# Patient Record
Sex: FEMALE | Race: WHITE | Employment: UNEMPLOYED | URBAN - METROPOLITAN AREA
[De-identification: names, ages, dates, MRNs, and addresses within clinical notes are randomized per-mention and may not be internally consistent; named-entity substitution may affect disease eponyms.]

---

## 2017-01-23 ENCOUNTER — OFFICE VISIT (OUTPATIENT)
Dept: FAMILY MEDICINE | Facility: CLINIC | Age: 22
End: 2017-01-23

## 2017-01-23 VITALS
BODY MASS INDEX: 23.64 KG/M2 | OXYGEN SATURATION: 100 % | HEART RATE: 109 BPM | WEIGHT: 156 LBS | SYSTOLIC BLOOD PRESSURE: 116 MMHG | HEIGHT: 68 IN | DIASTOLIC BLOOD PRESSURE: 73 MMHG | RESPIRATION RATE: 14 BRPM | TEMPERATURE: 98.1 F

## 2017-01-23 DIAGNOSIS — E55.9 VITAMIN D DEFICIENCY: ICD-10-CM

## 2017-01-23 DIAGNOSIS — F45.9 SOMATOFORM DISORDER: ICD-10-CM

## 2017-01-23 DIAGNOSIS — N80.9 ENDOMETRIOSIS: ICD-10-CM

## 2017-01-23 DIAGNOSIS — G89.29 CHRONIC ABDOMINAL PAIN: Primary | ICD-10-CM

## 2017-01-23 DIAGNOSIS — E16.2 HYPOGLYCEMIA: ICD-10-CM

## 2017-01-23 DIAGNOSIS — R09.81 NASAL CONGESTION: ICD-10-CM

## 2017-01-23 DIAGNOSIS — K83.4 SPHINCTER OF ODDI DYSFUNCTION: ICD-10-CM

## 2017-01-23 DIAGNOSIS — F33.1 MAJOR DEPRESSIVE DISORDER, RECURRENT EPISODE, MODERATE (H): ICD-10-CM

## 2017-01-23 DIAGNOSIS — L70.0 ACNE VULGARIS: ICD-10-CM

## 2017-01-23 DIAGNOSIS — L85.8 KP (KERATOSIS PILARIS): ICD-10-CM

## 2017-01-23 DIAGNOSIS — R06.2 WHEEZE: ICD-10-CM

## 2017-01-23 DIAGNOSIS — R10.9 CHRONIC ABDOMINAL PAIN: Primary | ICD-10-CM

## 2017-01-23 DIAGNOSIS — K86.1 IDIOPATHIC CHRONIC PANCREATITIS (H): ICD-10-CM

## 2017-01-23 DIAGNOSIS — R10.9 RECURRING ABDOMINAL PAIN: ICD-10-CM

## 2017-01-23 RX ORDER — GABAPENTIN 300 MG/1
300 CAPSULE ORAL 3 TIMES DAILY
Qty: 90 CAPSULE | Refills: 5 | Status: ON HOLD | OUTPATIENT
Start: 2017-01-23 | End: 2018-01-04

## 2017-01-23 RX ORDER — ONDANSETRON 4 MG/1
4 TABLET, ORALLY DISINTEGRATING ORAL EVERY 8 HOURS PRN
Qty: 40 TABLET | Refills: 5 | Status: ON HOLD | OUTPATIENT
Start: 2017-01-23 | End: 2017-12-13

## 2017-01-23 RX ORDER — ERGOCALCIFEROL 1.25 MG/1
CAPSULE, LIQUID FILLED ORAL
Qty: 8 CAPSULE | Refills: 5 | Status: ON HOLD | OUTPATIENT
Start: 2017-01-23 | End: 2017-12-06

## 2017-01-23 RX ORDER — FLUTICASONE PROPIONATE 50 MCG
2 SPRAY, SUSPENSION (ML) NASAL DAILY
Qty: 16 G | Refills: 3 | Status: SHIPPED | OUTPATIENT
Start: 2017-01-23

## 2017-01-23 RX ORDER — LEVALBUTEROL TARTRATE 45 UG/1
2 AEROSOL, METERED ORAL EVERY 6 HOURS PRN
Qty: 1 INHALER | Refills: 1 | Status: SHIPPED | OUTPATIENT
Start: 2017-01-23

## 2017-01-23 RX ORDER — NORTRIPTYLINE HCL 10 MG
CAPSULE ORAL
Qty: 60 CAPSULE | Refills: 5 | Status: ON HOLD | OUTPATIENT
Start: 2017-01-23 | End: 2017-12-13

## 2017-01-23 RX ORDER — LANCETS
EACH MISCELLANEOUS
Qty: 1 BOX | Refills: 5 | Status: ON HOLD | OUTPATIENT
Start: 2017-01-23 | End: 2017-12-06

## 2017-01-23 RX ORDER — TRIAMCINOLONE ACETONIDE 1 MG/G
CREAM TOPICAL 2 TIMES DAILY
Qty: 45 G | Refills: 3 | Status: ON HOLD | OUTPATIENT
Start: 2017-01-23 | End: 2017-12-06

## 2017-01-23 RX ORDER — NORETHINDRONE ACETATE 5 MG
5 TABLET ORAL DAILY
Qty: 90 TABLET | Refills: 1 | Status: SHIPPED | OUTPATIENT
Start: 2017-01-23

## 2017-01-23 RX ORDER — ADAPALENE 0.1 G/100G
CREAM TOPICAL
Qty: 45 G | Refills: 3 | Status: ON HOLD | OUTPATIENT
Start: 2017-01-23 | End: 2017-12-06

## 2017-01-23 ASSESSMENT — ANXIETY QUESTIONNAIRES
5. BEING SO RESTLESS THAT IT IS HARD TO SIT STILL: NOT AT ALL
6. BECOMING EASILY ANNOYED OR IRRITABLE: NOT AT ALL
3. WORRYING TOO MUCH ABOUT DIFFERENT THINGS: SEVERAL DAYS
7. FEELING AFRAID AS IF SOMETHING AWFUL MIGHT HAPPEN: NOT AT ALL
IF YOU CHECKED OFF ANY PROBLEMS ON THIS QUESTIONNAIRE, HOW DIFFICULT HAVE THESE PROBLEMS MADE IT FOR YOU TO DO YOUR WORK, TAKE CARE OF THINGS AT HOME, OR GET ALONG WITH OTHER PEOPLE: NOT DIFFICULT AT ALL
GAD7 TOTAL SCORE: 2
2. NOT BEING ABLE TO STOP OR CONTROL WORRYING: NOT AT ALL
1. FEELING NERVOUS, ANXIOUS, OR ON EDGE: SEVERAL DAYS

## 2017-01-23 ASSESSMENT — PATIENT HEALTH QUESTIONNAIRE - PHQ9: 5. POOR APPETITE OR OVEREATING: NOT AT ALL

## 2017-01-23 ASSESSMENT — PAIN SCALES - GENERAL: PAINLEVEL: MILD PAIN (3)

## 2017-01-23 NOTE — PATIENT INSTRUCTIONS
Vitamin D  Does this test have other names?  25-hydroxyvitamin D (25-high-DROX-ee-VIE-tuh-min D), 25(OH)D  What is this test?  Vitamin D is mainly found in fortified dairy foods, juice, breakfast cereal, and certain fish. This vitamin plays many roles in the body. But because it helps the body absorb calcium from foods and supplements, it's particularly important for bone health. Vitamin D has many additional roles in the body.  Vitamin D comes in several forms. When ultraviolet light, such as sunlight, hits your skin, it creates vitamin D3. D2 is used to fortify dairy foods. Both of these are further processed by your liver and kidneys into a form your body can use. Most tests for vitamin D check the level of a form circulating in the body called 25-hydroxyvitamin D, also called 25(OH)D.   Why do I need this test?  Vitamin D testing has become much more popular in recent years. Your healthcare provider may check your vitamin D levels to find out if you have any risks to bone health. These might be:    Low calcium    Soft bones caused by low vitamin D or problems using it (osteomalacia)    Osteopenia    Osteoporosis    Rickets, in children  You may also need this test if you are at risk for low vitamin D levels. Risks include:    Being an older adult    Having difficulty absorbing fat from your diet    Having chronic kidney disease    Have dark skin pigmentation    Being a  baby  Vitamin D has many effects in the body. You may need this test to help your provider diagnose or treat:    Problems with the parathyroid gland    Cancer    Autoimmune diseases such as multiple sclerosis and Crohn's disease    Psoriasis    Asthma    Weakness or falls    What other tests might I have along with this test?  A healthcare provider may also want to check your parathyroid hormone levels and your calcium levels.   What do my test results mean?  A result for a lab test may be affected by many things, including the method  the laboratory uses to do the test. If your test results are different from the normal value, you may not have a problem. To learn what the results mean for you, talk with your healthcare provider.  Children and adults need more than 30 nanograms per milliliter (ng/ml) of vitamin D. The optimal level of 25(OH)D is usually between 30 and 60 ng/mL. Recommended daily amounts range from 400 to 800 international units (IU) per day based on your age.  Levels lower than normal can mean you are:    Not making enough vitamin D on your own    Not getting enough vitamin D in your diet    Not absorbing vitamin D from your food as you should  Lower levels may also mean that your body is not converting the vitamin as it should. This might be because of kidney or liver disease.  Above-normal levels may be a sign that you're taking too much in supplement form.   How is this test done?  The test requires a blood sample, which is drawn through a needle from a vein in your arm.  Does this test pose any risks?  Taking a blood sample with a needle carries risks that include bleeding, infection, bruising, and a sense of lightheadedness. When the needle pricks your arm, you may feel a slight stinging sensation or pain. Afterward, the site may be slightly sore.   What might affect my test results?  The amount of time you spend in the sunlight, your diet, and whether you take vitamin D in supplement form can affect your vitamin D levels. Ask your healthcare provider if any health conditions you have or medicines you take could affect your results.  How do I get ready for this test?  Tell your healthcare provider if you take vitamin D supplements. Also be sure your provider knows about all medicines, herbs, vitamins, and supplements you are taking. This includes medicines that don't need a prescription and any illicit drugs you may use.     7501-8447 [employer]. [address] [place] [address], [place] [address]. All rights reserved.  This information is not intended as a substitute for professional medical care. Always follow your healthcare professional's instructions.

## 2017-01-23 NOTE — PROGRESS NOTES
HPI:       Alexandra Melgoza is a 21 year old who presents for the following  Patient presents with:  Medication Request: Refill. Student in [place].     Alexandra is a 21 year old female who presents with her mother Leeann for refills on nearly all of her medications. She is new to this writer and new to this clinic. Alexandra reports she lives in [place] but used to live in [place] and still receives some of her health care in [place]. Most recently, she had some insurance changes on 1/1/17  And she's had difficulty getting her medications covered in [place]. She would like her meds refilled, she reports she is unable to get in to see her PCP prior to leaving today to head back to [place]. She does have a PCP she sees in [place] who helps refill her controlled medications. Patient's med list was reviewed along with reasons she is taking medication and if med refills were needed.     Recently, she got a kidney infection, went to the ED, she is still having some dysuria and is still taking her antibiotic, she will finish within the next 24 hours, she still has some mild dysuria. At night she spikes a low grade fever. It is resolving. She has some concerns that she may have a bladder infection. She is on a 7 days course of Keflex 500 q 12 hrs. She will finish this medication, no refills needed.       Hx of Chronic pancreatitis. Uses Kytril for nausea/vomiting and this was recently refilled 12/29/16, no refills needed. She Nausea last night, her last emesis was 3 days. She typically has emesis on a weekly basis. She's gone 2-3 days without vomiting. On the medication she will go one week. PCP watches weight and she will follow up with nutritionist. 2 months ago hospitalized for pancreatitis for 9 days in [place], lost weight but has regained it. Weight 156 and stable. She gets tooth cleanings to monitor the effect of the emesis acid on her teeth.    Ativan 0.6 mg tablet- PCP in [place] fills, takes as needed for anxiety,  uses on average once a day without Kytril as her anxiety is worse if she has increased N/V. In the past it was scheduled. In the past she was taking it 2 x day. Now prn, typically uses once or twice a day.     Hydromorphone filled by PCP 3-4 x a day as needed. Her pain comes on after eating anything. She eats small amounts more frequently. She takes it after meals if she has pain only if the pain isn't first relieved by the Tramadol. She has hx of diarrhea in the past, now stool is normal. BM at least once a day sometimes twice. She did not need any pain medication until two months ago after being hospitalized for acute on chronic pancreatitis.      Gabapentin 300 mg po daily, she takes am, afternoon evening for pain.      Takes Zenpep prior to eating or ingesting anything. In the past she lost 30 pounds, almost had GJ tube placed, Zenpep helps with pain. She takes 1-5 with snacks and meals and can take up to 15 per day. She takes at most 3- before during and after a meal 3 times a day (min 9 tabs a day).    Nortriptyline 10 mg; 2 tabs at bedtime for anxiety and chronic pain. No side effects, when she first started she had dry mouth.     OB/Gyn does lupron injection. She is due in 2 months. Treatment for endometriosis.     No diabetes but sometimes gets hypoglycemia, blood sugar can get low- 40s-50s and she has hx of hypoglycemia. She gets tingly when he blood sugar is low. She last took her glucose 40% gel last night, she takes on average 2 x a day. She finds she takes it before meals. She doesn't routinely check her blood sugars. She hasn't needed to use her glucagon.     Takes zofran as needed. She will take this if she doesn't have improvement with the Kytril.       Aygestin, for endometriosis. Helps with hot flashes.    Uses metrogel as needed to skin for keratosis pilaris on arms, it started when she became lactose intolerant.    Uses adapalene for face for break outs of skin and acne.    Uses rizatriptan once  or twice a year for migraines. Still has refills.     GI provider gives infusion orders. She will get infusions of LR For chronic pancreatitis and dehydration.     Tramadol taking max 4 tablets daily, takes 2-3 as needed for stomach pain. Uses this first prior to hydromorphone. She restarted pain meds 2 months ago.     Unsure of last vitamin D. Has been on vitamin D replacement for 2 years. She is open to getting her vitamin D level checked.     She uses kenalog for eczema to her hands.    She had lab work performed by GI 12/12/16    Problem, Medication and Allergy Lists were reviewed and are current.  Patient is   a new patient to this clinic and so  I reviewed/updated the Past Medical History, the Family History and the Social History. ,   Past Medical History   Diagnosis Date     Chronic abdominal pain      Ovarian cysts      Endometriosis      Mild intermittent asthma      Cholecystitis      s/p cholecystectomy     Somatoform disorder      Anxiety      Depression      PONV (postoperative nausea and vomiting)      Asthma      Chronic pain      Sphincter of Oddi dysfunction      Cyclic vomiting syndrome 10/27/2012     Vasovagal syncope      Pseudoseizures      Migraines      Pancreatic disease      Hypoglycaemia      Chronic infection      mrsa   ,   Family History     Problem (# of Occurrences) Relation (Name,Age of Onset)    Allergies (1) Maternal Grandmother    Anxiety Disorder (1) Other (paternal cousin)    Bipolar Disorder (1) Other (paternal cousin)    CEREBROVASCULAR DISEASE (1) Maternal Grandfather    Cardiovascular (1) Maternal Grandfather    DIABETES (1) Paternal Uncle    Depression (1) Paternal Grandmother    Depression/Anxiety (1) Maternal Grandfather    GASTROINTESTINAL DISEASE (1) Maternal Grandfather    Hypertension (1) Father    Hypoglycemia (1) Brother       Negative family history of: CANCER       and   Social History     Social History     Marital Status: Single     Spouse Name: N/A     Number  "of Children: N/A     Years of Education: N/A     Social History Main Topics     Smoking status: Never Smoker      Smokeless tobacco: Never Used     Alcohol Use: No     Drug Use: No     Sexual Activity: No     Other Topics Concern     Parent/Sibling W/ Cabg, Mi Or Angioplasty Before 65f 55m? 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, and works part-time     Focusing on DBT therapy - individual and group therapy    Currently living with her mother at her grandmother's home        Considering going to nursing school            Review of Systems:   Review of Systems    ROS: 10 point ROS neg other than the symptoms noted above in the HPI.         Physical Exam:   Patient Vitals for the past 24 hrs:   BP Temp Temp src Pulse Resp SpO2 Height Weight   01/23/17 1107 116/73 mmHg 98.1  F (36.7  C) Oral 109 14 100 % 5' 7.8\" (172.2 cm) 156 lb (70.761 kg)     Body mass index is 23.86 kg/(m^2).  Vital signs normal except  (pt reports this is normal for her)     Physical Exam   Constitutional: She is oriented to person, place, and time. She appears well-developed and well-nourished.   HENT:   Head: Normocephalic and atraumatic.   Nose: Nose normal.   Mouth/Throat: Oropharynx is clear and moist.   Eyes: Pupils are equal, round, and reactive to light.   Neck: Neck supple.   Cardiovascular: Normal rate and regular rhythm.    Pulmonary/Chest: Effort normal and breath sounds normal.   Lymphadenopathy:     She has no cervical adenopathy.   Neurological: She is alert and oriented to person, place, and time.   Skin: Skin is warm and dry.   Psychiatric: Her behavior is normal. Her mood appears anxious.         Results:      Results from the last 24 hoursNo results found. However, due to the size of the patient record, not all encounters were searched. Please check Results Review for a complete set of results.  Assessment and Plan     Alexandra was seen today for medication request.   21 year old female, PCP " Dr. Baez, patient unable to get same day appointment for refills and is leaving to head back to [place] where she lives. She does get some care in [place] and will stay with her mother Leeann who lives in [place]. She is requesting refills until she can get her insurance straightened out as well as get appointment for PCP. All medications refills for 6 months. Reviewed most recent lab work. Will get vitamin D level, if normal, will call patient and tell her to hold vitamin D supplementation. Total time spent reviewing each medication and getting refills and reviewing chart history is 45 min of 50 minute visit.     Diagnoses and all orders for this visit:    Chronic abdominal pain  -     gabapentin (NEURONTIN) 300 MG capsule; Take 1 capsule (300 mg) by mouth 3 times daily    Sphincter of Oddi dysfunction  -     gabapentin (NEURONTIN) 300 MG capsule; Take 1 capsule (300 mg) by mouth 3 times daily    Recurring abdominal pain  -     gabapentin (NEURONTIN) 300 MG capsule; Take 1 capsule (300 mg) by mouth 3 times daily    PROVISIONAL DX: Somatoform disorder  -     gabapentin (NEURONTIN) 300 MG capsule; Take 1 capsule (300 mg) by mouth 3 times daily    Idiopathic chronic pancreatitis (H)  -     amylase-lipase-protease (ZENPEP) 59762 UNITS CPEP; Take 1-5 with snacks and meals, up to 15 per day.  -     ondansetron (ZOFRAN ODT) 4 MG ODT tab; Take 1 tablet (4 mg) by mouth every 8 hours as needed for nausea or vomiting    Major depressive disorder, recurrent episode, moderate (H)  -     nortriptyline (PAMELOR) 10 MG capsule; Take 2 tablets by mouth at bedtime.  -     vitamin D (ERGOCALCIFEROL) 68883 UNIT capsule; Take one capsule by mouth twice a week    Endometriosis  -     norethindrone (AYGESTIN) 5 MG tablet; Take 1 tablet (5 mg) by mouth daily    Acne vulgaris  -     adapalene (DIFFERIN) 0.1 % cream; Apply pea-sized amount to face, chest, and back at bedtime. Initially start every 2 days, then every other day, then  daily as tolerated.    KP (keratosis pilaris)  -     triamcinolone (KENALOG) 0.1 % cream; Apply topically 2 times daily    Nasal congestion  -     fluticasone (FLONASE) 50 MCG/ACT spray; Spray 2 sprays into both nostrils daily    Hypoglycemia  -     blood glucose monitoring (ACCU-CHEK FASTCLIX) lancets; Use to test blood sugar 2 times daily or as directed.  -     blood glucose monitoring (NO BRAND SPECIFIED) test strip; Use to test blood sugars 2 times daily or as directed    Wheeze  -     levalbuterol (XOPENEX HFA) 45 MCG/ACT Inhaler; Inhale 2 puffs into the lungs every 6 hours as needed for shortness of breath / dyspnea    Vitamin D deficiency  -     Vitamin D Deficiency      Medications Discontinued During This Encounter   Medication Reason     fludrocortisone (FLORINEF) 0.1 MG tablet Therapy completed     Options for treatment and follow-up care were reviewed with the patient. Alexandra Melgoza  engaged in the decision making process and verbalized understanding of the options discussed and agreed with the final plan.    Ysabel Goddard, CATIA    Patient Instructions     Vitamin D  Does this test have other names?  25-hydroxyvitamin D (25-high-DROX-ee-VIE-tuh-min D), 25(OH)D  What is this test?  Vitamin D is mainly found in fortified dairy foods, juice, breakfast cereal, and certain fish. This vitamin plays many roles in the body. But because it helps the body absorb calcium from foods and supplements, it's particularly important for bone health. Vitamin D has many additional roles in the body.  Vitamin D comes in several forms. When ultraviolet light, such as sunlight, hits your skin, it creates vitamin D3. D2 is used to fortify dairy foods. Both of these are further processed by your liver and kidneys into a form your body can use. Most tests for vitamin D check the level of a form circulating in the body called 25-hydroxyvitamin D, also called 25(OH)D.   Why do I need this test?  Vitamin D testing has become much  more popular in recent years. Your healthcare provider may check your vitamin D levels to find out if you have any risks to bone health. These might be:    Low calcium    Soft bones caused by low vitamin D or problems using it (osteomalacia)    Osteopenia    Osteoporosis    Rickets, in children  You may also need this test if you are at risk for low vitamin D levels. Risks include:    Being an older adult    Having difficulty absorbing fat from your diet    Having chronic kidney disease    Have dark skin pigmentation    Being a  baby  Vitamin D has many effects in the body. You may need this test to help your provider diagnose or treat:    Problems with the parathyroid gland    Cancer    Autoimmune diseases such as multiple sclerosis and Crohn's disease    Psoriasis    Asthma    Weakness or falls    What other tests might I have along with this test?  A healthcare provider may also want to check your parathyroid hormone levels and your calcium levels.   What do my test results mean?  A result for a lab test may be affected by many things, including the method the laboratory uses to do the test. If your test results are different from the normal value, you may not have a problem. To learn what the results mean for you, talk with your healthcare provider.  Children and adults need more than 30 nanograms per milliliter (ng/ml) of vitamin D. The optimal level of 25(OH)D is usually between 30 and 60 ng/mL. Recommended daily amounts range from 400 to 800 international units (IU) per day based on your age.  Levels lower than normal can mean you are:    Not making enough vitamin D on your own    Not getting enough vitamin D in your diet    Not absorbing vitamin D from your food as you should  Lower levels may also mean that your body is not converting the vitamin as it should. This might be because of kidney or liver disease.  Above-normal levels may be a sign that you're taking too much in supplement form.   How  is this test done?  The test requires a blood sample, which is drawn through a needle from a vein in your arm.  Does this test pose any risks?  Taking a blood sample with a needle carries risks that include bleeding, infection, bruising, and a sense of lightheadedness. When the needle pricks your arm, you may feel a slight stinging sensation or pain. Afterward, the site may be slightly sore.   What might affect my test results?  The amount of time you spend in the sunlight, your diet, and whether you take vitamin D in supplement form can affect your vitamin D levels. Ask your healthcare provider if any health conditions you have or medicines you take could affect your results.  How do I get ready for this test?  Tell your healthcare provider if you take vitamin D supplements. Also be sure your provider knows about all medicines, herbs, vitamins, and supplements you are taking. This includes medicines that don't need a prescription and any illicit drugs you may use.     5325-2277 [employer]. [address] [place] [address], [place] [address]. All rights reserved. This information is not intended as a substitute for professional medical care. Always follow your healthcare professional's instructions.

## 2017-01-23 NOTE — NURSING NOTE
"21 year old  Chief Complaint   Patient presents with     Medication Request     Refill. Student in [place].        Blood pressure 116/73, pulse 109, temperature 98.1  F (36.7  C), temperature source Oral, resp. rate 14, height 5' 7.8\" (172.2 cm), weight 156 lb (70.761 kg), SpO2 100 %, not currently breastfeeding. Body mass index is 23.86 kg/(m^2).  BP completed using cuff size: regular    Patient Active Problem List   Diagnosis     Chronic abdominal pain     Nausea     PROVISIONAL DX: Somatoform disorder     Opioid dependence (H)     Encounter for long-term opiate analgesic use     Thrombocytopenia (H)     Dystonia     Sphincter of Oddi dysfunction     Dehydration     Vomiting     Migraine     Pancreatitis     Anxiety     Dysmenorrhea     Seizure- worked up and decided that it was pseudoseizure      POTS (postural orthostatic tachycardia syndrome)     Shingles (herpes zoster) polyneuropathy     Postherpetic neuralgia     Recurring abdominal pain     Cyclic vomiting syndrome     Dysuria     Endometriosis     Asthma     Eating disorder     Major depressive disorder, recurrent episode, mild (H)     Rash     Major depressive disorder, recurrent episode, moderate (H)     Anxiety state     Myalgia and myositis     Urge incontinence     Nausea & vomiting     Hypoglycemia     Palpitations       Wt Readings from Last 2 Encounters:   01/23/17 156 lb (70.761 kg)   12/12/16 155 lb 3.3 oz (70.4 kg)     BP Readings from Last 3 Encounters:   01/23/17 116/73   12/12/16 119/73   12/10/16 117/76       Current Outpatient Prescriptions   Medication     granisetron (KYTRIL) 1 MG tablet     LORazepam (ATIVAN) 0.5 MG tablet     HYDROMORPHONE HCL PO     gabapentin (NEURONTIN) 300 MG capsule     amylase-lipase-protease (ZENPEP) 48364 UNITS CPEP     amylase-lipase-protease (ZENPEP) 97439 UNITS CPEP     nortriptyline (PAMELOR) 10 MG capsule     leuprolide (LUPRON DEPOT) 11.25 MG injection     glucose (GLUCOSE 15) 40 % GEL     granisetron (KYTRIL) " 1 MG tablet     norethindrone (AYGESTIN) 5 MG tablet     metroNIDAZOLE (METROGEL) 0.75 % gel     adapalene (DIFFERIN) 0.1 % cream     RIZATRIPTAN BENZOATE PO     lactated ringers SOLN     traMADol (ULTRAM) 50 MG tablet     vitamin D (ERGOCALCIFEROL) 44848 UNIT capsule     triamcinolone (KENALOG) 0.1 % cream     tretinoin (RETIN-A) 0.025 % cream     ONE TOUCH LANCETS MISC     Blood Glucose Monitoring Suppl KIT     fluticasone (FLONASE) 50 MCG/ACT nasal spray     blood glucose (ACCU-CHEK SMARTVIEW) test strip     blood glucose monitoring (ACCU-CHEK FASTCLIX) lancets     leuprolide (LUPRON DEPOT) 11.25 MG injection     ibuprofen (ADVIL) 200 MG capsule     levalbuterol (XOPENEX HFA) 45 MCG/ACT inhaler     fluticasone-salmeterol (ADVAIR) 250-50 MCG/DOSE diskus inhaler     glucagon (GLUCAGON EMERGENCY) 1 MG injection     No current facility-administered medications for this visit.       Social History   Substance Use Topics     Smoking status: Never Smoker      Smokeless tobacco: Never Used     Alcohol Use: No       Health Maintenance Due   Topic Date Due     HPV IMMUNIZATION (2 of 3 - Female 3 Dose Series) 04/11/2011     URINE DRUG SCREEN Q1 YR  04/03/2014     ASTHMA CONTROL TEST Q6 MOS (NO INBASKET)  04/28/2016     PHQ-9 Q1 MONTH (NO INBASKET)  05/14/2016     PHQ-9 Q3 MONTHS (NO INBASKET)  07/14/2016     TANISHA QUESTIONNAIRE 1 YEAR  10/28/2016     DEPRESSION ACTION PLAN Q1 YR (NO INBASKET)  10/28/2016     ASTHMA ACTION PLAN Q1 YR (NO INBASKET)  02/11/2017       PAP      NIL   12/18/2014    PHQ-2 ( 1999 Pf[employer]) 1/23/2017 10/28/2015   Q1: Little interest or pleasure in doing things 0 0   Q2: Feeling down, depressed or hopeless 0 0   PHQ-2 Score 0 0       PHQ-9 SCORE 10/28/2015 2/11/2016 4/14/2016 1/23/2017   Total Score - - - -   Total Score - - - -   Total Score 1 4 0 1   Some encounter information is confidential and restricted. Go to Review Flowsheets activity to see all data.       TANISHA-7 SCORE 5/8/2015 10/28/2015  1/23/2017   Total Score - 0 2   Total Score 10 - -   Total Score [facility] Adult - - -       Renita Oscar CMA  January 23, 2017 11:11 AM

## 2017-01-23 NOTE — MR AVS SNAPSHOT
After Visit Summary   1/23/2017    Alexandra Melgoza    MRN: [id number]           Patient Information     Date Of Birth          1995        Visit Information        Provider Department      1/23/2017 11:00 AM Ysabel Goddard NP [facility]        Today's Diagnoses     Chronic abdominal pain    -  1     Sphincter of Oddi dysfunction         Recurring abdominal pain         PROVISIONAL DX: Somatoform disorder         Idiopathic chronic pancreatitis (H)         Major depressive disorder, recurrent episode, moderate (H)         Endometriosis         Acne vulgaris         KP (keratosis pilaris)         Nasal congestion         Hypoglycemia         Wheeze         Vitamin D deficiency           Care Instructions      Vitamin D  Does this test have other names?  25-hydroxyvitamin D (25-high-DROX-ee-VIE-tuh-min D), 25(OH)D  What is this test?  Vitamin D is mainly found in fortified dairy foods, juice, breakfast cereal, and certain fish. This vitamin plays many roles in the body. But because it helps the body absorb calcium from foods and supplements, it's particularly important for bone health. Vitamin D has many additional roles in the body.  Vitamin D comes in several forms. When ultraviolet light, such as sunlight, hits your skin, it creates vitamin D3. D2 is used to fortify dairy foods. Both of these are further processed by your liver and kidneys into a form your body can use. Most tests for vitamin D check the level of a form circulating in the body called 25-hydroxyvitamin D, also called 25(OH)D.   Why do I need this test?  Vitamin D testing has become much more popular in recent years. Your healthcare provider may check your vitamin D levels to find out if you have any risks to bone health. These might be:    Low calcium    Soft bones caused by low vitamin D or problems using it (osteomalacia)    Osteopenia    Osteoporosis    Rickets, in children  You may also need this test if you are  at risk for low vitamin D levels. Risks include:    Being an older adult    Having difficulty absorbing fat from your diet    Having chronic kidney disease    Have dark skin pigmentation    Being a  baby  Vitamin D has many effects in the body. You may need this test to help your provider diagnose or treat:    Problems with the parathyroid gland    Cancer    Autoimmune diseases such as multiple sclerosis and Crohn's disease    Psoriasis    Asthma    Weakness or falls    What other tests might I have along with this test?  A healthcare provider may also want to check your parathyroid hormone levels and your calcium levels.   What do my test results mean?  A result for a lab test may be affected by many things, including the method the laboratory uses to do the test. If your test results are different from the normal value, you may not have a problem. To learn what the results mean for you, talk with your healthcare provider.  Children and adults need more than 30 nanograms per milliliter (ng/ml) of vitamin D. The optimal level of 25(OH)D is usually between 30 and 60 ng/mL. Recommended daily amounts range from 400 to 800 international units (IU) per day based on your age.  Levels lower than normal can mean you are:    Not making enough vitamin D on your own    Not getting enough vitamin D in your diet    Not absorbing vitamin D from your food as you should  Lower levels may also mean that your body is not converting the vitamin as it should. This might be because of kidney or liver disease.  Above-normal levels may be a sign that you're taking too much in supplement form.   How is this test done?  The test requires a blood sample, which is drawn through a needle from a vein in your arm.  Does this test pose any risks?  Taking a blood sample with a needle carries risks that include bleeding, infection, bruising, and a sense of lightheadedness. When the needle pricks your arm, you may feel a slight stinging  sensation or pain. Afterward, the site may be slightly sore.   What might affect my test results?  The amount of time you spend in the sunlight, your diet, and whether you take vitamin D in supplement form can affect your vitamin D levels. Ask your healthcare provider if any health conditions you have or medicines you take could affect your results.  How do I get ready for this test?  Tell your healthcare provider if you take vitamin D supplements. Also be sure your provider knows about all medicines, herbs, vitamins, and supplements you are taking. This includes medicines that don't need a prescription and any illicit drugs you may use.     1828-9629 [employer]. [address] [place] [address], [place] [address]. All rights reserved. This information is not intended as a substitute for professional medical care. Always follow your healthcare professional's instructions.              Follow-ups after your visit        Who to contact     Please call your clinic at [phone] to:    Ask questions about your health    Make or cancel appointments    Discuss your medicines    Learn about your test results    Speak to your doctor   If you have compliments or concerns about an experience at your clinic, or if you wish to file a complaint, please contact [facility] Patient Relations at [phone] or email us at [email or web address]         Additional Information About Your Visit        [employer]" Information     [employer] gives you secure access to your electronic health record. If you see a primary care provider, you can also send messages to your care team and make appointments. If you have questions, please call your primary care clinic.  If you do not have a primary care provider, please call [phone] and they will assist you.      [employer] is an electronic gateway that provides easy, online access to your medical records. With [employer], you can request a clinic appointment,  "read your test results, renew a prescription or communicate with your care team.     To access your existing account, please contact your [facility] or call [phone] for assistance.        Care EveryWhere ID     This is your Care EveryWhere ID. This could be used by other organizations to access your [place] medical records  [id number]        Your Vitals Were     Pulse Temperature Respirations Height BMI (Body Mass Index) Pulse Oximetry    109 98.1  F (36.7  C) (Oral) 14 5' 7.8\" (172.2 cm) 23.86 kg/m2 100%    Breastfeeding?                   No            Blood Pressure from Last 3 Encounters:   01/23/17 116/73   12/12/16 119/73   12/10/16 117/76    Weight from Last 3 Encounters:   01/23/17 156 lb (70.761 kg)   12/12/16 155 lb 3.3 oz (70.4 kg)   12/09/16 156 lb (70.761 kg)              We Performed the Following     Vitamin D Deficiency          Today's Medication Changes          These changes are accurate as of: 1/23/17 12:00 PM.  If you have any questions, ask your nurse or doctor.               Start taking these medicines.        Dose/Directions    blood glucose monitoring test strip   Commonly known as:  no brand specified   Used for:  Hypoglycemia   Started by:  Ysabel Goddard NP        Use to test blood sugars 2 times daily or as directed   Quantity:  100 strip   Refills:  5       ondansetron 4 MG ODT tab   Commonly known as:  ZOFRAN ODT   Used for:  Idiopathic chronic pancreatitis (H)   Started by:  Ysabel Goddard NP        Dose:  4 mg   Take 1 tablet (4 mg) by mouth every 8 hours as needed for nausea or vomiting   Quantity:  40 tablet   Refills:  5         These medicines have changed or have updated prescriptions.        Dose/Directions    nortriptyline 10 MG capsule   Commonly known as:  PAMELOR   This may have changed:  additional instructions   Used for:  Major depressive disorder, recurrent episode, moderate (H)   Changed by:  Ysabel Goddard NP        " Take 2 tablets by mouth at bedtime.   Quantity:  60 capsule   Refills:  5            Where to get your medicines      These medications were sent to [facility] - [place], [place] - [address] [place] [facility] [address], [place] [address]    Hours:  called pharm.  they do accept faxes Phone:  [phone]    - adapalene 0.1 % cream  - amylase-lipase-protease 55520 UNITS Cpep  - blood glucose monitoring lancets  - blood glucose monitoring test strip  - fluticasone 50 MCG/ACT spray  - gabapentin 300 MG capsule  - levalbuterol 45 MCG/ACT Inhaler  - norethindrone 5 MG tablet  - nortriptyline 10 MG capsule  - ondansetron 4 MG ODT tab  - triamcinolone 0.1 % cream  - vitamin D 27624 UNIT capsule             Primary Care Provider Office Phone # Fax #    Quyen Baez [phone]       [address] [place] [address]        Thank you!     Thank you for choosing [facility]  for your care. Our goal is always to provide you with excellent care. Hearing back from our patients is one way we can continue to improve our services. Please take a few minutes to complete the written survey that you may receive in the mail after your visit with us. Thank you!             Your Updated Medication List - Protect others around you: Learn how to safely use, store and throw away your medicines at www.disposemymeds.org.          This list is accurate as of: 1/23/17 12:00 PM.  Always use your most recent med list.                   Brand Name Dispense Instructions for use    adapalene 0.1 % cream    DIFFERIN    45 g    Apply pea-sized amount to face, chest, and back at bedtime. Initially start every 2 days, then every other day, then daily as tolerated.       ADVIL 200 MG capsule   Generic drug:  ibuprofen      Take 400 mg by mouth every 4 hours as needed       amylase-lipase-protease 05576 UNITS Cpep    ZENPEP     450 capsule    Take 1-5 with snacks and meals, up to 15 per day.       blood glucose monitoring lancets     1 Box    Use to test blood sugar 2 times daily or as directed.       blood glucose monitoring meter device kit    no brand specified    1 kit    1 kit 2 times daily       blood glucose monitoring test strip    no brand specified    100 strip    Use to test blood sugars 2 times daily or as directed       fluticasone 50 MCG/ACT spray    FLONASE    16 g    Spray 2 sprays into both nostrils daily       fluticasone-salmeterol 250-50 MCG/DOSE diskus inhaler    ADVAIR    1 Inhaler    Inhale 1 puff into the lungs every 12 hours.       gabapentin 300 MG capsule    NEURONTIN    90 capsule    Take 1 capsule (300 mg) by mouth 3 times daily       glucagon 1 MG kit    GLUCAGON EMERGENCY    1 mg    Inject 1 mg into the muscle once for 1 dose       glucose 40 % Gel gel    GLUCOSE 15    1 Tube    Take 15 g by mouth every hour as needed for low blood sugar       granisetron 1 MG tablet    KYTRIL    6 tablet    Take 1 tablet (1 mg) by mouth every 12 hours as needed for nausea       HYDROMORPHONE HCL PO      Take 2 mg by mouth every 4 hours as needed for moderate to severe pain       lactated ringers Soln     83117 mL    Inject 2,000 mLs into the vein as needed (3 TIMES WEEKLY PRN) 3 TIMES WEEKLY PRN during times of exacerbation of her nausea/vomiting/intractable abdominal pain.  Please page [phone] or call [phone] with questions regarding this therapy plan.  VORB per Dr. Campbell Narayanan, per protocol.       leuprolide 11.25 MG kit    LUPRON DEPOT    1 each    Inject 11.25 mg into the muscle every 3 months       levalbuterol 45 MCG/ACT Inhaler    XOPENEX HFA    1 Inhaler    Inhale 2 puffs into the lungs every 6 hours as needed for shortness of breath / dyspnea       LORazepam 0.5 MG tablet    ATIVAN         metroNIDAZOLE 0.75 % topical gel    METROGEL    45 g    Apply twice daily to entire face       norethindrone 5  MG tablet    AYGESTIN    90 tablet    Take 1 tablet (5 mg) by mouth daily       nortriptyline 10 MG capsule    PAMELOR    60 capsule    Take 2 tablets by mouth at bedtime.       ondansetron 4 MG ODT tab    ZOFRAN ODT    40 tablet    Take 1 tablet (4 mg) by mouth every 8 hours as needed for nausea or vomiting       RIZATRIPTAN BENZOATE PO      Take 5 mg by mouth       traMADol 50 MG tablet    ULTRAM    90 tablet    Take 1-2 tablets ( mg) by mouth every 6 hours as needed for moderate pain (max 4 tabs daily)       triamcinolone 0.1 % cream    KENALOG    45 g    Apply topically 2 times daily       vitamin D 95573 UNIT capsule    ERGOCALCIFEROL    8 capsule    Take one capsule by mouth twice a week

## 2017-01-23 NOTE — Clinical Note
January 25, 2017       TO: Alexandra ROJO Rhona  [address] [facility] [address]       Dear ,    We are writing to inform you of your test results.    Test results indicate you may require additional follow up, see comment below.    Resulted Orders   Vitamin D Deficiency   Result Value Ref Range    Vitamin D Deficiency screening 15 (L) 20 - 75 ug/L      Comment:      Season, race, dietary intake, and treatment affect the concentration of   25-hydroxy-Vitamin D. Values may decrease during winter months and increase   during summer months. Values 20-29 ug/L may indicate Vitamin D insufficiency   and values <20 ug/L may indicate Vitamin D deficiency.   Vitamin D determination is routinely performed by an immunoassay specific for   25 hydroxyvitamin D3.  If an individual is on vitamin D2 (ergocalciferol)   supplementation, please specify 25 OH vitamin D2 and D3 level determination   by   LCMSMS test VITD23.         Your vitamin D level is low. Please take your vitamin D prescription of 50,000 units by mouth once a week. Follow up to have your vitamin D level checked in 2 months.   If you have questions, please call the clinic at [phone].    Sincerely,     Ysabel Goddard DNP, RN, FNP-BC

## 2017-01-24 LAB — DEPRECATED CALCIDIOL+CALCIFEROL SERPL-MC: 15 UG/L (ref 20–75)

## 2017-01-24 ASSESSMENT — ANXIETY QUESTIONNAIRES: GAD7 TOTAL SCORE: 2

## 2017-01-24 ASSESSMENT — PATIENT HEALTH QUESTIONNAIRE - PHQ9: SUM OF ALL RESPONSES TO PHQ QUESTIONS 1-9: 1

## 2017-01-25 ENCOUNTER — TELEPHONE (OUTPATIENT)
Dept: FAMILY MEDICINE | Facility: CLINIC | Age: 22
End: 2017-01-25

## 2017-01-27 ENCOUNTER — TELEPHONE (OUTPATIENT)
Dept: FAMILY MEDICINE | Facility: CLINIC | Age: 22
End: 2017-01-27

## 2017-01-27 NOTE — TELEPHONE ENCOUNTER
Called, prior auth from pharmacy for adapalene and inhaler, unable to leave voicemail, mailbox full. Follow up with PCP.

## 2017-04-27 ENCOUNTER — TELEPHONE (OUTPATIENT)
Dept: FAMILY MEDICINE | Facility: CLINIC | Age: 22
End: 2017-04-27

## 2017-04-27 NOTE — TELEPHONE ENCOUNTER
Called and spoke with Antonia at [facility] and informed her that we need to speak with patient before any refills/prior auths can be done. I tried to call the patient but she did not answer and her voice mail is not available. Renita Oscar, [facility]

## 2017-07-06 ENCOUNTER — TRANSFERRED RECORDS (OUTPATIENT)
Dept: HEALTH INFORMATION MANAGEMENT | Facility: CLINIC | Age: 22
End: 2017-07-06

## 2017-11-14 ENCOUNTER — CARE COORDINATION (OUTPATIENT)
Dept: GASTROENTEROLOGY | Facility: CLINIC | Age: 22
End: 2017-11-14

## 2017-11-14 ENCOUNTER — TELEPHONE (OUTPATIENT)
Dept: GASTROENTEROLOGY | Facility: CLINIC | Age: 22
End: 2017-11-14

## 2017-11-14 RX ORDER — ONDANSETRON 2 MG/ML
4 INJECTION INTRAMUSCULAR; INTRAVENOUS ONCE
Status: CANCELLED
Start: 2017-11-14 | End: 2017-11-14

## 2017-11-14 NOTE — PROGRESS NOTES
Message received:  Alexandra is calling Dr. Narayanan.  She reports she's moved to [place] and gets infusions 3 x per week there (2 L LR plus nausea meds).  She will be in [place] next week and is having problems, she's calling to request orders to come to the  infusion center starting Monday 11/20 for infusions while in [facility].  Has Dr. Naraaynan appointment 11/22. May be here as long as a month, but not sure.  Please call her when orders in place so she can call to schedule an appointment for Monday if possible. [phone]    Updated infusion orders.  Called Alexandra to advise she can schedule an infusion appointment if needed.    Mirza ORTEGA RN Coordinator  Dr. Narayanan, Dr. Joe & Dr. Klein  [facility]  [phone]

## 2017-11-14 NOTE — TELEPHONE ENCOUNTER
Alexandra is calling Dr. Narayanan.  She reports she's moved to [place] and gets infusions 3 x per week there (2 L LR plus nausea meds).  She will be in [place] next week and is having problems, she's calling to request orders to come to the  infusion center starting Monday 11/20 for infusions while in [facility].  Has Dr. Narayanan appointment 11/22. May be here as long as a month, but not sure.    Please call her when orders in place so she can call to schedule an appointment for Monday if possible. [phone]

## 2017-11-15 NOTE — TELEPHONE ENCOUNTER
Spoke with Alexandra today, reminded of upcoming appointment next week 11/22 with Dr. Narayanan.  She plans to attend and has number if needs to reschedule.  I see she got infusion center appointments made for next week as discussed yesterday.

## 2017-11-20 ENCOUNTER — INFUSION THERAPY VISIT (OUTPATIENT)
Dept: INFUSION THERAPY | Facility: CLINIC | Age: 22
End: 2017-11-20
Attending: INTERNAL MEDICINE
Payer: COMMERCIAL

## 2017-11-20 VITALS
SYSTOLIC BLOOD PRESSURE: 116 MMHG | RESPIRATION RATE: 16 BRPM | HEART RATE: 111 BPM | OXYGEN SATURATION: 100 % | DIASTOLIC BLOOD PRESSURE: 81 MMHG

## 2017-11-20 DIAGNOSIS — R11.15 CYCLIC VOMITING SYNDROME: Primary | ICD-10-CM

## 2017-11-20 DIAGNOSIS — K85.90 PANCREATITIS: ICD-10-CM

## 2017-11-20 PROCEDURE — 96374 THER/PROPH/DIAG INJ IV PUSH: CPT

## 2017-11-20 PROCEDURE — 25000128 H RX IP 250 OP 636: Mod: ZF | Performed by: INTERNAL MEDICINE

## 2017-11-20 PROCEDURE — 96361 HYDRATE IV INFUSION ADD-ON: CPT

## 2017-11-20 RX ORDER — ONDANSETRON 2 MG/ML
4 INJECTION INTRAMUSCULAR; INTRAVENOUS ONCE
Status: COMPLETED | OUTPATIENT
Start: 2017-11-20 | End: 2017-11-20

## 2017-11-20 RX ORDER — ONDANSETRON 2 MG/ML
4 INJECTION INTRAMUSCULAR; INTRAVENOUS ONCE
Status: CANCELLED
Start: 2017-11-20 | End: 2017-11-20

## 2017-11-20 RX ORDER — HYDROCODONE BITARTRATE AND ACETAMINOPHEN 5; 325 MG/1; MG/1
1 TABLET ORAL EVERY 4 HOURS PRN
Qty: 18 TABLET | Refills: 0 | Status: ON HOLD | COMMUNITY
Start: 2017-11-20 | End: 2017-12-13

## 2017-11-20 RX ADMIN — SODIUM CHLORIDE, POTASSIUM CHLORIDE, SODIUM LACTATE AND CALCIUM CHLORIDE 1500 ML: 600; 310; 30; 20 INJECTION, SOLUTION INTRAVENOUS at 14:28

## 2017-11-20 RX ADMIN — ONDANSETRON 4 MG: 2 INJECTION INTRAMUSCULAR; INTRAVENOUS at 14:29

## 2017-11-20 NOTE — MR AVS SNAPSHOT
After Visit Summary   11/20/2017    Alexandra Melgoza    MRN: [id number]           Patient Information     Date Of Birth          1995        Visit Information        Provider Department      11/20/2017 2:00 PM UC 45 ATC; UC SPEC INFUSION [facility]        Today's Diagnoses     Cyclic vomiting syndrome    -  1    Pancreatitis           Follow-ups after your visit        Your next 10 appointments already scheduled     Nov 22, 2017  2:00 PM CST   (Arrive by 1:45 PM)   Return Visit with Campbell Narayanan MD   [facility] ([facility])    [address] [place] [address]  4th Floor  [facility] [address] [phone]            Nov 24, 2017  9:00 AM CST   Infusion 180 with UC SPEC INFUSION, UC 47 ATC   [facility] ([facility])    [address] [place] [address]  2nd Floor  [facility] [address] [phone]              Who to contact     If you have questions or need follow up information about today's clinic visit or your schedule please contact [facility] PROC[facility] directly at [phone].  Normal or non-critical lab and imaging results will be communicated to you by [employer], letter or phone within 4 business days after the clinic has received the results. If you do not hear from us within 7 days, please contact the clinic through [employer] or phone. If you have a critical or abnormal lab result, we will notify you by phone as soon as possible.  Submit refill requests through [employer]igl or call your pharmacy and they will forward the refill request to us. Please allow 3 business days for your refill to be completed.          Additional Information About Your Visit        [employer] Information     [employer] gives you secure access to your electronic health record. If you see a primary care provider, you can also send  messages to your care team and make appointments. If you have questions, please call your primary care clinic.  If you do not have a primary care provider, please call [phone] and they will assist you.        Care EveryWhere ID     This is your Care EveryWhere ID. This could be used by other organizations to access your [facility] medical records  [id number]        Your Vitals Were     Pulse Respirations Pulse Oximetry             111 16 100%          Blood Pressure from Last 3 Encounters:   11/20/17 116/81   01/23/17 116/73   12/12/16 119/73    Weight from Last 3 Encounters:   01/23/17 70.8 kg (156 lb)   12/12/16 70.4 kg (155 lb 3.3 oz)   12/09/16 70.8 kg (156 lb)              Today, you had the following     No orders found for display         Today's Medication Changes          These changes are accurate as of: 11/20/17  4:27 PM.  If you have any questions, ask your nurse or doctor.               Stop taking these medicines if you haven't already. Please contact your care team if you have questions.     lactated ringers Soln                    Primary Care Provider Office Phone # Fax #    Quyen Baez [phone]       [address] [place] [address] [address] [place] [address]        Equal Access to Services     RACHAEL BENITEZ : Hadii angelina rodneyo Sokathleenali, waaxda luqadaha, qaybta kaalmada adeegyada, maki smallwood. So [facility] [phone].    ATENCIÓN: Si habla español, tiene a quijano disposición servicios gratuitos de asistencia lingüística. Lllaurie al [phone].    We comply with applicable federal civil rights laws and [place] laws. We do not discriminate on the basis of race, color, national origin, age, disability, sex, sexual orientation, or gender identity.            Thank you!     Thank you for choosing [facility] PROCEDURE  for your care. Our goal is always to provide you with excellent care. Hearing back from our patients is  one way we can continue to improve our services. Please take a few minutes to complete the written survey that you may receive in the mail after your visit with us. Thank you!             Your Updated Medication List - Protect others around you: Learn how to safely use, store and throw away your medicines at www.disposemymeds.org.          This list is accurate as of: 11/20/17  4:27 PM.  Always use your most recent med list.                   Brand Name Dispense Instructions for use Diagnosis    adapalene 0.1 % cream    DIFFERIN    45 g    Apply pea-sized amount to face, chest, and back at bedtime. Initially start every 2 days, then every other day, then daily as tolerated.    Acne vulgaris       ADVIL 200 MG capsule   Generic drug:  ibuprofen      Take 400 mg by mouth every 4 hours as needed        amylase-lipase-protease 02784 UNITS Cpep    ZENPEP    450 capsule    Take 1-5 with snacks and meals, up to 15 per day.    Idiopathic chronic pancreatitis (H)       blood glucose monitoring lancets     1 Box    Use to test blood sugar 2 times daily or as directed.    Hypoglycemia       blood glucose monitoring meter device kit    no brand specified    1 kit    1 kit 2 times daily    Type I (juvenile type) diabetes mellitus without mention of complication, not stated as uncontrolled       blood glucose monitoring test strip    no brand specified    100 strip    Use to test blood sugars 2 times daily or as directed    Hypoglycemia       fluticasone 50 MCG/ACT spray    FLONASE    16 g    Spray 2 sprays into both nostrils daily    Nasal congestion       fluticasone-salmeterol 250-50 MCG/DOSE diskus inhaler    ADVAIR    1 Inhaler    Inhale 1 puff into the lungs every 12 hours.    Wheeze       gabapentin 300 MG capsule    NEURONTIN    90 capsule    Take 1 capsule (300 mg) by mouth 3 times daily    Chronic abdominal pain, Sphincter of Oddi dysfunction, Recurring abdominal pain, Somatoform disorder       glucagon 1 MG kit     GLUCAGON EMERGENCY    1 mg    Inject 1 mg into the muscle once for 1 dose    Hypoglycemia       glucose 40 % Gel gel    GLUCOSE 15    1 Tube    Take 15 g by mouth every hour as needed for low blood sugar        granisetron 1 MG tablet    KYTRIL    6 tablet    Take 1 tablet (1 mg) by mouth every 12 hours as needed for nausea    Cyclical vomiting with nausea, intractability of vomiting not specified       HYDROcodone-acetaminophen 5-325 MG per tablet    NORCO    18 tablet    Take 1 tablet by mouth every 4 hours as needed for moderate to severe pain (Dr. santa in [place] ordered)    Pancreatitis       leuprolide 11.25 MG kit    LUPRON DEPOT (3-MONTH)    1 each    Inject 11.25 mg into the muscle every 3 months    Endometriosis       levalbuterol 45 MCG/ACT Inhaler    XOPENEX HFA    1 Inhaler    Inhale 2 puffs into the lungs every 6 hours as needed for shortness of breath / dyspnea    Wheeze       metroNIDAZOLE 0.75 % topical gel    METROGEL    45 g    Apply twice daily to entire face    Dermatitis, perioral       norethindrone 5 MG tablet    AYGESTIN    90 tablet    Take 1 tablet (5 mg) by mouth daily    Endometriosis       nortriptyline 10 MG capsule    PAMELOR    60 capsule    Take 2 tablets by mouth at bedtime.    Major depressive disorder, recurrent episode, moderate (H)       ondansetron 4 MG ODT tab    ZOFRAN ODT    40 tablet    Take 1 tablet (4 mg) by mouth every 8 hours as needed for nausea or vomiting    Idiopathic chronic pancreatitis (H)       RIZATRIPTAN BENZOATE PO      Take 5 mg by mouth        traMADol 50 MG tablet    ULTRAM    90 tablet    Take 1-2 tablets ( mg) by mouth every 6 hours as needed for moderate pain (max 4 tabs daily)    Chronic pain syndrome       triamcinolone 0.1 % cream    KENALOG    45 g    Apply topically 2 times daily    KP (keratosis pilaris)       vitamin D 15664 UNIT capsule    ERGOCALCIFEROL    8 capsule    Take one capsule by mouth twice a week    Major depressive  disorder, recurrent episode, moderate (H)

## 2017-11-20 NOTE — PROGRESS NOTES
Infusion Nursing Note:  Alexandra Melgoza presents today to [facility] for a IVF and zofran infusion.  During today's [facility] appointment orders from Dr. Campbell Narayanan were completed.  Frequency: As needed    Progress note:  ID verified by name and .  Assessment completed. Vitals were stable throughout time in [facility]. Patient education regarding infusion and possible side effects provided.  Patient verbalized understanding. Patient reports ongoing nausea and vomiting. Tachycardic with .     Premedicationst ordered as prescribed    Infusion Rates: 1500 ml over 1.5 hours. Zofran over 2 minutes    Labs were not ordered for this appointment.    Vascular access: Port accessed today. + blood return.     Treatment Conditions:   No treatment conditions.    Patient tolerated infusion well.    Discharge Plan:   Follow up plan of care with: [facility]  Discharge instructions were reviewed with patient.  Patient/representative verbalized understanding of discharge instructions and all questions answered.    [facility] in stable condition.    Lauren Menjivar RN    Administrations This Visit     lactated ringers BOLUS 1,000-1,500 mL     Admin Date Action Dose Route Administered By             2017 New Bag 1500 mL Intravenous Lauren Menjivar RN                    ondansetron (ZOFRAN) injection 4 mg     Admin Date Action Dose Route Administered By             2017 Given 4 mg Intravenous Lauren Menjivar RN                          /81  Pulse 111  Resp 16  SpO2 100%

## 2017-11-22 ENCOUNTER — OFFICE VISIT (OUTPATIENT)
Dept: GASTROENTEROLOGY | Facility: CLINIC | Age: 22
End: 2017-11-22

## 2017-11-22 VITALS
OXYGEN SATURATION: 100 % | BODY MASS INDEX: 23.42 KG/M2 | HEART RATE: 101 BPM | TEMPERATURE: 98.4 F | HEIGHT: 67 IN | DIASTOLIC BLOOD PRESSURE: 72 MMHG | WEIGHT: 149.2 LBS | SYSTOLIC BLOOD PRESSURE: 114 MMHG

## 2017-11-22 DIAGNOSIS — R10.10 PAIN OF UPPER ABDOMEN: Primary | ICD-10-CM

## 2017-11-22 ASSESSMENT — PAIN SCALES - GENERAL: PAINLEVEL: SEVERE PAIN (6)

## 2017-11-22 NOTE — NURSING NOTE
"Chief Complaint   Patient presents with     Clinic Care Coordination - Follow-up     F/u-increasing pain.        Vitals:    11/22/17 1346   BP: 114/72   Pulse: 101   Temp: 98.4  F (36.9  C)   TempSrc: Oral   SpO2: 100%   Weight: 149 lb 3.2 oz   Height: 5' 6.8\"       Body mass index is 23.51 kg/(m^2).    Eric BARBOSA LPN                     "

## 2017-11-22 NOTE — PROGRESS NOTES
HISTORY OF PRESENT ILLNESS:  Alexandra is very well known to me with intractable abdominal pain after presenting with post-cholecystectomy pain with mildly abnormal liver enzymes, not responding to sphincter of Oddi ablation a few years ago.  She has had increasingly intractable pain.  A year ago, we did a full evaluation for chronic pancreatitis.  She had normal pancreatic function test, 2/9 criteria at EUS and a completely normal pancreatic secretin MRCP with normal T1 signal, exocrine function, etc.  She has been living in [place] and has been in [facility] where they are discussing repeating all these tests.  They even suggested pancreatectomy islet autotransplant to be considered.  Her pain is epigastric, radiates to the back.  It is improved with enzyme replacement.  The only objective evidence is that she has had a persistently low serum lipase of as low as 8 done at other centers and now around 18 at [facility] lab.  Her stools are regular.      IMPRESSION/RECOMMENDATION:  We spent 25 minutes, more than 50% spent in counseling, discussing that clearly her pain syndrome seems to be deteriorating.  She is on narcotics, now on Norco 3 times a day and gabapentin, but she is not being managed [facility].  Nobody in [place] will take her on.  We discussed that she does not have evidence to support chronic pancreatitis at present, even though she might.  Her MRCP is normal, EUS essentially normal and pancreatic function tests normal, has not had recurrent acute pancreatitis.  Therefore, we cannot possibly entertain pancreatectomy with islet autotransplant.  Which she needs is diagnostically a fecal elastase, a repeat secretin MRCP to see if there is any evidence now and then referral to our Pain Clinic.  She is willing to come from [place] and even eventually move here if that is what it takes. I would again recommended as usual against celiac plexus blocks as in the most young women  with somatic pain syndrome.  We will see her in followup after that.      Twenty-five minutes, more than 50% in counseling.

## 2017-11-22 NOTE — MR AVS SNAPSHOT
After Visit Summary   11/22/2017    Alexandra Melgoza    MRN: [id number]           Patient Information     Date Of Birth          1995        Visit Information        Provider Department      11/22/2017 2:00 PM Campbell Narayanan MD [facility] Pancreas and Biliary        Today's Diagnoses     Abdominal pain    -  1      Care Instructions    1.  Secretin MRCP [facility] [address] - Thursday, December 7th check in at 12:45 PM. Nothing by mouth 6 hours prior to scan    2.  Chronic pain referral with Dr. Wang - Please call [phone]    3.  Nutrition referral with Marjorie Brown RD - Please call [phone]    4.  Fecal elastase test - please stop by lab on first floor on your way out.    Follow up:    Please call with any questions or concerns regarding your clinic visit today.    It is a pleasure being involved in your health care.    Contacts post-consultation depending on your need:    Schedule Clinic Appointments       [phone] # 1   M-F 7:30 - 5 pm    Ella SARMIENTO RN Coordinator           [phone] # 3   M-F 8:00 - 5 pm  (Triage hours)     Mirza ORTEGA RN Coordinator               [phone] # 3   M-F 8:00 - 5 pm  (Triage hours)    OR Procedure Scheduling              [phone]    My Chart is available 24 hours a day and is a secure way to access your records and communicate with your care team.  I strongly recommend signing up if you haven't already done so, if you are comfortable with computers.  If you would like to inquire about this or are having problems with My Chart access, you may call [phone] or go online at [email or web address].  Please allow at least 24 hours for a response and extra time on weekends and Holidays.          Follow-ups after your visit        Additional Services     [facility] REFERRAL       Your provider has referred you to: [facility] for Comprehensive Pain Management.  Please call [phone] to make an appointment.     Clinic is located 1st Floor, Clinic 1C ,  [facility] ,  [address] [place] [address], [place], [place]    Please schedule with Dr. Wang            NUTRITION REFERRAL       With Marjorie Brown RD                  Your next 10 appointments already scheduled     Nov 24, 2017  9:00 AM CST   Infusion 180 with UC SPEC INFUSION, UC 47 ATC   [facility] Specialty and Procedure ([facility])    [address]  2nd Floor  [facility] [address] [phone]            Dec 07, 2017  1:00 PM CST   MR ABDOMEN MRCP W/O & W CONTRAST with UUMR1   [facility], [place], MRI ([facility], [facility])    [address] [facility] [address] [phone]           Take your medicines as usual, unless your doctor tells you not to. Bring a list of your current medicines to your exam (including vitamins, minerals and over-the-counter drugs). Also bring the results of similar scans you may have had.    The day before your exam, drink extra fluids at least six 8-ounce glasses (unless your doctor tells you to restrict your fluids).   Have a blood test (creatinine test) within 30 days of your exam. Go to your clinic or Diagnostic Imaging Department for this test.   Do not eat or drink for 6 hours prior to exam.  The MRI machine uses a strong magnet. Please wear clothes without metal (snaps, zippers). A sweatsuit works well, or we may give you a hospital gown.  Please remove any body piercings and hair extensions before you arrive. You will also remove watches, jewelry, hairpins, wallets, dentures, partial dental plates and hearing aids. You may wear contact lenses, and you may be able to wear your rings. We have a safe place to keep your personal items, but it is safer to leave them at home.   **IMPORTANT** THE INSTRUCTIONS BELOW ARE ONLY FOR THOSE PATIENTS WHO HAVE BEEN TOLD  THEY WILL RECEIVE SEDATION OR GENERAL ANESTHESIA DURING THEIR MRI PROCEDURE:  IF YOU WILL RECEIVE SEDATION (take medicine to help you relax during your exam):   You must get the medicine from your doctor before you arrive. Bring the medicine to the exam. Do not take it at home.   Arrive one hour early. Bring someone who can take you home after the test. Your medicine will make you sleepy. After the exam, you may not drive, take a bus or take a taxi by yourself.   No eating 8 hours before your exam. You may have clear liquids up until 4 hours before your exam. (Clear liquids include water, clear tea, black coffee and fruit juice without pulp.)  IF YOU WILL RECEIVE ANESTHESIA (be asleep for your exam):   Arrive 1 1/2 hours early. Bring someone who can take you home after the test. You may not drive, take a bus or take a taxi by yourself.   No eating 8 hours before your exam. You may have clear liquids up until 4 hours before your exam. (Clear liquids include water, clear tea, black coffee and fruit juice without pulp.)  If you have any questions, please contact your Imaging Department exam site.              Future tests that were ordered for you today     Open Future Orders        Priority Expected Expires Ordered    Pancreatic Elastase Fecal Routine  11/22/2018 11/22/2017            Who to contact     Please call your clinic at [phone] to:    Ask questions about your health    Make or cancel appointments    Discuss your medicines    Learn about your test results    Speak to your doctor   If you have compliments or concerns about an experience at your clinic, or if you wish to file a complaint, please contact [facility] Patient Relations at [phone] or email us at [email or web address]         Additional Information About Your Visit        [employer] Information     [employer] gives you secure access to your electronic health record. If you see a primary care provider, you can  "also send messages to your care team and make appointments. If you have questions, please call your primary care clinic.  If you do not have a primary care provider, please call [phone] and they will assist you.      sMedio is an electronic gateway that provides easy, online access to your medical records. With sMedio, you can request a clinic appointment, read your test results, renew a prescription or communicate with your care team.     To access your existing account, please contact your [facility] or call [phone] for assistance.        Care EveryWhere ID     This is your Care EveryWhere ID. This could be used by other organizations to access your [place] medical records  [id number]        Your Vitals Were     Pulse Temperature Height Pulse Oximetry BMI (Body Mass Index)       101 98.4  F (36.9  C) (Oral) 1.697 m (5' 6.8\") 100% 23.51 kg/m2        Blood Pressure from Last 3 Encounters:   11/22/17 114/72   11/20/17 116/81   01/23/17 116/73    Weight from Last 3 Encounters:   11/22/17 67.7 kg (149 lb 3.2 oz)   01/23/17 70.8 kg (156 lb)   12/12/16 70.4 kg (155 lb 3.3 oz)              We Performed the Following     [facility] REFERRAL     MR Abdomen MRCP w/o & w Contrast     NUTRITION REFERRAL        Primary Care Provider Office Phone # Fax #    Quyen Baez [phone]       [address] [place] [address] [address] [place] [address]        Equal Access to Services     RACHAEL BENITEZ : Hadii aad ku hadasho Soomaali, waaxda luqadaha, qaybta kaalmada adeegyada, maki smallwood. So [facility] [phone].    ATENCIÓN: Si habla español, tiene a quijano disposición servicios gratuitos de asistencia lingüística. Llame al [phone].    We comply with applicable federal civil rights laws and [place] laws. We do not discriminate on the basis of race, color, national origin, age, disability, sex, sexual orientation, or " gender identity.            Thank you!     Thank you for choosing [facility] PANCREAS AND BILIARY  for your care. Our goal is always to provide you with excellent care. Hearing back from our patients is one way we can continue to improve our services. Please take a few minutes to complete the written survey that you may receive in the mail after your visit with us. Thank you!             Your Updated Medication List - Protect others around you: Learn how to safely use, store and throw away your medicines at www.disposemymeds.org.          This list is accurate as of: 11/22/17  3:49 PM.  Always use your most recent med list.                   Brand Name Dispense Instructions for use Diagnosis    adapalene 0.1 % cream    DIFFERIN    45 g    Apply pea-sized amount to face, chest, and back at bedtime. Initially start every 2 days, then every other day, then daily as tolerated.    Acne vulgaris       ADVIL 200 MG capsule   Generic drug:  ibuprofen      Take 400 mg by mouth every 4 hours as needed        amylase-lipase-protease 45006 UNITS Cpep    ZENPEP    450 capsule    Take 1-5 with snacks and meals, up to 15 per day.    Idiopathic chronic pancreatitis (H)       blood glucose monitoring lancets     1 Box    Use to test blood sugar 2 times daily or as directed.    Hypoglycemia       blood glucose monitoring meter device kit    no brand specified    1 kit    1 kit 2 times daily    Type I (juvenile type) diabetes mellitus without mention of complication, not stated as uncontrolled       blood glucose monitoring test strip    no brand specified    100 strip    Use to test blood sugars 2 times daily or as directed    Hypoglycemia       fluticasone 50 MCG/ACT spray    FLONASE    16 g    Spray 2 sprays into both nostrils daily    Nasal congestion       fluticasone-salmeterol 250-50 MCG/DOSE diskus inhaler    ADVAIR    1 Inhaler    Inhale 1 puff into the lungs every 12 hours.    Wheeze       gabapentin 300 MG capsule     NEURONTIN    90 capsule    Take 1 capsule (300 mg) by mouth 3 times daily    Chronic abdominal pain, Sphincter of Oddi dysfunction, Recurring abdominal pain, Somatoform disorder       glucagon 1 MG kit    GLUCAGON EMERGENCY    1 mg    Inject 1 mg into the muscle once for 1 dose    Hypoglycemia       glucose 40 % Gel gel    GLUCOSE 15    1 Tube    Take 15 g by mouth every hour as needed for low blood sugar        granisetron 1 MG tablet    KYTRIL    6 tablet    Take 1 tablet (1 mg) by mouth every 12 hours as needed for nausea    Cyclical vomiting with nausea, intractability of vomiting not specified       HYDROcodone-acetaminophen 5-325 MG per tablet    NORCO    18 tablet    Take 1 tablet by mouth every 4 hours as needed for moderate to severe pain (Dr. santa in [place] ordered)    Pancreatitis       leuprolide 11.25 MG kit    LUPRON DEPOT (3-MONTH)    1 each    Inject 11.25 mg into the muscle every 3 months    Endometriosis       levalbuterol 45 MCG/ACT Inhaler    XOPENEX HFA    1 Inhaler    Inhale 2 puffs into the lungs every 6 hours as needed for shortness of breath / dyspnea    Wheeze       metroNIDAZOLE 0.75 % topical gel    METROGEL    45 g    Apply twice daily to entire face    Dermatitis, perioral       norethindrone 5 MG tablet    AYGESTIN    90 tablet    Take 1 tablet (5 mg) by mouth daily    Endometriosis       nortriptyline 10 MG capsule    PAMELOR    60 capsule    Take 2 tablets by mouth at bedtime.    Major depressive disorder, recurrent episode, moderate (H)       ondansetron 4 MG ODT tab    ZOFRAN ODT    40 tablet    Take 1 tablet (4 mg) by mouth every 8 hours as needed for nausea or vomiting    Idiopathic chronic pancreatitis (H)       RIZATRIPTAN BENZOATE PO      Take 5 mg by mouth        triamcinolone 0.1 % cream    KENALOG    45 g    Apply topically 2 times daily    KP (keratosis pilaris)       vitamin D 10187 UNIT capsule    ERGOCALCIFEROL    8 capsule    Take one capsule by mouth twice a week     Major depressive disorder, recurrent episode, moderate (H)

## 2017-11-22 NOTE — PATIENT INSTRUCTIONS
1.  Secretin [facility] [address] - Thursday, December 7th check in at 12:45 PM. Nothing by mouth 6 hours prior to scan    2.  Chronic pain referral with Dr. Wang - Please call [phone]    3.  Nutrition referral with Marjorie Brown RD - Please call [phone]    4.  Fecal elastase test - please stop by lab on first floor on your way out.    Follow up:    Please call with any questions or concerns regarding your clinic visit today.    It is a pleasure being involved in your health care.    Contacts post-consultation depending on your need:    Schedule Clinic Appointments       [phone] # 1   M-F 7:30 - 5 pm    Ella SARMIENTO RN Coordinator           [phone] # 3   M-F 8:00 - 5 pm  (Triage hours)     Mirza ORTEGA RN Coordinator               [phone] # 3   M-F 8:00 - 5 pm  (Triage hours)    OR Procedure Scheduling              [phone]    My Chart is available 24 hours a day and is a secure way to access your records and communicate with your care team.  I strongly recommend signing up if you haven't already done so, if you are comfortable with computers.  If you would like to inquire about this or are having problems with My Chart access, you may call [phone] or go online at [email or web address].  Please allow at least 24 hours for a response and extra time on weekends and Holidays.

## 2017-11-24 ENCOUNTER — INFUSION THERAPY VISIT (OUTPATIENT)
Dept: INFUSION THERAPY | Facility: CLINIC | Age: 22
End: 2017-11-24
Attending: INTERNAL MEDICINE
Payer: COMMERCIAL

## 2017-11-24 VITALS — DIASTOLIC BLOOD PRESSURE: 80 MMHG | OXYGEN SATURATION: 100 % | SYSTOLIC BLOOD PRESSURE: 117 MMHG

## 2017-11-24 DIAGNOSIS — R11.15 CYCLIC VOMITING SYNDROME: Primary | ICD-10-CM

## 2017-11-24 PROCEDURE — 96361 HYDRATE IV INFUSION ADD-ON: CPT

## 2017-11-24 PROCEDURE — 25000128 H RX IP 250 OP 636: Mod: ZF | Performed by: INTERNAL MEDICINE

## 2017-11-24 PROCEDURE — 96374 THER/PROPH/DIAG INJ IV PUSH: CPT

## 2017-11-24 PROCEDURE — 96376 TX/PRO/DX INJ SAME DRUG ADON: CPT

## 2017-11-24 RX ORDER — ONDANSETRON 2 MG/ML
4 INJECTION INTRAMUSCULAR; INTRAVENOUS ONCE
Status: CANCELLED
Start: 2017-11-24 | End: 2017-11-24

## 2017-11-24 RX ORDER — ONDANSETRON 2 MG/ML
4 INJECTION INTRAMUSCULAR; INTRAVENOUS ONCE
Status: COMPLETED | OUTPATIENT
Start: 2017-11-24 | End: 2017-11-24

## 2017-11-24 RX ADMIN — ONDANSETRON 4 MG: 2 INJECTION INTRAMUSCULAR; INTRAVENOUS at 09:35

## 2017-11-24 RX ADMIN — ONDANSETRON 4 MG: 2 INJECTION INTRAMUSCULAR; INTRAVENOUS at 11:00

## 2017-11-24 RX ADMIN — SODIUM CHLORIDE, SODIUM LACTATE, POTASSIUM CHLORIDE, AND CALCIUM CHLORIDE 1500 ML: 600; 310; 30; 20 INJECTION, SOLUTION INTRAVENOUS at 09:28

## 2017-11-24 NOTE — MR AVS SNAPSHOT
After Visit Summary   11/24/2017    Alexandra Melgoza    MRN: [id number]           Patient Information     Date Of Birth          1995        Visit Information        Provider Department      11/24/2017 9:00 AM RENEE 47 ATC; RENEE SPEC INFUSION [facility] [place] Specialty and Procedure        Today's Diagnoses     Cyclic vomiting syndrome    -  1       Follow-ups after your visit        Your next 10 appointments already scheduled     Nov 27, 2017  7:20 AM CST   (Arrive by 7:05 AM)   New Patient Visit with ANABEL [facility] for Comprehensive Pain Management ([facility])    [address]  [address]th [facility] [address] [phone]            Dec 07, 2017  1:00 PM CST   MR ABDOMEN MRCP W/O & W CONTRAST with UUMR1   [facility] [place], MRI ([facility])    [address] [facility] [id number] [phone]           Take your medicines as usual, unless your doctor tells you not to. Bring a list of your current medicines to your exam (including vitamins, minerals and over-the-counter drugs). Also bring the results of similar scans you may have had.    The day before your exam, drink extra fluids at least six 8-ounce glasses (unless your doctor tells you to restrict your fluids).   Have a blood test (creatinine test) within 30 days of your exam. Go to your clinic or Diagnostic Imaging Department for this test.   Do not eat or drink for 6 hours prior to exam.  The MRI machine uses a strong magnet. Please wear clothes without metal (snaps, zippers). A sweatsuit works well, or we may give you a hospital gown.  Please remove any body piercings and hair extensions before you arrive. You will also remove watches, jewelry, hairpins, wallets, dentures, partial dental plates and hearing aids. You may wear contact lenses, and you may be able to wear your rings. We have a safe  place to keep your personal items, but it is safer to leave them at home.   **IMPORTANT** THE INSTRUCTIONS BELOW ARE ONLY FOR THOSE PATIENTS WHO HAVE BEEN TOLD THEY WILL RECEIVE SEDATION OR GENERAL ANESTHESIA DURING THEIR MRI PROCEDURE:  IF YOU WILL RECEIVE SEDATION (take medicine to help you relax during your exam):   You must get the medicine from your doctor before you arrive. Bring the medicine to the exam. Do not take it at home.   Arrive one hour early. Bring someone who can take you home after the test. Your medicine will make you sleepy. After the exam, you may not drive, take a bus or take a taxi by yourself.   No eating 8 hours before your exam. You may have clear liquids up until 4 hours before your exam. (Clear liquids include water, clear tea, black coffee and fruit juice without pulp.)  IF YOU WILL RECEIVE ANESTHESIA (be asleep for your exam):   Arrive 1 1/2 hours early. Bring someone who can take you home after the test. You may not drive, take a bus or take a taxi by yourself.   No eating 8 hours before your exam. You may have clear liquids up until 4 hours before your exam. (Clear liquids include water, clear tea, black coffee and fruit juice without pulp.)  If you have any questions, please contact your Imaging Department exam site.              Who to contact     If you have questions or need follow up information about today's clinic visit or your schedule please contact [facility] SPECIALTY AND PROCEDURE directly at [phone].  Normal or non-critical lab and imaging results will be communicated to you by MyChart, letter or phone within 4 business days after the clinic has received the results. If you do not hear from us within 7 days, please contact the clinic through [employer] or phone. If you have a critical or abnormal lab result, we will notify you by phone as soon as possible.  Submit refill requests through [employer] or call your pharmacy and they will forward the  refill request to us. Please allow 3 business days for your refill to be completed.          Additional Information About Your Visit        virtual [employer] l[employer]hart Information     [employer] gives you secure access to your electronic health record. If you see a primary care provider, you can also send messages to your care team and make appointments. If you have questions, please call your primary care clinic.  If you do not have a primary care provider, please call [phone] and they will assist you.        Care EveryWhere ID     This is your Care EveryWhere ID. This could be used by other organizations to access your [place] medical records  [id number]        Your Vitals Were     Pulse Oximetry                   100%            Blood Pressure from Last 3 Encounters:   11/24/17 117/80   11/22/17 114/72   11/20/17 116/81    Weight from Last 3 Encounters:   11/22/17 67.7 kg (149 lb 3.2 oz)   01/23/17 70.8 kg (156 lb)   12/12/16 70.4 kg (155 lb 3.3 oz)              Today, you had the following     No orders found for display       Primary Care Provider Office Phone # Fax #    Quyen Baez [phone]       [address] [place] [address] [address]  [facility] [address]        Equal Access to Services     RACHAEL BENITEZ : Hadii angelina kelley hadasho Sokathleenali, waaxda luqadaha, qaybta kaalmada adeegyada, maki smallwood. So [facility] [phone].    ATENCIÓN: Si habla español, tiene a quijano disposición servicios gratuitos de asistencia lingüística. Llame al [phone].    We comply with applicable federal civil rights laws and [place] laws. We do not discriminate on the basis of race, color, national origin, age, disability, sex, sexual orientation, or gender identity.            Thank you!     Thank you for choosing [facility] SPECIALTY AND PROCEDURE  for your care. Our goal is always to provide you with excellent care. Hearing back from our patients is one way we can continue to improve  our services. Please take a few minutes to complete the written survey that you may receive in the mail after your visit with us. Thank you!             Your Updated Medication List - Protect others around you: Learn how to safely use, store and throw away your medicines at www.disposemymeds.org.          This list is accurate as of: 11/24/17  4:11 PM.  Always use your most recent med list.                   Brand Name Dispense Instructions for use Diagnosis    adapalene 0.1 % cream    DIFFERIN    45 g    Apply pea-sized amount to face, chest, and back at bedtime. Initially start every 2 days, then every other day, then daily as tolerated.    Acne vulgaris       ADVIL 200 MG capsule   Generic drug:  ibuprofen      Take 400 mg by mouth every 4 hours as needed        amylase-lipase-protease 06399 UNITS Cpep    ZENPEP    450 capsule    Take 1-5 with snacks and meals, up to 15 per day.    Idiopathic chronic pancreatitis (H)       blood glucose monitoring lancets     1 Box    Use to test blood sugar 2 times daily or as directed.    Hypoglycemia       blood glucose monitoring meter device kit    no brand specified    1 kit    1 kit 2 times daily    Type I (juvenile type) diabetes mellitus without mention of complication, not stated as uncontrolled       blood glucose monitoring test strip    no brand specified    100 strip    Use to test blood sugars 2 times daily or as directed    Hypoglycemia       fluticasone 50 MCG/ACT spray    FLONASE    16 g    Spray 2 sprays into both nostrils daily    Nasal congestion       fluticasone-salmeterol 250-50 MCG/DOSE diskus inhaler    ADVAIR    1 Inhaler    Inhale 1 puff into the lungs every 12 hours.    Wheeze       gabapentin 300 MG capsule    NEURONTIN    90 capsule    Take 1 capsule (300 mg) by mouth 3 times daily    Chronic abdominal pain, Sphincter of Oddi dysfunction, Recurring abdominal pain, Somatoform disorder       glucagon 1 MG kit    GLUCAGON EMERGENCY    1 mg    Inject 1  mg into the muscle once for 1 dose    Hypoglycemia       glucose 40 % Gel gel    GLUCOSE 15    1 Tube    Take 15 g by mouth every hour as needed for low blood sugar        granisetron 1 MG tablet    KYTRIL    6 tablet    Take 1 tablet (1 mg) by mouth every 12 hours as needed for nausea    Cyclical vomiting with nausea, intractability of vomiting not specified       HYDROcodone-acetaminophen 5-325 MG per tablet    NORCO    18 tablet    Take 1 tablet by mouth every 4 hours as needed for moderate to severe pain (Dr. santa in [place] ordered)    Pancreatitis       leuprolide 11.25 MG kit    LUPRON DEPOT (3-MONTH)    1 each    Inject 11.25 mg into the muscle every 3 months    Endometriosis       levalbuterol 45 MCG/ACT Inhaler    XOPENEX HFA    1 Inhaler    Inhale 2 puffs into the lungs every 6 hours as needed for shortness of breath / dyspnea    Wheeze       metroNIDAZOLE 0.75 % topical gel    METROGEL    45 g    Apply twice daily to entire face    Dermatitis, perioral       norethindrone 5 MG tablet    AYGESTIN    90 tablet    Take 1 tablet (5 mg) by mouth daily    Endometriosis       nortriptyline 10 MG capsule    PAMELOR    60 capsule    Take 2 tablets by mouth at bedtime.    Major depressive disorder, recurrent episode, moderate (H)       ondansetron 4 MG ODT tab    ZOFRAN ODT    40 tablet    Take 1 tablet (4 mg) by mouth every 8 hours as needed for nausea or vomiting    Idiopathic chronic pancreatitis (H)       RIZATRIPTAN BENZOATE PO      Take 5 mg by mouth        triamcinolone 0.1 % cream    KENALOG    45 g    Apply topically 2 times daily    KP (keratosis pilaris)       vitamin D 92545 UNIT capsule    ERGOCALCIFEROL    8 capsule    Take one capsule by mouth twice a week    Major depressive disorder, recurrent episode, moderate (H)

## 2017-11-24 NOTE — PROGRESS NOTES
Infusion nursing note:    Alexandra Melgoza presents today to [facility] for a IVF, zofran infusion.  During today's [facility] appointment orders from Dr Narayanan were completed.  Frequency: 3times per week as needed    Progress note:  ID verified by name and .  Assessment completed.  Vitals were stable throughout time in [facility].  verbal education given to patient/representative regarding infusion and possible side effects.  Patient verbalized understanding.    Note: Alexandra is nauseated and in pain on arrival. She feels somewhat improved after IVF and zofran IV.  Alexandra unfortunately is rarely without pain and nausea due to chronic pancreatitis.     Infusion given over approximately  IVF 1.5liters over 1.5 hours.  IV Zofran (x2 doses0 IVP over 2minutes apiece.     Administrations This Visit     lactated ringers BOLUS 1,000-1,500 mL     Admin Date Action Dose Route Administered By             2017 New Bag 1500 mL Intravenous Patito Liu RN                    ondansetron (ZOFRAN) injection 4 mg     Admin Date Action Dose Route Administered By             2017 Given 4 mg Intravenous Patito Liu RN              Admin Date Action Dose Route Administered By             2017 Given 4 mg Intravenous Patito Liu RN                          /80  SpO2 100%    Discharge plan:    Discharge instructions were reviewed with patient: Yes  Patient/representative verbalized understanding of discharge instructions and all questions answered: Yes.    Discharged from [facility] at 1130 with self to home.    Patito Liu

## 2017-11-24 NOTE — TELEPHONE ENCOUNTER
APPT INFO    Date /Time: 11/27/17 7:20AM    Reason for Appt: Pancreatitis    Ref Provider/Clinic: Oracio GALLEGOS    Are there internal records? If yes, list: [facility] Oracio GALLEGOS (referring) / Images in PACS   [facility] Rodolfo GALLEGOS   [facility] ED Notes 2012   [facility] Angelika NP & Amada GALLEGOS    [facility] Winnie GALLEGOS & Juan Antonio PT     Patient Contact (Y/N) & Call Details: No, pt was referred.    Action: Closing encounter

## 2017-11-27 ENCOUNTER — PRE VISIT (OUTPATIENT)
Dept: ANESTHESIOLOGY | Facility: CLINIC | Age: 22
End: 2017-11-27

## 2017-11-29 ENCOUNTER — CARE COORDINATION (OUTPATIENT)
Dept: GASTROENTEROLOGY | Facility: CLINIC | Age: 22
End: 2017-11-29

## 2017-11-29 ENCOUNTER — TRANSFERRED RECORDS (OUTPATIENT)
Dept: HEALTH INFORMATION MANAGEMENT | Facility: CLINIC | Age: 22
End: 2017-11-29

## 2017-11-29 NOTE — PROGRESS NOTES
Alexandra calling with increased pain and vomiting.  Patient states she is back in [place] now and she is going to go to the emergency room there.  Advised that she should go to the emergency room, if her physician would like to talk to an attending physician they can call.  Patient was agreeable to plan, verified contact information.    Mirza ORTEGA RN Coordinator  Dr. Narayanan, Dr. Joe & Dr. Klein  Advanced Endoscopy  [phone]

## 2017-12-01 ENCOUNTER — CARE COORDINATION (OUTPATIENT)
Dept: GASTROENTEROLOGY | Facility: CLINIC | Age: 22
End: 2017-12-01

## 2017-12-01 NOTE — PROGRESS NOTES
Leeann is calling back and she is currently inpatient with an episode of pancreatitis. She is expecting her to be there for 24-48 hours before she is safe to travel home.   Leeann is wondering if she does get her home what should she do then. She is currently scheduled for procedure Thursday with Dr. Narayanan.     Advised to have her medical team there call the [facility] to collaborate care and form plan if needed. Number given for GI on-call. Verbalized understanding amenable to plan.     Ella SARMIENTO RN Coordinator  Dr. Narayanan, Dr. Joe & Dr. Klein   Advanced Endoscopy  [phone]

## 2017-12-01 NOTE — PROGRESS NOTES
Received message from triage line: Patients mother, Leeann called. Alexandra was admitted to [facility] in [place], [place]. Currently NPO receiving IV antibiotics.     Alexandra is scheduled for an MRCP with Dr. Narayanan next Thursday.     Leeann is wondering if Alexandra should be seen sooner?     [facility]- [phone]   Leeann- [phone]     Called Leeann and left a voicemail asking for more information, contact information for her to call back to discuss.     Ella SARMIENTO RN Coordinator  Dr. Narayanan, Dr. Joe & Dr. Klein   Advanced Endoscopy  [phone]

## 2017-12-04 ENCOUNTER — CARE COORDINATION (OUTPATIENT)
Dept: GASTROENTEROLOGY | Facility: CLINIC | Age: 22
End: 2017-12-04

## 2017-12-04 NOTE — PROGRESS NOTES
[facility]  Transfer Triage Note    Date of call: 12/04/17  Time of call: 9:37 AM    Reason for Transfer:Further diagnostic work up, management, and consultation for specialized care  Diagnosis: Chronic pancreatitis    Outside Records: Not available  Additional records requested to be faxed to [phone].    Stability of Patient: Patient is vitally stable, with no critical labs, and will likely remain stable throughout the transfer process    Expected Time of Arrival for Transfer: greater than 24 hours    Recommendations for Management and Stabilization: Not needed    Additional Comments   20 yo female with chronic abd pain without clear etiology.  Concerns for possible chronic pancreatitis and has been seeing Dr. Narayanan. She is hospitalized at [facility] in [place] for the past 5 days.  Dr. Joe was on the phone call. Hospitalist, their GI service, and patient requesting transfer due to specialist care with Dr. Narayanan.  It was made clear that she may end up not getting further interventions once here.  She is otherwise stable and thus transfer can happen tomorrow as our bed situation is full today.    Trevon Hughes MD    12/6/2017 Update    Patient has developed fever to 102 with possible infiltrate on CXR.  She has had NG tubes in and out. Lactate was 1.7 with norm WBC. BP stable.  Plan is to start zosyn for possible aspiration pneumonia.  Ongoing IVF hydration. Family is having some difficulty with confirming $ for transport costs. The referring hospital will update us when that is figured out.     Brcye Jeffrey MD  IM/Peds Hospitalist - [phone]

## 2017-12-06 ENCOUNTER — HOSPITAL ENCOUNTER (INPATIENT)
Facility: CLINIC | Age: 22
LOS: 7 days | Discharge: HOME | DRG: 091 | End: 2017-12-13
Attending: INTERNAL MEDICINE | Admitting: INTERNAL MEDICINE
Payer: COMMERCIAL

## 2017-12-06 ENCOUNTER — CARE COORDINATION (OUTPATIENT)
Dept: GASTROENTEROLOGY | Facility: CLINIC | Age: 22
End: 2017-12-06

## 2017-12-06 ENCOUNTER — TELEPHONE (OUTPATIENT)
Dept: GASTROENTEROLOGY | Facility: CLINIC | Age: 22
End: 2017-12-06

## 2017-12-06 ENCOUNTER — APPOINTMENT (OUTPATIENT)
Dept: GENERAL RADIOLOGY | Facility: CLINIC | Age: 22
DRG: 091 | End: 2017-12-06
Attending: NURSE PRACTITIONER
Payer: COMMERCIAL

## 2017-12-06 ENCOUNTER — TRANSFERRED RECORDS (OUTPATIENT)
Dept: HEALTH INFORMATION MANAGEMENT | Facility: CLINIC | Age: 22
End: 2017-12-06

## 2017-12-06 DIAGNOSIS — K86.1 IDIOPATHIC CHRONIC PANCREATITIS (H): ICD-10-CM

## 2017-12-06 DIAGNOSIS — K29.60 EROSIVE GASTRITIS: ICD-10-CM

## 2017-12-06 DIAGNOSIS — R10.11 RIGHT UPPER QUADRANT ABDOMINAL PAIN: Primary | ICD-10-CM

## 2017-12-06 LAB
ALBUMIN SERPL-MCNC: 3.3 G/DL (ref 3.4–5)
ALP SERPL-CCNC: 73 U/L (ref 40–150)
ALT SERPL W P-5'-P-CCNC: 18 U/L (ref 0–50)
ANION GAP SERPL CALCULATED.3IONS-SCNC: 8 MMOL/L (ref 3–14)
AST SERPL W P-5'-P-CCNC: 16 U/L (ref 0–45)
BILIRUB SERPL-MCNC: 0.8 MG/DL (ref 0.2–1.3)
BUN SERPL-MCNC: 3 MG/DL (ref 7–30)
CALCIUM SERPL-MCNC: 8.5 MG/DL (ref 8.5–10.1)
CHLORIDE SERPL-SCNC: 102 MMOL/L (ref 94–109)
CO2 SERPL-SCNC: 27 MMOL/L (ref 20–32)
CREAT SERPL-MCNC: 0.65 MG/DL (ref 0.52–1.04)
ERYTHROCYTE [DISTWIDTH] IN BLOOD BY AUTOMATED COUNT: 12.3 % (ref 10–15)
GFR SERPL CREATININE-BSD FRML MDRD: >90 ML/MIN/1.7M2
GLUCOSE SERPL-MCNC: 79 MG/DL (ref 70–99)
HCT VFR BLD AUTO: 30.9 % (ref 35–47)
HGB BLD-MCNC: 10.6 G/DL (ref 11.7–15.7)
LIPASE SERPL-CCNC: 96 U/L (ref 73–393)
MCH RBC QN AUTO: 30.3 PG (ref 26.5–33)
MCHC RBC AUTO-ENTMCNC: 34.3 G/DL (ref 31.5–36.5)
MCV RBC AUTO: 88 FL (ref 78–100)
PLATELET # BLD AUTO: 147 10E9/L (ref 150–450)
POTASSIUM SERPL-SCNC: 3.3 MMOL/L (ref 3.4–5.3)
PROT SERPL-MCNC: 5.5 G/DL (ref 6.8–8.8)
RBC # BLD AUTO: 3.5 10E12/L (ref 3.8–5.2)
SODIUM SERPL-SCNC: 138 MMOL/L (ref 133–144)
WBC # BLD AUTO: 7.9 10E9/L (ref 4–11)

## 2017-12-06 PROCEDURE — 25000128 H RX IP 250 OP 636: Performed by: NURSE PRACTITIONER

## 2017-12-06 PROCEDURE — 99222 1ST HOSP IP/OBS MODERATE 55: CPT | Mod: AI | Performed by: INTERNAL MEDICINE

## 2017-12-06 PROCEDURE — 71010 XR CHEST PORT 1 VW: CPT

## 2017-12-06 PROCEDURE — 80053 COMPREHEN METABOLIC PANEL: CPT | Performed by: NURSE PRACTITIONER

## 2017-12-06 PROCEDURE — 99207 ZZC APP CREDIT; MD BILLING SHARED VISIT: CPT | Performed by: NURSE PRACTITIONER

## 2017-12-06 PROCEDURE — 85027 COMPLETE CBC AUTOMATED: CPT | Performed by: NURSE PRACTITIONER

## 2017-12-06 PROCEDURE — 12000008 ZZH R&B INTERMEDIATE UMMC

## 2017-12-06 PROCEDURE — 83690 ASSAY OF LIPASE: CPT | Performed by: NURSE PRACTITIONER

## 2017-12-06 PROCEDURE — 36592 COLLECT BLOOD FROM PICC: CPT | Performed by: NURSE PRACTITIONER

## 2017-12-06 RX ORDER — ONDANSETRON 2 MG/ML
4 INJECTION INTRAMUSCULAR; INTRAVENOUS EVERY 6 HOURS PRN
Status: CANCELLED | OUTPATIENT
Start: 2017-12-06

## 2017-12-06 RX ORDER — AMOXICILLIN 250 MG
1 CAPSULE ORAL 2 TIMES DAILY PRN
Status: DISCONTINUED | OUTPATIENT
Start: 2017-12-06 | End: 2017-12-13 | Stop reason: HOSPADM

## 2017-12-06 RX ORDER — LEVALBUTEROL TARTRATE 45 UG/1
2 AEROSOL, METERED ORAL EVERY 6 HOURS PRN
Status: DISCONTINUED | OUTPATIENT
Start: 2017-12-06 | End: 2017-12-13 | Stop reason: HOSPADM

## 2017-12-06 RX ORDER — HEPARIN SODIUM (PORCINE) LOCK FLUSH IV SOLN 100 UNIT/ML 100 UNIT/ML
5 SOLUTION INTRAVENOUS
Status: DISCONTINUED | OUTPATIENT
Start: 2017-12-06 | End: 2017-12-13 | Stop reason: HOSPADM

## 2017-12-06 RX ORDER — LIDOCAINE 40 MG/G
CREAM TOPICAL
Status: DISCONTINUED | OUTPATIENT
Start: 2017-12-06 | End: 2017-12-13 | Stop reason: HOSPADM

## 2017-12-06 RX ORDER — ACETAMINOPHEN 325 MG/1
975 TABLET ORAL EVERY 8 HOURS PRN
Status: DISCONTINUED | OUTPATIENT
Start: 2017-12-06 | End: 2017-12-08

## 2017-12-06 RX ORDER — GABAPENTIN 300 MG/1
300 CAPSULE ORAL 3 TIMES DAILY
Status: DISCONTINUED | OUTPATIENT
Start: 2017-12-06 | End: 2017-12-13 | Stop reason: HOSPADM

## 2017-12-06 RX ORDER — OXYCODONE HYDROCHLORIDE 5 MG/1
5-10 TABLET ORAL
Status: DISCONTINUED | OUTPATIENT
Start: 2017-12-06 | End: 2017-12-07

## 2017-12-06 RX ORDER — NALOXONE HYDROCHLORIDE 0.4 MG/ML
.1-.4 INJECTION, SOLUTION INTRAMUSCULAR; INTRAVENOUS; SUBCUTANEOUS
Status: DISCONTINUED | OUTPATIENT
Start: 2017-12-06 | End: 2017-12-13 | Stop reason: HOSPADM

## 2017-12-06 RX ORDER — RIZATRIPTAN BENZOATE 5 MG/1
5 TABLET ORAL
Status: DISCONTINUED | OUTPATIENT
Start: 2017-12-06 | End: 2017-12-13 | Stop reason: HOSPADM

## 2017-12-06 RX ORDER — HEPARIN SODIUM,PORCINE 10 UNIT/ML
5-10 VIAL (ML) INTRAVENOUS
Status: DISCONTINUED | OUTPATIENT
Start: 2017-12-06 | End: 2017-12-13 | Stop reason: HOSPADM

## 2017-12-06 RX ORDER — HEPARIN SODIUM,PORCINE 10 UNIT/ML
5-10 VIAL (ML) INTRAVENOUS EVERY 24 HOURS
Status: DISCONTINUED | OUTPATIENT
Start: 2017-12-06 | End: 2017-12-13 | Stop reason: HOSPADM

## 2017-12-06 RX ORDER — AMOXICILLIN 250 MG
2 CAPSULE ORAL 2 TIMES DAILY PRN
Status: DISCONTINUED | OUTPATIENT
Start: 2017-12-06 | End: 2017-12-13 | Stop reason: HOSPADM

## 2017-12-06 RX ORDER — SODIUM CHLORIDE, SODIUM LACTATE, POTASSIUM CHLORIDE, CALCIUM CHLORIDE 600; 310; 30; 20 MG/100ML; MG/100ML; MG/100ML; MG/100ML
INJECTION, SOLUTION INTRAVENOUS CONTINUOUS
Status: DISCONTINUED | OUTPATIENT
Start: 2017-12-06 | End: 2017-12-11

## 2017-12-06 RX ORDER — NALOXONE HYDROCHLORIDE 0.4 MG/ML
.1-.4 INJECTION, SOLUTION INTRAMUSCULAR; INTRAVENOUS; SUBCUTANEOUS
Status: DISCONTINUED | OUTPATIENT
Start: 2017-12-06 | End: 2017-12-06

## 2017-12-06 RX ORDER — GRANISETRON HYDROCHLORIDE 1 MG/1
1 TABLET, FILM COATED ORAL EVERY 12 HOURS PRN
Status: DISCONTINUED | OUTPATIENT
Start: 2017-12-06 | End: 2017-12-08

## 2017-12-06 RX ORDER — NORETHINDRONE ACETATE 5 MG
5 TABLET ORAL AT BEDTIME
Status: DISCONTINUED | OUTPATIENT
Start: 2017-12-07 | End: 2017-12-13 | Stop reason: HOSPADM

## 2017-12-06 RX ORDER — NORTRIPTYLINE HCL 10 MG
30 CAPSULE ORAL AT BEDTIME
Status: DISCONTINUED | OUTPATIENT
Start: 2017-12-06 | End: 2017-12-13 | Stop reason: HOSPADM

## 2017-12-06 RX ORDER — ONDANSETRON 4 MG/1
4 TABLET, ORALLY DISINTEGRATING ORAL EVERY 6 HOURS PRN
Status: CANCELLED | OUTPATIENT
Start: 2017-12-06

## 2017-12-06 RX ADMIN — HYDROMORPHONE HYDROCHLORIDE: 10 INJECTION, SOLUTION INTRAMUSCULAR; INTRAVENOUS; SUBCUTANEOUS at 22:06

## 2017-12-06 RX ADMIN — SODIUM CHLORIDE, POTASSIUM CHLORIDE, SODIUM LACTATE AND CALCIUM CHLORIDE: 600; 310; 30; 20 INJECTION, SOLUTION INTRAVENOUS at 22:03

## 2017-12-06 ASSESSMENT — PAIN DESCRIPTION - DESCRIPTORS
DESCRIPTORS: RADIATING;SHARP;STABBING
DESCRIPTORS: RADIATING;SHARP;STABBING

## 2017-12-06 NOTE — IP AVS SNAPSHOT
MRN:[id number]                      After Visit Summary   12/6/2017    Alexandra Melgoza    MRN: [id number]           Thank you!     Thank you for choosing [facility] for your care. Our goal is always to provide you with excellent care. Hearing back from our patients is one way we can continue to improve our services. Please take a few minutes to complete the written survey that you may receive in the mail after you visit with us. Thank you!        Patient Information     Date Of Birth          1995        Designated Caregiver       Most Recent Value    Caregiver    Will someone help with your care after discharge? yes    Name of designated caregiver Leeann Goldberg - mom    Phone number of caregiver [phone]    Caregiver address [address] [place] [address]      About your hospital stay     You were admitted on:  December 6, 2017 You last received care in the:  [address] [place] [address]    You were discharged on:  December 13, 2017        Reason for your hospital stay       Abdominal pain and erosive gastrititis                  Who to Call     For medical emergencies, please call 911.  For non-urgent questions about your medical care, please call your primary care provider or clinic, [phone]  For questions related to your surgery, please call your surgery clinic        Attending Provider     Provider Specialty    Trevon Hughes MD Internal Medicine    Elvia Ang MD Internal Medicine    Chip Blanc MD Internal Medicine    Meme Hameed MD Internal Medicine       Primary Care Provider Office Phone # Fax #    Quyen Baez [phone]       When to contact your care team       Call your primary doctor if you have any of the following: increased pain.                  After Care Instructions     Diet       Follow this diet upon discharge: Orders Placed This Encounter      Advance Diet as Tolerated: Regular Diet Adult                   Follow-up Appointments     Adult [facility] Follow-up and recommended labs and tests       Follow up with primary care provider, Quyen Baez, within 7 days for hospital follow- up.  No follow up labs or test are needed.      Appointments on [facility] and/or [facility] (with [facility] or [facility] provider or service). Call [phone] if you haven't heard regarding these appointments within 7 days of discharge.                  Additional Services     GASTROENTEROLOGY ADULT REF CONSULT ONLY       Preferred Location: [facility] [phone]    With Dr. Narayanan as per GI recommendations       Please be aware that coverage of these services is subject to the terms and limitations of your health insurance plan.  Call member services at your health plan with any benefit or coverage questions.  Any procedures must be performed at a [facility] facility OR coordinated by your clinic's referral office.    Please bring the following with you to your appointment:    (1) Any X-Rays, CTs or MRIs which have been performed.  Contact the facility where they were done to arrange for  prior to your scheduled appointment.    (2) List of current medications   (3) This referral request   (4) Any documents/labs given to you for this referral            Pain Management Referral       Your provider has referred you to: [facility]: [facility] [place] [phone] [email or web address]      Please call [phone] to make an appointment. Clinic is located: [facility] [address] [place] [address] [id number]th [place] [address]    Please complete the following questions:    Procedure/Referral: Referral Only -  Comprehensive Evaluation and Management    What is your diagnosis for the patient's pain? Chronic abdominal pain of unknown etiology     What are your specific questions for the pain specialist? Optimal  management of pain and non pharmacological therapy     Are there any red flags that may impact the assessment or management of the patient? None         Please note the Pre-Op Pain Consult must be scheduled 2-3 weeks prior to the patient's surgery.  Patient's trying to schedule within 2 weeks of surgery may not be accommodated.     Pre-Op Pain Consults are only good for 30 days.    **ANY DIAGNOSTIC TESTS THAT ARE NOT IN EPIC SHOULD BE SENT TO THE PAIN CENTER**    REGARDING OPIOID MEDICATIONS:  We will always address appropriateness of opioid pain medications, but we generally will not automatically take on a prescribing role. When we do take on prescribing of opioids for chronic pain, it is in collaboration with the referring physician for an intermediate period of time (months), with an expectation that the primary physician or provider will assume the prescribing role if medications are effective at stable doses with demonstrated compliance.  Therefore, please do not assume that your prescribing responsibilities end on the day of pain clinic consultation.  Is this agreeable to you? YES    Please be aware that coverage of these services is subject to the terms and limitations of your health insurance plan.  Call member services at your health plan with any benefit or coverage questions.      Please bring the following with you to your appointment:    (1) Any X-Rays, CTs or MRIs which have been performed.  Contact the facility where they were done to arrange for  prior to your scheduled appointment.    (2) List of current medications   (3) This referral request   (4) Any documents/labs given to you for this referral                  Further instructions from your care team       You were admitted for abdominal pain.  You were found not to have chronic pancreatitis here.  Please continue follow up with pain clinic and primary care provider.     Pending Results     No orders found from 12/4/2017 to 12/7/2017.             Statement of Approval     Ordered          12/13/17 1218  I have reviewed and agree with all the recommendations and orders detailed in this document.  EFFECTIVE NOW     Approved and electronically signed by:  Meme Hmaeed MD             Admission Information     Date & Time Provider Department Dept. Phone    12/6/2017 Meme Hameed MD [address] [facility] [phone]      Your Vitals Were     Blood Pressure Pulse Temperature Respirations Weight Pulse Oximetry    102/56 (BP Location: Left arm) 71 97.5  F (36.4  C) (Oral) 16 65.2 kg (143 lb 12.8 oz) 95%    BMI (Body Mass Index)                   22.66 kg/m2           MyChart Information     [facility] gives you secure access to your electronic health record. If you see a primary care provider, you can also send messages to your care team and make appointments. If you have questions, please call your primary care clinic.  If you do not have a primary care provider, please call [phone] and they will assist you.        Care EveryWhere ID     This is your Care EveryWhere ID. This could be used by other organizations to access your [facility] medical records  [id number]        Equal Access to Services     RACHAEL BENITEZ AH: Hadyoana Enamorado, alcon beck, maki quintero. So [facility] [phone].    ATENCIÓN: Si habla español, tiene a quijano disposición servicios gratuitos de asistencia lingüística. Llame al [phone].    We comply with applicable federal civil rights laws and [place] laws. We do not discriminate on the basis of race, color, national origin, age, disability, sex, sexual orientation, or gender identity.               Review of your medicines      START taking        Dose / Directions    oxyCODONE 5 MG/5ML solution   Commonly known as:  ROXICODONE        Dose:  10-15 mg   Take 10-15 mLs (10-15 mg) by mouth every 4 hours as needed for moderate to severe pain   Quantity:   900 mL   Refills:  0       pantoprazole 40 MG EC tablet   Commonly known as:  PROTONIX   Used for:  Erosive gastritis        Dose:  40 mg   Take 1 tablet (40 mg) by mouth 2 times daily (before meals)   Quantity:  30 tablet   Refills:  1       polyethylene glycol Packet   Commonly known as:  MIRALAX/GLYCOLAX        Dose:  17 g   Start taking on:  12/14/2017   Take 17 g by mouth daily   Quantity:  7 packet   Refills:  0       senna-docusate 8.6-50 MG per tablet   Commonly known as:  SENOKOT-S;PERICOLACE        Dose:  1 tablet   Take 1 tablet by mouth 2 times daily as needed for constipation   Quantity:  100 tablet   Refills:  0         CONTINUE these medicines which may have CHANGED, or have new prescriptions. If we are uncertain of the size of tablets/capsules you have at home, strength may be listed as something that might have changed.        Dose / Directions    * amylase-lipase-protease 73423 UNITS Cpep   Commonly known as:  ZENPEP   This may have changed:    - how much to take  - how to take this  - when to take this  - reasons to take this  - additional instructions   Used for:  Idiopathic chronic pancreatitis (H)        Dose:  2-3 capsule   Take 2-3 capsules (40,000-60,000 Units) by mouth Take with snacks or supplements (Snacks)   Quantity:  180 capsule   Refills:  1       * amylase-lipase-protease 41924 UNITS Cpep   Commonly known as:  ZENPEP   This may have changed:  You were already taking a medication with the same name, and this prescription was added. Make sure you understand how and when to take each.        Dose:  5 capsule   Take 5 capsules (100,000 Units) by mouth 3 times daily (with meals)   Quantity:  180 capsule   Refills:  1       nortriptyline 10 MG capsule   Commonly known as:  PAMELOR   This may have changed:    - how much to take  - how to take this  - when to take this  - additional instructions        Dose:  30 mg   Take 3 capsules (30 mg) by mouth At Bedtime   Quantity:  120 capsule    Refills:  0       * Notice:  This list has 2 medication(s) that are the same as other medications prescribed for you. Read the directions carefully, and ask your doctor or other care provider to review them with you.      CONTINUE these medicines which have NOT CHANGED        Dose / Directions    fluticasone 50 MCG/ACT spray   Commonly known as:  FLONASE   Used for:  Nasal congestion        Dose:  2 spray   Spray 2 sprays into both nostrils daily   Quantity:  16 g   Refills:  3       gabapentin 300 MG capsule   Commonly known as:  NEURONTIN   Used for:  Chronic abdominal pain, Sphincter of Oddi dysfunction, Recurring abdominal pain, Somatoform disorder        Dose:  300 mg   Take 1 capsule (300 mg) by mouth 3 times daily   Quantity:  90 capsule   Refills:  5       leuprolide 11.25 MG kit   Commonly known as:  LUPRON DEPOT (3-MONTH)   Used for:  Endometriosis        Dose:  11.25 mg   Inject 11.25 mg into the muscle every 3 months   Quantity:  1 each   Refills:  3       levalbuterol 45 MCG/ACT Inhaler   Commonly known as:  XOPENEX HFA   Used for:  Wheeze        Dose:  2 puff   Inhale 2 puffs into the lungs every 6 hours as needed for shortness of breath / dyspnea   Quantity:  1 Inhaler   Refills:  1       norethindrone 5 MG tablet   Commonly known as:  AYGESTIN   Used for:  Endometriosis        Dose:  5 mg   Take 1 tablet (5 mg) by mouth daily   Quantity:  90 tablet   Refills:  1       ondansetron 4 MG ODT tab   Commonly known as:  ZOFRAN ODT   Used for:  Idiopathic chronic pancreatitis (H)        Dose:  4 mg   Take 1 tablet (4 mg) by mouth every 8 hours as needed for nausea or vomiting   Quantity:  40 tablet   Refills:  0       RIZATRIPTAN BENZOATE PO        Dose:  5 mg   Take 5 mg by mouth once as needed   Refills:  0         STOP taking     ADVIL 200 MG capsule   Generic drug:  ibuprofen           glucagon 1 MG kit   Commonly known as:  GLUCAGON EMERGENCY           granisetron 1 MG tablet   Commonly known as:   KYTRIL           HYDROcodone-acetaminophen 5-325 MG per tablet   Commonly known as:  NORCO                Where to get your medicines      These medications were sent to [facility] - [place], [place] - [address]  [address] [place] [address]     Phone:  [phone]     amylase-lipase-protease 78866 UNITS Cpep    amylase-lipase-protease 01677 UNITS Cpep    nortriptyline 10 MG capsule    ondansetron 4 MG ODT tab    pantoprazole 40 MG EC tablet    polyethylene glycol Packet    senna-docusate 8.6-50 MG per tablet         Some of these will need a paper prescription and others can be bought over the counter. Ask your nurse if you have questions.     Bring a paper prescription for each of these medications     oxyCODONE 5 MG/5ML solution                Protect others around you: Learn how to safely use, store and throw away your medicines at www.disposemymeds.org.             Medication List: This is a list of all your medications and when to take them. Check marks below indicate your daily home schedule. Keep this list as a reference.      Medications           Morning Afternoon Evening Bedtime As Needed    * amylase-lipase-protease 46263 UNITS Cpep   Commonly known as:  ZENPEP   Take 2-3 capsules (40,000-60,000 Units) by mouth Take with snacks or supplements (Snacks)   Last time this was given:  100,000 Units on 12/13/2017  9:56 AM                            With snacks only       * amylase-lipase-protease 42658 UNITS Cpep   Commonly known as:  ZENPEP   Take 5 capsules (100,000 Units) by mouth 3 times daily (with meals)   Last time this was given:  100,000 Units on 12/13/2017  9:56 AM            Before breakfast       Before lunch       Before dinner               fluticasone 50 MCG/ACT spray   Commonly known as:  FLONASE   Spray 2 sprays into both nostrils daily                                   gabapentin 300 MG capsule   Commonly known as:  NEURONTIN   Take 1 capsule (300 mg)  by mouth 3 times daily   Last time this was given:  300 mg on 12/13/2017  2:03 PM            8 am       2 pm       8 pm               leuprolide 11.25 MG kit   Commonly known as:  LUPRON DEPOT (3-MONTH)   Inject 11.25 mg into the muscle every 3 months                            Every 3 months       levalbuterol 45 MCG/ACT Inhaler   Commonly known as:  XOPENEX HFA   Inhale 2 puffs into the lungs every 6 hours as needed for shortness of breath / dyspnea                            Every 6 hours as needed       norethindrone 5 MG tablet   Commonly known as:  AYGESTIN   Take 1 tablet (5 mg) by mouth daily   Last time this was given:  5 mg on 12/12/2017 10:53 PM                        10 pm           nortriptyline 10 MG capsule   Commonly known as:  PAMELOR   Take 3 capsules (30 mg) by mouth At Bedtime   Last time this was given:  30 mg on 12/12/2017 10:53 PM                        10 pm           ondansetron 4 MG ODT tab   Commonly known as:  ZOFRAN ODT   Take 1 tablet (4 mg) by mouth every 8 hours as needed for nausea or vomiting                            Every 8 hours as needed (total 3x/day)       oxyCODONE 5 MG/5ML solution   Commonly known as:  ROXICODONE   Take 10-15 mLs (10-15 mg) by mouth every 4 hours as needed for moderate to severe pain   Last time this was given:  15 mg on 12/13/2017  2:03 PM                            Every 4 hours as needed       pantoprazole 40 MG EC tablet   Commonly known as:  PROTONIX   Take 1 tablet (40 mg) by mouth 2 times daily (before meals)   Last time this was given:  40 mg on 12/13/2017  9:05 AM            Before breakfast           Before dinner               polyethylene glycol Packet   Commonly known as:  MIRALAX/GLYCOLAX   Take 17 g by mouth daily   Start taking on:  12/14/2017   Last time this was given:  17 g on 12/13/2017  9:05 AM            8 am                       RIZATRIPTAN BENZOATE PO   Take 5 mg by mouth once as needed                            1x/day as needed        senna-docusate 8.6-50 MG per tablet   Commonly known as:  SENOKOT-S;PERICOLACE   Take 1 tablet by mouth 2 times daily as needed for constipation   Last time this was given:  2 tablets on 12/11/2017 11:54 AM                            2x/day as needed       * Notice:  This list has 2 medication(s) that are the same as other medications prescribed for you. Read the directions carefully, and ask your doctor or other care provider to review them with you.

## 2017-12-06 NOTE — PROGRESS NOTES
Leeann Alexandra's mom called and stated Alexandra is being transferred to [facility] today from [place] at noon.  Wanted to be sure her MRCP was still scheduled for tomorrow.  Advised the appointment is still scheduled but inpatient team will decide what patient will be having done.  She understood and is thankful she will be at the [facility].    Mirza ORTEGA RN Coordinator  Dr. Narayanan, Dr. Joe & Dr. Klein  Advanced Endoscopy  [phone]

## 2017-12-06 NOTE — IP AVS SNAPSHOT
[address] [address]    Phone:  [phone]                                       After Visit Summary   12/6/2017    Alexandra Melgoza    MRN: [id number]           After Visit Summary Signature Page     I have received my discharge instructions, and my questions have been answered. I have discussed any challenges I see with this plan with the nurse or doctor.    ..........................................................................................................................................  Patient/Patient Representative Signature      ..........................................................................................................................................  Patient Representative Print Name and Relationship to Patient    ..................................................               ................................................  Date                                            Time    ..........................................................................................................................................  Reviewed by Signature/Title    ...................................................              ..............................................  Date                                                            Time

## 2017-12-06 NOTE — TELEPHONE ENCOUNTER
Alexandra's Mom is calling with information for Dr. Narayanan.  She reports Alexandra is still hospitalized in [place].  Yesterday they placed an NG.  Her BP has been high and her hemoglobin is low.  Fever greater than 102.  The hospital staff there spoke with physician here on call her, but would really like to speak directly with Dr. Narayanan.  Number has been provided previously to this.  Call Mom if more information needed.    Will route to Dr. Oracio Blue's clinic nurse.

## 2017-12-07 ENCOUNTER — HOSPITAL ENCOUNTER (OUTPATIENT)
Dept: MRI IMAGING | Facility: CLINIC | Age: 22
Discharge: HOME | DRG: 091 | End: 2017-12-07
Attending: INTERNAL MEDICINE | Admitting: INTERNAL MEDICINE
Payer: COMMERCIAL

## 2017-12-07 ENCOUNTER — APPOINTMENT (OUTPATIENT)
Dept: GENERAL RADIOLOGY | Facility: CLINIC | Age: 22
DRG: 091 | End: 2017-12-07
Attending: INTERNAL MEDICINE
Payer: COMMERCIAL

## 2017-12-07 LAB
ALBUMIN UR-MCNC: NEGATIVE MG/DL
ANION GAP SERPL CALCULATED.3IONS-SCNC: 10 MMOL/L (ref 3–14)
APPEARANCE UR: CLEAR
BILIRUB UR QL STRIP: NEGATIVE
BUN SERPL-MCNC: 12 MG/DL (ref 7–30)
CALCIUM SERPL-MCNC: 8.8 MG/DL (ref 8.5–10.1)
CHLORIDE SERPL-SCNC: 101 MMOL/L (ref 94–109)
CO2 SERPL-SCNC: 24 MMOL/L (ref 20–32)
COLOR UR AUTO: YELLOW
CREAT SERPL-MCNC: 0.64 MG/DL (ref 0.52–1.04)
CRP SERPL-MCNC: 35 MG/L (ref 0–8)
FLUAV+FLUBV RNA SPEC QL NAA+PROBE: ABNORMAL
FLUAV+FLUBV RNA SPEC QL NAA+PROBE: ABNORMAL
FLUAV+FLUBV RNA SPEC QL NAA+PROBE: NEGATIVE
FLUAV+FLUBV RNA SPEC QL NAA+PROBE: NEGATIVE
GFR SERPL CREATININE-BSD FRML MDRD: >90 ML/MIN/1.7M2
GLUCOSE SERPL-MCNC: 63 MG/DL (ref 70–99)
GLUCOSE UR STRIP-MCNC: NEGATIVE MG/DL
HGB UR QL STRIP: NEGATIVE
KETONES UR STRIP-MCNC: 40 MG/DL
LEUKOCYTE ESTERASE UR QL STRIP: NEGATIVE
MAGNESIUM SERPL-MCNC: 1.6 MG/DL (ref 1.6–2.3)
NITRATE UR QL: NEGATIVE
PH UR STRIP: 8 PH (ref 5–7)
PHOSPHATE SERPL-MCNC: 4.7 MG/DL (ref 2.5–4.5)
POTASSIUM SERPL-SCNC: 4 MMOL/L (ref 3.4–5.3)
PROCALCITONIN SERPL-MCNC: 0.07 NG/ML
RSV RNA SPEC NAA+PROBE: ABNORMAL
RSV RNA SPEC NAA+PROBE: NEGATIVE
SODIUM SERPL-SCNC: 135 MMOL/L (ref 133–144)
SOURCE: ABNORMAL
SP GR UR STRIP: 1.01 (ref 1–1.03)
SPECIMEN SOURCE: ABNORMAL
SPECIMEN SOURCE: NORMAL
UROBILINOGEN UR STRIP-MCNC: NORMAL MG/DL (ref 0–2)

## 2017-12-07 PROCEDURE — 25000128 H RX IP 250 OP 636

## 2017-12-07 PROCEDURE — 84145 PROCALCITONIN (PCT): CPT | Performed by: INTERNAL MEDICINE

## 2017-12-07 PROCEDURE — 87040 BLOOD CULTURE FOR BACTERIA: CPT | Performed by: INTERNAL MEDICINE

## 2017-12-07 PROCEDURE — 81003 URINALYSIS AUTO W/O SCOPE: CPT | Performed by: NURSE PRACTITIONER

## 2017-12-07 PROCEDURE — 25000125 ZZHC RX 250: Performed by: INTERNAL MEDICINE

## 2017-12-07 PROCEDURE — 86140 C-REACTIVE PROTEIN: CPT | Performed by: INTERNAL MEDICINE

## 2017-12-07 PROCEDURE — 27210437 ZZH NUTRITION PRODUCT SEMIELEM INTERMED LITER

## 2017-12-07 PROCEDURE — 25000128 H RX IP 250 OP 636: Performed by: INTERNAL MEDICINE

## 2017-12-07 PROCEDURE — A9585 GADOBUTROL INJECTION: HCPCS | Performed by: INTERNAL MEDICINE

## 2017-12-07 PROCEDURE — 25000128 H RX IP 250 OP 636: Performed by: NURSE PRACTITIONER

## 2017-12-07 PROCEDURE — 25000132 ZZH RX MED GY IP 250 OP 250 PS 637: Performed by: NURSE PRACTITIONER

## 2017-12-07 PROCEDURE — 99233 SBSQ HOSP IP/OBS HIGH 50: CPT | Performed by: INTERNAL MEDICINE

## 2017-12-07 PROCEDURE — 80048 BASIC METABOLIC PNL TOTAL CA: CPT | Performed by: INTERNAL MEDICINE

## 2017-12-07 PROCEDURE — 25000125 ZZHC RX 250: Performed by: RADIOLOGY

## 2017-12-07 PROCEDURE — 25000132 ZZH RX MED GY IP 250 OP 250 PS 637: Performed by: INTERNAL MEDICINE

## 2017-12-07 PROCEDURE — 12000008 ZZH R&B INTERMEDIATE UMMC

## 2017-12-07 PROCEDURE — 74183 MRI ABD W/O CNTR FLWD CNTR: CPT

## 2017-12-07 PROCEDURE — 25000128 H RX IP 250 OP 636: Performed by: PEDIATRICS

## 2017-12-07 PROCEDURE — 87631 RESP VIRUS 3-5 TARGETS: CPT | Performed by: NURSE PRACTITIONER

## 2017-12-07 PROCEDURE — 83735 ASSAY OF MAGNESIUM: CPT | Performed by: INTERNAL MEDICINE

## 2017-12-07 PROCEDURE — 84100 ASSAY OF PHOSPHORUS: CPT | Performed by: INTERNAL MEDICINE

## 2017-12-07 PROCEDURE — 44500 INTRO GASTROINTESTINAL TUBE: CPT

## 2017-12-07 PROCEDURE — 36415 COLL VENOUS BLD VENIPUNCTURE: CPT | Performed by: INTERNAL MEDICINE

## 2017-12-07 PROCEDURE — 99223 1ST HOSP IP/OBS HIGH 75: CPT | Performed by: ANESTHESIOLOGY

## 2017-12-07 RX ORDER — POTASSIUM CHLORIDE 29.8 MG/ML
20 INJECTION INTRAVENOUS
Status: DISCONTINUED | OUTPATIENT
Start: 2017-12-07 | End: 2017-12-12

## 2017-12-07 RX ORDER — OXYCODONE HYDROCHLORIDE 10 MG/1
10 TABLET ORAL EVERY 4 HOURS PRN
Status: DISCONTINUED | OUTPATIENT
Start: 2017-12-07 | End: 2017-12-08

## 2017-12-07 RX ORDER — LORAZEPAM 2 MG/ML
0.5 INJECTION INTRAMUSCULAR ONCE
Status: COMPLETED | OUTPATIENT
Start: 2017-12-07 | End: 2017-12-07

## 2017-12-07 RX ORDER — POTASSIUM CHLORIDE 750 MG/1
20-40 TABLET, EXTENDED RELEASE ORAL
Status: DISCONTINUED | OUTPATIENT
Start: 2017-12-07 | End: 2017-12-12

## 2017-12-07 RX ORDER — KETOROLAC TROMETHAMINE 30 MG/ML
15 INJECTION, SOLUTION INTRAMUSCULAR; INTRAVENOUS EVERY 6 HOURS PRN
Status: DISCONTINUED | OUTPATIENT
Start: 2017-12-07 | End: 2017-12-08

## 2017-12-07 RX ORDER — POTASSIUM CHLORIDE 7.45 MG/ML
10 INJECTION INTRAVENOUS
Status: DISCONTINUED | OUTPATIENT
Start: 2017-12-07 | End: 2017-12-12

## 2017-12-07 RX ORDER — GADOBUTROL 604.72 MG/ML
7.5 INJECTION INTRAVENOUS ONCE
Status: COMPLETED | OUTPATIENT
Start: 2017-12-07 | End: 2017-12-07

## 2017-12-07 RX ORDER — HYDROMORPHONE HYDROCHLORIDE 1 MG/ML
0.4 INJECTION, SOLUTION INTRAMUSCULAR; INTRAVENOUS; SUBCUTANEOUS ONCE
Status: COMPLETED | OUTPATIENT
Start: 2017-12-07 | End: 2017-12-07

## 2017-12-07 RX ORDER — POTASSIUM CHLORIDE 1.5 G/1.58G
20-40 POWDER, FOR SOLUTION ORAL
Status: DISCONTINUED | OUTPATIENT
Start: 2017-12-07 | End: 2017-12-12

## 2017-12-07 RX ORDER — LORAZEPAM 2 MG/ML
INJECTION INTRAMUSCULAR
Status: COMPLETED
Start: 2017-12-07 | End: 2017-12-07

## 2017-12-07 RX ORDER — POTASSIUM CL/LIDO/0.9 % NACL 10MEQ/0.1L
10 INTRAVENOUS SOLUTION, PIGGYBACK (ML) INTRAVENOUS
Status: DISCONTINUED | OUTPATIENT
Start: 2017-12-07 | End: 2017-12-12

## 2017-12-07 RX ADMIN — PIPERACILLIN SODIUM AND TAZOBACTAM SODIUM 3.38 G: 36; 4.5 INJECTION, POWDER, FOR SOLUTION INTRAVENOUS at 06:18

## 2017-12-07 RX ADMIN — HYDROMORPHONE HYDROCHLORIDE 1 MG: 1 INJECTION, SOLUTION INTRAMUSCULAR; INTRAVENOUS; SUBCUTANEOUS at 22:48

## 2017-12-07 RX ADMIN — HUMAN SECRETIN 13.7 MCG: 16 INJECTION, POWDER, LYOPHILIZED, FOR SOLUTION INTRAVENOUS at 10:17

## 2017-12-07 RX ADMIN — LIDOCAINE HYDROCHLORIDE 5 ML: 20 SOLUTION ORAL; TOPICAL at 15:49

## 2017-12-07 RX ADMIN — HUMAN SECRETIN 0.2 MCG: 16 INJECTION, POWDER, LYOPHILIZED, FOR SOLUTION INTRAVENOUS at 10:14

## 2017-12-07 RX ADMIN — GRANISETRON HYDROCHLORIDE 1 MG: 1 TABLET, FILM COATED ORAL at 00:11

## 2017-12-07 RX ADMIN — LORAZEPAM 0.5 MG: 2 INJECTION, SOLUTION INTRAMUSCULAR; INTRAVENOUS at 15:13

## 2017-12-07 RX ADMIN — HYDROMORPHONE HYDROCHLORIDE 1 MG: 1 INJECTION, SOLUTION INTRAMUSCULAR; INTRAVENOUS; SUBCUTANEOUS at 15:50

## 2017-12-07 RX ADMIN — GRANISETRON HYDROCHLORIDE 1 MG: 1 TABLET, FILM COATED ORAL at 12:49

## 2017-12-07 RX ADMIN — OXYCODONE HYDROCHLORIDE 10 MG: 10 TABLET ORAL at 21:19

## 2017-12-07 RX ADMIN — HYDROMORPHONE HYDROCHLORIDE 1 MG: 1 INJECTION, SOLUTION INTRAMUSCULAR; INTRAVENOUS; SUBCUTANEOUS at 13:08

## 2017-12-07 RX ADMIN — GADOBUTROL 7.5 ML: 604.72 INJECTION INTRAVENOUS at 10:53

## 2017-12-07 RX ADMIN — HYDROMORPHONE HYDROCHLORIDE 0.4 MG: 10 INJECTION, SOLUTION INTRAMUSCULAR; INTRAVENOUS; SUBCUTANEOUS at 10:42

## 2017-12-07 RX ADMIN — LORAZEPAM 0.5 MG: 2 INJECTION INTRAMUSCULAR at 15:13

## 2017-12-07 RX ADMIN — PIPERACILLIN SODIUM AND TAZOBACTAM SODIUM 3.38 G: 36; 4.5 INJECTION, POWDER, FOR SOLUTION INTRAVENOUS at 00:11

## 2017-12-07 RX ADMIN — LORAZEPAM 0.5 MG: 2 INJECTION, SOLUTION INTRAMUSCULAR; INTRAVENOUS at 09:12

## 2017-12-07 RX ADMIN — OXYCODONE HYDROCHLORIDE 10 MG: 5 TABLET ORAL at 08:54

## 2017-12-07 RX ADMIN — HYDROMORPHONE HYDROCHLORIDE 1 MG: 1 INJECTION, SOLUTION INTRAMUSCULAR; INTRAVENOUS; SUBCUTANEOUS at 19:23

## 2017-12-07 RX ADMIN — KETOROLAC TROMETHAMINE 15 MG: 30 INJECTION, SOLUTION INTRAMUSCULAR at 12:48

## 2017-12-07 RX ADMIN — KETOROLAC TROMETHAMINE 15 MG: 30 INJECTION, SOLUTION INTRAMUSCULAR at 19:23

## 2017-12-07 RX ADMIN — OXYCODONE HYDROCHLORIDE 10 MG: 10 TABLET ORAL at 16:28

## 2017-12-07 ASSESSMENT — PAIN DESCRIPTION - DESCRIPTORS
DESCRIPTORS: SHARP
DESCRIPTORS: SHARP

## 2017-12-07 NOTE — PLAN OF CARE
Problem: Patient Care Overview  Goal: Plan of Care/Patient Progress Review  /48  Pulse 96  Temp 100  F (37.8  C) (Oral)  Resp 15  Wt 68.9 kg (152 lb)  SpO2 95%  BMI 23.95 kg/m2     Pt arrived on 7C at approximately 2015 and was oriented to the room. Pt A&Ox4, Neuros intact; Pt was tearful, reporting sharp, radiating mid-abdominal pain and nausea. Tmax 100.0, intermittently tachycardic (upper 90s to low 100s), AOVSS on 2L O2; pt desatting into upper 80s to 90 on RA. Pt on capnography, EtCO2 35-45, IPI 8-10. Pain managed w/ PCA dilaudid, started at 0.2mg q10min which pt reported was inadequate; dose bumped to current dose of 0.3mg q10 min with max dose of 1.8mg/hr. Nausea managed with granisetron q12, plus cold packs, cool cloth, and nausea triggers minimized. Pt requested Benadryl for nausea, states that it has helped in the past w/ nausea; MD paged. Pt denies dyspnea. Voiding in adequate amounts in BSC, Up w/ SBA. No BM this shift. Pt is on contact precautions for hx of MRSA, and droplet precautions pending results ruling out of influenza or RSV; nasal swab collected and sent to lab. UA still needs to be collected. Will continue to monitor and follow POC.

## 2017-12-07 NOTE — PHARMACY-CONSULT NOTE
Pharmacy Tube Feeding Consult    Medication reviewed for administration by feeding tube and for potential food/drug interactions.    Recommendation: No changes are needed at this time. Per nurse, patient is able to take medications orally.    Pharmacy will continue to follow as new medications are ordered.    Yarely Lanza, JoyD

## 2017-12-07 NOTE — CONSULTS
"  GASTROENTEROLOGY CONSULTATION      Date of Admission:  12/6/2017  Reason for Admission: Abdominal pain, n/v  Date of Consult  12/7/2017   Requesting Physician:  Chip Blanc MD           ASSESSMENT AND RECOMMENDATIONS:   Assessment:  21 year old female with a history of intractable abdominal pain for many years on chronic narcotics, previously thought to be related to chronic pancreatitis however had normal pancreatic function tests, only 2/9 EUS criteria (12/2016) and completely normal secretin stimulated MRCP with normal T1 signal and exocrine function. Admitted to OSH 11/29 for worsening abdominal pain, nausea and vomiting. Required NJFT placement due to poor oral intake and continuous N/V (dislodged during transfer). Transferred here for specialized care, patient follows with Dr. Narayanan. Per his last clinic note he had requested that patient repeat MRCP with secretin along with fecal elastase. Lastly, patient would benefit from comprehensive pain management, please obtain pain consult this admission.     Recommendations:  MRCP with secretin  Diet as tolerated  Pain consult  Please obtain CT scan A/P images from OSH  NJ replacement, nutrition consult and re-start TF  Obtain fecal elastase  Analgesia/Antiemetics per primary team and consulting pain service  Discussed with primary team, Dr. Blanc    Gastroenterology follow up recommendations: TBD    Thank you for involving us in this patient's care. Please do not hesitate to contact the GI service with any questions or concerns.     Pt seen and care plan discussed with Dr. Narayanan, GI staff physician.    Marlys Smallwood PA-C  [facility]  [facility]  Pager *[phone]  -------------------------------------------------------------------------------------------------------------------       Reason for Consultation:   \"came to see Dr. Narayanan for abd pain\"           History of Present Illness: "   Patient seen and examined at 1330. History is obtained from the patient and her mother at bedside.    Alexandra Melgoza is a 21 year old female with a PMH significant for intractable abdominal pain for many years on chronic narcotics, previously thought to be related to chronic pancreatitis however had normal pancreatic function tests, only 2/9 EUS criteria (12/2016) and completely normal secretin stimulated MRCP with normal T1 signal and exocrine function. She has followed with Dr. Narayanan for many years. She reports chronic abdominal pain issues since the age of 8. She reports that most recently she has been taking narcotics every day, has an appointment with the pain clinic tomorrow. She was hospitalized in [place], where she is living with her boyfriend, last week for flare of her chronic abdominal pain, associated with nausea and vomiting. Labs unremarkable on admission. She had a CT scan that commented on very mild pancreatitis (we do not have images available). She reports that the pain is similar to that she has had in the past. She was requiring PCA pump for analgesia along with frequent IV dilaudid pushes for break through pain. She had an NJT placed for enteral nutrition. She has never needed this in the past. OSH hospitalist provider requested transfer because they had reached maximal medical management.    Currently she reports her pain is in the RUQ, radiates around to her back. She is persistently nauseated. She developed a fever to 102F yesterday before transfer. They were told it was related to her NJFT. CXR showed mild left sided infiltrate concerning for pneumonia. Flu swab negative on admission today. Procalcitonin normal. Blood cultures drawn.      Previous Procedures:  EUS - Dr. Klein, 12/2016  - No gross lesions in esophagus.   - No gross lesions in the stomach.   - Duodenal juice was aspirated to check for bicarbonate   level. Since EUS findings have not progressed since last exam,  Dr Narayanan was consulted intra-operatively and we agreed to cancel the secretin stimulated test as both the EUS and MRI was indeterminate for chronic pancreatitis   - Pancreatic parenchymal abnormalities consisting of hyperechoic foci without shadowing and hyperechoic strands were noted in the pancreatic body and in the pancreatic tail. These represent 2/9 standard endosonographic features which is indeterminate for chronic pancreatitis. These changes have not progressed since last EUS exam performed by Dr Joe in 6/2015.   - Unremarkable bile duct and surgically absent gall bladder   - No focal pathology in the visualized portion of the liver             Past Medical History:   Reviewed and edited as appropriate  Past Medical History:   Diagnosis Date     Anxiety      Asthma      Cholecystitis     s/p cholecystectomy     Chronic abdominal pain      Chronic infection     mrsa     Chronic pain      Cyclic vomiting syndrome 10/27/2012     Depression      Endometriosis      Hypoglycaemia      Migraines      Mild intermittent asthma      Ovarian cysts      Pancreatic disease      PONV (postoperative nausea and vomiting)      Pseudoseizures      Somatoform disorder      Sphincter of Oddi dysfunction      Vasovagal syncope             Past Surgical History:   Reviewed and edited as appropriate   Past Surgical History:   Procedure Laterality Date     ABDOMEN SURGERY      ERCP, biliary stents     CHOLECYSTECTOMY  8/2/11     COLONOSCOPY  2011    negative finding     ENDOSCOPIC RETROGRADE CHOLANGIOPANCREATOGRAM  8/23/2011    Procedure:ENDOSCOPIC RETROGRADE CHOLANGIOPANCREATOGRAM; Endoscopic Retrograde Cholangiopancreatogram; Surgeon:SHORTY NARAYANAN; Location:[facility]     ENDOSCOPIC RETROGRADE CHOLANGIOPANCREATOGRAM  5/17/2012    Procedure:ENDOSCOPIC RETROGRADE CHOLANGIOPANCREATOGRAM; Endoscopic Retrograde Cholangiopancreatogram with pancreatic stent placement.; Surgeon:SHORTY NARAYANAN; [facility]      ENDOSCOPIC RETROGRADE CHOLANGIOPANCREATOGRAM COMPLEX  1/3/2012    Procedure:ENDOSCOPIC RETROGRADE CHOLANGIOPANCREATOGRAM COMPLEX; Endoscopic Retrograde Cholangiopancreatogram with Manometry bile duct sphincterotomy extention pancreatic duct sphincterotomy pancreatic duct stent placement; Surgeon:SHORTY MCCOY; Location:[facility]     ENDOSCOPIC ULTRASOUND UPPER GASTROINTESTINAL TRACT (GI) N/A 6/9/2015    Procedure: ENDOSCOPIC ULTRASOUND, ESOPHAGOSCOPY / UPPER GASTROINTESTINAL TRACT (GI);  Surgeon: Mario Joe MD;  [facility]: [facility]     ENDOSCOPIC ULTRASOUND UPPER GASTROINTESTINAL TRACT (GI) N/A 12/12/2016    Procedure: ENDOSCOPIC ULTRASOUND, ESOPHAGOSCOPY / UPPER GASTROINTESTINAL TRACT (GI);  Surgeon: Guru oJse Klein MD;  [facility]     ESOPHAGOSCOPY, GASTROSCOPY, DUODENOSCOPY (EGD), COMBINED  1/18/2012    Procedure:COMBINED ESOPHAGOSCOPY, GASTROSCOPY, DUODENOSCOPY (EGD); Surgeon:ARNIE ESPINOZA; Location[facility]     ESOPHAGOSCOPY, GASTROSCOPY, DUODENOSCOPY (EGD), COMBINED  1/18/2012    Procedure:COMBINED ESOPHAGOSCOPY, GASTROSCOPY, DUODENOSCOPY (EGD); EGD; Surgeon:ARNIE ESPINOZA; Location:[facility]     INSERT PORT VASCULAR ACCESS       L knee arthroscopy  2009     LAPAROSCOPY DIAGNOSTIC (GYN)  10/26/2012    Procedure: LAPAROSCOPY DIAGNOSTIC (GYN);  LAPAROSCOPY DIAGNOSTIC, CAUTERY ENDOMETRIOISIS and biopsy of Fallopian tube lesions;  Surgeon: Carla Lopez MD;  Location:  OR     ORTHOPEDIC SURGERY  2008    knee     VASCULAR SURGERY                Social History:   The patient lives in [facility] currently with her boyfriend. Her mother lives here in [place].    Alcohol: Denies  Tobacco: denies  Illicit drugs: denies           Family History:   Reviewed and edited as appropriate  Family History   Problem Relation Age of Onset     Hypertension Father      DIABETES Paternal Uncle      Bipolar Disorder Other      Anxiety Disorder Other      Depression Paternal Grandmother      Allergies  "Maternal Grandmother      CEREBROVASCULAR DISEASE Maternal Grandfather      Cardiovascular Maternal Grandfather      Depression/Anxiety Maternal Grandfather      GASTROINTESTINAL DISEASE Maternal Grandfather      CANCER No family hx of      No family history of skin cancer     Hypoglycemia Brother              Allergies:   Reviewed and edited as appropriate     Allergies   Allergen Reactions     Amoxicillin-Pot Clavulanate Nausea and Vomiting     Compazine [Prochlorperazine] Other (See Comments)     Dystonia       Hyoscyamine Other (See Comments)     Dystonia     Reglan [Metoclopramide Hcl] Other (See Comments)     Dystonia     Zyprexa Other (See Comments)     Sensitive, dystonic reaction on 11-9-2011     Amitriptyline Hcl Other (See Comments)     Dystonia, hallucinations     Buspirone Other (See Comments)     No Adverse Reactions, no benefit     Cogentin [Benztropine]      Cyproheptadine Other (See Comments)     Distonic     Dicyclomine Other (See Comments)     Droperidol Other (See Comments)     Feels tense and \"like she has to jump out of her skin\".       Effexor [Venlafaxine] Other (See Comments)     Dystonia     Food      Cilantro--lips/tongue swelling     No Clinical Screening - See Comments Other (See Comments)     Cilantro     Phenergan Dm [Promethazine-Dm] Other (See Comments)     dystonia     Promethazine Other (See Comments)     Risperidone Other (See Comments)     dystonia     Vistaril Other (See Comments)     Burning sensation.       Augmentin GI Disturbance     Ketamine Other (See Comments)     jittery     Sorbitol GI Disturbance     Headache and dyspepsia            Medications:     Current Facility-Administered Medications   Medication     HYDROmorphone (DILAUDID) injection 1 mg     ketorolac (TORADOL) injection 15 mg     lidocaine (viscous) (XYLOCAINE) 2 % solution 5 mL     oxyCODONE IR (ROXICODONE) tablet 10 mg     naloxone (NARCAN) injection 0.1-0.4 mg     acetaminophen (TYLENOL) tablet 975 mg     " senna-docusate (SENOKOT-S;PERICOLACE) 8.6-50 MG per tablet 1 tablet    Or     senna-docusate (SENOKOT-S;PERICOLACE) 8.6-50 MG per tablet 2 tablet     magnesium hydroxide (MILK OF MAGNESIA) suspension 30 mL     amylase-lipase-protease (ZENPEP) 16207 UNITS delayed release capsule 40,000 Units     amylase-lipase-protease (ZENPEP) 93108 UNITS delayed release capsule 60,000-80,000 Units     gabapentin (NEURONTIN) capsule 300 mg     granisetron (KYTRIL) tablet 1 mg     levalbuterol (XOPENEX HFA) Inhaler 2 puff     norethindrone (AYGESTIN) tablet 5 mg     nortriptyline (PAMELOR) capsule 30 mg     rizatriptan (MAXALT) tablet 5 mg     lactated ringers infusion     lidocaine 1 % 1 mL     lidocaine (LMX4) kit     sodium chloride (PF) 0.9% PF flush 10-20 mL     heparin lock flush 10 UNIT/ML injection 5-10 mL     heparin lock flush 10 UNIT/ML injection 5-10 mL     heparin 100 UNIT/ML injection 5 mL     sodium chloride (PF) 0.9% PF flush 10-20 mL             Review of Systems:   A complete review of systems was performed and is negative except as noted in the HPI      Skin: negative  Eyes: negative  Ears/Nose/Throat: negative  Respiratory: No shortness of breath, dyspnea on exertion, cough, or hemoptysis  Cardiovascular: negative  Gastrointestinal: positive for poor appetite, nausea, vomiting and abdominal pain  Genitourinary: negative  Musculoskeletal: negative  Neurologic: negative  Psychiatric: negative  Hematologic/Lymphatic/Immunologic: positive for fever yesterday  Endocrine: negative         Physical Exam:   Temp: 100  F (37.8  C) Temp src: Oral BP: 108/48 Pulse: 96   Resp: 15 SpO2: 95 % O2 Device: Nasal cannula Oxygen Delivery: 1 LPM  Wt:   Wt Readings from Last 2 Encounters:   12/06/17 68.9 kg (152 lb)   11/22/17 67.7 kg (149 lb 3.2 oz)        General: Pleasant female in NAD.  Answers appropriately.  Anxious, tearful  HEENT: Head is AT/NC. Sclera anicteric. No conjunctival injection.  Oropharynx is clear, moist and w/o  exudate or lesions.  Neck: No masses or thyromegaly.  Lungs: Clear to auscultation bilaterally.  No wheezes, rhonchi or crackles.    Heart: Regular rate and rhythm.  No murmurs, gallops or rubs.  Normal S1 and S2.  Abdomen: Soft, tender in RUQ with deep palpation but not when patient is distracted, non-distended.  BS +.  No hepatosplenomegaly. No rebound or peritoneal signs  Extremities: No pedal edema.  Heme/Lymph: No cervical or supraclavicular adenopathy.   Skin: No jaundice, rash  Neurologic: Grossly non-focal.  CN 2-12 grossly intact.           Data:   Labs and imaging below were independently reviewed and interpreted    LAB WORK:    BMP  Recent Labs  Lab 12/06/17  2132      POTASSIUM 3.3*   CHLORIDE 102   RENEA 8.5   CO2 27   BUN 3*   CR 0.65   GLC 79     CBC  Recent Labs  Lab 12/06/17 2132   WBC 7.9   RBC 3.50*   HGB 10.6*   HCT 30.9*   MCV 88   MCH 30.3   MCHC 34.3   RDW 12.3   *     LFTs  Recent Labs  Lab 12/06/17  2132   ALKPHOS 73   AST 16   ALT 18   BILITOTAL 0.8   PROTTOTAL 5.5*   ALBUMIN 3.3*      PANC  Recent Labs  Lab 12/06/17  2132   LIPASE 96       IMAGING:  Liver MRI with secretin enhanced MRCP: 12/7/2017     COMPARISON: MRI 11/30/2016, 4/22/2015, CT 8/19/2014     HISTORY: Pain of upper abdomen     TECHNIQUE: Coronal T2 HASTE, sagittal T2 HASTE, axial T2 STIR, axial  JUANJO T2, In-phase and Out-of-phase axial breath-hold FLASH,  diffusion-weighted, and T1-weighted VIBE axial fat saturation images  were obtained. Thick and thin slab heavily T2-weighted MRCP images  were obtained. 3-D reformatted images were created by the  technologist. Following administration of secretin, T2-weighted HASTE  images were obtained at 1 minute intervals to 7 minutes, and an  additional 10 minutes image was also obtained.      Contrast and medications: 7.5cc of Gadavist injected.     FINDINGS:   MRCP:   There is no pancreas divisum. The pancreatic duct does not appear  abnormally dilated, and no sidebranches  are visualized.      Pancreatic duct measures 2.2 mm prior to administration of secretin.  Following administration of secretin, it dilates to maximum diameter  of 3.0 mm.  At 10 minutes following secretin administration, the  diameter is approximately 1.2 mm.     There is normal exocrine function, with contrast seen in the second  and third portions of the duodenum at 10 minutes following secretin  administration.     Stable mild intrahepatic biliary dilation. Stable focal narrowing of  the left hepatic duct near the confluence with the right hepatic duct  (series 14, image 61). The common bile duct measures 5.6 mm.      Pancreas: No focal masses or cystic lesions. No significant atrophy.  No peripancreatic fluid collections. Decreased intrinsic parenchymal  T1 signal intensity similar to prior exams with normal homogeneous  contrast enhancement     Liver: Relatively normal parenchymal signal intensity. Slightly  decreased signal intensity within the left hepatic lobe on both the  intra and operative phase images may be artifactual. No focal liver  mass.     Gallbladder: Surgically absent.     Spleen: Normal     Kidneys: Normal     Adrenal glands: Normal     Bowel: Poorly visualized, grossly unremarkable     Lymph nodes: No lymphadenopathy     Blood vessels: Normal     Lung bases: Clear     Bones and soft tissues: Unremarkable     Mesentery and abdominal wall: Normal     Ascites: None            IMPRESSION:  1. Stable appearance of the pancreas without focal mass or ductal  dilatation.  2. Normal response to secretin.  3. Postoperative changes of cholecystectomy without extrahepatic  biliary dilatation. Persistent mild narrowing at the confluence of the  right and left hepatic ducts without obstructing mass identified.         =======================================================================

## 2017-12-07 NOTE — H&P
[facility]    Internal Medicine History and Physical - Gold Service       Date of Admission:  12/6/2017    Assessment & Plan   Alexandra Melgoza is a 21 year old female  admitted on 12/6/2017. She has a history of asthma, chronic abdominal pain s/p cholecystectomy, cyclic N/V, migraines, pseudoseizures and somatoform disorder and is admitted for abdominal pain and fever.  She was transferred here from a facility in [place] where she has been hospitalized for 5 days.    1) Fever - Developed yesterday and peaked at 102.4.  She had an NJ tube in place which was misplaced and had to be removed and her CXR was reportedly concerning for developing aspiration pneumonia.  Started on Zosyn today at OSH.  - Will repeat CXR now.  Will continue Zosyn for now.  - UA/UC, flu swab (patient reports body aches and headaches)    2) Recurrent abdominal pain - Presents with 7 days now of epigastric abdominal pain radiating to her back as well as nausea and vomiting.  CT scan at outside facility noted some minimal pancreatic inflammation, lipase negative, LFTs normal.  Reports a diagnosis of chronic pancreatitis but per our last GI note does not meet diagnostic criteria ([place], 11/22/2017).  Had an MRCP planned here and transferred to see Dr Narayanan in the hospital if possible.  - Consult gastroenterology  - Dilaudid PCA overnight  - LR @ 50 ccs/hr  - Will defer obtaining previously planned MRCP study to GI team in am  - Lipase, CBC, CMP now  - Continue chronic pain regimen: gabapentin, nortriptyline.  Hold norco while on PCA    3) History of endometriosis  - Continue lupron    4) History of asthma  - Continue levalbuterol    Diet: Combination Diet Regular Diet Adult, NPO midnight  Fluids: LR @ 50 ccs/hr  DVT Prophylaxis: Pneumatic Compression Devices  Code Status: Full Code    Disposition Plan   Expected discharge: 2 - 3 days; recommended to prior living arrangement once seen by GI team, febrile  "illness resolved.     Entered: Corbin Vaughn 12/06/2017, 7:42 PM   Information in the above section will display in the discharge planner report.    The patient's care was discussed with the Attending Physician, Dr. Ang.    Corbin Vaughn NP  Internal Medicine Staff Hospitalist Service  [facility]  Pager: [phone]  Please see sticky note for cross cover information  ______________________________________________________________________    Chief Complaint   Abdominal pain, N/V, fever    History is obtained from the patient    History of Present Illness   Alexandra Melgoza is a 21 year old female  admitted on 12/6/2017. She has a history of asthma, chronic abdominal pain s/p cholecystectomy, cyclic N/V, migraines, pseudoseizures and somatoform disorder and is admitted for abdominal pain and fever.  She was transferred here from a facility in [place] where she has been hospitalized for 5 days.    The patient reports she has been ill for close to ten days.  This past Sunday she noted feeling clammy, nauseated and full which is what she reports usually indicates a \"pancreas flare\" is coming on.  The next day she had vomiting and the day after that she went in for scheduled IV fluids (patient has a port an receives IVF ~3 times per week).  On Wednesday 11/29 her abdominal pain worsened severely.  It was epigastric, radiated to her back and was associated with nausea and vomiting.  All in all, it is similar to her chronic pain but intensified in every way.  It has been three months since she had a similar episode.    No chest pain, no shortness of breath.  She notes nausea, vomiting, grey stools, fevers, chills, body aches and headaches.    Since being hospitalized in [place], she had an NJ tube which she was later told was displaced with possible pneumonia noted on CXR.  She also reports fevers over the past two days up to 102.4.        Review of Systems   The 10 point Review of Systems is " negative other than noted in the HPI or here.     Past Medical History    I have reviewed this patient's medical history and updated it with pertinent information if needed.   Past Medical History:   Diagnosis Date     Anxiety      Asthma      Cholecystitis     s/p cholecystectomy     Chronic abdominal pain      Chronic infection     mrsa     Chronic pain      Cyclic vomiting syndrome 10/27/2012     Depression      Endometriosis      Hypoglycaemia      Migraines      Mild intermittent asthma      Ovarian cysts      Pancreatic disease      PONV (postoperative nausea and vomiting)      Pseudoseizures      Somatoform disorder      Sphincter of Oddi dysfunction      Vasovagal syncope       Past Surgical History   I have reviewed this patient's surgical history and updated it with pertinent information if needed.  Past Surgical History:   Procedure Laterality Date     ABDOMEN SURGERY      ERCP, biliary stents     CHOLECYSTECTOMY  8/2/11     COLONOSCOPY  2011    negative finding     ENDOSCOPIC RETROGRADE CHOLANGIOPANCREATOGRAM  8/23/2011    Procedure:ENDOSCOPIC RETROGRADE CHOLANGIOPANCREATOGRAM; Endoscopic Retrograde Cholangiopancreatogram; Surgeon:SHORTY MCCOY; Location:[facility]     ENDOSCOPIC RETROGRADE CHOLANGIOPANCREATOGRAM  5/17/2012    Procedure:ENDOSCOPIC RETROGRADE CHOLANGIOPANCREATOGRAM; Endoscopic Retrograde Cholangiopancreatogram with pancreatic stent placement.; Surgeon:SHORTY MCCOY; Location:[facility] OR     ENDOSCOPIC RETROGRADE CHOLANGIOPANCREATOGRAM COMPLEX  1/3/2012    Procedure:ENDOSCOPIC RETROGRADE CHOLANGIOPANCREATOGRAM COMPLEX; Endoscopic Retrograde Cholangiopancreatogram with Manometry bile duct sphincterotomy extention pancreatic duct sphincterotomy pancreatic duct stent placement; Surgeon:SHORTY MCCOY; Location:[facility] OR     ENDOSCOPIC ULTRASOUND UPPER GASTROINTESTINAL TRACT (GI) N/A 6/9/2015    Procedure: ENDOSCOPIC ULTRASOUND, ESOPHAGOSCOPY / UPPER GASTROINTESTINAL TRACT (GI);   Surgeon: Mario Joe MD;  Location: [facility] OR     ENDOSCOPIC ULTRASOUND UPPER GASTROINTESTINAL TRACT (GI) N/A 12/12/2016    Procedure: ENDOSCOPIC ULTRASOUND, ESOPHAGOSCOPY / UPPER GASTROINTESTINAL TRACT (GI);  Surgeon: Guru Jose Klein MD;  Location: [facility] OR     ESOPHAGOSCOPY, GASTROSCOPY, DUODENOSCOPY (EGD), COMBINED  1/18/2012    Procedure:COMBINED ESOPHAGOSCOPY, GASTROSCOPY, DUODENOSCOPY (EGD); Surgeon:ARNIE ESPINOZA; Location:[facility]     ESOPHAGOSCOPY, GASTROSCOPY, DUODENOSCOPY (EGD), COMBINED  1/18/2012    Procedure:COMBINED ESOPHAGOSCOPY, GASTROSCOPY, DUODENOSCOPY (EGD); EGD; Surgeon:ARNIE ESPINOZA; Location:[facility] OR     INSERT PORT VASCULAR ACCESS       L knee arthroscopy  2009     LAPAROSCOPY DIAGNOSTIC (GYN)  10/26/2012    Procedure: LAPAROSCOPY DIAGNOSTIC (GYN);  LAPAROSCOPY DIAGNOSTIC, CAUTERY ENDOMETRIOISIS and biopsy of Fallopian tube lesions;  Surgeon: Carla Lopez MD;  Location:  OR     ORTHOPEDIC SURGERY  2008    knee     VASCULAR SURGERY        Social History   Social History   Substance Use Topics     Smoking status: Never Smoker     Smokeless tobacco: Never Used     Alcohol use No     Family History   I have reviewed this patient's family history and updated it with pertinent information if needed.   Family History   Problem Relation Age of Onset     Hypertension Father      DIABETES Paternal Uncle      Bipolar Disorder Other      Anxiety Disorder Other      Depression Paternal Grandmother      Allergies Maternal Grandmother      CEREBROVASCULAR DISEASE Maternal Grandfather      Cardiovascular Maternal Grandfather      Depression/Anxiety Maternal Grandfather      GASTROINTESTINAL DISEASE Maternal Grandfather      CANCER No family hx of      No family history of skin cancer     Hypoglycemia Brother      Prior to Admission Medications   Prior to Admission Medications   Prescriptions Last Dose Informant Patient Reported? Taking?   Blood Glucose Monitoring Suppl KIT  Self No No    Si kit 2 times daily   HYDROcodone-acetaminophen (NORCO) 5-325 MG per tablet   Yes No   Sig: Take 1 tablet by mouth every 4 hours as needed for moderate to severe pain (Dr. santa in [place] ordered)   RIZATRIPTAN BENZOATE PO  Self Yes No   Sig: Take 5 mg by mouth   adapalene (DIFFERIN) 0.1 % cream   No No   Sig: Apply pea-sized amount to face, chest, and back at bedtime. Initially start every 2 days, then every other day, then daily as tolerated.   amylase-lipase-protease (ZENPEP) 48463 UNITS CPEP   No No   Sig: Take 1-5 with snacks and meals, up to 15 per day.   blood glucose monitoring (ACCU-CHEK FASTCLIX) lancets   No No   Sig: Use to test blood sugar 2 times daily or as directed.   blood glucose monitoring (NO BRAND SPECIFIED) test strip   No No   Sig: Use to test blood sugars 2 times daily or as directed   fluticasone (FLONASE) 50 MCG/ACT spray   No No   Sig: Spray 2 sprays into both nostrils daily   fluticasone-salmeterol (ADVAIR) 250-50 MCG/DOSE diskus inhaler  Self No No   Sig: Inhale 1 puff into the lungs every 12 hours.   gabapentin (NEURONTIN) 300 MG capsule   No No   Sig: Take 1 capsule (300 mg) by mouth 3 times daily   glucagon (GLUCAGON EMERGENCY) 1 MG injection   No No   Sig: Inject 1 mg into the muscle once for 1 dose   glucose (GLUCOSE 15) 40 % GEL  Self No No   Sig: Take 15 g by mouth every hour as needed for low blood sugar   granisetron (KYTRIL) 1 MG tablet   No No   Sig: Take 1 tablet (1 mg) by mouth every 12 hours as needed for nausea   ibuprofen (ADVIL) 200 MG capsule  Self Yes No   Sig: Take 400 mg by mouth every 4 hours as needed   leuprolide (LUPRON DEPOT) 11.25 MG injection  Self No No   Sig: Inject 11.25 mg into the muscle every 3 months   levalbuterol (XOPENEX HFA) 45 MCG/ACT Inhaler   No No   Sig: Inhale 2 puffs into the lungs every 6 hours as needed for shortness of breath / dyspnea   metroNIDAZOLE (METROGEL) 0.75 % gel  Self No No   Sig: Apply twice daily to entire face  "  norethindrone (AYGESTIN) 5 MG tablet   No No   Sig: Take 1 tablet (5 mg) by mouth daily   nortriptyline (PAMELOR) 10 MG capsule   No No   Sig: Take 2 tablets by mouth at bedtime.   ondansetron (ZOFRAN ODT) 4 MG ODT tab   No No   Sig: Take 1 tablet (4 mg) by mouth every 8 hours as needed for nausea or vomiting   triamcinolone (KENALOG) 0.1 % cream   No No   Sig: Apply topically 2 times daily   vitamin D (ERGOCALCIFEROL) 72602 UNIT capsule   No No   Sig: Take one capsule by mouth twice a week      Facility-Administered Medications: None     Allergies   Allergies   Allergen Reactions     Amoxicillin-Pot Clavulanate Nausea and Vomiting     Compazine [Prochlorperazine] Other (See Comments)     Dystonia       Hyoscyamine Other (See Comments)     Dystonia     Reglan [Metoclopramide Hcl] Other (See Comments)     Dystonia     Zyprexa Other (See Comments)     Sensitive, dystonic reaction on 11-9-2011     Amitriptyline Hcl Other (See Comments)     Dystonia, hallucinations     Buspirone Other (See Comments)     No Adverse Reactions, no benefit     Cogentin [Benztropine]      Cyproheptadine Other (See Comments)     Distonic     Dicyclomine Other (See Comments)     Droperidol Other (See Comments)     Feels tense and \"like she has to jump out of her skin\".       Effexor [Venlafaxine] Other (See Comments)     Dystonia     Food      Cilantro--lips/tongue swelling     No Clinical Screening - See Comments Other (See Comments)     Cilantro     Phenergan Dm [Promethazine-Dm] Other (See Comments)     dystonia     Promethazine Other (See Comments)     Risperidone Other (See Comments)     dystonia     Vistaril Other (See Comments)     Burning sensation.       Augmentin GI Disturbance     Ketamine Other (See Comments)     jittery     Sorbitol GI Disturbance     Headache and dyspepsia       Physical Exam   Vital Signs: Temp: 99.4  F (37.4  C) Temp src: Oral BP: 127/79 Pulse: 91   Resp: 18 SpO2: 100 % O2 Device: None (Room air)    Weight: " 152 lbs 0 oz    Physical Exam   Constitutional:   Uncomfortable and ill appearing young woman   Eyes: Conjunctivae are normal. Pupils are equal, round, and reactive to light.  Pharynx has no erythema or exudate, mucous membranes are moist  Neck:   No adenopathy, no bony tenderness  Cardiovascular: Regular rate and rhythm without murmurs or gallops  Pulmonary/Chest: Clear to auscultation bilaterally, with no wheezes or retractions. No respiratory distress.  GI: Soft with good bowel sounds.  Tender in the epigastric area, non-distended, with mild guarding, no rebound, no peritoneal signs.   Back:  No bony or CVA tenderness   Musculoskeletal:  No edema or clubbing   Skin: Skin is warm and dry. No rash noted.   Neurological: Alert and oriented to person, place, and time. Nonfocal exam  Psychiatric:  Normal mood and affect.      Data   Data reviewed today: I reviewed all medications, new labs and imaging results over the last 24 hours. I personally reviewed imaging reports from the outside hospital.

## 2017-12-07 NOTE — CONSULTS
Inpatient Pain Management Service: Consultation      DATE OF CONSULT: December 7, 2017      REASON FOR PAIN CONSULTATION:  Alexandra Melgoza is a 21 year old female I am seeing in consultation evaluation and recommendations for her pain condition.        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 However her pain continued after a sphincter of Oddi ablation.  A year ago, she had a full evaluation for chronic pancreatitis.  She had normal pancreatic function test, 2/9 criteria at EUS and a completely normal pancreatic secretin  In MRCP with normal T1 signal, exocrine function, etc.  She has been living in [place] and has been in [facility]. She was suggested pancreatectomy and islet autotransplant. She has seen Dr. Narayanan on 11/22/17. Per Dr. Narayanan's note, she does not have evidence to support chronic pancreatitis at present.   - She has a h/o spina bifida and has been hopitalized many times for multiple medical issues including gastritis, ovarian cyst rupture, and chronic abdominal pain .  -  She is on chronic opioid therapy since 2016 per . Currently on norco 5/325 mg bid prn. Her last prescription on 11/21/1. She has seen Dr. Haji in 2013. Currently under the care of Dr. Rendon at [place], [place].   - Opiate Risk/Benefit:  No abuse/misuse of medication; Stable dose with acceptable pain control.  Managable side effects.   - She has h/o depressive symptoms have included feelings of hopelessness, sadness, difficulty concentrating, low motivation, difficulty falling and staying asleep and decreased appetite.  Has seen several psychiatrist in the past and was diagnosed with depressive disorder, anxiety disorder and somatization disorders.        TREATMENT RECOMMENDATIONS/PLAN:      - Oxycodone 10 mg po q4 hours prn.   - Hydromorphone 1 mg IV q3 hour prn when pain not controlled with oxycodone.   - Once tolerates orals, start acetaminophen 1000mg po q6h scheduled for one week. Then change to PRN after a week.   - Continue gabapentin 300mg po tid.   - continue nortriptyline 30 mg qhs.   - Recommend to establish care with a pain physician for multidisciplinary approach for pain management including TAP block, Celiac ganglion block,  TENS unit, abdominal breathing exercise, pain psychotherapy, and acupunture.  - Recommend to see a clinical psychologist outpatient  for learning pain coping strategies. Quyen Lennon, PhD  - Recommend to see integrated medicine service  Betty Avila ([place]), How to order a consult: [facility] (Dr. Avila)   - Consider GI prophylaxis if the patient has not started yet.       ASSESSMENT AND RECOMMENDATIONS DISCUSSED WITH: GI service       Thank you for consulting the Inpatient Pain Management Service.   The above recommendations are to be acted upon at the primary team s discretion.    To reach us:  Mon - Friday 8 AM - 3 PM: Pager [phone]   After hours, weekends and holidays: Primary service should call [phone] for the on-call pain specialist    HISTORY OF PRESENT ILLNESS:   21 year old female with a history of intractable abdominal pain for nearly 10 years, managed with chronic narcotics, She has post-cholecystectomy pain which was diagnosed as sphincter of Oddi dysfunction. However her pain continued after a sphincter of Oddi ablation.  A year ago, we did a full evaluation for chronic pancreatitis.  She had normal pancreatic function test, 2/9 criteria at EUS and a completely normal pancreatic secretin MRCP with normal T1 signal, exocrine function, etc.  She has been living in [place] and has been in [facility]. She was suggested pancreatectomy islet autotransplant. She has seen Dr. Narayanan on 11/22/17.she does not have  evidence to support chronic pancreatitis at present. She has a h/o spina bifida and has been hopitalized many times for multiple medical issues including gastritis, ovarian cyst rupture, and chronic abdominal pain .She is on chronic opioid therapy since 2016. Currently on norco 5/325 mg bid prn. Her last prescription on 11/21/1. She has seen Dr. Haji in 2013. Currently under the care of Dr. Rendon at [place], [place]. Opiate Risk/Benefit:  No abuse/misuse of medication; Stable dose with acceptable pain control.  Managable side effects. She has h/o depressive symptoms have included feelings of hopelessness, sadness, difficulty concentrating, low motivation, difficulty falling and staying asleep and decreased appetite.  Has seen several psychiatrist in the past and was diagnosed with depressive disorder, anxiety disorder and somatization disorders.         CAPA (Clinically Aligned Pain Assessment)  Comfort (How is your pain?): Tolerable with discomfort  Change in Pain (Since your last medication/intervention?): About the same  Pain Control (How are your pain treatments working?): Partially effective pain control  Functioning (Are you able to do activities to get better?) : Pain keeps me from doing most of what I need to do  Sleep (Does your pain management allow you to sleep or rest?): Awake with occasional pain       REVIEW OF 10 BODY SYSTEMS: 10 point ROS of systems including Constitutional, Eyes, Respiratory, Cardiovascular, Gastroenterology, Genitourinary, Integumentary, Musculoskeletal, Psychiatric were all negative except for pertinent positives noted in my HPI.         CURRENT MEDICATIONS:   Current Facility-Administered Medications Ordered in Epic   Medication Dose Route Frequency Provider Last Rate Last Dose     HYDROmorphone (DILAUDID) injection 1 mg  1 mg Intravenous Q3H PRN Chip Blanc MD   1 mg at 12/07/17 1550     ketorolac (TORADOL) injection 15 mg  15 mg Intravenous Q6H PRN Chip Blanc MD    15 mg at 12/07/17 1248     oxyCODONE IR (ROXICODONE) tablet 10 mg  10 mg Oral Q4H PRN Chip Blanc MD   10 mg at 12/07/17 1628     dextrose 10 % 1,000 mL infusion   Intravenous Continuous PRN Chip Blanc MD         multivitamins with minerals (CERTAVITE/CEROVITE) liquid 15 mL  15 mL Per Feeding Tube Daily Chip Blanc MD         naloxone (NARCAN) injection 0.1-0.4 mg  0.1-0.4 mg Intravenous Q2 Min PRN Corbin Vaughn APRN CNP         acetaminophen (TYLENOL) tablet 975 mg  975 mg Oral Q8H PRN Corbin Vaughn APRN CNP         senna-docusate (SENOKOT-S;PERICOLACE) 8.6-50 MG per tablet 1 tablet  1 tablet Oral BID PRN Corbin Vaughn APRN CNP        Or     senna-docusate (SENOKOT-S;PERICOLACE) 8.6-50 MG per tablet 2 tablet  2 tablet Oral BID PRN Corbin Vaughn APRN CNP         magnesium hydroxide (MILK OF MAGNESIA) suspension 30 mL  30 mL Oral Daily PRN Corbin Vaughn APRN CNP         amylase-lipase-protease (ZENPEP) 05788 UNITS delayed release capsule 40,000 Units  2 capsule Oral With Snacks or Supplements Corbin Vaughn APRN CNP         amylase-lipase-protease (ZENPEP) 98299 UNITS delayed release capsule 60,000-80,000 Units  3-4 capsule Oral TID w/meals Corbin Vaughn APRN CNP         gabapentin (NEURONTIN) capsule 300 mg  300 mg Oral TID Corbin Vaughn APRN CNP         granisetron (KYTRIL) tablet 1 mg  1 mg Oral Q12H PRN Corbin Vaughn APRN CNP   1 mg at 12/07/17 1249     levalbuterol (XOPENEX HFA) Inhaler 2 puff  2 puff Inhalation Q6H PRN Corbin Vaughn APRN CNP         norethindrone (AYGESTIN) tablet 5 mg  5 mg Oral At Bedtime Corbin Vaughn APRN CNP         nortriptyline (PAMELOR) capsule 30 mg  30 mg Oral At Bedtime Corbin Vaughn APRN CNP         rizatriptan (MAXALT) tablet 5 mg  5 mg Oral Once PRN Corbin Vaughn APRN CNP         lactated ringers infusion   Intravenous Continuous Chip Blanc,   mL/hr at 12/07/17 1603       lidocaine 1 % 1 mL  1 mL Other Q1H PRN Corbin Vaughn APRN CNP         lidocaine (LMX4) kit   Topical Q1H PRN Corbin Vaughn APRN CNP         sodium chloride (PF) 0.9% PF flush 10-20 mL  10-20 mL Intracatheter Q1H PRN JohanaCorbin martínez APRN CNP         heparin lock flush 10 UNIT/ML injection 5-10 mL  5-10 mL Intracatheter Q24H Corbin Vaughn APRN CNP         heparin lock flush 10 UNIT/ML injection 5-10 mL  5-10 mL Intracatheter Q1H PRN Corbin Vaughn APRN CNP         heparin 100 UNIT/ML injection 5 mL  5 mL Intracatheter Q28 Days Corbin Vaughn APRN CNP         sodium chloride (PF) 0.9% PF flush 10-20 mL  10-20 mL Intracatheter Q1H PRN JohanaCorbin martínez APRN CNP   20 mL at 12/06/17 2128     No current [facility]-ordered outpatient prescriptions on file.           OUTPATIENT OPIOIDS PRESCRIBED BY:      PRIMARY CARE PROVIDER: Quyen Baez  database reviewed:       HOME/PREVIOUS MEDICATIONS:   Prior to Admission medications    Medication Sig Start Date End Date Taking? Authorizing Provider   HYDROcodone-acetaminophen (NORCO) 5-325 MG per tablet Take 1 tablet by mouth every 4 hours as needed for moderate to severe pain (Dr. santa in [place] ordered) 11/20/17   Campbell Narayanan MD   gabapentin (NEURONTIN) 300 MG capsule Take 1 capsule (300 mg) by mouth 3 times daily 1/23/17   Ysabel Goddard NP   amylase-lipase-protease (ZENPEP) 66973 UNITS CPEP Take 1-5 with snacks and meals, up to 15 per day. 1/23/17   Ysabel Goddard NP   nortriptyline (PAMELOR) 10 MG capsule Take 2 tablets by mouth at bedtime.  Patient taking differently: Take 30 mg by mouth At Bedtime Take 2 tablets by mouth at bedtime. 1/23/17   Ysabel Goddard NP   norethindrone (AYGESTIN) 5 MG tablet Take 1 tablet (5 mg) by mouth daily 1/23/17   Ysabel Goddard NP   fluticasone (FLONASE) 50 MCG/ACT spray Spray 2 sprays into both nostrils daily 1/23/17    "Ysabel Goddard NP   levalbuterol (XOPENEX HFA) 45 MCG/ACT Inhaler Inhale 2 puffs into the lungs every 6 hours as needed for shortness of breath / dyspnea 1/23/17   Ysabel Goddard NP   ondansetron (ZOFRAN ODT) 4 MG ODT tab Take 1 tablet (4 mg) by mouth every 8 hours as needed for nausea or vomiting 1/23/17   Ysabel Goddard NP   granisetron (KYTRIL) 1 MG tablet Take 1 tablet (1 mg) by mouth every 12 hours as needed for nausea 12/29/16   Quyen Baez MD   leuprolide (LUPRON DEPOT) 11.25 MG injection Inject 11.25 mg into the muscle every 3 months 5/3/16   Mary Anne Aranda MD   RIZATRIPTAN BENZOATE PO Take 5 mg by mouth once as needed     Adithya Haji MD   glucagon (GLUCAGON EMERGENCY) 1 MG injection Inject 1 mg into the muscle once for 1 dose 5/21/15 5/19/16  Iman Hooper MD   ibuprofen (ADVIL) 200 MG capsule Take 400 mg by mouth every 4 hours as needed    Reported, Patient         ALLERGIES:    Allergies   Allergen Reactions     Amoxicillin-Pot Clavulanate Nausea and Vomiting     Compazine [Prochlorperazine] Other (See Comments)     Dystonia       Hyoscyamine Other (See Comments)     Dystonia     Reglan [Metoclopramide Hcl] Other (See Comments)     Dystonia     Zyprexa Other (See Comments)     Sensitive, dystonic reaction on 11-9-2011     Amitriptyline Hcl Other (See Comments)     Dystonia, hallucinations     Buspirone Other (See Comments)     No Adverse Reactions, no benefit     Cogentin [Benztropine]      Cyproheptadine Other (See Comments)     Distonic     Dicyclomine Other (See Comments)     Droperidol Other (See Comments)     Feels tense and \"like she has to jump out of her skin\".       Effexor [Venlafaxine] Other (See Comments)     Dystonia     Food      Cilantro--lips/tongue swelling     No Clinical Screening - See Comments Other (See Comments)     Cilantro     Phenergan Dm [Promethazine-Dm] Other (See Comments)     dystonia     Promethazine Other (See Comments)     Risperidone " Other (See Comments)     dystonia     Vistaril Other (See Comments)     Burning sensation.       Augmentin GI Disturbance     Ketamine Other (See Comments)     jittery     Sorbitol GI Disturbance     Headache and dyspepsia            PAST MEDICAL AND PSYCHIATRIC HISTORY:    Past Medical History:   Diagnosis Date     Anxiety      Asthma      Cholecystitis     s/p cholecystectomy     Chronic abdominal pain      Chronic infection     mrsa     Chronic pain      Cyclic vomiting syndrome 10/27/2012     Depression      Endometriosis      Hypoglycaemia      Migraines      Mild intermittent asthma      Ovarian cysts      Pancreatic disease      PONV (postoperative nausea and vomiting)      Pseudoseizures      Somatoform disorder      Sphincter of Oddi dysfunction      Vasovagal syncope            PAST SURGICAL HISTORY:   Past Surgical History:   Procedure Laterality Date     ABDOMEN SURGERY      ERCP, biliary stents     CHOLECYSTECTOMY  8/2/11     COLONOSCOPY  2011    negative finding     ENDOSCOPIC RETROGRADE CHOLANGIOPANCREATOGRAM  8/23/2011    Procedure:ENDOSCOPIC RETROGRADE CHOLANGIOPANCREATOGRAM; Endoscopic Retrograde Cholangiopancreatogram; Surgeon:SHORTY MCCOY; [facility]     ENDOSCOPIC RETROGRADE CHOLANGIOPANCREATOGRAM  5/17/2012    Procedure:ENDOSCOPIC RETROGRADE CHOLANGIOPANCREATOGRAM; Endoscopic Retrograde Cholangiopancreatogram with pancreatic stent placement.; Surgeon:SHORTY MCCOY; Location:[facility] OR     ENDOSCOPIC RETROGRADE CHOLANGIOPANCREATOGRAM COMPLEX  1/3/2012    Procedure:ENDOSCOPIC RETROGRADE CHOLANGIOPANCREATOGRAM COMPLEX; Endoscopic Retrograde Cholangiopancreatogram with Manometry bile duct sphincterotomy extention pancreatic duct sphincterotomy pancreatic duct stent placement; Surgeon:SHORTY MCCOY; Location:[facility] OR     ENDOSCOPIC ULTRASOUND UPPER GASTROINTESTINAL TRACT (GI) N/A 6/9/2015    Procedure: ENDOSCOPIC ULTRASOUND, ESOPHAGOSCOPY / UPPER GASTROINTESTINAL TRACT (GI);   Surgeon: Mario Joe MD;  Location: [facility] OR     ENDOSCOPIC ULTRASOUND UPPER GASTROINTESTINAL TRACT (GI) N/A 12/12/2016    Procedure: ENDOSCOPIC ULTRASOUND, ESOPHAGOSCOPY / UPPER GASTROINTESTINAL TRACT (GI);  Surgeon: Guru Jose Klein MD;  Location: [facility] OR     ESOPHAGOSCOPY, GASTROSCOPY, DUODENOSCOPY (EGD), COMBINED  1/18/2012    Procedure:COMBINED ESOPHAGOSCOPY, GASTROSCOPY, DUODENOSCOPY (EGD); Surgeon:ARNIE ESPINOZA; [facility]     ESOPHAGOSCOPY, GASTROSCOPY, DUODENOSCOPY (EGD), COMBINED  1/18/2012    Procedure:COMBINED ESOPHAGOSCOPY, GASTROSCOPY, DUODENOSCOPY (EGD); EGD; Surgeon:ARNIE ESPINOZA; Location:[facility] OR     INSERT PORT VASCULAR ACCESS       L knee arthroscopy  2009     LAPAROSCOPY DIAGNOSTIC (GYN)  10/26/2012    Procedure: LAPAROSCOPY DIAGNOSTIC (GYN);  LAPAROSCOPY DIAGNOSTIC, CAUTERY ENDOMETRIOISIS and biopsy of Fallopian tube lesions;  Surgeon: Crala Lopez MD;  Location:  OR     ORTHOPEDIC SURGERY  2008    knee     VASCULAR SURGERY             FAMILY HISTORY: family history includes Allergies in her maternal grandmother; Anxiety Disorder in an other family member; Bipolar Disorder in an other family member; CEREBROVASCULAR DISEASE in her maternal grandfather; Cardiovascular in her maternal grandfather; DIABETES in her paternal uncle; Depression in her paternal grandmother; Depression/Anxiety in her maternal grandfather; GASTROINTESTINAL DISEASE in her maternal grandfather; Hypertension in her father; Hypoglycemia in her brother. There is no history of CANCER.      HEALTH & LIFESTYLE PRACTICES:   Tobacco:  reports that she has never smoked. She has never used smokeless tobacco.  Alcohol:  reports that she does not drink alcohol.  Illicit drugs:  reports that she does not use illicit drugs.      SOCIAL HISTORY:       LABORATORY VALUES:   Last Basic Metabolic Panel:  Lab Results   Component Value Date     12/06/2017      Lab Results   Component Value Date    POTASSIUM  3.3 12/06/2017     Lab Results   Component Value Date    CHLORIDE 102 12/06/2017     Lab Results   Component Value Date    RENEA 8.5 12/06/2017     Lab Results   Component Value Date    CO2 27 12/06/2017     Lab Results   Component Value Date    BUN 3 12/06/2017     Lab Results   Component Value Date    CR 0.65 12/06/2017     Lab Results   Component Value Date    GLC 79 12/06/2017       CBC results:  Lab Results   Component Value Date    WBC 7.9 12/06/2017     Lab Results   Component Value Date    HGB 10.6 12/06/2017     Lab Results   Component Value Date    HCT 30.9 12/06/2017     Lab Results   Component Value Date     12/06/2017       DIAGNOSTIC TESTS:       Labs above reviewed as well as additional relevant diagnostic studies from the EPIC record.       PHYSICAL EXAMINATION:  VITAL SIGNS:  B/P: 120/68, T: 97.5, P: 96, R: 16    CONSTITUTIONAL/GENERAL APPEARANCE: Alert, interactive, & in no significant discomfort  EYES: anicteric  ENT/NECK: atraumatic  RESPIRATORY: non-labored breathing   CARDIOVASCULAR: regular rate and rhythm   MUSCULOSKELETAL/BACK/SPINE/EXTREMITIES: moves all extremities    GAIT: not checked  NEURO:  Alert oriented   Abdomen: Soft, very tender in the epigastrium and LUQ with guarding, nondistended. SKIN/VASCULAR EXAM:  No jaundice or rash  PSYCHIATRIC/BEHAVIORAL/OBSERVATIONS:  No objective signs of pain observed during our interview.   Extremities: No LE edema or obvious joint abnormalities      TIME SPENT: 60 minutes including 30 minutes of face-to-face time counseling her  about her pain management treatment options, and coordinating care with the primary team.    Zena James MD  Inpatient Pain Management Service

## 2017-12-07 NOTE — PLAN OF CARE
Problem: Patient Care Overview  Goal: Plan of Care/Patient Progress Review  Outcome: Improving  PCA discontinued today, started tordol and prn IV dilaudid, so far pain appears managed. Kytril for nausea, no emesis today. Patient had an MRI today, will get a feeding tube placed this afternoon. Urine sample sent to lab, also had blood cultures today. No BM today, still needs pancreatic elastase fecal sample.

## 2017-12-07 NOTE — PROGRESS NOTES
"SPIRITUAL HEALTH SERVICES  SPIRITUAL ASSESSMENT Progress Note  [facility] ([place]) 7C    PRIMARY FOCUS:     Emotional/spiritual/[employer] distress    Support for coping    ILLNESS CIRCUMSTANCES:   Reviewed documentation. Reflective conversation shared with Alexandra which integrated elements of illness and family narratives.  Reason for visit: request on admission.      Context of Serious Illness/Symptom(s) - Alexandra briefly reviewed her periodic GI \"flare-ups\" which started at age [age] and have recurred periodically since.    Resources for Support -   o Mother  o Brother - five years older  o boyfriend - from [place]    DISTRESS:     Emotional/Existential/Relational Distress - Not discussed.    Spiritual/L[employer] Distress - Not discussed.    Social/Cultural/Economic Distress - Not discussed.    SPIRITUAL/Faith COPING:     Congregation/Berta - Not discussed.    Spiritual Practice(s) - Per her request, shared in prayer.  Introduced the 24/7 availability of [facility].    Emotional/Existential/Relational Connections -   o Alexandra described meeting her boyfriend at a retreat for teens dealing with chronic pain.  o She and her mom explained the longstanding relationship they have with staff here, especially known for their expertise in working with pancreatic issues.  o She articulated her hope that she would be able use her experience to help  - to share her experience with other young people and   - Eventually, to work as a nurse.    GOALS OF CARE:    Goals of Care - If possible, avoid future flare-ups.    Meaning/Sense-Making - Working through her issues presents and opportunity to help others.    PLAN: Will visit this pt 1-2x /wk while she remains on the unit.  Visits are available on request.      Alli Vang M.Div.  Staff   Pager [phone]      "

## 2017-12-07 NOTE — PROGRESS NOTES
CLINICAL NUTRITION SERVICES - ASSESSMENT NOTE     Nutrition Prescription    RECOMMENDATIONS FOR MDs/PROVIDERS TO ORDER:  -Diet advancement per team discretion, if unable to advance recommend restarting enteral nutrition.   -If diet advanced and unable to meet 60% of high end nutritional needs (1242 kcal, 83 g PRO) per tamra counts, recommend starting enteral nutrition    Malnutrition Status:    Non-severe malnutrition in the context of chronic illness.    Recommendations already ordered by Registered Dietitian (RD):  Ordered calorie counts    Continue current PERT regimen at this time.     Future/Additional Recommendations:  If diet unable to adv or inadequate calorie counts in the next 3-5 days consider TF via NJ:  Impact Peptide 1.5 (semi-elemental formula/appropriate for pancreatitis) @ 50 mL/hr to provide: 1200 mL, 1800 kcal (26 kcal/kg), 113 g PRO (1.6 g/kg), 168 g CHO, 77 g FAT, 0 g FIBER, 924 mL free water.  -If enteral nutrition started monitor for signs of fat malabsorption (oily, frothy stools, abdominal pain, bloating), may need to consider enzymes if s/s present. Would recommend as follows:  Oral PERT:   2 capsules of ZenPep 28226 q 4 hrs (12 total per day) to provide 3116 UI lipase/g fat/total volume.    Start TF at 15 mL/hr and adv by 10 mL q 8 hrs as tolerated. Monitor and replace lytes per protocol. Do not advance if Phos < 2.0.     If diet adv:  -Offer Ensure clear supplements to help with po intake  -Watch for signs of fat malabsorption in stool (oily, frothy stools, abdominal pain, bloating). If symptoms occur, consider increasing oral PERT to 5-6 capsules per meal for 0767-7981 units of lipase/kg/meal.     REASON FOR ASSESSMENT  Alexandra Melgoza is a/an 21 year old female assessed by the dietitian for Admission Nutrition Risk Screen for unintentional loss of 10# or more in the past two months    PMH of asthma, chronic abdominal pain s/p cholecystectomy, cyclic N/V, migraines, pseudoseizures, and  "somatoform disorder.    Pt admitted from previous hospital for further care and management of suspected chronic pancreatitis. Etiology is unknown. Pt reports the pain has been chronic with vomiting for the past 7 days. Pt was dx with chronic pancreatitis at previous hospital.     CT scan at outside facility noted some minimal pancreatic inflammation.    NUTRITION HISTORY  Pt appears to have a long standing hx of nausea, vomiting and abdominal pain effecting her eating habits per chart review. Pt reports these symptoms have been going on for about 7 years and her weight fluctuates from it. Pt visited with an outpatient RD in 2015 to discuss N/V and weight loss. Pt reported she will sometimes tolerate a meal and then excessively vomit from it the next day. Pt was following a gluten and dairy free, low fat diet in 2015. She also was following a LEAP (elimination diet). Pt reports she is currently following a mediterranean diet which has been working well for her for the most part. She eats small amounts of gluten and dairy and continues to follow a low fat diet. Pt says high fiber foods do not cause GI symptoms, but high fat foods seem to. Pt had NJ tube at OSH and received nutrition support for 3-4 days, this was removed d/t displacement.     CURRENT NUTRITION ORDERS  Diet: NPO  Intake/Tolerance: NPO since admit 12/6.    LABS  K+: 3.3 (L)  Cr: 3 (L)    MEDICATIONS  3-4 capsules ZenPep 20,000 UI w/ meals 3 x per day:  869-1,159 units of lipase/kg/meal  2,608-3,478 units lipase/kg/day    ANTHROPOMETRICS  Height: 67\" -obtained from chart  Most Recent Weight: 68.9 kg (152 lb)    IBW: 61 kg (113%)  BMI: Overweight BMI 25-29.9  Weight History:   Wt Readings from Last 20 Encounters:   12/06/17 68.9 kg (152 lb)   11/22/17 67.7 kg (149 lb 3.2 oz)   01/23/17 70.8 kg (156 lb)   12/12/16 70.4 kg (155 lb 3.3 oz)   12/09/16 70.8 kg (156 lb)   11/28/16 67.1 kg (148 lb)   09/28/16 63.3 kg (139 lb 8 oz)   05/19/16 60.6 kg (133 lb 8 " oz)   04/27/16 59.4 kg (131 lb)   04/25/16 58.5 kg (129 lb)   04/24/16 60.3 kg (133 lb)   04/14/16 60.4 kg (133 lb 3.2 oz)   03/17/16 59 kg (130 lb)   03/16/16 59 kg (130 lb)   03/06/16 59 kg (130 lb)   02/19/16 59.2 kg (130 lb 8 oz)   02/11/16 56.3 kg (124 lb 3.2 oz)   12/10/15 63.4 kg (139 lb 12.8 oz)   12/02/15 63 kg (138 lb 12.8 oz)   11/17/15 62.5 kg (137 lb 11.2 oz)     No weight loss noted. Pt reports her normal weight is around 162 lbs and she believes she has recently loss 10 lbs in the past 3 months. No data to confirm this.     Dosing Weight: 69 kg (actual weight based on admit weight 12/6)    ASSESSED NUTRITION NEEDS  Estimated Energy Needs: 4560-6887 kcals/day (25 - 30 kcals/kg)  Justification: Maintenance  Estimated Protein Needs: 104-138 grams protein/day (1.5 - 2 grams of pro/kg)  Justification: Possible chronic pancreatitis  Estimated Fluid Needs: 6284-4908 mL/day (1 mL/kcal)   Justification: Maintenance    PHYSICAL FINDINGS  See malnutrition section below.  No abnormal nutrition-related physical findings observed.     MALNUTRITION  % Intake: < 75% for > 7 days (non-severe)  % Weight Loss: Suspect up to 7.5% in 3 months (non-severe)  Subcutaneous Fat Loss: None observed  Muscle Loss: None observed  Fluid Accumulation/Edema: None noted  Malnutrition Diagnosis: Non-severe malnutrition in the context of chronic illness.    NUTRITION DIAGNOSIS  Inadequate oral intake related to abdominal pain, nausea and vomiting, as evidenced by pt reports 6% weight loss in the past 3 months and <50% oral intake in the past 7 days.       INTERVENTIONS  Implementation  Nutrition Education: Role as RD  Calorie counts x 3 days (12/8-12/10)  Enteral Nutrition - recs    Goals  1. Diet adv v nutrition support within 3-5 days.    2. Calorie counts to meet at least 60% of high end nutritional needs (1242 kcal, 83 g PRO)     Monitoring/Evaluation  Progress toward goals will be monitored and evaluated per protocol.    Mago  Kaveh  Dietetic Intern     I agree with the assessment, interventions, and recommendations.    Angie Delarosa RD, FAITH  Unit pager: [phone]      _Addendum/Nutrition Consult: Registered Dietitian to Assess and Order TF per Medical Nutrition Therapy Protocol    Spoke with MD.  Pt having feeding tube placed and ok to start TFs.     INTERVENTIONS  1. Ordered TF per above recs start Impact Peptide @ 10mL/hr and advance by 10mL every 8 hours pending lyte (mg++/K+ WNL and phos>1.9) to goal of 50mL/hr.   2. Water flushes 30mL every 4 hours  3. Ordered Certavite  4. Ordered labs BMP,mg,phos-monitor for refeeding.     Angie Delarosa RD, FAITH  Pager: [phone]

## 2017-12-08 ENCOUNTER — APPOINTMENT (OUTPATIENT)
Dept: CT IMAGING | Facility: CLINIC | Age: 22
DRG: 091 | End: 2017-12-08
Attending: INTERNAL MEDICINE
Payer: COMMERCIAL

## 2017-12-08 LAB
ALBUMIN SERPL-MCNC: 3.2 G/DL (ref 3.4–5)
ALBUMIN SERPL-MCNC: 3.4 G/DL (ref 3.4–5)
ALBUMIN UR-MCNC: NEGATIVE MG/DL
ALP SERPL-CCNC: 74 U/L (ref 40–150)
ALP SERPL-CCNC: 78 U/L (ref 40–150)
ALT SERPL W P-5'-P-CCNC: 18 U/L (ref 0–50)
ALT SERPL W P-5'-P-CCNC: 18 U/L (ref 0–50)
ANION GAP SERPL CALCULATED.3IONS-SCNC: 8 MMOL/L (ref 3–14)
ANION GAP SERPL CALCULATED.3IONS-SCNC: 8 MMOL/L (ref 3–14)
APPEARANCE UR: CLEAR
AST SERPL W P-5'-P-CCNC: 14 U/L (ref 0–45)
AST SERPL W P-5'-P-CCNC: 15 U/L (ref 0–45)
BILIRUB SERPL-MCNC: 0.6 MG/DL (ref 0.2–1.3)
BILIRUB SERPL-MCNC: 1.4 MG/DL (ref 0.2–1.3)
BILIRUB UR QL STRIP: NEGATIVE
BUN SERPL-MCNC: 6 MG/DL (ref 7–30)
BUN SERPL-MCNC: 7 MG/DL (ref 7–30)
CALCIUM SERPL-MCNC: 8.6 MG/DL (ref 8.5–10.1)
CALCIUM SERPL-MCNC: 8.8 MG/DL (ref 8.5–10.1)
CHLORIDE SERPL-SCNC: 103 MMOL/L (ref 94–109)
CHLORIDE SERPL-SCNC: 104 MMOL/L (ref 94–109)
CO2 SERPL-SCNC: 27 MMOL/L (ref 20–32)
CO2 SERPL-SCNC: 27 MMOL/L (ref 20–32)
COLOR UR AUTO: YELLOW
CREAT SERPL-MCNC: 0.57 MG/DL (ref 0.52–1.04)
CREAT SERPL-MCNC: 0.58 MG/DL (ref 0.52–1.04)
ERYTHROCYTE [DISTWIDTH] IN BLOOD BY AUTOMATED COUNT: 12.2 % (ref 10–15)
ERYTHROCYTE [DISTWIDTH] IN BLOOD BY AUTOMATED COUNT: 12.3 % (ref 10–15)
GFR SERPL CREATININE-BSD FRML MDRD: >90 ML/MIN/1.7M2
GFR SERPL CREATININE-BSD FRML MDRD: >90 ML/MIN/1.7M2
GLUCOSE BLDC GLUCOMTR-MCNC: 96 MG/DL (ref 70–99)
GLUCOSE SERPL-MCNC: 96 MG/DL (ref 70–99)
GLUCOSE SERPL-MCNC: 99 MG/DL (ref 70–99)
GLUCOSE UR STRIP-MCNC: NEGATIVE MG/DL
HCT VFR BLD AUTO: 31.3 % (ref 35–47)
HCT VFR BLD AUTO: 33.3 % (ref 35–47)
HGB BLD-MCNC: 10.7 G/DL (ref 11.7–15.7)
HGB BLD-MCNC: 11.4 G/DL (ref 11.7–15.7)
HGB UR QL STRIP: NEGATIVE
KETONES UR STRIP-MCNC: 40 MG/DL
LEUKOCYTE ESTERASE UR QL STRIP: NEGATIVE
LIPASE SERPL-CCNC: 88 U/L (ref 73–393)
MAGNESIUM SERPL-MCNC: 1.7 MG/DL (ref 1.6–2.3)
MCH RBC QN AUTO: 30.2 PG (ref 26.5–33)
MCH RBC QN AUTO: 30.4 PG (ref 26.5–33)
MCHC RBC AUTO-ENTMCNC: 34.2 G/DL (ref 31.5–36.5)
MCHC RBC AUTO-ENTMCNC: 34.2 G/DL (ref 31.5–36.5)
MCV RBC AUTO: 88 FL (ref 78–100)
MCV RBC AUTO: 89 FL (ref 78–100)
NITRATE UR QL: NEGATIVE
PH UR STRIP: 6.5 PH (ref 5–7)
PHOSPHATE SERPL-MCNC: 3.4 MG/DL (ref 2.5–4.5)
PLATELET # BLD AUTO: 160 10E9/L (ref 150–450)
PLATELET # BLD AUTO: 170 10E9/L (ref 150–450)
POTASSIUM SERPL-SCNC: 3.6 MMOL/L (ref 3.4–5.3)
POTASSIUM SERPL-SCNC: 3.9 MMOL/L (ref 3.4–5.3)
PROCALCITONIN SERPL-MCNC: 0.07 NG/ML
PROT SERPL-MCNC: 6 G/DL (ref 6.8–8.8)
PROT SERPL-MCNC: 6.5 G/DL (ref 6.8–8.8)
RBC # BLD AUTO: 3.54 10E12/L (ref 3.8–5.2)
RBC # BLD AUTO: 3.75 10E12/L (ref 3.8–5.2)
RBC #/AREA URNS AUTO: 1 /HPF (ref 0–2)
SODIUM SERPL-SCNC: 138 MMOL/L (ref 133–144)
SODIUM SERPL-SCNC: 140 MMOL/L (ref 133–144)
SOURCE: ABNORMAL
SP GR UR STRIP: 1.01 (ref 1–1.03)
SQUAMOUS #/AREA URNS AUTO: <1 /HPF (ref 0–1)
UROBILINOGEN UR STRIP-MCNC: 2 MG/DL (ref 0–2)
WBC # BLD AUTO: 3.6 10E9/L (ref 4–11)
WBC # BLD AUTO: 3.9 10E9/L (ref 4–11)
WBC #/AREA URNS AUTO: 1 /HPF (ref 0–2)

## 2017-12-08 PROCEDURE — 12000008 ZZH R&B INTERMEDIATE UMMC

## 2017-12-08 PROCEDURE — S0164 INJECTION PANTROPRAZOLE: HCPCS | Performed by: INTERNAL MEDICINE

## 2017-12-08 PROCEDURE — 83735 ASSAY OF MAGNESIUM: CPT | Performed by: INTERNAL MEDICINE

## 2017-12-08 PROCEDURE — 80048 BASIC METABOLIC PNL TOTAL CA: CPT | Performed by: INTERNAL MEDICINE

## 2017-12-08 PROCEDURE — 25000128 H RX IP 250 OP 636: Performed by: INTERNAL MEDICINE

## 2017-12-08 PROCEDURE — 99233 SBSQ HOSP IP/OBS HIGH 50: CPT | Performed by: INTERNAL MEDICINE

## 2017-12-08 PROCEDURE — 25000132 ZZH RX MED GY IP 250 OP 250 PS 637: Performed by: INTERNAL MEDICINE

## 2017-12-08 PROCEDURE — 25000132 ZZH RX MED GY IP 250 OP 250 PS 637: Performed by: NURSE PRACTITIONER

## 2017-12-08 PROCEDURE — 83690 ASSAY OF LIPASE: CPT | Performed by: INTERNAL MEDICINE

## 2017-12-08 PROCEDURE — 25000125 ZZHC RX 250: Performed by: INTERNAL MEDICINE

## 2017-12-08 PROCEDURE — 25000128 H RX IP 250 OP 636: Performed by: PHYSICIAN ASSISTANT

## 2017-12-08 PROCEDURE — 25000125 ZZHC RX 250

## 2017-12-08 PROCEDURE — 74176 CT ABD & PELVIS W/O CONTRAST: CPT

## 2017-12-08 PROCEDURE — 27210437 ZZH NUTRITION PRODUCT SEMIELEM INTERMED LITER

## 2017-12-08 PROCEDURE — 36592 COLLECT BLOOD FROM PICC: CPT | Performed by: INTERNAL MEDICINE

## 2017-12-08 PROCEDURE — 25000128 H RX IP 250 OP 636

## 2017-12-08 PROCEDURE — 84145 PROCALCITONIN (PCT): CPT | Performed by: INTERNAL MEDICINE

## 2017-12-08 PROCEDURE — 40000556 ZZH STATISTIC PERIPHERAL IV START W US GUIDANCE

## 2017-12-08 PROCEDURE — 85027 COMPLETE CBC AUTOMATED: CPT | Performed by: INTERNAL MEDICINE

## 2017-12-08 PROCEDURE — 00000146 ZZHCL STATISTIC GLUCOSE BY METER IP

## 2017-12-08 PROCEDURE — 84100 ASSAY OF PHOSPHORUS: CPT | Performed by: INTERNAL MEDICINE

## 2017-12-08 PROCEDURE — 81001 URINALYSIS AUTO W/SCOPE: CPT | Performed by: INTERNAL MEDICINE

## 2017-12-08 PROCEDURE — 80053 COMPREHEN METABOLIC PANEL: CPT | Performed by: INTERNAL MEDICINE

## 2017-12-08 RX ORDER — CELECOXIB 100 MG/1
100 CAPSULE ORAL 2 TIMES DAILY
Status: DISCONTINUED | OUTPATIENT
Start: 2017-12-08 | End: 2017-12-08

## 2017-12-08 RX ORDER — ONDANSETRON 2 MG/ML
4 INJECTION INTRAMUSCULAR; INTRAVENOUS EVERY 6 HOURS PRN
Status: DISCONTINUED | OUTPATIENT
Start: 2017-12-08 | End: 2017-12-08

## 2017-12-08 RX ORDER — DIPHENHYDRAMINE HYDROCHLORIDE 50 MG/ML
INJECTION INTRAMUSCULAR; INTRAVENOUS
Status: DISPENSED
Start: 2017-12-08 | End: 2017-12-09

## 2017-12-08 RX ORDER — DIPHENHYDRAMINE HYDROCHLORIDE 50 MG/ML
INJECTION INTRAMUSCULAR; INTRAVENOUS
Status: DISCONTINUED
Start: 2017-12-08 | End: 2017-12-08 | Stop reason: WASHOUT

## 2017-12-08 RX ORDER — ONDANSETRON 2 MG/ML
4 INJECTION INTRAMUSCULAR; INTRAVENOUS ONCE
Status: COMPLETED | OUTPATIENT
Start: 2017-12-08 | End: 2017-12-08

## 2017-12-08 RX ORDER — DIPHENHYDRAMINE HYDROCHLORIDE 50 MG/ML
25 INJECTION INTRAMUSCULAR; INTRAVENOUS ONCE
Status: COMPLETED | OUTPATIENT
Start: 2017-12-08 | End: 2017-12-08

## 2017-12-08 RX ORDER — SCOLOPAMINE TRANSDERMAL SYSTEM 1 MG/1
1 PATCH, EXTENDED RELEASE TRANSDERMAL
Status: DISCONTINUED | OUTPATIENT
Start: 2017-12-08 | End: 2017-12-09

## 2017-12-08 RX ORDER — ONDANSETRON 2 MG/ML
4 INJECTION INTRAMUSCULAR; INTRAVENOUS ONCE
Status: COMPLETED | OUTPATIENT
Start: 2017-12-09 | End: 2017-12-09

## 2017-12-08 RX ORDER — ONDANSETRON 2 MG/ML
4 INJECTION INTRAMUSCULAR; INTRAVENOUS EVERY 8 HOURS PRN
Status: DISCONTINUED | OUTPATIENT
Start: 2017-12-08 | End: 2017-12-08

## 2017-12-08 RX ORDER — LORAZEPAM 2 MG/ML
0.25 INJECTION INTRAMUSCULAR EVERY 6 HOURS PRN
Status: DISCONTINUED | OUTPATIENT
Start: 2017-12-08 | End: 2017-12-09

## 2017-12-08 RX ORDER — ONDANSETRON 2 MG/ML
4 INJECTION INTRAMUSCULAR; INTRAVENOUS ONCE
Status: DISCONTINUED | OUTPATIENT
Start: 2017-12-08 | End: 2017-12-08

## 2017-12-08 RX ORDER — DIPHENHYDRAMINE HYDROCHLORIDE 50 MG/ML
12.5 INJECTION INTRAMUSCULAR; INTRAVENOUS ONCE
Status: DISCONTINUED | OUTPATIENT
Start: 2017-12-08 | End: 2017-12-08

## 2017-12-08 RX ORDER — ACETAMINOPHEN 325 MG/1
975 TABLET ORAL 3 TIMES DAILY
Status: DISCONTINUED | OUTPATIENT
Start: 2017-12-08 | End: 2017-12-13 | Stop reason: HOSPADM

## 2017-12-08 RX ORDER — ONDANSETRON 8 MG/1
8 TABLET, FILM COATED ORAL EVERY 8 HOURS PRN
Status: DISCONTINUED | OUTPATIENT
Start: 2017-12-08 | End: 2017-12-08

## 2017-12-08 RX ORDER — DIPHENHYDRAMINE HYDROCHLORIDE 50 MG/ML
25 INJECTION INTRAMUSCULAR; INTRAVENOUS ONCE
Status: COMPLETED | OUTPATIENT
Start: 2017-12-09 | End: 2017-12-09

## 2017-12-08 RX ORDER — OXYCODONE HCL 5 MG/5 ML
10 SOLUTION, ORAL ORAL EVERY 4 HOURS PRN
Status: DISCONTINUED | OUTPATIENT
Start: 2017-12-08 | End: 2017-12-11

## 2017-12-08 RX ORDER — LIDOCAINE 40 MG/G
CREAM TOPICAL
Status: DISCONTINUED | OUTPATIENT
Start: 2017-12-08 | End: 2017-12-13 | Stop reason: HOSPADM

## 2017-12-08 RX ORDER — IOPAMIDOL 755 MG/ML
93 INJECTION, SOLUTION INTRAVASCULAR ONCE
Status: COMPLETED | OUTPATIENT
Start: 2017-12-08 | End: 2017-12-08

## 2017-12-08 RX ADMIN — PANTOPRAZOLE SODIUM 8 MG/HR: 40 INJECTION, POWDER, FOR SOLUTION INTRAVENOUS at 18:11

## 2017-12-08 RX ADMIN — SENNOSIDES AND DOCUSATE SODIUM 2 TABLET: 8.6; 5 TABLET ORAL at 15:18

## 2017-12-08 RX ADMIN — SODIUM CHLORIDE, POTASSIUM CHLORIDE, SODIUM LACTATE AND CALCIUM CHLORIDE: 600; 310; 30; 20 INJECTION, SOLUTION INTRAVENOUS at 01:26

## 2017-12-08 RX ADMIN — HYDROMORPHONE HYDROCHLORIDE 1 MG: 1 INJECTION, SOLUTION INTRAMUSCULAR; INTRAVENOUS; SUBCUTANEOUS at 09:02

## 2017-12-08 RX ADMIN — IOPAMIDOL 93 ML: 755 INJECTION, SOLUTION INTRAVENOUS at 19:32

## 2017-12-08 RX ADMIN — PANTOPRAZOLE SODIUM 80 MG: 40 INJECTION, POWDER, FOR SOLUTION INTRAVENOUS at 16:40

## 2017-12-08 RX ADMIN — SODIUM CHLORIDE, POTASSIUM CHLORIDE, SODIUM LACTATE AND CALCIUM CHLORIDE 500 ML: 600; 310; 30; 20 INJECTION, SOLUTION INTRAVENOUS at 16:41

## 2017-12-08 RX ADMIN — DIPHENHYDRAMINE HYDROCHLORIDE 25 MG: 50 INJECTION, SOLUTION INTRAMUSCULAR; INTRAVENOUS at 17:47

## 2017-12-08 RX ADMIN — HYDROMORPHONE HYDROCHLORIDE 1 MG: 1 INJECTION, SOLUTION INTRAMUSCULAR; INTRAVENOUS; SUBCUTANEOUS at 23:28

## 2017-12-08 RX ADMIN — LORAZEPAM 0.25 MG: 2 INJECTION INTRAMUSCULAR; INTRAVENOUS at 21:25

## 2017-12-08 RX ADMIN — SODIUM CHLORIDE, PRESERVATIVE FREE 73 ML: 5 INJECTION INTRAVENOUS at 19:32

## 2017-12-08 RX ADMIN — HYDROMORPHONE HYDROCHLORIDE 1 MG: 1 INJECTION, SOLUTION INTRAMUSCULAR; INTRAVENOUS; SUBCUTANEOUS at 19:48

## 2017-12-08 RX ADMIN — HYDROMORPHONE HYDROCHLORIDE 1 MG: 1 INJECTION, SOLUTION INTRAMUSCULAR; INTRAVENOUS; SUBCUTANEOUS at 17:13

## 2017-12-08 RX ADMIN — SODIUM CHLORIDE, POTASSIUM CHLORIDE, SODIUM LACTATE AND CALCIUM CHLORIDE: 600; 310; 30; 20 INJECTION, SOLUTION INTRAVENOUS at 10:58

## 2017-12-08 RX ADMIN — OXYCODONE HYDROCHLORIDE 10 MG: 10 TABLET ORAL at 01:26

## 2017-12-08 RX ADMIN — SCOPALAMINE 1 PATCH: 1 PATCH, EXTENDED RELEASE TRANSDERMAL at 19:47

## 2017-12-08 RX ADMIN — ONDANSETRON 4 MG: 2 INJECTION INTRAMUSCULAR; INTRAVENOUS at 17:13

## 2017-12-08 RX ADMIN — ACETAMINOPHEN 975 MG: 325 TABLET, FILM COATED ORAL at 15:18

## 2017-12-08 RX ADMIN — GABAPENTIN 300 MG: 300 CAPSULE ORAL at 15:18

## 2017-12-08 RX ADMIN — ONDANSETRON HYDROCHLORIDE 8 MG: 8 TABLET, FILM COATED ORAL at 15:38

## 2017-12-08 RX ADMIN — HYDROMORPHONE HYDROCHLORIDE 1 MG: 1 INJECTION, SOLUTION INTRAMUSCULAR; INTRAVENOUS; SUBCUTANEOUS at 13:15

## 2017-12-08 RX ADMIN — ONDANSETRON 4 MG: 2 INJECTION INTRAMUSCULAR; INTRAVENOUS at 09:55

## 2017-12-08 RX ADMIN — OXYCODONE HYDROCHLORIDE 10 MG: 10 TABLET ORAL at 06:47

## 2017-12-08 RX ADMIN — GRANISETRON HYDROCHLORIDE 1 MG: 1 TABLET, FILM COATED ORAL at 01:26

## 2017-12-08 RX ADMIN — HYDROMORPHONE HYDROCHLORIDE 1 MG: 1 INJECTION, SOLUTION INTRAMUSCULAR; INTRAVENOUS; SUBCUTANEOUS at 16:14

## 2017-12-08 RX ADMIN — KETOROLAC TROMETHAMINE 15 MG: 30 INJECTION, SOLUTION INTRAMUSCULAR at 01:26

## 2017-12-08 RX ADMIN — OXYCODONE HYDROCHLORIDE 10 MG: 5 SOLUTION ORAL at 15:18

## 2017-12-08 RX ADMIN — GABAPENTIN 300 MG: 300 CAPSULE ORAL at 09:02

## 2017-12-08 RX ADMIN — HYDROMORPHONE HYDROCHLORIDE 1 MG: 1 INJECTION, SOLUTION INTRAMUSCULAR; INTRAVENOUS; SUBCUTANEOUS at 02:45

## 2017-12-08 ASSESSMENT — PAIN DESCRIPTION - DESCRIPTORS
DESCRIPTORS: ACHING
DESCRIPTORS: SHARP
DESCRIPTORS: ACHING
DESCRIPTORS: SHARP

## 2017-12-08 NOTE — PROGRESS NOTES
Patient: Alexandra Melgoza  Date of Service: December 8, 2017 Admission Date:12/6/2017   * No surgery found *     Chief Pain Endorsement: abdominal pain     Recommendations were discussed and relayed to Dr. Blanc (medicine)  Plan was reviewed by the Inpatient Pain Service and staff attending, Dr. James      1. Discontinue ketorolac, patient received PRN doses throughout hospitalization at OSH.    2. Start oral NSAID of primary team's choice. Celecoxib 100 mg PO BID would have lower GI side effect profile.  Would continue x5-7 days then stop.   3. Continue acetaminophen - if OK with possibility of masking fevers would schedule it 975 mg PO Q8hr x7 days then change back to PRN.   4. Continue oxycodone LIQUID 10 mg PO q4 hr PRN.  Would recommend using this first.  Patient has been preferentially using IV dilaudid today, we discussed using orals first and transitioning off IVs in upcoming days and patient was agreeable, also discussed this with daytime RN Saima.     5. Continue hydromorphone 1 mg IV every 3 hr PRN while GI workup ongoing, Tomorrow would start limiting IV doses to 3-4 doses/24 hr then discontinue by Sunday or Monday as tolerated.  Patient agreeable to this plan during discussion on 12/8/17.  If new organic causes for pain are discovered or patient requires a procedure then plan will need modification.   6. Consider starting senna/docusate 1 tab PO BID to prevent opioid induced constipation.   7. Recommended patient call [facility] to reschedule her appointment - pt had an appointment today that she missed due to inpatient hospitalization.    8. Recommend referral for Pain Psychology with Dr. Quyen Lennon in [facility] - order as Pain Management Referral and in the comments section select Dr. Lennon.   9. Continue gabapentin 300mg po tid and nortriptyline 30 mg qhs.  Modifications to these medications deferred to outpatient  pain provider.   10. Nausea regimen deferred to medicine team.   11. If other medications taken orally would be easier taken in liquid formulation while NG in place, recommend consulting with floor RPh and they can switch medications to liquid formulation.     Pain Service will Continue to follow at this time.     Thank you for consulting the Inpatient Pain Management Service. The above recommendations are to be acted upon at the primary team s discretion.     To reach us:  Mon - Friday 8 AM - 3 PM: Pager [phone] (Text Page)  After hours, weekends and holidays: Primary service should call [phone] for the on-call pain specialist    PAIN MEDICATION SAFE USE:   Prior to discharge instruct patient on the following in addition to the medication fact sheet:    Caution: these medications can cause sedation       Do not use alcohol or other recreational drugs while on these medications    Take your medication exactly as directed    Do not drive until you are instructed by your medical provider that it is okay and you know how the medication affects you.      Do not stop abruptly once at higher doses.  These medications must be tapered off.    Opioid pain medications do carry the risk for physical dependence and addiction and patients should be counseled about this.         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 However her pain continued after a sphincter of Oddi ablation.  A year ago, she had a full evaluation for chronic pancreatitis.  She had normal pancreatic function test, 2/9 criteria at EUS and a completely normal pancreatic secretin  In MRCP with normal T1 signal, exocrine function, etc.  She has been living in [place] and has been in [facility]. She was suggested pancreatectomy and islet autotransplant. She has seen Dr. Narayanan on  11/22/17. Per Dr. Narayanan's note, she does not have evidence to support chronic pancreatitis at present.  Pain has not changed much since yesterday.   - She has a h/o spina bifida and has been hopitalized many times for multiple medical issues including gastritis, ovarian cyst rupture, and chronic abdominal pain .  -  She is on chronic opioid therapy since 2016 per . Currently on norco 5/325 mg bid prn. Her last prescription on 11/21/1. She has seen Dr. Haji in 2013. Currently under the care of Dr. Rendon at [place], [place].   - Opiate Risk/Benefit:  No abuse/misuse of medication; Stable dose with acceptable pain control.  Managable side effects.   - She has h/o depressive symptoms have included feelings of hopelessness, sadness, difficulty concentrating, low motivation, difficulty falling and staying asleep and decreased appetite.  Has seen several psychiatrist in the past and was diagnosed with depressive disorder, anxiety disorder and somatization disorders.       Interval History:  Alexandra Melgoza was seen today (December 8, 2017) and she reports that her pain is quite similar to yesterday although she has used significantly less opioid pain medication in the past 12 hr than she had previously.  She reports pain is primarily RUQ and is sharp and radiates to her back.  Pain has not changed much.  She has been able to get up and ambulate in the room but has not been walking the halls much, largely because she is hooked up to a lot of IVs, capnography, NG tube etc.  Continues to have issues with nausea but is taking medicaitons orally.  Feels that the liquid oxycodone will be easier to take as the patient has difficulty swallowing around the NG tube.   Reports she is passing gas, but has not had a BM since 12/6 which is not unusual for her.  Awaiting further GI input at this time regarding final dispo.  Will continue to follow.   CAPA (Clinically Aligned Pain Assessment):    Comfort (How is your pain?):  Tolerable with discomfort  Change in Pain (Since your last medication/intervention?): About the same  Pain Control (How are your pain treatments working?):  Partially effective control  Sleep (Does your pain management allow you to sleep or rest?): Awake with occasional pain      FUNCTIONAL STATUS:  Change:      staying the same  Oral intake:     NPO with TF via NG tube  Activity level:     Up ambulating in room  Mood:      Stable      -- Inpatient Medications Related to Pain Management:   Medications related to Pain Management (Future)    Start     Dose/Rate Route Frequency Ordered Stop    12/08/17 1339  oxyCODONE (ROXICODONE) solution 10 mg      10 mg Oral EVERY 4 HOURS PRN 12/08/17 1340      12/07/17 1212  ketorolac (TORADOL) injection 15 mg      15 mg Intravenous EVERY 6 HOURS PRN 12/07/17 1212 12/12/17 1211    12/07/17 1211  HYDROmorphone (DILAUDID) injection 1 mg      1 mg Intravenous EVERY 3 HOURS PRN 12/07/17 1212      12/06/17 2200  nortriptyline (PAMELOR) capsule 30 mg      30 mg Oral AT BEDTIME 12/06/17 2055 12/06/17 2100  gabapentin (NEURONTIN) capsule 300 mg      300 mg Oral 3 TIMES DAILY 12/06/17 2055 12/06/17 2056  lidocaine 1 % 1 mL      1 mL Other EVERY 1 HOUR PRN 12/06/17 2056 12/06/17 2056  lidocaine (LMX4) kit       Topical EVERY 1 HOUR PRN 12/06/17 2056 12/06/17 2048  senna-docusate (SENOKOT-S;PERICOLACE) 8.6-50 MG per tablet 1 tablet      1 tablet Oral 2 TIMES DAILY PRN 12/06/17 2055 12/06/17 2048  senna-docusate (SENOKOT-S;PERICOLACE) 8.6-50 MG per tablet 2 tablet      2 tablet Oral 2 TIMES DAILY PRN 12/06/17 2055 12/06/17 2048  acetaminophen (TYLENOL) tablet 975 mg      975 mg Oral EVERY 8 HOURS PRN 12/06/17 2055 12/06/17 2048  magnesium hydroxide (MILK OF MAGNESIA) suspension 30 mL      30 mL Oral DAILY PRN 12/06/17 2055            LAB DATA:    Recent Labs  Lab 12/08/17 0713 12/07/17  1647 12/06/17  2132   CR 0.57 0.64 0.65   WBC 3.9*  --  7.9   HGB 10.7*   --  10.6*   AST 14  --  16   ALT 18  --  18         ----------------------------------------------------------------------------------  Maury Ledesma, [facility]  Inpatient Pain Service     To reach us:  Mon - Friday 8 AM - 3 PM: Pager [phone] (Text Page)  After hours, weekends and holidays: Primary service should call [phone] for the on-call pain specialist    Helpful Resources:  Getting Rid of Unwanted Medications (printable PDF for patients)   Opioid Overdose Prevention Toolkit (printable PDF for patients)

## 2017-12-08 NOTE — PLAN OF CARE
Problem: Patient Care Overview  Goal: Plan of Care/Patient Progress Review  Outcome: No Change  /68 (BP Location: Left arm)  Pulse 96  Temp 97.5  F (36.4  C) (Oral)  Resp 16  Wt 68.9 kg (152 lb)  SpO2 98%  BMI 23.95 kg/m2  VSS. A&Ox4. Up ad gonzález. C/O abd pain; taking IV dilaudid, PO oxy, IV Toradol. Denies nausea; but taking kytril Q12hrs. PICC infusing LR @ 100ml/hr. NG placed today started tubefeeding 8PM @ 10ml/hr, So far pt tolerating okay. Continue POC.

## 2017-12-08 NOTE — PROGRESS NOTES
Clinical Nutrition Services- Brief Note    TF's started 12/8, pt advancing to goal.     Diet order:  NPO    Calorie counts 12/8-10    RECOMMENDATIONS  1. RD continuing to follow tolerance of TF's. No enzymes added at this time. If enzymes warranted see recs from previous note on 12/7.  2. If water flushes to meet 100% of hydration recommend increasing to 75mL every 2 hours (900mL/day)    INTERVENTIONS  1. DC'd calorie counts given NPO diet and TFs started.     Angie Delarosa RD, LD  Pager: [phone]

## 2017-12-08 NOTE — PROGRESS NOTES
[facility], [place]    Internal Medicine Progress Note - Gold Service      Assessment & Plan   Alexandra Melgoza is a 21 year old female admitted on 12/6/2017. She has a history of asthma, chronic abdominal pain s/p cholecystectomy, cyclic N/V, migraines, pseudoseizures and somatoform disorder and is admitted for abdominal pain and fever.  She was transferred here from a facility in [place] where she has been hospitalized for 5 days.      Changes today:  -d/c zosyn with no symptom for PNA and procalcitonin negative  -NJ tube and feeding started   -GI consulted- appreciate recs= MRCP with secretin and fecal elastase  -hydromorphone changed from PCA to PRN along with ketorolac and oxycodone oral        1) Fever - Developed yesterday and peaked at 102.4.  She had an NJ tube in place which was misplaced and had to be removed and her CXR was reportedly concerning for developing aspiration pneumonia.  Started on Zosyn today at OSH.  -will d/c antibiotic and continue to monitor     2) Recurrent abdominal pain - Presents with 7 days now of epigastric abdominal pain radiating to her back as well as nausea and vomiting.  CT scan at outside facility noted some minimal pancreatic inflammation, lipase negative, LFTs normal.  Reports a diagnosis of chronic pancreatitis but per our last GI note does not meet diagnostic criteria (Oracio, 11/22/2017).  Had an MRCP planned here and transferred to see Dr Narayanan in the hospital if possible.  - Consult gastroenterology- appreciate recs  - Dilaudid PRN, along with ketorolac PRN and oxycodone PRN  -pain consulted and appreciate recs   3) History of endometriosis  - Continue lupron     4) History of asthma  - Continue levalbuterol      Diet: NPO for Medical/Clinical Reasons Except for: Meds, Ice Chips  Calorie Counts  Adult Formula Drip Feeding: Continuous Impact Peptide 1.5; Nasojejunal; Goal Rate: 50; mL/hr; Medication - Tube Feeding Flush Frequency: At  least 15-30 mL water before and after medication administration and with tube clogging; Amout to Send (Nutr...  Fluids: ringer's lactate   DVT Prophylaxis: ambulate  Code Status: Full Code    Disposition Plan   Expected discharge: 2 - 3 days, recommended to prior living arrangement once adequate pain management/ tolerating PO medications.     Entered: Chip Dueñasta 12/07/2017, 6:34 PM   Information in the above section will display in the discharge planner report.      The patient's care was discussed with the Bedside Nurse.    zakia Suekota  Internal Medicine Staff Hospitalist Service  [facility]  Pager: [phone]  Please see sticky note for cross cover information    Interval History   Pt still complaining of epigastric pain; some nausea and denies vomiting   Last 24 hr care team notes reviewed.   ROS:  4 point ROS including Respiratory, CV, GI and , other than that noted in the HPI, is negative.       Data reviewed today: I reviewed all medications, new labs and imaging results over the last 24 hours. I personally reviewed no images or EKG's today.    Physical Exam   Vital Signs: Temp: 97.5  F (36.4  C) Temp src: Oral BP: 120/68 Pulse: 96   Resp: 16 SpO2: 98 % O2 Device: None (Room air) Oxygen Delivery: 1 LPM  Weight: 152 lbs 0 oz  General Appearance: Pt in mild distress due to pain  Respiratory: b/l equal breath sounds with no added sounds   Cardiovascular: s1s2 with no murmur   GI: soft, tenderness in epigastric, bowel sounds noted  Skin: no rash  Neuro: AAOx3, b/l UE and LE-5/5          Data   Data     Recent Labs  Lab 12/07/17  1647 12/06/17  2132   WBC  --  7.9   HGB  --  10.6*   MCV  --  88   PLT  --  147*    138   POTASSIUM 4.0 3.3*   CHLORIDE 101 102   CO2 24 27   BUN 12 3*   CR 0.64 0.65   ANIONGAP 10 8   RENEA 8.8 8.5   GLC 63* 79   ALBUMIN  --  3.3*   PROTTOTAL  --  5.5*   BILITOTAL  --  0.8   ALKPHOS  --  73   ALT  --  18   AST  --  16   LIPASE  --  96

## 2017-12-08 NOTE — PLAN OF CARE
"4820-9015 hours: Patient has been in \"a lot\" of abdominal pain today. IV dilaudid given x 2. Oxycodone offered when available. Discussed using oxycodone first. Oxycodone switched to oral solution, which patient feels she will be able to swallow easier. Zofran and kytril given for nausea. Ice pack behind the head also helped with nausea. Ice chips are used sparingly. TF increased at 1200 hours to 30 mL/hr. Goal rate is 50 mL/hr. At 1400 reduced TF rate due to nausea and will increase again by 1400 as patient's nausea is better managed. No emesis. Adequate urine output. No BM x 3-4 days. Senna given. Stool sample needed if patient has a bowel movement. LR infusing at 100 mL/hr via port.     "

## 2017-12-08 NOTE — PLAN OF CARE
Problem: Patient Care Overview  Goal: Plan of Care/Patient Progress Review  Outcome: Therapy, progress towards functional goals is fair  HD 2 admitted with abd. Pain and nausea. A&Ox4, VSS on room air, Pain is controlled with IV dilaudid, Ketorolac, Oxycodone and tylenol. Capno WNL, Port is infusing IVM at 100/hr. TF at 20 ml/hr, increase rate at 1200 by 10/hr. LS clear, BS hypoactive. Up SBA, voiding in the bathroom, UA sent. Plan is to control pain, possible discharge in 2-3 days.     Pt would like bedside report

## 2017-12-09 ENCOUNTER — SURGERY (OUTPATIENT)
Age: 22
End: 2017-12-09

## 2017-12-09 LAB
ALBUMIN SERPL-MCNC: 3.3 G/DL (ref 3.4–5)
ALP SERPL-CCNC: 75 U/L (ref 40–150)
ALT SERPL W P-5'-P-CCNC: 17 U/L (ref 0–50)
ANION GAP SERPL CALCULATED.3IONS-SCNC: 8 MMOL/L (ref 3–14)
AST SERPL W P-5'-P-CCNC: 11 U/L (ref 0–45)
BILIRUB SERPL-MCNC: 0.6 MG/DL (ref 0.2–1.3)
BUN SERPL-MCNC: 5 MG/DL (ref 7–30)
CALCIUM SERPL-MCNC: 8.4 MG/DL (ref 8.5–10.1)
CHLORIDE SERPL-SCNC: 104 MMOL/L (ref 94–109)
CO2 SERPL-SCNC: 27 MMOL/L (ref 20–32)
CREAT SERPL-MCNC: 0.5 MG/DL (ref 0.52–1.04)
ERYTHROCYTE [DISTWIDTH] IN BLOOD BY AUTOMATED COUNT: 12.2 % (ref 10–15)
GFR SERPL CREATININE-BSD FRML MDRD: >90 ML/MIN/1.7M2
GLUCOSE SERPL-MCNC: 75 MG/DL (ref 70–99)
HCT VFR BLD AUTO: 31.1 % (ref 35–47)
HGB BLD-MCNC: 10.7 G/DL (ref 11.7–15.7)
MAGNESIUM SERPL-MCNC: 1.6 MG/DL (ref 1.6–2.3)
MCH RBC QN AUTO: 30.2 PG (ref 26.5–33)
MCHC RBC AUTO-ENTMCNC: 34.4 G/DL (ref 31.5–36.5)
MCV RBC AUTO: 88 FL (ref 78–100)
PHOSPHATE SERPL-MCNC: 3.2 MG/DL (ref 2.5–4.5)
PLATELET # BLD AUTO: 178 10E9/L (ref 150–450)
POTASSIUM SERPL-SCNC: 3.7 MMOL/L (ref 3.4–5.3)
PROT SERPL-MCNC: 6.3 G/DL (ref 6.8–8.8)
RBC # BLD AUTO: 3.54 10E12/L (ref 3.8–5.2)
SODIUM SERPL-SCNC: 139 MMOL/L (ref 133–144)
UPPER GI ENDOSCOPY: NORMAL
WBC # BLD AUTO: 3.6 10E9/L (ref 4–11)

## 2017-12-09 PROCEDURE — 85027 COMPLETE CBC AUTOMATED: CPT | Performed by: INTERNAL MEDICINE

## 2017-12-09 PROCEDURE — 25000132 ZZH RX MED GY IP 250 OP 250 PS 637: Performed by: NURSE PRACTITIONER

## 2017-12-09 PROCEDURE — G0500 MOD SEDAT ENDO SERVICE >5YRS: HCPCS | Performed by: INTERNAL MEDICINE

## 2017-12-09 PROCEDURE — 25000125 ZZHC RX 250: Performed by: INTERNAL MEDICINE

## 2017-12-09 PROCEDURE — 36592 COLLECT BLOOD FROM PICC: CPT | Performed by: INTERNAL MEDICINE

## 2017-12-09 PROCEDURE — 12000008 ZZH R&B INTERMEDIATE UMMC

## 2017-12-09 PROCEDURE — 99233 SBSQ HOSP IP/OBS HIGH 50: CPT | Performed by: INTERNAL MEDICINE

## 2017-12-09 PROCEDURE — 25000128 H RX IP 250 OP 636: Performed by: INTERNAL MEDICINE

## 2017-12-09 PROCEDURE — 25000132 ZZH RX MED GY IP 250 OP 250 PS 637: Performed by: INTERNAL MEDICINE

## 2017-12-09 PROCEDURE — 80053 COMPREHEN METABOLIC PANEL: CPT | Performed by: INTERNAL MEDICINE

## 2017-12-09 PROCEDURE — 99153 MOD SED SAME PHYS/QHP EA: CPT | Performed by: INTERNAL MEDICINE

## 2017-12-09 PROCEDURE — S0164 INJECTION PANTROPRAZOLE: HCPCS | Performed by: INTERNAL MEDICINE

## 2017-12-09 PROCEDURE — 84100 ASSAY OF PHOSPHORUS: CPT | Performed by: INTERNAL MEDICINE

## 2017-12-09 PROCEDURE — 43235 EGD DIAGNOSTIC BRUSH WASH: CPT | Performed by: INTERNAL MEDICINE

## 2017-12-09 PROCEDURE — 27210437 ZZH NUTRITION PRODUCT SEMIELEM INTERMED LITER

## 2017-12-09 PROCEDURE — 83735 ASSAY OF MAGNESIUM: CPT | Performed by: INTERNAL MEDICINE

## 2017-12-09 PROCEDURE — 0DJ08ZZ INSPECTION OF UPPER INTESTINAL TRACT, VIA NATURAL OR ARTIFICIAL OPENING ENDOSCOPIC: ICD-10-PCS | Performed by: INTERNAL MEDICINE

## 2017-12-09 RX ORDER — DIPHENHYDRAMINE HYDROCHLORIDE 50 MG/ML
25 INJECTION INTRAMUSCULAR; INTRAVENOUS EVERY 6 HOURS PRN
Status: DISCONTINUED | OUTPATIENT
Start: 2017-12-09 | End: 2017-12-09

## 2017-12-09 RX ORDER — DIPHENHYDRAMINE HYDROCHLORIDE 50 MG/ML
25 INJECTION INTRAMUSCULAR; INTRAVENOUS
Status: DISCONTINUED | OUTPATIENT
Start: 2017-12-09 | End: 2017-12-12

## 2017-12-09 RX ORDER — DIPHENHYDRAMINE HYDROCHLORIDE 50 MG/ML
25 INJECTION INTRAMUSCULAR; INTRAVENOUS 4 TIMES DAILY PRN
Status: DISCONTINUED | OUTPATIENT
Start: 2017-12-09 | End: 2017-12-09

## 2017-12-09 RX ORDER — DIPHENHYDRAMINE HYDROCHLORIDE 50 MG/ML
INJECTION INTRAMUSCULAR; INTRAVENOUS PRN
Status: DISCONTINUED | OUTPATIENT
Start: 2017-12-09 | End: 2017-12-12

## 2017-12-09 RX ORDER — ONDANSETRON 2 MG/ML
4 INJECTION INTRAMUSCULAR; INTRAVENOUS 4 TIMES DAILY PRN
Status: DISCONTINUED | OUTPATIENT
Start: 2017-12-09 | End: 2017-12-09

## 2017-12-09 RX ORDER — ONDANSETRON 2 MG/ML
4 INJECTION INTRAMUSCULAR; INTRAVENOUS
Status: DISCONTINUED | OUTPATIENT
Start: 2017-12-09 | End: 2017-12-13 | Stop reason: HOSPADM

## 2017-12-09 RX ORDER — FENTANYL CITRATE 50 UG/ML
INJECTION, SOLUTION INTRAMUSCULAR; INTRAVENOUS PRN
Status: DISCONTINUED | OUTPATIENT
Start: 2017-12-09 | End: 2017-12-12

## 2017-12-09 RX ORDER — ONDANSETRON 2 MG/ML
4 INJECTION INTRAMUSCULAR; INTRAVENOUS EVERY 6 HOURS PRN
Status: DISCONTINUED | OUTPATIENT
Start: 2017-12-09 | End: 2017-12-09

## 2017-12-09 RX ADMIN — OXYCODONE HYDROCHLORIDE 10 MG: 5 SOLUTION ORAL at 19:35

## 2017-12-09 RX ADMIN — DIPHENHYDRAMINE HYDROCHLORIDE 25 MG: 50 INJECTION, SOLUTION INTRAMUSCULAR; INTRAVENOUS at 09:01

## 2017-12-09 RX ADMIN — PANTOPRAZOLE SODIUM 8 MG/HR: 40 INJECTION, POWDER, FOR SOLUTION INTRAVENOUS at 05:15

## 2017-12-09 RX ADMIN — MIDAZOLAM 1 MG: 1 INJECTION INTRAMUSCULAR; INTRAVENOUS at 11:55

## 2017-12-09 RX ADMIN — PANTOPRAZOLE SODIUM 40 MG: 40 INJECTION, POWDER, FOR SOLUTION INTRAVENOUS at 19:36

## 2017-12-09 RX ADMIN — FENTANYL CITRATE 50 MCG: 50 INJECTION, SOLUTION INTRAMUSCULAR; INTRAVENOUS at 11:57

## 2017-12-09 RX ADMIN — GABAPENTIN 300 MG: 300 CAPSULE ORAL at 07:58

## 2017-12-09 RX ADMIN — GABAPENTIN 300 MG: 300 CAPSULE ORAL at 19:36

## 2017-12-09 RX ADMIN — HYDROMORPHONE HYDROCHLORIDE 1 MG: 1 INJECTION, SOLUTION INTRAMUSCULAR; INTRAVENOUS; SUBCUTANEOUS at 04:20

## 2017-12-09 RX ADMIN — MIDAZOLAM 2 MG: 1 INJECTION INTRAMUSCULAR; INTRAVENOUS at 11:46

## 2017-12-09 RX ADMIN — ACETAMINOPHEN 375 MG: 325 TABLET, FILM COATED ORAL at 19:36

## 2017-12-09 RX ADMIN — FENTANYL CITRATE 50 MCG: 50 INJECTION, SOLUTION INTRAMUSCULAR; INTRAVENOUS at 11:51

## 2017-12-09 RX ADMIN — DIPHENHYDRAMINE HYDROCHLORIDE 25 MG: 50 INJECTION, SOLUTION INTRAMUSCULAR; INTRAVENOUS at 22:27

## 2017-12-09 RX ADMIN — MIDAZOLAM 1 MG: 1 INJECTION INTRAMUSCULAR; INTRAVENOUS at 11:51

## 2017-12-09 RX ADMIN — HYDROMORPHONE HYDROCHLORIDE 1 MG: 1 INJECTION, SOLUTION INTRAMUSCULAR; INTRAVENOUS; SUBCUTANEOUS at 12:53

## 2017-12-09 RX ADMIN — DIPHENHYDRAMINE HYDROCHLORIDE 50 MG: 50 INJECTION, SOLUTION INTRAMUSCULAR; INTRAVENOUS at 11:39

## 2017-12-09 RX ADMIN — HYDROMORPHONE HYDROCHLORIDE 1 MG: 1 INJECTION, SOLUTION INTRAMUSCULAR; INTRAVENOUS; SUBCUTANEOUS at 18:19

## 2017-12-09 RX ADMIN — ONDANSETRON 4 MG: 2 INJECTION INTRAMUSCULAR; INTRAVENOUS at 00:46

## 2017-12-09 RX ADMIN — ACETAMINOPHEN 975 MG: 325 TABLET, FILM COATED ORAL at 13:33

## 2017-12-09 RX ADMIN — ONDANSETRON 4 MG: 2 INJECTION INTRAMUSCULAR; INTRAVENOUS at 13:34

## 2017-12-09 RX ADMIN — OXYCODONE HYDROCHLORIDE 10 MG: 5 SOLUTION ORAL at 15:51

## 2017-12-09 RX ADMIN — FENTANYL CITRATE 50 MCG: 50 INJECTION, SOLUTION INTRAMUSCULAR; INTRAVENOUS at 11:55

## 2017-12-09 RX ADMIN — DIPHENHYDRAMINE HYDROCHLORIDE 25 MG: 50 INJECTION, SOLUTION INTRAMUSCULAR; INTRAVENOUS at 13:34

## 2017-12-09 RX ADMIN — NORETHINDRONE ACETATE 5 MG: 5 TABLET ORAL at 22:19

## 2017-12-09 RX ADMIN — FENTANYL CITRATE 50 MCG: 50 INJECTION, SOLUTION INTRAMUSCULAR; INTRAVENOUS at 11:43

## 2017-12-09 RX ADMIN — SODIUM CHLORIDE, POTASSIUM CHLORIDE, SODIUM LACTATE AND CALCIUM CHLORIDE: 600; 310; 30; 20 INJECTION, SOLUTION INTRAVENOUS at 00:46

## 2017-12-09 RX ADMIN — DIPHENHYDRAMINE HYDROCHLORIDE 25 MG: 50 INJECTION, SOLUTION INTRAMUSCULAR; INTRAVENOUS at 18:18

## 2017-12-09 RX ADMIN — GABAPENTIN 300 MG: 300 CAPSULE ORAL at 13:33

## 2017-12-09 RX ADMIN — MIDAZOLAM 2 MG: 1 INJECTION INTRAMUSCULAR; INTRAVENOUS at 11:43

## 2017-12-09 RX ADMIN — HYDROMORPHONE HYDROCHLORIDE 1 MG: 1 INJECTION, SOLUTION INTRAMUSCULAR; INTRAVENOUS; SUBCUTANEOUS at 07:58

## 2017-12-09 RX ADMIN — SODIUM CHLORIDE, POTASSIUM CHLORIDE, SODIUM LACTATE AND CALCIUM CHLORIDE: 600; 310; 30; 20 INJECTION, SOLUTION INTRAVENOUS at 15:57

## 2017-12-09 RX ADMIN — SODIUM CHLORIDE, POTASSIUM CHLORIDE, SODIUM LACTATE AND CALCIUM CHLORIDE: 600; 310; 30; 20 INJECTION, SOLUTION INTRAVENOUS at 07:35

## 2017-12-09 RX ADMIN — MIDAZOLAM 1 MG: 1 INJECTION INTRAMUSCULAR; INTRAVENOUS at 11:57

## 2017-12-09 RX ADMIN — ONDANSETRON 4 MG: 2 INJECTION INTRAMUSCULAR; INTRAVENOUS at 22:27

## 2017-12-09 RX ADMIN — DIPHENHYDRAMINE HYDROCHLORIDE 25 MG: 50 INJECTION, SOLUTION INTRAMUSCULAR; INTRAVENOUS at 00:45

## 2017-12-09 RX ADMIN — ONDANSETRON 4 MG: 2 INJECTION INTRAMUSCULAR; INTRAVENOUS at 09:00

## 2017-12-09 RX ADMIN — FENTANYL CITRATE 50 MCG: 50 INJECTION, SOLUTION INTRAMUSCULAR; INTRAVENOUS at 11:46

## 2017-12-09 RX ADMIN — HYDROMORPHONE HYDROCHLORIDE 1 MG: 1 INJECTION, SOLUTION INTRAMUSCULAR; INTRAVENOUS; SUBCUTANEOUS at 22:27

## 2017-12-09 ASSESSMENT — PAIN DESCRIPTION - DESCRIPTORS
DESCRIPTORS: SHARP

## 2017-12-09 NOTE — PROGRESS NOTES
[facility], [place]    Internal Medicine Progress Note - Gold Service      Assessment & Plan   Alexandra Melgoza is a 21 year old female admitted on 12/6/2017. She has a history of asthma, chronic abdominal pain s/p cholecystectomy, cyclic N/V, migraines, pseudoseizures and somatoform disorder and is admitted for abdominal pain and fever.  She was transferred here from a facility in [place] where she has been hospitalized for 5 days.      Changes today:  -in evening time patient had episode of hematemesis  -d/c celecoxib, started on IV fluids and PPI drip   -CT abdomen stat ordered due to concern for worsening pain  -also has dystonic reaction with zofran and started on scopolamine patch    1)Concern for upper GI bleed:  Patient had an episode of hematemesis -concern for erosive gastritis in setting of NSAID use   -hemodynamically stable and hemoglobin stable  -GI consulted -will see patient tomorrow; started on PPI drip   -NPO for now  -NJ tube decompression with no output   -IVF bolus and increase maintainance      2) Recurrent abdominal pain   Acute on chronic pancreatitis   - Presents with 7 days now of epigastric abdominal pain radiating to her back as well as nausea and vomiting.  CT scan at outside facility noted some minimal pancreatic inflammation, lipase negative, LFTs normal.  Reports a diagnosis of chronic pancreatitis but per our last GI note does not meet diagnostic criteria ([place], 11/22/2017).  Had an MRCP planned here and transferred to see Dr Narayanan in the hospital if possible.  MRCP with secretin with normal response -with no changes in pancreas  CT abdomen stat obtained due to concern for worsening abdominal pain in setting of hematemesis  Pain consulted-appreciate recs  -continue with hydromorphone IV PRN, oxycodone solution PRN and tylenol veena  -d/c celecoxib due to concern for hematemesis  -continue with gabapentin, nortryptilline and pancreatic enzymes  -nausea-  scopolamine patch; does have dystonic reaction with zofran -need to give benadryl with that        3) History of endometriosis  - Continue lupron     4) History of asthma  - Continue levalbuterol      Diet: NPO, holding tube feed   Fluids: ringer's lactate   DVT Prophylaxis: ambulate  Code Status: Full Code    Disposition Plan   Expected discharge: 2 - 3 days, recommended to prior living arrangement once adequate pain management/ tolerating PO medications.     Entered: Chip Blanc 12/08/2017, 6:33 PM   Information in the above section will display in the discharge planner report.      The patient's care was discussed with the Bedside Nurse.    Chip Blanc  Internal Medicine Staff Hospitalist Service  [facility]  Pager: [phone]  Please see sticky note for cross cover information    Interval History   Pt still complaining of epigastric pain; hematemesis  Last 24 hr care team notes reviewed.   ROS:  4 point ROS including Respiratory, CV, GI and , other than that noted in the HPI, is negative.       Data reviewed today: I reviewed all medications, new labs and imaging results over the last 24 hours. I personally reviewed no images or EKG's today.    Physical Exam   Vital Signs: Temp: 98.4  F (36.9  C) Temp src: Oral BP: 128/82 Pulse: 75 Heart Rate: 74 Resp: 16 SpO2: 95 % O2 Device: None (Room air)    Weight: 152 lbs 0 oz  General Appearance: Pt in  distress due to pain  Respiratory: b/l equal breath sounds with no added sounds   Cardiovascular: s1s2 with no murmur   GI:  tenderness in epigastric, bowel sounds noted  Skin: no rash  Neuro: AAOx3, b/l UE and LE-5/5          Data   Data     Recent Labs  Lab 12/08/17  1716 12/08/17  0713 12/07/17  1647 12/06/17  2132   WBC 3.6* 3.9*  --  7.9   HGB 11.4* 10.7*  --  10.6*   MCV 89 88  --  88    160  --  147*    138 135 138   POTASSIUM 3.9 3.6 4.0 3.3*   CHLORIDE 104 103 101 102   CO2 27 27 24 27   BUN 6* 7 12 3*   CR 0.58 0.57 0.64 0.65    ANIONGAP 8 8 10 8   RENEA 8.6 8.8 8.8 8.5   GLC 96 99 63* 79   ALBUMIN 3.4 3.2*  --  3.3*   PROTTOTAL 6.5* 6.0*  --  5.5*   BILITOTAL 0.6 1.4*  --  0.8   ALKPHOS 78 74  --  73   ALT 18 18  --  18   AST 15 14  --  16   LIPASE 88  --   --  96

## 2017-12-09 NOTE — CONSULTS
GASTROENTEROLOGY CONSULTATION      Date of Admission:  12/6/2017          ASSESSMENT AND RECOMMENDATIONS:   Assessment:  21 year old female with a history of gastritis, chronic intractable abdominal pain on chronic narcotics, transferred from [place] for flare of chronic abdominal pain with nausea/vomiting, developed hematemesis x1 today, GI luminal is consulted for evaluation.     Hematemesis: likely due to MV tear. Other differentials include gastritis/duodenitis, PUD.      Recommendations  - keep NPO after midnight   - start IV PPI gtt.   - Plan for EGD tomorrow. Discussed EGD with patient and her mother. Patient states that her previous EGD was all done under general anaesthesia. Explained to patient that we don't have OR available for non-emergent cases over the weekend, offered to try EGD under moderate sedation tomorrow, patient started to cry. Patient's mother at bedside tried to calm her down. Patient's mother states that they will think about EGD under moderate sedation overnight, and will let the GI team know tomorrow morning.     Gastroenterology follow up recommendations: TBD    Thank you for involving us in this patient's care. Please do not hesitate to contact the GI service with any questions or concerns.     Pt care plan is pending discussion with GI staff physician.    Charis David  -------------------------------------------------------------------------------------------------------------------          Chief Complaint:   We were asked by Dr. Blanc of internal medicine to evaluate this patient with hematemesis     History is obtained from the patient and the medical record.          History of Present Illness:   Alexandra Melgoza is a 21 year old female with a history of gastritis, chronic intractable abdominal pain on chronic narcotics, transferred from [place] for flare of chronic abdominal pain with nausea/vomiting, developed hematemesis x1 today, GI luminal is consulted for evaluation.  Patient had 1 episode hematemesis this afternoon, about 150 cc bright red blood. Denied melena, hematochezia, dysphagia, odynophagia, fever or chills. Reports abdominal pain which is chronic. Patient has been retching for couple of days.      Please refer to GI panc/bili consult note for history of chronic abdominal pain.             Past Medical History:   Reviewed and edited as appropriate  Past Medical History:   Diagnosis Date     Anxiety      Asthma      Cholecystitis     s/p cholecystectomy     Chronic abdominal pain      Chronic infection     mrsa     Chronic pain      Cyclic vomiting syndrome 10/27/2012     Depression      Endometriosis      Hypoglycaemia      Migraines      Mild intermittent asthma      Ovarian cysts      Pancreatic disease      PONV (postoperative nausea and vomiting)      Pseudoseizures      Somatoform disorder      Sphincter of Oddi dysfunction      Vasovagal syncope             Past Surgical History:   Reviewed and edited as appropriate   Past Surgical History:   Procedure Laterality Date     ABDOMEN SURGERY      ERCP, biliary stents     CHOLECYSTECTOMY  8/2/11     COLONOSCOPY  2011    negative finding     ENDOSCOPIC RETROGRADE CHOLANGIOPANCREATOGRAM  8/23/2011    Procedure:ENDOSCOPIC RETROGRADE CHOLANGIOPANCREATOGRAM; Endoscopic Retrograde Cholangiopancreatogram; Surgeon:SHORTY MCCOY; Location:[facility]     ENDOSCOPIC RETROGRADE CHOLANGIOPANCREATOGRAM  5/17/2012    Procedure:ENDOSCOPIC RETROGRADE CHOLANGIOPANCREATOGRAM; Endoscopic Retrograde Cholangiopancreatogram with pancreatic stent placement.; Surgeon:SHORTY MCCOY; Location:[facility]     ENDOSCOPIC RETROGRADE CHOLANGIOPANCREATOGRAM COMPLEX  1/3/2012    Procedure:ENDOSCOPIC RETROGRADE CHOLANGIOPANCREATOGRAM COMPLEX; Endoscopic Retrograde Cholangiopancreatogram with Manometry bile duct sphincterotomy extention pancreatic duct sphincterotomy pancreatic duct stent placement; Surgeon:SHORTY MCCOY; Location:[facility]      ENDOSCOPIC ULTRASOUND UPPER GASTROINTESTINAL TRACT (GI) N/A 6/9/2015    Procedure: ENDOSCOPIC ULTRASOUND, ESOPHAGOSCOPY / UPPER GASTROINTESTINAL TRACT (GI);  Surgeon: Mario Joe MD;  Location: [facility] OR     ENDOSCOPIC ULTRASOUND UPPER GASTROINTESTINAL TRACT (GI) N/A 12/12/2016    Procedure: ENDOSCOPIC ULTRASOUND, ESOPHAGOSCOPY / UPPER GASTROINTESTINAL TRACT (GI);  Surgeon: Guru Jose Klein MD;  Location: [facility] OR     ESOPHAGOSCOPY, GASTROSCOPY, DUODENOSCOPY (EGD), COMBINED  1/18/2012    Procedure:COMBINED ESOPHAGOSCOPY, GASTROSCOPY, DUODENOSCOPY (EGD); Surgeon:ARNIE ESPINOZA; Location:[facility]     ESOPHAGOSCOPY, GASTROSCOPY, DUODENOSCOPY (EGD), COMBINED  1/18/2012    Procedure:COMBINED ESOPHAGOSCOPY, GASTROSCOPY, DUODENOSCOPY (EGD); EGD; Surgeon:ARNIE ESPINOZA; Location:[facility] OR     INSERT PORT VASCULAR ACCESS       L knee arthroscopy  2009     LAPAROSCOPY DIAGNOSTIC (GYN)  10/26/2012    Procedure: LAPAROSCOPY DIAGNOSTIC (GYN);  LAPAROSCOPY DIAGNOSTIC, CAUTERY ENDOMETRIOISIS and biopsy of Fallopian tube lesions;  Surgeon: Carla Lopez MD;  Location: [facility] OR     ORTHOPEDIC SURGERY  2008    knee     VASCULAR SURGERY              Previous Endoscopy:   No results found. However, due to the size of the patient record, not all encounters were searched. Please check Results Review for a complete set of results.         Social History:   Reviewed and edited as appropriate  Social History     Social History     Marital status: Single     Spouse name: N/A     Number of children: N/A     Years of education: N/A     Occupational History     Not on file.     Social History Main Topics     Smoking status: Never Smoker     Smokeless tobacco: Never Used     Alcohol use No     Drug use: No     Sexual activity: No     Other Topics Concern     Parent/Sibling W/ Cabg, Mi Or Angioplasty Before 65f 55m? 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, and works part-time     Focusing on DBT  "therapy - individual and group therapy    Currently living with her mother at her grandmother's home        Considering going to nursing school            Family History:   Reviewed and edited as appropriate  No known history of gastrointestinal/liver disease or  gastrointestinal malignancies       Allergies:   Reviewed and edited as appropriate     Allergies   Allergen Reactions     Amoxicillin-Pot Clavulanate Nausea and Vomiting     Compazine [Prochlorperazine] Other (See Comments)     Dystonia       Hyoscyamine Other (See Comments)     Dystonia     Reglan [Metoclopramide Hcl] Other (See Comments)     Dystonia     Zyprexa Other (See Comments)     Sensitive, dystonic reaction on 11-9-2011     Amitriptyline Hcl Other (See Comments)     Dystonia, hallucinations     Buspirone Other (See Comments)     No Adverse Reactions, no benefit     Cogentin [Benztropine]      Cyproheptadine Other (See Comments)     Distonic     Dicyclomine Other (See Comments)     Droperidol Other (See Comments)     Feels tense and \"like she has to jump out of her skin\".       Effexor [Venlafaxine] Other (See Comments)     Dystonia     Food      Cilantro--lips/tongue swelling     No Clinical Screening - See Comments Other (See Comments)     Cilantro     Phenergan Dm [Promethazine-Dm] Other (See Comments)     dystonia     Promethazine Other (See Comments)     Risperidone Other (See Comments)     dystonia     Vistaril Other (See Comments)     Burning sensation.       Augmentin GI Disturbance     Ketamine Other (See Comments)     jittery     Sorbitol GI Disturbance     Headache and dyspepsia            Medications:     Current Facility-Administered Medications   Medication     oxyCODONE (ROXICODONE) solution 10 mg     acetaminophen (TYLENOL) tablet 975 mg     pantoprazole (PROTONIX) 80 mg in NaCl 0.9 % 100 mL infusion     scopolamine (TRANSDERM) 72 hr patch 1 patch    And     scopolamine (TRANSDERM-SCOP) Patch in Place    And     [START ON " 12/11/2017] scopolamine (TRANSDERM-SCOP) patch REMOVAL     lidocaine 1 % 1 mL     lidocaine (LMX4) kit     sodium chloride (PF) 0.9% PF flush 3 mL     sodium chloride (PF) 0.9% PF flush 3 mL     ondansetron (ZOFRAN) injection 4 mg     diphenhydrAMINE (BENADRYL) injection 12.5 mg     HYDROmorphone (DILAUDID) injection 1 mg     dextrose 10 % 1,000 mL infusion     multivitamins with minerals (CERTAVITE/CEROVITE) liquid 15 mL     potassium chloride SA (K-DUR/KLOR-CON M) CR tablet 20-40 mEq     potassium chloride (KLOR-CON) Packet 20-40 mEq     potassium chloride 10 mEq in 100 mL sterile water intermittent infusion (premix)     potassium chloride 10 mEq in 100 mL intermittent infusion with 10 mg lidocaine     potassium chloride 20 mEq in 50 mL intermittent infusion     naloxone (NARCAN) injection 0.1-0.4 mg     senna-docusate (SENOKOT-S;PERICOLACE) 8.6-50 MG per tablet 1 tablet    Or     senna-docusate (SENOKOT-S;PERICOLACE) 8.6-50 MG per tablet 2 tablet     magnesium hydroxide (MILK OF MAGNESIA) suspension 30 mL     amylase-lipase-protease (ZENPEP) 83941 UNITS delayed release capsule 40,000 Units     amylase-lipase-protease (ZENPEP) 65671 UNITS delayed release capsule 60,000-80,000 Units     gabapentin (NEURONTIN) capsule 300 mg     levalbuterol (XOPENEX HFA) Inhaler 2 puff     norethindrone (AYGESTIN) tablet 5 mg     nortriptyline (PAMELOR) capsule 30 mg     rizatriptan (MAXALT) tablet 5 mg     lactated ringers infusion     lidocaine 1 % 1 mL     lidocaine (LMX4) kit     sodium chloride (PF) 0.9% PF flush 10-20 mL     heparin lock flush 10 UNIT/ML injection 5-10 mL     heparin lock flush 10 UNIT/ML injection 5-10 mL     heparin 100 UNIT/ML injection 5 mL     sodium chloride (PF) 0.9% PF flush 10-20 mL             Review of Systems:   A complete review of systems was performed and is negative except as noted in the HPI           Physical Exam:   /82 (BP Location: Left arm)  Pulse 75  Temp 98.4  F (36.9  C)  (Oral)  Resp 16  Wt 68.9 kg (152 lb)  SpO2 95%  BMI 23.95 kg/m2  Wt:   Wt Readings from Last 2 Encounters:   12/06/17 68.9 kg (152 lb)   11/22/17 67.7 kg (149 lb 3.2 oz)      Constitutional: agitated and crying  Eyes: Sclera anicteric/injected  Ears/nose/mouth/throat: Normal oropharynx without ulcers or exudate, mucus membranes moist, hearing intact  Neck: supple, thyroid normal size  CV: RRR, no murmur   Respiratory: clear to auscultation bilaterally, no wheezing or crackles   Lymph: No axillary, submandibular, supraclavicular or inguinal lymphadenopathy  Abd: tender at epigastric and RUQ areas. Normal active BS. No peritoneal signs.   Skin: warm, perfused, no jaundice  Neuro: AAO x 3, No asterixis  Psych: crying and agitated   MSK: No gross deformities         Data:   Labs and imaging below were independently reviewed and interpreted    BMP  Recent Labs  Lab 12/08/17 1716 12/08/17  0713 12/07/17  1647 12/06/17  2132    138 135 138   POTASSIUM 3.9 3.6 4.0 3.3*   CHLORIDE 104 103 101 102   RENEA 8.6 8.8 8.8 8.5   CO2 27 27 24 27   BUN 6* 7 12 3*   CR 0.58 0.57 0.64 0.65   GLC 96 99 63* 79     CBC  Recent Labs  Lab 12/08/17  1716 12/08/17  0713 12/06/17  2132   WBC 3.6* 3.9* 7.9   RBC 3.75* 3.54* 3.50*   HGB 11.4* 10.7* 10.6*   HCT 33.3* 31.3* 30.9*   MCV 89 88 88   MCH 30.4 30.2 30.3   MCHC 34.2 34.2 34.3   RDW 12.3 12.2 12.3    160 147*     INRNo lab results found in last 7 days.  LFTs  Recent Labs  Lab 12/08/17 1716 12/08/17  0713 12/06/17  2132   ALKPHOS 78 74 73   AST 15 14 16   ALT 18 18 18   BILITOTAL 0.6 1.4* 0.8   PROTTOTAL 6.5* 6.0* 5.5*   ALBUMIN 3.4 3.2* 3.3*      PANC  Recent Labs  Lab 12/08/17  1716 12/06/17  2132   LIPASE 88 96

## 2017-12-09 NOTE — PROVIDER NOTIFICATION
Paged oncall Gold crosscover of pt's increasing complaints of throat/upper chest pain she attributes to her NJ tube; per pt, she had emesis x2 earlier today and pain in throat has increased since then. Pt also c/o increased nausea; pharmacy also contacted to make sure that pt can get additional dose of IV zofran 4mg, even though last dose was given at 1713 this evening. Per pharmacy, okay to administer IV zofran and IV benadryl @2100; eleazar GALLEGOS also to order additional dose of lorazepam and GI cocktail for nausea/abdominal pain. Also per MD, okay to remove NJ tube tonight--per MD, CT showing that feeding tube is coiled in distal esophagus. Pt and pt's mother made aware of plan and pt amenable to having IV zofran/IV benadryl @2100. Will continue to monitor.     Addendum @0100--After removing feeding tube, pt reported major decrease in nausea and decrease of throat/upper chest pain. However, pt still endorsing dull, persistent feeling of queasiness. Pt denied all PO meds this evening d/t nausea. IV zofran/IV benadryl was not given @2100 d/t being discontinued by evening provider. Night shift eleazar GALLEGOS paged and one time dose of IV zofran/IV benadryl ordered and given to pt; upon reassessment, pt was asleep in bed. Will continue to monitor.

## 2017-12-09 NOTE — PROGRESS NOTES
RN notified of continued nausea, throat pain since emesis.    Followed up on CT scan- no acute findings w/ exception feeding tube coiled in distal esophagus w/ tip in the stomach, mild intrahepatic biliary ductal dilatation not significantly changed from recent MRCP, groundglass opacities in medial right lung base.     NJ coiled in the distal esophagus w/ the tip in the stomach.     -Will have RN remove NG tube.  -GI to scope tomorrow, day team to assess w/ GI if they can replace while scoping.  -For nausea overnight due to allergies, will start low dose ativan PRN.     Please notify medicine if any changes.    ESTELITA Olivares

## 2017-12-09 NOTE — PROCEDURES
Upper GI Endoscopy 12/09/2017 10:29 AM [address] [place] [address] [phone]     Endoscopy Department   _______________________________________________________________________________   Patient Name: Alexandra Melgoza           Procedure Date: 12/9/2017 10:29 AM   MRN: [id number]                       Account Number: [id number]   YOB: 1995             Admit Type: Inpatient   Age: 21                                Gender: Female   Note Status: Finalized                Attending MD: Josias Chan MD   Pause for the Cause: time out performed Total Sedation Time:   _______________________________________________________________________________       Procedure:           Upper GI endoscopy   Indications:         Hematemesis   Providers:           Josias Chan MD, Violette Rod RN, Franca Castillo RN   Referring MD:        Chip Balnc   Medicines:           Midazolam 7 mg IV, Fentanyl 250 micrograms IV,                        Diphenhydramine 50 mg IV   Complications:       No immediate complications. Estimated blood loss: None.   _______________________________________________________________________________   Procedure:           Pre-Anesthesia Assessment:                        - Prior to the procedure, a History and Physical was                        performed, and patient medications and allergies were                        reviewed. The patient is competent. The risks and                        benefits of the procedure and the sedation options and                        risks were discussed with the patient. All questions                        were answered and informed consent was obtained. Patient                        identification and proposed procedure were verified by                        the physician and the nurse in the procedure room.                        Mental Status Examination: alert and oriented. Airway                         Examination: normal oropharyngeal airway and neck                        mobility. Respiratory Examination: clear to                        auscultation. CV Examination: normal. Prophylactic                        Antibiotics: The patient does not require prophylactic                        antibiotics. Prior Anticoagulants: The patient has taken                        no previous anticoagulant or antiplatelet agents. ASA                        Grade Assessment: II - A patient with mild systemic                        disease. After reviewing the risks and benefits, the                        patient was deemed in satisfactory condition to undergo                        the procedure. The anesthesia plan was to use moderate                        sedation / analgesia (conscious sedation). Immediately                        prior to administration of medications, the patient was                        re-assessed for adequacy to receive sedatives. The heart                        rate, respiratory rate, oxygen saturations, blood                        pressure, adequacy of pulmonary ventilation, and                        response to care were monitored throughout the                        procedure. The physical status of the patient was                        re-assessed after the procedure.                        After obtaining informed consent, the endoscope was                        passed under direct vision. Throughout the procedure,                        the patient's blood pressure, pulse, and oxygen                        saturations were monitored continuously. The Endoscope                        was introduced through the mouth, and advanced to the                        second part of duodenum. The upper GI endoscopy was                        accomplished without difficulty. The patient tolerated                        the procedure well.                                                                                      Findings:        The examined duodenum was normal.        Patchy moderate inflammation characterized by congestion (edema),        erythema and granularity was found in the entire examined stomach. More        extensive in the cardia possibly related to wretching.        The esophagus was normal.        No active or signs of old bleeding seen.                                                                                     Moderate Sedation:        Moderate (conscious) sedation was administered by the endoscopy nurse        and supervised by the endoscopist. The following parameters were        monitored: oxygen saturation, heart rate, blood pressure, and response        to care. Total physician intraservice time was 26 minutes.   Impression:          - Normal examined duodenum.                        - Patchy gastritis in the stomach. More pronounced in                        the cardia likely from wretching.                        - Normal esophagus.                        - No active or signs of old bleeding seen                        - No specimens collected.   Recommendation:      - [facility] to [facility] care.                        - Continue BID PPI                                                                                       Josias Chan MD   ________________

## 2017-12-09 NOTE — PLAN OF CARE
[id number] hours: Afebrile, vital signs were stable. Patient did have an emesis of 150 cc bright red blood followed by a 50 cc clear emesis approximately 1 hour later. MD notified. One time order IV dilaudid ordered for pain, 4 mg IV zofran ordered for nausea. Patient developed dystonia to right jaw due to zofran, benadryl 25 mg IV given with relief. CT abdomen ordered and awaiting transport to take her down. Protonix 80 mg IVP given as ordered followed by the start of a Protonix gtt that is currently infusing at 8 mcg/hr (10 cc per hour). Scopalomine patch ordered for better nausea management. Awaiting scop patch from pharmacy. No bowel movement x 3-4 days per patient report. Active bowel sounds present and soft abdomen. Patient has been tearful throughout this experience and expressed feelings of fear.

## 2017-12-09 NOTE — OR NURSING
Upper endoscopy tolerated moderately well with sedation given. All sedation given before procedure attempted. Pediatric scope used and patient verbally encouraged to relax, take deep breaths and think about getting through the procedure without difficulty.  No therapeutic measures performed.

## 2017-12-09 NOTE — PLAN OF CARE
Problem: Patient Care Overview  Goal: Plan of Care/Patient Progress Review    [id number] VSS on RA, no s/s of distress. A/Ox4, pleasant and cooperative through cares most of shift. At start of shift, pt was tearful/anxious and c/o of increased nausea/pain d/t NJ tube/feeding tube--feeding tube discontinued per oncall MD order (see previous note). After feeding tube d/c; pt expressed major decrease in nausea and pain. Nausea also treated with low dose of PRN ativan x1, scopolamine patch placed behind right ear, and one time order of IV zofran x1 with IV benadryl x1 (IV benadryl given to prevent dystonia that occurs with zofran administration). After zofran administration; pt was able to rest/sleep 3-4hrs. Per pt, zofran/benadryl combo is better at controlling her nausea; ativan is second. As for abdominal pain, pt given IV dilaudid x3 this shift with adequate relief; pt refused PO oxy and all PO meds d/t fear of having emesis overnight. Up ad gonzález in room; no BMs overnight; voiding adequate amounts (about 1500ml clear yellow urine this shift). Right Port patent with LR@150ml/hr; protonix gtt y-sited in @10ml/hr. LPIV SL. Pt NPO overnight for possible EGD with mild sedation this morning. Pt resting quietly now, call light and belongings within reach, will continue to monitor and continue POC/notify team as needed.

## 2017-12-09 NOTE — PLAN OF CARE
Problem: Patient Care Overview  Goal: Individualization & Mutuality  Outcome: No Change  AVSS on RA. Continues to c/o of sharp, upper abdominal pain and nausea. RN encouraged pt to try oxycodone before IV dilaudid but pt declined d/t nausea. PRN IV dilaudid given x2 with good relief. PRN zofran and benadryl given x2 for nausea w/ good relief. Scopolamine patch d/c'd. MIVF infusing via port. PIV saline locked. Pt went for upper endoscopy, no signs of bleeding, protonix gtt d/c'd. Voiding well. No BM since day of transfer, stool sample remains to be collected. Diet advanced to clears, pt to order. Up ad gonzález. Will continue POC.

## 2017-12-10 LAB
ANION GAP SERPL CALCULATED.3IONS-SCNC: 10 MMOL/L (ref 3–14)
BUN SERPL-MCNC: 4 MG/DL (ref 7–30)
CALCIUM SERPL-MCNC: 8.5 MG/DL (ref 8.5–10.1)
CHLORIDE SERPL-SCNC: 107 MMOL/L (ref 94–109)
CO2 SERPL-SCNC: 26 MMOL/L (ref 20–32)
CREAT SERPL-MCNC: 0.62 MG/DL (ref 0.52–1.04)
ERYTHROCYTE [DISTWIDTH] IN BLOOD BY AUTOMATED COUNT: 12.4 % (ref 10–15)
GFR SERPL CREATININE-BSD FRML MDRD: >90 ML/MIN/1.7M2
GLUCOSE SERPL-MCNC: 77 MG/DL (ref 70–99)
HCT VFR BLD AUTO: 30.9 % (ref 35–47)
HGB BLD-MCNC: 10.6 G/DL (ref 11.7–15.7)
MAGNESIUM SERPL-MCNC: 1.7 MG/DL (ref 1.6–2.3)
MCH RBC QN AUTO: 30.6 PG (ref 26.5–33)
MCHC RBC AUTO-ENTMCNC: 34.3 G/DL (ref 31.5–36.5)
MCV RBC AUTO: 89 FL (ref 78–100)
PHOSPHATE SERPL-MCNC: 3.4 MG/DL (ref 2.5–4.5)
PLATELET # BLD AUTO: 180 10E9/L (ref 150–450)
POTASSIUM SERPL-SCNC: 3.3 MMOL/L (ref 3.4–5.3)
POTASSIUM SERPL-SCNC: 3.7 MMOL/L (ref 3.4–5.3)
RBC # BLD AUTO: 3.46 10E12/L (ref 3.8–5.2)
SODIUM SERPL-SCNC: 143 MMOL/L (ref 133–144)
WBC # BLD AUTO: 3.5 10E9/L (ref 4–11)

## 2017-12-10 PROCEDURE — 25000132 ZZH RX MED GY IP 250 OP 250 PS 637: Performed by: INTERNAL MEDICINE

## 2017-12-10 PROCEDURE — 12000008 ZZH R&B INTERMEDIATE UMMC

## 2017-12-10 PROCEDURE — 25000128 H RX IP 250 OP 636: Performed by: INTERNAL MEDICINE

## 2017-12-10 PROCEDURE — 99233 SBSQ HOSP IP/OBS HIGH 50: CPT | Performed by: INTERNAL MEDICINE

## 2017-12-10 PROCEDURE — 36592 COLLECT BLOOD FROM PICC: CPT | Performed by: INTERNAL MEDICINE

## 2017-12-10 PROCEDURE — 84132 ASSAY OF SERUM POTASSIUM: CPT | Performed by: INTERNAL MEDICINE

## 2017-12-10 PROCEDURE — 83735 ASSAY OF MAGNESIUM: CPT | Performed by: INTERNAL MEDICINE

## 2017-12-10 PROCEDURE — 25000125 ZZHC RX 250: Performed by: INTERNAL MEDICINE

## 2017-12-10 PROCEDURE — 84100 ASSAY OF PHOSPHORUS: CPT | Performed by: INTERNAL MEDICINE

## 2017-12-10 PROCEDURE — 80048 BASIC METABOLIC PNL TOTAL CA: CPT | Performed by: INTERNAL MEDICINE

## 2017-12-10 PROCEDURE — 25000132 ZZH RX MED GY IP 250 OP 250 PS 637: Performed by: NURSE PRACTITIONER

## 2017-12-10 PROCEDURE — 85027 COMPLETE CBC AUTOMATED: CPT | Performed by: INTERNAL MEDICINE

## 2017-12-10 RX ORDER — PANTOPRAZOLE SODIUM 40 MG/1
40 TABLET, DELAYED RELEASE ORAL
Status: DISCONTINUED | OUTPATIENT
Start: 2017-12-10 | End: 2017-12-13 | Stop reason: HOSPADM

## 2017-12-10 RX ORDER — MV-MIN 51/FOLIC ACID/VIT K/UBI 100-350MCG
1 TABLET,CHEWABLE ORAL DAILY
Status: DISCONTINUED | OUTPATIENT
Start: 2017-12-10 | End: 2017-12-11

## 2017-12-10 RX ADMIN — POTASSIUM CHLORIDE 20 MEQ: 29.8 INJECTION, SOLUTION INTRAVENOUS at 11:05

## 2017-12-10 RX ADMIN — NORTRIPTYLINE HYDROCHLORIDE 30 MG: 10 CAPSULE ORAL at 22:46

## 2017-12-10 RX ADMIN — GABAPENTIN 300 MG: 300 CAPSULE ORAL at 07:48

## 2017-12-10 RX ADMIN — HYDROMORPHONE HYDROCHLORIDE 1 MG: 1 INJECTION, SOLUTION INTRAMUSCULAR; INTRAVENOUS; SUBCUTANEOUS at 02:23

## 2017-12-10 RX ADMIN — ACETAMINOPHEN 975 MG: 325 TABLET, FILM COATED ORAL at 21:33

## 2017-12-10 RX ADMIN — NORETHINDRONE ACETATE 5 MG: 5 TABLET ORAL at 22:46

## 2017-12-10 RX ADMIN — PANCRELIPASE 60000 UNITS: 20000; 68000; 109000 CAPSULE, DELAYED RELEASE ORAL at 09:26

## 2017-12-10 RX ADMIN — POTASSIUM CHLORIDE 20 MEQ: 29.8 INJECTION, SOLUTION INTRAVENOUS at 09:59

## 2017-12-10 RX ADMIN — DIPHENHYDRAMINE HYDROCHLORIDE 25 MG: 50 INJECTION, SOLUTION INTRAMUSCULAR; INTRAVENOUS at 05:01

## 2017-12-10 RX ADMIN — OXYCODONE HYDROCHLORIDE 10 MG: 5 SOLUTION ORAL at 13:39

## 2017-12-10 RX ADMIN — PANTOPRAZOLE SODIUM 40 MG: 40 INJECTION, POWDER, FOR SOLUTION INTRAVENOUS at 07:48

## 2017-12-10 RX ADMIN — DIPHENHYDRAMINE HYDROCHLORIDE 25 MG: 50 INJECTION, SOLUTION INTRAMUSCULAR; INTRAVENOUS at 23:33

## 2017-12-10 RX ADMIN — ONDANSETRON 4 MG: 2 INJECTION INTRAMUSCULAR; INTRAVENOUS at 23:33

## 2017-12-10 RX ADMIN — OXYCODONE HYDROCHLORIDE 10 MG: 5 SOLUTION ORAL at 05:01

## 2017-12-10 RX ADMIN — PANTOPRAZOLE SODIUM 40 MG: 40 TABLET, DELAYED RELEASE ORAL at 16:49

## 2017-12-10 RX ADMIN — HYDROMORPHONE HYDROCHLORIDE 1 MG: 1 INJECTION, SOLUTION INTRAMUSCULAR; INTRAVENOUS; SUBCUTANEOUS at 21:33

## 2017-12-10 RX ADMIN — OXYCODONE HYDROCHLORIDE 10 MG: 5 SOLUTION ORAL at 09:31

## 2017-12-10 RX ADMIN — HYDROMORPHONE HYDROCHLORIDE 1 MG: 1 INJECTION, SOLUTION INTRAMUSCULAR; INTRAVENOUS; SUBCUTANEOUS at 12:21

## 2017-12-10 RX ADMIN — ONDANSETRON 4 MG: 2 INJECTION INTRAMUSCULAR; INTRAVENOUS at 19:39

## 2017-12-10 RX ADMIN — NORTRIPTYLINE HYDROCHLORIDE 20 MG: 10 CAPSULE ORAL at 01:00

## 2017-12-10 RX ADMIN — ONDANSETRON 4 MG: 2 INJECTION INTRAMUSCULAR; INTRAVENOUS at 11:20

## 2017-12-10 RX ADMIN — SODIUM CHLORIDE, POTASSIUM CHLORIDE, SODIUM LACTATE AND CALCIUM CHLORIDE: 600; 310; 30; 20 INJECTION, SOLUTION INTRAVENOUS at 23:26

## 2017-12-10 RX ADMIN — DIPHENHYDRAMINE HYDROCHLORIDE 25 MG: 50 INJECTION, SOLUTION INTRAMUSCULAR; INTRAVENOUS at 19:39

## 2017-12-10 RX ADMIN — Medication 1 TABLET: at 13:40

## 2017-12-10 RX ADMIN — GABAPENTIN 300 MG: 300 CAPSULE ORAL at 21:33

## 2017-12-10 RX ADMIN — DIPHENHYDRAMINE HYDROCHLORIDE 25 MG: 50 INJECTION, SOLUTION INTRAMUSCULAR; INTRAVENOUS at 11:20

## 2017-12-10 RX ADMIN — ACETAMINOPHEN 975 MG: 325 TABLET, FILM COATED ORAL at 13:39

## 2017-12-10 RX ADMIN — HYDROMORPHONE HYDROCHLORIDE 1 MG: 1 INJECTION, SOLUTION INTRAMUSCULAR; INTRAVENOUS; SUBCUTANEOUS at 16:49

## 2017-12-10 RX ADMIN — ONDANSETRON 4 MG: 2 INJECTION INTRAMUSCULAR; INTRAVENOUS at 05:01

## 2017-12-10 RX ADMIN — OXYCODONE HYDROCHLORIDE 10 MG: 5 SOLUTION ORAL at 18:19

## 2017-12-10 RX ADMIN — SODIUM CHLORIDE, POTASSIUM CHLORIDE, SODIUM LACTATE AND CALCIUM CHLORIDE: 600; 310; 30; 20 INJECTION, SOLUTION INTRAVENOUS at 01:30

## 2017-12-10 RX ADMIN — HYDROMORPHONE HYDROCHLORIDE 1 MG: 1 INJECTION, SOLUTION INTRAMUSCULAR; INTRAVENOUS; SUBCUTANEOUS at 07:48

## 2017-12-10 RX ADMIN — ACETAMINOPHEN 975 MG: 325 TABLET, FILM COATED ORAL at 07:48

## 2017-12-10 RX ADMIN — OXYCODONE HYDROCHLORIDE 10 MG: 5 SOLUTION ORAL at 00:03

## 2017-12-10 RX ADMIN — SODIUM CHLORIDE, POTASSIUM CHLORIDE, SODIUM LACTATE AND CALCIUM CHLORIDE: 600; 310; 30; 20 INJECTION, SOLUTION INTRAVENOUS at 12:21

## 2017-12-10 RX ADMIN — OXYCODONE HYDROCHLORIDE 10 MG: 5 SOLUTION ORAL at 22:46

## 2017-12-10 RX ADMIN — SENNOSIDES AND DOCUSATE SODIUM 2 TABLET: 8.6; 5 TABLET ORAL at 09:58

## 2017-12-10 RX ADMIN — GABAPENTIN 300 MG: 300 CAPSULE ORAL at 13:39

## 2017-12-10 RX ADMIN — HYDROMORPHONE HYDROCHLORIDE 1 MG: 1 INJECTION, SOLUTION INTRAMUSCULAR; INTRAVENOUS; SUBCUTANEOUS at 14:59

## 2017-12-10 RX ADMIN — ONDANSETRON 4 MG: 2 INJECTION INTRAMUSCULAR; INTRAVENOUS at 16:49

## 2017-12-10 RX ADMIN — DIPHENHYDRAMINE HYDROCHLORIDE 25 MG: 50 INJECTION, SOLUTION INTRAMUSCULAR; INTRAVENOUS at 16:49

## 2017-12-10 ASSESSMENT — PAIN DESCRIPTION - DESCRIPTORS
DESCRIPTORS: ACHING
DESCRIPTORS: ACHING;DISCOMFORT
DESCRIPTORS: SHOOTING;STABBING
DESCRIPTORS: STABBING
DESCRIPTORS: ACHING;CRAMPING;DISCOMFORT
DESCRIPTORS: ACHING;CONSTANT
DESCRIPTORS: ACHING;DISCOMFORT;CRAMPING
DESCRIPTORS: SHARP
DESCRIPTORS: ACHING
DESCRIPTORS: ACHING;CONSTANT
DESCRIPTORS: ACHING
DESCRIPTORS: SHARP
DESCRIPTORS: ACHING;DISCOMFORT
DESCRIPTORS: ACHING;DISCOMFORT
DESCRIPTORS: ACHING;SHOOTING

## 2017-12-10 NOTE — DOWNTIME EVENT NOTE
The EMR was down for 2.5 hours on 12/10/2017.    Grace Kilpatrick was responsible for completing the paper charting during this time period.     The following information was re-entered into the system by Garce Kilpatrick: Flowsheet data, Intake and output and MAR    The following information will remain in the paper chart: none    Grace Kilpatrick  12/10/2017

## 2017-12-10 NOTE — PLAN OF CARE
Gastroenterology Inpatient Sign Off Note    Inpatient GI consults service will sign off. No further recommendations at this time. If primary team has addition questions, please page consult fellow listed in [place].    Current GI Consult Staff: Dr. Chan    Follow up recommendations:   No outpatient GI clinic follow-up indicated. Follow-up with primary care provider at timing determined by discharge physician.    If outpatient GI follow-up is felt indicated by primary care, they may coordinate this by placing new GI referral and contacting ; General GI clinic - ([phone]); Advanced Endoscopy clinic (Esophageal and Pancreas Biliary Clinics) - ([phone]); IBD clinic - ([phone]); Hepatology Clinic (Liver) - ([phone])    Charis David  GI fellow  P [phone]

## 2017-12-10 NOTE — PROGRESS NOTES
[facility], [place]    Internal Medicine Progress Note - Gold Service      Assessment & Plan   Alexandra Melgoza is a 21 year old female admitted on 12/6/2017. She has a history of asthma, chronic abdominal pain s/p cholecystectomy, cyclic N/V, migraines, pseudoseizures and somatoform disorder and is admitted for abdominal pain and fever.  She was transferred here from a facility in [place] where she has been hospitalized for 5 days.      Changes today:  -feels better and wants to try diet , does not  feel ready to go down on pain medications   -advanced to diet as tolerated   -zofran and bendryl for nausea     1)Concern for upper GI bleed:  Patient had an episode of hematemesis -concern for erosive gastritis in setting of NSAID use   -hemodynamically stable and hemoglobin stable  -GI consulted -EGD with erosive gastritis and continue with PPI BID  -advance diet as tolerated   -NJ tube was d/francy      2) Recurrent abdominal pain   Acute on chronic pancreatitis   - Presents with 7 days now of epigastric abdominal pain radiating to her back as well as nausea and vomiting.  CT scan at outside facility noted some minimal pancreatic inflammation, lipase negative, LFTs normal.  Reports a diagnosis of chronic pancreatitis but per our last GI note does not meet diagnostic criteria (Oracio, 11/22/2017).  Had an MRCP planned here and transferred to see Dr Narayanan in the hospital if possible.  MRCP with secretin with normal response -with no changes in pancreas  CT abdomen stat obtained due to concern for worsening abdominal pain in setting of hematemesis  Pain consulted-appreciate recs  -continue with hydromorphone IV PRN (decreased frequency to every 4 hrs), oxycodone solution PRN and tylenol veena  -d/c celecoxib due to concern for hematemesis  -continue with gabapentin, nortryptilline and pancreatic enzymes  -nausea-treating with zofran and benadryl  -advance diet as tolerated       3) History of  endometriosis  - Continue lupron     4) History of asthma  - Continue levalbuterol      Diet: advance diet as tolerated   Fluids: ringer's lactate   DVT Prophylaxis: ambulate  Code Status: Full Code    Disposition Plan   Expected discharge: 2 - 3 days, recommended to prior living arrangement once adequate pain management/ tolerating PO medications.     Entered: zakia Suekota 12/10/2017, 3:42 PM   Information in the above section will display in the discharge planner report.      The patient's care was discussed with the Bedside Nurse.    Blackrito Jayashree  Internal Medicine Staff Hospitalist Service  [facility]  Pager: [phone]  Please see sticky note for cross cover information    Interval History   Pt still complaining of epigastric pain; feels better after discontinuing the NJ tube and feels ready for diet     Last 24 hr care team notes reviewed.   ROS:  4 point ROS including Respiratory, CV, GI and , other than that noted in the HPI, is negative.       Data reviewed today: I reviewed all medications, new labs and imaging results over the last 24 hours. I personally reviewed no images or EKG's today.    Physical Exam   Vital Signs: Temp: 99.4  F (37.4  C) Temp src: Oral BP: 121/83 Pulse: 73 Heart Rate: 69 Resp: 16 SpO2: 99 % O2 Device: None (Room air)    Weight: 152 lbs 0 oz  General Appearance: Pt in  distress due to pain  Respiratory: b/l equal breath sounds with no added sounds   Cardiovascular: s1s2 with no murmur   GI:  tenderness in epigastric, bowel sounds noted  Skin: no rash  Neuro: AAOx3, b/l UE and LE-5/5          Data   Data     Recent Labs  Lab 12/10/17  1421 12/10/17  0737 12/09/17  0708 12/08/17  1716  12/06/17  2132   WBC  --  3.5* 3.6* 3.6*  < > 7.9   HGB  --  10.6* 10.7* 11.4*  < > 10.6*   MCV  --  89 88 89  < > 88   PLT  --  180 178 170  < > 147*   NA  --  143 139 140  < > 138   POTASSIUM 3.7 3.3* 3.7 3.9  < > 3.3*   CHLORIDE  --  107 104 104  < > 102   CO2  --  26 27 27  < >  27   BUN  --  4* 5* 6*  < > 3*   CR  --  0.62 0.50* 0.58  < > 0.65   ANIONGAP  --  10 8 8  < > 8   RENEA  --  8.5 8.4* 8.6  < > 8.5   GLC  --  77 75 96  < > 79   ALBUMIN  --   --  3.3* 3.4  < > 3.3*   PROTTOTAL  --   --  6.3* 6.5*  < > 5.5*   BILITOTAL  --   --  0.6 0.6  < > 0.8   ALKPHOS  --   --  75 78  < > 73   ALT  --   --  17 18  < > 18   AST  --   --  11 15  < > 16   LIPASE  --   --   --  88  --  96   < > = values in this interval not displayed.

## 2017-12-10 NOTE — PLAN OF CARE
Problem: Patient Care Overview  Goal: Plan of Care/Patient Progress Review  Outcome: No Change  AVSS.  Pt resting well between cares.  Still with pain and nausea.  Pain adequately controlled with oxycodone, dilaudid IV for BT.  Nausea adequately controlled with zofran (IV benadryl given to prevent dystonia that occurs with zofran administration).  Up independently in room.  Voiding.  No stool overnight, needs stool sample still - pt aware.  Port infusing @100/hr.  Cont with POC.  Plan for report at bedside.

## 2017-12-10 NOTE — PLAN OF CARE
Problem: Patient Care Overview  Goal: Plan of Care/Patient Progress Review  Outcome: No Change  Slightly hypertensive possible d/t pain, otherwise VSS. RA. Up ad gonzález. Voiding adequately. + PG/-BM. Senna administered per MAR. Clear liquid diet with minimal intake (<240). RN encouraged fluids. LR running @ 100 mL/hr into PORT. PIV saline locked. Pain is being managed with Oxycodone 10 mg PO q4h, Tylenol scheduled and IV Dilaudid 1mg q4h. Nausea is being managed with Zofran 5 x daily. Please administered with Benadryl due to dystonia in jaw d/t Zofran. Continue POC.    Pt would LIKE to be awoken for bedside shift report.

## 2017-12-11 ENCOUNTER — APPOINTMENT (OUTPATIENT)
Dept: GENERAL RADIOLOGY | Facility: CLINIC | Age: 22
DRG: 091 | End: 2017-12-11
Attending: INTERNAL MEDICINE
Payer: COMMERCIAL

## 2017-12-11 LAB
ANION GAP SERPL CALCULATED.3IONS-SCNC: 11 MMOL/L (ref 3–14)
BUN SERPL-MCNC: 4 MG/DL (ref 7–30)
CALCIUM SERPL-MCNC: 8.4 MG/DL (ref 8.5–10.1)
CHLORIDE SERPL-SCNC: 105 MMOL/L (ref 94–109)
CO2 SERPL-SCNC: 25 MMOL/L (ref 20–32)
CREAT SERPL-MCNC: 0.64 MG/DL (ref 0.52–1.04)
GFR SERPL CREATININE-BSD FRML MDRD: >90 ML/MIN/1.7M2
GLUCOSE SERPL-MCNC: 73 MG/DL (ref 70–99)
MAGNESIUM SERPL-MCNC: 1.7 MG/DL (ref 1.6–2.3)
PHOSPHATE SERPL-MCNC: 4 MG/DL (ref 2.5–4.5)
POTASSIUM SERPL-SCNC: 3.5 MMOL/L (ref 3.4–5.3)
SODIUM SERPL-SCNC: 142 MMOL/L (ref 133–144)

## 2017-12-11 PROCEDURE — 99207 ZZC NO CHARGE FOLLOW UP PS: CPT

## 2017-12-11 PROCEDURE — 99233 SBSQ HOSP IP/OBS HIGH 50: CPT | Performed by: INTERNAL MEDICINE

## 2017-12-11 PROCEDURE — 83520 IMMUNOASSAY QUANT NOS NONAB: CPT | Performed by: PHYSICIAN ASSISTANT

## 2017-12-11 PROCEDURE — 36592 COLLECT BLOOD FROM PICC: CPT | Performed by: INTERNAL MEDICINE

## 2017-12-11 PROCEDURE — 25000128 H RX IP 250 OP 636: Performed by: INTERNAL MEDICINE

## 2017-12-11 PROCEDURE — 80048 BASIC METABOLIC PNL TOTAL CA: CPT | Performed by: INTERNAL MEDICINE

## 2017-12-11 PROCEDURE — 40000135 CT OUTSIDE READ

## 2017-12-11 PROCEDURE — 25000132 ZZH RX MED GY IP 250 OP 250 PS 637: Performed by: INTERNAL MEDICINE

## 2017-12-11 PROCEDURE — 84100 ASSAY OF PHOSPHORUS: CPT | Performed by: INTERNAL MEDICINE

## 2017-12-11 PROCEDURE — 25000132 ZZH RX MED GY IP 250 OP 250 PS 637: Performed by: NURSE PRACTITIONER

## 2017-12-11 PROCEDURE — 83735 ASSAY OF MAGNESIUM: CPT | Performed by: INTERNAL MEDICINE

## 2017-12-11 PROCEDURE — 12000001 ZZH R&B MED SURG/OB UMMC

## 2017-12-11 RX ORDER — OXYCODONE HCL 5 MG/5 ML
10-15 SOLUTION, ORAL ORAL EVERY 4 HOURS PRN
Status: DISCONTINUED | OUTPATIENT
Start: 2017-12-11 | End: 2017-12-13 | Stop reason: HOSPADM

## 2017-12-11 RX ORDER — POLYETHYLENE GLYCOL 3350 17 G/17G
17 POWDER, FOR SOLUTION ORAL DAILY
Status: DISCONTINUED | OUTPATIENT
Start: 2017-12-11 | End: 2017-12-13 | Stop reason: HOSPADM

## 2017-12-11 RX ADMIN — GABAPENTIN 300 MG: 300 CAPSULE ORAL at 14:32

## 2017-12-11 RX ADMIN — DIPHENHYDRAMINE HYDROCHLORIDE 25 MG: 50 INJECTION, SOLUTION INTRAMUSCULAR; INTRAVENOUS at 20:19

## 2017-12-11 RX ADMIN — NORETHINDRONE ACETATE 5 MG: 5 TABLET ORAL at 22:15

## 2017-12-11 RX ADMIN — ACETAMINOPHEN 650 MG: 325 TABLET, FILM COATED ORAL at 20:10

## 2017-12-11 RX ADMIN — OXYCODONE HYDROCHLORIDE 10 MG: 5 SOLUTION ORAL at 02:49

## 2017-12-11 RX ADMIN — ONDANSETRON 4 MG: 2 INJECTION INTRAMUSCULAR; INTRAVENOUS at 04:21

## 2017-12-11 RX ADMIN — OXYCODONE HYDROCHLORIDE 15 MG: 5 SOLUTION ORAL at 22:15

## 2017-12-11 RX ADMIN — Medication 1 TABLET: at 08:13

## 2017-12-11 RX ADMIN — DIPHENHYDRAMINE HYDROCHLORIDE 25 MG: 50 INJECTION, SOLUTION INTRAMUSCULAR; INTRAVENOUS at 04:21

## 2017-12-11 RX ADMIN — OXYCODONE HYDROCHLORIDE 15 MG: 5 SOLUTION ORAL at 16:37

## 2017-12-11 RX ADMIN — ACETAMINOPHEN 975 MG: 325 TABLET, FILM COATED ORAL at 08:13

## 2017-12-11 RX ADMIN — PANTOPRAZOLE SODIUM 40 MG: 40 TABLET, DELAYED RELEASE ORAL at 08:13

## 2017-12-11 RX ADMIN — OXYCODONE HYDROCHLORIDE 10 MG: 5 SOLUTION ORAL at 12:38

## 2017-12-11 RX ADMIN — ONDANSETRON 4 MG: 2 INJECTION INTRAMUSCULAR; INTRAVENOUS at 09:46

## 2017-12-11 RX ADMIN — PANTOPRAZOLE SODIUM 40 MG: 40 TABLET, DELAYED RELEASE ORAL at 16:13

## 2017-12-11 RX ADMIN — NORTRIPTYLINE HYDROCHLORIDE 30 MG: 10 CAPSULE ORAL at 22:15

## 2017-12-11 RX ADMIN — ONDANSETRON 4 MG: 2 INJECTION INTRAMUSCULAR; INTRAVENOUS at 20:19

## 2017-12-11 RX ADMIN — ACETAMINOPHEN 975 MG: 325 TABLET, FILM COATED ORAL at 14:32

## 2017-12-11 RX ADMIN — HYDROMORPHONE HYDROCHLORIDE 1 MG: 1 INJECTION, SOLUTION INTRAMUSCULAR; INTRAVENOUS; SUBCUTANEOUS at 05:37

## 2017-12-11 RX ADMIN — GABAPENTIN 300 MG: 300 CAPSULE ORAL at 20:10

## 2017-12-11 RX ADMIN — GABAPENTIN 300 MG: 300 CAPSULE ORAL at 08:13

## 2017-12-11 RX ADMIN — PANCRELIPASE 60000 UNITS: 20000; 68000; 109000 CAPSULE, DELAYED RELEASE ORAL at 11:54

## 2017-12-11 RX ADMIN — HYDROMORPHONE HYDROCHLORIDE 1 MG: 1 INJECTION, SOLUTION INTRAMUSCULAR; INTRAVENOUS; SUBCUTANEOUS at 20:12

## 2017-12-11 RX ADMIN — HYDROMORPHONE HYDROCHLORIDE 1 MG: 1 INJECTION, SOLUTION INTRAMUSCULAR; INTRAVENOUS; SUBCUTANEOUS at 14:33

## 2017-12-11 RX ADMIN — SODIUM CHLORIDE, POTASSIUM CHLORIDE, SODIUM LACTATE AND CALCIUM CHLORIDE: 600; 310; 30; 20 INJECTION, SOLUTION INTRAVENOUS at 09:39

## 2017-12-11 RX ADMIN — SENNOSIDES AND DOCUSATE SODIUM 2 TABLET: 8.6; 5 TABLET ORAL at 11:54

## 2017-12-11 RX ADMIN — PANCRELIPASE 80000 UNITS: 20000; 68000; 109000 CAPSULE, DELAYED RELEASE ORAL at 16:36

## 2017-12-11 RX ADMIN — OXYCODONE HYDROCHLORIDE 10 MG: 5 SOLUTION ORAL at 07:06

## 2017-12-11 RX ADMIN — HYDROMORPHONE HYDROCHLORIDE 1 MG: 1 INJECTION, SOLUTION INTRAMUSCULAR; INTRAVENOUS; SUBCUTANEOUS at 01:37

## 2017-12-11 RX ADMIN — DIPHENHYDRAMINE HYDROCHLORIDE 25 MG: 50 INJECTION, SOLUTION INTRAMUSCULAR; INTRAVENOUS at 09:47

## 2017-12-11 RX ADMIN — HYDROMORPHONE HYDROCHLORIDE 1 MG: 1 INJECTION, SOLUTION INTRAMUSCULAR; INTRAVENOUS; SUBCUTANEOUS at 09:39

## 2017-12-11 RX ADMIN — PANCRELIPASE 40000 UNITS: 20000; 68000; 109000 CAPSULE, DELAYED RELEASE ORAL at 18:40

## 2017-12-11 RX ADMIN — ONDANSETRON 4 MG: 2 INJECTION INTRAMUSCULAR; INTRAVENOUS at 16:14

## 2017-12-11 RX ADMIN — DIPHENHYDRAMINE HYDROCHLORIDE 25 MG: 50 INJECTION, SOLUTION INTRAMUSCULAR; INTRAVENOUS at 16:13

## 2017-12-11 ASSESSMENT — PAIN DESCRIPTION - DESCRIPTORS
DESCRIPTORS: CONSTANT;ACHING
DESCRIPTORS: ACHING;DISCOMFORT
DESCRIPTORS: ACHING;CONSTANT
DESCRIPTORS: ACHING;CONSTANT
DESCRIPTORS: CONSTANT;ACHING
DESCRIPTORS: CONSTANT;ACHING
DESCRIPTORS: ACHING;CONSTANT
DESCRIPTORS: CONSTANT;ACHING

## 2017-12-11 NOTE — PLAN OF CARE
Problem: Patient Care Overview  Goal: Plan of Care/Patient Progress Review  Writer cared for pt from [id number]. A&Ox4. AVSS. Pt c/o pain, getting PRN dilaudid and oxycodone and scheduled tylenol. Pain was severe around 1400, MD ordered one time dose IV dilaudid, pt reported small amount of relief. Around-the-clock dosing utilized for pain meds, pt reporting more relief. Pt c/o nausea 2x this shift, PRN zofran given with benadryl (pt needs benadryl with zofran to prevent dystonia), relief reported. Pt tolerating small amts of full liquids, writer encouraged pt to take it slow to prevent nausea. Pt ambulated in halls 1x this shift, independently. Pt voiding in bathroom, not saving. Pt needs to give stool sample, No BM this shift, + flatus. Continue POC.     Pt would like bedside report, but not if asleep.

## 2017-12-11 NOTE — PROGRESS NOTES
Patient: Alexandra Melgoza  Date of Service: December 11, 2017 Admission Date:12/6/2017   2 Days Post-Op     Chief Pain Endorsement: abdominal pain    Recommendations were discussed and relayed to Chip Blanc  Plan was reviewed by the Inpatient Pain Service and staff attending, Dr. Adela Romo      1. Increase oxycodone liquid to 10-15 mg orally Q 4 hours PRN. Patient is okay using liquid even though she no longer has NG tube as the liquid formulation causes less nausea  2. Decrease hydromorphone IV to 1 mg IV QID PRN x 24 hours today    On 12/12 decrease to 1 mg IV BID x 24 hours, then off Patient was agreeable to reducing her IV use today and working more on an oral regimen in anticipating for discharge soon  3. Continue home gabapentin and nortriptyline  4. Continue acetaminophen 975 mg orally Q 8 hours scheduled    Change to PRN upon discharge  5. Patient is planning to reschedule appointment with [facility] for Comprehensive Pain Management  6. Recommend referral for Pain Psychology with Dr. Quyen Lennon in [facility] for [facility] Pain Management  7. Bowel regimen per primary team to prevent opioid induced constipation.    Pain Service will Sign Off at this time.     Thank you for consulting the Inpatient Pain Management Service. The above recommendations are to be acted upon at the primary team s discretion.     To reach us:  Mon - Friday 8 AM - 3 PM: Pager [phone] (Text Page)  After hours, weekends and holidays: Primary service should call [phone] for the on-call pain specialist    PAIN MEDICATION SAFE USE:   Prior to discharge instruct patient on the following in addition to the medication fact sheet:    Caution: these medications can cause sedation       Do not use alcohol or other recreational drugs while on these medications    Take your medication exactly as directed    Do not drive until you are instructed by your medical provider that it is okay and you know how the  medication affects you.      Do not stop abruptly once at higher doses.  These medications must be tapered off.    Opioid pain medications do carry the risk for physical dependence and addiction and patients should be counseled about this.       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 However her pain continued after a sphincter of Oddi ablation.  A year ago, she had a full evaluation for chronic pancreatitis.  She had normal pancreatic function test, 2/9 criteria at EUS and a completely normal pancreatic secretin  In MRCP with normal T1 signal, exocrine function, etc.  She has been living in [place] and has been in [facility]. She was suggested pancreatectomy and islet autotransplant. She has seen Dr. Narayanan on 11/22/17. Per Dr. Narayanan's note, she does not have evidence to support chronic pancreatitis at present.  Pain has not changed much since yesterday.   - She has a h/o spina bifida and has been hopitalized many times for multiple medical issues including gastritis, ovarian cyst rupture, and chronic abdominal pain .  -  She is on chronic opioid therapy since 2016 per . Currently on norco 5/325 mg bid prn. Her last prescription on 11/21/1. She has seen Dr. Haji in 2013. Currently under the care of Dr. Rendon at [place], [place].   - Opiate Risk/Benefit:  No abuse/misuse of medication; Stable dose with acceptable pain control.  Managable side effects.   - She has h/o depressive symptoms have included feelings of hopelessness, sadness, difficulty concentrating, low motivation, difficulty falling and staying asleep and decreased appetite.  Has seen several psychiatrist in the past and was diagnosed with depressive disorder, anxiety disorder and somatization disorders.        Interval History:  Alexandra Melgoza was seen today (December  11, 2017) sitting comfortably in bed. She reports that her pain has not changed much since yesterday. Her pain is in her RUQ and she describes it as a sharp pain straight through her back. Her IV usage did not reduce at much even with the addition of the oral regimen. She's been working on advancing her diet and ate cream of wheat yesterday, which made her very nauseous so she's back on a clear liquid diet. She still hasn't had a bowel movement yet but reports passing flatus. Plan is to likely discharge in 2-3 days    Her last bowel movement was 12/6 and it was Normal.     CAPA (Clinically Aligned Pain Assessment):    Comfort (How is your pain?): Tolerable with discomfort  Change in Pain (Since your last medication/intervention?): About the same  Pain Control (How are your pain treatments working?):  Partially effective control  Functioning (Are you able to do activities to get better?) : Can do most things, but pain gets in the way of some   Sleep (Does your pain management allow you to sleep or rest?): Awake with occasional pain      FUNCTIONAL STATUS:  Change:      improving  Oral intake:     Advancing diet as tolerated (12/11 clear liquids)  Activity level:     Ambulating in andres  Mood:      adjusting to situation     -- Inpatient Medications Related to Pain Management:   Medications related to Pain Management (Future)    Start     Dose/Rate Route Frequency Ordered Stop    12/11/17 1100  HYDROmorphone (DILAUDID) injection 1 mg      1 mg Intravenous 5 TIMES DAILY PRN 12/11/17 1031      12/09/17 1345  diphenhydrAMINE (BENADRYL) injection 25 mg      25 mg  over 1-2 Minutes Intravenous 5 TIMES DAILY PRN 12/09/17 1344      12/09/17 1143  fentaNYL (PF) (SUBLIMAZE) injection      over 3-5 Minutes  PRN 12/09/17 1143      12/09/17 1143  midazolam (VERSED) injection      over 2 Minutes  PRN 12/09/17 1143      12/09/17 1139  diphenhydrAMINE (BENADRYL) injection      over 1-2 Minutes  PRN 12/09/17 1140      12/08/17 1817   lidocaine 1 % 1 mL      1 mL Other EVERY 1 HOUR PRN 12/08/17 1817      12/08/17 1817  lidocaine (LMX4) kit       Topical EVERY 1 HOUR PRN 12/08/17 1817 12/08/17 1500  acetaminophen (TYLENOL) tablet 975 mg      975 mg Oral 3 TIMES DAILY 12/08/17 1452      12/08/17 1339  oxyCODONE (ROXICODONE) solution 10 mg      10 mg Oral EVERY 4 HOURS PRN 12/08/17 1340      12/06/17 2200  nortriptyline (PAMELOR) capsule 30 mg      30 mg Oral AT BEDTIME 12/06/17 2055 12/06/17 2100  gabapentin (NEURONTIN) capsule 300 mg      300 mg Oral 3 TIMES DAILY 12/06/17 2055 12/06/17 2056  lidocaine 1 % 1 mL      1 mL Other EVERY 1 HOUR PRN 12/06/17 2056 12/06/17 2056  lidocaine (LMX4) kit       Topical EVERY 1 HOUR PRN 12/06/17 2056 12/06/17 2048  senna-docusate (SENOKOT-S;PERICOLACE) 8.6-50 MG per tablet 1 tablet      1 tablet Oral 2 TIMES DAILY PRN 12/06/17 2055 12/06/17 2048  senna-docusate (SENOKOT-S;PERICOLACE) 8.6-50 MG per tablet 2 tablet      2 tablet Oral 2 TIMES DAILY PRN 12/06/17 2055 12/06/17 2048  magnesium hydroxide (MILK OF MAGNESIA) suspension 30 mL      30 mL Oral DAILY PRN 12/06/17 2055            LAB DATA:    Recent Labs  Lab 12/11/17  0749 12/10/17  0737 12/09/17  0708 12/08/17  1716 12/08/17  0713   CR 0.64 0.62 0.50* 0.58 0.57   WBC  --  3.5* 3.6* 3.6* 3.9*   HGB  --  10.6* 10.7* 11.4* 10.7*   AST  --   --  11 15 14   ALT  --   --  17 18 18         ----------------------------------------------------------------------------------  Yarely Lanza [facility]  Inpatient Pain Service     To reach us:  Mon - Friday 8 AM - 3 PM: Pager [phone] (Text Page)  After hours, weekends and holidays: Primary service should call [phone] for the on-call pain specialist    Helpful Resources:  Getting Rid of Unwanted Medications (printable PDF for patients)   Opioid Overdose Prevention Toolkit (printable PDF for patients)

## 2017-12-11 NOTE — PROGRESS NOTES
[facility], [place]    Internal Medicine Progress Note - Gold Service      Assessment & Plan   Alexandra Melgoza is a 21 year old female admitted on 12/6/2017. She has a history of asthma, chronic abdominal pain s/p cholecystectomy, cyclic N/V, migraines, pseudoseizures and somatoform disorder and is admitted for abdominal pain and fever.  She was transferred here from a facility in [place] where she has been hospitalized for 5 days.      Changes today:  -feels better and wants to try diet   -advanced to diet as tolerated   -zofran and bendryl for nausea continue and will go down on hydromorphone to IV q6H PRN along with increased oxycodone to 10-15 every 4hr PRN       1)Concern for upper GI bleed:  Patient had an episode of hematemesis -concern for erosive gastritis in setting of NSAID use   -hemodynamically stable and hemoglobin stable  -GI consulted -EGD with erosive gastritis and continue with PPI BID  -advance diet as tolerated   -NJ tube was d/francy  -continue with PPI BID for 4-6 weeks and can follow GI PRN if ongoing issues      2) Acute on chronic pancreatitis   - Presents with 7 days now of epigastric abdominal pain radiating to her back as well as nausea and vomiting.  CT scan at outside facility noted some minimal pancreatic inflammation, lipase negative, LFTs normal.  Reports a diagnosis of chronic pancreatitis but per our last GI note does not meet diagnostic criteria (Oracio, 11/22/2017).  Had an MRCP planned here and transferred to see Dr Narayanan in the hospital if possible.  MRCP with secretin with normal response -with no changes in pancreas  CT abdomen stat obtained due to concern for worsening abdominal pain in setting of hematemesis  Pain consulted-appreciate recs  -continue with hydromorphone IV PRN (decreased frequency to every 6 hrs), oxycodone solution PRN and tylenol veena; will continue to work with patient to go down on her pain meds every day and aim to  transition her to norco (home regimen) when she discharges   -d/c celecoxib due to concern for hematemesis  -continue with gabapentin, nortryptilline and pancreatic enzymes  -nausea-treating with zofran and benadryl  -advance diet as tolerated       3) History of endometriosis  - Continue lupron     4) History of asthma  - Continue levalbuterol      Diet: advance diet as tolerated   Fluids: ringer's lactate   DVT Prophylaxis: ambulate  Code Status: Full Code    Disposition Plan   Expected discharge: 2 - 3 days, recommended to prior living arrangement once adequate pain management/ tolerating PO medications.     Entered: Chip Blanc 12/11/2017, 3:50 PM   Information in the above section will display in the discharge planner report.      The patient's care was discussed with the Bedside Nurse.    Chip Blanc  Internal Medicine Staff Hospitalist Service  [facility]  Pager: [phone]  Please see sticky note for cross cover information    Interval History   Pt still complaining of epigastric pain; feels better today and wants to try full liquid diet     Last 24 hr care team notes reviewed.   ROS:  4 point ROS including Respiratory, CV, GI and , other than that noted in the HPI, is negative.       Data reviewed today: I reviewed all medications, new labs and imaging results over the last 24 hours. I personally reviewed no images or EKG's today.    Physical Exam   Vital Signs: Temp: 97.5  F (36.4  C) Temp src: Oral BP: 123/70 Pulse: 73 Heart Rate: 73 Resp: 16 SpO2: 98 % O2 Device: None (Room air)    Weight: 152 lbs 0 oz  General Appearance: Pt in  distress due to pain  Respiratory: b/l equal breath sounds with no added sounds   Cardiovascular: s1s2 with no murmur   GI:  Non tender, no organomegaly, bowel sounds noted  Skin: no rash  Neuro: AAOx3, b/l UE and LE-5/5          Data   Data     Recent Labs  Lab 12/11/17  0749 12/10/17  1421 12/10/17  0737 12/09/17  0708 12/08/17  1716  12/06/17  2132    WBC  --   --  3.5* 3.6* 3.6*  < > 7.9   HGB  --   --  10.6* 10.7* 11.4*  < > 10.6*   MCV  --   --  89 88 89  < > 88   PLT  --   --  180 178 170  < > 147*     --  143 139 140  < > 138   POTASSIUM 3.5 3.7 3.3* 3.7 3.9  < > 3.3*   CHLORIDE 105  --  107 104 104  < > 102   CO2 25  --  26 27 27  < > 27   BUN 4*  --  4* 5* 6*  < > 3*   CR 0.64  --  0.62 0.50* 0.58  < > 0.65   ANIONGAP 11  --  10 8 8  < > 8   RENEA 8.4*  --  8.5 8.4* 8.6  < > 8.5   GLC 73  --  77 75 96  < > 79   ALBUMIN  --   --   --  3.3* 3.4  < > 3.3*   PROTTOTAL  --   --   --  6.3* 6.5*  < > 5.5*   BILITOTAL  --   --   --  0.6 0.6  < > 0.8   ALKPHOS  --   --   --  75 78  < > 73   ALT  --   --   --  17 18  < > 18   AST  --   --   --  11 15  < > 16   LIPASE  --   --   --   --  88  --  96   < > = values in this interval not displayed.

## 2017-12-11 NOTE — PLAN OF CARE
Problem: Patient Care Overview  Goal: Plan of Care/Patient Progress Review  Outcome: Therapy, progress toward functional goals is gradual  AVSS. Pain managed with scheduled tylenol,  prn oxycodone and iv dilaudid. Around the clock dosing utilized for pain meds with relief. Nausea with zofran. Zofran must be given with benadryl to prevent dystonia. Port infusing IVMF. On full liquids, gets nauseated very easily. Showered last night. Voids, not saving. No BM overnight but passing gas. Still need stool sample. UAL. Pt would like bedside report if awake. Continue with plan.

## 2017-12-11 NOTE — PROGRESS NOTES
GASTROENTEROLOGY PROGRESS NOTE    Date of Admission: 12/6/2017  Reason for Admission: acute on chronic abdominal pain      Assessment:  21 year old female with a history of intractable abdominal pain for many years on chronic narcotics, previously thought to be related to chronic pancreatitis however had normal pancreatic function tests, only 2/9 EUS criteria (12/2016) and completely normal secretin stimulated MRCP with normal T1 signal and exocrine function. Admitted to OSH 11/29 for worsening abdominal pain, nausea and vomiting. Required NJFT placement due to poor oral intake and continuous N/V. Transferred here for specialized care, patient follows with Dr. Narayanan. Repeat MRCP with secretin is normal. Patient working on advancing diet.    Patient had small volume hematemesis over weekend, underwent EGD which was quite unremarkable. Likely related to healing MW tear. Hgb stable, no further bleeding      Recommendations:  Diet as tolerated  Obtain fecal elastase when having BM  Analgesia/Antiemetics per primary team and consulting pain service  Discussed with primary team, Dr. Blanc    The inpatient gastroenterology service will sign off at this time. Please call or repost with questions or if status changes. Thank you for allowing us to participate in the care of this patient.    GI follow up:  Appt to be scheduled with Dr. Narayanan  To establish with pain clinic as outpatient    The patient was discussed and plan agreed upon with GI staff, Dr Klein.      Marlys Smallwood PA-C   [facility]  [facility]  Pager *[phone]  _______________________________________________________________      Subjective: Patient seen and examined at 0930. Patient reports that he abd pain is controlled with po oxy and Iv dilaudid (pain team following). Reports nausea, no further vomiting since EGD this weekend. No fevers, cough or SOB. Passing gas. Working on  advancing diet. NJT dislodged over weekend with vomiting and decision was made not to replace.    ROS:   4 pt ROS negative unless noted in subjective.     Objective:  Blood pressure 123/70, pulse 73, temperature 97.5  F (36.4  C), temperature source Oral, resp. rate 16, weight 68.9 kg (152 lb), SpO2 98 %, not currently breastfeeding.  Gen: NAD  HEENT: No icterus  Ext: WWP  Skin: No jaundice      Date 12/11/17 0700 - 12/12/17 0659   Shift 7152-5733 3183-1716 8740-5282 24 Hour Total   I  N  T  A  K  E   I.V. 20   20    Shift Total  (mL/kg) 20  (0.29)   20  (0.29)   O  U  T  P  U  T   Urine 1000   1000    Shift Total  (mL/kg) 1000  (14.5)   1000  (14.5)   Weight (kg) 68.95 68.95 68.95 68.95         PROCEDURES:  12/9 EGD - Dr. Chan  - Normal examined duodenum.   - Patchy gastritis in the stomach. More pronounced in the cardia likely from wretching.   - Normal esophagus.   - No active or signs of old bleeding seen   - No specimens collected.    LABS:  BMP  Recent Labs  Lab 12/11/17  0749 12/10/17  1421 12/10/17  0737 12/09/17  0708 12/08/17  1716     --  143 139 140   POTASSIUM 3.5 3.7 3.3* 3.7 3.9   CHLORIDE 105  --  107 104 104   RENEA 8.4*  --  8.5 8.4* 8.6   CO2 25  --  26 27 27   BUN 4*  --  4* 5* 6*   CR 0.64  --  0.62 0.50* 0.58   GLC 73  --  77 75 96     CBC  Recent Labs  Lab 12/10/17  0737 12/09/17  0708 12/08/17  1716 12/08/17  0713   WBC 3.5* 3.6* 3.6* 3.9*   RBC 3.46* 3.54* 3.75* 3.54*   HGB 10.6* 10.7* 11.4* 10.7*   HCT 30.9* 31.1* 33.3* 31.3*   MCV 89 88 89 88   MCH 30.6 30.2 30.4 30.2   MCHC 34.3 34.4 34.2 34.2   RDW 12.4 12.2 12.3 12.2    178 170 160     LFTs  Recent Labs  Lab 12/09/17  0708 12/08/17  1716 12/08/17  0713 12/06/17  2132   ALKPHOS 75 78 74 73   AST 11 15 14 16   ALT 17 18 18 18   BILITOTAL 0.6 0.6 1.4* 0.8   PROTTOTAL 6.3* 6.5* 6.0* 5.5*   ALBUMIN 3.3* 3.4 3.2* 3.3*      PANC  Recent Labs  Lab 12/08/17  1716 12/06/17  2132   LIPASE 88 96         IMAGING:  EXAMINATION: CT ABDOMEN  PELVIS W/O CONTRAST  12/8/2017 7:46 PM       CLINICAL HISTORY: pt with acute hematemesis and now complaining of  severe abdominal pain with guarding;      COMPARISON: Fluoroscopic images from the prior day. MRI from the prior  day.     PROCEDURE COMMENTS: CT of the abdomen was performed with 93 cc of  Isovue-370 intravenous and oral contrast. Coronal and sagittal  reformatted images were obtained.     FINDINGS:  Lower thorax:   Nodular groundglass opacities in the medial right lower lobe. No  pleural effusion. No other suspicious pulmonary opacities.     Abdomen and pelvis:  The feeding tube has been retracted, with a portion of the tube coiled  in the distal esophagus, and the tip of the tube within the stomach.  The hepatic parenchyma is within normal limits. There is stable mild  intrahepatic biliary ductal dilation, better characterized on the  recent MRCP. Postsurgical changes of cholecystectomy. The pancreatic  parenchyma adrenal glands, and spleen are within normal limits.  Kidneys are normal. No stone or hydronephrosis. Bladder wall  thickening. Stomach and small bowel are nondilated. Inspissated stool  in the colon. Uterus and adnexa are unremarkable. No focal  inflammatory changes or abnormal fluid collection in the abdomen or  pelvis. No lymphadenopathy.     Osseous structures:   No acute fracture or suspicious bone lesion.         IMPRESSION:  1. The feeding tube is coiled in the distal esophagus with the tip in  the stomach.  2. Stable stable mild intrahepatic biliary ductal dilation, not  significantly changed from and better characterized on the recent  MRCP.  3. Groundglass nodules in the medial right lung base have a  nonspecific appearance. The differential includes infection and  inflammation, and could be secondary to aspiration.   4. Bladder wall thickening. Could represent cystitis.   5. No other acute finding in the abdomen or pelvis.    Liver MRI with secretin enhanced MRCP:  12/7/2017     COMPARISON: MRI 11/30/2016, 4/22/2015, CT 8/19/2014     HISTORY: Pain of upper abdomen     TECHNIQUE: Coronal T2 HASTE, sagittal T2 HASTE, axial T2 STIR, axial  JUANJO T2, In-phase and Out-of-phase axial breath-hold FLASH,  diffusion-weighted, and T1-weighted VIBE axial fat saturation images  were obtained. Thick and thin slab heavily T2-weighted MRCP images  were obtained. 3-D reformatted images were created by the  technologist. Following administration of secretin, T2-weighted HASTE  images were obtained at 1 minute intervals to 7 minutes, and an  additional 10 minutes image was also obtained.      Contrast and medications: 7.5cc of Gadavist injected.     FINDINGS:   MRCP:   There is no pancreas divisum. The pancreatic duct does not appear  abnormally dilated, and no sidebranches are visualized.      Pancreatic duct measures 2.2 mm prior to administration of secretin.  Following administration of secretin, it dilates to maximum diameter  of 3.0 mm.  At 10 minutes following secretin administration, the  diameter is approximately 1.2 mm.     There is normal exocrine function, with contrast seen in the second  and third portions of the duodenum at 10 minutes following secretin  administration.     Stable mild intrahepatic biliary dilation. Stable focal narrowing of  the left hepatic duct near the confluence with the right hepatic duct  (series 14, image 61). The common bile duct measures 5.6 mm.      Pancreas: No focal masses or cystic lesions. No significant atrophy.  No peripancreatic fluid collections. Decreased intrinsic parenchymal  T1 signal intensity similar to prior exams with normal homogeneous  contrast enhancement     Liver: Relatively normal parenchymal signal intensity. Slightly  decreased signal intensity within the left hepatic lobe on both the  intra and operative phase images may be artifactual. No focal liver  mass.     Gallbladder: Surgically absent.     Spleen: Normal     Kidneys:  Normal     Adrenal glands: Normal     Bowel: Poorly visualized, grossly unremarkable     Lymph nodes: No lymphadenopathy     Blood vessels: Normal     Lung bases: Clear     Bones and soft tissues: Unremarkable     Mesentery and abdominal wall: Normal     Ascites: None            IMPRESSION:  1. Stable appearance of the pancreas without focal mass or ductal  dilatation.  2. Normal response to secretin.  3. Postoperative changes of cholecystectomy without extrahepatic  biliary dilatation. Persistent mild narrowing at the confluence of the  right and left hepatic ducts without obstructing mass identified.

## 2017-12-11 NOTE — PLAN OF CARE
Problem: Patient Care Overview  Goal: Plan of Care/Patient Progress Review  Outcome: No Change  Patient had a BM today, sent a sample to lab. Pain managed with IV dilaudid and oxycodone, followed by pain service. Nausea managed with zofran and benadryl (benadryl for dystonia ).  Patient declined breakfast but ate a chocolate ice cream and a few bites of pudding for lunch. Up ad gonzález, needs encouragement to walk.

## 2017-12-12 LAB
ANION GAP SERPL CALCULATED.3IONS-SCNC: 11 MMOL/L (ref 3–14)
BUN SERPL-MCNC: 6 MG/DL (ref 7–30)
CALCIUM SERPL-MCNC: 8.9 MG/DL (ref 8.5–10.1)
CHLORIDE SERPL-SCNC: 106 MMOL/L (ref 94–109)
CO2 SERPL-SCNC: 24 MMOL/L (ref 20–32)
CREAT SERPL-MCNC: 0.66 MG/DL (ref 0.52–1.04)
GFR SERPL CREATININE-BSD FRML MDRD: >90 ML/MIN/1.7M2
GLUCOSE SERPL-MCNC: 80 MG/DL (ref 70–99)
POTASSIUM SERPL-SCNC: 3.5 MMOL/L (ref 3.4–5.3)
SODIUM SERPL-SCNC: 140 MMOL/L (ref 133–144)

## 2017-12-12 PROCEDURE — 25000132 ZZH RX MED GY IP 250 OP 250 PS 637: Performed by: INTERNAL MEDICINE

## 2017-12-12 PROCEDURE — 25000132 ZZH RX MED GY IP 250 OP 250 PS 637: Performed by: NURSE PRACTITIONER

## 2017-12-12 PROCEDURE — 99207 ZZC NO CHARGE SIGN-OFF PS: CPT

## 2017-12-12 PROCEDURE — 36592 COLLECT BLOOD FROM PICC: CPT | Performed by: INTERNAL MEDICINE

## 2017-12-12 PROCEDURE — 25000128 H RX IP 250 OP 636: Performed by: INTERNAL MEDICINE

## 2017-12-12 PROCEDURE — 25000132 ZZH RX MED GY IP 250 OP 250 PS 637: Performed by: HOSPITALIST

## 2017-12-12 PROCEDURE — 25000128 H RX IP 250 OP 636: Performed by: NURSE PRACTITIONER

## 2017-12-12 PROCEDURE — 27210437 ZZH NUTRITION PRODUCT SEMIELEM INTERMED LITER

## 2017-12-12 PROCEDURE — 12000001 ZZH R&B MED SURG/OB UMMC

## 2017-12-12 PROCEDURE — 99232 SBSQ HOSP IP/OBS MODERATE 35: CPT | Performed by: HOSPITALIST

## 2017-12-12 PROCEDURE — 80048 BASIC METABOLIC PNL TOTAL CA: CPT | Performed by: INTERNAL MEDICINE

## 2017-12-12 RX ORDER — DIPHENHYDRAMINE HCL 25 MG
25 CAPSULE ORAL EVERY 6 HOURS PRN
Status: DISCONTINUED | OUTPATIENT
Start: 2017-12-12 | End: 2017-12-13 | Stop reason: HOSPADM

## 2017-12-12 RX ORDER — DIPHENHYDRAMINE HCL 25 MG
25 CAPSULE ORAL
Status: DISCONTINUED | OUTPATIENT
Start: 2017-12-12 | End: 2017-12-12

## 2017-12-12 RX ADMIN — GABAPENTIN 300 MG: 300 CAPSULE ORAL at 14:16

## 2017-12-12 RX ADMIN — ONDANSETRON 4 MG: 2 INJECTION INTRAMUSCULAR; INTRAVENOUS at 22:52

## 2017-12-12 RX ADMIN — PANCRELIPASE 100000 UNITS: 20000; 68000; 109000 CAPSULE, DELAYED RELEASE ORAL at 18:59

## 2017-12-12 RX ADMIN — ONDANSETRON 4 MG: 2 INJECTION INTRAMUSCULAR; INTRAVENOUS at 14:49

## 2017-12-12 RX ADMIN — ACETAMINOPHEN 975 MG: 325 TABLET, FILM COATED ORAL at 20:00

## 2017-12-12 RX ADMIN — OXYCODONE HYDROCHLORIDE 15 MG: 5 SOLUTION ORAL at 11:49

## 2017-12-12 RX ADMIN — DIPHENHYDRAMINE HYDROCHLORIDE 25 MG: 50 INJECTION, SOLUTION INTRAMUSCULAR; INTRAVENOUS at 00:55

## 2017-12-12 RX ADMIN — SODIUM CHLORIDE, PRESERVATIVE FREE 5 ML: 5 INJECTION INTRAVENOUS at 08:39

## 2017-12-12 RX ADMIN — GABAPENTIN 300 MG: 300 CAPSULE ORAL at 07:43

## 2017-12-12 RX ADMIN — ACETAMINOPHEN 975 MG: 325 TABLET, FILM COATED ORAL at 07:43

## 2017-12-12 RX ADMIN — PANTOPRAZOLE SODIUM 40 MG: 40 TABLET, DELAYED RELEASE ORAL at 17:35

## 2017-12-12 RX ADMIN — HYDROMORPHONE HYDROCHLORIDE 1 MG: 1 INJECTION, SOLUTION INTRAMUSCULAR; INTRAVENOUS; SUBCUTANEOUS at 22:03

## 2017-12-12 RX ADMIN — ACETAMINOPHEN 975 MG: 325 TABLET, FILM COATED ORAL at 14:16

## 2017-12-12 RX ADMIN — NORETHINDRONE ACETATE 5 MG: 5 TABLET ORAL at 22:53

## 2017-12-12 RX ADMIN — OXYCODONE HYDROCHLORIDE 15 MG: 5 SOLUTION ORAL at 20:00

## 2017-12-12 RX ADMIN — HYDROMORPHONE HYDROCHLORIDE 1 MG: 1 INJECTION, SOLUTION INTRAMUSCULAR; INTRAVENOUS; SUBCUTANEOUS at 08:39

## 2017-12-12 RX ADMIN — SODIUM CHLORIDE, PRESERVATIVE FREE 5 ML: 5 INJECTION INTRAVENOUS at 14:50

## 2017-12-12 RX ADMIN — OXYCODONE HYDROCHLORIDE 15 MG: 5 SOLUTION ORAL at 07:42

## 2017-12-12 RX ADMIN — DIPHENHYDRAMINE HYDROCHLORIDE 25 MG: 25 CAPSULE ORAL at 14:16

## 2017-12-12 RX ADMIN — POLYETHYLENE GLYCOL 3350 17 G: 17 POWDER, FOR SOLUTION ORAL at 07:42

## 2017-12-12 RX ADMIN — PANTOPRAZOLE SODIUM 40 MG: 40 TABLET, DELAYED RELEASE ORAL at 07:43

## 2017-12-12 RX ADMIN — GABAPENTIN 300 MG: 300 CAPSULE ORAL at 20:00

## 2017-12-12 RX ADMIN — ONDANSETRON 4 MG: 2 INJECTION INTRAMUSCULAR; INTRAVENOUS at 00:55

## 2017-12-12 RX ADMIN — DIPHENHYDRAMINE HYDROCHLORIDE 25 MG: 50 INJECTION, SOLUTION INTRAMUSCULAR; INTRAVENOUS at 07:43

## 2017-12-12 RX ADMIN — HYDROMORPHONE HYDROCHLORIDE 1 MG: 1 INJECTION, SOLUTION INTRAMUSCULAR; INTRAVENOUS; SUBCUTANEOUS at 02:15

## 2017-12-12 RX ADMIN — NORTRIPTYLINE HYDROCHLORIDE 30 MG: 10 CAPSULE ORAL at 22:53

## 2017-12-12 RX ADMIN — PANCRELIPASE 60000 UNITS: 20000; 68000; 109000 CAPSULE, DELAYED RELEASE ORAL at 10:05

## 2017-12-12 RX ADMIN — PANCRELIPASE 100000 UNITS: 20000; 68000; 109000 CAPSULE, DELAYED RELEASE ORAL at 17:34

## 2017-12-12 RX ADMIN — PANCRELIPASE 60000 UNITS: 20000; 68000; 109000 CAPSULE, DELAYED RELEASE ORAL at 16:13

## 2017-12-12 RX ADMIN — DIPHENHYDRAMINE HYDROCHLORIDE 25 MG: 25 CAPSULE ORAL at 21:56

## 2017-12-12 RX ADMIN — HYDROMORPHONE HYDROCHLORIDE 1 MG: 1 INJECTION, SOLUTION INTRAMUSCULAR; INTRAVENOUS; SUBCUTANEOUS at 15:03

## 2017-12-12 RX ADMIN — OXYCODONE HYDROCHLORIDE 15 MG: 5 SOLUTION ORAL at 16:14

## 2017-12-12 RX ADMIN — ONDANSETRON 4 MG: 2 INJECTION INTRAMUSCULAR; INTRAVENOUS at 07:43

## 2017-12-12 ASSESSMENT — PAIN DESCRIPTION - DESCRIPTORS
DESCRIPTORS: ACHING;STABBING
DESCRIPTORS: ACHING;DISCOMFORT
DESCRIPTORS: ACHING;THROBBING;SHARP
DESCRIPTORS: ACHING;CONSTANT
DESCRIPTORS: ACHING;DISCOMFORT;THROBBING
DESCRIPTORS: ACHING;DISCOMFORT;THROBBING;NAGGING
DESCRIPTORS: ACHING;STABBING
DESCRIPTORS: ACHING
DESCRIPTORS: ACHING
DESCRIPTORS: CONSTANT

## 2017-12-12 NOTE — PLAN OF CARE
Problem: Patient Care Overview  Goal: Plan of Care/Patient Progress Review  Outcome: Therapy, progress towards functional goals is fair  UAL, ambulating in the hallway. Abdominal pain treated with prn oxycodone and IV dilaudid with relief. OVSS, afebrile. On regular diet tolerating well. Pt is getting IV zofran and benadryl 5x a day. Pt have already received 4 doses of both medication. Voided x 3 this shift not saving but pt verbalized good amount of urine. Power port needle changed today, good blood return with no incident. PLAN: To continue with the POC.

## 2017-12-12 NOTE — PROGRESS NOTES
Clinical Nutrition Services- Brief Note/Nutrition Consult: please order for diet given acute on chronic abdominal pain and patient wants more info on if dietary changes help with chronic pain    Pt's NJ tube found to be in stomach on 12/8, likely dislodged d/t vomiting. Feeding tube was pulled and no plans to replace at this time.     Pt able to tolerate some food last evening (chicken noodle soup, pears, peaches, potatoes) with some pain but it was tolerable.     Medications: Zenpep 65465 (3-4 capsules/meal, 2 capsules/snacks & supplements). Medication helps significantly with PO but notes she still gets some abdominal bloating and sometimes notes steatorhea.     Met with patient who reports she would like to try a mediterranean type diet to help w/ pain associated with pancreatitis. She does not tolerate red meat well but does tolerate chicken, fish, fruit and vegetables so she thinks this would be a good diet for her.    INTERVENTIONS  -Discussed mediterranean diet, likely good option to try and monitor fat in foods with this diet. For pt's generalized pain encouraged healthy diet that includes whole foods or minimally processed. Discussed adding omega-3 foods in diet to help inflammation w/ inflammation. Encouraged eating small frequent meals/snacks/supplements.   -Increase zenpep to 5 capsules w/meals and 2-3 capsules with snacks.     Angie Delarosa RD, LD  Pager: [phone]

## 2017-12-12 NOTE — PLAN OF CARE
Problem: Patient Care Overview  Goal: Plan of Care/Patient Progress Review  AVSS for pt on RA. C/o constant abdominal pain that is improving, had IV dilaudid x1 per request this shift, rested comfortably since. Regular diet, c/o nausea at beginning of shift, improved with prn zofran & benadryl (can get 4 more doses today for a total of up to 5 doses prn/day), pt needs to get these 2 meds together. Voiding not saving. Passing gas, had a loose BM yesterday (on scheduled senna). Port infusing TKO. UAL. Cont POC.     Pt would like bedside report.

## 2017-12-13 VITALS
SYSTOLIC BLOOD PRESSURE: 102 MMHG | HEART RATE: 71 BPM | WEIGHT: 143.8 LBS | TEMPERATURE: 97.5 F | OXYGEN SATURATION: 95 % | BODY MASS INDEX: 22.66 KG/M2 | RESPIRATION RATE: 16 BRPM | DIASTOLIC BLOOD PRESSURE: 56 MMHG

## 2017-12-13 LAB
ANION GAP SERPL CALCULATED.3IONS-SCNC: 8 MMOL/L (ref 3–14)
BACTERIA SPEC CULT: NO GROWTH
BACTERIA SPEC CULT: NO GROWTH
BUN SERPL-MCNC: 7 MG/DL (ref 7–30)
CALCIUM SERPL-MCNC: 8.4 MG/DL (ref 8.5–10.1)
CHLORIDE SERPL-SCNC: 107 MMOL/L (ref 94–109)
CO2 SERPL-SCNC: 25 MMOL/L (ref 20–32)
CREAT SERPL-MCNC: 0.58 MG/DL (ref 0.52–1.04)
ELASTASE PANC STL-MCNT: 351 UG/G
GFR SERPL CREATININE-BSD FRML MDRD: >90 ML/MIN/1.7M2
GLUCOSE SERPL-MCNC: 91 MG/DL (ref 70–99)
POTASSIUM SERPL-SCNC: 3.3 MMOL/L (ref 3.4–5.3)
SODIUM SERPL-SCNC: 140 MMOL/L (ref 133–144)
SPECIMEN SOURCE: NORMAL
SPECIMEN SOURCE: NORMAL

## 2017-12-13 PROCEDURE — 80048 BASIC METABOLIC PNL TOTAL CA: CPT | Performed by: HOSPITALIST

## 2017-12-13 PROCEDURE — 99239 HOSP IP/OBS DSCHRG MGMT >30: CPT | Performed by: HOSPITALIST

## 2017-12-13 PROCEDURE — 25000132 ZZH RX MED GY IP 250 OP 250 PS 637: Performed by: INTERNAL MEDICINE

## 2017-12-13 PROCEDURE — 25000128 H RX IP 250 OP 636: Performed by: NURSE PRACTITIONER

## 2017-12-13 PROCEDURE — 25000128 H RX IP 250 OP 636: Performed by: INTERNAL MEDICINE

## 2017-12-13 PROCEDURE — 25000132 ZZH RX MED GY IP 250 OP 250 PS 637: Performed by: NURSE PRACTITIONER

## 2017-12-13 PROCEDURE — 36592 COLLECT BLOOD FROM PICC: CPT | Performed by: HOSPITALIST

## 2017-12-13 RX ORDER — PANTOPRAZOLE SODIUM 40 MG/1
40 TABLET, DELAYED RELEASE ORAL
Qty: 30 TABLET | Refills: 1 | Status: ON HOLD | OUTPATIENT
Start: 2017-12-13 | End: 2018-12-11

## 2017-12-13 RX ORDER — AMOXICILLIN 250 MG
1 CAPSULE ORAL 2 TIMES DAILY PRN
Qty: 100 TABLET | Refills: 0 | Status: ON HOLD | OUTPATIENT
Start: 2017-12-13 | End: 2019-06-18

## 2017-12-13 RX ORDER — POLYETHYLENE GLYCOL 3350 17 G/17G
17 POWDER, FOR SOLUTION ORAL DAILY
Qty: 7 PACKET | Refills: 0 | Status: SHIPPED | OUTPATIENT
Start: 2017-12-14

## 2017-12-13 RX ORDER — ONDANSETRON 4 MG/1
4 TABLET, ORALLY DISINTEGRATING ORAL EVERY 8 HOURS PRN
Qty: 40 TABLET | Refills: 0 | Status: ON HOLD | OUTPATIENT
Start: 2017-12-13 | End: 2018-01-04

## 2017-12-13 RX ORDER — OXYCODONE HCL 5 MG/5 ML
10-15 SOLUTION, ORAL ORAL EVERY 4 HOURS PRN
Qty: 900 ML | Refills: 0 | Status: ON HOLD | OUTPATIENT
Start: 2017-12-13 | End: 2018-01-04

## 2017-12-13 RX ORDER — NORTRIPTYLINE HCL 10 MG
30 CAPSULE ORAL AT BEDTIME
Qty: 120 CAPSULE | Refills: 0 | Status: SHIPPED | OUTPATIENT
Start: 2017-12-13 | End: 2018-02-13

## 2017-12-13 RX ADMIN — OXYCODONE HYDROCHLORIDE 15 MG: 5 SOLUTION ORAL at 14:03

## 2017-12-13 RX ADMIN — OXYCODONE HYDROCHLORIDE 15 MG: 5 SOLUTION ORAL at 01:27

## 2017-12-13 RX ADMIN — PANCRELIPASE 100000 UNITS: 20000; 68000; 109000 CAPSULE, DELAYED RELEASE ORAL at 09:56

## 2017-12-13 RX ADMIN — ACETAMINOPHEN 975 MG: 325 TABLET, FILM COATED ORAL at 09:05

## 2017-12-13 RX ADMIN — HYDROMORPHONE HYDROCHLORIDE 1 MG: 1 INJECTION, SOLUTION INTRAMUSCULAR; INTRAVENOUS; SUBCUTANEOUS at 03:50

## 2017-12-13 RX ADMIN — GABAPENTIN 300 MG: 300 CAPSULE ORAL at 14:03

## 2017-12-13 RX ADMIN — GABAPENTIN 300 MG: 300 CAPSULE ORAL at 09:05

## 2017-12-13 RX ADMIN — SODIUM CHLORIDE, PRESERVATIVE FREE 5 ML: 5 INJECTION INTRAVENOUS at 08:25

## 2017-12-13 RX ADMIN — ACETAMINOPHEN 975 MG: 325 TABLET, FILM COATED ORAL at 14:03

## 2017-12-13 RX ADMIN — SODIUM CHLORIDE, PRESERVATIVE FREE 5 ML: 5 INJECTION INTRAVENOUS at 14:49

## 2017-12-13 RX ADMIN — OXYCODONE HYDROCHLORIDE 15 MG: 5 SOLUTION ORAL at 05:53

## 2017-12-13 RX ADMIN — POLYETHYLENE GLYCOL 3350 17 G: 17 POWDER, FOR SOLUTION ORAL at 09:05

## 2017-12-13 RX ADMIN — PANTOPRAZOLE SODIUM 40 MG: 40 TABLET, DELAYED RELEASE ORAL at 09:05

## 2017-12-13 RX ADMIN — SODIUM CHLORIDE, PRESERVATIVE FREE 5 ML: 5 INJECTION INTRAVENOUS at 09:06

## 2017-12-13 RX ADMIN — OXYCODONE HYDROCHLORIDE 15 MG: 5 SOLUTION ORAL at 09:56

## 2017-12-13 ASSESSMENT — PAIN DESCRIPTION - DESCRIPTORS
DESCRIPTORS: ACHING
DESCRIPTORS: ACHING;SHARP
DESCRIPTORS: ACHING
DESCRIPTORS: ACHING;DISCOMFORT

## 2017-12-13 NOTE — DISCHARGE INSTRUCTIONS
You were admitted for abdominal pain.  You were found not to have chronic pancreatitis here.  Please continue follow up with pain clinic and primary care provider.

## 2017-12-13 NOTE — PLAN OF CARE
"Problem: Patient Care Overview  Goal: Plan of Care/Patient Progress Review  Writer cared for pt from [id number]. A&Ox4. AVSS. Pt c/o R upper-mid abdominal pain, controlled with around-the-clock dosing of PRN oxycodone and 2 doses of PRN dilaudid for breakthrough pain. Pt reports worsening nausea in correlation with worsening pain. PRN zofran given with benadryl, relief reported. Pt to get benadryl before each zofran administration d/t hx of dystonia with zofran. Pt tolerating moderate amts of regular diet, enzymes given before each meal and snack. Pt up ad gonzález in room, ambulated in halls once, independent. Pt voiding good amounts, not saving. No BM, no flatus. Pt reports feeling as though \"nothing is getting better.\" Writer reassured pt of pt's progress, provided empathetic listening.  Continue POC.      Pt would like bedside report, but not if asleep.  "

## 2017-12-13 NOTE — DISCHARGE SUMMARY
[facility], [place]    Internal Medicine Discharge Summary- Gold Service    Date of Admission:  12/6/2017  Date of Discharge:  12/13/2017  Discharging Provider: Meme Hameed  Discharge Team: Gold 1    Discharge Diagnoses     # Acute on chronic abdominal pain due to chronic pain syndrome with ongoing outpatient evaluation  # Hematemesis due to possible Alexandra Sanchez tear and erosive gastritis   # Endometriosis   # Asthma     Follow-ups Needed After Discharge      # Follow up with Dr. Narayanan from GI  # Follow up with [facility]  #  Follow up with Pain Psychology with Dr. Quyen Lennon in [facility]  # Follow up with primary care provider for post hospital follow up     Hospital Course     Alexandra Melgoza is a 21 year old female admitted on 12/6/2017. She has a prior medical history of chronic abdominal pain, spina bifida, cyclic nausea and vomiting, migraines, pseudoseizures, endometriosis, prior ovarian cyst rupture presenting for abdominal pain.  She was assessed for pancreatitis however we don't think this is the etiology of her pain.  We are working on symptomatic management of her pain and advancement of her diet.     Alexandra did have some hematemesis and she underwent an EGD on December 9, 2017.  This showed patchy gastritis in the stomach.  She was started on a PPI and celecoxib was held.    Alexandra was seen by our nutrition team.  Her nausea was controlled with Zofran and her diet was advanced on a general diet.  Her abdominal pain was controlled with IV and oral pain medications and tapered to oral pain medications upon dismissal.  She was also continued on her home Gabapentin and Nortriptyline.  Pancreatic enzyme doses were increased for symptomatic relief.     Consultations This Hospital Stay   GI PANCREATICOBILIARY ADULT IP CONSULT  PAIN MANAGEMENT ADULT IP CONSULT  NUTRITION SERVICES ADULT IP  CONSULT  PHARMACY IP CONSULT  GI LUMINAL ADULT IP CONSULT  VASCULAR ACCESS CARE ADULT IP CONSULT  NUTRITION SERVICES ADULT IP CONSULT     Code Status   Full Code    Time Spent on this Encounter   I, Meme Hameed, personally saw the patient today and spent greater than 30 minutes discharging this patient.     Meme Hameed  Internal Medicine Staff Hospitalist Service  [facility]  Pager: [phone]  ______________________________________________________________________    Physical Exam   Vital Signs: Temp: 97.5  F (36.4  C) Temp src: Oral BP: 102/56 Pulse: 71 Heart Rate: 71 Resp: 16 SpO2: 95 % O2 Device: None (Room air)    Weight: 143 lbs 12.8 oz    General Appearance: Patient is in no acute distress, pain controlled   Neuro: Patient is alert and oriented to person, place, time, and situation  Respiratory: Lungs are clear to auscultation, no wheezing, rales, or rhonchi auscultated  Cardiovascular: Regular rate and rhythm, no murmurs, no rubs, and no gallops   GI: soft, not tender, not distended, bowel sounds present and normal, no masses appreciated   Skin: no rashes and no discolorations, port site with no erythema and no edema   Extremities: no cyanosis, no edema, and no clubbing     Significant Results and Procedures   Results for orders placed or performed during the hospital encounter of 12/06/17   XR Chest Port 1 View    Narrative    EXAMINATION: XR CHEST PORT 1 VW, 12/6/2017 9:21 PM    COMPARISON: 6/19/2015    HISTORY: Reported L PNA ?aspiration per OSH;     FINDINGS: Cardiac silhouette is within normal limits. Right IJ  catheter tip in the high right atrium right lateral midlung hazy  opacity.      Impression    IMPRESSION: Right lateral mid lung haziness may represent mild  pneumonia.    NAMITA LOPEZ MD   XR Feeding Tube Placement    Narrative    FEEDING TUBE PLACEMENT 12/7/2017 3:49 PM    HISTORY: Nutritional needs.    COMPARISON: Correlations made with CT dated  8/19/2014    FINDINGS: 2.45 minutes of fluoroscopy were utilized for placement of a  feeding tube.  At the final position, the feeding tube tip was in the  fourth segment of the duodenum.  10 mL of Omnipaque 240 contrast was  injected to confirm placement. There were no complications of the  procedure.      Impression    IMPRESSION: Successful feeding tube placement.    I, PANCHO ROSENTHAL MD, attest that I was present for all critical  portions of the procedure and was immediately available to provide  guidance and assistance during the remainder of the procedure.    I have personally reviewed the examination and initial interpretation  and I agree with the findings.    PANCHO ROSENTHAL MD   CT Abdomen Pelvis w/o Contrast    Narrative    EXAMINATION: CT ABDOMEN PELVIS W/O CONTRAST  12/8/2017 7:46 PM      CLINICAL HISTORY: pt with acute hematemesis and now complaining of  severe abdominal pain with guarding;     COMPARISON: Fluoroscopic images from the prior day. MRI from the prior  day.    PROCEDURE COMMENTS: CT of the abdomen was performed with 93 cc of  Isovue-370 intravenous and oral contrast. Coronal and sagittal  reformatted images were obtained.    FINDINGS:  Lower thorax:   Nodular groundglass opacities in the medial right lower lobe. No  pleural effusion. No other suspicious pulmonary opacities.    Abdomen and pelvis:  The feeding tube has been retracted, with a portion of the tube coiled  in the distal esophagus, and the tip of the tube within the stomach.  The hepatic parenchyma is within normal limits. There is stable mild  intrahepatic biliary ductal dilation, better characterized on the  recent MRCP. Postsurgical changes of cholecystectomy. The pancreatic  parenchyma adrenal glands, and spleen are within normal limits.  Kidneys are normal. No stone or hydronephrosis. Bladder wall  thickening. Stomach and small bowel are nondilated. Inspissated stool  in the colon. Uterus and adnexa are  unremarkable. No focal  inflammatory changes or abnormal fluid collection in the abdomen or  pelvis. No lymphadenopathy.    Osseous structures:   No acute fracture or suspicious bone lesion.      Impression    IMPRESSION:  1. The feeding tube is coiled in the distal esophagus with the tip in  the stomach.  2. Stable stable mild intrahepatic biliary ductal dilation, not  significantly changed from and better characterized on the recent  MRCP.  3. Groundglass nodules in the medial right lung base have a  nonspecific appearance. The differential includes infection and  inflammation, and could be secondary to aspiration.   4. Bladder wall thickening. Could represent cystitis.   5. No other acute finding in the abdomen or pelvis.    I have personally reviewed the examination and initial interpretation  and I agree with the findings.    PANCHO ROSENTHAL MD     *Note: Due to a large number of results and/or encounters for the requested time period, some results have not been displayed. A complete set of results can be found in Results Review.       Pending Results      These results will be followed up by primary care provider   Unresulted Labs Ordered in the Past 30 Days of this Admission     No orders found from 10/7/2017 to 12/7/2017.             Primary Care Physician   Quyen Baez    Discharge Disposition   Discharged to home  Condition at discharge: Stable    Discharge Orders     Reason for your hospital stay   Abdominal pain and erosive gastrititis     Adult [facility] Follow-up and recommended labs and tests   Follow up with primary care provider, Quyen Baez, within 7 days for hospital follow- up.  No follow up labs or test are needed.      Appointments on [place] and/or [facility] (with [facility] or [facility] provider or service). Call [phone] if you haven't heard regarding these appointments within 7 days of discharge.     When to contact your care team   Call your primary doctor if you have  any of the following: increased pain.     Full Code     Diet   Follow this diet upon discharge: Orders Placed This Encounter     Advance Diet as Tolerated: Regular Diet Adult       Discharge Medications   Current Discharge Medication List      START taking these medications    Details   oxyCODONE (ROXICODONE) 5 MG/5ML solution Take 10-15 mLs (10-15 mg) by mouth every 4 hours as needed for moderate to severe pain  Qty: 900 mL, Refills: 0    Comments: For short term course until pain clinic follow up  Associated Diagnoses: Right upper quadrant abdominal pain      senna-docusate (SENOKOT-S;PERICOLACE) 8.6-50 MG per tablet Take 1 tablet by mouth 2 times daily as needed for constipation  Qty: 100 tablet, Refills: 0    Associated Diagnoses: Right upper quadrant abdominal pain      pantoprazole (PROTONIX) 40 MG EC tablet Take 1 tablet (40 mg) by mouth 2 times daily (before meals)  Qty: 30 tablet, Refills: 1    Associated Diagnoses: Right upper quadrant abdominal pain; Erosive gastritis      polyethylene glycol (MIRALAX/GLYCOLAX) Packet Take 17 g by mouth daily  Qty: 7 packet, Refills: 0    Associated Diagnoses: Right upper quadrant abdominal pain         CONTINUE these medications which have CHANGED    Details   nortriptyline (PAMELOR) 10 MG capsule Take 3 capsules (30 mg) by mouth At Bedtime  Qty: 120 capsule, Refills: 0    Associated Diagnoses: Right upper quadrant abdominal pain      ondansetron (ZOFRAN ODT) 4 MG ODT tab Take 1 tablet (4 mg) by mouth every 8 hours as needed for nausea or vomiting  Qty: 40 tablet, Refills: 0    Associated Diagnoses: Idiopathic chronic pancreatitis (H)      !! amylase-lipase-protease (ZENPEP) 11371 UNITS CPEP Take 2-3 capsules (40,000-60,000 Units) by mouth Take with snacks or supplements (Snacks)  Qty: 180 capsule, Refills: 1    Associated Diagnoses: Idiopathic chronic pancreatitis (H)      !! amylase-lipase-protease (ZENPEP) 55842 UNITS CPEP Take 5 capsules (100,000 Units) by mouth 3  times daily (with meals)  Qty: 180 capsule, Refills: 1    Associated Diagnoses: Right upper quadrant abdominal pain       !! - Potential duplicate medications found. Please discuss with provider.      CONTINUE these medications which have NOT CHANGED    Details   fluticasone (FLONASE) 50 MCG/ACT spray Spray 2 sprays into both nostrils daily  Qty: 16 g, Refills: 3    Associated Diagnoses: Nasal congestion      gabapentin (NEURONTIN) 300 MG capsule Take 1 capsule (300 mg) by mouth 3 times daily  Qty: 90 capsule, Refills: 5    Associated Diagnoses: Chronic abdominal pain; Sphincter of Oddi dysfunction; Recurring abdominal pain; Somatoform disorder      norethindrone (AYGESTIN) 5 MG tablet Take 1 tablet (5 mg) by mouth daily  Qty: 90 tablet, Refills: 1    Associated Diagnoses: Endometriosis      levalbuterol (XOPENEX HFA) 45 MCG/ACT Inhaler Inhale 2 puffs into the lungs every 6 hours as needed for shortness of breath / dyspnea  Qty: 1 Inhaler, Refills: 1    Associated Diagnoses: Wheeze      leuprolide (LUPRON DEPOT) 11.25 MG injection Inject 11.25 mg into the muscle every 3 months  Qty: 1 each, Refills: 3    Associated Diagnoses: Endometriosis      RIZATRIPTAN BENZOATE PO Take 5 mg by mouth once as needed          STOP taking these medications       HYDROcodone-acetaminophen (NORCO) 5-325 MG per tablet Comments:   Reason for Stopping:         granisetron (KYTRIL) 1 MG tablet Comments:   Reason for Stopping:         glucagon (GLUCAGON EMERGENCY) 1 MG injection Comments:   Reason for Stopping:         ibuprofen (ADVIL) 200 MG capsule Comments:   Reason for Stopping:             Allergies   Allergies   Allergen Reactions     Amoxicillin-Pot Clavulanate Nausea and Vomiting     Compazine [Prochlorperazine] Other (See Comments)     Dystonia       Hyoscyamine Other (See Comments)     Dystonia     Reglan [Metoclopramide Hcl] Other (See Comments)     Dystonia     Zyprexa Other (See Comments)     Sensitive, dystonic reaction on  "11-9-2011     Amitriptyline Hcl Other (See Comments)     Dystonia, hallucinations     Buspirone Other (See Comments)     No Adverse Reactions, no benefit     Cogentin [Benztropine]      Cyproheptadine Other (See Comments)     Distonic     Dicyclomine Other (See Comments)     Droperidol Other (See Comments)     Feels tense and \"like she has to jump out of her skin\".       Effexor [Venlafaxine] Other (See Comments)     Dystonia     Food      Cilantro--lips/tongue swelling     No Clinical Screening - See Comments Other (See Comments)     Cilantro     Phenergan Dm [Promethazine-Dm] Other (See Comments)     dystonia     Promethazine Other (See Comments)     Risperidone Other (See Comments)     dystonia     Vistaril Other (See Comments)     Burning sensation.       Augmentin GI Disturbance     Ketamine Other (See Comments)     jittery     Sorbitol GI Disturbance     Headache and dyspepsia     "

## 2017-12-13 NOTE — PLAN OF CARE
"Problem: Patient Care Overview  Goal: Plan of Care/Patient Progress Review  Assumed cares 6060-7010:   AVSS for pt. RUQ pain mostly managed with cold packs, scheduled tylenol, around the clock prn oxycodone 15 mg, also had prn IV dilaudid for breakthrough pain>8 - was tearful at these times d/t pain. Pt reports worsening nausea in conjunction with worsening pain, did have prn po benadryl (given prior to zofran d/t hx of dystonia) with zofran x1 for nausea, can have 4 more doses today. Regular diet, thinks that she ate too much per pt and this caused flare of pain. Enzymes given prior to snacks and meals, review pill bottles carefully. UAL. Port hep locked, good blood return. Voiding spont, not saving. Passing gas, had 1 BM this shift (\"oily\" per report and caused stomach upset). Frequent emotional support provided this shift, pt feeling stuck in her situation. Cont to provide emotional support, cont POC.     If sleeping, pt did not want to be woken for bedside report.  "

## 2017-12-14 ENCOUNTER — TELEPHONE (OUTPATIENT)
Dept: FAMILY MEDICINE | Facility: CLINIC | Age: 22
End: 2017-12-14

## 2017-12-14 ENCOUNTER — MYC MEDICAL ADVICE (OUTPATIENT)
Dept: INTERNAL MEDICINE | Facility: CLINIC | Age: 22
End: 2017-12-14

## 2017-12-14 ENCOUNTER — CARE COORDINATION (OUTPATIENT)
Dept: CARE COORDINATION | Facility: CLINIC | Age: 22
End: 2017-12-14

## 2017-12-14 ENCOUNTER — OFFICE VISIT (OUTPATIENT)
Dept: INTERNAL MEDICINE | Facility: CLINIC | Age: 22
End: 2017-12-14
Payer: COMMERCIAL

## 2017-12-14 VITALS
HEART RATE: 96 BPM | SYSTOLIC BLOOD PRESSURE: 116 MMHG | DIASTOLIC BLOOD PRESSURE: 77 MMHG | WEIGHT: 144.8 LBS | BODY MASS INDEX: 22.81 KG/M2

## 2017-12-14 DIAGNOSIS — R30.0 DYSURIA: ICD-10-CM

## 2017-12-14 DIAGNOSIS — R30.0 DYSURIA: Primary | ICD-10-CM

## 2017-12-14 LAB
ALBUMIN UR-MCNC: NEGATIVE MG/DL
APPEARANCE UR: CLEAR
BILIRUB UR QL STRIP: NEGATIVE
COLOR UR AUTO: YELLOW
GLUCOSE UR STRIP-MCNC: NEGATIVE MG/DL
HGB UR QL STRIP: NEGATIVE
KETONES UR STRIP-MCNC: NEGATIVE MG/DL
LEUKOCYTE ESTERASE UR QL STRIP: NEGATIVE
MUCOUS THREADS #/AREA URNS LPF: PRESENT /LPF
NITRATE UR QL: NEGATIVE
PH UR STRIP: 7 PH (ref 5–7)
RBC #/AREA URNS AUTO: 1 /HPF (ref 0–2)
SOURCE: ABNORMAL
SP GR UR STRIP: 1.02 (ref 1–1.03)
SQUAMOUS #/AREA URNS AUTO: 1 /HPF (ref 0–1)
UROBILINOGEN UR STRIP-MCNC: 4 MG/DL (ref 0–2)
WBC #/AREA URNS AUTO: 3 /HPF (ref 0–2)

## 2017-12-14 RX ORDER — POLYETHYLENE GLYCOL 3350 17 G/17G
POWDER, FOR SOLUTION ORAL
COMMUNITY
Start: 2017-12-13 | End: 2017-12-14

## 2017-12-14 ASSESSMENT — PAIN SCALES - GENERAL: PAINLEVEL: MILD PAIN (3)

## 2017-12-14 NOTE — PROGRESS NOTES
PRIMARY CARE CENTER       SUBJECTIVE:  Alexandra Melgoza is a 21 year old female who comes in for dysuria. This worsened last night and is having fevers and cold sweats. Also having increased frequency. Feels like her nausea is a little bit worse. Urine appears darker. Also voiding in small amounts. Has been taking tylenol, but no AZO. Discharged from hospital yesterday after being there for chronic abdominal pain, nausea/vomiting. Had a UA on 12/7 and 12/8 that were normal, but notes being asymptomatic at that time.       Medications and allergies reviewed by me today.     ROS:   Constitutional, HEENT, cardiovascular, pulmonary, gi and gu systems are negative, except as otherwise noted.      OBJECTIVE:  /77  Pulse 96  Wt 65.7 kg (144 lb 12.8 oz)  BMI 22.81 kg/m2   Wt Readings from Last 1 Encounters:   12/14/17 65.7 kg (144 lb 12.8 oz)     Gen: Pleasant female, in NAD  ENT: Oropharynx clear, MMM  Neck: No LAD  Resp: lungs CAB  CV: Heart RRR, no MRG  Abd: Soft, mild suprapubic tenderness, epigastric tenderness; mild CVA tenderness bilaterally     ASSESSMENT/PLAN:    Alexandra was seen today for hospital f/u and uti. Will get UA/UC. If positive will treat with oral antibiotics first. Alexandra notes having a difficult time taking oral antibiotics, but I want to start with this prior to IV.     Diagnoses and all orders for this visit:    Dysuria  -     UA with Micro reflex to Culture; Future       Pt should return to clinic for f/u with me in PRN      Marjorie Beltran MD  12/14/17

## 2017-12-14 NOTE — TELEPHONE ENCOUNTER
Patient discharged from [facility] for inpatient hospital stay on 12/13 for RUQ abd pain.    Please contact patient to follow up; no appointment scheduled at this time.  Patient no longer seen at  [facility].  Will not call for follow up.    ER / IP:  0/1    Care Coordination:  In other cc      Merry Garner

## 2017-12-15 NOTE — PROGRESS NOTES
Patient has clinic visit within 24-48 hours of Discharge so no post DC follow up call is needed              Date: 12/14/2017 Status: Arrived   Time: 5:30 PM Length: 30     Visit Type: UMP RETURN [[id number]]   COLE:     Provider: Marjorie López MD Department: [facility] MEDICINE   Special Needs:   Comments:     Referral Number:   Referral Status:     Enc Form Number: [id number] CSN: [id number]   Notes: HOSP FU &Possible UTI 12/6-2/14/17 for chronic Pacreatitis. [facility], [facility] pt     Arrival Time: 5:14 PM     Made On:  Checked In:   12/14/2017 10:59 AM  12/14/2017 5:14 PM By:  By:   CHASE HIGGINS [[email or web address]] (ES)  BECCA ORO [[id number]] (ES)

## 2017-12-18 NOTE — TELEPHONE ENCOUNTER
APPT INFO     Date /Time: 12/20/17 2:10PM    Reason for Appt: Pancreatitis    Ref Provider/Clinic: Oracio GALLEGOS    Are there internal records? If yes, list: [facility] Oracio GALLEGOS (referring) / Images in PACS   [facility]fo GALLE[facility]   [facility] ED Notes 2012   [facility] Angelika NP & Amada GALLEGOS    [facility] Winnie GALLEGOS & Juan Antonio PT   Patient Contact (Y/N) & Call Details: No, pt was referred.    Action: Closing encounter

## 2017-12-20 ENCOUNTER — PRE VISIT (OUTPATIENT)
Dept: ANESTHESIOLOGY | Facility: CLINIC | Age: 22
End: 2017-12-20

## 2017-12-20 ENCOUNTER — OFFICE VISIT (OUTPATIENT)
Dept: ANESTHESIOLOGY | Facility: CLINIC | Age: 22
End: 2017-12-20
Attending: INTERNAL MEDICINE
Payer: COMMERCIAL

## 2017-12-20 VITALS
HEIGHT: 67 IN | BODY MASS INDEX: 22.6 KG/M2 | SYSTOLIC BLOOD PRESSURE: 115 MMHG | DIASTOLIC BLOOD PRESSURE: 79 MMHG | RESPIRATION RATE: 16 BRPM | HEART RATE: 93 BPM | WEIGHT: 144 LBS

## 2017-12-20 DIAGNOSIS — R10.13 ABDOMINAL PAIN, EPIGASTRIC: Primary | ICD-10-CM

## 2017-12-20 RX ORDER — OXYCODONE HYDROCHLORIDE 10 MG/1
10 TABLET ORAL EVERY 8 HOURS PRN
Qty: 42 TABLET | Refills: 0 | Status: ON HOLD | OUTPATIENT
Start: 2017-12-20 | End: 2018-01-04

## 2017-12-20 RX ORDER — GABAPENTIN 400 MG/1
400 CAPSULE ORAL 3 TIMES DAILY
Qty: 90 CAPSULE | Refills: 3 | Status: ON HOLD | OUTPATIENT
Start: 2017-12-20 | End: 2018-01-23

## 2017-12-20 ASSESSMENT — ENCOUNTER SYMPTOMS
SHORTNESS OF BREATH: 1
NECK MASS: 0
DYSPNEA ON EXERTION: 1
ABDOMINAL PAIN: 1
DECREASED APPETITE: 1
HALLUCINATIONS: 0
TROUBLE SWALLOWING: 0
SORE THROAT: 0
POSTURAL DYSPNEA: 0
BLOOD IN STOOL: 0
WHEEZING: 0
COUGH: 1
SPUTUM PRODUCTION: 1
MUSCLE CRAMPS: 1
WEIGHT LOSS: 1
HEMOPTYSIS: 0
ARTHRALGIAS: 0
DIFFICULTY URINATING: 0
STIFFNESS: 0
MYALGIAS: 1
DIARRHEA: 1
RECTAL PAIN: 0
CHILLS: 1
FEVER: 1
HOARSE VOICE: 0
SINUS PAIN: 1
SINUS CONGESTION: 1
JAUNDICE: 0
FLANK PAIN: 0
POLYDIPSIA: 0
NIGHT SWEATS: 1
BOWEL INCONTINENCE: 0
NECK PAIN: 0
MUSCLE WEAKNESS: 1
ALTERED TEMPERATURE REGULATION: 1
NAUSEA: 1
DYSURIA: 1
SMELL DISTURBANCE: 0
BACK PAIN: 0
INCREASED ENERGY: 1
BLOATING: 1
COUGH DISTURBING SLEEP: 1
FATIGUE: 1
VOMITING: 1
POLYPHAGIA: 0
CONSTIPATION: 0
JOINT SWELLING: 0
HEARTBURN: 0
SNORES LOUDLY: 0
HEMATURIA: 0
TASTE DISTURBANCE: 0

## 2017-12-20 ASSESSMENT — ANXIETY QUESTIONNAIRES
4. TROUBLE RELAXING: SEVERAL DAYS
5. BEING SO RESTLESS THAT IT IS HARD TO SIT STILL: NOT AT ALL
2. NOT BEING ABLE TO STOP OR CONTROL WORRYING: NOT AT ALL
7. FEELING AFRAID AS IF SOMETHING AWFUL MIGHT HAPPEN: NOT AT ALL
GAD7 TOTAL SCORE: 2
GAD7 TOTAL SCORE: 2
1. FEELING NERVOUS, ANXIOUS, OR ON EDGE: SEVERAL DAYS
7. FEELING AFRAID AS IF SOMETHING AWFUL MIGHT HAPPEN: NOT AT ALL
3. WORRYING TOO MUCH ABOUT DIFFERENT THINGS: NOT AT ALL
GAD7 TOTAL SCORE: 2
6. BECOMING EASILY ANNOYED OR IRRITABLE: NOT AT ALL

## 2017-12-20 ASSESSMENT — PAIN SCALES - GENERAL: PAINLEVEL: SEVERE PAIN (6)

## 2017-12-20 NOTE — LETTER
12/20/2017       RE: Alexandra Melgoza  [address]  [facility] [address]     Dear Colleague,    Thank you for referring your patient, Alexandra Melgoza, to the [facility] CLINIC FOR COMPREHENSIVE PAIN MANAGEMENT at [facility]. Please see a copy of my visit note below.    The patient is a 21-year-old woman with a history of chronic pancreatitis.  She had her gallbladder removed at the age of 15 and has had severe pancreatitis since that time.  She has been seen by Dr. Narayanan here at the [facility] and has had an ERCP and stent placed.  She notes that she has pancreatitis flares approximately every 3 months.  And presents today for suggestions about treatments for her pain during these pancreatitis flares.  She states that her flares occur every 3 months.  During her flares she states that she gets fluids and pain medications namely oxycodone 10-15 mg every 4-6 hours when necessary.  Her pain is alleviated by utilizing a low-fat diet, fruit, veggie's, poultry and fish, and relaxation techniques.  She states that her pain is exacerbated when she straightens from this diet.  She has used several different modalities to treat her pain.  These include Tylenol oxycodone gabapentin Pamelor and acupuncture. She states that the gabapentin causes sedation.  She presents today for initial evaluation and treatment modalities for her pain.  She asked specifically about celiac plexus blockade.  Current Outpatient Prescriptions   Medication     gabapentin (NEURONTIN) 400 MG capsule     oxyCODONE IR (ROXICODONE) 10 MG tablet     oxyCODONE (ROXICODONE) 5 MG/5ML solution     nortriptyline (PAMELOR) 10 MG capsule     ondansetron (ZOFRAN ODT) 4 MG ODT tab     amylase-lipase-protease (ZENPEP) 12233 UNITS CPEP     amylase-lipase-protease (ZENPEP) 78629 UNITS CPEP     senna-docusate (SENOKOT-S;PERICOLACE) 8.6-50 MG per tablet     pantoprazole (PROTONIX) 40 MG EC tablet     polyethylene  "glycol (MIRALAX/GLYCOLAX) Packet     gabapentin (NEURONTIN) 300 MG capsule     norethindrone (AYGESTIN) 5 MG tablet     fluticasone (FLONASE) 50 MCG/ACT spray     levalbuterol (XOPENEX HFA) 45 MCG/ACT Inhaler     leuprolide (LUPRON DEPOT) 11.25 MG injection     RIZATRIPTAN BENZOATE PO     No current facility-administered medications for this visit.      Allergies   Allergen Reactions     Amoxicillin-Pot Clavulanate Nausea and Vomiting     Compazine [Prochlorperazine] Other (See Comments)     Dystonia       Hyoscyamine Other (See Comments)     Dystonia     Reglan [Metoclopramide Hcl] Other (See Comments)     Dystonia     Zyprexa Other (See Comments)     Sensitive, dystonic reaction on 11-9-2011     Amitriptyline Hcl Other (See Comments)     Dystonia, hallucinations     Buspirone Other (See Comments)     No Adverse Reactions, no benefit     Cogentin [Benztropine]      Cyproheptadine Other (See Comments)     Distonic     Dicyclomine Other (See Comments)     Droperidol Other (See Comments)     Feels tense and \"like she has to jump out of her skin\".       Effexor [Venlafaxine] Other (See Comments)     Dystonia     Food      Cilantro--lips/tongue swelling     No Clinical Screening - See Comments Other (See Comments)     Cilantro     Phenergan Dm [Promethazine-Dm] Other (See Comments)     dystonia     Promethazine Other (See Comments)     Risperidone Other (See Comments)     dystonia     Vistaril Other (See Comments)     Burning sensation.       Augmentin GI Disturbance     Ketamine Other (See Comments)     jittery     Sorbitol GI Disturbance     Headache and dyspepsia     Past Medical History:   Diagnosis Date     Anxiety      Asthma      Cholecystitis     s/p cholecystectomy     Chronic abdominal pain      Chronic infection     mrsa     Chronic pain      Cyclic vomiting syndrome 10/27/2012     Depression      Endometriosis      Hypoglycaemia      Migraines      Mild intermittent asthma      Ovarian cysts      Pancreatic " disease      PONV (postoperative nausea and vomiting)      Pseudoseizures      Somatoform disorder      Sphincter of Oddi dysfunction      Vasovagal syncope      Past Surgical History:   Procedure Laterality Date     ABDOMEN SURGERY      ERCP, biliary stents     CHOLECYSTECTOMY  8/2/11     COLONOSCOPY  2011    negative finding     ENDOSCOPIC RETROGRADE CHOLANGIOPANCREATOGRAM  8/23/2011    Procedure:ENDOSCOPIC RETROGRADE CHOLANGIOPANCREATOGRAM; Endoscopic Retrograde Cholangiopancreatogram; Surgeon:SHORTY MCCOY; Location:[facility]     ENDOSCOPIC RETROGRADE CHOLANGIOPANCREATOGRAM  5/17/2012    Procedure:ENDOSCOPIC RETROGRADE CHOLANGIOPANCREATOGRAM; Endoscopic Retrograde Cholangiopancreatogram with pancreatic stent placement.; Surgeon:SHORTY MCCOY; [facility]     ENDOSCOPIC RETROGRADE CHOLANGIOPANCREATOGRAM COMPLEX  1/3/2012    Procedure:ENDOSCOPIC RETROGRADE CHOLANGIOPANCREATOGRAM COMPLEX; Endoscopic Retrograde Cholangiopancreatogram with Manometry bile duct sphincterotomy extention pancreatic duct sphincterotomy pancreatic duct stent placement; Surgeon:SHORTY MCCOY; [facility]     ENDOSCOPIC ULTRASOUND UPPER GASTROINTESTINAL TRACT (GI) N/A 6/9/2015    Procedure: ENDOSCOPIC ULTRASOUND, ESOPHAGOSCOPY / UPPER GASTROINTESTINAL TRACT (GI);  Surgeon: Mario Joe MD;  [facility]     ENDOSCOPIC ULTRASOUND UPPER GASTROINTESTINAL TRACT (GI) N/A 12/12/2016    Procedure: ENDOSCOPIC ULTRASOUND, ESOPHAGOSCOPY / UPPER GASTROINTESTINAL TRACT (GI);  Surgeon: Guru Jose Klein MD;  [facility]     ESOPHAGOSCOPY, GASTROSCOPY, DUODENOSCOPY (EGD), COMBINED  1/18/2012    Procedure:COMBINED ESOPHAGOSCOPY, GASTROSCOPY, DUODENOSCOPY (EGD); Surgeon:ARNIE ESPINOZA; [facility]     ESOPHAGOSCOPY, GASTROSCOPY, DUODENOSCOPY (EGD), COMBINED  1/18/2012    Procedure:COMBINED ESOPHAGOSCOPY, GASTROSCOPY, DUODENOSCOPY (EGD); EGD; Surgeon:ARNIE ESPINOZA; [facility]      ESOPHAGOSCOPY, GASTROSCOPY, DUODENOSCOPY (EGD), COMBINED N/A 12/9/2017    Procedure: COMBINED ESOPHAGOSCOPY, GASTROSCOPY, DUODENOSCOPY (EGD);;  Surgeon: Josias Chan MD;  Location[facility] GI     INSERT PORT VASCULAR ACCESS       L knee arthroscopy  2009     LAPAROSCOPY DIAGNOSTIC (GYN)  10/26/2012    Procedure: LAPAROSCOPY DIAGNOSTIC (GYN);  LAPAROSCOPY DIAGNOSTIC, CAUTERY ENDOMETRIOISIS and biopsy of Fallopian tube lesions;  Surgeon: Carla Lopez MD;  Location: [facility] OR     ORTHOPEDIC SURGERY  2008    knee     VASCULAR SURGERY       Social History     Social History     Marital status: Single     Spouse name: N/A     Number of children: N/A     Years of education: N/A     Occupational History     Not on file.     Social History Main Topics     Smoking status: Never Smoker     Smokeless tobacco: Never Used     Alcohol use No     Drug use: No     Sexual activity: No     Other Topics Concern     Parent/Sibling W/ Cabg, Mi Or Angioplasty Before 65f 55m? 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  Physical Exam   Constitutional: She is oriented to person, place, and time. She appears well-developed and well-nourished.   HENT:   Head: Normocephalic and atraumatic.   Right Ear: External ear normal.   Left Ear: External ear normal.   Nose: Nose normal.   Mouth/Throat: Oropharynx is clear and moist.   Eyes: Conjunctivae and EOM are normal. Pupils are equal, round, and reactive to light.   Neck: Normal range of motion. Neck supple.   Cardiovascular: Normal rate, regular rhythm, normal heart sounds and intact distal pulses.    Pulmonary/Chest: Effort normal and breath sounds normal.   Abdominal: She exhibits no distension and no mass. There is  tenderness. There is guarding. There is no rebound.   Musculoskeletal: Normal range of motion.   Neurological: She is alert and oriented to person, place, and time. She has normal reflexes.   Skin: Skin is warm and dry.   Psychiatric: She has a normal mood and affect.   Nursing note and vitals reviewed.  A/P:Patient is a  23 y/o lady with chronic abdominal pain who presents for initial evaluation.  It is clear that the most likely eiology of her pain is pancreatitic flares and in an effort to decrease the frequency of her flares and decrease the duration of flares, I recommend  1. Pain psychology  2. Accuuncture  3. Increase gabapentin dose to 400 mg tid  4. Oxycodone 10 mg  Tid during flares (which normally last two weeks)  5. RTC in 3 weeks                Again, thank you for allowing me to participate in the care of your patient.      Sincerely,    Leonardo Wang MD

## 2017-12-20 NOTE — PATIENT INSTRUCTIONS
1. Referral placed to Dr. Lennon in Pain Psychology.   Call to schedule [phone]    2.  Acupuncture Referral.  -Please call your insurance provider to find out about acupuncture coverage, being that not all policies cover acupuncture services.    [facility]- Dominique Nudd [phone]    ([facility])- [facility] Thelma baker- [phone]    [facility] [phone]  [facility]   [address], [place]    3. Increase your Neurontin to 400 mg Three times a day.    4. One time prescription given for Oxycodone 10 mg, Take 1 tablet every 8 hours as needed.  42 tablets dispensed.     Follow up: Early January with Dr. Wang.       To speak with a nurse, schedule/reschedule/cancel a clinic appointment, or request a medication refill call: [phone]     You can also reach us by [employer]: [email or web address]    For refills, please call on Monday, 1 week before your medication runs out. The doctors are not always in clinic, so this gives us time to get your prescriptions ready.  Please let us know the name of the medication you are requesting a refill of.

## 2017-12-20 NOTE — MR AVS SNAPSHOT
After Visit Summary   12/20/2017    Alexandra Melgoza    MRN: [id number]           Patient Information     Date Of Birth          1995        Visit Information        Provider Department      12/20/2017 2:10 PM Leonardo Wang MD [facility] for Comprehensive [facility] Management        Today's Diagnoses     Abdominal pain, epigastric    -  1      Care Instructions    1. Referral placed to Dr. Lennon in Pain Psychology.   Call to schedule [phone]    2.  Acupuncture Referral.  -Please call your insurance provider to find out about acupuncture coverage, being that not all policies cover acupuncture services.    [facility]- Dominique Nudd [phone]    [facility] [phone]    [facility] [phone]  [facility]   [address] [place] [address]    3. Increase your Neurontin to 400 mg Three times a day.    4. One time prescription given for Oxycodone 10 mg, Take 1 tablet every 8 hours as needed.  42 tablets dispensed.     Follow up: Early January with Dr. Wang.       To speak with a nurse, schedule/reschedule/cancel a clinic appointment, or request a medication refill call: [phone]     You can also reach us by [employer]: [email or web address]    For refills, please call on Monday, 1 week before your medication runs out. The doctors are not always in clinic, so this gives us time to get your prescriptions ready.  Please let us know the name of the medication you are requesting a refill of.                                     Follow-ups after your visit        Additional Services     ACUPUNCTURE REFERRAL       Acupuncture Referral.  -Please call your insurance provider to find out about acupuncture coverage, being that not all policies cover acupuncture services.    [facility]- Dominique Nudd [phone]    [facility] [phone]    [facility] [phone]  [facility]   [facility] REFERRAL       Please call [phone] to make an appointment. Clinic is located: [facility] [address] [address]th [place] [address]      Please complete the following questions:    Procedure/Referral: Referral Only -  Refer to Dr. Quyen Lennon for Pain Psychology, Needs approval to be seen, contact .[phone].  [facility]   Comprehensive Pain Program         Why should you see a pain psychologist?       Because you have CHRONIC  Pain.         Chronic pain produces unique challenges that effect your whole body and almost every area of your life.    Chronic pain   (different from acute pain )  can change your  relationships, your work, your sleep, your finances, enjoyment of your hobbies and activities.  Your  sense of self-worth can  be impacted by   chronic pain.    Often, anger or anxiety or depression are triggered by  chronic pain.   Your normal coping strategies   ( having fun  or  exercise) may not be possible any more, or only in very limited doses.    You may be surprised to know  that pain psychology is a psychological specialty. Pain psychologists typically have a PhD in clinical psychology and training  in specialized treatment tools for improving pain.   Pain psychology is different from seeing a therapist for a primary mental health problem.   In pain psychology,  the focus is on your body, and its more about teaching than having a series of conversations.    Learning techniques to down regulate your Sympathetic  Nervous System and manage  Central Sensitization Syndrome are the focus of pain psychology.    Quyen Lennon, PhD LP  Is the medical psychologist for the  Comprehensive Pain Program.    She has over 25 years of experience working with chronic pain patients.   Prior to being a psychologist,     Saji was a physician assistant.  And so she brings a dual background in medicine and psychology to assist chronic pain patients.       The goal of the  Comprehensive Pain Program is to work as a multi disciplinary team.   Physicians,  Nurses,  Physical therapists,  Pharmacists,    Psychologists and other  Professionals are working in tandem to provide the breadth of care necessary to manage the many challenges of chronic pain.                  Your next 10 appointments already scheduled     Jan 03, 2018  1:00 PM CST   (Arrive by 12:45 PM)   New Patient Visit with ANABEL Baez CNP   [facility] ([facility])    [address] [place] [address] [phone]            Umer 10, 2018 12:00 PM CST   (Arrive by 11:45 AM)   Return Visit with Campbell Narayanan MD   [facility] ([facility])    [address] [place] [address] [phone]              Who to contact     Please call your clinic at [phone] to:    Ask questions about your health    Make or cancel appointments    Discuss your medicines    Learn about your test results    Speak to your doctor   If you have compliments or concerns about an experience at your clinic, or if you wish to file a complaint, please contact [facility] Patient Relations at [phone] or email us at [email or web address]         Additional Information About Your Visit        MyChart Information     [employer] gives you secure access to your electronic health record. If you see a primary care provider, you can also send messages to your care team and make appointments. If you have questions, please call your primary care clinic.  If you do not have a primary care provider, please call [phone] and they will assist you.      [employer] is an electronic gateway that provides easy, online access to  "your medical records. With [employer], you can request a clinic appointment, read your test results, renew a prescription or communicate with your care team.     To access your existing account, please contact your [facility] or call [phone] for assistance.        Care EveryWhere ID     This is your Care EveryWhere ID. This could be used by other organizations to access your [facility] medical records  [id number]        Your Vitals Were     Pulse Respirations Height BMI (Body Mass Index)          93 16 1.689 m (5' 6.5\") 22.89 kg/m2         Blood Pressure from Last 3 Encounters:   12/20/17 115/79   12/14/17 116/77   12/13/17 102/56    Weight from Last 3 Encounters:   12/20/17 65.3 kg (144 lb)   12/14/17 65.7 kg (144 lb 12.8 oz)   12/12/17 65.2 kg (143 lb 12.8 oz)              We Performed the Following     [facility] REFERR[facility]     [facility]          Today's Medication Changes          These changes are accurate as of: 12/20/17  3:50 PM.  If you have any questions, ask your nurse or doctor.               These medicines have changed or have updated prescriptions.        Dose/Directions    * gabapentin 300 MG capsule   Commonly known as:  NEURONTIN   This may have changed:  Another medication with the same name was added. Make sure you understand how and when to take each.   Used for:  Chronic abdominal pain, Sphincter of Oddi dysfunction, Recurring abdominal pain, Somatoform disorder   Changed by:  Ysabel Goddard NP        Dose:  300 mg   Take 1 capsule (300 mg) by mouth 3 times daily   Quantity:  90 capsule   Refills:  5       * gabapentin 400 MG capsule   Commonly known as:  NEURONTIN   This may have changed:  You were already taking a medication with the same name, and this prescription was added. Make sure you understand how and when to take each.   Used for:  Abdominal pain, epigastric   Changed by:  Leonardo Wang MD        Dose:  400 " mg   Take 1 capsule (400 mg) by mouth 3 times daily   Quantity:  90 capsule   Refills:  3       * oxyCODONE 5 MG/5ML solution   Commonly known as:  ROXICODONE   This may have changed:  Another medication with the same name was added. Make sure you understand how and when to take each.   Used for:  Right upper quadrant abdominal pain        Dose:  10-15 mg   Take 10-15 mLs (10-15 mg) by mouth every 4 hours as needed for moderate to severe pain   Quantity:  900 mL   Refills:  0       * oxyCODONE IR 10 MG tablet   Commonly known as:  ROXICODONE   This may have changed:  You were already taking a medication with the same name, and this prescription was added. Make sure you understand how and when to take each.   Used for:  Abdominal pain, epigastric   Changed by:  Leonardo Wang MD        Dose:  10 mg   Take 1 tablet (10 mg) by mouth every 8 hours as needed for moderate to severe pain   Quantity:  42 tablet   Refills:  0       * Notice:  This list has 4 medication(s) that are the same as other medications prescribed for you. Read the directions carefully, and ask your doctor or other care provider to review them with you.         Where to get your medicines      These medications were sent to [facility] [phone]  [address] [id number]    Hours:  TRANSPLANT PHONE NUMBER [phone] Phone:  [phone]     gabapentin 400 MG capsule         Some of these will need a paper prescription and others can be bought over the counter.  Ask your nurse if you have questions.     Bring a paper prescription for each of these medications     oxyCODONE IR 10 MG tablet                Primary Care Provider Office Phone # Fax #    Quyen Baez [phone]       [address] [place] [address] 741  [facility] [address]        Equal Access to Services     RACHAEL BENITEZ AH: alcon Orta qaybta kaalmada  maki santillanchris elvaperi ramaan ah. So [facility] [phone].    ATENCIÓN: Si ishan kilpatrick, tiene a quijano disposición servicios gratuitos de asistencia lingüística. Panfilo al [phone].    We comply with applicable federal civil rights laws and [place] laws. We do not discriminate on the basis of race, color, national origin, age, disability, sex, sexual orientation, or gender identity.            Thank you!     Thank you for choosing [facility] FOR COMPREHENSIVE PAIN MANAGEMENT  for your care. Our goal is always to provide you with excellent care. Hearing back from our patients is one way we can continue to improve our services. Please take a few minutes to complete the written survey that you may receive in the mail after your visit with us. Thank you!             Your Updated Medication List - Protect others around you: Learn how to safely use, store and throw away your medicines at www.disposemymeds.org.          This list is accurate as of: 12/20/17  3:50 PM.  Always use your most recent med list.                   Brand Name Dispense Instructions for use Diagnosis    * amylase-lipase-protease 08985 UNITS Cpep    ZENPEP    180 capsule    Take 2-3 capsules (40,000-60,000 Units) by mouth Take with snacks or supplements (Snacks)    Idiopathic chronic pancreatitis (H)       * amylase-lipase-protease 21657 UNITS Cpep    ZENPEP    180 capsule    Take 5 capsules (100,000 Units) by mouth 3 times daily (with meals)    Right upper quadrant abdominal pain       fluticasone 50 MCG/ACT spray    FLONASE    16 g    Spray 2 sprays into both nostrils daily    Nasal congestion       * gabapentin 300 MG capsule    NEURONTIN    90 capsule    Take 1 capsule (300 mg) by mouth 3 times daily    Chronic abdominal pain, Sphincter of Oddi dysfunction, Recurring abdominal pain, Somatoform disorder       * gabapentin 400 MG capsule    NEURONTIN    90 capsule    Take 1 capsule (400 mg) by mouth 3 times daily     Abdominal pain, epigastric       leuprolide 11.25 MG kit    LUPRON DEPOT (3-MONTH)    1 each    Inject 11.25 mg into the muscle every 3 months    Endometriosis       levalbuterol 45 MCG/ACT Inhaler    XOPENEX HFA    1 Inhaler    Inhale 2 puffs into the lungs every 6 hours as needed for shortness of breath / dyspnea    Wheeze       norethindrone 5 MG tablet    AYGESTIN    90 tablet    Take 1 tablet (5 mg) by mouth daily    Endometriosis       nortriptyline 10 MG capsule    PAMELOR    120 capsule    Take 3 capsules (30 mg) by mouth At Bedtime    Right upper quadrant abdominal pain       ondansetron 4 MG ODT tab    ZOFRAN ODT    40 tablet    Take 1 tablet (4 mg) by mouth every 8 hours as needed for nausea or vomiting    Idiopathic chronic pancreatitis (H)       * oxyCODONE 5 MG/5ML solution    ROXICODONE    900 mL    Take 10-15 mLs (10-15 mg) by mouth every 4 hours as needed for moderate to severe pain    Right upper quadrant abdominal pain       * oxyCODONE IR 10 MG tablet    ROXICODONE    42 tablet    Take 1 tablet (10 mg) by mouth every 8 hours as needed for moderate to severe pain    Abdominal pain, epigastric       pantoprazole 40 MG EC tablet    PROTONIX    30 tablet    Take 1 tablet (40 mg) by mouth 2 times daily (before meals)    Right upper quadrant abdominal pain, Erosive gastritis       polyethylene glycol Packet    MIRALAX/GLYCOLAX    7 packet    Take 17 g by mouth daily    Right upper quadrant abdominal pain       RIZATRIPTAN BENZOATE PO      Take 5 mg by mouth once as needed        senna-docusate 8.6-50 MG per tablet    SENOKOT-S;PERICOLACE    100 tablet    Take 1 tablet by mouth 2 times daily as needed for constipation    Right upper quadrant abdominal pain       * Notice:  This list has 6 medication(s) that are the same as other medications prescribed for you. Read the directions carefully, and ask your doctor or other care provider to review them with you.

## 2017-12-20 NOTE — NURSING NOTE
LPN reviewed AVS with Pt includin. Referral placed to Dr. Lennon in Pain Psychology.   Call to schedule [phone]    2.  Acupuncture Referral.  -Please call your insurance provider to find out about acupuncture coverage, being that not all policies cover acupuncture services.    [facility]- Dominique Nudd [phone]    ([facility])- [facility] oliviaThelma [phone]    [facility] [phone]  [facility]   [address] [place] [address]    3. Increase your Neurontin to 400 mg Three times a day.    4. One time prescription given for Oxycodone 10 mg, Take 1 tablet every 8 hours as needed.  42 tablets dispensed.     Follow up: Early January with Dr. Wang.     Pt verbalized an understanding of information, and was asked to contact clinic with questions.    Abigail Prieto LPN

## 2017-12-21 ASSESSMENT — ANXIETY QUESTIONNAIRES: GAD7 TOTAL SCORE: 2

## 2017-12-22 ENCOUNTER — OFFICE VISIT (OUTPATIENT)
Dept: FAMILY MEDICINE | Facility: CLINIC | Age: 22
End: 2017-12-22
Payer: COMMERCIAL

## 2017-12-22 ENCOUNTER — TELEPHONE (OUTPATIENT)
Dept: INTERNAL MEDICINE | Facility: CLINIC | Age: 22
End: 2017-12-22

## 2017-12-22 ENCOUNTER — RADIANT APPOINTMENT (OUTPATIENT)
Dept: GENERAL RADIOLOGY | Facility: CLINIC | Age: 22
End: 2017-12-22
Attending: FAMILY MEDICINE
Payer: COMMERCIAL

## 2017-12-22 VITALS
HEIGHT: 67 IN | TEMPERATURE: 98 F | BODY MASS INDEX: 22.99 KG/M2 | RESPIRATION RATE: 15 BRPM | WEIGHT: 146.5 LBS | DIASTOLIC BLOOD PRESSURE: 64 MMHG | OXYGEN SATURATION: 99 % | SYSTOLIC BLOOD PRESSURE: 104 MMHG | HEART RATE: 85 BPM

## 2017-12-22 DIAGNOSIS — N39.0 URINARY TRACT INFECTION WITHOUT HEMATURIA, SITE UNSPECIFIED: Primary | ICD-10-CM

## 2017-12-22 DIAGNOSIS — R74.8 ELEVATED LIVER ENZYMES: ICD-10-CM

## 2017-12-22 DIAGNOSIS — R05.9 COUGH: ICD-10-CM

## 2017-12-22 LAB
ALBUMIN UR-MCNC: NEGATIVE MG/DL
APPEARANCE UR: CLEAR
BASOPHILS # BLD AUTO: 0 10E9/L (ref 0–0.2)
BASOPHILS NFR BLD AUTO: 0.4 %
BILIRUB UR QL STRIP: NEGATIVE
COLOR UR AUTO: ABNORMAL
DIFFERENTIAL METHOD BLD: NORMAL
EOSINOPHIL # BLD AUTO: 0.1 10E9/L (ref 0–0.7)
EOSINOPHIL NFR BLD AUTO: 2.1 %
ERYTHROCYTE [DISTWIDTH] IN BLOOD BY AUTOMATED COUNT: 12.5 % (ref 10–15)
GLUCOSE UR STRIP-MCNC: NEGATIVE MG/DL
HCT VFR BLD AUTO: 39.9 % (ref 35–47)
HGB BLD-MCNC: 13.3 G/DL (ref 11.7–15.7)
HGB UR QL STRIP: NEGATIVE
KETONES UR STRIP-MCNC: NEGATIVE MG/DL
LEUKOCYTE ESTERASE UR QL STRIP: NEGATIVE
LIPASE SERPL-CCNC: 121 U/L (ref 73–393)
LYMPHOCYTES # BLD AUTO: 1.6 10E9/L (ref 0.8–5.3)
LYMPHOCYTES NFR BLD AUTO: 34.4 %
MCH RBC QN AUTO: 31.3 PG (ref 26.5–33)
MCHC RBC AUTO-ENTMCNC: 33.3 G/DL (ref 31.5–36.5)
MCV RBC AUTO: 94 FL (ref 78–100)
MONOCYTES # BLD AUTO: 0.3 10E9/L (ref 0–1.3)
MONOCYTES NFR BLD AUTO: 6.6 %
NEUTROPHILS # BLD AUTO: 2.7 10E9/L (ref 1.6–8.3)
NEUTROPHILS NFR BLD AUTO: 56.5 %
NITRATE UR QL: POSITIVE
PH UR STRIP: 6.5 PH (ref 5–7)
PLATELET # BLD AUTO: 255 10E9/L (ref 150–450)
RBC # BLD AUTO: 4.25 10E12/L (ref 3.8–5.2)
RBC #/AREA URNS AUTO: NORMAL /HPF
SOURCE: ABNORMAL
SP GR UR STRIP: 1.02 (ref 1–1.03)
UROBILINOGEN UR STRIP-ACNC: 1 EU/DL (ref 0.2–1)
WBC # BLD AUTO: 4.7 10E9/L (ref 4–11)
WBC #/AREA URNS AUTO: NORMAL /HPF

## 2017-12-22 PROCEDURE — 71020 XR CHEST 2 VW: CPT

## 2017-12-22 PROCEDURE — 87086 URINE CULTURE/COLONY COUNT: CPT | Performed by: FAMILY MEDICINE

## 2017-12-22 PROCEDURE — 36415 COLL VENOUS BLD VENIPUNCTURE: CPT | Performed by: FAMILY MEDICINE

## 2017-12-22 PROCEDURE — 83690 ASSAY OF LIPASE: CPT | Performed by: FAMILY MEDICINE

## 2017-12-22 PROCEDURE — 80053 COMPREHEN METABOLIC PANEL: CPT | Performed by: FAMILY MEDICINE

## 2017-12-22 PROCEDURE — 81001 URINALYSIS AUTO W/SCOPE: CPT | Performed by: FAMILY MEDICINE

## 2017-12-22 PROCEDURE — 85025 COMPLETE CBC W/AUTO DIFF WBC: CPT | Performed by: FAMILY MEDICINE

## 2017-12-22 PROCEDURE — 99214 OFFICE O/P EST MOD 30 MIN: CPT | Performed by: FAMILY MEDICINE

## 2017-12-22 RX ORDER — CEPHALEXIN 500 MG/1
500 CAPSULE ORAL 2 TIMES DAILY
Qty: 14 CAPSULE | Refills: 0 | Status: SHIPPED | OUTPATIENT
Start: 2017-12-22 | End: 2017-12-29

## 2017-12-22 NOTE — PATIENT INSTRUCTIONS
Urinary Tract Infections in Women    Urinary tract infections (UTIs) are most often caused by bacteria (germs). These bacteria enter the urinary tract. The bacteria may come from outside the body. Or they may travel from the skin outside the rectum or vagina into the urethra. Female anatomy makes it easy for bacteria from the bowel to enter a woman s urinary tract, which is the most common source of UTI. This means women develop UTIs more often than men. Pain in or around the urinary tract is a common UTI symptom. But the only way to know for sure if you have a UTI for the healthcare provider to test your urine. The two tests that may be done are the urinalysis and urine culture.  Types of UTIs    Cystitis: A bladder infection (cystitis) is the most common UTI in women. You may have urgent or frequent urination. You may also have pain, burning when you urinate, and bloody urine.    Urethritis: This is an inflamed urethra, which is the tube that carries urine from the bladder to outside the body. You may have lower stomach or back pain. You may also have urgent or frequent urination.    Pyelonephritis: This is a kidney infection. If not treated, it can be serious and damage your kidneys. In severe cases, you may be hospitalized. You may have a fever and lower back pain.  Medicines to treat a UTI  Most UTIs are treated with antibiotics. These kill the bacteria. The length of time you need to take them depends on the type of infection. It may be as short as 3 days. If you have repeated UTIs, a low-dose antibiotic may be needed for several months. Take antibiotics exactly as directed. Don t stop taking them until all of the medicine is gone. If you stop taking the antibiotic too soon, the infection may not go away, and you may develop a resistance to the antibiotic. This can make it much harder to treat.  Lifestyle changes to treat and prevent UTIs  The lifestyle changes below will help get rid of your UTI. They may  also help prevent future UTIs.    Drink plenty of fluids. This includes water, juice, or other caffeine-free drinks. Fluids help flush bacteria out of your body.    Empty your bladder. Always empty your bladder when you feel the urge to urinate. And always urinate before going to sleep. Urine that stays in your bladder can lead to infection. Try to urinate before and after sex as well.    Practice good personal hygiene. Wipe yourself from front to back after using the toilet. This helps keep bacteria from getting into the urethra.    Use condoms during sex. These help prevent UTIs caused by sexually transmitted bacteria. Also, avoid using spermicides during sex. These can increase the risk of UTIs. Choose other forms of birth control instead. For women who tend to get UTIs after sex, a low-dose of a preventive antibiotic may be used. Be sure to discuss this option with your healthcare provider.    Follow up with your healthcare provider as directed. He or she may test to make sure the infection has cleared. If needed, more treatment may be started.  Date Last Reviewed: 1/1/2017 2000-2017 [employer]. [address] [place] [address]. All rights reserved. This information is not intended as a substitute for professional medical care. Always follow your healthcare professional's instructions.

## 2017-12-22 NOTE — NURSING NOTE
"Chief Complaint   Patient presents with     [facility] from 12/5 to 12/12/2017     Cough     Right sided chest pain, temp.       Initial /64 (BP Location: Right arm, Patient Position: Sitting, Cuff Size: Adult Regular)  Pulse 85  Temp 98  F (36.7  C) (Tympanic)  Resp 15  Ht 5' 6.5\" (1.689 m)  Wt 146 lb 8 oz (66.5 kg)  SpO2 99%  Breastfeeding? No  BMI 23.29 kg/m2 Estimated body mass index is 23.29 kg/(m^2) as calculated from the following:    Height as of this encounter: 5' 6.5\" (1.689 m).    Weight as of this encounter: 146 lb 8 oz (66.5 kg).  Medication Reconciliation: complete     Marjorie Bates LPN  "

## 2017-12-22 NOTE — TELEPHONE ENCOUNTER
Pt called with c/o rt sided chest pain, SOB, elevated temp, headache, cough, wheezing and sweating.  Pt said she was recently hospitalized and advised to call if sx's get worse and the sx's are getting worse.  No available appt in [facility] today, advised pt to go to the ED for an evaluation of her sx's, pt agreed with the plan.  Renetta Haddad RN

## 2017-12-22 NOTE — MR AVS SNAPSHOT
After Visit Summary   12/22/2017    Alexandra Melgoza    MRN: [id number]           Patient Information     Date Of Birth          1995        Visit Information        Provider Department      12/22/2017 3:10 PM Michelle Martinez DO [facility]        Today's Diagnoses     Dysuria    -  1    Cough        Urinary tract infection without hematuria, site unspecified          Care Instructions      Urinary Tract Infections in Women    Urinary tract infections (UTIs) are most often caused by bacteria (germs). These bacteria enter the urinary tract. The bacteria may come from outside the body. Or they may travel from the skin outside the rectum or vagina into the urethra. Female anatomy makes it easy for bacteria from the bowel to enter a woman s urinary tract, which is the most common source of UTI. This means women develop UTIs more often than men. Pain in or around the urinary tract is a common UTI symptom. But the only way to know for sure if you have a UTI for the healthcare provider to test your urine. The two tests that may be done are the urinalysis and urine culture.  Types of UTIs    Cystitis: A bladder infection (cystitis) is the most common UTI in women. You may have urgent or frequent urination. You may also have pain, burning when you urinate, and bloody urine.    Urethritis: This is an inflamed urethra, which is the tube that carries urine from the bladder to outside the body. You may have lower stomach or back pain. You may also have urgent or frequent urination.    Pyelonephritis: This is a kidney infection. If not treated, it can be serious and damage your kidneys. In severe cases, you may be hospitalized. You may have a fever and lower back pain.  Medicines to treat a UTI  Most UTIs are treated with antibiotics. These kill the bacteria. The length of time you need to take them depends on the type of infection. It may be as short as 3 days. If you have  repeated UTIs, a low-dose antibiotic may be needed for several months. Take antibiotics exactly as directed. Don t stop taking them until all of the medicine is gone. If you stop taking the antibiotic too soon, the infection may not go away, and you may develop a resistance to the antibiotic. This can make it much harder to treat.  Lifestyle changes to treat and prevent UTIs  The lifestyle changes below will help get rid of your UTI. They may also help prevent future UTIs.    Drink plenty of fluids. This includes water, juice, or other caffeine-free drinks. Fluids help flush bacteria out of your body.    Empty your bladder. Always empty your bladder when you feel the urge to urinate. And always urinate before going to sleep. Urine that stays in your bladder can lead to infection. Try to urinate before and after sex as well.    Practice good personal hygiene. Wipe yourself from front to back after using the toilet. This helps keep bacteria from getting into the urethra.    Use condoms during sex. These help prevent UTIs caused by sexually transmitted bacteria. Also, avoid using spermicides during sex. These can increase the risk of UTIs. Choose other forms of birth control instead. For women who tend to get UTIs after sex, a low-dose of a preventive antibiotic may be used. Be sure to discuss this option with your healthcare provider.    Follow up with your healthcare provider as directed. He or she may test to make sure the infection has cleared. If needed, more treatment may be started.  Date Last Reviewed: 1/1/2017 2000-2017 [employer]. [address] [place] [address], [place] [address]. All rights reserved. This information is not intended as a substitute for professional medical care. Always follow your healthcare professional's instructions.                Follow-ups after your visit        Follow-up notes from your care team     Return in about 1 week (around 12/29/2017).      Your next 10 appointments  already scheduled     Jan 02, 2018  8:20 AM CST   (Arrive by 8:05 AM)   Return Visit with Leonardo Wang MD   [facility] ([facility])    [address] [place] [id number] [phone]            Jan 03, 2018  1:00 PM CST   (Arrive by 12:45 PM)   New Patient Visit with ANABEL Baez CNP   [facility] ([facility])    [address] [place] [id number] [phone]            Umer 10, 2018 12:00 PM CST   (Arrive by 11:45 AM)   Return Visit with Campbell Narayanan MD   [facility] ([facility])    [address] [place] [id number] [phone]              Who to contact     If you have questions or need follow up information about today's clinic visit or your schedule please contact [facility] directly at [phone].  Normal or non-critical lab and imaging results will be communicated to you by [employer], letter or phone within 4 business days after the clinic has received the results. If you do not hear from us within 7 days, please contact the clinic through [employer] or phone. If you have a critical or abnormal lab result, we will notify you by phone as soon as possible.  Submit refill requests through [facility] or call your pharmacy and they will forward the refill request to us. Please allow 3 business days for your refill to be completed.          Additional Information About Your Visit        [employer] Information     [facility] gives you secure access to your electronic health record. If you see a primary care provider, you can also send messages to your care team and make appointments. If you have questions, please call your primary care clinic.  If you do not have a primary care provider, please call [phone] and they will assist you.       "  Care EveryWhere ID     This is your Care EveryWhere ID. This could be used by other organizations to access your [facility] medical records  [id number]        Your Vitals Were     Pulse Temperature Respirations Height Pulse Oximetry Breastfeeding?    85 98  F (36.7  C) (Tympanic) 15 5' 6.5\" (1.689 m) 99% No    BMI (Body Mass Index)                   23.29 kg/m2            Blood Pressure from Last 3 Encounters:   12/22/17 104/64   12/20/17 115/79   12/14/17 116/77    Weight from Last 3 Encounters:   12/22/17 146 lb 8 oz (66.5 kg)   12/20/17 144 lb (65.3 kg)   12/14/17 144 lb 12.8 oz (65.7 kg)              We Performed the Following     CBC with platelets and differential     Comprehensive metabolic panel (BMP + Alb, Alk Phos, ALT, AST, Total. Bili, TP)     Lipase     UA reflex to Microscopic     Urine Culture Aerobic Bacterial          Today's Medication Changes          These changes are accurate as of: 12/22/17  3:52 PM.  If you have any questions, ask your nurse or doctor.               Start taking these medicines.        Dose/Directions    cephALEXin 500 MG capsule   Commonly known as:  KEFLEX   Used for:  Urinary tract infection without hematuria, site unspecified   Started by:  Michelle Martinez DO        Dose:  500 mg   Take 1 capsule (500 mg) by mouth 2 times daily for 7 days   Quantity:  14 capsule   Refills:  0            Where to get your medicines      These medications were sent to [facility] [address] [place] [address] ANGEL CORTES  Maame VERONA FITZPATRICK AT [facility]  [address] [place] [address]     Phone:  [phone]     cephALEXin 500 MG capsule                Primary Care Provider Office Phone # Fax #    Quyen Baez [phone]       [address] [place] [address]        Equal Access to Services     RACHAEL BENITEZ AH: Bret joseph Somalini, waaxda luqadaha, qaybta kaalmada jacque, maki gaspar . So [facility] " [phone].    ATENCIÓN: Si ishan kilpatrick, tiene a quijano disposición servicios gratuitos de asistencia lingüística. Panfilo champion [phone].    We comply with applicable federal civil rights laws and [place] laws. We do not discriminate on the basis of race, color, national origin, age, disability, sex, sexual orientation, or gender identity.            Thank you!     Thank you for choosing [facility]  for your care. Our goal is always to provide you with excellent care. Hearing back from our patients is one way we can continue to improve our services. Please take a few minutes to complete the written survey that you may receive in the mail after your visit with us. Thank you!             Your Updated Medication List - Protect others around you: Learn how to safely use, store and throw away your medicines at www.disposemymeds.org.          This list is accurate as of: 12/22/17  3:52 PM.  Always use your most recent med list.                   Brand Name Dispense Instructions for use Diagnosis    * amylase-lipase-protease 92295 UNITS Cpep    ZENPEP    180 capsule    Take 2-3 capsules (40,000-60,000 Units) by mouth Take with snacks or supplements (Snacks)    Idiopathic chronic pancreatitis (H)       * amylase-lipase-protease 29198 UNITS Cpep    ZENPEP    180 capsule    Take 5 capsules (100,000 Units) by mouth 3 times daily (with meals)    Right upper quadrant abdominal pain       cephALEXin 500 MG capsule    KEFLEX    14 capsule    Take 1 capsule (500 mg) by mouth 2 times daily for 7 days    Urinary tract infection without hematuria, site unspecified       fluticasone 50 MCG/ACT spray    FLONASE    16 g    Spray 2 sprays into both nostrils daily    Nasal congestion       * gabapentin 300 MG capsule    NEURONTIN    90 capsule    Take 1 capsule (300 mg) by mouth 3 times daily    Chronic abdominal pain, Sphincter of Oddi dysfunction, Recurring abdominal pain, Somatoform disorder       *  gabapentin 400 MG capsule    NEURONTIN    90 capsule    Take 1 capsule (400 mg) by mouth 3 times daily    Abdominal pain, epigastric       leuprolide 11.25 MG kit    LUPRON DEPOT (3-MONTH)    1 each    Inject 11.25 mg into the muscle every 3 months    Endometriosis       levalbuterol 45 MCG/ACT Inhaler    XOPENEX HFA    1 Inhaler    Inhale 2 puffs into the lungs every 6 hours as needed for shortness of breath / dyspnea    Wheeze       norethindrone 5 MG tablet    AYGESTIN    90 tablet    Take 1 tablet (5 mg) by mouth daily    Endometriosis       nortriptyline 10 MG capsule    PAMELOR    120 capsule    Take 3 capsules (30 mg) by mouth At Bedtime    Right upper quadrant abdominal pain       ondansetron 4 MG ODT tab    ZOFRAN ODT    40 tablet    Take 1 tablet (4 mg) by mouth every 8 hours as needed for nausea or vomiting    Idiopathic chronic pancreatitis (H)       * oxyCODONE 5 MG/5ML solution    ROXICODONE    900 mL    Take 10-15 mLs (10-15 mg) by mouth every 4 hours as needed for moderate to severe pain    Right upper quadrant abdominal pain       * oxyCODONE IR 10 MG tablet    ROXICODONE    42 tablet    Take 1 tablet (10 mg) by mouth every 8 hours as needed for moderate to severe pain    Abdominal pain, epigastric       pantoprazole 40 MG EC tablet    PROTONIX    30 tablet    Take 1 tablet (40 mg) by mouth 2 times daily (before meals)    Right upper quadrant abdominal pain, Erosive gastritis       polyethylene glycol Packet    MIRALAX/GLYCOLAX    7 packet    Take 17 g by mouth daily    Right upper quadrant abdominal pain       RIZATRIPTAN BENZOATE PO      Take 5 mg by mouth once as needed        senna-docusate 8.6-50 MG per tablet    SENOKOT-S;PERICOLACE    100 tablet    Take 1 tablet by mouth 2 times daily as needed for constipation    Right upper quadrant abdominal pain       * Notice:  This list has 6 medication(s) that are the same as other medications prescribed for you. Read the directions carefully, and ask  your doctor or other care provider to review them with you.

## 2017-12-22 NOTE — PROGRESS NOTES
SUBJECTIVE:   Alexandra Melgoza is a 21 year old female who presents to clinic today for the following health issues:      Hospital Follow-up Visit (already had hospital f/u visit with PCP on 12/14/17) :    Hospital/Nursing Home/IP Rehab Facility: [facility]  Date of Admission: 12/05/2017  Date of Discharge: 12/12/2017  Reason(s) for Admission: Pancreatitis            Problems taking medications regularly:  None       Medication changes since discharge: None       Problems adhering to non-medication therapy:  None    Summary of hospitalization:  [facility] discharge summary reviewed  Diagnostic Tests/Treatments reviewed.  Follow up needed: PCP  Other Healthcare Providers Involved in Patient s Care:         PCP  Update since discharge: worsened. Coughing more.  Might have a UTI.  Having some nausea/vomiting as well and abdominal pain similar to her pancreatitis    Post Discharge Medication Reconciliation: discharge medications reconciled, continue medications without change.  Plan of care communicated with patient              UTI - Female:  Continues to have UTI symptoms--frequency, dusuria.  Has tried AZO which has helped a little.  Was seen by PCP a few days ago for s/p hospital visit and urine was tested but found to be negative for an infection.    Cough   Had feeding tube placed during hospital stay--complicated by aspiration.  CT was taken and found to have possible PNA--no antibiotic treatment given.  Pt has since c/o worsening cough, fevers, chills and vomiting.      Problem list and histories reviewed & adjusted, as indicated.  Additional history: as documented    Labs reviewed in EPIC    Reviewed and updated as needed this visit by clinical staffTobacco  Allergies  Meds  Problems  Med Hx  Surg Hx  Fam Hx  Soc Hx        Reviewed and updated as needed this visit by Provider  Allergies  Meds  Problems         ROS:  CONSTITUTIONAL:POSITIVE  for chills, fatigue, fever,  "malaise and sweats  I: NEGATIVE for worrisome rashes, moles or lesions  E: NEGATIVE for vision changes or irritation  E/M: NEGATIVE for ear, mouth and throat problems  RESP:POSITIVE for cough-productive and sputum   CV: NEGATIVE for chest pain, palpitations or peripheral edema  GI: POSITIVE for abdominal pain epigastric, nausea, poor appetite and vomiting   female: dysuria and frequency  M: NEGATIVE for significant arthralgias or myalgia  N: NEGATIVE for weakness, dizziness or paresthesias  H: NEGATIVE for bleeding problems    OBJECTIVE:     /64 (BP Location: Right arm, Patient Position: Sitting, Cuff Size: Adult Regular)  Pulse 85  Temp 98  F (36.7  C) (Tympanic)  Resp 15  Ht 5' 6.5\" (1.689 m)  Wt 146 lb 8 oz (66.5 kg)  SpO2 99%  Breastfeeding? No  BMI 23.29 kg/m2  Body mass index is 23.29 kg/(m^2).   GENERAL: pale and fatigued  EYES: Eyes grossly normal to inspection, PERRL and conjunctivae and sclerae normal  HENT: ear canals and TM's normal, nose and mouth without ulcers or lesions  NECK: no adenopathy, no asymmetry, masses, or scars and thyroid normal to palpation  RESP:no rales or wheezes.  Rhonchi present in right mid-posterior lung  CV: regular rate and rhythm, normal S1 S2, no S3 or S4, no murmur, click or rub, no peripheral edema and peripheral pulses strong  ABDOMEN: tenderness epigastric and bowel sounds normal  MS: no gross musculoskeletal defects noted, no edema  SKIN: no suspicious lesions or rashes  NEURO: Normal strength and tone, mentation intact and speech normal  PSYCH: mentation appears normal, affect normal/bright    Diagnostic Test Results:  CBC, CMP, lipase, Urinalysis, CXR  ASSESSMENT/PLAN:     Problem List Items Addressed This Visit     None      Visit Diagnoses     Urinary tract infection without hematuria, site unspecified    -  Primary    Relevant Medications    cephALEXin (KEFLEX) 500 MG capsule    Other Relevant Orders    UA reflex to Microscopic (Completed)    Urine " Culture Aerobic Bacterial (Completed)    Urine Microscopic (Completed)    Cough        Relevant Orders    CBC with platelets and differential (Completed)    Comprehensive metabolic panel (BMP + Alb, Alk Phos, ALT, AST, Total. Bili, TP) (Completed)    XR Chest 2 Views (Completed)    Elevated liver enzymes        Relevant Orders    Lipase (Completed)    CBC with platelets and differential (Completed)    Comprehensive metabolic panel (BMP + Alb, Alk Phos, ALT, AST, Total. Bili, TP) (Completed)    Hepatic panel (Albumin, ALT, AST, Bili, Alk Phos, TP)         --CXR personally reviewed, negative for acute lung pathology.  Radiologist agrees.  --Obtaining blood work to monitor pancreatitis: lipase, CBC/CMP, hepatic panel  --Urinalysis appears positive for UTI. Ucx pending.  Rx Keflex, will titrate according to Ucx  --Will return to clinic in 1-2 weeks to monitor symptoms.    For now, instructed to rest, keep up fluids/hydration, take Tylenol prn pain.  If pain/symptoms worsen, will go to ED/hospital for immediate evaluation.  See Patient Instructions for details and follow-up instructions    Michelle Martinez,   [facility]

## 2017-12-23 LAB
ALBUMIN SERPL-MCNC: 4.3 G/DL (ref 3.4–5)
ALP SERPL-CCNC: 108 U/L (ref 40–150)
ALT SERPL W P-5'-P-CCNC: 74 U/L (ref 0–50)
ANION GAP SERPL CALCULATED.3IONS-SCNC: 5 MMOL/L (ref 3–14)
AST SERPL W P-5'-P-CCNC: 121 U/L (ref 0–45)
BACTERIA SPEC CULT: NO GROWTH
BILIRUB SERPL-MCNC: 0.4 MG/DL (ref 0.2–1.3)
BUN SERPL-MCNC: 10 MG/DL (ref 7–30)
CALCIUM SERPL-MCNC: 8.7 MG/DL (ref 8.5–10.1)
CHLORIDE SERPL-SCNC: 107 MMOL/L (ref 94–109)
CO2 SERPL-SCNC: 29 MMOL/L (ref 20–32)
CREAT SERPL-MCNC: 0.59 MG/DL (ref 0.52–1.04)
GFR SERPL CREATININE-BSD FRML MDRD: >90 ML/MIN/1.7M2
GLUCOSE SERPL-MCNC: 70 MG/DL (ref 70–99)
POTASSIUM SERPL-SCNC: 4.2 MMOL/L (ref 3.4–5.3)
PROT SERPL-MCNC: 7.4 G/DL (ref 6.8–8.8)
SODIUM SERPL-SCNC: 141 MMOL/L (ref 133–144)
SPECIMEN SOURCE: NORMAL

## 2017-12-27 ENCOUNTER — TELEPHONE (OUTPATIENT)
Dept: GASTROENTEROLOGY | Facility: CLINIC | Age: 22
End: 2017-12-27

## 2017-12-27 ENCOUNTER — TELEPHONE (OUTPATIENT)
Dept: ANESTHESIOLOGY | Facility: CLINIC | Age: 22
End: 2017-12-27

## 2017-12-27 NOTE — TELEPHONE ENCOUNTER
Left message for patient, reminded of upcoming appointment 1/3 with Rand Navarro. Number provided if needs to reschedule.

## 2017-12-27 NOTE — TELEPHONE ENCOUNTER
LPN received a phone call from pt stating that they were having increased pain, and had been vomiting. Pt stated that they were in [place] but that they were going to be coming back to the [facility].     LPN reviewed pt's chart, Pt was reminding that Dr. Wang had requested that pt increase their Neurontin to 400 mg TID, as well as utilize their Oxycodone for breakthrough pain.   Pt was also referred to Acupuncture and Dr. Lennon in Pain Psychology. Pt had not called to set up those appointments as of yet.     Pt stated that they just wanted to let the Doctor know about their pain. That they were going to take their Zofran and understood that if their pain becomes worse they could go in for an evaluation.     Pt is scheduled to follow up with Dr. Wang on 1/2/17.    Abigail Prieto LPN  [facility]  [phone]

## 2018-01-01 ENCOUNTER — HOSPITAL ENCOUNTER (EMERGENCY)
Facility: CLINIC | Age: 23
Discharge: HOME | End: 2018-01-02
Attending: EMERGENCY MEDICINE | Admitting: EMERGENCY MEDICINE
Payer: COMMERCIAL

## 2018-01-01 DIAGNOSIS — R10.9 CHRONIC ABDOMINAL PAIN: ICD-10-CM

## 2018-01-01 DIAGNOSIS — G89.29 CHRONIC ABDOMINAL PAIN: ICD-10-CM

## 2018-01-01 DIAGNOSIS — R11.2 NON-INTRACTABLE VOMITING WITH NAUSEA, UNSPECIFIED VOMITING TYPE: ICD-10-CM

## 2018-01-01 LAB
ALBUMIN SERPL-MCNC: 4.8 G/DL (ref 3.4–5)
ALBUMIN UR-MCNC: NEGATIVE MG/DL
ALP SERPL-CCNC: 99 U/L (ref 40–150)
ALT SERPL W P-5'-P-CCNC: 42 U/L (ref 0–50)
ANION GAP SERPL CALCULATED.3IONS-SCNC: 9 MMOL/L (ref 3–14)
APPEARANCE UR: CLEAR
AST SERPL W P-5'-P-CCNC: 23 U/L (ref 0–45)
BASOPHILS # BLD AUTO: 0 10E9/L (ref 0–0.2)
BASOPHILS NFR BLD AUTO: 0.2 %
BILIRUB SERPL-MCNC: 0.5 MG/DL (ref 0.2–1.3)
BILIRUB UR QL STRIP: NEGATIVE
BUN SERPL-MCNC: 14 MG/DL (ref 7–30)
CALCIUM SERPL-MCNC: 9.5 MG/DL (ref 8.5–10.1)
CHLORIDE SERPL-SCNC: 106 MMOL/L (ref 94–109)
CO2 SERPL-SCNC: 26 MMOL/L (ref 20–32)
COLOR UR AUTO: ABNORMAL
CREAT SERPL-MCNC: 0.53 MG/DL (ref 0.52–1.04)
DIFFERENTIAL METHOD BLD: NORMAL
EOSINOPHIL # BLD AUTO: 0 10E9/L (ref 0–0.7)
EOSINOPHIL NFR BLD AUTO: 0.4 %
ERYTHROCYTE [DISTWIDTH] IN BLOOD BY AUTOMATED COUNT: 13 % (ref 10–15)
GFR SERPL CREATININE-BSD FRML MDRD: >90 ML/MIN/1.7M2
GLUCOSE SERPL-MCNC: 89 MG/DL (ref 70–99)
GLUCOSE UR STRIP-MCNC: NEGATIVE MG/DL
HCT VFR BLD AUTO: 39.3 % (ref 35–47)
HGB BLD-MCNC: 12.9 G/DL (ref 11.7–15.7)
HGB UR QL STRIP: NEGATIVE
IMM GRANULOCYTES # BLD: 0 10E9/L (ref 0–0.4)
IMM GRANULOCYTES NFR BLD: 0.2 %
KETONES UR STRIP-MCNC: 10 MG/DL
LEUKOCYTE ESTERASE UR QL STRIP: NEGATIVE
LIPASE SERPL-CCNC: 100 U/L (ref 73–393)
LYMPHOCYTES # BLD AUTO: 1.3 10E9/L (ref 0.8–5.3)
LYMPHOCYTES NFR BLD AUTO: 27.9 %
MCH RBC QN AUTO: 30.4 PG (ref 26.5–33)
MCHC RBC AUTO-ENTMCNC: 32.8 G/DL (ref 31.5–36.5)
MCV RBC AUTO: 93 FL (ref 78–100)
MONOCYTES # BLD AUTO: 0.2 10E9/L (ref 0–1.3)
MONOCYTES NFR BLD AUTO: 5.2 %
MUCOUS THREADS #/AREA URNS LPF: PRESENT /LPF
NEUTROPHILS # BLD AUTO: 3.1 10E9/L (ref 1.6–8.3)
NEUTROPHILS NFR BLD AUTO: 66.1 %
NITRATE UR QL: NEGATIVE
NRBC # BLD AUTO: 0 10*3/UL
NRBC BLD AUTO-RTO: 0 /100
PH UR STRIP: 6.5 PH (ref 5–7)
PLATELET # BLD AUTO: 208 10E9/L (ref 150–450)
POTASSIUM SERPL-SCNC: 4.1 MMOL/L (ref 3.4–5.3)
PROT SERPL-MCNC: 7.6 G/DL (ref 6.8–8.8)
RBC # BLD AUTO: 4.24 10E12/L (ref 3.8–5.2)
RBC #/AREA URNS AUTO: 0 /HPF (ref 0–2)
SODIUM SERPL-SCNC: 140 MMOL/L (ref 133–144)
SOURCE: ABNORMAL
SP GR UR STRIP: 1.01 (ref 1–1.03)
SQUAMOUS #/AREA URNS AUTO: 1 /HPF (ref 0–1)
UROBILINOGEN UR STRIP-MCNC: NORMAL MG/DL (ref 0–2)
WBC # BLD AUTO: 4.6 10E9/L (ref 4–11)
WBC #/AREA URNS AUTO: 1 /HPF (ref 0–2)

## 2018-01-01 PROCEDURE — 83690 ASSAY OF LIPASE: CPT | Performed by: EMERGENCY MEDICINE

## 2018-01-01 PROCEDURE — 96376 TX/PRO/DX INJ SAME DRUG ADON: CPT | Performed by: EMERGENCY MEDICINE

## 2018-01-01 PROCEDURE — 96361 HYDRATE IV INFUSION ADD-ON: CPT | Performed by: EMERGENCY MEDICINE

## 2018-01-01 PROCEDURE — 99285 EMERGENCY DEPT VISIT HI MDM: CPT | Mod: 25 | Performed by: EMERGENCY MEDICINE

## 2018-01-01 PROCEDURE — 80053 COMPREHEN METABOLIC PANEL: CPT | Performed by: EMERGENCY MEDICINE

## 2018-01-01 PROCEDURE — 96374 THER/PROPH/DIAG INJ IV PUSH: CPT | Performed by: EMERGENCY MEDICINE

## 2018-01-01 PROCEDURE — 96375 TX/PRO/DX INJ NEW DRUG ADDON: CPT | Performed by: EMERGENCY MEDICINE

## 2018-01-01 PROCEDURE — 85025 COMPLETE CBC W/AUTO DIFF WBC: CPT | Performed by: EMERGENCY MEDICINE

## 2018-01-01 PROCEDURE — 81001 URINALYSIS AUTO W/SCOPE: CPT | Performed by: EMERGENCY MEDICINE

## 2018-01-01 PROCEDURE — 99285 EMERGENCY DEPT VISIT HI MDM: CPT | Mod: Z6 | Performed by: EMERGENCY MEDICINE

## 2018-01-01 PROCEDURE — 25000128 H RX IP 250 OP 636: Performed by: EMERGENCY MEDICINE

## 2018-01-01 RX ORDER — ONDANSETRON 2 MG/ML
4 INJECTION INTRAMUSCULAR; INTRAVENOUS EVERY 30 MIN PRN
Status: COMPLETED | OUTPATIENT
Start: 2018-01-01 | End: 2018-01-02

## 2018-01-01 RX ORDER — SODIUM CHLORIDE, SODIUM LACTATE, POTASSIUM CHLORIDE, CALCIUM CHLORIDE 600; 310; 30; 20 MG/100ML; MG/100ML; MG/100ML; MG/100ML
1000 INJECTION, SOLUTION INTRAVENOUS CONTINUOUS
Status: DISCONTINUED | OUTPATIENT
Start: 2018-01-01 | End: 2018-01-02 | Stop reason: HOSPADM

## 2018-01-01 RX ORDER — MORPHINE SULFATE 2 MG/ML
4 INJECTION, SOLUTION INTRAMUSCULAR; INTRAVENOUS
Status: COMPLETED | OUTPATIENT
Start: 2018-01-01 | End: 2018-01-02

## 2018-01-01 RX ADMIN — ONDANSETRON 4 MG: 2 INJECTION INTRAMUSCULAR; INTRAVENOUS at 23:20

## 2018-01-01 RX ADMIN — ONDANSETRON 4 MG: 2 INJECTION INTRAMUSCULAR; INTRAVENOUS at 22:19

## 2018-01-01 RX ADMIN — SODIUM CHLORIDE, POTASSIUM CHLORIDE, SODIUM LACTATE AND CALCIUM CHLORIDE 1000 ML: 600; 310; 30; 20 INJECTION, SOLUTION INTRAVENOUS at 22:18

## 2018-01-01 RX ADMIN — MORPHINE SULFATE 4 MG: 2 INJECTION, SOLUTION INTRAMUSCULAR; INTRAVENOUS at 22:18

## 2018-01-01 RX ADMIN — MORPHINE SULFATE 4 MG: 2 INJECTION, SOLUTION INTRAMUSCULAR; INTRAVENOUS at 23:20

## 2018-01-01 ASSESSMENT — ENCOUNTER SYMPTOMS
ABDOMINAL PAIN: 1
VOMITING: 1
NAUSEA: 1

## 2018-01-01 NOTE — ED AVS SNAPSHOT
[facility], [place], Emergency Department    [address] [place] [address]    Phone:  [phone]                                       Alexandra Melgoza   MRN: [id number]    Department:  [facility], Emergency Department   Date of Visit:  1/1/2018           After Visit Summary Signature Page     I have received my discharge instructions, and my questions have been answered. I have discussed any challenges I see with this plan with the nurse or doctor.    ..........................................................................................................................................  Patient/Patient Representative Signature      ..........................................................................................................................................  Patient Representative Print Name and Relationship to Patient    ..................................................               ................................................  Date                                            Time    ..........................................................................................................................................  Reviewed by Signature/Title    ...................................................              ..............................................  Date                                                            Time

## 2018-01-01 NOTE — ED AVS SNAPSHOT
[facility], Emergency Department    [address] [address]    Phone:  [phone]                                       Alexandra Melgoza   MRN: [id number]    Department:  [facility], Emergency Department   Date of Visit:  1/1/2018           Patient Information     Date Of Birth          1995        Your diagnoses for this visit were:     Non-intractable vomiting with nausea, unspecified vomiting type     Chronic abdominal pain        You were seen by Linus Church MD.        Discharge Instructions       Your labs were all normal  Follow up with your gastroenterologist    Future Appointments        Provider Department Dept Phone Center    1/2/2018 8:20 AM Leonardo Wang MD [facility] [phone] [facility]    1/3/2018 1:00 PM ANABEL Apple [facility] [phone] [facility]    1/10/2018 12:00 PM Campbell Narayanan MD [facility] [phone] [facility]      24 Hour Appointment Hotline       To make an appointment at any [facility], call [phone] ([phone]). If you don't have a family doctor or clinic, we will help you find one. [facility] clinics are conveniently located to serve the needs of you and your family.             Review of your medicines      Our records show that you are taking the medicines listed below. If these are incorrect, please call your family doctor or clinic.        Dose / Directions Last dose taken    * amylase-lipase-protease 81395 UNITS Cpep   Commonly known as:  ZENPEP   Dose:  2-3 capsule   Quantity:  180 capsule        Take 2-3 capsules (40,000-60,000 Units) by mouth Take with snacks or supplements (Snacks)   Refills:  1        * amylase-lipase-protease 98793 UNITS Cpep   Commonly known as:  ZENPEP   Dose:  5 capsule   Quantity:  180 capsule        Take 5 capsules (100,000 Units) by mouth 3 times daily (with meals)   Refills:  1        fluticasone  50 MCG/ACT spray   Commonly known as:  FLONASE   Dose:  2 spray   Quantity:  16 g        Spray 2 sprays into both nostrils daily   Refills:  3        * gabapentin 300 MG capsule   Commonly known as:  NEURONTIN   Dose:  300 mg   Quantity:  90 capsule        Take 1 capsule (300 mg) by mouth 3 times daily   Refills:  5        * gabapentin 400 MG capsule   Commonly known as:  NEURONTIN   Dose:  400 mg   Quantity:  90 capsule        Take 1 capsule (400 mg) by mouth 3 times daily   Refills:  3        leuprolide 11.25 MG kit   Commonly known as:  LUPRON DEPOT (3-MONTH)   Dose:  11.25 mg   Quantity:  1 each        Inject 11.25 mg into the muscle every 3 months   Refills:  3        levalbuterol 45 MCG/ACT Inhaler   Commonly known as:  XOPENEX HFA   Dose:  2 puff   Quantity:  1 Inhaler        Inhale 2 puffs into the lungs every 6 hours as needed for shortness of breath / dyspnea   Refills:  1        norethindrone 5 MG tablet   Commonly known as:  AYGESTIN   Dose:  5 mg   Quantity:  90 tablet        Take 1 tablet (5 mg) by mouth daily   Refills:  1        nortriptyline 10 MG capsule   Commonly known as:  PAMELOR   Dose:  30 mg   Quantity:  120 capsule        Take 3 capsules (30 mg) by mouth At Bedtime   Refills:  0        ondansetron 4 MG ODT tab   Commonly known as:  ZOFRAN ODT   Dose:  4 mg   Quantity:  40 tablet        Take 1 tablet (4 mg) by mouth every 8 hours as needed for nausea or vomiting   Refills:  0        * oxyCODONE 5 MG/5ML solution   Commonly known as:  ROXICODONE   Dose:  10-15 mg   Quantity:  900 mL        Take 10-15 mLs (10-15 mg) by mouth every 4 hours as needed for moderate to severe pain   Refills:  0        * oxyCODONE IR 10 MG tablet   Commonly known as:  ROXICODONE   Dose:  10 mg   Quantity:  42 tablet        Take 1 tablet (10 mg) by mouth every 8 hours as needed for moderate to severe pain   Refills:  0        pantoprazole 40 MG EC tablet   Commonly known as:  PROTONIX   Dose:  40 mg   Quantity:  30  tablet        Take 1 tablet (40 mg) by mouth 2 times daily (before meals)   Refills:  1        polyethylene glycol Packet   Commonly known as:  MIRALAX/GLYCOLAX   Dose:  17 g   Quantity:  7 packet        Take 17 g by mouth daily   Refills:  0        RIZATRIPTAN BENZOATE PO   Dose:  5 mg        Take 5 mg by mouth once as needed   Refills:  0        senna-docusate 8.6-50 MG per tablet   Commonly known as:  SENOKOT-S;PERICOLACE   Dose:  1 tablet   Quantity:  100 tablet        Take 1 tablet by mouth 2 times daily as needed for constipation   Refills:  0        * Notice:  This list has 6 medication(s) that are the same as other medications prescribed for you. Read the directions carefully, and ask your doctor or other care provider to review them with you.            Procedures and tests performed during your visit     CBC with platelets differential    Comprehensive metabolic panel    Lipase    UA with Microscopic      Orders Needing Specimen Collection     None      Pending Results     No orders found for last 3 day(s).            Pending Culture Results     No orders found for last 3 day(s).            Pending Results Instructions     If you had any lab results that were not finalized at the time of your Discharge, you can call the ED Lab Result RN at [phone]. You will be contacted by this team for any positive Lab results or changes in treatment. The nurses are available 7 days a week from 10A to 6:30P.  You can leave a message 24 hours per day and they will return your call.        Thank you for choosing [facility]       Thank you for choosing [facility] for your care. Our goal is always to provide you with excellent care. Hearing back from our patients is one way we can continue to improve our services. Please take a few minutes to complete the written survey that you may receive in the mail after you visit with us. Thank you!        [employer] Information     [facility] [employer] gives you secure access to your electronic health  record. If you see a primary care provider, you can also send messages to your care team and make appointments. If you have questions, please call your primary care clinic.  If you do not have a primary care provider, please call [phone] and they will assist you.        Care EveryWhere ID     This is your Care EveryWhere ID. This could be used by other organizations to access your [facility] medical records  [id number]        Equal Access to Services     RACHAEL BENITEZ : Bret Enamorado, alcon beck, lashell santillan, maki smallwood. So [facility] [phone].    ATENCIÓN: Si habla español, tiene a quijano disposición servicios gratuitos de asistencia lingüística. Llame al [phone].    We comply with applicable federal civil rights laws and [place] laws. We do not discriminate on the basis of race, color, national origin, age, disability, sex, sexual orientation, or gender identity.            After Visit Summary       This is your record. Keep this with you and show to your community pharmacist(s) and doctor(s) at your next visit.

## 2018-01-02 ENCOUNTER — OFFICE VISIT (OUTPATIENT)
Dept: ANESTHESIOLOGY | Facility: CLINIC | Age: 23
End: 2018-01-02
Payer: COMMERCIAL

## 2018-01-02 ENCOUNTER — TELEPHONE (OUTPATIENT)
Dept: ANESTHESIOLOGY | Facility: CLINIC | Age: 23
End: 2018-01-02

## 2018-01-02 ENCOUNTER — APPOINTMENT (OUTPATIENT)
Dept: GENERAL RADIOLOGY | Facility: CLINIC | Age: 23
End: 2018-01-02
Payer: COMMERCIAL

## 2018-01-02 VITALS
RESPIRATION RATE: 16 BRPM | BODY MASS INDEX: 22.58 KG/M2 | DIASTOLIC BLOOD PRESSURE: 65 MMHG | HEART RATE: 96 BPM | WEIGHT: 142 LBS | TEMPERATURE: 97.4 F | OXYGEN SATURATION: 100 % | SYSTOLIC BLOOD PRESSURE: 116 MMHG

## 2018-01-02 VITALS
HEART RATE: 106 BPM | BODY MASS INDEX: 22.29 KG/M2 | WEIGHT: 142 LBS | HEIGHT: 67 IN | SYSTOLIC BLOOD PRESSURE: 125 MMHG | DIASTOLIC BLOOD PRESSURE: 73 MMHG

## 2018-01-02 DIAGNOSIS — R10.13 ABDOMINAL PAIN, EPIGASTRIC: Primary | ICD-10-CM

## 2018-01-02 PROCEDURE — 96361 HYDRATE IV INFUSION ADD-ON: CPT | Performed by: EMERGENCY MEDICINE

## 2018-01-02 PROCEDURE — 25000128 H RX IP 250 OP 636: Performed by: EMERGENCY MEDICINE

## 2018-01-02 PROCEDURE — 96376 TX/PRO/DX INJ SAME DRUG ADON: CPT | Performed by: EMERGENCY MEDICINE

## 2018-01-02 RX ORDER — HEPARIN SODIUM (PORCINE) LOCK FLUSH IV SOLN 100 UNIT/ML 100 UNIT/ML
5 SOLUTION INTRAVENOUS
Status: DISCONTINUED | OUTPATIENT
Start: 2018-01-02 | End: 2018-01-02 | Stop reason: HOSPADM

## 2018-01-02 RX ORDER — HEPARIN SODIUM,PORCINE 10 UNIT/ML
5-10 VIAL (ML) INTRAVENOUS
Status: DISCONTINUED | OUTPATIENT
Start: 2018-01-02 | End: 2018-01-02 | Stop reason: HOSPADM

## 2018-01-02 RX ORDER — METHADONE HYDROCHLORIDE 5 MG/1
2.5 TABLET ORAL EVERY 12 HOURS
Qty: 30 TABLET | Refills: 0 | Status: ON HOLD | OUTPATIENT
Start: 2018-01-02 | End: 2018-01-18

## 2018-01-02 RX ORDER — HEPARIN SODIUM,PORCINE 10 UNIT/ML
5-10 VIAL (ML) INTRAVENOUS EVERY 24 HOURS
Status: DISCONTINUED | OUTPATIENT
Start: 2018-01-02 | End: 2018-01-02 | Stop reason: HOSPADM

## 2018-01-02 RX ORDER — METHADONE HYDROCHLORIDE 5 MG/1
2.5 TABLET ORAL EVERY 8 HOURS
Qty: 30 TABLET | Refills: 0 | Status: SHIPPED | OUTPATIENT
Start: 2018-01-02 | End: 2018-01-02

## 2018-01-02 RX ADMIN — ONDANSETRON 4 MG: 2 INJECTION INTRAMUSCULAR; INTRAVENOUS at 00:24

## 2018-01-02 RX ADMIN — SODIUM CHLORIDE, PRESERVATIVE FREE 5 ML: 5 INJECTION INTRAVENOUS at 01:15

## 2018-01-02 RX ADMIN — MORPHINE SULFATE 4 MG: 2 INJECTION, SOLUTION INTRAMUSCULAR; INTRAVENOUS at 00:24

## 2018-01-02 RX ADMIN — SODIUM CHLORIDE, POTASSIUM CHLORIDE, SODIUM LACTATE AND CALCIUM CHLORIDE 1000 ML: 600; 310; 30; 20 INJECTION, SOLUTION INTRAVENOUS at 00:23

## 2018-01-02 ASSESSMENT — ENCOUNTER SYMPTOMS
CARDIOVASCULAR NEGATIVE: 1
APPETITE CHANGE: 1
RESPIRATORY NEGATIVE: 1
FEVER: 0

## 2018-01-02 ASSESSMENT — PAIN SCALES - GENERAL: PAINLEVEL: EXTREME PAIN (8)

## 2018-01-02 NOTE — MR AVS SNAPSHOT
After Visit Summary   1/2/2018    Alexandra Melgoza    MRN: [id number]           Patient Information     Date Of Birth          1995        Visit Information        Provider Department      1/2/2018 8:20 AM Leonardo Wang MD [facility]        Today's Diagnoses     Abdominal pain, epigastric    -  1      Care Instructions    1. Get an EKG     2. Take Methadone 2.5mg every 12 hours    3. Discuss with Dr. Narayanan a celiac plexus block        Follow up:    1 month before your next refill      To speak with a nurse, schedule/reschedule/cancel a clinic appointment, or request a medication refill call: [phone]     You can also reach us by Rdio: [email or web address]    For refills, please call on Monday, 1 week before your medication runs out. The doctors are not always in clinic, so this gives us time to get your prescriptions ready.  Please let us know the name of the medication you are requesting a refill of.                                     Methadone tablets  Brand Names: Dolophine, Methadose  What is this medicine?  METHADONE (METH a done) is a pain reliever. It is used to treat severe pain. The medicine is also used to prevent withdrawal symptoms in people addicted to other drugs.  How should I use this medicine?  Take this medicine by mouth with a drink of water. If the medicine upsets your stomach, take it with food or milk. Follow the directions on the prescription label. Do not take more medicine than you are told to take.  A special MedGuide will be given to you by the pharmacist with each prescription and refill. Be sure to read this information carefully each time.  Talk to your pediatrician regarding the use of this medicine in children. Special care may be needed.  What side effects may I notice from receiving this medicine?  Side effects that you should report to your doctor or health care professional as soon as  possible:    allergic reactions like skin rash, itching or hives, swelling of the face, lips, or tongue    breathing problems    confusion    signs and symptoms of a dangerous change in heartbeat or heart rhythm like chest pain; dizziness; fast or irregular heartbeat; palpitations; feeling faint or lightheaded, falls; breathing problems    signs and symptoms of adrenal insufficiency like nausea; vomiting; loss of appetite; fatigue; weakness; dizziness; low blood pressure    signs and symptoms of low blood pressure like dizziness; feeling faint or lightheaded, falls; unusually weak or tired    signs and symptoms of serotonin syndrome like agitation; confusion; diarrhea; fast or irregular heartbeat; muscle twitching; stiff muscles; trouble walking; increased sweating; high fever; seizures; shivering; vomiting    trouble passing urine or change in the amount of urine  Side effects that usually do not require medical attention (report to your doctor or health care professional if they continue or are bothersome):    constipation    dry mouth    nausea, vomiting    tiredness  What may interact with this medicine?  Do not take this medicine with any of the following medications:    certain medicines for fungal infections like itraconazole, ketoconazole, posaconazole, voriconazole    certain medicines for irregular heart beat like bepridil, bretylium, dofetilide, dronedarone, quinidine    cisapride    halofantrine    mesoridazine    pimozide    rasagiline    selegiline    thioridazine    ziprasidone  This medicine may also interact with the following medications:    alcohol    antihistamines for allergy, cough and cold    antiviral medicines for HIV or AIDS    arsenic trioxide    atropine    certain antibiotics like clarithromycin, erythromycin, gemifloxacin, levofloxacin, moxifloxacin, ofloxacin, pentamidine, telithromycin, rifampin, rifapentine    certain medicines for anxiety or sleep    certain medicines for bladder  problems like oxybutynin, tolterodine    certain medicines for cancer like dasatinib, lapatinib, sunitinib, vorinostat    certain medicines for depression like amitriptyline, desipramine, fluoxetine, sertraline    certain medicines for irregular heart beat like amiodarone, disopyramide, flecainide, procainamide, propafenone, sotalol    certain medicines for malaria like chloroquine, mefloquine    certain medicines for migraine headache like almotriptan, eletriptan, frovatriptan, naratriptan, rizatriptan, sumatriptan, zolmitriptan    certain medicines for nausea or vomiting like dolasetron, droperidol, granisetron, ondansetron    certain medicines for seizures like carbamazepine, phenobarbital, phenytoin, primidone    certain medicines for stomach problems like dicyclomine, hyoscyamine    certain medicines for travel sickness like scopolamine    certain medicines for Parkinson's disease like benztropine, trihexyphenidyl    fluconazole    general anesthetics like halothane, isoflurane, methoxyflurane, propofol    haloperidol    ipratropium    linezolid    local anesthetics like lidocaine, pramoxine, tetracaine    MAOIs like Marplan, Nardil, and Parnate    medicines that relax muscles for surgery    methylene blue    octreotide    other medicines that prolong the QT interval (cause an abnormal heart rhythm)    other narcotic medicines for pain or cough    peginterferon samuel-2b    phenothiazines like chlorpromazine, prochlorperazine    ranolazine    tacrolimus    vardenafil  What if I miss a dose?  If you miss a dose, take it as soon as you can. If it is almost time for your next dose, take only that dose. Do not take double or extra doses.  Where should I keep my medicine?  Keep out of the reach of children. This medicine can be abused. Keep your medicine in a safe place to protect it from theft. Do not share this medicine with anyone. Selling or giving away this medicine is dangerous and is against the law.  Store at  room temperature between 15 and 30 degrees C (59 and 86 degrees F). Keep container tightly closed.  This medicine may cause accidental overdose and death if it is taken by other adults, children, or pets. Flush any unused medicine down the toilet to reduce the chance of harm. Do not use the medicine after the expiration date.  What should I tell my health care provider before I take this medicine?  They need to know if you have any of these conditions:    [place]'s disease    brain tumor    drug abuse or addiction    gallbladder disease    head injury    history of irregular heartbeat    if you often drink alcohol    kidney disease    liver disease    low blood pressure    lung or breathing disease, like asthma    mental illness    problems urinating    seizures    stomach or intestine problems    thyroid disease    an unusual or allergic reaction to methadone, other opioid analgesics, other medicines, foods, dyes, or preservatives    pregnant or trying to get pregnant    breast-feeding  What should I watch for while using this medicine?  Tell your doctor or health care professional if your pain does not go away, if it gets worse, or if you have new or a different type of pain. You may develop tolerance to the medicine. Tolerance means that you will need a higher dose of the medicine for pain relief. Tolerance is normal and is expected if you take this medicine for a long time.  Do not suddenly stop taking your medicine because you may develop a severe reaction. Your body becomes used to the medicine. This does NOT mean you are addicted. Addiction is a behavior related to getting and using a drug for a non-medical reason. If you have pain, you have a medical reason to take pain medicine. Your doctor will tell you how much medicine to take. If your doctor wants you to stop the medicine, the dose will be slowly lowered over time to avoid any side effects.  There are different types of narcotic medicines (opiates). If  you take more than one type at the same time or if you are taking another medicine that also causes drowsiness, you may have more side effects. Give your health care provider a list of all medicines you use. Your doctor will tell you how much medicine to take. Do not take more medicine than directed. Call emergency for help if you have problems breathing or unusual sleepiness.  You may get drowsy or dizzy. Do not drive, use machinery, or do anything that needs mental alertness until you know how this medicine affects you. Do not stand or sit up quickly, especially if you are an older patient. This reduces the risk of dizzy or fainting spells. Alcohol may interfere with the effect of this medicine. Avoid alcoholic drinks.  This medicine will cause constipation. Try to have a bowel movement at least every 2 to 3 days. If you do not have a bowel movement for 3 days, call your doctor or health care professional.  Your mouth may get dry. Chewing sugarless gum or sucking hard candy, and drinking plenty of water may help. Contact your doctor if the problem does not go away or is severe.  Women should inform their doctor if they wish to become pregnant or think they might be pregnant. There is a potential for serious side effects to an unborn child. Talk to your doctor about the benefits and risks of breast-feeding while using this medicine. This medicine does pass into breast milk. Talk to your doctor if you plan to begin or stop using this medicine while breast-feeding or if you plan to stop breast-feeding.  NOTE:This sheet is a summary. It may not cover all possible information. If you have questions about this medicine, talk to your doctor, pharmacist, or health care provider. Copyright  2017 [employer]                Follow-ups after your visit        Your next 10 appointments already scheduled     Jan 03, 2018  1:00 PM CST   (Arrive by 12:45 PM)   New Patient Visit with ANABEL Baez [facility] ([facility])    [address]  4th Floor  [facility] [id number] [phone]            Jan 05, 2018  8:30 AM CST   Level 2 with UR CH 04   [facility] [place] ([facility])    [address] [place] [address] S.  [address]  [facility] [address]   [phone]            Umer 10, 2018 12:00 PM CST   (Arrive by 11:45 AM)   Return Visit with Campbell Narayanan MD   [facility] ([facility])    [address]  4th [employer] [id number] [phone]              Who to contact     Please call your clinic at [phone] to:    Ask questions about your health    Make or cancel appointments    Discuss your medicines    Learn about your test results    Speak to your doctor   If you have compliments or concerns about an experience at your clinic, or if you wish to file a complaint, please contact [facility] Patient Relations at [phone] or email us at [email or web address]         Additional Information About Your Visit        AppratsharWhitepages Information     [employer] gives you secure access to your electronic health record. If you see a primary care provider, you can also send messages to your care team and make appointments. If you have questions, please call your primary care clinic.  If you do not have a primary care provider, please call [phone] and they will assist you.      [email or web address] is an electronic gateway that provides easy, online access to your medical records. With [email or web address], you can request a clinic appointment, read your test results, renew a prescription or communicate with your care team.     To access your existing account, please contact your [facility] or call [phone] for assistance.        Care EveryWhere ID     This is your Care EveryWhere ID. This could be used by other  "organizations to access your [place] medical records  [id number]        Your Vitals Were     Pulse Height BMI (Body Mass Index)             106 1.689 m (5' 6.5\") 22.58 kg/m2          Blood Pressure from Last 3 Encounters:   01/02/18 125/73   01/02/18 116/65   12/22/17 104/64    Weight from Last 3 Encounters:   01/02/18 64.4 kg (142 lb)   01/01/18 64.4 kg (142 lb)   12/22/17 66.5 kg (146 lb 8 oz)              Today, you had the following     No orders found for display         Today's Medication Changes          These changes are accurate as of: 1/2/18  8:53 AM.  If you have any questions, ask your nurse or doctor.               Start taking these medicines.        Dose/Directions    methadone 5 MG tablet   Commonly known as:  DOLOPHINE   Used for:  Abdominal pain, epigastric   Started by:  Leonardo Wang MD        Dose:  2.5 mg   Take 0.5 tablets (2.5 mg) by mouth every 12 hours   Quantity:  30 tablet   Refills:  0            Where to get your medicines      Some of these will need a paper prescription and others can be bought over the counter.  Ask your nurse if you have questions.     Bring a paper prescription for each of these medications     methadone 5 MG tablet                Primary Care Provider Office Phone # Fax #    TombstoneCheyanne Baez [phone]       [address] [place] [address]  [facility] [address]        Equal Access to Services     [facility] Center[facility] : Hadii angelina Enamorado, waaxda kiran, qaybta kaalmamaki jones. So [facility] [phone].    ATENCIÓN: Si habla español, tiene a quijano disposición servicios gratuitos de asistencia lingüística. Llame al [phone].    We comply with applicable federal civil rights laws and [place] laws. We do not discriminate on the basis of race, color, national origin, age, disability, sex, sexual orientation, or gender identity.            Thank you!     Thank you for choosing [facility]  for your care. Our goal is always to provide you with excellent care. Hearing back from our patients is one way we can continue to improve our services. Please take a few minutes to complete the written survey that you may receive in the mail after your visit with us. Thank you!             Your Updated Medication List - Protect others around you: Learn how to safely use, store and throw away your medicines at www.disposemymeds.org.          This list is accurate as of: 1/2/18  8:53 AM.  Always use your most recent med list.                   Brand Name Dispense Instructions for use Diagnosis    * amylase-lipase-protease 93569 UNITS Cpep    ZENPEP    180 capsule    Take 2-3 capsules (40,000-60,000 Units) by mouth Take with snacks or supplements (Snacks)    Idiopathic chronic pancreatitis (H)       * amylase-lipase-protease 70241 UNITS Cpep    ZENPEP    180 capsule    Take 5 capsules (100,000 Units) by mouth 3 times daily (with meals)    Right upper quadrant abdominal pain       fluticasone 50 MCG/ACT spray    FLONASE    16 g    Spray 2 sprays into both nostrils daily    Nasal congestion       * gabapentin 300 MG capsule    NEURONTIN    90 capsule    Take 1 capsule (300 mg) by mouth 3 times daily    Chronic abdominal pain, Sphincter of Oddi dysfunction, Recurring abdominal pain, Somatoform disorder       * gabapentin 400 MG capsule    NEURONTIN    90 capsule    Take 1 capsule (400 mg) by mouth 3 times daily    Abdominal pain, epigastric       leuprolide 11.25 MG kit    LUPRON DEPOT (3-MONTH)    1 each    Inject 11.25 mg into the muscle every 3 months    Endometriosis       levalbuterol 45 MCG/ACT Inhaler    XOPENEX HFA    1 Inhaler    Inhale 2 puffs into the lungs every 6 hours as needed for shortness of breath / dyspnea    Wheeze       methadone 5 MG tablet    DOLOPHINE    30 tablet    Take 0.5 tablets (2.5 mg) by mouth every 12 hours    Abdominal pain, epigastric       norethindrone 5  MG tablet    AYGESTIN    90 tablet    Take 1 tablet (5 mg) by mouth daily    Endometriosis       nortriptyline 10 MG capsule    PAMELOR    120 capsule    Take 3 capsules (30 mg) by mouth At Bedtime    Right upper quadrant abdominal pain       ondansetron 4 MG ODT tab    ZOFRAN ODT    40 tablet    Take 1 tablet (4 mg) by mouth every 8 hours as needed for nausea or vomiting    Idiopathic chronic pancreatitis (H)       * oxyCODONE 5 MG/5ML solution    ROXICODONE    900 mL    Take 10-15 mLs (10-15 mg) by mouth every 4 hours as needed for moderate to severe pain    Right upper quadrant abdominal pain       * oxyCODONE IR 10 MG tablet    ROXICODONE    42 tablet    Take 1 tablet (10 mg) by mouth every 8 hours as needed for moderate to severe pain    Abdominal pain, epigastric       pantoprazole 40 MG EC tablet    PROTONIX    30 tablet    Take 1 tablet (40 mg) by mouth 2 times daily (before meals)    Right upper quadrant abdominal pain, Erosive gastritis       polyethylene glycol Packet    MIRALAX/GLYCOLAX    7 packet    Take 17 g by mouth daily    Right upper quadrant abdominal pain       RIZATRIPTAN BENZOATE PO      Take 5 mg by mouth once as needed        senna-docusate 8.6-50 MG per tablet    SENOKOT-S;PERICOLACE    100 tablet    Take 1 tablet by mouth 2 times daily as needed for constipation    Right upper quadrant abdominal pain       * Notice:  This list has 6 medication(s) that are the same as other medications prescribed for you. Read the directions carefully, and ask your doctor or other care provider to review them with you.

## 2018-01-02 NOTE — NURSING NOTE
"Pt stated that they went to the ED at 630 pm on 1/1/18 with complaints of vomiting and increased abdominal pain.   \"They gave me fluids (2 L), and medications- Zofran, Morphine.   I was recently on ABX and sometimes it flares me up.   I wanted to be sure I would get discharged so I wouldn't miss this appointment with Dr. Wang today.\"    Abigail Prieto LPN      "

## 2018-01-02 NOTE — PATIENT INSTRUCTIONS
1. Get an EKG     2. Take Methadone 2.5mg every 12 hours    3. Discuss with Dr. Narayanan a celiac plexus block        Follow up:    1 month before your next refill      To speak with a nurse, schedule/reschedule/cancel a clinic appointment, or request a medication refill call: [phone]     You can also reach us by [email or web address]: [email or web address]    For refills, please call on Monday, 1 week before your medication runs out. The doctors are not always in clinic, so this gives us time to get your prescriptions ready.  Please let us know the name of the medication you are requesting a refill of.                                     Methadone tablets  Brand Names: Dolophine, Methadose  What is this medicine?  METHADONE (METH a done) is a pain reliever. It is used to treat severe pain. The medicine is also used to prevent withdrawal symptoms in people addicted to other drugs.  How should I use this medicine?  Take this medicine by mouth with a drink of water. If the medicine upsets your stomach, take it with food or milk. Follow the directions on the prescription label. Do not take more medicine than you are told to take.  A special MedGuide will be given to you by the pharmacist with each prescription and refill. Be sure to read this information carefully each time.  Talk to your pediatrician regarding the use of this medicine in children. Special care may be needed.  What side effects may I notice from receiving this medicine?  Side effects that you should report to your doctor or health care professional as soon as possible:    allergic reactions like skin rash, itching or hives, swelling of the face, lips, or tongue    breathing problems    confusion    signs and symptoms of a dangerous change in heartbeat or heart rhythm like chest pain; dizziness; fast or irregular heartbeat; palpitations; feeling faint or lightheaded, falls; breathing problems    signs and symptoms of adrenal insufficiency like  nausea; vomiting; loss of appetite; fatigue; weakness; dizziness; low blood pressure    signs and symptoms of low blood pressure like dizziness; feeling faint or lightheaded, falls; unusually weak or tired    signs and symptoms of serotonin syndrome like agitation; confusion; diarrhea; fast or irregular heartbeat; muscle twitching; stiff muscles; trouble walking; increased sweating; high fever; seizures; shivering; vomiting    trouble passing urine or change in the amount of urine  Side effects that usually do not require medical attention (report to your doctor or health care professional if they continue or are bothersome):    constipation    dry mouth    nausea, vomiting    tiredness  What may interact with this medicine?  Do not take this medicine with any of the following medications:    certain medicines for fungal infections like itraconazole, ketoconazole, posaconazole, voriconazole    certain medicines for irregular heart beat like bepridil, bretylium, dofetilide, dronedarone, quinidine    cisapride    halofantrine    mesoridazine    pimozide    rasagiline    selegiline    thioridazine    ziprasidone  This medicine may also interact with the following medications:    alcohol    antihistamines for allergy, cough and cold    antiviral medicines for HIV or AIDS    arsenic trioxide    atropine    certain antibiotics like clarithromycin, erythromycin, gemifloxacin, levofloxacin, moxifloxacin, ofloxacin, pentamidine, telithromycin, rifampin, rifapentine    certain medicines for anxiety or sleep    certain medicines for bladder problems like oxybutynin, tolterodine    certain medicines for cancer like dasatinib, lapatinib, sunitinib, vorinostat    certain medicines for depression like amitriptyline, desipramine, fluoxetine, sertraline    certain medicines for irregular heart beat like amiodarone, disopyramide, flecainide, procainamide, propafenone, sotalol    certain medicines for malaria like chloroquine,  mefloquine    certain medicines for migraine headache like almotriptan, eletriptan, frovatriptan, naratriptan, rizatriptan, sumatriptan, zolmitriptan    certain medicines for nausea or vomiting like dolasetron, droperidol, granisetron, ondansetron    certain medicines for seizures like carbamazepine, phenobarbital, phenytoin, primidone    certain medicines for stomach problems like dicyclomine, hyoscyamine    certain medicines for travel sickness like scopolamine    certain medicines for Parkinson's disease like benztropine, trihexyphenidyl    fluconazole    general anesthetics like halothane, isoflurane, methoxyflurane, propofol    haloperidol    ipratropium    linezolid    local anesthetics like lidocaine, pramoxine, tetracaine    MAOIs like Marplan, Nardil, and Parnate    medicines that relax muscles for surgery    methylene blue    octreotide    other medicines that prolong the QT interval (cause an abnormal heart rhythm)    other narcotic medicines for pain or cough    peginterferon samuel-2b    phenothiazines like chlorpromazine, prochlorperazine    ranolazine    tacrolimus    vardenafil  What if I miss a dose?  If you miss a dose, take it as soon as you can. If it is almost time for your next dose, take only that dose. Do not take double or extra doses.  Where should I keep my medicine?  Keep out of the reach of children. This medicine can be abused. Keep your medicine in a safe place to protect it from theft. Do not share this medicine with anyone. Selling or giving away this medicine is dangerous and is against the law.  Store at room temperature between 15 and 30 degrees C (59 and 86 degrees F). Keep container tightly closed.  This medicine may cause accidental overdose and death if it is taken by other adults, children, or pets. Flush any unused medicine down the toilet to reduce the chance of harm. Do not use the medicine after the expiration date.  What should I tell my health care provider before I take  this medicine?  They need to know if you have any of these conditions:    [place]'s disease    brain tumor    drug abuse or addiction    gallbladder disease    head injury    history of irregular heartbeat    if you often drink alcohol    kidney disease    liver disease    low blood pressure    lung or breathing disease, like asthma    mental illness    problems urinating    seizures    stomach or intestine problems    thyroid disease    an unusual or allergic reaction to methadone, other opioid analgesics, other medicines, foods, dyes, or preservatives    pregnant or trying to get pregnant    breast-feeding  What should I watch for while using this medicine?  Tell your doctor or health care professional if your pain does not go away, if it gets worse, or if you have new or a different type of pain. You may develop tolerance to the medicine. Tolerance means that you will need a higher dose of the medicine for pain relief. Tolerance is normal and is expected if you take this medicine for a long time.  Do not suddenly stop taking your medicine because you may develop a severe reaction. Your body becomes used to the medicine. This does NOT mean you are addicted. Addiction is a behavior related to getting and using a drug for a non-medical reason. If you have pain, you have a medical reason to take pain medicine. Your doctor will tell you how much medicine to take. If your doctor wants you to stop the medicine, the dose will be slowly lowered over time to avoid any side effects.  There are different types of narcotic medicines (opiates). If you take more than one type at the same time or if you are taking another medicine that also causes drowsiness, you may have more side effects. Give your health care provider a list of all medicines you use. Your doctor will tell you how much medicine to take. Do not take more medicine than directed. Call emergency for help if you have problems breathing or unusual sleepiness.  You  may get drowsy or dizzy. Do not drive, use machinery, or do anything that needs mental alertness until you know how this medicine affects you. Do not stand or sit up quickly, especially if you are an older patient. This reduces the risk of dizzy or fainting spells. Alcohol may interfere with the effect of this medicine. Avoid alcoholic drinks.  This medicine will cause constipation. Try to have a bowel movement at least every 2 to 3 days. If you do not have a bowel movement for 3 days, call your doctor or health care professional.  Your mouth may get dry. Chewing sugarless gum or sucking hard candy, and drinking plenty of water may help. Contact your doctor if the problem does not go away or is severe.  Women should inform their doctor if they wish to become pregnant or think they might be pregnant. There is a potential for serious side effects to an unborn child. Talk to your doctor about the benefits and risks of breast-feeding while using this medicine. This medicine does pass into breast milk. Talk to your doctor if you plan to begin or stop using this medicine while breast-feeding or if you plan to stop breast-feeding.  NOTE:This sheet is a summary. It may not cover all possible information. If you have questions about this medicine, talk to your doctor, pharmacist, or health care provider. Copyright  2017 [employer]

## 2018-01-02 NOTE — LETTER
1/2/2018       RE: Alexandra Melgoza  [address]  [facility] [address]     Dear Colleague,    Thank you for referring your patient, Alexandra Melgoza, to the [facility] FOR COMPRE[facility]ENSIVE PAIN MANAGEMENT at [facility]. Please see a copy of my visit note below.    The patient is a 22-year-old female with a history of chronic pancreatitis who presents for follow-up.  The previous visit was her first visit to our clinic.  During that visit a plan was formulated with the goals of attempting to diminish chronic pancreatitis flares and thus emergency room visits and should those flares occur provided patient with pain medication that would be available to treat a flare and preclude visit to the emergency room.  In the interim since our last visit the patient was actually seen in the emergency room last night.  She notes that of the pain medications that were prescribed to her she has used all of the 42 tablets of the oxycodone 10 mg.  At her last visit she also inquired about the possibility of a celiac plexus block.  I spoke with her and informed her that this could make her pain worse secondary to subsequent artery spasm however I would speak with Dr. Narayanan to see if he were amenable to having such a block performed.  She once again asked about performance of a celiac plexus block today her physical status is otherwise unchanged.  She complains of mild nausea and vomiting.  She presents today for reevaluation and follow-up    Initial summary:  The patient is a 21-year-old woman with a history of chronic pancreatitis.  She had her gallbladder removed at the age of 15 and has had severe pancreatitis since that time.  She has been seen by Dr. Narayanan here at the [facility] and has had an ERCP and stent placed.  She notes that she has pancreatitis flares approximately every 3 months.  And presents today for suggestions about treatments for her pain during these  pancreatitis flares.  She states that her flares occur every 3 months.  During her flares she states that she gets fluids and pain medications namely oxycodone 10-15 mg every 4-6 hours when necessary.  Her pain is alleviated by utilizing a low-fat diet, fruit, veggie's, poultry and fish, and relaxation techniques.  She states that her pain is exacerbated when she straightens from this diet.  She has used several different modalities to treat her pain.  These include Tylenol oxycodone gabapentin Pamelor and acupuncture. She states that the gabapentin causes sedation.  She presents today for initial evaluation and treatment modalities for her pain.  She asked specifically about celiac plexus blockade.      Current Outpatient Prescriptions   Medication     gabapentin (NEURONTIN) 400 MG capsule     oxyCODONE IR (ROXICODONE) 10 MG tablet     oxyCODONE (ROXICODONE) 5 MG/5ML solution     nortriptyline (PAMELOR) 10 MG capsule     ondansetron (ZOFRAN ODT) 4 MG ODT tab     amylase-lipase-protease (ZENPEP) 37245 UNITS CPEP     amylase-lipase-protease (ZENPEP) 77405 UNITS CPEP     senna-docusate (SENOKOT-S;PERICOLACE) 8.6-50 MG per tablet     pantoprazole (PROTONIX) 40 MG EC tablet     polyethylene glycol (MIRALAX/GLYCOLAX) Packet     gabapentin (NEURONTIN) 300 MG capsule     norethindrone (AYGESTIN) 5 MG tablet     fluticasone (FLONASE) 50 MCG/ACT spray     levalbuterol (XOPENEX HFA) 45 MCG/ACT Inhaler     leuprolide (LUPRON DEPOT) 11.25 MG injection     RIZATRIPTAN BENZOATE PO      No current facility-administered medications for this visit.             Allergies   Allergen Reactions     Amoxicillin-Pot Clavulanate Nausea and Vomiting     Compazine [Prochlorperazine] Other (See Comments)       Dystonia     Hyoscyamine Other (See Comments)       Dystonia     Reglan [Metoclopramide Hcl] Other (See Comments)       Dystonia     Zyprexa Other (See Comments)       Sensitive, dystonic reaction on 11-9-2011     Amitriptyline Hcl  "Other (See Comments)       Dystonia, hallucinations     Buspirone Other (See Comments)       No Adverse Reactions, no benefit     Cogentin [Benztropine]       Cyproheptadine Other (See Comments)       Distonic     Dicyclomine Other (See Comments)     Droperidol Other (See Comments)       Feels tense and \"like she has to jump out of her skin\".       Effexor [Venlafaxine] Other (See Comments)       Dystonia     Food         Cilantro--lips/tongue swelling     No Clinical Screening - See Comments Other (See Comments)       Cilantro     Phenergan Dm [Promethazine-Dm] Other (See Comments)       dystonia     Promethazine Other (See Comments)     Risperidone Other (See Comments)       dystonia     Vistaril Other (See Comments)       Burning sensation.     Augmentin GI Disturbance     Ketamine Other (See Comments)       jittery     Sorbitol GI Disturbance       Headache and dyspepsia       Past Medical History         Past Medical History:   Diagnosis Date     Anxiety       Asthma       Cholecystitis       s/p cholecystectomy     Chronic abdominal pain       Chronic infection       mrsa     Chronic pain       Cyclic vomiting syndrome 10/27/2012     Depression       Endometriosis       Hypoglycaemia       Migraines       Mild intermittent asthma       Ovarian cysts       Pancreatic disease       PONV (postoperative nausea and vomiting)       Pseudoseizures       Somatoform disorder       Sphincter of Oddi dysfunction       Vasovagal syncope            Past Surgical History          Past Surgical History:   Procedure Laterality Date     ABDOMEN SURGERY         ERCP, biliary stents     CHOLECYSTECTOMY   8/2/11     COLONOSCOPY   2011     negative finding     ENDOSCOPIC RETROGRADE CHOLANGIOPANCREATOGRAM   8/23/2011     Procedure:ENDOSCOPIC RETROGRADE CHOLANGIOPANCREATOGRAM; Endoscopic Retrograde Cholangiopancreatogram; Surgeon:SHORTY MCCOY; Location:[facility]     ENDOSCOPIC RETROGRADE CHOLANGIOPANCREATOGRAM   5/17/2012     " Procedure:ENDOSCOPIC RETROGRADE CHOLANGIOPANCREATOGRAM; Endoscopic Retrograde Cholangiopancreatogram with pancreatic stent placement.; Surgeon:SHORTY MCCOY; Location:[facility]     ENDOSCOPIC RETROGRADE CHOLANGIOPANCREATOGRAM COMPLEX   1/3/2012     Procedure:ENDOSCOPIC RETROGRADE CHOLANGIOPANCREATOGRAM COMPLEX; Endoscopic Retrograde Cholangiopancreatogram with Manometry bile duct sphincterotomy extention pancreatic duct sphincterotomy pancreatic duct stent placement; Surgeon:SHORTY MCCOY; Location:[facility] OR     ENDOSCOPIC ULTRASOUND UPPER GASTROINTESTINAL TRACT (GI) N/A 6/9/2015     Procedure: ENDOSCOPIC ULTRASOUND, ESOPHAGOSCOPY / UPPER GASTROINTESTINAL TRACT (GI);  Surgeon: Mario Joe MD;  Location: [facility] OR     ENDOSCOPIC ULTRASOUND UPPER GASTROINTESTINAL TRACT (GI) N/A 12/12/2016     Procedure: ENDOSCOPIC ULTRASOUND, ESOPHAGOSCOPY / UPPER GASTROINTESTINAL TRACT (GI);  Surgeon: Guru Jose Klein MD;  Location: [facility] OR     ESOPHAGOSCOPY, GASTROSCOPY, DUODENOSCOPY (EGD), COMBINED   1/18/2012     Procedure:COMBINED ESOPHAGOSCOPY, GASTROSCOPY, DUODENOSCOPY (EGD); Surgeon:ARNIE ESPINOZA; Location:[facility]     ESOPHAGOSCOPY, GASTROSCOPY, DUODENOSCOPY (EGD), COMBINED   1/18/2012     Procedure:COMBINED ESOPHAGOSCOPY, GASTROSCOPY, DUODENOSCOPY (EGD); EGD; Surgeon:ARNIE ESPINOZA; Location:[facility]     ESOPHAGOSCOPY, GASTROSCOPY, DUODENOSCOPY (EGD), COMBINED N/A 12/9/2017     Procedure: COMBINED ESOPHAGOSCOPY, GASTROSCOPY, DUODENOSCOPY (EGD);;  Surgeon: Josias Chan MD;  Location: [facility] GI     INSERT PORT VASCULAR ACCESS         L knee arthroscopy   2009     LAPAROSCOPY DIAGNOSTIC (GYN)   10/26/2012     Procedure: LAPAROSCOPY DIAGNOSTIC (GYN);  LAPAROSCOPY DIAGNOSTIC, CAUTERY ENDOMETRIOISIS and biopsy of Fallopian tube lesions;  Surgeon: Carla Lopez MD;  Location:  OR     ORTHOPEDIC SURGERY   2008     knee     VASCULAR SURGERY              Social History    Social History            Social  History     Marital status: Single       Spouse name: N/A     Number of children: N/A     Years of education: N/A          Occupational History     Not on file.           Social History Main Topics     Smoking status: Never Smoker     Smokeless tobacco: Never Used     Alcohol use No     Drug use: No     Sexual activity: No           Other Topics Concern     Parent/Sibling W/ Cabg, Mi Or Angioplasty Before 65f 55m? 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  Physical Exam   Constitutional: She is oriented to person, place, and time. She appears well-developed and well-nourished.   HENT:   Head: Normocephalic and atraumatic.   Right Ear: External ear normal.   Left Ear: External ear normal.   Nose: Nose normal.   Mouth/Throat: Oropharynx is clear and moist.   Eyes: Conjunctivae and EOM are normal. Pupils are equal, round, and reactive to light.   Neck: Normal range of motion. Neck supple.   Cardiovascular: Normal rate, regular rhythm, normal heart sounds and intact distal pulses.    Pulmonary/Chest: Effort normal and breath sounds normal.   Abdominal: She exhibits no distension and no mass. There is tenderness. There is guarding. There is no rebound.   Musculoskeletal: Normal range of motion.   Neurological: She is alert and oriented to person, place, and time. She has normal reflexes.   Skin: Skin is warm and dry.   Psychiatric: She has a normal mood and affect.   Nursing note and vitals reviewed.  A/P:Patient is a  21 y/o lady with chronic abdominal pain who presents for initial evaluation.  It is clear that the most likely eiology of her pain is pancreatitic flares.  The previous plan of providing medication for  flares only was not very successful as she was in the ED last night.  She has utilized all of the opioid that was prescribed. In an effort to decrease the frequency of her flares and decrease the duration of flares, I recommend  1. Pain psychology- patient is scheduling appt today  2. D/C Oxycodone  3.Add methadone 2.5 mg bid  4. Obtain EKG to r/o prolonged QT   5. Continue gabapentin dose to 400 mg tid  6. Consult GI service re: efficacy of celiac plexus blockade as this has not been proven to be beneficial in the past  7. RTC in 4 weeks                               Again, thank you for allowing me to participate in the care of your patient.      Sincerely,    Leonardo Wang MD

## 2018-01-02 NOTE — ED PROVIDER NOTES
History     Chief Complaint   Patient presents with     Abdominal Pain     HPI  Alexandra Melgoza is a 22 year old female history of chronic abdominal pain, chronic pancreatitis, spina bifida, cyclical vomiting syndrome, migraines, pseudoseizures, somatoform disorder and migraines who presents with abdominal pain.  Of note, the patient was recently admitted (12/6/2017-12/13/2017) for acute on chronic abdominal pain secondary to chronic pain syndrome, hematemesis (possible Alexandra-Sanchez tear and erosive gastritis) and endometriosis.  At that time, she was managed with pain medication and advancement of her diet.  She did undergo an EGD for evaluation of hematemesis (12/9/2017) which revealed patchy gastritis for which she was started on a PPI.    Patient reports that 2-3 days ago she developed worsening abdominal pain with associated nausea and vomiting.  She notes that she has been taking her home pain medications including oxycodone 10 mg every 6-8 hours.  She reports that she does have a follow-up appointment with her pain management physician, Dr. Wang, tomorrow.  She notes that she also has a GI follow-up with Dr. Narayanan on Wednesday, 2 days from now. She does have a port in place reports this is used for regular IV fluid infusions.  Her abdominal surgical history is significant for cholecystectomy in 2011, as well as previous ERCPs with stent placement.  Patient reports that her lipase does not normally increase with episodes of abdominal pain.  Patient is currently living in [place] (with a 1.5 years ago).    Past Medical History:   Diagnosis Date     Anxiety      Asthma      Cholecystitis     s/p cholecystectomy     Chronic abdominal pain      Chronic infection     mrsa     Chronic pain      Cyclic vomiting syndrome 10/27/2012     Depression      Endometriosis      Hypoglycaemia      Migraines      Mild intermittent asthma      Ovarian cysts      Pancreatic disease      PONV (postoperative nausea and  vomiting)      Pseudoseizures      Somatoform disorder      Sphincter of Oddi dysfunction      Vasovagal syncope        Past Surgical History:   Procedure Laterality Date     ABDOMEN SURGERY      ERCP, biliary stents     CHOLECYSTECTOMY  8/2/11     COLONOSCOPY  2011    negative finding     ENDOSCOPIC RETROGRADE CHOLANGIOPANCREATOGRAM  8/23/2011    Procedure:ENDOSCOPIC RETROGRADE CHOLANGIOPANCREATOGRAM; Endoscopic Retrograde Cholangiopancreatogram; Surgeon:SHORTY MCCOY; [facility]     ENDOSCOPIC RETROGRADE CHOLANGIOPANCREATOGRAM  5/17/2012    Procedure:ENDOSCOPIC RETROGRADE CHOLANGIOPANCREATOGRAM; Endoscopic Retrograde Cholangiopancreatogram with pancreatic stent placement.; Surgeon:SHORTY MCCOY; [facility]     ENDOSCOPIC RETROGRADE CHOLANGIOPANCREATOGRAM COMPLEX  1/3/2012    Procedure:ENDOSCOPIC RETROGRADE CHOLANGIOPANCREATOGRAM COMPLEX; Endoscopic Retrograde Cholangiopancreatogram with Manometry bile duct sphincterotomy extention pancreatic duct sphincterotomy pancreatic duct stent placement; Surgeon:SHORTY MCCOY; [facility]     ENDOSCOPIC ULTRASOUND UPPER GASTROINTESTINAL TRACT (GI) N/A 6/9/2015    Procedure: ENDOSCOPIC ULTRASOUND, ESOPHAGOSCOPY / UPPER GASTROINTESTINAL TRACT (GI);  Surgeon: Mario Joe MD;  [facility]     ENDOSCOPIC ULTRASOUND UPPER GASTROINTESTINAL TRACT (GI) N/A 12/12/2016    Procedure: ENDOSCOPIC ULTRASOUND, ESOPHAGOSCOPY / UPPER GASTROINTESTINAL TRACT (GI);  Surgeon: Guru Jose Klein MD;  [facility]     ESOPHAGOSCOPY, GASTROSCOPY, DUODENOSCOPY (EGD), COMBINED  1/18/2012    Procedure:COMBINED ESOPHAGOSCOPY, GASTROSCOPY, DUODENOSCOPY (EGD); Surgeon:ARNIE ESPINOZA; [facility]     ESOPHAGOSCOPY, GASTROSCOPY, DUODENOSCOPY (EGD), COMBINED  1/18/2012    Procedure:COMBINED ESOPHAGOSCOPY, GASTROSCOPY, DUODENOSCOPY (EGD); EGD; Surgeon:ARNIE ESPINOZA; [facility]     ESOPHAGOSCOPY, GASTROSCOPY, DUODENOSCOPY (EGD),  COMBINED N/A 12/9/2017    Procedure: COMBINED ESOPHAGOSCOPY, GASTROSCOPY, DUODENOSCOPY (EGD);;  Surgeon: Josias Chan MD;  [facility]     INSERT PORT VASCULAR ACCESS       L knee arthroscopy  2009     LAPAROSCOPY DIAGNOSTIC (GYN)  10/26/2012    Procedure: LAPAROSCOPY DIAGNOSTIC (GYN);  LAPAROSCOPY DIAGNOSTIC, CAUTERY ENDOMETRIOISIS and biopsy of Fallopian tube lesions;  Surgeon: Carla Lopez MD;  Location: [facility]     ORTHOPEDIC SURGERY  2008    knee     VASCULAR SURGERY         Family History   Problem Relation Age of Onset     Hypertension Father      Bipolar Disorder Other      Anxiety Disorder Other      Depression Paternal Grandmother      Allergies Maternal Grandmother      CEREBROVASCULAR DISEASE Maternal Grandfather      Cardiovascular Maternal Grandfather      Depression/Anxiety Maternal Grandfather      GASTROINTESTINAL DISEASE Maternal Grandfather      DIABETES Paternal Uncle      Hypoglycemia Brother      CANCER No family hx of      No family history of skin cancer       Social History   Substance Use Topics     Smoking status: Never Smoker     Smokeless tobacco: Never Used     Alcohol use No     Current Facility-Administered Medications   Medication     sodium chloride (PF) 0.9% PF flush 10-20 mL     heparin lock flush 10 UNIT/ML injection 5-10 mL     heparin lock flush 10 UNIT/ML injection 5-10 mL     heparin 100 UNIT/ML injection 5 mL     sodium chloride (PF) 0.9% PF flush 10-20 mL     lactated ringers BOLUS 1,000 mL    Followed by     lactated ringers infusion     Current Outpatient Prescriptions   Medication     oxyCODONE IR (ROXICODONE) 10 MG tablet     ondansetron (ZOFRAN ODT) 4 MG ODT tab     gabapentin (NEURONTIN) 300 MG capsule     gabapentin (NEURONTIN) 400 MG capsule     oxyCODONE (ROXICODONE) 5 MG/5ML solution     nortriptyline (PAMELOR) 10 MG capsule     amylase-lipase-protease (ZENPEP) 70630 UNITS CPEP     amylase-lipase-protease (ZENPEP) 64538 UNITS CPEP     senna-docusate  "(SENOKOT-S;PERICOLACE) 8.6-50 MG per tablet     pantoprazole (PROTONIX) 40 MG EC tablet     polyethylene glycol (MIRALAX/GLYCOLAX) Packet     norethindrone (AYGESTIN) 5 MG tablet     fluticasone (FLONASE) 50 MCG/ACT spray     levalbuterol (XOPENEX HFA) 45 MCG/ACT Inhaler     leuprolide (LUPRON DEPOT) 11.25 MG injection     RIZATRIPTAN BENZOATE PO        Allergies   Allergen Reactions     Amoxicillin-Pot Clavulanate Nausea and Vomiting     Compazine [Prochlorperazine] Other (See Comments)     Dystonia       Hyoscyamine Other (See Comments)     Dystonia     Reglan [Metoclopramide Hcl] Other (See Comments)     Dystonia     Zyprexa Other (See Comments)     Sensitive, dystonic reaction on 11-9-2011     Amitriptyline Hcl Other (See Comments)     Dystonia, hallucinations     Buspirone Other (See Comments)     No Adverse Reactions, no benefit     Cogentin [Benztropine]      Cyproheptadine Other (See Comments)     Distonic     Dicyclomine Other (See Comments)     Droperidol Other (See Comments)     Feels tense and \"like she has to jump out of her skin\".       Effexor [Venlafaxine] Other (See Comments)     Dystonia     Food      Cilantro--lips/tongue swelling     No Clinical Screening - See Comments Other (See Comments)     Cilantro     Phenergan Dm [Promethazine-Dm] Other (See Comments)     dystonia     Promethazine Other (See Comments)     Risperidone Other (See Comments)     dystonia     Vistaril Other (See Comments)     Burning sensation.       Augmentin GI Disturbance     Ketamine Other (See Comments)     jittery     Sorbitol GI Disturbance     Headache and dyspepsia        I have reviewed the Medications, Allergies, Past Medical and Surgical History, and Social History in the Epic system.    Review of Systems   Constitutional: Positive for appetite change. Negative for fever.   Respiratory: Negative.    Cardiovascular: Negative.    Gastrointestinal: Positive for abdominal pain, nausea and vomiting.   Genitourinary: " Negative.    All other systems reviewed and are negative.      Physical Exam   BP: 142/78  Heart Rate: 120  Temp: 98.2  F (36.8  C)  Resp: 16  Weight: 64.4 kg (142 lb)  SpO2: 100 %      Physical Exam   Constitutional: She is oriented to person, place, and time. She appears well-developed and well-nourished.   HENT:   Head: Normocephalic and atraumatic.   Mouth/Throat: Oropharynx is clear and moist.   Eyes: Conjunctivae and EOM are normal. Pupils are equal, round, and reactive to light.   Neck: Normal range of motion. Neck supple. No JVD present.   Cardiovascular: Normal rate, regular rhythm, normal heart sounds and intact distal pulses.    Pulmonary/Chest: Effort normal and breath sounds normal. No respiratory distress. She has no wheezes. She has no rales.   Abdominal: Soft. She exhibits no mass. There is no tenderness. There is no guarding.   Musculoskeletal: Normal range of motion.        Right lower leg: Normal.        Left lower leg: Normal.   Neurological: She is alert and oriented to person, place, and time. No cranial nerve deficit.   Skin: Skin is warm and dry.   Psychiatric: She has a normal mood and affect. Her behavior is normal. Thought content normal.   Nursing note and vitals reviewed.      ED Course     ED Course     Procedures     9:52 PM  The patient was seen and examined by Dr. Church in Room [facility].          Medications   lactated ringers BOLUS 1,000 mL (0 mLs Intravenous Stopped 1/2/18 0018)     Followed by   lactated ringers BOLUS 1,000 mL (1,000 mLs Intravenous New Bag 1/2/18 0023)     Followed by   lactated ringers infusion (not administered)   sodium chloride (PF) 0.9% PF flush 10-20 mL (not administered)   heparin lock flush 10 UNIT/ML injection 5-10 mL (5 mLs Intracatheter Not Given 1/2/18 0020)   heparin lock flush 10 UNIT/ML injection 5-10 mL (not administered)   heparin 100 UNIT/ML injection 5 mL (not administered)   sodium chloride (PF) 0.9% PF flush 10-20 mL (not administered)    ondansetron (ZOFRAN) injection 4 mg (4 mg Intravenous Given 1/2/18 0024)   morphine (PF) injection 4 mg (4 mg Intravenous Given 1/2/18 0024)     12:53 AM feels better, ready for discharge       Labs Ordered and Resulted from Time of ED Arrival Up to the Time of Departure from the ED   ROUTINE UA WITH MICROSCOPIC - Abnormal; Notable for the following:        Result Value    Ketones Urine 10 (*)     Mucous Urine Present (*)     All other components within normal limits   CBC WITH PLATELETS DIFFERENTIAL   COMPREHENSIVE METABOLIC PANEL   LIPASE   CENTRAL VENOUS CATHETER            Assessments & Plan (with Medical Decision Making)   22-year-old female with chronic abdominal pain of unknown etiology.  She is here with epigastric discomfort nausea and vomiting.  I have reviewed her previous notes.  Here her labs were checked which were all normal she was given IV fluids and 1 dose of morphine.  She feels better and will be discharged home.  Her abdominal exam is on concerning this seems to be a recurrence of her chronic symptoms of unknown etiology she has been referred for chronic comprehensive pain management which seems reasonable.  She does not require admission to the hospital.    I have reviewed the nursing notes.    I have reviewed the findings, diagnosis, plan and need for follow up with the patient.    New Prescriptions    No medications on file       Final diagnoses:   Non-intractable vomiting with nausea, unspecified vomiting type   Chronic abdominal pain   I, Rita Fung, am serving as a trained medical scribe to document services personally performed by Linus Church MD, based on the provider's statements to me.   ILinus MD, was physically present and have reviewed and verified the accuracy of this note documented by Rita Fung.    1/1/2018   [facility], EMERGENCY DEPARTMENT     Linus Church MD  01/02/18 0054

## 2018-01-02 NOTE — PROGRESS NOTES
The patient is a 22-year-old female with a history of chronic pancreatitis who presents for follow-up.  The previous visit was her first visit to our clinic.  During that visit a plan was formulated with the goals of attempting to diminish chronic pancreatitis flares and thus emergency room visits and should those flares occur provided patient with pain medication that would be available to treat a flare and preclude visit to the emergency room.  In the interim since our last visit the patient was actually seen in the emergency room last night.  She notes that of the pain medications that were prescribed to her she has used all of the 42 tablets of the oxycodone 10 mg.  At her last visit she also inquired about the possibility of a celiac plexus block.  I spoke with her and informed her that this could make her pain worse secondary to subsequent artery spasm however I would speak with Dr. Narayanan to see if he were amenable to having such a block performed.  She once again asked about performance of a celiac plexus block today her physical status is otherwise unchanged.  She complains of mild nausea and vomiting.  She presents today for reevaluation and follow-up    Initial summary:  The patient is a 21-year-old woman with a history of chronic pancreatitis.  She had her gallbladder removed at the age of 15 and has had severe pancreatitis since that time.  She has been seen by Dr. Narayanan here at the [facility] and has had an ERCP and stent placed.  She notes that she has pancreatitis flares approximately every 3 months.  And presents today for suggestions about treatments for her pain during these pancreatitis flares.  She states that her flares occur every 3 months.  During her flares she states that she gets fluids and pain medications namely oxycodone 10-15 mg every 4-6 hours when necessary.  Her pain is alleviated by utilizing a low-fat diet, fruit, veggie's, poultry and fish, and relaxation  techniques.  She states that her pain is exacerbated when she straightens from this diet.  She has used several different modalities to treat her pain.  These include Tylenol oxycodone gabapentin Pamelor and acupuncture. She states that the gabapentin causes sedation.  She presents today for initial evaluation and treatment modalities for her pain.  She asked specifically about celiac plexus blockade.      Current Outpatient Prescriptions   Medication     gabapentin (NEURONTIN) 400 MG capsule     oxyCODONE IR (ROXICODONE) 10 MG tablet     oxyCODONE (ROXICODONE) 5 MG/5ML solution     nortriptyline (PAMELOR) 10 MG capsule     ondansetron (ZOFRAN ODT) 4 MG ODT tab     amylase-lipase-protease (ZENPEP) 97670 UNITS CPEP     amylase-lipase-protease (ZENPEP) 83688 UNITS CPEP     senna-docusate (SENOKOT-S;PERICOLACE) 8.6-50 MG per tablet     pantoprazole (PROTONIX) 40 MG EC tablet     polyethylene glycol (MIRALAX/GLYCOLAX) Packet     gabapentin (NEURONTIN) 300 MG capsule     norethindrone (AYGESTIN) 5 MG tablet     fluticasone (FLONASE) 50 MCG/ACT spray     levalbuterol (XOPENEX HFA) 45 MCG/ACT Inhaler     leuprolide (LUPRON DEPOT) 11.25 MG injection     RIZATRIPTAN BENZOATE PO      No current facility-administered medications for this visit.             Allergies   Allergen Reactions     Amoxicillin-Pot Clavulanate Nausea and Vomiting     Compazine [Prochlorperazine] Other (See Comments)       Dystonia     Hyoscyamine Other (See Comments)       Dystonia     Reglan [Metoclopramide Hcl] Other (See Comments)       Dystonia     Zyprexa Other (See Comments)       Sensitive, dystonic reaction on 11-9-2011     Amitriptyline Hcl Other (See Comments)       Dystonia, hallucinations     Buspirone Other (See Comments)       No Adverse Reactions, no benefit     Cogentin [Benztropine]       Cyproheptadine Other (See Comments)       Distonic     Dicyclomine Other (See Comments)     Droperidol Other (See Comments)       Feels tense and  "\"like she has to jump out of her skin\".       Effexor [Venlafaxine] Other (See Comments)       Dystonia     Food         Cilantro--lips/tongue swelling     No Clinical Screening - See Comments Other (See Comments)       Cilantro     Phenergan Dm [Promethazine-Dm] Other (See Comments)       dystonia     Promethazine Other (See Comments)     Risperidone Other (See Comments)       dystonia     Vistaril Other (See Comments)       Burning sensation.     Augmentin GI Disturbance     Ketamine Other (See Comments)       jittery     Sorbitol GI Disturbance       Headache and dyspepsia       Past Medical History         Past Medical History:   Diagnosis Date     Anxiety       Asthma       Cholecystitis       s/p cholecystectomy     Chronic abdominal pain       Chronic infection       mrsa     Chronic pain       Cyclic vomiting syndrome 10/27/2012     Depression       Endometriosis       Hypoglycaemia       Migraines       Mild intermittent asthma       Ovarian cysts       Pancreatic disease       PONV (postoperative nausea and vomiting)       Pseudoseizures       Somatoform disorder       Sphincter of Oddi dysfunction       Vasovagal syncope            Past Surgical History          Past Surgical History:   Procedure Laterality Date     ABDOMEN SURGERY         ERCP, biliary stents     CHOLECYSTECTOMY   8/2/11     COLONOSCOPY   2011     negative finding     ENDOSCOPIC RETROGRADE CHOLANGIOPANCREATOGRAM   8/23/2011     Procedure:ENDOSCOPIC RETROGRADE CHOLANGIOPANCREATOGRAM; Endoscopic Retrograde Cholangiopancreatogram; Surgeon:SHORTY MCCOY; [facility]     ENDOSCOPIC RETROGRADE CHOLANGIOPANCREATOGRAM   5/17/2012     Procedure:ENDOSCOPIC RETROGRADE CHOLANGIOPANCREATOGRAM; Endoscopic Retrograde Cholangiopancreatogram with pancreatic stent placement.; Surgeon:SHORTY MCCOY; [facility]     ENDOSCOPIC RETROGRADE CHOLANGIOPANCREATOGRAM COMPLEX   1/3/2012     Procedure:ENDOSCOPIC RETROGRADE " CHOLANGIOPANCREATOGRAM COMPLEX; Endoscopic Retrograde Cholangiopancreatogram with Manometry bile duct sphincterotomy extention pancreatic duct sphincterotomy pancreatic duct stent placement; Surgeon:SHORTY MCCOY; [facility]     ENDOSCOPIC ULTRASOUND UPPER GASTROINTESTINAL TRACT (GI) N/A 6/9/2015     Procedure: ENDOSCOPIC ULTRASOUND, ESOPHAGOSCOPY / UPPER GASTROINTESTINAL TRACT (GI);  Surgeon: Mario Joe MD;  [facility]     ENDOSCOPIC ULTRASOUND UPPER GASTROINTESTINAL TRACT (GI) N/A 12/12/2016     Procedure: ENDOSCOPIC ULTRASOUND, ESOPHAGOSCOPY / UPPER GASTROINTESTINAL TRACT (GI);  Surgeon: Guru Jose Klein MD;  [facility]     ESOPHAGOSCOPY, GASTROSCOPY, DUODENOSCOPY (EGD), COMBINED   1/18/2012     Procedure:COMBINED ESOPHAGOSCOPY, GASTROSCOPY, DUODENOSCOPY (EGD); Surgeon:ARNIE ESPINOZA; [facility]     ESOPHAGOSCOPY, GASTROSCOPY, DUODENOSCOPY (EGD), COMBINED   1/18/2012     Procedure:COMBINED ESOPHAGOSCOPY, GASTROSCOPY, DUODENOSCOPY (EGD); EGD; Surgeon:ARNIE ESPINOZA; [facility]     ESOPHAGOSCOPY, GASTROSCOPY, DUODENOSCOPY (EGD), COMBINED N/A 12/9/2017     Procedure: COMBINED ESOPHAGOSCOPY, GASTROSCOPY, DUODENOSCOPY (EGD);;  Surgeon: Josias Chan MD;  [facility]     INSERT PORT VASCULAR ACCESS         L knee arthroscopy   2009     LAPAROSCOPY DIAGNOSTIC (GYN)   10/26/2012     Procedure: LAPAROSCOPY DIAGNOSTIC (GYN);  LAPAROSCOPY DIAGNOSTIC, CAUTERY ENDOMETRIOISIS and biopsy of Fallopian tube lesions;  Surgeon: Carla Lopez MD;  Location:  OR     ORTHOPEDIC SURGERY   2008     knee     VASCULAR SURGERY              Social History    Social History            Social History     Marital status: Single       Spouse name: N/A     Number of children: N/A     Years of education: N/A          Occupational History     Not on file.           Social History Main Topics     Smoking status: Never Smoker     Smokeless tobacco: Never Used     Alcohol use No      Drug use: No     Sexual activity: No           Other Topics Concern     Parent/Sibling W/ Cabg, Mi Or Angioplasty Before 65f 55m? 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  Physical Exam   Constitutional: She is oriented to person, place, and time. She appears well-developed and well-nourished.   HENT:   Head: Normocephalic and atraumatic.   Right Ear: External ear normal.   Left Ear: External ear normal.   Nose: Nose normal.   Mouth/Throat: Oropharynx is clear and moist.   Eyes: Conjunctivae and EOM are normal. Pupils are equal, round, and reactive to light.   Neck: Normal range of motion. Neck supple.   Cardiovascular: Normal rate, regular rhythm, normal heart sounds and intact distal pulses.    Pulmonary/Chest: Effort normal and breath sounds normal.   Abdominal: She exhibits no distension and no mass. There is tenderness. There is guarding. There is no rebound.   Musculoskeletal: Normal range of motion.   Neurological: She is alert and oriented to person, place, and time. She has normal reflexes.   Skin: Skin is warm and dry.   Psychiatric: She has a normal mood and affect.   Nursing note and vitals reviewed.  A/P:Patient is a  21 y/o lady with chronic abdominal pain who presents for initial evaluation.  It is clear that the most likely eiology of her pain is pancreatitic flares.  The previous plan of providing medication for flares only was not very successful as she was in the ED last night.  She has utilized all of the opioid that was prescribed. In an effort to decrease the frequency of her flares and decrease the duration of flares, I recommend  1. Pain psychology- patient is scheduling appt today  2. D/C  Oxycodone  3.Add methadone 2.5 mg bid  4. Obtain EKG to r/o prolonged QT   5. Continue gabapentin dose to 400 mg tid  6. Consult GI service re: efficacy of celiac plexus blockade as this has not been proven to be beneficial in the past  7. RTC in 4 weeks

## 2018-01-03 ENCOUNTER — TELEPHONE (OUTPATIENT)
Dept: GASTROENTEROLOGY | Facility: CLINIC | Age: 23
End: 2018-01-03

## 2018-01-03 ENCOUNTER — HOSPITAL ENCOUNTER (OUTPATIENT)
Facility: CLINIC | Age: 23
Setting detail: OBSERVATION
Discharge: HOME | End: 2018-01-04
Attending: EMERGENCY MEDICINE | Admitting: INTERNAL MEDICINE
Payer: COMMERCIAL

## 2018-01-03 ENCOUNTER — OFFICE VISIT (OUTPATIENT)
Dept: GASTROENTEROLOGY | Facility: CLINIC | Age: 23
End: 2018-01-03
Payer: COMMERCIAL

## 2018-01-03 ENCOUNTER — APPOINTMENT (OUTPATIENT)
Dept: ULTRASOUND IMAGING | Facility: CLINIC | Age: 23
End: 2018-01-03
Attending: EMERGENCY MEDICINE
Payer: COMMERCIAL

## 2018-01-03 VITALS
SYSTOLIC BLOOD PRESSURE: 116 MMHG | HEIGHT: 67 IN | HEART RATE: 122 BPM | BODY MASS INDEX: 22.44 KG/M2 | TEMPERATURE: 98.7 F | WEIGHT: 143 LBS | OXYGEN SATURATION: 98 % | DIASTOLIC BLOOD PRESSURE: 76 MMHG

## 2018-01-03 DIAGNOSIS — R11.2 INTRACTABLE VOMITING WITH NAUSEA, UNSPECIFIED VOMITING TYPE: ICD-10-CM

## 2018-01-03 DIAGNOSIS — R11.2 NON-INTRACTABLE VOMITING WITH NAUSEA, UNSPECIFIED VOMITING TYPE: ICD-10-CM

## 2018-01-03 DIAGNOSIS — R10.13 ABDOMINAL PAIN, EPIGASTRIC: ICD-10-CM

## 2018-01-03 DIAGNOSIS — R10.13 ABDOMINAL PAIN, EPIGASTRIC: Primary | ICD-10-CM

## 2018-01-03 DIAGNOSIS — K86.1 IDIOPATHIC CHRONIC PANCREATITIS (H): ICD-10-CM

## 2018-01-03 DIAGNOSIS — K86.1 OTHER CHRONIC PANCREATITIS (H): ICD-10-CM

## 2018-01-03 PROBLEM — R10.9 ACUTE ABDOMINAL PAIN: Status: ACTIVE | Noted: 2018-01-03

## 2018-01-03 LAB
ALBUMIN SERPL-MCNC: 4.6 G/DL (ref 3.4–5)
ALP SERPL-CCNC: 120 U/L (ref 40–150)
ALT SERPL W P-5'-P-CCNC: 152 U/L (ref 0–50)
ANION GAP SERPL CALCULATED.3IONS-SCNC: 8 MMOL/L (ref 3–14)
AST SERPL W P-5'-P-CCNC: 82 U/L (ref 0–45)
BILIRUB SERPL-MCNC: 0.6 MG/DL (ref 0.2–1.3)
BUN SERPL-MCNC: 12 MG/DL (ref 7–30)
CALCIUM SERPL-MCNC: 9.3 MG/DL (ref 8.5–10.1)
CHLORIDE SERPL-SCNC: 105 MMOL/L (ref 94–109)
CO2 SERPL-SCNC: 24 MMOL/L (ref 20–32)
CREAT SERPL-MCNC: 0.61 MG/DL (ref 0.52–1.04)
ERYTHROCYTE [DISTWIDTH] IN BLOOD BY AUTOMATED COUNT: 12.3 % (ref 10–15)
GFR SERPL CREATININE-BSD FRML MDRD: >90 ML/MIN/1.7M2
GLUCOSE SERPL-MCNC: 94 MG/DL (ref 70–99)
HCT VFR BLD AUTO: 41.2 % (ref 35–47)
HGB BLD-MCNC: 13.4 G/DL (ref 11.7–15.7)
LIPASE SERPL-CCNC: 134 U/L (ref 73–393)
MCH RBC QN AUTO: 30.5 PG (ref 26.5–33)
MCHC RBC AUTO-ENTMCNC: 32.5 G/DL (ref 31.5–36.5)
MCV RBC AUTO: 94 FL (ref 78–100)
PLATELET # BLD AUTO: 203 10E9/L (ref 150–450)
POTASSIUM SERPL-SCNC: 4.5 MMOL/L (ref 3.4–5.3)
PROT SERPL-MCNC: 7.8 G/DL (ref 6.8–8.8)
RBC # BLD AUTO: 4.39 10E12/L (ref 3.8–5.2)
SODIUM SERPL-SCNC: 136 MMOL/L (ref 133–144)
WBC # BLD AUTO: 3.3 10E9/L (ref 4–11)

## 2018-01-03 PROCEDURE — 99285 EMERGENCY DEPT VISIT HI MDM: CPT | Mod: Z6 | Performed by: EMERGENCY MEDICINE

## 2018-01-03 PROCEDURE — 99207 ZZC APP CREDIT; MD BILLING SHARED VISIT: CPT | Performed by: PHYSICIAN ASSISTANT

## 2018-01-03 PROCEDURE — 96375 TX/PRO/DX INJ NEW DRUG ADDON: CPT

## 2018-01-03 PROCEDURE — 96361 HYDRATE IV INFUSION ADD-ON: CPT | Performed by: EMERGENCY MEDICINE

## 2018-01-03 PROCEDURE — 96376 TX/PRO/DX INJ SAME DRUG ADON: CPT | Performed by: EMERGENCY MEDICINE

## 2018-01-03 PROCEDURE — G0378 HOSPITAL OBSERVATION PER HR: HCPCS

## 2018-01-03 PROCEDURE — 99285 EMERGENCY DEPT VISIT HI MDM: CPT | Mod: 25 | Performed by: EMERGENCY MEDICINE

## 2018-01-03 PROCEDURE — 99220 ZZC INITIAL OBSERVATION CARE,LEVL III: CPT | Mod: AI | Performed by: INTERNAL MEDICINE

## 2018-01-03 PROCEDURE — 25000128 H RX IP 250 OP 636: Performed by: PHYSICIAN ASSISTANT

## 2018-01-03 PROCEDURE — 96361 HYDRATE IV INFUSION ADD-ON: CPT

## 2018-01-03 PROCEDURE — 96375 TX/PRO/DX INJ NEW DRUG ADDON: CPT | Performed by: EMERGENCY MEDICINE

## 2018-01-03 PROCEDURE — 76700 US EXAM ABDOM COMPLETE: CPT

## 2018-01-03 PROCEDURE — 25000132 ZZH RX MED GY IP 250 OP 250 PS 637: Performed by: EMERGENCY MEDICINE

## 2018-01-03 PROCEDURE — 96374 THER/PROPH/DIAG INJ IV PUSH: CPT | Performed by: EMERGENCY MEDICINE

## 2018-01-03 PROCEDURE — 25000128 H RX IP 250 OP 636: Performed by: EMERGENCY MEDICINE

## 2018-01-03 RX ORDER — NORTRIPTYLINE HCL 10 MG
30 CAPSULE ORAL AT BEDTIME
Status: DISCONTINUED | OUTPATIENT
Start: 2018-01-03 | End: 2018-01-05 | Stop reason: HOSPADM

## 2018-01-03 RX ORDER — POLYETHYLENE GLYCOL 3350 17 G/17G
17 POWDER, FOR SOLUTION ORAL 2 TIMES DAILY
Status: DISCONTINUED | OUTPATIENT
Start: 2018-01-04 | End: 2018-01-05 | Stop reason: HOSPADM

## 2018-01-03 RX ORDER — SODIUM CHLORIDE, SODIUM LACTATE, POTASSIUM CHLORIDE, CALCIUM CHLORIDE 600; 310; 30; 20 MG/100ML; MG/100ML; MG/100ML; MG/100ML
INJECTION, SOLUTION INTRAVENOUS CONTINUOUS
Status: DISCONTINUED | OUTPATIENT
Start: 2018-01-03 | End: 2018-01-05 | Stop reason: HOSPADM

## 2018-01-03 RX ORDER — NALOXONE HYDROCHLORIDE 0.4 MG/ML
.1-.4 INJECTION, SOLUTION INTRAMUSCULAR; INTRAVENOUS; SUBCUTANEOUS
Status: DISCONTINUED | OUTPATIENT
Start: 2018-01-03 | End: 2018-01-05 | Stop reason: HOSPADM

## 2018-01-03 RX ORDER — FLUTICASONE PROPIONATE 50 MCG
2 SPRAY, SUSPENSION (ML) NASAL DAILY
Status: DISCONTINUED | OUTPATIENT
Start: 2018-01-04 | End: 2018-01-05 | Stop reason: HOSPADM

## 2018-01-03 RX ORDER — HYDROMORPHONE HYDROCHLORIDE 1 MG/ML
.5-1 INJECTION, SOLUTION INTRAMUSCULAR; INTRAVENOUS; SUBCUTANEOUS
Status: DISCONTINUED | OUTPATIENT
Start: 2018-01-03 | End: 2018-01-04

## 2018-01-03 RX ORDER — LEVALBUTEROL TARTRATE 45 UG/1
2 AEROSOL, METERED ORAL EVERY 6 HOURS PRN
Status: DISCONTINUED | OUTPATIENT
Start: 2018-01-03 | End: 2018-01-05 | Stop reason: HOSPADM

## 2018-01-03 RX ORDER — GABAPENTIN 400 MG/1
400 CAPSULE ORAL 3 TIMES DAILY
Status: DISCONTINUED | OUTPATIENT
Start: 2018-01-04 | End: 2018-01-05 | Stop reason: HOSPADM

## 2018-01-03 RX ORDER — ACETAMINOPHEN 325 MG/1
650 TABLET ORAL EVERY 4 HOURS PRN
Status: DISCONTINUED | OUTPATIENT
Start: 2018-01-03 | End: 2018-01-05 | Stop reason: HOSPADM

## 2018-01-03 RX ORDER — PANTOPRAZOLE SODIUM 40 MG/1
40 TABLET, DELAYED RELEASE ORAL
Status: DISCONTINUED | OUTPATIENT
Start: 2018-01-04 | End: 2018-01-05 | Stop reason: HOSPADM

## 2018-01-03 RX ORDER — ONDANSETRON 2 MG/ML
4 INJECTION INTRAMUSCULAR; INTRAVENOUS EVERY 6 HOURS PRN
Status: DISCONTINUED | OUTPATIENT
Start: 2018-01-03 | End: 2018-01-05 | Stop reason: HOSPADM

## 2018-01-03 RX ORDER — LIDOCAINE 40 MG/G
CREAM TOPICAL
Status: DISCONTINUED | OUTPATIENT
Start: 2018-01-03 | End: 2018-01-05 | Stop reason: HOSPADM

## 2018-01-03 RX ORDER — ONDANSETRON 4 MG/1
4 TABLET, ORALLY DISINTEGRATING ORAL EVERY 6 HOURS PRN
Status: DISCONTINUED | OUTPATIENT
Start: 2018-01-03 | End: 2018-01-05 | Stop reason: HOSPADM

## 2018-01-03 RX ORDER — ACETAMINOPHEN 500 MG
1000 TABLET ORAL ONCE
Status: COMPLETED | OUTPATIENT
Start: 2018-01-03 | End: 2018-01-03

## 2018-01-03 RX ORDER — ONDANSETRON 2 MG/ML
4 INJECTION INTRAMUSCULAR; INTRAVENOUS ONCE
Status: COMPLETED | OUTPATIENT
Start: 2018-01-03 | End: 2018-01-03

## 2018-01-03 RX ORDER — AMOXICILLIN 250 MG
1 CAPSULE ORAL 2 TIMES DAILY PRN
Status: DISCONTINUED | OUTPATIENT
Start: 2018-01-03 | End: 2018-01-05 | Stop reason: HOSPADM

## 2018-01-03 RX ORDER — SODIUM CHLORIDE 9 MG/ML
1000 INJECTION, SOLUTION INTRAVENOUS CONTINUOUS
Status: DISCONTINUED | OUTPATIENT
Start: 2018-01-03 | End: 2018-01-03

## 2018-01-03 RX ORDER — MORPHINE SULFATE 2 MG/ML
4 INJECTION, SOLUTION INTRAMUSCULAR; INTRAVENOUS
Status: COMPLETED | OUTPATIENT
Start: 2018-01-03 | End: 2018-01-03

## 2018-01-03 RX ORDER — ONDANSETRON 2 MG/ML
4 INJECTION INTRAMUSCULAR; INTRAVENOUS EVERY 30 MIN PRN
Status: COMPLETED | OUTPATIENT
Start: 2018-01-03 | End: 2018-01-03

## 2018-01-03 RX ADMIN — SODIUM CHLORIDE 1000 ML: 9 INJECTION, SOLUTION INTRAVENOUS at 16:28

## 2018-01-03 RX ADMIN — SODIUM CHLORIDE 1000 ML: 9 INJECTION, SOLUTION INTRAVENOUS at 18:04

## 2018-01-03 RX ADMIN — SODIUM CHLORIDE 1000 ML: 9 INJECTION, SOLUTION INTRAVENOUS at 19:06

## 2018-01-03 RX ADMIN — ONDANSETRON 4 MG: 2 INJECTION INTRAMUSCULAR; INTRAVENOUS at 17:47

## 2018-01-03 RX ADMIN — ONDANSETRON 4 MG: 2 INJECTION INTRAMUSCULAR; INTRAVENOUS at 21:48

## 2018-01-03 RX ADMIN — Medication 0.5 MG: at 23:39

## 2018-01-03 RX ADMIN — MORPHINE SULFATE 4 MG: 2 INJECTION, SOLUTION INTRAMUSCULAR; INTRAVENOUS at 17:47

## 2018-01-03 RX ADMIN — ACETAMINOPHEN 1000 MG: 500 TABLET, FILM COATED ORAL at 19:05

## 2018-01-03 RX ADMIN — ONDANSETRON 4 MG: 2 INJECTION INTRAMUSCULAR; INTRAVENOUS at 19:24

## 2018-01-03 RX ADMIN — SODIUM CHLORIDE 1000 ML: 9 INJECTION, SOLUTION INTRAVENOUS at 21:51

## 2018-01-03 RX ADMIN — MORPHINE SULFATE 4 MG: 2 INJECTION, SOLUTION INTRAMUSCULAR; INTRAVENOUS at 19:05

## 2018-01-03 RX ADMIN — SODIUM CHLORIDE, POTASSIUM CHLORIDE, SODIUM LACTATE AND CALCIUM CHLORIDE: 600; 310; 30; 20 INJECTION, SOLUTION INTRAVENOUS at 23:40

## 2018-01-03 ASSESSMENT — ENCOUNTER SYMPTOMS
SHORTNESS OF BREATH: 0
FREQUENCY: 0
NAUSEA: 1
CHILLS: 0
ABDOMINAL PAIN: 1
DYSURIA: 0
COUGH: 0
HEMATURIA: 0
VOMITING: 1
FEVER: 0

## 2018-01-03 ASSESSMENT — PAIN SCALES - GENERAL: PAINLEVEL: EXTREME PAIN (8)

## 2018-01-03 NOTE — ED NOTES
Bed: IN02  Expected date:   Expected time:   Means of arrival:   Comments:  Alexandra Melgoza [id number]  Was in ED on 1/1/18, coming from GI clinic  Chronic abdominal pain, s/p cholecystectomy  Cyclic vomiting, gastritis, migraines  Since 12/31/17, increased nausea and vomiting  Not keeping meds down

## 2018-01-03 NOTE — TELEPHONE ENCOUNTER
Attempted to call patient to remind of upcoming appointment with Dr. Narayanan 1/10.  Mailbox is full.  Unable to leave a message.

## 2018-01-03 NOTE — ED PROVIDER NOTES
[place] EMERGENCY DEPARTMENT ([facility])  1/03/18 ED 14  4:41 PM   History     Chief Complaint   Patient presents with     Abdominal Pain     The history is provided by the patient, medical records and a parent.     Alexandra Melgoza is a 22 year old female who presents for further management of chronic abdominal pain, nausea and vomiting.  She has a history of chronic abdominal pain, cholecystectomy, cyclic nausea and vomiting, spina bifida, endometriosis, and prior hospitalizations secondary to abdominal pain.  Her abdominal pain is managed by Dr. Narayanan.  In the past this is been worked up and felt to be possibly pancreatitis, but further imaging suggested otherwise.  Patient had been away from [place] for a while, as she had moved to [place] year and a half ago. She developed a flare of her chronic abdominal pain a month ago and was seen and admitted to a hospital in [place] on 12/1/17.  She reports that imaging was suggestive of inflammation of her pancreas, and she was admitted for 7 days.  She was transferred by ambulance and hospitalized here under Dr. Narayanan s recommendations and was inpatient from 12/6-12/13/17 on the [facility] medicine service.  It was felt that she had acute on chronic abdominal pain due to chronic pain syndrome.  She also had hematemesis which they felt was possibly due to Alexandra-Sanchez tear and erosive gastritis.  She was instructed to follow-up with Dr. Narayanan from GI as well as the [facility] for comprehensive pain management.  She states that she has had this ongoing abdominal pain now for a month that continues to get worse.  She developed exacerbation of her chronic symptoms 4 days ago.  She did follow up as instructed. Patient just saw pain clinic yesterday and they changed medication to methadone to see if this could help. She saw Dr. Narayanan s NP today and reported her symptoms including abdominal pain, nausea and vomiting who referred her back to the emergency  department.  Patient is fairly miserable, states that she vomited all night last night.  She states she has this abdominal pain is focal in her right upper quadrant and radiates to her midback.  This is consistent with her long standing pain.  No fevers.  No hematemesis. Stools have been normal, stools 3 times a day. No black or bloody stools.  She denies any no cough cold rhinorrhea flu symptoms.  She has been on birth control for 2 years and cannot recall when her last period was.     This part of the document was transcribed by Tomeka Pan, Medical Scribe.      I have reviewed the Medications, Allergies, Past Medical and Surgical History, and Social History in the [employer] system.  Past Medical History:   Diagnosis Date     Anxiety      Asthma      Cholecystitis     s/p cholecystectomy     Chronic abdominal pain      Chronic infection     mrsa     Chronic pain      Cyclic vomiting syndrome 10/27/2012     Depression      Endometriosis      Hypoglycaemia      Migraines      Mild intermittent asthma      Ovarian cysts      Pancreatic disease      PONV (postoperative nausea and vomiting)      Pseudoseizures      Somatoform disorder      Sphincter of Oddi dysfunction      Vasovagal syncope        Past Surgical History:   Procedure Laterality Date     ABDOMEN SURGERY      ERCP, biliary stents     CHOLECYSTECTOMY  8/2/11     COLONOSCOPY  2011    negative finding     ENDOSCOPIC RETROGRADE CHOLANGIOPANCREATOGRAM  8/23/2011    Procedure:ENDOSCOPIC RETROGRADE CHOLANGIOPANCREATOGRAM; Endoscopic Retrograde Cholangiopancreatogram; Surgeon:SHORTY MCCOY; Location:[facility] OR     ENDOSCOPIC RETROGRADE CHOLANGIOPANCREATOGRAM  5/17/2012    Procedure:ENDOSCOPIC RETROGRADE CHOLANGIOPANCREATOGRAM; Endoscopic Retrograde Cholangiopancreatogram with pancreatic stent placement.; Surgeon:SHORTY MCCOY; Location:[facility] OR     ENDOSCOPIC RETROGRADE CHOLANGIOPANCREATOGRAM COMPLEX  1/3/2012    Procedure:ENDOSCOPIC RETROGRADE  CHOLANGIOPANCREATOGRAM COMPLEX; Endoscopic Retrograde Cholangiopancreatogram with Manometry bile duct sphincterotomy extention pancreatic duct sphincterotomy pancreatic duct stent placement; Surgeon:SHORTY MCCOY; Location:[facility] OR     ENDOSCOPIC ULTRASOUND UPPER GASTROINTESTINAL TRACT (GI) N/A 6/9/2015    Procedure: ENDOSCOPIC ULTRASOUND, ESOPHAGOSCOPY / UPPER GASTROINTESTINAL TRACT (GI);  Surgeon: Mario Joe MD;  Location: [facility] OR     ENDOSCOPIC ULTRASOUND UPPER GASTROINTESTINAL TRACT (GI) N/A 12/12/2016    Procedure: ENDOSCOPIC ULTRASOUND, ESOPHAGOSCOPY / UPPER GASTROINTESTINAL TRACT (GI);  Surgeon: Guru Jose Klein MD;  Location: [facility] OR     ESOPHAGOSCOPY, GASTROSCOPY, DUODENOSCOPY (EGD), COMBINED  1/18/2012    Procedure:COMBINED ESOPHAGOSCOPY, GASTROSCOPY, DUODENOSCOPY (EGD); Surgeon:ARNIE ESPINOZA; Location:[facility] GI     ESOPHAGOSCOPY, GASTROSCOPY, DUODENOSCOPY (EGD), COMBINED  1/18/2012    Procedure:COMBINED ESOPHAGOSCOPY, GASTROSCOPY, DUODENOSCOPY (EGD); EGD; Surgeon:ARNIE ESPINOZA; [facility]     ESOPHAGOSCOPY, GASTROSCOPY, DUODENOSCOPY (EGD), COMBINED N/A 12/9/2017    Procedure: COMBINED ESOPHAGOSCOPY, GASTROSCOPY, DUODENOSCOPY (EGD);;  Surgeon: Josias Chan MD;  Location: [facility] GI     INSERT PORT VASCULAR ACCESS       L knee arthroscopy  2009     LAPAROSCOPY DIAGNOSTIC (GYN)  10/26/2012    Procedure: LAPAROSCOPY DIAGNOSTIC (GYN);  LAPAROSCOPY DIAGNOSTIC, CAUTERY ENDOMETRIOISIS and biopsy of Fallopian tube lesions;  Surgeon: Carla Lopez MD;  Location:  OR     ORTHOPEDIC SURGERY  2008    knee     VASCULAR SURGERY         Family History   Problem Relation Age of Onset     Hypertension Father      Bipolar Disorder Other      Anxiety Disorder Other      Depression Paternal Grandmother      Allergies Maternal Grandmother      CEREBROVASCULAR DISEASE Maternal Grandfather      Cardiovascular Maternal Grandfather      Depression/Anxiety Maternal Grandfather      GASTROINTESTINAL  "DISEASE Maternal Grandfather      DIABETES Paternal Uncle      Hypoglycemia Brother      CANCER No family hx of      No family history of skin cancer       Social History   Substance Use Topics     Smoking status: Never Smoker     Smokeless tobacco: Never Used     Alcohol use No     Current Facility-Administered Medications   Medication     0.9% sodium chloride BOLUS    Followed by     0.9% sodium chloride infusion     Current Outpatient Prescriptions   Medication     methadone (DOLOPHINE) 5 MG tablet     gabapentin (NEURONTIN) 400 MG capsule     oxyCODONE (ROXICODONE) 5 MG/5ML solution     nortriptyline (PAMELOR) 10 MG capsule     ondansetron (ZOFRAN ODT) 4 MG ODT tab     amylase-lipase-protease (ZENPEP) 82660 UNITS CPEP     senna-docusate (SENOKOT-S;PERICOLACE) 8.6-50 MG per tablet     pantoprazole (PROTONIX) 40 MG EC tablet     norethindrone (AYGESTIN) 5 MG tablet     fluticasone (FLONASE) 50 MCG/ACT spray     oxyCODONE IR (ROXICODONE) 10 MG tablet     amylase-lipase-protease (ZENPEP) 14121 UNITS CPEP     polyethylene glycol (MIRALAX/GLYCOLAX) Packet     gabapentin (NEURONTIN) 300 MG capsule     levalbuterol (XOPENEX HFA) 45 MCG/ACT Inhaler     leuprolide (LUPRON DEPOT) 11.25 MG injection     RIZATRIPTAN BENZOATE PO     Allergies   Allergen Reactions     Amoxicillin-Pot Clavulanate Nausea and Vomiting     Compazine [Prochlorperazine] Other (See Comments)     Dystonia       Hyoscyamine Other (See Comments)     Dystonia     Reglan [Metoclopramide Hcl] Other (See Comments)     Dystonia     Zyprexa Other (See Comments)     Sensitive, dystonic reaction on 11-9-2011     Amitriptyline Hcl Other (See Comments)     Dystonia, hallucinations     Buspirone Other (See Comments)     No Adverse Reactions, no benefit     Cogentin [Benztropine]      Cyproheptadine Other (See Comments)     Distonic     Dicyclomine Other (See Comments)     Droperidol Other (See Comments)     Feels tense and \"like she has to jump out of her skin\".   "     Effexor [Venlafaxine] Other (See Comments)     Dystonia     Food      Cilantro--lips/tongue swelling     No Clinical Screening - See Comments Other (See Comments)     Cilantro     Phenergan Dm [Promethazine-Dm] Other (See Comments)     dystonia     Promethazine Other (See Comments)     Risperidone Other (See Comments)     dystonia     Vistaril Other (See Comments)     Burning sensation.       Augmentin GI Disturbance     Ketamine Other (See Comments)     jittery     Sorbitol GI Disturbance     Headache and dyspepsia       Review of Systems   Constitutional: Negative for chills and fever.   Respiratory: Negative for cough and shortness of breath.    Gastrointestinal: Positive for abdominal pain, nausea and vomiting.   Genitourinary: Negative for dysuria, frequency, hematuria and urgency.   All other systems reviewed and are negative.      Physical Exam   BP: 135/77  Pulse: 119  Temp: 99.2  F (37.3  C)  Resp: 16  Weight: 66.4 kg (146 lb 6.2 oz)  SpO2: 100 %      Physical Exam   General: patient is alert and oriented, tearful and uncomfortable  Head: atraumatic and normocephalic   EENT: dry mucus membranes without tonsillar erythema or exudates, pupils round and reactive, sclera anicteric  Neck: supple   Cardiovascular: regular rate and rhythm, extremities warm and well perfused, no lower extremity edema  Pulmonary: lungs clear to auscultation bilaterally   Abdomen: soft, epigastric TTP without rebound or guarding  Musculoskeletal: normal range of motion   Neurological: alert and oriented, moving all extremities symmetrically, gait normal   Skin: warm, dry       ED Course     ED Course     Procedures             Critical Care time:  none             Labs Ordered and Resulted from Time of ED Arrival Up to the Time of Departure from the ED - No data to display         Assessments & Plan (with Medical Decision Making)   Ms. Melgoza is a 22 year old female who presents for further management of chronic abdominal pain,  nausea and vomiting.  She is tachycardic to the 120s but not hypotensive.  She has a low-grade temp to 99.2.  Labs drawn in clinic were reviewed and she does have a new elevation in her LFTs compared to 2 days ago.  Lipase is within normal limits.  She has no leukocytosis and anemia is at baseline.  Ultrasound of the abdomen is obtained which does not show any hepatic enlargement or ductal dilatation.  UA from 2 days ago is reviewed and negative and she denies any urinary symptoms.  She was given 2 L IV fluids, IV morphine, acetaminophen and IV Zofran ×2.  She continues to have multiple episodes of vomiting and has been unable to tolerate p.o.  I do not feel that she has any acute emergent abdominal pathology she has had an extensive workup for this in the past.  It is unclear the treatment of her elevated LFTs given her negative abdominal ultrasound.  She was recently started on methadone however this is oral and low dose and is low risk for hepatic toxicity.  Unfortunately she continues to be unable to take any of her oral medications or fluids without vomiting.  I did discuss trial outpatient management with Zofran at home however both the patient and her mother do not feel this will be successful and opposed to trial as she has had two ED in the last 3 days.  She did follow her ED discharge plan on prior visit which resulted in her being referred directly back to the ED.  We discussed that her pain would likely not be resolved in the hospital and goal would be to tolerate orals or establish plan for outpatient infusions for hydration and patient and mother voiced understanding.        I have reviewed the nursing notes.    I have reviewed the findings, diagnosis, plan and need for follow up with the patient.    New Prescriptions    No medications on file       Final diagnoses:   Abdominal pain, epigastric   Intractable vomiting with nausea, unspecified vomiting type   I, Tomeka Pan, am serving as a trained  medical scribe to document services personally performed by Agnes Henderson MD, based on the provider's statements to me on January 3, 2018.  This document has been checked and approved by the attending provider.    I, Agnes Henderson MD, was physically present and have reviewed and verified the accuracy of this note documented by Tomeka Pan, medical scribe.       1/3/2018   [facility], [place], EMERGENCY DEPARTMENT     Agnes Henderson MD  01/03/18 204

## 2018-01-03 NOTE — NURSING NOTE
"Chief Complaint   Patient presents with     Clinic Care Coordination - Initial     Post cholecystectomy pain.        Vitals:    01/03/18 1319   BP: 116/76   BP Location: Left arm   Patient Position: Chair   Cuff Size: Adult Regular   Pulse: 122   Temp: 98.7  F (37.1  C)   TempSrc: Oral   SpO2: 98%   Weight: 143 lb   Height: 5' 6.5\"       Body mass index is 22.74 kg/(m^2).          Eric BABROSA LPN    "

## 2018-01-03 NOTE — PROGRESS NOTES
REASON FOR VISIT/CONSULT:  Abdominal pain       HISTORY OF PRESENT ILLNESS:     Alexandra Melgoza is a 22 year old female with a history of gastritis, chronic abdominal pain with post-cholecystectomy pain with mildly abnormal liver enzymes, not responding to sphincter of Oddi ablation a few years ago, on chronic narcotics. She reports chronic abdominal pain issues since the age of 8. She underwent a full evaluation for chronic pancreatitis however had normal pancreatic function tests, only 2/9 EUS criteria (12/2016) and completely normal secretin stimulated MRCP with normal T1 signal and exocrine function. She was last seen in clinic by Dr. Narayanan on 11/22/2017. Hospital admission in November for worsening abdominal pain, nausea and vomiting. Required NJFT placement due to poor oral intake and continuous N/V. Repeat MRCP with secretin 11/22/2017 was normal. She did undergo an EGD for evaluation of hematemesis (12/9/2017) which revealed patchy gastritis for which she was started on a PPI.  She was seen in the emergency room 2 days ago for abdominal pain, nausea and vomiting. Het labs were checked which were all normal she was given IV fluids and 1 dose of morphine, her symptoms improved she was discharged home. She followed up with pain clinic yesterday for chronic comprehensive pain management and started on Methadone.     She reports at baseline she has daily abdominal pain. She reports worsening abdominal pain for the past 4 days, with associated nausea and vomiting. For the past few days, her usual regimen for  nausea,Zofran and benadryl, has not helped. She reports 4 days of vomiting that inially improved after receiving IV fluids and zofran in the ED a few days ago. She reports persistent nausea and vomiting for the past 24 hours. She vomited 6 times yesterday and 3 times this morning. She reports worsening abdominal  pain today that radiates to her back, rates pain 8/10. She is unable to keep pain medication  down, food or liquids down. She denies fever, she does endorse sweating and some chills.         PAST MEDICAL HISTORY:  chronic abdominal pain, pancreatitis, cyclical vomiting syndrome, endometriosis, migraines, pseudoseizures, somatoform disorder and migraines      PAST SURGICAL HISTORY:   cholecystectomy       MEDICATIONS:   See Epic medication list, medications reviewed       FAMILY HISTORY:  Non-contributory           SOCIAL HISTORY:    Tobacco use: none   Alcohol use: none   Drug use: none        REVIEW OF SYSTEMS:   A comprehensive review of systems was performed and was noted to be negative except as above.      PHYSICAL EXAM:  VITAL SIGNS: stable, tachycardia   GENERAL: tearful, appears to be in mild distress, holding emesis back, dry heaves  EYES: Eyes grossly normal to inspection, anicteric sclera   RESP: lungs clear to auscultation - no rales, no rhonchi, no wheezes  CV: regular rates and rhythm, normal S1 S2, no murmur   ABDOMEN: soft, epigastric tenderness, no hepatosplenomegaly, no masses, distention, guarding, or rebound, normal bowel sounds  MS: extremities- no gross deformities noted, no edema  SKIN: no rashes or juandice, skin warm and dry  PSYCH: Alert and oriented times 3  LYMPHATICS: ant. cervical- normal      ASSESSMENT AND RECOMMENDATIONS:   Alexandra Melgoza is a 22 year old female with a history of gastritis, chronic abdominal pain with post-cholecystectomy pain with mildly abnormal liver enzymes, not responding to sphincter of Oddi ablation a few years ago, on chronic narcotics who presents today for persistent nausea, vomiting and abdominal pain. Considering she is unable to tolerate PO intake and with persistent abdominal pain, nausea and vomiting recommended patient be evaluated in the emergency room for possible hospital admission due to concern for dehydration, persistent nausea and vomiting and acute on chronic abdominal pain. Will check labs including lipase, CMP, and CBC in clinic  today before sending to ER.     She has follow-up appointment with Dr. Narayanan next week on the 10th. We discussed long-term management to include consideration of GJ tube placement to meet nutritional needs and goal that this will help with symptom management. Will discuss this further at next clinic visit.           Plan discussed with Dr. Narayanan, agreed with plan     Rand Navarro, CIPRIANO   [facility]   [phone]        I spent 30 minutes with this patient face to face and explained the conditions and plans (more than 50% of time was counseling/coordination of care, as discussed above).

## 2018-01-03 NOTE — ED NOTES
Pt presents ambulatory to triage from Clinic with mother. Pt states for past 4 days has had upper abd pain, nausea and emesis. Denies fevers and diarrhea. Pt has hx chronic pancreatitis. Pt states pain feels like pancreatitis flare up, sent from clinic for pain control.

## 2018-01-03 NOTE — MR AVS SNAPSHOT
After Visit Summary   1/3/2018    Alexandra Melgoza    MRN: [id number]           Patient Information     Date Of Birth          1995        Visit Information        Provider Department      1/3/2018 1:00 PM Rand Navarro APRN CNP [facility]        Today's Diagnoses     Abdominal pain, epigastric    -  1    Non-intractable vomiting with nausea, unspecified vomiting type           Follow-ups after your visit        Your next 10 appointments already scheduled     Jan 05, 2018  8:30 AM CST   Level 2 with UR  04   [facility] [place], Infusion Services ([facility])    [address] [place] [address] S  [address]  [facility] [address]   [phone]            Umer 10, 2018 12:00 PM CST   (Arrive by 11:45 AM)   Return Visit with MD LIVE [facility] ([facility])    [address] [facility] [address]  [address] [facility] [address] [phone]              Who to contact     Please call your clinic at [phone] to:    Ask questions about your health    Make or cancel appointments    Discuss your medicines    Learn about your test results    Speak to your doctor   If you have compliments or concerns about an experience at your clinic, or if you wish to file a complaint, please contact [facility] Patient Relations at [phone] or email us at [email or web address]         Additional Information About Your Visit        MyChart Information     [employer] gives you secure access to your electronic health record. If you see a primary care provider, you can also send messages to your care team and make appointments. If you have questions, please call your primary care clinic.  If you do not have a primary care provider, please call [phone] and they will assist you.      [email or web address] is an electronic gateway that provides easy, online access to your medical records.  "With MyChart, you can request a clinic appointment, read your test results, renew a prescription or communicate with your care team.     To access your existing account, please contact your [facility] or call [phone] for assistance.        Care EveryWhere ID     This is your Care EveryWhere ID. This could be used by other organizations to access your [place] medical records  [id number]        Your Vitals Were     Pulse Temperature Height Pulse Oximetry BMI (Body Mass Index)       122 98.7  F (37.1  C) (Oral) 1.689 m (5' 6.5\") 98% 22.74 kg/m2        Blood Pressure from Last 3 Encounters:   01/03/18 135/77   01/03/18 116/76   01/02/18 125/73    Weight from Last 3 Encounters:   01/03/18 66.4 kg (146 lb 6.2 oz)   01/03/18 64.9 kg (143 lb)   01/02/18 64.4 kg (142 lb)               Primary Care Provider Office Phone # Fax #    Quyen Baez [phone]       [address] [place] [address] [phone]  [place] [address]        Equal Access to Services     [facility]: Hadii aad allie hadcathio Somalini, waaxda luqadaha, qaybta kaalmada adeegyada, maki gaspar . So [facility] [phone].    ATENCIÓN: Si habla español, tiene a quijano disposición servicios gratuitos de asistencia lingüística. Llame al [phone].    We comply with applicable federal civil rights laws and [place] laws. We do not discriminate on the basis of race, color, national origin, age, disability, sex, sexual orientation, or gender identity.            Thank you!     Thank you for choosing [facility]  for your care. Our goal is always to provide you with excellent care. Hearing back from our patients is one way we can continue to improve our services. Please take a few minutes to complete the written survey that you may receive in the mail after your visit with us. Thank you!             Your Updated Medication List - Protect others around you: Learn how to safely use, " store and throw away your medicines at www.disposemymeds.org.          This list is accurate as of: 1/3/18  6:26 PM.  Always use your most recent med list.                   Brand Name Dispense Instructions for use Diagnosis    * amylase-lipase-protease 23827 UNITS Cpep    ZENPEP    180 capsule    Take 2-3 capsules (40,000-60,000 Units) by mouth Take with snacks or supplements (Snacks)    Idiopathic chronic pancreatitis (H)       * amylase-lipase-protease 95294 UNITS Cpep    ZENPEP    180 capsule    Take 5 capsules (100,000 Units) by mouth 3 times daily (with meals)    Right upper quadrant abdominal pain       fluticasone 50 MCG/ACT spray    FLONASE    16 g    Spray 2 sprays into both nostrils daily    Nasal congestion       * gabapentin 300 MG capsule    NEURONTIN    90 capsule    Take 1 capsule (300 mg) by mouth 3 times daily    Chronic abdominal pain, Sphincter of Oddi dysfunction, Recurring abdominal pain, Somatoform disorder       * gabapentin 400 MG capsule    NEURONTIN    90 capsule    Take 1 capsule (400 mg) by mouth 3 times daily    Abdominal pain, epigastric       leuprolide 11.25 MG kit    LUPRON DEPOT (3-MONTH)    1 each    Inject 11.25 mg into the muscle every 3 months    Endometriosis       levalbuterol 45 MCG/ACT Inhaler    XOPENEX HFA    1 Inhaler    Inhale 2 puffs into the lungs every 6 hours as needed for shortness of breath / dyspnea    Wheeze       methadone 5 MG tablet    DOLOPHINE    30 tablet    Take 0.5 tablets (2.5 mg) by mouth every 12 hours    Abdominal pain, epigastric       norethindrone 5 MG tablet    AYGESTIN    90 tablet    Take 1 tablet (5 mg) by mouth daily    Endometriosis       nortriptyline 10 MG capsule    PAMELOR    120 capsule    Take 3 capsules (30 mg) by mouth At Bedtime    Right upper quadrant abdominal pain       ondansetron 4 MG ODT tab    ZOFRAN ODT    40 tablet    Take 1 tablet (4 mg) by mouth every 8 hours as needed for nausea or vomiting    Idiopathic chronic  pancreatitis (H)       * oxyCODONE 5 MG/5ML solution    ROXICODONE    900 mL    Take 10-15 mLs (10-15 mg) by mouth every 4 hours as needed for moderate to severe pain    Right upper quadrant abdominal pain       * oxyCODONE IR 10 MG tablet    ROXICODONE    42 tablet    Take 1 tablet (10 mg) by mouth every 8 hours as needed for moderate to severe pain    Abdominal pain, epigastric       pantoprazole 40 MG EC tablet    PROTONIX    30 tablet    Take 1 tablet (40 mg) by mouth 2 times daily (before meals)    Right upper quadrant abdominal pain, Erosive gastritis       polyethylene glycol Packet    MIRALAX/GLYCOLAX    7 packet    Take 17 g by mouth daily    Right upper quadrant abdominal pain       RIZATRIPTAN BENZOATE PO      Take 5 mg by mouth once as needed        senna-docusate 8.6-50 MG per tablet    SENOKOT-S;PERICOLACE    100 tablet    Take 1 tablet by mouth 2 times daily as needed for constipation    Right upper quadrant abdominal pain       * Notice:  This list has 6 medication(s) that are the same as other medications prescribed for you. Read the directions carefully, and ask your doctor or other care provider to review them with you.

## 2018-01-03 NOTE — ED AVS SNAPSHOT
[facility], Emergency Department    [address] [facility] [address]    Phone:  [phone]                                       Alexandra Melgoza   MRN: [id number]    Department:  [facility], Emergency Department   Date of Visit:  1/3/2018           Patient Information     Date Of Birth          1995        Your diagnoses for this visit were:     Abdominal pain, epigastric        You were seen by Agnes Martinez MD.        Discharge Instructions       Please make an appointment to follow up with GI clinic in one week to re-check LFTs.  Follow-up with pain clinic as scheduled.  Continue previously prescribed methadone and zofran as needed for nausea.     If you have worsening pain, fevers, intractable vomiting or other concerns, return to the emergency department for re-evaluation.            *Abdominal Pain, Unknown Cause (Female)    The exact cause of your abdominal (stomach) pain is not certain. This does not mean that this is something to worry about, or the right tests were not done. Everyone likes to know the exact cause of the problem, but sometimes with abdominal pain, there is no clear-cut cause, and this could be a good thing. The good news is that your symptoms can be treated, and you will feel better.   Your condition does not seem serious now; however, sometimes the signs of a serious problem may take more time to appear. For this reason, it is important for you to watch for any new symptoms, problems, or worsening of your condition.  Over the next few days, the abdominal pain may come and go, or be continuous. Other common symptoms can include nausea and vomiting. Sometimes it can be difficult to tell if you feel nauseous, you may just feel bad and not associate that feeling with nausea. Constipation, diarrhea, and a fever may go along with the pain.  The pain may continue even if treated correctly over the following days. Depending on how things go, sometimes the cause can  become clear and may require further or different treatment. Additional evaluations, medications, or tests may be needed.  Home care  Your health care provider may prescribe medications for pain, symptoms, or an infection.  Follow the health care provider's instructions for taking these medications.  General care    Rest until your next exam. No strenuous activities.    Try to find positions that ease discomfort. A small pillow placed on the abdomen may help relieve pain.    Something warm on your abdomen (such as a heating pad) may help, but be careful not to burn yourself.  Diet    Do not force yourself to eat, especially if having cramps, vomiting, or diarrhea.    Water is important so you do not get dehydrated. Soup may also be good. Sports drinks may also help, especially if they are not too acidic. Make sure you don't drink sugary drinks as this can make things worse. Take liquids in small amounts. Do not guzzle them.    Caffeine sometimes makes the pain and cramping worse.    Avoid dairy products if you have vomiting or diarrhea.    Don't eat large amounts at a time. Wait a few minutes between bites.    Eat a diet low in fiber (called a low-residue diet). Foods allowed include refined breads, white rice, fruit and vegetable juices without pulp, tender meats. These foods will pass more easily through the intestine.    Avoid fried or fatty foods, dairy, alcohol and spicy foods until your symptoms go away.  Follow-up care  Follow up with your health care provider as instructed, or if your pain does not begin to improve in the next 24 hours.  When to seek medical care  Seek prompt medical care if any of the following occur:    Pain gets worse or moves to the right lower abdomen    New or worsening vomiting or diarrhea    Swelling of the abdomen    Unable to pass stool for more than three days    New fever over 101  F (38.3 C), or rising fever    Blood in vomit or bowel movements (dark red or black  color)    Jaundice (yellow color of eyes and skin)    Weakness, dizziness    Chest, arm, back, neck or jaw pain    Unexpected vaginal bleeding or missed period  Call 911  Call emergency services if any of the following occur:    Trouble breathing    Confusion    Fainting or loss of consciousness    Rapid heart rate    Seizure    [id number] Dionne Nolasco, [address] [place] [address], [place] [address]. All rights reserved. This information is not intended as a substitute for professional medical care. Always follow your healthcare professional's instructions.            Future Appointments        Provider Department Dept Phone Center    1/5/2018 8:30 AM UR CHAIR 04 [facility], [place], Infusion Services [phone] [place]    1/10/2018 12:00 PM Campbell Narayanan MD [facility] [phone] [place]      24 Hour Appointment Hotline       To make an appointment at any [facility] call [phone] ([phone]). If you don't have a family doctor or clinic, we will help you find one. [place] clinics are conveniently located to serve the needs of you and your family.             Review of your medicines      Our records show that you are taking the medicines listed below. If these are incorrect, please call your family doctor or clinic.        Dose / Directions Last dose taken    * amylase-lipase-protease 85380 UNITS Cpep   Commonly known as:  ZENPEP   Dose:  2-3 capsule   Quantity:  180 capsule        Take 2-3 capsules (40,000-60,000 Units) by mouth Take with snacks or supplements (Snacks)   Refills:  1        * amylase-lipase-protease 20774 UNITS Cpep   Commonly known as:  ZENPEP   Dose:  5 capsule   Quantity:  180 capsule        Take 5 capsules (100,000 Units) by mouth 3 times daily (with meals)   Refills:  1        fluticasone 50 MCG/ACT spray   Commonly known as:  FLONASE   Dose:  2 spray   Quantity:  16 g        Spray 2 sprays into both nostrils daily   Refills:  3        * gabapentin 300  MG capsule   Commonly known as:  NEURONTIN   Dose:  300 mg   Quantity:  90 capsule        Take 1 capsule (300 mg) by mouth 3 times daily   Refills:  5        * gabapentin 400 MG capsule   Commonly known as:  NEURONTIN   Dose:  400 mg   Quantity:  90 capsule        Take 1 capsule (400 mg) by mouth 3 times daily   Refills:  3        leuprolide 11.25 MG kit   Commonly known as:  LUPRON DEPOT (3-MONTH)   Dose:  11.25 mg   Quantity:  1 each        Inject 11.25 mg into the muscle every 3 months   Refills:  3        levalbuterol 45 MCG/ACT Inhaler   Commonly known as:  XOPENEX HFA   Dose:  2 puff   Quantity:  1 Inhaler        Inhale 2 puffs into the lungs every 6 hours as needed for shortness of breath / dyspnea   Refills:  1        methadone 5 MG tablet   Commonly known as:  DOLOPHINE   Dose:  2.5 mg   Quantity:  30 tablet        Take 0.5 tablets (2.5 mg) by mouth every 12 hours   Refills:  0        norethindrone 5 MG tablet   Commonly known as:  AYGESTIN   Dose:  5 mg   Quantity:  90 tablet        Take 1 tablet (5 mg) by mouth daily   Refills:  1        nortriptyline 10 MG capsule   Commonly known as:  PAMELOR   Dose:  30 mg   Quantity:  120 capsule        Take 3 capsules (30 mg) by mouth At Bedtime   Refills:  0        ondansetron 4 MG ODT tab   Commonly known as:  ZOFRAN ODT   Dose:  4 mg   Quantity:  40 tablet        Take 1 tablet (4 mg) by mouth every 8 hours as needed for nausea or vomiting   Refills:  0        * oxyCODONE 5 MG/5ML solution   Commonly known as:  ROXICODONE   Dose:  10-15 mg   Quantity:  900 mL        Take 10-15 mLs (10-15 mg) by mouth every 4 hours as needed for moderate to severe pain   Refills:  0        * oxyCODONE IR 10 MG tablet   Commonly known as:  ROXICODONE   Dose:  10 mg   Quantity:  42 tablet        Take 1 tablet (10 mg) by mouth every 8 hours as needed for moderate to severe pain   Refills:  0        pantoprazole 40 MG EC tablet   Commonly known as:  PROTONIX   Dose:  40 mg   Quantity:   30 tablet        Take 1 tablet (40 mg) by mouth 2 times daily (before meals)   Refills:  1        polyethylene glycol Packet   Commonly known as:  MIRALAX/GLYCOLAX   Dose:  17 g   Quantity:  7 packet        Take 17 g by mouth daily   Refills:  0        RIZATRIPTAN BENZOATE PO   Dose:  5 mg        Take 5 mg by mouth once as needed   Refills:  0        senna-docusate 8.6-50 MG per tablet   Commonly known as:  SENOKOT-S;PERICOLACE   Dose:  1 tablet   Quantity:  100 tablet        Take 1 tablet by mouth 2 times daily as needed for constipation   Refills:  0        * Notice:  This list has 6 medication(s) that are the same as other medications prescribed for you. Read the directions carefully, and ask your doctor or other care provider to review them with you.            Procedures and tests performed during your visit     Abdomen US, complete      Orders Needing Specimen Collection     None      Pending Results     Date and Time Order Name Status Description    1/3/2018 1610 Abdomen US, complete Preliminary     1/2/2018 0857 EKG 12-LEAD COMPLETE W/READ - CLINICS Preliminary             Pending Culture Results     No orders found from 1/1/2018 to 1/4/2018.            Pending Results Instructions     If you had any lab results that were not finalized at the time of your Discharge, you can call the ED Lab Result RN at [phone]. You will be contacted by this team for any positive Lab results or changes in treatment. The nurses are available 7 days a week from 10A to 6:30P.  You can leave a message 24 hours per day and they will return your call.        Thank you for choosing [facility]       Thank you for choosing [facility] for your care. Our goal is always to provide you with excellent care. Hearing back from our patients is one way we can continue to improve our services. Please take a few minutes to complete the written survey that you may receive in the mail after you visit with us. Thank you!        MyChart Information      [employer] gives you secure access to your electronic health record. If you see a primary care provider, you can also send messages to your care team and make appointments. If you have questions, please call your primary care clinic.  If you do not have a primary care provider, please call [phone] and they will assist you.        Care EveryWhere ID     This is your Care EveryWhere ID. This could be used by other organizations to access your [place] medical records  [id number]        Equal Access to Services     [facility] : Hadii angelina rodneyo Somalini, waaxda luqadaha, qaybta kaalmada adeonel, maki gaspar . So [facility] [phone].    ATENCIÓN: Si habla español, tiene a quijano disposición servicios gratuitos de asistencia lingüística. Panfilo al [phone].    We comply with applicable federal civil rights laws and [place] laws. We do not discriminate on the basis of race, color, national origin, age, disability, sex, sexual orientation, or gender identity.            After Visit Summary       This is your record. Keep this with you and show to your community pharmacist(s) and doctor(s) at your next visit.

## 2018-01-03 NOTE — IP AVS SNAPSHOT
MRN:[id number]                      After Visit Summary   1/3/2018    Alexandra Melgoza    MRN: [id number]           Thank you!     Thank you for choosing [facility] for your care. Our goal is always to provide you with excellent care. Hearing back from our patients is one way we can continue to improve our services. Please take a few minutes to complete the written survey that you may receive in the mail after you visit with us. Thank you!        Patient Information     Date Of Birth          1995        Designated Caregiver       Most Recent Value    Caregiver    Will someone help with your care after discharge? no      About your hospital stay     You were admitted on:  January 3, 2018 You last received care in the:  Unit 6D Observation [facility]    You were discharged on:  January 4, 2018        Reason for your hospital stay       Acute on chronic abdominal pain                  Who to Call     For medical emergencies, please call 911.  For non-urgent questions about your medical care, please call your primary care provider or clinic, [phone]          Attending Provider     Provider Specialty    Agnes Martinez MD Emergency Medicine    Raissa, Jasmin Muñiz MD Emergency Medicine    Bautista, Louise Horowitz MD Internal Medicine    Ricardo, August NATARAJAN MD Internal Medicine       Primary Care Provider Office Phone # Fax #    Quyen Baez [phone]      After Care Instructions     Diet       Follow this diet upon discharge: Regular                  Follow-up Appointments     Follow Up and recommended labs and tests       Follow up with pain team, infusion center                  Your next 10 appointments already scheduled     Jan 05, 2018  8:30 AM CST   Level 2 with UR CH 04   [facility]    [address] [place] [address]   [phone]            Umer 10,  2018 12:00 PM CST   (Arrive by 11:45 AM)   Return Visit with Campbell Narayanan MD   [facility] ([facility])    [address]  [address] [facility] [address] [phone]              Further instructions from your care team       Please make an appointment to follow up with GI clinic in one week to re-check LFTs.  Follow-up with pain clinic as scheduled.  Continue previously prescribed methadone and zofran as needed for nausea.     If you have worsening pain, fevers, intractable vomiting or other concerns, return to the emergency department for re-evaluation.            *Abdominal Pain, Unknown Cause (Female)    The exact cause of your abdominal (stomach) pain is not certain. This does not mean that this is something to worry about, or the right tests were not done. Everyone likes to know the exact cause of the problem, but sometimes with abdominal pain, there is no clear-cut cause, and this could be a good thing. The good news is that your symptoms can be treated, and you will feel better.   Your condition does not seem serious now; however, sometimes the signs of a serious problem may take more time to appear. For this reason, it is important for you to watch for any new symptoms, problems, or worsening of your condition.  Over the next few days, the abdominal pain may come and go, or be continuous. Other common symptoms can include nausea and vomiting. Sometimes it can be difficult to tell if you feel nauseous, you may just feel bad and not associate that feeling with nausea. Constipation, diarrhea, and a fever may go along with the pain.  The pain may continue even if treated correctly over the following days. Depending on how things go, sometimes the cause can become clear and may require further or different treatment. Additional evaluations, medications, or tests may be needed.  Home care  Your health care provider may prescribe medications for  pain, symptoms, or an infection.  Follow the health care provider's instructions for taking these medications.  General care    Rest until your next exam. No strenuous activities.    Try to find positions that ease discomfort. A small pillow placed on the abdomen may help relieve pain.    Something warm on your abdomen (such as a heating pad) may help, but be careful not to burn yourself.  Diet    Do not force yourself to eat, especially if having cramps, vomiting, or diarrhea.    Water is important so you do not get dehydrated. Soup may also be good. Sports drinks may also help, especially if they are not too acidic. Make sure you don't drink sugary drinks as this can make things worse. Take liquids in small amounts. Do not guzzle them.    Caffeine sometimes makes the pain and cramping worse.    Avoid dairy products if you have vomiting or diarrhea.    Don't eat large amounts at a time. Wait a few minutes between bites.    Eat a diet low in fiber (called a low-residue diet). Foods allowed include refined breads, white rice, fruit and vegetable juices without pulp, tender meats. These foods will pass more easily through the intestine.    Avoid fried or fatty foods, dairy, alcohol and spicy foods until your symptoms go away.  Follow-up care  Follow up with your health care provider as instructed, or if your pain does not begin to improve in the next 24 hours.  When to seek medical care  Seek prompt medical care if any of the following occur:    Pain gets worse or moves to the right lower abdomen    New or worsening vomiting or diarrhea    Swelling of the abdomen    Unable to pass stool for more than three days    New fever over 101  F (38.3 C), or rising fever    Blood in vomit or bowel movements (dark red or black color)    Jaundice (yellow color of eyes and skin)    Weakness, dizziness    Chest, arm, back, neck or jaw pain    Unexpected vaginal bleeding or missed period  Call 911  Call emergency services if any of  "the following occur:    Trouble breathing    Confusion    Fainting or loss of consciousness    Rapid heart rate    Seizure    [id number] Dionne Nolasco, [address] [facility], [place] [address]. All rights reserved. This information is not intended as a substitute for professional medical care. Always follow your healthcare professional's instructions.            Pending Results     No orders found for last 3 day(s).            Statement of Approval     Ordered          01/04/18 2100  I have reviewed and agree with all the recommendations and orders detailed in this document.  EFFECTIVE NOW     Approved and electronically signed by:  Corbin Vaughn APRN CNP             Admission Information     Date & Time Provider Department Dept. Phone    1/3/2018 August Silva MD Unit 6D Observation [facility] [phone]      Your Vitals Were     Blood Pressure Pulse Temperature Respirations Height Weight    119/79 (BP Location: Right arm) 98 98.8  F (37.1  C) (Oral) 15 1.676 m (5' 6\") 65.8 kg (145 lb)    Pulse Oximetry BMI (Body Mass Index)                100% 23.4 kg/m2          [employer] Information     [employer] gives you secure access to your electronic health record. If you see a primary care provider, you can also send messages to your care team and make appointments. If you have questions, please call your primary care clinic.  If you do not have a primary care provider, please call [phone] and they will assist you.        Care EveryWhere ID     This is your Care EveryWhere ID. This could be used by other organizations to access your [facility] medical records  [id number]        Equal Access to Services     [facility] : Hadii angelina ku hadasho Soomaali, waaxda luqadaha, qaybta kaalmada adeegyada, maki gaspar . So [facility] [phone].    ATENCIÓN: Si habla español, tiene a quijano disposición servicios gratuitos de asistencia lingüística. Llame al [phone].    We comply with " applicable federal civil rights laws and [place] laws. We do not discriminate on the basis of race, color, national origin, age, disability, sex, sexual orientation, or gender identity.               Review of your medicines      START taking        Dose / Directions    scopolamine 72 hr patch   Commonly known as:  TRANSDERM   Used for:  Intractable vomiting with nausea, unspecified vomiting type        Apply 1 patch to hairless area behind one ear if severe nausea and vomiting.  Remove old patch and change every 3 days (72 hours).   Quantity:  2 patch   Refills:  0         CONTINUE these medicines which may have CHANGED, or have new prescriptions. If we are uncertain of the size of tablets/capsules you have at home, strength may be listed as something that might have changed.        Dose / Directions    gabapentin 400 MG capsule   Commonly known as:  NEURONTIN   This may have changed:  Another medication with the same name was removed. Continue taking this medication, and follow the directions you see here.   Used for:  Abdominal pain, epigastric        Dose:  400 mg   Take 1 capsule (400 mg) by mouth 3 times daily   Quantity:  90 capsule   Refills:  3         CONTINUE these medicines which have NOT CHANGED        Dose / Directions    * amylase-lipase-protease 56173 UNITS Cpep   Commonly known as:  ZENPEP   Used for:  Idiopathic chronic pancreatitis (H)        Dose:  2-3 capsule   Take 2-3 capsules (40,000-60,000 Units) by mouth Take with snacks or supplements (Snacks)   Quantity:  180 capsule   Refills:  1       * amylase-lipase-protease 71830 UNITS Cpep   Commonly known as:  ZENPEP   Used for:  Right upper quadrant abdominal pain        Dose:  5 capsule   Take 5 capsules (100,000 Units) by mouth 3 times daily (with meals)   Quantity:  180 capsule   Refills:  1       fluticasone 50 MCG/ACT spray   Commonly known as:  FLONASE   Used for:  Nasal congestion        Dose:  2 spray   Spray 2 sprays into both nostrils  daily   Quantity:  16 g   Refills:  3       leuprolide 11.25 MG kit   Commonly known as:  LUPRON DEPOT (3-MONTH)   Used for:  Endometriosis        Dose:  11.25 mg   Inject 11.25 mg into the muscle every 3 months   Quantity:  1 each   Refills:  3       levalbuterol 45 MCG/ACT Inhaler   Commonly known as:  XOPENEX HFA   Used for:  Wheeze        Dose:  2 puff   Inhale 2 puffs into the lungs every 6 hours as needed for shortness of breath / dyspnea   Quantity:  1 Inhaler   Refills:  1       methadone 5 MG tablet   Commonly known as:  DOLOPHINE   Used for:  Abdominal pain, epigastric        Dose:  2.5 mg   Take 0.5 tablets (2.5 mg) by mouth every 12 hours   Quantity:  30 tablet   Refills:  0       norethindrone 5 MG tablet   Commonly known as:  AYGESTIN   Used for:  Endometriosis        Dose:  5 mg   Take 1 tablet (5 mg) by mouth daily   Quantity:  90 tablet   Refills:  1       nortriptyline 10 MG capsule   Commonly known as:  PAMELOR   Used for:  Right upper quadrant abdominal pain        Dose:  30 mg   Take 3 capsules (30 mg) by mouth At Bedtime   Quantity:  120 capsule   Refills:  0       ondansetron 4 MG ODT tab   Commonly known as:  ZOFRAN ODT   Used for:  Idiopathic chronic pancreatitis (H)        Dose:  4 mg   Take 1 tablet (4 mg) by mouth every 8 hours as needed for nausea or vomiting   Quantity:  40 tablet   Refills:  0       pantoprazole 40 MG EC tablet   Commonly known as:  PROTONIX   Used for:  Right upper quadrant abdominal pain, Erosive gastritis        Dose:  40 mg   Take 1 tablet (40 mg) by mouth 2 times daily (before meals)   Quantity:  30 tablet   Refills:  1       polyethylene glycol Packet   Commonly known as:  MIRALAX/GLYCOLAX   Used for:  Right upper quadrant abdominal pain        Dose:  17 g   Take 17 g by mouth daily   Quantity:  7 packet   Refills:  0       RIZATRIPTAN BENZOATE PO        Dose:  5 mg   Take 5 mg by mouth once as needed   Refills:  0       senna-docusate 8.6-50 MG per tablet    Commonly known as:  SENOKOT-S;PERICOLACE   Used for:  Right upper quadrant abdominal pain        Dose:  1 tablet   Take 1 tablet by mouth 2 times daily as needed for constipation   Quantity:  100 tablet   Refills:  0       * Notice:  This list has 2 medication(s) that are the same as other medications prescribed for you. Read the directions carefully, and ask your doctor or other care provider to review them with you.      STOP taking     oxyCODONE 5 MG/5ML solution   Commonly known as:  ROXICODONE           oxyCODONE IR 10 MG tablet   Commonly known as:  ROXICODONE                Where to get your medicines      Some of these will need a paper prescription and others can be bought over the counter. Ask your nurse if you have questions.     Bring a paper prescription for each of these medications     ondansetron 4 MG ODT tab    scopolamine 72 hr patch                Protect others around you: Learn how to safely use, store and throw away your medicines at www.disposemymeds.org.             Medication List: This is a list of all your medications and when to take them. Check marks below indicate your daily home schedule. Keep this list as a reference.      Medications           Morning Afternoon Evening Bedtime As Needed    * amylase-lipase-protease 69783 UNITS Cpep   Commonly known as:  ZENPEP   Take 2-3 capsules (40,000-60,000 Units) by mouth Take with snacks or supplements (Snacks)                                * amylase-lipase-protease 44620 UNITS Cpep   Commonly known as:  ZENPEP   Take 5 capsules (100,000 Units) by mouth 3 times daily (with meals)                                fluticasone 50 MCG/ACT spray   Commonly known as:  FLONASE   Spray 2 sprays into both nostrils daily                                gabapentin 400 MG capsule   Commonly known as:  NEURONTIN   Take 1 capsule (400 mg) by mouth 3 times daily   Last time this was given:  400 mg on 1/4/2018  8:01 PM                                leuprolide  11.25 MG kit   Commonly known as:  LUPRON DEPOT (3-MONTH)   Inject 11.25 mg into the muscle every 3 months                                levalbuterol 45 MCG/ACT Inhaler   Commonly known as:  XOPENEX HFA   Inhale 2 puffs into the lungs every 6 hours as needed for shortness of breath / dyspnea                                methadone 5 MG tablet   Commonly known as:  DOLOPHINE   Take 0.5 tablets (2.5 mg) by mouth every 12 hours   Last time this was given:  2.5 mg on 1/4/2018  4:34 PM                                norethindrone 5 MG tablet   Commonly known as:  AYGESTIN   Take 1 tablet (5 mg) by mouth daily                                nortriptyline 10 MG capsule   Commonly known as:  PAMELOR   Take 3 capsules (30 mg) by mouth At Bedtime                                ondansetron 4 MG ODT tab   Commonly known as:  ZOFRAN ODT   Take 1 tablet (4 mg) by mouth every 8 hours as needed for nausea or vomiting                                pantoprazole 40 MG EC tablet   Commonly known as:  PROTONIX   Take 1 tablet (40 mg) by mouth 2 times daily (before meals)   Last time this was given:  40 mg on 1/4/2018  4:36 PM                                polyethylene glycol Packet   Commonly known as:  MIRALAX/GLYCOLAX   Take 17 g by mouth daily                                RIZATRIPTAN BENZOATE PO   Take 5 mg by mouth once as needed                                scopolamine 72 hr patch   Commonly known as:  TRANSDERM   Apply 1 patch to hairless area behind one ear if severe nausea and vomiting.  Remove old patch and change every 3 days (72 hours).   Last time this was given:  1 patch on 1/4/2018  2:45 PM                                senna-docusate 8.6-50 MG per tablet   Commonly known as:  SENOKOT-S;PERICOLACE   Take 1 tablet by mouth 2 times daily as needed for constipation                                * Notice:  This list has 2 medication(s) that are the same as other medications prescribed for you. Read the directions  carefully, and ask your doctor or other care provider to review them with you.

## 2018-01-03 NOTE — IP AVS SNAPSHOT
Unit 6D Observation [address] [address]    Phone:  [phone]    Fax:  [phone]                                       After Visit Summary   1/3/2018    Alexandra Melgoza    MRN: [id number]           After Visit Summary Signature Page     I have received my discharge instructions, and my questions have been answered. I have discussed any challenges I see with this plan with the nurse or doctor.    ..........................................................................................................................................  Patient/Patient Representative Signature      ..........................................................................................................................................  Patient Representative Print Name and Relationship to Patient    ..................................................               ................................................  Date                                            Time    ..........................................................................................................................................  Reviewed by Signature/Title    ...................................................              ..............................................  Date                                                            Time

## 2018-01-03 NOTE — ED AVS SNAPSHOT
[facility], [place], Emergency Department    [address] [place] [address]    Phone:  [phone]                                       Alexandra Melgoza   MRN: [id number]    Department:  [facility], Emergency Department   Date of Visit:  1/3/2018           After Visit Summary Signature Page     I have received my discharge instructions, and my questions have been answered. I have discussed any challenges I see with this plan with the nurse or doctor.    ..........................................................................................................................................  Patient/Patient Representative Signature      ..........................................................................................................................................  Patient Representative Print Name and Relationship to Patient    ..................................................               ................................................  Date                                            Time    ..........................................................................................................................................  Reviewed by Signature/Title    ...................................................              ..............................................  Date                                                            Time

## 2018-01-03 NOTE — LETTER
1/3/2018       RE: Alexandra Melgoza  [address]  [facility] [address]     Dear Colleague,    Thank you for referring your patient, Alexandra Melgoza, to the [facility] PANCREAS AND BILIARY at [facility]. Please see a copy of my visit note below.    REASON FOR VISIT/CONSULT:  Abdominal pain       HISTORY OF PRESENT ILLNESS:     Alexandra Melgoza is a 22 year old female with a history of gastritis, chronic abdominal pain with post-cholecystectomy pain with mildly abnormal liver enzymes, not responding to sphincter of Oddi ablation a few years ago, on chronic narcotics. She reports chronic abdominal pain issues since the age of 8. She underwent a full evaluation for chronic pancreatitis however had normal pancreatic function tests, only 2/9 EUS criteria (12/2016) and completely normal secretin stimulated MRCP with normal T1 signal and exocrine function. She was last seen in clinic by Dr. Narayanan on 11/22/2017. Hospital admission in November for worsening abdominal pain, nausea and vomiting. Required NJFT placement due to poor oral intake and continuous N/V. Repeat MRCP with secretin 11/22/2017 was normal. She did undergo an EGD for evaluation of hematemesis (12/9/2017) which revealed patchy gastritis for which she was started on a PPI.  She was seen in the emergency room 2 days ago for abdominal pain, nausea and vomiting. Het labs were checked which were all normal she was given IV fluids and 1 dose of morphine, her symptoms improved she was discharged home. She followed up with pain clinic yesterday for chronic comprehensive pain management and started on Methadone.     She reports at baseline she has daily abdominal pain. She reports worsening abdominal pain for the past 4 days, with associated nausea and vomiting. For the past few days, her usual regimen for  nausea,Zofran and benadryl, has not helped. She reports 4 days of vomiting that inially improved after receiving IV  fluids and zofran in the ED a few days ago. She reports persistent nausea and vomiting for the past 24 hours. She vomited 6 times yesterday and 3 times this morning. She reports worsening abdominal  pain today that radiates to her back, rates pain 8/10. She is unable to keep pain medication down, food or liquids down. She denies fever, she does endorse sweating and some chills.         PAST MEDICAL HISTORY:  chronic abdominal pain, pancreatitis, cyclical vomiting syndrome, endometriosis, migraines, pseudoseizures, somatoform disorder and migraines      PAST SURGICAL HISTORY:   cholecystectomy       MEDICATIONS:   See Epic medication list, medications reviewed       FAMILY HISTORY:  Non-contributory           SOCIAL HISTORY:    Tobacco use: none   Alcohol use: none   Drug use: none        REVIEW OF SYSTEMS:   A comprehensive review of systems was performed and was noted to be negative except as above.      PHYSICAL EXAM:  VITAL SIGNS: stable, tachycardia   GENERAL: tearful, appears to be in mild distress, holding emesis back, dry heaves  EYES: Eyes grossly normal to inspection, anicteric sclera   RESP: lungs clear to auscultation - no rales, no rhonchi, no wheezes  CV: regular rates and rhythm, normal S1 S2, no murmur   ABDOMEN: soft, epigastric tenderness, no hepatosplenomegaly, no masses, distention, guarding, or rebound, normal bowel sounds  MS: extremities- no gross deformities noted, no edema  SKIN: no rashes or juandice, skin warm and dry  PSYCH: Alert and oriented times 3  LYMPHATICS: ant. cervical- normal      ASSESSMENT AND RECOMMENDATIONS:   Alexandra Melgoza is a 22 year old female with a history of gastritis, chronic abdominal pain with post-cholecystectomy pain with mildly abnormal liver enzymes, not responding to sphincter of Oddi ablation a few years ago, on chronic narcotics who presents today for persistent nausea, vomiting and abdominal pain. Considering she is unable to tolerate PO intake and with  persistent abdominal pain, nausea and vomiting recommended patient be evaluated in the emergency room for possible hospital admission due to concern for dehydration, persistent nausea and vomiting and acute on chronic abdominal pain. Will check labs including lipase, CMP, and CBC in clinic today before sending to ER.     She has follow-up appointment with Dr. Narayanan next week on the 10th. We discussed long-term management to include consideration of GJ tube placement to meet nutritional needs and goal that this will help with symptom management. Will discuss this further at next clinic visit.       I spent 30 minutes with this patient face to face and explained the conditions and plans (more than 50% of time was counseling/coordination of care, as discussed above).    Plan discussed with Dr. Narayanan, agreed with plan     Rand Navarro, CIPRIANO   Advanced Endoscopy   [phone]

## 2018-01-04 VITALS
SYSTOLIC BLOOD PRESSURE: 119 MMHG | TEMPERATURE: 98.8 F | OXYGEN SATURATION: 100 % | DIASTOLIC BLOOD PRESSURE: 79 MMHG | BODY MASS INDEX: 23.3 KG/M2 | HEART RATE: 98 BPM | RESPIRATION RATE: 15 BRPM | WEIGHT: 145 LBS | HEIGHT: 66 IN

## 2018-01-04 LAB
ALBUMIN SERPL-MCNC: 3.5 G/DL (ref 3.4–5)
ALP SERPL-CCNC: 102 U/L (ref 40–150)
ALT SERPL W P-5'-P-CCNC: 110 U/L (ref 0–50)
ANION GAP SERPL CALCULATED.3IONS-SCNC: 13 MMOL/L (ref 3–14)
AST SERPL W P-5'-P-CCNC: 56 U/L (ref 0–45)
BILIRUB SERPL-MCNC: 0.8 MG/DL (ref 0.2–1.3)
BUN SERPL-MCNC: 14 MG/DL (ref 7–30)
CALCIUM SERPL-MCNC: 8.9 MG/DL (ref 8.5–10.1)
CHLORIDE SERPL-SCNC: 106 MMOL/L (ref 94–109)
CO2 SERPL-SCNC: 21 MMOL/L (ref 20–32)
CREAT SERPL-MCNC: 0.54 MG/DL (ref 0.52–1.04)
ERYTHROCYTE [DISTWIDTH] IN BLOOD BY AUTOMATED COUNT: 12.6 % (ref 10–15)
GFR SERPL CREATININE-BSD FRML MDRD: >90 ML/MIN/1.7M2
GLUCOSE SERPL-MCNC: 60 MG/DL (ref 70–99)
HCT VFR BLD AUTO: 32.4 % (ref 35–47)
HGB BLD-MCNC: 10.6 G/DL (ref 11.7–15.7)
INTERPRETATION ECG - MUSE: NORMAL
LIPASE SERPL-CCNC: 93 U/L (ref 73–393)
MCH RBC QN AUTO: 30.5 PG (ref 26.5–33)
MCHC RBC AUTO-ENTMCNC: 32.7 G/DL (ref 31.5–36.5)
MCV RBC AUTO: 93 FL (ref 78–100)
PLATELET # BLD AUTO: 154 10E9/L (ref 150–450)
POTASSIUM SERPL-SCNC: 3.8 MMOL/L (ref 3.4–5.3)
PROT SERPL-MCNC: 6.2 G/DL (ref 6.8–8.8)
RBC # BLD AUTO: 3.48 10E12/L (ref 3.8–5.2)
SODIUM SERPL-SCNC: 139 MMOL/L (ref 133–144)
WBC # BLD AUTO: 4.5 10E9/L (ref 4–11)

## 2018-01-04 PROCEDURE — 99207 ZZC CDG-CODE CATEGORY CHANGED: CPT | Performed by: NURSE PRACTITIONER

## 2018-01-04 PROCEDURE — 25000132 ZZH RX MED GY IP 250 OP 250 PS 637: Performed by: PHYSICIAN ASSISTANT

## 2018-01-04 PROCEDURE — 25000128 H RX IP 250 OP 636: Performed by: PHYSICIAN ASSISTANT

## 2018-01-04 PROCEDURE — G0378 HOSPITAL OBSERVATION PER HR: HCPCS

## 2018-01-04 PROCEDURE — 25000128 H RX IP 250 OP 636: Performed by: NURSE PRACTITIONER

## 2018-01-04 PROCEDURE — 96376 TX/PRO/DX INJ SAME DRUG ADON: CPT

## 2018-01-04 PROCEDURE — 83690 ASSAY OF LIPASE: CPT | Performed by: PHYSICIAN ASSISTANT

## 2018-01-04 PROCEDURE — 25000125 ZZHC RX 250: Performed by: PHYSICIAN ASSISTANT

## 2018-01-04 PROCEDURE — 99207 ZZC CONSULT E&M CHANGED TO SUBSEQUENT LEVEL: CPT | Performed by: PHYSICIAN ASSISTANT

## 2018-01-04 PROCEDURE — 36592 COLLECT BLOOD FROM PICC: CPT | Performed by: PHYSICIAN ASSISTANT

## 2018-01-04 PROCEDURE — 85027 COMPLETE CBC AUTOMATED: CPT | Performed by: PHYSICIAN ASSISTANT

## 2018-01-04 PROCEDURE — 99207 ZZC APP CREDIT; MD BILLING SHARED VISIT: CPT | Performed by: PHYSICIAN ASSISTANT

## 2018-01-04 PROCEDURE — 99217 ZZC OBSERVATION CARE DISCHARGE: CPT | Performed by: NURSE PRACTITIONER

## 2018-01-04 PROCEDURE — 99215 OFFICE O/P EST HI 40 MIN: CPT | Performed by: PHYSICIAN ASSISTANT

## 2018-01-04 PROCEDURE — 80053 COMPREHEN METABOLIC PANEL: CPT | Performed by: PHYSICIAN ASSISTANT

## 2018-01-04 RX ORDER — ONDANSETRON 4 MG/1
4 TABLET, ORALLY DISINTEGRATING ORAL EVERY 8 HOURS PRN
Qty: 40 TABLET | Refills: 0 | Status: SHIPPED | OUTPATIENT
Start: 2018-01-04 | End: 2018-01-31

## 2018-01-04 RX ORDER — LIDOCAINE 40 MG/G
CREAM TOPICAL
Status: DISCONTINUED | OUTPATIENT
Start: 2018-01-04 | End: 2018-01-05 | Stop reason: HOSPADM

## 2018-01-04 RX ORDER — SCOLOPAMINE TRANSDERMAL SYSTEM 1 MG/1
PATCH, EXTENDED RELEASE TRANSDERMAL
Qty: 2 PATCH | Refills: 0 | Status: ON HOLD | OUTPATIENT
Start: 2018-01-04 | End: 2018-03-27

## 2018-01-04 RX ORDER — LIDOCAINE 4 G/G
1 PATCH TOPICAL
Status: DISCONTINUED | OUTPATIENT
Start: 2018-01-04 | End: 2018-01-05 | Stop reason: HOSPADM

## 2018-01-04 RX ORDER — SCOLOPAMINE TRANSDERMAL SYSTEM 1 MG/1
1 PATCH, EXTENDED RELEASE TRANSDERMAL
Status: DISCONTINUED | OUTPATIENT
Start: 2018-01-04 | End: 2018-01-05 | Stop reason: HOSPADM

## 2018-01-04 RX ORDER — HEPARIN SODIUM,PORCINE 10 UNIT/ML
5-10 VIAL (ML) INTRAVENOUS EVERY 24 HOURS
Status: DISCONTINUED | OUTPATIENT
Start: 2018-01-04 | End: 2018-01-05 | Stop reason: HOSPADM

## 2018-01-04 RX ORDER — HEPARIN SODIUM (PORCINE) LOCK FLUSH IV SOLN 100 UNIT/ML 100 UNIT/ML
5 SOLUTION INTRAVENOUS
Status: DISCONTINUED | OUTPATIENT
Start: 2018-01-04 | End: 2018-01-05 | Stop reason: HOSPADM

## 2018-01-04 RX ORDER — METHADONE HYDROCHLORIDE 5 MG/5ML
2.5 SOLUTION ORAL EVERY 12 HOURS
Status: DISCONTINUED | OUTPATIENT
Start: 2018-01-04 | End: 2018-01-05 | Stop reason: HOSPADM

## 2018-01-04 RX ORDER — HEPARIN SODIUM,PORCINE 10 UNIT/ML
5-10 VIAL (ML) INTRAVENOUS
Status: DISCONTINUED | OUTPATIENT
Start: 2018-01-04 | End: 2018-01-05 | Stop reason: HOSPADM

## 2018-01-04 RX ADMIN — PANTOPRAZOLE SODIUM 40 MG: 40 TABLET, DELAYED RELEASE ORAL at 16:36

## 2018-01-04 RX ADMIN — SODIUM CHLORIDE, POTASSIUM CHLORIDE, SODIUM LACTATE AND CALCIUM CHLORIDE: 600; 310; 30; 20 INJECTION, SOLUTION INTRAVENOUS at 06:25

## 2018-01-04 RX ADMIN — Medication 0.5 MG: at 03:42

## 2018-01-04 RX ADMIN — ONDANSETRON 4 MG: 2 INJECTION INTRAMUSCULAR; INTRAVENOUS at 15:48

## 2018-01-04 RX ADMIN — Medication 0.5 MG: at 07:43

## 2018-01-04 RX ADMIN — ONDANSETRON 4 MG: 2 INJECTION INTRAMUSCULAR; INTRAVENOUS at 03:42

## 2018-01-04 RX ADMIN — PANTOPRAZOLE SODIUM 40 MG: 40 TABLET, DELAYED RELEASE ORAL at 07:45

## 2018-01-04 RX ADMIN — GABAPENTIN 400 MG: 400 CAPSULE ORAL at 07:45

## 2018-01-04 RX ADMIN — SCOPALAMINE 1 PATCH: 1 PATCH, EXTENDED RELEASE TRANSDERMAL at 14:45

## 2018-01-04 RX ADMIN — GABAPENTIN 400 MG: 400 CAPSULE ORAL at 20:01

## 2018-01-04 RX ADMIN — SODIUM CHLORIDE, POTASSIUM CHLORIDE, SODIUM LACTATE AND CALCIUM CHLORIDE: 600; 310; 30; 20 INJECTION, SOLUTION INTRAVENOUS at 12:48

## 2018-01-04 RX ADMIN — Medication 1 MG: at 11:58

## 2018-01-04 RX ADMIN — METHADONE HYDROCHLORIDE 2.5 MG: 5 SOLUTION ORAL at 20:00

## 2018-01-04 RX ADMIN — SODIUM CHLORIDE, PRESERVATIVE FREE 5 ML: 5 INJECTION INTRAVENOUS at 21:36

## 2018-01-04 RX ADMIN — GABAPENTIN 400 MG: 400 CAPSULE ORAL at 13:59

## 2018-01-04 RX ADMIN — METHADONE HYDROCHLORIDE 2.5 MG: 5 TABLET ORAL at 16:34

## 2018-01-04 RX ADMIN — LIDOCAINE 1 PATCH: 560 PATCH PERCUTANEOUS; TOPICAL; TRANSDERMAL at 20:01

## 2018-01-04 RX ADMIN — ONDANSETRON 4 MG: 2 INJECTION INTRAMUSCULAR; INTRAVENOUS at 09:31

## 2018-01-04 NOTE — PROVIDER NOTIFICATION
[facility] text paged Re: Patient arrived to the unit and needs orders placed. Patient also reporting pain to the abdomen.

## 2018-01-04 NOTE — PROGRESS NOTES
[facility], [place]    Internal Medicine Progress Note - Gold Service      Assessment & Plan   Alexandra Melgoza is a 22 year old female with a pmh od asthma, chronic abdominal pain s/p cholecystectomy, cyclic N/V, migraines, pseudoseizures and somatoform disorder admitted on 12/6/17 for acute on chronic abdominal pain.     # Acute on chronic abdominal pain. Unknown etiology and ongoing since age 8. EGD in 12/2017 w/ patchy gastritis in the stomach- more pronounced in the cardia, normal esophagus and duodenum. MRCP 12/2017 w/ normal secretin stimulation study, stable appearance of pancreas w/o focal mass or ductal dilatation, post op sravani changes w/o extrahepatic biliary dilatation. EUS in 12/2016 did not meet criteria for chronic pancreatitis. NM hepatobiliary scan in 04/2015 normal. Pancreatic elastase normal 12/2017.  Follows w/ Dr. Narayanan and Pain clinic. Recently DCed oxycodone and started on PO methadone. GI consulted on admission.   - GI consulted  - Pain consulted, recommend dc IV dilaudid, resume PTA methadone 2.5mg Q24H  - Will try scopolamine patches for nausea  - Continue IVF  - CLD  - IV pain management, dc once tolerating PO  - pancreatic enzyme supplementation w/ meals  - Continue PPI  - am CMP    Transaminitis. Unclear etiology  (152), AST 56 (82), Tbili, lipase and alk phos normal. US abdomen 1/3/18 normal.  - GI consulted    Depression, anxiety. Continue nortriptyline.     Diet: Clear Liquid Diet  Fluids: LR @ 150  DVT Prophylaxis: PCDs and Ambulate every shift  Code Status: Full Code    Disposition Plan   Expected discharge: 2 - 3 days, recommended to prior living arrangement once adequate pain management/ tolerating PO medications.     Entered: Nadeen Torres 01/04/2018, 11:55 AM   Information in the above section will display in the discharge planner report.      The patient's care was discussed with the Attending Physician, Dr. Sanders.    Nadeen BURRIS  Melissa  Internal Medicine Staff Hospitalist Service  [facility]  Pager: [phone]  Please see sticky note for cross cover information    Interval History   Abdominal pain unchanged from yesterday. Continues to have significant nausea, vomited meds this am. Describes pain as RUQ and wrapping around to back, stabbing in nature. Denies chest pain, shortness of breath or difficulty breathing. No fevers, chills or diarrhea.       Data reviewed today: I reviewed all medications, new labs and imaging results over the last 24 hours.    Physical Exam   Vital Signs: Temp: 97.7  F (36.5  C) Temp src: Oral BP: 105/74 Pulse: 78   Resp: 16 SpO2: 100 % O2 Device: None (Room air)    Weight: 145 lbs 0 oz  General Appearance: Alert and oriented x 3, NAD, tearful  Respiratory: Lungs CTAB, no wheezing or crackles  Cardiovascular: RRR, no murmurs or gallops  GI: Abdomen soft, non distended, tender to palpation in RUQ, BS+  Skin: warm and dry to touch, no rashes or excoriations  Other: no LE edema          Data   Data     Recent Labs  Lab 01/04/18  0656 01/03/18  1416 01/01/18  2145   WBC 4.5 3.3* 4.6   HGB 10.6* 13.4 12.9   MCV 93 94 93    203 208    136 140   POTASSIUM 3.8 4.5 4.1   CHLORIDE 106 105 106   CO2 21 24 26   BUN 14 12 14   CR 0.54 0.61 0.53   ANIONGAP 13 8 9   RENEA 8.9 9.3 9.5   GLC 60* 94 89   ALBUMIN 3.5 4.6 4.8   PROTTOTAL 6.2* 7.8 7.6   BILITOTAL 0.8 0.6 0.5   ALKPHOS 102 120 99   * 152* 42   AST 56* 82* 23   LIPASE 93 134 100

## 2018-01-04 NOTE — PROVIDER NOTIFICATION
Med Gold text paged Re: pt. Threw up morning meds and is nauseous.  Zofran is not due until 0945, need antiemetic sooner.

## 2018-01-04 NOTE — PLAN OF CARE
Problem: Patient Care Overview  Goal: Plan of Care/Patient Progress Review  Outpatient/Observation goals to be met before discharge home:    - Diagnostic tests and consults completed and resulted: Yes  - Vital signs normal or at patient baseline: Yes   -Tolerating oral intake to maintain hydration: No, pt is NPO  - Adequate pain control on oral analgesics: No

## 2018-01-04 NOTE — DISCHARGE INSTRUCTIONS
Please make an appointment to follow up with GI clinic in one week to re-check LFTs.  Follow-up with pain clinic as scheduled.  Continue previously prescribed methadone and zofran as needed for nausea.     If you have worsening pain, fevers, intractable vomiting or other concerns, return to the emergency department for re-evaluation.            *Abdominal Pain, Unknown Cause (Female)    The exact cause of your abdominal (stomach) pain is not certain. This does not mean that this is something to worry about, or the right tests were not done. Everyone likes to know the exact cause of the problem, but sometimes with abdominal pain, there is no clear-cut cause, and this could be a good thing. The good news is that your symptoms can be treated, and you will feel better.   Your condition does not seem serious now; however, sometimes the signs of a serious problem may take more time to appear. For this reason, it is important for you to watch for any new symptoms, problems, or worsening of your condition.  Over the next few days, the abdominal pain may come and go, or be continuous. Other common symptoms can include nausea and vomiting. Sometimes it can be difficult to tell if you feel nauseous, you may just feel bad and not associate that feeling with nausea. Constipation, diarrhea, and a fever may go along with the pain.  The pain may continue even if treated correctly over the following days. Depending on how things go, sometimes the cause can become clear and may require further or different treatment. Additional evaluations, medications, or tests may be needed.  Home care  Your health care provider may prescribe medications for pain, symptoms, or an infection.  Follow the health care provider's instructions for taking these medications.  General care    Rest until your next exam. No strenuous activities.    Try to find positions that ease discomfort. A small pillow placed on the abdomen may help relieve  pain.    Something warm on your abdomen (such as a heating pad) may help, but be careful not to burn yourself.  Diet    Do not force yourself to eat, especially if having cramps, vomiting, or diarrhea.    Water is important so you do not get dehydrated. Soup may also be good. Sports drinks may also help, especially if they are not too acidic. Make sure you don't drink sugary drinks as this can make things worse. Take liquids in small amounts. Do not guzzle them.    Caffeine sometimes makes the pain and cramping worse.    Avoid dairy products if you have vomiting or diarrhea.    Don't eat large amounts at a time. Wait a few minutes between bites.    Eat a diet low in fiber (called a low-residue diet). Foods allowed include refined breads, white rice, fruit and vegetable juices without pulp, tender meats. These foods will pass more easily through the intestine.    Avoid fried or fatty foods, dairy, alcohol and spicy foods until your symptoms go away.  Follow-up care  Follow up with your health care provider as instructed, or if your pain does not begin to improve in the next 24 hours.  When to seek medical care  Seek prompt medical care if any of the following occur:    Pain gets worse or moves to the right lower abdomen    New or worsening vomiting or diarrhea    Swelling of the abdomen    Unable to pass stool for more than three days    New fever over 101  F (38.3 C), or rising fever    Blood in vomit or bowel movements (dark red or black color)    Jaundice (yellow color of eyes and skin)    Weakness, dizziness    Chest, arm, back, neck or jaw pain    Unexpected vaginal bleeding or missed period  Call 911  Call emergency services if any of the following occur:    Trouble breathing    Confusion    Fainting or loss of consciousness    Rapid heart rate    Seizure    [id number] Dionne NarayananLe[facility], [address] [place] [address], [place] [address]. All rights reserved. This information is not intended as a substitute for  professional medical care. Always follow your healthcare professional's instructions.

## 2018-01-04 NOTE — PLAN OF CARE
Problem: Patient Care Overview  Goal: Plan of Care/Patient Progress Review  - Diagnostic tests and consults completed and resulted: No  - Vital signs normal or at patient baseline: Yes   -Tolerating oral intake to maintain hydration: No, pt is NPO  - Adequate pain control on oral analgesics: No

## 2018-01-04 NOTE — PLAN OF CARE
Problem: Patient Care Overview  Goal: Plan of Care/Patient Progress Review  - Diagnostic tests and consults completed and resulted: No  - Vital signs normal or at patient baseline: Yes   -Tolerating oral intake to maintain hydration: No   - Adequate pain control on oral analgesics: No

## 2018-01-04 NOTE — H&P
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    #Acute on chronic abdominal pain: EGD in 12/2017 w/ patchy gastritis in the stomach- more pronounced in the cardia, normal esophagus and duodenum. MRCP 12/2017 w/ normal secretin stimulation study, stable appearance of pancreas w/o focal mass or ductal dilatation, post op sravani changes w/o extrahepatic biliary dilatation. EUS in 12/2016 did not meet criteria for chronic pancreatitis. NM hepatobiliary scan in 04/2015 normal. Pancreatic elastase normal 12/2017. Gastric bicarb fluid w/ 13. Follows w/ Dr. Narayanan and Pain clinic. Recently DCed oxycodone and started on PO methadone. DDx includes gastritis, gastroparesis, functional abdominal pain.  -GI consult  -NPO  -IVF  W/ LR at 150 cc/hour  -IV dilaudid overnight for pain  -Zofran PRN for nausea  -Continue Protonix  -Continue nortriptyline and gabapentin for chronic pain  -Continue zenpep when tolerating diet    #Transaminitis: Unclear etiology, , AST 82, Tbili, lipase & alk phos normal. US abdomen 01/03/2018 normal.  -Trend in AM    #Depression, anxiety: On nortriptyline. Continue.     Diet:  NPO  Fluids: LR at 150 cc/hour  DVT Prophylaxis: Pneumatic Compression Devices and Ambulate every shift  Code Status: FULL    Disposition Plan   Expected discharge: 2 - 3 days; recommended to prior living arrangement once adequate pain management/ tolerating PO medications.     Entered: Sandra Moreno 01/03/2018, 9:15 PM   Information in the above section will display in the discharge planner report.    The patient's care was discussed with the Attending Physician,   Ricardo, Bedside Nurse and Patient.    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    The patient notes that she has had worsening abdominal pain over the last 4 days. She notes she always has RUQ abdominal pain. Now worse in the last 4 days, sharp and stabbing in nature and goes to her back. Unable to tolerate PO food/drinks due to nausea. 7 episodes of vomiting today. Soft BM this morning, dark grey in color. No diarrhea or sick contacts. Notes she has been discussing a GJ w/ GI as OP. She denies fevers, notes intermittent chills. She denies dysuria, LE edema, headaches, rashes, URI symptoms.     Review of Systems   The 10 point Review of Systems is negative other than noted in the HPI or here.     Past Medical History    I have reviewed this patient's medical history and updated it with pertinent information if needed.   Past Medical History:   Diagnosis Date     Anxiety      Asthma      Cholecystitis     s/p cholecystectomy     Chronic abdominal pain      Chronic infection     mrsa     Chronic pain      Cyclic vomiting syndrome 10/27/2012     Depression      Endometriosis      Hypoglycaemia      Migraines      Mild intermittent asthma      Ovarian cysts      Pancreatic disease      PONV (postoperative nausea and vomiting)      Pseudoseizures      Somatoform disorder      Sphincter of Oddi dysfunction      Vasovagal syncope         Past Surgical History   I have reviewed this patient's  surgical history and updated it with pertinent information if needed.  Past Surgical History:   Procedure Laterality Date     ABDOMEN SURGERY      ERCP, biliary stents     CHOLECYSTECTOMY  8/2/11     COLONOSCOPY  2011    negative finding     ENDOSCOPIC RETROGRADE CHOLANGIOPANCREATOGRAM  8/23/2011    Procedure:ENDOSCOPIC RETROGRADE CHOLANGIOPANCREATOGRAM; Endoscopic Retrograde Cholangiopancreatogram; Surgeon:SHORTY MCCOY; Location:[facility]     ENDOSCOPIC RETROGRADE CHOLANGIOPANCREATOGRAM  5/17/2012    Procedure:ENDOSCOPIC RETROGRADE CHOLANGIOPANCREATOGRAM; Endoscopic Retrograde Cholangiopancreatogram with pancreatic stent placement.; Surgeon:SHORTY MCCOY; Location:[facility]     ENDOSCOPIC RETROGRADE CHOLANGIOPANCREATOGRAM COMPLEX  1/3/2012    Procedure:ENDOSCOPIC RETROGRADE CHOLANGIOPANCREATOGRAM COMPLEX; Endoscopic Retrograde Cholangiopancreatogram with Manometry bile duct sphincterotomy extention pancreatic duct sphincterotomy pancreatic duct stent placement; Surgeon:SHORTY MCCOY; Location:[facility] OR     ENDOSCOPIC ULTRASOUND UPPER GASTROINTESTINAL TRACT (GI) N/A 6/9/2015    Procedure: ENDOSCOPIC ULTRASOUND, ESOPHAGOSCOPY / UPPER GASTROINTESTINAL TRACT (GI);  Surgeon: Mario Joe MD;  Location: [facility] OR     ENDOSCOPIC ULTRASOUND UPPER GASTROINTESTINAL TRACT (GI) N/A 12/12/2016    Procedure: ENDOSCOPIC ULTRASOUND, ESOPHAGOSCOPY / UPPER GASTROINTESTINAL TRACT (GI);  Surgeon: Guru Jose Klein MD;  Location: [facility] OR     ESOPHAGOSCOPY, GASTROSCOPY, DUODENOSCOPY (EGD), COMBINED  1/18/2012    Procedure:COMBINED ESOPHAGOSCOPY, GASTROSCOPY, DUODENOSCOPY (EGD); Surgeon:ARNIE ESPINOZA; [facility]     ESOPHAGOSCOPY, GASTROSCOPY, DUODENOSCOPY (EGD), COMBINED  1/18/2012    Procedure:COMBINED ESOPHAGOSCOPY, GASTROSCOPY, DUODENOSCOPY (EGD); EGD; Surgeon:ARNIE ESPINOZA; Location:[facility]     ESOPHAGOSCOPY, GASTROSCOPY, DUODENOSCOPY (EGD), COMBINED N/A 12/9/2017    Procedure: COMBINED  ESOPHAGOSCOPY, GASTROSCOPY, DUODENOSCOPY (EGD);;  Surgeon: Josias Chan MD;  Location: [facility] GI     INSERT PORT VASCULAR ACCESS       L knee arthroscopy  2009     LAPAROSCOPY DIAGNOSTIC (GYN)  10/26/2012    Procedure: LAPAROSCOPY DIAGNOSTIC (GYN);  LAPAROSCOPY DIAGNOSTIC, CAUTERY ENDOMETRIOISIS and biopsy of Fallopian tube lesions;  Surgeon: Carla Lopez MD;  Location: [facility] OR     ORTHOPEDIC SURGERY  2008    knee     VASCULAR SURGERY          Social History   Social History   Substance Use Topics     Smoking status: Never Smoker     Smokeless tobacco: Never Used     Alcohol use No   Lives in a home with parents right now, has house in [place] with boyfriend. Wants to be nurse.     Family History   I have reviewed this patient's family history and updated it with pertinent information if needed.   Family History   Problem Relation Age of Onset     Hypertension Father      Bipolar Disorder Other      Anxiety Disorder Other      Depression Paternal Grandmother      Allergies Maternal Grandmother      CEREBROVASCULAR DISEASE Maternal Grandfather      Cardiovascular Maternal Grandfather      Depression/Anxiety Maternal Grandfather      GASTROINTESTINAL DISEASE Maternal Grandfather      DIABETES Paternal Uncle      Hypoglycemia Brother      CANCER No family hx of      No family history of skin cancer       Prior to Admission Medications   Prior to Admission Medications   Prescriptions Last Dose Informant Patient Reported? Taking?   RIZATRIPTAN BENZOATE PO Unknown at Unknown time Self Yes No   Sig: Take 5 mg by mouth once as needed    amylase-lipase-protease (ZENPEP) 39380 UNITS CPEP 1/2/2018 at Unknown time  No Yes   Sig: Take 2-3 capsules (40,000-60,000 Units) by mouth Take with snacks or supplements (Snacks)   amylase-lipase-protease (ZENPEP) 26361 UNITS CPEP Unknown at Unknown time  No No   Sig: Take 5 capsules (100,000 Units) by mouth 3 times daily (with meals)   fluticasone (FLONASE) 50 MCG/ACT spray 1/2/2018 at  Unknown time  No Yes   Sig: Spray 2 sprays into both nostrils daily   gabapentin (NEURONTIN) 300 MG capsule Unknown at Unknown time  No No   Sig: Take 1 capsule (300 mg) by mouth 3 times daily   gabapentin (NEURONTIN) 400 MG capsule 1/2/2018 at Unknown time  No Yes   Sig: Take 1 capsule (400 mg) by mouth 3 times daily   leuprolide (LUPRON DEPOT) 11.25 MG injection Unknown at Unknown time Self No No   Sig: Inject 11.25 mg into the muscle every 3 months   levalbuterol (XOPENEX HFA) 45 MCG/ACT Inhaler Unknown at Unknown time  No No   Sig: Inhale 2 puffs into the lungs every 6 hours as needed for shortness of breath / dyspnea   methadone (DOLOPHINE) 5 MG tablet 1/2/2018 at Unknown time  No Yes   Sig: Take 0.5 tablets (2.5 mg) by mouth every 12 hours   norethindrone (AYGESTIN) 5 MG tablet 1/2/2018 at Unknown time  No Yes   Sig: Take 1 tablet (5 mg) by mouth daily   nortriptyline (PAMELOR) 10 MG capsule 1/2/2018 at Unknown time  No Yes   Sig: Take 3 capsules (30 mg) by mouth At Bedtime   ondansetron (ZOFRAN ODT) 4 MG ODT tab 1/3/2018 at Unknown time  No Yes   Sig: Take 1 tablet (4 mg) by mouth every 8 hours as needed for nausea or vomiting   oxyCODONE (ROXICODONE) 5 MG/5ML solution 1/2/2018 at Unknown time  No Yes   Sig: Take 10-15 mLs (10-15 mg) by mouth every 4 hours as needed for moderate to severe pain   oxyCODONE IR (ROXICODONE) 10 MG tablet   No No   Sig: Take 1 tablet (10 mg) by mouth every 8 hours as needed for moderate to severe pain   pantoprazole (PROTONIX) 40 MG EC tablet 1/2/2018 at Unknown time  No Yes   Sig: Take 1 tablet (40 mg) by mouth 2 times daily (before meals)   polyethylene glycol (MIRALAX/GLYCOLAX) Packet   No No   Sig: Take 17 g by mouth daily   senna-docusate (SENOKOT-S;PERICOLACE) 8.6-50 MG per tablet 1/2/2018 at Unknown time  No Yes   Sig: Take 1 tablet by mouth 2 times daily as needed for constipation      Facility-Administered Medications: None     Allergies   Allergies   Allergen Reactions  "    Amoxicillin-Pot Clavulanate Nausea and Vomiting     Compazine [Prochlorperazine] Other (See Comments)     Dystonia       Hyoscyamine Other (See Comments)     Dystonia     Reglan [Metoclopramide Hcl] Other (See Comments)     Dystonia     Zyprexa Other (See Comments)     Sensitive, dystonic reaction on 11-9-2011     Amitriptyline Hcl Other (See Comments)     Dystonia, hallucinations     Buspirone Other (See Comments)     No Adverse Reactions, no benefit     Cogentin [Benztropine]      Cyproheptadine Other (See Comments)     Distonic     Dicyclomine Other (See Comments)     Droperidol Other (See Comments)     Feels tense and \"like she has to jump out of her skin\".       Effexor [Venlafaxine] Other (See Comments)     Dystonia     Food      Cilantro--lips/tongue swelling     No Clinical Screening - See Comments Other (See Comments)     Cilantro     Phenergan Dm [Promethazine-Dm] Other (See Comments)     dystonia     Promethazine Other (See Comments)     Risperidone Other (See Comments)     dystonia     Vistaril Other (See Comments)     Burning sensation.       Augmentin GI Disturbance     Ketamine Other (See Comments)     jittery     Sorbitol GI Disturbance     Headache and dyspepsia       Physical Exam   Vital Signs: Temp: 99.2  F (37.3  C) Temp src: Oral BP: 100/80 Pulse: 119   Resp: 16 SpO2: 100 % O2 Device: None (Room air)    Weight: 146 lbs 6.17 oz  GENERAL: Alert and oriented x 3. Sitting comfortably in bed.   HEENT: Anicteric sclera. Mucous membranes moist and without lesions.   CV: RRR. S1, S2. No murmurs appreciated.   RESPIRATORY: Effort normal on RA. Lungs CTAB with no wheezing, rales, rhonchi.   GI: Abdomen soft and non distended, bowel sounds present. No tenderness, rebound, guarding.   MUSCULOSKELETAL: No joint swelling or tenderness. Moves all extremities.   NEUROLOGICAL: No focal deficits.   EXTREMITIES: No peripheral edema. Intact bilateral pedal pulses.   SKIN: No jaundice. No rashes.       Data "   Data reviewed today: I reviewed all medications, new labs and imaging results over the last 24 hours. I personally reviewed the abdominal US  image(s) showing normal.    Reviewed CMP, CBC, glucose

## 2018-01-04 NOTE — PLAN OF CARE
Problem: Patient Care Overview  Goal: Plan of Care/Patient Progress Review  Outpatient/Observation goals to be met before discharge home:    - Diagnostic tests and consults completed and resulted: No  - Vital signs normal or at patient baseline: Yes   -Tolerating oral intake to maintain hydration: No, pt is NPO  - Adequate pain control on oral analgesics: No

## 2018-01-04 NOTE — ED PROVIDER NOTES
SIGN-OUT:  - Assumed care of this patient from Dr. Martinez  - Pending at shift change: Movement to inpatient service  - Tentative plan: Manage nausea as needed w/ zofran, etc., but does have adverse reactions to many anti-emetics. Recommended not escalating pain regimen as able.     REASSESSMENT:  - Did need redosing of zofran x1, otherwise no changes in ED     DISPOSITION:  - Patient carried on with their original disposition plan.           Jasmin Haji MD  01/03/18 [id number]

## 2018-01-04 NOTE — CONSULTS
GASTROENTEROLOGY CONSULTATION      Date of Admission:  1/3/2018          ASSESSMENT AND RECOMMENDATIONS:   Assessment:  22 year old female with a history of chronic pain of unknown etiology since the age of 8, that worsened post-cholecystectomy (chronic opioid dependence, currently on methadone) with mildly abnormal liver enzymes (did not respond to sphincter of Oddi ablation a few years ago), patchy erosive gastritis seen on EGD Dec 2017 (on PPI), cyclical nausea and vomiting, hx of migraines and ?somatoform disorder who has been admitted under the medicine service for acute worsening of her chronic abdominal pain for the last 4 days, associated with inability to tolerate PO intake, nausea and vomiting. Gastroenterology has been consulted to assist in the management of her acute on chronic abdominal pain.     This is acute worsening of her known chronic pain with opioid dependence. As per patient, she has had flares like this 4 times this year with the last flare being the most severe that required hospitalization. Typically, she is able to go to the infusion center 3 times x week to get IV fluid infusions when she has symptoms but due to the holidays, she was unable to get in. She was also recently treated for a UTI with antibiotics and believes that the antibiotics might have been a trigger for her symptoms as well.   Labs reveal a normal CBC but mildly elevated LFTs (which she has had in the past). RUQ U/S reveals a normal liver and no biliary ductal dilatation.      Recommendations:  - Recommend resuscitation with IV Fluids.  - Diet as tolerated. Clear liquids today and can advance to full liquids tomorrow.  - Please consult the pain team to assist in management of her pain (follows with comprehensive pain clinic as an outpatient).  - Continue pancreatic enzyme supplementation with meals.  - No role for further imaging at this time. Continue to trend LFTs.   - Continue PPI for her hx of patchy, erosive  gastritis.    Gastroenterology outpatient follow up recommendations: We will continue to follow with you.  Thank you for involving us in this patient's care. Please do not hesitate to contact the GI service with any questions or concerns.     Pt care plan discussed with Dr. Curry, GI staff physician.    Camilo Mccollum  -------------------------------------------------------------------------------------------------------------------          Chief Complaint:   We were asked by Dr. Blanc of the medicine team to evaluate this patient with acute on chronic abdominal pain.   History is obtained from the patient and the medical record.          History of Present Illness:   Alexandra Melgoza is a 22 year old female with a history of chronic pain of unknown etiology since the age of 8, that worsened post-cholecystectomy (chronic opioid dependence, currently on methadone) with mildly abnormal liver enzymes (did not respond to sphincter of Oddi ablation a few years ago), patchy erosive gastritis seen on EGD Dec 2017 (on PPI), cyclical nausea and vomiting, hx of migraines and ?somatoform disorder who has been admitted under the medicine service for acute worsening of her chronic abdominal pain for the last 4 days, associated with inability to tolerate PO intake, nausea and vomiting. Gastroenterology has been consulted to assist in the management of her abdominal pain.   Patient reports that she has had abdominal pain since the age of 8. Due to mildly abnormal LFTs that persisted post cholecystectomy, there was suspected sphincter of Oddi dysfunction and the patient underwent an ERCP on 8/23/11 with biliary sphincterotomy with no improvement in her symptoms. Then there were concerns of chronic pancreatitis but a full evaluation for chronic pancreatitis has so far been unrevealing. She had an EUS 6/9/2015 that revealed only 2/9 conventional 2 minor [place] criteria. A repeat EUS on 12/12/16 revealed an unchanged  pancreas. Exocrine function was assessed via aspiration of the duodenal juice for bicarbonate but was 13 mmol/L and normal. An MRCP with secretin in 2015 did show a decreased parenchymal T1 signal (?suggestive of chronic pancreatitis) however it was felt to be unequivocal since pancreatic duct was unremarkable and there was a normal exocrine response to secretin. Repeat MRCP 11/30/16 and 12/7/17 actually revealed a normal T1 signal, normal pancreatic duct and a normal exocrine response to secretin. The only objective evidence for chronic pancreatitis has been her chronically low lipase levels (previously were as low as 8 but recently have been within normal limits) and the fact that her symptoms improved with pancreatic enzyme supplementation (currently on ZENPEP 5 capsules 3 times daily with meals).   She was admitted in [place] for worsening pain, nausea and vomiting end of November earlier this year. She required placement of [place] tube since she had poor PO intake. Eventually, she had to be transferred here on 12/6/17 for further work-up /managment since she had no improvement in her symptoms. A repeat MRCP was done here as mentioned above. She did have an episode of hematemesis which led to an EGD that showed patchy erosive gastritis for which she was started on a PPI with little to no improvement in her pain or other symptoms. She was seen in [facility]'s clinic yesterday after an ED visit 2 days ago with worsening symptoms (at that time her labs had been normal and she was discharged home after receiving IV Fluids). She had reported worsening abdominal pain last 4 days with associated nausea and vomiting. She had endorsed that her usual regimen for her nausea that includes Ondansteron and Diphenyhydramine had stopped working. She described the pain as a stabbing, sharp pain in her RUQ that was radiating to her back. Pain is 8/10 on the pain scale (worse from a baseline of 4). She was unable to tolerate PO  intake including medications due to her nausea/vomitting and abdominal pain. She thinks the symptoms started after she received antibiotics for a UTI (she started therapy 10 days ago and completed the course of therapy 5 days ago). She was sent to the emergency room for further management due to concern for dehydration. In the ED, she was afebrile and hemodynamically stable. Initial labs revealed a normal CBC, Lipase of 134, mildly elevated LFTs with AST 56,  and a normal T bili of 0.6 and Alk Phos of 102. She was admitted for further management of her symptoms.   Of note, she has been seen by pain clinic 2 days ago for chronic comprehensive pain management on 1/2/18. Her oxycodone was discontinued and methadone was added for her pain regimen.  Her Gabapentin and Nortryptyline was continued and a pain psychology consultation was recommended.           Past Medical History:   Reviewed and edited as appropriate  Past Medical History:   Diagnosis Date     Anxiety      Asthma      Cholecystitis     s/p cholecystectomy     Chronic abdominal pain      Chronic infection     mrsa     Chronic pain      Cyclic vomiting syndrome 10/27/2012     Depression      Endometriosis      Hypoglycaemia      Migraines      Mild intermittent asthma      Ovarian cysts      Pancreatic disease      PONV (postoperative nausea and vomiting)      Pseudoseizures      Somatoform disorder      Sphincter of Oddi dysfunction      Vasovagal syncope             Past Surgical History:   Reviewed and edited as appropriate   Past Surgical History:   Procedure Laterality Date     ABDOMEN SURGERY      ERCP, biliary stents     CHOLECYSTECTOMY  8/2/11     COLONOSCOPY  2011    negative finding     ENDOSCOPIC RETROGRADE CHOLANGIOPANCREATOGRAM  8/23/2011    Procedure:ENDOSCOPIC RETROGRADE CHOLANGIOPANCREATOGRAM; Endoscopic Retrograde Cholangiopancreatogram; Surgeon:SHORTY MCCOY; Location:[facility]     ENDOSCOPIC RETROGRADE CHOLANGIOPANCREATOGRAM   5/17/2012    Procedure:ENDOSCOPIC RETROGRADE CHOLANGIOPANCREATOGRAM; Endoscopic Retrograde Cholangiopancreatogram with pancreatic stent placement.; Surgeon:SHORTY MCCOY; Location:[facility]     ENDOSCOPIC RETROGRADE CHOLANGIOPANCREATOGRAM COMPLEX  1/3/2012    Procedure:ENDOSCOPIC RETROGRADE CHOLANGIOPANCREATOGRAM COMPLEX; Endoscopic Retrograde Cholangiopancreatogram with Manometry bile duct sphincterotomy extention pancreatic duct sphincterotomy pancreatic duct stent placement; Surgeon:SHORTY MCCOY; Location:[facility]     ENDOSCOPIC ULTRASOUND UPPER GASTROINTESTINAL TRACT (GI) N/A 6/9/2015    Procedure: ENDOSCOPIC ULTRASOUND, ESOPHAGOSCOPY / UPPER GASTROINTESTINAL TRACT (GI);  Surgeon: Mario Jeo MD;  Location: [facility]     ENDOSCOPIC ULTRASOUND UPPER GASTROINTESTINAL TRACT (GI) N/A 12/12/2016    Procedure: ENDOSCOPIC ULTRASOUND, ESOPHAGOSCOPY / UPPER GASTROINTESTINAL TRACT (GI);  Surgeon: Guru Jose Klein MD;  Location: [facility]     ESOPHAGOSCOPY, GASTROSCOPY, DUODENOSCOPY (EGD), COMBINED  1/18/2012    Procedure:COMBINED ESOPHAGOSCOPY, GASTROSCOPY, DUODENOSCOPY (EGD); Surgeon:ARNIE ESPINOZA; Location:[facility]     ESOPHAGOSCOPY, GASTROSCOPY, DUODENOSCOPY (EGD), COMBINED  1/18/2012    Procedure:COMBINED ESOPHAGOSCOPY, GASTROSCOPY, DUODENOSCOPY (EGD); EGD; Surgeon:ARNIE ESPINOZA; [facility]     ESOPHAGOSCOPY, GASTROSCOPY, DUODENOSCOPY (EGD), COMBINED N/A 12/9/2017    Procedure: COMBINED ESOPHAGOSCOPY, GASTROSCOPY, DUODENOSCOPY (EGD);;  Surgeon: Josias Chan MD;  Location: [facility]     INSERT PORT VASCULAR ACCESS       L knee arthroscopy  2009     LAPAROSCOPY DIAGNOSTIC (GYN)  10/26/2012    Procedure: LAPAROSCOPY DIAGNOSTIC (GYN);  LAPAROSCOPY DIAGNOSTIC, CAUTERY ENDOMETRIOISIS and biopsy of Fallopian tube lesions;  Surgeon: Carla Lopez MD;  Location:  OR     ORTHOPEDIC SURGERY  2008    knee     VASCULAR SURGERY              Previous Endoscopy:   No results found. However, due to  the size of the patient record, not all encounters were searched. Please check Results Review for a complete set of results.         Social History:   Reviewed and edited as appropriate  Social History     Social History     Marital status: Single     Spouse name: N/A     Number of children: N/A     Years of education: N/A     Occupational History     Not on file.     Social History Main Topics     Smoking status: Never Smoker     Smokeless tobacco: Never Used     Alcohol use No     Drug use: No     Sexual activity: No     Other Topics Concern     Parent/Sibling W/ Cabg, Mi Or Angioplasty Before 65f 55m? 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 She plans to go to nursing school.  - Since her hospitalization in December, she has moved here and is living with her mother and step dad temporarily.         Family History:   Reviewed and edited as appropriate  Family History   Problem Relation Age of Onset     Hypertension Father      Bipolar Disorder Other      Anxiety Disorder Other      Depression Paternal Grandmother      Allergies Maternal Grandmother      CEREBROVASCULAR DISEASE Maternal Grandfather      Cardiovascular Maternal Grandfather      Depression/Anxiety Maternal Grandfather      GASTROINTESTINAL DISEASE Maternal Grandfather      DIABETES Paternal Uncle      Hypoglycemia Brother      CANCER No family hx of      No family history of skin cancer      No known history of colorectal cancer, liver disease, or inflammatory bowel disease.         Allergies:   Reviewed and edited as appropriate     Allergies   Allergen Reactions     Amoxicillin-Pot Clavulanate Nausea  "and Vomiting     Compazine [Prochlorperazine] Other (See Comments)     Dystonia       Hyoscyamine Other (See Comments)     Dystonia     Reglan [Metoclopramide Hcl] Other (See Comments)     Dystonia     Zyprexa Other (See Comments)     Sensitive, dystonic reaction on 11-9-2011     Amitriptyline Hcl Other (See Comments)     Dystonia, hallucinations     Buspirone Other (See Comments)     No Adverse Reactions, no benefit     Cogentin [Benztropine]      Cyproheptadine Other (See Comments)     Distonic     Dicyclomine Other (See Comments)     Droperidol Other (See Comments)     Feels tense and \"like she has to jump out of her skin\".       Effexor [Venlafaxine] Other (See Comments)     Dystonia     Food      Cilantro--lips/tongue swelling     No Clinical Screening - See Comments Other (See Comments)     Cilantro     Phenergan Dm [Promethazine-Dm] Other (See Comments)     dystonia     Promethazine Other (See Comments)     Risperidone Other (See Comments)     dystonia     Vistaril Other (See Comments)     Burning sensation.       Augmentin GI Disturbance     Ketamine Other (See Comments)     jittery     Sorbitol GI Disturbance     Headache and dyspepsia            Medications:     Current Facility-Administered Medications   Medication     amylase-lipase-protease (ZENPEP) 49541 UNITS delayed release capsule 40,000-60,000 Units     amylase-lipase-protease (ZENPEP) 75912 UNITS delayed release capsule 100,000 Units     fluticasone (FLONASE) 50 MCG/ACT spray 2 spray     levalbuterol (XOPENEX HFA) Inhaler 2 puff     nortriptyline (PAMELOR) capsule 30 mg     pantoprazole (PROTONIX) EC tablet 40 mg     polyethylene glycol (MIRALAX/GLYCOLAX) Packet 17 g     senna-docusate (SENOKOT-S;PERICOLACE) 8.6-50 MG per tablet 1 tablet     gabapentin (NEURONTIN) capsule 400 mg     acetaminophen (TYLENOL) tablet 650 mg     naloxone (NARCAN) injection 0.1-0.4 mg     ondansetron (ZOFRAN-ODT) ODT tab 4 mg    Or     ondansetron (ZOFRAN) injection 4 " "mg     lidocaine 1 % 1 mL     lidocaine (LMX4) kit     sodium chloride (PF) 0.9% PF flush 3 mL     sodium chloride (PF) 0.9% PF flush 3 mL     HYDROmorphone (PF) (DILAUDID) injection 0.5-1 mg     lactated ringers infusion             Review of Systems:   A complete review of systems was performed and is negative except as noted in the HPI           Physical Exam:   /74 (BP Location: Right arm)  Pulse 78  Temp 97.7  F (36.5  C) (Oral)  Resp 16  Ht 1.676 m (5' 6\")  Wt 65.8 kg (145 lb)  SpO2 100%  BMI 23.4 kg/m2  Wt:   Wt Readings from Last 2 Encounters:   01/03/18 65.8 kg (145 lb)   01/03/18 64.9 kg (143 lb)      Constitutional: cooperative, anxious, not dyspneic/diaphoretic,   Eyes: Sclera anicteric/injected  Ears/nose/mouth/throat: Normal oropharynx without ulcers or exudate, mucus membranes moist, hearing intact  Neck: supple, thyroid normal size  CV: No edema  Respiratory: Unlabored breathing  Lymph: No axillary, submandibular, supraclavicular or inguinal lymphadenopathy  Abd:  TTP in RUQ, Nondistended, +bs, no hepatosplenomegaly, no peritoneal signs  Skin: warm, perfused, no jaundice  Neuro: AAO x 3, No asterixis  Psych: Normal affect  MSK: No gross deformities         Data:   Labs and imaging below were independently reviewed and interpreted    BMP  Recent Labs  Lab 01/04/18  0656 01/03/18  1416 01/01/18  2145    136 140   POTASSIUM 3.8 4.5 4.1   CHLORIDE 106 105 106   RENEA 8.9 9.3 9.5   CO2 21 24 26   BUN 14 12 14   CR 0.54 0.61 0.53   GLC 60* 94 89     CBC  Recent Labs  Lab 01/04/18  0656 01/03/18  1416 01/01/18  2145   WBC 4.5 3.3* 4.6   RBC 3.48* 4.39 4.24   HGB 10.6* 13.4 12.9   HCT 32.4* 41.2 39.3   MCV 93 94 93   MCH 30.5 30.5 30.4   MCHC 32.7 32.5 32.8   RDW 12.6 12.3 13.0    203 208     INRNo lab results found in last 7 days.  LFTs  Recent Labs  Lab 01/04/18  0656 01/03/18  1416 01/01/18  2145   ALKPHOS 102 120 99   AST 56* 82* 23   * 152* 42   BILITOTAL 0.8 0.6 0.5 "   PROTTOTAL 6.2* 7.8 7.6   ALBUMIN 3.5 4.6 4.8      PANC  Recent Labs  Lab 01/04/18  0656 01/03/18  1416 01/01/18  2145   LIPASE 93 134 100       Imaging:  U/S Abdomen 1/3/2018:  Liver: The liver demonstrates normal homogeneous echotexture. No evidence of a focal hepatic mass. The main portal vein is patent with antegrade flow.  Gallbladder: Surgically absent  Bile Ducts: Both the intra- and extrahepatic biliary system are of normal caliber.  The common bile duct measures 6 mm in diameter.  Pancreas: Obscured by overlying bowel gas.  Kidneys: Both kidneys are of normal echotexture, without mass or hydronephrosis.   The craniocaudal dimensions are: right- 8.4 cm, left- 9.0 cm.   Spleen: The spleen is normal in size,  measuring 10.5 cm in sagittal dimension.  Aorta and IVC: The visualized portions of the aorta and IVC are unremarkable. The proximal aorta measures 1.5 cm in diameter and the IVC measures 2.3 cm in diameter.  Fluid: No evidence of ascites or pleural effusions

## 2018-01-04 NOTE — CONSULTS
Inpatient Pain Management Service: Consultation      DATE OF CONSULT:January 4, 2018      REASON FOR PAIN CONSULTATION:  Alexandra Melgoza is a 22 year old female I am seeing in consultation at the request of  Nadeen Torres PA-C for evaluation and recommendations for her acute on chronic RUQ pain.       CHIEF PAIN COMPLAINT: RUQ pain      ASSESSMENT:   1. Acute on chronic RUQ pain-states that this recent pain was triggered from recent use of antibiotics for treatment of UTI.  States this has happened to her in the past with antibiotics use  and bowel rest is what helps her the best.  2. Chronic abdominal pain since age 8.  She has recently had extensive GI workup and does not meet criteria for chronic pancreatitis.  3. Chronic opioid use.  Was on oxycodone 10mg 2-3/day.  On 1/2/17, was seen at [facility] pain clinic and discontinued on oxycodone and started methadone 2.5mg PO BID and so far had 3 doses without side effects.  She would like to continue on the methadone.  4. H/o depression.  Plan on seeing psychologist Quyen Lennon at [facility] pain clinic.    -- Outpatient opioid requirements prior to admission:   [place]  reviewed.  1/2/17, filled methadone 5mg #30 for 30days.    Primary Care Provider: Quyen Baez  Chronic Pain Provider: [facility]: Dr. Leonardo Wang (recent)    TREATMENT RECOMMENDATIONS/PLAN:   1. Discontinue IV pushes of Dilaudid 0.5-1mg inj Q 2 hours PRN.  2. Resume PTA methadone 2.5mg PO Q 12 hours.  May consider oral solution or tablet form. Pt has had 3 doses of methadone since 1/2/17 and tolerated it.  States that there was some nausea associated, but she does have nausea with abdominal pain flares such as the present one.  3. Defer anti nausea-emetics to primary team. Pt states that IV Zofran does not provide long lasting anti nausea relief.  May consider scopolamine patches.  4. Continue gabapentin 400mg PO TID (PTA she was taking 400mg, 300, 400mg as prescribed by [facility])  5. Continue bowel regimen per primary team to prevent opioid induced constipation.    Pain Service will Continue to follow at this time.     Recommendations were discussed with pain team and Danitza Torres PA-C (medicine)  Plan was reviewed by the Pain Service consisting of Adela Romo MD.    Thank you for consulting the Inpatient Pain Management Service.   The above recommendations are to be acted upon at the primary team s discretion.    To reach us:  Mon - Friday 8 AM - 3 PM: Pager [phone] (Text Page)  After hours, weekends and holidays: Primary service should call [phone] for the on-call pain specialist    HISTORY OF PRESENT ILLNESS: Alexandra Melgoza is a 22 year old female with history of asthma, chronic abdominal pain since age 8 but worsened since cholecystectomy, cyclic N/V, migraines, pseudoseizures and somatoform disorder admitted on 12/6/2017 for abdominal pain.  Ms. Melgoza states that since her last admission in early Dec. 2017 for abdominal pain, she had more abdominal pain flares and increased intensity.    She was in ED on 1/1/18 for abdominal pain was treated and discharged from ED.  Was evaluated by Dr. Leonardo Wang on 1/2/18 for abdominal pain and flare and was started on methadone 2.5mg PO BID and so far had 3 doses without any side effects.  States that on 1/3/18, she was in the clinic at Dr. Narayanan's office for evaluation and because of her health state, Dr. Narayanan advised her to get admitted.    Today, 1/4/17, Ms. Melgoza appears comfortable lying in bed.  She is very pleasant.  States pain is in RUQ.  Pain is 7/10 on the VAS.  Feels that this abdominal painful flare is related to recent antibiotics use for UTI which can trigger her abdominal pain.  She states bowel rest is what helps her pain in the past for several days along with fluids.  She has been in discussion with Dr. Narayanan for possible GJ tube to allow her bowel rest in the future when flares occur.   She will also talk to Dr. Narayanan regarding possible Celiac plexus nerve block at [facility] pain clinic.  For now discussed discontinuing IV and resume the methadone that was recently prescribed to her.  She is agreeable to this but somewhat hesitant to stop the IV Dilaudid.  She reported throwing up her morning oral medications and concerned of nausea and vomiting with any oral intake.      PAIN HISTORY:   Location: RUQ  Duration: Constant  Pain intensity: 7/10 on VAS  Quality of the pain: sharp  Aggravating factors: oral intake  Relieving factors : bowel rest, IV pain medication    CAPA (Clinically Aligned Pain Assessment)  Comfort (How is your pain?): Tolerable with discomfort  Change in Pain (Since your last medication/intervention?): Getting better  Pain Control (How are your pain treatments working?): Partially effective pain control  Functioning (Are you able to do activities to get better?) : Can do most things, but pain gets in the way of some   Sleep (Does your pain management allow you to sleep or rest?): Awake with occasional pain       FUNCTIONAL STATUS:  Change:      staying the same  Oral intake:     Clear liquids  Bowel function:    Normal-uses colace at home  Activity level:     Up ambulating independently in room  Sleep:      Did not sleep well due to pain  Mood:      Calm, pleasant, NAD      REVIEW OF 10 BODY SYSTEMS: 10 point ROS of systems including Constitutional, Eyes, Respiratory, Cardiovascular, Gastroenterology, Genitourinary, Integumentary, Musculoskeletal, Psychiatric were all negative except for pertinent positives noted in my HPI.       INPATIENT MEDICATIONS PERTINENT TO PAIN CONSULT:   Medications related to Pain Management (Future)    Start     Dose/Rate Route Frequency Ordered Stop    01/04/18 0800  polyethylene glycol (MIRALAX/GLYCOLAX) Packet 17 g      17 g Oral 2 TIMES DAILY 01/03/18 8711      01/04/18 0800  gabapentin (NEURONTIN) capsule 400 mg      400 mg Oral 3 TIMES DAILY  01/03/18 2247 01/03/18 2300  nortriptyline (PAMELOR) capsule 30 mg      30 mg Oral AT BEDTIME 01/03/18 2247 01/03/18 2247  senna-docusate (SENOKOT-S;PERICOLACE) 8.6-50 MG per tablet 1 tablet      1 tablet Oral 2 TIMES DAILY PRN 01/03/18 2247      01/03/18 2247  lidocaine 1 % 1 mL      1 mL Other EVERY 1 HOUR PRN 01/03/18 2247      01/03/18 2247  lidocaine (LMX4) kit       Topical EVERY 1 HOUR PRN 01/03/18 2247      01/03/18 2247  HYDROmorphone (PF) (DILAUDID) injection 0.5-1 mg      0.5-1 mg Intravenous EVERY 2 HOURS PRN 01/03/18 2247 01/03/18 2247  acetaminophen (TYLENOL) tablet 650 mg      650 mg Oral EVERY 4 HOURS PRN 01/03/18 2247              CURRENT MEDICATIONS:   Current Facility-Administered Medications Ordered in Epic   Medication Dose Route Frequency Provider Last Rate Last Dose     amylase-lipase-protease (ZENPEP) 51388 UNITS delayed release capsule 40,000-60,000 Units  2-3 capsule Oral With Snacks or Supplements Sandra Hayward PA-C         amylase-lipase-protease (ZENPEP) 09499 UNITS delayed release capsule 100,000 Units  5 capsule Oral TID w/meals Sandra Hayward PA-C         fluticasone (FLONASE) 50 MCG/ACT spray 2 spray  2 spray Both Nostrils Daily Sandra Hayward PA-C         levalbuterol (XOPENEX HFA) Inhaler 2 puff  2 puff Inhalation Q6H PRN Sandra Hayward PA-C         nortriptyline (PAMELOR) capsule 30 mg  30 mg Oral At Bedtime Sandra Hayward PA-C         pantoprazole (PROTONIX) EC tablet 40 mg  40 mg Oral BID AC Sandra Hayward PA-C   40 mg at 01/04/18 0745     polyethylene glycol (MIRALAX/GLYCOLAX) Packet 17 g  17 g Oral BID Sandra Hayward PA-C         senna-docusate (SENOKOT-S;PERICOLACE) 8.6-50 MG per tablet 1 tablet  1 tablet Oral BID PRN Sandra Hayward PA-C         gabapentin (NEURONTIN) capsule 400 mg  400 mg Oral TID Sandra Hayward PA-C   400 mg at 01/04/18 0745     acetaminophen  (TYLENOL) tablet 650 mg  650 mg Oral Q4H PRN Sandra Hayward PA-C         naloxone (NARCAN) injection 0.1-0.4 mg  0.1-0.4 mg Intravenous Q2 Min PRN Sandra Hayward PA-C         ondansetron (ZOFRAN-ODT) ODT tab 4 mg  4 mg Oral Q6H PRN Sandra Hayward PA-C        Or     ondansetron (ZOFRAN) injection 4 mg  4 mg Intravenous Q6H PRN Sandra Hayward PA-C   4 mg at 01/04/18 0931     lidocaine 1 % 1 mL  1 mL Other Q1H PRN Sandra Hayward PA-C         lidocaine (LMX4) kit   Topical Q1H PRN Sandra Hayward PA-C         sodium chloride (PF) 0.9% PF flush 3 mL  3 mL Intracatheter Q1H PRN Sandra Hayward PA-C   20 mL at 01/04/18 0652     sodium chloride (PF) 0.9% PF flush 3 mL  3 mL Intracatheter Q8H Sandra Hayward PA-C   3 mL at 01/03/18 2340     HYDROmorphone (PF) (DILAUDID) injection 0.5-1 mg  0.5-1 mg Intravenous Q2H PRN Sandra Hayward PA-C   1 mg at 01/04/18 1158     lactated ringers infusion   Intravenous Continuous Sandra Hayward PA-C 150 mL/hr at 01/04/18 1248       No current [facility]-ordered outpatient prescriptions on file.           HOME/PREVIOUS MEDICATIONS:   Prior to Admission medications    Medication Sig Start Date End Date Taking? Authorizing Provider   methadone (DOLOPHINE) 5 MG tablet Take 0.5 tablets (2.5 mg) by mouth every 12 hours 1/2/18  Yes Leonardo Wang MD   gabapentin (NEURONTIN) 400 MG capsule Take 1 capsule (400 mg) by mouth 3 times daily 12/20/17  Yes Leonardo Wang MD   oxyCODONE (ROXICODONE) 5 MG/5ML solution Take 10-15 mLs (10-15 mg) by mouth every 4 hours as needed for moderate to severe pain 12/13/17  Yes Khambaty, Maleka, MD   nortriptyline (PAMELOR) 10 MG capsule Take 3 capsules (30 mg) by mouth At Bedtime 12/13/17  Yes Meme Hameed MD   ondansetron (ZOFRAN ODT) 4 MG ODT tab Take 1 tablet (4 mg) by mouth every 8 hours as needed for nausea or vomiting 12/13/17  Yes Yoseph  MD Meme   amylase-lipase-protease (ZENPEP) 21147 UNITS CPEP Take 2-3 capsules (40,000-60,000 Units) by mouth Take with snacks or supplements (Snacks) 12/13/17  Yes Meme Hameed MD   senna-docusate (SENOKOT-S;PERICOLACE) 8.6-50 MG per tablet Take 1 tablet by mouth 2 times daily as needed for constipation 12/13/17  Yes Meme Hameed MD   pantoprazole (PROTONIX) 40 MG EC tablet Take 1 tablet (40 mg) by mouth 2 times daily (before meals) 12/13/17  Yes Meme Hameed MD   norethindrone (AYGESTIN) 5 MG tablet Take 1 tablet (5 mg) by mouth daily 1/23/17  Yes Ysabel Goddard NP   fluticasone (FLONASE) 50 MCG/ACT spray Spray 2 sprays into both nostrils daily 1/23/17  Yes Ysabel Goddard NP   oxyCODONE IR (ROXICODONE) 10 MG tablet Take 1 tablet (10 mg) by mouth every 8 hours as needed for moderate to severe pain 12/20/17   Leonardo Wang MD   amylase-lipase-protease (ZENPEP) 94540 UNITS CPEP Take 5 capsules (100,000 Units) by mouth 3 times daily (with meals) 12/13/17   Meme Hameed MD   polyethylene glycol (MIRALAX/GLYCOLAX) Packet Take 17 g by mouth daily 12/14/17   Meme Hameed MD   gabapentin (NEURONTIN) 300 MG capsule Take 1 capsule (300 mg) by mouth 3 times daily 1/23/17   Ysabel Goddard NP   levalbuterol (XOPENEX HFA) 45 MCG/ACT Inhaler Inhale 2 puffs into the lungs every 6 hours as needed for shortness of breath / dyspnea 1/23/17   Ysabel Goddard NP   leuprolide (LUPRON DEPOT) 11.25 MG injection Inject 11.25 mg into the muscle every 3 months 5/3/16   Mary Anne Aranda MD   RIZATRIPTAN BENZOATE PO Take 5 mg by mouth once as needed     Adithya Haji MD           PAST PAIN TREATMENT:   Chronic opioid  Oxycodone 10mg 2-3/day        ALLERGIES:    Allergies   Allergen Reactions     Amoxicillin-Pot Clavulanate Nausea and Vomiting     Compazine [Prochlorperazine] Other (See Comments)     Dystonia       Hyoscyamine Other (See Comments)     Dystonia     Reglan [Metoclopramide Hcl]  "Other (See Comments)     Dystonia     Zyprexa Other (See Comments)     Sensitive, dystonic reaction on 11-9-2011     Amitriptyline Hcl Other (See Comments)     Dystonia, hallucinations     Buspirone Other (See Comments)     No Adverse Reactions, no benefit     Cogentin [Benztropine]      Cyproheptadine Other (See Comments)     Distonic     Dicyclomine Other (See Comments)     Droperidol Other (See Comments)     Feels tense and \"like she has to jump out of her skin\".       Effexor [Venlafaxine] Other (See Comments)     Dystonia     Food      Cilantro--lips/tongue swelling     No Clinical Screening - See Comments Other (See Comments)     Cilantro     Phenergan Dm [Promethazine-Dm] Other (See Comments)     dystonia     Promethazine Other (See Comments)     Risperidone Other (See Comments)     dystonia     Vistaril Other (See Comments)     Burning sensation.       Augmentin GI Disturbance     Ketamine Other (See Comments)     jittery     Sorbitol GI Disturbance     Headache and dyspepsia            PAST MEDICAL AND PSYCHIATRIC HISTORY:    Past Medical History:   Diagnosis Date     Anxiety      Asthma      Cholecystitis     s/p cholecystectomy     Chronic abdominal pain      Chronic infection     mrsa     Chronic pain      Cyclic vomiting syndrome 10/27/2012     Depression      Endometriosis      Hypoglycaemia      Migraines      Mild intermittent asthma      Ovarian cysts      Pancreatic disease      PONV (postoperative nausea and vomiting)      Pseudoseizures      Somatoform disorder      Sphincter of Oddi dysfunction      Vasovagal syncope            PAST SURGICAL HISTORY:   Past Surgical History:   Procedure Laterality Date     ABDOMEN SURGERY      ERCP, biliary stents     CHOLECYSTECTOMY  8/2/11     COLONOSCOPY  2011    negative finding     ENDOSCOPIC RETROGRADE CHOLANGIOPANCREATOGRAM  8/23/2011    Procedure:ENDOSCOPIC RETROGRADE CHOLANGIOPANCREATOGRAM; Endoscopic Retrograde Cholangiopancreatogram; Surgeon:NADINE" SHORTY PINEDA; Location:[facility]     ENDOSCOPIC RETROGRADE CHOLANGIOPANCREATOGRAM  5/17/2012    Procedure:ENDOSCOPIC RETROGRADE CHOLANGIOPANCREATOGRAM; Endoscopic Retrograde Cholangiopancreatogram with pancreatic stent placement.; Surgeon:SHORTY MCCOY; Location:[facility]     ENDOSCOPIC RETROGRADE CHOLANGIOPANCREATOGRAM COMPLEX  1/3/2012    Procedure:ENDOSCOPIC RETROGRADE CHOLANGIOPANCREATOGRAM COMPLEX; Endoscopic Retrograde Cholangiopancreatogram with Manometry bile duct sphincterotomy extention pancreatic duct sphincterotomy pancreatic duct stent placement; Surgeon:SHORTY MCCOY; Location:[facility] OR     ENDOSCOPIC ULTRASOUND UPPER GASTROINTESTINAL TRACT (GI) N/A 6/9/2015    Procedure: ENDOSCOPIC ULTRASOUND, ESOPHAGOSCOPY / UPPER GASTROINTESTINAL TRACT (GI);  Surgeon: Mario Joe MD;  Location: [facility] OR     ENDOSCOPIC ULTRASOUND UPPER GASTROINTESTINAL TRACT (GI) N/A 12/12/2016    Procedure: ENDOSCOPIC ULTRASOUND, ESOPHAGOSCOPY / UPPER GASTROINTESTINAL TRACT (GI);  Surgeon: Guru Jose Klein MD;  Location: [facility] OR     ESOPHAGOSCOPY, GASTROSCOPY, DUODENOSCOPY (EGD), COMBINED  1/18/2012    Procedure:COMBINED ESOPHAGOSCOPY, GASTROSCOPY, DUODENOSCOPY (EGD); Surgeon:ARNIE ESPINOZA; [facility]     ESOPHAGOSCOPY, GASTROSCOPY, DUODENOSCOPY (EGD), COMBINED  1/18/2012    Procedure:COMBINED ESOPHAGOSCOPY, GASTROSCOPY, DUODENOSCOPY (EGD); EGD; Surgeon:ARNIE ESPINOZA; Location[facility]     ESOPHAGOSCOPY, GASTROSCOPY, DUODENOSCOPY (EGD), COMBINED N/A 12/9/2017    Procedure: COMBINED ESOPHAGOSCOPY, GASTROSCOPY, DUODENOSCOPY (EGD);;  Surgeon: Josias Chan MD;  Location: [facility] GI     INSERT PORT VASCULAR ACCESS       L knee arthroscopy  2009     LAPAROSCOPY DIAGNOSTIC (GYN)  10/26/2012    Procedure: LAPAROSCOPY DIAGNOSTIC (GYN);  LAPAROSCOPY DIAGNOSTIC, CAUTERY ENDOMETRIOISIS and biopsy of Fallopian tube lesions;  Surgeon: Carla Lopez MD;  Location:  OR     ORTHOPEDIC SURGERY  2008    knee     VASCULAR  SURGERY             FAMILY HISTORY: family history includes Allergies in her maternal grandmother; Anxiety Disorder in an other family member; Bipolar Disorder in an other family member; CEREBROVASCULAR DISEASE in her maternal grandfather; Cardiovascular in her maternal grandfather; DIABETES in her paternal uncle; Depression in her paternal grandmother; Depression/Anxiety in her maternal grandfather; GASTROINTESTINAL DISEASE in her maternal grandfather; Hypertension in her father; Hypoglycemia in her brother. There is no history of CANCER.      HEALTH & LIFESTYLE PRACTICES:   Tobacco:  reports that she has never smoked. She has never used smokeless tobacco.  Alcohol:  reports that she does not drink alcohol.  Illicit drugs:  reports that she does not use illicit drugs.      SOCIAL HISTORY:  She is engaged.  Was living in [place] but returned to [place] to receive GI care from Dr. Narayanan.  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 Wants to return to school to be an RN someday.  Social History     Social History     Marital status: Single     Spouse name: N/A     Number of children: N/A     Years of education: N/A     Occupational History     Not on file.     Social History Main Topics     Smoking status: Never Smoker     Smokeless tobacco: Never Used     Alcohol use No     Drug use: No     Sexual activity: No     Other Topics Concern     Parent/Sibling W/ Cabg, Mi Or Angioplasty Before 65f 55m? 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, and works part-time     Focusing on DBT therapy - individual and group therapy    Currently living with her mother at her grandmother's home        Considering going to nursing school       LABORATORY VALUES:   Last Basic Metabolic Panel:  Lab Results   Component Value Date     01/04/2018      Lab Results   Component Value Date    POTASSIUM 3.8 01/04/2018     Lab  Results   Component Value Date    CHLORIDE 106 01/04/2018     Lab Results   Component Value Date    RENEA 8.9 01/04/2018     Lab Results   Component Value Date    CO2 21 01/04/2018     Lab Results   Component Value Date    BUN 14 01/04/2018     Lab Results   Component Value Date    CR 0.54 01/04/2018     Lab Results   Component Value Date    GLC 60 01/04/2018       CBC results:  Lab Results   Component Value Date    WBC 4.5 01/04/2018     Lab Results   Component Value Date    HGB 10.6 01/04/2018     Lab Results   Component Value Date    HCT 32.4 01/04/2018     Lab Results   Component Value Date     01/04/2018       DIAGNOSTIC TESTS:       Labs above reviewed as well as additional relevant diagnostic studies from the EPIC record.       PHYSICAL EXAMINATION:  VITAL SIGNS:  B/P: 105/74, T: 97.7, P: 78, R: 16    CONSTITUTIONAL/GENERAL APPEARANCE:  Awake, alert, cooperative. AS stated age.  NAD  EYES: EOMIs, sclerae clear  ENT/NECK: supple  RESPIRATORY: non labored    GI:soft, round, mild tenderness in RUQ,     MUSCULOSKELETAL/BACK/SPINE/EXTREMITIES: Moving UE and LE well     NEURO:  SILT UE and LE  SKIN/VASCULAR EXAM:  Dry, warm, no rashes observed  PSYCHIATRIC/BEHAVIORAL/OBSERVATIONS:  No objective signs of pain observed during our interview. Pleasant   Judgment/Insight -fair   Orientation - x4   Memory -good   Mood and affect -calm, pleasant, NAD             TIME SPENT: 50 minutes including 25 minutes of face-to-face time counseling her  about her pain management treatment options, and coordinating care with the primary team.    Ani Garibay PA-C  January 4, 2018, 1:13 PM  Inpatient Pain Management Service

## 2018-01-04 NOTE — PROGRESS NOTES
Care Coordinator Progress Note     Admission Date/Time:  1/3/2018  Attending MD:  August Silva MD     Data  Chart reviewed, discussed with interdisciplinary team.   Patient was admitted for:    Abdominal pain, epigastric  Intractable vomiting with nausea, unspecified vomiting type  Other chronic pancreatitis (H).    Concerns with insurance coverage for discharge needs: None.  Current Living Situation: Patient lives in [place] with fiance, but currently staying with her mother in [place].  Support System: Supportive and Involved  Services Involved: none  Transportation: [place] transportation,  [facility] [phone]  Barriers to Discharge: chronically ill and pending medical clearance    Coordination of Care and Referrals: Provided patient/family with options for pending medical clearance.        Assessment  Alexandra Melgoza is a 21 year old female with a past medical history of asthma, chronic abdominal pain s/p cholecystectomy, cyclic N/V, migraines, pseudoseizures and somatoform disorder admitted for abdominal pain.    Spoke with patient at bedside.  Introduced RNCC role and discharge planning.  Patient lives with her fiance in [place], but was transferred back to [place] for continuation of her cares with her specialists.  Patient has been staying with her mom while in [place].  Patient is up independently.  Patient states her mom can provide assistance at home if needed after discharge and her mom should be able to provide transportation upon discharge.  No needs for discharge anticipated at this time.  Will follow.     Plan  Anticipated Discharge Date:  TBD  Anticipated Discharge Plan:  home    Ysabel Vázquez RN, BSN  Care Coordinator [facility]  Pager: [phone]  Phone: [phone]

## 2018-01-05 ENCOUNTER — TELEPHONE (OUTPATIENT)
Dept: FAMILY MEDICINE | Facility: CLINIC | Age: 23
End: 2018-01-05

## 2018-01-05 ENCOUNTER — INFUSION THERAPY VISIT (OUTPATIENT)
Dept: INFUSION THERAPY | Facility: CLINIC | Age: 23
End: 2018-01-05
Attending: INTERNAL MEDICINE
Payer: COMMERCIAL

## 2018-01-05 DIAGNOSIS — K86.1 CHRONIC PANCREATITIS, UNSPECIFIED PANCREATITIS TYPE (H): ICD-10-CM

## 2018-01-05 DIAGNOSIS — R10.9 CHRONIC ABDOMINAL PAIN: Primary | ICD-10-CM

## 2018-01-05 DIAGNOSIS — G89.29 CHRONIC ABDOMINAL PAIN: Primary | ICD-10-CM

## 2018-01-05 DIAGNOSIS — G43.A0 CYCLICAL VOMITING WITH NAUSEA, INTRACTABILITY OF VOMITING NOT SPECIFIED: Primary | ICD-10-CM

## 2018-01-05 PROCEDURE — 96374 THER/PROPH/DIAG INJ IV PUSH: CPT

## 2018-01-05 PROCEDURE — 25000128 H RX IP 250 OP 636: Mod: ZF | Performed by: INTERNAL MEDICINE

## 2018-01-05 PROCEDURE — 96361 HYDRATE IV INFUSION ADD-ON: CPT

## 2018-01-05 RX ORDER — ONDANSETRON 2 MG/ML
4 INJECTION INTRAMUSCULAR; INTRAVENOUS ONCE
Status: COMPLETED | OUTPATIENT
Start: 2018-01-05 | End: 2018-01-05

## 2018-01-05 RX ORDER — OXYCODONE HCL 5 MG/5 ML
10 SOLUTION, ORAL ORAL EVERY 8 HOURS PRN
Qty: 300 ML | Refills: 0 | Status: SHIPPED | OUTPATIENT
Start: 2018-01-05 | End: 2018-01-15

## 2018-01-05 RX ORDER — ONDANSETRON 2 MG/ML
4 INJECTION INTRAMUSCULAR; INTRAVENOUS ONCE
Status: CANCELLED
Start: 2018-01-05 | End: 2018-01-05

## 2018-01-05 RX ADMIN — SODIUM CHLORIDE, POTASSIUM CHLORIDE, SODIUM LACTATE AND CALCIUM CHLORIDE 1000 ML: 600; 310; 30; 20 INJECTION, SOLUTION INTRAVENOUS at 09:19

## 2018-01-05 RX ADMIN — ONDANSETRON 4 MG: 2 INJECTION INTRAMUSCULAR; INTRAVENOUS at 09:19

## 2018-01-05 ASSESSMENT — PAIN SCALES - GENERAL: PAINLEVEL: SEVERE PAIN (6)

## 2018-01-05 NOTE — PROGRESS NOTES
"Patient tolerated her scheduled night meds and stated she was feeling better and her nausea was controlled. Discharge instructions reviewed.  Patient verbalized understanding. Port A Cath de accessed per patient request. Patient ambulated to the main lobby with family. Patient discharged. Blood pressure 119/79, pulse 98, temperature 98.8  F (37.1  C), temperature source Oral, resp. rate 15, height 1.676 m (5' 6\"), weight 65.8 kg (145 lb), SpO2 100 %, not currently breastfeeding.      "

## 2018-01-05 NOTE — PROVIDER NOTIFICATION
[employer] text paged Re: Patient states provider told her she could discharge if she's keeping things down. Patient took meds and kept them down. Would like provider to see her or place discharge orders.

## 2018-01-05 NOTE — PROGRESS NOTES
Infusion Nursing Note:  Alexandra Melgoza presents today to [facility] for a IVF and zofran infusion.  During today's [facility] appointment orders from Dr. Campbell Narayanan were completed.  Frequency: As needed    Progress note:  ID verified by name and .  Assessment completed. Vitals were stable throughout time in [facility]. Patient education regarding infusion and possible side effects provided.  Patient verbalized understanding. Patient reports ongoing nausea and vomiting.    Premedicationst ordered as prescribed    Infusion Rates: 1500 ml over 1.5 hours. Zofran over 2 minutes    Labs were not ordered for this appointment.    Vascular access: Port accessed today. + blood return.     Treatment Conditions:   No treatment conditions.    Patient tolerated infusion well.    Discharge Plan:   Follow up plan of care with: Ongoing infusions at [facility]  Discharge instructions were reviewed with patient.  Patient/representative verbalized understanding of discharge instructions and all questions answered.    Patient discharged from [facility] in stable condition.    Lauren Menjivar RN    Administrations This Visit     lactated ringers BOLUS 1,000-1,500 mL     Admin Date Action Dose Rate Route Administered By          2018 New Bag 1000 mL 999 mL/hr Intravenous Lauren Menjivar RN                   ondansetron (ZOFRAN) injection 4 mg     Admin Date Action Dose Route Administered By             2018 Given 4 mg Intravenous Lauren Menjivar RN                          BP (P) 114/67  Pulse (P) 101  Temp (P) 97.2  F (36.2  C) (Oral)  Resp (P) 16

## 2018-01-05 NOTE — TELEPHONE ENCOUNTER
"Hospital/TCU/ED for chronic condition Discharge Protocol    \"Hi, my name is Tanmay Peraza, a registered nurse, and I am calling from [facility].  I am calling to follow up and see how things are going for you after your recent emergency visit/hospital/TCU stay.\"    Tell me how you are doing now that you are home?\" not good- see plan below.      Discharge Instructions    \"Let's review your discharge instructions.  What is/are the follow-up recommendations?  Pt. Response:     \"Has an appointment with your primary care provider been scheduled?\"   Yes. (confirm)    \"When you see the provider, I would recommend that you bring your medications with you.\"    Medications    \"Tell me what changed about your medicines when you discharged?\"    Changes to chronic meds?               \"What questions do you have about your medications?\"    None     New diagnoses of heart failure, COPD, diabetes, or MI?    No                  Medication reconciliation completed? Yes  Was MTM referral placed (*Make sure to put transitions as reason for referral)?   No    Call Summary    \"What questions or concerns do you have about your recent visit and your follow-up care?\"     none Patient reports she plans to schedule appt with her pain doctor and will also find a primary clinic. She is currently getting fluids at outpatient clinic.    \"If you have questions or things don't continue to improve, we encourage you contact us through the main clinic number (give number).  Even if the clinic is not open, triage nurses are available 24/7 to help you.     We would like you to know that our clinic has extended hours (provide information).  We also have urgent care (provide details on closest location and hours/contact info)\"      \"Thank you for your time and take care!\"         "

## 2018-01-05 NOTE — MR AVS SNAPSHOT
After Visit Summary   1/5/2018    Alexandra Melgoza    MRN: [id number]           Patient Information     Date Of Birth          1995        Visit Information        Provider Department      1/5/2018 9:00 AM UC 46 ATC; UC SPEC INFUSION [facility]        Today's Diagnoses     Cyclical vomiting with nausea, intractability of vomiting not specified    -  1    Chronic pancreatitis, unspecified pancreatitis type (H)           Follow-ups after your visit        Your next 10 appointments already scheduled     Jan 08, 2018 11:00 AM CST   Infusion 90 with UC SPEC INFUSION, UC 44 ATC   [facility] ([facility])    [address]  [address]  [facility] [address]   [phone]            Umer 10, 2018 12:00 PM CST   (Arrive by 11:45 AM)   Return Visit with Campbell Narayanan MD   [facility] ([facility])    [address]  4th [address]  [facility] [address]   [phone]            Umer 10, 2018  1:00 PM CST   Infusion 90 with UC SPEC INFUSION, UC 42 ATC   [facility] ([facility])    [address]  [address]  [facility] [address]   [phone]            Jan 12, 2018  3:00 PM CST   Infusion 90 with UC SPEC INFUSION, UC 45 ATC   [facility] ([facility])    [address]  [address]  [facility] [address]   [phone]            Umer 15, 2018 10:00 AM CST   (Arrive by 9:45 AM)   Return Visit with Leonardo Wang MD   [facility] ([facility])    [address]  4th [address]  [facility] [address]   [phone]            Umer 15, 2018  3:00 PM CST   Infusion 90 with UC SPEC INFUSION, UC 51 ATC     [facility] Procedure ([facility])    [address]  [address]  [facility] [address] [phone]              Who to contact     If you have questions or need follow up information about today's clinic visit or your schedule please contact [facility] SPECIAL[facility] AND PROCEDURE directly at [phone].  Normal or non-critical lab and imaging results will be communicated to you by MyChart, letter or phone within 4 business days after the clinic has received the results. If you do not hear from us within 7 days, please contact the clinic through [employer] or phone. If you have a critical or abnormal lab result, we will notify you by phone as soon as possible.  Submit refill requests through [employer] or call your pharmacy and they will forward the refill request to us. Please allow 3 business days for your refill to be completed.          Additional Information About Your Visit        [employer] Information     [employer] gives you secure access to your electronic health record. If you see a primary care provider, you can also send messages to your care team and make appointments. If you have questions, please call your primary care clinic.  If you do not have a primary care provider, please call [phone] and they will assist you.        Care EveryWhere ID     This is your Care EveryWhere ID. This could be used by other organizations to access your [place] medical records  [id number]         Blood Pressure from Last 3 Encounters:   01/05/18 (P) 114/67   01/04/18 119/79   01/03/18 116/76    Weight from Last 3 Encounters:   01/03/18 65.8 kg (145 lb)   01/03/18 64.9 kg (143 lb)   01/02/18 64.4 kg (142 lb)              Today, you had the following     No orders found for display         Today's Medication Changes          These changes are accurate as of: 1/5/18  1:10 PM.  If you have any questions, ask your nurse or doctor.                Start taking these medicines.        Dose/Directions    oxyCODONE 5 MG/5ML solution   Commonly known as:  ROXICODONE   Used for:  Chronic abdominal pain   Started by:  Leonardo Wang MD        Dose:  10 mg   Take 10 mLs (10 mg) by mouth every 8 hours as needed for pain maximum 30 mg per day   Quantity:  300 mL   Refills:  0            Where to get your medicines      Some of these will need a paper prescription and others can be bought over the counter.  Ask your nurse if you have questions.     Bring a paper prescription for each of these medications     oxyCODONE 5 MG/5ML solution                Primary Care Provider Office Phone # Fax #    PeruCheyanne Baez [phone]       [address] [place] [address] [address] [place] [address]        Equal Access to Services     RACHAEL BENITEZ : Bret rodneyo Somalini, waaxda luqadaha, qaybta kaalmada adechrisyakush, maki smallwood. So [facility] [phone].    ATENCIÓN: Si habla español, tiene a quijano disposición servicios gratuitos de asistencia lingüística. [facility] [phone].    We comply with applicable federal civil rights laws and [place] laws. We do not discriminate on the basis of race, color, national origin, age, disability, sex, sexual orientation, or gender identity.            Thank you!     Thank you for choosing [facility] SPECIALTY AND PROCEDURE  for your care. Our goal is always to provide you with excellent care. Hearing back from our patients is one way we can continue to improve our services. Please take a few minutes to complete the written survey that you may receive in the mail after your visit with us. Thank you!             Your Updated Medication List - Protect others around you: Learn how to safely use, store and throw away your medicines at www.disposemymeds.org.          This list is accurate as of: 1/5/18  1:10 PM.  Always use your most recent med list.                   Brand  Name Dispense Instructions for use Diagnosis    * amylase-lipase-protease 14637 UNITS Cpep    ZENPEP    180 capsule    Take 2-3 capsules (40,000-60,000 Units) by mouth Take with snacks or supplements (Snacks)    Idiopathic chronic pancreatitis (H)       * amylase-lipase-protease 26123 UNITS Cpep    ZENPEP    180 capsule    Take 5 capsules (100,000 Units) by mouth 3 times daily (with meals)    Right upper quadrant abdominal pain       fluticasone 50 MCG/ACT spray    FLONASE    16 g    Spray 2 sprays into both nostrils daily    Nasal congestion       gabapentin 400 MG capsule    NEURONTIN    90 capsule    Take 1 capsule (400 mg) by mouth 3 times daily    Abdominal pain, epigastric       leuprolide 11.25 MG kit    LUPRON DEPOT (3-MONTH)    1 each    Inject 11.25 mg into the muscle every 3 months    Endometriosis       levalbuterol 45 MCG/ACT Inhaler    XOPENEX HFA    1 Inhaler    Inhale 2 puffs into the lungs every 6 hours as needed for shortness of breath / dyspnea    Wheeze       methadone 5 MG tablet    DOLOPHINE    30 tablet    Take 0.5 tablets (2.5 mg) by mouth every 12 hours    Abdominal pain, epigastric       norethindrone 5 MG tablet    AYGESTIN    90 tablet    Take 1 tablet (5 mg) by mouth daily    Endometriosis       nortriptyline 10 MG capsule    PAMELOR    120 capsule    Take 3 capsules (30 mg) by mouth At Bedtime    Right upper quadrant abdominal pain       ondansetron 4 MG ODT tab    ZOFRAN ODT    40 tablet    Take 1 tablet (4 mg) by mouth every 8 hours as needed for nausea or vomiting    Idiopathic chronic pancreatitis (H)       oxyCODONE 5 MG/5ML solution    ROXICODONE    300 mL    Take 10 mLs (10 mg) by mouth every 8 hours as needed for pain maximum 30 mg per day    Chronic abdominal pain       pantoprazole 40 MG EC tablet    PROTONIX    30 tablet    Take 1 tablet (40 mg) by mouth 2 times daily (before meals)    Right upper quadrant abdominal pain, Erosive gastritis       polyethylene glycol Packet     MIRALAX/GLYCOLAX    7 packet    Take 17 g by mouth daily    Right upper quadrant abdominal pain       RIZATRIPTAN BENZOATE PO      Take 5 mg by mouth once as needed        scopolamine 72 hr patch    TRANSDERM    2 patch    Apply 1 patch to hairless area behind one ear if severe nausea and vomiting.  Remove old patch and change every 3 days (72 hours).    Intractable vomiting with nausea, unspecified vomiting type       senna-docusate 8.6-50 MG per tablet    SENOKOT-S;PERICOLACE    100 tablet    Take 1 tablet by mouth 2 times daily as needed for constipation    Right upper quadrant abdominal pain       * Notice:  This list has 2 medication(s) that are the same as other medications prescribed for you. Read the directions carefully, and ask your doctor or other care provider to review them with you.

## 2018-01-05 NOTE — DISCHARGE SUMMARY
[facility], [place]    Internal Medicine Discharge Summary- Gold Service    Date of Admission:  1/3/2018  Date of Discharge:  1/4/2018  Discharging Provider: Corbin Vaughn  Discharge Team: Gold Night    Discharge Diagnoses   Chronic abdominal pain    Follow-ups Needed After Discharge   Follow up with pain clinic    Hospital Course   Alexandra Melgoza was admitted on 1/3/2018 for acute on chronic abdominal pain complicated by nausea and vomiting limiting oral pain medication tolerance.  The following problems were addressed during her hospitalization:    Acute on chronic abdominal pain - Presented with worsening of her chronic abdominal pain and new nausea and vomiting limiting PO medication tolerance in the setting of recent outpatient pain medication adjustment.  CMP significant for mild AST, ALT elevation which has been historic and abd US without new pathology.  - Seen by GI with no plans for intervention or further imaging  - Seen by pain team and restarted on methadone which she was able to tolerate with Zofran and a scopolamine patch  - Patient will follow up with her pain team as an outpatient and resume fluid infusions as an outpatient.  - Patient prescribed Zofran, scopolamine patch on discharge    Consultations This Hospital Stay   GI PANCREATICOBILIARY ADULT IP CONSULT  PAIN MANAGEMENT ADULT IP CONSULT     Code Status   Full Code    Time Spent on this Encounter   I, Corbin Vaughn, personally saw the patient today and spent less than or equal to 30 minutes discharging this patient.       Crobin Vaughn NP  Internal Medicine Staff Hospitalist Service  [facility]  Pager: [phone]  ______________________________________________________________________    Physical Exam   Vital Signs: Temp: 98.8  F (37.1  C) Temp src: Oral BP: 119/79 Pulse: 98   Resp: 15 SpO2: 100 % O2 Device: None (Room air)    Weight: 145 lbs 0 oz    General Appearance: Young  woman, tearful  Respiratory: No labored breathing, no evident respiratory distress.  Skin: No rash or bruising noted    Significant Results and Procedures        Primary Care Physician   Quyen Baez    Discharge Disposition   Discharged to home  Condition at discharge: Good    Discharge Orders   No discharge procedures on file.  Discharge Medications   Current Discharge Medication List      START taking these medications    Details   scopolamine (TRANSDERM) 72 hr patch Apply 1 patch to hairless area behind one ear if severe nausea and vomiting.  Remove old patch and change every 3 days (72 hours).  Qty: 2 patch, Refills: 0    Associated Diagnoses: Intractable vomiting with nausea, unspecified vomiting type         CONTINUE these medications which have NOT CHANGED    Details   methadone (DOLOPHINE) 5 MG tablet Take 0.5 tablets (2.5 mg) by mouth every 12 hours  Qty: 30 tablet, Refills: 0    Associated Diagnoses: Abdominal pain, epigastric      gabapentin (NEURONTIN) 400 MG capsule Take 1 capsule (400 mg) by mouth 3 times daily  Qty: 90 capsule, Refills: 3    Associated Diagnoses: Abdominal pain, epigastric      nortriptyline (PAMELOR) 10 MG capsule Take 3 capsules (30 mg) by mouth At Bedtime  Qty: 120 capsule, Refills: 0    Associated Diagnoses: Right upper quadrant abdominal pain      ondansetron (ZOFRAN ODT) 4 MG ODT tab Take 1 tablet (4 mg) by mouth every 8 hours as needed for nausea or vomiting  Qty: 40 tablet, Refills: 0    Associated Diagnoses: Idiopathic chronic pancreatitis (H)      !! amylase-lipase-protease (ZENPEP) 98867 UNITS CPEP Take 2-3 capsules (40,000-60,000 Units) by mouth Take with snacks or supplements (Snacks)  Qty: 180 capsule, Refills: 1    Associated Diagnoses: Idiopathic chronic pancreatitis (H)      senna-docusate (SENOKOT-S;PERICOLACE) 8.6-50 MG per tablet Take 1 tablet by mouth 2 times daily as needed for constipation  Qty: 100 tablet, Refills: 0    Associated Diagnoses: Right upper  quadrant abdominal pain      pantoprazole (PROTONIX) 40 MG EC tablet Take 1 tablet (40 mg) by mouth 2 times daily (before meals)  Qty: 30 tablet, Refills: 1    Associated Diagnoses: Right upper quadrant abdominal pain; Erosive gastritis      norethindrone (AYGESTIN) 5 MG tablet Take 1 tablet (5 mg) by mouth daily  Qty: 90 tablet, Refills: 1    Associated Diagnoses: Endometriosis      fluticasone (FLONASE) 50 MCG/ACT spray Spray 2 sprays into both nostrils daily  Qty: 16 g, Refills: 3    Associated Diagnoses: Nasal congestion      !! amylase-lipase-protease (ZENPEP) 38982 UNITS CPEP Take 5 capsules (100,000 Units) by mouth 3 times daily (with meals)  Qty: 180 capsule, Refills: 1    Associated Diagnoses: Right upper quadrant abdominal pain      polyethylene glycol (MIRALAX/GLYCOLAX) Packet Take 17 g by mouth daily  Qty: 7 packet, Refills: 0    Associated Diagnoses: Right upper quadrant abdominal pain      levalbuterol (XOPENEX HFA) 45 MCG/ACT Inhaler Inhale 2 puffs into the lungs every 6 hours as needed for shortness of breath / dyspnea  Qty: 1 Inhaler, Refills: 1    Associated Diagnoses: Wheeze      leuprolide (LUPRON DEPOT) 11.25 MG injection Inject 11.25 mg into the muscle every 3 months  Qty: 1 each, Refills: 3    Associated Diagnoses: Endometriosis      RIZATRIPTAN BENZOATE PO Take 5 mg by mouth once as needed        !! - Potential duplicate medications found. Please discuss with provider.      STOP taking these medications       oxyCODONE IR (ROXICODONE) 10 MG tablet Comments:   Reason for Stopping:         oxyCODONE (ROXICODONE) 5 MG/5ML solution Comments:   Reason for Stopping:             Allergies   Allergies   Allergen Reactions     Amoxicillin-Pot Clavulanate Nausea and Vomiting     Compazine [Prochlorperazine] Other (See Comments)     Dystonia       Hyoscyamine Other (See Comments)     Dystonia     Reglan [Metoclopramide Hcl] Other (See Comments)     Dystonia     Zyprexa Other (See Comments)      "Sensitive, dystonic reaction on 11-9-2011     Amitriptyline Hcl Other (See Comments)     Dystonia, hallucinations     Buspirone Other (See Comments)     No Adverse Reactions, no benefit     Cogentin [Benztropine]      Cyproheptadine Other (See Comments)     Distonic     Dicyclomine Other (See Comments)     Droperidol Other (See Comments)     Feels tense and \"like she has to jump out of her skin\".       Effexor [Venlafaxine] Other (See Comments)     Dystonia     Food      Cilantro--lips/tongue swelling     No Clinical Screening - See Comments Other (See Comments)     Cilantro     Phenergan Dm [Promethazine-Dm] Other (See Comments)     dystonia     Promethazine Other (See Comments)     Risperidone Other (See Comments)     dystonia     Vistaril Other (See Comments)     Burning sensation.       Augmentin GI Disturbance     Ketamine Other (See Comments)     jittery     Sorbitol GI Disturbance     Headache and dyspepsia       "

## 2018-01-08 ENCOUNTER — INFUSION THERAPY VISIT (OUTPATIENT)
Dept: INFUSION THERAPY | Facility: CLINIC | Age: 23
End: 2018-01-08
Attending: INTERNAL MEDICINE
Payer: COMMERCIAL

## 2018-01-08 VITALS
DIASTOLIC BLOOD PRESSURE: 84 MMHG | RESPIRATION RATE: 16 BRPM | SYSTOLIC BLOOD PRESSURE: 138 MMHG | HEART RATE: 91 BPM | OXYGEN SATURATION: 100 % | TEMPERATURE: 98.5 F

## 2018-01-08 DIAGNOSIS — G43.A0 CYCLICAL VOMITING WITH NAUSEA, INTRACTABILITY OF VOMITING NOT SPECIFIED: Primary | ICD-10-CM

## 2018-01-08 DIAGNOSIS — K86.1 CHRONIC PANCREATITIS, UNSPECIFIED PANCREATITIS TYPE (H): ICD-10-CM

## 2018-01-08 PROCEDURE — 96361 HYDRATE IV INFUSION ADD-ON: CPT

## 2018-01-08 PROCEDURE — 96374 THER/PROPH/DIAG INJ IV PUSH: CPT

## 2018-01-08 PROCEDURE — 25000128 H RX IP 250 OP 636: Mod: ZF | Performed by: INTERNAL MEDICINE

## 2018-01-08 RX ORDER — ONDANSETRON 2 MG/ML
4 INJECTION INTRAMUSCULAR; INTRAVENOUS ONCE
Status: CANCELLED
Start: 2018-01-08 | End: 2018-01-08

## 2018-01-08 RX ORDER — ONDANSETRON 2 MG/ML
4 INJECTION INTRAMUSCULAR; INTRAVENOUS DAILY PRN
Status: DISCONTINUED | OUTPATIENT
Start: 2018-01-08 | End: 2018-01-08 | Stop reason: HOSPADM

## 2018-01-08 RX ORDER — ONDANSETRON 2 MG/ML
4 INJECTION INTRAMUSCULAR; INTRAVENOUS DAILY PRN
Status: CANCELLED
Start: 2018-01-08

## 2018-01-08 RX ORDER — ONDANSETRON 2 MG/ML
4 INJECTION INTRAMUSCULAR; INTRAVENOUS ONCE
Status: COMPLETED | OUTPATIENT
Start: 2018-01-08 | End: 2018-01-08

## 2018-01-08 RX ADMIN — ONDANSETRON 4 MG: 2 INJECTION INTRAMUSCULAR; INTRAVENOUS at 13:11

## 2018-01-08 RX ADMIN — ONDANSETRON 4 MG: 2 INJECTION INTRAMUSCULAR; INTRAVENOUS at 11:24

## 2018-01-08 RX ADMIN — SODIUM CHLORIDE, POTASSIUM CHLORIDE, SODIUM LACTATE AND CALCIUM CHLORIDE 1500 ML: 600; 310; 30; 20 INJECTION, SOLUTION INTRAVENOUS at 11:24

## 2018-01-08 NOTE — PROGRESS NOTES
Nursing Note  Alexandra Melgo[facility]:   Chief Complaint   Patient presents with     Infusion     Fluids/ zofran   During today's [facility] appointment, orders from Dr. Campbell Narayanan were completed.  Frequency: as needed    Progress note:  Patient identification verified by name and date of birth.  Assessment completed.  Vitals recorded in Doc Flowsheets.  Patient was provided with education regarding infusion and possible side effects.  Patient verbalized understanding.      needed: No  Premedications: were not ordered.  Infusion Rates: 999 mls/hr  Approximate Infusion length:2 hours.   Labs: were not ordered for this appointment.  Vascular access: port accessed today.  Treatment Conditions: non-applicable.  Patient tolerated infusion: well.    Discharge Plan:   Follow up plan of care with: ongoing infusions at [facility].  Discharge instructions were reviewed with patient.  Patient/representative verbalized understanding of discharge instructions and all questions answered.  [facility] discharged [facility] in stable condition.    Ysabel Piedra RN    Administrations This Visit     lactated ringers BOLUS 1,000-1,500 mL     Admin Date Action Dose Route Administered By             01/08/2018 New Bag 1500 mL Intravenous Ysabel Piedra RN                    ondansetron (ZOFRAN) injection 4 mg     Admin Date Action Dose Route Administered By             01/08/2018 Given 4 mg Intravenous Ysabel Piedra RN                          /73  Pulse 86  Temp 98.5  F (36.9  C) (Oral)  Resp 16  SpO2 100%

## 2018-01-08 NOTE — PATIENT INSTRUCTIONS
Dear Alexandra Melgoza    Thank you for choosing [facility] ([facility]) for your infusion.  The following information is a summary of our appointment as well as important reminders.        We look forward in seeing you on your next appointment here at [facility].  Please don t hesitate to call us at [phone] to reschedule any of your appointments or to speak with one of the [facility] registered nurses.  It was a pleasure taking care of you today.    Sincerely,    [facility]  [facility]  [address] [place] [address]  [address]  Phone:  [phone]

## 2018-01-08 NOTE — MR AVS SNAPSHOT
After Visit Summary   1/8/2018    Alexandra Melgoza    MRN: [id number]           Patient Information     Date Of Birth          1995        Visit Information        Provider Department      1/8/2018 11:00 AM UC 44 ATC; UC SPEC INFUSION [facility] Specialty and Procedure        Today's Diagnoses     Cyclical vomiting with nausea, intractability of vomiting not specified    -  1    Chronic pancreatitis, unspecified pancreatitis type (H)          Care Instructions    Dear Alexandra Melgoza    Thank you for choosing [facility] Specialty Infusion [facility] Procedure [facility] ([facility]) for your infusion.  The following information is a summary of our appointment as well as important reminders.        We look forward in seeing you on your next appointment here at [facility].  Please don t hesitate to call us at [phone] to reschedule any of your appointments or to speak with one of the [facility] registered nurses.  It was a pleasure taking care of you today.    Sincerely,    [facility]  Specialty Infusion & Procedure Center  [address] [place] [address]  [address]  Phone:  [phone]            Follow-ups after your visit        Your next 10 appointments already scheduled     Umer 10, 2018 12:00 PM CST   (Arrive by 11:45 AM)   Return Visit with Campbell Narayanan MD   [facility] Pancreas and Biliary ([facility])    [address]  4th Floor  [facility] [address] [phone]            Umer 10, 2018  1:00 PM CST   Infusion 90 with UC SPEC INFUSION, UC 42 ATC   [facility] Specialty and Procedure ([facility])    [address]  [address]  [facility] [address] [phone]            Jan 12, 2018  3:00 PM CST   Infusion 90 with UC SPEC INFUSION, UC 45 ATC   [facility] Specialty and Procedure ([facility])    [address]  [address]  [facility] [address]   [phone]            Umer 15, 2018 10:00 AM CST   (Arrive by 9:45 AM)   Return Visit with Leonardo Wang MD   [facility] ([facility])    [address] [place] [address]  4th Floor  [facility] [address] [phone]            Umer 15, 2018  3:00 PM CST   Infusion 90 with UC SPEC INFUSION, UC 51 ATC   [facility] Specialty and Procedure ([facility])    [address]  [address]  [facility] [address] [phone]            Jan 17, 2018  4:00 PM CST   Infusion 90 with UC SPEC INFUSION, UC 44 ATC   [facility] Specialty and Procedure ([facility])    [address] [place] [address]  [address]  [facility] [address] [phone]              Who to contact     If you have questions or need follow up information about today's clinic visit or your schedule please contact [facility] SPECIALTY AND PROCEDURE directly at [phone].  Normal or non-critical lab and imaging results will be communicated to you by Coupayhart, letter or phone within 4 business days after the clinic has received the results. If you do not hear from us within 7 days, please contact the clinic through Kirondot or phone. If you have a critical or abnormal lab result, we will notify you by phone as soon as possible.  Submit refill requests through Mobspire or call your pharmacy and they will forward the refill request to us. Please allow 3 business days for your refill to be completed.          Additional Information About Your Visit        MyChart Information     Mobspire gives you secure access to your electronic health record. If you see a primary care provider, you can also send messages to your care team and make appointments. If you have questions, please call your primary care clinic.  If  you do not have a primary care provider, please call [phone] and they will assist you.        Care EveryWhere ID     This is your Care EveryWhere ID. This could be used by other organizations to access your [place] medical records  [id number]        Your Vitals Were     Pulse Temperature Respirations Pulse Oximetry          91 98.5  F (36.9  C) (Oral) 16 100%         Blood Pressure from Last 3 Encounters:   01/08/18 138/84   01/05/18 (P) 114/67   01/04/18 119/79    Weight from Last 3 Encounters:   01/03/18 65.8 kg (145 lb)   01/03/18 64.9 kg (143 lb)   01/02/18 64.4 kg (142 lb)              Today, you had the following     No orders found for display       Primary Care Provider Office Phone # Fax #    Quyen Baez [phone]       [address] [place] [address] [address]  [facility] [address]        Equal Access to Services     RACHAEL BENITEZ : Hadii aad ku hadasho Somalini, waaxda luqadaha, qaybta kaalmada adeegyada, maki gaspar . So [facility] [phone].    ATENCIÓN: Si habla español, tiene a quijano disposición servicios gratuitos de asistencia lingüística. Llame al [phone].    We comply with applicable federal civil rights laws and [place] laws. We do not discriminate on the basis of race, color, national origin, age, disability, sex, sexual orientation, or gender identity.            Thank you!     Thank you for choosing [facility] SPECIALTY AND PROCEDURE  for your care. Our goal is always to provide you with excellent care. Hearing back from our patients is one way we can continue to improve our services. Please take a few minutes to complete the written survey that you may receive in the mail after your visit with us. Thank you!             Your Updated Medication List - Protect others around you: Learn how to safely use, store and throw away your medicines at www.disposemymeds.org.          This list is accurate as of: 1/8/18  2:50 PM.   Always use your most recent med list.                   Brand Name Dispense Instructions for use Diagnosis    * amylase-lipase-protease 67996 UNITS Cpep    ZENPEP    180 capsule    Take 2-3 capsules (40,000-60,000 Units) by mouth Take with snacks or supplements (Snacks)    Idiopathic chronic pancreatitis (H)       * amylase-lipase-protease 24956 UNITS Cpep    ZENPEP    180 capsule    Take 5 capsules (100,000 Units) by mouth 3 times daily (with meals)    Right upper quadrant abdominal pain       fluticasone 50 MCG/ACT spray    FLONASE    16 g    Spray 2 sprays into both nostrils daily    Nasal congestion       gabapentin 400 MG capsule    NEURONTIN    90 capsule    Take 1 capsule (400 mg) by mouth 3 times daily    Abdominal pain, epigastric       leuprolide 11.25 MG kit    LUPRON DEPOT (3-MONTH)    1 each    Inject 11.25 mg into the muscle every 3 months    Endometriosis       levalbuterol 45 MCG/ACT Inhaler    XOPENEX HFA    1 Inhaler    Inhale 2 puffs into the lungs every 6 hours as needed for shortness of breath / dyspnea    Wheeze       methadone 5 MG tablet    DOLOPHINE    30 tablet    Take 0.5 tablets (2.5 mg) by mouth every 12 hours    Abdominal pain, epigastric       norethindrone 5 MG tablet    AYGESTIN    90 tablet    Take 1 tablet (5 mg) by mouth daily    Endometriosis       nortriptyline 10 MG capsule    PAMELOR    120 capsule    Take 3 capsules (30 mg) by mouth At Bedtime    Right upper quadrant abdominal pain       ondansetron 4 MG ODT tab    ZOFRAN ODT    40 tablet    Take 1 tablet (4 mg) by mouth every 8 hours as needed for nausea or vomiting    Idiopathic chronic pancreatitis (H)       oxyCODONE 5 MG/5ML solution    ROXICODONE    300 mL    Take 10 mLs (10 mg) by mouth every 8 hours as needed for pain maximum 30 mg per day    Chronic abdominal pain       pantoprazole 40 MG EC tablet    PROTONIX    30 tablet    Take 1 tablet (40 mg) by mouth 2 times daily (before meals)    Right upper quadrant  abdominal pain, Erosive gastritis       polyethylene glycol Packet    MIRALAX/GLYCOLAX    7 packet    Take 17 g by mouth daily    Right upper quadrant abdominal pain       RIZATRIPTAN BENZOATE PO      Take 5 mg by mouth once as needed        scopolamine 72 hr patch    TRANSDERM    2 patch    Apply 1 patch to hairless area behind one ear if severe nausea and vomiting.  Remove old patch and change every 3 days (72 hours).    Intractable vomiting with nausea, unspecified vomiting type       senna-docusate 8.6-50 MG per tablet    SENOKOT-S;PERICOLACE    100 tablet    Take 1 tablet by mouth 2 times daily as needed for constipation    Right upper quadrant abdominal pain       * Notice:  This list has 2 medication(s) that are the same as other medications prescribed for you. Read the directions carefully, and ask your doctor or other care provider to review them with you.

## 2018-01-10 ENCOUNTER — INFUSION THERAPY VISIT (OUTPATIENT)
Dept: INFUSION THERAPY | Facility: CLINIC | Age: 23
End: 2018-01-10
Attending: INTERNAL MEDICINE
Payer: COMMERCIAL

## 2018-01-10 ENCOUNTER — OFFICE VISIT (OUTPATIENT)
Dept: GASTROENTEROLOGY | Facility: CLINIC | Age: 23
End: 2018-01-10
Payer: COMMERCIAL

## 2018-01-10 ENCOUNTER — TELEPHONE (OUTPATIENT)
Dept: ANESTHESIOLOGY | Facility: CLINIC | Age: 23
End: 2018-01-10

## 2018-01-10 VITALS
TEMPERATURE: 98.4 F | DIASTOLIC BLOOD PRESSURE: 72 MMHG | WEIGHT: 147.4 LBS | SYSTOLIC BLOOD PRESSURE: 112 MMHG | BODY MASS INDEX: 23.79 KG/M2 | OXYGEN SATURATION: 100 % | RESPIRATION RATE: 17 BRPM | HEART RATE: 87 BPM

## 2018-01-10 VITALS
BODY MASS INDEX: 23.69 KG/M2 | WEIGHT: 147.4 LBS | SYSTOLIC BLOOD PRESSURE: 134 MMHG | DIASTOLIC BLOOD PRESSURE: 87 MMHG | HEART RATE: 106 BPM | HEIGHT: 66 IN | TEMPERATURE: 98.4 F | OXYGEN SATURATION: 100 %

## 2018-01-10 DIAGNOSIS — K86.1 CHRONIC PANCREATITIS, UNSPECIFIED PANCREATITIS TYPE (H): ICD-10-CM

## 2018-01-10 DIAGNOSIS — G43.A0 CYCLICAL VOMITING WITH NAUSEA, INTRACTABILITY OF VOMITING NOT SPECIFIED: Primary | ICD-10-CM

## 2018-01-10 DIAGNOSIS — R10.11 RIGHT UPPER QUADRANT ABDOMINAL PAIN: Primary | ICD-10-CM

## 2018-01-10 DIAGNOSIS — G43.A0 CYCLICAL VOMITING WITH NAUSEA, INTRACTABILITY OF VOMITING NOT SPECIFIED: ICD-10-CM

## 2018-01-10 PROCEDURE — 96374 THER/PROPH/DIAG INJ IV PUSH: CPT

## 2018-01-10 PROCEDURE — 25000128 H RX IP 250 OP 636: Mod: ZF | Performed by: INTERNAL MEDICINE

## 2018-01-10 PROCEDURE — 96361 HYDRATE IV INFUSION ADD-ON: CPT

## 2018-01-10 RX ORDER — ONDANSETRON 2 MG/ML
4 INJECTION INTRAMUSCULAR; INTRAVENOUS ONCE
Status: COMPLETED | OUTPATIENT
Start: 2018-01-10 | End: 2018-01-10

## 2018-01-10 RX ORDER — ONDANSETRON 2 MG/ML
4 INJECTION INTRAMUSCULAR; INTRAVENOUS DAILY PRN
Status: CANCELLED
Start: 2018-01-10

## 2018-01-10 RX ORDER — ONDANSETRON 2 MG/ML
4 INJECTION INTRAMUSCULAR; INTRAVENOUS DAILY PRN
Status: DISCONTINUED | OUTPATIENT
Start: 2018-01-10 | End: 2018-01-10 | Stop reason: HOSPADM

## 2018-01-10 RX ORDER — DIPHENHYDRAMINE HYDROCHLORIDE 50 MG/ML
25 INJECTION INTRAMUSCULAR; INTRAVENOUS ONCE
Status: CANCELLED
Start: 2018-01-10 | End: 2018-01-10

## 2018-01-10 RX ORDER — ONDANSETRON 2 MG/ML
4 INJECTION INTRAMUSCULAR; INTRAVENOUS ONCE
Status: CANCELLED
Start: 2018-01-10 | End: 2018-01-10

## 2018-01-10 RX ORDER — DIPHENHYDRAMINE HYDROCHLORIDE 50 MG/ML
25 INJECTION INTRAMUSCULAR; INTRAVENOUS ONCE
Status: COMPLETED | OUTPATIENT
Start: 2018-01-10 | End: 2018-01-10

## 2018-01-10 RX ADMIN — SODIUM CHLORIDE, POTASSIUM CHLORIDE, SODIUM LACTATE AND CALCIUM CHLORIDE 1500 ML: 600; 310; 30; 20 INJECTION, SOLUTION INTRAVENOUS at 13:20

## 2018-01-10 RX ADMIN — ONDANSETRON 4 MG: 2 INJECTION INTRAMUSCULAR; INTRAVENOUS at 14:51

## 2018-01-10 RX ADMIN — DIPHENHYDRAMINE HYDROCHLORIDE 25 MG: 50 INJECTION INTRAMUSCULAR; INTRAVENOUS at 13:21

## 2018-01-10 RX ADMIN — ONDANSETRON 4 MG: 2 INJECTION INTRAMUSCULAR; INTRAVENOUS at 13:21

## 2018-01-10 ASSESSMENT — PAIN SCALES - GENERAL
PAINLEVEL: SEVERE PAIN (6)
PAINLEVEL: SEVERE PAIN (7)

## 2018-01-10 NOTE — PROGRESS NOTES
HISTORY OF PRESENT ILLNESS:  Alexandra is well known to me.  She is a 22-year-old with intractable abdominal pain and recent hospitalizations (who moved back here from [place]) most recently for severe flare of pain, and on 01/03 associated with dramatic transaminase rises of 152 ALT, AST 82, coming down to 110 and 56.  Other enzymes were normal.  Also, her ultrasound showed intrahepatic ducts relatively normal.  A CT, though, from 12/06 here had showed coiled feeding tube, mild intrahepatic chasidy-dil.  No other acute findings.  She had thought that on her admission to [facility], she had evidence of acute pancreatitis.  I reviewed that CT, and it is without contrast.      IMPRESSION/RECOMMENDATIONS:  We spent 25 minutes, more than 50% counseling, reviewing her situation.  She is seeing Dr. Leonardo Wang and is very happy with her pain management.  She has a good rapport with him. She feels that things are becoming intolerable.  I did review her full history.  It appears that her bile duct is now somewhat dilated.  She had significant liver enzyme bumps with pain.  We had done an ERCP with sphincterotomy x1 in 2011, so more than 6 years ago.  We discussed that it is possible that she has stenosis of her biliary sphincter noting that her original presentation was abdominal pain with bumped liver tests.  I doubt that this would have a major impact on her pain.  We went over risks and alternatives.  The other is that she did really much better with a feeding tube in, although it would not stay in place.  We discussed that under the same anesthesia, we could place and NJ relatively deep and give her a trial of NJ feeding.  The ERCP would consist of extending the sphincterotomy, temporary stent in the bile and pancreatic ducts.  We also reviewed the possibility of a GJ tube; however, I would really like to hold that off, as it would give her a whole new level of sources of pain and irritation.  Plan is ERCP plus  NJ tube placement.  We went over risks, limited benefit and alternatives.      We spent 25 minutes, more than 50% counseling.

## 2018-01-10 NOTE — MR AVS SNAPSHOT
After Visit Summary   1/10/2018    Alexandra Melgoza    MRN: [id number]           Patient Information     Date Of Birth          1995        Visit Information        Provider Department      1/10/2018 12:00 PM Campbell Narayanan MD [facility] Pancreas and Biliary        Care Instructions    1. ERCP - You will be called to schedule this, tentatively scheduled for Thursday 1/18/2018.      Follow up:  As above.    Please call with any questions or concerns regarding your clinic visit today.    It is a pleasure being involved in your health care.    Contacts post-consultation depending on your need:    Schedule Clinic Appointments       [phone] # 1   M-F 7:30 - 5 pm    Ella SARMIENTO RN Coordinator           [phone] # 3   M-F 8:00 - 5 pm  (Triage hours)     Mirza ORTEGA RN Coordinator               [phone] # 3   M-F 8:00 - 5 pm  (Triage hours)    OR Procedure Scheduling              [phone]    My Chart is available 24 hours a day and is a secure way to access your records and communicate with your care team.  I strongly recommend signing up if you haven't already done so, if you are comfortable with computers.  If you would like to inquire about this or are having problems with My Chart access, you may call [phone] or go online at [email or web address].  Please allow at least 24 hours for a response and extra time on weekends and Holidays.          Follow-ups after your visit        Your next 10 appointments already scheduled     Jan 12, 2018  3:00 PM CST   Infusion 90 with UC SPEC INFUSION, UC 45 ATC   [facility] Specialty and Procedure ([facility])    [address] [place] [address]  [address]  [facility] [address] [phone]            Umer 15, 2018 10:00 AM CST   (Arrive by 9:45 AM)   Return Visit with Leonardo Wang MD   [facility] ([facility])    [address] [place] [address]  [address]  4th Floor  [facility] [address]   [phone]            Umer 15, 2018  3:00 PM CST   Infusion 90 with UC SPEC INFUSION, UC 51 ATC   [facility] Specialty and Procedure ([facility])    [address]  [address]  [facility] [address]   [phone]            Jan 17, 2018  4:00 PM CST   Infusion 90 with UC SPEC INFUSION, UC 44 ATC   [facility] Specialty and Procedure ([facility])    [address]  [address]  [facility] [address] [phone]            Jan 19, 2018 10:00 AM CST   Infusion 90 with UC SPEC INFUSION, UC 48 ATC   [facility] Specialty and Procedure ([facility])    [address]  [address]  [facility] [address] [phone]              Who to contact     Please call your clinic at [phone] to:    Ask questions about your health    Make or cancel appointments    Discuss your medicines    Learn about your test results    Speak to your doctor   If you have compliments or concerns about an experience at your clinic, or if you wish to file a complaint, please contact [facility] Patient Relations at [phone] or email us at [email or web address]         Additional Information About Your Visit        Flypadhart Information     ProspectNow gives you secure access to your electronic health record. If you see a primary care provider, you can also send messages to your care team and make appointments. If you have questions, please call your primary care clinic.  If you do not have a primary care provider, please call [phone] and they will assist you.      ProspectNow is an electronic gateway that provides easy, online access to your medical records. With ProspectNow, you can request a clinic appointment, read your test results, renew a prescription or communicate with your care team.    "  To access your existing account, please contact your [facility] or call [phone] for assistance.        Care EveryWhere ID     This is your Care EveryWhere ID. This could be used by other organizations to access your [place] medical records  [id number]        Your Vitals Were     Pulse Temperature Height Pulse Oximetry BMI (Body Mass Index)       106 98.4  F (36.9  C) (Oral) 1.676 m (5' 6\") 100% 23.79 kg/m2        Blood Pressure from Last 3 Encounters:   01/10/18 134/87   01/08/18 138/84   01/05/18 (P) 114/67    Weight from Last 3 Encounters:   01/10/18 66.9 kg (147 lb 6.4 oz)   01/03/18 65.8 kg (145 lb)   01/03/18 64.9 kg (143 lb)              Today, you had the following     No orders found for display       Primary Care Provider Office Phone # Fax #    Quyen Baez [phone]       [address] [place] [address] [address] [place] [address]        Equal Access to Services     [facility]: Hadii angelina ku hadasho Somalini, waaxda luqadaha, qaybta kaalmada jacque, maki gaspar . So [facility] [phone].    ATENCIÓN: Si habla español, tiene a quijano disposición servicios gratuitos de asistencia lingüística. Panfilo al [phone].    We comply with applicable federal civil rights laws and [place] laws. We do not discriminate on the basis of race, color, national origin, age, disability, sex, sexual orientation, or gender identity.            Thank you!     Thank you for choosing [facility] PANCREAS AND BILIARY  for your care. Our goal is always to provide you with excellent care. Hearing back from our patients is one way we can continue to improve our services. Please take a few minutes to complete the written survey that you may receive in the mail after your visit with us. Thank you!             Your Updated Medication List - Protect others around you: Learn how to safely use, store and throw away your medicines at www.disposemymeds.org.    "       This list is accurate as of: 1/10/18  1:00 PM.  Always use your most recent med list.                   Brand Name Dispense Instructions for use Diagnosis    * amylase-lipase-protease 35168 UNITS Cpep    ZENPEP    180 capsule    Take 2-3 capsules (40,000-60,000 Units) by mouth Take with snacks or supplements (Snacks)    Idiopathic chronic pancreatitis (H)       * amylase-lipase-protease 77407 UNITS Cpep    ZENPEP    180 capsule    Take 5 capsules (100,000 Units) by mouth 3 times daily (with meals)    Right upper quadrant abdominal pain       fluticasone 50 MCG/ACT spray    FLONASE    16 g    Spray 2 sprays into both nostrils daily    Nasal congestion       gabapentin 400 MG capsule    NEURONTIN    90 capsule    Take 1 capsule (400 mg) by mouth 3 times daily    Abdominal pain, epigastric       leuprolide 11.25 MG kit    LUPRON DEPOT (3-MONTH)    1 each    Inject 11.25 mg into the muscle every 3 months    Endometriosis       levalbuterol 45 MCG/ACT Inhaler    XOPENEX HFA    1 Inhaler    Inhale 2 puffs into the lungs every 6 hours as needed for shortness of breath / dyspnea    Wheeze       methadone 5 MG tablet    DOLOPHINE    30 tablet    Take 0.5 tablets (2.5 mg) by mouth every 12 hours    Abdominal pain, epigastric       norethindrone 5 MG tablet    AYGESTIN    90 tablet    Take 1 tablet (5 mg) by mouth daily    Endometriosis       nortriptyline 10 MG capsule    PAMELOR    120 capsule    Take 3 capsules (30 mg) by mouth At Bedtime    Right upper quadrant abdominal pain       ondansetron 4 MG ODT tab    ZOFRAN ODT    40 tablet    Take 1 tablet (4 mg) by mouth every 8 hours as needed for nausea or vomiting    Idiopathic chronic pancreatitis (H)       oxyCODONE 5 MG/5ML solution    ROXICODONE    300 mL    Take 10 mLs (10 mg) by mouth every 8 hours as needed for pain maximum 30 mg per day    Chronic abdominal pain       pantoprazole 40 MG EC tablet    PROTONIX    30 tablet    Take 1 tablet (40 mg) by mouth 2 times  daily (before meals)    Right upper quadrant abdominal pain, Erosive gastritis       polyethylene glycol Packet    MIRALAX/GLYCOLAX    7 packet    Take 17 g by mouth daily    Right upper quadrant abdominal pain       RIZATRIPTAN BENZOATE PO      Take 5 mg by mouth once as needed        scopolamine 72 hr patch    TRANSDERM    2 patch    Apply 1 patch to hairless area behind one ear if severe nausea and vomiting.  Remove old patch and change every 3 days (72 hours).    Intractable vomiting with nausea, unspecified vomiting type       senna-docusate 8.6-50 MG per tablet    SENOKOT-S;PERICOLACE    100 tablet    Take 1 tablet by mouth 2 times daily as needed for constipation    Right upper quadrant abdominal pain       * Notice:  This list has 2 medication(s) that are the same as other medications prescribed for you. Read the directions carefully, and ask your doctor or other care provider to review them with you.

## 2018-01-10 NOTE — TELEPHONE ENCOUNTER
Central Prior Authorization Team   Phone: [phone]    PA Initiation    Medication: ondansetron (ZOFRAN ODT) 4 MG ODT tab-Initiated-  Insurance Company: [employer] - Phone [phone] Fax [phone]  Pharmacy Filling the Rx: [facility] [place], [place] - [address] [place] [address] [phone]  Filling Pharmacy Phone: [phone]  Filling Pharmacy Fax:    Start Date: 1/10/2018

## 2018-01-10 NOTE — LETTER
1/10/2018       RE: Alexandra Melgoza  [address]  [facility] [address]     Dear Colleague,    Thank you for referring your patient, Alexandra Melgoza, to the [facility] PANCREAS AND BILIARY at [facility]. Please see a copy of my visit note below.    HISTORY OF PRESENT ILLNESS:  Alexandra is well known to me.  She is a 22-year-old with intractable abdominal pain and recent hospitalizations (who moved back here from [place]) most recently for severe flare of pain, and on 01/03 associated with dramatic transaminase rises of 152 ALT, AST 82, coming down to 110 and 56.  Other enzymes were normal.  Also, her ultrasound showed intrahepatic ducts relatively normal.  A CT, though, from 12/06 here had showed coiled feeding tube, mild intrahepatic chasidy-dil.  No other acute findings.  She had thought that on her admission to [facility], she had evidence of acute pancreatitis.  I reviewed that CT, and it is without contrast.      IMPRESSION/RECOMMENDATIONS:  We spent 25 minutes, more than 50% counseling, reviewing her situation.  She is seeing Dr. Leonardo Wang and is very happy with her pain management.  She has a good rapport with him. She feels that things are becoming intolerable.  I did review her full history.  It appears that her bile duct is now somewhat dilated.  She had significant liver enzyme bumps with pain.  We had done an ERCP with sphincterotomy x1 in 2011, so more than 6 years ago.  We discussed that it is possible that she has stenosis of her biliary sphincter noting that her original presentation was abdominal pain with bumped liver tests.  I doubt that this would have a major impact on her pain.  We went over risks and alternatives.  The other is that she did really much better with a feeding tube in, although it would not stay in place.  We discussed that under the same anesthesia, we could place and NJ relatively deep and give her a trial of NJ feeding.  The  ERCP would consist of extending the sphincterotomy, temporary stent in the bile and pancreatic ducts.  We also reviewed the possibility of a GJ tube; however, I would really like to hold that off, as it would give her a whole new level of sources of pain and irritation.  Plan is ERCP plus NJ tube placement.  We went over risks, limited benefit and alternatives.      We spent 25 minutes, more than 50% counseling.         Again, thank you for allowing me to participate in the care of your patient.      Sincerely,    Campbell Narayanan MD

## 2018-01-10 NOTE — NURSING NOTE
"Chief Complaint   Patient presents with     Clinic Care Coordination - Follow-up     return       Vitals:    01/10/18 1219   BP: 134/87   Pulse: 106   Temp: 98.4  F (36.9  C)   TempSrc: Oral   SpO2: 100%   Weight: 147 lb 6.4 oz   Height: 5' 6\"       Body mass index is 23.79 kg/(m^2).      Quyen BURRIS LPN                       "

## 2018-01-10 NOTE — PATIENT INSTRUCTIONS
1. ERCP - You will be called to schedule this, tentatively scheduled for Thursday 1/18/2018.      Follow up:  As above.    Please call with any questions or concerns regarding your clinic visit today.    It is a pleasure being involved in your health care.    Contacts post-consultation depending on your need:    Schedule Clinic Appointments       [phone] # 1   M-F 7:30 - 5 pm    Ella SARMIENTO RN Coordinator           [phone] # 3   M-F 8:00 - 5 pm  (Triage hours)     Mirza ORTEGA RN Coordinator               [phone] # 3   M-F 8:00 - 5 pm  (Triage hours)    OR Procedure Scheduling              [phone]    My Chart is available 24 hours a day and is a secure way to access your records and communicate with your care team.  I strongly recommend signing up if you haven't already done so, if you are comfortable with computers.  If you would like to inquire about this or are having problems with My Chart access, you may call [phone] or go online at [email or web address].  Please allow at least 24 hours for a response and extra time on weekends and Holidays.

## 2018-01-10 NOTE — PATIENT INSTRUCTIONS
Dear Alexandra Melgoza    Thank you for choosing [facility] ([facility]) for your infusion.  The following information is a summary of our appointment as well as important reminders.      Please refer to your hospital discharge instructions for details on home care services, future appointments, phone numbers, and diet/activity levels.    Additional information: Today you received hydration and some pre meds. You will have your next infusion this week.    We look forward in seeing you on your next appointment here at [facility].  Please don t hesitate to call us at [phone] to reschedule any of your appointments or to speak with one of the [facility] registered nurses.  It was a pleasure taking care of you today.    Sincerely,    [facility]  Specialty Infusion & Procedure Center  [address] [place]  [address]  Phone:  [phone]

## 2018-01-10 NOTE — PROGRESS NOTES
Nursing Note  Alexandra Melgoza [facility] for:   Chief Complaint   Patient presents with     Infusion     IVF & pre meds     During today's [facility] appointment, orders from Dr Narayanan were completed.  Frequency: 2-3 times weekly    Progress note:  Patient identification verified by name and date of birth.  Assessment completed.  Vitals recorded in Doc Flowsheets.  Patient was provided with education regarding infusion and possible side effects.  Patient verbalized understanding.      needed: No  Premedications: administered per order.  Infusion Rates: 999 ml/hr.  Approximate Infusion length:90 minutes.   Labs: were not ordered for this appointment.  Vascular access: port accessed today.  Treatment Conditions: non-applicable.  Patient tolerated infusion: well.    Drug Waste Record? Yes      Drug Name: Benadryl  Dose: 25mg  Route administered: IV  NDC #: 8868-2039-93  Amount of waste(mL):0.5ml  Reason for waste: Single use vial       Discharge Plan:   Follow up plan of care with: ongoing infusions at [facility].  Discharge instructions were reviewed with patient.  Patient/representative verbalized understanding of discharge instructions and all questions answered.  Patient discharged from [facility] in stable condition.  YAHAIRA RAYMUNDO, SAMARA    Administrations This Visit     diphenhydrAMINE (BENADRYL) injection 25 mg     Admin Date Action Dose Route Administered By             01/10/2018 Given 25 mg Intravenous Yahaira Raymundo RN                    lactated ringers BOLUS 1,000-1,500 mL     Admin Date Action Dose Rate Route Administered By          01/10/2018 New Bag 1500 mL 999 mL/hr Intravenous Yahaira Raymundo, SAMARA                   ondansetron (ZOFRAN) injection 4 mg     Admin Date Action Dose Route Administered By             01/10/2018 Given 4 mg Intravenous Breanne  Nita, RN              Admin Date Action Dose Route Administered By             01/10/2018 Given 4 mg Intravenous Nita Raymundo, SAMARA                          /72 (BP Location: Left arm, Patient Position: Sitting, Cuff Size: Adult Regular)  Pulse 87  Temp 98.4  F (36.9  C) (Oral)  Resp 17  Wt 66.9 kg (147 lb 6.4 oz)  SpO2 100%  BMI 23.79 kg/m2

## 2018-01-10 NOTE — MR AVS SNAPSHOT
After Visit Summary   1/10/2018    Alexandra Melgoza    MRN: [id number]           Patient Information     Date Of Birth          1995        Visit Information        Provider Department      1/10/2018 1:00 PM UC 42 ATC; UC SPEC INFUSION [facility] Specialty and Procedure        Today's Diagnoses     Cyclical vomiting with nausea, intractability of vomiting not specified    -  1    Chronic pancreatitis, unspecified pancreatitis type (H)          Care Instructions    Dear Alexandra Melgoza    Thank you for choosing [facility] ([facility]) for your infusion.  The following information is a summary of our appointment as well as important reminders.      Please refer to your hospital discharge instructions for details on home care services, future appointments, phone numbers, and diet/activity levels.    Additional information: Today you received hydration and some pre meds. You will have your next infusion this week.    We look forward in seeing you on your next appointment here at [facility].  Please don t hesitate to call us at [phone] to reschedule any of your appointments or to speak with one of the [facility] registered nurses.  It was a pleasure taking care of you today.    Sincerely,    [facility]  Specialty Infusion & Procedure Center  [address] [place] [address]  [address]  Phone:  [phone]          Follow-ups after your visit        Your next 10 appointments already scheduled     Jan 12, 2018  3:00 PM CST   Infusion 90 with UC SPEC INFUSION, UC 45 ATC   [facility] Specialty and Procedure ([facility])    [address] [place] [address] [phone]            Umer 15, 2018 10:00 AM CST   (Arrive by 9:45 AM)   Return Visit with Leonardo Wang MD   [facility] ([facility]  [facility] [place])    [address]  4th Floor  [facility] [address]   [phone]            Umer 15, 2018  3:00 PM CST   Infusion 90 with UC SPEC INFUSION, UC 51 ATC   [facility] Specialty and Procedure ([facility])    [address]  [address]  [facility] [place] [address] [phone]            Jan 17, 2018  4:00 PM CST   Infusion 90 with UC SPEC INFUSION, UC 44 ATC   [facility] Specialty and Procedure ([facility])    [address]  [address]  [facility] [place] [address] [phone]            Jan 18, 2018   Procedure with Campbell Narayanan MD   [facility] [place], Same Day Surgery (--)    [address] [address] [phone]            Jan 19, 2018 10:00 AM CST   Infusion 90 with UC SPEC INFUSION, UC 48 ATC   [facility] Specialty and Procedure ([facility])    [address]  [address]  [facility] [id number] [phone]              Who to contact     If you have questions or need follow up information about today's clinic visit or your schedule please contact [facility] SPECIALTY AND PROCEDURE directly at [phone].  Normal or non-critical lab and imaging results will be communicated to you by UmbaBoxhart, letter or phone within 4 business days after the clinic has received the results. If you do not hear from us within 7 days, please contact the clinic through MyChart or phone. If you have a critical or abnormal lab result, we will notify you by phone as soon as possible.  Submit refill requests through [employer] or call your pharmacy and they will forward the refill request to us. Please allow 3 business days for your refill to be completed.          Additional Information About Your Visit        Get Satisfaction Information     [employer] gives you secure access to your electronic health record.  If you see a primary care provider, you can also send messages to your care team and make appointments. If you have questions, please call your primary care clinic.  If you do not have a primary care provider, please call [phone] and they will assist you.        Care EveryWhere ID     This is your Care EveryWhere ID. This could be used by other organizations to access your [place] medical records  [id number]        Your Vitals Were     Pulse Temperature Respirations Pulse Oximetry BMI (Body Mass Index)       87 98.4  F (36.9  C) (Oral) 17 100% 23.79 kg/m2        Blood Pressure from Last 3 Encounters:   01/10/18 112/72   01/10/18 134/87   01/08/18 138/84    Weight from Last 3 Encounters:   01/10/18 66.9 kg (147 lb 6.4 oz)   01/10/18 66.9 kg (147 lb 6.4 oz)   01/03/18 65.8 kg (145 lb)              Today, you had the following     No orders found for display       Primary Care Provider Office Phone # Fax #    Quyen Baez [phone]       [address] [place] [address]  [facility] [address]        Equal Access to Services     RACHAEL BENITEZ AH: Hadii angelina Enamorado, waaxda kiran, qaybta kaalmada adetobinda, maki smallwood. So [facility] [phone].    ATENCIÓN: Si habla español, tiene a quijano disposición servicios gratuitos de asistencia lingüística. [facility] [phone].    We comply with applicable federal civil rights laws and [place] laws. We do not discriminate on the basis of race, color, national origin, age, disability, sex, sexual orientation, or gender identity.            Thank you!     Thank you for choosing [facility] SPECIALTY AND PROCEDURE  for your care. Our goal is always to provide you with excellent care. Hearing back from our patients is one way we can continue to improve our services. Please take a few minutes to complete the written survey that you may receive in the mail after your visit with us. Thank you!              Your Updated Medication List - Protect others around you: Learn how to safely use, store and throw away your medicines at www.disposemymeds.org.          This list is accurate as of: 1/10/18  3:04 PM.  Always use your most recent med list.                   Brand Name Dispense Instructions for use Diagnosis    * amylase-lipase-protease 01592 UNITS Cpep    ZENPEP    180 capsule    Take 2-3 capsules (40,000-60,000 Units) by mouth Take with snacks or supplements (Snacks)    Idiopathic chronic pancreatitis (H)       * amylase-lipase-protease 08432 UNITS Cpep    ZENPEP    180 capsule    Take 5 capsules (100,000 Units) by mouth 3 times daily (with meals)    Right upper quadrant abdominal pain       fluticasone 50 MCG/ACT spray    FLONASE    16 g    Spray 2 sprays into both nostrils daily    Nasal congestion       gabapentin 400 MG capsule    NEURONTIN    90 capsule    Take 1 capsule (400 mg) by mouth 3 times daily    Abdominal pain, epigastric       leuprolide 11.25 MG kit    LUPRON DEPOT (3-MONTH)    1 each    Inject 11.25 mg into the muscle every 3 months    Endometriosis       levalbuterol 45 MCG/ACT Inhaler    XOPENEX HFA    1 Inhaler    Inhale 2 puffs into the lungs every 6 hours as needed for shortness of breath / dyspnea    Wheeze       methadone 5 MG tablet    DOLOPHINE    30 tablet    Take 0.5 tablets (2.5 mg) by mouth every 12 hours    Abdominal pain, epigastric       norethindrone 5 MG tablet    AYGESTIN    90 tablet    Take 1 tablet (5 mg) by mouth daily    Endometriosis       nortriptyline 10 MG capsule    PAMELOR    120 capsule    Take 3 capsules (30 mg) by mouth At Bedtime    Right upper quadrant abdominal pain       ondansetron 4 MG ODT tab    ZOFRAN ODT    40 tablet    Take 1 tablet (4 mg) by mouth every 8 hours as needed for nausea or vomiting    Idiopathic chronic pancreatitis (H)       oxyCODONE 5 MG/5ML solution    ROXICODONE    300 mL    Take 10 mLs (10 mg) by mouth every 8 hours as needed for pain  maximum 30 mg per day    Chronic abdominal pain       pantoprazole 40 MG EC tablet    PROTONIX    30 tablet    Take 1 tablet (40 mg) by mouth 2 times daily (before meals)    Right upper quadrant abdominal pain, Erosive gastritis       polyethylene glycol Packet    MIRALAX/GLYCOLAX    7 packet    Take 17 g by mouth daily    Right upper quadrant abdominal pain       RIZATRIPTAN BENZOATE PO      Take 5 mg by mouth once as needed        scopolamine 72 hr patch    TRANSDERM    2 patch    Apply 1 patch to hairless area behind one ear if severe nausea and vomiting.  Remove old patch and change every 3 days (72 hours).    Intractable vomiting with nausea, unspecified vomiting type       senna-docusate 8.6-50 MG per tablet    SENOKOT-S;PERICOLACE    100 tablet    Take 1 tablet by mouth 2 times daily as needed for constipation    Right upper quadrant abdominal pain       * Notice:  This list has 2 medication(s) that are the same as other medications prescribed for you. Read the directions carefully, and ask your doctor or other care provider to review them with you.

## 2018-01-11 ENCOUNTER — TELEPHONE (OUTPATIENT)
Dept: ANESTHESIOLOGY | Facility: CLINIC | Age: 23
End: 2018-01-11

## 2018-01-11 ENCOUNTER — CARE COORDINATION (OUTPATIENT)
Dept: GASTROENTEROLOGY | Facility: CLINIC | Age: 23
End: 2018-01-11

## 2018-01-11 NOTE — PROGRESS NOTES
"Grabiel called with intense abdominal pain rated 7-8/10 since 3:30 am and has jb throwing up since then. She is treating her with pain with liquid pain medication. Vomited about 6 times since then, now dry heaving. Unable to take in water except a few sips of water at at time. Last ate applesauce last night around 8pm.   Taking her zofran for the nausea with little effect. She had an infusion yesterday and is scheduled for another one tomorrow.     States she is feeling weak but denies being lightheadedness or dizzy.      ~ Advised to go to the ER if develops:    Fevers over 101 +/- chills,    Significant nausea/vomiting causing inability to take in fluids depleting hydration.    severe pain, ongoing symptoms (pain, nausea) that cannot be controlled with prescribed or OTC medication.    Signs of dehydration (pale skin, low blood pressure, lightheadedness, dark urine, \"sticky\" skin when pinched, rapid heart rate, little to no urine output).    She is home alone, but someone will be home within the next hour.   Advised her to call her mom and get some pedialyte and then possibly try sea-bands which can help with nausea. Again, reviewed the symptoms that she will need to watch to go to ER. Main concern would be the dehydration. Verbalized understanding.   Confirmed she has the contact number to call with further questions.     Ella SARMIENTO RN Coordinator  Dr. Narayanan, Dr. Joe & Dr. Klein   [facility]  [phone]          "

## 2018-01-11 NOTE — TELEPHONE ENCOUNTER
Call back to pt.  Pt advised to continue using oxycodone as prescribed, in addition to heat and cold therapy for pain.  Pt already scheduled to see Dr. Wang 1/15/18.  Pt verbalized understanding.     Amanda Rodriguez, RN, BSN

## 2018-01-11 NOTE — TELEPHONE ENCOUNTER
----- Message from Sosa Irene sent at 1/11/2018 11:19 AM CST -----  Regarding: (Kathleen) Increased Pain  Contact: [phone]  Pt pain has been getting worse as of last night.  She saw her GI doctor yesterday, and she is having surgery next Thursday, Jan 18th to have stints placed.  She is concerned about managing her pain through the weekend and week. Please give Alexandra a call today at [phone].

## 2018-01-12 ENCOUNTER — INFUSION THERAPY VISIT (OUTPATIENT)
Dept: INFUSION THERAPY | Facility: CLINIC | Age: 23
End: 2018-01-12
Attending: INTERNAL MEDICINE
Payer: COMMERCIAL

## 2018-01-12 VITALS
HEART RATE: 85 BPM | OXYGEN SATURATION: 100 % | RESPIRATION RATE: 16 BRPM | SYSTOLIC BLOOD PRESSURE: 124 MMHG | DIASTOLIC BLOOD PRESSURE: 79 MMHG

## 2018-01-12 DIAGNOSIS — K86.1 CHRONIC PANCREATITIS, UNSPECIFIED PANCREATITIS TYPE (H): ICD-10-CM

## 2018-01-12 DIAGNOSIS — G43.A0 CYCLICAL VOMITING WITH NAUSEA, INTRACTABILITY OF VOMITING NOT SPECIFIED: Primary | ICD-10-CM

## 2018-01-12 PROCEDURE — 96375 TX/PRO/DX INJ NEW DRUG ADDON: CPT

## 2018-01-12 PROCEDURE — 25000128 H RX IP 250 OP 636: Mod: ZF | Performed by: INTERNAL MEDICINE

## 2018-01-12 PROCEDURE — 96374 THER/PROPH/DIAG INJ IV PUSH: CPT

## 2018-01-12 PROCEDURE — 96361 HYDRATE IV INFUSION ADD-ON: CPT

## 2018-01-12 RX ORDER — ONDANSETRON 2 MG/ML
4 INJECTION INTRAMUSCULAR; INTRAVENOUS DAILY PRN
Status: CANCELLED
Start: 2018-01-12

## 2018-01-12 RX ORDER — DIPHENHYDRAMINE HYDROCHLORIDE 50 MG/ML
25 INJECTION INTRAMUSCULAR; INTRAVENOUS ONCE
Status: COMPLETED | OUTPATIENT
Start: 2018-01-12 | End: 2018-01-12

## 2018-01-12 RX ORDER — ONDANSETRON 2 MG/ML
4 INJECTION INTRAMUSCULAR; INTRAVENOUS ONCE
Status: CANCELLED
Start: 2018-01-12 | End: 2018-01-12

## 2018-01-12 RX ORDER — ONDANSETRON 2 MG/ML
4 INJECTION INTRAMUSCULAR; INTRAVENOUS ONCE
Status: COMPLETED | OUTPATIENT
Start: 2018-01-12 | End: 2018-01-12

## 2018-01-12 RX ORDER — DIPHENHYDRAMINE HYDROCHLORIDE 50 MG/ML
25 INJECTION INTRAMUSCULAR; INTRAVENOUS ONCE
Status: CANCELLED
Start: 2018-01-12 | End: 2018-01-12

## 2018-01-12 RX ADMIN — ONDANSETRON 4 MG: 2 INJECTION INTRAMUSCULAR; INTRAVENOUS at 15:51

## 2018-01-12 RX ADMIN — SODIUM CHLORIDE, POTASSIUM CHLORIDE, SODIUM LACTATE AND CALCIUM CHLORIDE 1500 ML: 600; 310; 30; 20 INJECTION, SOLUTION INTRAVENOUS at 15:42

## 2018-01-12 RX ADMIN — DIPHENHYDRAMINE HYDROCHLORIDE 25 MG: 50 INJECTION INTRAMUSCULAR; INTRAVENOUS at 15:52

## 2018-01-12 ASSESSMENT — PAIN SCALES - GENERAL: PAINLEVEL: MODERATE PAIN (4)

## 2018-01-12 NOTE — PROGRESS NOTES
Nursing Note  Alexandra Melgoza [facility] for:   Chief Complaint   Patient presents with     Infusion     IVF      During today's [facility] appointment, orders from Dr. Narayanan were completed.  Frequency: as needed    Progress note:  Patient identification verified by name and date of birth.  Assessment completed.  Vitals recorded in Doc Flowsheets.  Patient was provided with education regarding infusion and possible side effects.  Patient verbalized understanding.      needed: No  Premedications: were not ordered. PRN Zofran and PRN Benadryl given IVP.  Infusion Rates: 1.5 liters LR infusion given over approximately 1.5 hours.  Approximate Infusion length:1.5 hours.   Labs: were not ordered for this appointment.  Vascular access: port accessed today.  Treatment Conditions: non-applicable.  Patient tolerated infusion: well.    Drug Waste Record    Drug Name: Benadryl  Dose: 25 mg  Route administered: IV  NDC #: 1962-6829-66  Amount of waste(mL):0.5 ml  Reason for waste: Single use vial      Discharge Plan:   Follow up plan of care with: ongoing infusions at [facility].  Discharge instructions were reviewed with patient.  Patient/representative verbalized understanding of discharge instructions and all questions answered.  Patient discharged from [facility] in stable condition.    Phyllis Brooks RN    Administrations This Visit     diphenhydrAMINE (BENADRYL) injection 25 mg     Admin Date Action Dose Route Administered By             01/12/2018 Given 25 mg Intravenous Phyllis Brooks RN                    lactated ringers BOLUS 1,000-1,500 mL     Admin Date Action Dose Route Administered By             01/12/2018 New Bag 1000 mL Intravenous Phyllis Brooks RN                    ondansetron (ZOFRAN) injection 4 mg     Admin Date Action Dose Route Administered By              01/12/2018 Given 4 mg Intravenous Phyllis Brooks, RN                          /88  Resp 18  SpO2 100%

## 2018-01-12 NOTE — PATIENT INSTRUCTIONS
Dear Alexandra Melgoza    Thank you for choosing [facility] ([facility]) for your infusion.  The following information is a summary of our appointment as well as important reminders.        We look forward in seeing you on your next appointment here at [facility].  Please don t hesitate to call us at [phone] to reschedule any of your appointments or to speak with one of the [facility] registered nurses.  It was a pleasure taking care of you today.    Sincerely,    [facility]  Specialty Infusion & Procedure Center  [address] [place] [address]  [address]  Phone:  [phone]

## 2018-01-12 NOTE — MR AVS SNAPSHOT
After Visit Summary   1/12/2018    Alexandra Melgoza    MRN: [id number]           Patient Information     Date Of Birth          1995        Visit Information        Provider Department      1/12/2018 3:00 PM UC 45 ATC; UC SPEC INFUSION [facility]        Today's Diagnoses     Cyclical vomiting with nausea, intractability of vomiting not specified    -  1    Chronic pancreatitis, unspecified pancreatitis type (H)          Care Instructions    Dear Alexandra Melgoza    Thank you for choosing [facility] ([facility]) for your infusion.  The following information is a summary of our appointment as well as important reminders.        We look forward in seeing you on your next appointment here at [facility].  Please don t hesitate to call us at [phone] to reschedule any of your appointments or to speak with one of the [facility] registered nurses.  It was a pleasure taking care of you today.    Sincerely,    [facility]  Specialty Infusion & Procedure Center  [address] [place] [address]  [address]  Phone:  [phone]            Follow-ups after your visit        Your next 10 appointments already scheduled     Umer 15, 2018 10:00 AM CST   (Arrive by 9:45 AM)   Return Visit with Leonardo Wang MD   [facility] ([facility])    [address] [place] [address]  4th Floor  [facility] [address] [phone]            Umer 15, 2018  3:00 PM CST   Infusion 90 with UC SPEC INFUSION, UC 51 ATC   [facility] ([facility])    [address]  [address]  [facility] [address] [phone]            Jan 17, 2018  4:00 PM CST   Infusion 90 with UC SPEC INFUSION, UC 44 ATC   [facility] Special[facility] ([facility]  [facility])    [address]  [address]  [facility] [address] [phone]            Jan 18, 2018   Procedure with Campbell Narayanan MD   [facility] [place], Same Day Surgery (--)    [address]  [facility] [place] [address]   [phone]            Jan 19, 2018 10:00 AM CST   Infusion 90 with UC SPEC INFUSION, UC 48 ATC   [facility] Specialty and Procedure ([facility])    [address]  [address]  [facility] [address] [phone]            Jan 22, 2018  1:00 PM CST   Infusion 90 with UC SPEC INFUSION, UC 49 ATC   [facility] Specialty and Procedure ([facility])    [address]  [address]  [facility] [address] [phone]              Who to contact     If you have questions or need follow up information about today's clinic visit or your schedule please contact [facility] SPECIALTY AND PROCEDURE directly at [phone].  Normal or non-critical lab and imaging results will be communicated to you by [employer], letter or phone within 4 business days after the clinic has received the results. If you do not hear from us within 7 days, please contact the clinic through Piazza or phone. If you have a critical or abnormal lab result, we will notify you by phone as soon as possible.  Submit refill requests through Piazza or call your pharmacy and they will forward the refill request to us. Please allow 3 business days for your refill to be completed.          Additional Information About Your Visit        Piazza Information     Piazza gives you secure access to your electronic health record. If you see a primary care provider, you can also send messages to your care team and make appointments. If you have questions, please call your primary care clinic.  If you do not have a primary care provider, please call [phone] and they will assist you.         Care EveryWhere ID     This is your Care EveryWhere ID. This could be used by other organizations to access your [facility] medical records  [id number]        Your Vitals Were     Pulse Respirations Pulse Oximetry             85 16 100%          Blood Pressure from Last 3 Encounters:   01/12/18 124/79   01/10/18 112/72   01/10/18 134/87    Weight from Last 3 Encounters:   01/10/18 66.9 kg (147 lb 6.4 oz)   01/10/18 66.9 kg (147 lb 6.4 oz)   01/03/18 65.8 kg (145 lb)              Today, you had the following     No orders found for display       Primary Care Provider Office Phone # Fax #    Quyen Baez [phone]       [address] [place] [address]        Equal Access to Services     [facility] Center[facility]IL : Hadii angelina rodneyo Somalini, waaxda luqadaha, qaybta kaalmada jacque, maki gaspar . So [facility] [phone].    ATENCIÓN: Si habla español, tiene a quijano disposición servicios gratuitos de asistencia lingüística. [facility] [phone].    We comply with applicable federal civil rights laws and [place] laws. We do not discriminate on the basis of race, color, national origin, age, disability, sex, sexual orientation, or gender identity.            Thank you!     Thank you for choosing [facility] SPECIALTY AND PROCEDURE  for your care. Our goal is always to provide you with excellent care. Hearing back from our patients is one way we can continue to improve our services. Please take a few minutes to complete the written survey that you may receive in the mail after your visit with us. Thank you!             Your Updated Medication List - Protect others around you: Learn how to safely use, store and throw away your medicines at www.disposemymeds.org.          This list is accurate as of: 1/12/18  5:20 PM.  Always use your most recent med list.                   Brand Name Dispense Instructions for use Diagnosis    *  amylase-lipase-protease 69291 UNITS Cpep    ZENPEP    180 capsule    Take 2-3 capsules (40,000-60,000 Units) by mouth Take with snacks or supplements (Snacks)    Idiopathic chronic pancreatitis (H)       * amylase-lipase-protease 11643 UNITS Cpep    ZENPEP    180 capsule    Take 5 capsules (100,000 Units) by mouth 3 times daily (with meals)    Right upper quadrant abdominal pain       fluticasone 50 MCG/ACT spray    FLONASE    16 g    Spray 2 sprays into both nostrils daily    Nasal congestion       gabapentin 400 MG capsule    NEURONTIN    90 capsule    Take 1 capsule (400 mg) by mouth 3 times daily    Abdominal pain, epigastric       leuprolide 11.25 MG kit    LUPRON DEPOT (3-MONTH)    1 each    Inject 11.25 mg into the muscle every 3 months    Endometriosis       levalbuterol 45 MCG/ACT Inhaler    XOPENEX HFA    1 Inhaler    Inhale 2 puffs into the lungs every 6 hours as needed for shortness of breath / dyspnea    Wheeze       methadone 5 MG tablet    DOLOPHINE    30 tablet    Take 0.5 tablets (2.5 mg) by mouth every 12 hours    Abdominal pain, epigastric       norethindrone 5 MG tablet    AYGESTIN    90 tablet    Take 1 tablet (5 mg) by mouth daily    Endometriosis       nortriptyline 10 MG capsule    PAMELOR    120 capsule    Take 3 capsules (30 mg) by mouth At Bedtime    Right upper quadrant abdominal pain       ondansetron 4 MG ODT tab    ZOFRAN ODT    40 tablet    Take 1 tablet (4 mg) by mouth every 8 hours as needed for nausea or vomiting    Idiopathic chronic pancreatitis (H)       oxyCODONE 5 MG/5ML solution    ROXICODONE    300 mL    Take 10 mLs (10 mg) by mouth every 8 hours as needed for pain maximum 30 mg per day    Chronic abdominal pain       pantoprazole 40 MG EC tablet    PROTONIX    30 tablet    Take 1 tablet (40 mg) by mouth 2 times daily (before meals)    Right upper quadrant abdominal pain, Erosive gastritis       polyethylene glycol Packet    MIRALAX/GLYCOLAX    7 packet    Take 17 g by mouth  daily    Right upper quadrant abdominal pain       RIZATRIPTAN BENZOATE PO      Take 5 mg by mouth once as needed        scopolamine 72 hr patch    TRANSDERM    2 patch    Apply 1 patch to hairless area behind one ear if severe nausea and vomiting.  Remove old patch and change every 3 days (72 hours).    Intractable vomiting with nausea, unspecified vomiting type       senna-docusate 8.6-50 MG per tablet    SENOKOT-S;PERICOLACE    100 tablet    Take 1 tablet by mouth 2 times daily as needed for constipation    Right upper quadrant abdominal pain       * Notice:  This list has 2 medication(s) that are the same as other medications prescribed for you. Read the directions carefully, and ask your doctor or other care provider to review them with you.

## 2018-01-13 ENCOUNTER — HOSPITAL ENCOUNTER (EMERGENCY)
Facility: CLINIC | Age: 23
Discharge: HOME | End: 2018-01-13
Attending: EMERGENCY MEDICINE | Admitting: EMERGENCY MEDICINE
Payer: COMMERCIAL

## 2018-01-13 ENCOUNTER — TELEPHONE (OUTPATIENT)
Dept: GASTROENTEROLOGY | Facility: CLINIC | Age: 23
End: 2018-01-13

## 2018-01-13 VITALS
SYSTOLIC BLOOD PRESSURE: 141 MMHG | BODY MASS INDEX: 23.08 KG/M2 | DIASTOLIC BLOOD PRESSURE: 90 MMHG | WEIGHT: 143 LBS | RESPIRATION RATE: 14 BRPM | TEMPERATURE: 98.2 F | OXYGEN SATURATION: 97 %

## 2018-01-13 DIAGNOSIS — R11.2 INTRACTABLE VOMITING WITH NAUSEA, UNSPECIFIED VOMITING TYPE: ICD-10-CM

## 2018-01-13 DIAGNOSIS — R10.84 ABDOMINAL PAIN, GENERALIZED: ICD-10-CM

## 2018-01-13 LAB
ALBUMIN SERPL-MCNC: 4.3 G/DL (ref 3.4–5)
ALP SERPL-CCNC: 103 U/L (ref 40–150)
ALT SERPL W P-5'-P-CCNC: 35 U/L (ref 0–50)
ANION GAP SERPL CALCULATED.3IONS-SCNC: 8 MMOL/L (ref 3–14)
AST SERPL W P-5'-P-CCNC: 17 U/L (ref 0–45)
BASOPHILS # BLD AUTO: 0 10E9/L (ref 0–0.2)
BASOPHILS NFR BLD AUTO: 0.2 %
BILIRUB SERPL-MCNC: 0.3 MG/DL (ref 0.2–1.3)
BUN SERPL-MCNC: 9 MG/DL (ref 7–30)
CALCIUM SERPL-MCNC: 9.1 MG/DL (ref 8.5–10.1)
CHLORIDE SERPL-SCNC: 106 MMOL/L (ref 94–109)
CO2 SERPL-SCNC: 26 MMOL/L (ref 20–32)
CREAT SERPL-MCNC: 0.5 MG/DL (ref 0.52–1.04)
DIFFERENTIAL METHOD BLD: NORMAL
EOSINOPHIL # BLD AUTO: 0.1 10E9/L (ref 0–0.7)
EOSINOPHIL NFR BLD AUTO: 1.6 %
ERYTHROCYTE [DISTWIDTH] IN BLOOD BY AUTOMATED COUNT: 12.8 % (ref 10–15)
GFR SERPL CREATININE-BSD FRML MDRD: >90 ML/MIN/1.7M2
GLUCOSE SERPL-MCNC: 89 MG/DL (ref 70–99)
HCG SERPL QL: NEGATIVE
HCT VFR BLD AUTO: 37 % (ref 35–47)
HGB BLD-MCNC: 12.1 G/DL (ref 11.7–15.7)
IMM GRANULOCYTES # BLD: 0 10E9/L (ref 0–0.4)
IMM GRANULOCYTES NFR BLD: 0 %
LIPASE SERPL-CCNC: 113 U/L (ref 73–393)
LYMPHOCYTES # BLD AUTO: 1.5 10E9/L (ref 0.8–5.3)
LYMPHOCYTES NFR BLD AUTO: 33.7 %
MCH RBC QN AUTO: 30.3 PG (ref 26.5–33)
MCHC RBC AUTO-ENTMCNC: 32.7 G/DL (ref 31.5–36.5)
MCV RBC AUTO: 93 FL (ref 78–100)
MONOCYTES # BLD AUTO: 0.3 10E9/L (ref 0–1.3)
MONOCYTES NFR BLD AUTO: 6.1 %
NEUTROPHILS # BLD AUTO: 2.6 10E9/L (ref 1.6–8.3)
NEUTROPHILS NFR BLD AUTO: 58.4 %
NRBC # BLD AUTO: 0 10*3/UL
NRBC BLD AUTO-RTO: 0 /100
PLATELET # BLD AUTO: 249 10E9/L (ref 150–450)
POTASSIUM SERPL-SCNC: 4 MMOL/L (ref 3.4–5.3)
PROT SERPL-MCNC: 7.3 G/DL (ref 6.8–8.8)
RBC # BLD AUTO: 3.99 10E12/L (ref 3.8–5.2)
SODIUM SERPL-SCNC: 140 MMOL/L (ref 133–144)
WBC # BLD AUTO: 4.4 10E9/L (ref 4–11)

## 2018-01-13 PROCEDURE — 96374 THER/PROPH/DIAG INJ IV PUSH: CPT | Performed by: EMERGENCY MEDICINE

## 2018-01-13 PROCEDURE — 84703 CHORIONIC GONADOTROPIN ASSAY: CPT | Performed by: EMERGENCY MEDICINE

## 2018-01-13 PROCEDURE — 85025 COMPLETE CBC W/AUTO DIFF WBC: CPT | Performed by: EMERGENCY MEDICINE

## 2018-01-13 PROCEDURE — 96361 HYDRATE IV INFUSION ADD-ON: CPT | Performed by: EMERGENCY MEDICINE

## 2018-01-13 PROCEDURE — 96375 TX/PRO/DX INJ NEW DRUG ADDON: CPT | Performed by: EMERGENCY MEDICINE

## 2018-01-13 PROCEDURE — 25000128 H RX IP 250 OP 636: Performed by: EMERGENCY MEDICINE

## 2018-01-13 PROCEDURE — 83690 ASSAY OF LIPASE: CPT | Performed by: EMERGENCY MEDICINE

## 2018-01-13 PROCEDURE — 96376 TX/PRO/DX INJ SAME DRUG ADON: CPT | Performed by: EMERGENCY MEDICINE

## 2018-01-13 PROCEDURE — 99285 EMERGENCY DEPT VISIT HI MDM: CPT | Mod: 25 | Performed by: EMERGENCY MEDICINE

## 2018-01-13 PROCEDURE — 99285 EMERGENCY DEPT VISIT HI MDM: CPT | Mod: Z6 | Performed by: EMERGENCY MEDICINE

## 2018-01-13 PROCEDURE — 80053 COMPREHEN METABOLIC PANEL: CPT | Performed by: EMERGENCY MEDICINE

## 2018-01-13 RX ORDER — DIPHENHYDRAMINE HYDROCHLORIDE 50 MG/ML
25 INJECTION INTRAMUSCULAR; INTRAVENOUS ONCE
Status: COMPLETED | OUTPATIENT
Start: 2018-01-13 | End: 2018-01-13

## 2018-01-13 RX ORDER — HEPARIN SODIUM (PORCINE) LOCK FLUSH IV SOLN 100 UNIT/ML 100 UNIT/ML
500 SOLUTION INTRAVENOUS ONCE
Status: COMPLETED | OUTPATIENT
Start: 2018-01-13 | End: 2018-01-13

## 2018-01-13 RX ORDER — HEPARIN SODIUM (PORCINE) LOCK FLUSH IV SOLN 100 UNIT/ML 100 UNIT/ML
100 SOLUTION INTRAVENOUS ONCE
Status: DISCONTINUED | OUTPATIENT
Start: 2018-01-13 | End: 2018-01-13

## 2018-01-13 RX ORDER — ONDANSETRON 2 MG/ML
4 INJECTION INTRAMUSCULAR; INTRAVENOUS ONCE
Status: COMPLETED | OUTPATIENT
Start: 2018-01-13 | End: 2018-01-13

## 2018-01-13 RX ORDER — HYDROMORPHONE HYDROCHLORIDE 1 MG/ML
0.5 INJECTION, SOLUTION INTRAMUSCULAR; INTRAVENOUS; SUBCUTANEOUS ONCE
Status: COMPLETED | OUTPATIENT
Start: 2018-01-13 | End: 2018-01-13

## 2018-01-13 RX ORDER — ONDANSETRON 2 MG/ML
4 INJECTION INTRAMUSCULAR; INTRAVENOUS EVERY 30 MIN PRN
Status: DISCONTINUED | OUTPATIENT
Start: 2018-01-13 | End: 2018-01-14 | Stop reason: HOSPADM

## 2018-01-13 RX ORDER — ONDANSETRON 2 MG/ML
4 INJECTION INTRAMUSCULAR; INTRAVENOUS EVERY 30 MIN PRN
Status: COMPLETED | OUTPATIENT
Start: 2018-01-13 | End: 2018-01-13

## 2018-01-13 RX ORDER — SODIUM CHLORIDE 9 MG/ML
1000 INJECTION, SOLUTION INTRAVENOUS CONTINUOUS
Status: DISCONTINUED | OUTPATIENT
Start: 2018-01-13 | End: 2018-01-14 | Stop reason: HOSPADM

## 2018-01-13 RX ORDER — HYDROMORPHONE HYDROCHLORIDE 1 MG/ML
0.5 INJECTION, SOLUTION INTRAMUSCULAR; INTRAVENOUS; SUBCUTANEOUS
Status: COMPLETED | OUTPATIENT
Start: 2018-01-13 | End: 2018-01-13

## 2018-01-13 RX ADMIN — DIPHENHYDRAMINE HYDROCHLORIDE 25 MG: 50 INJECTION, SOLUTION INTRAMUSCULAR; INTRAVENOUS at 22:36

## 2018-01-13 RX ADMIN — ONDANSETRON 4 MG: 2 INJECTION INTRAMUSCULAR; INTRAVENOUS at 22:00

## 2018-01-13 RX ADMIN — HYDROMORPHONE HYDROCHLORIDE 1 MG: 1 INJECTION, SOLUTION INTRAMUSCULAR; INTRAVENOUS; SUBCUTANEOUS at 16:38

## 2018-01-13 RX ADMIN — Medication 0.5 MG: at 20:12

## 2018-01-13 RX ADMIN — Medication 0.5 MG: at 22:01

## 2018-01-13 RX ADMIN — DIPHENHYDRAMINE HYDROCHLORIDE 25 MG: 50 INJECTION, SOLUTION INTRAMUSCULAR; INTRAVENOUS at 20:11

## 2018-01-13 RX ADMIN — SODIUM CHLORIDE 1000 ML: 9 INJECTION, SOLUTION INTRAVENOUS at 20:11

## 2018-01-13 RX ADMIN — SODIUM CHLORIDE 1000 ML: 9 INJECTION, SOLUTION INTRAVENOUS at 16:36

## 2018-01-13 RX ADMIN — ONDANSETRON 4 MG: 2 INJECTION INTRAMUSCULAR; INTRAVENOUS at 16:37

## 2018-01-13 RX ADMIN — ONDANSETRON 4 MG: 2 INJECTION INTRAMUSCULAR; INTRAVENOUS at 20:11

## 2018-01-13 RX ADMIN — SODIUM CHLORIDE, PRESERVATIVE FREE 500 UNITS: 5 INJECTION INTRAVENOUS at 22:36

## 2018-01-13 RX ADMIN — HYDROMORPHONE HYDROCHLORIDE 1 MG: 1 INJECTION, SOLUTION INTRAMUSCULAR; INTRAVENOUS; SUBCUTANEOUS at 18:01

## 2018-01-13 RX ADMIN — ONDANSETRON 4 MG: 2 INJECTION INTRAMUSCULAR; INTRAVENOUS at 17:56

## 2018-01-13 ASSESSMENT — ENCOUNTER SYMPTOMS
NAUSEA: 1
VOMITING: 1
ABDOMINAL PAIN: 1

## 2018-01-13 NOTE — ED NOTES
Pt comes in with abdominal pain and vomiting. Hx chronic pancreatitis. Saw GI last week, scheduled for stent placement this coming Thursday. Unable to control pain and nausea at home. Alert and oriented, vss.

## 2018-01-13 NOTE — ED PROVIDER NOTES
History     Chief Complaint   Patient presents with     Pancreatitis     HPI  Alexandra Melgoza is a 22 year old female with history of chronic abdominal pain who follows with gastroenterology for possible chronic pancreatitis who presents today with acute on chronic abdominal pain.  Patient states that this is her typical abdominal pain which started getting worse last night.  She was just admitted to the hospital about a week and half ago.  She was feeling better by the time she was discharged home.  She gets fluids infused daily and this was helping yesterday but then last night she got worse.  She has been unable to take her oxycodone which she has a prescription for.  She was started on methadone but did not continue to take this.  She has had multiple episodes of nonbloody vomiting.  Pain is localized in the epigastrium.  Again she states that this is her typical pain.  She has an appointment scheduled for this week for a stent placement with Dr. Narayanan.  She was hoping to have gotten through to that point but said that her symptoms got so bad she had to come.    This part of the document was transcribed by Tomeka Pan, Medical Scribe.      I have reviewed the Medications, Allergies, Past Medical and Surgical History, and Social History in the Epic system.    Review of Systems   Gastrointestinal: Positive for abdominal pain, nausea and vomiting.   All other systems reviewed and are negative.      Physical Exam   BP: (!) 139/93  Heart Rate: 100  Temp: 98.2  F (36.8  C)  Resp: 16  Weight: 64.9 kg (143 lb)  SpO2: 100 %      Physical Exam   Constitutional: She is oriented to person, place, and time. She appears well-developed and well-nourished.   HENT:   Head: Atraumatic.   Right Ear: External ear normal.   Left Ear: External ear normal.   Eyes: Conjunctivae and EOM are normal. No scleral icterus.   Neck: Normal range of motion. Neck supple.   Cardiovascular: Normal rate and regular rhythm.    Pulmonary/Chest:  Effort normal. No respiratory distress.   Abdominal: Soft. There is tenderness. There is no rebound and no guarding.   Very mild epigastric tenderness without rebound or guarding   Musculoskeletal: Normal range of motion. She exhibits no edema or tenderness.   Neurological: She is alert and oriented to person, place, and time.   Skin: Skin is warm and dry. No rash noted.   Psychiatric: She has a normal mood and affect. Her behavior is normal.   Nursing note and vitals reviewed.      ED Course     ED Course     Procedures      Labs Ordered and Resulted from Time of ED Arrival Up to the Time of Departure from the ED   COMPREHENSIVE METABOLIC PANEL - Abnormal; Notable for the following:        Result Value    Creatinine 0.50 (*)     All other components within normal limits   CBC WITH PLATELETS DIFFERENTIAL   LIPASE   HCG QUALITATIVE            Assessments & Plan (with Medical Decision Making)   22-year-old female who presents today with acute on chronic abdominal pain.  Patient with history of pancreatitis, follows with GI.  She is due to have a stent later this week but is having ongoing symptoms.  She is well appearing with normal vital signs.  Her exam is extremely benign.  She endorses that this is her typical abdominal pain.    Her labs all look unremarkable today, her LFTs and lipase are normal.  Her white blood cell count is normal.  Her belly exam is very benign so I do not think she needs any imaging today, and the patient and her family agree.    She was given a dose of Dilaudid, Zofran, as well as IV fluids.  She did have temporary relief of her symptoms but did request additional antiemetics.  She is allergic to most antiemetics of Zofran so she was given 2 more doses of Zofran will is a second liter IV fluid.    At this juncture did talk to the patient regarding admission versus discharge home.  She would prefer to go home if she feels well enough.  She would like to try one more round of Zofran and  Benadryl.  I signed the patient out at this point to Dr. Garcia who will follow up on her in the next hour disposition pending.    I have reviewed the nursing notes.    I have reviewed the findings, diagnosis, plan and need for follow up with the patient.    Discharge Medication List as of 1/13/2018 10:31 PM          Final diagnoses:   Abdominal pain, generalized   Intractable vomiting with nausea, unspecified vomiting type       1/13/2018   [facility], [place], EMERGENCY DEPARTMENT     Tanesha Gray MD  01/14/18 6838

## 2018-01-13 NOTE — TELEPHONE ENCOUNTER
I was paged by Ms. Melgoza as the GI fellow on call.  She is having worsening abdominal pain and nausea with vomiting starting this morning.  She is unable to tolerate liquids or solids.  She is planning to have an ERCP and nasojejunal feeding tube placed this coming Thursday.  I stated if she is truly unable to tolerate p.o., she will need to come into the emergency room for evaluation and treatment.  If her workup is reassuring and she feels better after fluids and antiemetics, she can follow-up as an outpatient as currently scheduled.    Arun Kay MD  GI Fellow  [phone]

## 2018-01-13 NOTE — ED AVS SNAPSHOT
[facility], [place], Emergency Department    [address] [place] [address]    Phone:  [phone]                                       Alexandra Melgoza   MRN: [id number]    Department:  [facility], Emergency Department   Date of Visit:  1/13/2018           After Visit Summary Signature Page     I have received my discharge instructions, and my questions have been answered. I have discussed any challenges I see with this plan with the nurse or doctor.    ..........................................................................................................................................  Patient/Patient Representative Signature      ..........................................................................................................................................  Patient Representative Print Name and Relationship to Patient    ..................................................               ................................................  Date                                            Time    ..........................................................................................................................................  Reviewed by Signature/Title    ...................................................              ..............................................  Date                                                            Time

## 2018-01-13 NOTE — ED AVS SNAPSHOT
[facility], Emergency Department    [address] [facility] [address]    Phone:  [phone]                                       Alexandra Melgoza   MRN: [id number]    Department:  [facility], Emergency Department   Date of Visit:  1/13/2018           Patient Information     Date Of Birth          1995        Your diagnoses for this visit were:     Abdominal pain, generalized     Intractable vomiting with nausea, unspecified vomiting type        You were seen by Tanesha Gray MD and Duane Garcia MD.        Discharge Instructions       Please make an appointment to follow up with Your Primary Care Provider in 2 days    Return to the ED if you are having fever, worsening symptoms, or any other urgent/life-threatening concerns.       Future Appointments        Provider Department Dept Phone Center    1/15/2018 10:00 AM Leonardo Wang MD [facility] [phone] [facility]    1/15/2018 3:00 PM [facility]; Specialty [facility] [place] Specialty and Procedure [phone] [facility]    1/17/2018 4:00 PM [facility]; Specialty [facility] Specialty and Procedure [phone] [facility]    1/19/2018 10:00 AM [facility]; Specialty [facility] Specialty and Procedure [phone] [facility]    1/22/2018 1:00 PM [facility]; Specialty [facility] Specialty [facility] Procedure [phone] [facility]    1/24/2018 3:00 PM [facility] Treatment [facility]; [facility] Specialty and Procedure [phone] [facility]    1/26/2018 2:00 PM [facility]; Specialty [facility] Specialty and Procedure [phone] [facility]      24 Hour Appointment Hotline       To make an appointment at any [facility] [place], call [phone]  ([phone]). If you don't have a family doctor or clinic, we will help you find one. [facility] are conveniently located to serve the needs of you and your family.             Review of your medicines      Our records show that you are taking the medicines listed below. If these are incorrect, please call your family doctor or clinic.        Dose / Directions Last dose taken    * amylase-lipase-protease 11456 UNITS Cpep   Commonly known as:  ZENPEP   Dose:  2-3 capsule   Quantity:  180 capsule        Take 2-3 capsules (40,000-60,000 Units) by mouth Take with snacks or supplements (Snacks)   Refills:  1        * amylase-lipase-protease 74697 UNITS Cpep   Commonly known as:  ZENPEP   Dose:  5 capsule   Quantity:  180 capsule        Take 5 capsules (100,000 Units) by mouth 3 times daily (with meals)   Refills:  1        fluticasone 50 MCG/ACT spray   Commonly known as:  FLONASE   Dose:  2 spray   Quantity:  16 g        Spray 2 sprays into both nostrils daily   Refills:  3        gabapentin 400 MG capsule   Commonly known as:  NEURONTIN   Dose:  400 mg   Quantity:  90 capsule        Take 1 capsule (400 mg) by mouth 3 times daily   Refills:  3        leuprolide 11.25 MG kit   Commonly known as:  LUPRON DEPOT (3-MONTH)   Dose:  11.25 mg   Quantity:  1 each        Inject 11.25 mg into the muscle every 3 months   Refills:  3        levalbuterol 45 MCG/ACT Inhaler   Commonly known as:  XOPENEX HFA   Dose:  2 puff   Quantity:  1 Inhaler        Inhale 2 puffs into the lungs every 6 hours as needed for shortness of breath / dyspnea   Refills:  1        methadone 5 MG tablet   Commonly known as:  DOLOPHINE   Dose:  2.5 mg   Quantity:  30 tablet        Take 0.5 tablets (2.5 mg) by mouth every 12 hours   Refills:  0        norethindrone 5 MG tablet   Commonly known as:  AYGESTIN   Dose:  5 mg   Quantity:  90 tablet        Take 1 tablet (5 mg) by mouth daily   Refills:  1        nortriptyline 10 MG capsule   Commonly  known as:  PAMELOR   Dose:  30 mg   Quantity:  120 capsule        Take 3 capsules (30 mg) by mouth At Bedtime   Refills:  0        ondansetron 4 MG ODT tab   Commonly known as:  ZOFRAN ODT   Dose:  4 mg   Quantity:  40 tablet        Take 1 tablet (4 mg) by mouth every 8 hours as needed for nausea or vomiting   Refills:  0        oxyCODONE 5 MG/5ML solution   Commonly known as:  ROXICODONE   Dose:  10 mg   Quantity:  300 mL        Take 10 mLs (10 mg) by mouth every 8 hours as needed for pain maximum 30 mg per day   Refills:  0        pantoprazole 40 MG EC tablet   Commonly known as:  PROTONIX   Dose:  40 mg   Quantity:  30 tablet        Take 1 tablet (40 mg) by mouth 2 times daily (before meals)   Refills:  1        polyethylene glycol Packet   Commonly known as:  MIRALAX/GLYCOLAX   Dose:  17 g   Quantity:  7 packet        Take 17 g by mouth daily   Refills:  0        RIZATRIPTAN BENZOATE PO   Dose:  5 mg        Take 5 mg by mouth once as needed   Refills:  0        scopolamine 72 hr patch   Commonly known as:  TRANSDERM   Quantity:  2 patch        Apply 1 patch to hairless area behind one ear if severe nausea and vomiting.  Remove old patch and change every 3 days (72 hours).   Refills:  0        senna-docusate 8.6-50 MG per tablet   Commonly known as:  SENOKOT-S;PERICOLACE   Dose:  1 tablet   Quantity:  100 tablet        Take 1 tablet by mouth 2 times daily as needed for constipation   Refills:  0        * Notice:  This list has 2 medication(s) that are the same as other medications prescribed for you. Read the directions carefully, and ask your doctor or other care provider to review them with you.            Procedures and tests performed during your visit     CBC with platelets differential    Comprehensive metabolic panel    HCG qualitative    Lipase      Orders Needing Specimen Collection     None      Pending Results     No orders found from 1/11/2018 to 1/14/2018.            Pending Culture Results     No  orders found from 1/11/2018 to 1/14/2018.            Pending Results Instructions     If you had any lab results that were not finalized at the time of your Discharge, you can call the ED Lab Result RN at [phone]. You will be contacted by this team for any positive Lab results or changes in treatment. The nurses are available 7 days a week from 10A to 6:30P.  You can leave a message 24 hours per day and they will return your call.        Thank you for choosing [facility]       Thank you for choosing [facility] for your care. Our goal is always to provide you with excellent care. Hearing back from our patients is one way we can continue to improve our services. Please take a few minutes to complete the written survey that you may receive in the mail after you visit with us. Thank you!        [employer] Information     [employer] gives you secure access to your electronic health record. If you see a primary care provider, you can also send messages to your care team and make appointments. If you have questions, please call your primary care clinic.  If you do not have a primary care provider, please call [phone] and they will assist you.        Care EveryWhere ID     This is your Care EveryWhere ID. This could be used by other organizations to access your [facility] medical records  [id number]        Equal Access to Services     RACHAEL BENITEZ : Bret Enamorado, waar beck, qanicci kaalmakush santillan, maki smallwood. So [facility] [phone].    ATENCIÓN: Si habla español, tiene a quijano disposición servicios gratuitos de asistencia lingüística. Llame al [phone].    We comply with applicable federal civil rights laws and [place] laws. We do not discriminate on the basis of race, color, national origin, age, disability, sex, sexual orientation, or gender identity.            After Visit Summary       This is your record. Keep this with you and show to your community  pharmacist(s) and doctor(s) at your next visit.

## 2018-01-14 NOTE — DISCHARGE INSTRUCTIONS
Please make an appointment to follow up with Your Primary Care Provider in 2 days    Return to the ED if you are having fever, worsening symptoms, or any other urgent/life-threatening concerns.

## 2018-01-15 ENCOUNTER — CARE COORDINATION (OUTPATIENT)
Dept: GASTROENTEROLOGY | Facility: CLINIC | Age: 23
End: 2018-01-15

## 2018-01-15 ENCOUNTER — TELEPHONE (OUTPATIENT)
Dept: GASTROENTEROLOGY | Facility: CLINIC | Age: 23
End: 2018-01-15

## 2018-01-15 ENCOUNTER — OFFICE VISIT (OUTPATIENT)
Dept: ANESTHESIOLOGY | Facility: CLINIC | Age: 23
End: 2018-01-15
Payer: COMMERCIAL

## 2018-01-15 ENCOUNTER — INFUSION THERAPY VISIT (OUTPATIENT)
Dept: INFUSION THERAPY | Facility: CLINIC | Age: 23
End: 2018-01-15
Attending: INTERNAL MEDICINE
Payer: COMMERCIAL

## 2018-01-15 VITALS
TEMPERATURE: 98.6 F | RESPIRATION RATE: 16 BRPM | HEART RATE: 85 BPM | DIASTOLIC BLOOD PRESSURE: 60 MMHG | SYSTOLIC BLOOD PRESSURE: 117 MMHG

## 2018-01-15 VITALS
BODY MASS INDEX: 23.3 KG/M2 | HEIGHT: 66 IN | RESPIRATION RATE: 16 BRPM | HEART RATE: 104 BPM | SYSTOLIC BLOOD PRESSURE: 119 MMHG | WEIGHT: 145 LBS | DIASTOLIC BLOOD PRESSURE: 76 MMHG

## 2018-01-15 DIAGNOSIS — G89.29 CHRONIC ABDOMINAL PAIN: ICD-10-CM

## 2018-01-15 DIAGNOSIS — G43.A0 CYCLICAL VOMITING WITH NAUSEA, INTRACTABILITY OF VOMITING NOT SPECIFIED: Primary | ICD-10-CM

## 2018-01-15 DIAGNOSIS — K86.1 CHRONIC PANCREATITIS, UNSPECIFIED PANCREATITIS TYPE (H): ICD-10-CM

## 2018-01-15 DIAGNOSIS — R10.9 CHRONIC ABDOMINAL PAIN: ICD-10-CM

## 2018-01-15 DIAGNOSIS — K85.90 ACUTE PANCREATITIS: Primary | ICD-10-CM

## 2018-01-15 PROCEDURE — 96374 THER/PROPH/DIAG INJ IV PUSH: CPT

## 2018-01-15 PROCEDURE — 96361 HYDRATE IV INFUSION ADD-ON: CPT

## 2018-01-15 PROCEDURE — 25000128 H RX IP 250 OP 636: Mod: ZF | Performed by: INTERNAL MEDICINE

## 2018-01-15 PROCEDURE — 96375 TX/PRO/DX INJ NEW DRUG ADDON: CPT

## 2018-01-15 PROCEDURE — 96376 TX/PRO/DX INJ SAME DRUG ADON: CPT

## 2018-01-15 RX ORDER — DIPHENHYDRAMINE HYDROCHLORIDE 50 MG/ML
25 INJECTION INTRAMUSCULAR; INTRAVENOUS ONCE
Status: COMPLETED | OUTPATIENT
Start: 2018-01-15 | End: 2018-01-15

## 2018-01-15 RX ORDER — DIPHENHYDRAMINE HYDROCHLORIDE 50 MG/ML
25 INJECTION INTRAMUSCULAR; INTRAVENOUS ONCE
Status: CANCELLED
Start: 2018-01-15 | End: 2018-01-15

## 2018-01-15 RX ORDER — ONDANSETRON 2 MG/ML
4 INJECTION INTRAMUSCULAR; INTRAVENOUS DAILY PRN
Status: DISCONTINUED | OUTPATIENT
Start: 2018-01-15 | End: 2018-01-15 | Stop reason: HOSPADM

## 2018-01-15 RX ORDER — ONDANSETRON 2 MG/ML
4 INJECTION INTRAMUSCULAR; INTRAVENOUS ONCE
Status: CANCELLED
Start: 2018-01-15 | End: 2018-01-15

## 2018-01-15 RX ORDER — OXYCODONE HCL 5 MG/5 ML
10 SOLUTION, ORAL ORAL EVERY 8 HOURS PRN
Qty: 900 ML | Refills: 0 | Status: SHIPPED | OUTPATIENT
Start: 2018-01-15 | End: 2018-02-02

## 2018-01-15 RX ORDER — ONDANSETRON 2 MG/ML
4 INJECTION INTRAMUSCULAR; INTRAVENOUS ONCE
Status: COMPLETED | OUTPATIENT
Start: 2018-01-15 | End: 2018-01-15

## 2018-01-15 RX ORDER — ONDANSETRON 2 MG/ML
4 INJECTION INTRAMUSCULAR; INTRAVENOUS DAILY PRN
Status: CANCELLED
Start: 2018-01-15

## 2018-01-15 RX ADMIN — ONDANSETRON 4 MG: 2 INJECTION INTRAMUSCULAR; INTRAVENOUS at 13:48

## 2018-01-15 RX ADMIN — ONDANSETRON 4 MG: 2 INJECTION INTRAMUSCULAR; INTRAVENOUS at 11:43

## 2018-01-15 RX ADMIN — SODIUM CHLORIDE, POTASSIUM CHLORIDE, SODIUM LACTATE AND CALCIUM CHLORIDE 2000 ML: 600; 310; 30; 20 INJECTION, SOLUTION INTRAVENOUS at 11:35

## 2018-01-15 RX ADMIN — DIPHENHYDRAMINE HYDROCHLORIDE 25 MG: 50 INJECTION INTRAMUSCULAR; INTRAVENOUS at 11:39

## 2018-01-15 ASSESSMENT — PAIN SCALES - GENERAL: PAINLEVEL: MODERATE PAIN (5)

## 2018-01-15 NOTE — PATIENT INSTRUCTIONS
Dear Alexandra Melgoza    Thank you for choosing [facility] ([facility]) for your infusion.  The following information is a summary of our appointment as well as important reminders.      We look forward in seeing you on your next appointment here at [facility].  Please don t hesitate to call us at [phone] to reschedule any of your appointments or to speak with one of the [facility] registered nurses.  It was a pleasure taking care of you today.    Sincerely,    [facility]  Specialty Infusion [facility] Procedure Center  [address] [place] [address]  [address]  Phone:  [phone]

## 2018-01-15 NOTE — NURSING NOTE
LPN reviewed AVS with Pt includin. Medication refilled. Continue as prescribed.     Follow up: 30 days for your next prescription.     Pt verbalized an understanding of information, and was asked to contact clinic with questions.    Abigail Prieto LPN

## 2018-01-15 NOTE — MR AVS SNAPSHOT
After Visit Summary   1/15/2018    Alexandra Melgoza    MRN: [id number]           Patient Information     Date Of Birth          1995        Visit Information        Provider Department      1/15/2018 10:00 AM Leonardo Wang MD [facility]        Today's Diagnoses     Chronic abdominal pain          Care Instructions    1. Medication refilled. Continue as prescribed.     Follow up: 30 days for your next prescription.       To speak with a nurse, schedule/reschedule/cancel a clinic appointment, or request a medication refill call: [phone]     You can also reach us by mGenerator: [email or web address]    For refills, please call on Monday, 1 week before your medication runs out. The doctors are not always in clinic, so this gives us time to get your prescriptions ready.  Please let us know the name of the medication you are requesting a refill of.                                     Follow-ups after your visit        Your next 10 appointments already scheduled     Umer 15, 2018  3:00 PM CST   Infusion 90 with UC SPEC INFUSION, UC 51 ATC   [facility] Specialty and Procedure ([facility])    [address]  [address]  [facility] [id number] [phone]            Jan 17, 2018  4:00 PM CST   Infusion 90 with UC SPEC INFUSION, UC 44 ATC   [facility] Specialty and Procedure ([facility])    [address]  [address]  [facility] [address] [phone]            Jan 18, 2018   Procedure with Campbell Narayanan MD   [facility], [place], Same Day Surgery (--)    [address] [address] [phone]            Jan 19, 2018 10:00 AM CST   Infusion 90 with UC SPEC INFUSION, UC 48 ATC   [facility] Specialty and Procedure ([facility])    [address] [place] [address]  [address]  [facility] [address] [phone]            Jan 22, 2018  1:00 PM CST   Infusion 90 with UC SPEC INFUSION, UC 49 ATC   [facility] Specialty and Procedure ([facility])    [address]  [address]  [facility] [address] [phone]            Jan 24, 2018  3:00 PM CST   Infusion 90 with UC SPEC INFUSION   [facility] Specialty and Procedure ([facility])    [address]  [address]  [facility] [address] [phone]              Who to contact     Please call your clinic at [phone] to:    Ask questions about your health    Make or cancel appointments    Discuss your medicines    Learn about your test results    Speak to your doctor   If you have compliments or concerns about an experience at your clinic, or if you wish to file a complaint, please contact [facility] Patient Relations at [phone] or email us at [email or web address]         Additional Information About Your Visit        [employer] Information     Sunnova gives you secure access to your electronic health record. If you see a primary care provider, you can also send messages to your care team and make appointments. If you have questions, please call your primary care clinic.  If you do not have a primary care provider, please call [phone] and they will assist you.      Sunnova is an electronic gateway that provides easy, online access to your medical records. With Sunnova, you can request a clinic appointment, read your test results, renew a prescription or communicate with your care team.     To access your existing account, please contact your [facility] or call [phone] for assistance.        Care EveryWhere ID     This is your Care EveryWhere ID. This could be used by other organizations to access your [facility]  "records  [id number]        Your Vitals Were     Pulse Respirations Height BMI (Body Mass Index)          104 16 1.676 m (5' 6\") 23.4 kg/m2         Blood Pressure from Last 3 Encounters:   01/15/18 119/76   01/13/18 141/90   01/12/18 124/79    Weight from Last 3 Encounters:   01/15/18 65.8 kg (145 lb)   01/13/18 64.9 kg (143 lb)   01/10/18 66.9 kg (147 lb 6.4 oz)              Today, you had the following     No orders found for display         Where to get your medicines      Some of these will need a paper prescription and others can be bought over the counter.  Ask your nurse if you have questions.     Bring a paper prescription for each of these medications     oxyCODONE 5 MG/5ML solution          Primary Care Provider Office Phone # Fax #    Quyen Baez [phone]       [address] [place] [address] [address] [place] [address]        Equal Access to Services     TRACE BENITEZ : Hadii aad ku hadasho Sokathleenali, waaxda luqadaha, qaybta kaalmada adeegyada, maki gaspar . So [facility] [phone].    ATENCIÓN: Si habla español, tiene a quijano disposición servicios gratuitos de asistencia lingüística. Llame al [phone].    We comply with applicable federal civil rights laws and [place] laws. We do not discriminate on the basis of race, color, national origin, age, disability, sex, sexual orientation, or gender identity.            Thank you!     Thank you for choosing [facility]  for your care. Our goal is always to provide you with excellent care. Hearing back from our patients is one way we can continue to improve our services. Please take a few minutes to complete the written survey that you may receive in the mail after your visit with us. Thank you!             Your Updated Medication List - Protect others around you: Learn how to safely use, store and throw away your medicines at www.disposemymeds.org.          This list is accurate " as of: 1/15/18 10:21 AM.  Always use your most recent med list.                   Brand Name Dispense Instructions for use Diagnosis    * amylase-lipase-protease 35209 UNITS Cpep    ZENPEP    180 capsule    Take 2-3 capsules (40,000-60,000 Units) by mouth Take with snacks or supplements (Snacks)    Idiopathic chronic pancreatitis (H)       * amylase-lipase-protease 04959 UNITS Cpep    ZENPEP    180 capsule    Take 5 capsules (100,000 Units) by mouth 3 times daily (with meals)    Right upper quadrant abdominal pain       fluticasone 50 MCG/ACT spray    FLONASE    16 g    Spray 2 sprays into both nostrils daily    Nasal congestion       gabapentin 400 MG capsule    NEURONTIN    90 capsule    Take 1 capsule (400 mg) by mouth 3 times daily    Abdominal pain, epigastric       leuprolide 11.25 MG kit    LUPRON DEPOT (3-MONTH)    1 each    Inject 11.25 mg into the muscle every 3 months    Endometriosis       levalbuterol 45 MCG/ACT Inhaler    XOPENEX HFA    1 Inhaler    Inhale 2 puffs into the lungs every 6 hours as needed for shortness of breath / dyspnea    Wheeze       methadone 5 MG tablet    DOLOPHINE    30 tablet    Take 0.5 tablets (2.5 mg) by mouth every 12 hours    Abdominal pain, epigastric       norethindrone 5 MG tablet    AYGESTIN    90 tablet    Take 1 tablet (5 mg) by mouth daily    Endometriosis       nortriptyline 10 MG capsule    PAMELOR    120 capsule    Take 3 capsules (30 mg) by mouth At Bedtime    Right upper quadrant abdominal pain       ondansetron 4 MG ODT tab    ZOFRAN ODT    40 tablet    Take 1 tablet (4 mg) by mouth every 8 hours as needed for nausea or vomiting    Idiopathic chronic pancreatitis (H)       oxyCODONE 5 MG/5ML solution    ROXICODONE    900 mL    Take 10 mLs (10 mg) by mouth every 8 hours as needed for pain maximum 30 mg per day    Chronic abdominal pain       pantoprazole 40 MG EC tablet    PROTONIX    30 tablet    Take 1 tablet (40 mg) by mouth 2 times daily (before meals)     Right upper quadrant abdominal pain, Erosive gastritis       polyethylene glycol Packet    MIRALAX/GLYCOLAX    7 packet    Take 17 g by mouth daily    Right upper quadrant abdominal pain       RIZATRIPTAN BENZOATE PO      Take 5 mg by mouth once as needed        scopolamine 72 hr patch    TRANSDERM    2 patch    Apply 1 patch to hairless area behind one ear if severe nausea and vomiting.  Remove old patch and change every 3 days (72 hours).    Intractable vomiting with nausea, unspecified vomiting type       senna-docusate 8.6-50 MG per tablet    SENOKOT-S;PERICOLACE    100 tablet    Take 1 tablet by mouth 2 times daily as needed for constipation    Right upper quadrant abdominal pain       * Notice:  This list has 2 medication(s) that are the same as other medications prescribed for you. Read the directions carefully, and ask your doctor or other care provider to review them with you.

## 2018-01-15 NOTE — MR AVS SNAPSHOT
After Visit Summary   1/15/2018    Alexandra Melgoza    MRN: [id number]           Patient Information     Date Of Birth          1995        Visit Information        Provider Department      1/15/2018 3:00 PM UC 51 ATC; UC SPEC INFUSION [facility] Specialty and Procedure        Today's Diagnoses     Cyclical vomiting with nausea, intractability of vomiting not specified    -  1    Chronic pancreatitis, unspecified pancreatitis type (H)          Care Instructions    Dear Alexandra Melgoza    Thank you for choosing [facility] ([facility]) for your infusion.  The following information is a summary of our appointment as well as important reminders.      We look forward in seeing you on your next appointment here at [facility].  Please don t hesitate to call us at [phone] to reschedule any of your appointments or to speak with one of the [facility] registered nurses.  It was a pleasure taking care of you today.    Sincerely,    [facility]  Specialty Infusion & Procedure Center  [address] [place] [address]  [address]  Phone:  [phone]            Follow-ups after your visit        Your next 10 appointments already scheduled     Umer 15, 2018  3:00 PM CST   Infusion 90 with UC SPEC INFUSION, UC 51 ATC   [facility] Specialty and Procedure ([facility] [place])    [address]  [address]  [facility] [address] [phone]            Jan 17, 2018  4:00 PM CST   Infusion 90 with UC SPEC INFUSION, UC 44 ATC   [facility] Specialty and Procedure ([facility] [place])    [address]  [address]  [facility] [address] [phone]            Jan 18, 2018   Procedure with Campbell Narayanan MD   [facility] [place], Same Day Surgery (--)    [address] [id number] [phone]             Jan 22, 2018  1:00 PM CST   Infusion 90 with UC SPEC INFUSION, UC 49 ATC   [facility] Specialty and Procedure ([facility])    [address]  [address]  [facility] [address] [phone]            Jan 24, 2018  3:00 PM CST   Infusion 90 with UC SPEC INFUSION, UC 47 ATC   [facility] Specialty and Procedure ([facility])    [address]  [address]  [facility] [address] [phone]            Jan 26, 2018  2:00 PM CST   Infusion 90 with UC SPEC INFUSION   [facility] Specialty and Procedure ([facility])    [address]  [address]  [facility] [address] [phone]              Future tests that were ordered for you today     Open Future Orders        Priority Expected Expires Ordered    Basic metabolic panel Routine  1/15/2019 1/15/2018            Who to contact     If you have questions or need follow up information about today's clinic visit or your schedule please contact [facility] SPECIALTY AND PROCEDURE directly at [phone].  Normal or non-critical lab and imaging results will be communicated to you by JPG Technologieshart, letter or phone within 4 business days after the clinic has received the results. If you do not hear from us within 7 days, please contact the clinic through Fluttert or phone. If you have a critical or abnormal lab result, we will notify you by phone as soon as possible.  Submit refill requests through [employer] or call your pharmacy and they will forward the refill request to us. Please allow 3 business days for your refill to be completed.          Additional Information About Your Visit        [employer] Information     [employer] gives you secure access to your electronic health record. If you see a primary care provider, you can also send messages to your care team and make appointments. If you  have questions, please call your primary care clinic.  If you do not have a primary care provider, please call [phone] and they will assist you.        Care EveryWhere ID     This is your Care EveryWhere ID. This could be used by other organizations to access your [facility] medical records  [id number]        Your Vitals Were     Pulse Temperature Respirations             85 98.6  F (37  C) (Oral) 16          Blood Pressure from Last 3 Encounters:   01/15/18 117/60   01/15/18 119/76   01/13/18 141/90    Weight from Last 3 Encounters:   01/15/18 65.8 kg (145 lb)   01/13/18 64.9 kg (143 lb)   01/10/18 66.9 kg (147 lb 6.4 oz)              Today, you had the following     No orders found for display         Where to get your medicines      Some of these will need a paper prescription and others can be bought over the counter.  Ask your nurse if you have questions.     Bring a paper prescription for each of these medications     oxyCODONE 5 MG/5ML solution          Primary Care Provider Office Phone # Fax #    Quyen Baez [phone]       [address] [place] [address]  [place] [address]        Equal Access to Services     RACHAEL BENITEZ AH: Hadii angelina Enamorado, waar beck, qanicci rosariomada jacque, maki smallwood. So [facility] [phone].    ATENCIÓN: Si habla español, tiene a quijano disposición servicios gratuitos de asistencia lingüística. Llame al [phone].    We comply with applicable federal civil rights laws and [place] laws. We do not discriminate on the basis of race, color, national origin, age, disability, sex, sexual orientation, or gender identity.            Thank you!     Thank you for choosing [facility] SPECIALTY AND PROCEDURE  for your care. Our goal is always to provide you with excellent care. Hearing back from our patients is one way we can continue to improve our services. Please take a few minutes to complete  the written survey that you may receive in the mail after your visit with us. Thank you!             Your Updated Medication List - Protect others around you: Learn how to safely use, store and throw away your medicines at www.PasslogixemTwengaeds.org.          This list is accurate as of: 1/15/18  2:01 PM.  Always use your most recent med list.                   Brand Name Dispense Instructions for use Diagnosis    * amylase-lipase-protease 46877 UNITS Cpep    ZENPEP    180 capsule    Take 2-3 capsules (40,000-60,000 Units) by mouth Take with snacks or supplements (Snacks)    Idiopathic chronic pancreatitis (H)       * amylase-lipase-protease 24177 UNITS Cpep    ZENPEP    180 capsule    Take 5 capsules (100,000 Units) by mouth 3 times daily (with meals)    Right upper quadrant abdominal pain       fluticasone 50 MCG/ACT spray    FLONASE    16 g    Spray 2 sprays into both nostrils daily    Nasal congestion       gabapentin 400 MG capsule    NEURONTIN    90 capsule    Take 1 capsule (400 mg) by mouth 3 times daily    Abdominal pain, epigastric       leuprolide 11.25 MG kit    LUPRON DEPOT (3-MONTH)    1 each    Inject 11.25 mg into the muscle every 3 months    Endometriosis       levalbuterol 45 MCG/ACT Inhaler    XOPENEX HFA    1 Inhaler    Inhale 2 puffs into the lungs every 6 hours as needed for shortness of breath / dyspnea    Wheeze       methadone 5 MG tablet    DOLOPHINE    30 tablet    Take 0.5 tablets (2.5 mg) by mouth every 12 hours    Abdominal pain, epigastric       norethindrone 5 MG tablet    AYGESTIN    90 tablet    Take 1 tablet (5 mg) by mouth daily    Endometriosis       nortriptyline 10 MG capsule    PAMELOR    120 capsule    Take 3 capsules (30 mg) by mouth At Bedtime    Right upper quadrant abdominal pain       ondansetron 4 MG ODT tab    ZOFRAN ODT    40 tablet    Take 1 tablet (4 mg) by mouth every 8 hours as needed for nausea or vomiting    Idiopathic chronic pancreatitis (H)       oxyCODONE 5  MG/5ML solution    ROXICODONE    900 mL    Take 10 mLs (10 mg) by mouth every 8 hours as needed for pain maximum 30 mg per day    Chronic abdominal pain       pantoprazole 40 MG EC tablet    PROTONIX    30 tablet    Take 1 tablet (40 mg) by mouth 2 times daily (before meals)    Right upper quadrant abdominal pain, Erosive gastritis       polyethylene glycol Packet    MIRALAX/GLYCOLAX    7 packet    Take 17 g by mouth daily    Right upper quadrant abdominal pain       RIZATRIPTAN BENZOATE PO      Take 5 mg by mouth once as needed        scopolamine 72 hr patch    TRANSDERM    2 patch    Apply 1 patch to hairless area behind one ear if severe nausea and vomiting.  Remove old patch and change every 3 days (72 hours).    Intractable vomiting with nausea, unspecified vomiting type       senna-docusate 8.6-50 MG per tablet    SENOKOT-S;PERICOLACE    100 tablet    Take 1 tablet by mouth 2 times daily as needed for constipation    Right upper quadrant abdominal pain       * Notice:  This list has 2 medication(s) that are the same as other medications prescribed for you. Read the directions carefully, and ask your doctor or other care provider to review them with you.

## 2018-01-15 NOTE — PATIENT INSTRUCTIONS
1. Medication refilled. Continue as prescribed.     Follow up: 30 days for your next prescription.       To speak with a nurse, schedule/reschedule/cancel a clinic appointment, or request a medication refill call: [phone]     You can also reach us by YellowPepper: [email or web address]    For refills, please call on Monday, 1 week before your medication runs out. The doctors are not always in clinic, so this gives us time to get your prescriptions ready.  Please let us know the name of the medication you are requesting a refill of.

## 2018-01-15 NOTE — PROGRESS NOTES
Patient called as she was getting an infusion, requested she received full 2 liter of LR during infusion.  Per Rand Navarro NP okay to change infusion orders to read 1394-2472 mL LR.  Also placed order for BMP per Rand Navarro NP.    Mirza ORTEGA RN Coordinator  Dr. Narayanan, Dr. Joe & Dr. Klein  [facility]  [phone]

## 2018-01-15 NOTE — TELEPHONE ENCOUNTER
Prior Authorization Not Needed per Insurance    Medication: ondansetron (ZOFRAN ODT) 4 MG ODT tab-Initiated-PA Not Needed  Insurance Company: [employer] - Phone [phone] Fax [phone]  Expected CoPay: $0.00    Pharmacy Filling the Rx: [facility] [place], [place] - [address] [place] [address] [phone]  Pharmacy Notified: Yes  Patient Notified: Yes    Patient already picked prescription up from pharmacy

## 2018-01-15 NOTE — PROGRESS NOTES
Nursing Note  Alexandra Melgoza [facility] for:   Chief Complaint   Patient presents with     Infusion     IVF & pre meds     During today's [facility] appointment, orders from Dr. Narayanan were completed.  Frequency: 2-3 times weekly    Progress note:  Patient identification verified by name and date of birth.  Assessment completed.  Vitals recorded in Doc Flowsheets.  Patient was provided with education regarding infusion and possible side effects.  Patient verbalized understanding.      needed: No  Premedications: administered per order.  Infusion Rates: infusion given over approximately 2 hours.  Labs: were not ordered for this appointment.  Vascular access: port accessed today.  Treatment Conditions: non-applicable.  Patient tolerated infusion: well.    Drug Waste Record    Drug Name: Benadryl   Dose: 25 mg  Route administered: IV  NDC #: 4339-0868-53  Amount of waste(mL):0.5ml  Reason for waste: Single use vial      Discharge Plan:   Follow up plan of care with: ongoing infusions at [facility].  Discharge instructions were reviewed with patient.  Patient/representative verbalized understanding of discharge instructions and all questions answered.  Patient discharged from [facility] in stable condition.    ROGER BARROS, SAMARA    Administrations This Visit     diphenhydrAMINE (BENADRYL) injection 25 mg     Admin Date Action Dose Route Administered By             01/15/2018 Given 25 mg Intravenous Daphnie Baum RN                    lactated ringers BOLUS 1,000-1,500 mL     Admin Date Action Dose Route Administered By             01/15/2018 New Bag 2000 mL Intravenous Poeschel, Ysabel MANCINI RN                    ondansetron (ZOFRAN) injection 4 mg     Admin Date Action Dose Route Administered By             01/15/2018 Given 4 mg Intravenous Daphnie Baum, SAMARA               Admin Date Action Dose Route Administered By             01/15/2018 Given 4 mg Intravenous Daphnie Baum, RN                          /52  Pulse 88  Temp 98.6  F (37  C) (Oral)  Resp 16

## 2018-01-15 NOTE — TELEPHONE ENCOUNTER
Left VM for Leeann (mom) informing her that I sent Alexandra a Zonoffhart message regarding scheduling but she has not yet read it.   I am calling to confirm Alexandra is informed of her procedure with Dr. Narayanan on 01/18/2018.  It was to be communicated to patient at her last clinic consult.   Alexandra was also called but her VM box was full.    SR 01.15.18 @ 804 A

## 2018-01-15 NOTE — LETTER
"1/15/2018       RE: Alexandra Melgoza  [address]  [facility] [address]     Dear Colleague,    Thank you for referring your patient, Alexandra Melgoza, to the [facility] at [facility]. Please see a copy of my visit note below.    The patient is a 22-year-old female with a history of chronic pancreatitis who presents for follow-up.  The previous visit was her second visit to our clinic.  During that visit a plan was formulated with the goals of attempting to diminish chronic pancreatitis flares and thus emergency room visits and should those flares occur provided patient with pain medication that would be available to treat a flare and preclude visit to the emergency room.   Methadone was prescribed.  Unfortunately, the methadone caused nausea.  She states that \"only the oxycodone helps me\".  She uses the medication three times per day.  At her last visit she also inquired about the possibility of a celiac plexus block. I have spoken with her GI physician.  He prefers no celiac plexus block at this time.   She complains of mild nausea and vomiting.  She presents today for reevaluation and follow-up     Initial summary:  The patient is a 21-year-old woman with a history of chronic pancreatitis.  She had her gallbladder removed at the age of 15 and has had severe pancreatitis since that time.  She has been seen by Dr. Narayanan here at the [facility] and has had an ERCP and stent placed.  She notes that she has pancreatitis flares approximately every 3 months.  And presents today for suggestions about treatments for her pain during these pancreatitis flares.  She states that her flares occur every 3 months.  During her flares she states that she gets fluids and pain medications namely oxycodone 10-15 mg every 4-6 hours when necessary.  Her pain is alleviated by utilizing a low-fat diet, fruit, veggie's, poultry and fish, and " relaxation techniques.  She states that her pain is exacerbated when she straightens from this diet.  She has used several different modalities to treat her pain.  These include Tylenol oxycodone gabapentin Pamelor and acupuncture. She states that the gabapentin causes sedation.  She presents today for initial evaluation and treatment modalities for her pain.  She asked specifically about celiac plexus blockade.        Current Outpatient Prescriptions   Medication     gabapentin (NEURONTIN) 400 MG capsule     oxyCODONE IR (ROXICODONE) 10 MG tablet     oxyCODONE (ROXICODONE) 5 MG/5ML solution     nortriptyline (PAMELOR) 10 MG capsule     ondansetron (ZOFRAN ODT) 4 MG ODT tab     amylase-lipase-protease (ZENPEP) 88897 UNITS CPEP     amylase-lipase-protease (ZENPEP) 20661 UNITS CPEP     senna-docusate (SENOKOT-S;PERICOLACE) 8.6-50 MG per tablet     pantoprazole (PROTONIX) 40 MG EC tablet     polyethylene glycol (MIRALAX/GLYCOLAX) Packet     gabapentin (NEURONTIN) 300 MG capsule     norethindrone (AYGESTIN) 5 MG tablet     fluticasone (FLONASE) 50 MCG/ACT spray     levalbuterol (XOPENEX HFA) 45 MCG/ACT Inhaler     leuprolide (LUPRON DEPOT) 11.25 MG injection     RIZATRIPTAN BENZOATE PO       No current facility-administered medications for this visit.                  Allergies   Allergen Reactions     Amoxicillin-Pot Clavulanate Nausea and Vomiting     Compazine [Prochlorperazine] Other (See Comments)         Dystonia     Hyoscyamine Other (See Comments)         Dystonia     Reglan [Metoclopramide Hcl] Other (See Comments)         Dystonia     Zyprexa Other (See Comments)         Sensitive, dystonic reaction on 11-9-2011     Amitriptyline Hcl Other (See Comments)         Dystonia, hallucinations     Buspirone Other (See Comments)         No Adverse Reactions, no benefit     Cogentin [Benztropine]        Cyproheptadine Other (See Comments)         Distonic     Dicyclomine Other (See Comments)     Droperidol Other (See  "Comments)         Feels tense and \"like she has to jump out of her skin\".       Effexor [Venlafaxine] Other (See Comments)         Dystonia     Food            Cilantro--lips/tongue swelling     No Clinical Screening - See Comments Other (See Comments)         Cilantro     Phenergan Dm [Promethazine-Dm] Other (See Comments)         dystonia     Promethazine Other (See Comments)     Risperidone Other (See Comments)         dystonia     Vistaril Other (See Comments)         Burning sensation.     Augmentin GI Disturbance     Ketamine Other (See Comments)         jittery     Sorbitol GI Disturbance         Headache and dyspepsia         Past Medical History            Past Medical History:   Diagnosis Date     Anxiety        Asthma        Cholecystitis         s/p cholecystectomy     Chronic abdominal pain        Chronic infection         mrsa     Chronic pain        Cyclic vomiting syndrome 10/27/2012     Depression        Endometriosis        Hypoglycaemia        Migraines        Mild intermittent asthma        Ovarian cysts        Pancreatic disease        PONV (postoperative nausea and vomiting)        Pseudoseizures        Somatoform disorder        Sphincter of Oddi dysfunction        Vasovagal syncope                Past Surgical History              Past Surgical History:   Procedure Laterality Date     ABDOMEN SURGERY            ERCP, biliary stents     CHOLECYSTECTOMY    8/2/11     COLONOSCOPY    2011      negative finding     ENDOSCOPIC RETROGRADE CHOLANGIOPANCREATOGRAM    8/23/2011      Procedure:ENDOSCOPIC RETROGRADE CHOLANGIOPANCREATOGRAM; Endoscopic Retrograde Cholangiopancreatogram; Surgeon:SHORTY MCCOY; [facility]     ENDOSCOPIC RETROGRADE CHOLANGIOPANCREATOGRAM    5/17/2012      Procedure:ENDOSCOPIC RETROGRADE CHOLANGIOPANCREATOGRAM; Endoscopic Retrograde Cholangiopancreatogram with pancreatic stent placement.; Surgeon:SHORTY MCCOY; [facility]     ENDOSCOPIC RETROGRADE " CHOLANGIOPANCREATOGRAM COMPLEX    1/3/2012      Procedure:ENDOSCOPIC RETROGRADE CHOLANGIOPANCREATOGRAM COMPLEX; Endoscopic Retrograde Cholangiopancreatogram with Manometry bile duct sphincterotomy extention pancreatic duct sphincterotomy pancreatic duct stent placement; Surgeon:SHORTY MCCOY; [facility]     ENDOSCOPIC ULTRASOUND UPPER GASTROINTESTINAL TRACT (GI) N/A 6/9/2015      Procedure: ENDOSCOPIC ULTRASOUND, ESOPHAGOSCOPY / UPPER GASTROINTESTINAL TRACT (GI);  Surgeon: Mario Joe MD;  [facility]     ENDOSCOPIC ULTRASOUND UPPER GASTROINTESTINAL TRACT (GI) N/A 12/12/2016      Procedure: ENDOSCOPIC ULTRASOUND, ESOPHAGOSCOPY / UPPER GASTROINTESTINAL TRACT (GI);  Surgeon: Guru Jose Klein MD;  [facility]     ESOPHAGOSCOPY, GASTROSCOPY, DUODENOSCOPY (EGD), COMBINED    1/18/2012      Procedure:COMBINED ESOPHAGOSCOPY, GASTROSCOPY, DUODENOSCOPY (EGD); Surgeon:ARNIE ESPINOZA; [facility]     ESOPHAGOSCOPY, GASTROSCOPY, DUODENOSCOPY (EGD), COMBINED    1/18/2012      Procedure:COMBINED ESOPHAGOSCOPY, GASTROSCOPY, DUODENOSCOPY (EGD); EGD; Surgeon:ARNIE ESPINOZA; [facility]     ESOPHAGOSCOPY, GASTROSCOPY, DUODENOSCOPY (EGD), COMBINED N/A 12/9/2017      Procedure: COMBINED ESOPHAGOSCOPY, GASTROSCOPY, DUODENOSCOPY (EGD);;  Surgeon: Josias Chan MD;  [facility]     INSERT PORT VASCULAR ACCESS           L knee arthroscopy    2009     LAPAROSCOPY DIAGNOSTIC (GYN)    10/26/2012      Procedure: LAPAROSCOPY DIAGNOSTIC (GYN);  LAPAROSCOPY DIAGNOSTIC, CAUTERY ENDOMETRIOISIS and biopsy of Fallopian tube lesions;  Surgeon: Carla Lopez MD;  Location:  OR     ORTHOPEDIC SURGERY    2008      knee     VASCULAR SURGERY                   Social History    Social History                 Social History     Marital status: Single         Spouse name: N/A     Number of children: N/A     Years of education: N/A             Occupational History     Not on file.                Social History Main Topics     Smoking status: Never Smoker     Smokeless tobacco: Never Used     Alcohol use No     Drug use: No     Sexual activity: No               Other Topics Concern     Parent/Sibling W/ Cabg, Mi Or Angioplasty Before 65f 55m? 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  Physical Exam   Constitutional: She is oriented to person, place, and time. She appears well-developed and well-nourished.   HENT:   Head: Normocephalic and atraumatic.   Right Ear: External ear normal.   Left Ear: External ear normal.   Nose: Nose normal.   Mouth/Throat: Oropharynx is clear and moist.   Eyes: Conjunctivae and EOM are normal. Pupils are equal, round, and reactive to light.   Neck: Normal range of motion. Neck supple.   Cardiovascular: Normal rate, regular rhythm, normal heart sounds and intact distal pulses.    Pulmonary/Chest: Effort normal and breath sounds normal.   Abdominal: She exhibits no distension and no mass. There is tenderness. There is guarding. There is no rebound.   Musculoskeletal: Normal range of motion.   Neurological: She is alert and oriented to person, place, and time. She has normal reflexes.   Skin: Skin is warm and dry.   Psychiatric: She has a normal mood and affect.   Nursing note and vitals reviewed.  A/P:Patient is a  23 y/o lady with chronic abdominal pain who presents for initial evaluation.  It is clear that the most likely eiology of her pain is pancreatitic flares.  The previous plan of providing medication for flares only was not very successful as she was in the ED last night.  She has utilized all of the opioid that was prescribed. In an effort to decrease the  frequency of her flares and decrease the duration of flares, I recommend  1. Pain psychology- patient is scheduling appt today  2. D/C methadone  3.restart oxycodone  4 RTC in 4 weeks      Again, thank you for allowing me to participate in the care of your patient.      Sincerely,    Leonardo Wang MD

## 2018-01-17 ENCOUNTER — INFUSION THERAPY VISIT (OUTPATIENT)
Dept: INFUSION THERAPY | Facility: CLINIC | Age: 23
End: 2018-01-17
Attending: INTERNAL MEDICINE
Payer: COMMERCIAL

## 2018-01-17 ENCOUNTER — ANESTHESIA EVENT (OUTPATIENT)
Dept: SURGERY | Facility: CLINIC | Age: 23
DRG: 948 | End: 2018-01-17
Payer: COMMERCIAL

## 2018-01-17 VITALS
HEART RATE: 93 BPM | OXYGEN SATURATION: 97 % | DIASTOLIC BLOOD PRESSURE: 73 MMHG | RESPIRATION RATE: 16 BRPM | SYSTOLIC BLOOD PRESSURE: 125 MMHG

## 2018-01-17 DIAGNOSIS — G43.A0 CYCLICAL VOMITING WITH NAUSEA, INTRACTABILITY OF VOMITING NOT SPECIFIED: Primary | ICD-10-CM

## 2018-01-17 DIAGNOSIS — K86.1 CHRONIC PANCREATITIS, UNSPECIFIED PANCREATITIS TYPE (H): ICD-10-CM

## 2018-01-17 PROCEDURE — 96374 THER/PROPH/DIAG INJ IV PUSH: CPT

## 2018-01-17 PROCEDURE — 96361 HYDRATE IV INFUSION ADD-ON: CPT

## 2018-01-17 PROCEDURE — 25000128 H RX IP 250 OP 636: Mod: ZF | Performed by: NURSE PRACTITIONER

## 2018-01-17 PROCEDURE — 25000128 H RX IP 250 OP 636: Mod: ZF | Performed by: INTERNAL MEDICINE

## 2018-01-17 PROCEDURE — 96375 TX/PRO/DX INJ NEW DRUG ADDON: CPT

## 2018-01-17 RX ORDER — ONDANSETRON 2 MG/ML
4 INJECTION INTRAMUSCULAR; INTRAVENOUS ONCE
Status: CANCELLED
Start: 2018-01-17 | End: 2018-01-17

## 2018-01-17 RX ORDER — ONDANSETRON 2 MG/ML
4 INJECTION INTRAMUSCULAR; INTRAVENOUS DAILY PRN
Status: DISCONTINUED | OUTPATIENT
Start: 2018-01-17 | End: 2018-01-17 | Stop reason: HOSPADM

## 2018-01-17 RX ORDER — ONDANSETRON 2 MG/ML
4 INJECTION INTRAMUSCULAR; INTRAVENOUS ONCE
Status: COMPLETED | OUTPATIENT
Start: 2018-01-17 | End: 2018-01-17

## 2018-01-17 RX ORDER — DIPHENHYDRAMINE HYDROCHLORIDE 50 MG/ML
25 INJECTION INTRAMUSCULAR; INTRAVENOUS ONCE
Status: COMPLETED | OUTPATIENT
Start: 2018-01-17 | End: 2018-01-17

## 2018-01-17 RX ORDER — DIPHENHYDRAMINE HYDROCHLORIDE 50 MG/ML
25 INJECTION INTRAMUSCULAR; INTRAVENOUS ONCE
Status: CANCELLED
Start: 2018-01-17 | End: 2018-01-17

## 2018-01-17 RX ORDER — ONDANSETRON 2 MG/ML
4 INJECTION INTRAMUSCULAR; INTRAVENOUS DAILY PRN
Status: CANCELLED
Start: 2018-01-17

## 2018-01-17 RX ADMIN — DIPHENHYDRAMINE HYDROCHLORIDE 25 MG: 50 INJECTION INTRAMUSCULAR; INTRAVENOUS at 15:56

## 2018-01-17 RX ADMIN — ONDANSETRON 4 MG: 2 INJECTION INTRAMUSCULAR; INTRAVENOUS at 17:56

## 2018-01-17 RX ADMIN — SODIUM CHLORIDE, POTASSIUM CHLORIDE, SODIUM LACTATE AND CALCIUM CHLORIDE 1000 ML: 600; 310; 30; 20 INJECTION, SOLUTION INTRAVENOUS at 15:51

## 2018-01-17 RX ADMIN — ONDANSETRON 4 MG: 2 INJECTION INTRAMUSCULAR; INTRAVENOUS at 15:51

## 2018-01-17 NOTE — ANESTHESIA PREPROCEDURE EVALUATION
Anesthesia Evaluation     . Pt has had prior anesthetic.     History of anesthetic complications   - PONV        ROS/MED HX    ENT/Pulmonary:     (+)asthma , . .    Neurologic:  - neg neurologic ROS     Cardiovascular: Comment: P.O.T.S. Postural orthostatic tachycardia syndrome    (+) ----. : . . . :. . Previous cardiac testing Echodate:2013results:Interpretation Summary  Global and regional left ventricular function is normal with an EF of 55-60%.   Global right ventricular function is normal. Pulmonary artery systolic   pressure is normal. The inferior vena cava is normal. No pericardial   effusion is present.  PatientHeight: 67 in  PatientWeight: 143 lbs  SystolicPressure: 96 mmHg  DiastolicPressure: 55 mmHg  BSA 1.8 m^2      Procedure  Echocardiogram with two-dimensional, color and spectral Doppler performed.    Left Ventricle  Global and regional left ventricular function is normal with an EF of 55-60%.    Right Ventricle  The right ventricle is normal size.  Global right ventricular function is normal.    Atria  Both atria appear normal.  A prominent eustachian valve is noted.  A catheter is noted in the right atrium.    Mitral Valve  The mitral valve is normal.    Aortic Valve  Aortic valve is normal in structure and function.    Tricuspid Valve  The tricuspid valve is normal.  Trace tricuspid insufficiency is present.  The TR peak pressure gradient is 16 mmHg .  Pulmonary artery systolic pressure is normal.    Pulmonic Valve  TheInterpretation Summary  Global and regional left ventricular function is normal with an EF of 55-60%.   Global right ventricular function is normal. Pulmonary artery systolic   pressure is normal. The inferior vena cava is normal. No pericardial   effusion is present.  PatientHeight: 67 in  PatientWeight: 143 lbs  SystolicPressure: 96 mmHg  DiastolicPressure: 55 mmHg  BSA 1.8 m^2      Procedure  Echocardiogram with two-dimensional, color and spectral Doppler performed.    Left  Ventricle  Global and regional left ventricular function is normal with an EF of 55-60%.    Right Ventricle  The right ventricle is normal size.  Global right ventricular function is normal.    Atria  Both atria appear normal.  A prominent eustachian valve is noted.  A catheter is noted in the right atrium.    Mitral Valve  The mitral valve is normal.    Aortic Valve  Aortic valve is normal in structure and function.    Tricuspid Valve  The tricuspid valve is normal.  Trace tricuspid insufficiency is present.  The TR peak pressure gradient is 16 mmHg .  Pulmonary artery systolic pressure is normal.    Pulmonic Valve  The pulmonic valve is normal.  Trace pulmonic insufficiency is present.    Vessels  The inferior vena cava is normal.    Pericardium  No pericardial effusion is present.   pulmonic valve is normal.  Trace pulmonic insufficiency is present.    Vessels  The inferior vena cava is normal.    Pericardium  No pericardial effusion is present.  date: results:ECG reviewed date:2/15 results:NSR date: results:          METS/Exercise Tolerance:     Hematologic:  - neg hematologic  ROS       Musculoskeletal:  - neg musculoskeletal ROS       GI/Hepatic:         Renal/Genitourinary:         Endo:     (+) Other Endocrine Disorder endometriosis.      Psychiatric:     (+) psychiatric history other (comment) (somatoform)      Infectious Disease:  - neg infectious disease ROS       Malignancy:      - no malignancy   Other:    (+) H/O Chronic Pain,                   Physical Exam  Normal systems: cardiovascular, pulmonary and dental    Airway   Mallampati: II  TM distance: >3 FB  Neck ROM: full    Dental     Cardiovascular       Pulmonary                         Anesthesia Plan      History & Physical Review  History and physical reviewed and following examination; no interval change.    ASA Status:  2 .    NPO Status:  > 8 hours    Plan for General and ETT with Intravenous induction. Maintenance will be TIVA.    PONV  prophylaxis:  Ondansetron and Dexamethasone or Solumedrol (TIVA)       Postoperative Care  Postoperative pain management:  IV analgesics.      Consents  Anesthetic plan, risks, benefits and alternatives discussed with:  Patient..        Procedure:  Procedure(s):  Endoscopic Retrograde Cholangiopancreatogram  - Wound Class:     Patient Active Problem List   Diagnosis     Chronic abdominal pain     PROVISIONAL DX: Somatoform disorder     Opioid dependence (H)     Encounter for long-term opiate analgesic use     Thrombocytopenia (H)     Sphincter of Oddi dysfunction     Vomiting     Migraine     Pancreatitis     Anxiety     Dysmenorrhea     POTS (postural orthostatic tachycardia syndrome)     Postherpetic neuralgia     Cyclic vomiting syndrome     Endometriosis     Asthma     Eating disorder     Major depressive disorder, recurrent episode, mild (H)     Rash     Major depressive disorder, recurrent episode, moderate (H)     Anxiety state     Myalgia and myositis     Urge incontinence     Hypoglycemia     Abdominal pain     Acute abdominal pain       Past Medical History:   Diagnosis Date     Anxiety      Asthma      Cholecystitis     s/p cholecystectomy     Chronic abdominal pain      Chronic infection     mrsa     Chronic pain      Cyclic vomiting syndrome 10/27/2012     Depression      Endometriosis      Hypoglycaemia      Migraines      Mild intermittent asthma      Ovarian cysts      Pancreatic disease      PONV (postoperative nausea and vomiting)      Pseudoseizures      Somatoform disorder      Sphincter of Oddi dysfunction      Vasovagal syncope        Past Surgical History:   Procedure Laterality Date     ABDOMEN SURGERY      ERCP, biliary stents     CHOLECYSTECTOMY  8/2/11     COLONOSCOPY  2011    negative finding     ENDOSCOPIC RETROGRADE CHOLANGIOPANCREATOGRAM  8/23/2011    Procedure:ENDOSCOPIC RETROGRADE CHOLANGIOPANCREATOGRAM; Endoscopic Retrograde Cholangiopancreatogram; Surgeon:SHORTY MCCOY;  [facility]     ENDOSCOPIC RETROGRADE CHOLANGIOPANCREATOGRAM  5/17/2012    Procedure:ENDOSCOPIC RETROGRADE CHOLANGIOPANCREATOGRAM; Endoscopic Retrograde Cholangiopancreatogram with pancreatic stent placement.; Surgeon:SHORTY MCCOY; [facility]     ENDOSCOPIC RETROGRADE CHOLANGIOPANCREATOGRAM COMPLEX  1/3/2012    Procedure:ENDOSCOPIC RETROGRADE CHOLANGIOPANCREATOGRAM COMPLEX; Endoscopic Retrograde Cholangiopancreatogram with Manometry bile duct sphincterotomy extention pancreatic duct sphincterotomy pancreatic duct stent placement; Surgeon:SHORTY MCCOY; [facility]     ENDOSCOPIC ULTRASOUND UPPER GASTROINTESTINAL TRACT (GI) N/A 6/9/2015    Procedure: ENDOSCOPIC ULTRASOUND, ESOPHAGOSCOPY / UPPER GASTROINTESTINAL TRACT (GI);  Surgeon: Mario Joe MD;  [facility]     ENDOSCOPIC ULTRASOUND UPPER GASTROINTESTINAL TRACT (GI) N/A 12/12/2016    Procedure: ENDOSCOPIC ULTRASOUND, ESOPHAGOSCOPY / UPPER GASTROINTESTINAL TRACT (GI);  Surgeon: Guru Jose Klein MD;  [facility]     ESOPHAGOSCOPY, GASTROSCOPY, DUODENOSCOPY (EGD), COMBINED  1/18/2012    Procedure:COMBINED ESOPHAGOSCOPY, GASTROSCOPY, DUODENOSCOPY (EGD); Surgeon:ARNIE ESPINOZA; [facility]     ESOPHAGOSCOPY, GASTROSCOPY, DUODENOSCOPY (EGD), COMBINED  1/18/2012    Procedure:COMBINED ESOPHAGOSCOPY, GASTROSCOPY, DUODENOSCOPY (EGD); EGD; Surgeon:ARNIE ESPINOZA; [facility]     ESOPHAGOSCOPY, GASTROSCOPY, DUODENOSCOPY (EGD), COMBINED N/A 12/9/2017    Procedure: COMBINED ESOPHAGOSCOPY, GASTROSCOPY, DUODENOSCOPY (EGD);;  Surgeon: Josias Chan MD;  [facility]     INSERT PORT VASCULAR ACCESS       L knee arthroscopy  2009     LAPAROSCOPY DIAGNOSTIC (GYN)  10/26/2012    Procedure: LAPAROSCOPY DIAGNOSTIC (GYN);  LAPAROSCOPY DIAGNOSTIC, CAUTERY ENDOMETRIOISIS and biopsy of Fallopian tube lesions;  Surgeon: Carla Lopez MD;  Location:  OR     ORTHOPEDIC SURGERY  2008    knee     VASCULAR SURGERY            No current facility-administered medications on file prior to encounter.   Current Outpatient Prescriptions on File Prior to Encounter:  scopolamine (TRANSDERM) 72 hr patch Apply 1 patch to hairless area behind one ear if severe nausea and vomiting.  Remove old patch and change every 3 days (72 hours).   ondansetron (ZOFRAN ODT) 4 MG ODT tab Take 1 tablet (4 mg) by mouth every 8 hours as needed for nausea or vomiting   methadone (DOLOPHINE) 5 MG tablet Take 0.5 tablets (2.5 mg) by mouth every 12 hours   gabapentin (NEURONTIN) 400 MG capsule Take 1 capsule (400 mg) by mouth 3 times daily   nortriptyline (PAMELOR) 10 MG capsule Take 3 capsules (30 mg) by mouth At Bedtime   amylase-lipase-protease (ZENPEP) 73048 UNITS CPEP Take 2-3 capsules (40,000-60,000 Units) by mouth Take with snacks or supplements (Snacks) (Patient not taking: Reported on 1/15/2018)   amylase-lipase-protease (ZENPEP) 48074 UNITS CPEP Take 5 capsules (100,000 Units) by mouth 3 times daily (with meals)   senna-docusate (SENOKOT-S;PERICOLACE) 8.6-50 MG per tablet Take 1 tablet by mouth 2 times daily as needed for constipation   pantoprazole (PROTONIX) 40 MG EC tablet Take 1 tablet (40 mg) by mouth 2 times daily (before meals)   polyethylene glycol (MIRALAX/GLYCOLAX) Packet Take 17 g by mouth daily   norethindrone (AYGESTIN) 5 MG tablet Take 1 tablet (5 mg) by mouth daily   fluticasone (FLONASE) 50 MCG/ACT spray Spray 2 sprays into both nostrils daily   levalbuterol (XOPENEX HFA) 45 MCG/ACT Inhaler Inhale 2 puffs into the lungs every 6 hours as needed for shortness of breath / dyspnea   leuprolide (LUPRON DEPOT) 11.25 MG injection Inject 11.25 mg into the muscle every 3 months   RIZATRIPTAN BENZOATE PO Take 5 mg by mouth once as needed        Allergies   Allergen Reactions     Amoxicillin-Pot Clavulanate Nausea and Vomiting     Compazine [Prochlorperazine] Other (See Comments)     Dystonia       Hyoscyamine Other (See Comments)     Dystonia  "    Reglan [Metoclopramide Hcl] Other (See Comments)     Dystonia     Zyprexa Other (See Comments)     Sensitive, dystonic reaction on 11-9-2011     Amitriptyline Hcl Other (See Comments)     Dystonia, hallucinations     Buspirone Other (See Comments)     No Adverse Reactions, no benefit     Cogentin [Benztropine]      Cyproheptadine Other (See Comments)     Distonic     Dicyclomine Other (See Comments)     Droperidol Other (See Comments)     Feels tense and \"like she has to jump out of her skin\".       Effexor [Venlafaxine] Other (See Comments)     Dystonia     Food      Cilantro--lips/tongue swelling     No Clinical Screening - See Comments Other (See Comments)     Cilantro     Phenergan Dm [Promethazine-Dm] Other (See Comments)     dystonia     Promethazine Other (See Comments)     Risperidone Other (See Comments)     dystonia     Vistaril Other (See Comments)     Burning sensation.       Augmentin GI Disturbance     Ketamine Other (See Comments)     jittery     Sorbitol GI Disturbance     Headache and dyspepsia       Recent Labs   Lab Test  12/10/16   1053   HGB  12.4     Recent Labs   Lab Test  12/10/16   1053   POTASSIUM  3.7     Recent Labs   Lab Test  12/10/16   1053   PLT  241     Recent Labs   Lab Test  11/06/15   2003   INR  1.07       No results found for this or any previous visit (from the past 4320 hour(s)).                  .  "

## 2018-01-17 NOTE — MR AVS SNAPSHOT
After Visit Summary   1/17/2018    Alexandra Melgoza    MRN: [id number]           Patient Information     Date Of Birth          1995        Visit Information        Provider Department      1/17/2018 4:00 PM UC 44 ATC; UC SPEC INFUSION [facility] Specialty and Procedure        Today's Diagnoses     Cyclical vomiting with nausea, intractability of vomiting not specified    -  1    Chronic pancreatitis, unspecified pancreatitis type (H)           Follow-ups after your visit        Your next 10 appointments already scheduled     Jan 18, 2018   Procedure with Campbell Narayanan MD   [facility], [place], Same Day Surgery (--)    [address] [address]   [phone]            Jan 22, 2018  1:00 PM CST   Infusion 90 with UC SPEC INFUSION, UC 49 ATC   [facility] Specialty and Procedure ([facility])    [address]  [address] [place] [address] [phone]            Jan 24, 2018  3:00 PM CST   Infusion 90 with UC SPEC INFUSION, UC 47 ATC   [facility] Specialty and Procedure ([facility])    [address]  [address]  [facility] [address] [phone]            Jan 26, 2018  2:00 PM CST   Infusion 90 with UC SPEC INFUSION, UC 45 ATC   [facility] Specialty and Procedure ([facility])    [address]  [address]  [facility] [address] [phone]            Feb 13, 2018  8:00 AM CST   (Arrive by 7:45 AM)   Return Visit with Leonardo Wang MD   [facility] [[email or web address]] for Comprehensive Pain Management ([facility])    [address] [place] [address]  4th Floor  [facility] [address] [phone]              Who to contact     If you have questions or need follow up information about today's clinic visit or your schedule please contact [facility]  SPECIALTY AND PROCEDURE directly at [phone].  Normal or non-critical lab and imaging results will be communicated to you by MyChart, letter or phone within 4 business days after the clinic has received the results. If you do not hear from us within 7 days, please contact the clinic through [employer] or phone. If you have a critical or abnormal lab result, we will notify you by phone as soon as possible.  Submit refill requests through [facility] or call your pharmacy and they will forward the refill request to us. Please allow 3 business days for your refill to be completed.          Additional Information About Your Visit        [employer] Information     [facility] gives you secure access to your electronic health record. If you see a primary care provider, you can also send messages to your care team and make appointments. If you have questions, please call your primary care clinic.  If you do not have a primary care provider, please call [phone] and they will assist you.        Care EveryWhere ID     This is your Care EveryWhere ID. This could be used by other organizations to access your [facility] medical records  [id number]        Your Vitals Were     Pulse Respirations Pulse Oximetry             93 16 97%          Blood Pressure from Last 3 Encounters:   01/17/18 125/73   01/15/18 117/60   01/15/18 119/76    Weight from Last 3 Encounters:   01/15/18 65.8 kg (145 lb)   01/13/18 64.9 kg (143 lb)   01/10/18 66.9 kg (147 lb 6.4 oz)              Today, you had the following     No orders found for display       Primary Care Provider Office Phone # Fax #    Quyen Baez [phone]       [address] [place] [address]47  [facility] [address]        Equal Access to Services     [facility]: Hadyoana Enamorado, alcon beck, maki quintero . So [facility] [phone].    ATENCIÓN: Si habla español, tiene a quijano disposición servicios  kinsey de asistencia lingüística. Panfilo champion [phone].    We comply with applicable federal civil rights laws and [place] laws. We do not discriminate on the basis of race, color, national origin, age, disability, sex, sexual orientation, or gender identity.            Thank you!     Thank you for choosing [facility] SPECIALTY AND PROCEDURE  for your care. Our goal is always to provide you with excellent care. Hearing back from our patients is one way we can continue to improve our services. Please take a few minutes to complete the written survey that you may receive in the mail after your visit with us. Thank you!             Your Updated Medication List - Protect others around you: Learn how to safely use, store and throw away your medicines at www.disposemymeds.org.          This list is accurate as of: 1/17/18  5:56 PM.  Always use your most recent med list.                   Brand Name Dispense Instructions for use Diagnosis    * amylase-lipase-protease 34395 UNITS Cpep    ZENPEP    180 capsule    Take 2-3 capsules (40,000-60,000 Units) by mouth Take with snacks or supplements (Snacks)    Idiopathic chronic pancreatitis (H)       * amylase-lipase-protease 96045 UNITS Cpep    ZENPEP    180 capsule    Take 5 capsules (100,000 Units) by mouth 3 times daily (with meals)    Right upper quadrant abdominal pain       fluticasone 50 MCG/ACT spray    FLONASE    16 g    Spray 2 sprays into both nostrils daily    Nasal congestion       gabapentin 400 MG capsule    NEURONTIN    90 capsule    Take 1 capsule (400 mg) by mouth 3 times daily    Abdominal pain, epigastric       leuprolide 11.25 MG kit    LUPRON DEPOT (3-MONTH)    1 each    Inject 11.25 mg into the muscle every 3 months    Endometriosis       levalbuterol 45 MCG/ACT Inhaler    XOPENEX HFA    1 Inhaler    Inhale 2 puffs into the lungs every 6 hours as needed for shortness of breath / dyspnea    Wheeze       methadone 5 MG tablet     DOLOPHINE    30 tablet    Take 0.5 tablets (2.5 mg) by mouth every 12 hours    Abdominal pain, epigastric       norethindrone 5 MG tablet    AYGESTIN    90 tablet    Take 1 tablet (5 mg) by mouth daily    Endometriosis       nortriptyline 10 MG capsule    PAMELOR    120 capsule    Take 3 capsules (30 mg) by mouth At Bedtime    Right upper quadrant abdominal pain       ondansetron 4 MG ODT tab    ZOFRAN ODT    40 tablet    Take 1 tablet (4 mg) by mouth every 8 hours as needed for nausea or vomiting    Idiopathic chronic pancreatitis (H)       oxyCODONE 5 MG/5ML solution    ROXICODONE    900 mL    Take 10 mLs (10 mg) by mouth every 8 hours as needed for pain maximum 30 mg per day    Chronic abdominal pain       pantoprazole 40 MG EC tablet    PROTONIX    30 tablet    Take 1 tablet (40 mg) by mouth 2 times daily (before meals)    Right upper quadrant abdominal pain, Erosive gastritis       polyethylene glycol Packet    MIRALAX/GLYCOLAX    7 packet    Take 17 g by mouth daily    Right upper quadrant abdominal pain       RIZATRIPTAN BENZOATE PO      Take 5 mg by mouth once as needed        scopolamine 72 hr patch    TRANSDERM    2 patch    Apply 1 patch to hairless area behind one ear if severe nausea and vomiting.  Remove old patch and change every 3 days (72 hours).    Intractable vomiting with nausea, unspecified vomiting type       senna-docusate 8.6-50 MG per tablet    SENOKOT-S;PERICOLACE    100 tablet    Take 1 tablet by mouth 2 times daily as needed for constipation    Right upper quadrant abdominal pain       * Notice:  This list has 2 medication(s) that are the same as other medications prescribed for you. Read the directions carefully, and ask your doctor or other care provider to review them with you.

## 2018-01-17 NOTE — PROGRESS NOTES
Nursing Note  Alexandra Melgoza [facility]:   Chief Complaint   Patient presents with     Infusion     2 L IVF, Anti-emetics     During [facility]'s [facility] appointment, orders from Dr Narayanan were completed.  Frequency: 2-3 times weekly    Progress note:  Patient identification verified by name and date of birth.  Assessment completed.  Vitals recorded in Doc Flowsheets.  Patient was provided with education regarding infusion and possible side effects.  Patient verbalized understanding.      needed: No  Premedications: administered per order.  Infusion Rates: 999 ml/hr.  Approximate Infusion length:2 hour   Labs: were not ordered for this appointment.  Vascular access: port accessed today.  Treatment Conditions: non-applicable.  Patient tolerated infusion: well.    Drug Waste Record? Yes      Drug Name: Benadryl  Dose: 25mg  Route administered: IV  NDC #: 4083-6335-13  Amount of waste(mL):0.5ml  Reason for waste: Single use vial       Discharge Plan:   Follow up plan of care with: ongoing infusions [facility].  Discharge instructions were reviewed with patient.  Patient/representative verbalized understanding of discharge instructions and all questions answered.  Patient discharged from [facility] in stable condition.  Lauren Menjivar RN    Administrations This Visit     diphenhydrAMINE (BENADRYL) injection 25 mg     Admin Date Action Dose Route Administered By             01/17/2018 Given 25 mg Intravenous Lauren Menjivar RN                    lactated ringers BOLUS 1,000-2,000 mL     Admin Date Action Dose Route Administered By             01/17/2018 New Bag 1000 mL Intravenous Lauren Menjivar RN                    ondansetron (ZOFRAN) injection 4 mg     Admin Date Action Dose Route Administered By             01/17/2018 Given 4 mg Intravenous Lauren Menjivar  J, RN                          /69  Pulse 97  Resp 16  SpO2 97%

## 2018-01-18 ENCOUNTER — HOSPITAL ENCOUNTER (INPATIENT)
Facility: CLINIC | Age: 23
LOS: 3 days | Discharge: HOME | DRG: 948 | End: 2018-01-23
Attending: INTERNAL MEDICINE | Admitting: INTERNAL MEDICINE
Payer: COMMERCIAL

## 2018-01-18 ENCOUNTER — ANESTHESIA (OUTPATIENT)
Dept: SURGERY | Facility: CLINIC | Age: 23
DRG: 948 | End: 2018-01-18
Payer: COMMERCIAL

## 2018-01-18 ENCOUNTER — APPOINTMENT (OUTPATIENT)
Dept: GENERAL RADIOLOGY | Facility: CLINIC | Age: 23
DRG: 948 | End: 2018-01-18
Attending: INTERNAL MEDICINE
Payer: COMMERCIAL

## 2018-01-18 DIAGNOSIS — R10.9 CHRONIC ABDOMINAL PAIN: Chronic | ICD-10-CM

## 2018-01-18 DIAGNOSIS — R52 PAIN: ICD-10-CM

## 2018-01-18 DIAGNOSIS — R10.11 RIGHT UPPER QUADRANT ABDOMINAL PAIN: ICD-10-CM

## 2018-01-18 DIAGNOSIS — R10.13 ABDOMINAL PAIN, EPIGASTRIC: Primary | ICD-10-CM

## 2018-01-18 DIAGNOSIS — G89.29 CHRONIC ABDOMINAL PAIN: Chronic | ICD-10-CM

## 2018-01-18 PROBLEM — Z98.890 S/P ERCP: Status: ACTIVE | Noted: 2018-01-18

## 2018-01-18 LAB
ALBUMIN SERPL-MCNC: 4.2 G/DL (ref 3.4–5)
ALP SERPL-CCNC: 113 U/L (ref 40–150)
ALT SERPL W P-5'-P-CCNC: 74 U/L (ref 0–50)
ALT SERPL W P-5'-P-CCNC: 78 U/L (ref 0–50)
AMYLASE SERPL-CCNC: 36 U/L (ref 30–110)
AMYLASE SERPL-CCNC: 38 U/L (ref 30–110)
ANION GAP SERPL CALCULATED.3IONS-SCNC: 7 MMOL/L (ref 3–14)
AST SERPL W P-5'-P-CCNC: 38 U/L (ref 0–45)
AST SERPL W P-5'-P-CCNC: 55 U/L (ref 0–45)
BILIRUB SERPL-MCNC: 0.3 MG/DL (ref 0.2–1.3)
BUN SERPL-MCNC: 10 MG/DL (ref 7–30)
CALCIUM SERPL-MCNC: 9 MG/DL (ref 8.5–10.1)
CHLORIDE SERPL-SCNC: 103 MMOL/L (ref 94–109)
CO2 SERPL-SCNC: 28 MMOL/L (ref 20–32)
CREAT SERPL-MCNC: 0.62 MG/DL (ref 0.52–1.04)
ERYTHROCYTE [DISTWIDTH] IN BLOOD BY AUTOMATED COUNT: 12.9 % (ref 10–15)
GFR SERPL CREATININE-BSD FRML MDRD: >90 ML/MIN/1.7M2
GLUCOSE BLDC GLUCOMTR-MCNC: 92 MG/DL (ref 70–99)
GLUCOSE SERPL-MCNC: 80 MG/DL (ref 70–99)
HCT VFR BLD AUTO: 38.9 % (ref 35–47)
HGB BLD-MCNC: 12.4 G/DL (ref 11.7–15.7)
LIPASE SERPL-CCNC: 111 U/L (ref 73–393)
LIPASE SERPL-CCNC: 80 U/L (ref 73–393)
MCH RBC QN AUTO: 30 PG (ref 26.5–33)
MCHC RBC AUTO-ENTMCNC: 31.9 G/DL (ref 31.5–36.5)
MCV RBC AUTO: 94 FL (ref 78–100)
PLATELET # BLD AUTO: 206 10E9/L (ref 150–450)
POTASSIUM SERPL-SCNC: 4.1 MMOL/L (ref 3.4–5.3)
PROT SERPL-MCNC: 7.4 G/DL (ref 6.8–8.8)
RBC # BLD AUTO: 4.13 10E12/L (ref 3.8–5.2)
SODIUM SERPL-SCNC: 139 MMOL/L (ref 133–144)
WBC # BLD AUTO: 3.7 10E9/L (ref 4–11)

## 2018-01-18 PROCEDURE — 36000059 ZZH SURGERY LEVEL 3 EA 15 ADDTL MIN UMMC: Performed by: INTERNAL MEDICINE

## 2018-01-18 PROCEDURE — 25000132 ZZH RX MED GY IP 250 OP 250 PS 637: Performed by: NURSE PRACTITIONER

## 2018-01-18 PROCEDURE — 37000008 ZZH ANESTHESIA TECHNICAL FEE, 1ST 30 MIN: Performed by: INTERNAL MEDICINE

## 2018-01-18 PROCEDURE — 85027 COMPLETE CBC AUTOMATED: CPT | Performed by: INTERNAL MEDICINE

## 2018-01-18 PROCEDURE — 25000125 ZZHC RX 250: Performed by: NURSE ANESTHETIST, CERTIFIED REGISTERED

## 2018-01-18 PROCEDURE — 25000128 H RX IP 250 OP 636: Performed by: ANESTHESIOLOGY

## 2018-01-18 PROCEDURE — 25000128 H RX IP 250 OP 636: Performed by: NURSE ANESTHETIST, CERTIFIED REGISTERED

## 2018-01-18 PROCEDURE — 0DHA8UZ INSERTION OF FEEDING DEVICE INTO JEJUNUM, VIA NATURAL OR ARTIFICIAL OPENING ENDOSCOPIC: ICD-10-PCS | Performed by: INTERNAL MEDICINE

## 2018-01-18 PROCEDURE — 84460 ALANINE AMINO (ALT) (SGPT): CPT | Performed by: INTERNAL MEDICINE

## 2018-01-18 PROCEDURE — 27210794 ZZH OR GENERAL SUPPLY STERILE: Performed by: INTERNAL MEDICINE

## 2018-01-18 PROCEDURE — 80053 COMPREHEN METABOLIC PANEL: CPT | Performed by: INTERNAL MEDICINE

## 2018-01-18 PROCEDURE — 37000009 ZZH ANESTHESIA TECHNICAL FEE, EACH ADDTL 15 MIN: Performed by: INTERNAL MEDICINE

## 2018-01-18 PROCEDURE — 96361 HYDRATE IV INFUSION ADD-ON: CPT

## 2018-01-18 PROCEDURE — 25000125 ZZHC RX 250: Performed by: ANESTHESIOLOGY

## 2018-01-18 PROCEDURE — 25000125 ZZHC RX 250: Performed by: INTERNAL MEDICINE

## 2018-01-18 PROCEDURE — 25500064 ZZH RX 255 OP 636: Performed by: INTERNAL MEDICINE

## 2018-01-18 PROCEDURE — 83690 ASSAY OF LIPASE: CPT | Performed by: INTERNAL MEDICINE

## 2018-01-18 PROCEDURE — G0378 HOSPITAL OBSERVATION PER HR: HCPCS

## 2018-01-18 PROCEDURE — 36000061 ZZH SURGERY LEVEL 3 W FLUORO 1ST 30 MIN - UMMC: Performed by: INTERNAL MEDICINE

## 2018-01-18 PROCEDURE — 40000280 XR SURGERY CARM FLUORO GREATER THAN 5 MIN: Mod: TC

## 2018-01-18 PROCEDURE — C9399 UNCLASSIFIED DRUGS OR BIOLOG: HCPCS | Performed by: NURSE ANESTHETIST, CERTIFIED REGISTERED

## 2018-01-18 PROCEDURE — 00000146 ZZHCL STATISTIC GLUCOSE BY METER IP

## 2018-01-18 PROCEDURE — 25000128 H RX IP 250 OP 636: Performed by: NURSE PRACTITIONER

## 2018-01-18 PROCEDURE — 36415 COLL VENOUS BLD VENIPUNCTURE: CPT | Performed by: INTERNAL MEDICINE

## 2018-01-18 PROCEDURE — 84450 TRANSFERASE (AST) (SGOT): CPT | Performed by: INTERNAL MEDICINE

## 2018-01-18 PROCEDURE — 82150 ASSAY OF AMYLASE: CPT | Performed by: INTERNAL MEDICINE

## 2018-01-18 PROCEDURE — 71000017 ZZH RECOVERY PHASE 1 LEVEL 3 EA ADDTL HR: Performed by: INTERNAL MEDICINE

## 2018-01-18 PROCEDURE — 96374 THER/PROPH/DIAG INJ IV PUSH: CPT

## 2018-01-18 PROCEDURE — 25000128 H RX IP 250 OP 636: Performed by: INTERNAL MEDICINE

## 2018-01-18 PROCEDURE — 40000170 ZZH STATISTIC PRE-PROCEDURE ASSESSMENT II: Performed by: INTERNAL MEDICINE

## 2018-01-18 PROCEDURE — 71000016 ZZH RECOVERY PHASE 1 LEVEL 3 FIRST HR: Performed by: INTERNAL MEDICINE

## 2018-01-18 PROCEDURE — 99220 ZZC INITIAL OBSERVATION CARE,LEVL III: CPT | Mod: AI | Performed by: INTERNAL MEDICINE

## 2018-01-18 PROCEDURE — 0FJB8ZZ INSPECTION OF HEPATOBILIARY DUCT, VIA NATURAL OR ARTIFICIAL OPENING ENDOSCOPIC: ICD-10-PCS | Performed by: INTERNAL MEDICINE

## 2018-01-18 PROCEDURE — C1769 GUIDE WIRE: HCPCS | Performed by: INTERNAL MEDICINE

## 2018-01-18 PROCEDURE — 99207 ZZC APP CREDIT; MD BILLING SHARED VISIT: CPT | Performed by: NURSE PRACTITIONER

## 2018-01-18 RX ORDER — GABAPENTIN 250 MG/5ML
400 SOLUTION ORAL 3 TIMES DAILY
Status: DISCONTINUED | OUTPATIENT
Start: 2018-01-18 | End: 2018-01-22

## 2018-01-18 RX ORDER — SODIUM CHLORIDE, SODIUM LACTATE, POTASSIUM CHLORIDE, CALCIUM CHLORIDE 600; 310; 30; 20 MG/100ML; MG/100ML; MG/100ML; MG/100ML
INJECTION, SOLUTION INTRAVENOUS CONTINUOUS
Status: DISCONTINUED | OUTPATIENT
Start: 2018-01-18 | End: 2018-01-18 | Stop reason: HOSPADM

## 2018-01-18 RX ORDER — PANTOPRAZOLE SODIUM 40 MG/1
40 TABLET, DELAYED RELEASE ORAL
Status: DISCONTINUED | OUTPATIENT
Start: 2018-01-19 | End: 2018-01-20

## 2018-01-18 RX ORDER — ONDANSETRON 4 MG/1
4 TABLET, ORALLY DISINTEGRATING ORAL EVERY 30 MIN PRN
Status: DISCONTINUED | OUTPATIENT
Start: 2018-01-18 | End: 2018-01-18 | Stop reason: HOSPADM

## 2018-01-18 RX ORDER — ACETAMINOPHEN 325 MG/1
975 TABLET ORAL EVERY 8 HOURS PRN
Status: DISCONTINUED | OUTPATIENT
Start: 2018-01-18 | End: 2018-01-23

## 2018-01-18 RX ORDER — LORAZEPAM 2 MG/ML
1 INJECTION INTRAMUSCULAR ONCE
Status: COMPLETED | OUTPATIENT
Start: 2018-01-18 | End: 2018-01-18

## 2018-01-18 RX ORDER — DIPHENHYDRAMINE HYDROCHLORIDE 50 MG/ML
INJECTION INTRAMUSCULAR; INTRAVENOUS PRN
Status: DISCONTINUED | OUTPATIENT
Start: 2018-01-18 | End: 2018-01-18

## 2018-01-18 RX ORDER — ONDANSETRON 2 MG/ML
4 INJECTION INTRAMUSCULAR; INTRAVENOUS EVERY 6 HOURS PRN
Status: DISCONTINUED | OUTPATIENT
Start: 2018-01-18 | End: 2018-01-19

## 2018-01-18 RX ORDER — DIPHENHYDRAMINE HYDROCHLORIDE 50 MG/ML
25 INJECTION INTRAMUSCULAR; INTRAVENOUS ONCE
Status: COMPLETED | OUTPATIENT
Start: 2018-01-18 | End: 2018-01-18

## 2018-01-18 RX ORDER — ONDANSETRON 4 MG/1
4 TABLET, ORALLY DISINTEGRATING ORAL EVERY 6 HOURS PRN
Status: DISCONTINUED | OUTPATIENT
Start: 2018-01-18 | End: 2018-01-19

## 2018-01-18 RX ORDER — IOPAMIDOL 510 MG/ML
INJECTION, SOLUTION INTRAVASCULAR PRN
Status: DISCONTINUED | OUTPATIENT
Start: 2018-01-18 | End: 2018-01-18 | Stop reason: HOSPADM

## 2018-01-18 RX ORDER — FENTANYL CITRATE 50 UG/ML
25-50 INJECTION, SOLUTION INTRAMUSCULAR; INTRAVENOUS
Status: DISCONTINUED | OUTPATIENT
Start: 2018-01-18 | End: 2018-01-18 | Stop reason: HOSPADM

## 2018-01-18 RX ORDER — ESMOLOL HYDROCHLORIDE 10 MG/ML
INJECTION INTRAVENOUS PRN
Status: DISCONTINUED | OUTPATIENT
Start: 2018-01-18 | End: 2018-01-18

## 2018-01-18 RX ORDER — PROPOFOL 10 MG/ML
INJECTION, EMULSION INTRAVENOUS PRN
Status: DISCONTINUED | OUTPATIENT
Start: 2018-01-18 | End: 2018-01-18

## 2018-01-18 RX ORDER — AMOXICILLIN 250 MG
2 CAPSULE ORAL 2 TIMES DAILY PRN
Status: DISCONTINUED | OUTPATIENT
Start: 2018-01-18 | End: 2018-01-19

## 2018-01-18 RX ORDER — OXYCODONE HYDROCHLORIDE 5 MG/1
5 TABLET ORAL EVERY 4 HOURS PRN
Status: DISCONTINUED | OUTPATIENT
Start: 2018-01-18 | End: 2018-01-18

## 2018-01-18 RX ORDER — DEXAMETHASONE SODIUM PHOSPHATE 4 MG/ML
INJECTION, SOLUTION INTRA-ARTICULAR; INTRALESIONAL; INTRAMUSCULAR; INTRAVENOUS; SOFT TISSUE PRN
Status: DISCONTINUED | OUTPATIENT
Start: 2018-01-18 | End: 2018-01-18

## 2018-01-18 RX ORDER — AMOXICILLIN 250 MG
1 CAPSULE ORAL 2 TIMES DAILY PRN
Status: DISCONTINUED | OUTPATIENT
Start: 2018-01-18 | End: 2018-01-19

## 2018-01-18 RX ORDER — NALOXONE HYDROCHLORIDE 0.4 MG/ML
.1-.4 INJECTION, SOLUTION INTRAMUSCULAR; INTRAVENOUS; SUBCUTANEOUS
Status: DISCONTINUED | OUTPATIENT
Start: 2018-01-18 | End: 2018-01-18

## 2018-01-18 RX ORDER — OXYMETAZOLINE HYDROCHLORIDE 0.05 G/100ML
SPRAY NASAL PRN
Status: DISCONTINUED | OUTPATIENT
Start: 2018-01-18 | End: 2018-01-18

## 2018-01-18 RX ORDER — PROPOFOL 10 MG/ML
INJECTION, EMULSION INTRAVENOUS CONTINUOUS PRN
Status: DISCONTINUED | OUTPATIENT
Start: 2018-01-18 | End: 2018-01-18

## 2018-01-18 RX ORDER — ONDANSETRON 2 MG/ML
4 INJECTION INTRAMUSCULAR; INTRAVENOUS EVERY 30 MIN PRN
Status: DISCONTINUED | OUTPATIENT
Start: 2018-01-18 | End: 2018-01-18 | Stop reason: HOSPADM

## 2018-01-18 RX ORDER — NALOXONE HYDROCHLORIDE 0.4 MG/ML
.1-.4 INJECTION, SOLUTION INTRAMUSCULAR; INTRAVENOUS; SUBCUTANEOUS
Status: DISCONTINUED | OUTPATIENT
Start: 2018-01-18 | End: 2018-01-18 | Stop reason: HOSPADM

## 2018-01-18 RX ORDER — POLYETHYLENE GLYCOL 3350 17 G/17G
17 POWDER, FOR SOLUTION ORAL DAILY
Status: DISCONTINUED | OUTPATIENT
Start: 2018-01-19 | End: 2018-01-23 | Stop reason: HOSPADM

## 2018-01-18 RX ORDER — HYDROMORPHONE HYDROCHLORIDE 1 MG/ML
INJECTION, SOLUTION INTRAMUSCULAR; INTRAVENOUS; SUBCUTANEOUS
Status: DISPENSED
Start: 2018-01-18 | End: 2018-01-19

## 2018-01-18 RX ORDER — MEPERIDINE HYDROCHLORIDE 50 MG/ML
12.5 INJECTION INTRAMUSCULAR; INTRAVENOUS; SUBCUTANEOUS
Status: DISCONTINUED | OUTPATIENT
Start: 2018-01-18 | End: 2018-01-18 | Stop reason: HOSPADM

## 2018-01-18 RX ORDER — LIDOCAINE HYDROCHLORIDE 20 MG/ML
INJECTION, SOLUTION INFILTRATION; PERINEURAL PRN
Status: DISCONTINUED | OUTPATIENT
Start: 2018-01-18 | End: 2018-01-18

## 2018-01-18 RX ORDER — LIDOCAINE 40 MG/G
CREAM TOPICAL
Status: DISCONTINUED | OUTPATIENT
Start: 2018-01-18 | End: 2018-01-18 | Stop reason: HOSPADM

## 2018-01-18 RX ORDER — NORTRIPTYLINE HCL 10 MG
30 CAPSULE ORAL AT BEDTIME
Status: DISCONTINUED | OUTPATIENT
Start: 2018-01-18 | End: 2018-01-23 | Stop reason: HOSPADM

## 2018-01-18 RX ORDER — LABETALOL HYDROCHLORIDE 5 MG/ML
10 INJECTION, SOLUTION INTRAVENOUS
Status: DISCONTINUED | OUTPATIENT
Start: 2018-01-18 | End: 2018-01-18 | Stop reason: HOSPADM

## 2018-01-18 RX ORDER — NALOXONE HYDROCHLORIDE 0.4 MG/ML
.1-.4 INJECTION, SOLUTION INTRAMUSCULAR; INTRAVENOUS; SUBCUTANEOUS
Status: DISCONTINUED | OUTPATIENT
Start: 2018-01-18 | End: 2018-01-23 | Stop reason: HOSPADM

## 2018-01-18 RX ORDER — SCOLOPAMINE TRANSDERMAL SYSTEM 1 MG/1
1 PATCH, EXTENDED RELEASE TRANSDERMAL
Status: DISCONTINUED | OUTPATIENT
Start: 2018-01-18 | End: 2018-01-23 | Stop reason: HOSPADM

## 2018-01-18 RX ORDER — DIPHENHYDRAMINE HCL 12.5MG/5ML
25-50 LIQUID (ML) ORAL EVERY 6 HOURS PRN
Status: DISCONTINUED | OUTPATIENT
Start: 2018-01-18 | End: 2018-01-23 | Stop reason: HOSPADM

## 2018-01-18 RX ORDER — INDOMETHACIN 50 MG/1
100 SUPPOSITORY RECTAL
Status: COMPLETED | OUTPATIENT
Start: 2018-01-18 | End: 2018-01-18

## 2018-01-18 RX ORDER — FENTANYL CITRATE 50 UG/ML
INJECTION, SOLUTION INTRAMUSCULAR; INTRAVENOUS PRN
Status: DISCONTINUED | OUTPATIENT
Start: 2018-01-18 | End: 2018-01-18

## 2018-01-18 RX ORDER — ONDANSETRON 2 MG/ML
INJECTION INTRAMUSCULAR; INTRAVENOUS PRN
Status: DISCONTINUED | OUTPATIENT
Start: 2018-01-18 | End: 2018-01-18

## 2018-01-18 RX ADMIN — MIDAZOLAM 2 MG: 1 INJECTION INTRAMUSCULAR; INTRAVENOUS at 11:46

## 2018-01-18 RX ADMIN — SCOLOPAMINE TRANSDERMAL SYSTEM 1 PATCH: 1 PATCH, EXTENDED RELEASE TRANSDERMAL at 17:00

## 2018-01-18 RX ADMIN — Medication: at 22:57

## 2018-01-18 RX ADMIN — SUGAMMADEX 130 MG: 100 INJECTION, SOLUTION INTRAVENOUS at 12:52

## 2018-01-18 RX ADMIN — ROCURONIUM BROMIDE 30 MG: 10 INJECTION INTRAVENOUS at 11:54

## 2018-01-18 RX ADMIN — ONDANSETRON 4 MG: 2 INJECTION INTRAMUSCULAR; INTRAVENOUS at 14:30

## 2018-01-18 RX ADMIN — HYDROMORPHONE HYDROCHLORIDE 1 MG: 1 INJECTION, SOLUTION INTRAMUSCULAR; INTRAVENOUS; SUBCUTANEOUS at 19:49

## 2018-01-18 RX ADMIN — HYDROMORPHONE HYDROCHLORIDE 1 MG: 1 INJECTION, SOLUTION INTRAMUSCULAR; INTRAVENOUS; SUBCUTANEOUS at 20:48

## 2018-01-18 RX ADMIN — DIPHENHYDRAMINE HYDROCHLORIDE 25 MG: 50 INJECTION, SOLUTION INTRAMUSCULAR; INTRAVENOUS at 17:04

## 2018-01-18 RX ADMIN — HYDROMORPHONE HYDROCHLORIDE 0.5 MG: 1 INJECTION, SOLUTION INTRAMUSCULAR; INTRAVENOUS; SUBCUTANEOUS at 17:15

## 2018-01-18 RX ADMIN — DIPHENHYDRAMINE HYDROCHLORIDE 50 MG: 25 SOLUTION ORAL at 23:10

## 2018-01-18 RX ADMIN — HYDROMORPHONE HYDROCHLORIDE 0.5 MG: 1 INJECTION, SOLUTION INTRAMUSCULAR; INTRAVENOUS; SUBCUTANEOUS at 15:07

## 2018-01-18 RX ADMIN — DEXAMETHASONE SODIUM PHOSPHATE 6 MG: 4 INJECTION, SOLUTION INTRA-ARTICULAR; INTRALESIONAL; INTRAMUSCULAR; INTRAVENOUS; SOFT TISSUE at 12:05

## 2018-01-18 RX ADMIN — HYDROMORPHONE HYDROCHLORIDE 0.5 MG: 1 INJECTION, SOLUTION INTRAMUSCULAR; INTRAVENOUS; SUBCUTANEOUS at 14:30

## 2018-01-18 RX ADMIN — HYDROMORPHONE HYDROCHLORIDE 0.5 MG: 1 INJECTION, SOLUTION INTRAMUSCULAR; INTRAVENOUS; SUBCUTANEOUS at 15:17

## 2018-01-18 RX ADMIN — SODIUM CHLORIDE, POTASSIUM CHLORIDE, SODIUM LACTATE AND CALCIUM CHLORIDE: 600; 310; 30; 20 INJECTION, SOLUTION INTRAVENOUS at 11:46

## 2018-01-18 RX ADMIN — PHENYLEPHRINE HYDROCHLORIDE 100 MCG: 10 INJECTION, SOLUTION INTRAMUSCULAR; INTRAVENOUS; SUBCUTANEOUS at 12:33

## 2018-01-18 RX ADMIN — ONDANSETRON 4 MG: 2 INJECTION INTRAMUSCULAR; INTRAVENOUS at 20:38

## 2018-01-18 RX ADMIN — HYDROMORPHONE HYDROCHLORIDE 1 MG: 1 INJECTION, SOLUTION INTRAMUSCULAR; INTRAVENOUS; SUBCUTANEOUS at 18:31

## 2018-01-18 RX ADMIN — OXYMETAZOLINE HYDROCHLORIDE 4 SPRAY: 5 SPRAY NASAL at 12:33

## 2018-01-18 RX ADMIN — ONDANSETRON 4 MG: 2 INJECTION INTRAMUSCULAR; INTRAVENOUS at 12:38

## 2018-01-18 RX ADMIN — PROPOFOL 120 MG: 10 INJECTION, EMULSION INTRAVENOUS at 11:54

## 2018-01-18 RX ADMIN — ESMOLOL HYDROCHLORIDE 20 MG: 10 INJECTION, SOLUTION INTRAVENOUS at 12:28

## 2018-01-18 RX ADMIN — LORAZEPAM 1 MG: 2 INJECTION INTRAMUSCULAR; INTRAVENOUS at 17:57

## 2018-01-18 RX ADMIN — LIDOCAINE HYDROCHLORIDE 100 MG: 20 INJECTION, SOLUTION INFILTRATION; PERINEURAL at 11:54

## 2018-01-18 RX ADMIN — PROPOFOL 200 MCG/KG/MIN: 10 INJECTION, EMULSION INTRAVENOUS at 11:55

## 2018-01-18 RX ADMIN — FENTANYL CITRATE 100 MCG: 50 INJECTION, SOLUTION INTRAMUSCULAR; INTRAVENOUS at 11:59

## 2018-01-18 RX ADMIN — HYDROMORPHONE HYDROCHLORIDE 0.4 MG: 1 INJECTION, SOLUTION INTRAMUSCULAR; INTRAVENOUS; SUBCUTANEOUS at 13:59

## 2018-01-18 RX ADMIN — FENTANYL CITRATE 50 MCG: 50 INJECTION, SOLUTION INTRAMUSCULAR; INTRAVENOUS at 14:12

## 2018-01-18 RX ADMIN — HYDROMORPHONE HYDROCHLORIDE 0.3 MG: 1 INJECTION, SOLUTION INTRAMUSCULAR; INTRAVENOUS; SUBCUTANEOUS at 13:37

## 2018-01-18 RX ADMIN — HYDROMORPHONE HYDROCHLORIDE 0.5 MG: 1 INJECTION, SOLUTION INTRAMUSCULAR; INTRAVENOUS; SUBCUTANEOUS at 16:12

## 2018-01-18 RX ADMIN — FENTANYL CITRATE 25 MCG: 50 INJECTION, SOLUTION INTRAMUSCULAR; INTRAVENOUS at 14:08

## 2018-01-18 RX ADMIN — DIPHENHYDRAMINE HYDROCHLORIDE 25 MG: 50 INJECTION, SOLUTION INTRAMUSCULAR; INTRAVENOUS at 12:15

## 2018-01-18 RX ADMIN — HYDROMORPHONE HYDROCHLORIDE 0.5 MG: 1 INJECTION, SOLUTION INTRAMUSCULAR; INTRAVENOUS; SUBCUTANEOUS at 13:42

## 2018-01-18 RX ADMIN — HYDROMORPHONE HYDROCHLORIDE 0.3 MG: 1 INJECTION, SOLUTION INTRAMUSCULAR; INTRAVENOUS; SUBCUTANEOUS at 13:52

## 2018-01-18 RX ADMIN — HYDROMORPHONE HYDROCHLORIDE 1 MG: 1 INJECTION, SOLUTION INTRAMUSCULAR; INTRAVENOUS; SUBCUTANEOUS at 22:40

## 2018-01-18 RX ADMIN — LORAZEPAM 1 MG: 2 INJECTION INTRAMUSCULAR; INTRAVENOUS at 20:04

## 2018-01-18 RX ADMIN — SODIUM CHLORIDE, POTASSIUM CHLORIDE, SODIUM LACTATE AND CALCIUM CHLORIDE: 600; 310; 30; 20 INJECTION, SOLUTION INTRAVENOUS at 20:21

## 2018-01-18 RX ADMIN — HYDROMORPHONE HYDROCHLORIDE 0.5 MG: 1 INJECTION, SOLUTION INTRAMUSCULAR; INTRAVENOUS; SUBCUTANEOUS at 13:47

## 2018-01-18 RX ADMIN — PHENYLEPHRINE HYDROCHLORIDE 100 MCG: 10 INJECTION, SOLUTION INTRAMUSCULAR; INTRAVENOUS; SUBCUTANEOUS at 12:35

## 2018-01-18 ASSESSMENT — PAIN DESCRIPTION - DESCRIPTORS
DESCRIPTORS: SHARP
DESCRIPTORS: SHARP
DESCRIPTORS: ACHING

## 2018-01-18 NOTE — IP AVS SNAPSHOT
MRN:[id number]                      After Visit Summary   1/18/2018    Alexandra Melgoza    MRN: [id number]           Thank you!     Thank you for choosing [facility] for your care. Our goal is always to provide you with excellent care. Hearing back from our patients is one way we can continue to improve our services. Please take a few minutes to complete the written survey that you may receive in the mail after you visit with us. Thank you!        Patient Information     Date Of Birth          1995        Designated Caregiver       Most Recent Value    Caregiver    Will someone help with your care after discharge? yes    Name of designated caregiver ALBAN    Phone number of caregiver [phone]    Caregiver address [place]      About your hospital stay     You were admitted on:  January 18, 2018 You last received care in the:  [facility]    You were discharged on:  January 23, 2018        Reason for your hospital stay       You were admitted to the hospital for an ERCP and remained in the hospital following this procedure for ongoing abdominal pain and intolerance of oral food and medication.                  Who to Call     For medical emergencies, please call 911.  For non-urgent questions about your medical care, please call your primary care provider or clinic, [phone]  For questions related to your surgery, please call your surgery clinic        Attending Provider     Provider Specialty    Campbell Narayanan MD Gastroenterology    RESIDENT, PHYSICAN UNLICENSED --    Daquan Salas MD Internal Medicine    Stacey Gray MD Internal Medicine    Renny Harding MD Internal Medicine       Primary Care Provider Office Phone # Fax #    Quyen Baez [phone]       When to contact your care team       Call your PCP or return to ED for temperatures > 100.7 degrees, worsening or changing pain, uncontrolled vomiting or inability to tolerate oral intake,  new or worsening diarrhea, new or worsening shortness of breath, decreased urine output, yellowing of the eyes or skin, confusion, weakness, blood in urine or stools.                  After Care Instructions     Activity       Your activity upon discharge: activity as tolerated.  No driving on narcotic pain medication.            Diet       Follow this diet upon discharge: Orders Placed This Encounter      Adult Formula Drip Feeding: Continuous Peptamen 1.5; Nasoduodenal tube; Goal Rate: 50; mL/hr; Medication - Tube Feeding Flush Frequency: At least 15-30 mL water before and after medication administration and with tube clogging; Amout to Send (Nutr...      Advance Diet as Tolerated: Clear Liquid Diet            Discharge Instructions       You were ordered for Lidocaine patches during your hospital stay.  Unfortunately Lidocaine patches are not covered by your insurance.  If you would like to continue this, you can but the Lidocaine 4% patches at your local pharmacy as over-the-counter.            Free water       Free water flushes 250mL every 6 hours via feeding tube            Monitor and record       fluid intake and output daily  Drink plenty of fluids and ensure that you are urinating 6-8 times a day            Tubes and drains       You are going home with the following tubes or drains: feeding tube NJ-Tube.   Tube cares per hospital or home care instructions                  Follow-up Appointments     Adult [facility] Follow-up and recommended labs and tests       Follow up with primary care provider, Quyen Baez, within 7 days for hospital follow- up.  No follow up labs or test are needed.  Follow up with Nurse Practitioner from Dr. Kaiser office this week if possible.  Please also call the nutritionist at Dr. Kaiser office to scheduled a follow up appointment in order to discuss tube feeding plan.     Follow up Dr. Wang in Pain Clinic as scheduled for hospital follow up.  No follow up labs or  test are needed.    Appointments on [facility] and/or [facility] (with [facility] or [employer] provider or service). Call [phone] if you haven't heard regarding these appointments within 7 days of discharge.                  Your next 10 appointments already scheduled     Jan 24, 2018  3:00 PM CST   Infusion 90 with UC SPEC INFUSION, UC 47 ATC   [facility] ([facility] [place])    [address]  [address]  [place] [address] [phone]            Jan 26, 2018  2:00 PM CST   Infusion 90 with UC SPEC INFUSION, UC 45 ATC   [facility] ([facility])    [address]  [address]  [facility] [address] [phone]            Feb 13, 2018  8:00 AM CST   (Arrive by 7:45 AM)   Return Visit with Leonardo Wang MD   [facility] ([facility])    [address]  4th Floor  [facility] [address] [phone]              Additional Services     Home infusion referral       Your provider has referred you to: [facility]     Local Address (if different from home address): N/A    Anticipated Length of Therapy: per md order    Home Infusion Pharmacist to adjust therapy based on labs and clinical assessments: Yes    Labs:  May draw labs from Venous Catheter: Yes  Home Infusion Pharmacist to order labs based on therapy type and clinical assessments: Yes  Call/Fax Lab Results to: pcp    Agency Staff to assess nursing needs for Infusion Therapy.    Tube Feedings and supplies                  Additional Information     If you use hormonal birth control (such as the pill, patch, ring or implants): You'll need a second form of birth control for 7 days (condoms, a diaphragm or contraceptive foam). While in the hospital, you received a medicine called Bridion. Your normal birth control will not  "work as well for a week after taking this medicine.          Pending Results     No orders found from 1/16/2018 to 1/19/2018.            Admission Information     Date & Time Provider Department Dept. Phone    1/18/2018 Renny Harding MD Unit 6D [facility]ation [facility] [phone]      Your Vitals Were     Blood Pressure Pulse Temperature Respirations Height Weight    128/82 (BP Location: Right arm) 80 98.7  F (37.1  C) (Oral) 16 1.689 m (5' 6.5\") 67.8 kg (149 lb 7.6 oz)    Pulse Oximetry BMI (Body Mass Index)                100% 23.76 kg/m2          MyChart Information     [employer] gives you secure access to your electronic health record. If you see a primary care provider, you can also send messages to your care team and make appointments. If you have questions, please call your primary care clinic.  If you do not have a primary care provider, please call [phone] and they will assist you.        Care EveryWhere ID     This is your Care EveryWhere ID. This could be used by other organizations to access your [facility] medical records  [id number]        Equal Access to Services     RACHAEL BENITEZ AH: Hadyoana Enamorado, alcon beck, lashell santillan, maki gaspar . So [facility] [phone].    ATENCIÓN: Si habla español, tiene a quijano disposición servicios gratuitos de asistencia lingüística. Llame al [phone].    We comply with applicable federal civil rights laws and [place] laws. We do not discriminate on the basis of race, color, national origin, age, disability, sex, sexual orientation, or gender identity.               Review of your medicines      START taking        Dose / Directions    * gabapentin 250 MG/5ML solution   Commonly known as:  NEURONTIN   Replaces:  gabapentin 400 MG capsule        Dose:  400 mg   8 mLs (400 mg) by ORAL OR NJ TUBE route 2 times daily   Quantity:  470 mL   Refills:  0       * gabapentin 250 MG/5ML solution   Commonly known " as:  NEURONTIN        Dose:  600 mg   Take 12 mLs (600 mg) by mouth daily   Quantity:  450 mL   Refills:  0       menthol 5 % Ptch   Commonly known as:  ICY HOT        Dose:  1 patch   Apply 1 patch topically every 8 hours as needed for muscle soreness   Quantity:  15 patch   Refills:  3       multivitamins with minerals Liqd liquid        Dose:  15 mL   Start taking on:  1/24/2018   15 mLs by Per Feeding Tube route daily   Quantity:  1 Bottle   Refills:  0       order for DME   Used for:  Chronic abdominal pain        Equipment being ordered: TENS with wire and electrode.   Quantity:  1 Units   Refills:  0       sodium bicarbonate 325 MG tablet        Dose:  325 mg   1 tablet (325 mg) by Per Feeding Tube route 4 times daily   Quantity:  200 tablet   Refills:  0       * Notice:  This list has 2 medication(s) that are the same as other medications prescribed for you. Read the directions carefully, and ask your doctor or other care provider to review them with you.      CONTINUE these medicines which may have CHANGED, or have new prescriptions. If we are uncertain of the size of tablets/capsules you have at home, strength may be listed as something that might have changed.        Dose / Directions    * amylase-lipase-protease 49217 UNITS Cpep   Commonly known as:  ZENPEP   This may have changed:  Another medication with the same name was changed. Make sure you understand how and when to take each.   Used for:  Idiopathic chronic pancreatitis (H)        Dose:  2-3 capsule   Take 2-3 capsules (40,000-60,000 Units) by mouth Take with snacks or supplements (Snacks)   Quantity:  180 capsule   Refills:  1       * amylase-lipase-protease 06238 UNITS Cpep   Commonly known as:  ZENPEP   This may have changed:  when to take this   Used for:  Right upper quadrant abdominal pain        Dose:  5 capsule   Take 5 capsules (100,000 Units) by mouth 4 times daily (with meals and nightly)   Quantity:  180 capsule   Refills:  1       *  Notice:  This list has 2 medication(s) that are the same as other medications prescribed for you. Read the directions carefully, and ask your doctor or other care provider to review them with you.      CONTINUE these medicines which have NOT CHANGED        Dose / Directions    fluticasone 50 MCG/ACT spray   Commonly known as:  FLONASE   Used for:  Nasal congestion        Dose:  2 spray   Spray 2 sprays into both nostrils daily   Quantity:  16 g   Refills:  3       leuprolide 11.25 MG kit   Commonly known as:  LUPRON DEPOT (3-MONTH)   Used for:  Endometriosis        Dose:  11.25 mg   Inject 11.25 mg into the muscle every 3 months   Quantity:  1 each   Refills:  3       levalbuterol 45 MCG/ACT Inhaler   Commonly known as:  XOPENEX HFA   Used for:  Wheeze        Dose:  2 puff   Inhale 2 puffs into the lungs every 6 hours as needed for shortness of breath / dyspnea   Quantity:  1 Inhaler   Refills:  1       norethindrone 5 MG tablet   Commonly known as:  AYGESTIN   Used for:  Endometriosis        Dose:  5 mg   Take 1 tablet (5 mg) by mouth daily   Quantity:  90 tablet   Refills:  1       nortriptyline 10 MG capsule   Commonly known as:  PAMELOR   Used for:  Right upper quadrant abdominal pain        Dose:  30 mg   Take 3 capsules (30 mg) by mouth At Bedtime   Quantity:  120 capsule   Refills:  0       ondansetron 4 MG ODT tab   Commonly known as:  ZOFRAN ODT   Used for:  Idiopathic chronic pancreatitis (H)        Dose:  4 mg   Take 1 tablet (4 mg) by mouth every 8 hours as needed for nausea or vomiting   Quantity:  40 tablet   Refills:  0       oxyCODONE 5 MG/5ML solution   Commonly known as:  ROXICODONE   Used for:  Chronic abdominal pain        Dose:  10 mg   Take 10 mLs (10 mg) by mouth every 8 hours as needed for pain maximum 30 mg per day   Quantity:  900 mL   Refills:  0       pantoprazole 40 MG EC tablet   Commonly known as:  PROTONIX   Used for:  Right upper quadrant abdominal pain, Erosive gastritis         Dose:  40 mg   Take 1 tablet (40 mg) by mouth 2 times daily (before meals)   Quantity:  30 tablet   Refills:  1       polyethylene glycol Packet   Commonly known as:  MIRALAX/GLYCOLAX   Used for:  Right upper quadrant abdominal pain        Dose:  17 g   Take 17 g by mouth daily   Quantity:  7 packet   Refills:  0       RIZATRIPTAN BENZOATE PO        Dose:  5 mg   Take 5 mg by mouth once as needed   Refills:  0       scopolamine 72 hr patch   Commonly known as:  TRANSDERM   Used for:  Intractable vomiting with nausea, unspecified vomiting type        Apply 1 patch to hairless area behind one ear if severe nausea and vomiting.  Remove old patch and change every 3 days (72 hours).   Quantity:  2 patch   Refills:  0       senna-docusate 8.6-50 MG per tablet   Commonly known as:  SENOKOT-S;PERICOLACE   Used for:  Right upper quadrant abdominal pain        Dose:  1 tablet   Take 1 tablet by mouth 2 times daily as needed for constipation   Quantity:  100 tablet   Refills:  0         STOP taking     gabapentin 400 MG capsule   Commonly known as:  NEURONTIN   Replaced by:  gabapentin 250 MG/5ML solution                Where to get your medicines      These medications were sent to [facility] [place], [place] - [address] [place] [address]     Phone:  [phone]     gabapentin 250 MG/5ML solution    gabapentin 250 MG/5ML solution    menthol 5 % Ptch    multivitamins with minerals Liqd liquid    sodium bicarbonate 325 MG tablet         Some of these will need a paper prescription and others can be bought over the counter. Ask your nurse if you have questions.     Bring a paper prescription for each of these medications     order for DME                Protect others around you: Learn how to safely use, store and throw away your medicines at www.disposemymeds.org.             Medication List: This is a list of all your medications and when to take them. Check marks below  indicate your daily home schedule. Keep this list as a reference.      Medications           Morning Afternoon Evening Bedtime As Needed    * amylase-lipase-protease 68423 UNITS Cpep   Commonly known as:  ZENPEP   Take 2-3 capsules (40,000-60,000 Units) by mouth Take with snacks or supplements (Snacks)   Last time this was given:  40,000 Units on 1/23/2018  1:20 PM                                * amylase-lipase-protease 79277 UNITS Cpep   Commonly known as:  ZENPEP   Take 5 capsules (100,000 Units) by mouth 4 times daily (with meals and nightly)   Last time this was given:  40,000 Units on 1/23/2018  1:20 PM                                fluticasone 50 MCG/ACT spray   Commonly known as:  FLONASE   Spray 2 sprays into both nostrils daily                                * gabapentin 250 MG/5ML solution   Commonly known as:  NEURONTIN   8 mLs (400 mg) by ORAL OR NJ TUBE route 2 times daily   Last time this was given:  400 mg on 1/23/2018  9:46 AM                                * gabapentin 250 MG/5ML solution   Commonly known as:  NEURONTIN   Take 12 mLs (600 mg) by mouth daily   Last time this was given:  400 mg on 1/23/2018  9:46 AM                                leuprolide 11.25 MG kit   Commonly known as:  LUPRON DEPOT (3-MONTH)   Inject 11.25 mg into the muscle every 3 months                                levalbuterol 45 MCG/ACT Inhaler   Commonly known as:  XOPENEX HFA   Inhale 2 puffs into the lungs every 6 hours as needed for shortness of breath / dyspnea                                menthol 5 % Ptch   Commonly known as:  ICY HOT   Apply 1 patch topically every 8 hours as needed for muscle soreness                                multivitamins with minerals Liqd liquid   15 mLs by Per Feeding Tube route daily   Start taking on:  1/24/2018   Last time this was given:  15 mLs on 1/23/2018  9:47 AM                                norethindrone 5 MG tablet   Commonly known as:  AYGESTIN   Take 1 tablet (5 mg) by  mouth daily                                nortriptyline 10 MG capsule   Commonly known as:  PAMELOR   Take 3 capsules (30 mg) by mouth At Bedtime   Last time this was given:  30 mg on 1/22/2018 10:29 PM                                ondansetron 4 MG ODT tab   Commonly known as:  ZOFRAN ODT   Take 1 tablet (4 mg) by mouth every 8 hours as needed for nausea or vomiting   Last time this was given:  4 mg on 1/23/2018 11:55 AM                                order for DME   Equipment being ordered: TENS with wire and electrode.                                oxyCODONE 5 MG/5ML solution   Commonly known as:  ROXICODONE   Take 10 mLs (10 mg) by mouth every 8 hours as needed for pain maximum 30 mg per day   Last time this was given:  10 mg on 1/23/2018  9:28 AM                                pantoprazole 40 MG EC tablet   Commonly known as:  PROTONIX   Take 1 tablet (40 mg) by mouth 2 times daily (before meals)                                polyethylene glycol Packet   Commonly known as:  MIRALAX/GLYCOLAX   Take 17 g by mouth daily   Last time this was given:  17 g on 1/21/2018  8:43 AM                                RIZATRIPTAN BENZOATE PO   Take 5 mg by mouth once as needed                                scopolamine 72 hr patch   Commonly known as:  TRANSDERM   Apply 1 patch to hairless area behind one ear if severe nausea and vomiting.  Remove old patch and change every 3 days (72 hours).   Last time this was given:  1 patch on 1/21/2018  8:10 PM                                senna-docusate 8.6-50 MG per tablet   Commonly known as:  SENOKOT-S;PERICOLACE   Take 1 tablet by mouth 2 times daily as needed for constipation   Last time this was given:  1 tablet on 1/22/2018  8:53 AM                                sodium bicarbonate 325 MG tablet   1 tablet (325 mg) by Per Feeding Tube route 4 times daily   Last time this was given:  325 mg on 1/23/2018  1:20 PM                                * Notice:  This list has 4  medication(s) that are the same as other medications prescribed for you. Read the directions carefully, and ask your doctor or other care provider to review them with you.              More Information        Taking Medicine Through a Feeding Tube  You are going home with a feeding tube in place. If you normally take any medicines by mouth, you will need to take them through your feeding tube. You can make this easier by calling your pharmacist to see whether any of your medicines are available in liquid form. If they are, ask that your prescriptions be filled with liquid medicines.  You were shown how to care for your type of feeding tube in the hospital. If you did not receive an instruction sheet on caring for your tube, ask for one. This sheet provides general guidelines and steps to follow when taking medicine through a feeding tube.  Before you begin  Remember to:    Use liquid medicines whenever possible.    Tell all your healthcare providers that you take medicines through your tube.    Don't crush or dissolve extended-release or enteric-coated medicines unless directed by your healthcare provider.    Don t mix medicines with feeding formula unless your healthcare provider says it s OK.    Flush your tube before, between, and after giving medicines to prevent the tube from getting clogged.  Gather your supplies  Wash your hands thoroughly with mild soap and warm water.    Here's what you will need:    Measuring cup that is marked with mL or cc (these are the same thing)    Measuring spoon or syringe marked with mL or cc    50 mL (cc) or larger syringe    Bowl of tap water (2 cups or more)  Taking your medicine  Recommendations include the following:     Check the placement of your feeding tube the way you were shown in the hospital.    Prepare each medicine the way you were shown in the hospital.    Be sure to use the correct port on the feeding tube for your medicines     Take your medicines in the  following order:    Liquid medicines first.    Medicines that need to be dissolved second.    Thick medicines last.    Measure the prescribed amount of liquid medicine, or crush pills and dissolve powder in 15 mL (about 1 tablespoon) or more of warm water.    Remove the plunger from the 50 mL syringe. Pour 30 mL of warm water into the syringe and flush your tube.    Pour the medicine into the syringe. Do not use the syringe plunger to push the medicine into the tube. Let the medicine flow in slowly.    Be sure to flush your tube with 5 mL (about 1 teaspoon) or more of warm water between all medicines.    Take each medicine by itself. Never mix medicines together in the syringe.    Flush the tube with 30 mL of warm water after all medicines have been given.    Wait before restarting your tube feeding. Some medicines don t work when mixed with the feeding formula. Ask your healthcare provider how long you should wait to start feeding after taking medicines.    Keep your tube clamped in between feedings.     When to call your healthcare provider  Call your healthcare provider right away if you have any of the following:    Coughing    Trouble breathing during feeding, flushing, or giving medicine    Tube that can t be unclogged    Tube that falls out or difficulty telling if the tube is in your stomach    Tube that is cracked or breaking down     Diarrhea that lasts more than 3 loose stools    Constipation that lasts more than 48 hours    Nausea or vomiting    Bloody or coffee-colored drainage through the tube    Red, warm, or tender skin around the tube    Sudden increase or decrease in the amount of drainage through the tube    Sudden weight loss or gain (more than 2 pounds in 24 hours)    Bloated or tight stomach    Fever of 100.4 F (38 C) or higher, or as directed by your healthcare provider   Date Last Reviewed: 8/1/2016 2000-2017 [employer]. [address] [place] [address], [place] [address]. All rights  reserved. This information is not intended as a substitute for professional medical care. Always follow your healthcare professional's instructions.                Discharge Instructions for ERCP (Endoscopic Retrograde Cholangiopancreatography)  You had a procedure known as an ERCP. Your healthcare provider performed the ERCP to look at your bile or pancreatic ducts, and to locate and treat blockages in the ducts. This procedure is used to diagnose diseases of the pancreas, bile ducts, and pancreatic duct, liver, and gallbladder. Here s what you need to do following your ERCP.  Home care    Don t take aspirin or any other blood-thinning medicines (anticoagulants) until your provider says it s OK.    Your provider may prescribe an antibiotic, depending on what was done during the ERCP.    You may have a sore throat for 1 to 2 days after the procedure. Use lozenges or gargle with salt water for your sore throat. If you're not better in a few days, call your provider.    Rest, drink fluids, and eat light meals. If you feel bloated or have too much gas, use a heating pad on your belly to help reduce the discomfort. This should help you feel better. But if it doesn't, call your provider.    Don t drink alcohol for 2 days after the procedure.  Follow-up care  Make a follow-up appointment as directed by our staff.     When to seek medical care  Call your provider right away if you have any of the following:    Trouble swallowing or throat pain that gets worse     Chest pain or severe belly or abdominal pain    Fever above 100 F (37.7 C) or chills    Upset stomach (nausea) and vomiting    Black or tarry stools   Date Last Reviewed: 6/13/2015 2000-2017 [employer] [address] [place] [address], [place] [address]. All rights reserved. This information is not intended as a substitute for professional medical care. Always follow your healthcare professional's instructions.

## 2018-01-18 NOTE — BRIEF OP NOTE
[facility], [place]    Brief Operative Note    Pre-operative diagnosis: Abdominal Pain   Post-operative diagnosis Same  Procedure: Procedure(s):  Endoscopic Retrograde Cholangiopancreatography with nasojejunal feeding tube placement - Wound Class: II-Clean Contaminated  Surgeon: Surgeon(s) and Role:     * Campbell Narayanan MD - Primary  Anesthesia: General, indomethacin suppository   Estimated blood loss: None  Drains: None  Specimens: * No specimens in log *  Findings:   Wide open pancreatic and biliary sphincters, bile duct injected  8F NJ placed to beyond ligment of Treitz w peds endoscope assistance  Complications: None.  Implants: 8F NJ  HOme, FU in clinic

## 2018-01-18 NOTE — IP AVS SNAPSHOT
Unit 6D Observation [address] [address]    Phone:  [phone]    Fax:  [phone]                                       After Visit Summary   1/18/2018    Alexandra Melgoza    MRN: [id number]           After Visit Summary Signature Page     I have received my discharge instructions, and my questions have been answered. I have discussed any challenges I see with this plan with the nurse or doctor.    ..........................................................................................................................................  Patient/Patient Representative Signature      ..........................................................................................................................................  Patient Representative Print Name and Relationship to Patient    ..................................................               ................................................  Date                                            Time    ..........................................................................................................................................  Reviewed by Signature/Title    ...................................................              ..............................................  Date                                                            Time

## 2018-01-18 NOTE — OR NURSING
Dr narayanan called and notified of Alexandra's pain. He ordered labs for 2:30 labs sent. Alexandra then started to vomit increasing her pain. Dr. Narayanan came and talked with Alexandra. He will admit her for nausea and pain management. Dr. Lopez called x2 first for pain management to increase dilaudid second for uncontrolled vomiting and pain. She stopped by room 8 in PACU

## 2018-01-18 NOTE — ANESTHESIA CARE TRANSFER NOTE
Patient: Alexandra Melgoza    Procedure(s):  Endoscopic Retrograde Cholangiopancreatography with nasojejunal feeding tube placement - Wound Class: II-Clean Contaminated    Diagnosis: Abdominal Pain   Diagnosis Additional Information: No value filed.    Anesthesia Type:   General, ETT     Note:  Airway :Nasal Cannula  Patient transferred to:PACU  Comments: VSS, patent airway, report to RN.Handoff Report: Identifed the Patient, Identified the Reponsible Provider, Reviewed the pertinent medical history, Discussed the surgical course, Reviewed Intra-OP anesthesia mangement and issues during anesthesia, Set expectations for post-procedure period and Allowed opportunity for questions and acknowledgement of understanding      Vitals: (Last set prior to Anesthesia Care Transfer)    CRNA VITALS  1/18/2018 1234 - 1/18/2018 1310      1/18/2018             Pulse: 93    SpO2: 100 %                Electronically Signed By: ANABEL Osuna CRNA  January 18, 2018  1:10 PM

## 2018-01-18 NOTE — ANESTHESIA POSTPROCEDURE EVALUATION
Patient: Alexandra Melgoza    Procedure(s):  Endoscopic Retrograde Cholangiopancreatography with nasojejunal feeding tube placement - Wound Class: II-Clean Contaminated    Diagnosis:Abdominal Pain   Diagnosis Additional Information: No value filed.    Anesthesia Type:  General, ETT    Note:  Anesthesia Post Evaluation    Patient location during evaluation: PACU and Bedside  Patient participation: Able to fully participate in evaluation  Level of consciousness: awake and alert  Pain management: adequate  Airway patency: patent  Cardiovascular status: acceptable  Respiratory status: acceptable  Hydration status: acceptable  PONV: none     Anesthetic complications: None          Last vitals:  Vitals:    01/18/18 1415 01/18/18 1430 01/18/18 1445   BP: (!) 141/92 (!) 134/97 128/74   Pulse:      Resp: 16 16 16   Temp:      SpO2: 98% 96% 97%         Electronically Signed By: Georgie Lopez MD  January 18, 2018  3:39 PM

## 2018-01-19 ENCOUNTER — APPOINTMENT (OUTPATIENT)
Dept: GENERAL RADIOLOGY | Facility: CLINIC | Age: 23
DRG: 948 | End: 2018-01-19
Attending: PHYSICIAN ASSISTANT
Payer: COMMERCIAL

## 2018-01-19 LAB
ALBUMIN SERPL-MCNC: 3.7 G/DL (ref 3.4–5)
ALP SERPL-CCNC: 95 U/L (ref 40–150)
ALT SERPL W P-5'-P-CCNC: 64 U/L (ref 0–50)
AMYLASE SERPL-CCNC: 28 U/L (ref 30–110)
ANION GAP SERPL CALCULATED.3IONS-SCNC: 9 MMOL/L (ref 3–14)
AST SERPL W P-5'-P-CCNC: 44 U/L (ref 0–45)
BILIRUB SERPL-MCNC: 0.5 MG/DL (ref 0.2–1.3)
BUN SERPL-MCNC: 12 MG/DL (ref 7–30)
CALCIUM SERPL-MCNC: 8.9 MG/DL (ref 8.5–10.1)
CHLORIDE SERPL-SCNC: 101 MMOL/L (ref 94–109)
CO2 SERPL-SCNC: 26 MMOL/L (ref 20–32)
CREAT SERPL-MCNC: 0.49 MG/DL (ref 0.52–1.04)
ERYTHROCYTE [DISTWIDTH] IN BLOOD BY AUTOMATED COUNT: 12.9 % (ref 10–15)
GFR SERPL CREATININE-BSD FRML MDRD: >90 ML/MIN/1.7M2
GLUCOSE SERPL-MCNC: 77 MG/DL (ref 70–99)
HCT VFR BLD AUTO: 35 % (ref 35–47)
HGB BLD-MCNC: 11.4 G/DL (ref 11.7–15.7)
LIPASE SERPL-CCNC: 64 U/L (ref 73–393)
MCH RBC QN AUTO: 30.2 PG (ref 26.5–33)
MCHC RBC AUTO-ENTMCNC: 32.6 G/DL (ref 31.5–36.5)
MCV RBC AUTO: 93 FL (ref 78–100)
PLATELET # BLD AUTO: 204 10E9/L (ref 150–450)
POTASSIUM SERPL-SCNC: 4.3 MMOL/L (ref 3.4–5.3)
PROT SERPL-MCNC: 6.6 G/DL (ref 6.8–8.8)
RBC # BLD AUTO: 3.77 10E12/L (ref 3.8–5.2)
SODIUM SERPL-SCNC: 136 MMOL/L (ref 133–144)
WBC # BLD AUTO: 5.3 10E9/L (ref 4–11)

## 2018-01-19 PROCEDURE — 99207 ZZC APP CREDIT; MD BILLING SHARED VISIT: CPT | Performed by: PHYSICIAN ASSISTANT

## 2018-01-19 PROCEDURE — 99226 ZZC SUBSEQUENT OBSERVATION CARE,LEVEL III: CPT | Performed by: INTERNAL MEDICINE

## 2018-01-19 PROCEDURE — G0378 HOSPITAL OBSERVATION PER HR: HCPCS

## 2018-01-19 PROCEDURE — 82150 ASSAY OF AMYLASE: CPT | Performed by: PHYSICIAN ASSISTANT

## 2018-01-19 PROCEDURE — 74018 RADEX ABDOMEN 1 VIEW: CPT

## 2018-01-19 PROCEDURE — 25000128 H RX IP 250 OP 636: Performed by: PHYSICIAN ASSISTANT

## 2018-01-19 PROCEDURE — 96376 TX/PRO/DX INJ SAME DRUG ADON: CPT

## 2018-01-19 PROCEDURE — 96361 HYDRATE IV INFUSION ADD-ON: CPT

## 2018-01-19 PROCEDURE — 80053 COMPREHEN METABOLIC PANEL: CPT | Performed by: PHYSICIAN ASSISTANT

## 2018-01-19 PROCEDURE — 85027 COMPLETE CBC AUTOMATED: CPT | Performed by: PHYSICIAN ASSISTANT

## 2018-01-19 PROCEDURE — 96375 TX/PRO/DX INJ NEW DRUG ADDON: CPT

## 2018-01-19 PROCEDURE — 36415 COLL VENOUS BLD VENIPUNCTURE: CPT | Performed by: PHYSICIAN ASSISTANT

## 2018-01-19 PROCEDURE — 25000132 ZZH RX MED GY IP 250 OP 250 PS 637: Performed by: NURSE PRACTITIONER

## 2018-01-19 PROCEDURE — 83690 ASSAY OF LIPASE: CPT | Performed by: PHYSICIAN ASSISTANT

## 2018-01-19 PROCEDURE — 25000125 ZZHC RX 250: Performed by: NURSE PRACTITIONER

## 2018-01-19 RX ORDER — ONDANSETRON 4 MG/1
4-8 TABLET, ORALLY DISINTEGRATING ORAL EVERY 6 HOURS PRN
Status: DISCONTINUED | OUTPATIENT
Start: 2018-01-19 | End: 2018-01-23 | Stop reason: HOSPADM

## 2018-01-19 RX ORDER — ONDANSETRON 2 MG/ML
4 INJECTION INTRAMUSCULAR; INTRAVENOUS ONCE
Status: COMPLETED | OUTPATIENT
Start: 2018-01-19 | End: 2018-01-19

## 2018-01-19 RX ORDER — AMOXICILLIN 250 MG
1 CAPSULE ORAL 2 TIMES DAILY
Status: DISCONTINUED | OUTPATIENT
Start: 2018-01-19 | End: 2018-01-23 | Stop reason: HOSPADM

## 2018-01-19 RX ORDER — ONDANSETRON 2 MG/ML
4 INJECTION INTRAMUSCULAR; INTRAVENOUS EVERY 6 HOURS PRN
Status: DISCONTINUED | OUTPATIENT
Start: 2018-01-19 | End: 2018-01-19

## 2018-01-19 RX ORDER — ONDANSETRON 4 MG/1
4-8 TABLET, ORALLY DISINTEGRATING ORAL EVERY 6 HOURS PRN
Status: DISCONTINUED | OUTPATIENT
Start: 2018-01-19 | End: 2018-01-19

## 2018-01-19 RX ORDER — SODIUM CHLORIDE 9 MG/ML
INJECTION, SOLUTION INTRAVENOUS CONTINUOUS
Status: DISCONTINUED | OUTPATIENT
Start: 2018-01-19 | End: 2018-01-19

## 2018-01-19 RX ORDER — HYDROMORPHONE HYDROCHLORIDE 1 MG/ML
.5-1 INJECTION, SOLUTION INTRAMUSCULAR; INTRAVENOUS; SUBCUTANEOUS
Status: DISCONTINUED | OUTPATIENT
Start: 2018-01-19 | End: 2018-01-21

## 2018-01-19 RX ORDER — ONDANSETRON 2 MG/ML
4-8 INJECTION INTRAMUSCULAR; INTRAVENOUS EVERY 6 HOURS PRN
Status: DISCONTINUED | OUTPATIENT
Start: 2018-01-19 | End: 2018-01-23 | Stop reason: HOSPADM

## 2018-01-19 RX ORDER — SODIUM CHLORIDE, SODIUM LACTATE, POTASSIUM CHLORIDE, CALCIUM CHLORIDE 600; 310; 30; 20 MG/100ML; MG/100ML; MG/100ML; MG/100ML
INJECTION, SOLUTION INTRAVENOUS CONTINUOUS
Status: DISCONTINUED | OUTPATIENT
Start: 2018-01-19 | End: 2018-01-20

## 2018-01-19 RX ORDER — DIPHENHYDRAMINE HYDROCHLORIDE 50 MG/ML
25 INJECTION INTRAMUSCULAR; INTRAVENOUS EVERY 6 HOURS PRN
Status: DISCONTINUED | OUTPATIENT
Start: 2018-01-19 | End: 2018-01-23 | Stop reason: HOSPADM

## 2018-01-19 RX ADMIN — ONDANSETRON 4 MG: 4 TABLET, ORALLY DISINTEGRATING ORAL at 09:14

## 2018-01-19 RX ADMIN — SODIUM CHLORIDE, POTASSIUM CHLORIDE, SODIUM LACTATE AND CALCIUM CHLORIDE: 600; 310; 30; 20 INJECTION, SOLUTION INTRAVENOUS at 17:58

## 2018-01-19 RX ADMIN — ONDANSETRON 4 MG: 4 TABLET, ORALLY DISINTEGRATING ORAL at 04:07

## 2018-01-19 RX ADMIN — HYDROMORPHONE HYDROCHLORIDE 1 MG: 1 INJECTION, SOLUTION INTRAMUSCULAR; INTRAVENOUS; SUBCUTANEOUS at 12:57

## 2018-01-19 RX ADMIN — ONDANSETRON 4 MG: 2 INJECTION INTRAMUSCULAR; INTRAVENOUS at 11:45

## 2018-01-19 RX ADMIN — ONDANSETRON 4 MG: 2 INJECTION INTRAMUSCULAR; INTRAVENOUS at 14:59

## 2018-01-19 RX ADMIN — HYDROMORPHONE HYDROCHLORIDE 1 MG: 1 INJECTION, SOLUTION INTRAMUSCULAR; INTRAVENOUS; SUBCUTANEOUS at 21:19

## 2018-01-19 RX ADMIN — HYDROMORPHONE HYDROCHLORIDE 1 MG: 1 INJECTION, SOLUTION INTRAMUSCULAR; INTRAVENOUS; SUBCUTANEOUS at 19:07

## 2018-01-19 RX ADMIN — NORTRIPTYLINE HYDROCHLORIDE 30 MG: 10 CAPSULE ORAL at 23:44

## 2018-01-19 RX ADMIN — HYDROMORPHONE HYDROCHLORIDE 1 MG: 1 INJECTION, SOLUTION INTRAMUSCULAR; INTRAVENOUS; SUBCUTANEOUS at 17:01

## 2018-01-19 RX ADMIN — HYDROMORPHONE HYDROCHLORIDE 1 MG: 1 INJECTION, SOLUTION INTRAMUSCULAR; INTRAVENOUS; SUBCUTANEOUS at 14:55

## 2018-01-19 RX ADMIN — DIPHENHYDRAMINE HYDROCHLORIDE 25 MG: 50 INJECTION, SOLUTION INTRAMUSCULAR; INTRAVENOUS at 17:58

## 2018-01-19 RX ADMIN — ONDANSETRON 4 MG: 2 INJECTION INTRAMUSCULAR; INTRAVENOUS at 20:16

## 2018-01-19 RX ADMIN — ONDANSETRON 8 MG: 2 INJECTION INTRAMUSCULAR; INTRAVENOUS at 22:57

## 2018-01-19 RX ADMIN — HYDROMORPHONE HYDROCHLORIDE 1 MG: 1 INJECTION, SOLUTION INTRAMUSCULAR; INTRAVENOUS; SUBCUTANEOUS at 23:22

## 2018-01-19 ASSESSMENT — PAIN DESCRIPTION - DESCRIPTORS
DESCRIPTORS: SHARP
DESCRIPTORS: STABBING;SHARP
DESCRIPTORS: STABBING;SHARP

## 2018-01-19 NOTE — PROGRESS NOTES
[facility], [place]    Internal Medicine Progress Note - Gold Service      Assessment & Plan   Alexandra Melgoza is a 22 year old female  admitted on 1/18/2018. She has a history of chronic abdominal pain, somatoform disorder, pseudoseizures, POTS and is admitted for post-op pain, nausea and vomiting.     Post-ERCP pain, nausea: S/p diagnostic ERCP with NJ feeding tube placement yesterday. Post procedure unable to go home secondary to pain, nausea and vomiting. PTA on oxycodone 10 mg TID. Previously on methadone but stopped a few days ago with the agreement of her pain specialist. Last night switched to Dilaudid PCA into this am and pt states abd pain 8/10 and with severe nausea and dry heaves as pt with no PO intake.    - D/c Dilaudid PCA  - Start Dilaudid 0.5-1 mg q2hrs prn pain.   - Continue PTA gabapentin and nortriptyline.  - Hold oral narcotics as well as starting TFs due to current severe nausea  - Continue scopolamine patch and Zofran for nausea.   - Continue IVFs until tolerating clears.   - Repeat BMP tomorrow am    Pancreatic insufficiency:  Continue home pancreatic enzymes     Diet:  Clears  Fluids: LR @ 100 ccs/hr  DVT Prophylaxis: Low Risk/Ambulatory with no VTE prophylaxis indicated  Code Status: Full      Disposition Plan   Expected discharge: 1-2 days, recommended to prior living arrangement once adequate pain management/ tolerating PO medications.     Entered: Herberth Kerns 01/19/2018, 12:52 PM   Information in the above section will display in the discharge planner report.      The patient's care was discussed with the Attending Physician, Dr. Gray and Bedside Nurse.    Evangelista Kerns PA-C  Internal Medicine Hospitalist   [facility]  [phone]     Interval History   No acute events overnight. Currently states abd pain 8/10 and having severe nausea and dry heaves. No PO intake. Denies fever, chills, chest pain and other acute physical concerns  at this time.       Data reviewed today: I reviewed all medications, new labs and imaging results over the last 24 hours.    Physical Exam   Vital Signs: Temp: 98  F (36.7  C) Temp src: Oral BP: 94/70 Pulse: 85 Heart Rate: 113 Resp: 16 SpO2: 96 % O2 Device: None (Room air)    Weight: 149 lbs 7.55 oz  General Appearance: Appears uncomfortable due to nausea and abd pain  Respiratory: CTAB  Cardiovascular: RRR  GI: soft, ND; tender over epigastrium with guarding  Skin: No acute rashes on exposed areas.   Ext: No c/c/e      Data     CMP  Recent Labs  Lab 01/19/18  1039 01/18/18  1446 01/18/18  1043 01/13/18  1615     --  139 140   POTASSIUM 4.3  --  4.1 4.0   CHLORIDE 101  --  103 106   CO2 26  --  28 26   ANIONGAP 9  --  7 8   GLC 77  --  80 89   BUN 12  --  10 9   CR 0.49*  --  0.62 0.50*   GFRESTIMATED >90  --  >90 >90   GFRESTBLACK >90  --  >90 >90   RENEA 8.9  --  9.0 9.1   PROTTOTAL 6.6*  --  7.4 7.3   ALBUMIN 3.7  --  4.2 4.3   BILITOTAL 0.5  --  0.3 0.3   ALKPHOS 95  --  113 103   AST 44 55* 38 17   ALT 64* 78* 74* 35     CBC  Recent Labs  Lab 01/19/18  1039 01/18/18  1043 01/13/18  1615   WBC 5.3 3.7* 4.4   RBC 3.77* 4.13 3.99   HGB 11.4* 12.4 12.1   HCT 35.0 38.9 37.0   MCV 93 94 93   MCH 30.2 30.0 30.3   MCHC 32.6 31.9 32.7   RDW 12.9 12.9 12.8    206 249     Lab Results   Component Value Date    LIPASE 64 01/19/2018    LIPASE 111 01/18/2018

## 2018-01-19 NOTE — PROGRESS NOTES
Care Coordinator Progress Note     Admission Date/Time:  1/18/2018  Attending MD:  Daquan Salas MD     Data  Chart reviewed, discussed with interdisciplinary team.   Patient was admitted for: Abdominal pain, epigastric.    Concerns with insurance coverage for discharge needs: None.  Current Living Situation: Patient lives with family.  Support System: Supportive and Involved  Services Involved: Outpatient Infusion Services  Transportation: Family or Friend will provide  Barriers to Discharge: tube feeding initiation    Coordination of Care and Referrals: Provided patient/family with options for Home Infusion.        Assessment  Patient admitted for pain control and tube feeding initiation s/p tube feeding placement and ERCP. History of chronic abdominal pain, somatoform disorder, pseudoseizures, and POTS.   Met with patient at bedside to discuss discharge planning and needs. Patient lives at home in [place] with her fiance. But currently living locally in [place] with her parents for the next month or so. Goes to [facility] outpatient infusion three times per week for IVF. Open to starting tube feedings and after given choice referral made for [facility] as they are a statewide company and will be able to switch to a [place] branch when she moves home. Patient learning center appointment ordered. Parents to provide ride home at time of discharge. Patient currently on a PCA pump for pain and has not started on tube feedings yet. RNCC to continue to follow for discharge planning and needs.      Plan  Anticipated Discharge Date:  TBD  Anticipated Discharge Plan:  Home with home infusion    Ronna Lion RN

## 2018-01-19 NOTE — H&P
[facility]    Internal Medicine History and Physical - Gold Service       Date of Admission:  1/18/2018    Assessment & Plan   Alexandra Melgoza is a 22 year old female  admitted on 1/18/2018. She has a history of chronic abdominal pain, somatoform disorder, pseudoseizures, POTS and is admitted for post-op pain, nausea and vomiting.    1) Pain post ERCP - S/p diagnostic ERCP with NJ feeding tube placement.  Unable to go home secondary to pain, nausea and vomiting.  - Patient normally takes oxycodone 10 mg TID.  Will switch to PCA overnight given high needs in PACU.  Would switch to oxycodone solution via NJ tube in am.  - Zofran, scopalamine for nausea.  Patient also requesting benadryl which I will order via her NJ tube  - Continue home pain regimen: gabapentin, nortriptyline.  Previously on methadone but stopped a few days ago with the agreement of her pain specialist    2) Question of pancreatic insufficiency - Does not appear to have a diagnosis of chronic pancreatitis or insufficiency.  - Continue home pancreatic enzymes    Diet:  Regular  Fluids: LR @ 100 ccs/hr  DVT Prophylaxis: Low Risk/Ambulatory with no VTE prophylaxis indicated  Code Status: Full    Disposition Plan   Expected discharge: Tomorrow; recommended to prior living arrangement once adequate pain management/ tolerating PO medications.     Entered: Corbin Vaughn 01/18/2018, 9:06 PM   Information in the above section will display in the discharge planner report.    The patient's care was discussed with the Attending Physician, Dr. TREVINO.    Corbin Vaughn NP  Internal Medicine Staff Hospitalist Service  [facility]  Pager: [phone]  Please see sticky note for cross cover information  ______________________________________________________________________    Chief Complaint   Abdominal pain, N/V post-op    History is obtained from the patient    History of Present Illness   Alexandra Melgoza  is a 22 year old female  admitted on 1/18/2018. She has a history of chronic abdominal pain and pseudoseizures and is admitted for post-op pain, nausea and vomiting.    The patient reports she has a history of chronic abdominal pain of unclear etiology.  She requires frequent IVF infusions secondary to nausea and comes in to the hospital when she can no longer keep down her her home narcotics.  She was just admitted from 1/3-1/4 with a pain flare.  She was seen again on 1/13 in our ED with a flare of pain, nausea and vomiting and discharged home.  She was recently given a trial of methadone per her pain team but felt this worsened her nausea and is now back to TID oxycodone along with gabapentin and nortriptyline.    She presents today following an ERCP for placement of an NJ tube.  Sphincterotomy and bile and pancreatic duct stenting was planned but does not appear to have been performed per her procedure note.    Post-op she had severe nausea and vomiting which limit her ability to keep down her medications.  She denies any recent illness including fevers, chills, chest pain, shortness of breath, diarrhea or constipation.  Her post-op pain feels like a flare of her chronic pain.    Review of Systems   The 10 point Review of Systems is negative other than noted in the HPI or here.     Past Medical History    I have reviewed this patient's medical history and updated it with pertinent information if needed.   Past Medical History:   Diagnosis Date     Anxiety      Asthma      Cholecystitis     s/p cholecystectomy     Chronic abdominal pain      Chronic infection     mrsa     Chronic pain      Cyclic vomiting syndrome 10/27/2012     Depression      Endometriosis      Hypoglycaemia      Migraines      Mild intermittent asthma      Ovarian cysts      Pancreatic disease      PONV (postoperative nausea and vomiting)      Pseudoseizures      Somatoform disorder      Sphincter of Oddi dysfunction      Vasovagal syncope        Past Surgical History   I have reviewed this patient's surgical history and updated it with pertinent information if needed.  Past Surgical History:   Procedure Laterality Date     ABDOMEN SURGERY      ERCP, biliary stents     CHOLECYSTECTOMY  8/2/11     COLONOSCOPY  2011    negative finding     ENDOSCOPIC RETROGRADE CHOLANGIOPANCREATOGRAM  8/23/2011    Procedure:ENDOSCOPIC RETROGRADE CHOLANGIOPANCREATOGRAM; Endoscopic Retrograde Cholangiopancreatogram; Surgeon:SHORTY MCCOY; Location:[facility] OR     ENDOSCOPIC RETROGRADE CHOLANGIOPANCREATOGRAM  5/17/2012    Procedure:ENDOSCOPIC RETROGRADE CHOLANGIOPANCREATOGRAM; Endoscopic Retrograde Cholangiopancreatogram with pancreatic stent placement.; Surgeon:SHORTY MCCOY; Location:[facility] OR     ENDOSCOPIC RETROGRADE CHOLANGIOPANCREATOGRAM COMPLEX  1/3/2012    Procedure:ENDOSCOPIC RETROGRADE CHOLANGIOPANCREATOGRAM COMPLEX; Endoscopic Retrograde Cholangiopancreatogram with Manometry bile duct sphincterotomy extention pancreatic duct sphincterotomy pancreatic duct stent placement; Surgeon:SHORTY MCCOY; Location:[facility] OR     ENDOSCOPIC ULTRASOUND UPPER GASTROINTESTINAL TRACT (GI) N/A 6/9/2015    Procedure: ENDOSCOPIC ULTRASOUND, ESOPHAGOSCOPY / UPPER GASTROINTESTINAL TRACT (GI);  Surgeon: Mario Joe MD;  Location: [facility] OR     ENDOSCOPIC ULTRASOUND UPPER GASTROINTESTINAL TRACT (GI) N/A 12/12/2016    Procedure: ENDOSCOPIC ULTRASOUND, ESOPHAGOSCOPY / UPPER GASTROINTESTINAL TRACT (GI);  Surgeon: Guru Jose Klein MD;  Location: [facility] OR     ESOPHAGOSCOPY, GASTROSCOPY, DUODENOSCOPY (EGD), COMBINED  1/18/2012    Procedure:COMBINED ESOPHAGOSCOPY, GASTROSCOPY, DUODENOSCOPY (EGD); Surgeon:ARNIE ESPINOZA; [facility]     ESOPHAGOSCOPY, GASTROSCOPY, DUODENOSCOPY (EGD), COMBINED  1/18/2012    Procedure:COMBINED ESOPHAGOSCOPY, GASTROSCOPY, DUODENOSCOPY (EGD); EGD; Surgeon:ARNIE ESPINOZA; Location[facility]     ESOPHAGOSCOPY, GASTROSCOPY, DUODENOSCOPY  (EGD), COMBINED N/A 12/9/2017    Procedure: COMBINED ESOPHAGOSCOPY, GASTROSCOPY, DUODENOSCOPY (EGD);;  Surgeon: Josias Chan MD;  Location: [facility] GI     INSERT PORT VASCULAR ACCESS       L knee arthroscopy  2009     LAPAROSCOPY DIAGNOSTIC (GYN)  10/26/2012    Procedure: LAPAROSCOPY DIAGNOSTIC (GYN);  LAPAROSCOPY DIAGNOSTIC, CAUTERY ENDOMETRIOISIS and biopsy of Fallopian tube lesions;  Surgeon: Carla Lopez MD;  Location: [facility] OR     ORTHOPEDIC SURGERY  2008    knee     VASCULAR SURGERY        Social History   Social History   Substance Use Topics     Smoking status: Never Smoker     Smokeless tobacco: Never Used     Alcohol use No     Family History   I have reviewed this patient's family history and updated it with pertinent information if needed.   Family History   Problem Relation Age of Onset     Hypertension Father      Bipolar Disorder Other      Anxiety Disorder Other      Depression Paternal Grandmother      Allergies Maternal Grandmother      CEREBROVASCULAR DISEASE Maternal Grandfather      Cardiovascular Maternal Grandfather      Depression/Anxiety Maternal Grandfather      GASTROINTESTINAL DISEASE Maternal Grandfather      DIABETES Paternal Uncle      Hypoglycemia Brother      CANCER No family hx of      No family history of skin cancer     Prior to Admission Medications   Prior to Admission Medications   Prescriptions Last Dose Informant Patient Reported? Taking?   RIZATRIPTAN BENZOATE PO More than a month at Unknown time Self Yes No   Sig: Take 5 mg by mouth once as needed    amylase-lipase-protease (ZENPEP) 39622 UNITS CPEP 1/17/2018 at Unknown time  No Yes   Sig: Take 2-3 capsules (40,000-60,000 Units) by mouth Take with snacks or supplements (Snacks)   amylase-lipase-protease (ZENPEP) 46695 UNITS CPEP 1/17/2018 at Unknown time  No Yes   Sig: Take 5 capsules (100,000 Units) by mouth 3 times daily (with meals)   fluticasone (FLONASE) 50 MCG/ACT spray 1/17/2018 at Unknown time  No Yes   Sig: Spray 2  sprays into both nostrils daily   gabapentin (NEURONTIN) 400 MG capsule 1/17/2018 at Unknown time  No Yes   Sig: Take 1 capsule (400 mg) by mouth 3 times daily   leuprolide (LUPRON DEPOT) 11.25 MG injection More than a month at Unknown time Self No No   Sig: Inject 11.25 mg into the muscle every 3 months   levalbuterol (XOPENEX HFA) 45 MCG/ACT Inhaler More than a month at Unknown time  No No   Sig: Inhale 2 puffs into the lungs every 6 hours as needed for shortness of breath / dyspnea   norethindrone (AYGESTIN) 5 MG tablet 1/17/2018 at Unknown time  No Yes   Sig: Take 1 tablet (5 mg) by mouth daily   nortriptyline (PAMELOR) 10 MG capsule 1/17/2018 at Unknown time  No Yes   Sig: Take 3 capsules (30 mg) by mouth At Bedtime   ondansetron (ZOFRAN ODT) 4 MG ODT tab 1/17/2018 at Unknown time  No Yes   Sig: Take 1 tablet (4 mg) by mouth every 8 hours as needed for nausea or vomiting   oxyCODONE (ROXICODONE) 5 MG/5ML solution 1/18/2018 at 0730  No Yes   Sig: Take 10 mLs (10 mg) by mouth every 8 hours as needed for pain maximum 30 mg per day   pantoprazole (PROTONIX) 40 MG EC tablet 1/17/2018 at Unknown time  No Yes   Sig: Take 1 tablet (40 mg) by mouth 2 times daily (before meals)   polyethylene glycol (MIRALAX/GLYCOLAX) Packet Past Week at Unknown time  No Yes   Sig: Take 17 g by mouth daily   scopolamine (TRANSDERM) 72 hr patch Past Week at Unknown time  No Yes   Sig: Apply 1 patch to hairless area behind one ear if severe nausea and vomiting.  Remove old patch and change every 3 days (72 hours).   senna-docusate (SENOKOT-S;PERICOLACE) 8.6-50 MG per tablet Past Week at Unknown time  No Yes   Sig: Take 1 tablet by mouth 2 times daily as needed for constipation      Facility-Administered Medications: None     Allergies   Allergies   Allergen Reactions     Amoxicillin-Pot Clavulanate Nausea and Vomiting     Compazine [Prochlorperazine] Other (See Comments)     Dystonia       Hyoscyamine Other (See Comments)     Dystonia      "Reglan [Metoclopramide Hcl] Other (See Comments)     Dystonia     Zyprexa Other (See Comments)     Sensitive, dystonic reaction on 11-9-2011     Amitriptyline Hcl Other (See Comments)     Dystonia, hallucinations     Buspirone Other (See Comments)     No Adverse Reactions, no benefit     Cogentin [Benztropine]      Cyproheptadine Other (See Comments)     Distonic     Dicyclomine Other (See Comments)     Droperidol Other (See Comments)     Feels tense and \"like she has to jump out of her skin\".       Effexor [Venlafaxine] Other (See Comments)     Dystonia     Food      Cilantro--lips/tongue swelling     No Clinical Screening - See Comments Other (See Comments)     Cilantro     Phenergan Dm [Promethazine-Dm] Other (See Comments)     dystonia     Promethazine Other (See Comments)     Risperidone Other (See Comments)     dystonia     Vistaril Other (See Comments)     Burning sensation.       Augmentin GI Disturbance     Ketamine Other (See Comments)     jittery     Sorbitol GI Disturbance     Headache and dyspepsia       Physical Exam   Vital Signs: Temp: 98.8  F (37.1  C) Temp src: Oral BP: (!) 141/92 Pulse: 85 Heart Rate: 90 Resp: 16 SpO2: 97 % O2 Device: None (Room air)    Weight: 149 lbs 7.55 oz    Physical Exam   Constitutional:   Tearful young woman resting comfortably   Head: Normocephalic and atraumatic.   Eyes: Conjunctivae are normal. Pupils are equal, round, and reactive to light.  Pharynx has no erythema or exudate, mucous membranes are moist  Neck:   No adenopathy, no bony tenderness  Cardiovascular: Regular rate and rhythm without murmurs or gallops  Pulmonary/Chest: Clear to auscultation bilaterally, with no wheezes or retractions. No respiratory distress.  GI: Soft with good bowel sounds.  Mildly tender in the RUQ, non-distended, with no guarding, no rebound, no peritoneal signs.   Back:  No bony or CVA tenderness   Musculoskeletal:  No edema or clubbing   Skin: Skin is warm and dry. No rash noted. "   Neurological: Alert and oriented to person, place, and time. Nonfocal exam  Psychiatric:  Tearfull.      Data   Data reviewed today: I reviewed all medications, new labs and imaging results over the last 24 hours. I personally reviewed her procedure note from today.

## 2018-01-19 NOTE — OR NURSING
Dr Narayanan called because of continued nause and dry heaving. Ativan ordered and given. Dr. Sauer called due to continued pain. Dilaudid 1mg given as ordered. Patient is calmer laying in bed. Mom at bedside. Awaiting a bed on 6D. Report to Guanaco LUNA RN

## 2018-01-19 NOTE — PLAN OF CARE
"Problem: Patient Care Overview  Goal: Discharge Needs Assessment  Tolerating PO intake. No. Patient refused po meds.  Receiving pca dilaudid for pain. Patient reports relief.  Ng tube is intact.  BP (!) 123/111 (BP Location: Right arm)  Pulse 85  Temp 99.6  F (37.6  C) (Oral)  Resp 16  Ht 1.689 m (5' 6.5\")  Wt 67.8 kg (149 lb 7.6 oz)  SpO2 96%  BMI 23.76 kg/m2        "

## 2018-01-19 NOTE — PLAN OF CARE
Problem: Patient Care Overview  Goal: Discharge Needs Assessment  Tolerating PO intake. No. Patient refused po meds.  Receiving pca dilaudid for pain. Patient reports relief.  Ng tube is intact.

## 2018-01-19 NOTE — OR NURSING
Pain medication is lasting less than an hour. Pain gets aggravated when pt has dry heaves. Ativan given for nausea per Dr. Narayanan

## 2018-01-19 NOTE — OR NURSING
The on call medicine physician called for orders. He sent Dr. Mcdonald down to assess pt. Plan for care was set up for pain and nausea.

## 2018-01-20 PROBLEM — R11.2 INTRACTABLE NAUSEA AND VOMITING: Status: ACTIVE | Noted: 2018-01-20

## 2018-01-20 LAB
ANION GAP SERPL CALCULATED.3IONS-SCNC: 14 MMOL/L (ref 3–14)
BUN SERPL-MCNC: 14 MG/DL (ref 7–30)
CALCIUM SERPL-MCNC: 8.9 MG/DL (ref 8.5–10.1)
CHLORIDE SERPL-SCNC: 101 MMOL/L (ref 94–109)
CO2 SERPL-SCNC: 23 MMOL/L (ref 20–32)
CREAT SERPL-MCNC: 0.57 MG/DL (ref 0.52–1.04)
GFR SERPL CREATININE-BSD FRML MDRD: >90 ML/MIN/1.7M2
GLUCOSE SERPL-MCNC: 52 MG/DL (ref 70–99)
POTASSIUM SERPL-SCNC: 3.8 MMOL/L (ref 3.4–5.3)
SODIUM SERPL-SCNC: 138 MMOL/L (ref 133–144)

## 2018-01-20 PROCEDURE — 25000132 ZZH RX MED GY IP 250 OP 250 PS 637: Performed by: PHYSICIAN ASSISTANT

## 2018-01-20 PROCEDURE — 25800025 ZZH RX 258: Performed by: PHYSICIAN ASSISTANT

## 2018-01-20 PROCEDURE — 96361 HYDRATE IV INFUSION ADD-ON: CPT

## 2018-01-20 PROCEDURE — 80048 BASIC METABOLIC PNL TOTAL CA: CPT | Performed by: PHYSICIAN ASSISTANT

## 2018-01-20 PROCEDURE — 25000132 ZZH RX MED GY IP 250 OP 250 PS 637: Performed by: NURSE PRACTITIONER

## 2018-01-20 PROCEDURE — 96376 TX/PRO/DX INJ SAME DRUG ADON: CPT

## 2018-01-20 PROCEDURE — 00000146 ZZHCL STATISTIC GLUCOSE BY METER IP

## 2018-01-20 PROCEDURE — 99232 SBSQ HOSP IP/OBS MODERATE 35: CPT | Performed by: INTERNAL MEDICINE

## 2018-01-20 PROCEDURE — G0378 HOSPITAL OBSERVATION PER HR: HCPCS

## 2018-01-20 PROCEDURE — 36592 COLLECT BLOOD FROM PICC: CPT | Performed by: PHYSICIAN ASSISTANT

## 2018-01-20 PROCEDURE — 99207 ZZC APP CREDIT; MD BILLING SHARED VISIT: CPT | Performed by: PHYSICIAN ASSISTANT

## 2018-01-20 PROCEDURE — 27210436 ZZH NUTRITION PRODUCT SEMIELEM INTERMED CAN

## 2018-01-20 PROCEDURE — 12000001 ZZH R&B MED SURG/OB UMMC

## 2018-01-20 PROCEDURE — 25000128 H RX IP 250 OP 636: Performed by: PHYSICIAN ASSISTANT

## 2018-01-20 PROCEDURE — 25800025 ZZH RX 258: Performed by: INTERNAL MEDICINE

## 2018-01-20 RX ORDER — NICOTINE POLACRILEX 4 MG
15-30 LOZENGE BUCCAL
Status: DISCONTINUED | OUTPATIENT
Start: 2018-01-20 | End: 2018-01-23 | Stop reason: HOSPADM

## 2018-01-20 RX ORDER — SODIUM BICARBONATE 325 MG/1
325 TABLET ORAL 4 TIMES DAILY
Status: DISCONTINUED | OUTPATIENT
Start: 2018-01-20 | End: 2018-01-20

## 2018-01-20 RX ORDER — DEXTROSE MONOHYDRATE, SODIUM CHLORIDE, AND POTASSIUM CHLORIDE 50; 1.49; 9 G/1000ML; G/1000ML; G/1000ML
INJECTION, SOLUTION INTRAVENOUS CONTINUOUS
Status: DISCONTINUED | OUTPATIENT
Start: 2018-01-20 | End: 2018-01-23

## 2018-01-20 RX ORDER — SODIUM BICARBONATE 325 MG/1
325 TABLET ORAL EVERY 6 HOURS
Status: DISCONTINUED | OUTPATIENT
Start: 2018-01-20 | End: 2018-01-20

## 2018-01-20 RX ORDER — DEXTROSE MONOHYDRATE 25 G/50ML
25-50 INJECTION, SOLUTION INTRAVENOUS
Status: DISCONTINUED | OUTPATIENT
Start: 2018-01-20 | End: 2018-01-20

## 2018-01-20 RX ORDER — NICOTINE POLACRILEX 4 MG
15-30 LOZENGE BUCCAL
Status: DISCONTINUED | OUTPATIENT
Start: 2018-01-20 | End: 2018-01-20

## 2018-01-20 RX ORDER — SODIUM BICARBONATE 325 MG/1
325 TABLET ORAL EVERY 6 HOURS
Status: DISCONTINUED | OUTPATIENT
Start: 2018-01-20 | End: 2018-01-23

## 2018-01-20 RX ORDER — DEXTROSE MONOHYDRATE 25 G/50ML
25-50 INJECTION, SOLUTION INTRAVENOUS
Status: DISCONTINUED | OUTPATIENT
Start: 2018-01-20 | End: 2018-01-23 | Stop reason: HOSPADM

## 2018-01-20 RX ADMIN — GABAPENTIN 400 MG: 250 SOLUTION ORAL at 20:48

## 2018-01-20 RX ADMIN — PANTOPRAZOLE SODIUM 40 MG: 40 TABLET, DELAYED RELEASE ORAL at 20:49

## 2018-01-20 RX ADMIN — POTASSIUM CHLORIDE, DEXTROSE MONOHYDRATE AND SODIUM CHLORIDE: 150; 5; 900 INJECTION, SOLUTION INTRAVENOUS at 21:10

## 2018-01-20 RX ADMIN — HYDROMORPHONE HYDROCHLORIDE 1 MG: 1 INJECTION, SOLUTION INTRAMUSCULAR; INTRAVENOUS; SUBCUTANEOUS at 21:41

## 2018-01-20 RX ADMIN — DIPHENHYDRAMINE HYDROCHLORIDE 25 MG: 50 INJECTION, SOLUTION INTRAMUSCULAR; INTRAVENOUS at 00:00

## 2018-01-20 RX ADMIN — POTASSIUM CHLORIDE, DEXTROSE MONOHYDRATE AND SODIUM CHLORIDE: 150; 5; 900 INJECTION, SOLUTION INTRAVENOUS at 11:56

## 2018-01-20 RX ADMIN — HYDROMORPHONE HYDROCHLORIDE 1 MG: 1 INJECTION, SOLUTION INTRAMUSCULAR; INTRAVENOUS; SUBCUTANEOUS at 08:13

## 2018-01-20 RX ADMIN — HYDROMORPHONE HYDROCHLORIDE 1 MG: 1 INJECTION, SOLUTION INTRAMUSCULAR; INTRAVENOUS; SUBCUTANEOUS at 05:58

## 2018-01-20 RX ADMIN — HYDROMORPHONE HYDROCHLORIDE 1 MG: 1 INJECTION, SOLUTION INTRAMUSCULAR; INTRAVENOUS; SUBCUTANEOUS at 19:24

## 2018-01-20 RX ADMIN — HYDROMORPHONE HYDROCHLORIDE 1 MG: 1 INJECTION, SOLUTION INTRAMUSCULAR; INTRAVENOUS; SUBCUTANEOUS at 10:31

## 2018-01-20 RX ADMIN — HYDROMORPHONE HYDROCHLORIDE 1 MG: 1 INJECTION, SOLUTION INTRAMUSCULAR; INTRAVENOUS; SUBCUTANEOUS at 13:01

## 2018-01-20 RX ADMIN — HYDROMORPHONE HYDROCHLORIDE 1 MG: 1 INJECTION, SOLUTION INTRAMUSCULAR; INTRAVENOUS; SUBCUTANEOUS at 01:21

## 2018-01-20 RX ADMIN — SODIUM CHLORIDE, POTASSIUM CHLORIDE, SODIUM LACTATE AND CALCIUM CHLORIDE: 600; 310; 30; 20 INJECTION, SOLUTION INTRAVENOUS at 02:07

## 2018-01-20 RX ADMIN — HYDROMORPHONE HYDROCHLORIDE 1 MG: 1 INJECTION, SOLUTION INTRAMUSCULAR; INTRAVENOUS; SUBCUTANEOUS at 17:18

## 2018-01-20 RX ADMIN — DEXTROSE MONOHYDRATE 25 ML: 500 INJECTION PARENTERAL at 10:14

## 2018-01-20 RX ADMIN — ONDANSETRON 8 MG: 2 INJECTION INTRAMUSCULAR; INTRAVENOUS at 18:02

## 2018-01-20 RX ADMIN — ONDANSETRON 8 MG: 2 INJECTION INTRAMUSCULAR; INTRAVENOUS at 11:48

## 2018-01-20 RX ADMIN — DEXTROSE MONOHYDRATE 25 ML: 500 INJECTION PARENTERAL at 12:15

## 2018-01-20 RX ADMIN — SENNOSIDES AND DOCUSATE SODIUM 1 TABLET: 8.6; 5 TABLET ORAL at 20:42

## 2018-01-20 RX ADMIN — DIPHENHYDRAMINE HYDROCHLORIDE 25 MG: 50 INJECTION, SOLUTION INTRAMUSCULAR; INTRAVENOUS at 13:16

## 2018-01-20 RX ADMIN — ACETAMINOPHEN 975 MG: 325 TABLET, FILM COATED ORAL at 20:42

## 2018-01-20 RX ADMIN — MULTIVIT AND MINERALS-FERROUS GLUCONATE 9 MG IRON/15 ML ORAL LIQUID 15 ML: at 19:18

## 2018-01-20 RX ADMIN — HYDROMORPHONE HYDROCHLORIDE 1 MG: 1 INJECTION, SOLUTION INTRAMUSCULAR; INTRAVENOUS; SUBCUTANEOUS at 15:21

## 2018-01-20 RX ADMIN — DIPHENHYDRAMINE HYDROCHLORIDE 25 MG: 50 INJECTION, SOLUTION INTRAMUSCULAR; INTRAVENOUS at 06:23

## 2018-01-20 RX ADMIN — PANCRELIPASE 40000 UNITS: 20000; 68000; 109000 CAPSULE, DELAYED RELEASE ORAL at 20:49

## 2018-01-20 RX ADMIN — ONDANSETRON 8 MG: 2 INJECTION INTRAMUSCULAR; INTRAVENOUS at 05:02

## 2018-01-20 RX ADMIN — DIPHENHYDRAMINE HYDROCHLORIDE 25 MG: 50 INJECTION, SOLUTION INTRAMUSCULAR; INTRAVENOUS at 19:24

## 2018-01-20 RX ADMIN — SODIUM BICARBONATE 325 MG: 325 TABLET ORAL at 20:44

## 2018-01-20 RX ADMIN — HYDROMORPHONE HYDROCHLORIDE 1 MG: 1 INJECTION, SOLUTION INTRAMUSCULAR; INTRAVENOUS; SUBCUTANEOUS at 03:44

## 2018-01-20 ASSESSMENT — PAIN DESCRIPTION - DESCRIPTORS
DESCRIPTORS: ACHING;SHARP
DESCRIPTORS: ACHING;SHARP
DESCRIPTORS: SHARP
DESCRIPTORS: SHARP;STABBING
DESCRIPTORS: SHARP
DESCRIPTORS: SHARP;STABBING
DESCRIPTORS: SHARP;STABBING
DESCRIPTORS: SHARP

## 2018-01-20 NOTE — PROGRESS NOTES
[facility], [place]    Internal Medicine Progress Note - Gold Service      Assessment & Plan   Alexandra Melgoza is a 22 year old female  admitted on 1/18/2018. She has a history of chronic abdominal pain, somatoform disorder, pseudoseizures, POTS and is admitted for post-op pain, nausea and vomiting.     Post-ERCP pain, nausea: S/p diagnostic ERCP with NJ feeding tube placement 1/18. Post procedure unable to go home secondary to pain, nausea and vomiting. PTA on oxycodone 10 mg TID. Previously on methadone but stopped a few days ago with the agreement of her pain specialist. After procedure started on Dilaudid PCA pump that was discontinued yesterday and placed on IV hydromorphone, of which pt states controls the pain. Due to severe nausea yesterday decision made to defer start of TFs. Continues into today with severe nausea resulting in no PO intake and pt had episode of hypoglycemia earlier in the day that resolved after giving pt a one time dextrose 50% injection. Of note pt concerned about the tip placement being in wrong place, however, d/w GI team (Dr. Narayanan) and he states tip in perfect place. Also discussed that sxs most likely exacerbated by significant anxiety component.  - D/w Dr. Narayanan today and ok to start TFs  - Consult pain service to help with pain management   - In interim, continue Dilaudid 0.5-1 mg q2hrs prn pain.   - Consult psychiatry to help treat pt's anxiety.   - Continue PTA gabapentin and nortriptyline.  - Hold oral narcotics due to current severe nausea  - Continue scopolamine patch and prn Zofran and Benadryl for nausea.   - Continue IVFs until tolerating clears.   - Repeat BMP tomorrow am     Pancreatic insufficiency:  Continue home pancreatic enzymes     Diet:  Clears; start TFs today  Fluids: LR @ 100 ccs/hr  DVT Prophylaxis: Low Risk/Ambulatory with no VTE prophylaxis indicated  Code Status: Full      Disposition Plan   Expected discharge: 2-3 days,  recommended to prior living arrangement once adequate pain management/ tolerating PO medications and TFs.     Entered: Herberth Kerns 01/20/2018, 2:28 PM   Information in the above section will display in the discharge planner report.      The patient's care was discussed with the Attending Physician, Dr. Gray and Bedside Nurse.    Evangelista Kerns PA-C  Internal Medicine Hospitalist   [facility]  [phone]     Interval History   No acute events overnight other than an AXR obtained due to pt feeling her GJ tube was not in the right place. States abd pain somewhat better into today but nausea still severe and not able to eat any food and keep any water down. Has been dry heaving but no significant amount of emesis. Had large BM this morning. Denies other acute physical concerns at this time.       Data reviewed today: I reviewed all medications, new labs and imaging results over the last 24 hours.    Physical Exam   Vital Signs: Temp: 98.6  F (37  C) Temp src: Oral BP: 128/77   Heart Rate: 99 Resp: 16 SpO2: 100 % O2 Device: None (Room air)    Weight: 149 lbs 7.55 oz  General Appearance: Appears less uncomfortable due to nausea and abd pain  Respiratory: CTAB  Cardiovascular: RRR  GI: soft, ND; tender over epigastrium with guarding  Skin: No acute rashes on exposed areas.   Ext: No c/c/e      Data     CMP    Recent Labs  Lab 01/20/18  0714 01/19/18  1039 01/18/18  1446 01/18/18  1043 01/13/18  1615    136  --  139 140   POTASSIUM 3.8 4.3  --  4.1 4.0   CHLORIDE 101 101  --  103 106   CO2 23 26  --  28 26   ANIONGAP 14 9  --  7 8   GLC 52* 77  --  80 89   BUN 14 12  --  10 9   CR 0.57 0.49*  --  0.62 0.50*   GFRESTIMATED >90 >90  --  >90 >90   GFRESTBLACK >90 >90  --  >90 >90   RENEA 8.9 8.9  --  9.0 9.1   PROTTOTAL  --  6.6*  --  7.4 7.3   ALBUMIN  --  3.7  --  4.2 4.3   BILITOTAL  --  0.5  --  0.3 0.3   ALKPHOS  --  95  --  113 103   AST  --  44 55* 38 17   ALT  --  64* 78* 74* 35      CBC    Recent Labs  Lab 01/19/18  1039 01/18/18  1043 01/13/18  1615   WBC 5.3 3.7* 4.4   RBC 3.77* 4.13 3.99   HGB 11.4* 12.4 12.1   HCT 35.0 38.9 37.0   MCV 93 94 93   MCH 30.2 30.0 30.3   MCHC 32.6 31.9 32.7   RDW 12.9 12.9 12.8    206 249     Lab Results   Component Value Date    LIPASE 64 01/19/2018    LIPASE 111 01/18/2018

## 2018-01-20 NOTE — PROGRESS NOTES
CLINICAL NUTRITION SERVICES - ASSESSMENT NOTE     Nutrition Prescription    RECOMMENDATIONS FOR MDs/PROVIDERS TO ORDER:  - Place pt on electrolyte protocol with the initiation of nutrition support.     Malnutrition Status:    Patient does not meet two of the above criteria necessary for diagnosing malnutrition but is at risk for malnutrition    Recommendations already ordered by Registered Dietitian (RD):  Peptamen 1.5 @ goal 50 ml/hr (1200 ml/day) to provide 1800 kcals (26 kcal/kg/day), 82 g PRO (1.2 g/kg/day), 924 ml free H2O, 67 g Fat (70% from MCTs), 226 g CHO and no Fiber daily.  - Initiate TF at 10 ml/hr and advance by 10 ml Q8hrs to goal of 50 ml/hr. Only advance if K+/Mg++/Phos are WNL  - Ensure that FT is in the correct position prior to starting TF  - Flush TF with 30 ml of free water Q4hr  - Ordered Certavite to ensure micronutrients needs are being met, with the anticipation of TF interruptions.   Once begin TFs, begin the following pancreatic enzyme regimen and recommend order each of the following:   (please copy and paste administration instructions into each order)  A) Sodium Bicarb tablet (325 mg), 1 tablet q 4 hrs via Jtube. Administration Instructions: Crush 1 tablet and mix into 15 ml of warm water and use this solution to mix with Creon pancreatic enzymes. DO NOT administer directly into Jtube (to be mixed into TF formula with Creon enzyme - see Creon enzyme order)   B) ZenPEP  28555, 1- 2 capsules q 4 hrs via Jtube. Administration Instructions:   --If TF rate is running @ 10-30 ml/hr, administer 1 capsule q 4 hrs;   --If TF rate is running @ 40-50 ml/hr, increase to 2 capsules q 4 hrs.    **Open capsule and empty contents into 15 ml sodium bicarb solution (see sodium bicarb order), let dissolve for about 20-30 minutes and then add this solution to the amount of TF formula hung in TF bag every 4 hrs (i.e., once TF @ goal infusion 50 ml/hr will mix 2 capsules into 200 ml of TF formula every 4  "hrs).   *Note: this enzyme regimen with TF @ goal infusion will provide approximately 2388 units of lipase/gram of total Fat daily and approp dosing initially for pancreatic insufficiency with more elemental TF formula.     Future/Additional Recommendations:  - Monitor symptoms and weight     REASON FOR ASSESSMENT  Alexandra Melgoza is a/an 22 year old female assessed by the dietitian for Provider Order - Registered Dietitian to Assess and Order TF per Medical Nutrition Therapy Protocol    NUTRITION HISTORY  Spoke with pt today and she states that d/t abdominal pain at home she was having small snacks through out the day. Pt on PERT at home; she takes 5 capsules of ZenPEP 24915 with meals and 3-4 capsules of ZenPEP 73325 with snacks at home. Pt does report that her stools are a bit frothy and have been blade colored lately.     She endorses severe nausea and has not been able to keep anything down since admission. Pt reports that she has been on TF before and does not have any questions at this time.     CURRENT NUTRITION ORDERS  Diet: Clear Liquid  Intake/Tolerance: Per pt not tolerating any PO intake at this time.     LABS  Labs reviewed    MEDICATIONS  Medications reviewed    ANTHROPOMETRICS  Height: 168.9 cm (5' 6.5\")  Most Recent Weight: 67.8 kg (149 lb 7.6 oz)    IBW: 59.1 kg  BMI: Normal BMI  Weight History:   Wt Readings from Last 15 Encounters:   01/18/18 67.8 kg (149 lb 7.6 oz)   01/15/18 65.8 kg (145 lb)   01/13/18 64.9 kg (143 lb)   01/10/18 66.9 kg (147 lb 6.4 oz)   01/10/18 66.9 kg (147 lb 6.4 oz)   01/03/18 65.8 kg (145 lb)   01/03/18 64.9 kg (143 lb)   01/02/18 64.4 kg (142 lb)   01/01/18 64.4 kg (142 lb)   12/22/17 66.5 kg (146 lb 8 oz)   12/20/17 65.3 kg (144 lb)   12/14/17 65.7 kg (144 lb 12.8 oz)   12/12/17 65.2 kg (143 lb 12.8 oz)   11/22/17 67.7 kg (149 lb 3.2 oz)   01/23/17 70.8 kg (156 lb)       Dosing Weight: 68 kg (actual) - based on lowest weight since admission (67.8 kg on 1/18)    ASSESSED " NUTRITION NEEDS  Estimated Energy Needs: 7272-4522 kcals/day (25 - 30 kcals/kg)  Justification: Maintenance  Estimated Protein Needs: 68-81 grams protein/day (1 - 1.2 grams of pro/kg)  Justification: Maintenance  Estimated Fluid Needs: (1 mL/kcal) or Per provider pending fluid status  Justification: Maintenance     PHYSICAL FINDINGS  See malnutrition section below.    MALNUTRITION  % Intake: </= 50% for >/= 5 days (severe)  % Weight Loss: None noted  Subcutaneous Fat Loss: None observed  Muscle Loss: None observed  Fluid Accumulation/Edema: None noted  Malnutrition Diagnosis: Patient does not meet two of the above criteria necessary for diagnosing malnutrition but is at risk for malnutrition    NUTRITION DIAGNOSIS  Inadequate protein-energy intake related to decreased appetite secondary to abdominal pain and nausea as evidenced by pt report and need to initiate TF to meet 100% of pt's nutritional needs.       INTERVENTIONS  Implementation   Enteral Nutrition - Initiate     Goals  Total avg nutritional intake to meet a minimum of 25 kcal/kg and 1 g PRO/kg daily (per dosing wt 68 kg).     Monitoring/Evaluation  Progress toward goals will be monitored and evaluated per protocol.    Aleta Wiggins RD, LD  Weekend pgr:  [phone]

## 2018-01-20 NOTE — PLAN OF CARE
Problem: Patient Care Overview  Goal: Plan of Care/Patient Progress Review  Outpatient/Observation goals to be met before discharge home:    - Tolerating PO intake - NO, unable to tolerate po    Patient on PCA pump, unable to stay awake overnight and got behind on pain medication. Orders to change to IV Dilaudid 1 mg Q2 hrs prn. Patient continues to be nauseated and receiving Zofran ODT. Will continue to monitor.

## 2018-01-20 NOTE — PROGRESS NOTES
BG check 70 at 1205, MDs rounding and aware, D5 NS with 20 KCL infusing. Apple juice at bedside, dextrose 25 mg given via IV. Recheck 117. Will continue to monitor.

## 2018-01-20 NOTE — PLAN OF CARE
Problem: Patient Care Overview  Goal: Plan of Care/Patient Progress Review  - Tolerating PO intake - NO, unable to tolerate PO    Patient not tolerating any PO intake at this time. Patient dry heaving overnight. IV benadryl and zofran given for nausea/dry heaving with some relief. IV dilaudid given prn overnight for abdominal pain. Patient is ambulating independently and voiding spontaneously. LR @125ml/hr via PIV. Awaiting abdominal Xray results. Will continue to monitor.

## 2018-01-20 NOTE — PROGRESS NOTES
Spoke with nutrition re: tube feeding, they will supply in jug so we can add in medications per orders. Kangaroo pump ordered.

## 2018-01-20 NOTE — PLAN OF CARE
Problem: Patient Care Overview  Goal: Plan of Care/Patient Progress Review  - Tolerating PO intake - NO, unable to tolerate PO      IV Dilaudid 1 mg Q2 hrs prn. Will continue to monitor.  Patient reports increased abdominal pain to left abdomen; believes there may be a problem with feeding tube. Gold cross cover paged. Abdomen is soft, non distended. Will await further orders.

## 2018-01-20 NOTE — PROGRESS NOTES
Pt c/o shakiness and requested BG check. BG 44, pt unable to tolerable PO apple juice. Team paged and 25mL dextrose given with recheck 116. Pt reports improvement in symptoms.

## 2018-01-20 NOTE — PROVIDER NOTIFICATION
Text paged Gold 3 [phone] Evangelista CAPONE re: Low BS 44, apple juice, Needing order for glucose gel STAT and D5NS and call me. Franca PASCUAL [phone]

## 2018-01-20 NOTE — PLAN OF CARE
Problem: Patient Care Overview  Goal: Plan of Care/Patient Progress Review  Outpatient/Observation goals to be met before discharge home:     - Tolerating PO intake - NO, unable to tolerate po     New orders for IV Dilaudid 1 mg Q2 hrs prn. Patient continues to be nauseated and receiving Zofran ODT, Patient to rest and not take orals at this time, patient dry heaving and up to bathroom, IV Zofran given. Will continue to monitor.

## 2018-01-20 NOTE — PROVIDER NOTIFICATION
Text paged provider, patient has had multiple IV pain medications, requesting inpatient and transfer orders.

## 2018-01-20 NOTE — PROGRESS NOTES
"Patient A&O, VSS. Patient continues to have \"sharp\" and \"stabbing\" pain to right upper abdomen radiating to back. IV Dilaudid 1 mg Q2 hrs prn needed with 6 doses given this shift. Nausea continues and 2 episodes of dry heaves reported. IV Zofran 8 mg prn needed with 3 doses given. IV Benedryl 25 mg prn needed as well for nausea with 4 doses given. Ice pack, aromatherapy and sea bands utilized as well. Waiting [facility] Consult. Patient was able to get a short nap this afternoon between pain medication, otherwise teary eyed and appears uncomfortable but states pain is about the same. NJ tube in place, Nutrition in to see patient and discuss continuous tube feeding to start this evening, discussed plan to add enzymes to feeding formula as patient is unable to tolerate anything by mouth at this time. Feeding pump and formula arrived to unit. Patient is ambulating independently to the bathroom, tolerating short walks in room but states anything further is too much at this time. Voiding spontaneously. BM reported this am. Patient had low blood glucose this am. Dextrose 50 give per IV twice this shift with recheck at 116-117. D5 NS with 20 KCL infusing. Will continue to with POC, monitor and notify provider of any concerns.   "

## 2018-01-20 NOTE — PLAN OF CARE
Problem: Patient Care Overview  Goal: Plan of Care/Patient Progress Review  - Tolerating PO intake - NO, unable to tolerate PO    Abdominal XR completed; awaiting results.    Patient attempted to take PM medications by mouth. Patient had emesis shortly after administration.      IV Dilaudid 1 mg Q2 hrs prn for abdominal pain with moderate relief. IV zofran and IV benadryl given for nausea with moderate relief. Will continue to monitor.

## 2018-01-20 NOTE — PLAN OF CARE
"Problem: Patient Care Overview  Goal: Plan of Care/Patient Progress Review  Outpatient/Observation goals to be met before discharge home:      - Tolerating PO intake - NO, unable to tolerate po      IV Dilaudid 1 mg Q2 hrs prn throughout shift x 4 doses. Patient continues to be nauseated, Zofran IV given twice and one-time dose ordered but patient wanted to try Benadryl instead, states \"sometimes it works better\". Patient has not taken anything orally this shift. dry heaving improved.   Will continue to monitor. Mom and Step-Dad at bedside.     "

## 2018-01-21 LAB
ANION GAP SERPL CALCULATED.3IONS-SCNC: 7 MMOL/L (ref 3–14)
BUN SERPL-MCNC: 5 MG/DL (ref 7–30)
CALCIUM SERPL-MCNC: 8.7 MG/DL (ref 8.5–10.1)
CHLORIDE SERPL-SCNC: 103 MMOL/L (ref 94–109)
CO2 SERPL-SCNC: 29 MMOL/L (ref 20–32)
CREAT SERPL-MCNC: 0.53 MG/DL (ref 0.52–1.04)
GFR SERPL CREATININE-BSD FRML MDRD: >90 ML/MIN/1.7M2
GLUCOSE SERPL-MCNC: 95 MG/DL (ref 70–99)
MAGNESIUM SERPL-MCNC: 1.7 MG/DL (ref 1.6–2.3)
PHOSPHATE SERPL-MCNC: 4.4 MG/DL (ref 2.5–4.5)
POTASSIUM SERPL-SCNC: 4 MMOL/L (ref 3.4–5.3)
SODIUM SERPL-SCNC: 139 MMOL/L (ref 133–144)

## 2018-01-21 PROCEDURE — 84100 ASSAY OF PHOSPHORUS: CPT | Performed by: PHYSICIAN ASSISTANT

## 2018-01-21 PROCEDURE — 25000128 H RX IP 250 OP 636: Performed by: INTERNAL MEDICINE

## 2018-01-21 PROCEDURE — 00000146 ZZHCL STATISTIC GLUCOSE BY METER IP

## 2018-01-21 PROCEDURE — 27210436 ZZH NUTRITION PRODUCT SEMIELEM INTERMED CAN

## 2018-01-21 PROCEDURE — 36592 COLLECT BLOOD FROM PICC: CPT | Performed by: PHYSICIAN ASSISTANT

## 2018-01-21 PROCEDURE — 25800025 ZZH RX 258: Performed by: PHYSICIAN ASSISTANT

## 2018-01-21 PROCEDURE — 25000128 H RX IP 250 OP 636: Performed by: PHYSICIAN ASSISTANT

## 2018-01-21 PROCEDURE — 25000132 ZZH RX MED GY IP 250 OP 250 PS 637: Performed by: NURSE PRACTITIONER

## 2018-01-21 PROCEDURE — 25000132 ZZH RX MED GY IP 250 OP 250 PS 637: Performed by: PHYSICIAN ASSISTANT

## 2018-01-21 PROCEDURE — 80048 BASIC METABOLIC PNL TOTAL CA: CPT | Performed by: PHYSICIAN ASSISTANT

## 2018-01-21 PROCEDURE — 12000001 ZZH R&B MED SURG/OB UMMC

## 2018-01-21 PROCEDURE — 99232 SBSQ HOSP IP/OBS MODERATE 35: CPT | Performed by: INTERNAL MEDICINE

## 2018-01-21 PROCEDURE — 25000125 ZZHC RX 250: Performed by: ANESTHESIOLOGY

## 2018-01-21 PROCEDURE — 99207 ZZC APP CREDIT; MD BILLING SHARED VISIT: CPT | Performed by: PHYSICIAN ASSISTANT

## 2018-01-21 PROCEDURE — 83735 ASSAY OF MAGNESIUM: CPT | Performed by: PHYSICIAN ASSISTANT

## 2018-01-21 RX ORDER — HYDROMORPHONE HYDROCHLORIDE 1 MG/ML
0.5 INJECTION, SOLUTION INTRAMUSCULAR; INTRAVENOUS; SUBCUTANEOUS EVERY 4 HOURS PRN
Status: DISCONTINUED | OUTPATIENT
Start: 2018-01-21 | End: 2018-01-21

## 2018-01-21 RX ORDER — OXYCODONE HCL 5 MG/5 ML
10 SOLUTION, ORAL ORAL 3 TIMES DAILY
Status: DISCONTINUED | OUTPATIENT
Start: 2018-01-21 | End: 2018-01-23 | Stop reason: HOSPADM

## 2018-01-21 RX ORDER — HYDROMORPHONE HYDROCHLORIDE 1 MG/ML
0.5 INJECTION, SOLUTION INTRAMUSCULAR; INTRAVENOUS; SUBCUTANEOUS ONCE
Status: COMPLETED | OUTPATIENT
Start: 2018-01-21 | End: 2018-01-21

## 2018-01-21 RX ORDER — HYDROMORPHONE HYDROCHLORIDE 1 MG/ML
0.5 INJECTION, SOLUTION INTRAMUSCULAR; INTRAVENOUS; SUBCUTANEOUS
Status: DISCONTINUED | OUTPATIENT
Start: 2018-01-21 | End: 2018-01-22

## 2018-01-21 RX ADMIN — ONDANSETRON 4 MG: 2 INJECTION INTRAMUSCULAR; INTRAVENOUS at 00:06

## 2018-01-21 RX ADMIN — HYDROMORPHONE HYDROCHLORIDE 1 MG: 1 INJECTION, SOLUTION INTRAMUSCULAR; INTRAVENOUS; SUBCUTANEOUS at 09:02

## 2018-01-21 RX ADMIN — POTASSIUM CHLORIDE, DEXTROSE MONOHYDRATE AND SODIUM CHLORIDE: 150; 5; 900 INJECTION, SOLUTION INTRAVENOUS at 06:43

## 2018-01-21 RX ADMIN — OXYCODONE HYDROCHLORIDE 10 MG: 5 SOLUTION ORAL at 19:52

## 2018-01-21 RX ADMIN — ONDANSETRON 4 MG: 2 INJECTION INTRAMUSCULAR; INTRAVENOUS at 06:50

## 2018-01-21 RX ADMIN — SCOLOPAMINE TRANSDERMAL SYSTEM 1 PATCH: 1 PATCH, EXTENDED RELEASE TRANSDERMAL at 20:10

## 2018-01-21 RX ADMIN — GABAPENTIN 400 MG: 250 SOLUTION ORAL at 08:42

## 2018-01-21 RX ADMIN — PANTOPRAZOLE SODIUM 40 MG: 40 TABLET, DELAYED RELEASE ORAL at 08:42

## 2018-01-21 RX ADMIN — PANCRELIPASE 20000 UNITS: 20000; 68000; 109000 CAPSULE, DELAYED RELEASE ORAL at 16:51

## 2018-01-21 RX ADMIN — HYDROMORPHONE HYDROCHLORIDE 1 MG: 1 INJECTION, SOLUTION INTRAMUSCULAR; INTRAVENOUS; SUBCUTANEOUS at 02:04

## 2018-01-21 RX ADMIN — DIPHENHYDRAMINE HYDROCHLORIDE 25 MG: 50 INJECTION, SOLUTION INTRAMUSCULAR; INTRAVENOUS at 13:56

## 2018-01-21 RX ADMIN — DIPHENHYDRAMINE HYDROCHLORIDE 25 MG: 50 INJECTION, SOLUTION INTRAMUSCULAR; INTRAVENOUS at 08:01

## 2018-01-21 RX ADMIN — SODIUM BICARBONATE 325 MG: 325 TABLET ORAL at 20:58

## 2018-01-21 RX ADMIN — Medication 0.5 MG: at 13:17

## 2018-01-21 RX ADMIN — Medication 0.5 MG: at 16:26

## 2018-01-21 RX ADMIN — DIPHENHYDRAMINE HYDROCHLORIDE 25 MG: 50 INJECTION, SOLUTION INTRAMUSCULAR; INTRAVENOUS at 01:31

## 2018-01-21 RX ADMIN — SODIUM BICARBONATE 325 MG: 325 TABLET ORAL at 08:37

## 2018-01-21 RX ADMIN — MULTIVIT AND MINERALS-FERROUS GLUCONATE 9 MG IRON/15 ML ORAL LIQUID 15 ML: at 08:42

## 2018-01-21 RX ADMIN — DIPHENHYDRAMINE HYDROCHLORIDE 25 MG: 50 INJECTION, SOLUTION INTRAMUSCULAR; INTRAVENOUS at 20:11

## 2018-01-21 RX ADMIN — HYDROMORPHONE HYDROCHLORIDE 1 MG: 1 INJECTION, SOLUTION INTRAMUSCULAR; INTRAVENOUS; SUBCUTANEOUS at 07:03

## 2018-01-21 RX ADMIN — SENNOSIDES AND DOCUSATE SODIUM 1 TABLET: 8.6; 5 TABLET ORAL at 08:43

## 2018-01-21 RX ADMIN — HYDROMORPHONE HYDROCHLORIDE 1 MG: 1 INJECTION, SOLUTION INTRAMUSCULAR; INTRAVENOUS; SUBCUTANEOUS at 04:53

## 2018-01-21 RX ADMIN — GABAPENTIN 400 MG: 250 SOLUTION ORAL at 19:53

## 2018-01-21 RX ADMIN — SENNOSIDES AND DOCUSATE SODIUM 1 TABLET: 8.6; 5 TABLET ORAL at 19:53

## 2018-01-21 RX ADMIN — PANCRELIPASE 40000 UNITS: 20000; 68000; 109000 CAPSULE, DELAYED RELEASE ORAL at 08:37

## 2018-01-21 RX ADMIN — ONDANSETRON 4 MG: 2 INJECTION INTRAMUSCULAR; INTRAVENOUS at 12:55

## 2018-01-21 RX ADMIN — Medication 0.5 MG: at 14:59

## 2018-01-21 RX ADMIN — POLYETHYLENE GLYCOL 3350 17 G: 17 POWDER, FOR SOLUTION ORAL at 08:43

## 2018-01-21 RX ADMIN — PANTOPRAZOLE SODIUM 40 MG: 40 TABLET, DELAYED RELEASE ORAL at 19:53

## 2018-01-21 RX ADMIN — ONDANSETRON 8 MG: 2 INJECTION INTRAMUSCULAR; INTRAVENOUS at 23:34

## 2018-01-21 RX ADMIN — SODIUM BICARBONATE 325 MG: 325 TABLET ORAL at 02:05

## 2018-01-21 RX ADMIN — Medication 0.5 MG: at 21:39

## 2018-01-21 RX ADMIN — PANCRELIPASE 40000 UNITS: 20000; 68000; 109000 CAPSULE, DELAYED RELEASE ORAL at 02:05

## 2018-01-21 RX ADMIN — POTASSIUM CHLORIDE, DEXTROSE MONOHYDRATE AND SODIUM CHLORIDE: 150; 5; 900 INJECTION, SOLUTION INTRAVENOUS at 17:00

## 2018-01-21 RX ADMIN — HYDROMORPHONE HYDROCHLORIDE 1 MG: 1 INJECTION, SOLUTION INTRAMUSCULAR; INTRAVENOUS; SUBCUTANEOUS at 11:10

## 2018-01-21 RX ADMIN — GABAPENTIN 400 MG: 250 SOLUTION ORAL at 14:29

## 2018-01-21 RX ADMIN — SODIUM BICARBONATE 325 MG: 325 TABLET ORAL at 16:52

## 2018-01-21 RX ADMIN — PANCRELIPASE 40000 UNITS: 20000; 68000; 109000 CAPSULE, DELAYED RELEASE ORAL at 20:59

## 2018-01-21 RX ADMIN — HYDROMORPHONE HYDROCHLORIDE 1 MG: 1 INJECTION, SOLUTION INTRAMUSCULAR; INTRAVENOUS; SUBCUTANEOUS at 00:09

## 2018-01-21 RX ADMIN — OXYCODONE HYDROCHLORIDE 10 MG: 5 SOLUTION ORAL at 14:30

## 2018-01-21 ASSESSMENT — PAIN DESCRIPTION - DESCRIPTORS
DESCRIPTORS: SHARP;STABBING
DESCRIPTORS: ACHING;SHARP

## 2018-01-21 NOTE — PROGRESS NOTES
[facility], [place]    Internal Medicine Progress Note - Gold Service      Assessment & Plan   Alexandra Melgoza is a 22 year old female  admitted on 1/18/2018. She has a history of chronic abdominal pain, somatoform disorder, pseudoseizures, POTS and is admitted for post-op pain, nausea and vomiting.     Post-ERCP pain, nausea: S/p diagnostic ERCP with NJ feeding tube placement 1/18. Post procedure unable to go home secondary to pain, nausea and vomiting. PTA on oxycodone 10 mg TID. Previously on methadone but stopped a few days ago with the agreement of her pain specialist. After procedure started on Dilaudid PCA pump that was discontinued 1/19 and placed on IV hydromorphone, of which pt states controls the pain. Due to ongoing severe nausea 1/19 decision made to defer start of TFs until yesterday, of which pt is tolerating at 20 ml/hr (historically states never tolerates >30 ml/hr). Continues into today with severe nausea resulting in no PO intake resulting in intermittent hypoglycemia. Pain with modest improvement since yesterday.   - Consult pain service to help transition pt to only oral narcotic regimen   - In interim, restart PTA oxycodone solution 10 mg via feeding tube TID  - Continue IV Dilaudid, but change from 0.5-1 mg q2hrs to 0.5 mg q3hrs prn. Continue to wean as able.    - Continue PTA gabapentin and nortriptyline.  - Continue scopolamine patch and prn Zofran and Benadryl for nausea.   - Continue IVFs until tolerating clears.   - Repeat BMP tomorrow am     Pancreatic insufficiency:  Continue home pancreatic enzymes     Diet:  Clears; TFs currently at 20 ml/hr  Fluids: LR @ 100 ccs/hr  DVT Prophylaxis: Low Risk/Ambulatory with no VTE prophylaxis indicated  Code Status: Full      Disposition Plan   Expected discharge: 2-3 days, recommended to prior living arrangement once adequate pain management/ tolerating PO medications and TFs.     Entered: Herberth Kerns  01/21/2018, 10:33 AM   Information in the above section will display in the discharge planner report.      The patient's care was discussed with the Attending Physician, Dr. Gray and Bedside Nurse.    Evangelista Kerns PA-C  Internal Medicine Hospitalist   [facility]  [phone]     Interval History   No acute events overnight. Tolerating TFs currently at 20 ml/hr. Still with severe nausea preventing any PO food or fluid intake. States abd pain persists but slightly improved from yesterday. Had soft formed BM this am. Denies fever, chills, chest pain, SOB, and bladder concerns.       Data reviewed today: I reviewed all medications, new labs and imaging results over the last 24 hours.    Physical Exam   Vital Signs: Temp: 99.4  F (37.4  C) Temp src: Oral BP: 122/87 Pulse: 74 Heart Rate: 85 Resp: 16 SpO2: 100 % O2 Device: None (Room air)    Weight: 149 lbs 7.55 oz  General Appearance: In NAD lying in bed  Respiratory: CTAB  Cardiovascular: RRR  GI: soft, ND; tender over epigastrium without guarding  Skin: No acute rashes on exposed areas.   Ext: No c/c/e      Data     CMP    Recent Labs  Lab 01/21/18  0754 01/20/18  0714 01/19/18  1039 01/18/18  1446 01/18/18  1043    138 136  --  139   POTASSIUM 4.0 3.8 4.3  --  4.1   CHLORIDE 103 101 101  --  103   CO2 29 23 26  --  28   ANIONGAP 7 14 9  --  7   GLC 95 52* 77  --  80   BUN 5* 14 12  --  10   CR 0.53 0.57 0.49*  --  0.62   GFRESTIMATED >90 >90 >90  --  >90   GFRESTBLACK >90 >90 >90  --  >90   RENEA 8.7 8.9 8.9  --  9.0   MAG 1.7  --   --   --   --    PHOS 4.4  --   --   --   --    PROTTOTAL  --   --  6.6*  --  7.4   ALBUMIN  --   --  3.7  --  4.2   BILITOTAL  --   --  0.5  --  0.3   ALKPHOS  --   --  95  --  113   AST  --   --  44 55* 38   ALT  --   --  64* 78* 74*     CBC    Recent Labs  Lab 01/19/18  1039 01/18/18  1043   WBC 5.3 3.7*   RBC 3.77* 4.13   HGB 11.4* 12.4   HCT 35.0 38.9   MCV 93 94   MCH 30.2 30.0   MCHC 32.6 31.9   RDW 12.9 12.9     206     Lab Results   Component Value Date    LIPASE 64 01/19/2018    LIPASE 111 01/18/2018

## 2018-01-21 NOTE — PHARMACY-CONSULT NOTE
Pharmacy Tube Feeding Consult    Medication reviewed for administration by feeding tube and for potential food/drug interactions.    Recommendation: No changes are needed at this time.     Pharmacy will continue to follow as new medications are ordered.    Benji Nichols, PharmD, BCPS

## 2018-01-21 NOTE — PROGRESS NOTES
"Patent A&O, AVSS. Continues to have \"sharp and stabbing\" pain to right upper abdomen. IV Dilaudid orders changed from Q 2 hrs to Q 3 hrs with Liquid Oxycodone 10 mg given once this shift. Intermittent nausea reported, no emesis but pt states one episode of dry heaves. Prn Zofran and Benadryl given with relief. Tube feeding at 10 ml/hr advanced to 20 ml/hr at start of shift and now currently at 30 ml/hr and tolerating. Pancreatic enzymes and sodium bicarb dissolved and added to feeding. Encouraged ambulation. Patient up independently to bathroom and took 2 walks in hallway today with her Mom. Voiding spontaneously and had BM this am.   [facility],Sunday at 9 am in [facility]. Rm [id number] - patient invited family and Mom attended. Continue POC. /76 (BP Location: Right arm)  Pulse 74  Temp 98.9  F (37.2  C) (Oral)  Resp 20  Ht 1.689 m (5' 6.5\")  Wt 67.8 kg (149 lb 7.6 oz)  SpO2 99%  BMI 23.76 kg/m2    "

## 2018-01-21 NOTE — PROGRESS NOTES
"Continues to receive prn Dilaudid 1 mg for sharp/ stabbing abdominal pain every 2 hrs. Reports of intermittent nausea and has been dry heaving. No emesis. Prn Benadryl and Zofran administered throughout with no relief. Pt not tolerating oral intake. TF initiated at 2000 infusing at 10 ml/hr and to advance after 8 hrs if tolerating. Pt refused to have TF avance from 10 ml to 20 ml at 0400, because she is not comfortable with advancement and fear of abdominal pain. Blood sugar checked through out, 93, 96 and 89.  Continues with D5 NS and 20Kcl infusing at 125 ml/hr. Teary eyes due to pain. Indepent to baroom and voiding spontaneously, 1 BM overnight. /81  Pulse 79  Temp 98.1  F (36.7  C) (Oral)  Resp 16  Ht 1.689 m (5' 6.5\")  Wt 67.8 kg (149 lb 7.6 oz)  SpO2 95%  BMI 23.76 kg/m2  Will continue to monitor pain and manage accordingly.  "

## 2018-01-22 PROCEDURE — 99232 SBSQ HOSP IP/OBS MODERATE 35: CPT | Performed by: INTERNAL MEDICINE

## 2018-01-22 PROCEDURE — 12000001 ZZH R&B MED SURG/OB UMMC

## 2018-01-22 PROCEDURE — 25000132 ZZH RX MED GY IP 250 OP 250 PS 637: Performed by: NURSE PRACTITIONER

## 2018-01-22 PROCEDURE — 25000128 H RX IP 250 OP 636: Performed by: NURSE PRACTITIONER

## 2018-01-22 PROCEDURE — 25000128 H RX IP 250 OP 636: Performed by: PHYSICIAN ASSISTANT

## 2018-01-22 PROCEDURE — 25000125 ZZHC RX 250: Performed by: PHYSICIAN ASSISTANT

## 2018-01-22 PROCEDURE — 25000132 ZZH RX MED GY IP 250 OP 250 PS 637: Performed by: PHYSICIAN ASSISTANT

## 2018-01-22 PROCEDURE — 99207 ZZC APP CREDIT; MD BILLING SHARED VISIT: CPT | Performed by: NURSE PRACTITIONER

## 2018-01-22 PROCEDURE — 27210436 ZZH NUTRITION PRODUCT SEMIELEM INTERMED CAN

## 2018-01-22 PROCEDURE — 25000132 ZZH RX MED GY IP 250 OP 250 PS 637

## 2018-01-22 PROCEDURE — 25800025 ZZH RX 258: Performed by: PHYSICIAN ASSISTANT

## 2018-01-22 PROCEDURE — 25000128 H RX IP 250 OP 636: Performed by: INTERNAL MEDICINE

## 2018-01-22 RX ORDER — GABAPENTIN 250 MG/5ML
400 SOLUTION ORAL 2 TIMES DAILY
Status: DISCONTINUED | OUTPATIENT
Start: 2018-01-22 | End: 2018-01-23 | Stop reason: HOSPADM

## 2018-01-22 RX ORDER — GABAPENTIN 250 MG/5ML
600 SOLUTION ORAL DAILY
Status: DISCONTINUED | OUTPATIENT
Start: 2018-01-22 | End: 2018-01-23 | Stop reason: HOSPADM

## 2018-01-22 RX ORDER — LIDOCAINE 4 G/G
1-3 PATCH TOPICAL
Status: DISCONTINUED | OUTPATIENT
Start: 2018-01-22 | End: 2018-01-22

## 2018-01-22 RX ORDER — LIDOCAINE 4 G/G
1-3 PATCH TOPICAL
Status: DISCONTINUED | OUTPATIENT
Start: 2018-01-22 | End: 2018-01-23 | Stop reason: HOSPADM

## 2018-01-22 RX ORDER — HYDROMORPHONE HYDROCHLORIDE 1 MG/ML
0.5 INJECTION, SOLUTION INTRAMUSCULAR; INTRAVENOUS; SUBCUTANEOUS 2 TIMES DAILY PRN
Status: DISCONTINUED | OUTPATIENT
Start: 2018-01-22 | End: 2018-01-23

## 2018-01-22 RX ADMIN — PANCRELIPASE 40000 UNITS: 20000; 68000; 109000 CAPSULE, DELAYED RELEASE ORAL at 08:54

## 2018-01-22 RX ADMIN — MULTIVIT AND MINERALS-FERROUS GLUCONATE 9 MG IRON/15 ML ORAL LIQUID 15 ML: at 08:54

## 2018-01-22 RX ADMIN — POTASSIUM CHLORIDE, DEXTROSE MONOHYDRATE AND SODIUM CHLORIDE: 150; 5; 900 INJECTION, SOLUTION INTRAVENOUS at 17:21

## 2018-01-22 RX ADMIN — ACETAMINOPHEN 975 MG: 325 TABLET, FILM COATED ORAL at 14:09

## 2018-01-22 RX ADMIN — ONDANSETRON 4 MG: 4 TABLET, ORALLY DISINTEGRATING ORAL at 09:31

## 2018-01-22 RX ADMIN — Medication 0.5 MG: at 01:42

## 2018-01-22 RX ADMIN — OXYCODONE HYDROCHLORIDE 10 MG: 5 SOLUTION ORAL at 08:52

## 2018-01-22 RX ADMIN — Medication 0.5 MG: at 12:48

## 2018-01-22 RX ADMIN — LIDOCAINE 1 PATCH: 560 PATCH PERCUTANEOUS; TOPICAL; TRANSDERMAL at 19:58

## 2018-01-22 RX ADMIN — PANCRELIPASE 40000 UNITS: 20000; 68000; 109000 CAPSULE, DELAYED RELEASE ORAL at 13:27

## 2018-01-22 RX ADMIN — Medication 0.5 MG: at 05:10

## 2018-01-22 RX ADMIN — GABAPENTIN 400 MG: 250 SOLUTION ORAL at 14:08

## 2018-01-22 RX ADMIN — DIPHENHYDRAMINE HYDROCHLORIDE 25 MG: 50 INJECTION, SOLUTION INTRAMUSCULAR; INTRAVENOUS at 16:21

## 2018-01-22 RX ADMIN — ONDANSETRON 4 MG: 4 TABLET, ORALLY DISINTEGRATING ORAL at 16:21

## 2018-01-22 RX ADMIN — Medication 0.5 MG: at 22:29

## 2018-01-22 RX ADMIN — PANCRELIPASE 40000 UNITS: 20000; 68000; 109000 CAPSULE, DELAYED RELEASE ORAL at 01:20

## 2018-01-22 RX ADMIN — SODIUM BICARBONATE 325 MG: 325 TABLET ORAL at 01:20

## 2018-01-22 RX ADMIN — POTASSIUM CHLORIDE, DEXTROSE MONOHYDRATE AND SODIUM CHLORIDE: 150; 5; 900 INJECTION, SOLUTION INTRAVENOUS at 09:17

## 2018-01-22 RX ADMIN — POTASSIUM CHLORIDE, DEXTROSE MONOHYDRATE AND SODIUM CHLORIDE: 150; 5; 900 INJECTION, SOLUTION INTRAVENOUS at 01:25

## 2018-01-22 RX ADMIN — SODIUM BICARBONATE 325 MG: 325 TABLET ORAL at 19:19

## 2018-01-22 RX ADMIN — DIPHENHYDRAMINE HYDROCHLORIDE 25 MG: 50 INJECTION, SOLUTION INTRAMUSCULAR; INTRAVENOUS at 03:47

## 2018-01-22 RX ADMIN — PANTOPRAZOLE SODIUM 40 MG: 40 TABLET, DELAYED RELEASE ORAL at 19:58

## 2018-01-22 RX ADMIN — DIPHENHYDRAMINE HYDROCHLORIDE 25 MG: 50 INJECTION, SOLUTION INTRAMUSCULAR; INTRAVENOUS at 10:14

## 2018-01-22 RX ADMIN — GABAPENTIN 400 MG: 250 SOLUTION ORAL at 08:54

## 2018-01-22 RX ADMIN — SODIUM BICARBONATE 325 MG: 325 TABLET ORAL at 08:59

## 2018-01-22 RX ADMIN — OXYCODONE HYDROCHLORIDE 10 MG: 5 SOLUTION ORAL at 14:08

## 2018-01-22 RX ADMIN — SODIUM BICARBONATE 325 MG: 325 TABLET ORAL at 13:27

## 2018-01-22 RX ADMIN — PANCRELIPASE 40000 UNITS: 20000; 68000; 109000 CAPSULE, DELAYED RELEASE ORAL at 19:20

## 2018-01-22 RX ADMIN — NORTRIPTYLINE HYDROCHLORIDE 30 MG: 10 CAPSULE ORAL at 22:29

## 2018-01-22 RX ADMIN — DIPHENHYDRAMINE HYDROCHLORIDE 25 MG: 50 INJECTION, SOLUTION INTRAMUSCULAR; INTRAVENOUS at 22:29

## 2018-01-22 RX ADMIN — PANTOPRAZOLE SODIUM 40 MG: 40 TABLET, DELAYED RELEASE ORAL at 08:53

## 2018-01-22 RX ADMIN — OXYCODONE HYDROCHLORIDE 10 MG: 5 SOLUTION ORAL at 19:24

## 2018-01-22 RX ADMIN — GABAPENTIN 600 MG: 250 SOLUTION ORAL at 19:58

## 2018-01-22 RX ADMIN — SENNOSIDES AND DOCUSATE SODIUM 1 TABLET: 8.6; 5 TABLET ORAL at 08:53

## 2018-01-22 ASSESSMENT — PAIN DESCRIPTION - DESCRIPTORS
DESCRIPTORS: SHARP;RADIATING
DESCRIPTORS: SHARP

## 2018-01-22 NOTE — PROGRESS NOTES
VSS. Pt reports continued pain in RUQ that radiates to back. Dilauded, Oxycodone, and Tylenol given for pain. Pt reported improvement in pain from 8 to 5. Pt reports nausea-some relief with IV Benadryl and sublingual Zofran. Tube feedings at goal rate of 50ml/hr with q4hr flushes of 15ml/hr. No episodes of vomiting. Pt ambulated in andres. NJ tube in place.

## 2018-01-22 NOTE — PROGRESS NOTES
VSS. Patient continues to endorse sharp pain to right upper abdomen; reports pain goes through to her back. PO oxycodone given and PRN IV dilaudid given with some relief. Patient continues to report some nausea; IV benadryl and IV zofran given with some relief. Patient has not taken anything by mouth this shift. Feeds running via NJ tube; feeds increased to 40ml/hr at 0140; next rate change due at 0940. Patient sleeping intermittently overnight. Patient ambulating independently and voiding spontaneously. Maintenance fluids via Port at 125ml/hr. NJ securement device changed overnight; noted skin to be reddened underneath. Skin protectant applied prior to new device application. Will continue to monitor.

## 2018-01-22 NOTE — CONSULTS
Inpatient Pain Management Service: Consultation      DATE OF CONSULT: January 22, 2018      REASON FOR PAIN CONSULTATION:  Alexandra Melgoza is a 22 year old female I am seeing in consultation at the request of Herberth Kerns PA-C for evaluation and recommendations for her acute on chronic RUQ pain.       CHIEF PAIN COMPLAINT:  Acute on chronic RUQ pain      ASSESSMENT:  1. Acute on chronic RUQ pain.  Pt had ERCP on 1/18/18 for diagnostic reasons and NJ tube placement was admitted due to uncontrolled pain and nausea post ERCP.  No new pathology noted. She had past NJ tube placements x 2, the first one lasted 4 days which helped with her pain.  The second NJ tube last 24 hours.  They were removed due to coiling of the tube.  States that if needed in future, GJ tube has been discussed.  2. Chronic abdominal pain since age 8.   She does not meet criteria for chronic pancreatitis.  She has been receiving chronic pain care by Dr. Leonardo Wang at [facility].  She was discontinued on oxycodone 10mg TID and started on methadone 2.5mg BID for a short while.  She states that she tried methadone for a time but agreed with Dr. Wang to discontinuing it because of nausea.  She is now back to using oxycodone solution 10mg TID.  States that she uses oxycodone everyday anywhere from 10-30mg/day.  3. Chronic opioid use.  PTA, uses oxycodone solution 10mg TID which manages her pain to 4/10 on VAS and is very controlled for her.  4. H/o Depression.  Was planning on seeing psychologist Quyen Lennon at [facility] pain clinic.  5. H/o somataform disorder  6. H/o pseudoseizures     -- Outpatient opioid requirements prior to admission: oxycodone 30mg/day    [place] and [place]  reviewed.-Pt lives in [place] but is able to fly to [place] as needed for GI and pain care at the [facility].    Primary Care Provider: Quyen Baez  Chronic Pain Provider: Leonardo Bennett MD    TREATMENT RECOMMENDATIONS/PLAN:    1. Continue with PTA oxycodone solution 10mg PO or FT  TID   No need to provide a prescription for oxycodone upon discharge, she has a script to last her until 2/14/18 (plus the 4+ days that she was inpatient).  2. Decrease IV Dilaudid 0.5mg inj up BID for rescue and activations only for 1/22/18.     Discontinue IV injection  HM on 1/23/18.  Discussed that this is being tapered and discontinued in preparation for discharge which patient is agreeable.  3. Increase gabapentin 400mg in am, 400mg in afternoon, 600mg at bedttime PO or FT.  (PTA was 400mg TID).   At discharge, may need to provide additional 200mg capsules of gabapentin #22 capsules until her appt. On 2/13/18 at [facility]. pt has 400mg capsules at home only.  4. Start Lidoderm 5%, 1-3 patches 12hours on and 12 hours off.   If helpful, may consider OTC lidocaine patches 4% in the outpatient setting if insurance will not cover the 5%.  5. Start Menthol patches 1 patch TD Q 8 hours.  6. Consider trial of TENS unit via physical therapy service or in the outpatient setting.  She tried a TENS unit for past LE CRPS pain with benefit.  7. Consider scheduling acetaminophen 975mg PO BID.  Slight ALT and AST elevation on 1/18/18.  States that acetaminophen scheduled in past was helpful.   8. No role for muscle relaxant at this time due to patient not endorsing muscle spasms.  9. Please have patient f/u with [facility] pain clinic with scheduled appointment on 2/13/18 or patient can try to move up the appointment sooner if needed.      Pain Service will sign off at this time.     Recommendations were discussed with pain team and medicine team (Sapphire Guerra CNP)  Plan was reviewed by the Pain Service consisting of Bryce Alvaraod MD    Thank you for consulting the Inpatient Pain Management Service.   The above recommendations are to be acted upon at the primary team s discretion.    To reach us:  Mon - Friday 8 AM - 3 PM: Pager [phone] (Text  Page)  After hours, weekends and holidays: Primary service should call [phone] for the on-call pain specialist    HISTORY OF PRESENT ILLNESS: Alexandra Melgoza is a 22 year old female with  history of asthma, chronic abdominal pain since age 8 but worsened since cholecystectomy, cyclic N/V, migraines, pseudoseizures and somatoform disorder admitted on 1/18/2018 secondary to uncontrolled pain and nausea post ERCP and NJ placement on 1/18/18.  Typically, bowel rest provides relief to abdominal pain exacerbations.    Today, 1/22/18, she feels that RUQ pain is better controlled at 6/10 on the VAS.  Pain is sharp constantly but intermittently has more intense pain.  Pain is mostly related to exacerbation from ERCP procedure on 1/18/18.  States that prior to that she was managing her pain well (4/10 on VAS) with liquid oxycodone 10mg max TID.  She was on methadone 2.5mg BID but discontinued that recently as agreed by Dr. Wang (pain) due to nausea.  She is now back to using liquid oxycodone 10mg TID.  Discussed plan to taper and discontinue IV Dilaudid in anticipation of discharge.  Discussed that oxycodone will likely provide longer lasting relief than IV Dilaudid.  Other pain management options provided to her and she was agreeable.  Encourage her to f/u with Dr. Wang at [facility].    PAIN HISTORY:   Location: RUQ  Duration: constant, with more intermittent intense pain  Pain intensity: 6/10 on VAS  Quality of the pain: Sharp  Aggravating factors: oral intake  Relieving factors : pain medication, NJ tube    CAPA (Clinically Aligned Pain Assessment)  Comfort (How is your pain?): Tolerable with discomfort  Change in Pain (Since your last medication/intervention?): Getting better  Pain Control (How are your pain treatments working?): Partially effective pain control  Functioning (Are you able to do activities to get better?) : Able to do most ADLs but pain gets in the way of some.  Sleep (Does your pain  management allow you to sleep or rest?): slept well last night      FUNCTIONAL STATUS:  Change:      improving  Oral intake:     Tube feed via NJ tube.  Alvin will provide in home care for tube feeding when she is discharged.  Bowel function:    Soft. Last BM was on 1/21/18  Activity level:     Ambulating in andres  Sleep:      No concerns, sleeps well through night  Mood:      Pleasant, calm and appropriate.    REVIEW OF 10 BODY SYSTEMS: 10 point ROS of systems including Constitutional, Eyes, Respiratory, Cardiovascular, Gastroenterology, Genitourinary, Integumentary, Musculoskeletal, Psychiatric were all negative except for pertinent positives noted in my HPI.       INPATIENT MEDICATIONS PERTINENT TO PAIN CONSULT:   Medications related to Pain Management (Future)    Start     Dose/Rate Route Frequency Ordered Stop    01/21/18 1400  oxyCODONE (ROXICODONE) solution 10 mg      10 mg Oral or Feeding Tube 3 TIMES DAILY 01/21/18 1131      01/21/18 1220  HYDROmorphone (PF) (DILAUDID) injection 0.5 mg      0.5 mg Intravenous EVERY 3 HOURS PRN 01/21/18 1220      01/19/18 2200  senna-docusate (SENOKOT-S;PERICOLACE) 8.6-50 MG per tablet 1 tablet      1 tablet Oral 2 TIMES DAILY 01/19/18 2158 01/19/18 1731  diphenhydrAMINE (BENADRYL) injection 25 mg      25 mg  over 1-2 Minutes Intravenous EVERY 6 HOURS PRN 01/19/18 1732      01/19/18 0800  polyethylene glycol (MIRALAX/GLYCOLAX) Packet 17 g      17 g Oral DAILY 01/18/18 2150 01/18/18 2300  gabapentin (NEURONTIN) solution 400 mg      400 mg ORAL OR NJ TUBE 3 TIMES DAILY 01/18/18 2150      01/18/18 2300  nortriptyline (PAMELOR) capsule 30 mg      30 mg Oral AT BEDTIME 01/18/18 2150 01/18/18 2150  diphenhydrAMINE (BENADRYL) solution 25-50 mg      25-50 mg Oral EVERY 6 HOURS PRN 01/18/18 2150      01/18/18 2150  acetaminophen (TYLENOL) tablet 975 mg      975 mg Oral EVERY 8 HOURS PRN 01/18/18 2150              CURRENT MEDICATIONS:   Current Facility-Administered  Medications Ordered in Epic   Medication Dose Route Frequency Provider Last Rate Last Dose     oxyCODONE (ROXICODONE) solution 10 mg  10 mg Oral or Feeding Tube TID Herberth Kerns PA-C   10 mg at 01/22/18 0852     HYDROmorphone (PF) (DILAUDID) injection 0.5 mg  0.5 mg Intravenous Q3H PRN Stacey Gray MD   0.5 mg at 01/22/18 0510     glucose 40 % gel 15-30 g  15-30 g Oral Q15 Min PRN Stacey Gray MD        Or     dextrose 50 % injection 25-50 mL  25-50 mL Intravenous Q15 Min PRN Stacey Gray MD   25 mL at 01/20/18 1215    Or     glucagon injection 1 mg  1 mg Subcutaneous Q15 Min PRN Stacey Gray MD         dextrose 5% and 0.9% NaCl with potassium chloride 20 mEq infusion   Intravenous Continuous Herberth Kerns PA-C 125 mL/hr at 01/22/18 1000       dextrose 10 % 1,000 mL infusion   Intravenous Continuous PRN Herberth Kerns PA-C         multivitamins with minerals (CERTAVITE/CEROVITE) liquid 15 mL  15 mL Per Feeding Tube Daily Herberth Kerns PA-C   15 mL at 01/22/18 0854     pantoprazole (PROTONIX) suspension 40 mg  40 mg Oral BID Corbin Vaughn APRN CNP   40 mg at 01/22/18 0853     amylase-lipase-protease (ZENPEP) 46350 UNITS delayed release capsule 40,000 Units  2 capsule Per Feeding Tube Q6H Corbin Vaughn APRN CNP   40,000 Units at 01/22/18 0854    And     sodium bicarbonate tablet 325 mg  325 mg Per Feeding Tube Q6H Corbin Vaughn APRN CNP   325 mg at 01/22/18 0859     ondansetron (ZOFRAN) injection 4-8 mg  4-8 mg Intravenous Q6H PRN Herberth Kerns PA-C   8 mg at 01/21/18 2334    Or     ondansetron (ZOFRAN-ODT) ODT tab 4-8 mg  4-8 mg Oral Q6H PRN Herberth Kerns PA-C   4 mg at 01/22/18 0931     diphenhydrAMINE (BENADRYL) injection 25 mg  25 mg Intravenous Q6H PRN Herberth Kerns PA-C   25 mg at 01/22/18 1014     senna-docusate (SENOKOT-S;PERICOLACE) 8.6-50 MG per tablet 1 tablet  1 tablet Oral BID Ivelisse Lynn PA   1 tablet at 01/22/18  0853     scopolamine (TRANSDERM) 72 hr patch 1 patch  1 patch Transdermal Q72H Georgie Lopez MD   1 patch at 01/21/18 2010    And     scopolamine (TRANSDERM-SCOP) Patch in Place   Transdermal Q8H Georgie Lopez MD        And     scopolamine (TRANSDERM-SCOP) patch REMOVAL   Transdermal Q72H Georgie Lopez MD         amylase-lipase-protease (ZENPEP) 87478 UNITS delayed release capsule 40,000-60,000 Units  2-3 capsule Oral With Snacks or Supplements Corbin Vaughn APRN CNP         gabapentin (NEURONTIN) solution 400 mg  400 mg ORAL OR NJ TUBE TID Corbin Vaughn APRN CNP   400 mg at 01/22/18 0854     nortriptyline (PAMELOR) capsule 30 mg  30 mg Oral At Bedtime Corbin Vaughn APRN CNP   30 mg at 01/19/18 2344     polyethylene glycol (MIRALAX/GLYCOLAX) Packet 17 g  17 g Oral Daily Corbin Vaughn APRN CNP   17 g at 01/21/18 0843     acetaminophen (TYLENOL) tablet 975 mg  975 mg Oral Q8H PRN Corbin Vaughn APRN CNP   975 mg at 01/20/18 2042     naloxone (NARCAN) injection 0.1-0.4 mg  0.1-0.4 mg Intravenous Q2 Min PRN Corbin Vaughn APRN CNP         diphenhydrAMINE (BENADRYL) solution 25-50 mg  25-50 mg Oral Q6H PRN Corbin Vaughn APRN CNP   50 mg at 01/18/18 2310     No current [facility]-ordered outpatient prescriptions on file.           HOME/PREVIOUS MEDICATIONS:   Prior to Admission medications    Medication Sig Start Date End Date Taking? Authorizing Provider   oxyCODONE (ROXICODONE) 5 MG/5ML solution Take 10 mLs (10 mg) by mouth every 8 hours as needed for pain maximum 30 mg per day 1/15/18  Yes Leonardo Wang MD   scopolamine (TRANSDERM) 72 hr patch Apply 1 patch to hairless area behind one ear if severe nausea and vomiting.  Remove old patch and change every 3 days (72 hours). 1/4/18  Yes Corbin Vaughn APRN CNP   ondansetron (ZOFRAN ODT) 4 MG ODT tab Take 1 tablet (4 mg) by mouth every 8 hours as needed for nausea or vomiting 1/4/18  Yes Johana,  ANABEL Godfrey CNP   gabapentin (NEURONTIN) 400 MG capsule Take 1 capsule (400 mg) by mouth 3 times daily 12/20/17  Yes Leonardo Wang MD   nortriptyline (PAMELOR) 10 MG capsule Take 3 capsules (30 mg) by mouth At Bedtime 12/13/17  Yes Meme Hameed MD   amylase-lipase-protease (ZENPEP) 70386 UNITS CPEP Take 2-3 capsules (40,000-60,000 Units) by mouth Take with snacks or supplements (Snacks) 12/13/17  Yes Meme Hameed MD   amylase-lipase-protease (ZENPEP) 03534 UNITS CPEP Take 5 capsules (100,000 Units) by mouth 3 times daily (with meals) 12/13/17  Yes Meme Hameed MD   senna-docusate (SENOKOT-S;PERICOLACE) 8.6-50 MG per tablet Take 1 tablet by mouth 2 times daily as needed for constipation 12/13/17  Yes Meme Hameed MD   pantoprazole (PROTONIX) 40 MG EC tablet Take 1 tablet (40 mg) by mouth 2 times daily (before meals) 12/13/17  Yes Meme Hameed MD   polyethylene glycol (MIRALAX/GLYCOLAX) Packet Take 17 g by mouth daily 12/14/17  Yes Meme Hameed MD   norethindrone (AYGESTIN) 5 MG tablet Take 1 tablet (5 mg) by mouth daily 1/23/17  Yes Ysabel Goddard NP   fluticasone (FLONASE) 50 MCG/ACT spray Spray 2 sprays into both nostrils daily 1/23/17  Yes Ysabel Goddard NP   levalbuterol (XOPENEX HFA) 45 MCG/ACT Inhaler Inhale 2 puffs into the lungs every 6 hours as needed for shortness of breath / dyspnea 1/23/17   Ysabel Goddard NP   leuprolide (LUPRON DEPOT) 11.25 MG injection Inject 11.25 mg into the muscle every 3 months 5/3/16   Mary Anne Aranda MD   RIZATRIPTAN BENZOATE PO Take 5 mg by mouth once as needed     Adithya Haji MD           PAST PAIN TREATMENT:   Chronic opioid management      ALLERGIES:    Allergies   Allergen Reactions     Amoxicillin-Pot Clavulanate Nausea and Vomiting     Compazine [Prochlorperazine] Other (See Comments)     Dystonia       Hyoscyamine Other (See Comments)     Dystonia     Reglan [Metoclopramide Hcl] Other (See Comments)      "Dystonia     Zyprexa Other (See Comments)     Sensitive, dystonic reaction on 11-9-2011     Amitriptyline Hcl Other (See Comments)     Dystonia, hallucinations     Buspirone Other (See Comments)     No Adverse Reactions, no benefit     Cogentin [Benztropine]      Cyproheptadine Other (See Comments)     Distonic     Dicyclomine Other (See Comments)     Droperidol Other (See Comments)     Feels tense and \"like she has to jump out of her skin\".       Effexor [Venlafaxine] Other (See Comments)     Dystonia     Food      Cilantro--lips/tongue swelling     No Clinical Screening - See Comments Other (See Comments)     Cilantro     Phenergan Dm [Promethazine-Dm] Other (See Comments)     dystonia     Promethazine Other (See Comments)     Risperidone Other (See Comments)     dystonia     Vistaril Other (See Comments)     Burning sensation.       Augmentin GI Disturbance     Ketamine Other (See Comments)     jittery     Sorbitol GI Disturbance     Headache and dyspepsia            PAST MEDICAL AND PSYCHIATRIC HISTORY:    Past Medical History:   Diagnosis Date     Anxiety      Asthma      Cholecystitis     s/p cholecystectomy     Chronic abdominal pain      Chronic infection     mrsa     Chronic pain      Cyclic vomiting syndrome 10/27/2012     Depression      Endometriosis      Hypoglycaemia      Migraines      Mild intermittent asthma      Ovarian cysts      Pancreatic disease      PONV (postoperative nausea and vomiting)      Pseudoseizures      Somatoform disorder      Sphincter of Oddi dysfunction      Vasovagal syncope            PAST SURGICAL HISTORY:   Past Surgical History:   Procedure Laterality Date     ABDOMEN SURGERY      ERCP, biliary stents     CHOLECYSTECTOMY  8/2/11     COLONOSCOPY  2011    negative finding     ENDOSCOPIC RETROGRADE CHOLANGIOPANCREATOGRAM  8/23/2011    Procedure:ENDOSCOPIC RETROGRADE CHOLANGIOPANCREATOGRAM; Endoscopic Retrograde Cholangiopancreatogram; Surgeon:SHORTY MCCOY; Location:[facility] " OR     ENDOSCOPIC RETROGRADE CHOLANGIOPANCREATOGRAM  5/17/2012    Procedure:ENDOSCOPIC RETROGRADE CHOLANGIOPANCREATOGRAM; Endoscopic Retrograde Cholangiopancreatogram with pancreatic stent placement.; Surgeon:CAMPBELL NARAYANAN; Location:[facility] OR     ENDOSCOPIC RETROGRADE CHOLANGIOPANCREATOGRAM N/A 1/18/2018    Procedure: COMBINED ENDOSCOPIC RETROGRADE CHOLANGIOPANCREATOGRAPHY, PLACE TUBE/STENT;  Endoscopic Retrograde Cholangiopancreatography with nasojejunal feeding tube placement;  Surgeon: Campbell Narayanan MD;  Location: [facility] OR     ENDOSCOPIC RETROGRADE CHOLANGIOPANCREATOGRAM COMPLEX  1/3/2012    Procedure:ENDOSCOPIC RETROGRADE CHOLANGIOPANCREATOGRAM COMPLEX; Endoscopic Retrograde Cholangiopancreatogram with Manometry bile duct sphincterotomy extention pancreatic duct sphincterotomy pancreatic duct stent placement; Surgeon:CAMPBELL NARAYANAN; Location[facility] OR     ENDOSCOPIC ULTRASOUND UPPER GASTROINTESTINAL TRACT (GI) N/A 6/9/2015    Procedure: ENDOSCOPIC ULTRASOUND, ESOPHAGOSCOPY / UPPER GASTROINTESTINAL TRACT (GI);  Surgeon: aMrio Joe MD;  Location: [facility] OR     ENDOSCOPIC ULTRASOUND UPPER GASTROINTESTINAL TRACT (GI) N/A 12/12/2016    Procedure: ENDOSCOPIC ULTRASOUND, ESOPHAGOSCOPY / UPPER GASTROINTESTINAL TRACT (GI);  Surgeon: Guru Jose Klein MD;  Location: [facility] OR     ESOPHAGOSCOPY, GASTROSCOPY, DUODENOSCOPY (EGD), COMBINED  1/18/2012    Procedure:COMBINED ESOPHAGOSCOPY, GASTROSCOPY, DUODENOSCOPY (EGD); Surgeon:ARNIE ESPINOZA; [facility]     ESOPHAGOSCOPY, GASTROSCOPY, DUODENOSCOPY (EGD), COMBINED  1/18/2012    Procedure:COMBINED ESOPHAGOSCOPY, GASTROSCOPY, DUODENOSCOPY (EGD); EGD; Surgeon:ARNIE ESPINOZA; Location[facility]     ESOPHAGOSCOPY, GASTROSCOPY, DUODENOSCOPY (EGD), COMBINED N/A 12/9/2017    Procedure: COMBINED ESOPHAGOSCOPY, GASTROSCOPY, DUODENOSCOPY (EGD);;  Surgeon: Josias Chan MD;  Location: [facility] GI     INSERT PORT VASCULAR ACCESS       L knee arthroscopy  2009      LAPAROSCOPY DIAGNOSTIC (GYN)  10/26/2012    Procedure: LAPAROSCOPY DIAGNOSTIC (GYN);  LAPAROSCOPY DIAGNOSTIC, CAUTERY ENDOMETRIOISIS and biopsy of Fallopian tube lesions;  Surgeon: Carla Lopez MD;  Location: [facility] OR     ORTHOPEDIC SURGERY  2008    knee     VASCULAR SURGERY             FAMILY HISTORY: family history includes Allergies in her maternal grandmother; Anxiety Disorder in an other family member; Bipolar Disorder in an other family member; CEREBROVASCULAR DISEASE in her maternal grandfather; Cardiovascular in her maternal grandfather; DIABETES in her paternal uncle; Depression in her paternal grandmother; Depression/Anxiety in her maternal grandfather; GASTROINTESTINAL DISEASE in her maternal grandfather; Hypertension in her father; Hypoglycemia in her brother. There is no history of CANCER.      HEALTH & LIFESTYLE PRACTICES:   Tobacco:  reports that she has never smoked. She has never used smokeless tobacco.  Alcohol:  reports that she does not drink alcohol.  Illicit drugs:  reports that she does not use illicit drugs.       SOCIAL HISTORY:  Pt lives in [place] with her fiance.  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  Her job is flexible in allowing her to fly to [place] for GI and pain care.  Social History     Social History     Marital status: Single     Spouse name: N/A     Number of children: N/A     Years of education: N/A     Occupational History     Not on file.     Social History Main Topics     Smoking status: Never Smoker     Smokeless tobacco: Never Used     Alcohol use No     Drug use: No     Sexual activity: No     Other Topics Concern     Parent/Sibling W/ Cabg, Mi Or Angioplasty Before 65f 55m? 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, and works part-time     Focusing on DBT therapy - individual and group therapy    Currently living with her mother at her grandmother's home        Considering going to nursing school         LABORATORY VALUES:    Last Basic Metabolic Panel:  Lab Results   Component Value Date     01/21/2018      Lab Results   Component Value Date    POTASSIUM 4.0 01/21/2018     Lab Results   Component Value Date    CHLORIDE 103 01/21/2018     Lab Results   Component Value Date    RENEA 8.7 01/21/2018     Lab Results   Component Value Date    CO2 29 01/21/2018     Lab Results   Component Value Date    BUN 5 01/21/2018     Lab Results   Component Value Date    CR 0.53 01/21/2018     Lab Results   Component Value Date    GLC 95 01/21/2018       CBC results:  Lab Results   Component Value Date    WBC 5.3 01/19/2018     Lab Results   Component Value Date    HGB 11.4 01/19/2018     Lab Results   Component Value Date    HCT 35.0 01/19/2018     Lab Results   Component Value Date     01/19/2018       DIAGNOSTIC TESTS:       Labs above reviewed as well as additional relevant diagnostic studies from the EPIC record.       PHYSICAL EXAMINATION:  VITAL SIGNS:  B/P: 102/72, T: 98.2, P: 74, R: 16    CONSTITUTIONAL/GENERAL APPEARANCE:  Of stated age.  NAD, calm. Sitting up in bed.  EYES:  Pupils round and Equal.  EOMIs. Sclerae clear  ENT/NECK:  Supple. Moist mucosa.  NJ tube present in left nostril  RESPIRATORY: non labored  CARDIOVASCULAR: Pulses regular.  GI:  Soft, round.  RUQ- sensitive to light touch and tender.    MUSCULOSKELETAL/BACK/SPINE/EXTREMITIES: Moving all extremities.  NO UE and LE edema     NEURO:  SILT to UE and LE.  SKIN/VASCULAR EXAM:  Dry and warm.  PSYCHIATRIC/BEHAVIORAL/OBSERVATIONS:  No objective signs of pain observed during our interview.   Judgment/Insight - fair   Orientation - x 4   Memory -good   Mood and affect -calm, cooperative, pleasant.             TIME SPENT:55  minutes including 25 minutes of face-to-face time counseling her  about her pain management treatment options, and coordinating care with the primary team.    Ani Garibay PA-C  January 22, 2018, 10:41 AM  Inpatient Pain Management  Service

## 2018-01-22 NOTE — PROGRESS NOTES
VSS. Pt reports pain in RUQ of abdomen that radiates to her back- liquid Oxycodone given via NJ tube which provided some relief. Pain clinic to consult patient later in morning. Pt still reports pain at 4/10. IV Benadryl and sublingual Zofran given for nausea-some relief. Feeds increased to goal of 50ml/hr. Free water flush decreased to 15ml q4hrs due to pt reports of nausea. Pt denies any emesis episodes this morning. Ambulates independently. NJ tube in place and secure. Family asking for tender  securing device as well as tube extender for at home ease of use.

## 2018-01-22 NOTE — PROGRESS NOTES
[facility], [place]  Internal Medicine Progress Note - Gold Service    Assessment & Plan   Alexandra Melgoza is a 22 year old female  admitted on 1/18/2018. She has a history of chronic abdominal pain, somatoform disorder, pseudoseizures, POTS and is admitted for post-op pain, nausea and vomiting.    Acute on Chronic Abdominal Pain; Post-ERCP Pain; Nausea - Pt carries history of chronic abd pain since age 8, does not meet criteria for chronic pancreatitis.  Has chronic pain care as OP with Dr. Leonardo Wang at [facility].  Managed on PTA Oxycodone 10 mg TID and Gabapentin 400 mg TID.  Briefly trialed Methadone but this was D/Cd 2/2 nausea.  She is currently using oxycodone every day anywhere from 10-30 mg per day.  Has been on IV Dilaudid this hospitalization given intolerance of oral meds.  S/p diagnostic ERCP with NJ feeding tube placement 1/18. Post procedure unable to go home secondary to pain, nausea and vomiting.  Initially started on Hydromorphone PCA, this has been since D/Cd.  Due to ongoing severe nausea 1/19 decision made to defer start of TFs until 1/20. Tube feeds have been increased to 50mL/hour which she is tolerating.  Per chart review, she historically has not tolerated TF greater than 20mL/hour.  Pain improved from yesterday.  Pain team consulted today for assistance with transition to oral medications.    - Pain recommendations: Continue Oxycodone 10 mg TID, Increase Gabapentin to 400mg/400mg/600mg, start Lidoderm and Menthol patches   - Ok for two more doses of IV Dilaudid tonight for rescue, no further IV doses tomorrow. This was discussed with patient and family.   - Follow up with OP Pain provider on 2/13, consider scheduling earlier  - PCP vs OP pain team consider TENS unit     Pancreatic Insufficiency - Continue home pancreatic enzymes    History of Depression - Continue Nortriptyline.  Plan to follow up with OP psychologist  Quyen Lennon at [facility].     Diet: Advance Diet as Tolerated: Clear Liquid Diet  Adult Formula Drip Feeding: Continuous Peptamen 1.5; Nasoduodenal tube; Goal Rate: 50; mL/hr; Medication - Tube Feeding Flush Frequency: At least 15-30 mL water before and after medication administration and with tube clogging; Amout to Send (Nutr...  Fluids: D5NS with 20 meq KCL at 125mL/hour  DVT Prophylaxis: Low Risk/Ambulatory with no VTE prophylaxis indicated  Code Status: Full Code    Disposition Plan   Expected discharge: Tomorrow, recommended to prior living arrangement once adequate pain management/ tolerating PO medications.     Entered: Sapphire Guerra 01/22/2018, 8:13 AM   Information in the above section will display in the discharge planner report.      The patient's care was discussed with the Attending Physician, Dr. Gray, Bedside Nurse, Care Coordinator/, Patient, Patient's Family and Pain .    Sapphire Guerra NP  Internal Medicine Staff Hospitalist Service  [facility]  Pager: [phone]  Please see sticky note for cross cover information    Interval History     No overnight events.  Pain is slightly better than yesterday. Tolerating tube feeds at goal.  Denies fever, chills, nausea, vomiting, chest pain SOB.  Denies HA, changes in vision.  No urinary symptoms.  Normal BMs.     Data reviewed today: I reviewed all medications, new labs and imaging results over the last 24 hours. I personally reviewed no images or EKG's today.    Physical Exam   Vital Signs: Temp: 98.2  F (36.8  C) Temp src: Oral BP: 102/72   Heart Rate: 68 Resp: 16 SpO2: 98 % O2 Device: None (Room air)    Weight: 149 lbs 7.55 oz    GENERAL: Alert and oriented x 3. Appears comfortable.   HEENT: Anicteric sclera. Mucous membranes moist. NJT in place with TF infusing.   CV: RRR. S1, S2. No murmurs appreciated.   RESPIRATORY: Effort normal on room air. Lungs CTAB with no wheezing, rales,  rhonchi.   GI: Abdomen soft and non distended, bowel sounds present. Mild tenderness to palpation on deep palpation.  No rebound, guarding.   MUSCULOSKELETAL: No joint swelling or tenderness.   NEUROLOGICAL: No focal deficits. Moves all extremities.   EXTREMITIES: No peripheral edema. Intact bilateral pedal pulses.   SKIN: No jaundice. No rashes.       Data   Medications     dextrose 5% and 0.9% NaCl with potassium chloride 20 mEq 125 mL/hr at 01/22/18 1300     IV fluid REPLACEMENT ONLY         gabapentin  400 mg ORAL OR NJ TUBE BID     gabapentin  600 mg Oral Daily     menthol   Transdermal Q8H     lidocaine  1-3 patch Transdermal Q24h    And     [START ON 1/23/2018] lidocaine   Transdermal Q24H    And     lidocaine   Transdermal Q8H     oxyCODONE  10 mg Oral or Feeding Tube TID     multivitamins with minerals  15 mL Per Feeding Tube Daily     pantoprazole  40 mg Oral BID     amylase-lipase-protease  2 capsule Per Feeding Tube Q6H    And     sodium bicarbonate  325 mg Per Feeding Tube Q6H     senna-docusate  1 tablet Oral BID     scopolamine  1 patch Transdermal Q72H    And     scopolamine   Transdermal Q8H    And     scopolamine   Transdermal Q72H     nortriptyline  30 mg Oral At Bedtime     polyethylene glycol  17 g Oral Daily     Data     Recent Labs  Lab 01/21/18  0754 01/20/18  0714 01/19/18  1039 01/18/18  1446 01/18/18  1043   WBC  --   --  5.3  --  3.7*   HGB  --   --  11.4*  --  12.4   MCV  --   --  93  --  94   PLT  --   --  204  --  206    138 136  --  139   POTASSIUM 4.0 3.8 4.3  --  4.1   CHLORIDE 103 101 101  --  103   CO2 29 23 26  --  28   BUN 5* 14 12  --  10   CR 0.53 0.57 0.49*  --  0.62   ANIONGAP 7 14 9  --  7   RENEA 8.7 8.9 8.9  --  9.0   GLC 95 52* 77  --  80   ALBUMIN  --   --  3.7  --  4.2   PROTTOTAL  --   --  6.6*  --  7.4   BILITOTAL  --   --  0.5  --  0.3   ALKPHOS  --   --  95  --  113   ALT  --   --  64* 78* 74*   AST  --   --  44 55* 38   LIPASE  --   --  64* 111 80     No  results found for this or any previous visit (from the past 24 hour(s)).

## 2018-01-23 ENCOUNTER — TELEPHONE (OUTPATIENT)
Dept: GASTROENTEROLOGY | Facility: CLINIC | Age: 23
End: 2018-01-23

## 2018-01-23 ENCOUNTER — APPOINTMENT (OUTPATIENT)
Dept: PHYSICAL THERAPY | Facility: CLINIC | Age: 23
DRG: 948 | End: 2018-01-23
Attending: NURSE PRACTITIONER
Payer: COMMERCIAL

## 2018-01-23 VITALS
DIASTOLIC BLOOD PRESSURE: 82 MMHG | RESPIRATION RATE: 16 BRPM | OXYGEN SATURATION: 100 % | TEMPERATURE: 98.7 F | HEART RATE: 80 BPM | HEIGHT: 67 IN | SYSTOLIC BLOOD PRESSURE: 128 MMHG | WEIGHT: 149.47 LBS | BODY MASS INDEX: 23.46 KG/M2

## 2018-01-23 LAB — GLUCOSE BLDC GLUCOMTR-MCNC: 105 MG/DL (ref 70–99)

## 2018-01-23 PROCEDURE — 25000128 H RX IP 250 OP 636: Performed by: INTERNAL MEDICINE

## 2018-01-23 PROCEDURE — 99238 HOSP IP/OBS DSCHRG MGMT 30/<: CPT | Mod: GC | Performed by: INTERNAL MEDICINE

## 2018-01-23 PROCEDURE — 99207 ZZC NO CHARGE SIGN-OFF PS: CPT

## 2018-01-23 PROCEDURE — 25000132 ZZH RX MED GY IP 250 OP 250 PS 637

## 2018-01-23 PROCEDURE — 99239 HOSP IP/OBS DSCHRG MGMT >30: CPT | Performed by: NURSE PRACTITIONER

## 2018-01-23 PROCEDURE — 25000132 ZZH RX MED GY IP 250 OP 250 PS 637: Performed by: NURSE PRACTITIONER

## 2018-01-23 PROCEDURE — 25800025 ZZH RX 258: Performed by: PHYSICIAN ASSISTANT

## 2018-01-23 PROCEDURE — 25000125 ZZHC RX 250: Performed by: PHYSICIAN ASSISTANT

## 2018-01-23 PROCEDURE — 64550 ZZHC PT APPLICATION OF SURFACE NUEROSTIMULATOR: CPT | Mod: GP

## 2018-01-23 PROCEDURE — 25000128 H RX IP 250 OP 636: Performed by: PHYSICIAN ASSISTANT

## 2018-01-23 PROCEDURE — 27210436 ZZH NUTRITION PRODUCT SEMIELEM INTERMED CAN

## 2018-01-23 PROCEDURE — 00000146 ZZHCL STATISTIC GLUCOSE BY METER IP

## 2018-01-23 PROCEDURE — 97161 PT EVAL LOW COMPLEX 20 MIN: CPT | Mod: GP

## 2018-01-23 PROCEDURE — 25000132 ZZH RX MED GY IP 250 OP 250 PS 637: Performed by: PHYSICIAN ASSISTANT

## 2018-01-23 PROCEDURE — 40000193 ZZH STATISTIC PT WARD VISIT

## 2018-01-23 RX ORDER — GABAPENTIN 250 MG/5ML
400 SOLUTION ORAL 2 TIMES DAILY
Qty: 470 ML | Refills: 0 | Status: SHIPPED | OUTPATIENT
Start: 2018-01-23 | End: 2018-03-13

## 2018-01-23 RX ORDER — GABAPENTIN 250 MG/5ML
600 SOLUTION ORAL DAILY
Qty: 450 ML | Refills: 0 | Status: SHIPPED | OUTPATIENT
Start: 2018-01-23 | End: 2018-02-25

## 2018-01-23 RX ORDER — SODIUM BICARBONATE 325 MG/1
325 TABLET ORAL EVERY 4 HOURS
Status: DISCONTINUED | OUTPATIENT
Start: 2018-01-23 | End: 2018-01-23 | Stop reason: HOSPADM

## 2018-01-23 RX ORDER — SODIUM BICARBONATE 325 MG/1
325 TABLET ORAL 4 TIMES DAILY
Qty: 200 TABLET | Refills: 0 | Status: ON HOLD | OUTPATIENT
Start: 2018-01-23 | End: 2018-02-27

## 2018-01-23 RX ORDER — HEPARIN SODIUM (PORCINE) LOCK FLUSH IV SOLN 100 UNIT/ML 100 UNIT/ML
500 SOLUTION INTRAVENOUS EVERY 8 HOURS
Status: DISCONTINUED | OUTPATIENT
Start: 2018-01-23 | End: 2018-01-23 | Stop reason: HOSPADM

## 2018-01-23 RX ADMIN — PANCRELIPASE 40000 UNITS: 20000; 68000; 109000 CAPSULE, DELAYED RELEASE ORAL at 02:13

## 2018-01-23 RX ADMIN — SODIUM BICARBONATE 325 MG: 325 TABLET ORAL at 13:20

## 2018-01-23 RX ADMIN — SODIUM CHLORIDE, PRESERVATIVE FREE 500 UNITS: 5 INJECTION INTRAVENOUS at 14:33

## 2018-01-23 RX ADMIN — ACETAMINOPHEN 975 MG: 160 SOLUTION ORAL at 09:30

## 2018-01-23 RX ADMIN — OXYCODONE HYDROCHLORIDE 10 MG: 5 SOLUTION ORAL at 09:28

## 2018-01-23 RX ADMIN — SODIUM BICARBONATE 325 MG: 325 TABLET ORAL at 09:00

## 2018-01-23 RX ADMIN — OXYCODONE HYDROCHLORIDE 10 MG: 5 SOLUTION ORAL at 14:11

## 2018-01-23 RX ADMIN — DIPHENHYDRAMINE HYDROCHLORIDE 25 MG: 50 INJECTION, SOLUTION INTRAMUSCULAR; INTRAVENOUS at 13:01

## 2018-01-23 RX ADMIN — GABAPENTIN 400 MG: 250 SOLUTION ORAL at 14:11

## 2018-01-23 RX ADMIN — PANTOPRAZOLE SODIUM 40 MG: 40 TABLET, DELAYED RELEASE ORAL at 09:47

## 2018-01-23 RX ADMIN — ONDANSETRON 4 MG: 4 TABLET, ORALLY DISINTEGRATING ORAL at 11:55

## 2018-01-23 RX ADMIN — PANCRELIPASE 40000 UNITS: 20000; 68000; 109000 CAPSULE, DELAYED RELEASE ORAL at 09:00

## 2018-01-23 RX ADMIN — DIPHENHYDRAMINE HYDROCHLORIDE 25 MG: 50 INJECTION, SOLUTION INTRAMUSCULAR; INTRAVENOUS at 05:53

## 2018-01-23 RX ADMIN — SODIUM BICARBONATE 325 MG: 325 TABLET ORAL at 02:13

## 2018-01-23 RX ADMIN — GABAPENTIN 400 MG: 250 SOLUTION ORAL at 09:46

## 2018-01-23 RX ADMIN — POTASSIUM CHLORIDE, DEXTROSE MONOHYDRATE AND SODIUM CHLORIDE: 150; 5; 900 INJECTION, SOLUTION INTRAVENOUS at 01:55

## 2018-01-23 RX ADMIN — MULTIVIT AND MINERALS-FERROUS GLUCONATE 9 MG IRON/15 ML ORAL LIQUID 15 ML: at 09:47

## 2018-01-23 NOTE — PLAN OF CARE
Problem: Patient Care Overview  Goal: Plan of Care/Patient Progress Review  PT 6D: Trialed TENS unit for RUQ pain. Pt reports no improvement in pain. Will sign off.

## 2018-01-23 NOTE — PLAN OF CARE
Problem: Patient Care Overview  Goal: Plan of Care/Patient Progress Review  Outcome: No Change  0925-9479: A&O, VSS on RA. C/o pain in RUQ that was well managed with schedule regimen, patches, and PRN IV Dilaudid x1. C/o nausea that was well managed with aromatherapy and PRN IV Benadryl x2. NJ patent with TF infusing at goal rate of 50 mL/hr; pt tolerating well. Port patent and infusing IVMF's. BG stable throughout shift. Up ad gonzález. Voiding spontaneously. BMx1. Plan for possible d/c home today. Continue to monitor and follow POC.

## 2018-01-23 NOTE — PROGRESS NOTES
Care Coordinator- Discharge Planning     Admission Date/Time:  1/18/2018  Attending MD:  Renny Harding MD     Data  Chart reviewed, discussed with interdisciplinary team.   Patient was admitted for:   1. Abdominal pain, epigastric    2. Chronic abdominal pain    3. Right upper quadrant abdominal pain         Assessment  Full assessment completed in previous note    Coordination of Care and Referrals: Provided patient/family with options for DME and Home Infusion.      Plan  Anticipated Discharge Date: 1/23/2018   Anticipated Discharge Plan: dc home with NJ and [place] for home infusion.  RNCC met with pt and mother at bedside, Per pt she has not had a phone call from [facility] stating when pump and feedings are going to be delivered.  RNCC contacted Becky Esquivel, who states she will stop in her room now and update them on plan for feedings and pump.  Pt also needing a Tens unit at home.  RNCC contacted several DME companies, the only one that is still doing this unit is [facility].  [facility] was unsure if it would be paid by insurance, they will contact pt if it ends up being self pay.  Pt could also buy one OTC if insurance will not cover it.  RNCC updated script in chart, faxed that and MD note to [facility].  Updated pt and mother on these items.  No other needs known.  Per NP, Sapphire, pt to dc this afternoon.     CTS Handoff completed:  YES      [facility]- tens unit  Ph [phone]  Fax [phone]              [facility]- TF  Enteral refills/pump questions/supplies  Ph: [phone]  Fx: [phone]  Cam[facility]- Enteral Liaison @ [phone]    NAOMI Vance, BSN    [facility]  [address] [place] [id number]    [email or web address]  [email or web address]    Office: [phone] Pager: [phone]

## 2018-01-23 NOTE — PROGRESS NOTES
01/23/18 1500   Quick Adds   Type of Visit Initial PT Evaluation   Living Environment   Living Environment Comment Did not gather as it doesn't pertain to this type of eval for TENS unit.    Self-Care   Activity/Exercise/Self-Care Comment Pt reports indep.   Functional Level Prior   Ambulation 0-->independent   Transferring 0-->independent   General Information   Patient/Family Goals Statement Improve pain.   Pertinent History of Current Problem (include personal factors and/or comorbidities that impact the POC) Alexandra Melgoza is a 22 year old female  admitted on 1/18/2018. She has a history of chronic abdominal pain, somatoform disorder, pseudoseizures, POTS and is admitted for post-op pain, nausea and vomiting.   Precautions/Limitations no known precautions/limitations   General Info Comments Pt indep.   Cognitive Status Examination   Orientation orientation to person, place and time   Level of Consciousness alert   Personal Safety and Judgment intact   Pain Assessment   Patient Currently in Pain Yes, see Vital Sign flowsheet   Integumentary/Edema   Integumentary/Edema no deficits were identifed   Posture    Posture Not impaired   Range of Motion (ROM)   ROM Comment WFL   Strength   Strength Comments WFL   Bed Mobility   Bed Mobility Comments Indep   Transfer Skills   Transfer Comments Indep   Gait   Gait Comments Indep   Balance   Balance Comments Indep   Sensory Examination   Sensory Perception no deficits were identified   General Therapy Interventions   Planned Therapy Interventions home program guidelines   Clinical Impression   Criteria for Skilled Therapeutic Intervention yes, treatment indicated   PT Diagnosis Impaired function due to chronic referred pain   Influenced by the following impairments Pain   Functional limitations due to impairments Endurance   Clinical Presentation Stable/Uncomplicated   Clinical Presentation Rationale Clinical judgement   Clinical Decision Making (Complexity) Low  "complexity   Therapy Frequency` (1x eval and treat)   Predicted Duration of Therapy Intervention (days/wks) 1x eval and treat   Anticipated Discharge Disposition Home   Risk & Benefits of therapy have been explained Yes   Patient, Family & other staff in agreement with plan of care Yes   [facility] AM-PAC  \"6 Clicks\" V.2 Basic Mobility Inpatient Short Form   1. Turning from your back to your side while in a flat bed without using bedrails? 4 - None   2. Moving from lying on your back to sitting on the side of a flat bed without using bedrails? 4 - None   3. Moving to and from a bed to a chair (including a wheelchair)? 4 - None   4. Standing up from a chair using your arms (e.g., wheelchair, or bedside chair)? 4 - None   5. To walk in hospital room? 4 - None   6. Climbing 3-5 steps with a railing? 4 - None   Basic Mobility Raw Score (Score out of 24.Lower scores equate to lower levels of function) 24   Total Evaluation Time   Total Evaluation Time (Minutes) 5     "

## 2018-01-23 NOTE — PROGRESS NOTES
Discharge instructions given and pt voiced understanding. Scripts picked up from pharmacy. Up walking in room and andres. Tolerating tube feeds. Urinating adequate amounts. Discomfort noted - oxycodone and Tylenol given with some relief. Nauseated relieved with Zofran. Adequate for discharge. Discharged to home with family.

## 2018-01-24 ENCOUNTER — OFFICE VISIT (OUTPATIENT)
Dept: GASTROENTEROLOGY | Facility: CLINIC | Age: 23
End: 2018-01-24
Payer: COMMERCIAL

## 2018-01-24 ENCOUNTER — INFUSION THERAPY VISIT (OUTPATIENT)
Dept: INFUSION THERAPY | Facility: CLINIC | Age: 23
End: 2018-01-24
Attending: INTERNAL MEDICINE
Payer: COMMERCIAL

## 2018-01-24 VITALS
TEMPERATURE: 97.1 F | WEIGHT: 149 LBS | SYSTOLIC BLOOD PRESSURE: 110 MMHG | OXYGEN SATURATION: 100 % | DIASTOLIC BLOOD PRESSURE: 60 MMHG | BODY MASS INDEX: 23.69 KG/M2 | HEART RATE: 91 BPM

## 2018-01-24 VITALS
OXYGEN SATURATION: 100 % | SYSTOLIC BLOOD PRESSURE: 110 MMHG | DIASTOLIC BLOOD PRESSURE: 60 MMHG | HEART RATE: 91 BPM | TEMPERATURE: 97.1 F

## 2018-01-24 DIAGNOSIS — R10.13 ABDOMINAL PAIN, EPIGASTRIC: Primary | ICD-10-CM

## 2018-01-24 DIAGNOSIS — Z78.9 ON TUBE FEEDING DIET: Primary | ICD-10-CM

## 2018-01-24 DIAGNOSIS — G89.29 CHRONIC ABDOMINAL PAIN: Primary | Chronic | ICD-10-CM

## 2018-01-24 DIAGNOSIS — R10.9 CHRONIC ABDOMINAL PAIN: Primary | Chronic | ICD-10-CM

## 2018-01-24 DIAGNOSIS — G43.A0 CYCLICAL VOMITING WITH NAUSEA, INTRACTABILITY OF VOMITING NOT SPECIFIED: Primary | ICD-10-CM

## 2018-01-24 DIAGNOSIS — R10.13 ABDOMINAL PAIN, EPIGASTRIC: ICD-10-CM

## 2018-01-24 DIAGNOSIS — K86.1 CHRONIC PANCREATITIS, UNSPECIFIED PANCREATITIS TYPE (H): ICD-10-CM

## 2018-01-24 DIAGNOSIS — Z71.3 NUTRITIONAL COUNSELING: ICD-10-CM

## 2018-01-24 LAB
ALBUMIN SERPL-MCNC: 3.5 G/DL (ref 3.4–5)
ALP SERPL-CCNC: 95 U/L (ref 40–150)
ALT SERPL W P-5'-P-CCNC: 107 U/L (ref 0–50)
ANION GAP SERPL CALCULATED.3IONS-SCNC: 6 MMOL/L (ref 3–14)
AST SERPL W P-5'-P-CCNC: 42 U/L (ref 0–45)
BILIRUB SERPL-MCNC: 0.2 MG/DL (ref 0.2–1.3)
BUN SERPL-MCNC: 12 MG/DL (ref 7–30)
CALCIUM SERPL-MCNC: 8.1 MG/DL (ref 8.5–10.1)
CHLORIDE SERPL-SCNC: 104 MMOL/L (ref 94–109)
CO2 SERPL-SCNC: 28 MMOL/L (ref 20–32)
CREAT SERPL-MCNC: 0.52 MG/DL (ref 0.52–1.04)
GFR SERPL CREATININE-BSD FRML MDRD: >90 ML/MIN/1.7M2
GLUCOSE SERPL-MCNC: 112 MG/DL (ref 70–99)
POTASSIUM SERPL-SCNC: 3.8 MMOL/L (ref 3.4–5.3)
PROT SERPL-MCNC: 6.2 G/DL (ref 6.8–8.8)
SODIUM SERPL-SCNC: 138 MMOL/L (ref 133–144)

## 2018-01-24 PROCEDURE — 25000128 H RX IP 250 OP 636: Mod: ZF | Performed by: NURSE PRACTITIONER

## 2018-01-24 PROCEDURE — 96374 THER/PROPH/DIAG INJ IV PUSH: CPT

## 2018-01-24 PROCEDURE — 25000128 H RX IP 250 OP 636: Mod: ZF | Performed by: INTERNAL MEDICINE

## 2018-01-24 PROCEDURE — 96376 TX/PRO/DX INJ SAME DRUG ADON: CPT

## 2018-01-24 PROCEDURE — 96361 HYDRATE IV INFUSION ADD-ON: CPT

## 2018-01-24 PROCEDURE — 96375 TX/PRO/DX INJ NEW DRUG ADDON: CPT

## 2018-01-24 PROCEDURE — 80053 COMPREHEN METABOLIC PANEL: CPT | Performed by: NURSE PRACTITIONER

## 2018-01-24 RX ORDER — ONDANSETRON 2 MG/ML
4 INJECTION INTRAMUSCULAR; INTRAVENOUS DAILY PRN
Status: DISCONTINUED | OUTPATIENT
Start: 2018-01-24 | End: 2018-01-24 | Stop reason: HOSPADM

## 2018-01-24 RX ORDER — ONDANSETRON 2 MG/ML
4 INJECTION INTRAMUSCULAR; INTRAVENOUS ONCE
Status: COMPLETED | OUTPATIENT
Start: 2018-01-24 | End: 2018-01-24

## 2018-01-24 RX ORDER — ONDANSETRON 2 MG/ML
4 INJECTION INTRAMUSCULAR; INTRAVENOUS ONCE
Status: CANCELLED
Start: 2018-01-24 | End: 2018-01-24

## 2018-01-24 RX ORDER — ONDANSETRON 2 MG/ML
4 INJECTION INTRAMUSCULAR; INTRAVENOUS DAILY PRN
Status: CANCELLED
Start: 2018-01-24

## 2018-01-24 RX ORDER — DIPHENHYDRAMINE HYDROCHLORIDE 50 MG/ML
25 INJECTION INTRAMUSCULAR; INTRAVENOUS ONCE
Status: CANCELLED
Start: 2018-01-24 | End: 2018-01-24

## 2018-01-24 RX ORDER — DIPHENHYDRAMINE HYDROCHLORIDE 50 MG/ML
25 INJECTION INTRAMUSCULAR; INTRAVENOUS ONCE
Status: COMPLETED | OUTPATIENT
Start: 2018-01-24 | End: 2018-01-24

## 2018-01-24 RX ADMIN — SODIUM CHLORIDE, POTASSIUM CHLORIDE, SODIUM LACTATE AND CALCIUM CHLORIDE 1000 ML: 600; 310; 30; 20 INJECTION, SOLUTION INTRAVENOUS at 10:05

## 2018-01-24 RX ADMIN — ONDANSETRON 4 MG: 2 INJECTION INTRAMUSCULAR; INTRAVENOUS at 12:27

## 2018-01-24 RX ADMIN — DIPHENHYDRAMINE HYDROCHLORIDE 25 MG: 50 INJECTION INTRAMUSCULAR; INTRAVENOUS at 10:07

## 2018-01-24 RX ADMIN — ONDANSETRON 4 MG: 2 INJECTION INTRAMUSCULAR; INTRAVENOUS at 10:07

## 2018-01-24 NOTE — PROGRESS NOTES
Nursing Note  Alexandra Melgoza [facility] for:   Chief Complaint   Patient presents with     Infusion     IVF, Zofran     During today's [facility] appointment, orders from Dr Narayanan were completed.  Frequency: 2-3 times weekly    Progress note:  Patient identification verified by name and date of birth.  Assessment completed.  Vitals recorded in Doc Flowsheets.  Patient was provided with education regarding infusion and possible side effects.  Patient verbalized understanding.      needed: No  Premedications: administered per order.  Infusion Rates: 999 ml/hr.  Approximate Infusion length:2 hour   Labs: were ordered for this appointment.  Vascular access: port accessed today.  Treatment Conditions: non-applicable.  Patient tolerated infusion: well.    Drug Waste Record? Yes      Drug Name: Benadryl   Dose: 25mg  Route administered: IV  NDC #: 6333-1589-07  Amount of waste(mL):0.5ml  Reason for waste: Single use vial       Discharge Plan:   Follow up plan of care with: ongoing infusions at [facility].  Discharge instructions were reviewed with patient.  Patient/representative verbalized understanding of discharge instructions and all questions answered.  Patient discharged from [facility] in stable condition.  Lauren Alvarez RN    Administrations This Visit     diphenhydrAMINE (BENADRYL) injection 25 mg     Admin Date Action Dose Route Administered By             01/24/2018 Given 25 mg Intravenous Nita Raymundo RN                    lactated ringers BOLUS 1,000-2,000 mL     Admin Date Action Dose Rate Route Administered By          01/24/2018 New Bag 1000 mL 999 mL/hr Intravenous Nita Raymundo RN                   ondansetron (ZOFRAN) injection 4 mg     Admin Date Action Dose Route Administered By             01/24/2018 Given 4 mg Intravenous Nita Raymundo  RN              Admin Date Action Dose Route Administered By             01/24/2018 Given 4 mg Intravenous Lauren Menjivar RN                          /60  Pulse 91  Temp 97.1  F (36.2  C) (Oral)  SpO2 100%

## 2018-01-24 NOTE — LETTER
1/24/2018       RE: Alexandra Melgoza  [address]  [facility] [place] [facility] [address]     Dear Colleague,    Thank you for referring your patient, Alexandra Melgoza, to the [facility] at [facility]. Please see a copy of my visit note below.    REASON FOR VISIT: Hospital discharge follow-up       HISTORY OF PRESENT ILLNESS:     Alexandra Melgoza is a 22 year old female with a history of gastritis, chronic abdominal pain post-cholecystectomy with mildly abnormal liver enzymes, not responding to sphincter of Oddi ablation a few years ago,cyclic N/V, migraines, pseudoseizures and somatoform disorder on chronic narcotics. Chronic abdominal pain since age 8 but worsened since cholecystectomy, admitted on 1/18/2018 secondary to uncontrolled pain and nausea post ERCP. She was discharged yesterday, presents today for hospital discharge follow-up. She underwent diagnostic ERCP by Dr. Narayanan  and NJ placement on 1/18/18, noted to have wide open pancreatic and biliary sphincters at time of ERCP. Post procedure unable to go home secondary to pain, nausea and vomiting. During her hospital stay she had ongoing nausea, initially had difficulty tolerating tube feed, resulting in intermittent hypoglycemia. Started on Peptamen with goal rate of 50 ml/hr. She reports abdominal pain and nausea well controlled on current regimen. She was previously on methadone but stopped due to nausea. Discharged home on Oxycodone 10 mg TID. She reports tolerating tube feeding at current rate of 50 ml/hr. She reports some difficulty tolerating full liquid diet such as jello, applesauce,and clear liquids without increase abdominal pain and nausea.  She takes benadryl, Zofran and scolopmine patch which seems to help manage nausea. She denies constipation, diarrhea, bloody stools, jaundice, itching, fever, night sweats or chills.      Past medical history, social history, surgical history, family history,  medications, and allergies were reviewed      REVIEW OF SYSTEMS:   A comprehensive review of systems was performed and was noted to be negative except as above.      PHYSICAL EXAM:  VITAL SIGNS: Stable  GENERAL: healthy, alert, well nourished, well hydrated, no distress  EYES: Eyes grossly normal to inspection, anicteric sclera   RESP: lungs clear to auscultation - no rales, no rhonchi, no wheezes  CV: regular rates and rhythm, normal S1 S2, no murmur   ABDOMEN: soft, no tenderness, no hepatosplenomegaly, no masses, distention, guarding, or rebound, normal bowel sounds  PSYCH: Alert and oriented times 3  LYMPHATICS: ant. cervical- normal      ASSESSMENT AND RECOMMENDATIONS:     Alexandra Melgoza is a 22 year old female with a history of gastritis, chronic abdominal pain with post-cholecystectomy pain with mildly abnormal liver enzymes, not responding to sphincter of Oddi ablation a few years ago, on chronic narcotics who presents today for hospital discharge follow-up. S/P ERCP with NJ tube placement secondary to inability to tolerate oral intake due to worsening abdominal pain, nausea, and vomiting. She reports abdominal pain and nausea significantly improved, sympotms managed on current regimen. The following recommendations were discussed with patient and her mother:     #Chronic abdominal pain  #Nausea   -Follow-up with Pain Specialist, continue with current pain regimen Oxycodone 10 mg TID, gabapentin and nortriptyline  - Continue scopolamine patch and prn Zofran and Benadryl for nausea, may try taking Zofran 20-30 minutes before meals   - Continue outpatient IVFs as patient finds this beneficial    Meet with dietitian Marjorie today, continue tube feeding at 50 ml/hr and PO intake as tolerated (patient reports bowel rest provides relief to abdominal pain exacerbations)  -Continue pancreatic enzymes   -Continue daily bowel regimen for constipation, may increase Miralax to twice a day   -Follow-up with Dr. Narayanan in  3-4 weeks to discuss long-term tube feeding management. If unable to meet nutritional needs PO, may need to consider GJ tube placement.     I spent 25 minutes with this patient face to face and explained the conditions and plans (more than 50% of time was counseling/coordination of care, as discussed above).    Sincerely,    ANABEL Apple CNP

## 2018-01-24 NOTE — NURSING NOTE
Chief Complaint   Patient presents with     Clinic Care Coordination - Follow-up     Hospital Follow up.        Vitals:    01/24/18 1021   BP: 110/60   Pulse: 91   Temp: 97.1  F (36.2  C)   TempSrc: Oral   SpO2: 100%   Weight: 149 lb       Body mass index is 23.69 kg/(m^2).    Vitals transferred from appt today with infusion.    Eric BARBOSA LPN

## 2018-01-24 NOTE — PROGRESS NOTES
REASON FOR VISIT: Hospital discharge follow-up       HISTORY OF PRESENT ILLNESS:     Alexandra Melgoza is a 22 year old female with a history of gastritis, chronic abdominal pain post-cholecystectomy with mildly abnormal liver enzymes, not responding to sphincter of Oddi ablation a few years ago,cyclic N/V, migraines, pseudoseizures and somatoform disorder on chronic narcotics. Chronic abdominal pain since age 8 but worsened since cholecystectomy, admitted on 1/18/2018 secondary to uncontrolled pain and nausea post ERCP. She was discharged yesterday, presents today for hospital discharge follow-up. She underwent diagnostic ERCP by Dr. Narayanan  and NJ placement on 1/18/18, noted to have wide open pancreatic and biliary sphincters at time of ERCP. Post procedure unable to go home secondary to pain, nausea and vomiting. During her hospital stay she had ongoing nausea, initially had difficulty tolerating tube feed, resulting in intermittent hypoglycemia. Started on Peptamen with goal rate of 50 ml/hr. She reports abdominal pain and nausea well controlled on current regimen. She was previously on methadone but stopped due to nausea. Discharged home on Oxycodone 10 mg TID. She reports tolerating tube feeding at current rate of 50 ml/hr. She reports some difficulty tolerating full liquid diet such as jello, applesauce,and clear liquids without increase abdominal pain and nausea.  She takes benadryl, Zofran and scolopmine patch which seems to help manage nausea. She denies constipation, diarrhea, bloody stools, jaundice, itching, fever, night sweats or chills.      Past medical history, social history, surgical history, family history, medications, and allergies were reviewed      REVIEW OF SYSTEMS:   A comprehensive review of systems was performed and was noted to be negative except as above.      PHYSICAL EXAM:  VITAL SIGNS: Stable  GENERAL: healthy, alert, well nourished, well hydrated, no distress  EYES: Eyes grossly  normal to inspection, anicteric sclera   RESP: lungs clear to auscultation - no rales, no rhonchi, no wheezes  CV: regular rates and rhythm, normal S1 S2, no murmur   ABDOMEN: soft, no tenderness, no hepatosplenomegaly, no masses, distention, guarding, or rebound, normal bowel sounds  PSYCH: Alert and oriented times 3  LYMPHATICS: ant. cervical- normal      ASSESSMENT AND RECOMMENDATIONS:     Alexandra Melgoza is a 22 year old female with a history of gastritis, chronic abdominal pain with post-cholecystectomy pain with mildly abnormal liver enzymes, not responding to sphincter of Oddi ablation a few years ago, on chronic narcotics who presents today for hospital discharge follow-up. S/P ERCP with NJ tube placement secondary to inability to tolerate oral intake due to worsening abdominal pain, nausea, and vomiting. She reports abdominal pain and nausea significantly improved, sympotms managed on current regimen. The following recommendations were discussed with patient and her mother:     #Chronic abdominal pain  #Nausea   -Follow-up with Pain Specialist, continue with current pain regimen Oxycodone 10 mg TID, gabapentin and nortriptyline  - Continue scopolamine patch and prn Zofran and Benadryl for nausea, may try taking Zofran 20-30 minutes before meals   - Continue outpatient IVFs as patient finds this beneficial    Meet with dietitiflaca Amador today, continue tube feeding at 50 ml/hr and PO intake as tolerated (patient reports bowel rest provides relief to abdominal pain exacerbations)  -Continue pancreatic enzymes   -Continue daily bowel regimen for constipation, may increase Miralax to twice a day   -Follow-up with Dr. Narayanan in 3-4 weeks to discuss long-term tube feeding management. If unable to meet nutritional needs PO, may need to consider GJ tube placement.           Rand Navarro, CIPRIANO   Advanced Endoscopy   [phone]        I spent 25 minutes with this patient face to face and explained the conditions and  plans (more than 50% of time was counseling/coordination of care, as discussed above).

## 2018-01-24 NOTE — MR AVS SNAPSHOT
After Visit Summary   1/24/2018    Alexandra Melgoza    MRN: [id number]           Patient Information     Date Of Birth          1995        Visit Information        Provider Department      1/24/2018 11:00 AM Marjorie Brown RD [facility]        Today's Diagnoses     On tube feeding diet    -  1    Nutritional counseling           Follow-ups after your visit        Your next 10 appointments already scheduled     Jan 26, 2018  2:00 PM CST   Infusion 120 with UC SPEC INFUSION, UC 45 ATC   [facility] ([facility])    [address]  [address]  [facility] [address] [phone]            Feb 13, 2018  8:00 AM CST   (Arrive by 7:45 AM)   Return Visit with Leonardo Wang MD   [facility] ([facility])    [address] [place] [address]  4th Floor  [facility] [id number] [phone]            Feb 14, 2018 12:00 PM CST   (Arrive by 11:45 AM)   Return Visit with Campbell Narayanan MD   [facility] ([facility])    [address] [place] [address]  4th [facility] [id number] [phone]              Who to contact     Please call your clinic at [phone] to:    Ask questions about your health    Make or cancel appointments    Discuss your medicines    Learn about your test results    Speak to your doctor   If you have compliments or concerns about an experience at your clinic, or if you wish to file a complaint, please contact [facility] Patient Relations at [phone] or email us at [email or web address]         Additional Information About Your Visit        MyChart Information     [employer] gives you secure access to your electronic health record. If you see a primary care provider, you can also send messages to your care team and make  appointments. If you have questions, please call your primary care clinic.  If you do not have a primary care provider, please call [phone] and they will assist you.      [employer] is an electronic gateway that provides easy, online access to your medical records. With [employer], you can request a clinic appointment, read your test results, renew a prescription or communicate with your care team.     To access your existing account, please contact your [facility] or call [phone] for assistance.        Care EveryWhere ID     This is your Care EveryWhere ID. This could be used by other organizations to access your [place] medical records  [id number]         Blood Pressure from Last 3 Encounters:   01/24/18 110/60   01/24/18 110/60   01/23/18 128/82    Weight from Last 3 Encounters:   01/24/18 67.6 kg (149 lb)   01/18/18 67.8 kg (149 lb 7.6 oz)   01/15/18 65.8 kg (145 lb)              We Performed the Following     MNT INDIVIDUAL INITIAL EA 15 MIN        Primary Care Provider Office Phone # Fax #    Quyen Baez [phone]       [address] [place] [address] 741  [facility] [address]        Equal Access to Services     RACHAEL BENITEZ : Hadii angelina rodneyo Somalini, waaxda luqadaha, qaybta kaalmada adeegyada, maki smallwood. So [facility] [phone].    ATENCIÓN: Si habla español, tiene a quijano disposición servicios gratuitos de asistencia lingüística. Llame al [phone].    We comply with applicable federal civil rights laws and [place] laws. We do not discriminate on the basis of race, color, national origin, age, disability, sex, sexual orientation, or gender identity.            Thank you!     Thank you for choosing [facility]  for your care. Our goal is always to provide you with excellent care. Hearing back from our patients is one way we can continue to improve our services. Please take a few minutes to  complete the written survey that you may receive in the mail after your visit with us. Thank you!             Your Updated Medication List - Protect others around you: Learn how to safely use, store and throw away your medicines at www.disposemymeds.org.          This list is accurate as of 1/24/18  3:23 PM.  Always use your most recent med list.                   Brand Name Dispense Instructions for use Diagnosis    * amylase-lipase-protease 79107 UNITS Cpep    ZENPEP    180 capsule    Take 2-3 capsules (40,000-60,000 Units) by mouth Take with snacks or supplements (Snacks)    Idiopathic chronic pancreatitis (H)       * amylase-lipase-protease 55288 UNITS Cpep    ZENPEP    180 capsule    Take 5 capsules (100,000 Units) by mouth 4 times daily (with meals and nightly)    Right upper quadrant abdominal pain       fluticasone 50 MCG/ACT spray    FLONASE    16 g    Spray 2 sprays into both nostrils daily    Nasal congestion       * gabapentin 250 MG/5ML solution    NEURONTIN    470 mL    8 mLs (400 mg) by ORAL OR NJ TUBE route 2 times daily    Abdominal pain, epigastric       * gabapentin 250 MG/5ML solution    NEURONTIN    450 mL    Take 12 mLs (600 mg) by mouth daily    Abdominal pain, epigastric       leuprolide 11.25 MG kit    LUPRON DEPOT (3-MONTH)    1 each    Inject 11.25 mg into the muscle every 3 months    Endometriosis       levalbuterol 45 MCG/ACT Inhaler    XOPENEX HFA    1 Inhaler    Inhale 2 puffs into the lungs every 6 hours as needed for shortness of breath / dyspnea    Wheeze       menthol 5 % Ptch    ICY HOT    15 patch    Apply 1 patch topically every 8 hours as needed for muscle soreness    Abdominal pain, epigastric       multivitamins with minerals Liqd liquid     1 Bottle    15 mLs by Per Feeding Tube route daily    Abdominal pain, epigastric       norethindrone 5 MG tablet    AYGESTIN    90 tablet    Take 1 tablet (5 mg) by mouth daily    Endometriosis       nortriptyline 10 MG capsule    PAMELOR     120 capsule    Take 3 capsules (30 mg) by mouth At Bedtime    Right upper quadrant abdominal pain       ondansetron 4 MG ODT tab    ZOFRAN ODT    40 tablet    Take 1 tablet (4 mg) by mouth every 8 hours as needed for nausea or vomiting    Idiopathic chronic pancreatitis (H)       order for DME     1 Units    Equipment being ordered: TENS with wire and electrode.    Chronic abdominal pain       oxyCODONE 5 MG/5ML solution    ROXICODONE    900 mL    Take 10 mLs (10 mg) by mouth every 8 hours as needed for pain maximum 30 mg per day    Chronic abdominal pain       pantoprazole 40 MG EC tablet    PROTONIX    30 tablet    Take 1 tablet (40 mg) by mouth 2 times daily (before meals)    Right upper quadrant abdominal pain, Erosive gastritis       polyethylene glycol Packet    MIRALAX/GLYCOLAX    7 packet    Take 17 g by mouth daily    Right upper quadrant abdominal pain       RIZATRIPTAN BENZOATE PO      Take 5 mg by mouth once as needed        scopolamine 72 hr patch    TRANSDERM    2 patch    Apply 1 patch to hairless area behind one ear if severe nausea and vomiting.  Remove old patch and change every 3 days (72 hours).    Intractable vomiting with nausea, unspecified vomiting type       senna-docusate 8.6-50 MG per tablet    SENOKOT-S;PERICOLACE    100 tablet    Take 1 tablet by mouth 2 times daily as needed for constipation    Right upper quadrant abdominal pain       sodium bicarbonate 325 MG tablet     200 tablet    1 tablet (325 mg) by Per Feeding Tube route 4 times daily    Abdominal pain, epigastric       * Notice:  This list has 4 medication(s) that are the same as other medications prescribed for you. Read the directions carefully, and ask your doctor or other care provider to review them with you.

## 2018-01-24 NOTE — MR AVS SNAPSHOT
After Visit Summary   1/24/2018    Alexandra Melgoza    MRN: [id number]           Patient Information     Date Of Birth          1995        Visit Information        Provider Department      1/24/2018 10:00 AM Rand Navarro APRN CNP [facility]creas [facility] Biliary        Today's Diagnoses     Chronic abdominal pain    -  1       Follow-ups after your visit        Your next 10 appointments already scheduled     Jan 26, 2018  2:00 PM CST   Infusion 120 with UC SPEC INFUSION, UC 45 ATC   [facility] [place] Specialty and Procedure ([facility])    [address]  [address]  [facility] [address]   [phone]            Feb 13, 2018  8:00 AM CST   (Arrive by 7:45 AM)   Return Visit with Leonardo Wang MD   [facility] ([facility])    [address] [place] [address]  4th [facility] [address] [phone]            Feb 14, 2018 12:00 PM CST   (Arrive by 11:45 AM)   Return Visit with Campbell Narayanan MD   [facility] Biliary ([facility])    [address] [place] [address]  4th [facility] [id number] [phone]              Future tests that were ordered for you today     Open Future Orders        Priority Expected Expires Ordered    Comprehensive metabolic panel Routine  1/24/2019 1/24/2018            Who to contact     Please call your clinic at [phone] to:    Ask questions about your health    Make or cancel appointments    Discuss your medicines    Learn about your test results    Speak to your doctor   If you have compliments or concerns about an experience at your clinic, or if you wish to file a complaint, please contact [facility] Patient Relations at [phone] or email us at [email or web address]         Additional Information About Your Visit        MyChart Information     MyChart gives  you secure access to your electronic health record. If you see a primary care provider, you can also send messages to your care team and make appointments. If you have questions, please call your primary care clinic.  If you do not have a primary care provider, please call [phone] and they will assist you.      [email or web address] is an electronic gateway that provides easy, online access to your medical records. With [email or web address], you can request a clinic appointment, read your test results, renew a prescription or communicate with your care team.     To access your existing account, please contact your [facility] or call [phone] for assistance.        Care EveryWhere ID     This is your Care EveryWhere ID. This could be used by other organizations to access your [place] medical records  [id number]        Your Vitals Were     Pulse Temperature Pulse Oximetry BMI (Body Mass Index)          91 97.1  F (36.2  C) (Oral) 100% 23.69 kg/m2         Blood Pressure from Last 3 Encounters:   01/24/18 110/60   01/24/18 110/60   01/23/18 128/82    Weight from Last 3 Encounters:   01/24/18 67.6 kg (149 lb)   01/18/18 67.8 kg (149 lb 7.6 oz)   01/15/18 65.8 kg (145 lb)              Today, you had the following     No orders found for display       Primary Care Provider Office Phone # Fax #    Quyen Baez [phone]       [address] [place] [address] 7[address]  [facility] [address]        Equal Access to Services     RACHAEL BENITEZ : Hadii angelina rodneyo Somalini, waaxda luqadaha, qaybta kaalmada adeegyada, maki gaspar . So [facility] [phone].    ATENCIÓN: Si habla español, tiene a quijano disposición servicios gratuitos de asistencia lingüística. Llame al [phone].    We comply with applicable federal civil rights laws and [place] laws. We do not discriminate on the basis of race, color, national origin, age, disability, sex, sexual orientation, or gender  identity.            Thank you!     Thank you for choosing [facility]AS [facility] BILI[facility]  for your care. Our goal is always to provide you with excellent care. Hearing back from our patients is one way we can continue to improve our services. Please take a few minutes to complete the written survey that you may receive in the mail after your visit with us. Thank you!             Your Updated Medication List - Protect others around you: Learn how to safely use, store and throw away your medicines at www.disposemymeds.org.          This list is accurate as of 1/24/18 11:58 AM.  Always use your most recent med list.                   Brand Name Dispense Instructions for use Diagnosis    * amylase-lipase-protease 03082 UNITS Cpep    ZENPEP    180 capsule    Take 2-3 capsules (40,000-60,000 Units) by mouth Take with snacks or supplements (Snacks)    Idiopathic chronic pancreatitis (H)       * amylase-lipase-protease 80677 UNITS Cpep    ZENPEP    180 capsule    Take 5 capsules (100,000 Units) by mouth 4 times daily (with meals and nightly)    Right upper quadrant abdominal pain       fluticasone 50 MCG/ACT spray    FLONASE    16 g    Spray 2 sprays into both nostrils daily    Nasal congestion       * gabapentin 250 MG/5ML solution    NEURONTIN    470 mL    8 mLs (400 mg) by ORAL OR NJ TUBE route 2 times daily    Abdominal pain, epigastric       * gabapentin 250 MG/5ML solution    NEURONTIN    450 mL    Take 12 mLs (600 mg) by mouth daily    Abdominal pain, epigastric       leuprolide 11.25 MG kit    LUPRON DEPOT (3-MONTH)    1 each    Inject 11.25 mg into the muscle every 3 months    Endometriosis       levalbuterol 45 MCG/ACT Inhaler    XOPENEX HFA    1 Inhaler    Inhale 2 puffs into the lungs every 6 hours as needed for shortness of breath / dyspnea    Wheeze       menthol 5 % Ptch    ICY HOT    15 patch    Apply 1 patch topically every 8 hours as needed for muscle soreness    Abdominal pain, epigastric        multivitamins with minerals Liqd liquid     1 Bottle    15 mLs by Per Feeding Tube route daily    Abdominal pain, epigastric       norethindrone 5 MG tablet    AYGESTIN    90 tablet    Take 1 tablet (5 mg) by mouth daily    Endometriosis       nortriptyline 10 MG capsule    PAMELOR    120 capsule    Take 3 capsules (30 mg) by mouth At Bedtime    Right upper quadrant abdominal pain       ondansetron 4 MG ODT tab    ZOFRAN ODT    40 tablet    Take 1 tablet (4 mg) by mouth every 8 hours as needed for nausea or vomiting    Idiopathic chronic pancreatitis (H)       order for DME     1 Units    Equipment being ordered: TENS with wire and electrode.    Chronic abdominal pain       oxyCODONE 5 MG/5ML solution    ROXICODONE    900 mL    Take 10 mLs (10 mg) by mouth every 8 hours as needed for pain maximum 30 mg per day    Chronic abdominal pain       pantoprazole 40 MG EC tablet    PROTONIX    30 tablet    Take 1 tablet (40 mg) by mouth 2 times daily (before meals)    Right upper quadrant abdominal pain, Erosive gastritis       polyethylene glycol Packet    MIRALAX/GLYCOLAX    7 packet    Take 17 g by mouth daily    Right upper quadrant abdominal pain       RIZATRIPTAN BENZOATE PO      Take 5 mg by mouth once as needed        scopolamine 72 hr patch    TRANSDERM    2 patch    Apply 1 patch to hairless area behind one ear if severe nausea and vomiting.  Remove old patch and change every 3 days (72 hours).    Intractable vomiting with nausea, unspecified vomiting type       senna-docusate 8.6-50 MG per tablet    SENOKOT-S;PERICOLACE    100 tablet    Take 1 tablet by mouth 2 times daily as needed for constipation    Right upper quadrant abdominal pain       sodium bicarbonate 325 MG tablet     200 tablet    1 tablet (325 mg) by Per Feeding Tube route 4 times daily    Abdominal pain, epigastric       * Notice:  This list has 4 medication(s) that are the same as other medications prescribed for you. Read the directions carefully,  and ask your doctor or other care provider to review them with you.

## 2018-01-24 NOTE — LETTER
1/24/2018       RE: Alexandra Melgoza  [address] [facility] [address]     Dear Colleague,    Thank you for referring your patient, Alexandra Melgoza, to the [facility] at [facility]. Please see a copy of my visit note below.    [facility] Outpatient Medical Nutrition Therapy      Time Spent:  15 minutes  Session Type:  Initial Individual Session  Referring Physician:  Rand Navarro CNP     Nutrition Assessment:  Patient is here with her mother for initial visit with Registered Dietitian (RD).  Patient is a 22 y.o. female with history of chronic abdominal pain, post cholecystectomy, sphincter of Oddi ablation, cyclic nausea and vomiting, migraines, pseudoseizure and somatoform disorder. Had recent hospital admission on 1/18/2018 for uncontrolled pain and nausea post ERCP. Started NJ feeding tube on 1/18/2018 and has been on continuous tube feeding at goal rate of 50 ml/hr of Peptamen 1.5. + 30 ml water flushes q 4 hours + Zenpep (see full regimen below). Patient is eating bites of applesauce, jello and drinking about 4 oz water and 4 oz apple juice per day, but has some pain with eating. Stated that she takes one enzyme capsule with PO. Patient and her mother were wondering if patient is getting adequate fluids. Patient met with NP prior to RD visit. Since patient only recently to goal rate 2 days ago, plan to continue continuous TF until upcoming MD visit in mid-February.    Current Enteral Nutrition regimen as follows:  Peptamen 1.5 @ goal 50 ml/hr (1200 ml/day) to provide 1800 kcals (26 kcal/kg/day), 82 g PRO (1.2 g/kg/day), 924 ml free H2O, 67 g Fat (70% from MCTs), 226 g CHO and no Fiber daily + 30 ml water flushes every 4 hours (provides 180 ml fluids from flushes, so total fluids from formula and flushes =1104 ml). Is dissolving 2 capsules zenpep 20,000 in sodium bicarbonate and warm water every 4 hours which provides 3,582 units  "lipase/gram of fat daily.    Height:   Ht Readings from Last 1 Encounters:   18 1.689 m (5' 6.5\")     Weight:    Wt Readings from Last 15 Encounters:   18 67.6 kg (149 lb)   18 67.8 kg (149 lb 7.6 oz)   01/15/18 65.8 kg (145 lb)   18 64.9 kg (143 lb)   01/10/18 66.9 kg (147 lb 6.4 oz)   01/10/18 66.9 kg (147 lb 6.4 oz)   18 65.8 kg (145 lb)   18 64.9 kg (143 lb)   18 64.4 kg (142 lb)   18 64.4 kg (142 lb)   17 66.5 kg (146 lb 8 oz)   17 65.3 kg (144 lb)   17 65.7 kg (144 lb 12.8 oz)   17 65.2 kg (143 lb 12.8 oz)   17 67.7 kg (149 lb 3.2 oz)      BMI: 23.45 healthy     Labs:  On  elevated glucose (105),  decreased urea nitrogen (5). On  decreased total protein (6.6), elevated ALT (64) and decreased amylase (28) and decrease lipase (64).  Pertinent Medications/vitamin and mineral supplements:   Zenpep 20,000. (reported takes 5 with PO meals and 3 with snacks). Is taking 1 with her bites of applesauce or jello. Reported gets IV infusions a few times per week.  Food Allergies:  Cilantro, sorbitol.  Estimated Nutrition Needs based on current body weight of ~67.6k-2028 calories (25-30 kcals/kg), 67-81g protein (1-1.2g/kg), 7777-0210 ml fluids (~1 ml/kcal or total fluids per MD).     MALNUTRITION:  % Weight Loss:  None noted  % Intake:  Decreased intake does not meet criteria for malnutrition. Currently on enteral nutrition which meets estimated nutrition needs.  Subcutaneous Fat Loss:  None observed  Muscle Loss:  None observed  Fluid Retention:  None noted    Malnutrition Diagnosis: Patient does not meet two of the above criteria necessary for diagnosing malnutrition  In Context of:  Chronic illness or disease    Nutrition Diagnosis:    Altered GI function related to inability to consume adequate PO, chronic abdominal pain and recent hx of cyclic nausea and vomiting as evidenced by Patient report and need for enteral " nutrition to meet estimated nutrition needs.    Nutrition Prescription: Recommended continuing current TF regimen of Peptamin 1.5 at 50 ml/hr continuous. Recommended increasing water flushes to  ml every 4 hours (6x/day).    Nutrition Intervention:    Nutrition Education/Counseling:  Provided nutrition tips and suggestions and answered patient and her mother's questions surrounding Tube feeding and PO. Encouraged patient to trial PO as able but explained that she does not need to push PO or eat large amounts of PO at this time especially if causing a lot of pain. Explained that tube feeding is meeting estimated needs at this time so continue to trial PO as tolerated. Since patient recently at goal rate over the past couple of day and recent hospital discharge, plan is for patient to continue continuous tube feeding at this time. Recommended increasing water flushes from 30 ml q4 hours to  ml q4 hours (6x/day). This will provide 360-720 ml from flushes which will provide  ~0725-7599 ml total fluids per day (formala= 924 ml and PO fluids ~240 ml/day + flushes).    Once patient able to transition to overnight feeding, then recommend increasing from 50 ml to goal rate of 85 ml x 14 hours +  180 ml water flushes (4 times per day). This regimen will provide 1785 calories, 81g protein, 67g fat, 219g  ml fluids (total with flushes 916 ml from formula +720 ml from flushes = 1636 ml). This meets 100% of estimated calories and protein needs.    -Recommend continuing 12 capsules Zenpep/day added to TF formula  (opened, dissolved in warm water, sodium bicarbonate and then added to TF formula) which continues to provide 3582 units lipase per gram fat.    Patient verbalized understanding of education provided. See Goals below.     Goals:  -Continue current TF regimen  -Continue PO as able/tolerated  -Follow up with RD next month (on same day a f/u with MD 2/14/18).    Nutrition Monitoring and Evaluation: Will  monitor adherence to nutrition recommendations at future RD visits.     Further Medical Nutrition Therapy:  Yes  Next Appointment (if applicable):  Patient will f/u with RD on same day as next f/u visit with MD on 2/14/2018.  Patient was encouraged to call/contact RD with any further questions.    Marjorie Brown, MS, RD, LD

## 2018-01-24 NOTE — PROGRESS NOTES
"[facility] Medical Nutrition Therapy      Time Spent:  15 minutes  Session Type:  Initial Individual Session  Referring Physician:  Rand Navarro CNP     Nutrition Assessment:  Patient is here with her mother for initial visit with Registered Dietitian (RD).  Patient is a 22 y.o. female with history of chronic abdominal pain, post cholecystectomy, sphincter of Oddi ablation, cyclic nausea and vomiting, migraines, pseudoseizure and somatoform disorder. Had recent hospital admission on 1/18/2018 for uncontrolled pain and nausea post ERCP. Started NJ feeding tube on 1/18/2018 and has been on continuous tube feeding at goal rate of 50 ml/hr of Peptamen 1.5. + 30 ml water flushes q 4 hours + Zenpep (see full regimen below). Patient is eating bites of applesauce, jello and drinking about 4 oz water and 4 oz apple juice per day, but has some pain with eating. Stated that she takes one enzyme capsule with PO. Patient and her mother were wondering if patient is getting adequate fluids. Patient met with NP prior to RD visit. Since patient only recently to goal rate 2 days ago, plan to continue continuous TF until upcoming MD visit in mid-February.    Current Enteral Nutrition regimen as follows:  Peptamen 1.5 @ goal 50 ml/hr (1200 ml/day) to provide 1800 kcals (26 kcal/kg/day), 82 g PRO (1.2 g/kg/day), 924 ml free H2O, 67 g Fat (70% from MCTs), 226 g CHO and no Fiber daily + 30 ml water flushes every 4 hours (provides 180 ml fluids from flushes, so total fluids from formula and flushes =1104 ml). Is dissolving 2 capsules zenpep 20,000 in sodium bicarbonate and warm water every 4 hours which provides 3,582 units lipase/gram of fat daily.    Height:   Ht Readings from Last 1 Encounters:   01/18/18 1.689 m (5' 6.5\")     Weight:    Wt Readings from Last 15 Encounters:   01/24/18 67.6 kg (149 lb)   01/18/18 67.8 kg (149 lb 7.6 oz)   01/15/18 65.8 kg (145 lb)   01/13/18 64.9 kg (143 lb)   01/10/18 66.9 kg (147 lb 6.4 oz) "   01/10/18 66.9 kg (147 lb 6.4 oz)   18 65.8 kg (145 lb)   18 64.9 kg (143 lb)   18 64.4 kg (142 lb)   18 64.4 kg (142 lb)   17 66.5 kg (146 lb 8 oz)   17 65.3 kg (144 lb)   17 65.7 kg (144 lb 12.8 oz)   17 65.2 kg (143 lb 12.8 oz)   17 67.7 kg (149 lb 3.2 oz)      BMI: 23.45 healthy     Labs:  On  elevated glucose (105),  decreased urea nitrogen (5). On  decreased total protein (6.6), elevated ALT (64) and decreased amylase (28) and decrease lipase (64).  Pertinent Medications/vitamin and mineral supplements:   Zenpep 20,000. (reported takes 5 with PO meals and 3 with snacks). Is taking 1 with her bites of applesauce or jello. Reported gets IV infusions a few times per week.  Food Allergies:  Cilantro, sorbitol.  Estimated Nutrition Needs based on current body weight of ~67.6k-2028 calories (25-30 kcals/kg), 67-81g protein (1-1.2g/kg), 6630-1757 ml fluids (~1 ml/kcal or total fluids per MD).     MALNUTRITION:  % Weight Loss:  None noted  % Intake:  Decreased intake does not meet criteria for malnutrition. Currently on enteral nutrition which meets estimated nutrition needs.  Subcutaneous Fat Loss:  None observed  Muscle Loss:  None observed  Fluid Retention:  None noted    Malnutrition Diagnosis: Patient does not meet two of the above criteria necessary for diagnosing malnutrition  In Context of:  Chronic illness or disease    Nutrition Diagnosis:    Altered GI function related to inability to consume adequate PO, chronic abdominal pain and recent hx of cyclic nausea and vomiting as evidenced by Patient report and need for enteral nutrition to meet estimated nutrition needs.    Nutrition Prescription: Recommended continuing current TF regimen of Peptamin 1.5 at 50 ml/hr continuous. Recommended increasing water flushes to  ml every 4 hours (6x/day).    Nutrition Intervention:    Nutrition Education/Counseling:  Provided nutrition tips  and suggestions and answered patient and her mother's questions surrounding Tube feeding and PO. Encouraged patient to trial PO as able but explained that she does not need to push PO or eat large amounts of PO at this time especially if causing a lot of pain. Explained that tube feeding is meeting estimated needs at this time so continue to trial PO as tolerated. Since patient recently at goal rate over the past couple of day and recent hospital discharge, plan is for patient to continue continuous tube feeding at this time. Recommended increasing water flushes from 30 ml q4 hours to  ml q4 hours (6x/day). This will provide 360-720 ml from flushes which will provide  ~6983-4234 ml total fluids per day (formala= 924 ml and PO fluids ~240 ml/day + flushes).    Once patient able to transition to overnight feeding, then recommend increasing from 50 ml to goal rate of 85 ml x 14 hours +  180 ml water flushes (4 times per day). This regimen will provide 1785 calories, 81g protein, 67g fat, 219g  ml fluids (total with flushes 916 ml from formula +720 ml from flushes = 1636 ml). This meets 100% of estimated calories and protein needs.    -Recommend continuing 12 capsules Zenpep/day added to TF formula  (opened, dissolved in warm water, sodium bicarbonate and then added to TF formula) which continues to provide 3582 units lipase per gram fat.    Patient verbalized understanding of education provided. See Goals below.     Goals:  -Continue current TF regimen  -Continue PO as able/tolerated  -Follow up with RD next month (on same day a f/u with MD 2/14/18).    Nutrition Monitoring and Evaluation: Will monitor adherence to nutrition recommendations at future RD visits.     Further Medical Nutrition Therapy:  Yes  Next Appointment (if applicable):  Patient will f/u with RD on same day as next f/u visit with MD on 2/14/2018.  Patient was encouraged to call/contact RD with any further questions.    Marjorie Brown MS,  DAVIAN, FAITH

## 2018-01-24 NOTE — MR AVS SNAPSHOT
After Visit Summary   1/24/2018    Alexandra Melgoza    MRN: [id number]           Patient Information     Date Of Birth          1995        Visit Information        Provider Department      1/24/2018 9:30 AM UC 47 ATC; UC SPEC INFUSION [facility] Specialty [facility] Procedure        Today's Diagnoses     Cyclical vomiting with nausea, intractability of vomiting not specified    -  1    Chronic pancreatitis, unspecified pancreatitis type (H)        Abdominal pain, epigastric           Follow-ups after your visit        Your next 10 appointments already scheduled     Jan 26, 2018  2:00 PM CST   Infusion 120 with UC SPEC INFUSION, UC 45 ATC   [facility] ([facility])    [address]  [address]  [facility] [id number] [phone]            Feb 13, 2018  8:00 AM CST   (Arrive by 7:45 AM)   Return Visit with Leonardo Wang MD   [facility] ([facility])    [address]  4th [address]  [facility] [address] [phone]            Feb 14, 2018 12:00 PM CST   (Arrive by 11:45 AM)   Return Visit with Campbell Narayanan MD   [facility]iliary ([facility])    [address]  [address]th [address]  [facility] [address] [phone]              Who to contact     If you have questions or need follow up information about today's clinic visit or your schedule please contact [facility] SPECIALTY AND PROCEDURE directly at [phone].  Normal or non-critical lab and imaging results will be communicated to you by MyChart, letter or phone within 4 business days after the clinic has received the results. If you do not hear from us within 7 days, please contact the clinic through MyChart or phone. If you have a critical or abnormal lab result, we will notify you by phone as  soon as possible.  Submit refill requests through Netotiate or call your pharmacy and they will forward the refill request to us. Please allow 3 business days for your refill to be completed.          Additional Information About Your Visit        8Triphart Information     Netotiate gives you secure access to your electronic health record. If you see a primary care provider, you can also send messages to your care team and make appointments. If you have questions, please call your primary care clinic.  If you do not have a primary care provider, please call [phone] and they will assist you.        Care EveryWhere ID     This is your Care EveryWhere ID. This could be used by other organizations to access your [facility] medical records  [id number]        Your Vitals Were     Pulse Temperature Pulse Oximetry             91 97.1  F (36.2  C) (Oral) 100%          Blood Pressure from Last 3 Encounters:   01/24/18 110/60   01/24/18 110/60   01/23/18 128/82    Weight from Last 3 Encounters:   01/24/18 67.6 kg (149 lb)   01/18/18 67.8 kg (149 lb 7.6 oz)   01/15/18 65.8 kg (145 lb)              We Performed the Following     Comprehensive metabolic panel        Primary Care Provider Office Phone # Fax #    Quyen Baez [phone]       [address] [place] [address] [address] [place] [address]        Equal Access to Services     RACHAEL BENITEZ : Hadii angelina ku hadasho Soomaali, waaxda luqadaha, qaybta kaalmada adechrisyada, maki smallwood. So [facility] [phone].    ATENCIÓN: Si habla español, tiene a quijano disposición servicios gratuitos de asistencia lingüística. Panfilo al [phone].    We comply with applicable federal civil rights laws and [place] laws. We do not discriminate on the basis of race, color, national origin, age, disability, sex, sexual orientation, or gender identity.            Thank you!     Thank you for choosing [facility] SPECIALTY AND PROCEDURE   for your care. Our goal is always to provide you with excellent care. Hearing back from our patients is one way we can continue to improve our services. Please take a few minutes to complete the written survey that you may receive in the mail after your visit with us. Thank you!             Your Updated Medication List - Protect others around you: Learn how to safely use, store and throw away your medicines at www.disposemymeds.org.          This list is accurate as of 1/24/18  3:06 PM.  Always use your most recent med list.                   Brand Name Dispense Instructions for use Diagnosis    * amylase-lipase-protease 19658 UNITS Cpep    ZENPEP    180 capsule    Take 2-3 capsules (40,000-60,000 Units) by mouth Take with snacks or supplements (Snacks)    Idiopathic chronic pancreatitis (H)       * amylase-lipase-protease 27452 UNITS Cpep    ZENPEP    180 capsule    Take 5 capsules (100,000 Units) by mouth 4 times daily (with meals and nightly)    Right upper quadrant abdominal pain       fluticasone 50 MCG/ACT spray    FLONASE    16 g    Spray 2 sprays into both nostrils daily    Nasal congestion       * gabapentin 250 MG/5ML solution    NEURONTIN    470 mL    8 mLs (400 mg) by ORAL OR NJ TUBE route 2 times daily    Abdominal pain, epigastric       * gabapentin 250 MG/5ML solution    NEURONTIN    450 mL    Take 12 mLs (600 mg) by mouth daily    Abdominal pain, epigastric       leuprolide 11.25 MG kit    LUPRON DEPOT (3-MONTH)    1 each    Inject 11.25 mg into the muscle every 3 months    Endometriosis       levalbuterol 45 MCG/ACT Inhaler    XOPENEX HFA    1 Inhaler    Inhale 2 puffs into the lungs every 6 hours as needed for shortness of breath / dyspnea    Wheeze       menthol 5 % Ptch    ICY HOT    15 patch    Apply 1 patch topically every 8 hours as needed for muscle soreness    Abdominal pain, epigastric       multivitamins with minerals Liqd liquid     1 Bottle    15 mLs by Per Feeding Tube route daily     Abdominal pain, epigastric       norethindrone 5 MG tablet    AYGESTIN    90 tablet    Take 1 tablet (5 mg) by mouth daily    Endometriosis       nortriptyline 10 MG capsule    PAMELOR    120 capsule    Take 3 capsules (30 mg) by mouth At Bedtime    Right upper quadrant abdominal pain       ondansetron 4 MG ODT tab    ZOFRAN ODT    40 tablet    Take 1 tablet (4 mg) by mouth every 8 hours as needed for nausea or vomiting    Idiopathic chronic pancreatitis (H)       order for DME     1 Units    Equipment being ordered: TENS with wire and electrode.    Chronic abdominal pain       oxyCODONE 5 MG/5ML solution    ROXICODONE    900 mL    Take 10 mLs (10 mg) by mouth every 8 hours as needed for pain maximum 30 mg per day    Chronic abdominal pain       pantoprazole 40 MG EC tablet    PROTONIX    30 tablet    Take 1 tablet (40 mg) by mouth 2 times daily (before meals)    Right upper quadrant abdominal pain, Erosive gastritis       polyethylene glycol Packet    MIRALAX/GLYCOLAX    7 packet    Take 17 g by mouth daily    Right upper quadrant abdominal pain       RIZATRIPTAN BENZOATE PO      Take 5 mg by mouth once as needed        scopolamine 72 hr patch    TRANSDERM    2 patch    Apply 1 patch to hairless area behind one ear if severe nausea and vomiting.  Remove old patch and change every 3 days (72 hours).    Intractable vomiting with nausea, unspecified vomiting type       senna-docusate 8.6-50 MG per tablet    SENOKOT-S;PERICOLACE    100 tablet    Take 1 tablet by mouth 2 times daily as needed for constipation    Right upper quadrant abdominal pain       sodium bicarbonate 325 MG tablet     200 tablet    1 tablet (325 mg) by Per Feeding Tube route 4 times daily    Abdominal pain, epigastric       * Notice:  This list has 4 medication(s) that are the same as other medications prescribed for you. Read the directions carefully, and ask your doctor or other care provider to review them with you.

## 2018-01-25 ENCOUNTER — CARE COORDINATION (OUTPATIENT)
Dept: GASTROENTEROLOGY | Facility: CLINIC | Age: 23
End: 2018-01-25

## 2018-01-25 LAB — ERCP: NORMAL

## 2018-01-25 NOTE — PROGRESS NOTES
Patient calling with symptoms of nausea and LUQ pain.  Patient reports that nausea is helped after turning her tube feeding off.  Advised to keep it off but continue flushing with water as tolerated.  If she is feeling better in the morning to start tube feeding again at half the rate.  Patient is agreeable to plan and will call with any further questions or concerns.    Mirza ORTEGA RN Coordinator  Dr. Narayanan, Dr. Joe & Dr. Klein  Advanced Endoscopy  [phone]

## 2018-01-26 ENCOUNTER — INFUSION THERAPY VISIT (OUTPATIENT)
Dept: INFUSION THERAPY | Facility: CLINIC | Age: 23
End: 2018-01-26
Attending: INTERNAL MEDICINE
Payer: COMMERCIAL

## 2018-01-26 VITALS
TEMPERATURE: 97.4 F | SYSTOLIC BLOOD PRESSURE: 114 MMHG | HEART RATE: 84 BPM | OXYGEN SATURATION: 100 % | RESPIRATION RATE: 17 BRPM | DIASTOLIC BLOOD PRESSURE: 64 MMHG

## 2018-01-26 DIAGNOSIS — K86.1 CHRONIC PANCREATITIS, UNSPECIFIED PANCREATITIS TYPE (H): ICD-10-CM

## 2018-01-26 DIAGNOSIS — G43.A0 CYCLICAL VOMITING WITH NAUSEA, INTRACTABILITY OF VOMITING NOT SPECIFIED: Primary | ICD-10-CM

## 2018-01-26 PROCEDURE — 25000128 H RX IP 250 OP 636: Mod: ZF | Performed by: NURSE PRACTITIONER

## 2018-01-26 PROCEDURE — 96375 TX/PRO/DX INJ NEW DRUG ADDON: CPT

## 2018-01-26 PROCEDURE — 25000128 H RX IP 250 OP 636: Mod: ZF | Performed by: INTERNAL MEDICINE

## 2018-01-26 PROCEDURE — 96361 HYDRATE IV INFUSION ADD-ON: CPT

## 2018-01-26 PROCEDURE — 96374 THER/PROPH/DIAG INJ IV PUSH: CPT

## 2018-01-26 PROCEDURE — 96376 TX/PRO/DX INJ SAME DRUG ADON: CPT

## 2018-01-26 RX ORDER — ONDANSETRON 2 MG/ML
4 INJECTION INTRAMUSCULAR; INTRAVENOUS ONCE
Status: CANCELLED
Start: 2018-01-26 | End: 2018-01-26

## 2018-01-26 RX ORDER — ONDANSETRON 2 MG/ML
4 INJECTION INTRAMUSCULAR; INTRAVENOUS ONCE
Status: COMPLETED | OUTPATIENT
Start: 2018-01-26 | End: 2018-01-26

## 2018-01-26 RX ORDER — DIPHENHYDRAMINE HYDROCHLORIDE 50 MG/ML
25 INJECTION INTRAMUSCULAR; INTRAVENOUS ONCE
Status: CANCELLED
Start: 2018-01-26 | End: 2018-01-26

## 2018-01-26 RX ORDER — DIPHENHYDRAMINE HYDROCHLORIDE 50 MG/ML
25 INJECTION INTRAMUSCULAR; INTRAVENOUS ONCE
Status: COMPLETED | OUTPATIENT
Start: 2018-01-26 | End: 2018-01-26

## 2018-01-26 RX ORDER — ONDANSETRON 2 MG/ML
4 INJECTION INTRAMUSCULAR; INTRAVENOUS DAILY PRN
Status: CANCELLED
Start: 2018-01-26

## 2018-01-26 RX ORDER — ONDANSETRON 2 MG/ML
4 INJECTION INTRAMUSCULAR; INTRAVENOUS DAILY PRN
Status: DISCONTINUED | OUTPATIENT
Start: 2018-01-26 | End: 2018-01-26 | Stop reason: HOSPADM

## 2018-01-26 RX ADMIN — DIPHENHYDRAMINE HYDROCHLORIDE 25 MG: 50 INJECTION INTRAMUSCULAR; INTRAVENOUS at 14:31

## 2018-01-26 RX ADMIN — SODIUM CHLORIDE, POTASSIUM CHLORIDE, SODIUM LACTATE AND CALCIUM CHLORIDE 1000 ML: 600; 310; 30; 20 INJECTION, SOLUTION INTRAVENOUS at 14:25

## 2018-01-26 RX ADMIN — ONDANSETRON 4 MG: 2 INJECTION INTRAMUSCULAR; INTRAVENOUS at 14:31

## 2018-01-26 RX ADMIN — ONDANSETRON 4 MG: 2 INJECTION INTRAMUSCULAR; INTRAVENOUS at 15:31

## 2018-01-26 ASSESSMENT — PAIN DESCRIPTION - DESCRIPTORS: DESCRIPTORS: SHARP

## 2018-01-26 NOTE — MR AVS SNAPSHOT
After Visit Summary   1/26/2018    Alexandra Melgoza    MRN: [id number]           Patient Information     Date Of Birth          1995        Visit Information        Provider Department      1/26/2018 2:00 PM UC 45 ATC; UC SPEC INFUSION [facility] Specialty and Procedure        Today's Diagnoses     Cyclical vomiting with nausea, intractability of vomiting not specified    -  1    Chronic pancreatitis, unspecified pancreatitis type (H)          Care Instructions    Dear Alexandra Melgoza    Thank you for choosing [facility]fusion [facility] ([facility]) for your infusion.  The following information is a summary of our appointment as well as important reminders.      Please refer to your hospital discharge instructions for details on home care services, future appointments, phone numbers, and diet/activity levels.    Additional information: You received IVF, Benadryl & Zofran today. You will receive your next week. infusion      We look forward in seeing you on your next appointment here at [facility].  Please don t hesitate to call us at [phone] to reschedule any of your appointments or to speak with one of the [facility] registered nurses.  It was a pleasure taking care of you today.    Sincerely,    [facility]  Specialty Infusion & Procedure Center  [address] [place] [address]  [address]  Phone:  [phone]          Follow-ups after your visit        Your next 10 appointments already scheduled     Jan 29, 2018  5:00 PM CST   Infusion 120 with UC SPEC INFUSION, UC 47 ATC   [facility] Specialty and Procedure ([facility])    [address] [place] [address]  [address] [place] [address] [phone]            Jan 31, 2018  5:00 PM CST   Infusion 120 with UC SPEC INFUSION, UC 47 ATC   [facility] Specialty and Procedure ([facility]  [facility])    [address]  [address]  [facility] [address]   [phone]            Feb 02, 2018  8:00 AM CST   Infusion 120 with UC SPEC INFUSION, UC 48 ATC   [facility] Specialty and Procedure ([facility])    [address]  [address]  [facility] [address]   [phone]            Feb 05, 2018  2:00 PM CST   Infusion 120 with UC SPEC INFUSION, UC 48 ATC   [facility] Specialty and Procedure ([facility])    [address]  [address]  [facility] [address]   [phone]            Feb 07, 2018  1:00 PM CST   Infusion 120 with UC SPEC INFUSION, UC 48 ATC   [facility] Specialty and Procedure ([facility])    [address] [place] [address]  [address]  [facility] [address]   [phone]              Who to contact     If you have questions or need follow up information about today's clinic visit or your schedule please contact [facility] SPECIALTY AND PROCEDURE directly at [phone].  Normal or non-critical lab and imaging results will be communicated to you by [email or web address], letter or phone within 4 business days after the clinic has received the results. If you do not hear from us within 7 days, please contact the clinic through Codesiont or phone. If you have a critical or abnormal lab result, we will notify you by phone as soon as possible.  Submit refill requests through NeuroPace or call your pharmacy and they will forward the refill request to us. Please allow 3 business days for your refill to be completed.          Additional Information About Your Visit        [email or web address] Information     NeuroPace gives you secure access to your electronic health record. If you see a primary care provider, you can also send messages to your care team and make appointments. If you have questions, please call your primary care  clinic.  If you do not have a primary care provider, please call [phone] and they will assist you.        Care EveryWhere ID     This is your Care EveryWhere ID. This could be used by other organizations to access your [facility] medical records  [id number]        Your Vitals Were     Pulse Temperature Respirations Pulse Oximetry          84 97.4  F (36.3  C) (Oral) 17 100%         Blood Pressure from Last 3 Encounters:   01/26/18 114/64   01/24/18 110/60   01/24/18 110/60    Weight from Last 3 Encounters:   01/24/18 67.6 kg (149 lb)   01/18/18 67.8 kg (149 lb 7.6 oz)   01/15/18 65.8 kg (145 lb)              Today, you had the following     No orders found for display       Primary Care Provider Office Phone # Fax #    Quyen Baez [phone]       [address] [place] [address]  [facility] [address]        Equal Access to Services     RACHAEL BENITEZ : Hadii aad ku hadasho Soomaali, waaxda luqadaha, qaybta kaalmada adeegyada, waxay eliseoin haymonique gaspar . So [facility] [phone].    ATENCIÓN: Si habla español, tiene a quijano disposición servicios gratuitos de asistencia lingüística. Ll[facility] [phone].    We comply with applicable federal civil rights laws and [place] laws. We do not discriminate on the basis of race, color, national origin, age, disability, sex, sexual orientation, or gender identity.            Thank you!     Thank you for choosing [facility] SPECIALTY AND PROCEDURE  for your care. Our goal is always to provide you with excellent care. Hearing back from our patients is one way we can continue to improve our services. Please take a few minutes to complete the written survey that you may receive in the mail after your visit with us. Thank you!             Your Updated Medication List - Protect others around you: Learn how to safely use, store and throw away your medicines at www.disposemymeds.org.          This list is accurate as of 1/26/18   4:51 PM.  Always use your most recent med list.                   Brand Name Dispense Instructions for use Diagnosis    * amylase-lipase-protease 96602 UNITS Cpep    ZENPEP    180 capsule    Take 2-3 capsules (40,000-60,000 Units) by mouth Take with snacks or supplements (Snacks)    Idiopathic chronic pancreatitis (H)       * amylase-lipase-protease 95454 UNITS Cpep    ZENPEP    180 capsule    Take 5 capsules (100,000 Units) by mouth 4 times daily (with meals and nightly)    Right upper quadrant abdominal pain       fluticasone 50 MCG/ACT spray    FLONASE    16 g    Spray 2 sprays into both nostrils daily    Nasal congestion       * gabapentin 250 MG/5ML solution    NEURONTIN    470 mL    8 mLs (400 mg) by ORAL OR NJ TUBE route 2 times daily    Abdominal pain, epigastric       * gabapentin 250 MG/5ML solution    NEURONTIN    450 mL    Take 12 mLs (600 mg) by mouth daily    Abdominal pain, epigastric       leuprolide 11.25 MG kit    LUPRON DEPOT (3-MONTH)    1 each    Inject 11.25 mg into the muscle every 3 months    Endometriosis       levalbuterol 45 MCG/ACT Inhaler    XOPENEX HFA    1 Inhaler    Inhale 2 puffs into the lungs every 6 hours as needed for shortness of breath / dyspnea    Wheeze       menthol 5 % Ptch    ICY HOT    15 patch    Apply 1 patch topically every 8 hours as needed for muscle soreness    Abdominal pain, epigastric       multivitamins with minerals Liqd liquid     1 Bottle    15 mLs by Per Feeding Tube route daily    Abdominal pain, epigastric       norethindrone 5 MG tablet    AYGESTIN    90 tablet    Take 1 tablet (5 mg) by mouth daily    Endometriosis       nortriptyline 10 MG capsule    PAMELOR    120 capsule    Take 3 capsules (30 mg) by mouth At Bedtime    Right upper quadrant abdominal pain       ondansetron 4 MG ODT tab    ZOFRAN ODT    40 tablet    Take 1 tablet (4 mg) by mouth every 8 hours as needed for nausea or vomiting    Idiopathic chronic pancreatitis (H)       order for DME      1 Units    Equipment being ordered: TENS with wire and electrode.    Chronic abdominal pain       oxyCODONE 5 MG/5ML solution    ROXICODONE    900 mL    Take 10 mLs (10 mg) by mouth every 8 hours as needed for pain maximum 30 mg per day    Chronic abdominal pain       pantoprazole 40 MG EC tablet    PROTONIX    30 tablet    Take 1 tablet (40 mg) by mouth 2 times daily (before meals)    Right upper quadrant abdominal pain, Erosive gastritis       polyethylene glycol Packet    MIRALAX/GLYCOLAX    7 packet    Take 17 g by mouth daily    Right upper quadrant abdominal pain       RIZATRIPTAN BENZOATE PO      Take 5 mg by mouth once as needed        scopolamine 72 hr patch    TRANSDERM    2 patch    Apply 1 patch to hairless area behind one ear if severe nausea and vomiting.  Remove old patch and change every 3 days (72 hours).    Intractable vomiting with nausea, unspecified vomiting type       senna-docusate 8.6-50 MG per tablet    SENOKOT-S;PERICOLACE    100 tablet    Take 1 tablet by mouth 2 times daily as needed for constipation    Right upper quadrant abdominal pain       sodium bicarbonate 325 MG tablet     200 tablet    1 tablet (325 mg) by Per Feeding Tube route 4 times daily    Abdominal pain, epigastric       * Notice:  This list has 4 medication(s) that are the same as other medications prescribed for you. Read the directions carefully, and ask your doctor or other care provider to review them with you.

## 2018-01-26 NOTE — DISCHARGE SUMMARY
[facility], [place]    Internal Medicine Discharge Summary- Gold Service    Date of Admission:  1/18/2018  Date of Discharge:  1/23/2018  3:42 PM  Discharging Provider: Leodan Perez Team: Gold 3    Discharge Diagnoses   Acute on Chronic Abdominal Pain  Post-ERCP Pain  Nausea   Pancreatic Insufficiency  History of Depression     Follow-ups Needed After Discharge   1.  Follow up with PCP within one week of discharge  2.  Follow up with Dr. Narayanan's RN and nutritionist for tube feed lauryn  3.  Follow up with Pain Management, Dr. Wang as scheduled   4.  Follow up with Dr. Narayanan per his instructions    Hospital Course   Alexandra Melgoza is a 22 year old female  admitted on 1/18/2018. She has a history of chronic abdominal pain, somatoform disorder, pseudoseizures, POTS and is admitted for post-op pain, nausea and vomiting.  The following medical problems were addressed this hospitalization.     Acute on Chronic Abdominal Pain; Post-ERCP Pain; Nausea. Patient carries history of chronic abd pain since age 8, does not meet criteria for chronic pancreatitis.  Has chronic pain care as OP with Dr. Leonardo Wang at [facility].  Managed on PTA Oxycodone 10 mg TID and Gabapentin 400 mg TID.  Briefly trialed Methadone but this was D/Cd 2/2 nausea.  She is currently using oxycodone every day anywhere from 10-30 mg per day.  Has been on IV Dilaudid this hospitalization given intolerance of oral meds.  S/p diagnostic ERCP with NJ feeding tube placement 1/18. Post procedure unable to go home secondary to pain, nausea and vomiting.  Initially started on Hydromorphone PCA, this has been since D/Cd.  Due to ongoing severe nausea 1/19 decision made to defer start of TFs until 1/20. Tube feeds have been increased to 50mL/hour which she is tolerating.  Prior to discharge, her TF were increased to goal and she tolerated oral medications.  Pain team was  consulted this admission.  They recommended continuing her PTA Oxycodone 10 mg TID and her Gabapentin was increased to 400 mg in AM and afternoon, then 600 mg at night.  She was discharged on Menthol patches.  She was weaned off IV narcotics prior to discharge.  She will need to follow up with Dr. Wang, her PTA pain MD as scheduled on 2/13.  I have ordered a TENS unit for patient on discharge as well.  She will need to follow up with Dr. Narayanan's nutritionist and her PCP within one week for TF plan.     Pancreatic Insufficiency. Continue home pancreatic enzymes     History of Depression. Continue Nortriptyline.  Plan to follow up with OP psychologist Quyen Lennon at [facility].      Consultations This Hospital Stay   NUTRITION SERVICES ADULT IP CONSULT  PATIENT LEARNING CENTER IP CONSULT  NUTRITION SERVICES ADULT IP CONSULT  REGIONAL ANESTHESIA PAIN SERVICE ADULT IP CONSULT  PSYCHIATRY IP CONSULT  PHARMACY IP CONSULT  PAIN MANAGEMENT ADULT IP CONSULT  PHYSICAL THERAPY ADULT IP CONSULT     Code Status   Full Code    Time Spent on this Encounter   I, Sapphire Guerra, personally saw the patient today and spent greater than 30 minutes discharging this patient.     Sapphire Guerra NP  Internal Medicine Staff Hospitalist Service  [facility]  Pager: [phone]  ______________________________________________________________________    Physical Exam   Vital Signs:                    Weight: 149 lbs 7.55 oz    GENERAL: Alert and oriented x 3. Appears comfortable.   HEENT: Anicteric sclera. Mucous membranes moist. NJT in place.   CV: RRR. S1, S2. No murmurs appreciated.   RESPIRATORY: Effort normal on room air. Lungs CTAB with no wheezing, rales, rhonchi.   GI: Abdomen soft and non distended, bowel sounds present. No tenderness, rebound, guarding.   MUSCULOSKELETAL: No joint swelling or tenderness.   NEUROLOGICAL: No focal deficits. Moves all extremities.   EXTREMITIES: No peripheral  edema. Intact bilateral pedal pulses.   SKIN: No jaundice. No rashes.     Significant Results and Procedures   Results for orders placed or performed during the hospital encounter of 01/18/18   XR Surgery BARBER  Fluoro G/T 5 Min    Narrative    This exam was marked as non-reportable because it will not be read by a   radiologist or a [place] non-radiologist provider.             XR Abdomen Port 1 View    Narrative    Exam: XR ABDOMEN PORT F1 VW, 1/19/2018 10:42 PM    Indication: new abdominal pain - check tube placement and r/o  ileus/obstruction;     Comparison: None    Findings:   Single AP supine radiograph of abdomen was obtained.  A feeding tube with tip projects over the proximal jejunum. Partial  visualization of a central venous catheter projecting over the right  atrium.  Nonobstructive bowel gas pattern. No abnormal calcification. No  evidence for organomegaly. Moderate stool burden in the colon.  Visualized lower chest is unremarkable. Cholecystectomy surgical  clips.      Impression    Impression:   1. The feeding tube tip projects over the proximal jejunum.  2. Moderate stool burden with nonobstructive bowel gas pattern.    I have personally reviewed the examination and initial interpretation  and I agree with the findings.    PNACHO GAN     *Note: Due to a large number of results and/or encounters for the requested time period, some results have not been displayed. A complete set of results can be found in Results Review.       Pending Results   These results will be followed up by: NA  Unresulted Labs Ordered in the Past 30 Days of this Admission     No orders found from 11/19/2017 to 1/19/2018.             Primary Care Physician   Quyen Baez    Discharge Disposition   Discharged to home  Condition at discharge: Stable    Discharge Orders     Home infusion referral     Reason for your hospital stay   You were admitted to the hospital for an ERCP and remained in the hospital following this  procedure for ongoing abdominal pain and intolerance of oral food and medication.     Activity   Your activity upon discharge: activity as tolerated.  No driving on narcotic pain medication.     Monitor and record   fluid intake and output daily  Drink plenty of fluids and ensure that you are urinating 6-8 times a day     When to contact your care team   Call your PCP or return to ED for temperatures > 100.7 degrees, worsening or changing pain, uncontrolled vomiting or inability to tolerate oral intake, new or worsening diarrhea, new or worsening shortness of breath, decreased urine output, yellowing of the eyes or skin, confusion, weakness, blood in urine or stools.     Tubes and drains   You are going home with the following tubes or drains: feeding tube NJ-Tube.   Tube cares per hospital or home care instructions     Discharge Instructions   You were ordered for Lidocaine patches during your hospital stay.  Unfortunately Lidocaine patches are not covered by your insurance.  If you would like to continue this, you can but the Lidocaine 4% patches at your local pharmacy as over-the-counter.     Adult [facility] Follow-up and recommended labs and tests   Follow up with primary care provider, Quyen Baez, within 7 days for hospital follow- up.  No follow up labs or test are needed.  Follow up with Nurse Practitioner from Dr. Kaiser office this week if possible.  Please also call the nutritionist at Dr. Kaiser office to scheduled a follow up appointment in order to discuss tube feeding plan.     Follow up Dr. Wang in Pain Clinic as scheduled for hospital follow up.  No follow up labs or test are needed.    Appointments on [place] and/or [facility] (with [facility] or [facility] provider or service). Call [phone] if you haven't heard regarding these appointments within 7 days of discharge.     Free water   Free water flushes 250mL every 6 hours via feeding tube     Full Code     Diet   Follow this diet  upon discharge: Orders Placed This Encounter     Adult Formula Drip Feeding: Continuous Peptamen 1.5; Nasoduodenal tube; Goal Rate: 50; mL/hr; Medication - Tube Feeding Flush Frequency: At least 15-30 mL water before and after medication administration and with tube clogging; Amout to Send (Nutr...     Advance Diet as Tolerated: Clear Liquid Diet       Discharge Medications   Discharge Medication List as of 1/23/2018  1:30 PM      START taking these medications    Details   menthol (ICY HOT) 5 % PTCH Apply 1 patch topically every 8 hours as needed for muscle soreness, Disp-15 patch, R-3, E-Prescribe      multivitamins with minerals (CERTAVITE/CEROVITE) LIQD liquid 15 mLs by Per Feeding Tube route daily, Disp-1 Bottle, R-0, E-Prescribe      !! gabapentin (NEURONTIN) 250 MG/5ML solution 8 mLs (400 mg) by ORAL OR NJ TUBE route 2 times daily, Disp-470 mL, R-0, E-Prescribe      !! gabapentin (NEURONTIN) 250 MG/5ML solution Take 12 mLs (600 mg) by mouth daily, Disp-450 mL, R-0, E-Prescribe      sodium bicarbonate 325 MG tablet 1 tablet (325 mg) by Per Feeding Tube route 4 times daily, Disp-200 tablet, R-0, E-Prescribe      order for DME Equipment being ordered: TENS with wire and electrode.Disp-1 Units, R-0, Local Print       !! - Potential duplicate medications found. Please discuss with provider.      CONTINUE these medications which have CHANGED    Details   !! amylase-lipase-protease (ZENPEP) 76558 UNITS CPEP Take 5 capsules (100,000 Units) by mouth 4 times daily (with meals and nightly), Disp-180 capsule, R-1, Historical       !! - Potential duplicate medications found. Please discuss with provider.      CONTINUE these medications which have NOT CHANGED    Details   oxyCODONE (ROXICODONE) 5 MG/5ML solution Take 10 mLs (10 mg) by mouth every 8 hours as needed for pain maximum 30 mg per day, Disp-900 mL, R-0, Local Print      scopolamine (TRANSDERM) 72 hr patch Apply 1 patch to hairless area behind one ear if severe  nausea and vomiting.  Remove old patch and change every 3 days (72 hours)., Disp-2 patch, R-0, Local Print      ondansetron (ZOFRAN ODT) 4 MG ODT tab Take 1 tablet (4 mg) by mouth every 8 hours as needed for nausea or vomiting, Disp-40 tablet, R-0, Local Print      nortriptyline (PAMELOR) 10 MG capsule Take 3 capsules (30 mg) by mouth At Bedtime, Disp-120 capsule, R-0, E-Prescribe      !! amylase-lipase-protease (ZENPEP) 90783 UNITS CPEP Take 2-3 capsules (40,000-60,000 Units) by mouth Take with snacks or supplements (Snacks), Disp-180 capsule, R-1, E-Prescribe      senna-docusate (SENOKOT-S;PERICOLACE) 8.6-50 MG per tablet Take 1 tablet by mouth 2 times daily as needed for constipation, Disp-100 tablet, R-0, E-Prescribe      pantoprazole (PROTONIX) 40 MG EC tablet Take 1 tablet (40 mg) by mouth 2 times daily (before meals), Disp-30 tablet, R-1, E-Prescribe      polyethylene glycol (MIRALAX/GLYCOLAX) Packet Take 17 g by mouth daily, Disp-7 packet, R-0, E-Prescribe      norethindrone (AYGESTIN) 5 MG tablet Take 1 tablet (5 mg) by mouth daily, Disp-90 tablet, R-1, E-Prescribe      fluticasone (FLONASE) 50 MCG/ACT spray Spray 2 sprays into both nostrils daily, Disp-16 g, R-3, E-Prescribe      levalbuterol (XOPENEX HFA) 45 MCG/ACT Inhaler Inhale 2 puffs into the lungs every 6 hours as needed for shortness of breath / dyspnea, Disp-1 Inhaler, R-1, E-Prescribe      leuprolide (LUPRON DEPOT) 11.25 MG injection Inject 11.25 mg into the muscle every 3 months, Disp-1 each, R-3, Injection      RIZATRIPTAN BENZOATE PO Take 5 mg by mouth once as needed , Historical       !! - Potential duplicate medications found. Please discuss with provider.      STOP taking these medications       gabapentin (NEURONTIN) 400 MG capsule Comments:   Reason for Stopping:             Allergies   Allergies   Allergen Reactions     Amoxicillin-Pot Clavulanate Nausea and Vomiting     Compazine [Prochlorperazine] Other (See Comments)     Dystonia    "    Hyoscyamine Other (See Comments)     Dystonia     Reglan [Metoclopramide Hcl] Other (See Comments)     Dystonia     Zyprexa Other (See Comments)     Sensitive, dystonic reaction on 11-9-2011     Amitriptyline Hcl Other (See Comments)     Dystonia, hallucinations     Buspirone Other (See Comments)     No Adverse Reactions, no benefit     Cogentin [Benztropine]      Cyproheptadine Other (See Comments)     Distonic     Dicyclomine Other (See Comments)     Droperidol Other (See Comments)     Feels tense and \"like she has to jump out of her skin\".       Effexor [Venlafaxine] Other (See Comments)     Dystonia     Food      Cilantro--lips/tongue swelling     No Clinical Screening - See Comments Other (See Comments)     Cilantro     Phenergan Dm [Promethazine-Dm] Other (See Comments)     dystonia     Promethazine Other (See Comments)     Risperidone Other (See Comments)     dystonia     Vistaril Other (See Comments)     Burning sensation.       Augmentin GI Disturbance     Ketamine Other (See Comments)     jittery     Sorbitol GI Disturbance     Headache and dyspepsia     "

## 2018-01-26 NOTE — PROGRESS NOTES
Nursing Note  Alexandra Melgoza presents [facility] for:   Chief Complaint   Patient presents with     Infusion     Fluids, zofran, benadryl     During today's [facility] appointment, orders from Dr. Narayanan were completed.  Frequency: as needed    Progress note:  Patient identification verified by name and date of birth.  Assessment completed.  Vitals recorded in Doc Flowsheets.  Patient was provided with education regarding infusion and possible side effects.  Patient verbalized understanding.      needed: No  Premedications: administered per order.  Infusion Rates: IVP for benadryl and zofran, 2 liters lactated ringers over 2 hours.  Approximate Infusion length:3 hours.   Labs: were not ordered for this appointment.  Vascular access: port accessed today.  Treatment Conditions: patient denies fever, chills, signs of infection, recent illness, on antibiotics, productive cough or elevated temperature.  Patient tolerated infusion: well.    Drug Waste Record? No     Discharge Plan:   Follow up plan of care with: ongoing infusions at [facility].  Discharge instructions were reviewed with patient.  Patient/representative verbalized understanding of discharge instructions and all questions answered.  Patient discharged from [facility] in stable condition.    Ting Davidson RN    Administrations This Visit     diphenhydrAMINE (BENADRYL) injection 25 mg     Admin Date Action Dose Route Administered By             01/26/2018 Given 25 mg Intravenous Ting Davidson RN                    lactated ringers BOLUS 1,000-2,000 mL     Admin Date Action Dose Route Administered By             01/26/2018 New Bag 1000 mL Intravenous Ting Davidson RN                    ondansetron (ZOFRAN) injection 4 mg     Admin Date Action Dose Route Administered By             01/26/2018 Given 4 mg  Intravenous Ting Davidson, RN              Admin Date Action Dose Route Administered By             01/26/2018 Given 4 mg Intravenous Nita Raymundo, SAMARA                          /64 (BP Location: Left arm, Patient Position: Semi-Antunez's, Cuff Size: Adult Regular)  Pulse 84  Temp 97.4  F (36.3  C) (Oral)  Resp 17  SpO2 100%

## 2018-01-26 NOTE — PATIENT INSTRUCTIONS
Dear Alexandra Melgoza    Thank you for choosing [facility] ([facility]) for your infusion.  The following information is a summary of our appointment as well as important reminders.      Please refer to your hospital discharge instructions for details on home care services, future appointments, phone numbers, and diet/activity levels.    Additional information: You received IVF, Benadryl & Zofran today. You will receive your next week. infusion      We look forward in seeing you on your next appointment here at [facility].  Please don t hesitate to call us at [phone] to reschedule any of your appointments or to speak with one of the [facility] registered nurses.  It was a pleasure taking care of you today.    Sincerely,    [facility]  [facility]  [address] [place] [address]  [id number]  Phone:  [phone]

## 2018-01-29 ENCOUNTER — INFUSION THERAPY VISIT (OUTPATIENT)
Dept: INFUSION THERAPY | Facility: CLINIC | Age: 23
End: 2018-01-29
Attending: INTERNAL MEDICINE
Payer: COMMERCIAL

## 2018-01-29 VITALS
SYSTOLIC BLOOD PRESSURE: 116 MMHG | HEART RATE: 97 BPM | DIASTOLIC BLOOD PRESSURE: 71 MMHG | RESPIRATION RATE: 16 BRPM | OXYGEN SATURATION: 100 %

## 2018-01-29 DIAGNOSIS — G43.A0 CYCLICAL VOMITING WITH NAUSEA, INTRACTABILITY OF VOMITING NOT SPECIFIED: Primary | ICD-10-CM

## 2018-01-29 DIAGNOSIS — K86.1 CHRONIC PANCREATITIS, UNSPECIFIED PANCREATITIS TYPE (H): ICD-10-CM

## 2018-01-29 PROCEDURE — 96361 HYDRATE IV INFUSION ADD-ON: CPT

## 2018-01-29 PROCEDURE — 96375 TX/PRO/DX INJ NEW DRUG ADDON: CPT

## 2018-01-29 PROCEDURE — 25000128 H RX IP 250 OP 636: Mod: ZF | Performed by: INTERNAL MEDICINE

## 2018-01-29 PROCEDURE — 96376 TX/PRO/DX INJ SAME DRUG ADON: CPT

## 2018-01-29 PROCEDURE — 96374 THER/PROPH/DIAG INJ IV PUSH: CPT

## 2018-01-29 PROCEDURE — 25000128 H RX IP 250 OP 636: Mod: ZF | Performed by: NURSE PRACTITIONER

## 2018-01-29 RX ORDER — ONDANSETRON 2 MG/ML
4 INJECTION INTRAMUSCULAR; INTRAVENOUS DAILY PRN
Status: DISCONTINUED | OUTPATIENT
Start: 2018-01-29 | End: 2018-01-29 | Stop reason: HOSPADM

## 2018-01-29 RX ORDER — DIPHENHYDRAMINE HYDROCHLORIDE 50 MG/ML
25 INJECTION INTRAMUSCULAR; INTRAVENOUS ONCE
Status: CANCELLED
Start: 2018-01-29 | End: 2018-01-29

## 2018-01-29 RX ORDER — ONDANSETRON 2 MG/ML
4 INJECTION INTRAMUSCULAR; INTRAVENOUS DAILY PRN
Status: CANCELLED
Start: 2018-01-29

## 2018-01-29 RX ORDER — ONDANSETRON 2 MG/ML
4 INJECTION INTRAMUSCULAR; INTRAVENOUS ONCE
Status: CANCELLED
Start: 2018-01-29 | End: 2018-01-29

## 2018-01-29 RX ORDER — DIPHENHYDRAMINE HYDROCHLORIDE 50 MG/ML
25 INJECTION INTRAMUSCULAR; INTRAVENOUS ONCE
Status: COMPLETED | OUTPATIENT
Start: 2018-01-29 | End: 2018-01-29

## 2018-01-29 RX ORDER — ONDANSETRON 2 MG/ML
4 INJECTION INTRAMUSCULAR; INTRAVENOUS ONCE
Status: COMPLETED | OUTPATIENT
Start: 2018-01-29 | End: 2018-01-29

## 2018-01-29 RX ADMIN — DIPHENHYDRAMINE HYDROCHLORIDE: 50 INJECTION INTRAMUSCULAR; INTRAVENOUS at 17:05

## 2018-01-29 RX ADMIN — ONDANSETRON 4 MG: 2 INJECTION INTRAMUSCULAR; INTRAVENOUS at 18:41

## 2018-01-29 RX ADMIN — SODIUM CHLORIDE, POTASSIUM CHLORIDE, SODIUM LACTATE AND CALCIUM CHLORIDE 1500 ML: 600; 310; 30; 20 INJECTION, SOLUTION INTRAVENOUS at 17:02

## 2018-01-29 RX ADMIN — ONDANSETRON 4 MG: 2 INJECTION INTRAMUSCULAR; INTRAVENOUS at 17:02

## 2018-01-29 NOTE — PROGRESS NOTES
Nursing Note  Alexandra Melgoza presents today to [facility] for:   Chief Complaint   Patient presents with     Infusion     Fluids, zofran, benadryl     During today's [facility] appointment, orders from Dr. Narayanan were completed.  Frequency: as needed    Progress note:  Patient identification verified by name and date of birth.  Assessment completed.  Vitals recorded in Doc Flowsheets.  Patient was provided with education regarding infusion and possible side effects.  Patient verbalized understanding.      needed: No  Premedications: administered per order.  Infusion Rates: IVP for benadryl and zofran, 2 liters lactated ringers over 2 hours.  Approximate Infusion length:3 hours.   Labs: were not ordered for this appointment.  Vascular access: port accessed today.  Treatment Conditions: patient denies fever, chills, signs of infection, recent illness, on antibiotics, productive cough or elevated temperature.  Patient tolerated infusion: well.    Drug Waste Record? No     Discharge Plan:   Follow up plan of care with: ongoing infusions at [facility].  Discharge instructions were reviewed with patient.  Patient/representative verbalized understanding of discharge instructions and all questions answered.  Patient discharged from [facility] in stable condition.    Ysabel Piedra RN    Administrations This Visit     diphenhydrAMINE (BENADRYL) injection 25 mg     Admin Date Action Dose Route Administered By             01/29/2018 Given   Intravenous Ysabel Piedra RN                    lactated ringers BOLUS 1,000-2,000 mL     Admin Date Action Dose Route Administered By             01/29/2018 New Bag 2000 mL Intravenous Ysabel Piedra RN                    ondansetron (ZOFRAN) injection 4 mg     Admin Date Action Dose Route Administered By             01/29/2018 Given 4 mg  Intravenous Ysabel Piedra RN                          /71  Pulse 97  Resp 16  SpO2 100%

## 2018-01-29 NOTE — MR AVS SNAPSHOT
After Visit Summary   1/29/2018    Alexandra Melgoza    MRN: [id number]           Patient Information     Date Of Birth          1995        Visit Information        Provider Department      1/29/2018 5:00 PM UC 47 ATC; UC SPEC INFUSION [facility] Specialty and Procedure        Today's Diagnoses     Cyclical vomiting with nausea, intractability of vomiting not specified    -  1    Chronic pancreatitis, unspecified pancreatitis type (H)           Follow-ups after your visit        Your next 10 appointments already scheduled     Jan 31, 2018  5:00 PM CST   Infusion 120 with UC SPEC INFUSION, UC 47 ATC   [facility] Specialty and Procedure ([facility])    [address]  [address]  [facility] [address]   [phone]            Feb 02, 2018  8:00 AM CST   Infusion 120 with UC SPEC INFUSION, UC 48 ATC   [facility] Specialty and Procedure ([facility])    [address]  [address]  [facility] [address]   [phone]            Feb 05, 2018  2:00 PM CST   Infusion 120 with UC SPEC INFUSION, UC 48 ATC   [facility] Special[facility] Procedure ([facility])    [address]  [address]  [facility] [address]   [phone]            Feb 07, 2018  1:00 PM CST   Infusion 120 with UC SPEC INFUSION, UC 48 ATC   [facility] Specialty [facility] Procedure ([facility])    [address]  [address]  [facility] [address]   [phone]            Feb 09, 2018  3:00 PM CST   Infusion 120 with UC SPEC INFUSION, UC 47 ATC   [facility] Procedure ([facility])    [address]  [address]  [facility] [address]   [phone]              Who to contact     If you have questions or need follow up  information about today's clinic visit or your schedule please contact [facility] SPECIAL[facility] AND PROCEDURE directly at [phone].  Normal or non-critical lab and imaging results will be communicated to you by Flexiroamhart, letter or phone within 4 business days after the clinic has received the results. If you do not hear from us within 7 days, please contact the clinic through Flexiroamhart or phone. If you have a critical or abnormal lab result, we will notify you by phone as soon as possible.  Submit refill requests through [employer] or call your pharmacy and they will forward the refill request to us. Please allow 3 business days for your refill to be completed.          Additional Information About Your Visit        Flex[employer] Medical Information     [employer] gives you secure access to your electronic health record. If you see a primary care provider, you can also send messages to your care team and make appointments. If you have questions, please call your primary care clinic.  If you do not have a primary care provider, please call [phone] and they will assist you.        Care EveryWhere ID     This is your Care EveryWhere ID. This could be used by other organizations to access your [place] medical records  [id number]        Your Vitals Were     Pulse Respirations Pulse Oximetry             97 16 100%          Blood Pressure from Last 3 Encounters:   01/29/18 116/71   01/26/18 114/64   01/24/18 110/60    Weight from Last 3 Encounters:   01/24/18 67.6 kg (149 lb)   01/18/18 67.8 kg (149 lb 7.6 oz)   01/15/18 65.8 kg (145 lb)              Today, you had the following     No orders found for display       Primary Care Provider Office Phone # Fax #    Quyen Baez [phone]       [address] [place] [address] [address] [place] [address]        Equal Access to Services     RACHAEL BENITEZ : alcon Orta qaybta kaalmada adeegyada, waxay idiin hayaan adeeg  dorene gaspar ah. So [facility] [phone].    ATENCIÓN: Si ishan kilpatrick, tiene a quijano disposición servicios gratuitos de asistencia lingüística. Panfilo champion [phone].    We comply with applicable federal civil rights laws and [place] laws. We do not discriminate on the basis of race, color, national origin, age, disability, sex, sexual orientation, or gender identity.            Thank you!     Thank you for choosing [facility] SPECIALTY AND PROCEDURE  for your care. Our goal is always to provide you with excellent care. Hearing back from our patients is one way we can continue to improve our services. Please take a few minutes to complete the written survey that you may receive in the mail after your visit with us. Thank you!             Your Updated Medication List - Protect others around you: Learn how to safely use, store and throw away your medicines at www.disposemymeds.org.          This list is accurate as of 1/29/18  6:45 PM.  Always use your most recent med list.                   Brand Name Dispense Instructions for use Diagnosis    * amylase-lipase-protease 94942 UNITS Cpep    ZENPEP    180 capsule    Take 2-3 capsules (40,000-60,000 Units) by mouth Take with snacks or supplements (Snacks)    Idiopathic chronic pancreatitis (H)       * amylase-lipase-protease 69079 UNITS Cpep    ZENPEP    180 capsule    Take 5 capsules (100,000 Units) by mouth 4 times daily (with meals and nightly)    Right upper quadrant abdominal pain       fluticasone 50 MCG/ACT spray    FLONASE    16 g    Spray 2 sprays into both nostrils daily    Nasal congestion       * gabapentin 250 MG/5ML solution    NEURONTIN    470 mL    8 mLs (400 mg) by ORAL OR NJ TUBE route 2 times daily    Abdominal pain, epigastric       * gabapentin 250 MG/5ML solution    NEURONTIN    450 mL    Take 12 mLs (600 mg) by mouth daily    Abdominal pain, epigastric       leuprolide 11.25 MG kit    LUPRON DEPOT (3-MONTH)    1 each     Inject 11.25 mg into the muscle every 3 months    Endometriosis       levalbuterol 45 MCG/ACT Inhaler    XOPENEX HFA    1 Inhaler    Inhale 2 puffs into the lungs every 6 hours as needed for shortness of breath / dyspnea    Wheeze       menthol 5 % Ptch    ICY HOT    15 patch    Apply 1 patch topically every 8 hours as needed for muscle soreness    Abdominal pain, epigastric       multivitamins with minerals Liqd liquid     1 Bottle    15 mLs by Per Feeding Tube route daily    Abdominal pain, epigastric       norethindrone 5 MG tablet    AYGESTIN    90 tablet    Take 1 tablet (5 mg) by mouth daily    Endometriosis       nortriptyline 10 MG capsule    PAMELOR    120 capsule    Take 3 capsules (30 mg) by mouth At Bedtime    Right upper quadrant abdominal pain       ondansetron 4 MG ODT tab    ZOFRAN ODT    40 tablet    Take 1 tablet (4 mg) by mouth every 8 hours as needed for nausea or vomiting    Idiopathic chronic pancreatitis (H)       order for DME     1 Units    Equipment being ordered: TENS with wire and electrode.    Chronic abdominal pain       oxyCODONE 5 MG/5ML solution    ROXICODONE    900 mL    Take 10 mLs (10 mg) by mouth every 8 hours as needed for pain maximum 30 mg per day    Chronic abdominal pain       pantoprazole 40 MG EC tablet    PROTONIX    30 tablet    Take 1 tablet (40 mg) by mouth 2 times daily (before meals)    Right upper quadrant abdominal pain, Erosive gastritis       polyethylene glycol Packet    MIRALAX/GLYCOLAX    7 packet    Take 17 g by mouth daily    Right upper quadrant abdominal pain       RIZATRIPTAN BENZOATE PO      Take 5 mg by mouth once as needed        scopolamine 72 hr patch    TRANSDERM    2 patch    Apply 1 patch to hairless area behind one ear if severe nausea and vomiting.  Remove old patch and change every 3 days (72 hours).    Intractable vomiting with nausea, unspecified vomiting type       senna-docusate 8.6-50 MG per tablet    SENOKOT-S;PERICOLACE    100 tablet     Take 1 tablet by mouth 2 times daily as needed for constipation    Right upper quadrant abdominal pain       sodium bicarbonate 325 MG tablet     200 tablet    1 tablet (325 mg) by Per Feeding Tube route 4 times daily    Abdominal pain, epigastric       * Notice:  This list has 4 medication(s) that are the same as other medications prescribed for you. Read the directions carefully, and ask your doctor or other care provider to review them with you.

## 2018-01-30 ENCOUNTER — CARE COORDINATION (OUTPATIENT)
Dept: GASTROENTEROLOGY | Facility: CLINIC | Age: 23
End: 2018-01-30

## 2018-01-30 NOTE — PROGRESS NOTES
Grabiel is calling with pain at her [place] site in her nose and she still continues to have nausea. She has tried to decrease the rate with little effect. She is only able to take in small sips of water.     States the pain is so much more manageable since she has had the [place] placed but she can feel the tube much more and feels like it is gagging her and causing more nausea and vomiting. She would like to see Rand in clinic and talk about a more permanent solution for tube feedings.     Ella SARMIENTO RN Coordinator  Dr. Narayanan, Dr. Joe & Dr. Klein   [facility]  [phone]

## 2018-01-31 ENCOUNTER — OFFICE VISIT (OUTPATIENT)
Dept: GASTROENTEROLOGY | Facility: CLINIC | Age: 23
End: 2018-01-31
Payer: COMMERCIAL

## 2018-01-31 ENCOUNTER — INFUSION THERAPY VISIT (OUTPATIENT)
Dept: INFUSION THERAPY | Facility: CLINIC | Age: 23
End: 2018-01-31
Attending: INTERNAL MEDICINE
Payer: COMMERCIAL

## 2018-01-31 VITALS
SYSTOLIC BLOOD PRESSURE: 127 MMHG | BODY MASS INDEX: 23.95 KG/M2 | WEIGHT: 149 LBS | HEART RATE: 110 BPM | DIASTOLIC BLOOD PRESSURE: 80 MMHG | OXYGEN SATURATION: 100 % | HEIGHT: 66 IN

## 2018-01-31 VITALS
SYSTOLIC BLOOD PRESSURE: 110 MMHG | RESPIRATION RATE: 14 BRPM | DIASTOLIC BLOOD PRESSURE: 79 MMHG | OXYGEN SATURATION: 100 % | TEMPERATURE: 97.8 F

## 2018-01-31 DIAGNOSIS — R10.9 CHRONIC ABDOMINAL PAIN: Primary | ICD-10-CM

## 2018-01-31 DIAGNOSIS — G43.A0 CYCLICAL VOMITING WITH NAUSEA, INTRACTABILITY OF VOMITING NOT SPECIFIED: Primary | ICD-10-CM

## 2018-01-31 DIAGNOSIS — K86.1 CHRONIC PANCREATITIS, UNSPECIFIED PANCREATITIS TYPE (H): ICD-10-CM

## 2018-01-31 DIAGNOSIS — G89.29 CHRONIC ABDOMINAL PAIN: Primary | ICD-10-CM

## 2018-01-31 PROCEDURE — 25000128 H RX IP 250 OP 636: Mod: ZF | Performed by: NURSE PRACTITIONER

## 2018-01-31 PROCEDURE — 96361 HYDRATE IV INFUSION ADD-ON: CPT

## 2018-01-31 PROCEDURE — 25000128 H RX IP 250 OP 636: Mod: ZF | Performed by: INTERNAL MEDICINE

## 2018-01-31 PROCEDURE — 96376 TX/PRO/DX INJ SAME DRUG ADON: CPT

## 2018-01-31 PROCEDURE — 96374 THER/PROPH/DIAG INJ IV PUSH: CPT

## 2018-01-31 PROCEDURE — 96375 TX/PRO/DX INJ NEW DRUG ADDON: CPT

## 2018-01-31 RX ORDER — ONDANSETRON 2 MG/ML
4 INJECTION INTRAMUSCULAR; INTRAVENOUS ONCE
Status: COMPLETED | OUTPATIENT
Start: 2018-01-31 | End: 2018-01-31

## 2018-01-31 RX ORDER — DIPHENHYDRAMINE HYDROCHLORIDE 50 MG/ML
25 INJECTION INTRAMUSCULAR; INTRAVENOUS ONCE
Status: COMPLETED | OUTPATIENT
Start: 2018-01-31 | End: 2018-01-31

## 2018-01-31 RX ORDER — ONDANSETRON 2 MG/ML
4 INJECTION INTRAMUSCULAR; INTRAVENOUS ONCE
Status: CANCELLED
Start: 2018-01-31 | End: 2018-01-31

## 2018-01-31 RX ORDER — ONDANSETRON 2 MG/ML
4 INJECTION INTRAMUSCULAR; INTRAVENOUS DAILY PRN
Status: CANCELLED
Start: 2018-01-31

## 2018-01-31 RX ORDER — ONDANSETRON 4 MG/1
4 TABLET, ORALLY DISINTEGRATING ORAL EVERY 8 HOURS PRN
Qty: 40 TABLET | Refills: 3 | Status: ON HOLD | OUTPATIENT
Start: 2018-01-31 | End: 2018-02-27

## 2018-01-31 RX ORDER — ONDANSETRON 2 MG/ML
4 INJECTION INTRAMUSCULAR; INTRAVENOUS DAILY PRN
Status: DISCONTINUED | OUTPATIENT
Start: 2018-01-31 | End: 2018-01-31 | Stop reason: HOSPADM

## 2018-01-31 RX ORDER — DIPHENHYDRAMINE HYDROCHLORIDE 50 MG/ML
25 INJECTION INTRAMUSCULAR; INTRAVENOUS ONCE
Status: CANCELLED
Start: 2018-01-31 | End: 2018-01-31

## 2018-01-31 RX ADMIN — ONDANSETRON 4 MG: 2 INJECTION INTRAMUSCULAR; INTRAVENOUS at 17:05

## 2018-01-31 RX ADMIN — DIPHENHYDRAMINE HYDROCHLORIDE 25 MG: 50 INJECTION INTRAMUSCULAR; INTRAVENOUS at 16:18

## 2018-01-31 RX ADMIN — SODIUM CHLORIDE, POTASSIUM CHLORIDE, SODIUM LACTATE AND CALCIUM CHLORIDE 2000 ML: 600; 310; 30; 20 INJECTION, SOLUTION INTRAVENOUS at 16:00

## 2018-01-31 RX ADMIN — ONDANSETRON 4 MG: 2 INJECTION INTRAMUSCULAR; INTRAVENOUS at 16:13

## 2018-01-31 ASSESSMENT — PAIN SCALES - GENERAL: PAINLEVEL: NO PAIN (0)

## 2018-01-31 NOTE — NURSING NOTE
"Chief Complaint   Patient presents with     RECHECK     f/u        Vitals:    01/31/18 1326   BP: 127/80   Pulse: 110   SpO2: 100%   Weight: 67.6 kg (149 lb)   Height: 1.676 m (5' 6\")       Body mass index is 24.05 kg/(m^2).      Jose HIGGINBOTHAM"

## 2018-01-31 NOTE — MR AVS SNAPSHOT
After Visit Summary   1/31/2018    Alexandra Melgoza    MRN: [id number]           Patient Information     Date Of Birth          1995        Visit Information        Provider Department      1/31/2018 5:00 PM UC 47 ATC; UC SPEC INFUSION [facility] Specialty and Procedure        Today's Diagnoses     Cyclical vomiting with nausea, intractability of vomiting not specified    -  1    Chronic pancreatitis, unspecified pancreatitis type (H)           Follow-ups after your visit        Your next 10 appointments already scheduled     Feb 02, 2018  8:00 AM CST   Infusion 120 with UC SPEC INFUSION, UC 48 ATC   [facility] Specialty and Procedure ([facility])    [address]  [address]  [facility] [address]   [phone]            Feb 05, 2018  2:00 PM CST   Infusion 120 with UC SPEC INFUSION, UC 48 ATC   [facility] Specialty and Procedure ([facility])    [address]  [address]  [facility] [address]   [phone]            Feb 07, 2018  1:00 PM CST   Infusion 120 with UC SPEC INFUSION, UC 48 ATC   [facility] Specialty and Procedure ([facility])    [address]  [address]  [facility] [address]   [phone]            Feb 09, 2018  3:00 PM CST   Infusion 120 with UC SPEC INFUSION, UC 47 ATC   [facility] Special[facility] and Procedure ([facility])    [address]  [address]  [facility] [address]   [phone]            Feb 12, 2018  1:00 PM CST   Infusion 120 with UC SPEC INFUSION, UC 45 ATC   [facility] Specialty and Procedure ([facility])    [address]  [address]  [facility] [address]   [phone]              Future tests that were ordered for you today     Open Future Orders         Priority Expected Expires Ordered    EXAMEGD Routine 3/17/2018 3/17/2018 1/31/2018            Who to contact     If you have questions or need follow up information about today's clinic visit or your schedule please contact [facility] SPECIALTY AND PROCEDURE directly at [phone].  Normal or non-critical lab and imaging results will be communicated to you by MyChart, letter or phone within 4 business days after the clinic has received the results. If you do not hear from us within 7 days, please contact the clinic through [employer] or phone. If you have a critical or abnormal lab result, we will notify you by phone as soon as possible.  Submit refill requests through [employer] or call your pharmacy and they will forward the refill request to us. Please allow 3 business days for your refill to be completed.          Additional Information About Your Visit        MyChart Information     [employer] gives you secure access to your electronic health record. If you see a primary care provider, you can also send messages to your care team and make appointments. If you have questions, please call your primary care clinic.  If you do not have a primary care provider, please call [phone] and they will assist you.        Care EveryWhere ID     This is your Care EveryWhere ID. This could be used by other organizations to access your [place] medical records  [id number]        Your Vitals Were     Temperature Respirations Pulse Oximetry             97.8  F (36.6  C) 14 100%          Blood Pressure from Last 3 Encounters:   01/31/18 110/79   01/31/18 127/80   01/29/18 116/71    Weight from Last 3 Encounters:   01/31/18 67.6 kg (149 lb)   01/24/18 67.6 kg (149 lb)   01/18/18 67.8 kg (149 lb 7.6 oz)              Today, you had the following     No orders found for display         Where to get your medicines      These medications were sent to [place], [place] -  [address]  [address], [facility] [address]    Hours:  TRANSPLANT PHONE NUMBER [phone] Phone:  [phone]     ondansetron 4 MG ODT tab          Primary Care Provider Office Phone # Fax #    Quyen Baez [phone]       [address] [place] [address] [address]  [facility] [address]        Equal Access to Services     RACHAEL BENITEZ : Hadii aad ku hadasho Soomaali, waaxda luqadaha, qaybta kaalmada adeegyada, waxay idiin hayaan adeeg kharash la'monique ah. So [facility] [phone].    ATENCIÓN: Si habla español, tiene a quijano disposición servicios gratuitos de asistencia lingüística. Llame al [phone].    We comply with applicable federal civil rights laws and [place] laws. We do not discriminate on the basis of race, color, national origin, age, disability, sex, sexual orientation, or gender identity.            Thank you!     Thank you for choosing [facility] PROCEDURE  for your care. Our goal is always to provide you with excellent care. Hearing back from our patients is one way we can continue to improve our services. Please take a few minutes to complete the written survey that you may receive in the mail after your visit with us. Thank you!             Your Updated Medication List - Protect others around you: Learn how to safely use, store and throw away your medicines at www.disposemymeds.org.          This list is accurate as of 1/31/18  7:02 PM.  Always use your most recent med list.                   Brand Name Dispense Instructions for use Diagnosis    * amylase-lipase-protease 33752 UNITS Cpep    ZENPEP    180 capsule    Take 2-3 capsules (40,000-60,000 Units) by mouth Take with snacks or supplements (Snacks)    Idiopathic chronic pancreatitis (H)       * amylase-lipase-protease 68687 UNITS Cpep    ZENPEP    180 capsule    Take 5 capsules (100,000 Units) by mouth 4 times daily (with meals and nightly)    Right upper quadrant  abdominal pain       fluticasone 50 MCG/ACT spray    FLONASE    16 g    Spray 2 sprays into both nostrils daily    Nasal congestion       * gabapentin 250 MG/5ML solution    NEURONTIN    470 mL    8 mLs (400 mg) by ORAL OR [place] TUBE route 2 times daily    Abdominal pain, epigastric       * gabapentin 250 MG/5ML solution    NEURONTIN    450 mL    Take 12 mLs (600 mg) by mouth daily    Abdominal pain, epigastric       leuprolide 11.25 MG kit    LUPRON DEPOT (3-MONTH)    1 each    Inject 11.25 mg into the muscle every 3 months    Endometriosis       levalbuterol 45 MCG/ACT Inhaler    XOPENEX HFA    1 Inhaler    Inhale 2 puffs into the lungs every 6 hours as needed for shortness of breath / dyspnea    Wheeze       menthol 5 % Ptch    ICY HOT    15 patch    Apply 1 patch topically every 8 hours as needed for muscle soreness    Abdominal pain, epigastric       multivitamins with minerals Liqd liquid     1 Bottle    15 mLs by Per Feeding Tube route daily    Abdominal pain, epigastric       norethindrone 5 MG tablet    AYGESTIN    90 tablet    Take 1 tablet (5 mg) by mouth daily    Endometriosis       nortriptyline 10 MG capsule    PAMELOR    120 capsule    Take 3 capsules (30 mg) by mouth At Bedtime    Right upper quadrant abdominal pain       ondansetron 4 MG ODT tab    ZOFRAN ODT    40 tablet    Take 1 tablet (4 mg) by mouth every 8 hours as needed for nausea or vomiting        order for DME     1 Units    Equipment being ordered: TENS with wire and electrode.    Chronic abdominal pain       oxyCODONE 5 MG/5ML solution    ROXICODONE    900 mL    Take 10 mLs (10 mg) by mouth every 8 hours as needed for pain maximum 30 mg per day    Chronic abdominal pain       pantoprazole 40 MG EC tablet    PROTONIX    30 tablet    Take 1 tablet (40 mg) by mouth 2 times daily (before meals)    Right upper quadrant abdominal pain, Erosive gastritis       polyethylene glycol Packet    MIRALAX/GLYCOLAX    7 packet    Take 17 g by mouth daily     Right upper quadrant abdominal pain       RIZATRIPTAN BENZOATE PO      Take 5 mg by mouth once as needed        scopolamine 72 hr patch    TRANSDERM    2 patch    Apply 1 patch to hairless area behind one ear if severe nausea and vomiting.  Remove old patch and change every 3 days (72 hours).    Intractable vomiting with nausea, unspecified vomiting type       senna-docusate 8.6-50 MG per tablet    SENOKOT-S;PERICOLACE    100 tablet    Take 1 tablet by mouth 2 times daily as needed for constipation    Right upper quadrant abdominal pain       sodium bicarbonate 325 MG tablet     200 tablet    1 tablet (325 mg) by Per Feeding Tube route 4 times daily    Abdominal pain, epigastric       * Notice:  This list has 4 medication(s) that are the same as other medications prescribed for you. Read the directions carefully, and ask your doctor or other care provider to review them with you.

## 2018-01-31 NOTE — PATIENT INSTRUCTIONS
1. GJ- Tube placement for Tube Feeds  2. PAC appointment prior to surgery   Our  will call and schedule both appointments with you.    Follow up:    Please call with any questions or concerns regarding your clinic visit today.    It is a pleasure being involved in your health care.    Contacts post-consultation depending on your need:    Schedule Clinic Appointments       [phone] # 1   M-F 7:30 - 5 pm    Ella SARMIENTO RN Coordinator           [phone] # 3   M-F 8:00 - 5 pm  (Triage hours)     OR Procedure Scheduling              [phone]    My Chart is available 24 hours a day and is a secure way to access your records and communicate with your care team.  I strongly recommend signing up if you haven't already done so, if you are comfortable with computers.  If you would like to inquire about this or are having problems with My Chart access, you may call [phone] or go online at [email or web address].  Please allow at least 24 hours for a response and extra time on weekends and Holidays.

## 2018-01-31 NOTE — MR AVS SNAPSHOT
After Visit Summary   1/31/2018    Alexandra Melgoza    MRN: [id number]           Patient Information     Date Of Birth          1995        Visit Information        Provider Department      1/31/2018 1:00 PM Rand Navarro APRN [facility]        Today's Diagnoses     Idiopathic chronic pancreatitis (H)          Care Instructions    1. GJ- Tube placement for Tube Feeds  2. PAC appointment prior to surgery   Our  will call and schedule both appointments with you.    Follow up:    Please call with any questions or concerns regarding your clinic visit today.    It is a pleasure being involved in your health care.    Contacts post-consultation depending on your need:    Schedule Clinic Appointments       [phone] # 1   M-F 7:30 - 5 pm    Ella SARMIENTO RN Coordinator           [phone] # 3   M-F 8:00 - 5 pm  (Triage hours)     OR Procedure Scheduling              [phone]    My Chart is available 24 hours a day and is a secure way to access your records and communicate with your care team.  I strongly recommend signing up if you haven't already done so, if you are comfortable with computers.  If you would like to inquire about this or are having problems with My Chart access, you may call [phone] or go online at [email or web address].  Please allow at least 24 hours for a response and extra time on weekends and Holidays.              Follow-ups after your visit        Additional Services     NUTRITION REFERRAL           PAC Visit Referral (For [facility] Only)           Patient Learning Center OP Referral                 Your next 10 appointments already scheduled     Jan 31, 2018  5:00 PM CST   Infusion 120 with UC SPEC INFUSION, UC 47 ATC   [facility] ([facility])    [address]  [address]  [place] [address] [phone]            Feb 02, 2018  8:00 AM CST    Infusion 120 with UC SPEC INFUSION, UC 48 ATC   [facility] Specialty [facility] Procedure ([facility])    [address]  [address]  [place] [facility] [address] [phone]            Feb 05, 2018  2:00 PM CST   Infusion 120 with UC SPEC INFUSION, UC 48 ATC   [facility] Specialty [facility] Procedure ([facility])    [address]  [address]  [place] [facility] [address] [phone]            Feb 07, 2018  1:00 PM CST   Infusion 120 with UC SPEC INFUSION, UC 48 ATC   [facility] ([facility])    [address]  [address]  [place] [facility] [address] [phone]            Feb 09, 2018  3:00 PM CST   Infusion 120 with UC SPEC INFUSION, UC 47 ATC   [facility] Procedure ([facility])    [address]  [address]  [place] [facility] [id number] [phone]              Future tests that were ordered for you today     Open Future Orders        Priority Expected Expires Ordered    EXAMEGD Routine 3/17/2018 3/17/2018 1/31/2018            Who to contact     Please call your clinic at [phone] to:    Ask questions about your health    Make or cancel appointments    Discuss your medicines    Learn about your test results    Speak to your doctor   If you have compliments or concerns about an experience at your clinic, or if you wish to file a complaint, please contact [facility] Patient Relations at [phone] or email us at [email or web address]         Additional Information About Your Visit        MyChart Information     [employer] gives you secure access to your electronic health record. If you see a primary care provider, you can also send messages to your care team and make appointments. If you have questions, please call your primary care  "clinic.  If you do not have a primary care provider, please call [phone] and they will assist you.      [employer] is an electronic gateway that provides easy, online access to your medical records. With [employer], you can request a clinic appointment, read your test results, renew a prescription or communicate with your care team.     To access your existing account, please contact your [facility] or call [phone] for assistance.        Care EveryWhere ID     This is your Care EveryWhere ID. This could be used by other organizations to access your [place] medical records  [id number]        Your Vitals Were     Pulse Height Pulse Oximetry BMI (Body Mass Index)          110 1.676 m (5' 6\") 100% 24.05 kg/m2         Blood Pressure from Last 3 Encounters:   01/31/18 127/80   01/29/18 116/71   01/26/18 114/64    Weight from Last 3 Encounters:   01/31/18 67.6 kg (149 lb)   01/24/18 67.6 kg (149 lb)   01/18/18 67.8 kg (149 lb 7.6 oz)              We Performed the Following     NUTRITION REFERRAL     PAC Visit Referral (For [facility] Only)     Patient Learning Center OP Referral          Where to get your medicines      These medications were sent to [facility] [address]  [address] [place], [place] [id number]    Hours:  TRANSPLANT PHONE NUMBER [phone] Phone:  [phone]     ondansetron 4 MG ODT tab          Primary Care Provider Office Phone # Fax #    Quyen Baez [phone]       [address] [place] [address] [address] [place] [phone]        Equal Access to Services     RACHAEL BENITEZ : Hadyoana Enamorado, alcon beck, maki quintero. So [facility] [phone].    ATENCIÓN: Si habla español, tiene a quijano disposición servicios gratuitos de asistencia lingüística. Llame al [phone].    We comply with applicable federal civil rights " laws and [place] laws. We do not discriminate on the basis of race, color, national origin, age, disability, sex, sexual orientation, or gender identity.            Thank you!     Thank you for choosing [facility]  for your care. Our goal is always to provide you with excellent care. Hearing back from our patients is one way we can continue to improve our services. Please take a few minutes to complete the written survey that you may receive in the mail after your visit with us. Thank you!             Your Updated Medication List - Protect others around you: Learn how to safely use, store and throw away your medicines at www.disposemymeds.org.          This list is accurate as of 1/31/18  2:32 PM.  Always use your most recent med list.                   Brand Name Dispense Instructions for use Diagnosis    * amylase-lipase-protease 36091 UNITS Cpep    ZENPEP    180 capsule    Take 2-3 capsules (40,000-60,000 Units) by mouth Take with snacks or supplements (Snacks)    Idiopathic chronic pancreatitis (H)       * amylase-lipase-protease 52307 UNITS Cpep    ZENPEP    180 capsule    Take 5 capsules (100,000 Units) by mouth 4 times daily (with meals and nightly)    Right upper quadrant abdominal pain       fluticasone 50 MCG/ACT spray    FLONASE    16 g    Spray 2 sprays into both nostrils daily    Nasal congestion       * gabapentin 250 MG/5ML solution    NEURONTIN    470 mL    8 mLs (400 mg) by ORAL OR NJ TUBE route 2 times daily    Abdominal pain, epigastric       * gabapentin 250 MG/5ML solution    NEURONTIN    450 mL    Take 12 mLs (600 mg) by mouth daily    Abdominal pain, epigastric       leuprolide 11.25 MG kit    LUPRON DEPOT (3-MONTH)    1 each    Inject 11.25 mg into the muscle every 3 months    Endometriosis       levalbuterol 45 MCG/ACT Inhaler    XOPENEX HFA    1 Inhaler    Inhale 2 puffs into the lungs every 6 hours as needed for shortness of breath / dyspnea    Wheeze       menthol 5 %  Ptch    ICY HOT    15 patch    Apply 1 patch topically every 8 hours as needed for muscle soreness    Abdominal pain, epigastric       multivitamins with minerals Liqd liquid     1 Bottle    15 mLs by Per Feeding Tube route daily    Abdominal pain, epigastric       norethindrone 5 MG tablet    AYGESTIN    90 tablet    Take 1 tablet (5 mg) by mouth daily    Endometriosis       nortriptyline 10 MG capsule    PAMELOR    120 capsule    Take 3 capsules (30 mg) by mouth At Bedtime    Right upper quadrant abdominal pain       ondansetron 4 MG ODT tab    ZOFRAN ODT    40 tablet    Take 1 tablet (4 mg) by mouth every 8 hours as needed for nausea or vomiting    Idiopathic chronic pancreatitis (H)       order for DME     1 Units    Equipment being ordered: TENS with wire and electrode.    Chronic abdominal pain       oxyCODONE 5 MG/5ML solution    ROXICODONE    900 mL    Take 10 mLs (10 mg) by mouth every 8 hours as needed for pain maximum 30 mg per day    Chronic abdominal pain       pantoprazole 40 MG EC tablet    PROTONIX    30 tablet    Take 1 tablet (40 mg) by mouth 2 times daily (before meals)    Right upper quadrant abdominal pain, Erosive gastritis       polyethylene glycol Packet    MIRALAX/GLYCOLAX    7 packet    Take 17 g by mouth daily    Right upper quadrant abdominal pain       RIZATRIPTAN BENZOATE PO      Take 5 mg by mouth once as needed        scopolamine 72 hr patch    TRANSDERM    2 patch    Apply 1 patch to hairless area behind one ear if severe nausea and vomiting.  Remove old patch and change every 3 days (72 hours).    Intractable vomiting with nausea, unspecified vomiting type       senna-docusate 8.6-50 MG per tablet    SENOKOT-S;PERICOLACE    100 tablet    Take 1 tablet by mouth 2 times daily as needed for constipation    Right upper quadrant abdominal pain       sodium bicarbonate 325 MG tablet     200 tablet    1 tablet (325 mg) by Per Feeding Tube route 4 times daily    Abdominal pain, epigastric        * Notice:  This list has 4 medication(s) that are the same as other medications prescribed for you. Read the directions carefully, and ask your doctor or other care provider to review them with you.

## 2018-01-31 NOTE — PROGRESS NOTES
REASON FOR VISIT: Discuss long-term placement of feeding tube       HISTORY OF PRESENT ILLNESS:     Alexandra Melgoza is a 22 year old female with a history of gastritis, chronic abdominal pain post-cholecystectomy with mildly abnormal liver enzymes, not responding to sphincter of Oddi ablation a few years ago,cyclic N/V, migraines, pseudoseizures and somatoform disorder on chronic narcotics. Chronic abdominal pain since age 8 but worsened since cholecystectomy. She underwent diagnostic ERCP by Dr. Narayanan  and NJ placement on 1/18/18, noted to have wide open pancreatic and biliary sphincters at time of ERCP, was admitted post-procedure for  uncontrolled pain and nausea. Since NJ tube has been placed, she reports abdominal pain and nausea better controlled on current regimen of Oxycodone 10 mg TID. She reports tolerating tube feeding at current rate of 50 ml/hr. However, she continues to have difficulty tolerating PO intake, and discomfort from the NJ tube in her nose and the back of her throat. She states the pain is so much more manageable since she has had the NJ placed but she can feel the tube and feels like it is gagging her and causing more nausea and vomiting. She has tried nasal saline spray, chloraseptic spray, and Biotene mouth wash to help with sore nose and sore throat which all have been ineffective. She is requesting to have NJ tube removed. She presents today to discuss GJ tube feeding placement.      Past medical history, social history, surgical history, family history, medications, and allergies were reviewed      REVIEW OF SYSTEMS:   A comprehensive review of systems was performed and was noted to be negative except as above.      PHYSICAL EXAM:  VITAL SIGNS: Stable  GENERAL: Alert, no acute distress  EYES: Eyes grossly normal to inspection, anicteric sclera   RESP: lungs clear to auscultation - no rales, no rhonchi, no wheezes  CV: regular rates and rhythm, normal S1 S2, no murmur   ABDOMEN:  soft, no tenderness, no hepatosplenomegaly, no masses, distention, guarding, or rebound, normal bowel sounds  PSYCH: Alert and oriented times 3        ASSESSMENT AND RECOMMENDATIONS:   Alexandra Melgoza is a 22 old female with longstanding history of chronic abdominal pain since age 8, most recently frequent episodes of acute on chronic abdominal pain, nausea, vomiting and decreased PO intake secondary to abdominal pain. S/P ERCP with NJ tube placement on 1/18 with significant improvement in pain since NJ tube placed. However, she is experiencing difficulty tolerating NJ tube due to nasal pain from tube and sore throat.    Discussed with patient GJ tube placement for long-term tube feeding management as she is currently unable to meet nutritional needs by mouth. We discussed risk and benefits of GJ tube placement. She was informed of risk of procedure including  minor and major complications including wound infection, minor bleeding, tube dislodgement, peristomal wound leakage, tube dislodgement, ulceration, and major complications including gastric perforation, gastric bleeding, small bowel obstruction, hematoma development, fistula formation, and risk of serious infections such as peritonitis. She was informed placement of GJ tube may be another source of pain even in the absence of complications. She verbalized understanding and wants to proceed with GJ tube placement. Patient seen in conjunction with Dr. Narayanan, who recommends Tap Block by Anaesthesiologist  prior to GJ tube placement. Recommend patient continue with current pain regimen of Oxycodone 10 mg TID, gabapentin and nortriptyline as this has helped manage her pain. Continue scopolamine patch and prn Zofran for nausea. Recommend she continue outpatient IVFs as patient finds this beneficial        Patient seen in conjunction with Dr. Oracio Navarro, C[facility]   Advanced Endoscopy   [phone]        I spent 45 minutes with this patient face to  face and explained the conditions and plans (more than 50% of time was counseling/coordination of care, as discussed above).

## 2018-02-01 NOTE — PROGRESS NOTES
"Infusion nursing note:    Alexandra Melgoza presents today to [facility] for a IVF, IV zofran and IV benadryl infusion.  During today's [facility] appointment orders from Dr Narayanan were completed.  Frequency: as needed for nausea, dehydration    Progress note:  ID verified by name and .  Assessment completed.  Vitals were stable throughout time in [facility].  verbal education given to patient/representative regarding infusion and possible side effects.  Patient verbalized understanding.    Note: Pt states she is mostly nauseous from the tf presence in the back of her throat. Not having to take in as much oral intake because of the tube feedings actually improved her pain she said.  Her pain \"isn't too bad\"today. She is scheduled for a GJ tube placement next week.     Infusion given over approximately  Each liter over one hour      Administrations This Visit     diphenhydrAMINE (BENADRYL) injection 25 mg     Admin Date Action Dose Route Administered By             2018 Given 25 mg Intravenous Patito Liu RN                    lactated ringers BOLUS 1,000-2,000 mL     Admin Date Action Dose Route Administered By             2018 New Bag 2000 mL Intravenous Patito Liu RN                    ondansetron (ZOFRAN) injection 4 mg     Admin Date Action Dose Route Administered By             2018 Given 4 mg Intravenous Patito Liu RN              Admin Date Action Dose Route Administered By             2018 Given 4 mg Intravenous Patito Liu RN                          /79  Resp 14  SpO2 100%      Discharge plan:    Discharge instructions were reviewed with patient: Yes  Patient/representative verbalized understanding of discharge instructions and all questions answered: Yes.    Discharged from [facility] at 1825 with self to home..    Patito Liu      "

## 2018-02-02 ENCOUNTER — APPOINTMENT (OUTPATIENT)
Dept: GENERAL RADIOLOGY | Facility: CLINIC | Age: 23
End: 2018-02-02
Attending: EMERGENCY MEDICINE
Payer: COMMERCIAL

## 2018-02-02 ENCOUNTER — CARE COORDINATION (OUTPATIENT)
Dept: GASTROENTEROLOGY | Facility: CLINIC | Age: 23
End: 2018-02-02

## 2018-02-02 ENCOUNTER — TELEPHONE (OUTPATIENT)
Dept: GASTROENTEROLOGY | Facility: CLINIC | Age: 23
End: 2018-02-02

## 2018-02-02 ENCOUNTER — HOSPITAL ENCOUNTER (EMERGENCY)
Facility: CLINIC | Age: 23
Discharge: HOME | End: 2018-02-02
Attending: EMERGENCY MEDICINE | Admitting: EMERGENCY MEDICINE
Payer: COMMERCIAL

## 2018-02-02 ENCOUNTER — INFUSION THERAPY VISIT (OUTPATIENT)
Dept: INFUSION THERAPY | Facility: CLINIC | Age: 23
End: 2018-02-02
Attending: INTERNAL MEDICINE
Payer: COMMERCIAL

## 2018-02-02 ENCOUNTER — TELEPHONE (OUTPATIENT)
Dept: ANESTHESIOLOGY | Facility: CLINIC | Age: 23
End: 2018-02-02

## 2018-02-02 VITALS
OXYGEN SATURATION: 100 % | HEART RATE: 90 BPM | SYSTOLIC BLOOD PRESSURE: 116 MMHG | TEMPERATURE: 97.8 F | RESPIRATION RATE: 17 BRPM | DIASTOLIC BLOOD PRESSURE: 80 MMHG

## 2018-02-02 VITALS
SYSTOLIC BLOOD PRESSURE: 132 MMHG | OXYGEN SATURATION: 100 % | HEIGHT: 66 IN | HEART RATE: 93 BPM | RESPIRATION RATE: 16 BRPM | DIASTOLIC BLOOD PRESSURE: 89 MMHG | TEMPERATURE: 99 F

## 2018-02-02 DIAGNOSIS — K92.0 HEMATEMESIS, PRESENCE OF NAUSEA NOT SPECIFIED: ICD-10-CM

## 2018-02-02 DIAGNOSIS — R10.9 CHRONIC ABDOMINAL PAIN: ICD-10-CM

## 2018-02-02 DIAGNOSIS — K86.1 CHRONIC PANCREATITIS, UNSPECIFIED PANCREATITIS TYPE (H): ICD-10-CM

## 2018-02-02 DIAGNOSIS — G43.A0 CYCLICAL VOMITING WITH NAUSEA, INTRACTABILITY OF VOMITING NOT SPECIFIED: Primary | ICD-10-CM

## 2018-02-02 DIAGNOSIS — G89.29 CHRONIC ABDOMINAL PAIN: ICD-10-CM

## 2018-02-02 LAB
ANION GAP SERPL CALCULATED.3IONS-SCNC: 6 MMOL/L (ref 3–14)
BASOPHILS # BLD AUTO: 0 10E9/L (ref 0–0.2)
BASOPHILS NFR BLD AUTO: 0.2 %
BUN SERPL-MCNC: 8 MG/DL (ref 7–30)
CALCIUM SERPL-MCNC: 8.6 MG/DL (ref 8.5–10.1)
CHLORIDE SERPL-SCNC: 105 MMOL/L (ref 94–109)
CO2 SERPL-SCNC: 30 MMOL/L (ref 20–32)
CREAT SERPL-MCNC: 0.5 MG/DL (ref 0.52–1.04)
DIFFERENTIAL METHOD BLD: ABNORMAL
EOSINOPHIL # BLD AUTO: 0.1 10E9/L (ref 0–0.7)
EOSINOPHIL NFR BLD AUTO: 3.2 %
ERYTHROCYTE [DISTWIDTH] IN BLOOD BY AUTOMATED COUNT: 12.9 % (ref 10–15)
GFR SERPL CREATININE-BSD FRML MDRD: >90 ML/MIN/1.7M2
GLUCOSE SERPL-MCNC: 88 MG/DL (ref 70–99)
HCT VFR BLD AUTO: 33.3 % (ref 35–47)
HGB BLD-MCNC: 10.8 G/DL (ref 11.7–15.7)
IMM GRANULOCYTES # BLD: 0 10E9/L (ref 0–0.4)
IMM GRANULOCYTES NFR BLD: 0 %
LYMPHOCYTES # BLD AUTO: 1.8 10E9/L (ref 0.8–5.3)
LYMPHOCYTES NFR BLD AUTO: 45.1 %
MCH RBC QN AUTO: 30.1 PG (ref 26.5–33)
MCHC RBC AUTO-ENTMCNC: 32.4 G/DL (ref 31.5–36.5)
MCV RBC AUTO: 93 FL (ref 78–100)
MONOCYTES # BLD AUTO: 0.2 10E9/L (ref 0–1.3)
MONOCYTES NFR BLD AUTO: 6 %
NEUTROPHILS # BLD AUTO: 1.8 10E9/L (ref 1.6–8.3)
NEUTROPHILS NFR BLD AUTO: 45.5 %
NRBC # BLD AUTO: 0 10*3/UL
NRBC BLD AUTO-RTO: 0 /100
PLATELET # BLD AUTO: 222 10E9/L (ref 150–450)
POTASSIUM SERPL-SCNC: 4 MMOL/L (ref 3.4–5.3)
RBC # BLD AUTO: 3.59 10E12/L (ref 3.8–5.2)
SODIUM SERPL-SCNC: 141 MMOL/L (ref 133–144)
WBC # BLD AUTO: 4 10E9/L (ref 4–11)

## 2018-02-02 PROCEDURE — 96374 THER/PROPH/DIAG INJ IV PUSH: CPT | Performed by: EMERGENCY MEDICINE

## 2018-02-02 PROCEDURE — 96375 TX/PRO/DX INJ NEW DRUG ADDON: CPT

## 2018-02-02 PROCEDURE — 25000128 H RX IP 250 OP 636: Mod: ZF | Performed by: NURSE PRACTITIONER

## 2018-02-02 PROCEDURE — 96361 HYDRATE IV INFUSION ADD-ON: CPT

## 2018-02-02 PROCEDURE — 25000128 H RX IP 250 OP 636: Performed by: EMERGENCY MEDICINE

## 2018-02-02 PROCEDURE — 99284 EMERGENCY DEPT VISIT MOD MDM: CPT | Mod: 25 | Performed by: EMERGENCY MEDICINE

## 2018-02-02 PROCEDURE — 85025 COMPLETE CBC W/AUTO DIFF WBC: CPT | Performed by: EMERGENCY MEDICINE

## 2018-02-02 PROCEDURE — 25000132 ZZH RX MED GY IP 250 OP 250 PS 637: Performed by: EMERGENCY MEDICINE

## 2018-02-02 PROCEDURE — 96376 TX/PRO/DX INJ SAME DRUG ADON: CPT

## 2018-02-02 PROCEDURE — 80048 BASIC METABOLIC PNL TOTAL CA: CPT | Performed by: EMERGENCY MEDICINE

## 2018-02-02 PROCEDURE — 99284 EMERGENCY DEPT VISIT MOD MDM: CPT | Mod: Z6 | Performed by: EMERGENCY MEDICINE

## 2018-02-02 PROCEDURE — 25000128 H RX IP 250 OP 636: Mod: ZF | Performed by: INTERNAL MEDICINE

## 2018-02-02 PROCEDURE — 74018 RADEX ABDOMEN 1 VIEW: CPT

## 2018-02-02 PROCEDURE — 96374 THER/PROPH/DIAG INJ IV PUSH: CPT

## 2018-02-02 RX ORDER — ONDANSETRON 2 MG/ML
4 INJECTION INTRAMUSCULAR; INTRAVENOUS DAILY PRN
Status: DISCONTINUED | OUTPATIENT
Start: 2018-02-02 | End: 2018-02-02 | Stop reason: HOSPADM

## 2018-02-02 RX ORDER — DIPHENHYDRAMINE HYDROCHLORIDE 50 MG/ML
25 INJECTION INTRAMUSCULAR; INTRAVENOUS ONCE
Status: COMPLETED | OUTPATIENT
Start: 2018-02-02 | End: 2018-02-02

## 2018-02-02 RX ORDER — OXYCODONE HCL 5 MG/5 ML
10 SOLUTION, ORAL ORAL EVERY 8 HOURS PRN
Qty: 210 ML | Refills: 0 | Status: SHIPPED | OUTPATIENT
Start: 2018-02-02 | End: 2018-02-02

## 2018-02-02 RX ORDER — ONDANSETRON 2 MG/ML
4 INJECTION INTRAMUSCULAR; INTRAVENOUS ONCE
Status: COMPLETED | OUTPATIENT
Start: 2018-02-02 | End: 2018-02-02

## 2018-02-02 RX ORDER — ONDANSETRON 2 MG/ML
4 INJECTION INTRAMUSCULAR; INTRAVENOUS DAILY PRN
Status: CANCELLED
Start: 2018-02-02

## 2018-02-02 RX ORDER — HEPARIN SODIUM (PORCINE) LOCK FLUSH IV SOLN 100 UNIT/ML 100 UNIT/ML
5 SOLUTION INTRAVENOUS
Status: DISCONTINUED | OUTPATIENT
Start: 2018-02-02 | End: 2018-02-02 | Stop reason: HOSPADM

## 2018-02-02 RX ORDER — ONDANSETRON 2 MG/ML
4 INJECTION INTRAMUSCULAR; INTRAVENOUS ONCE
Status: CANCELLED
Start: 2018-02-02 | End: 2018-02-02

## 2018-02-02 RX ORDER — ONDANSETRON 2 MG/ML
4 INJECTION INTRAMUSCULAR; INTRAVENOUS EVERY 30 MIN PRN
Status: DISCONTINUED | OUTPATIENT
Start: 2018-02-02 | End: 2018-02-02 | Stop reason: HOSPADM

## 2018-02-02 RX ORDER — LIDOCAINE 40 MG/G
CREAM TOPICAL
Status: DISCONTINUED | OUTPATIENT
Start: 2018-02-02 | End: 2018-02-02 | Stop reason: HOSPADM

## 2018-02-02 RX ORDER — DIPHENHYDRAMINE HYDROCHLORIDE 50 MG/ML
25 INJECTION INTRAMUSCULAR; INTRAVENOUS ONCE
Status: CANCELLED
Start: 2018-02-02 | End: 2018-02-02

## 2018-02-02 RX ORDER — OXYCODONE HYDROCHLORIDE 5 MG/1
5 TABLET ORAL ONCE
Status: COMPLETED | OUTPATIENT
Start: 2018-02-02 | End: 2018-02-02

## 2018-02-02 RX ORDER — OXYCODONE HCL 5 MG/5 ML
10 SOLUTION, ORAL ORAL EVERY 8 HOURS PRN
Qty: 150 ML | Refills: 0 | Status: ON HOLD | OUTPATIENT
Start: 2018-02-09 | End: 2018-02-10

## 2018-02-02 RX ADMIN — SODIUM CHLORIDE, POTASSIUM CHLORIDE, SODIUM LACTATE AND CALCIUM CHLORIDE 2000 ML: 600; 310; 30; 20 INJECTION, SOLUTION INTRAVENOUS at 09:10

## 2018-02-02 RX ADMIN — ONDANSETRON 4 MG: 2 INJECTION INTRAMUSCULAR; INTRAVENOUS at 14:02

## 2018-02-02 RX ADMIN — OXYCODONE HYDROCHLORIDE 5 MG: 5 TABLET ORAL at 14:01

## 2018-02-02 RX ADMIN — DIPHENHYDRAMINE HYDROCHLORIDE 25 MG: 50 INJECTION INTRAMUSCULAR; INTRAVENOUS at 09:17

## 2018-02-02 RX ADMIN — ONDANSETRON 4 MG: 2 INJECTION INTRAMUSCULAR; INTRAVENOUS at 10:33

## 2018-02-02 RX ADMIN — ONDANSETRON 4 MG: 2 INJECTION INTRAMUSCULAR; INTRAVENOUS at 09:13

## 2018-02-02 ASSESSMENT — ENCOUNTER SYMPTOMS
ABDOMINAL PAIN: 1
NAUSEA: 1
BLOOD IN STOOL: 0
VOMITING: 1

## 2018-02-02 NOTE — TELEPHONE ENCOUNTER
Voice message left for pt and pt's mother, Leeann, that prescriptions for oxycodone is in lock box on 1st floor of [facility].  Clinic number left for pt to call if needed.     Amanda Rodriguez RN, BSN

## 2018-02-02 NOTE — PROGRESS NOTES
Grabiel is calling with abdominal pain that is worsening in nature since last night and she also states there is blood in her vomit. States she is vomiting bile mixed with blood- she estimates that there may be abut 10 ml of blood at each time.     Denies fevers, states she is sometimes dizzy and feels weak.   Advised her to head to the ED for evaluation.   She states she will head to the ED for evaluation.     Ella SARMIENTO RN Coordinator  Dr. Narayanan, Dr. Joe & Dr. Klein   Advanced Endoscopy  [phone]

## 2018-02-02 NOTE — PATIENT INSTRUCTIONS
Dear Alexandra Melgoza    Thank you for choosing [facility] ([facility]) for your infusion.  The following information is a summary of our appointment as well as important reminders.      Please refer to your hospital discharge instructions for details on home care services, future appointments, phone numbers, and diet/activity levels.    Additional information: You received IVF & anti nausea meds today. Per Dr Narayanan you should o to the ER upon discharged to be assessed due to the significant amount of blood that you vomited. Call us if you have ay appointment changes.      We look forward in seeing you on your next appointment here at [facility].  Please don t hesitate to call us at [phone] to reschedule any of your appointments or to speak with one of the [facility] registered nurses.  It was a pleasure taking care of you today.    Sincerely,    [facility]  Specialty Infusion [facility] Procedure Center  [address] [place] [address]  [id number]  Phone:  [phone]

## 2018-02-02 NOTE — ED AVS SNAPSHOT
[facility] [place], Emergency Department    [address] [place] [address]    Phone:  [phone]                                       Alexandra Melgoza   MRN: [id number]    Department:  [facility], Emergency Department   Date of Visit:  2/2/2018           After Visit Summary Signature Page     I have received my discharge instructions, and my questions have been answered. I have discussed any challenges I see with this plan with the nurse or doctor.    ..........................................................................................................................................  Patient/Patient Representative Signature      ..........................................................................................................................................  Patient Representative Print Name and Relationship to Patient    ..................................................               ................................................  Date                                            Time    ..........................................................................................................................................  Reviewed by Signature/Title    ...................................................              ..............................................  Date                                                            Time

## 2018-02-02 NOTE — DISCHARGE INSTRUCTIONS
The feeding tube is in the appropriate position  Dr. Narayanan will arrange for you to have the PEG tube placed next week

## 2018-02-02 NOTE — MR AVS SNAPSHOT
After Visit Summary   2/2/2018    Alexandra Melgoza    MRN: [id number]           Patient Information     Date Of Birth          1995        Visit Information        Provider Department      2/2/2018 8:00 AM UC 48 ATC; UC SPEC INFUSION [facility]        Today's Diagnoses     Cyclical vomiting with nausea, intractability of vomiting not specified    -  1    Chronic pancreatitis, unspecified pancreatitis type (H)          Care Instructions    Dear Alexandra Melgoza    Thank you for choosing [facility] ([facility]) for your infusion.  The following information is a summary of our appointment as well as important reminders.      Please refer to your hospital discharge instructions for details on home care services, future appointments, phone numbers, and diet/activity levels.    Additional information: You received IVF & anti nausea meds today. Per Dr Narayanan you should o to the ER upon discharged to be assessed due to the significant amount of blood that you vomited. Call us if you have ay appointment changes.      We look forward in seeing you on your next appointment here at [facility].  Please don t hesitate to call us at [phone] to reschedule any of your appointments or to speak with one of the [facility] registered nurses.  It was a pleasure taking care of you today.    Sincerely,    [facility]  [facility]  [address] [place] [address]  [address]  Phone:  [phone]          Follow-ups after your visit        Your next 10 appointments already scheduled     Feb 05, 2018  2:00 PM CST   Infusion 120 with UC SPEC INFUSION, UC 48 ATC   [facility] ([facility])    [address] [place] [address]  [address] [place] [address] [phone]            Feb 07, 2018  1:00 PM CST    Infusion 120 with UC SPEC INFUSION, UC 48 ATC   [facility] Special[facility] Procedure ([facility])    [address]  [address]  [facility] [address] [phone]            Feb 09, 2018  3:00 PM CST   Infusion 120 with UC SPEC INFUSION, UC 47 ATC   [facility] Special[facility] Procedure ([facility])    [address]  [address]  [facility] [id number] [phone]            Feb 12, 2018  1:00 PM CST   Infusion 120 with UC SPEC INFUSION, UC 45 ATC   [facility] Specialty and Procedure ([facility])    [address]  [address]  [facility] [id number] [phone]            Feb 13, 2018  8:00 AM CST   (Arrive by 7:45 AM)   Return Visit with Leonardo Wang MD   [facility] ([facility])    [address]  4th Floor  [facility] [id number] [phone]            Feb 14, 2018 12:00 PM CST   (Arrive by 11:45 AM)   Return Visit with Campbell Narayanan MD   [facility] Biliary ([facility])    [address]  4th Floor  [facility] [id number] [phone]              Who to contact     If you have questions or need follow up information about today's clinic visit or your schedule please contact [facility] SPECIALTY AND PROCEDURE directly at [phone].  Normal or non-critical lab and imaging results will be communicated to you by MyChart, letter or phone within 4 business days after the clinic has received the results. If you do not hear from us within 7 days, please contact the clinic through MyChart or phone. If you have a critical or abnormal lab result, we will notify you by phone as soon as possible.  Submit refill requests through [employer] or call your pharmacy and they will forward the refill  request to us. Please allow 3 business days for your refill to be completed.          Additional Information About Your Visit        [employer] Information     [employer] gives you secure access to your electronic health record. If you see a primary care provider, you can also send messages to your care team and make appointments. If you have questions, please call your primary care clinic.  If you do not have a primary care provider, please call [phone] and they will assist you.        Care EveryWhere ID     This is your Care EveryWhere ID. This could be used by other organizations to access your [facility] medical records  [id number]        Your Vitals Were     Pulse Temperature Respirations Pulse Oximetry          90 97.8  F (36.6  C) (Oral) 17 100%         Blood Pressure from Last 3 Encounters:   02/02/18 139/83   02/02/18 116/80   01/31/18 110/79    Weight from Last 3 Encounters:   01/31/18 67.6 kg (149 lb)   01/24/18 67.6 kg (149 lb)   01/18/18 67.8 kg (149 lb 7.6 oz)              Today, you had the following     No orders found for display         Today's Medication Changes          These changes are accurate as of 2/2/18  2:49 PM.  If you have any questions, ask your nurse or doctor.               Start taking these medicines.        Dose/Directions    oxyCODONE 5 MG/5ML solution   Commonly known as:  ROXICODONE   Used for:  Chronic abdominal pain   Started by:  Leonardo Wang MD        Dose:  10 mg   Start taking on:  2/9/2018   Take 10 mLs (10 mg) by mouth every 8 hours as needed for pain maximum 30 mg per day   Quantity:  150 mL   Refills:  0            Where to get your medicines      Some of these will need a paper prescription and others can be bought over the counter.  Ask your nurse if you have questions.     Bring a paper prescription for each of these medications     oxyCODONE 5 MG/5ML solution                Primary Care Provider Office Phone # Fax #    Quyen Baez [phone]  [phone]       [address] [place] [phone]  [facility] [address]        Equal Access to Services     RACHAEL BENITEZ : Hadii aad ku hadcathicaleb Livmalini, wajesusda luqadaha, qaybta kaalmada yannmariokush, maki avilez julianaamber stevensonarjunliss smallwood. So [facility] [phone].    ATENCIÓN: Si habla español, tiene a quijano disposición servicios gratuitos de asistencia lingüística. Panfilo al [phone].    We comply with applicable federal civil rights laws and [place] laws. We do not discriminate on the basis of race, color, national origin, age, disability, sex, sexual orientation, or gender identity.            Thank you!     Thank you for choosing [facility] SPECIALTY AND PROCEDURE  for your care. Our goal is always to provide you with excellent care. Hearing back from our patients is one way we can continue to improve our services. Please take a few minutes to complete the written survey that you may receive in the mail after your visit with us. Thank you!             Your Updated Medication List - Protect others around you: Learn how to safely use, store and throw away your medicines at www.disposemymeds.org.          This list is accurate as of 2/2/18  2:49 PM.  Always use your most recent med list.                   Brand Name Dispense Instructions for use Diagnosis    * amylase-lipase-protease 94230 UNITS Cpep    ZENPEP    180 capsule    Take 2-3 capsules (40,000-60,000 Units) by mouth Take with snacks or supplements (Snacks)    Idiopathic chronic pancreatitis (H)       * amylase-lipase-protease 26465 UNITS Cpep    ZENPEP    180 capsule    Take 5 capsules (100,000 Units) by mouth 4 times daily (with meals and nightly)    Right upper quadrant abdominal pain       fluticasone 50 MCG/ACT spray    FLONASE    16 g    Spray 2 sprays into both nostrils daily    Nasal congestion       * gabapentin 250 MG/5ML solution    NEURONTIN    470 mL    8 mLs (400 mg) by ORAL OR NJ TUBE route 2 times daily    Abdominal pain,  epigastric       * gabapentin 250 MG/5ML solution    NEURONTIN    450 mL    Take 12 mLs (600 mg) by mouth daily    Abdominal pain, epigastric       leuprolide 11.25 MG kit    LUPRON DEPOT (3-MONTH)    1 each    Inject 11.25 mg into the muscle every 3 months    Endometriosis       levalbuterol 45 MCG/ACT Inhaler    XOPENEX HFA    1 Inhaler    Inhale 2 puffs into the lungs every 6 hours as needed for shortness of breath / dyspnea    Wheeze       menthol 5 % Ptch    ICY HOT    15 patch    Apply 1 patch topically every 8 hours as needed for muscle soreness    Abdominal pain, epigastric       multivitamins with minerals Liqd liquid     1 Bottle    15 mLs by Per Feeding Tube route daily    Abdominal pain, epigastric       norethindrone 5 MG tablet    AYGESTIN    90 tablet    Take 1 tablet (5 mg) by mouth daily    Endometriosis       nortriptyline 10 MG capsule    PAMELOR    120 capsule    Take 3 capsules (30 mg) by mouth At Bedtime    Right upper quadrant abdominal pain       ondansetron 4 MG ODT tab    ZOFRAN ODT    40 tablet    Take 1 tablet (4 mg) by mouth every 8 hours as needed for nausea or vomiting        order for DME     1 Units    Equipment being ordered: TENS with wire and electrode.    Chronic abdominal pain       oxyCODONE 5 MG/5ML solution   Start taking on:  2/9/2018    ROXICODONE    150 mL    Take 10 mLs (10 mg) by mouth every 8 hours as needed for pain maximum 30 mg per day    Chronic abdominal pain       pantoprazole 40 MG EC tablet    PROTONIX    30 tablet    Take 1 tablet (40 mg) by mouth 2 times daily (before meals)    Right upper quadrant abdominal pain, Erosive gastritis       polyethylene glycol Packet    MIRALAX/GLYCOLAX    7 packet    Take 17 g by mouth daily    Right upper quadrant abdominal pain       RIZATRIPTAN BENZOATE PO      Take 5 mg by mouth once as needed        scopolamine 72 hr patch    TRANSDERM    2 patch    Apply 1 patch to hairless area behind one ear if severe nausea and  vomiting.  Remove old patch and change every 3 days (72 hours).    Intractable vomiting with nausea, unspecified vomiting type       senna-docusate 8.6-50 MG per tablet    SENOKOT-S;PERICOLACE    100 tablet    Take 1 tablet by mouth 2 times daily as needed for constipation    Right upper quadrant abdominal pain       sodium bicarbonate 325 MG tablet     200 tablet    1 tablet (325 mg) by Per Feeding Tube route 4 times daily    Abdominal pain, epigastric       * Notice:  This list has 4 medication(s) that are the same as other medications prescribed for you. Read the directions carefully, and ask your doctor or other care provider to review them with you.

## 2018-02-02 NOTE — TELEPHONE ENCOUNTER
Dr. Narayanan advised that he will follow up with the ED re: this patient after she is evaluated.   I called her and informed her of this and gave her [phone] to call and ask for the oncall provider for our department to be paged or she can also ask for the ED to page Dr. Narayanan.    SR 02/02/2018 @ 8931t

## 2018-02-02 NOTE — PROGRESS NOTES
Nursing Note  Alexandra Melgoza [facility] for: IVF; antiemetics  During today's [facility] appointment, orders from Dr. Narayanan/Rand Navarro were completed.  Frequency: as needed    Progress note:  Patient identification verified by name and date of birth.  Assessment completed.  Vitals recorded in Doc Flowsheets.  Patient was provided with education regarding infusion and possible side effects.  Patient verbalized understanding.      needed: No  Premedications: were not ordered.  Infusion Rates: infusion given over approximately 2 hours.  Labs: were not ordered for this appointment.  Vascular access: port accessed today.  Treatment Conditions: non-applicable.  Patient tolerated infusion: well.    Pt reports blood in emesis for the last couple days. Pt reports that amount was more last night. Pt already had a call in to GI team about advice. Pt heard back during infusion that she needs to go to ED for evaluation. Pt plans to discharge to ED after infusion.    Drug Waste Record    Drug Name: benadryl  Dose: 25  Route administered: IV  NDC #: 1630-9541-67  Amount of waste(mL):0.5  Reason for waste: Single use vial      Discharge Plan:   Follow up plan of care with: ongoing infusions at [facility].  Discharge instructions were reviewed with patient.  Patient/representative verbalized understanding of discharge instructions and all questions answered.  Patient discharged from [facility] in stable condition to ED for evaluation.    Mary Anne Nascimento RN    Administrations This Visit     diphenhydrAMINE (BENADRYL) injection 25 mg     Admin Date Action Dose Route Administered By             02/02/2018 Given 25 mg Intravenous Mary Anne Nascimento RN                    lactated ringers BOLUS 1,000-2,000 mL     Admin Date Action Dose Route Administered By             02/02/2018 New Bag  2000 mL Intravenous Mary Anne Nascimento RN                    ondansetron (ZOFRAN) injection 4 mg     Admin Date Action Dose Route Administered By             02/02/2018 Given 4 mg Intravenous Mary Anne Nascimento RN              Admin Date Action Dose Route Administered By             02/02/2018 Given 4 mg Intravenous Mary Anne Nascimento RN                          /80 (BP Location: Left arm, Patient Position: Sitting, Cuff Size: Adult Regular)  Pulse 90  Temp 97.8  F (36.6  C) (Oral)  Resp 17  SpO2 100%

## 2018-02-02 NOTE — ED NOTES
"Pt reports 3 days of emesis with \"5-10 mls\" of blood in emesis today. Pt reports she did receive her infusion today and was instructed to come to the ED now.   "

## 2018-02-02 NOTE — ED AVS SNAPSHOT
[facility], Emergency Department    [address] [place] [address]    Phone:  [phone]                                       Alexandra Melgoza   MRN: [id number]    Department:  [facility], Emergency Department   Date of Visit:  2/2/2018           Patient Information     Date Of Birth          1995        Your diagnoses for this visit were:     Hematemesis, presence of nausea not specified        You were seen by Linus Church MD.        Discharge Instructions       The feeding tube is in the appropriate position  Dr. Narayanan will arrange for you to have the PEG tube placed next week    Future Appointments        Provider Department Dept Phone Center    2/5/2018 2:00 PM [facility]; Specialty [facility] Specialty and Procedure [phone] [facility]    2/7/2018 1:00 PM [facility]; [facility] Specialty and Procedure [phone] [facility]    2/9/2018 3:00 PM [facility]; [facility] Specialty and Procedure [phone] [facility]    2/12/2018 1:00 PM [facility]; [facility] hospital [facility] [phone] [facility]    2/13/2018 8:00 AM Leonardo Wang MD [facility] [phone] [facility]    2/14/2018 12:00 PM Campbell Narayanan MD [facility] Pancreas and Biliary [phone] [facility]    2/14/2018 1:00 PM [facility]; [facility] Procedure [phone] [facility]    2/16/2018 3:00 PM [facility]; Critical access hospital [facility] Procedure [phone] [facility]    2/19/2018 12:00 PM [facility]; Specialty [facility] Procedure [phone] [facility]    2/21/2018 3:00 PM  [facility]; Specialty Infusion [facility] [place] Specialty and Procedure [phone] [facility]    2/23/2018 1:00 PM [facility]; Specialty Infusion [facility] Specialty [facility] Procedure [phone] [facility]    2/26/2018 12:00 PM [facility]; Specialty [facility] Specialty and Procedure [phone] [facility]    2/28/2018 12:00 PM [facility]; Specialty [facility] [place] Specialty and Procedure [phone] [facility]    3/2/2018 1:00 PM [facility]; Specialty [facility] Specialty and Procedure [phone] [facility]      24 Hour Appointment Hotline       To make an appointment at any [facility]3], call [phone] ([phone]). If you don't have a family doctor or clinic, we will help you find one. [facility] clinics are conveniently located to serve the needs of you and your family.             Review of your medicines      Our records show that you are taking the medicines listed below. If these are incorrect, please call your family doctor or clinic.        Dose / Directions Last dose taken    * amylase-lipase-protease 35486 UNITS Cpep   Commonly known as:  ZENPEP   Dose:  2-3 capsule   Quantity:  180 capsule        Take 2-3 capsules (40,000-60,000 Units) by mouth Take with snacks or supplements (Snacks)   Refills:  1        * amylase-lipase-protease 38318 UNITS Cpep   Commonly known as:  ZENPEP   Dose:  5 capsule   Quantity:  180 capsule        Take 5 capsules (100,000 Units) by mouth 4 times daily (with meals and nightly)   Refills:  1        fluticasone 50 MCG/ACT spray   Commonly known as:  FLONASE   Dose:  2 spray   Quantity:  16 g        Spray 2 sprays into both nostrils daily   Refills:  3        * gabapentin 250 MG/5ML solution   Commonly known as:  NEURONTIN   Dose:  400 mg   Quantity:  470 mL        8 mLs (400  mg) by ORAL OR NJ TUBE route 2 times daily   Refills:  0        * gabapentin 250 MG/5ML solution   Commonly known as:  NEURONTIN   Dose:  600 mg   Quantity:  450 mL        Take 12 mLs (600 mg) by mouth daily   Refills:  0        leuprolide 11.25 MG kit   Commonly known as:  LUPRON DEPOT (3-MONTH)   Dose:  11.25 mg   Quantity:  1 each        Inject 11.25 mg into the muscle every 3 months   Refills:  3        levalbuterol 45 MCG/ACT Inhaler   Commonly known as:  XOPENEX HFA   Dose:  2 puff   Quantity:  1 Inhaler        Inhale 2 puffs into the lungs every 6 hours as needed for shortness of breath / dyspnea   Refills:  1        menthol 5 % Ptch   Commonly known as:  ICY HOT   Dose:  1 patch   Quantity:  15 patch        Apply 1 patch topically every 8 hours as needed for muscle soreness   Refills:  3        multivitamins with minerals Liqd liquid   Dose:  15 mL   Quantity:  1 Bottle        15 mLs by Per Feeding Tube route daily   Refills:  0        norethindrone 5 MG tablet   Commonly known as:  AYGESTIN   Dose:  5 mg   Quantity:  90 tablet        Take 1 tablet (5 mg) by mouth daily   Refills:  1        nortriptyline 10 MG capsule   Commonly known as:  PAMELOR   Dose:  30 mg   Quantity:  120 capsule        Take 3 capsules (30 mg) by mouth At Bedtime   Refills:  0        ondansetron 4 MG ODT tab   Commonly known as:  ZOFRAN ODT   Dose:  4 mg   Quantity:  40 tablet        Take 1 tablet (4 mg) by mouth every 8 hours as needed for nausea or vomiting   Refills:  3        order for DME   Quantity:  1 Units        Equipment being ordered: TENS with wire and electrode.   Refills:  0        oxyCODONE 5 MG/5ML solution   Commonly known as:  ROXICODONE   Dose:  10 mg   Quantity:  150 mL   Start taking on:  2/9/2018        Take 10 mLs (10 mg) by mouth every 8 hours as needed for pain maximum 30 mg per day   Refills:  0        pantoprazole 40 MG EC tablet   Commonly known as:  PROTONIX   Dose:  40 mg   Quantity:  30 tablet         Take 1 tablet (40 mg) by mouth 2 times daily (before meals)   Refills:  1        polyethylene glycol Packet   Commonly known as:  MIRALAX/GLYCOLAX   Dose:  17 g   Quantity:  7 packet        Take 17 g by mouth daily   Refills:  0        RIZATRIPTAN BENZOATE PO   Dose:  5 mg        Take 5 mg by mouth once as needed   Refills:  0        scopolamine 72 hr patch   Commonly known as:  TRANSDERM   Quantity:  2 patch        Apply 1 patch to hairless area behind one ear if severe nausea and vomiting.  Remove old patch and change every 3 days (72 hours).   Refills:  0        senna-docusate 8.6-50 MG per tablet   Commonly known as:  SENOKOT-S;PERICOLACE   Dose:  1 tablet   Quantity:  100 tablet        Take 1 tablet by mouth 2 times daily as needed for constipation   Refills:  0        sodium bicarbonate 325 MG tablet   Dose:  325 mg   Quantity:  200 tablet        1 tablet (325 mg) by Per Feeding Tube route 4 times daily   Refills:  0        * Notice:  This list has 4 medication(s) that are the same as other medications prescribed for you. Read the directions carefully, and ask your doctor or other care provider to review them with you.            Procedures and tests performed during your visit     Basic metabolic panel    CBC with platelets differential    XR Abdomen 1 View      Orders Needing Specimen Collection     None      Pending Results     No orders found from 1/31/2018 to 2/3/2018.            Pending Culture Results     No orders found from 1/31/2018 to 2/3/2018.            Pending Results Instructions     If you had any lab results that were not finalized at the time of your Discharge, you can call the ED Lab Result RN at [phone]. You will be contacted by this team for any positive Lab results or changes in treatment. The nurses are available 7 days a week from 10A to 6:30P.  You can leave a message 24 hours per day and they will return your call.        Thank you for choosing [facility]       Thank you for  choosing [facility] for your care. Our goal is always to provide you with excellent care. Hearing back from our patients is one way we can continue to improve our services. Please take a few minutes to complete the written survey that you may receive in the mail after you visit with us. Thank you!        [employer] Information     [employer] gives you secure access to your electronic health record. If you see a primary care provider, you can also send messages to your care team and make appointments. If you have questions, please call your primary care clinic.  If you do not have a primary care provider, please call [phone] and they will assist you.        Care EveryWhere ID     This is your Care EveryWhere ID. This could be used by other organizations to access your [facility] medical records  [id number]        Equal Access to Services     RACHAEL BENITEZ : Bret Enamorado, alcon beck, lashell santillan, maki smallwood. So [facility] [place] [phone].    ATENCIÓN: Si habla español, tiene a quijano disposición servicios gratuitos de asistencia lingüística. Llame al [phone].    We comply with applicable federal civil rights laws and [place] laws. We do not discriminate on the basis of race, color, national origin, age, disability, sex, sexual orientation, or gender identity.            After Visit Summary       This is your record. Keep this with you and show to your community pharmacist(s) and doctor(s) at your next visit.

## 2018-02-02 NOTE — TELEPHONE ENCOUNTER
"Alexandra calls in to report she \"accidentally spilled\" all her oxycodone elixir.     checked.    Dr. Wang signed a prescription for 1 week of the oxycodone elixir, then signed a separate oxycodone prescription for 5 days; enough to get Alexandra to her appt on 02/13 with him.      "

## 2018-02-02 NOTE — TELEPHONE ENCOUNTER
----- Message from Amanda Rodriguez, RN sent at 2/2/2018  2:53 PM CST -----  Regarding: FW: Pt of Dr. Wang, Pt is in the ER at  and spilled her medication and needs to know what to do  Contact: [phone]      ----- Message -----     From: Loretta Ludwig     Sent: 2/2/2018   2:40 PM       To: Adult Chronic Pain Nurs[facility]  Subject: Pt of Dr. Wang, Pt is in the ER at  and#    Pt of Dr. Wang, Pt is in the ER at  and spilled her medication and needs to know what to do.  Pt's mom would like a call back ASAP in regards to this.  Pt's mom Leeann would like a call back at the number listed above    Thank you,    Allyssa ARGUELLO  [facility]    Please DO NOT send this message and/or reply back to sender.  Call Center Representatives DO NOT respond to messages.

## 2018-02-02 NOTE — PLAN OF CARE
Gastroenterology brief plan of care note:    23 yo F well known to Dr. Narayanan for her hx of gastritis, chronic abdominal pain post-cholecystectomy with mildly abnormal liver enzymes, not responding to sphincter of Oddi ablation a few years ago,cyclic N/V, migraines, pseudoseizures and somatoform disorder on chronic narcotics who has now been admitted to the ED for evaluation of small volume hematemesis. Pt has been having specks of blood in her vomitus over the last several days but today, she had about 5-10 mls of blood in her vomitus prompting an evaluation in the ED (last episode at 6:00 AM, none since her stay in the ER). Hb 10.8. HDS.     I was contacted because the patient had requested to see Dr. Narayanan. We will be happy to see the patient as a full consult if the patient is admitted to the hospital. If the patient is discharged, she should be educated about importance to return to the ED in case she has persistent hematemesis or signs/symptoms of symptomatic anemia (endorses none on my brief interview with her, did have some light-headedness/dizziness which has since resolved). She is well connected with our outpatient team and will contact us if necessary for any questions/ concerns.    Patient has been planned for a PEG-J insertion as an outpatient because even though she has had improvement of her abdominal symptoms with NJ placement, it has been a source of discomfort for her. This can be pursued as an outpatient as per the original plan and does not require an inpatient hospital stay.     I will try to get in touch with Dr. Narayanan and update this note with any changes/modification in plan.    ADDENDUM February 2, 2018 3:35 PM:    Communicated with Dr. Narayanan. Plan is to get an X-Ray. If the NJ is in position in the small bowel, will send her home with plans to do a PEG-J next week (will communicate with the GI schedulers who will schedule it and contact her).  If the X-ray shows that the NJ is in  the stomach, it can be removed prior to discharging her with plans to do the PEG-J next week as well.  Plan communicated with Dr. Church in the ED.      Camilo Mccollum   Gastroenterology Fellow

## 2018-02-02 NOTE — ED PROVIDER NOTES
History     Chief Complaint   Patient presents with     Hematemesis     HPI  Alexandra Melgoza is a 22 year old female with a history of chronic abdominal pain, chronic pancreatitis, spina bifida, cyclical vomiting syndrome, migraines, pseudoseizures, somatoform disorder and migraines who presents to the Emergency Department today for evaluation of hematemesis. The patient reports that she has been vomiting up blood for the last couple of days. She states that today it became a little worse and while she was at her infusion appointment today she was instructed by her doctor to present to the ED for further evaluation. Although she does have chronic abdominal pain she notes that it is worse than normal and her nausea has been increasing. She does report that her doctor wants to place a GJ tube in her sometime next week. The patient received 2 L of LR at her infusion appointment. She denies black stool.     I have reviewed the Medications, Allergies, Past Medical and Surgical History, and Social History in the [employer] system.    Past Medical History:   Diagnosis Date     Anxiety      Asthma      Cholecystitis     s/p cholecystectomy     Chronic abdominal pain      Chronic infection     mrsa     Chronic pain      Cyclic vomiting syndrome 10/27/2012     Depression      Endometriosis      Hypoglycaemia      Migraines      Mild intermittent asthma      Ovarian cysts      Pancreatic disease      PONV (postoperative nausea and vomiting)      Pseudoseizures      Somatoform disorder      Sphincter of Oddi dysfunction      Vasovagal syncope        Past Surgical History:   Procedure Laterality Date     ABDOMEN SURGERY      ERCP, biliary stents     CHOLECYSTECTOMY  8/2/11     COLONOSCOPY  2011    negative finding     ENDOSCOPIC RETROGRADE CHOLANGIOPANCREATOGRAM  8/23/2011    Procedure:ENDOSCOPIC RETROGRADE CHOLANGIOPANCREATOGRAM; Endoscopic Retrograde Cholangiopancreatogram; Surgeon:SHORTY MCCOY; Location: OR      ENDOSCOPIC RETROGRADE CHOLANGIOPANCREATOGRAM  5/17/2012    Procedure:ENDOSCOPIC RETROGRADE CHOLANGIOPANCREATOGRAM; Endoscopic Retrograde Cholangiopancreatogram with pancreatic stent placement.; Surgeon:CAMPBELL NARAYANAN; Location:[facility] OR     ENDOSCOPIC RETROGRADE CHOLANGIOPANCREATOGRAM N/A 1/18/2018    Procedure: COMBINED ENDOSCOPIC RETROGRADE CHOLANGIOPANCREATOGRAPHY, PLACE TUBE/STENT;  Endoscopic Retrograde Cholangiopancreatography with nasojejunal feeding tube placement;  Surgeon: Campbell Narayanan MD;  Location: [facility] OR     ENDOSCOPIC RETROGRADE CHOLANGIOPANCREATOGRAM COMPLEX  1/3/2012    Procedure:ENDOSCOPIC RETROGRADE CHOLANGIOPANCREATOGRAM COMPLEX; Endoscopic Retrograde Cholangiopancreatogram with Manometry bile duct sphincterotomy extention pancreatic duct sphincterotomy pancreatic duct stent placement; Surgeon:CAMPBELL NARAYANAN; Location:[facility] OR     ENDOSCOPIC ULTRASOUND UPPER GASTROINTESTINAL TRACT (GI) N/A 6/9/2015    Procedure: ENDOSCOPIC ULTRASOUND, ESOPHAGOSCOPY / UPPER GASTROINTESTINAL TRACT (GI);  Surgeon: Mario Joe MD;  Location: [facility] OR     ENDOSCOPIC ULTRASOUND UPPER GASTROINTESTINAL TRACT (GI) N/A 12/12/2016    Procedure: ENDOSCOPIC ULTRASOUND, ESOPHAGOSCOPY / UPPER GASTROINTESTINAL TRACT (GI);  Surgeon: Guru Jose Klein MD;  Location: [facility] OR     ESOPHAGOSCOPY, GASTROSCOPY, DUODENOSCOPY (EGD), COMBINED  1/18/2012    Procedure:COMBINED ESOPHAGOSCOPY, GASTROSCOPY, DUODENOSCOPY (EGD); Surgeon:ARNIE ESPINOZA; Location:[facility] GI     ESOPHAGOSCOPY, GASTROSCOPY, DUODENOSCOPY (EGD), COMBINED  1/18/2012    Procedure:COMBINED ESOPHAGOSCOPY, GASTROSCOPY, DUODENOSCOPY (EGD); EGD; Surgeon:ARNIE ESPINOZA; Location:[facility]     ESOPHAGOSCOPY, GASTROSCOPY, DUODENOSCOPY (EGD), COMBINED N/A 12/9/2017    Procedure: COMBINED ESOPHAGOSCOPY, GASTROSCOPY, DUODENOSCOPY (EGD);;  Surgeon: Josias Chan MD;  Location: [facility] GI     INSERT PORT VASCULAR ACCESS       L knee arthroscopy  2009      LAPAROSCOPY DIAGNOSTIC (GYN)  10/26/2012    Procedure: LAPAROSCOPY DIAGNOSTIC (GYN);  LAPAROSCOPY DIAGNOSTIC, CAUTERY ENDOMETRIOISIS and biopsy of Fallopian tube lesions;  Surgeon: Carla Lopez MD;  Location: [facility] OR     ORTHOPEDIC SURGERY  2008    knee     VASCULAR SURGERY         Family History   Problem Relation Age of Onset     Hypertension Father      Bipolar Disorder Other      Anxiety Disorder Other      Depression Paternal Grandmother      Allergies Maternal Grandmother      CEREBROVASCULAR DISEASE Maternal Grandfather      Cardiovascular Maternal Grandfather      Depression/Anxiety Maternal Grandfather      GASTROINTESTINAL DISEASE Maternal Grandfather      DIABETES Paternal Uncle      Hypoglycemia Brother      CANCER No family hx of      No family history of skin cancer       Social History   Substance Use Topics     Smoking status: Never Smoker     Smokeless tobacco: Never Used     Alcohol use No       No current facility-administered medications for this encounter.      Current Outpatient Prescriptions   Medication     [START ON 2/9/2018] oxyCODONE (ROXICODONE) 5 MG/5ML solution     ondansetron (ZOFRAN ODT) 4 MG ODT tab     menthol (ICY HOT) 5 % PTCH     amylase-lipase-protease (ZENPEP) 94727 UNITS CPEP     multivitamins with minerals (CERTAVITE/CEROVITE) LIQD liquid     gabapentin (NEURONTIN) 250 MG/5ML solution     gabapentin (NEURONTIN) 250 MG/5ML solution     sodium bicarbonate 325 MG tablet     order for DME     scopolamine (TRANSDERM) 72 hr patch     nortriptyline (PAMELOR) 10 MG capsule     amylase-lipase-protease (ZENPEP) 25642 UNITS CPEP     senna-docusate (SENOKOT-S;PERICOLACE) 8.6-50 MG per tablet     pantoprazole (PROTONIX) 40 MG EC tablet     polyethylene glycol (MIRALAX/GLYCOLAX) Packet     norethindrone (AYGESTIN) 5 MG tablet     fluticasone (FLONASE) 50 MCG/ACT spray     levalbuterol (XOPENEX HFA) 45 MCG/ACT Inhaler     leuprolide (LUPRON DEPOT) 11.25 MG injection     RIZATRIPTAN BENZOATE  "PO        Allergies   Allergen Reactions     Amoxicillin-Pot Clavulanate Nausea and Vomiting     Compazine [Prochlorperazine] Other (See Comments)     Dystonia       Hyoscyamine Other (See Comments)     Dystonia     Reglan [Metoclopramide Hcl] Other (See Comments)     Dystonia     Zyprexa Other (See Comments)     Sensitive, dystonic reaction on 11-9-2011     Amitriptyline Hcl Other (See Comments)     Dystonia, hallucinations     Buspirone Other (See Comments)     No Adverse Reactions, no benefit     Cogentin [Benztropine]      Cyproheptadine Other (See Comments)     Distonic     Dicyclomine Other (See Comments)     Droperidol Other (See Comments)     Feels tense and \"like she has to jump out of her skin\".       Effexor [Venlafaxine] Other (See Comments)     Dystonia     Food      Cilantro--lips/tongue swelling     No Clinical Screening - See Comments Other (See Comments)     Cilantro     Phenergan Dm [Promethazine-Dm] Other (See Comments)     dystonia     Promethazine Other (See Comments)     Risperidone Other (See Comments)     dystonia     Vistaril Other (See Comments)     Burning sensation.       Augmentin GI Disturbance     Ketamine Other (See Comments)     jittery     Sorbitol GI Disturbance     Headache and dyspepsia      Review of Systems   Gastrointestinal: Positive for abdominal pain, nausea and vomiting (blood in emesis). Negative for blood in stool.   All other systems reviewed and are negative.      Physical Exam   BP: 139/83  Pulse: 106  Temp: 99  F (37.2  C)  Resp: 16  Height: 167.6 cm (5' 6\")  SpO2: 100 %      Physical Exam   Constitutional: She is oriented to person, place, and time. She appears well-developed and well-nourished.   HENT:   Head: Normocephalic and atraumatic.   Mouth/Throat: Oropharynx is clear and moist.   Eyes: Conjunctivae and EOM are normal. Pupils are equal, round, and reactive to light.   Neck: Normal range of motion. Neck supple. No JVD present.   Cardiovascular: Normal rate, " regular rhythm, normal heart sounds and intact distal pulses.    Pulmonary/Chest: Effort normal and breath sounds normal. No respiratory distress. She has no wheezes. She has no rales.   Abdominal: Soft. She exhibits no mass. There is no tenderness. There is no guarding.   Musculoskeletal: Normal range of motion.        Right lower leg: Normal.        Left lower leg: Normal.   Neurological: She is alert and oriented to person, place, and time. No cranial nerve deficit.   Skin: Skin is warm and dry.   Psychiatric: She has a normal mood and affect. Her behavior is normal. Thought content normal.   Nursing note and vitals reviewed.      ED Course   12:10 PM  The patient was seen and examined by Dr. Church in Room 23.    ED Course     Procedures        Medications   oxyCODONE IR (ROXICODONE) tablet 5 mg (5 mg Oral Given 2/2/18 1401)       3:36 PM patient was seen by the GI fellow, he spoke to Dr. Narayanan.  The plan is to get a plain film of the abdomen, if the feeding tube is in the jejunum she can be discharged home if it is in the stomach he would like us to remove the tube and she can be discharged home.  He will make arrangements for her to get a PEG feeding tube next week       Labs Ordered and Resulted from Time of ED Arrival Up to the Time of Departure from the ED   CBC WITH PLATELETS DIFFERENTIAL - Abnormal; Notable for the following:        Result Value    RBC Count 3.59 (*)     Hemoglobin 10.8 (*)     Hematocrit 33.3 (*)     All other components within normal limits   BASIC METABOLIC PANEL - Abnormal; Notable for the following:     Creatinine 0.50 (*)     All other components within normal limits            Assessments & Plan (with Medical Decision Making)   22-year-old female with complex history for such a young age she has a nasal jejunal feeding tube in place she is followed by Dr. Narayanan.  She was at her clinic getting enough fluid infusions the plan is for her to get a PEG tube in the near future.   She had what sounds like a small volume hematemesis.  She is not anticoagulated.  She has her chronic pain for which she was given oral medication.  She was seen by the GI fellow and the plain film shows the tube appropriately placed and she will be discharged home and will try to move up the placement of her PEG.    I have reviewed the nursing notes.    I have reviewed the findings, diagnosis, plan and need for follow up with the patient.    Discharge Medication List as of 2/2/2018  4:26 PM      START taking these medications    Details   oxyCODONE (ROXICODONE) 5 MG/5ML solution Take 10 mLs (10 mg) by mouth every 8 hours as needed for pain maximum 30 mg per day, Disp-150 mL, R-0, Local Print             Final diagnoses:   Hematemesis, presence of nausea not specified   I, Nito Red, am serving as a trained medical scribe to document services personally performed by Jae Church MD, based on the provider's statements to me.   IJae MD, was physically present and have reviewed and verified the accuracy of this note documented by Nito Red.     2/2/2018   [facility], EMERGENCY DEPARTMENT     Linus Church MD  02/03/18 4521

## 2018-02-05 ENCOUNTER — TELEPHONE (OUTPATIENT)
Dept: GASTROENTEROLOGY | Facility: CLINIC | Age: 23
End: 2018-02-05

## 2018-02-05 ENCOUNTER — HOSPITAL ENCOUNTER (EMERGENCY)
Facility: CLINIC | Age: 23
Discharge: HOME | End: 2018-02-05
Attending: EMERGENCY MEDICINE | Admitting: EMERGENCY MEDICINE
Payer: COMMERCIAL

## 2018-02-05 ENCOUNTER — APPOINTMENT (OUTPATIENT)
Dept: GENERAL RADIOLOGY | Facility: CLINIC | Age: 23
End: 2018-02-05
Attending: EMERGENCY MEDICINE
Payer: COMMERCIAL

## 2018-02-05 VITALS
HEART RATE: 91 BPM | RESPIRATION RATE: 18 BRPM | OXYGEN SATURATION: 100 % | SYSTOLIC BLOOD PRESSURE: 123 MMHG | DIASTOLIC BLOOD PRESSURE: 86 MMHG | TEMPERATURE: 98.1 F | HEIGHT: 66 IN | WEIGHT: 148 LBS | BODY MASS INDEX: 23.78 KG/M2

## 2018-02-05 DIAGNOSIS — R10.84 ABDOMINAL PAIN, GENERALIZED: ICD-10-CM

## 2018-02-05 DIAGNOSIS — N30.00 ACUTE CYSTITIS WITHOUT HEMATURIA: ICD-10-CM

## 2018-02-05 LAB
ALBUMIN SERPL-MCNC: 4.2 G/DL (ref 3.4–5)
ALBUMIN UR-MCNC: NEGATIVE MG/DL
ALP SERPL-CCNC: 96 U/L (ref 40–150)
ALT SERPL W P-5'-P-CCNC: 39 U/L (ref 0–50)
ANION GAP SERPL CALCULATED.3IONS-SCNC: 9 MMOL/L (ref 3–14)
APPEARANCE UR: CLEAR
AST SERPL W P-5'-P-CCNC: 32 U/L (ref 0–45)
BASOPHILS # BLD AUTO: 0 10E9/L (ref 0–0.2)
BASOPHILS NFR BLD AUTO: 0.2 %
BILIRUB SERPL-MCNC: 0.4 MG/DL (ref 0.2–1.3)
BILIRUB UR QL STRIP: NEGATIVE
BUN SERPL-MCNC: 7 MG/DL (ref 7–30)
CALCIUM SERPL-MCNC: 9 MG/DL (ref 8.5–10.1)
CHLORIDE SERPL-SCNC: 103 MMOL/L (ref 94–109)
CO2 SERPL-SCNC: 28 MMOL/L (ref 20–32)
COLOR UR AUTO: ABNORMAL
CREAT SERPL-MCNC: 0.54 MG/DL (ref 0.52–1.04)
DIFFERENTIAL METHOD BLD: NORMAL
EOSINOPHIL # BLD AUTO: 0.1 10E9/L (ref 0–0.7)
EOSINOPHIL NFR BLD AUTO: 2.3 %
ERYTHROCYTE [DISTWIDTH] IN BLOOD BY AUTOMATED COUNT: 13 % (ref 10–15)
GFR SERPL CREATININE-BSD FRML MDRD: >90 ML/MIN/1.7M2
GLUCOSE SERPL-MCNC: 89 MG/DL (ref 70–99)
GLUCOSE UR STRIP-MCNC: NEGATIVE MG/DL
HCG UR QL: NEGATIVE
HCT VFR BLD AUTO: 37 % (ref 35–47)
HGB BLD-MCNC: 12 G/DL (ref 11.7–15.7)
HGB UR QL STRIP: NEGATIVE
IMM GRANULOCYTES # BLD: 0 10E9/L (ref 0–0.4)
IMM GRANULOCYTES NFR BLD: 0 %
KETONES UR STRIP-MCNC: NEGATIVE MG/DL
LACTATE BLD-SCNC: 0.8 MMOL/L (ref 0.7–2)
LEUKOCYTE ESTERASE UR QL STRIP: ABNORMAL
LIPASE SERPL-CCNC: 69 U/L (ref 73–393)
LYMPHOCYTES # BLD AUTO: 1.3 10E9/L (ref 0.8–5.3)
LYMPHOCYTES NFR BLD AUTO: 26.3 %
MCH RBC QN AUTO: 30.4 PG (ref 26.5–33)
MCHC RBC AUTO-ENTMCNC: 32.4 G/DL (ref 31.5–36.5)
MCV RBC AUTO: 94 FL (ref 78–100)
MONOCYTES # BLD AUTO: 0.3 10E9/L (ref 0–1.3)
MONOCYTES NFR BLD AUTO: 6.8 %
MUCOUS THREADS #/AREA URNS LPF: PRESENT /LPF
NEUTROPHILS # BLD AUTO: 3.1 10E9/L (ref 1.6–8.3)
NEUTROPHILS NFR BLD AUTO: 64.4 %
NITRATE UR QL: NEGATIVE
NRBC # BLD AUTO: 0 10*3/UL
NRBC BLD AUTO-RTO: 0 /100
PH UR STRIP: 7.5 PH (ref 5–7)
PLATELET # BLD AUTO: 230 10E9/L (ref 150–450)
POTASSIUM SERPL-SCNC: 4.2 MMOL/L (ref 3.4–5.3)
PROT SERPL-MCNC: 7.2 G/DL (ref 6.8–8.8)
RBC # BLD AUTO: 3.95 10E12/L (ref 3.8–5.2)
RBC #/AREA URNS AUTO: 3 /HPF (ref 0–2)
SODIUM SERPL-SCNC: 140 MMOL/L (ref 133–144)
SOURCE: ABNORMAL
SP GR UR STRIP: 1 (ref 1–1.03)
SQUAMOUS #/AREA URNS AUTO: 2 /HPF (ref 0–1)
TRANS CELLS #/AREA URNS HPF: 1 /HPF (ref 0–1)
UROBILINOGEN UR STRIP-MCNC: NORMAL MG/DL (ref 0–2)
WBC # BLD AUTO: 4.9 10E9/L (ref 4–11)
WBC #/AREA URNS AUTO: 17 /HPF (ref 0–2)

## 2018-02-05 PROCEDURE — 96376 TX/PRO/DX INJ SAME DRUG ADON: CPT | Performed by: EMERGENCY MEDICINE

## 2018-02-05 PROCEDURE — 25000128 H RX IP 250 OP 636: Performed by: EMERGENCY MEDICINE

## 2018-02-05 PROCEDURE — 83690 ASSAY OF LIPASE: CPT | Performed by: EMERGENCY MEDICINE

## 2018-02-05 PROCEDURE — 80053 COMPREHEN METABOLIC PANEL: CPT | Performed by: EMERGENCY MEDICINE

## 2018-02-05 PROCEDURE — 85025 COMPLETE CBC W/AUTO DIFF WBC: CPT | Performed by: EMERGENCY MEDICINE

## 2018-02-05 PROCEDURE — 83605 ASSAY OF LACTIC ACID: CPT | Performed by: EMERGENCY MEDICINE

## 2018-02-05 PROCEDURE — 96374 THER/PROPH/DIAG INJ IV PUSH: CPT | Performed by: EMERGENCY MEDICINE

## 2018-02-05 PROCEDURE — 96361 HYDRATE IV INFUSION ADD-ON: CPT | Performed by: EMERGENCY MEDICINE

## 2018-02-05 PROCEDURE — 99285 EMERGENCY DEPT VISIT HI MDM: CPT | Mod: 25 | Performed by: EMERGENCY MEDICINE

## 2018-02-05 PROCEDURE — 99284 EMERGENCY DEPT VISIT MOD MDM: CPT | Mod: Z6 | Performed by: EMERGENCY MEDICINE

## 2018-02-05 PROCEDURE — 81025 URINE PREGNANCY TEST: CPT | Performed by: EMERGENCY MEDICINE

## 2018-02-05 PROCEDURE — 96375 TX/PRO/DX INJ NEW DRUG ADDON: CPT | Performed by: EMERGENCY MEDICINE

## 2018-02-05 PROCEDURE — 81001 URINALYSIS AUTO W/SCOPE: CPT | Performed by: EMERGENCY MEDICINE

## 2018-02-05 PROCEDURE — 87086 URINE CULTURE/COLONY COUNT: CPT | Performed by: EMERGENCY MEDICINE

## 2018-02-05 PROCEDURE — 40000986 XR ABDOMEN 1 VW

## 2018-02-05 RX ORDER — ONDANSETRON 2 MG/ML
4 INJECTION INTRAMUSCULAR; INTRAVENOUS EVERY 30 MIN PRN
Status: DISCONTINUED | OUTPATIENT
Start: 2018-02-05 | End: 2018-02-05 | Stop reason: HOSPADM

## 2018-02-05 RX ORDER — HEPARIN SODIUM (PORCINE) LOCK FLUSH IV SOLN 100 UNIT/ML 100 UNIT/ML
500 SOLUTION INTRAVENOUS ONCE
Status: COMPLETED | OUTPATIENT
Start: 2018-02-05 | End: 2018-02-05

## 2018-02-05 RX ORDER — CEPHALEXIN 250 MG/5ML
50 POWDER, FOR SUSPENSION ORAL 4 TIMES DAILY
Qty: 201.6 ML | Refills: 0 | Status: ON HOLD | OUTPATIENT
Start: 2018-02-05 | End: 2018-02-10

## 2018-02-05 RX ORDER — ONDANSETRON 2 MG/ML
8 INJECTION INTRAMUSCULAR; INTRAVENOUS ONCE
Status: COMPLETED | OUTPATIENT
Start: 2018-02-05 | End: 2018-02-05

## 2018-02-05 RX ORDER — SODIUM CHLORIDE 9 MG/ML
1000 INJECTION, SOLUTION INTRAVENOUS CONTINUOUS
Status: DISCONTINUED | OUTPATIENT
Start: 2018-02-05 | End: 2018-02-05 | Stop reason: HOSPADM

## 2018-02-05 RX ORDER — MORPHINE SULFATE 2 MG/ML
4 INJECTION, SOLUTION INTRAMUSCULAR; INTRAVENOUS
Status: DISCONTINUED | OUTPATIENT
Start: 2018-02-05 | End: 2018-02-05 | Stop reason: HOSPADM

## 2018-02-05 RX ADMIN — SODIUM CHLORIDE 1000 ML: 900 INJECTION, SOLUTION INTRAVENOUS at 14:42

## 2018-02-05 RX ADMIN — ONDANSETRON 8 MG: 2 INJECTION INTRAMUSCULAR; INTRAVENOUS at 14:42

## 2018-02-05 RX ADMIN — SODIUM CHLORIDE, PRESERVATIVE FREE 500 UNITS: 5 INJECTION INTRAVENOUS at 17:33

## 2018-02-05 RX ADMIN — MORPHINE SULFATE 4 MG: 2 INJECTION, SOLUTION INTRAMUSCULAR; INTRAVENOUS at 14:43

## 2018-02-05 RX ADMIN — ONDANSETRON 4 MG: 2 INJECTION INTRAMUSCULAR; INTRAVENOUS at 16:03

## 2018-02-05 ASSESSMENT — ENCOUNTER SYMPTOMS
VOMITING: 1
NAUSEA: 1
ABDOMINAL PAIN: 1

## 2018-02-05 NOTE — ED NOTES
Bed: IN01  Expected date:   Expected time:   Means of arrival:   Comments:  Alexandra Thitoribio- vomiting blood.  Seen over weekend for the same- now worse.

## 2018-02-05 NOTE — ED AVS SNAPSHOT
[facility], [place], Emergency Department    [address] [place] [id number]    Phone:  [phone]                                       Alexandra Melgoza   MRN: [id number]    Department:  [facility], Emergency Department   Date of Visit:  2/5/2018           After Visit Summary Signature Page     I have received my discharge instructions, and my questions have been answered. I have discussed any challenges I see with this plan with the nurse or doctor.    ..........................................................................................................................................  Patient/Patient Representative Signature      ..........................................................................................................................................  Patient Representative Print Name and Relationship to Patient    ..................................................               ................................................  Date                                            Time    ..........................................................................................................................................  Reviewed by Signature/Title    ...................................................              ..............................................  Date                                                            Time

## 2018-02-05 NOTE — ED PROVIDER NOTES
"    [facility] EMERGENCY DEPARTMENT ([facility])  2/05/18 [facility] F 2:17 PM   History     Chief Complaint   Patient presents with     Vomiting     Abdominal Pain     HPI  Alexandra Melgoza is a 22 year old female who presents with nausea, vomiting and abdominal pain. She is well known to the ER for prior presentations of abdominal pain, nausea, vomiting. She had an NJ tube in place. She is followed by GI, and they plan to place a G tube.  Patient was seen here a few days ago with similar kind of symptoms.  Patient at that time had noted some vomiting with some streaks of blood.  Patient states that she was sent home but her abdominal discomfort as well as the vomiting had continued including her vomitus with some streaks of blood.  Patient does have surgery scheduled in 3 days but she states that her pain is so severe she could not wait.  She had contacted her nurse practitioner who is covering for her GI physician and they had asked her to come into the emergency department.  Patient states that she is taking her oxycodone around-the-clock but it has not helped.  She is also taking her antiemetics but that has not helped either.  Patient denies any fevers or chills.  No urinary symptoms.  She does describe some loose stools a few times yesterday.  Patient denies any GYN kind of symptoms such as vaginal bleeding or vaginal discharge.  Patient also denies any dysuria or hematuria.    I have reviewed the Medications, Allergies, Past Medical and Surgical History, and Social History in the Epic system.    Review of Systems   Gastrointestinal: Positive for abdominal pain, nausea and vomiting.       Physical Exam   BP: 120/89  Pulse: 56  Temp: 98.1  F (36.7  C)  Resp: 26  Height: 167.6 cm (5' 6\")  Weight: 67.1 kg (148 lb)  SpO2: 98 %      Physical Exam Exam:  Constitutional: healthy, alert and no distress  Head: Normocephalic. No masses, lesions, tenderness or abnormalities  Neck: Neck supple. No adenopathy. Thyroid " symmetric, normal size,, Carotids without bruits.  ENT: nj tube in place; ENT exam normal, no neck nodes or sinus tenderness  Cardiovascular: negative, PMI normal. No lifts, heaves, or thrills. RRR. No murmurs, clicks gallops or rub  Respiratory: negative, Percussion normal. Good diaphragmatic excursion. Lungs clear  Gastrointestinal: Abdomen soft, non-tender. BS normal. No masses, organomegaly  : Deferred  Musculoskeletal: extremities normal- no gross deformities noted, gait normal and normal muscle tone  Skin: no suspicious lesions or rashes  Neurologic: Gait normal. Reflexes normal and symmetric. Sensation grossly WNL.  Psychiatric: mentation appears normal and affect normal/bright  Hematologic/Lymphatic/Immunologic: Normal cervical lymph nodes      ED Course     ED Course     Procedures               Labs Ordered and Resulted from Time of ED Arrival Up to the Time of Departure from the ED   LIPASE - Abnormal; Notable for the following:        Result Value    Lipase 69 (*)     All other components within normal limits   UA MACROSCOPIC WITH REFLEX TO MICRO AND CULTURE - Abnormal; Notable for the following:     pH Urine 7.5 (*)     Leukocyte Esterase Urine Large (*)     RBC Urine 3 (*)     WBC Urine 17 (*)     Squamous Epithelial /HPF Urine 2 (*)     Mucous Urine Present (*)     All other components within normal limits   COMPREHENSIVE METABOLIC PANEL   LACTIC ACID WHOLE BLOOD   CBC WITH PLATELETS DIFFERENTIAL   HCG QUALITATIVE URINE       XR Abdomen 1 View (Final result) Result time: 02/05/18 16:46:49     Procedure changed from XR Abdomen 2 Views          Final result by Shayy Vazquez MD (02/05/18 16:46:49)     Impression:     IMPRESSION:   1. Changed in position of enteric tube tip, favor projected over the  proximal jejunum.  2. Moderate colonic stool burden. Nonobstructive bowel gas pattern.    Findings discussed with Dr. Hollis by Dr. Stanton at 4:38PM.    I have personally reviewed the examination and  initial interpretation  and I agree with the findings.    DEBORAH PRIETO MD     Narrative:     XR ABDOMEN 1 VW  2/5/2018 3:05 PM      HISTORY: NG tube     COMPARISON: 2/2/2018    FINDINGS: Single upright view of the abdomen.  Cholecystectomy. The  enteric tube tip appears slightly changed in position and apparently  folded back with weighted tip projecting over the right side of the  abdomen at the level of L2, possibly proximal jejunum. Moderate amount  of stool in the colon. Nonobstructed bowel gas pattern.                   Assessments & Plan (with Medical Decision Making)   This is a 22-year-old female with multiple medical history for abdominal pain that is chronic in origin.  Patient also underwent NJ tube placement after ERCP performed in this hospital.  Patient had been recovering at home however she has had increasing abdominal pain and was seen here a few days ago with same kind of symptoms as today.  Patient states that her vomitus shows some streaks of blood.  She has Zofran as well as some pain medicine at home but unable to control.  Over the weekend she has had increasing abdominal discomfort as well as vomiting and called her GI clinic and was told to come into the hospital.  Physical exam is unremarkable with soft abdomen.  NG tube is in place.  Given her history and current findings I am concerned for possible worsening of her chronic abdominal pain.  I am concerned for dehydration as well as possible migration of her NJ tube.  Abdominal x-rays are currently pending.  Basic labs are also pending.  We will speak with GI service to determine the next steps since her oral pain medicine regiment is not currently working for at home.    X-ray of the abdomen show any evidence of acute changes.  Urinalysis however does show evidence of UTI at this time.  Given her abdominal pain which appears to be an acute exacerbation of chronic abdominal pain, patient will undergo antibiotic treatment as an outpatient.   Patient to return if any worsening symptoms.    I have reviewed the nursing notes.    I have reviewed the findings, diagnosis, plan and need for follow up with the patient.    Discharge Medication List as of 2/5/2018  5:23 PM      START taking these medications    Details   cephalexin (KEFLEX) 250 MG/5ML suspension Take 16.8 mLs (839 mg) by mouth 4 times daily for 3 days, Disp-201.6 mL, R-0, Local Print             Final diagnoses:   Acute cystitis without hematuria   Abdominal pain, generalized       2/5/2018   [facility], [place], EMERGENCY DEPARTMENT     Phil Hollis MD  02/14/18 0985

## 2018-02-05 NOTE — ED AVS SNAPSHOT
[facility], Emergency Department    [address] [address]    Phone:  [phone]                                       Alexandra Melgoza   MRN: [id number]    Department:  [facility], Emergency Department   Date of Visit:  2/5/2018           Patient Information     Date Of Birth          1995        Your diagnoses for this visit were:     Acute cystitis without hematuria     Abdominal pain, generalized        You were seen by Phil Hollis MD.        Discharge Instructions         Abdominal Pain    Abdominal pain is pain in the stomach or belly area. Everyone has this pain from time to time. In many cases it goes away on its own. But abdominal pain can sometimes be due to a serious problem, such as appendicitis. So it s important to know when to seek help.  Causes of abdominal pain  There are many possible causes of abdominal pain. Common causes in adults include:    Constipation, diarrhea, or gas    Stomach acid flowing back up into the esophagus (acid reflux or heartburn)    Severe acid reflux, called GERD (gastroesophageal reflux disease)    A sore in the lining of the stomach or small intestine (peptic ulcer)    Inflammation of the gallbladder, liver, or pancreas    Gallstones or kidney stones    Appendicitis     Intestinal blockage     An internal organ pushing through a muscle or other tissue (hernia)    Urinary tract infections    In women, menstrual cramps, fibroids, or endometriosis    Inflammation or infection of the intestines  Diagnosing the cause of abdominal pain  Your healthcare provider will do a physical exam help find the cause of your pain. If needed, tests will be ordered. Belly pain has many possible causes. So it can be hard to find the reason for your pain. Giving details about your pain can help. Tell your provider where and when you feel the pain, and what makes it better or worse. Also let your provider know if you have other symptoms such  as:    Fever    Tiredness    Upset stomach (nausea)    Vomiting    Changes in bathroom habits  Treating abdominal pain  Some causes of pain need emergency medical treatment right away. These include appendicitis or a bowel blockage. Other problems can be treated with rest, fluids, or medicines. Your healthcare provider can give you specific instructions for treatment or self-care based on what is causing your pain.  If you have vomiting or diarrhea, sip water or other clear fluids. When you are ready to eat solid foods again, start with small amounts of easy-to-digest, low-fat foods. These include apple sauce, toast, or crackers.   When to seek medical care  Call 911 or go to the hospital right away if you:    Can t pass stool and are vomiting    Are vomiting blood or have bloody diarrhea or black, tarry diarrhea    Have chest, neck, or shoulder pain    Feel like you might pass out    Have pain in your shoulder blades with nausea    Have sudden, severe belly pain    Have new, severe pain unlike any you have felt before    Have a belly that is rigid, hard, and tender to touch  Call your healthcare provider if you have:    Pain for more than 5 days    Bloating for more than 2 days    Diarrhea for more than 5 days    A fever of 100.4 F (38.0 C) or higher, or as directed by your provider    Pain that gets worse    Weight loss for no reason    Continued lack of appetite    Blood in your stool  How to prevent abdominal pain  Here are some tips to help prevent abdominal pain:    Eat smaller amounts of food at one time.    Avoid greasy, fried, or other high-fat foods.    Avoid foods that give you gas.    Exercise regularly.    Drink plenty of fluids.  To help prevent GERD symptoms:    Quit smoking.    Reduce alcohol and certain foods that increase stomach acid.    Avoid aspirin and over-the-counter pain and fever medicines (NSAIDS or nonsteroidal anti-inflammatory drugs), if possible    Lose extra weight.    Finish eating  at least 2 hours before you go to bed or lie down.    Raise the head of your bed.  Date Last Reviewed: 7/1/2016 2000-2017 The "[employer]". [address] [place] [address]. All rights reserved. This information is not intended as a substitute for professional medical care. Always follow your healthcare professional's instructions.          Understanding Urinary Tract Infections (UTIs)  Most UTIs are caused by bacteria, although they may also be caused by viruses or fungi. Bacteria from the bowel are the most common source of infection. The infection may start because of any of the following:    Sexual activity. During sex, bacteria can travel from the penis, vagina, or rectum into the urethra.     Bacteria on the skin outside the rectum may travel into the urethra. This is more common in women since the rectum and urethra are closer to each other than in men. Wiping from front to back after using the toilet and keeping the area clean can help prevent germs from getting to the urethra.    Blockage of urine flow through the urinary tract. If urine sits too long, germs may start to grow out of control.      Parts of the urinary tract  The infection can occur in any part of the urinary tract.    The kidneys collect and store urine.    The ureters carry urine from the kidneys to the bladder.    The bladder holds urine until you are ready to let it out.    The urethra carries urine from the bladder out of the body. It is shorter in women, so bacteria can move through it more easily. The urethra is longer in men, so a UTI is less likely to reach the bladder or kidneys in men.  Date Last Reviewed: 1/1/2017 2000-2017 The "[employer]". [address] [place] [address]. All rights reserved. This information is not intended as a substitute for professional medical care. Always follow your healthcare professional's instructions.          Future Appointments        Provider Department  [facility]    2/7/2018 1:00 PM [facility] [phone] [facility]    2/9/2018 3:00 PM [facility] [phone] [facility]    2/12/2018 1:00 PM [facility]ty [facility] Specialty and Procedure [phone] [facility]    2/13/2018 8:00 AM Leonardo Wang MD [facility] [phone] [facility]    2/14/2018 12:00 PM Campbell Narayanan MD [facility] Pancreas [facility] Biliary [phone] [facility]    2/14/2018 1:00 PM [facility] Specialty [facility] Specialty and Procedure [phone] [facility]    2/16/2018 3:00 PM [facility]ty [facility] Specialty [facility] Procedure [phone] [facility]    2/19/2018 12:00 PM [facility]ty [facility] Special[facility] Procedure [phone] [facility]    2/21/2018 3:00 PM [facility] Procedure [phone] [facility]    2/23/2018 1:00 PM [facility] Special[facility] Procedure [phone] [facility]    2/26/2018 12:00 PM [facility] Specialty [facility] Procedure [phone] [facility]    2/28/2018 12:00 PM [facility] Procedure [phone] [facility]    3/2/2018 1:00 PM [facility]; Specialty [facility] Specialty and Procedure [phone] [facility]      24 Hour Appointment Hotline       To make an appointment at any [facility], call [phone]  ([phone]). If you don't have a family doctor or clinic, we will help you find one. [facility] are conveniently located to serve the needs of you and your family.             Review of your medicines      START taking        Dose / Directions Last dose taken    cephalexin 250 MG/5ML suspension   Commonly known as:  KEFLEX   Dose:  50 mg/kg/day   Quantity:  201.6 mL        Take 16.8 mLs (839 mg) by mouth 4 times daily for 3 days   Refills:  0          Our records show that you are taking the medicines listed below. If these are incorrect, please call your family doctor or clinic.        Dose / Directions Last dose taken    * amylase-lipase-protease 98259 UNITS Cpep   Commonly known as:  ZENPEP   Dose:  2-3 capsule   Quantity:  180 capsule        Take 2-3 capsules (40,000-60,000 Units) by mouth Take with snacks or supplements (Snacks)   Refills:  1        * amylase-lipase-protease 81894 UNITS Cpep   Commonly known as:  ZENPEP   Dose:  5 capsule   Quantity:  180 capsule        Take 5 capsules (100,000 Units) by mouth 4 times daily (with meals and nightly)   Refills:  1        fluticasone 50 MCG/ACT spray   Commonly known as:  FLONASE   Dose:  2 spray   Quantity:  16 g        Spray 2 sprays into both nostrils daily   Refills:  3        * gabapentin 250 MG/5ML solution   Commonly known as:  NEURONTIN   Dose:  400 mg   Quantity:  470 mL        8 mLs (400 mg) by ORAL OR NJ TUBE route 2 times daily   Refills:  0        * gabapentin 250 MG/5ML solution   Commonly known as:  NEURONTIN   Dose:  600 mg   Quantity:  450 mL        Take 12 mLs (600 mg) by mouth daily   Refills:  0        leuprolide 11.25 MG kit   Commonly known as:  LUPRON DEPOT (3-MONTH)   Dose:  11.25 mg   Quantity:  1 each        Inject 11.25 mg into the muscle every 3 months   Refills:  3        levalbuterol 45 MCG/ACT Inhaler   Commonly known as:  XOPENEX HFA   Dose:  2 puff   Quantity:  1 Inhaler        Inhale 2 puffs into the lungs every 6 hours  as needed for shortness of breath / dyspnea   Refills:  1        menthol 5 % Ptch   Commonly known as:  ICY HOT   Dose:  1 patch   Quantity:  15 patch        Apply 1 patch topically every 8 hours as needed for muscle soreness   Refills:  3        multivitamins with minerals Liqd liquid   Dose:  15 mL   Quantity:  1 Bottle        15 mLs by Per Feeding Tube route daily   Refills:  0        norethindrone 5 MG tablet   Commonly known as:  AYGESTIN   Dose:  5 mg   Quantity:  90 tablet        Take 1 tablet (5 mg) by mouth daily   Refills:  1        nortriptyline 10 MG capsule   Commonly known as:  PAMELOR   Dose:  30 mg   Quantity:  120 capsule        Take 3 capsules (30 mg) by mouth At Bedtime   Refills:  0        ondansetron 4 MG ODT tab   Commonly known as:  ZOFRAN ODT   Dose:  4 mg   Quantity:  40 tablet        Take 1 tablet (4 mg) by mouth every 8 hours as needed for nausea or vomiting   Refills:  3        order for DME   Quantity:  1 Units        Equipment being ordered: TENS with wire and electrode.   Refills:  0        oxyCODONE 5 MG/5ML solution   Commonly known as:  ROXICODONE   Dose:  10 mg   Quantity:  150 mL   Start taking on:  2/9/2018        Take 10 mLs (10 mg) by mouth every 8 hours as needed for pain maximum 30 mg per day   Refills:  0        pantoprazole 40 MG EC tablet   Commonly known as:  PROTONIX   Dose:  40 mg   Quantity:  30 tablet        Take 1 tablet (40 mg) by mouth 2 times daily (before meals)   Refills:  1        polyethylene glycol Packet   Commonly known as:  MIRALAX/GLYCOLAX   Dose:  17 g   Quantity:  7 packet        Take 17 g by mouth daily   Refills:  0        RIZATRIPTAN BENZOATE PO   Dose:  5 mg        Take 5 mg by mouth once as needed   Refills:  0        scopolamine 72 hr patch   Commonly known as:  TRANSDERM   Quantity:  2 patch        Apply 1 patch to hairless area behind one ear if severe nausea and vomiting.  Remove old patch and change every 3 days (72 hours).   Refills:  0         senna-docusate 8.6-50 MG per tablet   Commonly known as:  SENOKOT-S;PERICOLACE   Dose:  1 tablet   Quantity:  100 tablet        Take 1 tablet by mouth 2 times daily as needed for constipation   Refills:  0        sodium bicarbonate 325 MG tablet   Dose:  325 mg   Quantity:  200 tablet        1 tablet (325 mg) by Per Feeding Tube route 4 times daily   Refills:  0        * Notice:  This list has 4 medication(s) that are the same as other medications prescribed for you. Read the directions carefully, and ask your doctor or other care provider to review them with you.            Prescriptions were sent or printed at these locations (1 Prescription)                   Other Prescriptions                Printed at Department/Unit printer (1 of 1)         cephalexin (KEFLEX) 250 MG/5ML suspension                Procedures and tests performed during your visit     CBC with platelets differential    Comprehensive metabolic panel    HCG qualitative urine (UPT)    Lactic acid whole blood    Lipase    UA reflex to Microscopic and Culture    XR Abdomen 1 View      Orders Needing Specimen Collection     None      Pending Results     No orders found from 2/3/2018 to 2/6/2018.            Pending Culture Results     No orders found from 2/3/2018 to 2/6/2018.            Pending Results Instructions     If you had any lab results that were not finalized at the time of your Discharge, you can call the ED Lab Result RN at [phone]. You will be contacted by this team for any positive Lab results or changes in treatment. The nurses are available 7 days a week from 10A to 6:30P.  You can leave a message 24 hours per day and they will return your call.        Thank you for choosing [facility]       Thank you for choosing [facility] for your care. Our goal is always to provide you with excellent care. Hearing back from our patients is one way we can continue to improve our services. Please take a few minutes to complete the written survey  that you may receive in the mail after you visit with us. Thank you!        [facility] Information     [employer] gives you secure access to your electronic health record. If you see a primary care provider, you can also send messages to your care team and make appointments. If you have questions, please call your primary care clinic.  If you do not have a primary care provider, please call [phone] and they will assist you.        Care EveryWhere ID     This is your Care EveryWhere ID. This could be used by other organizations to access your [facility] medical records  [id number]        Equal Access to Services     [facility] : Bret Enamorado, alcon beck, lashell santillan, maki gaspar . So [facility] [phone].    ATENCIÓN: Si habla español, tiene a quijano disposición servicios gratuitos de asistencia lingüística. Panfilo al [phone].    We comply with applicable federal civil rights laws and [place] laws. We do not discriminate on the basis of race, color, national origin, age, disability, sex, sexual orientation, or gender identity.            After Visit Summary       This is your record. Keep this with you and show to your community pharmacist(s) and doctor(s) at your next visit.

## 2018-02-05 NOTE — TELEPHONE ENCOUNTER
"Patient called today, concern about persistent nausea, vomiting, worsening abdominal pain over the past 48 hours, \"vomiting up blood\", and inability to tolerate tube feed rate. She was seen in the ED on Friday for hematemesis. She states she is vomiting up more blood compared to a few days ago. In addition, she states outpatient IV infusion has not helped much for the nausea, vomiting and abdominal pain over the past week. She is scheduled with Dr. Narayanan on Thursday to have GJ tube placed. She states she is unable to hold off that long. She was instructed to go to the ED for further evaluation, concern for dehydration, acute on chronic abdominal pain, and for hematemesis.    "

## 2018-02-05 NOTE — PROGRESS NOTES
Gastroenterology brief plan of care note:     21 yo F well known to Dr. 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      I was contacted because the patient had requested to see Dr. Narayanan. We will be happy to see the patient as a full consult if the patient is admitted to the hospital. If she is discharged, she is well connected with our outpatient team and will contact us if necessary for any questions/ concerns. Evaluation in the ED has revealed normal hemodynamics, normal labs including Hb, electrolytes, renal function and normal lipase. X- Ray KUB is revealing a post-pyloric NJ tube (though appears to be slightly pulled back as compared to X-Ray KUB 2/2/18, still appears to be deep in the duodenum).     I had a detailed discussion with the patient and her mom at bedside. If she is discharged, she should decrease the rate of her tube feeds to 25 cc/ hr or as high as she can tolerate tills he gets her PEG placement on Thursday 2/8/18. If possible, she should also contact her infusion center for infusions of IV fluids tomorrow and on Wednesday to ensure that she does not get dehydrated. Can continue oral diet as tolerated in the meanwhile.     All her questions were answered. She was also advised to resume her laxatives since she has a high stool burden on her KUB. She has been having 1-2 loosely formed BMs daily at home.     Camilo Mccollum   Gastroenterology Fellow

## 2018-02-05 NOTE — DISCHARGE INSTRUCTIONS
Abdominal Pain    Abdominal pain is pain in the stomach or belly area. Everyone has this pain from time to time. In many cases it goes away on its own. But abdominal pain can sometimes be due to a serious problem, such as appendicitis. So it s important to know when to seek help.  Causes of abdominal pain  There are many possible causes of abdominal pain. Common causes in adults include:    Constipation, diarrhea, or gas    Stomach acid flowing back up into the esophagus (acid reflux or heartburn)    Severe acid reflux, called GERD (gastroesophageal reflux disease)    A sore in the lining of the stomach or small intestine (peptic ulcer)    Inflammation of the gallbladder, liver, or pancreas    Gallstones or kidney stones    Appendicitis     Intestinal blockage     An internal organ pushing through a muscle or other tissue (hernia)    Urinary tract infections    In women, menstrual cramps, fibroids, or endometriosis    Inflammation or infection of the intestines  Diagnosing the cause of abdominal pain  Your healthcare provider will do a physical exam help find the cause of your pain. If needed, tests will be ordered. Belly pain has many possible causes. So it can be hard to find the reason for your pain. Giving details about your pain can help. Tell your provider where and when you feel the pain, and what makes it better or worse. Also let your provider know if you have other symptoms such as:    Fever    Tiredness    Upset stomach (nausea)    Vomiting    Changes in bathroom habits  Treating abdominal pain  Some causes of pain need emergency medical treatment right away. These include appendicitis or a bowel blockage. Other problems can be treated with rest, fluids, or medicines. Your healthcare provider can give you specific instructions for treatment or self-care based on what is causing your pain.  If you have vomiting or diarrhea, sip water or other clear fluids. When you are ready to eat solid foods again,  start with small amounts of easy-to-digest, low-fat foods. These include apple sauce, toast, or crackers.   When to seek medical care  Call 911 or go to the hospital right away if you:    Can t pass stool and are vomiting    Are vomiting blood or have bloody diarrhea or black, tarry diarrhea    Have chest, neck, or shoulder pain    Feel like you might pass out    Have pain in your shoulder blades with nausea    Have sudden, severe belly pain    Have new, severe pain unlike any you have felt before    Have a belly that is rigid, hard, and tender to touch  Call your healthcare provider if you have:    Pain for more than 5 days    Bloating for more than 2 days    Diarrhea for more than 5 days    A fever of 100.4 F (38.0 C) or higher, or as directed by your provider    Pain that gets worse    Weight loss for no reason    Continued lack of appetite    Blood in your stool  How to prevent abdominal pain  Here are some tips to help prevent abdominal pain:    Eat smaller amounts of food at one time.    Avoid greasy, fried, or other high-fat foods.    Avoid foods that give you gas.    Exercise regularly.    Drink plenty of fluids.  To help prevent GERD symptoms:    Quit smoking.    Reduce alcohol and certain foods that increase stomach acid.    Avoid aspirin and over-the-counter pain and fever medicines (NSAIDS or nonsteroidal anti-inflammatory drugs), if possible    Lose extra weight.    Finish eating at least 2 hours before you go to bed or lie down.    Raise the head of your bed.  Date Last Reviewed: 7/1/2016 2000-2017 [employer]. [address] [place] [address], [place] [address]. All rights reserved. This information is not intended as a substitute for professional medical care. Always follow your healthcare professional's instructions.          Understanding Urinary Tract Infections (UTIs)  Most UTIs are caused by bacteria, although they may also be caused by viruses or fungi. Bacteria from the bowel are the  most common source of infection. The infection may start because of any of the following:    Sexual activity. During sex, bacteria can travel from the penis, vagina, or rectum into the urethra.     Bacteria on the skin outside the rectum may travel into the urethra. This is more common in women since the rectum and urethra are closer to each other than in men. Wiping from front to back after using the toilet and keeping the area clean can help prevent germs from getting to the urethra.    Blockage of urine flow through the urinary tract. If urine sits too long, germs may start to grow out of control.      Parts of the urinary tract  The infection can occur in any part of the urinary tract.    The kidneys collect and store urine.    The ureters carry urine from the kidneys to the bladder.    The bladder holds urine until you are ready to let it out.    The urethra carries urine from the bladder out of the body. It is shorter in women, so bacteria can move through it more easily. The urethra is longer in men, so a UTI is less likely to reach the bladder or kidneys in men.  Date Last Reviewed: 1/1/2017 2000-2017 [employer]. [address] [place] [address]. All rights reserved. This information is not intended as a substitute for professional medical care. Always follow your healthcare professional's instructions.

## 2018-02-05 NOTE — ED NOTES
Patient complains of intractable vomiting for 3 days. States that she is vomiting blood. Complains of severe abdominal pain that is not controlled with Oxycodone. Patient is schedule to have GJ tube placed, for now has an NJ tube.

## 2018-02-06 ENCOUNTER — TELEPHONE (OUTPATIENT)
Dept: GASTROENTEROLOGY | Facility: CLINIC | Age: 23
End: 2018-02-06

## 2018-02-06 ENCOUNTER — INFUSION THERAPY VISIT (OUTPATIENT)
Dept: INFUSION THERAPY | Facility: CLINIC | Age: 23
End: 2018-02-06
Attending: INTERNAL MEDICINE
Payer: COMMERCIAL

## 2018-02-06 VITALS
HEART RATE: 98 BPM | SYSTOLIC BLOOD PRESSURE: 113 MMHG | TEMPERATURE: 98.1 F | OXYGEN SATURATION: 99 % | DIASTOLIC BLOOD PRESSURE: 73 MMHG | RESPIRATION RATE: 16 BRPM

## 2018-02-06 DIAGNOSIS — K86.1 CHRONIC PANCREATITIS, UNSPECIFIED PANCREATITIS TYPE (H): ICD-10-CM

## 2018-02-06 DIAGNOSIS — G43.A0 CYCLICAL VOMITING WITH NAUSEA, INTRACTABILITY OF VOMITING NOT SPECIFIED: Primary | ICD-10-CM

## 2018-02-06 LAB
BACTERIA SPEC CULT: NO GROWTH
Lab: NORMAL
SPECIMEN SOURCE: NORMAL

## 2018-02-06 PROCEDURE — 96376 TX/PRO/DX INJ SAME DRUG ADON: CPT

## 2018-02-06 PROCEDURE — 25000128 H RX IP 250 OP 636: Mod: ZF | Performed by: NURSE PRACTITIONER

## 2018-02-06 PROCEDURE — 96375 TX/PRO/DX INJ NEW DRUG ADDON: CPT

## 2018-02-06 PROCEDURE — 96374 THER/PROPH/DIAG INJ IV PUSH: CPT

## 2018-02-06 PROCEDURE — 96361 HYDRATE IV INFUSION ADD-ON: CPT

## 2018-02-06 PROCEDURE — 25000128 H RX IP 250 OP 636: Mod: ZF | Performed by: INTERNAL MEDICINE

## 2018-02-06 RX ORDER — ONDANSETRON 2 MG/ML
4 INJECTION INTRAMUSCULAR; INTRAVENOUS ONCE
Status: COMPLETED | OUTPATIENT
Start: 2018-02-06 | End: 2018-02-06

## 2018-02-06 RX ORDER — ONDANSETRON 2 MG/ML
4 INJECTION INTRAMUSCULAR; INTRAVENOUS DAILY PRN
Status: DISCONTINUED | OUTPATIENT
Start: 2018-02-06 | End: 2018-02-06 | Stop reason: HOSPADM

## 2018-02-06 RX ORDER — DIPHENHYDRAMINE HYDROCHLORIDE 50 MG/ML
25 INJECTION INTRAMUSCULAR; INTRAVENOUS ONCE
Status: COMPLETED | OUTPATIENT
Start: 2018-02-06 | End: 2018-02-06

## 2018-02-06 RX ORDER — DIPHENHYDRAMINE HYDROCHLORIDE 50 MG/ML
25 INJECTION INTRAMUSCULAR; INTRAVENOUS ONCE
Status: CANCELLED
Start: 2018-02-06 | End: 2018-02-06

## 2018-02-06 RX ORDER — ONDANSETRON 2 MG/ML
4 INJECTION INTRAMUSCULAR; INTRAVENOUS DAILY PRN
Status: CANCELLED
Start: 2018-02-06

## 2018-02-06 RX ORDER — ONDANSETRON 2 MG/ML
4 INJECTION INTRAMUSCULAR; INTRAVENOUS ONCE
Status: CANCELLED
Start: 2018-02-06 | End: 2018-02-06

## 2018-02-06 RX ADMIN — SODIUM CHLORIDE, POTASSIUM CHLORIDE, SODIUM LACTATE AND CALCIUM CHLORIDE 2000 ML: 600; 310; 30; 20 INJECTION, SOLUTION INTRAVENOUS at 14:37

## 2018-02-06 RX ADMIN — ONDANSETRON 4 MG: 2 INJECTION INTRAMUSCULAR; INTRAVENOUS at 14:45

## 2018-02-06 RX ADMIN — ONDANSETRON 4 MG: 2 INJECTION INTRAMUSCULAR; INTRAVENOUS at 16:30

## 2018-02-06 RX ADMIN — DIPHENHYDRAMINE HYDROCHLORIDE 25 MG: 50 INJECTION INTRAMUSCULAR; INTRAVENOUS at 14:40

## 2018-02-06 NOTE — PATIENT INSTRUCTIONS
Dear Alexandra Melgoza    Thank you for choosing [facility] ([facility]) for your infusion.  The following information is a summary of our appointment as well as important reminders.      We look forward in seeing you on your next appointment here at [facility].  Please don t hesitate to call us at [phone] to reschedule any of your appointments or to speak with one of the [facility] registered nurses.  It was a pleasure taking care of you today.    Sincerely,    [facility]  Specialty Infusion & Procedure Center  [address] [place] [address]  [address]  Phone:  [phone]

## 2018-02-06 NOTE — TELEPHONE ENCOUNTER
Spoke with patient reminding of upcoming appointment 2/14 with Dr. Narayanan.  Number provided if needs to reschedule. She plans to attend.  Has GT placement procedure this week.  States she will call and cancel if still in the hospital next week.

## 2018-02-06 NOTE — MR AVS SNAPSHOT
After Visit Summary   2/6/2018    Alexandra Melgoza    MRN: [id number]           Patient Information     Date Of Birth          1995        Visit Information        Provider Department      2/6/2018 3:00 PM UC 47 ATC; UC SPEC INFUSION [facility] Specialty and Procedure        Today's Diagnoses     Cyclical vomiting with nausea, intractability of vomiting not specified    -  1    Chronic pancreatitis, unspecified pancreatitis type (H)          Care Instructions    Dear Alexandra Melgoza    Thank you for choosing [facility] ([facility]) for your infusion.  The following information is a summary of our appointment as well as important reminders.      We look forward in seeing you on your next appointment here at [facility].  Please don t hesitate to call us at [phone] to reschedule any of your appointments or to speak with one of the [facility] registered nurses.  It was a pleasure taking care of you today.    Sincerely,    [facility]  Specialty Infusion & Procedure Center  [address] [place] [address]  [address]  Phone:  [phone]            Follow-ups after your visit        Your next 10 appointments already scheduled     Feb 07, 2018  1:00 PM CST   Infusion 120 with UC SPEC INFUSION, UC 48 ATC   [facility] Specialty and Procedure ([facility])    [address]  [address]  [facility] [address] [phone]            Feb 08, 2018   Procedure with Campbell Narayanan MD   [facility] [place], Same Day Surgery (--)    [address] [id number] [phone]            Feb 09, 2018  3:00 PM CST   Infusion 120 with UC SPEC INFUSION, UC 47 ATC   [facility] Specialty and Procedure ([facility])    [address]  [address]  [facility] [address] [phone]             Feb 12, 2018  1:00 PM CST   Infusion 120 with UC SPEC INFUSION, UC 45 ATC   [facility] Specialty and Procedure ([facility])    [address]  [address]  [facility] [address] [phone]            Feb 13, 2018  8:00 AM CST   (Arrive by 7:45 AM)   Return Visit with Leonardo Wang MD   [facility] ([facility])    [address]  4th Floor  [facility] [address] [phone]            Feb 14, 2018 12:00 PM CST   (Arrive by 11:45 AM)   Return Visit with Campbell Narayanan MD   [facility] Pancreas and Biliary ([facility])    [address]  4th Floor  [facility] [id number] [phone]            Feb 14, 2018  1:00 PM CST   Infusion 120 with UC SPEC INFUSION   [facility] Specialty and Procedure ([facility])    [address]  [address]  [facility] [id number] [phone]              Who to contact     If you have questions or need follow up information about today's clinic visit or your schedule please contact [facility] SPECIALTY AND PROCEDURE directly at [phone].  Normal or non-critical lab and imaging results will be communicated to you by IronPlanethart, letter or phone within 4 business days after the clinic has received the results. If you do not hear from us within 7 days, please contact the clinic through IronPlanethart or phone. If you have a critical or abnormal lab result, we will notify you by phone as soon as possible.  Submit refill requests through Aloqa or call your pharmacy and they will forward the refill request to us. Please allow 3 business days for your refill to be completed.          Additional Information About Your Visit        Aloqa Information     Aloqa gives you secure access to your electronic health record. If you see a primary  care provider, you can also send messages to your care team and make appointments. If you have questions, please call your primary care clinic.  If you do not have a primary care provider, please call [phone] and they will assist you.        Care EveryWhere ID     This is your Care EveryWhere ID. This could be used by other organizations to access your [facility] medical records  [id number]        Your Vitals Were     Pulse Temperature Respirations Pulse Oximetry          98 98.1  F (36.7  C) (Oral) 16 99%         Blood Pressure from Last 3 Encounters:   02/06/18 113/73   02/05/18 123/86   02/02/18 132/89    Weight from Last 3 Encounters:   02/05/18 67.1 kg (148 lb)   01/31/18 67.6 kg (149 lb)   01/24/18 67.6 kg (149 lb)              Today, you had the following     No orders found for display       Primary Care Provider Office Phone # Fax #    Quyen Baez [phone]       [address] [address]        Equal Access to Services     [facility]: Hadii aad ku hadasho Somalini, waaxda luqadaha, qaybta kaalmada jacque, maki gaspar . So [facility] [phone].    ATENCIÓN: Si habla español, tiene a quijano disposición servicios gratrose marieos de asistencia lingüística. [facility] [phone].    We comply with applicable federal civil rights laws and [place] laws. We do not discriminate on the basis of race, color, national origin, age, disability, sex, sexual orientation, or gender identity.            Thank you!     Thank you for choosing [facility] SPECIALTY AND PROCEDURE  for your care. Our goal is always to provide you with excellent care. Hearing back from our patients is one way we can continue to improve our services. Please take a few minutes to complete the written survey that you may receive in the mail after your visit with us. Thank you!             Your Updated Medication List - Protect others around you: Learn  how to safely use, store and throw away your medicines at www.disposemymeds.org.          This list is accurate as of 2/6/18  4:56 PM.  Always use your most recent med list.                   Brand Name Dispense Instructions for use Diagnosis    * amylase-lipase-protease 97331 UNITS Cpep    ZENPEP    180 capsule    Take 2-3 capsules (40,000-60,000 Units) by mouth Take with snacks or supplements (Snacks)    Idiopathic chronic pancreatitis (H)       * amylase-lipase-protease 72667 UNITS Cpep    ZENPEP    180 capsule    Take 5 capsules (100,000 Units) by mouth 4 times daily (with meals and nightly)    Right upper quadrant abdominal pain       cephalexin 250 MG/5ML suspension    KEFLEX    201.6 mL    Take 16.8 mLs (839 mg) by mouth 4 times daily for 3 days        fluticasone 50 MCG/ACT spray    FLONASE    16 g    Spray 2 sprays into both nostrils daily    Nasal congestion       * gabapentin 250 MG/5ML solution    NEURONTIN    470 mL    8 mLs (400 mg) by ORAL OR NJ TUBE route 2 times daily    Abdominal pain, epigastric       * gabapentin 250 MG/5ML solution    NEURONTIN    450 mL    Take 12 mLs (600 mg) by mouth daily    Abdominal pain, epigastric       leuprolide 11.25 MG kit    LUPRON DEPOT (3-MONTH)    1 each    Inject 11.25 mg into the muscle every 3 months    Endometriosis       levalbuterol 45 MCG/ACT Inhaler    XOPENEX HFA    1 Inhaler    Inhale 2 puffs into the lungs every 6 hours as needed for shortness of breath / dyspnea    Wheeze       menthol 5 % Ptch    ICY HOT    15 patch    Apply 1 patch topically every 8 hours as needed for muscle soreness    Abdominal pain, epigastric       multivitamins with minerals Liqd liquid     1 Bottle    15 mLs by Per Feeding Tube route daily    Abdominal pain, epigastric       norethindrone 5 MG tablet    AYGESTIN    90 tablet    Take 1 tablet (5 mg) by mouth daily    Endometriosis       nortriptyline 10 MG capsule    PAMELOR    120 capsule    Take 3 capsules (30 mg) by mouth  At Bedtime    Right upper quadrant abdominal pain       ondansetron 4 MG ODT tab    ZOFRAN ODT    40 tablet    Take 1 tablet (4 mg) by mouth every 8 hours as needed for nausea or vomiting        order for DME     1 Units    Equipment being ordered: TENS with wire and electrode.    Chronic abdominal pain       oxyCODONE 5 MG/5ML solution   Start taking on:  2/9/2018    ROXICODONE    150 mL    Take 10 mLs (10 mg) by mouth every 8 hours as needed for pain maximum 30 mg per day    Chronic abdominal pain       pantoprazole 40 MG EC tablet    PROTONIX    30 tablet    Take 1 tablet (40 mg) by mouth 2 times daily (before meals)    Right upper quadrant abdominal pain, Erosive gastritis       polyethylene glycol Packet    MIRALAX/GLYCOLAX    7 packet    Take 17 g by mouth daily    Right upper quadrant abdominal pain       RIZATRIPTAN BENZOATE PO      Take 5 mg by mouth once as needed        scopolamine 72 hr patch    TRANSDERM    2 patch    Apply 1 patch to hairless area behind one ear if severe nausea and vomiting.  Remove old patch and change every 3 days (72 hours).    Intractable vomiting with nausea, unspecified vomiting type       senna-docusate 8.6-50 MG per tablet    SENOKOT-S;PERICOLACE    100 tablet    Take 1 tablet by mouth 2 times daily as needed for constipation    Right upper quadrant abdominal pain       sodium bicarbonate 325 MG tablet     200 tablet    1 tablet (325 mg) by Per Feeding Tube route 4 times daily    Abdominal pain, epigastric       * Notice:  This list has 4 medication(s) that are the same as other medications prescribed for you. Read the directions carefully, and ask your doctor or other care provider to review them with you.

## 2018-02-06 NOTE — PROGRESS NOTES
Nursing Note  Alexandra Melgoza [facility] for:   Chief Complaint   Patient presents with     Infusion     IVF, benadryl, zofran     During today's [facility] appointment, orders from Dr. Narayanan were completed.  Frequency: 3 times weekly as needed    Progress note:  Patient identification verified by name and date of birth.  Assessment completed.  Vitals recorded in Doc Flowsheets.  Patient was provided with education regarding infusion and possible side effects.  Patient verbalized understanding.      needed: No  Premedications: were not ordered.  Infusion Rates: Each liter of LR given over an hour  Approximate Infusion length:2 hours.   Labs: were not ordered for this appointment.  Vascular access: port accessed today.  Treatment Conditions: non-applicable.  Patient tolerated infusion: well.    Drug Waste Record    Drug Name: Benadryl   Dose: 25mg  Route administered: IV  NDC #: 0350-6993-10  Amount of waste(mL):0.5 ml  Reason for waste: Single use vial      Discharge Plan:   Follow up plan of care with: ongoing infusions at [facility].  Discharge instructions were reviewed with patient.  Patient/representative verbalized understanding of discharge instructions and all questions answered.  Patient discharged from [facility] in stable condition.    ROGER BARROS, SAMARA    Administrations This Visit     diphenhydrAMINE (BENADRYL) injection 25 mg     Admin Date Action Dose Route Administered By             02/06/2018 Given 25 mg Intravenous Daphnie Baum RN                    lactated ringers BOLUS 1,000-2,000 mL     Admin Date Action Dose Route Administered By             02/06/2018 New Bag 2000 mL Intravenous Daphnie Baum, RN                    ondansetron (ZOFRAN) injection 4 mg     Admin Date Action Dose Route Administered By             02/06/2018 Given 4 mg  Intravenous Daphnie Baum, RN              Admin Date Action Dose Route Administered By             02/06/2018 Given 4 mg Intravenous Daphnie Baum, RN                          /75  Pulse 95  Temp 98.1  F (36.7  C) (Oral)  Resp 16  SpO2 99%

## 2018-02-07 ENCOUNTER — INFUSION THERAPY VISIT (OUTPATIENT)
Dept: INFUSION THERAPY | Facility: CLINIC | Age: 23
End: 2018-02-07
Attending: INTERNAL MEDICINE
Payer: COMMERCIAL

## 2018-02-07 ENCOUNTER — ANESTHESIA EVENT (OUTPATIENT)
Dept: SURGERY | Facility: CLINIC | Age: 23
End: 2018-02-07
Payer: COMMERCIAL

## 2018-02-07 VITALS
HEART RATE: 97 BPM | DIASTOLIC BLOOD PRESSURE: 80 MMHG | SYSTOLIC BLOOD PRESSURE: 125 MMHG | TEMPERATURE: 99.3 F | RESPIRATION RATE: 16 BRPM

## 2018-02-07 DIAGNOSIS — K86.1 CHRONIC PANCREATITIS, UNSPECIFIED PANCREATITIS TYPE (H): ICD-10-CM

## 2018-02-07 DIAGNOSIS — G43.A0 CYCLICAL VOMITING WITH NAUSEA, INTRACTABILITY OF VOMITING NOT SPECIFIED: Primary | ICD-10-CM

## 2018-02-07 PROCEDURE — 96374 THER/PROPH/DIAG INJ IV PUSH: CPT

## 2018-02-07 PROCEDURE — 96361 HYDRATE IV INFUSION ADD-ON: CPT

## 2018-02-07 PROCEDURE — 25000128 H RX IP 250 OP 636: Mod: ZF | Performed by: NURSE PRACTITIONER

## 2018-02-07 PROCEDURE — 96376 TX/PRO/DX INJ SAME DRUG ADON: CPT

## 2018-02-07 PROCEDURE — 25000128 H RX IP 250 OP 636: Mod: ZF | Performed by: INTERNAL MEDICINE

## 2018-02-07 PROCEDURE — 96375 TX/PRO/DX INJ NEW DRUG ADDON: CPT

## 2018-02-07 RX ORDER — DIPHENHYDRAMINE HYDROCHLORIDE 50 MG/ML
25 INJECTION INTRAMUSCULAR; INTRAVENOUS ONCE
Status: COMPLETED | OUTPATIENT
Start: 2018-02-07 | End: 2018-02-07

## 2018-02-07 RX ORDER — ONDANSETRON 2 MG/ML
4 INJECTION INTRAMUSCULAR; INTRAVENOUS ONCE
Status: COMPLETED | OUTPATIENT
Start: 2018-02-07 | End: 2018-02-07

## 2018-02-07 RX ORDER — ONDANSETRON 2 MG/ML
4 INJECTION INTRAMUSCULAR; INTRAVENOUS ONCE
Status: CANCELLED
Start: 2018-02-07 | End: 2018-02-07

## 2018-02-07 RX ORDER — ONDANSETRON 2 MG/ML
4 INJECTION INTRAMUSCULAR; INTRAVENOUS DAILY PRN
Status: DISCONTINUED | OUTPATIENT
Start: 2018-02-07 | End: 2018-02-07 | Stop reason: HOSPADM

## 2018-02-07 RX ORDER — ONDANSETRON 2 MG/ML
4 INJECTION INTRAMUSCULAR; INTRAVENOUS DAILY PRN
Status: CANCELLED
Start: 2018-02-07

## 2018-02-07 RX ORDER — DIPHENHYDRAMINE HYDROCHLORIDE 50 MG/ML
25 INJECTION INTRAMUSCULAR; INTRAVENOUS ONCE
Status: CANCELLED
Start: 2018-02-07 | End: 2018-02-07

## 2018-02-07 RX ADMIN — DIPHENHYDRAMINE HYDROCHLORIDE 25 MG: 50 INJECTION INTRAMUSCULAR; INTRAVENOUS at 13:34

## 2018-02-07 RX ADMIN — ONDANSETRON 4 MG: 2 INJECTION INTRAMUSCULAR; INTRAVENOUS at 15:45

## 2018-02-07 RX ADMIN — SODIUM CHLORIDE, POTASSIUM CHLORIDE, SODIUM LACTATE AND CALCIUM CHLORIDE 2000 ML: 600; 310; 30; 20 INJECTION, SOLUTION INTRAVENOUS at 13:27

## 2018-02-07 RX ADMIN — ONDANSETRON 4 MG: 2 INJECTION INTRAMUSCULAR; INTRAVENOUS at 13:29

## 2018-02-07 NOTE — ANESTHESIA PREPROCEDURE EVALUATION
Anesthesia Evaluation     . Pt has had prior anesthetic.     History of anesthetic complications   - PONV        ROS/MED HX    ENT/Pulmonary: Comment: Mask Ventilation: Easy; Ease of Intubation: Easy; Airway Size: 7;  Cuffed;  Oral;  Blade Type: Ocampo;  Blade Size: 2;  Place by: Guanaco Rubin CRNA;  Insertion Attempts: 1;  Secured at (cm)to lip: 22 cm;  Breath Sounds: Equal, clear and bilateral;  End Tidal CO2: Present;  Dentition: Intact;  Grade View of Cords: 1      (+)asthma , . .    Neurologic:     (+)migraines,     Cardiovascular: Comment: P.O.T.S. Postural orthostatic tachycardia syndrome    (+) ----. : . . . :. . Previous cardiac testing Echodate:2013results:Interpretation Summary  Global and regional left ventricular function is normal with an EF of 55-60%.   Global right ventricular function is normal. Pulmonary artery systolic   pressure is normal. The inferior vena cava is normal. No pericardial   effusion is present.  PatientHeight: 67 in  PatientWeight: 143 lbs  SystolicPressure: 96 mmHg  DiastolicPressure: 55 mmHg  BSA 1.8 m^2      Procedure  Echocardiogram with two-dimensional, color and spectral Doppler performed.    Left Ventricle  Global and regional left ventricular function is normal with an EF of 55-60%.    Right Ventricle  The right ventricle is normal size.  Global right ventricular function is normal.    Atria  Both atria appear normal.  A prominent eustachian valve is noted.  A catheter is noted in the right atrium.    Mitral Valve  The mitral valve is normal.    Aortic Valve  Aortic valve is normal in structure and function.    Tricuspid Valve  The tricuspid valve is normal.  Trace tricuspid insufficiency is present.  The TR peak pressure gradient is 16 mmHg .  Pulmonary artery systolic pressure is normal.    Pulmonic Valve  TheInterpretation Summary  Global and regional left ventricular function is normal with an EF of 55-60%.   Global right ventricular function is normal. Pulmonary  artery systolic   pressure is normal. The inferior vena cava is normal. No pericardial   effusion is present.  PatientHeight: 67 in  PatientWeight: 143 lbs  SystolicPressure: 96 mmHg  DiastolicPressure: 55 mmHg  BSA 1.8 m^2      Procedure  Echocardiogram with two-dimensional, color and spectral Doppler performed.    Left Ventricle  Global and regional left ventricular function is normal with an EF of 55-60%.    Right Ventricle  The right ventricle is normal size.  Global right ventricular function is normal.    Atria  Both atria appear normal.  A prominent eustachian valve is noted.  A catheter is noted in the right atrium.    Mitral Valve  The mitral valve is normal.    Aortic Valve  Aortic valve is normal in structure and function.    Tricuspid Valve  The tricuspid valve is normal.  Trace tricuspid insufficiency is present.  The TR peak pressure gradient is 16 mmHg .  Pulmonary artery systolic pressure is normal.    Pulmonic Valve  The pulmonic valve is normal.  Trace pulmonic insufficiency is present.    Vessels  The inferior vena cava is normal.    Pericardium  No pericardial effusion is present.   pulmonic valve is normal.  Trace pulmonic insufficiency is present.    Vessels  The inferior vena cava is normal.    Pericardium  No pericardial effusion is present.  date: results:ECG reviewed date:1/2016 results:NSR date: results:          METS/Exercise Tolerance:     Hematologic:  - neg hematologic  ROS       Musculoskeletal:  - neg musculoskeletal ROS       GI/Hepatic:     (+) Other GI/Hepatic Chronic abdominal pain, cyclic N/V      Renal/Genitourinary:  - ROS Renal section negative       Endo:     (+) Other Endocrine Disorder endometriosis.      Psychiatric:     (+) psychiatric history other (comment) (somatoform disorder, pseudoseizures)      Infectious Disease:  - neg infectious disease ROS       Malignancy:      - no malignancy   Other:    (+) H/O Chronic Pain,                                  Anesthesia  Plan      History & Physical Review  History and physical reviewed and following examination; no interval change.    ASA Status:  2 .        Plan for General and ETT with Intravenous and Propofol induction. Maintenance will be TIVA.    PONV prophylaxis:  Ondansetron (or other 5HT-3) and Dexamethasone or Solumedrol       Postoperative Care      Consents                          .

## 2018-02-07 NOTE — PATIENT INSTRUCTIONS
Dear Alexandra Melgoza    Thank you for choosing [facility] ([facility]) for your infusion.  The following information is a summary of our appointment as well as important reminders.          We look forward in seeing you on your next appointment here at [facility].  Please don t hesitate to call us at [phone] to reschedule any of your appointments or to speak with one of the [facility] registered nurses.  It was a pleasure taking care of you today.    Sincerely,    [facility]  Specialty Infusion & Procedure Center  [address] [place] [address]  [address]  Phone:  [phone]

## 2018-02-07 NOTE — MR AVS SNAPSHOT
After Visit Summary   2/7/2018    Alexandra Melgoza    MRN: [id number]           Patient Information     Date Of Birth          1995        Visit Information        Provider Department      2/7/2018 1:00 PM UC 48 ATC; UC SPEC INFUSION [facility] Specialty [facility] Procedure        Today's Diagnoses     Cyclical vomiting with nausea, intractability of vomiting not specified    -  1    Chronic pancreatitis, unspecified pancreatitis type (H)          Care Instructions    Dear Alexandra Melgoza    Thank you for choosing [facility] ([facility]) for your infusion.  The following information is a summary of our appointment as well as important reminders.          We look forward in seeing you on your next appointment here at [facility].  Please don t hesitate to call us at [phone] to reschedule any of your appointments or to speak with one of the [facility] registered nurses.  It was a pleasure taking care of you today.    Sincerely,    [facility]  Specialty Infusion [facility] Procedure Center  [address] [place] [address]  [address]  Phone:  [phone]            Follow-ups after your visit        Your next 10 appointments already scheduled     Feb 08, 2018   Procedure with Campbell Narayanan MD   [facility] [place], Same Day Surgery (--)    [address] [id number] [phone]            Feb 12, 2018  1:00 PM CST   Infusion 120 with UC SPEC INFUSION, UC 45 ATC   [facility] Specialty and Procedure ([facility] [place])    [address]  [address]  [place] [facility] [address] [phone]            Feb 13, 2018  8:00 AM CST   (Arrive by 7:45 AM)   Return Visit with Leonardo Wang MD   [facility] ([facility] [place])    [address]  4th [address]  [place] [facility] [place] [address] [phone]             Feb 14, 2018 12:00 PM CST   (Arrive by 11:45 AM)   Return Visit with Campbell Narayanan MD   [facility]iliary ([facility])    [address]  4th Floor  [place] [address] [phone]            Feb 14, 2018  1:00 PM CST   Infusion 120 with UC SPEC INFUSION, UC 42 ATC   [facility] Specialty and Procedure ([facility])    [address]  [address]  [place] [id number] [phone]            Feb 16, 2018  3:00 PM CST   Infusion 120 with UC SPEC INFUSION, UC 41 ATC   [facility] Specialty and Procedure ([facility])    [address]  [address]  [place] [address] [phone]            Feb 19, 2018 12:00 PM CST   Infusion 120 with UC SPEC INFUSION   [facility] Specialty and Procedure ([facility])    [address]  [address]  [place] [address] [phone]              Who to contact     If you have questions or need follow up information about today's clinic visit or your schedule please contact [facility] SPECIALTY AND PROCEDURE directly at [phone].  Normal or non-critical lab and imaging results will be communicated to you by Studiohart, letter or phone within 4 business days after the clinic has received the results. If you do not hear from us within 7 days, please contact the clinic through Studiohart or phone. If you have a critical or abnormal lab result, we will notify you by phone as soon as possible.  Submit refill requests through 8Trip or call your pharmacy and they will forward the refill request to us. Please allow 3 business days for your refill to be completed.          Additional Information About Your Visit        8Trip Information     8Trip gives you secure access to your electronic health record. If you see a primary  care provider, you can also send messages to your care team and make appointments. If you have questions, please call your primary care clinic.  If you do not have a primary care provider, please call [phone] and they will assist you.        Care EveryWhere ID     This is your Care EveryWhere ID. This could be used by other organizations to access your Counce medical records  [id number]        Your Vitals Were     Pulse Temperature Respirations             97 99.3  F (37.4  C) (Oral) 16          Blood Pressure from Last 3 Encounters:   02/07/18 125/80   02/06/18 113/73   02/05/18 123/86    Weight from Last 3 Encounters:   02/05/18 67.1 kg (148 lb)   01/31/18 67.6 kg (149 lb)   01/24/18 67.6 kg (149 lb)              Today, you had the following     No orders found for display       Primary Care Provider Office Phone # Fax #    Quyen Baez [phone]        [address] [address]        Equal Access to Services     [facility]: Hadii angelina kelley hadasho Somalini, waaxda luqadaha, qaybta kaalmada adeegmiles, maki gaspar . So [facility] [phone].    ATENCIÓN: Si habla español, tiene a quijano disposición servicios gratuitos de asistencia lingüística. Llame al [phone].    We comply with applicable federal civil rights laws and [place] laws. We do not discriminate on the basis of race, color, national origin, age, disability, sex, sexual orientation, or gender identity.            Thank you!     Thank you for choosing [facility] SPECIALTY AND PROCEDURE  for your care. Our goal is always to provide you with excellent care. Hearing back from our patients is one way we can continue to improve our services. Please take a few minutes to complete the written survey that you may receive in the mail after your visit with us. Thank you!             Your Updated Medication List - Protect others around you: Learn how to safely  use, store and throw away your medicines at www.disposemymeds.org.          This list is accurate as of 2/7/18  3:56 PM.  Always use your most recent med list.                   Brand Name Dispense Instructions for use Diagnosis    * amylase-lipase-protease 34476 UNITS Cpep    ZENPEP    180 capsule    Take 2-3 capsules (40,000-60,000 Units) by mouth Take with snacks or supplements (Snacks)    Idiopathic chronic pancreatitis (H)       * amylase-lipase-protease 45223 UNITS Cpep    ZENPEP    180 capsule    Take 5 capsules (100,000 Units) by mouth 4 times daily (with meals and nightly)    Right upper quadrant abdominal pain       cephalexin 250 MG/5ML suspension    KEFLEX    201.6 mL    Take 16.8 mLs (839 mg) by mouth 4 times daily for 3 days        fluticasone 50 MCG/ACT spray    FLONASE    16 g    Spray 2 sprays into both nostrils daily    Nasal congestion       * gabapentin 250 MG/5ML solution    NEURONTIN    470 mL    8 mLs (400 mg) by ORAL OR NJ TUBE route 2 times daily    Abdominal pain, epigastric       * gabapentin 250 MG/5ML solution    NEURONTIN    450 mL    Take 12 mLs (600 mg) by mouth daily    Abdominal pain, epigastric       leuprolide 11.25 MG kit    LUPRON DEPOT (3-MONTH)    1 each    Inject 11.25 mg into the muscle every 3 months    Endometriosis       levalbuterol 45 MCG/ACT Inhaler    XOPENEX HFA    1 Inhaler    Inhale 2 puffs into the lungs every 6 hours as needed for shortness of breath / dyspnea    Wheeze       menthol 5 % Ptch    ICY HOT    15 patch    Apply 1 patch topically every 8 hours as needed for muscle soreness    Abdominal pain, epigastric       multivitamins with minerals Liqd liquid     1 Bottle    15 mLs by Per Feeding Tube route daily    Abdominal pain, epigastric       norethindrone 5 MG tablet    AYGESTIN    90 tablet    Take 1 tablet (5 mg) by mouth daily    Endometriosis       nortriptyline 10 MG capsule    PAMELOR    120 capsule    Take 3 capsules (30 mg) by mouth At Bedtime     Right upper quadrant abdominal pain       ondansetron 4 MG ODT tab    ZOFRAN ODT    40 tablet    Take 1 tablet (4 mg) by mouth every 8 hours as needed for nausea or vomiting        order for DME     1 Units    Equipment being ordered: TENS with wire and electrode.    Chronic abdominal pain       oxyCODONE 5 MG/5ML solution   Start taking on:  2/9/2018    ROXICODONE    150 mL    Take 10 mLs (10 mg) by mouth every 8 hours as needed for pain maximum 30 mg per day    Chronic abdominal pain       pantoprazole 40 MG EC tablet    PROTONIX    30 tablet    Take 1 tablet (40 mg) by mouth 2 times daily (before meals)    Right upper quadrant abdominal pain, Erosive gastritis       polyethylene glycol Packet    MIRALAX/GLYCOLAX    7 packet    Take 17 g by mouth daily    Right upper quadrant abdominal pain       RIZATRIPTAN BENZOATE PO      Take 5 mg by mouth once as needed        scopolamine 72 hr patch    TRANSDERM    2 patch    Apply 1 patch to hairless area behind one ear if severe nausea and vomiting.  Remove old patch and change every 3 days (72 hours).    Intractable vomiting with nausea, unspecified vomiting type       senna-docusate 8.6-50 MG per tablet    SENOKOT-S;PERICOLACE    100 tablet    Take 1 tablet by mouth 2 times daily as needed for constipation    Right upper quadrant abdominal pain       sodium bicarbonate 325 MG tablet     200 tablet    1 tablet (325 mg) by Per Feeding Tube route 4 times daily    Abdominal pain, epigastric       * Notice:  This list has 4 medication(s) that are the same as other medications prescribed for you. Read the directions carefully, and ask your doctor or other care provider to review them with you.

## 2018-02-07 NOTE — PROGRESS NOTES
Nursing Note  Alexandra Melgoza [facility] for:   Chief Complaint   Patient presents with     Infusion     IV fluids, Benadryl and Zofran      During today's [facility] appointment, orders from Dr. Narayanan were completed.  Frequency: 3 times weekly as needed.    Progress note:  Patient identification verified by name and date of birth.  Assessment completed.  Vitals recorded in Doc Flowsheets.  Patient was provided with education regarding infusion and possible side effects.  Patient verbalized understanding.      needed: No  Premedications: were not ordered.  Infusion Rates: Each liter of LR infused over 1 hour.   Approximate Infusion length:2 hours.   Labs: were not ordered for this appointment.  Vascular access: port accessed today.  Treatment Conditions: non-applicable.  Patient tolerated infusion: well.    Drug Waste Record    Drug Name: Benadryl  Dose: 25 mg  Route administered: IV  NDC #: 4685-8281-61  Amount of waste(mL):0.5 ml  Reason for waste: Single use vial      Discharge Plan:   Follow up plan of care with: ongoing infusions at [facility].  Discharge instructions were reviewed with patient.  Patient/representative verbalized understanding of discharge instructions and all questions answered.  Patient discharged from [facility] in stable condition.    Stacey Posada RN    Administrations This Visit     diphenhydrAMINE (BENADRYL) injection 25 mg     Admin Date Action Dose Route Administered By             02/07/2018 Given 25 mg Intravenous Stacey Posada RN                    lactated ringers BOLUS 1,000-2,000 mL     Admin Date Action Dose Route Administered By             02/07/2018 New Bag 2000 mL Intravenous Stacey Posada RN                    ondansetron (ZOFRAN) injection 4 mg     Admin Date Action Dose Route Administered By             02/07/2018  Given 4 mg Intravenous Stacey Posada RN              Admin Date Action Dose Route Administered By             02/07/2018 Given 4 mg Intravenous Stacey Posada RN                          /80  Pulse 100  Temp 99.3  F (37.4  C) (Oral)  Resp 16

## 2018-02-08 ENCOUNTER — SURGERY (OUTPATIENT)
Age: 23
End: 2018-02-08

## 2018-02-08 ENCOUNTER — ANESTHESIA (OUTPATIENT)
Dept: SURGERY | Facility: CLINIC | Age: 23
End: 2018-02-08
Payer: COMMERCIAL

## 2018-02-08 ENCOUNTER — APPOINTMENT (OUTPATIENT)
Dept: GENERAL RADIOLOGY | Facility: CLINIC | Age: 23
End: 2018-02-08
Attending: INTERNAL MEDICINE
Payer: COMMERCIAL

## 2018-02-08 ENCOUNTER — HOSPITAL ENCOUNTER (OUTPATIENT)
Facility: CLINIC | Age: 23
Setting detail: OBSERVATION
LOS: 2 days | Discharge: HOME | End: 2018-02-10
Attending: INTERNAL MEDICINE | Admitting: INTERNAL MEDICINE
Payer: COMMERCIAL

## 2018-02-08 DIAGNOSIS — K83.4 SPHINCTER OF ODDI DYSFUNCTION: Primary | ICD-10-CM

## 2018-02-08 DIAGNOSIS — R10.9 ACUTE ABDOMINAL PAIN: ICD-10-CM

## 2018-02-08 LAB
BUN SERPL-MCNC: 5 MG/DL (ref 7–30)
ERYTHROCYTE [DISTWIDTH] IN BLOOD BY AUTOMATED COUNT: 13 % (ref 10–15)
GLUCOSE BLDC GLUCOMTR-MCNC: 85 MG/DL (ref 70–99)
GLUCOSE BLDC GLUCOMTR-MCNC: 87 MG/DL (ref 70–99)
HCG UR QL: NEGATIVE
HCT VFR BLD AUTO: 33.6 % (ref 35–47)
HGB BLD-MCNC: 11 G/DL (ref 11.7–15.7)
INR PPP: 0.98 (ref 0.86–1.14)
MCH RBC QN AUTO: 30.6 PG (ref 26.5–33)
MCHC RBC AUTO-ENTMCNC: 32.7 G/DL (ref 31.5–36.5)
MCV RBC AUTO: 93 FL (ref 78–100)
PLATELET # BLD AUTO: 213 10E9/L (ref 150–450)
RBC # BLD AUTO: 3.6 10E12/L (ref 3.8–5.2)
WBC # BLD AUTO: 4.4 10E9/L (ref 4–11)

## 2018-02-08 PROCEDURE — 25000125 ZZHC RX 250: Performed by: NURSE ANESTHETIST, CERTIFIED REGISTERED

## 2018-02-08 PROCEDURE — 85027 COMPLETE CBC AUTOMATED: CPT | Performed by: INTERNAL MEDICINE

## 2018-02-08 PROCEDURE — 27210794 ZZH OR GENERAL SUPPLY STERILE: Performed by: INTERNAL MEDICINE

## 2018-02-08 PROCEDURE — 25000128 H RX IP 250 OP 636: Performed by: STUDENT IN AN ORGANIZED HEALTH CARE EDUCATION/TRAINING PROGRAM

## 2018-02-08 PROCEDURE — 25000132 ZZH RX MED GY IP 250 OP 250 PS 637: Performed by: INTERNAL MEDICINE

## 2018-02-08 PROCEDURE — 37000008 ZZH ANESTHESIA TECHNICAL FEE, 1ST 30 MIN: Performed by: INTERNAL MEDICINE

## 2018-02-08 PROCEDURE — C1894 INTRO/SHEATH, NON-LASER: HCPCS | Performed by: INTERNAL MEDICINE

## 2018-02-08 PROCEDURE — 36000053 ZZH SURGERY LEVEL 2 EA 15 ADDTL MIN - UMMC: Performed by: INTERNAL MEDICINE

## 2018-02-08 PROCEDURE — 96376 TX/PRO/DX INJ SAME DRUG ADON: CPT

## 2018-02-08 PROCEDURE — 71000014 ZZH RECOVERY PHASE 1 LEVEL 2 FIRST HR: Performed by: INTERNAL MEDICINE

## 2018-02-08 PROCEDURE — 81025 URINE PREGNANCY TEST: CPT | Performed by: ANESTHESIOLOGY

## 2018-02-08 PROCEDURE — 71000015 ZZH RECOVERY PHASE 1 LEVEL 2 EA ADDTL HR: Performed by: INTERNAL MEDICINE

## 2018-02-08 PROCEDURE — C1769 GUIDE WIRE: HCPCS | Performed by: INTERNAL MEDICINE

## 2018-02-08 PROCEDURE — 25000125 ZZHC RX 250: Performed by: INTERNAL MEDICINE

## 2018-02-08 PROCEDURE — 25000125 ZZHC RX 250: Performed by: STUDENT IN AN ORGANIZED HEALTH CARE EDUCATION/TRAINING PROGRAM

## 2018-02-08 PROCEDURE — 36000055 ZZH SURGERY LEVEL 2 W FLUORO 1ST 30 MIN - UMMC: Performed by: INTERNAL MEDICINE

## 2018-02-08 PROCEDURE — 25000128 H RX IP 250 OP 636: Performed by: ANESTHESIOLOGY

## 2018-02-08 PROCEDURE — C9399 UNCLASSIFIED DRUGS OR BIOLOG: HCPCS | Performed by: STUDENT IN AN ORGANIZED HEALTH CARE EDUCATION/TRAINING PROGRAM

## 2018-02-08 PROCEDURE — 96374 THER/PROPH/DIAG INJ IV PUSH: CPT

## 2018-02-08 PROCEDURE — 25000125 ZZHC RX 250: Performed by: ANESTHESIOLOGY

## 2018-02-08 PROCEDURE — 25000128 H RX IP 250 OP 636: Performed by: INTERNAL MEDICINE

## 2018-02-08 PROCEDURE — 25000128 H RX IP 250 OP 636: Performed by: NURSE ANESTHETIST, CERTIFIED REGISTERED

## 2018-02-08 PROCEDURE — 99220 ZZC INITIAL OBSERVATION CARE,LEVL III: CPT | Mod: AI | Performed by: INTERNAL MEDICINE

## 2018-02-08 PROCEDURE — 40000170 ZZH STATISTIC PRE-PROCEDURE ASSESSMENT II: Performed by: INTERNAL MEDICINE

## 2018-02-08 PROCEDURE — 27210995 ZZH RX 272: Performed by: INTERNAL MEDICINE

## 2018-02-08 PROCEDURE — 96375 TX/PRO/DX INJ NEW DRUG ADDON: CPT

## 2018-02-08 PROCEDURE — 84520 ASSAY OF UREA NITROGEN: CPT | Performed by: INTERNAL MEDICINE

## 2018-02-08 PROCEDURE — 00000146 ZZHCL STATISTIC GLUCOSE BY METER IP

## 2018-02-08 PROCEDURE — 85610 PROTHROMBIN TIME: CPT | Performed by: INTERNAL MEDICINE

## 2018-02-08 PROCEDURE — G0378 HOSPITAL OBSERVATION PER HR: HCPCS

## 2018-02-08 PROCEDURE — 37000009 ZZH ANESTHESIA TECHNICAL FEE, EACH ADDTL 15 MIN: Performed by: INTERNAL MEDICINE

## 2018-02-08 PROCEDURE — C9290 INJ, BUPIVACAINE LIPOSOME: HCPCS | Performed by: ANESTHESIOLOGY

## 2018-02-08 PROCEDURE — 40000277 XR SURGERY CARM FLUORO LESS THAN 5 MIN W STILLS: Mod: TC

## 2018-02-08 PROCEDURE — 25000132 ZZH RX MED GY IP 250 OP 250 PS 637: Performed by: STUDENT IN AN ORGANIZED HEALTH CARE EDUCATION/TRAINING PROGRAM

## 2018-02-08 RX ORDER — ONDANSETRON 2 MG/ML
4 INJECTION INTRAMUSCULAR; INTRAVENOUS EVERY 6 HOURS PRN
Status: DISCONTINUED | OUTPATIENT
Start: 2018-02-08 | End: 2018-02-10 | Stop reason: HOSPADM

## 2018-02-08 RX ORDER — DIPHENHYDRAMINE HYDROCHLORIDE 50 MG/ML
25 INJECTION INTRAMUSCULAR; INTRAVENOUS EVERY 6 HOURS PRN
Status: DISCONTINUED | OUTPATIENT
Start: 2018-02-08 | End: 2018-02-09

## 2018-02-08 RX ORDER — GABAPENTIN 300 MG/1
300 CAPSULE ORAL 3 TIMES DAILY
Status: DISCONTINUED | OUTPATIENT
Start: 2018-02-08 | End: 2018-02-09

## 2018-02-08 RX ORDER — FENTANYL CITRATE 50 UG/ML
25-50 INJECTION, SOLUTION INTRAMUSCULAR; INTRAVENOUS
Status: DISCONTINUED | OUTPATIENT
Start: 2018-02-08 | End: 2018-02-08

## 2018-02-08 RX ORDER — FENTANYL CITRATE 50 UG/ML
INJECTION, SOLUTION INTRAMUSCULAR; INTRAVENOUS PRN
Status: DISCONTINUED | OUTPATIENT
Start: 2018-02-08 | End: 2018-02-08

## 2018-02-08 RX ORDER — LEVALBUTEROL TARTRATE 45 UG/1
2 AEROSOL, METERED ORAL EVERY 6 HOURS PRN
Status: DISCONTINUED | OUTPATIENT
Start: 2018-02-08 | End: 2018-02-10 | Stop reason: HOSPADM

## 2018-02-08 RX ORDER — NALOXONE HYDROCHLORIDE 0.4 MG/ML
.1-.4 INJECTION, SOLUTION INTRAMUSCULAR; INTRAVENOUS; SUBCUTANEOUS
Status: DISCONTINUED | OUTPATIENT
Start: 2018-02-08 | End: 2018-02-08 | Stop reason: HOSPADM

## 2018-02-08 RX ORDER — FLUMAZENIL 0.1 MG/ML
0.2 INJECTION, SOLUTION INTRAVENOUS
Status: DISCONTINUED | OUTPATIENT
Start: 2018-02-08 | End: 2018-02-08 | Stop reason: HOSPADM

## 2018-02-08 RX ORDER — SODIUM CHLORIDE, SODIUM LACTATE, POTASSIUM CHLORIDE, CALCIUM CHLORIDE 600; 310; 30; 20 MG/100ML; MG/100ML; MG/100ML; MG/100ML
INJECTION, SOLUTION INTRAVENOUS CONTINUOUS PRN
Status: DISCONTINUED | OUTPATIENT
Start: 2018-02-08 | End: 2018-02-08

## 2018-02-08 RX ORDER — PROPOFOL 10 MG/ML
INJECTION, EMULSION INTRAVENOUS CONTINUOUS PRN
Status: DISCONTINUED | OUTPATIENT
Start: 2018-02-08 | End: 2018-02-08

## 2018-02-08 RX ORDER — ACETAMINOPHEN 10 MG/ML
1000 INJECTION, SOLUTION INTRAVENOUS ONCE
Status: COMPLETED | OUTPATIENT
Start: 2018-02-08 | End: 2018-02-08

## 2018-02-08 RX ORDER — NALOXONE HYDROCHLORIDE 0.4 MG/ML
.1-.4 INJECTION, SOLUTION INTRAMUSCULAR; INTRAVENOUS; SUBCUTANEOUS
Status: DISCONTINUED | OUTPATIENT
Start: 2018-02-08 | End: 2018-02-10 | Stop reason: HOSPADM

## 2018-02-08 RX ORDER — BUPIVACAINE HYDROCHLORIDE 2.5 MG/ML
INJECTION, SOLUTION EPIDURAL; INFILTRATION; INTRACAUDAL PRN
Status: DISCONTINUED | OUTPATIENT
Start: 2018-02-08 | End: 2018-02-08

## 2018-02-08 RX ORDER — ACETAMINOPHEN 325 MG/1
975 TABLET ORAL EVERY 6 HOURS
Status: DISCONTINUED | OUTPATIENT
Start: 2018-02-08 | End: 2018-02-10 | Stop reason: HOSPADM

## 2018-02-08 RX ORDER — AMOXICILLIN 250 MG
1 CAPSULE ORAL 2 TIMES DAILY PRN
Status: DISCONTINUED | OUTPATIENT
Start: 2018-02-08 | End: 2018-02-10 | Stop reason: HOSPADM

## 2018-02-08 RX ORDER — DIMENHYDRINATE 50 MG/ML
25 INJECTION, SOLUTION INTRAMUSCULAR; INTRAVENOUS
Status: DISCONTINUED | OUTPATIENT
Start: 2018-02-08 | End: 2018-02-08

## 2018-02-08 RX ORDER — ONDANSETRON 2 MG/ML
4 INJECTION INTRAMUSCULAR; INTRAVENOUS
Status: COMPLETED | OUTPATIENT
Start: 2018-02-08 | End: 2018-02-08

## 2018-02-08 RX ORDER — FLUTICASONE PROPIONATE 50 MCG
2 SPRAY, SUSPENSION (ML) NASAL DAILY PRN
Status: DISCONTINUED | OUTPATIENT
Start: 2018-02-08 | End: 2018-02-10 | Stop reason: HOSPADM

## 2018-02-08 RX ORDER — OXYCODONE HYDROCHLORIDE 100 MG/5ML
10 SOLUTION ORAL EVERY 4 HOURS PRN
Status: DISCONTINUED | OUTPATIENT
Start: 2018-02-08 | End: 2018-02-09

## 2018-02-08 RX ORDER — POLYETHYLENE GLYCOL 3350 17 G/17G
17 POWDER, FOR SOLUTION ORAL DAILY
Status: DISCONTINUED | OUTPATIENT
Start: 2018-02-08 | End: 2018-02-10 | Stop reason: HOSPADM

## 2018-02-08 RX ORDER — CEFAZOLIN SODIUM 1 G/3ML
INJECTION, POWDER, FOR SOLUTION INTRAMUSCULAR; INTRAVENOUS PRN
Status: DISCONTINUED | OUTPATIENT
Start: 2018-02-08 | End: 2018-02-08

## 2018-02-08 RX ORDER — KETOROLAC TROMETHAMINE 30 MG/ML
INJECTION, SOLUTION INTRAMUSCULAR; INTRAVENOUS PRN
Status: DISCONTINUED | OUTPATIENT
Start: 2018-02-08 | End: 2018-02-08

## 2018-02-08 RX ORDER — DEXAMETHASONE SODIUM PHOSPHATE 10 MG/ML
INJECTION, SOLUTION INTRAMUSCULAR; INTRAVENOUS PRN
Status: DISCONTINUED | OUTPATIENT
Start: 2018-02-08 | End: 2018-02-08

## 2018-02-08 RX ORDER — NORETHINDRONE ACETATE 5 MG
5 TABLET ORAL DAILY
Status: DISCONTINUED | OUTPATIENT
Start: 2018-02-08 | End: 2018-02-10 | Stop reason: HOSPADM

## 2018-02-08 RX ORDER — ONDANSETRON 2 MG/ML
INJECTION INTRAMUSCULAR; INTRAVENOUS PRN
Status: DISCONTINUED | OUTPATIENT
Start: 2018-02-08 | End: 2018-02-08

## 2018-02-08 RX ORDER — SCOLOPAMINE TRANSDERMAL SYSTEM 1 MG/1
1 PATCH, EXTENDED RELEASE TRANSDERMAL
Status: DISCONTINUED | OUTPATIENT
Start: 2018-02-08 | End: 2018-02-08 | Stop reason: HOSPADM

## 2018-02-08 RX ORDER — DIPHENHYDRAMINE HYDROCHLORIDE 50 MG/ML
12.5 INJECTION INTRAMUSCULAR; INTRAVENOUS
Status: COMPLETED | OUTPATIENT
Start: 2018-02-08 | End: 2018-02-08

## 2018-02-08 RX ORDER — LIDOCAINE 40 MG/G
CREAM TOPICAL
Status: DISCONTINUED | OUTPATIENT
Start: 2018-02-08 | End: 2018-02-08 | Stop reason: HOSPADM

## 2018-02-08 RX ORDER — LIDOCAINE 40 MG/G
CREAM TOPICAL
Status: DISCONTINUED | OUTPATIENT
Start: 2018-02-08 | End: 2018-02-10 | Stop reason: HOSPADM

## 2018-02-08 RX ORDER — SODIUM CHLORIDE, SODIUM LACTATE, POTASSIUM CHLORIDE, CALCIUM CHLORIDE 600; 310; 30; 20 MG/100ML; MG/100ML; MG/100ML; MG/100ML
INJECTION, SOLUTION INTRAVENOUS CONTINUOUS
Status: ACTIVE | OUTPATIENT
Start: 2018-02-08 | End: 2018-02-09

## 2018-02-08 RX ORDER — LANOLIN ALCOHOL/MO/W.PET/CERES
3 CREAM (GRAM) TOPICAL
Status: DISCONTINUED | OUTPATIENT
Start: 2018-02-08 | End: 2018-02-10 | Stop reason: HOSPADM

## 2018-02-08 RX ORDER — LIDOCAINE 4 G/G
1 PATCH TOPICAL
Status: DISCONTINUED | OUTPATIENT
Start: 2018-02-08 | End: 2018-02-09

## 2018-02-08 RX ORDER — FENTANYL CITRATE 50 UG/ML
25-50 INJECTION, SOLUTION INTRAMUSCULAR; INTRAVENOUS
Status: DISCONTINUED | OUTPATIENT
Start: 2018-02-08 | End: 2018-02-08 | Stop reason: HOSPADM

## 2018-02-08 RX ORDER — SODIUM CHLORIDE, SODIUM LACTATE, POTASSIUM CHLORIDE, CALCIUM CHLORIDE 600; 310; 30; 20 MG/100ML; MG/100ML; MG/100ML; MG/100ML
INJECTION, SOLUTION INTRAVENOUS CONTINUOUS
Status: DISCONTINUED | OUTPATIENT
Start: 2018-02-08 | End: 2018-02-08 | Stop reason: HOSPADM

## 2018-02-08 RX ORDER — PROPOFOL 10 MG/ML
INJECTION, EMULSION INTRAVENOUS PRN
Status: DISCONTINUED | OUTPATIENT
Start: 2018-02-08 | End: 2018-02-08

## 2018-02-08 RX ORDER — NORTRIPTYLINE HCL 10 MG
30 CAPSULE ORAL AT BEDTIME
Status: DISCONTINUED | OUTPATIENT
Start: 2018-02-08 | End: 2018-02-10 | Stop reason: HOSPADM

## 2018-02-08 RX ORDER — IOPAMIDOL 755 MG/ML
INJECTION, SOLUTION INTRAVASCULAR PRN
Status: DISCONTINUED | OUTPATIENT
Start: 2018-02-08 | End: 2018-02-08 | Stop reason: HOSPADM

## 2018-02-08 RX ORDER — ONDANSETRON 4 MG/1
4 TABLET, ORALLY DISINTEGRATING ORAL EVERY 6 HOURS PRN
Status: DISCONTINUED | OUTPATIENT
Start: 2018-02-08 | End: 2018-02-10 | Stop reason: HOSPADM

## 2018-02-08 RX ORDER — PANTOPRAZOLE SODIUM 40 MG/1
40 TABLET, DELAYED RELEASE ORAL
Status: DISCONTINUED | OUTPATIENT
Start: 2018-02-08 | End: 2018-02-08

## 2018-02-08 RX ORDER — DIPHENHYDRAMINE HYDROCHLORIDE 50 MG/ML
INJECTION INTRAMUSCULAR; INTRAVENOUS PRN
Status: DISCONTINUED | OUTPATIENT
Start: 2018-02-08 | End: 2018-02-08

## 2018-02-08 RX ORDER — OXYCODONE HYDROCHLORIDE 10 MG/1
10 TABLET ORAL EVERY 4 HOURS PRN
Status: DISCONTINUED | OUTPATIENT
Start: 2018-02-08 | End: 2018-02-08

## 2018-02-08 RX ADMIN — PROPOFOL 200 MCG/KG/MIN: 10 INJECTION, EMULSION INTRAVENOUS at 12:33

## 2018-02-08 RX ADMIN — HYDROMORPHONE HYDROCHLORIDE 0.5 MG: 1 INJECTION, SOLUTION INTRAMUSCULAR; INTRAVENOUS; SUBCUTANEOUS at 15:19

## 2018-02-08 RX ADMIN — GABAPENTIN 300 MG: 300 CAPSULE ORAL at 20:18

## 2018-02-08 RX ADMIN — Medication 100 MG: at 12:30

## 2018-02-08 RX ADMIN — ACETAMINOPHEN 1000 MG: 10 INJECTION, SOLUTION INTRAVENOUS at 14:32

## 2018-02-08 RX ADMIN — ONDANSETRON 4 MG: 2 INJECTION INTRAMUSCULAR; INTRAVENOUS at 19:33

## 2018-02-08 RX ADMIN — ACETAMINOPHEN 975 MG: 325 TABLET, FILM COATED ORAL at 20:18

## 2018-02-08 RX ADMIN — DIPHENHYDRAMINE HYDROCHLORIDE 12.5 MG: 50 INJECTION, SOLUTION INTRAMUSCULAR; INTRAVENOUS at 14:49

## 2018-02-08 RX ADMIN — FENTANYL CITRATE 25 MCG: 50 INJECTION, SOLUTION INTRAMUSCULAR; INTRAVENOUS at 14:01

## 2018-02-08 RX ADMIN — KETOROLAC TROMETHAMINE 30 MG: 30 INJECTION, SOLUTION INTRAMUSCULAR at 13:34

## 2018-02-08 RX ADMIN — BUPIVACAINE 20 ML: 13.3 INJECTION, SUSPENSION, LIPOSOMAL INFILTRATION at 12:47

## 2018-02-08 RX ADMIN — ONDANSETRON 4 MG: 2 INJECTION INTRAMUSCULAR; INTRAVENOUS at 13:35

## 2018-02-08 RX ADMIN — FENTANYL CITRATE 50 MCG: 50 INJECTION, SOLUTION INTRAMUSCULAR; INTRAVENOUS at 13:13

## 2018-02-08 RX ADMIN — ONDANSETRON 4 MG: 2 INJECTION INTRAMUSCULAR; INTRAVENOUS at 12:38

## 2018-02-08 RX ADMIN — SIMETHICONE 133 MG: 63.3; 3.7 SOLUTION/ DROPS ORAL at 11:56

## 2018-02-08 RX ADMIN — NORTRIPTYLINE HYDROCHLORIDE 30 MG: 10 CAPSULE ORAL at 21:42

## 2018-02-08 RX ADMIN — HYDROMORPHONE HYDROCHLORIDE 0.5 MG: 1 INJECTION, SOLUTION INTRAMUSCULAR; INTRAVENOUS; SUBCUTANEOUS at 14:54

## 2018-02-08 RX ADMIN — HYDROMORPHONE HYDROCHLORIDE 0.2 MG: 1 INJECTION, SOLUTION INTRAMUSCULAR; INTRAVENOUS; SUBCUTANEOUS at 14:00

## 2018-02-08 RX ADMIN — DIPHENHYDRAMINE HYDROCHLORIDE 25 MG: 50 INJECTION, SOLUTION INTRAMUSCULAR; INTRAVENOUS at 13:22

## 2018-02-08 RX ADMIN — FENTANYL CITRATE 50 MCG: 50 INJECTION, SOLUTION INTRAMUSCULAR; INTRAVENOUS at 14:31

## 2018-02-08 RX ADMIN — HYDROMORPHONE HYDROCHLORIDE 0.5 MG: 1 INJECTION, SOLUTION INTRAMUSCULAR; INTRAVENOUS; SUBCUTANEOUS at 15:46

## 2018-02-08 RX ADMIN — WATER 100 ML: 100 IRRIGANT IRRIGATION at 11:56

## 2018-02-08 RX ADMIN — MIDAZOLAM 2 MG: 1 INJECTION INTRAMUSCULAR; INTRAVENOUS at 12:19

## 2018-02-08 RX ADMIN — OXYCODONE HYDROCHLORIDE 10 MG: 100 SOLUTION ORAL at 20:16

## 2018-02-08 RX ADMIN — ONDANSETRON 4 MG: 2 INJECTION INTRAMUSCULAR; INTRAVENOUS at 14:48

## 2018-02-08 RX ADMIN — DEXAMETHASONE SODIUM PHOSPHATE 4 MG: 10 INJECTION, SOLUTION INTRAMUSCULAR; INTRAVENOUS at 12:30

## 2018-02-08 RX ADMIN — PROPOFOL 130 MG: 10 INJECTION, EMULSION INTRAVENOUS at 12:30

## 2018-02-08 RX ADMIN — ROCURONIUM BROMIDE 10 MG: 10 INJECTION INTRAVENOUS at 13:01

## 2018-02-08 RX ADMIN — SCOLOPAMINE TRANSDERMAL SYSTEM 1 PATCH: 1 PATCH, EXTENDED RELEASE TRANSDERMAL at 12:14

## 2018-02-08 RX ADMIN — PANTOPRAZOLE SODIUM 40 MG: 40 TABLET, DELAYED RELEASE ORAL at 21:42

## 2018-02-08 RX ADMIN — HYDROMORPHONE HYDROCHLORIDE 0.2 MG: 1 INJECTION, SOLUTION INTRAMUSCULAR; INTRAVENOUS; SUBCUTANEOUS at 14:09

## 2018-02-08 RX ADMIN — HYDROMORPHONE HYDROCHLORIDE 0.3 MG: 1 INJECTION, SOLUTION INTRAMUSCULAR; INTRAVENOUS; SUBCUTANEOUS at 17:05

## 2018-02-08 RX ADMIN — HYDROMORPHONE HYDROCHLORIDE 1 MG: 1 INJECTION, SOLUTION INTRAMUSCULAR; INTRAVENOUS; SUBCUTANEOUS at 21:39

## 2018-02-08 RX ADMIN — FENTANYL CITRATE 50 MCG: 50 INJECTION, SOLUTION INTRAMUSCULAR; INTRAVENOUS at 14:20

## 2018-02-08 RX ADMIN — NORETHINDRONE ACETATE 5 MG: 5 TABLET ORAL at 21:42

## 2018-02-08 RX ADMIN — FENTANYL CITRATE 25 MCG: 50 INJECTION, SOLUTION INTRAMUSCULAR; INTRAVENOUS at 13:01

## 2018-02-08 RX ADMIN — SODIUM CHLORIDE, POTASSIUM CHLORIDE, SODIUM LACTATE AND CALCIUM CHLORIDE: 600; 310; 30; 20 INJECTION, SOLUTION INTRAVENOUS at 12:19

## 2018-02-08 RX ADMIN — CEFAZOLIN 2 G: 1 INJECTION, POWDER, FOR SOLUTION INTRAMUSCULAR; INTRAVENOUS at 12:38

## 2018-02-08 RX ADMIN — FENTANYL CITRATE 100 MCG: 50 INJECTION, SOLUTION INTRAMUSCULAR; INTRAVENOUS at 12:30

## 2018-02-08 RX ADMIN — BUPIVACAINE HYDROCHLORIDE 20 ML: 2.5 INJECTION, SOLUTION EPIDURAL; INFILTRATION; INTRACAUDAL at 12:47

## 2018-02-08 RX ADMIN — IOPAMIDOL 10 ML: 755 INJECTION, SOLUTION INTRAVENOUS at 11:55

## 2018-02-08 RX ADMIN — SUGAMMADEX 150 MG: 100 INJECTION, SOLUTION INTRAVENOUS at 13:36

## 2018-02-08 RX ADMIN — HYDROMORPHONE HYDROCHLORIDE 1 MG: 1 INJECTION, SOLUTION INTRAMUSCULAR; INTRAVENOUS; SUBCUTANEOUS at 18:34

## 2018-02-08 RX ADMIN — ROCURONIUM BROMIDE 20 MG: 10 INJECTION INTRAVENOUS at 12:38

## 2018-02-08 RX ADMIN — HYDROMORPHONE HYDROCHLORIDE 0.5 MG: 1 INJECTION, SOLUTION INTRAMUSCULAR; INTRAVENOUS; SUBCUTANEOUS at 15:05

## 2018-02-08 RX ADMIN — HYDROMORPHONE HYDROCHLORIDE 0.3 MG: 1 INJECTION, SOLUTION INTRAMUSCULAR; INTRAVENOUS; SUBCUTANEOUS at 16:32

## 2018-02-08 ASSESSMENT — PAIN DESCRIPTION - DESCRIPTORS
DESCRIPTORS: ACHING

## 2018-02-08 NOTE — ANESTHESIA CARE TRANSFER NOTE
Patient: Alexandra Melgoza    Procedure(s):  upper endoscopy with percutaneous gastrojejunostimy feeding tuble placement and gastropexy - Wound Class: I-Clean    Diagnosis: Tube Malfunction   Diagnosis Additional Information: No value filed.    Anesthesia Type:   General, ETT     Note:  Airway :Face Mask  Patient transferred to:PACU  Comments: Transferred to PACU, report given to RN.  BAKARI.  Some mild abdominal pain responsive to analgesia.  Handoff Report: Identifed the Patient, Identified the Reponsible Provider, Reviewed the pertinent medical history, Discussed the surgical course, Reviewed Intra-OP anesthesia mangement and issues during anesthesia, Set expectations for post-procedure period and Allowed opportunity for questions and acknowledgement of understanding      Vitals: (Last set prior to Anesthesia Care Transfer)    CRNA VITALS  2/8/2018 1324 - 2/8/2018 1412      2/8/2018             Pulse: 86    SpO2: 100 %    Resp Rate (observed): (!)  5                Electronically Signed By: Kamlesh Castillo MD  February 8, 2018  2:12 PM

## 2018-02-08 NOTE — OR NURSING
Dr. Mcrae at bedside in PACU to assess pt and discuss plan with pt and mom. Pt and mom satisfied with plan for pain control and discharge goals/plan. Pt transferred to 6D.

## 2018-02-08 NOTE — ANESTHESIA POSTPROCEDURE EVALUATION
Patient: Alexandra Melgoza    Procedure(s):  upper endoscopy with percutaneous gastrojejunostimy feeding tuble placement and gastropexy - Wound Class: I-Clean    Diagnosis:Tube Malfunction   Diagnosis Additional Information: No value filed.    Anesthesia Type:  General, ETT    Note:  Anesthesia Post Evaluation    Patient location during evaluation: PACU  Patient participation: Able to fully participate in evaluation  Level of consciousness: awake  Pain management: adequate  Airway patency: patent  Cardiovascular status: acceptable  Respiratory status: acceptable  Hydration status: acceptable  PONV: none     Anesthetic complications: None          Last vitals:  Vitals:    02/08/18 1400 02/08/18 1415 02/08/18 1430   BP: 125/84 129/63 124/88   Resp: 16 16 16   Temp: 36.6  C (97.8  F)     SpO2: 100% 100% 100%         Electronically Signed By: Wesley Villanueva MD  February 8, 2018  2:47 PM

## 2018-02-08 NOTE — ANESTHESIA PROCEDURE NOTES
Peripheral Nerve Block Procedure Note    Staff:     Anesthesiologist:  ENDY ANDERSON    Resident/CRNA:  CRISTHIAN BUTLER    Block performed by resident/CRNA in the presence of a teaching physician    Location: OR AFTER induction  Procedure Start/Stop TImes:      2/8/2018 12:45 PM     2/8/2018 12:50 PM    patient identified, IV checked, site marked, risks and benefits discussed, informed consent, monitors and equipment checked, pre-op evaluation, at physician/surgeon's request and post-op pain management      Correct Patient: Yes      Correct Position: Yes      Correct Site: Yes      Correct Procedure: Yes      Correct Laterality:  Yes    Site Marked:  Yes  Procedure details:     Procedure:  TAP  Assessment/Narrative:      Patient identified, IV checked, risks and benefits discussed, informed consent, monitors and equipment checked, pre-op evaluation, at physician/surgeon's request and post-op pain management      Correct Patient: Yes      Correct Position: Yes      Correct Site: Yes      Correct Procedure: Yes      Correct Laterality:  N/A    Site Marked:  N/A  Procedure details:     Procedure:  TAP    Diagnosis:  Post-op analgesia    Laterality:  Bilateral    Position:  Supine    Sterile Prep: chloraprep, mask and sterile gloves      Local skin infiltration:  None    Needle:  Pajunk    Needle gauge:  21    Needle length (mm):  110    Ultrasound: Yes      Ultrasound used to identify targeted nerve, plexus, or vascular structure and placed a needle adjacent to it      Permanent Image entered into patiient's record      Abnormal pain on injection: No      Blood Aspirated: No      Paresthesias:  No    Bleeding at site: No      Bolus via:  Needle    Infusion Method:  Single Shot    Complications:  None  Assessment/Narrative:      10mL 0.25% bupivacaine and 10mL Exparel diluted in 10mL NS injected into each site

## 2018-02-08 NOTE — IP AVS SNAPSHOT
Unit [address]D Observation [address] [address]    Phone:  [phone]    Fax:  [phone]                                       After Visit Summary   2/8/2018    Alexandra Melgoza    MRN: [id number]           After Visit Summary Signature Page     I have received my discharge instructions, and my questions have been answered. I have discussed any challenges I see with this plan with the nurse or doctor.    ..........................................................................................................................................  Patient/Patient Representative Signature      ..........................................................................................................................................  Patient Representative Print Name and Relationship to Patient    ..................................................               ................................................  Date                                            Time    ..........................................................................................................................................  Reviewed by Signature/Title    ...................................................              ..............................................  Date                                                            Time

## 2018-02-08 NOTE — BRIEF OP NOTE
[facility] [place]  Gastroenterology Brief Operative Note    Pre-operative diagnosis: Sphincter of oddi dysfunction   Post-operative diagnosis Same   Procedure: EGD with gastrojejunostomy tube placement   Surgeon: Campbell Narayanan MD   Assistants(s): Josias Chan MD   Anesthesia: General endotracheal anesthesia   Estimated blood loss: Minimal    Total IV fluids: (See anesthesia record)   Blood transfusion: No transfusion was given during surgery   Total urine output: (See anesthesia record)   Drains: None   Specimens: None   Implants: 18 Fr x 45 cm GJ tube   Findings: Diagnostic EGD performed and normal. Appropriate site identified for gastrostomy in the antrum endoscopically and fluoroscopically. Gastrostomy created using standard pull technique. Three quadrant sutured gastropexy performed. PEG tube then converted to 18 Fr x 45 cm one-piece GJ tube using peel-away sheath and glidewire technique. Distal tip of GJ tube confirmed to be pas the ligament of Treitz fluoroscopically. Internal balloon inflated and bumper cinched loosely to skin at 4 cm.   Complications: None   Condition: Stable   Comments:      Recommendations:         See dictated procedure report for full details (found in chart review under 'Procedures')    - Admit to medicine for pain control  - Standard post-GJ care: NPO for 4 hours with Q2 hour abdominal exams, leave G tube to gravity.  - Avoid anticoagulation for at least the first 72 hours.  - J tube feedings per nutrition     Josias Chan MD  [phone]

## 2018-02-08 NOTE — IP AVS SNAPSHOT
MRN:[id number]                      After Visit Summary   2/8/2018    Alexandra Melgoza    MRN: [id number]           Thank you!     Thank you for choosing [facility] for your care. Our goal is always to provide you with excellent care. Hearing back from our patients is one way we can continue to improve our services. Please take a few minutes to complete the written survey that you may receive in the mail after you visit with us. Thank you!        Patient Information     Date Of Birth          1995        Designated Caregiver       Most Recent Value    Caregiver    Will someone help with your care after discharge? no      About your hospital stay     You were admitted on:  February 8, 2018 You last received care in the:  Unit 6D Observation [facility] [place]    You were discharged on:  February 10, 2018        Reason for your hospital stay       You were admitted for pain control after PEG tube placement                  Who to Call     For medical emergencies, please call 911.  For non-urgent questions about your medical care, please call your primary care provider or clinic, [phone]  For questions related to your surgery, please call your surgery clinic        Attending Provider     Provider Specialty    Campbell Narayanan MD Gastroenterology    [facility], Elvia Booth MD Internal Medicine    [facility]       Primary Care Provider Office Phone # Fax #    Quyen Baez [phone]      After Care Instructions     Activity       Your activity upon discharge: activity as tolerated            Diet       Follow this diet upon discharge: Orders Placed This Encounter      Adult Formula Drip Feeding: Continuous Peptamen 1.5; Jejunostomy; Goal Rate: 50; mL/hr; Medication - Tube Feeding Flush Frequency: At least 15-30 mL water before and after medication administration and with tube clogging; Amout to Send (Nutrition ...      Regular Diet  Adult            Discharge Instructions       1. Follow up with your appointments as scheduled.  2. If concerns regarding pain control, call Dr. Narayanan's office directly.                  Follow-up Appointments     Adult [facility] Follow-up and recommended labs and tests       Follow up with Dr. Wang and Dr. Narayanan as scheduled    Appointments on Center and/or [facility] (with [facility] or [facility] provider or service). Call [phone] if you haven't heard regarding these appointments within 7 days of discharge.                  Your next 10 appointments already scheduled     Feb 12, 2018  1:00 PM CST   Infusion 120 with UC SPEC INFUSION, UC 45 ATC   [facility] ([facility])    [address]  [address]  [facility] [facility] [id number] [phone]            Feb 13, 2018  8:00 AM CST   (Arrive by 7:45 AM)   Return Visit with Leonardo Wang MD   [facility] ([facility])    [address]  4th Floor  [facility] [place] [facility] [address] [phone]            Feb 14, 2018 12:00 PM CST   (Arrive by 11:45 AM)   Return Visit with Campbell Narayanan MD   [facility] ([facility])    [address]  4th Floor  [facility] [address] [phone]            Feb 14, 2018  1:00 PM CST   Infusion 120 with UC SPEC INFUSION, UC 42 ATC   [facility] ([facility])    [address]  [address]  [facility] [address] [phone]            Feb 16, 2018  3:00 PM CST   Infusion 120 with UC SPEC INFUSION, UC 41 ATC   [facility] ([facility])    [address]  [address]  [facility] [id number] [phone]            Feb 19, 2018 12:00 PM CST   Infusion 120 with UC SPEC  INFUSION,  50 ATC   [facility] ([facility])    [address]  [address]  [place] [address] [phone]              Additional Services     Home infusion referral       Resumption of home infusion services:    Your provider has referred you to: [facility]    Local Address (if different from home address): N/A    Anticipated Length of Therapy: per MD order    Enteral Nutrition via GJ tube:   Peptamen 1.5 at 50 cc/hr continuously      Sodium Bicarb tablet (325 mg), 1 tablet q 4 hrs via Jtube. Administration Instructions: Crush 1 tablet and mix into 15 ml of warm water and use this solution to mix with Creon pancreatic enzymes. DO NOT administer directly into Jtube (to be mixed into TF formula with Creon enzyme - see Creon enzyme order)   B) ZenPEP  20617, 1- 2 capsules q 4 hrs via Jtube. Administration Instructions:   --If TF rate is running @ 10-30 ml/hr, administer 1 capsule q 4 hrs;   --If TF rate is running @ 40-50 ml/hr, increase to 2 capsules q 4 hrs.    **Open capsule and empty contents into 15 ml sodium bicarb solution (see sodium bicarb order), let dissolve for about 20-30 minutes and then add this solution to the amount of TF formula hung in TF bag every 4 hrs (i.e., once TF @ goal infusion 50 ml/hr will mix 2 capsules into 200 ml of TF formula every 4 hrs).   *Note: this enzyme regimen with TF @ goal infusion will provide approximately 3582 units of lipase/gram of total Fat daily and approp dosing initially for pancreatic insufficiency with more elemental TF formula.     Home Infusion Pharmacist to adjust therapy based on labs and clinical assessments: Yes    Home Infusion Pharmacist to order labs based on therapy type and clinical assessments: Yes    Agency Staff to assess nursing needs for Infusion Therapy.                  Further instructions from your care team      "  __________________________________________________________  D/C'ing nurse: Please fax to following agencies at time of patient d/c:    [facility]   Fax: [phone]  ____________________________________________________________        Additional Information     If you use hormonal birth control (such as the pill, patch, ring or implants): You'll need a second form of birth control for 7 days (condoms, a diaphragm or contraceptive foam). While in the hospital, you received a medicine called Bridion. Your normal birth control will not work as well for a week after taking this medicine.          Pending Results     No orders found from 2/6/2018 to 2/9/2018.            Statement of Approval     Ordered          02/10/18 1900  I have reviewed and agree with all the recommendations and orders detailed in this document.  EFFECTIVE NOW     Approved and electronically signed by:  Tawanna Villalobos MD           02/10/18 1901  I have reviewed and agree with all the recommendations and orders detailed in this document.  EFFECTIVE NOW     Approved and electronically signed by:  Tawanna Villalobos MD             Admission Information     Date & Time Provider Department Dept. Phone    2/8/2018 Nadeen Mcrae MD Unit 6D Observation [facility] [place] [phone]      Your Vitals Were     Blood Pressure Pulse Temperature Respirations Height Weight    124/89 (BP Location: Right arm) 84 98.8  F (37.1  C) (Oral) 16 1.68 m (5' 6.14\") 67.7 kg (149 lb 4 oz)    Pulse Oximetry BMI (Body Mass Index)                98% 23.99 kg/m2          MyChart Information     [facility] gives you secure access to your electronic health record. If you see a primary care provider, you can also send messages to your care team and make appointments. If you have questions, please call your primary care clinic.  If you do not have a primary care provider, please call [phone] and they will assist you.        Care EveryWhere ID     This " is your Care EveryWhere ID. This could be used by other organizations to access your [place] medical records  [id number]        Equal Access to Services     RACHAEL BENITEZ : Hadii aad ku hadcathicaleb Enamorado, wajesusda kathryndemondha, lydiata kakurtda jacque, maki eliseoin hayaaamber duongchris catherine la'ramyaamber tabatha So [facility] [phone].    ATENCIÓN: Si habla español, tiene a quijano disposición servicios gratuitos de asistencia lingüística. Llame al [phone].    We comply with applicable federal civil rights laws and [place] laws. We do not discriminate on the basis of race, color, national origin, age, disability, sex, sexual orientation, or gender identity.               Review of your medicines      START taking        Dose / Directions    hydrOXYzine 10 MG/5ML syrup   Commonly known as:  ATARAX   Used for:  Acute abdominal pain        Dose:  25 mg   Take 12.5 mLs (25 mg) by mouth every 4 hours as needed for anxiety or other (pain)   Quantity:  473 mL   Refills:  0       oxyCODONE 20 mg/mL (HIGH CONC) solution   Commonly known as:  ROXICODONE INTENSOL   Used for:  Sphincter of Oddi dysfunction   Replaces:  oxyCODONE 5 MG/5ML solution        Dose:  10-15 mg   Take 0.5-0.75 mLs (10-15 mg) by mouth every 4 hours as needed for moderate to severe pain   Quantity:  13 mL   Refills:  0         CONTINUE these medicines which have NOT CHANGED        Dose / Directions    * amylase-lipase-protease 38913 UNITS Cpep   Commonly known as:  ZENPEP   Used for:  Idiopathic chronic pancreatitis (H)        Dose:  2-3 capsule   Take 2-3 capsules (40,000-60,000 Units) by mouth Take with snacks or supplements (Snacks)   Quantity:  180 capsule   Refills:  1       * amylase-lipase-protease 23502 UNITS Cpep   Commonly known as:  ZENPEP   Used for:  Right upper quadrant abdominal pain        Dose:  5 capsule   Take 5 capsules (100,000 Units) by mouth 4 times daily (with meals and nightly)   Quantity:  180 capsule   Refills:  1       fluticasone 50 MCG/ACT spray    Commonly known as:  FLONASE   Used for:  Nasal congestion        Dose:  2 spray   Spray 2 sprays into both nostrils daily   Quantity:  16 g   Refills:  3       * gabapentin 250 MG/5ML solution   Commonly known as:  NEURONTIN   Used for:  Abdominal pain, epigastric        Dose:  400 mg   8 mLs (400 mg) by ORAL OR NJ TUBE route 2 times daily   Quantity:  470 mL   Refills:  0       * gabapentin 250 MG/5ML solution   Commonly known as:  NEURONTIN   Used for:  Abdominal pain, epigastric        Dose:  600 mg   Take 12 mLs (600 mg) by mouth daily   Quantity:  450 mL   Refills:  0       leuprolide 11.25 MG kit   Commonly known as:  LUPRON DEPOT (3-MONTH)   Used for:  Endometriosis        Dose:  11.25 mg   Inject 11.25 mg into the muscle every 3 months   Quantity:  1 each   Refills:  3       levalbuterol 45 MCG/ACT Inhaler   Commonly known as:  XOPENEX HFA   Used for:  Wheeze        Dose:  2 puff   Inhale 2 puffs into the lungs every 6 hours as needed for shortness of breath / dyspnea   Quantity:  1 Inhaler   Refills:  1       menthol 5 % Ptch   Commonly known as:  ICY HOT   Used for:  Abdominal pain, epigastric        Dose:  1 patch   Apply 1 patch topically every 8 hours as needed for muscle soreness   Quantity:  15 patch   Refills:  3       multivitamins with minerals Liqd liquid   Used for:  Abdominal pain, epigastric        Dose:  15 mL   15 mLs by Per Feeding Tube route daily   Quantity:  1 Bottle   Refills:  0       norethindrone 5 MG tablet   Commonly known as:  AYGESTIN   Used for:  Endometriosis        Dose:  5 mg   Take 1 tablet (5 mg) by mouth daily   Quantity:  90 tablet   Refills:  1       nortriptyline 10 MG capsule   Commonly known as:  PAMELOR        Dose:  30 mg   Take 3 capsules (30 mg) by mouth At Bedtime   Quantity:  120 capsule   Refills:  0       ondansetron 4 MG ODT tab   Commonly known as:  ZOFRAN ODT        Dose:  4 mg   Take 1 tablet (4 mg) by mouth every 8 hours as needed for nausea or vomiting    Quantity:  40 tablet   Refills:  3       order for DME   Used for:  Chronic abdominal pain        Equipment being ordered: TENS with wire and electrode.   Quantity:  1 Units   Refills:  0       pantoprazole 40 MG EC tablet   Commonly known as:  PROTONIX   Used for:  Erosive gastritis        Dose:  40 mg   Take 1 tablet (40 mg) by mouth 2 times daily (before meals)   Quantity:  30 tablet   Refills:  1       polyethylene glycol Packet   Commonly known as:  MIRALAX/GLYCOLAX        Dose:  17 g   Take 17 g by mouth daily   Quantity:  7 packet   Refills:  0       RIZATRIPTAN BENZOATE PO        Dose:  5 mg   Take 5 mg by mouth once as needed   Refills:  0       scopolamine 72 hr patch   Commonly known as:  TRANSDERM   Used for:  Intractable vomiting with nausea, unspecified vomiting type        Apply 1 patch to hairless area behind one ear if severe nausea and vomiting.  Remove old patch and change every 3 days (72 hours).   Quantity:  2 patch   Refills:  0       senna-docusate 8.6-50 MG per tablet   Commonly known as:  SENOKOT-S;PERICOLACE        Dose:  1 tablet   Take 1 tablet by mouth 2 times daily as needed for constipation   Quantity:  100 tablet   Refills:  0       sodium bicarbonate 325 MG tablet   Used for:  Abdominal pain, epigastric        Dose:  325 mg   1 tablet (325 mg) by Per Feeding Tube route 4 times daily   Quantity:  200 tablet   Refills:  0       * Notice:  This list has 4 medication(s) that are the same as other medications prescribed for you. Read the directions carefully, and ask your doctor or other care provider to review them with you.      STOP taking     cephalexin 250 MG/5ML suspension   Commonly known as:  KEFLEX           oxyCODONE 5 MG/5ML solution   Commonly known as:  ROXICODONE   Replaced by:  oxyCODONE 20 mg/mL (HIGH CONC) solution                Where to get your medicines      These medications were sent to [facility] - [place], [place] - [address]  [facility] [address]     Phone:  [phone]     hydrOXYzine 10 MG/5ML syrup         Some of these will need a paper prescription and others can be bought over the counter. Ask your nurse if you have questions.     Bring a paper prescription for each of these medications     oxyCODONE 20 mg/mL (HIGH CONC) solution                Protect others around you: Learn how to safely use, store and throw away your medicines at www.disposemymeds.org.        Information about OPIOIDS     PRESCRIPTION OPIOIDS: WHAT YOU NEED TO KNOW    Prescription opioids can be used to help relieve moderate to severe pain and are often prescribed following a surgery or injury, or for certain health conditions. These medications can be an important part of treatment but also come with serious risks. It is important to work with your health care provider to make sure you are getting the safest, most effective care.    WHAT ARE THE RISKS AND SIDE EFFECTS OF OPIOID USE?  Prescription opioids carry serious risks of addiction and overdose, especially with prolonged use. An opioid overdose, often marked by slowed breathing can cause sudden death. The use of prescription opioids can have a number of side effects as well, even when taken as directed:      Tolerance - meaning you might need to take more of a medication for the same pain relief    Physical dependence - meaning you have symptoms of withdrawal when a medication is stopped    Increased sensitivity to pain    Constipation    Nausea, vomiting, and dry mouth    Sleepiness and dizziness    Confusion    Depression    Low levels of testosterone that can result in lower sex drive, energy, and strength    Itching and sweating    RISKS ARE GREATER WITH:    History of drug misuse, substance use disorder, or overdose    Mental health conditions (such as depression or anxiety)    Sleep apnea    Older age (65 years or older)    Pregnancy    Avoid alcohol while taking prescription  opioids.   Also, unless specifically advised by your health care provider, medications to avoid include:    Benzodiazepines (such as Xanax or Valium)    Muscle relaxants (such as Soma or Flexeril)    Hypnotics (such as Ambien or Lunesta)    Other prescription opioids    KNOW YOUR OPTIONS:  Talk to your health care provider about ways to manage your pain that do not involve prescription opioids. Some of these options may actually work better and have fewer risks and side effects:    Pain relievers such as acetaminophen, ibuprofen, and naproxen    Some medications that are also used for depression or seizures    Physical therapy and exercise    Cognitive behavioral therapy, a psychological, goal-directed approach, in which patients learn how to modify physical, behavioral, and emotional triggers of pain and stress    IF YOU ARE PRESCRIBED OPIOIDS FOR PAIN:    Never take opioids in greater amounts or more often than prescribed    Follow up with your primary health care provider and work together to create a plan on how to manage your pain.    Talk about ways to help manage your pain that do not involve prescription opioids    Talk about all concerns and side effects    Help prevent misuse and abuse    Never sell or share prescription opioids    Never use another person's prescription opioids    Store prescription opioids in a secure place and out of reach of others (this may include visitors, children, friends, and family)    Visit www.cdc.gov/drugoverdose to learn about risks of opioid abuse and overdose    If you believe you may be struggling with addiction, tell your health care provider and ask for guidance or call [facility]'s National Helpline at 5-437-828-HELP    LEARN MORE / www.cdc.gov/drugoverdose/prescribing/guideline.html    Safely dispose of unused prescription opioids: Find your local drug take-back programs and more information about the importance of safe disposal at www.doseofreality.mn.gov              Medication List: This is a list of all your medications and when to take them. Check marks below indicate your daily home schedule. Keep this list as a reference.      Medications           Morning Afternoon Evening Bedtime As Needed    * amylase-lipase-protease 90355 UNITS Cpep   Commonly known as:  ZENPEP   Take 2-3 capsules (40,000-60,000 Units) by mouth Take with snacks or supplements (Snacks)   Last time this was given:  40,000 Units on 2/10/2018  3:45 PM                                * amylase-lipase-protease 68268 UNITS Cpep   Commonly known as:  ZENPEP   Take 5 capsules (100,000 Units) by mouth 4 times daily (with meals and nightly)   Last time this was given:  40,000 Units on 2/10/2018  3:45 PM                                fluticasone 50 MCG/ACT spray   Commonly known as:  FLONASE   Spray 2 sprays into both nostrils daily                                * gabapentin 250 MG/5ML solution   Commonly known as:  NEURONTIN   8 mLs (400 mg) by ORAL OR NJ TUBE route 2 times daily                                * gabapentin 250 MG/5ML solution   Commonly known as:  NEURONTIN   Take 12 mLs (600 mg) by mouth daily                                hydrOXYzine 10 MG/5ML syrup   Commonly known as:  ATARAX   Take 12.5 mLs (25 mg) by mouth every 4 hours as needed for anxiety or other (pain)   Last time this was given:  25 mg on 2/10/2018  6:12 PM                                leuprolide 11.25 MG kit   Commonly known as:  LUPRON DEPOT (3-MONTH)   Inject 11.25 mg into the muscle every 3 months                                levalbuterol 45 MCG/ACT Inhaler   Commonly known as:  XOPENEX HFA   Inhale 2 puffs into the lungs every 6 hours as needed for shortness of breath / dyspnea                                menthol 5 % Ptch   Commonly known as:  ICY HOT   Apply 1 patch topically every 8 hours as needed for muscle soreness                                multivitamins with minerals Liqd liquid   15 mLs by Per Feeding Tube  route daily   Last time this was given:  15 mLs on 2/10/2018  9:37 AM                                norethindrone 5 MG tablet   Commonly known as:  AYGESTIN   Take 1 tablet (5 mg) by mouth daily   Last time this was given:  5 mg on 2/8/2018  9:42 PM                                nortriptyline 10 MG capsule   Commonly known as:  PAMELOR   Take 3 capsules (30 mg) by mouth At Bedtime   Last time this was given:  30 mg on 2/9/2018  9:57 PM                                ondansetron 4 MG ODT tab   Commonly known as:  ZOFRAN ODT   Take 1 tablet (4 mg) by mouth every 8 hours as needed for nausea or vomiting                                order for DME   Equipment being ordered: TENS with wire and electrode.                                oxyCODONE 20 mg/mL (HIGH CONC) solution   Commonly known as:  ROXICODONE INTENSOL   Take 0.5-0.75 mLs (10-15 mg) by mouth every 4 hours as needed for moderate to severe pain   Last time this was given:  15 mg on 2/10/2018  6:43 PM                                pantoprazole 40 MG EC tablet   Commonly known as:  PROTONIX   Take 1 tablet (40 mg) by mouth 2 times daily (before meals)                                polyethylene glycol Packet   Commonly known as:  MIRALAX/GLYCOLAX   Take 17 g by mouth daily                                RIZATRIPTAN BENZOATE PO   Take 5 mg by mouth once as needed                                scopolamine 72 hr patch   Commonly known as:  TRANSDERM   Apply 1 patch to hairless area behind one ear if severe nausea and vomiting.  Remove old patch and change every 3 days (72 hours).   Last time this was given:  1 patch on 2/8/2018 12:14 PM                                senna-docusate 8.6-50 MG per tablet   Commonly known as:  SENOKOT-S;PERICOLACE   Take 1 tablet by mouth 2 times daily as needed for constipation                                sodium bicarbonate 325 MG tablet   1 tablet (325 mg) by Per Feeding Tube route 4 times daily   Last time this was given:   325 mg on 2/10/2018  3:45 PM                                * Notice:  This list has 4 medication(s) that are the same as other medications prescribed for you. Read the directions carefully, and ask your doctor or other care provider to review them with you.

## 2018-02-08 NOTE — H&P
[facility], [place]    History and Physical     Date of Admission:  2/8/2018  Date of Service (when I saw the patient): 02/08/18    Assessment & Plan   Alexandra Melgoza is a 22 year old Alexandra Melgoza is a 22 year old female with a history of gastritis, chronic abdominal pain post-cholecystectomy with mildly abnormal liver enzymes, not responding to sphincter of Oddi ablation a few years ago,cyclic N/V, migraines, pseudoseizures and somatoform disorder on chronic narcotics presented with scheduled PEGJ placement and admitted for pain control.    # Acute on chronic abdominal pain  Patient has a history of chronic pain syndrome requiring hospitalization for pain control. Acute pain due to PEGJ placement. Discussed with patient and mother. We agreed to install the same regimen as patient's last hospitalization pain consult.  - oxycodone 10 mg po q4 hours prn  - hydromorphone 1 mg IV q3 hour prn when pain not controlled with oxycodone.   - continue acetaminophen 1000mg po q6h     - continue gabapentin 300mg po tid  - continue nortriptyline 30 mg qhs   - zofran PRN for nausea  - continue Protonix    FEN:   Active Diet Order      Regular Diet Adult  PPx: ambulatory  Code: Full Code  Dispo: Patient admitted under observation for pain control    Patient seen and discussed with Dr. Mcrae, who agrees with the above assessment and plan.    Kael Boyd  Internal Medicine PGY-2  [id number]    Primary Care Physician   Quyen Baez    Chief Complaint   Abdominal pain    History of Present Illness   History obtained from chart review and discussed with patient.    Alexandra Melgoza is a 22 year old Alexandra Melgoza is a 22 year old female with a history of gastritis, chronic abdominal pain post-cholecystectomy with mildly abnormal liver enzymes, not responding to sphincter of Oddi ablation a few years ago,cyclic N/V, migraines, pseudoseizures and somatoform disorder on chronic narcotics presented with  scheduled PEGJ placement and admitted for pain control.    Patient has a history of chronic abdominal pain since age 8 but worsened since cholecystectomy. She underwent diagnostic ERCP by Dr. Narayanan  and NJ placement on 1/18/18, noted to have wide open pancreatic and biliary sphincters at time of ERCP, was admitted post-procedure for  uncontrolled pain and nausea. Since NJ tube has been placed, she reports abdominal pain and nausea better controlled on current regimen of Oxycodone 10 mg TID. She was seen by Dr. Narayanan who recommended PEGJ placement after patient noted intolerance to NJ. Patient now presented for observation after PEGJ placement for pain control.     Past Medical History    I have reviewed this patient's medical history and updated it with pertinent information if needed.   Past Medical History:   Diagnosis Date     Anxiety      Asthma      Cholecystitis     s/p cholecystectomy     Chronic abdominal pain      Chronic infection     mrsa     Chronic pain      Cyclic vomiting syndrome 10/27/2012     Depression      Endometriosis      Hypoglycaemia      Migraines      Mild intermittent asthma      Ovarian cysts      Pancreatic disease      PONV (postoperative nausea and vomiting)      Pseudoseizures      Somatoform disorder      Sphincter of Oddi dysfunction      Vasovagal syncope        Past Surgical History   I have reviewed this patient's surgical history and updated it with pertinent information if needed.  Past Surgical History:   Procedure Laterality Date     ABDOMEN SURGERY      ERCP, biliary stents     CHOLECYSTECTOMY  8/2/11     COLONOSCOPY  2011    negative finding     ENDOSCOPIC RETROGRADE CHOLANGIOPANCREATOGRAM  8/23/2011    Procedure:ENDOSCOPIC RETROGRADE CHOLANGIOPANCREATOGRAM; Endoscopic Retrograde Cholangiopancreatogram; Surgeon:SHORTY NARAYANAN; Location:[facility]     ENDOSCOPIC RETROGRADE CHOLANGIOPANCREATOGRAM  5/17/2012    Procedure:ENDOSCOPIC RETROGRADE CHOLANGIOPANCREATOGRAM;  Endoscopic Retrograde Cholangiopancreatogram with pancreatic stent placement.; Surgeon:CAMPBELL NARAYANAN; Location:[facility]     ENDOSCOPIC RETROGRADE CHOLANGIOPANCREATOGRAM N/A 1/18/2018    Procedure: COMBINED ENDOSCOPIC RETROGRADE CHOLANGIOPANCREATOGRAPHY, PLACE TUBE/STENT;  Endoscopic Retrograde Cholangiopancreatography with nasojejunal feeding tube placement;  Surgeon: Campbell Narayanan MD;  Location: [facility]     ENDOSCOPIC RETROGRADE CHOLANGIOPANCREATOGRAM COMPLEX  1/3/2012    Procedure:ENDOSCOPIC RETROGRADE CHOLANGIOPANCREATOGRAM COMPLEX; Endoscopic Retrograde Cholangiopancreatogram with Manometry bile duct sphincterotomy extention pancreatic duct sphincterotomy pancreatic duct stent placement; Surgeon:CAMPBELL NARAYANAN; [facility]     ENDOSCOPIC ULTRASOUND UPPER GASTROINTESTINAL TRACT (GI) N/A 6/9/2015    Procedure: ENDOSCOPIC ULTRASOUND, ESOPHAGOSCOPY / UPPER GASTROINTESTINAL TRACT (GI);  Surgeon: Mario Joe MD;  [facility]: [facility]     ENDOSCOPIC ULTRASOUND UPPER GASTROINTESTINAL TRACT (GI) N/A 12/12/2016    Procedure: ENDOSCOPIC ULTRASOUND, ESOPHAGOSCOPY / UPPER GASTROINTESTINAL TRACT (GI);  Surgeon: Guru Jose Klein MD;  [facility]: [facility]     ESOPHAGOSCOPY, GASTROSCOPY, DUODENOSCOPY (EGD), COMBINED  1/18/2012    Procedure:COMBINED ESOPHAGOSCOPY, GASTROSCOPY, DUODENOSCOPY (EGD); Surgeon:ARNIE ESPINOZA; [facility]     ESOPHAGOSCOPY, GASTROSCOPY, DUODENOSCOPY (EGD), COMBINED  1/18/2012    Procedure:COMBINED ESOPHAGOSCOPY, GASTROSCOPY, DUODENOSCOPY (EGD); EGD; Surgeon:ARNIE ESPINOZA; [facility]     ESOPHAGOSCOPY, GASTROSCOPY, DUODENOSCOPY (EGD), COMBINED N/A 12/9/2017    Procedure: COMBINED ESOPHAGOSCOPY, GASTROSCOPY, DUODENOSCOPY (EGD);;  Surgeon: Josias Chan MD;  Location: [facility]     INSERT PORT VASCULAR ACCESS       L knee arthroscopy  2009     LAPAROSCOPY DIAGNOSTIC (GYN)  10/26/2012    Procedure: LAPAROSCOPY DIAGNOSTIC (GYN);  LAPAROSCOPY DIAGNOSTIC, CAUTERY  ENDOMETRIOISIS and biopsy of Fallopian tube lesions;  Surgeon: Carla Lopez MD;  Location: [facility] OR     ORTHOPEDIC SURGERY      knee     VASCULAR SURGERY         Prior to Admission Medications   Prior to Admission Medications   Prescriptions Last Dose Informant Patient Reported? Taking?   RIZATRIPTAN BENZOATE PO More than a month at Unknown time Self Yes No   Sig: Take 5 mg by mouth once as needed    amylase-lipase-protease (ZENPEP) 99248 UNITS CPEP 2018 at Unknown time  No Yes   Sig: Take 2-3 capsules (40,000-60,000 Units) by mouth Take with snacks or supplements (Snacks)   amylase-lipase-protease (ZENPEP) 24140 UNITS CPEP   Yes No   Sig: Take 5 capsules (100,000 Units) by mouth 4 times daily (with meals and nightly)   cephalexin (KEFLEX) 250 MG/5ML suspension 2018 at Unknown time  No Yes   Sig: Take 16.8 mLs (839 mg) by mouth 4 times daily for 3 days   fluticasone (FLONASE) 50 MCG/ACT spray Past Month at Unknown time  No Yes   Sig: Spray 2 sprays into both nostrils daily   gabapentin (NEURONTIN) 250 MG/5ML solution 2018 at Unknown time  No Yes   Si mLs (400 mg) by ORAL OR NJ TUBE route 2 times daily   gabapentin (NEURONTIN) 250 MG/5ML solution   No No   Sig: Take 12 mLs (600 mg) by mouth daily   leuprolide (LUPRON DEPOT) 11.25 MG injection  Self No No   Sig: Inject 11.25 mg into the muscle every 3 months   levalbuterol (XOPENEX HFA) 45 MCG/ACT Inhaler More than a month at Unknown time  No No   Sig: Inhale 2 puffs into the lungs every 6 hours as needed for shortness of breath / dyspnea   menthol (ICY HOT) 5 % PTCH Past Month at Unknown time  No Yes   Sig: Apply 1 patch topically every 8 hours as needed for muscle soreness   multivitamins with minerals (CERTAVITE/CEROVITE) LIQD liquid Past Month at Unknown time  No Yes   Sig: 15 mLs by Per Feeding Tube route daily   norethindrone (AYGESTIN) 5 MG tablet 2018 at Unknown time  No Yes   Sig: Take 1 tablet (5 mg) by mouth daily   nortriptyline  (PAMELOR) 10 MG capsule 2018 at pm  No Yes   Sig: Take 3 capsules (30 mg) by mouth At Bedtime   ondansetron (ZOFRAN ODT) 4 MG ODT tab 2018 at Unknown time  No Yes   Sig: Take 1 tablet (4 mg) by mouth every 8 hours as needed for nausea or vomiting   order for DME   No No   Sig: Equipment being ordered: TENS with wire and electrode.   oxyCODONE (ROXICODONE) 5 MG/5ML solution 2018 at pm  No Yes   Sig: Take 10 mLs (10 mg) by mouth every 8 hours as needed for pain maximum 30 mg per day   pantoprazole (PROTONIX) 40 MG EC tablet Past Month at Unknown time  No Yes   Sig: Take 1 tablet (40 mg) by mouth 2 times daily (before meals)   polyethylene glycol (MIRALAX/GLYCOLAX) Packet Past Week at Unknown time  No Yes   Sig: Take 17 g by mouth daily   scopolamine (TRANSDERM) 72 hr patch Past Month at Unknown time  No Yes   Sig: Apply 1 patch to hairless area behind one ear if severe nausea and vomiting.  Remove old patch and change every 3 days (72 hours).   senna-docusate (SENOKOT-S;PERICOLACE) 8.6-50 MG per tablet Past Week at Unknown time  No Yes   Sig: Take 1 tablet by mouth 2 times daily as needed for constipation   sodium bicarbonate 325 MG tablet 2018 at pm  No Yes   Si tablet (325 mg) by Per Feeding Tube route 4 times daily      Facility-Administered Medications: None     Allergies   Allergies   Allergen Reactions     Amoxicillin-Pot Clavulanate Nausea and Vomiting     Compazine [Prochlorperazine] Other (See Comments)     Dystonia       Hyoscyamine Other (See Comments)     Dystonia     Reglan [Metoclopramide Hcl] Other (See Comments)     Dystonia     Zyprexa Other (See Comments)     Sensitive, dystonic reaction on 2011     Amitriptyline Hcl Other (See Comments)     Dystonia, hallucinations     Buspirone Other (See Comments)     No Adverse Reactions, no benefit     Cogentin [Benztropine]      Cyproheptadine Other (See Comments)     Distonic     Dicyclomine Other (See Comments)     Droperidol Other  "(See Comments)     Feels tense and \"like she has to jump out of her skin\".       Effexor [Venlafaxine] Other (See Comments)     Dystonia     Food      Cilantro--lips/tongue swelling     No Clinical Screening - See Comments Other (See Comments)     Cilantro     Phenergan Dm [Promethazine-Dm] Other (See Comments)     dystonia     Promethazine Other (See Comments)     Risperidone Other (See Comments)     dystonia     Vistaril Other (See Comments)     Burning sensation.       Augmentin GI Disturbance     Ketamine Other (See Comments)     jittery     Sorbitol GI Disturbance     Headache and dyspepsia       Social History   I have reviewed this patient's social history and updated it with pertinent information if needed. Alexandra Melgoza  reports that she has never smoked. She has never used smokeless tobacco. She reports that she does not drink alcohol or use illicit drugs.    Family History   I have reviewed this patient's family history and updated it with pertinent information if needed.   Family History   Problem Relation Age of Onset     Hypertension Father      Bipolar Disorder Other      Anxiety Disorder Other      Depression Paternal Grandmother      Allergies Maternal Grandmother      CEREBROVASCULAR DISEASE Maternal Grandfather      Cardiovascular Maternal Grandfather      Depression/Anxiety Maternal Grandfather      GASTROINTESTINAL DISEASE Maternal Grandfather      DIABETES Paternal Uncle      Hypoglycemia Brother      CANCER No family hx of      No family history of skin cancer       Review of Systems   The 10 point Review of Systems is negative other than noted in the HPI or here.     Physical Exam   BP (!) 146/97  Temp 97.8  F (36.6  C) (Oral)  Resp 16  Ht 1.68 m (5' 6.14\")  Wt 67.7 kg (149 lb 4 oz)  SpO2 100%  BMI 23.99 kg/m2  Vitals:    02/08/18 1121   Weight: 67.7 kg (149 lb 4 oz)       GENERAL: Alert and interactive. Sobbing intermittently during conversation  HEENT: NCAT. PERRL, EOMI, anicteric " sclera. Oral mucosa pink and moist with no lesions or thrush.  LUNG: Non-labored breathing, good air exchange, lungs clear to auscultation bilaterally. No cough or wheeze noted.   CV: Regular rate and rhythm. No murmur or rub.   GI: Normoactive bowel sounds. Abdomen soft, non-distended, and tender around PEG site. No palpable masses or organomegaly. PEG site c/d/i.  SKIN: Warm and dry. No concerning lesions or rash on exposed surfaces.  MSK: Extremities grossly normal, non-tender, no edema. Strong peripheral pulses. Good strength and ROM in bed.   NEURO: A&O x 3. CNs 2-12 grossly intact, speech normal, gait normal, sensation to light touch grossly WNL. Grossly non-focal.    Data   CBC  Recent Labs  Lab 02/08/18  1150 02/05/18  1446 02/02/18  1230   WBC 4.4 4.9 4.0   RBC 3.60* 3.95 3.59*   HGB 11.0* 12.0 10.8*   HCT 33.6* 37.0 33.3*   MCV 93 94 93   MCH 30.6 30.4 30.1   MCHC 32.7 32.4 32.4   RDW 13.0 13.0 12.9    230 222     CMP  Recent Labs  Lab 02/08/18  1150 02/05/18  1446 02/02/18  1230   NA  --  140 141   POTASSIUM  --  4.2 4.0   CHLORIDE  --  103 105   CO2  --  28 30   ANIONGAP  --  9 6   GLC  --  89 88   BUN 5* 7 8   CR  --  0.54 0.50*   GFRESTIMATED  --  >90 >90   GFRESTBLACK  --  >90 >90   RENEA  --  9.0 8.6   PROTTOTAL  --  7.2  --    ALBUMIN  --  4.2  --    BILITOTAL  --  0.4  --    ALKPHOS  --  96  --    AST  --  32  --    ALT  --  39  --      INR  Recent Labs  Lab 02/08/18  1150   INR 0.98       Results for orders placed or performed during the hospital encounter of 02/08/18 (from the past 24 hour(s))   HCG qualitative urine   Result Value Ref Range    HCG Qual Urine Negative NEG^Negative   Glucose by meter   Result Value Ref Range    Glucose 85 70 - 99 mg/dL   CBC with platelets   Result Value Ref Range    WBC 4.4 4.0 - 11.0 10e9/L    RBC Count 3.60 (L) 3.8 - 5.2 10e12/L    Hemoglobin 11.0 (L) 11.7 - 15.7 g/dL    Hematocrit 33.6 (L) 35.0 - 47.0 %    MCV 93 78 - 100 fl    MCH 30.6 26.5 - 33.0 pg     MCHC 32.7 31.5 - 36.5 g/dL    RDW 13.0 10.0 - 15.0 %    Platelet Count 213 150 - 450 10e9/L   INR   Result Value Ref Range    INR 0.98 0.86 - 1.14   Urea nitrogen   Result Value Ref Range    Urea Nitrogen 5 (L) 7 - 30 mg/dL   XR Surgery BARBER Fluoro L/T 5 Min w Stills    Narrative    This exam was marked as non-reportable because it will not be read by a   radiologist or a [facility] non-radiologist provider.             Glucose by meter   Result Value Ref Range    Glucose 87 70 - 99 mg/dL     *Note: Due to a large number of results and/or encounters for the requested time period, some results have not been displayed. A complete set of results can be found in Results Review.

## 2018-02-08 NOTE — OR NURSING
Dr. Kael Boyd at bedside in PACU to evaluate pt and discuss admit plan with pt. MD began to discuss pain med regimen and discharge plan, pt stated that she had understood a different plan, and pt upset, tearful, crying. Writer will have mom come to PACU. Continue to assess, await orders and clarification.

## 2018-02-09 ENCOUNTER — CARE COORDINATION (OUTPATIENT)
Dept: GASTROENTEROLOGY | Facility: CLINIC | Age: 23
End: 2018-02-09

## 2018-02-09 LAB — PROVATION GI EXAM: NORMAL

## 2018-02-09 PROCEDURE — 25000132 ZZH RX MED GY IP 250 OP 250 PS 637: Performed by: INTERNAL MEDICINE

## 2018-02-09 PROCEDURE — 25000128 H RX IP 250 OP 636: Performed by: STUDENT IN AN ORGANIZED HEALTH CARE EDUCATION/TRAINING PROGRAM

## 2018-02-09 PROCEDURE — 27210436 ZZH NUTRITION PRODUCT SEMIELEM INTERMED CAN

## 2018-02-09 PROCEDURE — 99226 ZZC SUBSEQUENT OBSERVATION CARE,LEVEL III: CPT | Mod: GC | Performed by: INTERNAL MEDICINE

## 2018-02-09 PROCEDURE — 25000128 H RX IP 250 OP 636: Performed by: INTERNAL MEDICINE

## 2018-02-09 PROCEDURE — 25800025 ZZH RX 258: Performed by: STUDENT IN AN ORGANIZED HEALTH CARE EDUCATION/TRAINING PROGRAM

## 2018-02-09 PROCEDURE — 99224 ZZC SUBSEQUENT OBSERVATION CARE,LEVEL I: CPT | Performed by: NURSE PRACTITIONER

## 2018-02-09 PROCEDURE — G0378 HOSPITAL OBSERVATION PER HR: HCPCS

## 2018-02-09 PROCEDURE — 25000132 ZZH RX MED GY IP 250 OP 250 PS 637: Performed by: STUDENT IN AN ORGANIZED HEALTH CARE EDUCATION/TRAINING PROGRAM

## 2018-02-09 PROCEDURE — 96376 TX/PRO/DX INJ SAME DRUG ADON: CPT

## 2018-02-09 RX ORDER — SODIUM BICARBONATE 325 MG/1
325 TABLET ORAL EVERY 4 HOURS
Status: DISCONTINUED | OUTPATIENT
Start: 2018-02-09 | End: 2018-02-10 | Stop reason: HOSPADM

## 2018-02-09 RX ORDER — DEXTROSE MONOHYDRATE 25 G/50ML
25-50 INJECTION, SOLUTION INTRAVENOUS
Status: DISCONTINUED | OUTPATIENT
Start: 2018-02-09 | End: 2018-02-10 | Stop reason: HOSPADM

## 2018-02-09 RX ORDER — OXYCODONE HYDROCHLORIDE 100 MG/5ML
10 SOLUTION ORAL
Status: DISCONTINUED | OUTPATIENT
Start: 2018-02-09 | End: 2018-02-09

## 2018-02-09 RX ORDER — GABAPENTIN 400 MG/1
400 CAPSULE ORAL 2 TIMES DAILY
Status: DISCONTINUED | OUTPATIENT
Start: 2018-02-09 | End: 2018-02-10 | Stop reason: HOSPADM

## 2018-02-09 RX ORDER — GABAPENTIN 300 MG/1
600 CAPSULE ORAL AT BEDTIME
Status: DISCONTINUED | OUTPATIENT
Start: 2018-02-09 | End: 2018-02-10 | Stop reason: HOSPADM

## 2018-02-09 RX ORDER — OXYCODONE HYDROCHLORIDE 100 MG/5ML
10 SOLUTION ORAL
Status: DISCONTINUED | OUTPATIENT
Start: 2018-02-09 | End: 2018-02-10

## 2018-02-09 RX ORDER — CEPHALEXIN 250 MG/5ML
500 POWDER, FOR SUSPENSION ORAL 2 TIMES DAILY
Status: DISCONTINUED | OUTPATIENT
Start: 2018-02-09 | End: 2018-02-10 | Stop reason: HOSPADM

## 2018-02-09 RX ORDER — NICOTINE POLACRILEX 4 MG
15-30 LOZENGE BUCCAL
Status: DISCONTINUED | OUTPATIENT
Start: 2018-02-09 | End: 2018-02-10 | Stop reason: HOSPADM

## 2018-02-09 RX ORDER — GABAPENTIN 400 MG/1
400 CAPSULE ORAL 3 TIMES DAILY
Status: DISCONTINUED | OUTPATIENT
Start: 2018-02-09 | End: 2018-02-09

## 2018-02-09 RX ORDER — OXYCODONE HCL 5 MG/5 ML
10 SOLUTION, ORAL ORAL ONCE
Status: COMPLETED | OUTPATIENT
Start: 2018-02-09 | End: 2018-02-09

## 2018-02-09 RX ORDER — HYDROXYZINE HCL 10 MG/5 ML
25 SOLUTION, ORAL ORAL EVERY 4 HOURS PRN
Status: DISCONTINUED | OUTPATIENT
Start: 2018-02-09 | End: 2018-02-10 | Stop reason: HOSPADM

## 2018-02-09 RX ADMIN — HYDROMORPHONE HYDROCHLORIDE 1 MG: 1 INJECTION, SOLUTION INTRAMUSCULAR; INTRAVENOUS; SUBCUTANEOUS at 00:43

## 2018-02-09 RX ADMIN — HYDROMORPHONE HYDROCHLORIDE 1 MG: 1 INJECTION, SOLUTION INTRAMUSCULAR; INTRAVENOUS; SUBCUTANEOUS at 12:33

## 2018-02-09 RX ADMIN — OXYCODONE HYDROCHLORIDE 10 MG: 100 SOLUTION ORAL at 08:23

## 2018-02-09 RX ADMIN — OXYCODONE HYDROCHLORIDE 10 MG: 100 SOLUTION ORAL at 18:01

## 2018-02-09 RX ADMIN — NORTRIPTYLINE HYDROCHLORIDE 30 MG: 10 CAPSULE ORAL at 21:57

## 2018-02-09 RX ADMIN — HYDROMORPHONE HYDROCHLORIDE 1 MG: 1 INJECTION, SOLUTION INTRAMUSCULAR; INTRAVENOUS; SUBCUTANEOUS at 17:21

## 2018-02-09 RX ADMIN — ACETAMINOPHEN 975 MG: 325 TABLET, FILM COATED ORAL at 14:05

## 2018-02-09 RX ADMIN — CEPHALEXIN 500 MG: 250 POWDER, FOR SUSPENSION ORAL at 19:56

## 2018-02-09 RX ADMIN — OXYCODONE HYDROCHLORIDE 10 MG: 100 SOLUTION ORAL at 21:56

## 2018-02-09 RX ADMIN — ONDANSETRON 4 MG: 2 INJECTION INTRAMUSCULAR; INTRAVENOUS at 14:09

## 2018-02-09 RX ADMIN — ACETAMINOPHEN 975 MG: 325 TABLET, FILM COATED ORAL at 01:58

## 2018-02-09 RX ADMIN — OXYCODONE HYDROCHLORIDE 10 MG: 100 SOLUTION ORAL at 13:17

## 2018-02-09 RX ADMIN — HYDROMORPHONE HYDROCHLORIDE 1 MG: 1 INJECTION, SOLUTION INTRAMUSCULAR; INTRAVENOUS; SUBCUTANEOUS at 09:42

## 2018-02-09 RX ADMIN — HYDROMORPHONE HYDROCHLORIDE 1 MG: 1 INJECTION, SOLUTION INTRAMUSCULAR; INTRAVENOUS; SUBCUTANEOUS at 16:41

## 2018-02-09 RX ADMIN — GABAPENTIN 300 MG: 300 CAPSULE ORAL at 08:14

## 2018-02-09 RX ADMIN — SODIUM BICARBONATE 325 MG: 325 TABLET ORAL at 22:00

## 2018-02-09 RX ADMIN — ONDANSETRON 4 MG: 2 INJECTION INTRAMUSCULAR; INTRAVENOUS at 08:22

## 2018-02-09 RX ADMIN — OXYCODONE HYDROCHLORIDE 10 MG: 100 SOLUTION ORAL at 19:55

## 2018-02-09 RX ADMIN — OXYCODONE HYDROCHLORIDE 10 MG: 5 SOLUTION ORAL at 14:22

## 2018-02-09 RX ADMIN — HYDROMORPHONE HYDROCHLORIDE 1 MG: 1 INJECTION, SOLUTION INTRAMUSCULAR; INTRAVENOUS; SUBCUTANEOUS at 03:40

## 2018-02-09 RX ADMIN — PANTOPRAZOLE SODIUM 40 MG: 40 TABLET, DELAYED RELEASE ORAL at 21:42

## 2018-02-09 RX ADMIN — SODIUM BICARBONATE 325 MG: 325 TABLET ORAL at 18:01

## 2018-02-09 RX ADMIN — GABAPENTIN 600 MG: 300 CAPSULE ORAL at 21:59

## 2018-02-09 RX ADMIN — HYDROMORPHONE HYDROCHLORIDE 1 MG: 1 INJECTION, SOLUTION INTRAMUSCULAR; INTRAVENOUS; SUBCUTANEOUS at 06:45

## 2018-02-09 RX ADMIN — DEXTROSE MONOHYDRATE 25 ML: 500 INJECTION PARENTERAL at 09:31

## 2018-02-09 RX ADMIN — ACETAMINOPHEN 975 MG: 325 TABLET, FILM COATED ORAL at 08:14

## 2018-02-09 RX ADMIN — OXYCODONE HYDROCHLORIDE 10 MG: 100 SOLUTION ORAL at 04:31

## 2018-02-09 RX ADMIN — GABAPENTIN 400 MG: 400 CAPSULE ORAL at 14:04

## 2018-02-09 RX ADMIN — OXYCODONE HYDROCHLORIDE 10 MG: 100 SOLUTION ORAL at 00:10

## 2018-02-09 RX ADMIN — PANTOPRAZOLE SODIUM 40 MG: 40 TABLET, DELAYED RELEASE ORAL at 08:14

## 2018-02-09 RX ADMIN — ACETAMINOPHEN 975 MG: 325 TABLET, FILM COATED ORAL at 21:42

## 2018-02-09 RX ADMIN — ONDANSETRON 4 MG: 2 INJECTION INTRAMUSCULAR; INTRAVENOUS at 01:56

## 2018-02-09 ASSESSMENT — PAIN DESCRIPTION - DESCRIPTORS
DESCRIPTORS: ACHING
DESCRIPTORS: SHARP
DESCRIPTORS: ACHING;SHARP
DESCRIPTORS: ACHING

## 2018-02-09 NOTE — PHARMACY-CONSULT NOTE
Pharmacy consulted to Review/monitor meds for enteral feeding interactions     No interactions with current medications identified (patient is on the pantoprazole suspension already).    Pharmacy will continue to monitor for any future interactions.     Thank you,  Renate Gama, PharmD

## 2018-02-09 NOTE — PLAN OF CARE
Problem: Patient Care Overview  Goal: Plan of Care/Patient Progress Review  Outpatient/Observation goals to be met before discharge home:  -diagnostic tests and consults completed and resulted:No   -vital signs normal or at patient baseline: Yes  -tolerating oral intake to maintain hydration: No, pt nauseous  -adequate pain control on oral analgesics: No  IV Dilaudid given

## 2018-02-09 NOTE — DISCHARGE INSTRUCTIONS
__________________________________________________________  D/C'ing nurse: Please fax to following agencies at time of patient d/c:    [facility]   Fax: [phone]  ____________________________________________________________

## 2018-02-09 NOTE — PLAN OF CARE
Problem: Patient Care Overview  Goal: Plan of Care/Patient Progress Review  Gave the patient glucagon 25 ml IV and then on recheck the BS came up to 105, will continue to monitor.

## 2018-02-09 NOTE — PROGRESS NOTES
Patient calling. She is currently admitted in the hospital, and states she is concerned about her care she is receiving. She did not elaborate, but stated that she doesn't think the staff is honoring Dr. Narayanan's orders.     She would like to speak to [facility] RN[facility] for Dr. Narayanan. Advised patient that Ella is out of office, but she should ask to speak to the charge nurse on the floor to discuss her concerns, and she has the right to ask Dr. Narayanan to be paged. If he is not available, then one of his covering partners should be able to respond.     Also offered patient to speak to hospital patient relations, gave phone number to patient.

## 2018-02-09 NOTE — PLAN OF CARE
Provider notification:  Per orders medications to be given through G port, pt requesting meds go through J-tube port due to nausea.  Awaiting response

## 2018-02-09 NOTE — PLAN OF CARE
Problem: Patient Care Overview  Goal: Plan of Care/Patient Progress Review  Patient has been in pain and the resident came and talked to patient and family, IV dilaudid has been discontinued, MD wrote one time order of oxycodone which was given this afternoon.  Dietitian saw patient and tube to be resumed, ordered the pump but the feeding formula has not been delivered yet.  On regular diet but not eating anything yet, meds are given through the J tube.  G-tube to gravity with minima output of 20cc greenish output, wants to take multivitamin later, has given zofran for nausea, continue to monitor.

## 2018-02-09 NOTE — PLAN OF CARE
Problem: Patient Care Overview  Goal: Plan of Care/Patient Progress Review  Rechecked BS it was 56, charge Nurse also paged the team, and later, they called back to order the glucose, charge nurse previously has talked to the patient on accepting apple juice through the tube but she declined that too, will give the glucose as soon as it is available.  Patient stated that she knows her body, and sometimes the low BS resolves on it's own.

## 2018-02-09 NOTE — PLAN OF CARE
Pt arrived to unit stating she was having intolerable pain. Physician from [facility] 4 treatment team came to pt to discuss pain management. Oxycodone to be changed to liquid solution. Alternate Oxycodone with Hydromorphone doses. Pt encouraged to use alternative pain management methods such as aromatherapy. Pt currently holding ice pack over GJ site. GJ site tubes draining adequately. Scant amounts of serosanguinous drainage from GJ incision site. VSS. Pt reports mild nausea but claims nausea will go away with pain management. Pt A&Ox4.

## 2018-02-09 NOTE — PLAN OF CARE
Problem: Patient Care Overview  Goal: Plan of Care/Patient Progress Review  Outpatient/Observation goals to be met before discharge home:  -diagnostic tests and consults completed and resulted:No   -vital signs normal or at patient baseline: Monitoring   -tolerating oral intake to maintain hydration: No, pt nauseous and not taking anything orallly   -adequate pain control on oral analgesics: No on IV Dilaudid

## 2018-02-09 NOTE — PROGRESS NOTES
REGIONAL ANESTHESIA PAIN SERVICE  SUBJECTIVE  Interval history: Pt reports pain worsened this morning when she felt the block wear off.  She notes return of sensation to abdomen and was tearful during the interview stating she is very concerned about pain control and tolerating tube feedings when she discharges from hospital and would like to know that there is clarification of the plan, hopefully from Dr Narayanan or at a minimum someone from gastroenterology.  Patient calmer after discussion that dense block from local anesthetic for surgery will wear off, but there is still long acting local anesthetic that will be less noticeable in terms of blockade, but it will provide some block or numbness.  At this point the pt admitted that while sensation has returned to abdomen, she still does have numbness at PEG insertion site especially lateral aspect.  Denies nausea.  Denies any weakness, paresthesias, circumoral numbness, metallic taste or tinnitus.  Voiding without difficulty. Pt states she usually takes gabapentin 300 mg BID and 400 mg Q HS, then when we reviewed med rec together, she agreed that she was taking higher doses as written (400 mg BID and 600 mg Q HS), and she thinks it would be beneficial for pain to stay on home dose.       Clinically Aligned Pain Assessment (CAPA):  Comfort (How is your pain?): Intolerable  Change in Pain (Since your last medication/intervention?): Getting worse  Pain Control (How are your pain treatments working?):  Inadequate pain control    Medications related to Pain Management (Future)    Start     Dose/Rate Route Frequency Ordered Stop    02/09/18 1156  oxyCODONE HCl (ROXICODONE INTENSOL) 10 MG/0.5ML (HIGH CONC) solution 10 mg      10 mg Oral EVERY 3 HOURS PRN 02/09/18 1156      02/08/18 2200  nortriptyline (PAMELOR) capsule 30 mg      30 mg Oral AT BEDTIME 02/08/18 1751      02/08/18 2000  gabapentin (NEURONTIN) capsule 300 mg      300 mg Oral 3 TIMES DAILY 02/08/18 1758       "02/08/18 2000  Lidocaine (LIDOCARE) 4 % Patch 1 patch      1 patch  over 12 Hours Transdermal EVERY 24 HOURS 2000 02/08/18 1858 02/08/18 2000  lidocaine patch in PLACE       Transdermal EVERY 8 HOURS 02/08/18 1858 02/08/18 1800  acetaminophen (TYLENOL) tablet 975 mg      975 mg Oral EVERY 6 HOURS 02/08/18 1751 02/08/18 1800  polyethylene glycol (MIRALAX/GLYCOLAX) Packet 17 g      17 g Oral DAILY 02/08/18 1751 02/08/18 1751  senna-docusate (SENOKOT-S;PERICOLACE) 8.6-50 MG per tablet 1 tablet      1 tablet Oral 2 TIMES DAILY PRN 02/08/18 1751 02/08/18 1751  lidocaine 1 % 1 mL      1 mL Other EVERY 1 HOUR PRN 02/08/18 1751 02/08/18 1751  lidocaine (LMX4) kit       Topical EVERY 1 HOUR PRN 02/08/18 1751            OBJECTIVE:    /55  Pulse 91  Temp 98.4  F (36.9  C) (Oral)  Resp 16  Ht 1.68 m (5' 6.14\")  Wt 67.7 kg (149 lb 4 oz)  SpO2 96%  BMI 23.99 kg/m2  Exam:  General: alert and crying, moderate distress  Neuro: Strength 5/5 and grossly symmetric BLE     ASSESSMENT/PLAN:    Alexandra Melgoza is a 22 year old female with tube malfunction, now POD #1 s/p  REPLACE GASTROSTOMY TUBE, PERCUTANEOUS with single shot injection bilateral transversus abdominis plane (TAP) nerve block.  Total bupivacaine 0.25% 20 mL, then liposomal (long-acting) bupivacaine (Exparel) 1.3% 20mL administered 2/8/18 for postop pain control.  Pt is ambulating without difficulty.  No weakness or paresthesias.  No evidence of adverse side effects associated with nerve block injections.  Pt acheiving suboptimal pain control, with nerve block and oral analgesics.  Anticipate up to 72 hours of pain control with long-acting local anesthetic. Additionally, pt will continue to require multimodal analgesia for visceral/muscle/musculoskeletal pain not controlled with long-acting local anesthetic.    - NO other local anesthetic use within 96 hours of liposomal bupivacaine (Exparel)  - increase gabapentin to prior to " admission dosing [phone] - discussed with BERNARDO Boyd MD   - do not place lidocaine patches over abdomen which is area where there was placement of long acting local anesthetic nerve block injections  - patient received verbal and written instructions about liposomal bupivacaine and counseling about pharmacologic and nonpharmacologic measures for acute postoperative pain management  - please call [facility] if questions or concerns    ANABEL [facility]  Regional Anesthesia Pain Service ([facility])  2/9/2018 1:19 PM    [facility] Contact Info (for in-house use only):  Job code ID: [employer] [id number]   [employer] [address]  [facility] [address]  [place] phone: dial [phone], enter jobcode ID, then enter call-back number.    Text: Use [employer] on the Intranet <Paging/Directory> tab and enter Jobcode ID.   If no call back at any time, contact the hospital  and ask for [facility] attending or backup

## 2018-02-09 NOTE — PROGRESS NOTES
Care Coordinator Progress Note     Admission Date/Time:  2/8/2018  Attending MD:  Nadeen Mcrae,*     Data  Chart reviewed, discussed with interdisciplinary team.   Patient was admitted for: Sphincter of Oddi dysfunction.    Concerns with insurance coverage for discharge needs: None.  Current Living Situation: Patient lives with family.  Support System: Supportive and Involved  Services Involved: Home Infusion and Outpatient Infusion Services  Transportation: Family or Friend will provide  Barriers to Discharge: medical clearance      Assessment  Pt is a 22 year old female with PMH significant for gastritis, chronic abdominal pain s/p cholecystectomy, cyclic n/v, migraines, pseudoseizures, and somatoform disorder on chronic narcotics who presented with scheduled PEGJ placement and admitted for pain control. Met with pt at bedside to discuss discharge planning. Pt had NJ prior to procedure and was on continuous tube feeds with [facility]. Pt also goes to the [facility] for prn infusions (IV benadryl, zofran, and fluids). Resumption orders placed for tube feeds. No additional needs identified at this time.      Plan  Anticipated Discharge Date: 2/10/18  Anticipated Discharge Plan:  Discharge to home with resumption of enteral nutrition and outpatient infusions    Alice Castro RN

## 2018-02-09 NOTE — PLAN OF CARE
Problem: Patient Care Overview  Goal: Plan of Care/Patient Progress Review  Notified attending and resident that patient's BS is 58 and she stated that she cannot tolerate any oral intake

## 2018-02-09 NOTE — PROGRESS NOTES
CLINICAL NUTRITION SERVICES - ASSESSMENT NOTE     Nutrition Prescription    RECOMMENDATIONS FOR MDs/PROVIDERS TO ORDER:  None at this time     Malnutrition Status:    Patient does not meet two of the above criteria necessary for diagnosing malnutrition but is at risk for malnutrition    Recommendations already ordered by Registered Dietitian (RD):  1. Start Peptamen 1.5 at 10 ml/hr for 6 hrs and increase 10 ml q 6 hrs until goal rate 50 ml/hr (1200 ml/day) to provide 1800 kcals (26 kcal/kg/day), 82 g PRO (1.2 g/kg/day), 924 ml free H2O, 67 g Fat (70% from MCTs), 226 g CHO and no Fiber daily.  - Flush FT with 60 ml H2O q 4 hrs for tube patency  - Certavite 15 ml daily  Once begin TFs, begin the following pancreatic enzyme regimen and recommend order each of the following:   (please copy and paste administration instructions into each order)  A) Sodium Bicarb tablet (325 mg), 1 tablet q 4 hrs via Jtube. Administration Instructions: Crush 1 tablet and mix into 15 ml of warm water and use this solution to mix with Creon pancreatic enzymes. DO NOT administer directly into Jtube (to be mixed into TF formula with Creon enzyme - see Creon enzyme order)   B) ZenPEP  72465, 1- 2 capsules q 4 hrs via Jtube. Administration Instructions:   --If TF rate is running @ 10-30 ml/hr, administer 1 capsule q 4 hrs;   --If TF rate is running @ 40-50 ml/hr, increase to 2 capsules q 4 hrs.    **Open capsule and empty contents into 15 ml sodium bicarb solution (see sodium bicarb order), let dissolve for about 20-30 minutes and then add this solution to the amount of TF formula hung in TF bag every 4 hrs (i.e., once TF @ goal infusion 50 ml/hr will mix 2 capsules into 200 ml of TF formula every 4 hrs).   *Note: this enzyme regimen with TF @ goal infusion will provide approximately 3582 units of lipase/gram of total Fat daily and approp dosing initially for pancreatic insufficiency with more elemental TF formula.     Future/Additional  "Recommendations:  1. Signs of malabsorption and the need to adjust PERT pen  2. Tolerance to TF  3. Oral intake and H2O flushes     REASON FOR ASSESSMENT  Alexandra Melgoza is a/an 22 year old female assessed by the dietitian for Admission Nutrition Risk Screen for tube feeding or parenteral nutrition and Provider Order - Registered Dietitian to Assess and Order TF per Medical Nutrition Therapy Protocol    NUTRITION HISTORY  Per patient she had been tolerating TFs at home okay with NJT. She was on Peptamen 1.5 @ goal 50 ml/hr (1200 ml/day) to provide 1800 kcals (26 kcal/kg/day), 82 g PRO (1.2 g/kg/day), 924 ml free H2O, 67 g Fat (70% from MCTs), 226 g CHO and no Fiber daily.  - enzymes Zenpep 20,000 2 capsules q 4hrs with TF (this provides 3582 units of lipase/g fat/day)  - she reports bloating and some diarrhea with TFs. She had questions relating to trying Betty Komplete - the hospital does not carry this formula at this time. This writer discussed options with TF and requested patient speak with her outpatient team if she decides this is what she would like to try in the future. Patient verbalized understanding     CURRENT NUTRITION ORDERS  Diet: Regular  Intake/Tolerance: no intake at this time. Patient + pain and nausea    LABS  Labs reviewed    MEDICATIONS  Medications reviewed  zenpep 80112 5 caps with meals (this provides 1471 units of lipase/kg/meal)    ANTHROPOMETRICS  Height: 168 cm (5' 6.142\")  Most Recent Weight: 67.7 kg (149 lb 4 oz)    IBW: 59.74 kg  BMI: Normal BMI  Weight History:   Wt Readings from Last 10 Encounters:   02/08/18 67.7 kg (149 lb 4 oz)   02/05/18 67.1 kg (148 lb)   01/31/18 67.6 kg (149 lb)   01/24/18 67.6 kg (149 lb)   01/18/18 67.8 kg (149 lb 7.6 oz)   01/15/18 65.8 kg (145 lb)   01/13/18 64.9 kg (143 lb)   01/10/18 66.9 kg (147 lb 6.4 oz)   01/10/18 66.9 kg (147 lb 6.4 oz)   01/03/18 65.8 kg (145 lb)   + recent weight gain  Dosing Weight: 68 kg (actual wt)    ASSESSED NUTRITION " NEEDS  Estimated Energy Needs: 1774-3610 kcals/day (25 - 30 kcals/kg)  Justification: Maintenance  Estimated Protein Needs: 68-81 grams protein/day (1 - 1.2 grams of pro/kg)  Justification: Maintenance  Estimated Fluid Needs: 1 mL/kcal   Justification: Maintenance    PHYSICAL FINDINGS  See malnutrition section below.    MALNUTRITION  % Intake: No decreased intake noted  % Weight Loss: None noted  Subcutaneous Fat Loss: None observed  Muscle Loss: None observed  Fluid Accumulation/Edema: None noted  Malnutrition Diagnosis: Patient does not meet two of the above criteria necessary for diagnosing malnutrition but is at risk for malnutrition    NUTRITION DIAGNOSIS  Predicted inadequate nutrient intake energy/protein related to Nausea, possible interruptions to TF    INTERVENTIONS  Implementation  1. Nutrition Education: See education flowsheet   2. Enteral Nutrition - Initiate (as above)  3. Discussed FEN/GI with team- team okayed this writer to order PERT/bicarb    Goals  Total avg nutritional intake to meet a minimum of 25 kcal/kg and 1 g PRO/kg daily (per dosing wt 68 kg).     Monitoring/Evaluation  Progress toward goals will be monitored and evaluated per protocol.    Juanita Slater MS/RD/LD/CNSC  6D RD Pager: [phone]

## 2018-02-10 VITALS
SYSTOLIC BLOOD PRESSURE: 124 MMHG | TEMPERATURE: 98.8 F | RESPIRATION RATE: 16 BRPM | OXYGEN SATURATION: 98 % | HEART RATE: 84 BPM | HEIGHT: 66 IN | BODY MASS INDEX: 23.99 KG/M2 | WEIGHT: 149.25 LBS | DIASTOLIC BLOOD PRESSURE: 89 MMHG

## 2018-02-10 PROCEDURE — 25000132 ZZH RX MED GY IP 250 OP 250 PS 637: Performed by: STUDENT IN AN ORGANIZED HEALTH CARE EDUCATION/TRAINING PROGRAM

## 2018-02-10 PROCEDURE — G0378 HOSPITAL OBSERVATION PER HR: HCPCS

## 2018-02-10 PROCEDURE — 00000146 ZZHCL STATISTIC GLUCOSE BY METER IP

## 2018-02-10 PROCEDURE — 25000128 H RX IP 250 OP 636: Performed by: STUDENT IN AN ORGANIZED HEALTH CARE EDUCATION/TRAINING PROGRAM

## 2018-02-10 PROCEDURE — 25000132 ZZH RX MED GY IP 250 OP 250 PS 637: Performed by: INTERNAL MEDICINE

## 2018-02-10 PROCEDURE — 96375 TX/PRO/DX INJ NEW DRUG ADDON: CPT

## 2018-02-10 PROCEDURE — 27210436 ZZH NUTRITION PRODUCT SEMIELEM INTERMED CAN

## 2018-02-10 PROCEDURE — 99217 ZZC OBSERVATION CARE DISCHARGE: CPT | Performed by: INTERNAL MEDICINE

## 2018-02-10 PROCEDURE — 96376 TX/PRO/DX INJ SAME DRUG ADON: CPT

## 2018-02-10 PROCEDURE — 25000128 H RX IP 250 OP 636: Performed by: INTERNAL MEDICINE

## 2018-02-10 RX ORDER — HEPARIN SODIUM (PORCINE) LOCK FLUSH IV SOLN 100 UNIT/ML 100 UNIT/ML
100 SOLUTION INTRAVENOUS ONCE
Status: COMPLETED | OUTPATIENT
Start: 2018-02-10 | End: 2018-02-10

## 2018-02-10 RX ORDER — HEPARIN SODIUM (PORCINE) LOCK FLUSH IV SOLN 100 UNIT/ML 100 UNIT/ML
100 SOLUTION INTRAVENOUS ONCE
Status: DISCONTINUED | OUTPATIENT
Start: 2018-02-10 | End: 2018-02-10 | Stop reason: HOSPADM

## 2018-02-10 RX ORDER — HYDROXYZINE HCL 10 MG/5 ML
25 SOLUTION, ORAL ORAL EVERY 4 HOURS PRN
Qty: 473 ML | Refills: 0 | Status: SHIPPED | OUTPATIENT
Start: 2018-02-10 | End: 2018-03-13

## 2018-02-10 RX ORDER — OXYCODONE HCL 20 MG/ML
10-15 CONCENTRATE, ORAL ORAL EVERY 4 HOURS PRN
Qty: 13 ML | Refills: 0 | Status: SHIPPED | OUTPATIENT
Start: 2018-02-10 | End: 2018-02-25

## 2018-02-10 RX ORDER — OXYCODONE HCL 20 MG/ML
15 CONCENTRATE, ORAL ORAL EVERY 4 HOURS PRN
Status: DISCONTINUED | OUTPATIENT
Start: 2018-02-10 | End: 2018-02-10 | Stop reason: HOSPADM

## 2018-02-10 RX ADMIN — OXYCODONE HYDROCHLORIDE 15 MG: 100 SOLUTION ORAL at 18:43

## 2018-02-10 RX ADMIN — HYDROXYZINE HYDROCHLORIDE 25 MG: 10 SYRUP ORAL at 18:12

## 2018-02-10 RX ADMIN — OXYCODONE HYDROCHLORIDE 10 MG: 100 SOLUTION ORAL at 05:26

## 2018-02-10 RX ADMIN — OXYCODONE HYDROCHLORIDE 10 MG: 100 SOLUTION ORAL at 02:22

## 2018-02-10 RX ADMIN — CEPHALEXIN 500 MG: 250 POWDER, FOR SUSPENSION ORAL at 09:37

## 2018-02-10 RX ADMIN — HYDROXYZINE HYDROCHLORIDE 25 MG: 10 SYRUP ORAL at 09:37

## 2018-02-10 RX ADMIN — GABAPENTIN 400 MG: 400 CAPSULE ORAL at 09:38

## 2018-02-10 RX ADMIN — OXYCODONE HYDROCHLORIDE 10 MG: 100 SOLUTION ORAL at 00:02

## 2018-02-10 RX ADMIN — SODIUM BICARBONATE 325 MG: 325 TABLET ORAL at 11:38

## 2018-02-10 RX ADMIN — ACETAMINOPHEN 975 MG: 325 TABLET, FILM COATED ORAL at 14:59

## 2018-02-10 RX ADMIN — SODIUM BICARBONATE 325 MG: 325 TABLET ORAL at 06:27

## 2018-02-10 RX ADMIN — OXYCODONE HYDROCHLORIDE 15 MG: 100 SOLUTION ORAL at 10:58

## 2018-02-10 RX ADMIN — HYDROXYZINE HYDROCHLORIDE 25 MG: 10 SYRUP ORAL at 13:44

## 2018-02-10 RX ADMIN — SODIUM CHLORIDE, PRESERVATIVE FREE 500 UNITS: 5 INJECTION INTRAVENOUS at 19:18

## 2018-02-10 RX ADMIN — Medication 15 ML: at 09:37

## 2018-02-10 RX ADMIN — GABAPENTIN 400 MG: 400 CAPSULE ORAL at 14:59

## 2018-02-10 RX ADMIN — ACETAMINOPHEN 975 MG: 325 TABLET, FILM COATED ORAL at 02:22

## 2018-02-10 RX ADMIN — OXYCODONE HYDROCHLORIDE 10 MG: 100 SOLUTION ORAL at 08:31

## 2018-02-10 RX ADMIN — HYDROMORPHONE HYDROCHLORIDE 1 MG: 1 INJECTION, SOLUTION INTRAMUSCULAR; INTRAVENOUS; SUBCUTANEOUS at 01:07

## 2018-02-10 RX ADMIN — ONDANSETRON 4 MG: 2 INJECTION INTRAMUSCULAR; INTRAVENOUS at 01:13

## 2018-02-10 RX ADMIN — ACETAMINOPHEN 975 MG: 325 TABLET, FILM COATED ORAL at 09:38

## 2018-02-10 RX ADMIN — OXYCODONE HYDROCHLORIDE 15 MG: 100 SOLUTION ORAL at 14:58

## 2018-02-10 RX ADMIN — SODIUM BICARBONATE 325 MG: 325 TABLET ORAL at 15:45

## 2018-02-10 RX ADMIN — SODIUM BICARBONATE 325 MG: 325 TABLET ORAL at 02:17

## 2018-02-10 RX ADMIN — PANTOPRAZOLE SODIUM 40 MG: 40 TABLET, DELAYED RELEASE ORAL at 09:38

## 2018-02-10 ASSESSMENT — PAIN DESCRIPTION - DESCRIPTORS
DESCRIPTORS: CONSTANT;SHARP
DESCRIPTORS: SHARP
DESCRIPTORS: CONSTANT;SHARP
DESCRIPTORS: ACHING
DESCRIPTORS: CONSTANT;SHARP
DESCRIPTORS: CONSTANT;SHARP
DESCRIPTORS: ACHING
DESCRIPTORS: CONSTANT;SHARP

## 2018-02-10 NOTE — PLAN OF CARE
Problem: Patient Care Overview  Goal: Discharge Needs Assessment  Outpatient/Observation goals to be met before discharge home:    diagnostic tests and consults completed and resulted : Yes  -vital signs normal or at patient baseline : Yes.  -tolerating oral intake to maintain hydration : No. Pt does not eat currently stating taking food or drink orally will make her have more pain and have nausea.Pt is on TF titrating up to a goal rate of 50 ml/hr.  -adequate pain control on oral analgesics : Pt is on oxycodone 15 mg solution Q 4 hrs and atarax 25 mg solution Q 4 hrs, on scheduled tylenol 975 mg solution Q 6 hrs  and gabapentin 400 mg capsule BID; pt states that her pain is not well controlled yet.  Nurse to notify provider when observation goals have been met and patient is ready for discharge

## 2018-02-10 NOTE — PROGRESS NOTES
INTERNAL MEDICINE PROGRESS NOTE    Date of Service: February 10, 2018            Assessment and Plan:     Alexandra Melgoza is a 22 year old Alexandra Melgoza is a 22 year old female with a history of gastritis, chronic abdominal pain post-cholecystectomy with mildly abnormal liver enzymes, not responding to sphincter of Oddi ablation a few years ago,cyclic N/V, migraines, pseudoseizures and somatoform disorder on chronic narcotics presented with scheduled PEGJ placement and admitted for pain control.      # Acute on chronic abdominal pain  Patient has a history of chronic pain syndrome requiring hospitalization for pain control. Acute pain due to PEGJ placement. Case was discussed with Dr. Narayanan of GI and pain team. We continue to make progress today with regards to pain control.   - oxycodone 15 mg po q4 hours prn  - continue acetaminophen 1000mg po q6h     - continue gabapentin 400mg po BID and 600 qhs  - continue nortriptyline 30 mg qhs   - continue hydroxyzine 25mg q4h prn  - zofran PRN for nausea  - continue Protonix      # Tube feeding   Titrating to goal of 50 cc/hr per nutrition. This has been complicated by patient's nausea and pain. Pain and nausea control per above.       FEN:   Active Diet Order      Regular Diet Adult  PPx: ambulatory  Code: Full Code  Dispo: Patient admitted under observation for pain control    Patient staffed with Dr. Nadeen Mcrae, who agrees with above plans.    Kael Boyd MD  Internal Medicine, PGY-2  [phone]  -----------------------------------------------------------------------------------------------------------------------------------------------    Interval History    No acute events overnight. Patient explained that pain was better controlled overnight. Continue to make progress with oral feeds.     Review of Systems:   A 4 point review of systems was negative except as noted above.    OBJECTIVE:  VS: Vital signs:  Temp: 98.3  F (36.8  C) Temp src: Oral BP:  "115/62   Heart Rate: 72 Resp: 16 SpO2: 97 % O2 Device: None (Room air) Oxygen Delivery: 4 LPM Height: 168 cm (5' 6.14\") Weight: 67.7 kg (149 lb 4 oz)  Estimated body mass index is 23.99 kg/(m^2) as calculated from the following:    Height as of this encounter: 1.68 m (5' 6.14\").    Weight as of this encounter: 67.7 kg (149 lb 4 oz).      Intake/Output Summary (Last 24 hours) at 02/10/18 1233  Last data filed at 02/10/18 0951   Gross per 24 hour   Intake              370 ml   Output              400 ml   Net              -30 ml     GENERAL: Alert and interactive. Sobbing intermittently during conversation  LUNG: Non-labored breathing   CV: Regular rate and rhythm. No murmur or rub.   GI: Abdomen soft, non-distended, and mildly tender around PEG site. No palpable masses or organomegaly. PEG site c/d/i.  SKIN: Warm and dry. No concerning lesions or rash on exposed surfaces.  MSK: Extremities grossly normal, non-tender, no edema. Strong peripheral pulses. Good strength and ROM in bed.   NEURO: A&O x 3. Grossly non-focal.    Labs:   All labs reviewed by me  Electrolytes/Renal - Recent Labs   Lab Test  02/08/18   1150  02/05/18   1446  02/02/18   1230  01/24/18   1235  01/21/18   0754   12/11/17   0749   12/10/17   0737   NA   --   140  141  138  139   < >  142   --   143   POTASSIUM   --   4.2  4.0  3.8  4.0   < >  3.5   < >  3.3*   CHLORIDE   --   103  105  104  103   < >  105   --   107   CO2   --   28  30  28  29   < >  25   --   26   BUN  5*  7  8  12  5*   < >  4*   --   4*   CR   --   0.54  0.50*  0.52  0.53   < >  0.64   --   0.62   GLC   --   89  88  112*  95   < >  73   --   77   RENEA   --   9.0  8.6  8.1*  8.7   < >  8.4*   --   8.5   MAG   --    --    --    --   1.7   --   1.7   --   1.7   PHOS   --    --    --    --   4.4   --   4.0   --   3.4    < > = values in this interval not displayed.     CBC -   Recent Labs   Lab Test  02/08/18   1150  02/05/18   1446  02/02/18   1230   WBC  4.4  4.9  4.0   HGB  11.0*  " 12.0  10.8*   PLT  213  230  222     LFTs Recent Labs   Lab Test  02/05/18   1446  01/24/18   1235  01/19/18   1039   ALKPHOS  96  95  95   BILITOTAL  0.4  0.2  0.5   ALT  39  107*  64*   AST  32  42  44   PROTTOTAL  7.2  6.2*  6.6*   ALBUMIN  4.2  3.5  3.7       Current Medications:    multivitamins with minerals  15 mL Per Feeding Tube Daily     amylase-lipase-protease  1-2 capsule Oral Q4H     sodium bicarbonate  325 mg Oral or Feeding Tube Q4H     gabapentin  400 mg Oral BID     gabapentin  600 mg Oral At Bedtime     cephaleXin  500 mg Oral BID     sodium chloride (PF)  3 mL Intracatheter Q8H     acetaminophen  975 mg Oral Q6H     nortriptyline  30 mg Oral At Bedtime     polyethylene glycol  17 g Oral Daily     norethindrone  5 mg Oral Daily     menthol   Transdermal Q8H     amylase-lipase-protease  5 capsule Oral 4x Daily w/meals     pantoprazole  40 mg Oral BID

## 2018-02-10 NOTE — PLAN OF CARE
Problem: Patient Care Overview  Goal: Plan of Care/Patient Progress Review  Pt is alert and oriented x4. AVSS. Pt did not take food or drink orally all day long stating that eating or drinking will give her more pain and have her nausea. Pt is on TF via J tube that has been titrating up Q 4 hrs by 10cc to a goal rate of 50 cc/hr per  order. Tf is running at 40 cc/hr currently and will be titrated up to 50 cc/hr at 6 pm.Pt is tolerating tube feeding so far, denied nausea. Pt C/O constant abdominal pain, decrease in pain with oxycodone 15 mg solution Q 4 hrs, atarax 25 mg solution Q 4 hrs, scheduled tylenol 975 mg Q 6 hrs and gabapentin 400 mg capsule BID. G tube to gravity with about 100 cc of greenish output. Up independent to bathroom, voids, but pt does not save urine. Grandmother and grandfather came this afternoon to visit pt.Plan is to discharge pt home once pt's Tf reaches at goal rate( 50 cc/hr) and pt feels okay to be discharged this evening.

## 2018-02-10 NOTE — PLAN OF CARE
Problem: Patient Care Overview  Goal: Discharge Needs Assessment  Outpatient/Observation goals to be met before discharge home:    diagnostic tests and consults completed and resulted : Yes  -vital signs normal or at patient baseline : Yes.  -tolerating oral intake to maintain hydration : No. Pt does not eat currently stating taking food or drink orally will make her have more pain and have nausea.Pt is on TF titrating up to a goal rate of 50 ml/hr. Tf currently is at 40 ml/hr  -adequate pain control on oral analgesics : Pt is on oxycodone 15 mg solution Q 4 hrs and atarax 25 mg solution Q 4 hrs, on scheduled tylenol 975 mg solution Q 6 hrs  and gabapentin 400 mg capsule BID; pt states that her pain is not well controlled yet.  Nurse to notify provider when observation goals have been met and patient is ready for discharge

## 2018-02-10 NOTE — PROVIDER NOTIFICATION
Pt with tube feeds at 20 ml/hr to be increased to 30 ml at 0600. Has been having increased abdominal pain. Required dilaudid IV x1.  Would like to keep feeds at 20 ml/hr.Dr Saunders notified and OK to keep feeds at 20 ml/hr.

## 2018-02-10 NOTE — PLAN OF CARE
Problem: Patient Care Overview  Goal: Discharge Needs Assessment  Outcome: No Change  Outpatient/Observation goals to be met before discharge home:    OBSERVATION UNIT DISCHARGE PLANNING GOALS:    -diagnostic tests and consults completed and resulted : No.  -vital signs normal or at patient baseline : Yes.  -tolerating oral intake to maintain hydration : No.  -adequate pain control on oral analgesics : No.  Nurse to notify provider when observation goals have been met and patient is ready for discharge

## 2018-02-10 NOTE — PLAN OF CARE
Problem: Patient Care Overview  Goal: Plan of Care/Patient Progress Review  Outcome: No Change  Outpatient/Observation goals to be met before discharge home:      OBSERVATION UNIT DISCHARGE PLANNING GOALS:      -diagnostic tests and consults completed and resulted : No.  -vital signs normal or at patient baseline : Yes.  -tolerating oral intake to maintain hydration : No.  -adequate pain control on oral analgesics : No, one dose IV dilaudid given  Nurse to notify provider when observation goals have been met and patient is ready for discharge

## 2018-02-10 NOTE — PROGRESS NOTES
INTERNAL MEDICINE PROGRESS NOTE    Date of Service: February 9, 2018            Assessment and Plan:     Alexandra Melgoza is a 22 year old Alexandra Melgoza is a 22 year old female with a history of gastritis, chronic abdominal pain post-cholecystectomy with mildly abnormal liver enzymes, not responding to sphincter of Oddi ablation a few years ago,cyclic N/V, migraines, pseudoseizures and somatoform disorder on chronic narcotics presented with scheduled PEGJ placement and admitted for pain control.     # Acute on chronic abdominal pain  Patient has a history of chronic pain syndrome requiring hospitalization for pain control. Acute pain due to PEGJ placement. Case was discussed with Dr. Narayanan of GI and pain team. Today we attempted to transition patient's pain regimen to oral medication from IV. We explained to patient and family that the total morphine equivalent has not changed. Unfortunately, patient did not tolerate this change. We have agreed to a very specific taper overnight to help patient alleviate her pain. This is as follows:  - oxycodone 10 mg po q2 hours prn  - hydromorphone 1 mg IV PRN x2  - continue acetaminophen 1000mg po q6h     - continue gabapentin 400mg po BID and 600 qhs  - continue nortriptyline 30 mg qhs   - zofran PRN for nausea  - continue Protonix     FEN:   Active Diet Order      Regular Diet Adult  PPx: ambulatory  Code: Full Code  Dispo: Patient admitted under observation for pain control    Patient staffed with Dr. Nadeen Mcrae, who agrees with above plans.    Kael Boyd MD  Internal Medicine, PGY-2  [phone]  -----------------------------------------------------------------------------------------------------------------------------------------------    Interval History    Patient was seen 5 times in total today. Patient was very distressed and sobbing each time we saw the patient and explained that her pain is worsening along with nausea. At one time, patient's sister  "stated \"I need you to fucking get a pain consult.\" We spent time addressing patient's pain symptoms and discussing oral options. Pain regimen as above.     Review of Systems:   A 4 point review of systems was negative except as noted above.    OBJECTIVE:  VS: Vital signs:  Temp: 98.3  F (36.8  C) Temp src: Oral BP: 115/62   Heart Rate: 72 Resp: 16 SpO2: 97 % O2 Device: None (Room air) Oxygen Delivery: 4 LPM Height: 168 cm (5' 6.14\") Weight: 67.7 kg (149 lb 4 oz)  Estimated body mass index is 23.99 kg/(m^2) as calculated from the following:    Height as of this encounter: 1.68 m (5' 6.14\").    Weight as of this encounter: 67.7 kg (149 lb 4 oz).      Intake/Output Summary (Last 24 hours) at 02/10/18 0801  Last data filed at 02/10/18 0600   Gross per 24 hour   Intake              300 ml   Output              400 ml   Net             -100 ml     GENERAL: Alert and interactive. Sobbing intermittently during conversation  LUNG: Non-labored breathing, good air exchange, lungs clear to auscultation bilaterally. No cough or wheeze noted.   CV: Regular rate and rhythm. No murmur or rub.   GI: Normoactive bowel sounds. Abdomen soft, non-distended, and mildly tender around PEG site. No palpable masses or organomegaly. PEG site c/d/i.  SKIN: Warm and dry. No concerning lesions or rash on exposed surfaces.  MSK: Extremities grossly normal, non-tender, no edema. Strong peripheral pulses. Good strength and ROM in bed.   NEURO: A&O x 3. CNs 2-12 grossly intact, speech normal, gait normal, sensation to light touch grossly WNL. Grossly non-focal.    Labs:   All labs reviewed by me  Electrolytes/Renal - Recent Labs   Lab Test  02/08/18   1150  02/05/18   1446  02/02/18   1230  01/24/18   1235  01/21/18   0754   12/11/17   0749   12/10/17   0737   NA   --   140  141  138  139   < >  142   --   143   POTASSIUM   --   4.2  4.0  3.8  4.0   < >  3.5   < >  3.3*   CHLORIDE   --   103  105  104  103   < >  105   --   107   CO2   --   28 30  " 28  29   < >  25   --   26   BUN  5*  7  8  12  5*   < >  4*   --   4*   CR   --   0.54  0.50*  0.52  0.53   < >  0.64   --   0.62   GLC   --   89  88  112*  95   < >  73   --   77   RENEA   --   9.0  8.6  8.1*  8.7   < >  8.4*   --   8.5   MAG   --    --    --    --   1.7   --   1.7   --   1.7   PHOS   --    --    --    --   4.4   --   4.0   --   3.4    < > = values in this interval not displayed.     CBC -   Recent Labs   Lab Test  02/08/18   1150  02/05/18   1446  02/02/18   1230   WBC  4.4  4.9  4.0   HGB  11.0*  12.0  10.8*   PLT  213  230  222     LFTs Recent Labs   Lab Test  02/05/18   1446  01/24/18   1235  01/19/18   1039   ALKPHOS  96  95  95   BILITOTAL  0.4  0.2  0.5   ALT  39  107*  64*   AST  32  42  44   PROTTOTAL  7.2  6.2*  6.6*   ALBUMIN  4.2  3.5  3.7       Current Medications:    multivitamins with minerals  15 mL Per Feeding Tube Daily     amylase-lipase-protease  1-2 capsule Oral Q4H     sodium bicarbonate  325 mg Oral or Feeding Tube Q4H     gabapentin  400 mg Oral BID     gabapentin  600 mg Oral At Bedtime     cephaleXin  500 mg Oral BID     sodium chloride (PF)  3 mL Intracatheter Q8H     acetaminophen  975 mg Oral Q6H     nortriptyline  30 mg Oral At Bedtime     polyethylene glycol  17 g Oral Daily     norethindrone  5 mg Oral Daily     menthol   Transdermal Q8H     amylase-lipase-protease  5 capsule Oral 4x Daily w/meals     pantoprazole  40 mg Oral BID

## 2018-02-10 NOTE — PLAN OF CARE
Problem: Patient Care Overview  Goal: Discharge Needs Assessment  Outcome: No Change  Outpatient/Observation goals to be met before discharge home:    OBSERVATION UNIT DISCHARGE PLANNING GOALS:      -diagnostic tests and consults completed and resulted : No.  -vital signs normal or at patient baseline : Yes.  -tolerating oral intake to maintain hydration : No.  -adequate pain control on oral analgesics : No.  Nurse to notify provider when observation goals have been met and patient is ready for discharge.

## 2018-02-11 ENCOUNTER — NURSE TRIAGE (OUTPATIENT)
Dept: NURSING | Facility: CLINIC | Age: 23
End: 2018-02-11

## 2018-02-11 NOTE — PLAN OF CARE
Problem: Patient Care Overview  Goal: Discharge Needs Assessment  Pt later this evening stated that her pain is worse and she requested for more pain med. Medicine crossover, Dr. Tawanna Villalobos was notified and MD came and discussed regarding unable to change current pain regime (according to plan per primary care provider) with pt and pt's mother, Md stated to pt that  pt can stay over night and discuss again with primary care provider tomorrow. Pt decided to go home if there is no change in her pain med regime overnight.Tatianna cath was flushed with heparin per protocol and tatianna cath was  de accessed. J tube was flushed and clamped for the ride home. Pt was provided with some supplies that are needed for GJ tube care.Discharge instruction was revised with pt and pt's mother. Pt and pt's mother stated that they do not have question regarding discharge paper work. Pt's mother picked discharge med from discharge pharmacy.Mother to give pt ride home.Pt to follow up with clinic appointments per discharge instruction.

## 2018-02-11 NOTE — TELEPHONE ENCOUNTER
Alexandra discharged from the hospital 2/10/2018 post J-tube placement. She has concerns about her stoma. It's leaking around the tube and is painful. It's a burning pain on the skin.  She said too that a bloody piece of gauze is still on the operative site and she's wondering if that can be removed. I asked to have the page  page the provider on call for Dr LIVE Narayanan. I was told Dr Camilo Mccollum is the on call. I gave Alexandra's phone number, [phone]. I asked that Alexandra call back if she hasn't received a call by 2:10 pm  [facility]     Alexandra called back at 2:15pm. She had not yet received a call.  I asked the page  to put out a second page.  I asked Alexandra to call back if she doesn't received a call back by 2:45pm  [facility]     Alexandra called again at 4:25pm. I spoke to a page  again and this time she called Dr Mccollum's cell phone and connected him with me vs the patient. He' been too busy.  He said there is only one provider taking all the calls on the weekends. He asked me if he could call Alexandra later when he has the time.  I told him yes. I called Alexandra back and told her he said he'll call her back later when he can.  I asked her again to call [facility] if she hasn't gotten a call back by 8pm tonight. She agreed.  [facility]

## 2018-02-11 NOTE — DISCHARGE SUMMARY
General acute hospital, [place]    Internal Medicine Discharge Summary- [facility]    Date of Admission:  2/8/2018  Date of Discharge:  2/10/2018  8:00 PM  Discharging Attending Provider: Nadeen Mcrae  Discharge Team: [facility] 4    Discharge Diagnoses   Chronic abdominal pain due to pancreatitis.  Acute abdominal pain due to PEG tube placement  Poor PO intake requiring tube feeds    Follow-ups Needed After Discharge   F/u with PCP, pain clinic, and GI  Consider additional treatments for anxiety     Hospital Course   Alexandra Melgoza was admitted on 2/8/2018 for pain after PEGJ tube placement.  The following problems were addressed during her hospitalization:    # Acute on chronic abdominal pain  Patient has a history of chronic pain syndrome requiring hospitalization for pain control. Acute pain due to PEGJ placement. Pain management was difficult in part due to an initial unwillingness to transition from IV pain control to liquid oxycodone through the PEGJ.  I am concerned that some of reluctance to transition to liquid oxycodone is due to untreated anxiety, and she did agree that she would benefit from getting her anxiety under better control.  At the time of d/c, she was provided with a 3 day script for oxycodone 10-15mg q4 hrs prn through the PEGJ.  She has a f/u appt with Dr. Wang from the pain clinic.  Other medications include:  - atarax 25mg q4 hrs prn (NEW)  - continue acetaminophen 1000mg po q6h     - continue gabapentin 400mg po BID and 600 qhs  - continue nortriptyline 30 mg qhs   - zofran PRN for nausea  - continue Protonix    #Poor PO intake requiring TFs: Titrated back up to home rate of 50cc/hr.  She will continue TFs at home.    Consultations This Hospital Stay   NUTRITION SERVICES ADULT IP CONSULT  PHARMACY IP CONSULT     Code Status   Full Code       Nadeen Mcrae  [facility]  Pager:  [phone]  ______________________________________________________________________    Physical Exam   Vital Signs: Temp: 98.8  F (37.1  C) Temp src: Oral BP: 124/89 Pulse: 84   Resp: 16 SpO2: 98 % O2 Device: None (Room air)    Weight: 149 lbs 4.02 oz    General Appearance: NAD, laying in bed  Respiratory: CTAB  Cardiovascular: RRR  GI: soft, tender around the site of the PEGJ.  No erythema or drainage  Skin: intact  Other: AAOx3, anxious    Significant Results and Procedures   Most Recent 3 CBC's:  Recent Labs   Lab Test  02/08/18   1150  02/05/18   1446  02/02/18   1230   WBC  4.4  4.9  4.0   HGB  11.0*  12.0  10.8*   MCV  93  94  93   PLT  213  230  222     Most Recent 3 BMP's:  Recent Labs   Lab Test  02/08/18   1150  02/05/18   1446  02/02/18   1230  01/24/18   1235   NA   --   140  141  138   POTASSIUM   --   4.2  4.0  3.8   CHLORIDE   --   103  105  104   CO2   --   28  30  28   BUN  5*  7  8  12   CR   --   0.54  0.50*  0.52   ANIONGAP   --   9  6  6   RENEA   --   9.0  8.6  8.1*   GLC   --   89  88  112*       Pending Results   These results will be followed up by   Unresulted Labs Ordered in the Past 30 Days of this Admission     No orders found from 12/10/2017 to 2/9/2018.             Primary Care Physician   Quyen Baez    Discharge Disposition   Discharged to home  Condition at discharge: Stable    Discharge Orders     Home infusion referral     Reason for your hospital stay   You were admitted for pain control after PEG tube placement     Adult [facility] Follow-up and recommended labs and tests   Follow up with Dr. Wang and Dr. Narayanan as scheduled    Appointments on [place] and/or [facility] (with [facility] or [facility] provider or service). Call [phone] if you haven't heard regarding these appointments within 7 days of discharge.     Activity   Your activity upon discharge: activity as tolerated     Discharge Instructions   1. Follow up with your appointments as scheduled.  2. If concerns  regarding pain control, call Dr. Narayanan's office directly.     Full Code     Diet   Follow this diet upon discharge: Orders Placed This Encounter     Adult Formula Drip Feeding: Continuous Peptamen 1.5; Jejunostomy; Goal Rate: 50; mL/hr; Medication - Tube Feeding Flush Frequency: At least 15-30 mL water before and after medication administration and with tube clogging; Amout to Send (Nutrition ...     Regular Diet Adult       Discharge Medications   Discharge Medication List as of 2/10/2018  7:05 PM      START taking these medications    Details   oxyCODONE (ROXICODONE INTENSOL) 20 mg/mL (HIGH CONC) solution Take 0.5-0.75 mLs (10-15 mg) by mouth every 4 hours as needed for moderate to severe pain, Disp-13 mL, R-0, Local Print      hydrOXYzine (ATARAX) 10 MG/5ML syrup Take 12.5 mLs (25 mg) by mouth every 4 hours as needed for anxiety or other (pain), Disp-473 mL, R-0, E-Prescribe         CONTINUE these medications which have NOT CHANGED    Details   ondansetron (ZOFRAN ODT) 4 MG ODT tab Take 1 tablet (4 mg) by mouth every 8 hours as needed for nausea or vomiting, Disp-40 tablet, R-3, E-Prescribe      menthol (ICY HOT) 5 % PTCH Apply 1 patch topically every 8 hours as needed for muscle soreness, Disp-15 patch, R-3, E-Prescribe      !! amylase-lipase-protease (ZENPEP) 45356 UNITS CPEP Take 5 capsules (100,000 Units) by mouth 4 times daily (with meals and nightly), Disp-180 capsule, R-1, Historical      multivitamins with minerals (CERTAVITE/CEROVITE) LIQD liquid 15 mLs by Per Feeding Tube route daily, Disp-1 Bottle, R-0, E-Prescribe      !! gabapentin (NEURONTIN) 250 MG/5ML solution 8 mLs (400 mg) by ORAL OR NJ TUBE route 2 times daily, Disp-470 mL, R-0, E-Prescribe      !! gabapentin (NEURONTIN) 250 MG/5ML solution Take 12 mLs (600 mg) by mouth daily, Disp-450 mL, R-0, E-Prescribe      sodium bicarbonate 325 MG tablet 1 tablet (325 mg) by Per Feeding Tube route 4 times daily, Disp-200 tablet, R-0, E-Prescribe       order for DME Equipment being ordered: TENS with wire and electrode.Disp-1 Units, R-0, Local Print      scopolamine (TRANSDERM) 72 hr patch Apply 1 patch to hairless area behind one ear if severe nausea and vomiting.  Remove old patch and change every 3 days (72 hours)., Disp-2 patch, R-0, Local Print      nortriptyline (PAMELOR) 10 MG capsule Take 3 capsules (30 mg) by mouth At Bedtime, Disp-120 capsule, R-0, E-Prescribe      !! amylase-lipase-protease (ZENPEP) 18921 UNITS CPEP Take 2-3 capsules (40,000-60,000 Units) by mouth Take with snacks or supplements (Snacks), Disp-180 capsule, R-1, E-Prescribe      senna-docusate (SENOKOT-S;PERICOLACE) 8.6-50 MG per tablet Take 1 tablet by mouth 2 times daily as needed for constipation, Disp-100 tablet, R-0, E-Prescribe      pantoprazole (PROTONIX) 40 MG EC tablet Take 1 tablet (40 mg) by mouth 2 times daily (before meals), Disp-30 tablet, R-1, E-Prescribe      polyethylene glycol (MIRALAX/GLYCOLAX) Packet Take 17 g by mouth daily, Disp-7 packet, R-0, E-Prescribe      norethindrone (AYGESTIN) 5 MG tablet Take 1 tablet (5 mg) by mouth daily, Disp-90 tablet, R-1, E-Prescribe      fluticasone (FLONASE) 50 MCG/ACT spray Spray 2 sprays into both nostrils daily, Disp-16 g, R-3, E-Prescribe      levalbuterol (XOPENEX HFA) 45 MCG/ACT Inhaler Inhale 2 puffs into the lungs every 6 hours as needed for shortness of breath / dyspnea, Disp-1 Inhaler, R-1, E-Prescribe      leuprolide (LUPRON DEPOT) 11.25 MG injection Inject 11.25 mg into the muscle every 3 months, Disp-1 each, R-3, Injection      RIZATRIPTAN BENZOATE PO Take 5 mg by mouth once as needed , Historical       !! - Potential duplicate medications found. Please discuss with provider.      STOP taking these medications       cephalexin (KEFLEX) 250 MG/5ML suspension Comments:   Reason for Stopping:         oxyCODONE (ROXICODONE) 5 MG/5ML solution Comments:   Reason for Stopping:             Allergies   Allergies   Allergen  "Reactions     Amoxicillin-Pot Clavulanate Nausea and Vomiting     Compazine [Prochlorperazine] Other (See Comments)     Dystonia       Hyoscyamine Other (See Comments)     Dystonia     Reglan [Metoclopramide Hcl] Other (See Comments)     Dystonia     Zyprexa Other (See Comments)     Sensitive, dystonic reaction on 11-9-2011     Amitriptyline Hcl Other (See Comments)     Dystonia, hallucinations     Buspirone Other (See Comments)     No Adverse Reactions, no benefit     Cogentin [Benztropine]      Cyproheptadine Other (See Comments)     Distonic     Dicyclomine Other (See Comments)     Droperidol Other (See Comments)     Feels tense and \"like she has to jump out of her skin\".       Effexor [Venlafaxine] Other (See Comments)     Dystonia     Food      Cilantro--lips/tongue swelling     No Clinical Screening - See Comments Other (See Comments)     Cilantro     Phenergan Dm [Promethazine-Dm] Other (See Comments)     dystonia     Promethazine Other (See Comments)     Risperidone Other (See Comments)     dystonia     Vistaril Other (See Comments)     Burning sensation.       Augmentin GI Disturbance     Ketamine Other (See Comments)     jittery     Sorbitol GI Disturbance     Headache and dyspepsia       "

## 2018-02-12 ENCOUNTER — RADIANT APPOINTMENT (OUTPATIENT)
Dept: GENERAL RADIOLOGY | Facility: CLINIC | Age: 23
End: 2018-02-12
Attending: NURSE PRACTITIONER
Payer: COMMERCIAL

## 2018-02-12 ENCOUNTER — OFFICE VISIT (OUTPATIENT)
Dept: GASTROENTEROLOGY | Facility: CLINIC | Age: 23
End: 2018-02-12
Payer: COMMERCIAL

## 2018-02-12 ENCOUNTER — TELEPHONE (OUTPATIENT)
Dept: GASTROENTEROLOGY | Facility: CLINIC | Age: 23
End: 2018-02-12

## 2018-02-12 ENCOUNTER — INFUSION THERAPY VISIT (OUTPATIENT)
Dept: INFUSION THERAPY | Facility: CLINIC | Age: 23
End: 2018-02-12
Attending: INTERNAL MEDICINE
Payer: COMMERCIAL

## 2018-02-12 VITALS
HEART RATE: 101 BPM | DIASTOLIC BLOOD PRESSURE: 82 MMHG | OXYGEN SATURATION: 98 % | RESPIRATION RATE: 17 BRPM | TEMPERATURE: 97.6 F | SYSTOLIC BLOOD PRESSURE: 139 MMHG

## 2018-02-12 VITALS
OXYGEN SATURATION: 100 % | WEIGHT: 149 LBS | TEMPERATURE: 97.6 F | HEART RATE: 108 BPM | HEIGHT: 66 IN | SYSTOLIC BLOOD PRESSURE: 116 MMHG | DIASTOLIC BLOOD PRESSURE: 77 MMHG | BODY MASS INDEX: 23.95 KG/M2

## 2018-02-12 DIAGNOSIS — G89.29 CHRONIC ABDOMINAL PAIN: Primary | ICD-10-CM

## 2018-02-12 DIAGNOSIS — K86.1 CHRONIC PANCREATITIS, UNSPECIFIED PANCREATITIS TYPE (H): ICD-10-CM

## 2018-02-12 DIAGNOSIS — R10.9 CHRONIC ABDOMINAL PAIN: Primary | ICD-10-CM

## 2018-02-12 DIAGNOSIS — G43.A0 CYCLICAL VOMITING WITH NAUSEA, INTRACTABILITY OF VOMITING NOT SPECIFIED: Primary | ICD-10-CM

## 2018-02-12 DIAGNOSIS — R10.9 ABDOMINAL PAIN: ICD-10-CM

## 2018-02-12 PROCEDURE — 96376 TX/PRO/DX INJ SAME DRUG ADON: CPT

## 2018-02-12 PROCEDURE — 96374 THER/PROPH/DIAG INJ IV PUSH: CPT

## 2018-02-12 PROCEDURE — 96375 TX/PRO/DX INJ NEW DRUG ADDON: CPT

## 2018-02-12 PROCEDURE — 25000128 H RX IP 250 OP 636: Mod: ZF | Performed by: INTERNAL MEDICINE

## 2018-02-12 PROCEDURE — 96361 HYDRATE IV INFUSION ADD-ON: CPT

## 2018-02-12 PROCEDURE — 25000128 H RX IP 250 OP 636: Mod: ZF | Performed by: NURSE PRACTITIONER

## 2018-02-12 RX ORDER — ONDANSETRON 2 MG/ML
4 INJECTION INTRAMUSCULAR; INTRAVENOUS ONCE
Status: CANCELLED
Start: 2018-02-12 | End: 2018-02-12

## 2018-02-12 RX ORDER — ONDANSETRON 2 MG/ML
4 INJECTION INTRAMUSCULAR; INTRAVENOUS DAILY PRN
Status: CANCELLED
Start: 2018-02-12

## 2018-02-12 RX ORDER — DIPHENHYDRAMINE HYDROCHLORIDE 50 MG/ML
25 INJECTION INTRAMUSCULAR; INTRAVENOUS ONCE
Status: COMPLETED | OUTPATIENT
Start: 2018-02-12 | End: 2018-02-12

## 2018-02-12 RX ORDER — ONDANSETRON 2 MG/ML
4 INJECTION INTRAMUSCULAR; INTRAVENOUS DAILY PRN
Status: DISCONTINUED | OUTPATIENT
Start: 2018-02-12 | End: 2018-02-12 | Stop reason: HOSPADM

## 2018-02-12 RX ORDER — ONDANSETRON 2 MG/ML
4 INJECTION INTRAMUSCULAR; INTRAVENOUS ONCE
Status: COMPLETED | OUTPATIENT
Start: 2018-02-12 | End: 2018-02-12

## 2018-02-12 RX ORDER — DIPHENHYDRAMINE HYDROCHLORIDE 50 MG/ML
25 INJECTION INTRAMUSCULAR; INTRAVENOUS ONCE
Status: CANCELLED
Start: 2018-02-12 | End: 2018-02-12

## 2018-02-12 RX ADMIN — ONDANSETRON 4 MG: 2 INJECTION INTRAMUSCULAR; INTRAVENOUS at 13:21

## 2018-02-12 RX ADMIN — ONDANSETRON 4 MG: 2 INJECTION INTRAMUSCULAR; INTRAVENOUS at 16:11

## 2018-02-12 RX ADMIN — DIPHENHYDRAMINE HYDROCHLORIDE 25 MG: 50 INJECTION INTRAMUSCULAR; INTRAVENOUS at 13:22

## 2018-02-12 RX ADMIN — SODIUM CHLORIDE, POTASSIUM CHLORIDE, SODIUM LACTATE AND CALCIUM CHLORIDE 1000 ML: 600; 310; 30; 20 INJECTION, SOLUTION INTRAVENOUS at 13:21

## 2018-02-12 ASSESSMENT — PAIN SCALES - GENERAL
PAINLEVEL: EXTREME PAIN (8)
PAINLEVEL: EXTREME PAIN (8)

## 2018-02-12 NOTE — MR AVS SNAPSHOT
After Visit Summary   2/12/2018    Alexandra Melgoza    MRN: [id number]           Patient Information     Date Of Birth          1995        Visit Information        Provider Department      2/12/2018 2:00 PM Rand Navarro APRN CNP [facility]        Today's Diagnoses     Chronic abdominal pain    -  1       Follow-ups after your visit        Your next 10 appointments already scheduled     Feb 13, 2018  8:00 AM CST   (Arrive by 7:45 AM)   Return Visit with Leonardo Wang MD   [facility] ([facility])    [address] [place] [address]  4th Floor  [facility] [address]   [phone]            Feb 14, 2018 12:00 PM CST   (Arrive by 11:45 AM)   Return Visit with Campbell Narayanan MD   [facility] ([facility])    [address] [place] [address]  [address] [facility] [address]   [phone]            Feb 14, 2018  1:00 PM CST   Infusion 120 with UC SPEC INFUSION, UC 42 ATC   [facility] ([facility])    [address] [place] [address]  [address] [place] [address]   [phone]            Feb 16, 2018  3:00 PM CST   Infusion 120 with UC SPEC INFUSION, UC 41 ATC   [facility] ([facility])    [address] [place] [address]  [address] [place] [address]   [phone]            Feb 19, 2018 12:00 PM CST   Infusion 120 with UC SPEC INFUSION, UC 50 ATC   [facility] ([facility])    [address] [place] [address]  [address]  [facility] [address]   [phone]            Feb 21, 2018  3:00 PM CST   Infusion 120 with UC SPEC INFUSION, UC 48 ATC   [facility] ([facility])    [address] [place] [address]  [address]  [facility] [address] [phone]              Who to contact     Please call your clinic at [phone] to:    Ask questions about your health    Make or cancel appointments    Discuss your medicines    Learn about your test results    Speak to your doctor            Additional Information About Your Visit        MyChart Information     [employer] gives you secure access to your electronic health record. If you see a primary care provider, you can also send messages to your care team and make appointments. If you have questions, please call your primary care clinic.  If you do not have a primary care provider, please call [phone] and they will assist you.      [employer] is an electronic gateway that provides easy, online access to your medical records. With [employer], you can request a clinic appointment, read your test results, renew a prescription or communicate with your care team.     To access your existing account, please contact your [facility] or call [phone] for assistance.        Care EveryWhere ID     This is your Care EveryWhere ID. This could be used by other organizations to access your [place] medical records  [id number]        Your Vitals Were     Pulse Temperature Height Pulse Oximetry BMI (Body Mass Index)       108 97.6  F (36.4  C) (Oral) 1.68 m (5' 6.14\") 100% 23.95 kg/m2        Blood Pressure from Last 3 Encounters:   02/12/18 116/77   02/12/18 139/82   02/10/18 124/89    Weight from Last 3 Encounters:   02/12/18 67.6 kg (149 lb)   02/08/18 67.7 kg (149 lb 4 oz)   02/05/18 67.1 kg (148 lb)              Today, you had the following     No orders found for display       Primary Care Provider Office Phone # Fax #    Quyen Baez [phone]       [address] [place] [address]        Equal Access to Services     RACHAEL BENITEZ AH: Bret Enamorado, alcon beck, maki quintero " fatmata duongchris elvaperi la'aan ah. So [facility] [phone].    ATENCIÓN: Si hilariola finesse, tiene a quijano disposición servicios gratuitos de asistencia lingüística. Panfilo al [phone].    We comply with applicable federal civil rights laws and [place] laws. We do not discriminate on the basis of race, color, national origin, age, disability, sex, sexual orientation, or gender identity.            Thank you!     Thank you for choosing [facility]  for your care. Our goal is always to provide you with excellent care. Hearing back from our patients is one way we can continue to improve our services. Please take a few minutes to complete the written survey that you may receive in the mail after your visit with us. Thank you!             Your Updated Medication List - Protect others around you: Learn how to safely use, store and throw away your medicines at www.disposemymeds.org.          This list is accurate as of 2/12/18  5:11 PM.  Always use your most recent med list.                   Brand Name Dispense Instructions for use Diagnosis    * amylase-lipase-protease 75321 UNITS Cpep    ZENPEP    180 capsule    Take 2-3 capsules (40,000-60,000 Units) by mouth Take with snacks or supplements (Snacks)    Idiopathic chronic pancreatitis (H)       * amylase-lipase-protease 39888 UNITS Cpep    ZENPEP    180 capsule    Take 5 capsules (100,000 Units) by mouth 4 times daily (with meals and nightly)    Right upper quadrant abdominal pain       fluticasone 50 MCG/ACT spray    FLONASE    16 g    Spray 2 sprays into both nostrils daily    Nasal congestion       * gabapentin 250 MG/5ML solution    NEURONTIN    470 mL    8 mLs (400 mg) by ORAL OR NJ TUBE route 2 times daily    Abdominal pain, epigastric       * gabapentin 250 MG/5ML solution    NEURONTIN    450 mL    Take 12 mLs (600 mg) by mouth daily    Abdominal pain, epigastric       hydrOXYzine 10 MG/5ML syrup    ATARAX    473 mL    Take 12.5 mLs (25 mg) by  mouth every 4 hours as needed for anxiety or other (pain)    Acute abdominal pain       leuprolide 11.25 MG kit    LUPRON DEPOT (3-MONTH)    1 each    Inject 11.25 mg into the muscle every 3 months    Endometriosis       levalbuterol 45 MCG/ACT Inhaler    XOPENEX HFA    1 Inhaler    Inhale 2 puffs into the lungs every 6 hours as needed for shortness of breath / dyspnea    Wheeze       menthol 5 % Ptch    ICY HOT    15 patch    Apply 1 patch topically every 8 hours as needed for muscle soreness    Abdominal pain, epigastric       multivitamins with minerals Liqd liquid     1 Bottle    15 mLs by Per Feeding Tube route daily    Abdominal pain, epigastric       norethindrone 5 MG tablet    AYGESTIN    90 tablet    Take 1 tablet (5 mg) by mouth daily    Endometriosis       nortriptyline 10 MG capsule    PAMELOR    120 capsule    Take 3 capsules (30 mg) by mouth At Bedtime    Right upper quadrant abdominal pain       ondansetron 4 MG ODT tab    ZOFRAN ODT    40 tablet    Take 1 tablet (4 mg) by mouth every 8 hours as needed for nausea or vomiting        order for [facility]     1 Units    Equipment being ordered: TENS with wire and electrode.    Chronic abdominal pain       oxyCODONE 20 mg/mL (HIGH CONC) solution    ROXICODONE INTENSOL    13 mL    Take 0.5-0.75 mLs (10-15 mg) by mouth every 4 hours as needed for moderate to severe pain    Sphincter of Oddi dysfunction       pantoprazole 40 MG EC tablet    PROTONIX    30 tablet    Take 1 tablet (40 mg) by mouth 2 times daily (before meals)    Right upper quadrant abdominal pain, Erosive gastritis       polyethylene glycol Packet    MIRALAX/GLYCOLAX    7 packet    Take 17 g by mouth daily    Right upper quadrant abdominal pain       RIZATRIPTAN BENZOATE PO      Take 5 mg by mouth once as needed        scopolamine 72 hr patch    TRANSDERM    2 patch    Apply 1 patch to hairless area behind one ear if severe nausea and vomiting.  Remove old patch and change every 3 days (72 hours).     Intractable vomiting with nausea, unspecified vomiting type       senna-docusate 8.6-50 MG per tablet    SENOKOT-S;PERICOLACE    100 tablet    Take 1 tablet by mouth 2 times daily as needed for constipation    Right upper quadrant abdominal pain       sodium bicarbonate 325 MG tablet     200 tablet    1 tablet (325 mg) by Per Feeding Tube route 4 times daily    Abdominal pain, epigastric       * Notice:  This list has 4 medication(s) that are the same as other medications prescribed for you. Read the directions carefully, and ask your doctor or other care provider to review them with you.

## 2018-02-12 NOTE — MR AVS SNAPSHOT
After Visit Summary   2/12/2018    Alexandra Melgoza    MRN: [id number]           Patient Information     Date Of Birth          1995        Visit Information        Provider Department      2/12/2018 1:00 PM UC 45 ATC; UC SPEC INFUSION [facility] Specialty [facility] Procedure        Today's Diagnoses     Cyclical vomiting with nausea, intractability of vomiting not specified    -  1    Chronic pancreatitis, unspecified pancreatitis type (H)          Care Instructions    Dear Alexandra Melgoza    Thank you for choosing [facility] ([facility]) for your infusion.  The following information is a summary of our appointment as well as important reminders.      Please refer to your hospital discharge instructions for details on home care services, future appointments, phone numbers, and diet/activity levels.    Additional information: Today you received IVF & meds IV. You will return this week for your next infusion.      We look forward in seeing you on your next appointment here at [facility].  Please don t hesitate to call us at [phone] to reschedule any of your appointments or to speak with one of the [facility] registered nurses.  It was a pleasure taking care of you today.    Sincerely,    [facility]  Specialty Infusion & Procedure Center  [address] [place] [address]  [address]  Phone:  [phone]          Follow-ups after your visit        Your next 10 appointments already scheduled     Feb 13, 2018  8:00 AM CST   (Arrive by 7:45 AM)   Return Visit with Leonardo Wang MD   [facility] ([facility])    [address] [place] [address] [phone]            Feb 14, 2018 12:00 PM CST   (Arrive by 11:45 AM)   Return Visit with Campbell Narayanan MD   [facility] Pancreas and Biliary ([facility])    [address]  4th [address]  [facility] [address]   [phone]            Feb 14, 2018  1:00 PM CST   Infusion 120 with UC SPEC INFUSION, UC 42 ATC   [facility] Specialty and Procedure ([facility])    [address]  [address]  [facility] [address]   [phone]            Feb 16, 2018  3:00 PM CST   Infusion 120 with UC SPEC INFUSION, UC 41 ATC   [facility] Specialty and Procedure ([facility])    [address]  [address]  [facility] [address]   [phone]            Feb 19, 2018 12:00 PM CST   Infusion 120 with UC SPEC INFUSION, UC 50 ATC   [facility] Specialty and Procedure ([facility])    [address]  [address]  [facility] [address]   [phone]            Feb 21, 2018  3:00 PM CST   Infusion 120 with UC SPEC INFUSION, UC 48 ATC   [facility] Specialty and Procedure ([facility])    [address]  [address]  [facility] [address]   [phone]              Who to contact     If you have questions or need follow up information about today's clinic visit or your schedule please contact [facility] SPECIALTY AND PROCEDURE directly at [phone].  Normal or non-critical lab and imaging results will be communicated to you by MyChart, letter or phone within 4 business days after the clinic has received the results. If you do not hear from us within 7 days, please contact the clinic through MyChart or phone. If you have a critical or abnormal lab result, we will notify you by phone as soon as possible.  Submit refill requests through [employer] or call your pharmacy and they will forward the refill request to us. Please allow 3 business days for your refill to be completed.          Additional Information About Your Visit         [employer] Information     [employer] gives you secure access to your electronic health record. If you see a primary care provider, you can also send messages to your care team and make appointments. If you have questions, please call your primary care clinic.  If you do not have a primary care provider, please call [phone] and they will assist you.        Care EveryWhere ID     This is your Care EveryWhere ID. This could be used by other organizations to access your [facility] medical records  [id number]        Your Vitals Were     Pulse Temperature Respirations Pulse Oximetry          101 97.6  F (36.4  C) (Oral) 17 98%         Blood Pressure from Last 3 Encounters:   02/12/18 116/77   02/12/18 139/82   02/10/18 124/89    Weight from Last 3 Encounters:   02/12/18 67.6 kg (149 lb)   02/08/18 67.7 kg (149 lb 4 oz)   02/05/18 67.1 kg (148 lb)              Today, you had the following     No orders found for display       Primary Care Provider Office Phone # Fax #    Quyen Baez [phone] [address] [address]        Equal Access to Services     RACHAEL BENITEZ : Hadii angelina Enamorado, waaxda luhalley, qaybta kaalmada adechrisyada, maki smallwood. [facility] [phone].    ATENCIÓN: Si habla español, tiene a quijano disposición servicios gratuitos de asistencia lingüística. [facility] [phone].    We comply with applicable federal civil rights laws and [place] laws. We do not discriminate on the basis of race, color, national origin, age, disability, sex, sexual orientation, or gender identity.            Thank you!     Thank you for choosing [facility] SPECIALTY AND PROCEDURE  for your care. Our goal is always to provide you with excellent care. Hearing back from our patients is one way we can continue to improve our services. Please take a few minutes to complete the written survey that you may receive in  the mail after your visit with us. Thank you!             Your Updated Medication List - Protect others around you: Learn how to safely use, store and throw away your medicines at www.disposemymeds.org.          This list is accurate as of 2/12/18  5:33 PM.  Always use your most recent med list.                   Brand Name Dispense Instructions for use Diagnosis    * amylase-lipase-protease 78988 UNITS Cpep    ZENPEP    180 capsule    Take 2-3 capsules (40,000-60,000 Units) by mouth Take with snacks or supplements (Snacks)    Idiopathic chronic pancreatitis (H)       * amylase-lipase-protease 96299 UNITS Cpep    ZENPEP    180 capsule    Take 5 capsules (100,000 Units) by mouth 4 times daily (with meals and nightly)    Right upper quadrant abdominal pain       fluticasone 50 MCG/ACT spray    FLONASE    16 g    Spray 2 sprays into both nostrils daily    Nasal congestion       * gabapentin 250 MG/5ML solution    NEURONTIN    470 mL    8 mLs (400 mg) by ORAL OR NJ TUBE route 2 times daily    Abdominal pain, epigastric       * gabapentin 250 MG/5ML solution    NEURONTIN    450 mL    Take 12 mLs (600 mg) by mouth daily    Abdominal pain, epigastric       hydrOXYzine 10 MG/5ML syrup    ATARAX    473 mL    Take 12.5 mLs (25 mg) by mouth every 4 hours as needed for anxiety or other (pain)    Acute abdominal pain       leuprolide 11.25 MG kit    LUPRON DEPOT (3-MONTH)    1 each    Inject 11.25 mg into the muscle every 3 months    Endometriosis       levalbuterol 45 MCG/ACT Inhaler    XOPENEX HFA    1 Inhaler    Inhale 2 puffs into the lungs every 6 hours as needed for shortness of breath / dyspnea    Wheeze       menthol 5 % Ptch    ICY HOT    15 patch    Apply 1 patch topically every 8 hours as needed for muscle soreness    Abdominal pain, epigastric       multivitamins with minerals Liqd liquid     1 Bottle    15 mLs by Per Feeding Tube route daily    Abdominal pain, epigastric       norethindrone 5 MG tablet    AYGESTIN     90 tablet    Take 1 tablet (5 mg) by mouth daily    Endometriosis       nortriptyline 10 MG capsule    PAMELOR    120 capsule    Take 3 capsules (30 mg) by mouth At Bedtime    Right upper quadrant abdominal pain       ondansetron 4 MG ODT tab    ZOFRAN ODT    40 tablet    Take 1 tablet (4 mg) by mouth every 8 hours as needed for nausea or vomiting        order for DME     1 Units    Equipment being ordered: TENS with wire and electrode.    Chronic abdominal pain       oxyCODONE 20 mg/mL (HIGH CONC) solution    ROXICODONE INTENSOL    13 mL    Take 0.5-0.75 mLs (10-15 mg) by mouth every 4 hours as needed for moderate to severe pain    Sphincter of Oddi dysfunction       pantoprazole 40 MG EC tablet    PROTONIX    30 tablet    Take 1 tablet (40 mg) by mouth 2 times daily (before meals)    Right upper quadrant abdominal pain, Erosive gastritis       polyethylene glycol Packet    MIRALAX/GLYCOLAX    7 packet    Take 17 g by mouth daily    Right upper quadrant abdominal pain       RIZATRIPTAN BENZOATE PO      Take 5 mg by mouth once as needed        scopolamine 72 hr patch    TRANSDERM    2 patch    Apply 1 patch to hairless area behind one ear if severe nausea and vomiting.  Remove old patch and change every 3 days (72 hours).    Intractable vomiting with nausea, unspecified vomiting type       senna-docusate 8.6-50 MG per tablet    SENOKOT-S;PERICOLACE    100 tablet    Take 1 tablet by mouth 2 times daily as needed for constipation    Right upper quadrant abdominal pain       sodium bicarbonate 325 MG tablet     200 tablet    1 tablet (325 mg) by Per Feeding Tube route 4 times daily    Abdominal pain, epigastric       * Notice:  This list has 4 medication(s) that are the same as other medications prescribed for you. Read the directions carefully, and ask your doctor or other care provider to review them with you.

## 2018-02-12 NOTE — NURSING NOTE
"Chief Complaint   Patient presents with     Clinic Care Coordination - Follow-up     Follow up.       Vitals:    02/12/18 1403   BP: 116/77   Pulse: 108   Temp: 97.6  F (36.4  C)   TempSrc: Oral   SpO2: 100%   Weight: 149 lb   Height: 5' 6.14\"       Body mass index is 23.95 kg/(m^2).     Vitals transferred from appt this AM with another physician.  Eric BARBOSA LPN                       "

## 2018-02-12 NOTE — PROGRESS NOTES
Nursing Note  Alexandra Melgoza [facility] for:   Chief Complaint   Patient presents with     Infusion     IVF & Meds     During today's [facility] appointment, orders from Dr Narayanan were completed.  Frequency: 2-3 times weekly    Progress note:  Patient identification verified by name and date of birth.  Assessment completed.  Vitals recorded in Doc Flowsheets.  Patient was provided with education regarding infusion and possible side effects.  Patient verbalized understanding.      needed: No  Premedications: administered per order.  Infusion Rates: 999 ml/hr per bag x 2 bags.  Approximate Infusion length:4 hours.   Labs: were not ordered for this appointment.  Vascular access: port accessed today.  Treatment Conditions: non-applicable.  Patient tolerated infusion: well.    Drug Waste Record? No     Discharge Plan:   Follow up plan of care with: ongoing infusions at [facility] Infusion and Procedure Center.  Discharge instructions were reviewed with patient.  Patient/representative verbalized understanding of discharge instructions and all questions answered.  Patient discharged from [facility] in stable condition.    YAHAIRA RAYMUNDO RN    Administrations This Visit     diphenhydrAMINE (BENADRYL) injection 25 mg     Admin Date Action Dose Route Administered By             02/12/2018 Given 25 mg Intravenous Yahaira Raymundo RN                    lactated ringers BOLUS 1,000-2,000 mL     Admin Date Action Dose Rate Route Administered By          02/12/2018 New Bag 1000 mL 999 mL/hr Intravenous Yahaira Raymundo RN                   ondansetron (ZOFRAN) injection 4 mg     Admin Date Action Dose Route Administered By             02/12/2018 Given 4 mg Intravenous Yahaira Raymundo RN              Admin Date Action Dose Route Administered By             02/12/2018 Given 4 mg Intravenous Daphnie Baum  RN                          /82 (BP Location: Left arm, Patient Position: Sitting, Cuff Size: Adult Regular)  Pulse 101  Temp 97.6  F (36.4  C) (Oral)  Resp 17  SpO2 98%

## 2018-02-12 NOTE — PATIENT INSTRUCTIONS
Dear Alexandra Melgoza    Thank you for choosing [facility] ([facility]) for your infusion.  The following information is a summary of our appointment as well as important reminders.      Please refer to your hospital discharge instructions for details on home care services, future appointments, phone numbers, and diet/activity levels.    Additional information: Today you received IVF & meds IV. You will return this week for your next infusion.      We look forward in seeing you on your next appointment here at [facility].  Please don t hesitate to call us at [phone] to reschedule any of your appointments or to speak with one of the [facility] registered nurses.  It was a pleasure taking care of you today.    Sincerely,    [facility]  Specialty Infusion & Procedure Center  [address] [place]  [address]  Phone:  [phone]

## 2018-02-12 NOTE — TELEPHONE ENCOUNTER
Alexandra is calling for Dr. Narayanan.  She reports she was discharged Saturday from the hospital post PEGGJ placement.  She reports for the past 2 days when she puts red meds in the J port she can see them in the gastric port.  Needing to flush both ports to clear it out.  Also can see bile in jejunal port.  Felt better after tube placed, until last night nausea and pain returned with feeds and in general.    Has 1:00 infusion appointment here today if needs to be seen in IR or by Rand.    Will route to Dr. Oracio Jaramillo's clinic nurse.

## 2018-02-12 NOTE — LETTER
"2/12/2018       RE: Alexandra Melgoza  [address]  [facility] [address]     Dear Colleague,    Thank you for referring your patient, Alexandra Melgoza, to the [facility] PANCREAS AND BILIARY at [facility]. Please see a copy of my visit note below.    REASON FOR VISIT: \"make sure tube is working\"         HISTORY OF PRESENT ILLNESS:     Alexandra Melgoza is a 22 year old female with a history of gastritis, chronic abdominal pain post-cholecystectomy with mildly abnormal liver enzymes, not responding to sphincter of Oddi ablation a few years ago,cyclic N/V, migraines, pseudoseizures and somatoform disorder on chronic narcotics. Chronic abdominal pain since age 8 but worsened since cholecystectomy. She underwent diagnostic ERCP by Dr. Narayanan and NJ placement on 1/18/18, noted to have wide open pancreatic and biliary sphincters at time of ERCP. She was not able to tolerate the NJ tube longer than 2 weeks due to nasal pain from tube, sore throat, and constant gagging sensation. She underwent PEG-J placement by Dr. Narayanan on 2/8/18. She was hospitalized for 3 days for pain control following procedure. She reports over the weekend she noticed bile coming from both G and J tube. She had constant nausea, no vomiting. She vented G-tube which did help. She reports pain around G-J tube that is constant but has improved since inserted on 2/8, current pain regimen seems to help. She still is not able to tolerate anything by mouth. She is currently receiving LR bolus, (she goes for outpatient IVF infusions plus antiemetics 3 times per week) which seems to help. She denies diarrhea or constipation. She is tolerating feeding tube at 40 ml/hr (goal rate is 50 ml/hr). She denies drainage around stoma site, swelling, warmth, fever, night sweats or chills.       Past medical history, social history, surgical history, family history, medications, and allergies were reviewed    REVIEW OF SYSTEMS:   A " "comprehensive review of systems was performed and was noted to be negative except as above.      PHYSICAL EXAM:  VITAL SIGNS: Stable  GENERAL: alert, no acute distress  EYES: Eyes grossly normal to inspection, anicteric sclera   ABDOMEN: soft, no tenderness, no hepatosplenomegaly, no masses, distention, guarding, or rebound, normal bowel sounds  NEURO: strength and tone- normal, mentation- intact, speech- normal  PSYCH: Alert and oriented times 3        ASSESSMENT AND RECOMMENDATIONS:   Alexandra Melgoza is a pleasant 22 year old female with history of chronic abdominal pain who recently underwent PEG-J placement on 2/8/18 due to worsening abdominal pain associated with eating, persistent nausea, vomiting, and weight loss with several ED and hospital admissions as a result. She is following up today to ensure \"no problems with feeding tube\". Abdominal x-ray obtained, shows tube to be in good position, reviewed by Dr. Narayanan in clinic today. Stoma site is without signs of infection or complications. Alexandra was reassured by the findings. During our visit today, she was a little tearful and initially appeared anxious. She appeared much more at ease after she was reassured the tube is in good position without signs of complications. She was encouraged to vent the G-tube if she becomes nauseated and recommend PO intake as tolerated. Recommend she slowly increase tube feeding to goal rate. We discussed ensuring she continues on a daily bowel regimen to prevent constipation (x-ray showed large stool burden). She was instructed to start with Miralax daily and may titrate up to twice a day. We discussed at length that she should follow-up with her Psychiatrist/thearapist for management of anxiety, as she does recognize she has been anxious with all the recent hospital admissions and ED visits. She was encouraged to continue with outpatient IV fluid infusions, as this has helped reduce ED visits. Follow-up in clinic as " needed.       I spent 40 minutes with this patient face to face and explained the conditions and plans (more than 50% of time was counseling/coordination of care, as discussed above).       Again, thank you for allowing me to participate in the care of your patient.      Sincerely,    ANABEL Apple CNP

## 2018-02-12 NOTE — PROGRESS NOTES
"REASON FOR VISIT: \"make sure tube is working\"         HISTORY OF PRESENT ILLNESS:     Alexandra Melgoza is a 22 year old female with a history of gastritis, chronic abdominal pain post-cholecystectomy with mildly abnormal liver enzymes, not responding to sphincter of Oddi ablation a few years ago,cyclic N/V, migraines, pseudoseizures and somatoform disorder on chronic narcotics. Chronic abdominal pain since age 8 but worsened since cholecystectomy. She underwent diagnostic ERCP by Dr. Narayanan and NJ placement on 1/18/18, noted to have wide open pancreatic and biliary sphincters at time of ERCP. She was not able to tolerate the NJ tube longer than 2 weeks due to nasal pain from tube, sore throat, and constant gagging sensation. She underwent PEG-J placement by Dr. Narayanan on 2/8/18. She was hospitalized for 3 days for pain control following procedure. She reports over the weekend she noticed bile coming from both G and J tube. She had constant nausea, no vomiting. She vented G-tube which did help. She reports pain around G-J tube that is constant but has improved since inserted on 2/8, current pain regimen seems to help. She still is not able to tolerate anything by mouth. She is currently receiving LR bolus, (she goes for outpatient IVF infusions plus antiemetics 3 times per week) which seems to help. She denies diarrhea or constipation. She is tolerating feeding tube at 40 ml/hr (goal rate is 50 ml/hr). She denies drainage around stoma site, swelling, warmth, fever, night sweats or chills.       Past medical history, social history, surgical history, family history, medications, and allergies were reviewed    REVIEW OF SYSTEMS:   A comprehensive review of systems was performed and was noted to be negative except as above.      PHYSICAL EXAM:  VITAL SIGNS: Stable  GENERAL: alert, no acute distress  EYES: Eyes grossly normal to inspection, anicteric sclera   ABDOMEN: soft, no tenderness, no hepatosplenomegaly, no " "masses, distention, guarding, or rebound, normal bowel sounds  NEURO: strength and tone- normal, mentation- intact, speech- normal  PSYCH: Alert and oriented times 3        ASSESSMENT AND RECOMMENDATIONS:   Alexandra Melgoza is a pleasant 22 year old female with history of chronic abdominal pain who recently underwent PEG-J placement on 2/8/18 due to worsening abdominal pain associated with eating, persistent nausea, vomiting, and weight loss with several ED and hospital admissions as a result. She is following up today to ensure \"no problems with feeding tube\". Abdominal x-ray obtained, shows tube to be in good position, reviewed by Dr. Narayanan in clinic today. Stoma site is without signs of infection or complications. Alexandra was reassured by the findings. During our visit today, she was a little tearful and initially appeared anxious. She appeared much more at ease after she was reassured the tube is in good position without signs of complications. She was encouraged to vent the G-tube if she becomes nauseated and recommend PO intake as tolerated. Recommend she slowly increase tube feeding to goal rate. We discussed ensuring she continues on a daily bowel regimen to prevent constipation (x-ray showed large stool burden). She was instructed to start with Miralax daily and may titrate up to twice a day. We discussed at length that she should follow-up with her Psychiatrist/thearapist for management of anxiety, as she does recognize she has been anxious with all the recent hospital admissions and ED visits. She was encouraged to continue with outpatient IV fluid infusions, as this has helped reduce ED visits. Follow-up in clinic as needed.         Rand Navarro, CIPRIANO   Advanced [facility]   [phone]        I spent 40 minutes with this patient face to face and explained the conditions and plans (more than 50% of time was counseling/coordination of care, as discussed above).           "

## 2018-02-13 ENCOUNTER — OFFICE VISIT (OUTPATIENT)
Dept: ANESTHESIOLOGY | Facility: CLINIC | Age: 23
End: 2018-02-13
Payer: COMMERCIAL

## 2018-02-13 VITALS — SYSTOLIC BLOOD PRESSURE: 121 MMHG | HEART RATE: 115 BPM | DIASTOLIC BLOOD PRESSURE: 72 MMHG

## 2018-02-13 DIAGNOSIS — R10.11 RIGHT UPPER QUADRANT ABDOMINAL PAIN: ICD-10-CM

## 2018-02-13 DIAGNOSIS — R10.84 ABDOMINAL PAIN, GENERALIZED: Primary | ICD-10-CM

## 2018-02-13 RX ORDER — NORTRIPTYLINE HCL 10 MG
30 CAPSULE ORAL AT BEDTIME
Qty: 120 CAPSULE | Refills: 0 | Status: SHIPPED | OUTPATIENT
Start: 2018-02-13

## 2018-02-13 RX ORDER — OXYCODONE HCL 5 MG/5 ML
10 SOLUTION, ORAL ORAL EVERY 4 HOURS PRN
Qty: 840 ML | Refills: 0 | Status: ON HOLD | OUTPATIENT
Start: 2018-02-13 | End: 2018-02-27

## 2018-02-13 ASSESSMENT — ANXIETY QUESTIONNAIRES
3. WORRYING TOO MUCH ABOUT DIFFERENT THINGS: NOT AT ALL
1. FEELING NERVOUS, ANXIOUS, OR ON EDGE: SEVERAL DAYS
5. BEING SO RESTLESS THAT IT IS HARD TO SIT STILL: NOT AT ALL
GAD7 TOTAL SCORE: 2
4. TROUBLE RELAXING: SEVERAL DAYS
2. NOT BEING ABLE TO STOP OR CONTROL WORRYING: NOT AT ALL
6. BECOMING EASILY ANNOYED OR IRRITABLE: NOT AT ALL
GAD7 TOTAL SCORE: 2
7. FEELING AFRAID AS IF SOMETHING AWFUL MIGHT HAPPEN: NOT AT ALL
7. FEELING AFRAID AS IF SOMETHING AWFUL MIGHT HAPPEN: NOT AT ALL
GAD7 TOTAL SCORE: 2

## 2018-02-13 ASSESSMENT — PAIN SCALES - GENERAL: PAINLEVEL: EXTREME PAIN (8)

## 2018-02-13 NOTE — PROGRESS NOTES
Pt having concerns with increased pain throughout the day. [facility] team called on several occasions to see patient and assess for a different plan on pain management. She felt that they were not addressing her concerns in a timely fashion. In addition, she felt that when the team was in to assess her, they were not open to hearing her concerns or ideas. Mom and Step mom shared concerns with me (Charge Nurse) on wanting a different team to manage her cares. This was shared with Dr Boyd and Dr. Mcrae. Call place to Hospital Nursing Supervisor (3:50pm) to intervene between team and patient if needed. Dr. Boyd and Dr. Mcrae came to room at 4pm and spoke with patient for about an hour and a new plan of care for pain management was drawn up.

## 2018-02-13 NOTE — LETTER
2/13/2018       RE: Alexandra Melgoza  [address]  [facility] [address]     Dear Colleague,    Thank you for referring your patient, Alexandra Melgoza, to the [facility] at [facility]. Please see a copy of my visit note below.    The patient is a 22-year-old female with a history of chronic pancreatitis who presents for follow-up.  The previous visit was her third visit to our clinic.  During that visit a plan was formulated with the goals of attempting to diminish chronic pancreatitis flares and thus emergency room visits and should those flares occur provided patient with pain medication that would be available to treat a flare and preclude visit to the emergency room.   She states that her normal pain is almost nonexistent.  The pain she complains of at this time is pain from her surgical procedure (GJ tube placement).  She presents today for reevaluation and follow-up     Initial summary:  The patient is a 21-year-old woman with a history of chronic pancreatitis.  She had her gallbladder removed at the age of 15 and has had severe pancreatitis since that time.  She has been seen by Dr. Narayanan here at the [facility] and has had an ERCP and stent placed.  She notes that she has pancreatitis flares approximately every 3 months.  And presents today for suggestions about treatments for her pain during these pancreatitis flares.  She states that her flares occur every 3 months.  During her flares she states that she gets fluids and pain medications namely oxycodone 10-15 mg every 4-6 hours when necessary.  Her pain is alleviated by utilizing a low-fat diet, fruit, veggie's, poultry and fish, and relaxation techniques.  She states that her pain is exacerbated when she straightens from this diet.  She has used several different modalities to treat her pain.  These include Tylenol oxycodone gabapentin Pamelor and acupuncture. She  "states that the gabapentin causes sedation.  She presents today for initial evaluation and treatment modalities for her pain.  She asked specifically about celiac plexus blockade.        Current Outpatient Prescriptions   Medication     gabapentin (NEURONTIN) 400 MG capsule     oxyCODONE IR (ROXICODONE) 10 MG tablet     oxyCODONE (ROXICODONE) 5 MG/5ML solution     nortriptyline (PAMELOR) 10 MG capsule     ondansetron (ZOFRAN ODT) 4 MG ODT tab     amylase-lipase-protease (ZENPEP) 39559 UNITS CPEP     amylase-lipase-protease (ZENPEP) 20157 UNITS CPEP     senna-docusate (SENOKOT-S;PERICOLACE) 8.6-50 MG per tablet     pantoprazole (PROTONIX) 40 MG EC tablet     polyethylene glycol (MIRALAX/GLYCOLAX) Packet     gabapentin (NEURONTIN) 300 MG capsule     norethindrone (AYGESTIN) 5 MG tablet     fluticasone (FLONASE) 50 MCG/ACT spray     levalbuterol (XOPENEX HFA) 45 MCG/ACT Inhaler     leuprolide (LUPRON DEPOT) 11.25 MG injection     RIZATRIPTAN BENZOATE PO       No current facility-administered medications for this visit.                  Allergies   Allergen Reactions     Amoxicillin-Pot Clavulanate Nausea and Vomiting     Compazine [Prochlorperazine] Other (See Comments)         Dystonia     Hyoscyamine Other (See Comments)         Dystonia     Reglan [Metoclopramide Hcl] Other (See Comments)         Dystonia     Zyprexa Other (See Comments)         Sensitive, dystonic reaction on 11-9-2011     Amitriptyline Hcl Other (See Comments)         Dystonia, hallucinations     Buspirone Other (See Comments)         No Adverse Reactions, no benefit     Cogentin [Benztropine]        Cyproheptadine Other (See Comments)         Distonic     Dicyclomine Other (See Comments)     Droperidol Other (See Comments)         Feels tense and \"like she has to jump out of her skin\".       Effexor [Venlafaxine] Other (See Comments)         Dystonia     Food            Cilantro--lips/tongue swelling     No Clinical Screening - See Comments Other " (See Comments)         Cilantro     Phenergan Dm [Promethazine-Dm] Other (See Comments)         dystonia     Promethazine Other (See Comments)     Risperidone Other (See Comments)         dystonia     Vistaril Other (See Comments)         Burning sensation.     Augmentin GI Disturbance     Ketamine Other (See Comments)         jittery     Sorbitol GI Disturbance         Headache and dyspepsia         Past Medical History            Past Medical History:   Diagnosis Date     Anxiety        Asthma        Cholecystitis         s/p cholecystectomy     Chronic abdominal pain        Chronic infection         mrsa     Chronic pain        Cyclic vomiting syndrome 10/27/2012     Depression        Endometriosis        Hypoglycaemia        Migraines        Mild intermittent asthma        Ovarian cysts        Pancreatic disease        PONV (postoperative nausea and vomiting)        Pseudoseizures        Somatoform disorder        Sphincter of Oddi dysfunction        Vasovagal syncope                Past Surgical History              Past Surgical History:   Procedure Laterality Date     ABDOMEN SURGERY            ERCP, biliary stents     CHOLECYSTECTOMY    8/2/11     COLONOSCOPY    2011      negative finding     ENDOSCOPIC RETROGRADE CHOLANGIOPANCREATOGRAM    8/23/2011      Procedure:ENDOSCOPIC RETROGRADE CHOLANGIOPANCREATOGRAM; Endoscopic Retrograde Cholangiopancreatogram; Surgeon:SHORTY MCCOY; Location:[facility]     ENDOSCOPIC RETROGRADE CHOLANGIOPANCREATOGRAM    5/17/2012      Procedure:ENDOSCOPIC RETROGRADE CHOLANGIOPANCREATOGRAM; Endoscopic Retrograde Cholangiopancreatogram with pancreatic stent placement.; Surgeon:SHORTY MCCOY; Location[facility]     ENDOSCOPIC RETROGRADE CHOLANGIOPANCREATOGRAM COMPLEX    1/3/2012      Procedure:ENDOSCOPIC RETROGRADE CHOLANGIOPANCREATOGRAM COMPLEX; Endoscopic Retrograde Cholangiopancreatogram with Manometry bile duct sphincterotomy extention pancreatic duct sphincterotomy  pancreatic duct stent placement; Surgeon:SHORTY MCCOY; [facility]     ENDOSCOPIC ULTRASOUND UPPER GASTROINTESTINAL TRACT (GI) N/A 6/9/2015      Procedure: ENDOSCOPIC ULTRASOUND, ESOPHAGOSCOPY / UPPER GASTROINTESTINAL TRACT (GI);  Surgeon: Mario Joe MD;  [facility]: [facility] OR     ENDOSCOPIC ULTRASOUND UPPER GASTROINTESTINAL TRACT (GI) N/A 12/12/2016      Procedure: ENDOSCOPIC ULTRASOUND, ESOPHAGOSCOPY / UPPER GASTROINTESTINAL TRACT (GI);  Surgeon: Guru Jose Klein MD;  [facility]     ESOPHAGOSCOPY, GASTROSCOPY, DUODENOSCOPY (EGD), COMBINED    1/18/2012      Procedure:COMBINED ESOPHAGOSCOPY, GASTROSCOPY, DUODENOSCOPY (EGD); Surgeon:ARNIE ESPINOZA; [facility]     ESOPHAGOSCOPY, GASTROSCOPY, DUODENOSCOPY (EGD), COMBINED    1/18/2012      Procedure:COMBINED ESOPHAGOSCOPY, GASTROSCOPY, DUODENOSCOPY (EGD); EGD; Surgeon:ARNIE ESPINOZA; [facility]     ESOPHAGOSCOPY, GASTROSCOPY, DUODENOSCOPY (EGD), COMBINED N/A 12/9/2017      Procedure: COMBINED ESOPHAGOSCOPY, GASTROSCOPY, DUODENOSCOPY (EGD);;  Surgeon: Josias Chan MD;  Location: [facility] GI     INSERT PORT VASCULAR ACCESS           L knee arthroscopy    2009     LAPAROSCOPY DIAGNOSTIC (GYN)    10/26/2012      Procedure: LAPAROSCOPY DIAGNOSTIC (GYN);  LAPAROSCOPY DIAGNOSTIC, CAUTERY ENDOMETRIOISIS and biopsy of Fallopian tube lesions;  Surgeon: Calra Lopez MD;  Location: [facility] OR     ORTHOPEDIC SURGERY    2008      knee     VASCULAR SURGERY                   Social History    Social History                 Social History     Marital status: Single         Spouse name: N/A     Number of children: N/A     Years of education: N/A             Occupational History     Not on file.               Social History Main Topics     Smoking status: Never Smoker     Smokeless tobacco: Never Used     Alcohol use No     Drug use: No     Sexual activity: No               Other Topics Concern     Parent/Sibling W/ Cabg, Mi Or Angioplasty Before  65f 55m? 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  Physical Exam   Constitutional: She is oriented to person, place, and time. She appears well-developed and well-nourished.   HENT:   Head: Normocephalic and atraumatic.   Right Ear: External ear normal.   Left Ear: External ear normal.   Nose: Nose normal.   Mouth/Throat: Oropharynx is clear and moist.   Eyes: Conjunctivae and EOM are normal. Pupils are equal, round, and reactive to light.   Neck: Normal range of motion. Neck supple.   Cardiovascular: Normal rate, regular rhythm, normal heart sounds and intact distal pulses.    Pulmonary/Chest: Effort normal and breath sounds normal.   Abdominal: She exhibits no distension and no mass. There is tenderness. There is guarding. There is no rebound.   Musculoskeletal: Normal range of motion.   Neurological: She is alert and oriented to person, place, and time. She has normal reflexes.   Skin: Skin is warm and dry.   Psychiatric: She has a normal mood and affect.   Nursing note and vitals reviewed.  A/P:Patient is a  21 y/o lady with chronic abdominal pain who presents for initial evaluation.  It is clear that the most likely eiology of her pain is pancreatitic flares.  The previous plan of providing medication for flares only was not very successful as she was in the ED last night.  She has utilized all of the opioid that was prescribed. In an effort to decrease the frequency of her flares and decrease the duration of flares, I recommend  1. Pain psychology- patient is scheduling appt today  2. Continue Oxycodone 10 mg q4h prn with plan to wean when the surgical pain subsides.  3. Add pamelor 30 mg qHS  4. RTC  in 4 weeks    Again, thank you for allowing me to participate in the care of your patient.      Sincerely,    Leonardo Wang MD

## 2018-02-13 NOTE — NURSING NOTE
After visit summary given and reviewed with Alexandra and her mom.    Prescription given for oxycodone suspension. Will follow up in 2 weeks with ANABEL Falcon.

## 2018-02-13 NOTE — PATIENT INSTRUCTIONS
1. Continue your oxycodone with no changes        Follow up:    Follow up in 2 weeks with Juanita Baum Nurse Practitioner       To speak with a nurse, schedule/reschedule/cancel a clinic appointment, or request a medication refill call: [phone]     You can also reach us by [employer]: [email or web address]    For refills, please call on Monday, 1 week before your medication runs out. The doctors are not always in clinic, so this gives us time to get your prescriptions ready.  Please let us know the name of the medication you are requesting a refill of.

## 2018-02-13 NOTE — MR AVS SNAPSHOT
After Visit Summary   2/13/2018    Alexandra Melgoza    MRN: [id number]           Patient Information     Date Of Birth          1995        Visit Information        Provider Department      2/13/2018 8:00 AM Leonardo Wang MD [facility]        Today's Diagnoses     Abdominal pain, generalized    -  1    Right upper quadrant abdominal pain          Care Instructions    1. Continue your oxycodone with no changes        Follow up:    Follow up in 2 weeks with Juanita Baum Nurse Practitioner       To speak with a nurse, schedule/reschedule/cancel a clinic appointment, or request a medication refill call: [phone]     You can also reach us by [employer]: [email or web address]    For refills, please call on Monday, 1 week before your medication runs out. The doctors are not always in clinic, so this gives us time to get your prescriptions ready.  Please let us know the name of the medication you are requesting a refill of.                                     Follow-ups after your visit        Your next 10 appointments already scheduled     Feb 14, 2018 12:00 PM CST   (Arrive by 11:45 AM)   Return Visit with Campbell Narayanan MD   [facility] ([facility])    [address]  4th Floor  [facility] [id number] [phone]            Feb 14, 2018  1:00 PM CST   Infusion 120 with UC SPEC INFUSION, UC 42 ATC   [facility] Specialty [facility] Procedure ([facility])    [address]  [address]  [place] [id number] [phone]            Feb 16, 2018  3:00 PM CST   Infusion 120 with UC SPEC INFUSION, UC 41 ATC   [facility] Procedure ([facility])    [address]  [address]  [place] [address] [phone]            Feb 19, 2018 12:00 PM CST   Infusion 120  with UC SPEC INFUSION, UC 50 ATC   [facility] Specialty and Procedure ([facility])    [address]  [address]  [place] [address] [phone]            Feb 21, 2018  3:00 PM CST   Infusion 120 with UC SPEC INFUSION, UC 48 ATC   [facility] Specialty [facility] Procedure ([facility])    [address]  [address]  [place] [id number] [phone]            Feb 23, 2018  1:00 PM CST   Infusion 120 with UC SPEC INFUSION   [facility] Procedure ([facility])    [address]  [address]  [place] [id number] [phone]              Who to contact     Please call your clinic at [phone] to:    Ask questions about your health    Make or cancel appointments    Discuss your medicines    Learn about your test results    Speak to your doctor            Additional Information About Your Visit        [employer] Information     [employer] gives you secure access to your electronic health record. If you see a primary care provider, you can also send messages to your care team and make appointments. If you have questions, please call your primary care clinic.  If you do not have a primary care provider, please call [phone] and they will assist you.      [employer] is an electronic gateway that provides easy, online access to your medical records. With [employer], you can request a clinic appointment, read your test results, renew a prescription or communicate with your care team.     To access your existing account, please contact your [facility] or call [phone] for assistance.        Care EveryWhere ID     This is your Care EveryWhere ID. This could be used by other organizations to access your [place] medical records  [id number]        Your Vitals Were     Pulse                   115             Blood Pressure from Last 3 Encounters:   02/13/18 121/72   02/12/18 116/77   02/12/18 139/82    Weight from Last 3 Encounters:   02/12/18 67.6 kg (149 lb)   02/08/18 67.7 kg (149 lb 4 oz)   02/05/18 67.1 kg (148 lb)              Today, you had the following     No orders found for display         Today's Medication Changes          These changes are accurate as of 2/13/18  8:42 AM.  If you have any questions, ask your nurse or doctor.               These medicines have changed or have updated prescriptions.        Dose/Directions    * oxyCODONE 20 mg/mL (HIGH CONC) solution   Commonly known as:  ROXICODONE INTENSOL   This may have changed:  Another medication with the same name was added. Make sure you understand how and when to take each.   Used for:  Sphincter of Oddi dysfunction        Dose:  10-15 mg   Take 0.5-0.75 mLs (10-15 mg) by mouth every 4 hours as needed for moderate to severe pain   Quantity:  13 mL   Refills:  0       * oxyCODONE 5 MG/5ML solution   Commonly known as:  ROXICODONE   This may have changed:  You were already taking a medication with the same name, and this prescription was added. Make sure you understand how and when to take each.   Used for:  Abdominal pain, generalized        Dose:  10 mg   Take 10 mLs (10 mg) by mouth every 4 hours as needed for pain maximum 60 mL per day.  This is a 2 week supply.   Quantity:  840 mL   Refills:  0       * Notice:  This list has 2 medication(s) that are the same as other medications prescribed for you. Read the directions carefully, and ask your doctor or other care provider to review them with you.         Where to get your medicines      These medications were sent to [facility] [place], [place] - [address]  [address], [place] [facility] [address]    Hours:  TRANSPLANT PHONE NUMBER [phone] Phone:  [phone]     nortriptyline 10 MG capsule         Some of these will need a paper prescription  and others can be bought over the counter.  Ask your nurse if you have questions.     Bring a paper prescription for each of these medications     oxyCODONE 5 MG/5ML solution                Primary Care Provider Office Phone # Fax #    Quyen Baez [phone] [address] [address]        Equal Access to Services     [facility] : Hadii aad ku hadasho Soomaali, waaxda luqadaha, qaybta kaalmada adeegyada, waxay idiin hayaan adeeg kharash la'ramyan . So [facility] [phone].    ATENCIÓN: Si habla español, tiene a quijano disposición servicios gratuitos de asistencia lingüística. Llame al [phone].    We comply with applicable federal civil rights laws and [place] laws. We do not discriminate on the basis of race, color, national origin, age, disability, sex, sexual orientation, or gender identity.            Thank you!     Thank you for choosing [facility]  for your care. Our goal is always to provide you with excellent care. Hearing back from our patients is one way we can continue to improve our services. Please take a few minutes to complete the written survey that you may receive in the mail after your visit with us. Thank you!             Your Updated Medication List - Protect others around you: Learn how to safely use, store and throw away your medicines at www.disposemymeds.org.          This list is accurate as of 2/13/18  8:42 AM.  Always use your most recent med list.                   Brand Name Dispense Instructions for use Diagnosis    * amylase-lipase-protease 55773 UNITS Cpep    ZENPEP    180 capsule    Take 2-3 capsules (40,000-60,000 Units) by mouth Take with snacks or supplements (Snacks)    Idiopathic chronic pancreatitis (H)       * amylase-lipase-protease 54333 UNITS Cpep    ZENPEP    180 capsule    Take 5 capsules (100,000 Units) by mouth 4 times daily (with meals and nightly)    Right upper quadrant  abdominal pain       fluticasone 50 MCG/ACT spray    FLONASE    16 g    Spray 2 sprays into both nostrils daily    Nasal congestion       * gabapentin 250 MG/5ML solution    NEURONTIN    470 mL    8 mLs (400 mg) by ORAL OR NJ TUBE route 2 times daily    Abdominal pain, epigastric       * gabapentin 250 MG/5ML solution    NEURONTIN    450 mL    Take 12 mLs (600 mg) by mouth daily    Abdominal pain, epigastric       hydrOXYzine 10 MG/5ML syrup    ATARAX    473 mL    Take 12.5 mLs (25 mg) by mouth every 4 hours as needed for anxiety or other (pain)    Acute abdominal pain       leuprolide 11.25 MG kit    LUPRON DEPOT (3-MONTH)    1 each    Inject 11.25 mg into the muscle every 3 months    Endometriosis       levalbuterol 45 MCG/ACT Inhaler    XOPENEX HFA    1 Inhaler    Inhale 2 puffs into the lungs every 6 hours as needed for shortness of breath / dyspnea    Wheeze       menthol 5 % Ptch    ICY HOT    15 patch    Apply 1 patch topically every 8 hours as needed for muscle soreness    Abdominal pain, epigastric       multivitamins with minerals Liqd liquid     1 Bottle    15 mLs by Per Feeding Tube route daily    Abdominal pain, epigastric       norethindrone 5 MG tablet    AYGESTIN    90 tablet    Take 1 tablet (5 mg) by mouth daily    Endometriosis       nortriptyline 10 MG capsule    PAMELOR    120 capsule    Take 3 capsules (30 mg) by mouth At Bedtime    Right upper quadrant abdominal pain       ondansetron 4 MG ODT tab    ZOFRAN ODT    40 tablet    Take 1 tablet (4 mg) by mouth every 8 hours as needed for nausea or vomiting        order for DME     1 Units    Equipment being ordered: TENS with wire and electrode.    Chronic abdominal pain       * oxyCODONE 20 mg/mL (HIGH CONC) solution    ROXICODONE INTENSOL    13 mL    Take 0.5-0.75 mLs (10-15 mg) by mouth every 4 hours as needed for moderate to severe pain    Sphincter of Oddi dysfunction       * oxyCODONE 5 MG/5ML solution    ROXICODONE    840 mL    Take 10 mLs  (10 mg) by mouth every 4 hours as needed for pain maximum 60 mL per day.  This is a 2 week supply.    Abdominal pain, generalized       pantoprazole 40 MG EC tablet    PROTONIX    30 tablet    Take 1 tablet (40 mg) by mouth 2 times daily (before meals)    Right upper quadrant abdominal pain, Erosive gastritis       polyethylene glycol Packet    MIRALAX/GLYCOLAX    7 packet    Take 17 g by mouth daily    Right upper quadrant abdominal pain       RIZATRIPTAN BENZOATE PO      Take 5 mg by mouth once as needed        scopolamine 72 hr patch    TRANSDERM    2 patch    Apply 1 patch to hairless area behind one ear if severe nausea and vomiting.  Remove old patch and change every 3 days (72 hours).    Intractable vomiting with nausea, unspecified vomiting type       senna-docusate 8.6-50 MG per tablet    SENOKOT-S;PERICOLACE    100 tablet    Take 1 tablet by mouth 2 times daily as needed for constipation    Right upper quadrant abdominal pain       sodium bicarbonate 325 MG tablet     200 tablet    1 tablet (325 mg) by Per Feeding Tube route 4 times daily    Abdominal pain, epigastric       * Notice:  This list has 6 medication(s) that are the same as other medications prescribed for you. Read the directions carefully, and ask your doctor or other care provider to review them with you.

## 2018-02-14 ENCOUNTER — INFUSION THERAPY VISIT (OUTPATIENT)
Dept: INFUSION THERAPY | Facility: CLINIC | Age: 23
End: 2018-02-14
Attending: INTERNAL MEDICINE
Payer: COMMERCIAL

## 2018-02-14 ENCOUNTER — OFFICE VISIT (OUTPATIENT)
Dept: GASTROENTEROLOGY | Facility: CLINIC | Age: 23
End: 2018-02-14
Payer: COMMERCIAL

## 2018-02-14 VITALS
WEIGHT: 145.8 LBS | DIASTOLIC BLOOD PRESSURE: 75 MMHG | BODY MASS INDEX: 23.43 KG/M2 | HEART RATE: 122 BPM | SYSTOLIC BLOOD PRESSURE: 132 MMHG | OXYGEN SATURATION: 100 % | HEIGHT: 66 IN | TEMPERATURE: 98.5 F

## 2018-02-14 VITALS — DIASTOLIC BLOOD PRESSURE: 75 MMHG | SYSTOLIC BLOOD PRESSURE: 119 MMHG | HEART RATE: 89 BPM

## 2018-02-14 DIAGNOSIS — R10.11 ABDOMINAL PAIN, RIGHT UPPER QUADRANT: Primary | ICD-10-CM

## 2018-02-14 DIAGNOSIS — R74.8 ELEVATED LIVER ENZYMES: ICD-10-CM

## 2018-02-14 DIAGNOSIS — G43.A0 CYCLICAL VOMITING WITH NAUSEA, INTRACTABILITY OF VOMITING NOT SPECIFIED: Primary | ICD-10-CM

## 2018-02-14 DIAGNOSIS — K86.1 CHRONIC PANCREATITIS, UNSPECIFIED PANCREATITIS TYPE (H): ICD-10-CM

## 2018-02-14 DIAGNOSIS — K85.90 ACUTE PANCREATITIS: ICD-10-CM

## 2018-02-14 LAB
ALBUMIN SERPL-MCNC: 4.3 G/DL (ref 3.4–5)
ALP SERPL-CCNC: 89 U/L (ref 40–150)
ALT SERPL W P-5'-P-CCNC: 25 U/L (ref 0–50)
ANION GAP SERPL CALCULATED.3IONS-SCNC: 9 MMOL/L (ref 3–14)
AST SERPL W P-5'-P-CCNC: 19 U/L (ref 0–45)
BILIRUB DIRECT SERPL-MCNC: 0.1 MG/DL (ref 0–0.2)
BILIRUB SERPL-MCNC: 0.3 MG/DL (ref 0.2–1.3)
BUN SERPL-MCNC: 8 MG/DL (ref 7–30)
CALCIUM SERPL-MCNC: 9.4 MG/DL (ref 8.5–10.1)
CHLORIDE SERPL-SCNC: 102 MMOL/L (ref 94–109)
CO2 SERPL-SCNC: 26 MMOL/L (ref 20–32)
CREAT SERPL-MCNC: 0.56 MG/DL (ref 0.52–1.04)
GFR SERPL CREATININE-BSD FRML MDRD: >90 ML/MIN/1.7M2
GLUCOSE SERPL-MCNC: 97 MG/DL (ref 70–99)
LIPASE SERPL-CCNC: 83 U/L (ref 73–393)
POTASSIUM SERPL-SCNC: 3.9 MMOL/L (ref 3.4–5.3)
PROT SERPL-MCNC: 7.8 G/DL (ref 6.8–8.8)
SODIUM SERPL-SCNC: 138 MMOL/L (ref 133–144)

## 2018-02-14 PROCEDURE — 96376 TX/PRO/DX INJ SAME DRUG ADON: CPT

## 2018-02-14 PROCEDURE — 83690 ASSAY OF LIPASE: CPT | Performed by: NURSE PRACTITIONER

## 2018-02-14 PROCEDURE — 96374 THER/PROPH/DIAG INJ IV PUSH: CPT

## 2018-02-14 PROCEDURE — 80048 BASIC METABOLIC PNL TOTAL CA: CPT | Performed by: NURSE PRACTITIONER

## 2018-02-14 PROCEDURE — 25000128 H RX IP 250 OP 636: Mod: ZF | Performed by: INTERNAL MEDICINE

## 2018-02-14 PROCEDURE — 80076 HEPATIC FUNCTION PANEL: CPT | Performed by: NURSE PRACTITIONER

## 2018-02-14 PROCEDURE — 96361 HYDRATE IV INFUSION ADD-ON: CPT

## 2018-02-14 PROCEDURE — 25000128 H RX IP 250 OP 636: Mod: ZF | Performed by: NURSE PRACTITIONER

## 2018-02-14 PROCEDURE — 96375 TX/PRO/DX INJ NEW DRUG ADDON: CPT

## 2018-02-14 RX ORDER — ONDANSETRON 2 MG/ML
4 INJECTION INTRAMUSCULAR; INTRAVENOUS ONCE
Status: COMPLETED | OUTPATIENT
Start: 2018-02-14 | End: 2018-02-14

## 2018-02-14 RX ORDER — DIPHENHYDRAMINE HYDROCHLORIDE 50 MG/ML
25 INJECTION INTRAMUSCULAR; INTRAVENOUS ONCE
Status: COMPLETED | OUTPATIENT
Start: 2018-02-14 | End: 2018-02-14

## 2018-02-14 RX ORDER — ONDANSETRON 2 MG/ML
4 INJECTION INTRAMUSCULAR; INTRAVENOUS DAILY PRN
Status: CANCELLED
Start: 2018-02-14

## 2018-02-14 RX ORDER — ONDANSETRON 2 MG/ML
4 INJECTION INTRAMUSCULAR; INTRAVENOUS DAILY PRN
Status: DISCONTINUED | OUTPATIENT
Start: 2018-02-14 | End: 2018-02-14 | Stop reason: HOSPADM

## 2018-02-14 RX ORDER — DIPHENHYDRAMINE HYDROCHLORIDE 50 MG/ML
25 INJECTION INTRAMUSCULAR; INTRAVENOUS ONCE
Status: CANCELLED
Start: 2018-02-14 | End: 2018-02-14

## 2018-02-14 RX ORDER — ONDANSETRON 2 MG/ML
4 INJECTION INTRAMUSCULAR; INTRAVENOUS ONCE
Status: CANCELLED
Start: 2018-02-14 | End: 2018-02-14

## 2018-02-14 RX ADMIN — DIPHENHYDRAMINE HYDROCHLORIDE 25 MG: 50 INJECTION INTRAMUSCULAR; INTRAVENOUS at 12:58

## 2018-02-14 RX ADMIN — ONDANSETRON 4 MG: 2 INJECTION INTRAMUSCULAR; INTRAVENOUS at 12:56

## 2018-02-14 RX ADMIN — DIPHENHYDRAMINE HYDROCHLORIDE 25 MG: 50 INJECTION, SOLUTION INTRAMUSCULAR; INTRAVENOUS at 14:11

## 2018-02-14 RX ADMIN — ONDANSETRON 4 MG: 2 INJECTION INTRAMUSCULAR; INTRAVENOUS at 14:08

## 2018-02-14 RX ADMIN — SODIUM CHLORIDE, POTASSIUM CHLORIDE, SODIUM LACTATE AND CALCIUM CHLORIDE 2000 ML: 600; 310; 30; 20 INJECTION, SOLUTION INTRAVENOUS at 13:06

## 2018-02-14 ASSESSMENT — ANXIETY QUESTIONNAIRES: GAD7 TOTAL SCORE: 2

## 2018-02-14 ASSESSMENT — PAIN SCALES - GENERAL: PAINLEVEL: SEVERE PAIN (7)

## 2018-02-14 NOTE — PATIENT INSTRUCTIONS
Dear Alexandra Melgoza    Thank you for choosing [facility] ([facility]) for your infusion.  The following information is a summary of our appointment as well as important reminders.          We look forward in seeing you on your next appointment here at [facility].  Please don t hesitate to call us at [phone] to reschedule any of your appointments or to speak with one of the [facility] registered nurses.  It was a pleasure taking care of you today.    Sincerely,    [facility]  [address] [place] [address]  [address]  Phone:  [phone]

## 2018-02-14 NOTE — MR AVS SNAPSHOT
After Visit Summary   2/14/2018    Alexandra Melgoza    MRN: [id number]           Patient Information     Date Of Birth          1995        Visit Information        Provider Department      2/14/2018 12:00 PM Campbell Narayanan MD [facility]        Today's Diagnoses     Abdominal pain, right upper quadrant    -  1       Follow-ups after your visit        Your next 10 appointments already scheduled     Feb 15, 2018  3:45 PM CST   (Arrive by 3:30 PM)   Return Visit with Vicente Dang MD   [facility] ([facility])    [address] [place] [address]  4th Floor  [facility] [address]   [phone]            Feb 16, 2018  3:00 PM CST   Infusion 120 with UC SPEC INFUSION, UC 41 ATC   [facility] ([facility])    [address] [place] [address]  [address]  [facility] [address]   [phone]            Feb 19, 2018 12:00 PM CST   Infusion 120 with UC SPEC INFUSION, UC 50 ATC   [facility] ([facility])    [address] [place] [address]  [address]  [facility] [address]   [phone]            Feb 21, 2018  3:00 PM CST   Infusion 120 with UC SPEC INFUSION, UC 48 ATC   [facility] ([facility])    [address] [place] [address]  [address]  [facility] [address]   [phone]            Feb 23, 2018  1:00 PM CST   Infusion 120 with UC SPEC INFUSION, UC 42 ATC   [facility] ([facility])    [address] [place] [address]  [address]  [facility] [address]   [phone]            Feb 26, 2018 12:00 PM CST   Infusion 120 with UC SPEC INFUSION   [facility] ([facility])    [address] [place] [address]  [address]  [facility] [address] [phone]              Who to contact     Please call your clinic at [phone] to:    Ask questions about your health    Make or cancel appointments    Discuss your medicines    Learn about your test results    Speak to your doctor            Additional Information About Your Visit        MyChart Information     [employer] gives you secure access to your electronic health record. If you see a primary care provider, you can also send messages to your care team and make appointments. If you have questions, please call your primary care clinic.  If you do not have a primary care provider, please call [phone] and they will assist you.      [employer] is an electronic gateway that provides easy, online access to your medical records. With [employer], you can request a clinic appointment, read your test results, renew a prescription or communicate with your care team.     To access your existing account, please contact your [facility] or call [phone] for assistance.        Care EveryWhere ID     This is your Care EveryWhere ID. This could be used by other organizations to access your [place] medical records  [id number]        Your Vitals Were     Pulse Temperature Height Pulse Oximetry BMI (Body Mass Index)       122 98.5  F (36.9  C) (Oral) 1.68 m (5' 6.14\") 100% 23.43 kg/m2        Blood Pressure from Last 3 Encounters:   02/14/18 132/75   02/13/18 121/72   02/12/18 116/77    Weight from Last 3 Encounters:   02/14/18 66.1 kg (145 lb 12.8 oz)   02/12/18 67.6 kg (149 lb)   02/08/18 67.7 kg (149 lb 4 oz)              Today, you had the following     No orders found for display       Primary Care Provider Office Phone # Fax #    Quyen Baez [phone]       [address] [place] [address]        Equal Access to Services     RACHAEL BENITEZ AH: alcon Orta, lashell santillan, " maki duongchris palma'aan ah. So [facility] [phone].    ATENCIÓN: Si hilariola finesse, tiene a quijano disposición servicios gratuitos de asistencia lingüística. Panfilo champion [phone].    We comply with applicable federal civil rights laws and [place] laws. We do not discriminate on the basis of race, color, national origin, age, disability, sex, sexual orientation, or gender identity.            Thank you!     Thank you for choosing [facility]  for your care. Our goal is always to provide you with excellent care. Hearing back from our patients is one way we can continue to improve our services. Please take a few minutes to complete the written survey that you may receive in the mail after your visit with us. Thank you!             Your Updated Medication List - Protect others around you: Learn how to safely use, store and throw away your medicines at www.disposemymeds.org.          This list is accurate as of 2/14/18  1:14 PM.  Always use your most recent med list.                   Brand Name Dispense Instructions for use Diagnosis    * amylase-lipase-protease 64558 UNITS Cpep    ZENPEP    180 capsule    Take 2-3 capsules (40,000-60,000 Units) by mouth Take with snacks or supplements (Snacks)    Idiopathic chronic pancreatitis (H)       * amylase-lipase-protease 34944 UNITS Cpep    ZENPEP    180 capsule    Take 5 capsules (100,000 Units) by mouth 4 times daily (with meals and nightly)    Right upper quadrant abdominal pain       fluticasone 50 MCG/ACT spray    FLONASE    16 g    Spray 2 sprays into both nostrils daily    Nasal congestion       * gabapentin 250 MG/5ML solution    NEURONTIN    470 mL    8 mLs (400 mg) by ORAL OR NJ TUBE route 2 times daily    Abdominal pain, epigastric       * gabapentin 250 MG/5ML solution    NEURONTIN    450 mL    Take 12 mLs (600 mg) by mouth daily    Abdominal pain, epigastric       hydrOXYzine 10 MG/5ML syrup    ATARAX    473 mL    Take 12.5 mLs (25 mg)  by mouth every 4 hours as needed for anxiety or other (pain)    Acute abdominal pain       leuprolide 11.25 MG kit    LUPRON DEPOT (3-MONTH)    1 each    Inject 11.25 mg into the muscle every 3 months    Endometriosis       levalbuterol 45 MCG/ACT Inhaler    XOPENEX HFA    1 Inhaler    Inhale 2 puffs into the lungs every 6 hours as needed for shortness of breath / dyspnea    Wheeze       menthol 5 % Ptch    ICY HOT    15 patch    Apply 1 patch topically every 8 hours as needed for muscle soreness    Abdominal pain, epigastric       multivitamins with minerals Liqd liquid     1 Bottle    15 mLs by Per Feeding Tube route daily    Abdominal pain, epigastric       norethindrone 5 MG tablet    AYGESTIN    90 tablet    Take 1 tablet (5 mg) by mouth daily    Endometriosis       nortriptyline 10 MG capsule    PAMELOR    120 capsule    Take 3 capsules (30 mg) by mouth At Bedtime    Right upper quadrant abdominal pain       ondansetron 4 MG ODT tab    ZOFRAN ODT    40 tablet    Take 1 tablet (4 mg) by mouth every 8 hours as needed for nausea or vomiting        order for DME     1 Units    Equipment being ordered: TENS with wire and electrode.    Chronic abdominal pain       * oxyCODONE 20 mg/mL (HIGH CONC) solution    ROXICODONE INTENSOL    13 mL    Take 0.5-0.75 mLs (10-15 mg) by mouth every 4 hours as needed for moderate to severe pain    Sphincter of Oddi dysfunction       * oxyCODONE 5 MG/5ML solution    ROXICODONE    840 mL    Take 10 mLs (10 mg) by mouth every 4 hours as needed for pain maximum 60 mL per day.  This is a 2 week supply.    Abdominal pain, generalized       pantoprazole 40 MG EC tablet    PROTONIX    30 tablet    Take 1 tablet (40 mg) by mouth 2 times daily (before meals)    Right upper quadrant abdominal pain, Erosive gastritis       polyethylene glycol Packet    MIRALAX/GLYCOLAX    7 packet    Take 17 g by mouth daily    Right upper quadrant abdominal pain       RIZATRIPTAN BENZOATE PO      Take 5 mg by  mouth once as needed        scopolamine 72 hr patch    TRANSDERM    2 patch    Apply 1 patch to hairless area behind one ear if severe nausea and vomiting.  Remove old patch and change every 3 days (72 hours).    Intractable vomiting with nausea, unspecified vomiting type       senna-docusate 8.6-50 MG per tablet    SENOKOT-S;PERICOLACE    100 tablet    Take 1 tablet by mouth 2 times daily as needed for constipation    Right upper quadrant abdominal pain       sodium bicarbonate 325 MG tablet     200 tablet    1 tablet (325 mg) by Per Feeding Tube route 4 times daily    Abdominal pain, epigastric       * Notice:  This list has 6 medication(s) that are the same as other medications prescribed for you. Read the directions carefully, and ask your doctor or other care provider to review them with you.

## 2018-02-14 NOTE — PROGRESS NOTES
Nursing Note  Alexandra Melgoza [facility] for:   Chief Complaint   Patient presents with     Infusion     IV fluids, Benadryl and Zofran      During today's [facility] appointment, orders from Dr. Narayanan were completed.  Frequency: 3 times weekly as needed.    Progress note:  Patient identification verified by name and date of birth.  Assessment completed.  Vitals recorded in Doc Flowsheets.  Patient was provided with education regarding infusion and possible side effects.  Patient verbalized understanding.      needed: No  Premedications: were not ordered.  Infusion Rates: Each liter of LR infused over 1 hour.   Approximate Infusion length:2 hours.   Labs: were not ordered for this appointment.  Vascular access: port accessed today.  Treatment Conditions: non-applicable.  Patient tolerated infusion: well.    Drug Waste Record    Drug Name: Benadryl  Dose: 25 mg  Route administered: IV  NDC #: 5423-5031-32  Amount of waste(mL):0.5 ml  Reason for waste: Single use vial    Drug Name: Benadryl  Dose: 25 mg  Route administered: IV  NDC #: 1372-2611-64  Amount of waste(mL):0.5 ml  Reason for waste: Single use vial      Discharge Plan:   Follow up plan of care with: ongoing infusions at [facility].  Discharge instructions were reviewed with patient.  Patient/representative verbalized understanding of discharge instructions and all questions answered.  Patient discharged from [facility] in stable condition.  Ysabel Piedra RN    Administrations This Visit     diphenhydrAMINE (BENADRYL) injection 25 mg     Admin Date Action Dose Route Administered By             02/14/2018 Given 25 mg Intravenous Ysabel Piedra RN              Admin Date Action Dose Route Administered By             02/14/2018 Given 25 mg Intravenous Ysabel Piedra RN                     lactated ringers BOLUS 1,000-2,000 mL     Admin Date Action Dose Route Administered By             02/14/2018 New Bag 2000 mL Intravenous Ysabel Piedra RN                    ondansetron (ZOFRAN) injection 4 mg     Admin Date Action Dose Route Administered By             02/14/2018 Given 4 mg Intravenous NicolascheYsabel becker RN              Admin Date Action Dose Route Administered By             02/14/2018 Given 4 mg Intravenous Ysabel Piedra RN                          There were no vitals taken for this visit.

## 2018-02-14 NOTE — LETTER
"2/14/2018       RE: Alexandra Melgoza  [address]  [facility] [address]     Dear Colleague,    Thank you for referring your patient, Alexandra Melgoza, to the [facility] at [facility]. Please see a copy of my visit note below.    REASON FOR VISIT: Nausea and dry heaves       HISTORY OF PRESENT ILLNESS:     Alexandra Melgoza is a 22 year old female with history of chronic abdominal pain who recently had GJ tube placed due to nausea, vomiting, worsening abdominal pain and weight loss. She was seen in clinic on Monday, she is following up today due to increase nausea and dry heaves after attempting PO intake yesterday. She reports attempting to drink clear liquids including jello and broth, shortly after she reports worsening abdominal pain, nausea, and dry heaves, no vomiting, which persisted throughout the night. She is scheduled for outpatient IV infusion today, which she reports usually helps with nausea flare. She reports some drainage around GJ tube insertion site, \"looked like pus\". She reports low grade fever and some pain with urination, she was recently treated for UTI, while hospitalized antibiotics were discontinued. She states she restarted cephalexin. She denies flank pain, blood in stool, fever, chills, diarrhea, vomiting, blood in stool. She reports pain around GJ tube predominantly on the right side, no redness, warmth, swelling.       REVIEW OF SYSTEMS:   A comprehensive review of systems was performed and was noted to be negative except as above.      PHYSICAL EXAM:  VITAL SIGNS: Stable  GENERAL: alert, no distress  EYES: Eyes grossly normal to inspection, anicteric sclera   ABDOMEN: soft, no hepatosplenomegaly, no masses, distention, guarding, or rebound, normal bowel sounds, PEG-J right side of abdomen without redness, drainage, warmth, swelling, or signs of infection   SKIN: no rashes or juandice, skin warm and dry  NEURO: strength and tone- " normal, mentation- intact, speech- normal  PSYCH: Alert and oriented times 3, appears anxious       ASSESSMENT AND RECOMMENDATIONS:   Alexandra Melgoza is a pleasant 22 year old female with history of chronic abdominal pain who recently underwent PEG-J placement on 2/8/18 due to worsening abdominal pain associated with eating, persistent nausea, vomiting, and weight loss with several ED and hospital admissions as a result who presents today for nausea and dry heaves after PO intake of clear liquids. She is tolerating tube feeding at goal rate. X-ray on Monday confirmed proper GJ tube position. Stoma site is without worrisome signs of infection or complications. Benign abdominal exam expect for some tenderness around GJ tube site which is expected as tube was recently placed. Alexandra was reassured by these findings. She was encouraged to eat and drink as tolerated and continue with antiemetics as needed. She was encouraged to continue with outpatient IV fluids as she finds this beneficial. Will check CMP and lipase today. We discussed at length pain is most likely due to musculoskeletal pain from recent procedure. She was instructed to take Ibuprofen elixir 200 mg TID as needed for 3-4 days. She should continue with pain regimen per pain clinic recommendations. She was educated on worrisome signs and symptoms of infection and to follow up if symptoms worsen.  Encouraged to continue daily cleansing of GJ tube site. She was instructed to follow-up with PCP for UTI symptoms, she is waiting to hear back from primary care clinic.      Patient seen in conjunction with Dr. Narayanan       I spent 15 minutes with this patient face to face and explained the conditions and plans (more than 50% of time was counseling/coordination of care, as discussed above).       Again, thank you for allowing me to participate in the care of your patient.      Sincerely,    Campbell Narayanan MD

## 2018-02-14 NOTE — MR AVS SNAPSHOT
After Visit Summary   2/14/2018    Alexandra Melgoza    MRN: [id number]           Patient Information     Date Of Birth          1995        Visit Information        Provider Department      2/14/2018 1:00 PM UC 42 ATC; UC SPEC INFUSION [facility]        Today's Diagnoses     Cyclical vomiting with nausea, intractability of vomiting not specified    -  1    Chronic pancreatitis, unspecified pancreatitis type (H)        Acute pancreatitis        Elevated liver enzymes          Care Instructions    Dear Alexandra Melgoza    Thank you for choosing [facility] ([facility]) for your infusion.  The following information is a summary of our appointment as well as important reminders.          We look forward in seeing you on your next appointment here at [facility].  Please don t hesitate to call us at [phone] to reschedule any of your appointments or to speak with one of the [facility] registered nurses.  It was a pleasure taking care of you today.    Sincerely,    [facility]  [facility]  [address] [place] [address]  [id number]  Phone:  [phone]            Follow-ups after your visit        Your next 10 appointments already scheduled     Feb 15, 2018  3:45 PM CST   (Arrive by 3:30 PM)   Return Visit with Vicente Dang MD   [facility] ([facility])    [address] [place] [address]  4th Floor  [facility] [address] [phone]            Feb 16, 2018  3:00 PM CST   Infusion 120 with UC SPEC INFUSION, UC 41 ATC   [facility] ([facility])    [address] [place] [address]  [address] [place] [address] [phone]            Feb 19, 2018 12:00 PM CST   Infusion 120 with UC SPEC INFUSION, UC 50 ATC   [facility] ([facility])    [address]  [address]  [facility] [address]   [phone]            Feb 21, 2018  3:00 PM CST   Infusion 120 with UC SPEC INFUSION, UC 48 ATC   [facility] Specialty and Procedure ([facility])    [address]  [address]  [facility] [address]   [phone]            Feb 23, 2018  1:00 PM CST   Infusion 120 with UC SPEC INFUSION, UC 42 ATC   [facility] Procedure ([facility])    [address]  [address]  [facility] [address]   [phone]            Feb 26, 2018 12:00 PM CST   Infusion 120 with UC SPEC INFUSION   [facility] Procedure ([facility])    [address]  [address]  [facility] [address]   [phone]              Who to contact     If you have questions or need follow up information about today's clinic visit or your schedule please contact [facility] SPECIALTY AND PROCEDURE directly at [phone].  Normal or non-critical lab and imaging results will be communicated to you by [employer], letter or phone within 4 business days after the clinic has received the results. If you do not hear from us within 7 days, please contact the clinic through [employer] or phone. If you have a critical or abnormal lab result, we will notify you by phone as soon as possible.  Submit refill requests through [employer] or call your pharmacy and they will forward the refill request to us. Please allow 3 business days for your refill to be completed.          Additional Information About Your Visit        [employer] Information     [employer] gives you secure access to your electronic health record. If you see a primary care provider, you can also send messages to your care team and make appointments. If you have  questions, please call your primary care clinic.  If you do not have a primary care provider, please call [phone] and they will assist you.        Care EveryWhere ID     This is your Care EveryWhere ID. This could be used by other organizations to access your [place] medical records  [id number]        Your Vitals Were     Pulse                   89            Blood Pressure from Last 3 Encounters:   02/14/18 119/75   02/14/18 132/75   02/13/18 121/72    Weight from Last 3 Encounters:   02/14/18 66.1 kg (145 lb 12.8 oz)   02/12/18 67.6 kg (149 lb)   02/08/18 67.7 kg (149 lb 4 oz)              We Performed the Following     Basic metabolic panel     Hepatic panel (Albumin, ALT, AST, Bili, Alk Phos, TP)     Lipase        Primary Care Provider Office Phone # Fax #    Quyen Baez [phone] [address] [address]        Equal Access to Services     RACHAEL BENITEZ : Hadii aad ku hadasho Soomaali, waaxda luqadaha, qaybta kaalmada adeegyada, waxay eliseoin haymonique gaspar . So [facility] [phone].    ATENCIÓN: Si habla español, tiene a quijano disposición servicios gratuitos de asistencia lingüística. [facility] [phone].    We comply with applicable federal civil rights laws and [place] laws. We do not discriminate on the basis of race, color, national origin, age, disability, sex, sexual orientation, or gender identity.            Thank you!     Thank you for choosing [facility]  for your care. Our goal is always to provide you with excellent care. Hearing back from our patients is one way we can continue to improve our services. Please take a few minutes to complete the written survey that you may receive in the mail after your visit with us. Thank you!             Your Updated Medication List - Protect others around you: Learn how to safely use, store and throw away your medicines at  www.disposemymeds.org.          This list is accurate as of 2/14/18  3:21 PM.  Always use your most recent med list.                   Brand Name Dispense Instructions for use Diagnosis    * amylase-lipase-protease 58091 UNITS Cpep    ZENPEP    180 capsule    Take 2-3 capsules (40,000-60,000 Units) by mouth Take with snacks or supplements (Snacks)    Idiopathic chronic pancreatitis (H)       * amylase-lipase-protease 52402 UNITS Cpep    ZENPEP    180 capsule    Take 5 capsules (100,000 Units) by mouth 4 times daily (with meals and nightly)    Right upper quadrant abdominal pain       fluticasone 50 MCG/ACT spray    FLONASE    16 g    Spray 2 sprays into both nostrils daily    Nasal congestion       * gabapentin 250 MG/5ML solution    NEURONTIN    470 mL    8 mLs (400 mg) by ORAL OR NJ TUBE route 2 times daily    Abdominal pain, epigastric       * gabapentin 250 MG/5ML solution    NEURONTIN    450 mL    Take 12 mLs (600 mg) by mouth daily    Abdominal pain, epigastric       hydrOXYzine 10 MG/5ML syrup    ATARAX    473 mL    Take 12.5 mLs (25 mg) by mouth every 4 hours as needed for anxiety or other (pain)    Acute abdominal pain       leuprolide 11.25 MG kit    LUPRON DEPOT (3-MONTH)    1 each    Inject 11.25 mg into the muscle every 3 months    Endometriosis       levalbuterol 45 MCG/ACT Inhaler    XOPENEX HFA    1 Inhaler    Inhale 2 puffs into the lungs every 6 hours as needed for shortness of breath / dyspnea    Wheeze       menthol 5 % Ptch    ICY HOT    15 patch    Apply 1 patch topically every 8 hours as needed for muscle soreness    Abdominal pain, epigastric       multivitamins with minerals Liqd liquid     1 Bottle    15 mLs by Per Feeding Tube route daily    Abdominal pain, epigastric       norethindrone 5 MG tablet    AYGESTIN    90 tablet    Take 1 tablet (5 mg) by mouth daily    Endometriosis       nortriptyline 10 MG capsule    PAMELOR    120 capsule    Take 3 capsules (30 mg) by mouth At Bedtime     Right upper quadrant abdominal pain       ondansetron 4 MG ODT tab    ZOFRAN ODT    40 tablet    Take 1 tablet (4 mg) by mouth every 8 hours as needed for nausea or vomiting        order for DME     1 Units    Equipment being ordered: TENS with wire and electrode.    Chronic abdominal pain       * oxyCODONE 20 mg/mL (HIGH CONC) solution    ROXICODONE INTENSOL    13 mL    Take 0.5-0.75 mLs (10-15 mg) by mouth every 4 hours as needed for moderate to severe pain    Sphincter of Oddi dysfunction       * oxyCODONE 5 MG/5ML solution    ROXICODONE    840 mL    Take 10 mLs (10 mg) by mouth every 4 hours as needed for pain maximum 60 mL per day.  This is a 2 week supply.    Abdominal pain, generalized       pantoprazole 40 MG EC tablet    PROTONIX    30 tablet    Take 1 tablet (40 mg) by mouth 2 times daily (before meals)    Right upper quadrant abdominal pain, Erosive gastritis       polyethylene glycol Packet    MIRALAX/GLYCOLAX    7 packet    Take 17 g by mouth daily    Right upper quadrant abdominal pain       RIZATRIPTAN BENZOATE PO      Take 5 mg by mouth once as needed        scopolamine 72 hr patch    TRANSDERM    2 patch    Apply 1 patch to hairless area behind one ear if severe nausea and vomiting.  Remove old patch and change every 3 days (72 hours).    Intractable vomiting with nausea, unspecified vomiting type       senna-docusate 8.6-50 MG per tablet    SENOKOT-S;PERICOLACE    100 tablet    Take 1 tablet by mouth 2 times daily as needed for constipation    Right upper quadrant abdominal pain       sodium bicarbonate 325 MG tablet     200 tablet    1 tablet (325 mg) by Per Feeding Tube route 4 times daily    Abdominal pain, epigastric       * Notice:  This list has 6 medication(s) that are the same as other medications prescribed for you. Read the directions carefully, and ask your doctor or other care provider to review them with you.

## 2018-02-14 NOTE — NURSING NOTE
"Chief Complaint   Patient presents with     Clinic Care Coordination - Follow-up     Follow up, incision site.       Vitals:    02/14/18 1208   BP: 132/75   Pulse: 122   Temp: 98.5  F (36.9  C)   TempSrc: Oral   SpO2: 100%   Weight: 145 lb 12.8 oz   Height: 5' 6.14\"       Body mass index is 23.43 kg/(m^2).    Eric BARBOSA LPN                     "

## 2018-02-14 NOTE — PROGRESS NOTES
"REASON FOR VISIT: Nausea and dry heaves       HISTORY OF PRESENT ILLNESS:     Alexandra Melgoza is a 22 year old female with history of chronic abdominal pain who recently had GJ tube placed due to nausea, vomiting, worsening abdominal pain and weight loss. She was seen in clinic on Monday, she is following up today due to increase nausea and dry heaves after attempting PO intake yesterday. She reports attempting to drink clear liquids including jello and broth, shortly after she reports worsening abdominal pain, nausea, and dry heaves, no vomiting, which persisted throughout the night. She is scheduled for outpatient IV infusion today, which she reports usually helps with nausea flare. She reports some drainage around GJ tube insertion site, \"looked like pus\". She reports low grade fever and some pain with urination, she was recently treated for UTI, while hospitalized antibiotics were discontinued. She states she restarted cephalexin. She denies flank pain, blood in stool, fever, chills, diarrhea, vomiting, blood in stool. She reports pain around GJ tube predominantly on the right side, no redness, warmth, swelling.       REVIEW OF SYSTEMS:   A comprehensive review of systems was performed and was noted to be negative except as above.      PHYSICAL EXAM:  VITAL SIGNS: Stable  GENERAL: alert, no distress  EYES: Eyes grossly normal to inspection, anicteric sclera   ABDOMEN: soft, no hepatosplenomegaly, no masses, distention, guarding, or rebound, normal bowel sounds, PEG-J right side of abdomen without redness, drainage, warmth, swelling, or signs of infection   SKIN: no rashes or juandice, skin warm and dry  NEURO: strength and tone- normal, mentation- intact, speech- normal  PSYCH: Alert and oriented times 3, appears anxious       ASSESSMENT AND RECOMMENDATIONS:   Alexandra Melgoza is a pleasant 22 year old female with history of chronic abdominal pain who recently underwent PEG-J placement on 2/8/18 due to worsening " abdominal pain associated with eating, persistent nausea, vomiting, and weight loss with several ED and hospital admissions as a result who presents today for nausea and dry heaves after PO intake of clear liquids. She is tolerating tube feeding at goal rate. X-ray on Monday confirmed proper GJ tube position. Stoma site is without worrisome signs of infection or complications. Benign abdominal exam expect for some tenderness around GJ tube site which is expected as tube was recently placed. Alexandra was reassured by these findings. She was encouraged to eat and drink as tolerated and continue with antiemetics as needed. She was encouraged to continue with outpatient IV fluids as she finds this beneficial. Will check CMP and lipase today. We discussed at length pain is most likely due to musculoskeletal pain from recent procedure. She was instructed to take Ibuprofen elixir 200 mg TID as needed for 3-4 days. She should continue with pain regimen per pain clinic recommendations. She was educated on worrisome signs and symptoms of infection and to follow up if symptoms worsen.  Encouraged to continue daily cleansing of GJ tube site. She was instructed to follow-up with PCP for UTI symptoms, she is waiting to hear back from primary care clinic.            Patient seen in conjunction with Dr. Oracio Navarro, [facility]   Advanced Endoscopy   [phone]        I spent 15 minutes with this patient face to face and explained the conditions and plans (more than 50% of time was counseling/coordination of care, as discussed above).

## 2018-02-16 ENCOUNTER — TELEPHONE (OUTPATIENT)
Dept: INTERNAL MEDICINE | Facility: CLINIC | Age: 23
End: 2018-02-16

## 2018-02-16 ENCOUNTER — INFUSION THERAPY VISIT (OUTPATIENT)
Dept: INFUSION THERAPY | Facility: CLINIC | Age: 23
End: 2018-02-16
Attending: INTERNAL MEDICINE
Payer: COMMERCIAL

## 2018-02-16 VITALS
SYSTOLIC BLOOD PRESSURE: 130 MMHG | RESPIRATION RATE: 14 BRPM | TEMPERATURE: 97.6 F | DIASTOLIC BLOOD PRESSURE: 77 MMHG | OXYGEN SATURATION: 100 %

## 2018-02-16 DIAGNOSIS — R21 RASH: ICD-10-CM

## 2018-02-16 DIAGNOSIS — F41.1 ANXIETY STATE: ICD-10-CM

## 2018-02-16 DIAGNOSIS — R52 PAIN: ICD-10-CM

## 2018-02-16 DIAGNOSIS — K86.1 CHRONIC PANCREATITIS, UNSPECIFIED PANCREATITIS TYPE (H): ICD-10-CM

## 2018-02-16 DIAGNOSIS — F33.1 MAJOR DEPRESSIVE DISORDER, RECURRENT EPISODE, MODERATE (H): ICD-10-CM

## 2018-02-16 DIAGNOSIS — Z98.890 S/P ERCP: ICD-10-CM

## 2018-02-16 DIAGNOSIS — N39.41 URGE INCONTINENCE: ICD-10-CM

## 2018-02-16 DIAGNOSIS — G43.A0 CYCLICAL VOMITING WITH NAUSEA, INTRACTABILITY OF VOMITING NOT SPECIFIED: Primary | ICD-10-CM

## 2018-02-16 DIAGNOSIS — E16.2 HYPOGLYCEMIA: ICD-10-CM

## 2018-02-16 DIAGNOSIS — R10.13 ABDOMINAL PAIN, EPIGASTRIC: ICD-10-CM

## 2018-02-16 DIAGNOSIS — R30.0 DYSURIA: Primary | ICD-10-CM

## 2018-02-16 DIAGNOSIS — R10.9 ACUTE ABDOMINAL PAIN: ICD-10-CM

## 2018-02-16 PROCEDURE — 25000128 H RX IP 250 OP 636: Mod: ZF | Performed by: INTERNAL MEDICINE

## 2018-02-16 PROCEDURE — 96361 HYDRATE IV INFUSION ADD-ON: CPT

## 2018-02-16 PROCEDURE — 96376 TX/PRO/DX INJ SAME DRUG ADON: CPT

## 2018-02-16 PROCEDURE — 96374 THER/PROPH/DIAG INJ IV PUSH: CPT

## 2018-02-16 PROCEDURE — 96375 TX/PRO/DX INJ NEW DRUG ADDON: CPT

## 2018-02-16 PROCEDURE — 25000128 H RX IP 250 OP 636: Mod: ZF | Performed by: NURSE PRACTITIONER

## 2018-02-16 RX ORDER — DIPHENHYDRAMINE HYDROCHLORIDE 50 MG/ML
25 INJECTION INTRAMUSCULAR; INTRAVENOUS ONCE
Status: CANCELLED
Start: 2018-02-16 | End: 2018-02-16

## 2018-02-16 RX ORDER — DIPHENHYDRAMINE HYDROCHLORIDE 50 MG/ML
25 INJECTION INTRAMUSCULAR; INTRAVENOUS ONCE
Status: COMPLETED | OUTPATIENT
Start: 2018-02-16 | End: 2018-02-16

## 2018-02-16 RX ORDER — ONDANSETRON 2 MG/ML
4 INJECTION INTRAMUSCULAR; INTRAVENOUS DAILY PRN
Status: DISCONTINUED | OUTPATIENT
Start: 2018-02-16 | End: 2018-02-16 | Stop reason: HOSPADM

## 2018-02-16 RX ORDER — ONDANSETRON 2 MG/ML
4 INJECTION INTRAMUSCULAR; INTRAVENOUS ONCE
Status: COMPLETED | OUTPATIENT
Start: 2018-02-16 | End: 2018-02-16

## 2018-02-16 RX ORDER — ONDANSETRON 2 MG/ML
4 INJECTION INTRAMUSCULAR; INTRAVENOUS ONCE
Status: CANCELLED
Start: 2018-02-16 | End: 2018-02-16

## 2018-02-16 RX ORDER — ONDANSETRON 2 MG/ML
4 INJECTION INTRAMUSCULAR; INTRAVENOUS DAILY PRN
Status: CANCELLED
Start: 2018-02-16

## 2018-02-16 RX ADMIN — ONDANSETRON 4 MG: 2 INJECTION INTRAMUSCULAR; INTRAVENOUS at 17:11

## 2018-02-16 RX ADMIN — DIPHENHYDRAMINE HYDROCHLORIDE 25 MG: 50 INJECTION INTRAMUSCULAR; INTRAVENOUS at 15:44

## 2018-02-16 RX ADMIN — SODIUM CHLORIDE, POTASSIUM CHLORIDE, SODIUM LACTATE AND CALCIUM CHLORIDE 2000 ML: 600; 310; 30; 20 INJECTION, SOLUTION INTRAVENOUS at 15:43

## 2018-02-16 RX ADMIN — DIPHENHYDRAMINE HYDROCHLORIDE 25 MG: 50 INJECTION INTRAMUSCULAR; INTRAVENOUS at 17:11

## 2018-02-16 RX ADMIN — ONDANSETRON 4 MG: 2 INJECTION INTRAMUSCULAR; INTRAVENOUS at 15:43

## 2018-02-16 NOTE — PROGRESS NOTES
"Infusion nursing note:    Alexandra Melgoza presents today to [facility] for a IVF, anti nausea medications infusion.  During today's [facility] appointment orders from Dr Narayanan were completed.  Frequency: 3times per week as needed    Progress note:  ID verified by name and .  Assessment completed.  Vitals were stable throughout time in [facility].  verbal education given to patient/representative regarding infusion and possible side effects.  Patient verbalized understanding.    Note: Pt states her gt insertion site pain is finally in the 'acceptable\"range, especially now that she is doing her home pain meds on a scheduled basis. Also, being able to vent the gt has been helpful in lessening nausea and preventing vomiting.    Infusion given over approximately  Each liter over one hour; benadryl and zofran doses IVP each over 2minutes., with good relief from nausea obtained.     Administrations This Visit     diphenhydrAMINE (BENADRYL) injection 25 mg     Admin Date Action Dose Route Administered By             2018 Given 25 mg Intravenous Patito Liu RN              Admin Date Action Dose Route Administered By             2018 Given 25 mg Intravenous Patito Liu RN                    lactated ringers BOLUS 1,000-2,000 mL     Admin Date Action Dose Route Administered By             2018 New Bag 2000 mL Intravenous Patito Liu RN                    ondansetron (ZOFRAN) injection 4 mg     Admin Date Action Dose Route Administered By             2018 Given 4 mg Intravenous Patito Liu RN              Admin Date Action Dose Route Administered By             2018 Given 4 mg Intravenous Patito Liu RN                          /77  Temp 97.6  F (36.4  C)  Resp 14  SpO2 100%    Discharge plan:    Discharge instructions were reviewed with patient: Yes  Patient/representative verbalized understanding of discharge instructions and all questions answered: " Yes.    Discharged from [facility] at 1740 with boyfriend to home..    Patito Liu

## 2018-02-16 NOTE — MR AVS SNAPSHOT
After Visit Summary   2/16/2018    Alexandra Melgoza    MRN: [id number]           Patient Information     Date Of Birth          1995        Visit Information        Provider Department      2/16/2018 3:00 PM UC 41 ATC; UC SPEC INFUSION [facility]        Today's Diagnoses     Cyclical vomiting with nausea, intractability of vomiting not specified    -  1    Chronic pancreatitis, unspecified pancreatitis type (H)        Pain        Abdominal pain, epigastric        S/P ERCP        Acute abdominal pain        Hypoglycemia        Urge incontinence        Major depressive disorder, recurrent episode, moderate (H)        Anxiety state        Rash           Follow-ups after your visit        Your next 10 appointments already scheduled     Feb 19, 2018 12:00 PM CST   Infusion 120 with UC SPEC INFUSION, UC 50 ATC   [facility] ([facility])    [address] [place] [address]  [address]  [facility] [address] [phone]            Feb 21, 2018  3:00 PM CST   Infusion 120 with UC SPEC INFUSION, UC 48 ATC   [facility] ([facility])    [address] [place] [address]  [address]  [facility] [address] [phone]            Feb 22, 2018 10:10 AM CST   (Arrive by 9:55 AM)   Return Visit with Ting Carrillo MD   [facility] ([facility])    [address] [place] [address]  4th Floor  [facility] [address] [phone]            Feb 23, 2018  1:00 PM CST   Infusion 120 with UC SPEC INFUSION, UC 42 ATC   [facility] ([facility])    [address] [place] [address]  [address]  [facility] [address] [phone]            Feb 26, 2018 12:00 PM CST   Infusion 120 with UC SPEC INFUSION, UC 50 ATC   [facility] ([facility])    [address]  [address]  [facility] [id number] [phone]            Feb 27, 2018 10:00 AM CST   (Arrive by 9:45 AM)   Return Visit with ANABEL Falcon CNP   [facility] ([facility])    [address]  4th Floor  [facility] [id number] [phone]              Future tests that were ordered for you today     Open Future Orders        Priority Expected Expires Ordered    UA with Microscopic reflex to Culture Routine  2/16/2019 2/16/2018            Who to contact     If you have questions or need follow up information about today's clinic visit or your schedule please contact [facility] [place] [facility]ED[facility] directly at [phone].  Normal or non-critical lab and imaging results will be communicated to you by "[employer]hart, letter or phone within 4 business days after the clinic has received the results. If you do not hear from us within 7 days, please contact the clinic through "[employer]hart or phone. If you have a critical or abnormal lab result, we will notify you by phone as soon as possible.  Submit refill requests through [employer] or call your pharmacy and they will forward the refill request to us. Please allow 3 business days for your refill to be completed.          Additional Information About Your Visit        "[employer]hart Information     [employer] gives you secure access to your electronic health record. If you see a primary care provider, you can also send messages to your care team and make appointments. If you have questions, please call your primary care clinic.  If you do not have a primary care provider, please call [phone] and they will assist you.        Care EveryWhere ID     This is your Care EveryWhere ID. This could be used by other organizations to access your [place] medical records  [id number]        Your  Vitals Were     Temperature Respirations Pulse Oximetry             97.6  F (36.4  C) 14 100%          Blood Pressure from Last 3 Encounters:   02/16/18 130/77   02/14/18 119/75   02/14/18 132/75    Weight from Last 3 Encounters:   02/14/18 66.1 kg (145 lb 12.8 oz)   02/12/18 67.6 kg (149 lb)   02/08/18 67.7 kg (149 lb 4 oz)              Today, you had the following     No orders found for display       Primary Care Provider Office Phone # Fax #    Quyen Baez [phone] [address] [address]        Equal Access to Services     RACHAEL BENITEZ : Bret Enamorado, alcon beck, qanicci kaalmakush santillan, maki smallwood. So [facility] [phone].    ATENCIÓN: Si habla español, tiene a quijano disposición servicios gratuitos de asistencia lingüística. [facility] [phone].    We comply with applicable federal civil rights laws and [place] laws. We do not discriminate on the basis of race, color, national origin, age, disability, sex, sexual orientation, or gender identity.            Thank you!     Thank you for choosing [facility] SPECIAL[facility] AND PROCEDURE  for your care. Our goal is always to provide you with excellent care. Hearing back from our patients is one way we can continue to improve our services. Please take a few minutes to complete the written survey that you may receive in the mail after your visit with us. Thank you!             Your Updated Medication List - Protect others around you: Learn how to safely use, store and throw away your medicines at www.disposemymeds.org.          This list is accurate as of 2/16/18  5:51 PM.  Always use your most recent med list.                   Brand Name Dispense Instructions for use Diagnosis    * amylase-lipase-protease 29522 UNITS Cpep    ZENPEP    180 capsule    Take 2-3 capsules (40,000-60,000 Units) by mouth Take with snacks or supplements (Snacks)     Idiopathic chronic pancreatitis (H)       * amylase-lipase-protease 60467 UNITS Cpep    ZENPEP    180 capsule    Take 5 capsules (100,000 Units) by mouth 4 times daily (with meals and nightly)    Right upper quadrant abdominal pain       fluticasone 50 MCG/ACT spray    FLONASE    16 g    Spray 2 sprays into both nostrils daily    Nasal congestion       * gabapentin 250 MG/5ML solution    NEURONTIN    470 mL    8 mLs (400 mg) by ORAL OR NJ TUBE route 2 times daily    Abdominal pain, epigastric       * gabapentin 250 MG/5ML solution    NEURONTIN    450 mL    Take 12 mLs (600 mg) by mouth daily    Abdominal pain, epigastric       hydrOXYzine 10 MG/5ML syrup    ATARAX    473 mL    Take 12.5 mLs (25 mg) by mouth every 4 hours as needed for anxiety or other (pain)    Acute abdominal pain       leuprolide 11.25 MG kit    LUPRON DEPOT (3-MONTH)    1 each    Inject 11.25 mg into the muscle every 3 months    Endometriosis       levalbuterol 45 MCG/ACT Inhaler    XOPENEX HFA    1 Inhaler    Inhale 2 puffs into the lungs every 6 hours as needed for shortness of breath / dyspnea    Wheeze       menthol 5 % Ptch    ICY HOT    15 patch    Apply 1 patch topically every 8 hours as needed for muscle soreness    Abdominal pain, epigastric       multivitamins with minerals Liqd liquid     1 Bottle    15 mLs by Per Feeding Tube route daily    Abdominal pain, epigastric       norethindrone 5 MG tablet    AYGESTIN    90 tablet    Take 1 tablet (5 mg) by mouth daily    Endometriosis       nortriptyline 10 MG capsule    PAMELOR    120 capsule    Take 3 capsules (30 mg) by mouth At Bedtime    Right upper quadrant abdominal pain       ondansetron 4 MG ODT tab    ZOFRAN ODT    40 tablet    Take 1 tablet (4 mg) by mouth every 8 hours as needed for nausea or vomiting        order for DME     1 Units    Equipment being ordered: TENS with wire and electrode.    Chronic abdominal pain       * oxyCODONE 20 mg/mL (HIGH CONC) solution    ROXICODONE  INTENSOL    13 mL    Take 0.5-0.75 mLs (10-15 mg) by mouth every 4 hours as needed for moderate to severe pain    Sphincter of Oddi dysfunction       * oxyCODONE 5 MG/5ML solution    ROXICODONE    840 mL    Take 10 mLs (10 mg) by mouth every 4 hours as needed for pain maximum 60 mL per day.  This is a 2 week supply.    Abdominal pain, generalized       pantoprazole 40 MG EC tablet    PROTONIX    30 tablet    Take 1 tablet (40 mg) by mouth 2 times daily (before meals)    Right upper quadrant abdominal pain, Erosive gastritis       polyethylene glycol Packet    MIRALAX/GLYCOLAX    7 packet    Take 17 g by mouth daily    Right upper quadrant abdominal pain       RIZATRIPTAN BENZOATE PO      Take 5 mg by mouth once as needed        scopolamine 72 hr patch    TRANSDERM    2 patch    Apply 1 patch to hairless area behind one ear if severe nausea and vomiting.  Remove old patch and change every 3 days (72 hours).    Intractable vomiting with nausea, unspecified vomiting type       senna-docusate 8.6-50 MG per tablet    SENOKOT-S;PERICOLACE    100 tablet    Take 1 tablet by mouth 2 times daily as needed for constipation    Right upper quadrant abdominal pain       sodium bicarbonate 325 MG tablet     200 tablet    1 tablet (325 mg) by Per Feeding Tube route 4 times daily    Abdominal pain, epigastric       * Notice:  This list has 6 medication(s) that are the same as other medications prescribed for you. Read the directions carefully, and ask your doctor or other care provider to review them with you.

## 2018-02-16 NOTE — TELEPHONE ENCOUNTER
Patient reporting urgency and discomfort during urination.  Orders for UA/UC placed per Dr. Chopra.  Kathleen Herndon LPN

## 2018-02-16 NOTE — TELEPHONE ENCOUNTER
----- Message from Yaquelin Melgoza sent at 2/16/2018  8:30 AM CST -----  Regarding: Sooner appt- Dr Baez pt  Contact: [phone]  Pt was seen last week for a bladder injection in the hospital and was treated with antibiotics. Pt has now run out of medication and feels like her symptoms are returning. Pt will be in the clinic today for an infusion and pts mother is hoping she could be seen in primary care or could at least have some lab orders placed to see if the bladder injection has returned. Please call back pts motherLeeann at [phone] to discuss options. Thanks    MB    Please DO NOT send this message and/or reply back to sender.  Call Center Representatives DO NOT respond to messages.

## 2018-02-18 ENCOUNTER — HEALTH MAINTENANCE LETTER (OUTPATIENT)
Age: 23
End: 2018-02-18

## 2018-02-19 ENCOUNTER — INFUSION THERAPY VISIT (OUTPATIENT)
Dept: INFUSION THERAPY | Facility: CLINIC | Age: 23
End: 2018-02-19
Attending: INTERNAL MEDICINE
Payer: COMMERCIAL

## 2018-02-19 VITALS
SYSTOLIC BLOOD PRESSURE: 119 MMHG | DIASTOLIC BLOOD PRESSURE: 69 MMHG | TEMPERATURE: 96.8 F | HEART RATE: 91 BPM | RESPIRATION RATE: 17 BRPM | OXYGEN SATURATION: 100 %

## 2018-02-19 DIAGNOSIS — R10.9 ACUTE ABDOMINAL PAIN: ICD-10-CM

## 2018-02-19 DIAGNOSIS — R11.2 INTRACTABLE NAUSEA AND VOMITING: ICD-10-CM

## 2018-02-19 DIAGNOSIS — N39.41 URGE INCONTINENCE: ICD-10-CM

## 2018-02-19 DIAGNOSIS — R21 RASH: ICD-10-CM

## 2018-02-19 DIAGNOSIS — K86.1 CHRONIC PANCREATITIS, UNSPECIFIED PANCREATITIS TYPE (H): ICD-10-CM

## 2018-02-19 DIAGNOSIS — R52 PAIN: ICD-10-CM

## 2018-02-19 DIAGNOSIS — R10.13 ABDOMINAL PAIN, EPIGASTRIC: ICD-10-CM

## 2018-02-19 DIAGNOSIS — F33.1 MAJOR DEPRESSIVE DISORDER, RECURRENT EPISODE, MODERATE (H): ICD-10-CM

## 2018-02-19 DIAGNOSIS — Z98.890 S/P ERCP: ICD-10-CM

## 2018-02-19 DIAGNOSIS — R10.9 ABDOMINAL PAIN: ICD-10-CM

## 2018-02-19 DIAGNOSIS — E16.2 HYPOGLYCEMIA: ICD-10-CM

## 2018-02-19 DIAGNOSIS — G43.A0 CYCLICAL VOMITING WITH NAUSEA, INTRACTABILITY OF VOMITING NOT SPECIFIED: Primary | ICD-10-CM

## 2018-02-19 DIAGNOSIS — F41.1 ANXIETY STATE: ICD-10-CM

## 2018-02-19 PROCEDURE — 96375 TX/PRO/DX INJ NEW DRUG ADDON: CPT

## 2018-02-19 PROCEDURE — 96376 TX/PRO/DX INJ SAME DRUG ADON: CPT

## 2018-02-19 PROCEDURE — 25000128 H RX IP 250 OP 636: Mod: ZF | Performed by: INTERNAL MEDICINE

## 2018-02-19 PROCEDURE — 96374 THER/PROPH/DIAG INJ IV PUSH: CPT

## 2018-02-19 PROCEDURE — 96361 HYDRATE IV INFUSION ADD-ON: CPT

## 2018-02-19 PROCEDURE — 25000128 H RX IP 250 OP 636: Mod: ZF | Performed by: NURSE PRACTITIONER

## 2018-02-19 RX ORDER — ONDANSETRON 2 MG/ML
4 INJECTION INTRAMUSCULAR; INTRAVENOUS DAILY PRN
Status: DISCONTINUED | OUTPATIENT
Start: 2018-02-19 | End: 2018-02-19 | Stop reason: HOSPADM

## 2018-02-19 RX ORDER — ONDANSETRON 2 MG/ML
4 INJECTION INTRAMUSCULAR; INTRAVENOUS ONCE
Status: CANCELLED
Start: 2018-02-19 | End: 2018-02-19

## 2018-02-19 RX ORDER — ONDANSETRON 2 MG/ML
4 INJECTION INTRAMUSCULAR; INTRAVENOUS DAILY PRN
Status: CANCELLED
Start: 2018-02-19

## 2018-02-19 RX ORDER — DIPHENHYDRAMINE HYDROCHLORIDE 50 MG/ML
25 INJECTION INTRAMUSCULAR; INTRAVENOUS ONCE
Status: CANCELLED
Start: 2018-02-19 | End: 2018-02-19

## 2018-02-19 RX ORDER — ONDANSETRON 2 MG/ML
4 INJECTION INTRAMUSCULAR; INTRAVENOUS ONCE
Status: COMPLETED | OUTPATIENT
Start: 2018-02-19 | End: 2018-02-19

## 2018-02-19 RX ORDER — DIPHENHYDRAMINE HYDROCHLORIDE 50 MG/ML
25 INJECTION INTRAMUSCULAR; INTRAVENOUS ONCE
Status: COMPLETED | OUTPATIENT
Start: 2018-02-19 | End: 2018-02-19

## 2018-02-19 RX ADMIN — ONDANSETRON 4 MG: 2 INJECTION INTRAMUSCULAR; INTRAVENOUS at 13:25

## 2018-02-19 RX ADMIN — ONDANSETRON 4 MG: 2 INJECTION INTRAMUSCULAR; INTRAVENOUS at 12:19

## 2018-02-19 RX ADMIN — DIPHENHYDRAMINE HYDROCHLORIDE 25 MG: 50 INJECTION, SOLUTION INTRAMUSCULAR; INTRAVENOUS at 13:26

## 2018-02-19 RX ADMIN — SODIUM CHLORIDE, POTASSIUM CHLORIDE, SODIUM LACTATE AND CALCIUM CHLORIDE 1000 ML: 600; 310; 30; 20 INJECTION, SOLUTION INTRAVENOUS at 12:12

## 2018-02-19 RX ADMIN — DIPHENHYDRAMINE HYDROCHLORIDE 25 MG: 50 INJECTION, SOLUTION INTRAMUSCULAR; INTRAVENOUS at 12:18

## 2018-02-19 ASSESSMENT — PAIN SCALES - GENERAL: PAINLEVEL: SEVERE PAIN (6)

## 2018-02-19 NOTE — PATIENT INSTRUCTIONS
Dear Alexandra Melgoza    Thank you for choosing [facility] ([facility]) for your infusion.  The following information is a summary of our appointment as well as important reminders.      Please refer to your hospital discharge instructions for details on home care services, future appointments, phone numbers, and diet/activity levels.    Additional information: Today you received IVF and IV meds. You will return in a couple of days for your next infusion.      We look forward in seeing you on your next appointment here at [facility].  Please don t hesitate to call us at [phone] to reschedule any of your appointments or to speak with one of the [facility] registered nurses.  It was a pleasure taking care of you today.    Sincerely,    [facility]  [address] [place] [address]  [address]  Phone:  [phone]

## 2018-02-19 NOTE — MR AVS SNAPSHOT
After Visit Summary   2/19/2018    Alexandra Melgoza    MRN: [id number]           Patient Information     Date Of Birth          1995        Visit Information        Provider Department      2/19/2018 12:00 PM UC 50 ATC; UC SPEC INFUSION [facility]        Today's Diagnoses     Cyclical vomiting with nausea, intractability of vomiting not specified    -  1    Chronic pancreatitis, unspecified pancreatitis type (H)        Intractable nausea and vomiting        Pain        Abdominal pain, epigastric        S/P ERCP        Acute abdominal pain        Abdominal pain        Hypoglycemia        Urge incontinence        Major depressive disorder, recurrent episode, moderate (H)        Anxiety state        Rash          Care Instructions    Dear Alexandra Melgoza    Thank you for choosing [facility] ([facility]) for your infusion.  The following information is a summary of our appointment as well as important reminders.      Please refer to your hospital discharge instructions for details on home care services, future appointments, phone numbers, and diet/activity levels.    Additional information: Today you received IVF and IV meds. You will return in a couple of days for your next infusion.      We look forward in seeing you on your next appointment here at [facility].  Please don t hesitate to call us at [phone] to reschedule any of your appointments or to speak with one of the [facility] registered nurses.  It was a pleasure taking care of you today.    Sincerely,    [facility]  [address] [place] [address]  [address]  Phone:  [phone]          Follow-ups after your visit        Your next 10 appointments already scheduled     Feb 21, 2018  3:00 PM CST   Infusion 120 with UC SPEC INFUSION, UC 48 ATC   [facility] ([facility])    [address]  [address]  [facility] [address]   [phone]            Feb 22, 2018 10:10 AM CST   (Arrive by 9:55 AM)   Return Visit with Ting Carrillo MD   [facility] ([facility])    [address]  4th Floor  [facility] [address] [phone]            Feb 23, 2018  1:00 PM CST   Infusion 120 with UC SPEC INFUSION, UC 42 ATC   [facility] ([facility])    [address]  [address]  [facility] [address] [phone]            Feb 26, 2018 12:00 PM CST   Infusion 120 with UC SPEC INFUSION, UC 50 ATC   [facility] ([facility])    [address]  [address]  [facility] [address] [phone]            Feb 27, 2018 10:00 AM CST   (Arrive by 9:45 AM)   Return Visit with ANABEL Falcon CNP   [facility] ([facility])    [address]  4th Floor  [facility] [address] [phone]            Feb 28, 2018 12:00 PM CST   Infusion 120 with UC SPEC INFUSION, UC 45 ATC   [facility] ([facility])    [address]  [address]  [facility] [address] [phone]              Who to contact     If you have questions or need follow up information about today's clinic visit or your schedule please contact [facility] SPECIALTY AND PROCEDURE directly at [phone].  Normal or non-critical lab and imaging results will be communicated to you by MyChart, letter or phone within 4 business days after the clinic has received the results. If you do not hear from us within 7 days, please contact the clinic through MyChart or phone. If you  have a critical or abnormal lab result, we will notify you by phone as soon as possible.  Submit refill requests through [employer] or call your pharmacy and they will forward the refill request to us. Please allow 3 business days for your refill to be completed.          Additional Information About Your Visit        MyChart Information     [employer] gives you secure access to your electronic health record. If you see a primary care provider, you can also send messages to your care team and make appointments. If you have questions, please call your primary care clinic.  If you do not have a primary care provider, please call [phone] and they will assist you.        Care EveryWhere ID     This is your Care EveryWhere ID. This could be used by other organizations to access your [place] medical records  [id number]        Your Vitals Were     Pulse Temperature Respirations Pulse Oximetry          91 96.8  F (36  C) (Oral) 17 100%         Blood Pressure from Last 3 Encounters:   02/19/18 119/69   02/16/18 130/77   02/14/18 119/75    Weight from Last 3 Encounters:   02/14/18 66.1 kg (145 lb 12.8 oz)   02/12/18 67.6 kg (149 lb)   02/08/18 67.7 kg (149 lb 4 oz)              Today, you had the following     No orders found for display       Primary Care Provider Office Phone # Fax #    Quyen Baez [phone] [address] [address]        Equal Access to Services     RACHAEL BENITEZ : Hadii aad ku hadasho Sokathleenali, waaxda luqadaha, qaybta kaalmada adeegyada, maki smallwood. So [facility] [phone].    ATENCIÓN: Si habla español, tiene a quijano disposición servicios gratuitos de asistencia lingüística. Llame al [phone].    We comply with applicable federal civil rights laws and [place] laws. We do not discriminate on the basis of race, color, national origin, age, disability, sex, sexual orientation, or gender identity.            Thank you!      Thank you for choosing [facility] SPECIAL[facility] PROCEDURE  for your care. Our goal is always to provide you with excellent care. Hearing back from our patients is one way we can continue to improve our services. Please take a few minutes to complete the written survey that you may receive in the mail after your visit with us. Thank you!             Your Updated Medication List - Protect others around you: Learn how to safely use, store and throw away your medicines at www.disposemymeds.org.          This list is accurate as of 2/19/18  2:16 PM.  Always use your most recent med list.                   Brand Name Dispense Instructions for use Diagnosis    * amylase-lipase-protease 95835 UNITS Cpep    ZENPEP    180 capsule    Take 2-3 capsules (40,000-60,000 Units) by mouth Take with snacks or supplements (Snacks)    Idiopathic chronic pancreatitis (H)       * amylase-lipase-protease 91218 UNITS Cpep    ZENPEP    180 capsule    Take 5 capsules (100,000 Units) by mouth 4 times daily (with meals and nightly)    Right upper quadrant abdominal pain       fluticasone 50 MCG/ACT spray    FLONASE    16 g    Spray 2 sprays into both nostrils daily    Nasal congestion       * gabapentin 250 MG/5ML solution    NEURONTIN    470 mL    8 mLs (400 mg) by ORAL OR NJ TUBE route 2 times daily    Abdominal pain, epigastric       * gabapentin 250 MG/5ML solution    NEURONTIN    450 mL    Take 12 mLs (600 mg) by mouth daily    Abdominal pain, epigastric       hydrOXYzine 10 MG/5ML syrup    ATARAX    473 mL    Take 12.5 mLs (25 mg) by mouth every 4 hours as needed for anxiety or other (pain)    Acute abdominal pain       leuprolide 11.25 MG kit    LUPRON DEPOT (3-MONTH)    1 each    Inject 11.25 mg into the muscle every 3 months    Endometriosis       levalbuterol 45 MCG/ACT Inhaler    XOPENEX HFA    1 Inhaler    Inhale 2 puffs into the lungs every 6 hours as needed for shortness of breath / dyspnea    Wheeze        menthol 5 % Ptch    ICY HOT    15 patch    Apply 1 patch topically every 8 hours as needed for muscle soreness    Abdominal pain, epigastric       multivitamins with minerals Liqd liquid     1 Bottle    15 mLs by Per Feeding Tube route daily    Abdominal pain, epigastric       norethindrone 5 MG tablet    AYGESTIN    90 tablet    Take 1 tablet (5 mg) by mouth daily    Endometriosis       nortriptyline 10 MG capsule    PAMELOR    120 capsule    Take 3 capsules (30 mg) by mouth At Bedtime    Right upper quadrant abdominal pain       ondansetron 4 MG ODT tab    ZOFRAN ODT    40 tablet    Take 1 tablet (4 mg) by mouth every 8 hours as needed for nausea or vomiting        order for [facility]     1 Units    Equipment being ordered: TENS with wire and electrode.    Chronic abdominal pain       * oxyCODONE 20 mg/mL (HIGH CONC) solution    ROXICODONE INTENSOL    13 mL    Take 0.5-0.75 mLs (10-15 mg) by mouth every 4 hours as needed for moderate to severe pain    Sphincter of Oddi dysfunction       * oxyCODONE 5 MG/5ML solution    ROXICODONE    840 mL    Take 10 mLs (10 mg) by mouth every 4 hours as needed for pain maximum 60 mL per day.  This is a 2 week supply.    Abdominal pain, generalized       pantoprazole 40 MG EC tablet    PROTONIX    30 tablet    Take 1 tablet (40 mg) by mouth 2 times daily (before meals)    Right upper quadrant abdominal pain, Erosive gastritis       polyethylene glycol Packet    MIRALAX/GLYCOLAX    7 packet    Take 17 g by mouth daily    Right upper quadrant abdominal pain       RIZATRIPTAN BENZOATE PO      Take 5 mg by mouth once as needed        scopolamine 72 hr patch    TRANSDERM    2 patch    Apply 1 patch to hairless area behind one ear if severe nausea and vomiting.  Remove old patch and change every 3 days (72 hours).    Intractable vomiting with nausea, unspecified vomiting type       senna-docusate 8.6-50 MG per tablet    SENOKOT-S;PERICOLACE    100 tablet    Take 1 tablet by mouth 2 times  daily as needed for constipation    Right upper quadrant abdominal pain       sodium bicarbonate 325 MG tablet     200 tablet    1 tablet (325 mg) by Per Feeding Tube route 4 times daily    Abdominal pain, epigastric       * Notice:  This list has 6 medication(s) that are the same as other medications prescribed for you. Read the directions carefully, and ask your doctor or other care provider to review them with you.

## 2018-02-19 NOTE — PROGRESS NOTES
Nursing Note  Alexandra Melgo[facility]:   Chief Complaint   Patient presents with     Infusion     LR, meds     During [facility]'s [facility] appointment, orders from Dr Narayanan were completed.  Frequency: 2-3 times weekly    Progress note:  Patient identification verified by name and date of birth.  Assessment completed.  Vitals recorded in Doc Flowsheets.  Patient was provided with education regarding infusion and possible side effects.  Patient verbalized understanding.      needed: No  Premedications: administered per order.  Infusion Rates: 999 ml/hr/bag x 2 bags.  Approximate Infusion length:2 hours.   Labs: were not ordered for this appointment.  Vascular access: port accessed today.  Treatment Conditions: non-applicable.  Patient tolerated infusion: well.    Drug Waste Record? No     Discharge Plan:   Follow up plan of care with: ongoing infusions at [facility].  Discharge instructions were reviewed with patient.  Patient/representative verbalized understanding of discharge instructions and all questions answered.  [facility] in stable condition.    YAHAIRA RAYMUNDO, SAMARA    Administrations This Visit     diphenhydrAMINE (BENADRYL) injection 25 mg     Admin Date Action Dose Route Administered By             02/19/2018 Given 25 mg Intravenous Yahaira Raymundo RN              Admin Date Action Dose Route Administered By             02/19/2018 Given 25 mg Intravenous Yahaira Raymundo RN                    lactated ringers BOLUS 1,000-2,000 mL     Admin Date Action Dose Rate Route Administered By          02/19/2018 New Bag 1000 mL 999 mL/hr Intravenous Yahaira Raymundo, SAMARA                   ondansetron (ZOFRAN) injection 4 mg     Admin Date Action Dose Route Administered By             02/19/2018 Given 4 mg Intravenous Yahaira Raymundo RN               Admin Date Action Dose Route Administered By             02/19/2018 Given 4 mg Intravenous Nita Raymundo, RN                          /69 (BP Location: Right arm, Patient Position: Sitting, Cuff Size: Adult Regular)  Pulse 91  Temp 96.8  F (36  C) (Oral)  Resp 17  SpO2 100%

## 2018-02-21 ENCOUNTER — INFUSION THERAPY VISIT (OUTPATIENT)
Dept: INFUSION THERAPY | Facility: CLINIC | Age: 23
End: 2018-02-21
Attending: INTERNAL MEDICINE
Payer: COMMERCIAL

## 2018-02-21 VITALS
SYSTOLIC BLOOD PRESSURE: 123 MMHG | OXYGEN SATURATION: 100 % | RESPIRATION RATE: 16 BRPM | HEART RATE: 89 BPM | TEMPERATURE: 97.2 F | DIASTOLIC BLOOD PRESSURE: 81 MMHG

## 2018-02-21 DIAGNOSIS — F41.1 ANXIETY STATE: ICD-10-CM

## 2018-02-21 DIAGNOSIS — R52 PAIN: ICD-10-CM

## 2018-02-21 DIAGNOSIS — G43.A0 CYCLICAL VOMITING WITH NAUSEA, INTRACTABILITY OF VOMITING NOT SPECIFIED: Primary | ICD-10-CM

## 2018-02-21 DIAGNOSIS — K86.1 CHRONIC PANCREATITIS, UNSPECIFIED PANCREATITIS TYPE (H): ICD-10-CM

## 2018-02-21 DIAGNOSIS — R10.13 ABDOMINAL PAIN, EPIGASTRIC: ICD-10-CM

## 2018-02-21 DIAGNOSIS — R11.2 INTRACTABLE NAUSEA AND VOMITING: ICD-10-CM

## 2018-02-21 DIAGNOSIS — R21 RASH: ICD-10-CM

## 2018-02-21 DIAGNOSIS — Z98.890 S/P ERCP: ICD-10-CM

## 2018-02-21 DIAGNOSIS — R10.9 ABDOMINAL PAIN: ICD-10-CM

## 2018-02-21 DIAGNOSIS — F33.1 MAJOR DEPRESSIVE DISORDER, RECURRENT EPISODE, MODERATE (H): ICD-10-CM

## 2018-02-21 DIAGNOSIS — N39.41 URGE INCONTINENCE: ICD-10-CM

## 2018-02-21 DIAGNOSIS — R10.9 ACUTE ABDOMINAL PAIN: ICD-10-CM

## 2018-02-21 DIAGNOSIS — E16.2 HYPOGLYCEMIA: ICD-10-CM

## 2018-02-21 PROCEDURE — 96376 TX/PRO/DX INJ SAME DRUG ADON: CPT

## 2018-02-21 PROCEDURE — 96375 TX/PRO/DX INJ NEW DRUG ADDON: CPT

## 2018-02-21 PROCEDURE — 96374 THER/PROPH/DIAG INJ IV PUSH: CPT

## 2018-02-21 PROCEDURE — 96361 HYDRATE IV INFUSION ADD-ON: CPT

## 2018-02-21 PROCEDURE — 25000128 H RX IP 250 OP 636: Mod: ZF | Performed by: NURSE PRACTITIONER

## 2018-02-21 PROCEDURE — 25000128 H RX IP 250 OP 636: Mod: ZF | Performed by: INTERNAL MEDICINE

## 2018-02-21 RX ORDER — DIPHENHYDRAMINE HYDROCHLORIDE 50 MG/ML
25 INJECTION INTRAMUSCULAR; INTRAVENOUS ONCE
Status: CANCELLED
Start: 2018-02-21 | End: 2018-02-21

## 2018-02-21 RX ORDER — ONDANSETRON 2 MG/ML
4 INJECTION INTRAMUSCULAR; INTRAVENOUS ONCE
Status: CANCELLED
Start: 2018-02-21 | End: 2018-02-21

## 2018-02-21 RX ORDER — ONDANSETRON 2 MG/ML
4 INJECTION INTRAMUSCULAR; INTRAVENOUS DAILY PRN
Status: DISCONTINUED | OUTPATIENT
Start: 2018-02-21 | End: 2018-02-21 | Stop reason: HOSPADM

## 2018-02-21 RX ORDER — ONDANSETRON 2 MG/ML
4 INJECTION INTRAMUSCULAR; INTRAVENOUS ONCE
Status: COMPLETED | OUTPATIENT
Start: 2018-02-21 | End: 2018-02-21

## 2018-02-21 RX ORDER — DIPHENHYDRAMINE HYDROCHLORIDE 50 MG/ML
25 INJECTION INTRAMUSCULAR; INTRAVENOUS ONCE
Status: COMPLETED | OUTPATIENT
Start: 2018-02-21 | End: 2018-02-21

## 2018-02-21 RX ORDER — ONDANSETRON 2 MG/ML
4 INJECTION INTRAMUSCULAR; INTRAVENOUS DAILY PRN
Status: CANCELLED
Start: 2018-02-21

## 2018-02-21 RX ADMIN — ONDANSETRON 4 MG: 2 INJECTION INTRAMUSCULAR; INTRAVENOUS at 15:32

## 2018-02-21 RX ADMIN — SODIUM CHLORIDE, POTASSIUM CHLORIDE, SODIUM LACTATE AND CALCIUM CHLORIDE 1000 ML: 600; 310; 30; 20 INJECTION, SOLUTION INTRAVENOUS at 15:31

## 2018-02-21 RX ADMIN — SODIUM CHLORIDE, POTASSIUM CHLORIDE, SODIUM LACTATE AND CALCIUM CHLORIDE 1000 ML: 600; 310; 30; 20 INJECTION, SOLUTION INTRAVENOUS at 16:39

## 2018-02-21 RX ADMIN — DIPHENHYDRAMINE HYDROCHLORIDE 25 MG: 50 INJECTION, SOLUTION INTRAMUSCULAR; INTRAVENOUS at 15:32

## 2018-02-21 RX ADMIN — ONDANSETRON 4 MG: 2 INJECTION INTRAMUSCULAR; INTRAVENOUS at 16:45

## 2018-02-21 RX ADMIN — DIPHENHYDRAMINE HYDROCHLORIDE 25 MG: 50 INJECTION, SOLUTION INTRAMUSCULAR; INTRAVENOUS at 16:40

## 2018-02-21 NOTE — MR AVS SNAPSHOT
After Visit Summary   2/21/2018    Alexandra Melgoza    MRN: [id number]           Patient Information     Date Of Birth          1995        Visit Information        Provider Department      2/21/2018 3:00 PM UC 48 ATC; UC SPEC INFUSION [facility]        Today's Diagnoses     Cyclical vomiting with nausea, intractability of vomiting not specified    -  1    Chronic pancreatitis, unspecified pancreatitis type (H)        Intractable nausea and vomiting        Pain        Abdominal pain, epigastric        S/P ERCP        Acute abdominal pain        Abdominal pain        Hypoglycemia        Urge incontinence        Major depressive disorder, recurrent episode, moderate (H)        Anxiety state        Rash           Follow-ups after your visit        Your next 10 appointments already scheduled     Feb 22, 2018 10:10 AM CST   (Arrive by 9:55 AM)   Return Visit with Ting Carrillo MD   [facility] ([facility])    [address] [place] [address]  4th Floor  [facility] [address] [phone]            Feb 23, 2018  9:00 AM CST   Level 3 with  INFUSION CHAIR 5   [facility] ([facility])    [facility]  [address]  [address]  [facility] [address]   [phone]            Feb 23, 2018  1:00 PM CST   Infusion 120 with UC SPEC INFUSION, UC 42 ATC   [facility] ([facility])    [address]  [address]  [facility] [address] [phone]            Feb 26, 2018 12:00 PM CST   Infusion 120 with UC SPEC INFUSION, UC 50 ATC   [facility] ([facility])    [address]  [address]  [facility] [address] [phone]            Feb 27, 2018 10:00 AM CST   (Arrive by 9:45 AM)   Return  Visit with ANABEL Falcon CNP   [facility] ([facility])    [address]  4th Floor  [facility] [id number] [phone]            Feb 28, 2018 12:00 PM CST   Infusion 120 with UC SPEC INFUSION, UC 45 ATC   [facility] ([facility])    [address]  [address]  [facility] [id number] [phone]            Mar 02, 2018  1:00 PM CST   Infusion 120 with UC SPEC INFUSION   [facility] ([facility])    [address]  [address]  [facility] [address] [phone]              Who to contact     If you have questions or need follow up information about today's clinic visit or your schedule please contact [facility] directly at [phone].  Normal or non-critical lab and imaging results will be communicated to you by [employer], letter or phone within 4 business days after the clinic has received the results. If you do not hear from us within 7 days, please contact the clinic through [employer]il[employer] or phone. If you have a critical or abnormal lab result, we will notify you by phone as soon as possible.  Submit refill requests through [employer] or call your pharmacy and they will forward the refill request to us. Please allow 3 business days for your refill to be completed.          Additional Information About Your Visit        [employer] Information     [employer] gives you secure access to your electronic health record. If you see a primary care provider, you can also send messages to your care team and make appointments. If you have questions, please call your primary care clinic.  If you do not have a primary care provider, please call [phone] and they will assist you.        Care EveryWhere ID     This is your Care EveryWhere ID.  This could be used by other organizations to access your [facility] medical records  [id number]        Your Vitals Were     Pulse Temperature Respirations Pulse Oximetry          89 97.2  F (36.2  C) (Oral) 16 100%         Blood Pressure from Last 3 Encounters:   02/21/18 123/81   02/19/18 119/69   02/16/18 130/77    Weight from Last 3 Encounters:   02/14/18 66.1 kg (145 lb 12.8 oz)   02/12/18 67.6 kg (149 lb)   02/08/18 67.7 kg (149 lb 4 oz)              Today, you had the following     No orders found for display       Primary Care Provider Office Phone # Fax #    Quyen Baez [phone]       [address] [address]        Equal Access to Services     RACHAEL BENITEZ : Bret joseph Somalini, waaxda luqadaha, qaybta kaalmakush santillan, maki gaspar . So [facility] [phone].    ATENCIÓN: Si habla español, tiene a quijano disposición servicios gratuitos de asistencia lingüística. Panfilo al [phone].    We comply with applicable federal civil rights laws and [place] laws. We do not discriminate on the basis of race, color, national origin, age, disability, sex, sexual orientation, or gender identity.            Thank you!     Thank you for choosing [facility]  for your care. Our goal is always to provide you with excellent care. Hearing back from our patients is one way we can continue to improve our services. Please take a few minutes to complete the written survey that you may receive in the mail after your visit with us. Thank you!             Your Updated Medication List - Protect others around you: Learn how to safely use, store and throw away your medicines at www.disposemymeds.org.          This list is accurate as of 2/21/18  6:51 PM.  Always use your most recent med list.                   Brand Name Dispense Instructions for use Diagnosis    * amylase-lipase-protease 63818 UNITS  Cpep    ZENPEP    180 capsule    Take 2-3 capsules (40,000-60,000 Units) by mouth Take with snacks or supplements (Snacks)    Idiopathic chronic pancreatitis (H)       * amylase-lipase-protease 33488 UNITS Cpep    ZENPEP    180 capsule    Take 5 capsules (100,000 Units) by mouth 4 times daily (with meals and nightly)    Right upper quadrant abdominal pain       fluticasone 50 MCG/ACT spray    FLONASE    16 g    Spray 2 sprays into both nostrils daily    Nasal congestion       * gabapentin 250 MG/5ML solution    NEURONTIN    470 mL    8 mLs (400 mg) by ORAL OR NJ TUBE route 2 times daily    Abdominal pain, epigastric       * gabapentin 250 MG/5ML solution    NEURONTIN    450 mL    Take 12 mLs (600 mg) by mouth daily    Abdominal pain, epigastric       hydrOXYzine 10 MG/5ML syrup    ATARAX    473 mL    Take 12.5 mLs (25 mg) by mouth every 4 hours as needed for anxiety or other (pain)    Acute abdominal pain       leuprolide 11.25 MG kit    LUPRON DEPOT (3-MONTH)    1 each    Inject 11.25 mg into the muscle every 3 months    Endometriosis       levalbuterol 45 MCG/ACT Inhaler    XOPENEX HFA    1 Inhaler    Inhale 2 puffs into the lungs every 6 hours as needed for shortness of breath / dyspnea    Wheeze       menthol 5 % Ptch    ICY HOT    15 patch    Apply 1 patch topically every 8 hours as needed for muscle soreness    Abdominal pain, epigastric       multivitamins with minerals Liqd liquid     1 Bottle    15 mLs by Per Feeding Tube route daily    Abdominal pain, epigastric       norethindrone 5 MG tablet    AYGESTIN    90 tablet    Take 1 tablet (5 mg) by mouth daily    Endometriosis       nortriptyline 10 MG capsule    PAMELOR    120 capsule    Take 3 capsules (30 mg) by mouth At Bedtime    Right upper quadrant abdominal pain       ondansetron 4 MG ODT tab    ZOFRAN ODT    40 tablet    Take 1 tablet (4 mg) by mouth every 8 hours as needed for nausea or vomiting        order for DME     1 Units    Equipment being  ordered: TENS with wire and electrode.    Chronic abdominal pain       * oxyCODONE 20 mg/mL (HIGH CONC) solution    ROXICODONE INTENSOL    13 mL    Take 0.5-0.75 mLs (10-15 mg) by mouth every 4 hours as needed for moderate to severe pain    Sphincter of Oddi dysfunction       * oxyCODONE 5 MG/5ML solution    ROXICODONE    840 mL    Take 10 mLs (10 mg) by mouth every 4 hours as needed for pain maximum 60 mL per day.  This is a 2 week supply.    Abdominal pain, generalized       pantoprazole 40 MG EC tablet    PROTONIX    30 tablet    Take 1 tablet (40 mg) by mouth 2 times daily (before meals)    Right upper quadrant abdominal pain, Erosive gastritis       polyethylene glycol Packet    MIRALAX/GLYCOLAX    7 packet    Take 17 g by mouth daily    Right upper quadrant abdominal pain       RIZATRIPTAN BENZOATE PO      Take 5 mg by mouth once as needed        scopolamine 72 hr patch    TRANSDERM    2 patch    Apply 1 patch to hairless area behind one ear if severe nausea and vomiting.  Remove old patch and change every 3 days (72 hours).    Intractable vomiting with nausea, unspecified vomiting type       senna-docusate 8.6-50 MG per tablet    SENOKOT-S;PERICOLACE    100 tablet    Take 1 tablet by mouth 2 times daily as needed for constipation    Right upper quadrant abdominal pain       sodium bicarbonate 325 MG tablet     200 tablet    1 tablet (325 mg) by Per Feeding Tube route 4 times daily    Abdominal pain, epigastric       * Notice:  This list has 6 medication(s) that are the same as other medications prescribed for you. Read the directions carefully, and ask your doctor or other care provider to review them with you.

## 2018-02-22 ENCOUNTER — HOSPITAL ENCOUNTER (EMERGENCY)
Facility: CLINIC | Age: 23
Discharge: HOME | End: 2018-02-22
Attending: EMERGENCY MEDICINE | Admitting: EMERGENCY MEDICINE
Payer: COMMERCIAL

## 2018-02-22 VITALS
HEART RATE: 88 BPM | SYSTOLIC BLOOD PRESSURE: 138 MMHG | DIASTOLIC BLOOD PRESSURE: 88 MMHG | RESPIRATION RATE: 18 BRPM | TEMPERATURE: 98.7 F | OXYGEN SATURATION: 98 %

## 2018-02-22 DIAGNOSIS — R19.7 NAUSEA, VOMITING, AND DIARRHEA: ICD-10-CM

## 2018-02-22 DIAGNOSIS — R10.10 UPPER ABDOMINAL PAIN: ICD-10-CM

## 2018-02-22 DIAGNOSIS — R11.2 NAUSEA, VOMITING, AND DIARRHEA: ICD-10-CM

## 2018-02-22 DIAGNOSIS — E87.6 HYPOKALEMIA: ICD-10-CM

## 2018-02-22 LAB
ALBUMIN SERPL-MCNC: 3.9 G/DL (ref 3.4–5)
ALBUMIN UR-MCNC: NEGATIVE MG/DL
ALP SERPL-CCNC: 88 U/L (ref 40–150)
ALT SERPL W P-5'-P-CCNC: 29 U/L (ref 0–50)
ANION GAP SERPL CALCULATED.3IONS-SCNC: 6 MMOL/L (ref 3–14)
APPEARANCE UR: CLEAR
AST SERPL W P-5'-P-CCNC: 23 U/L (ref 0–45)
BASOPHILS # BLD AUTO: 0 10E9/L (ref 0–0.2)
BASOPHILS NFR BLD AUTO: 0.6 %
BILIRUB SERPL-MCNC: 0.2 MG/DL (ref 0.2–1.3)
BILIRUB UR QL STRIP: NEGATIVE
BUN SERPL-MCNC: 7 MG/DL (ref 7–30)
CALCIUM SERPL-MCNC: 8.6 MG/DL (ref 8.5–10.1)
CHLORIDE SERPL-SCNC: 105 MMOL/L (ref 94–109)
CO2 SERPL-SCNC: 29 MMOL/L (ref 20–32)
COLOR UR AUTO: ABNORMAL
CREAT SERPL-MCNC: 0.54 MG/DL (ref 0.52–1.04)
DIFFERENTIAL METHOD BLD: ABNORMAL
EOSINOPHIL # BLD AUTO: 0.1 10E9/L (ref 0–0.7)
EOSINOPHIL NFR BLD AUTO: 2.6 %
ERYTHROCYTE [DISTWIDTH] IN BLOOD BY AUTOMATED COUNT: 12.6 % (ref 10–15)
GFR SERPL CREATININE-BSD FRML MDRD: >90 ML/MIN/1.7M2
GLUCOSE SERPL-MCNC: 84 MG/DL (ref 70–99)
GLUCOSE UR STRIP-MCNC: NEGATIVE MG/DL
HCG UR QL: NEGATIVE
HCT VFR BLD AUTO: 32.8 % (ref 35–47)
HGB BLD-MCNC: 11.1 G/DL (ref 11.7–15.7)
HGB UR QL STRIP: NEGATIVE
IMM GRANULOCYTES # BLD: 0 10E9/L (ref 0–0.4)
IMM GRANULOCYTES NFR BLD: 0.2 %
KETONES UR STRIP-MCNC: NEGATIVE MG/DL
LACTATE BLD-SCNC: 0.9 MMOL/L (ref 0.7–2)
LEUKOCYTE ESTERASE UR QL STRIP: NEGATIVE
LIPASE SERPL-CCNC: 70 U/L (ref 73–393)
LYMPHOCYTES # BLD AUTO: 1.7 10E9/L (ref 0.8–5.3)
LYMPHOCYTES NFR BLD AUTO: 34.1 %
MCH RBC QN AUTO: 30.5 PG (ref 26.5–33)
MCHC RBC AUTO-ENTMCNC: 33.8 G/DL (ref 31.5–36.5)
MCV RBC AUTO: 90 FL (ref 78–100)
MONOCYTES # BLD AUTO: 0.5 10E9/L (ref 0–1.3)
MONOCYTES NFR BLD AUTO: 9.3 %
MUCOUS THREADS #/AREA URNS LPF: PRESENT /LPF
NEUTROPHILS # BLD AUTO: 2.6 10E9/L (ref 1.6–8.3)
NEUTROPHILS NFR BLD AUTO: 53.2 %
NITRATE UR QL: NEGATIVE
NRBC # BLD AUTO: 0 10*3/UL
NRBC BLD AUTO-RTO: 0 /100
PH UR STRIP: 7 PH (ref 5–7)
PLATELET # BLD AUTO: 220 10E9/L (ref 150–450)
POTASSIUM SERPL-SCNC: 3.3 MMOL/L (ref 3.4–5.3)
PROT SERPL-MCNC: 6.7 G/DL (ref 6.8–8.8)
RBC # BLD AUTO: 3.64 10E12/L (ref 3.8–5.2)
RBC #/AREA URNS AUTO: 1 /HPF (ref 0–2)
SODIUM SERPL-SCNC: 140 MMOL/L (ref 133–144)
SOURCE: ABNORMAL
SP GR UR STRIP: 1.01 (ref 1–1.03)
SQUAMOUS #/AREA URNS AUTO: <1 /HPF (ref 0–1)
UROBILINOGEN UR STRIP-MCNC: 0 MG/DL (ref 0–2)
WBC # BLD AUTO: 5 10E9/L (ref 4–11)
WBC #/AREA URNS AUTO: 2 /HPF (ref 0–2)

## 2018-02-22 PROCEDURE — 99284 EMERGENCY DEPT VISIT MOD MDM: CPT | Mod: 25

## 2018-02-22 PROCEDURE — 80053 COMPREHEN METABOLIC PANEL: CPT | Performed by: EMERGENCY MEDICINE

## 2018-02-22 PROCEDURE — 96361 HYDRATE IV INFUSION ADD-ON: CPT

## 2018-02-22 PROCEDURE — 96376 TX/PRO/DX INJ SAME DRUG ADON: CPT

## 2018-02-22 PROCEDURE — 81001 URINALYSIS AUTO W/SCOPE: CPT | Performed by: EMERGENCY MEDICINE

## 2018-02-22 PROCEDURE — 25000128 H RX IP 250 OP 636: Performed by: EMERGENCY MEDICINE

## 2018-02-22 PROCEDURE — 81025 URINE PREGNANCY TEST: CPT | Performed by: EMERGENCY MEDICINE

## 2018-02-22 PROCEDURE — 83690 ASSAY OF LIPASE: CPT | Performed by: EMERGENCY MEDICINE

## 2018-02-22 PROCEDURE — 83605 ASSAY OF LACTIC ACID: CPT | Performed by: EMERGENCY MEDICINE

## 2018-02-22 PROCEDURE — 25000132 ZZH RX MED GY IP 250 OP 250 PS 637: Performed by: EMERGENCY MEDICINE

## 2018-02-22 PROCEDURE — 96375 TX/PRO/DX INJ NEW DRUG ADDON: CPT

## 2018-02-22 PROCEDURE — 85025 COMPLETE CBC W/AUTO DIFF WBC: CPT | Performed by: EMERGENCY MEDICINE

## 2018-02-22 PROCEDURE — 96374 THER/PROPH/DIAG INJ IV PUSH: CPT

## 2018-02-22 PROCEDURE — 25000125 ZZHC RX 250: Performed by: EMERGENCY MEDICINE

## 2018-02-22 RX ORDER — HEPARIN SODIUM,PORCINE 10 UNIT/ML
5 VIAL (ML) INTRAVENOUS ONCE
Status: COMPLETED | OUTPATIENT
Start: 2018-02-22 | End: 2018-02-22

## 2018-02-22 RX ORDER — SODIUM CHLORIDE 9 MG/ML
1000 INJECTION, SOLUTION INTRAVENOUS CONTINUOUS
Status: DISCONTINUED | OUTPATIENT
Start: 2018-02-22 | End: 2018-02-23 | Stop reason: HOSPADM

## 2018-02-22 RX ORDER — POTASSIUM CHLORIDE 1.5 G/1.58G
20 POWDER, FOR SOLUTION ORAL ONCE
Status: COMPLETED | OUTPATIENT
Start: 2018-02-22 | End: 2018-02-22

## 2018-02-22 RX ORDER — ONDANSETRON 2 MG/ML
4 INJECTION INTRAMUSCULAR; INTRAVENOUS ONCE
Status: COMPLETED | OUTPATIENT
Start: 2018-02-22 | End: 2018-02-22

## 2018-02-22 RX ORDER — OXYCODONE HCL 5 MG/5 ML
10 SOLUTION, ORAL ORAL ONCE
Status: COMPLETED | OUTPATIENT
Start: 2018-02-22 | End: 2018-02-22

## 2018-02-22 RX ORDER — DIPHENHYDRAMINE HYDROCHLORIDE 50 MG/ML
25 INJECTION INTRAMUSCULAR; INTRAVENOUS ONCE
Status: COMPLETED | OUTPATIENT
Start: 2018-02-22 | End: 2018-02-22

## 2018-02-22 RX ADMIN — SODIUM CHLORIDE 1000 ML: 9 INJECTION, SOLUTION INTRAVENOUS at 21:40

## 2018-02-22 RX ADMIN — POTASSIUM CHLORIDE 20 MEQ: 1.5 POWDER, FOR SOLUTION ORAL at 22:28

## 2018-02-22 RX ADMIN — SODIUM CHLORIDE, PRESERVATIVE FREE 5 ML: 5 INJECTION INTRAVENOUS at 23:01

## 2018-02-22 RX ADMIN — LIDOCAINE HYDROCHLORIDE 30 ML: 20 SOLUTION ORAL; TOPICAL at 21:41

## 2018-02-22 RX ADMIN — OXYCODONE HYDROCHLORIDE 10 MG: 5 SOLUTION ORAL at 21:42

## 2018-02-22 RX ADMIN — DIPHENHYDRAMINE HYDROCHLORIDE 25 MG: 50 INJECTION, SOLUTION INTRAMUSCULAR; INTRAVENOUS at 22:26

## 2018-02-22 RX ADMIN — ONDANSETRON 4 MG: 2 INJECTION INTRAMUSCULAR; INTRAVENOUS at 21:42

## 2018-02-22 RX ADMIN — ONDANSETRON 4 MG: 2 INJECTION INTRAMUSCULAR; INTRAVENOUS at 22:26

## 2018-02-22 ASSESSMENT — ENCOUNTER SYMPTOMS
BLOOD IN STOOL: 0
ABDOMINAL PAIN: 1
DIARRHEA: 1
FEVER: 1
FREQUENCY: 0
NAUSEA: 1
HEMATURIA: 0
VOMITING: 1
DYSURIA: 0

## 2018-02-22 NOTE — PROGRESS NOTES
Nursing Note  Alexandra Melgoza [facility]:   Chief Complaint   Patient presents with     Infusion     IV fluids, zofran, benadryl     During [facility]'s [facility] appointment, orders from Dr. Narayanan were completed.  Frequency: three times weekly as needed.    Progress note:  Patient identification verified by name and date of birth.  Assessment completed.  Vitals recorded in Doc Flowsheets.  Patient was provided with education regarding infusion and possible side effects.  Patient verbalized understanding.      needed: No  Premedications: administered per order.  Infusion Rates: IVP benadryl and zofran, 2 liters lactated ringers over 1 hour each  Approximate Infusion length:3 hours.   Labs: were not ordered for this appointment.  Vascular access: port accessed today.  Treatment Conditions: patient denies fever, chills, signs of infection, recent illness, on antibiotics, productive cough or elevated temperature.  Patient tolerated infusion: well.    Drug Waste Record? No     Discharge Plan:   Follow up plan of care with: ongoing infusions [facility].  Discharge instructions were reviewed with patient.  Patient/representative verbalized understanding of discharge instructions and all questions answered.  [facility] discharged [facility] in stable condition.    Ting Davidson, SAMARA    Administrations This Visit     diphenhydrAMINE (BENADRYL) injection 25 mg     Admin Date Action Dose Route Administered By             02/21/2018 Given 25 mg Intravenous Ting Davidson, SAMARA              Admin Date Action Dose Route Administered By             02/21/2018 Given 25 mg Intravenous Ting Davidson, RN                    lactated ringers BOLUS 1,000-2,000 mL     Admin Date Action Dose Route Administered By             02/21/2018 New Bag 1000 mL Intravenous Ting Davidson  NERY, SAMARA              Admin Date Action Dose Route Administered By             02/21/2018 New Bag 1000 mL Intravenous Ting Davidson, SAMARA                    ondansetron (ZOFRAN) injection 4 mg     Admin Date Action Dose Route Administered By             02/21/2018 Given 4 mg Intravenous Ting Davidson RN              Admin Date Action Dose Route Administered By             02/21/2018 Given 4 mg Intravenous Ting Davidson RN                          /81  Pulse 89  Temp 97.2  F (36.2  C) (Oral)  Resp 16  SpO2 100%

## 2018-02-22 NOTE — ED AVS SNAPSHOT
[facility]    [address]    [facility] [address]    Phone:  [phone]    Fax:  [phone]                                       Alexandra Melgoza   MRN: [id number]    Department:  [facility]   Date of Visit:  2/22/2018           Patient Information     Date Of Birth          1995        Your diagnoses for this visit were:     Nausea, vomiting, and diarrhea     Upper abdominal pain     Hypokalemia        You were seen by Nunu Barnes MD.      Follow-up Information     Follow up with Quyen Baez MD.    Specialty:  Internal Medicine    Why:  1-3 days for reassessment    Contact information:    [address] [facility] [address] [phone]          Follow up with [facility].    Specialty:  EMERGENCY MEDICINE    Why:  As needed, If symptoms worsen    Contact information:    [address]  [facility] [address] [phone]        Discharge Instructions         Discharge Instructions  Gastroenteritis (possible cause of your symptoms)    You have been seen today for vomiting (throwing up) and diarrhea (loose stools), called gastroenteritis or the stomach flu. This is usually caused by a virus, but some bacteria, parasites, medicines or other medical conditions can cause similar symptoms. At this time your provider does not find that your vomiting and diarrhea is a sign of anything dangerous or life-threatening.  However, sometimes the signs of serious illness do not show up right away. Remember that serious problems like appendicitis can look like gastroenteritis at first.       Generally, every Emergency Department visit should have a follow-up clinic visit with either a primary or a specialty clinic/provider. Please follow-up as instructed by your emergency provider today.    Return to the Emergency Department if:    You keep vomiting and you  are not able to keep liquids down.    You feel you are getting dehydrated, such as being very thirsty, not urinating (peeing), or feeling faint or lightheaded.     You develop a new fever.    You have abdominal (belly) pain that seems worse than cramps, is in one spot, or is getting worse over time.     You have blood in your vomit or in your diarrhea.    You feel very weak.    What can I do to help myself?    The most important thing to do is to drink clear liquids.  If you have been vomiting a lot, it is best to have only small, frequent sips of liquids.  Drinking too much at once may cause more vomiting. Water is a good first option for rehydration. If you are vomiting often, you must also replace electrolytes (salts and minerals) lost with your illness. Pedialyte  is the best rehydration liquid but many don t like the taste so sports drinks (like Gatorade ) are a good option. Sodas and juice are also options but are high in sugar. Avoid acid liquids (orange), caffeine (coffee) or alcohol. Do not drink milk until you no longer have diarrhea.    After liquids are staying down, you may start eating mild foods. Soda crackers, toast, plain noodles, gelatin, applesauce and bananas are good first choices.  Avoid foods that have acid, are spicy, fatty or fibrous (such as meats, coarse grains, vegetables). You may start eating these foods again in about 3 days when you are better.    Sometimes treatment includes prescription medicine to prevent nausea (sick to your stomach) and vomiting and to prevent diarrhea. If your provider prescribes these for you, take them as directed.    Nonprescription medicine is available for the treatment of diarrhea and can be very effective.  If you use it, make sure you use the dose recommended on the package. Avoid Lomotil . Check with your healthcare provider before you use any medicine for diarrhea.    Do not take ibuprofen, or other nonsteroidal anti-inflammatory medicines without  checking with your healthcare provider.  If you were given a prescription for medicine here today, be sure to read all of the information (including the package insert) that comes with your prescription.  This will include important information about the medicine, its side effects, and any warnings that you need to know about.  The pharmacist who fills the prescription can provide more information and answer questions you may have about the medicine.  If you have questions or concerns that the pharmacist cannot address, please call or return to the Emergency Department.   Remember that you can always come back to the Emergency Department if you are not able to see your regular provider in the amount of time listed above, if you get any new symptoms, or if there is anything that worries you.    Hypokalemia (slight)  Hypokalemia means a low level of potassium in the blood. This most often occurs in people who take diuretics (water pills). It can also occur due to severe vomiting or diarrhea.  It is also seen in people who take laxatives for long periods of time. It can sometimes be associated with hypomagnesemia (low magnesium) which needs to be corrected before your potassium levels can be fixed.  A mild case usually causes no symptoms. It is only found with blood testing. More severe potassium loss causes generalized weakness, muscle or abdominal cramping, heart palpitations (rapid or irregular heartbeats), low blood pressure, specific muscle weakness, and in some people who are paralyzed.   Home care    Take any potassium supplements prescribed.    Eat foods rich in potassium. The highest amount is found in avocado, baked potatoes, spinach, cantaloupe, cod, halibut, salmon, and scallops. White, red, or lemus beans are also very good sources. A modest amount is found in orange juice, bananas, carrots, and tomato juice.    If you take certain types of diuretics, you will also need to take potassium supplements. If  you take a diuretic, discuss potassium supplements with your doctor.  Follow-up care  Follow up with your healthcare provider for a repeat blood test within the next week or as advised by our staff.  When to seek medical advice  Call your healthcare provider if any of the following occur:    Increased weakness, fatigue, muscle cramps    Dizziness  Call 911  Call 911 if any of the following occur:    Irregular heartbeat, extra beats or very fast heart rate    Loss of consciousness  Date Last Reviewed: 7/1/2017 2000-2017 [employer]. [address] [place] [address]. All rights reserved. This information is not intended as a substitute for professional medical care. Always follow your healthcare professional's instructions.          Discharge References/Attachments     VOMITING AND DIARRHEA, NONSPECIFIC (ADULT) (ENGLISH)      Future Appointments        Provider Department Dept Phone Center    2/23/2018 9:00 AM  Infusion Chair [address] [place] [address] Infusion Services [phone] [facility]    2/23/2018 1:00 PM [facility]; Specialty [facility] Specialty and Procedure [phone] [facility]    2/26/2018 12:00 PM [facility]; Specialty [facility] Specialty and Procedure [phone] [facility]    2/27/2018 10:00 AM ANAE[facility] [phone] [facility]    2/28/2018 12:00 PM [facility]; [facility] Specialty and Procedure [phone] [facility]    3/2/2018 1:00 PM [facility]; [facility] Specialty and Procedure [phone] [facility]    3/19/2018 7:30 AM Quyen Baez MD [facility] [phone] [facility]    3/19/2018 3:00 PM Quyen Lennon, PhD [facility] Comprehensive Pain Management [phone] [facility]       24 Hour Appointment Hotline       To make an appointment at any [facility], call [phone] ([phone]). If you don't have a family doctor or clinic, we will help you find one. [facility] clinics are conveniently located to serve the needs of you and your family.             Review of your medicines      Our records show that you are taking the medicines listed below. If these are incorrect, please call your family doctor or clinic.        Dose / Directions Last dose taken    * amylase-lipase-protease 60653 UNITS Cpep   Commonly known as:  ZENPEP   Dose:  2-3 capsule   Quantity:  180 capsule        Take 2-3 capsules (40,000-60,000 Units) by mouth Take with snacks or supplements (Snacks)   Refills:  1        * amylase-lipase-protease 39421 UNITS Cpep   Commonly known as:  ZENPEP   Dose:  5 capsule   Quantity:  180 capsule        Take 5 capsules (100,000 Units) by mouth 4 times daily (with meals and nightly)   Refills:  1        fluticasone 50 MCG/ACT spray   Commonly known as:  FLONASE   Dose:  2 spray   Quantity:  16 g        Spray 2 sprays into both nostrils daily   Refills:  3        * gabapentin 250 MG/5ML solution   Commonly known as:  NEURONTIN   Dose:  400 mg   Quantity:  470 mL        8 mLs (400 mg) by ORAL OR NJ TUBE route 2 times daily   Refills:  0        * gabapentin 250 MG/5ML solution   Commonly known as:  NEURONTIN   Dose:  600 mg   Quantity:  450 mL        Take 12 mLs (600 mg) by mouth daily   Refills:  0        hydrOXYzine 10 MG/5ML syrup   Commonly known as:  ATARAX   Dose:  25 mg   Quantity:  473 mL        Take 12.5 mLs (25 mg) by mouth every 4 hours as needed for anxiety or other (pain)   Refills:  0        leuprolide 11.25 MG kit   Commonly known as:  LUPRON DEPOT (3-MONTH)   Dose:  11.25 mg   Quantity:  1 each        Inject 11.25 mg into the muscle every 3 months   Refills:  3        levalbuterol 45 MCG/ACT Inhaler   Commonly known as:  XOPENEX HFA   Dose:  2 puff   Quantity:  1  Inhaler        Inhale 2 puffs into the lungs every 6 hours as needed for shortness of breath / dyspnea   Refills:  1        menthol 5 % Ptch   Commonly known as:  ICY HOT   Dose:  1 patch   Quantity:  15 patch        Apply 1 patch topically every 8 hours as needed for muscle soreness   Refills:  3        multivitamins with minerals Liqd liquid   Dose:  15 mL   Quantity:  1 Bottle        15 mLs by Per Feeding Tube route daily   Refills:  0        norethindrone 5 MG tablet   Commonly known as:  AYGESTIN   Dose:  5 mg   Quantity:  90 tablet        Take 1 tablet (5 mg) by mouth daily   Refills:  1        nortriptyline 10 MG capsule   Commonly known as:  PAMELOR   Dose:  30 mg   Quantity:  120 capsule        Take 3 capsules (30 mg) by mouth At Bedtime   Refills:  0        ondansetron 4 MG ODT tab   Commonly known as:  ZOFRAN ODT   Dose:  4 mg   Quantity:  40 tablet        Take 1 tablet (4 mg) by mouth every 8 hours as needed for nausea or vomiting   Refills:  3        order for DME   Quantity:  1 Units        Equipment being ordered: TENS with wire and electrode.   Refills:  0        * oxyCODONE 20 mg/mL (HIGH CONC) solution   Commonly known as:  ROXICODONE INTENSOL   Dose:  10-15 mg   Quantity:  13 mL        Take 0.5-0.75 mLs (10-15 mg) by mouth every 4 hours as needed for moderate to severe pain   Refills:  0        * oxyCODONE 5 MG/5ML solution   Commonly known as:  ROXICODONE   Dose:  10 mg   Quantity:  840 mL        Take 10 mLs (10 mg) by mouth every 4 hours as needed for pain maximum 60 mL per day.  This is a 2 week supply.   Refills:  0        pantoprazole 40 MG EC tablet   Commonly known as:  PROTONIX   Dose:  40 mg   Quantity:  30 tablet        Take 1 tablet (40 mg) by mouth 2 times daily (before meals)   Refills:  1        polyethylene glycol Packet   Commonly known as:  MIRALAX/GLYCOLAX   Dose:  17 g   Quantity:  7 packet        Take 17 g by mouth daily   Refills:  0        RIZATRIPTAN BENZOATE PO   Dose:  5  mg        Take 5 mg by mouth once as needed   Refills:  0        scopolamine 72 hr patch   Commonly known as:  TRANSDERM   Quantity:  2 patch        Apply 1 patch to hairless area behind one ear if severe nausea and vomiting.  Remove old patch and change every 3 days (72 hours).   Refills:  0        senna-docusate 8.6-50 MG per tablet   Commonly known as:  SENOKOT-S;PERICOLACE   Dose:  1 tablet   Quantity:  100 tablet        Take 1 tablet by mouth 2 times daily as needed for constipation   Refills:  0        sodium bicarbonate 325 MG tablet   Dose:  325 mg   Quantity:  200 tablet        1 tablet (325 mg) by Per Feeding Tube route 4 times daily   Refills:  0        * Notice:  This list has 6 medication(s) that are the same as other medications prescribed for you. Read the directions carefully, and ask your doctor or other care provider to review them with you.            Procedures and tests performed during your visit     CBC with platelets differential    Comprehensive metabolic panel    HCG qualitative urine    Lactic acid whole blood    Lipase    Pulse oximetry nursing    UA with Microscopic      Orders Needing Specimen Collection     None      Pending Results     No orders found from 2/20/2018 to 2/23/2018.            Pending Culture Results     No orders found from 2/20/2018 to 2/23/2018.            Pending Results Instructions     If you had any lab results that were not finalized at the time of your Discharge, you can call the ED Lab Result RN at [phone]. You will be contacted by this team for any positive Lab results or changes in treatment. The nurses are available 7 days a week from 10A to 6:30P.  You can leave a message 24 hours per day and they will return your call.        Test Results From Your Hospital Stay        2/22/2018 10:07 PM      Component Results     Component Value Ref Range & Units Status    WBC 5.0 4.0 - 11.0 10e9/L Final    RBC Count 3.64 (L) 3.8 - 5.2 10e12/L Final    Hemoglobin  11.1 (L) 11.7 - 15.7 g/dL Final    Hematocrit 32.8 (L) 35.0 - 47.0 % Final    MCV 90 78 - 100 fl Final    MCH 30.5 26.5 - 33.0 pg Final    MCHC 33.8 31.5 - 36.5 g/dL Final    RDW 12.6 10.0 - 15.0 % Final    Platelet Count 220 150 - 450 10e9/L Final    Diff Method Automated Method  Final    % Neutrophils 53.2 % Final    % Lymphocytes 34.1 % Final    % Monocytes 9.3 % Final    % Eosinophils 2.6 % Final    % Basophils 0.6 % Final    % Immature Granulocytes 0.2 % Final    Nucleated RBCs 0 0 /100 Final    Absolute Neutrophil 2.6 1.6 - 8.3 10e9/L Final    Absolute Lymphocytes 1.7 0.8 - 5.3 10e9/L Final    Absolute Monocytes 0.5 0.0 - 1.3 10e9/L Final    Absolute Eosinophils 0.1 0.0 - 0.7 10e9/L Final    Absolute Basophils 0.0 0.0 - 0.2 10e9/L Final    Abs Immature Granulocytes 0.0 0 - 0.4 10e9/L Final    Absolute Nucleated RBC 0.0  Final         2/22/2018 10:24 PM      Component Results     Component Value Ref Range & Units Status    Sodium 140 133 - 144 mmol/L Final    Potassium 3.3 (L) 3.4 - 5.3 mmol/L Final    Chloride 105 94 - 109 mmol/L Final    Carbon Dioxide 29 20 - 32 mmol/L Final    Anion Gap 6 3 - 14 mmol/L Final    Glucose 84 70 - 99 mg/dL Final    Urea Nitrogen 7 7 - 30 mg/dL Final    Creatinine 0.54 0.52 - 1.04 mg/dL Final    GFR Estimate >90 >60 mL/min/1.7m2 Final    Non  GFR Calc    GFR Estimate If Black >90 >60 mL/min/1.7m2 Final    African American GFR Calc    Calcium 8.6 8.5 - 10.1 mg/dL Final    Bilirubin Total 0.2 0.2 - 1.3 mg/dL Final    Albumin 3.9 3.4 - 5.0 g/dL Final    Protein Total 6.7 (L) 6.8 - 8.8 g/dL Final    Alkaline Phosphatase 88 40 - 150 U/L Final    ALT 29 0 - 50 U/L Final    AST 23 0 - 45 U/L Final         2/22/2018 10:24 PM      Component Results     Component Value Ref Range & Units Status    Lipase 70 (L) 73 - 393 U/L Final         2/22/2018 10:22 PM      Component Results     Component Value Ref Range & Units Status    Lactic Acid 0.9 0.7 - 2.0 mmol/L Final          2/22/2018 10:48 PM      Component Results     Component Value Ref Range & Units Status    Color Urine Straw  Final    Appearance Urine Clear  Final    Glucose Urine Negative NEG^Negative mg/dL Final    Bilirubin Urine Negative NEG^Negative Final    Ketones Urine Negative NEG^Negative mg/dL Final    Specific Gravity Urine 1.011 1.003 - 1.035 Final    Blood Urine Negative NEG^Negative Final    pH Urine 7.0 5.0 - 7.0 pH Final    Protein Albumin Urine Negative NEG^Negative mg/dL Final    Urobilinogen mg/dL 0.0 0.0 - 2.0 mg/dL Final    Nitrite Urine Negative NEG^Negative Final    Leukocyte Esterase Urine Negative NEG^Negative Final    Source Midstream Urine  Final    WBC Urine 2 0 - 2 /HPF Final    RBC Urine 1 0 - 2 /HPF Final    Squamous Epithelial /HPF Urine <1 0 - 1 /HPF Final    Mucous Urine Present (A) NEG^Negative /LPF Final         2/22/2018 10:49 PM      Component Results     Component Value Ref Range & Units Status    HCG Qual Urine Negative NEG^Negative Final    This test is for screening purposes.  Results should be interpreted along with   the clinical picture.  Confirmation testing is available if warranted by   ordering VAL810, HCG Quantitative Pregnancy.                  Clinical Quality Measure: Blood Pressure Screening     Your blood pressure was checked while you were in the emergency department today. The last reading we obtained was  BP: (!) 143/104 . Please read the guidelines below about what these numbers mean and what you should do about them.  If your systolic blood pressure (the top number) is less than 120 and your diastolic blood pressure (the bottom number) is less than 80, then your blood pressure is normal. There is nothing more that you need to do about it.  If your systolic blood pressure (the top number) is 120-139 or your diastolic blood pressure (the bottom number) is 80-89, your blood pressure may be higher than it should be. You should have your blood pressure rechecked within a  year by a primary care provider.  If your systolic blood pressure (the top number) is 140 or greater or your diastolic blood pressure (the bottom number) is 90 or greater, you may have high blood pressure. High blood pressure is treatable, but if left untreated over time it can put you at risk for heart attack, stroke, or kidney failure. You should have your blood pressure rechecked by a primary care provider within the next 4 weeks.  If your provider in the emergency department today gave you specific instructions to follow-up with your doctor or provider even sooner than that, you should follow that instruction and not wait for up to 4 weeks for your follow-up visit.        Thank you for choosing [facility]       Thank you for choosing [facility] for your care. Our goal is always to provide you with excellent care. Hearing back from our patients is one way we can continue to improve our services. Please take a few minutes to complete the written survey that you may receive in the mail after you visit with us. Thank you!        [employer] Information     [employer] gives you secure access to your electronic health record. If you see a primary care provider, you can also send messages to your care team and make appointments. If you have questions, please call your primary care clinic.  If you do not have a primary care provider, please call [phone] and they will assist you.        Care EveryWhere ID     This is your Care EveryWhere ID. This could be used by other organizations to access your [facility] medical records  [id number]        Equal Access to Services     RACHAEL BENITEZ : Bret Enamorado, alcon beck, qaybmaki hale. So [facility] [phone].    ATENCIÓN: Si habla español, tiene a quijano disposición servicios gratuitos de asistencia lingüística. Panfilo al [phone].    We comply with applicable federal civil rights laws and [place] laws. We do  not discriminate on the basis of race, color, national origin, age, disability, sex, sexual orientation, or gender identity.            After Visit Summary       This is your record. Keep this with you and show to your community pharmacist(s) and doctor(s) at your next visit.

## 2018-02-22 NOTE — ED AVS SNAPSHOT
[facility] Emergency Department    [address]    [facility] [address]    Phone:  [phone]    Fax:  [phone]                                       Alexandra Melgoza   MRN: [id number]    Department:  [facility] Emergency Department   Date of Visit:  2/22/2018           After Visit Summary Signature Page     I have received my discharge instructions, and my questions have been answered. I have discussed any challenges I see with this plan with the nurse or doctor.    ..........................................................................................................................................  Patient/Patient Representative Signature      ..........................................................................................................................................  Patient Representative Print Name and Relationship to Patient    ..................................................               ................................................  Date                                            Time    ..........................................................................................................................................  Reviewed by Signature/Title    ...................................................              ..............................................  Date                                                            Time

## 2018-02-23 ENCOUNTER — CARE COORDINATION (OUTPATIENT)
Dept: GASTROENTEROLOGY | Facility: CLINIC | Age: 23
End: 2018-02-23

## 2018-02-23 ENCOUNTER — INFUSION THERAPY VISIT (OUTPATIENT)
Dept: INFUSION THERAPY | Facility: CLINIC | Age: 23
End: 2018-02-23
Attending: INTERNAL MEDICINE
Payer: COMMERCIAL

## 2018-02-23 VITALS — TEMPERATURE: 97.7 F | SYSTOLIC BLOOD PRESSURE: 108 MMHG | DIASTOLIC BLOOD PRESSURE: 93 MMHG | HEART RATE: 94 BPM

## 2018-02-23 DIAGNOSIS — N39.41 URGE INCONTINENCE: ICD-10-CM

## 2018-02-23 DIAGNOSIS — Z98.890 S/P ERCP: ICD-10-CM

## 2018-02-23 DIAGNOSIS — R21 RASH: ICD-10-CM

## 2018-02-23 DIAGNOSIS — R10.13 ABDOMINAL PAIN, EPIGASTRIC: ICD-10-CM

## 2018-02-23 DIAGNOSIS — F41.1 ANXIETY STATE: ICD-10-CM

## 2018-02-23 DIAGNOSIS — K86.1 CHRONIC PANCREATITIS, UNSPECIFIED PANCREATITIS TYPE (H): ICD-10-CM

## 2018-02-23 DIAGNOSIS — F33.1 MAJOR DEPRESSIVE DISORDER, RECURRENT EPISODE, MODERATE (H): ICD-10-CM

## 2018-02-23 DIAGNOSIS — E16.2 HYPOGLYCEMIA: ICD-10-CM

## 2018-02-23 DIAGNOSIS — R52 PAIN: ICD-10-CM

## 2018-02-23 DIAGNOSIS — R10.9 ACUTE ABDOMINAL PAIN: ICD-10-CM

## 2018-02-23 DIAGNOSIS — G43.A0 CYCLICAL VOMITING WITH NAUSEA, INTRACTABILITY OF VOMITING NOT SPECIFIED: Primary | ICD-10-CM

## 2018-02-23 DIAGNOSIS — R11.2 INTRACTABLE NAUSEA AND VOMITING: ICD-10-CM

## 2018-02-23 DIAGNOSIS — R10.9 ABDOMINAL PAIN: ICD-10-CM

## 2018-02-23 PROCEDURE — 25000128 H RX IP 250 OP 636: Performed by: NURSE PRACTITIONER

## 2018-02-23 PROCEDURE — 25000128 H RX IP 250 OP 636: Performed by: INTERNAL MEDICINE

## 2018-02-23 RX ORDER — DIPHENHYDRAMINE HYDROCHLORIDE 50 MG/ML
25 INJECTION INTRAMUSCULAR; INTRAVENOUS ONCE
Status: COMPLETED | OUTPATIENT
Start: 2018-02-23 | End: 2018-02-23

## 2018-02-23 RX ORDER — DIPHENHYDRAMINE HYDROCHLORIDE 50 MG/ML
25 INJECTION INTRAMUSCULAR; INTRAVENOUS ONCE
Status: CANCELLED
Start: 2018-02-23 | End: 2018-02-23

## 2018-02-23 RX ORDER — ONDANSETRON 2 MG/ML
4 INJECTION INTRAMUSCULAR; INTRAVENOUS DAILY PRN
Status: CANCELLED
Start: 2018-02-23

## 2018-02-23 RX ORDER — ONDANSETRON 2 MG/ML
4 INJECTION INTRAMUSCULAR; INTRAVENOUS ONCE
Status: COMPLETED | OUTPATIENT
Start: 2018-02-23 | End: 2018-02-23

## 2018-02-23 RX ORDER — ONDANSETRON 2 MG/ML
4 INJECTION INTRAMUSCULAR; INTRAVENOUS ONCE
Status: CANCELLED
Start: 2018-02-23 | End: 2018-02-23

## 2018-02-23 RX ORDER — HEPARIN SODIUM (PORCINE) LOCK FLUSH IV SOLN 100 UNIT/ML 100 UNIT/ML
500 SOLUTION INTRAVENOUS ONCE
Status: COMPLETED | OUTPATIENT
Start: 2018-02-23 | End: 2018-02-23

## 2018-02-23 RX ADMIN — ONDANSETRON 4 MG: 2 INJECTION INTRAMUSCULAR; INTRAVENOUS at 10:56

## 2018-02-23 RX ADMIN — SODIUM CHLORIDE, PRESERVATIVE FREE 500 UNITS: 5 INJECTION INTRAVENOUS at 11:55

## 2018-02-23 RX ADMIN — DIPHENHYDRAMINE HYDROCHLORIDE 25 MG: 50 INJECTION, SOLUTION INTRAMUSCULAR; INTRAVENOUS at 10:58

## 2018-02-23 RX ADMIN — SODIUM CHLORIDE, POTASSIUM CHLORIDE, SODIUM LACTATE AND CALCIUM CHLORIDE 2000 ML: 600; 310; 30; 20 INJECTION, SOLUTION INTRAVENOUS at 09:42

## 2018-02-23 RX ADMIN — ONDANSETRON 4 MG: 2 INJECTION INTRAMUSCULAR; INTRAVENOUS at 09:46

## 2018-02-23 RX ADMIN — DIPHENHYDRAMINE HYDROCHLORIDE 25 MG: 50 INJECTION, SOLUTION INTRAMUSCULAR; INTRAVENOUS at 09:48

## 2018-02-23 NOTE — PROGRESS NOTES
"Infusion Nursing Note:  Alexandra Melgoza presents today for IVF, antiemetics.    Patient seen by provider today: No   present during visit today: Not Applicable.    Note: Pt seen in ED yesterday for intractable nausea/vomiting. Was given fluids and antiemetics and then discharged to home.  Was feeling well until about 5am this morning, when she began feeling nauseated again and started to vomit.  States when she gets these \"flares,\" they typically last a couple weeks.    Intravenous Access:  Implanted Port.    Treatment Conditions:  Not Applicable.      Post Infusion Assessment:  Patient tolerated infusion without incident.  Blood return noted pre and post infusion.  Site patent and intact, free from redness, edema or discomfort.  No evidence of extravasations.  Access discontinued per protocol.    Discharge Plan:   Discharge instructions reviewed with: Patient.  AVS to patient via [employer].  Patient will return 2/26/18 at the  for IVF/antiemetics for next appointment.   Patient discharged in stable condition accompanied by: self.  Departure Mode: Ambulatory.    Esperanza Souza RN                        "

## 2018-02-23 NOTE — ED NOTES
Patient comes in for evaluation of abdominal pain, fevers, and vomiting. Patient has a history of chronic pancreatitis for which she had a GJ tube placed 2 weeks ago. 3 days ago started having intermittent vomiting. Patient reports worsening abdominal pain around site and fevers starting last night. ABCs intact.

## 2018-02-23 NOTE — DISCHARGE INSTRUCTIONS
Discharge Instructions  Gastroenteritis (possible cause of your symptoms)    You have been seen today for vomiting (throwing up) and diarrhea (loose stools), called gastroenteritis or the stomach flu. This is usually caused by a virus, but some bacteria, parasites, medicines or other medical conditions can cause similar symptoms. At this time your provider does not find that your vomiting and diarrhea is a sign of anything dangerous or life-threatening.  However, sometimes the signs of serious illness do not show up right away. Remember that serious problems like appendicitis can look like gastroenteritis at first.       Generally, every Emergency Department visit should have a follow-up clinic visit with either a primary or a specialty clinic/provider. Please follow-up as instructed by your emergency provider today.    Return to the Emergency Department if:    You keep vomiting and you are not able to keep liquids down.    You feel you are getting dehydrated, such as being very thirsty, not urinating (peeing), or feeling faint or lightheaded.     You develop a new fever.    You have abdominal (belly) pain that seems worse than cramps, is in one spot, or is getting worse over time.     You have blood in your vomit or in your diarrhea.    You feel very weak.    What can I do to help myself?    The most important thing to do is to drink clear liquids.  If you have been vomiting a lot, it is best to have only small, frequent sips of liquids.  Drinking too much at once may cause more vomiting. Water is a good first option for rehydration. If you are vomiting often, you must also replace electrolytes (salts and minerals) lost with your illness. Pedialyte  is the best rehydration liquid but many don t like the taste so sports drinks (like Gatorade ) are a good option. Sodas and juice are also options but are high in sugar. Avoid acid liquids (orange), caffeine (coffee) or alcohol. Do not drink milk until you no longer  have diarrhea.    After liquids are staying down, you may start eating mild foods. Soda crackers, toast, plain noodles, gelatin, applesauce and bananas are good first choices.  Avoid foods that have acid, are spicy, fatty or fibrous (such as meats, coarse grains, vegetables). You may start eating these foods again in about 3 days when you are better.    Sometimes treatment includes prescription medicine to prevent nausea (sick to your stomach) and vomiting and to prevent diarrhea. If your provider prescribes these for you, take them as directed.    Nonprescription medicine is available for the treatment of diarrhea and can be very effective.  If you use it, make sure you use the dose recommended on the package. Avoid Lomotil . Check with your healthcare provider before you use any medicine for diarrhea.    Do not take ibuprofen, or other nonsteroidal anti-inflammatory medicines without checking with your healthcare provider.  If you were given a prescription for medicine here today, be sure to read all of the information (including the package insert) that comes with your prescription.  This will include important information about the medicine, its side effects, and any warnings that you need to know about.  The pharmacist who fills the prescription can provide more information and answer questions you may have about the medicine.  If you have questions or concerns that the pharmacist cannot address, please call or return to the Emergency Department.   Remember that you can always come back to the Emergency Department if you are not able to see your regular provider in the amount of time listed above, if you get any new symptoms, or if there is anything that worries you.    Hypokalemia (slight)  Hypokalemia means a low level of potassium in the blood. This most often occurs in people who take diuretics (water pills). It can also occur due to severe vomiting or diarrhea.  It is also seen in people who take laxatives  for long periods of time. It can sometimes be associated with hypomagnesemia (low magnesium) which needs to be corrected before your potassium levels can be fixed.  A mild case usually causes no symptoms. It is only found with blood testing. More severe potassium loss causes generalized weakness, muscle or abdominal cramping, heart palpitations (rapid or irregular heartbeats), low blood pressure, specific muscle weakness, and in some people who are paralyzed.   Home care    Take any potassium supplements prescribed.    Eat foods rich in potassium. The highest amount is found in avocado, baked potatoes, spinach, cantaloupe, cod, halibut, salmon, and scallops. White, red, or lemus beans are also very good sources. A modest amount is found in orange juice, bananas, carrots, and tomato juice.    If you take certain types of diuretics, you will also need to take potassium supplements. If you take a diuretic, discuss potassium supplements with your doctor.  Follow-up care  Follow up with your healthcare provider for a repeat blood test within the next week or as advised by our staff.  When to seek medical advice  Call your healthcare provider if any of the following occur:    Increased weakness, fatigue, muscle cramps    Dizziness  Call 911  Call 911 if any of the following occur:    Irregular heartbeat, extra beats or very fast heart rate    Loss of consciousness  Date Last Reviewed: 7/1/2017 2000-2017 [employer]. [address] [place] [address]. All rights reserved. This information is not intended as a substitute for professional medical care. Always follow your healthcare professional's instructions.

## 2018-02-23 NOTE — ED PROVIDER NOTES
History     Chief Complaint:  Abdominal Pain and Fever      HPI   Alexandra Melgoza is a 22 year old female with a history of chronic abdominal pain felt secondary to pancreatitis and cyclic vomiting syndrome who presents accompanied by her brother for evaluation of abdominal pain and a fever. On 2/8/2018, the patient had a GJ tube placed due to worsening chronic abdominal pain associated with eating, nausea, vomiting, and weight loss. She was seen in her GI office on 2/14 regarding her GJ tube, at which time she did have some discharge around the GJ insertion site, but on exam she showed no worrisome signs of infection or complications. Yesterday evening, the patient started to develop more frequent nausea and vomiting and worsening abdominal pain. This morning, she also developed a fever that was as high as 101 but improved after taking Tylenol through the GJ tube. Due to her worsening abdominal pain, nausea, and vomiting as well as her fever this morning, the patient came into the ED for evaluation. Currently in the ED, the patient rates her pain at a severity of 8/10. She has also had some intermittent diarrhea recently, although this has not been worse over the last few days. She has not had any bloody stools, dysuria, hematuria, or urinary frequency recently, and she denies the possibility of pregnancy.     Allergies:  Amoxicillin-pot clavulanate   Compazine  Hyoscyamine  Reglan  Zyprexa  Amitriptyline Hcl   Buspirone  Cogentin  Cyproheptadine   Dicyclomine  Droperidol   Effexor   Phenergan Dm  Promethazine  risperidone  Vistaril   Augmentin  Ketamine  Sorbitol     Medications:    oxyCODONE (ROXICODONE) 5 MG/5ML solution  nortriptyline (PAMELOR) 10 MG capsule  oxyCODONE (ROXICODONE INTENSOL) 20 mg/mL (HIGH CONC) solution  hydrOXYzine (ATARAX) 10 MG/5ML syrup  ondansetron (ZOFRAN ODT) 4 MG ODT tab  menthol (ICY HOT) 5 % PTCH  amylase-lipase-protease (ZENPEP) 81301 UNITS CPEP  multivitamins with minerals  (CERTAVITE/CEROVITE) LIQD liquid  gabapentin (NEURONTIN) 250 MG/5ML solution  sodium bicarbonate 325 MG tablet  scopolamine (TRANSDERM) 72 hr patch  amylase-lipase-protease (ZENPEP) 89509 UNITS CPEP  senna-docusate (SENOKOT-S;PERICOLACE) 8.6-50 MG per tablet  pantoprazole (PROTONIX) 40 MG EC tablet  polyethylene glycol (MIRALAX/GLYCOLAX) Packet  norethindrone (AYGESTIN) 5 MG tablet  fluticasone (FLONASE) 50 MCG/ACT spray  levalbuterol (XOPENEX HFA) 45 MCG/ACT Inhaler  leuprolide (LUPRON DEPOT) 11.25 MG injection  RIZATRIPTAN BENZOATE PO     Past Medical History:    Anxiety  Asthma  Cholecystitis    Chronic abdominal pain  Chronic infection  Chronic pain  Cyclic vomiting syndrome  Depression  Endometriosis   Hypoglycemia  Migraine headaches  Asthma  Ovarian cysts   Pancreatic disease  Pseudoseizures   Somatoform disorder  Sphincter of Oddi dysfunction  Vasovagal syncope   POTS    Past Surgical History:    ERCP, biliary stents   Cholecystectomy  Colonoscopy   Endoscopic retrograde cholangiopancreatogram   EGD, combined   Insert port vascular access  Left knee arthroscopy  Laparoscopy, diagnostic   remove and replace breast implant prosthesis   Vascular surgery     Family History:    Hypertension - Father   Hypoglycemia - Brother     Social History:  Tobacco use:    Never smoker  Alcohol use:    Negative  Marital status:    Single   Accompanied to ED by:  Brother     Review of Systems   Constitutional: Positive for fever.   Gastrointestinal: Positive for abdominal pain, diarrhea, nausea and vomiting. Negative for blood in stool.   Genitourinary: Negative for dysuria, frequency and hematuria.   All other systems reviewed and are negative.    Physical Exam   First Vitals:  BP: (!) 143/104  Pulse: 90  Temp: 98.7  F (37.1  C)  Resp: 20  SpO2: 99 %      Physical Exam  General: Uncomfortable appearing adult female.   Eyes: PERRL, Conjunctive within normal limits. No scleral icterus.   ENT: Moist mucous membranes, oropharynx  clear.   CV: Normal S1S2, no murmur, rub or gallop. Regular rate and rhythm  Resp: Clear to auscultation bilaterally, no wheezes, rales or rhonchi. Normal respiratory effort.  GI: Abdomen is soft, nondistended. Tender in the epigastric area and right upper quadrant. No palpable masses. No rebound or guarding.  MSK: No edema. Nontender. Normal active range of motion.  Skin: Warm and dry. No rashes or lesions or ecchymoses on visible skin. GJ tube site is normal in appearance with no spreading erythema.   Neuro: Alert and oriented. Responds appropriately to all questions and commands. No focal findings appreciated. Normal muscle tone.  Psych: Normal mood and affect. Pleasant.     Emergency Department Course     Laboratory:  CBC: HGB 11.1 low, o/w WNL (WBC 5.0, )    CMP: Potassium 3.3 low, Protein total 6.7 low, o/w WNL (Creatinine 0.54)  Lipase: 70 low  Lactic acid whole blood: 0.9   UA with Microscopic: Mucous present, o/w Negative  HCG Qualitative Urine: Negative     Interventions:  2140 NS 1,000 mL IV   2141 GI cocktail 30 mL Gastric tube   2142 Zofran 4 mg IV   2142 Roxicodone 10 mg oral or GJ tube   2226 Benadryl 25 mg IV   2226 Zofran 4 mg IV   2228 Potassium chloride 20 mEq Gastric tube     Emergency Department Course:  Nursing notes and vitals reviewed.  2109: I performed an exam of the patient as documented above.     2229: I updated and reassessed the patient.     2251: I updated and reassessed the patient. She is feeling improved. No new concerns.    I personally reviewed the laboratory results with the patient and brother and answered all related questions prior to discharge.     Findings and plan explained to the Patient and brother. Patient discharged home with instructions regarding supportive care, medications, and reasons to return. The importance of close follow-up was reviewed.    Impression & Plan      Medical Decision Making:  Alexandra Melgoza is a 22 year old female with a history of  chronic abdominal pain felt secondary to pancreatitis now with GJ tube for feeding assistance who presents to the emergency department for nausea, vomiting, and diarrhea. Diarrhea sounds like it may have a chronic component. Nausea and vomiting has been intermittent in the past but seems to be more severe. She is also having abdominal pain which is mild on examination when palpated. I suspect the abdominal pain is more related to stomach irritation and muscular strain from vomiting rather than acute process that would require advanced imaging or further testing. She had no worrisome laboratory abnormalities with just slight leukocytosis. She was slightly hypokalemic as well which was replenished here by GJ tube. This is likely secondary to GI losses. She may use Pedialyte at home. I have recommended ongoing nutritional treatment as she has been doing if tolerated. Zofran as needed for nausea and vomiting. Follow up with PCP within 1-3 days for reassessment. All questions were answered prior to discharge. At this time, there does not appear to be a life-threatening or surgical process that is acute, and she is agreeable and comfortable with plan for discharge home.     Diagnosis:    ICD-10-CM   1. Nausea, vomiting, and diarrhea R11.2    R19.7   2. Upper abdominal pain R10.10   3. Hypokalemia E87.6       Disposition:  Discharged to home.       I, Mele Bolton, am serving as a scribe at 9:09 PM on 2/22/2018 to document services personally performed by Dr. Barnes, based on my observations and the provider's statements to me.      [facility] EMERGENCY DEPARTMENT       Nunu Barnes MD  02/23/18 0006

## 2018-02-23 NOTE — MR AVS SNAPSHOT
After Visit Summary   2/23/2018    Alexandra Melgoza    MRN: [id number]           Patient Information     Date Of Birth          1995        Visit Information        Provider Department      2/23/2018 9:00 AM [facility] INFUSION CHAIR 5 [facility]        Today's Diagnoses     Cyclical vomiting with nausea, intractability of vomiting not specified    -  1    Chronic pancreatitis, unspecified pancreatitis type (H)        Intractable nausea and vomiting        Pain        Abdominal pain, epigastric        S/P ERCP        Acute abdominal pain        Abdominal pain        Hypoglycemia        Urge incontinence        Major depressive disorder, recurrent episode, moderate (H)        Anxiety state        Rash           Follow-ups after your visit        Your next 10 appointments already scheduled     Feb 23, 2018  1:00 PM CST   Infusion 120 with UC SPEC INFUSION, UC 42 ATC   [facility] ([facility])    [address]  [address]  [facility] [address] [phone]            Feb 26, 2018 12:00 PM CST   Infusion 120 with UC SPEC INFUSION, UC 50 ATC   [facility] ([facility])    [address]  [address]  [facility] [address] [phone]            Feb 27, 2018 10:00 AM CST   (Arrive by 9:45 AM)   Return Visit with ANABEL [facility] for [facility] ([facility])    [address]  4th [address]  [facility] [address] [phone]            Feb 28, 2018 12:00 PM CST   Infusion 120 with UC SPEC INFUSION, UC 45 ATC   [facility] ([facility])    [address]  [address]  [facility] [address] [phone]            Mar 02, 2018  1:00 PM CST   Infusion 120  with UC SPEC INFUSION, UC 41 ATC   [facility] ([facility])    [address]  [address]  [facility] [address] [phone]            Mar 19, 2018  7:30 AM CDT   (Arrive by 7:15 AM)   Return Visit with Quyen Baez MD   [facility] ([facility])    [address]  [address]  [facility] [address] [phone]              Who to contact     If you have questions or need follow up information about today's clinic visit or your schedule please contact [facility] directly at [phone].  Normal or non-critical lab and imaging results will be communicated to you by [email or web address], letter or phone within 4 business days after the clinic has received the results. If you do not hear from us within 7 days, please contact the clinic through [email or web address] or phone. If you have a critical or abnormal lab result, we will notify you by phone as soon as possible.  Submit refill requests through [employer] [employer] [employer] or call your pharmacy and they will forward the refill request to us. Please allow 3 business days for your refill to be completed.          Additional Information About Your Visit        [email or web address] Information     [employer] [employer] [employer] gives you secure access to your electronic health record. If you see a primary care provider, you can also send messages to your care team and make appointments. If you have questions, please call your primary care clinic.  If you do not have a primary care provider, please call [phone] and they will assist you.        Care EveryWhere ID     This is your Care EveryWhere ID. This could be used by other organizations to access your [facility] medical records  [id number]        Your Vitals Were     Pulse Temperature                94 97.7  F (36.5  C) (Tympanic)           Blood Pressure from Last 3 Encounters:   02/23/18 (!) 108/93   02/22/18  138/88   02/21/18 123/81    Weight from Last 3 Encounters:   02/14/18 66.1 kg (145 lb 12.8 oz)   02/12/18 67.6 kg (149 lb)   02/08/18 67.7 kg (149 lb 4 oz)              Today, you had the following     No orders found for display       Primary Care Provider Office Phone # Fax #    Quyen Baez [phone]       [address] [address]        Equal Access to Services     RACHAEL BENITEZ : Hadii aad ku hadasho Soomaali, waaxda luqadaha, qaybta kaalmada adeegyada, waxay eliseoin hayaan yann gaspar . So [facility] [phone].    ATENCIÓN: Si habla español, tiene a quijano disposición servicios gratuitos de asistencia lingüística. [facility] [phone].    We comply with applicable federal civil rights laws and [place] laws. We do not discriminate on the basis of race, color, national origin, age, disability, sex, sexual orientation, or gender identity.            Thank you!     Thank you for choosing [facility]  for your care. Our goal is always to provide you with excellent care. Hearing back from our patients is one way we can continue to improve our services. Please take a few minutes to complete the written survey that you may receive in the mail after your visit with us. Thank you!             Your Updated Medication List - Protect others around you: Learn how to safely use, store and throw away your medicines at www.disposemymeds.org.          This list is accurate as of 2/23/18 11:56 AM.  Always use your most recent med list.                   Brand Name Dispense Instructions for use Diagnosis    * amylase-lipase-protease 65721 UNITS Cpep    ZENPEP    180 capsule    Take 2-3 capsules (40,000-60,000 Units) by mouth Take with snacks or supplements (Snacks)    Idiopathic chronic pancreatitis (H)       * amylase-lipase-protease 36606 UNITS Cpep    ZENPEP    180 capsule    Take 5 capsules (100,000 Units) by mouth 4 times daily (with meals  and nightly)    Right upper quadrant abdominal pain       fluticasone 50 MCG/ACT spray    FLONASE    16 g    Spray 2 sprays into both nostrils daily    Nasal congestion       * gabapentin 250 MG/5ML solution    NEURONTIN    470 mL    8 mLs (400 mg) by ORAL OR NJ TUBE route 2 times daily    Abdominal pain, epigastric       * gabapentin 250 MG/5ML solution    NEURONTIN    450 mL    Take 12 mLs (600 mg) by mouth daily    Abdominal pain, epigastric       hydrOXYzine 10 MG/5ML syrup    ATARAX    473 mL    Take 12.5 mLs (25 mg) by mouth every 4 hours as needed for anxiety or other (pain)    Acute abdominal pain       leuprolide 11.25 MG kit    LUPRON DEPOT (3-MONTH)    1 each    Inject 11.25 mg into the muscle every 3 months    Endometriosis       levalbuterol 45 MCG/ACT Inhaler    XOPENEX HFA    1 Inhaler    Inhale 2 puffs into the lungs every 6 hours as needed for shortness of breath / dyspnea    Wheeze       menthol 5 % Ptch    ICY HOT    15 patch    Apply 1 patch topically every 8 hours as needed for muscle soreness    Abdominal pain, epigastric       multivitamins with minerals Liqd liquid     1 Bottle    15 mLs by Per Feeding Tube route daily    Abdominal pain, epigastric       norethindrone 5 MG tablet    AYGESTIN    90 tablet    Take 1 tablet (5 mg) by mouth daily    Endometriosis       nortriptyline 10 MG capsule    PAMELOR    120 capsule    Take 3 capsules (30 mg) by mouth At Bedtime    Right upper quadrant abdominal pain       ondansetron 4 MG ODT tab    ZOFRAN ODT    40 tablet    Take 1 tablet (4 mg) by mouth every 8 hours as needed for nausea or vomiting        order for DME     1 Units    Equipment being ordered: TENS with wire and electrode.    Chronic abdominal pain       * oxyCODONE 20 mg/mL (HIGH CONC) solution    ROXICODONE INTENSOL    13 mL    Take 0.5-0.75 mLs (10-15 mg) by mouth every 4 hours as needed for moderate to severe pain    Sphincter of Oddi dysfunction       * oxyCODONE 5 MG/5ML solution     ROXICODONE    840 mL    Take 10 mLs (10 mg) by mouth every 4 hours as needed for pain maximum 60 mL per day.  This is a 2 week supply.    Abdominal pain, generalized       pantoprazole 40 MG EC tablet    PROTONIX    30 tablet    Take 1 tablet (40 mg) by mouth 2 times daily (before meals)    Right upper quadrant abdominal pain, Erosive gastritis       polyethylene glycol Packet    MIRALAX/GLYCOLAX    7 packet    Take 17 g by mouth daily    Right upper quadrant abdominal pain       RIZATRIPTAN BENZOATE PO      Take 5 mg by mouth once as needed        scopolamine 72 hr patch    TRANSDERM    2 patch    Apply 1 patch to hairless area behind one ear if severe nausea and vomiting.  Remove old patch and change every 3 days (72 hours).    Intractable vomiting with nausea, unspecified vomiting type       senna-docusate 8.6-50 MG per tablet    SENOKOT-S;PERICOLACE    100 tablet    Take 1 tablet by mouth 2 times daily as needed for constipation    Right upper quadrant abdominal pain       sodium bicarbonate 325 MG tablet     200 tablet    1 tablet (325 mg) by Per Feeding Tube route 4 times daily    Abdominal pain, epigastric       * Notice:  This list has 6 medication(s) that are the same as other medications prescribed for you. Read the directions carefully, and ask your doctor or other care provider to review them with you.

## 2018-02-23 NOTE — PROGRESS NOTES
Grabiel called in with pain at her tube site after vomiting many times since Wednesday.   She is not able to tolerate her tube feeding. She was seen in the ED and told she may have a stomach flu. She will continue to monitor her symptoms, use the infusion center, zofran and pedialyte for hydration. She will call back Monday to let this RN know how she is doing. She will also f/u with PCP as recommended on her discharge paperwork.     Ella SARMIENTO RN Coordinator  Dr. Narayanan, Dr. Joe & Dr. Klein   Advanced Endoscopy  [phone]

## 2018-02-24 ENCOUNTER — HOSPITAL ENCOUNTER (EMERGENCY)
Facility: CLINIC | Age: 23
Discharge: HOME | End: 2018-02-24
Attending: EMERGENCY MEDICINE | Admitting: EMERGENCY MEDICINE
Payer: COMMERCIAL

## 2018-02-24 VITALS
DIASTOLIC BLOOD PRESSURE: 83 MMHG | WEIGHT: 147 LBS | SYSTOLIC BLOOD PRESSURE: 123 MMHG | BODY MASS INDEX: 23.63 KG/M2 | HEIGHT: 66 IN | HEART RATE: 103 BPM | OXYGEN SATURATION: 100 % | TEMPERATURE: 98.2 F

## 2018-02-24 DIAGNOSIS — R10.13 ABDOMINAL PAIN, EPIGASTRIC: ICD-10-CM

## 2018-02-24 DIAGNOSIS — R11.15 INTRACTABLE CYCLICAL VOMITING WITH NAUSEA: ICD-10-CM

## 2018-02-24 LAB
ALBUMIN SERPL-MCNC: 4.2 G/DL (ref 3.4–5)
ALBUMIN UR-MCNC: NEGATIVE MG/DL
ALP SERPL-CCNC: 95 U/L (ref 40–150)
ALT SERPL W P-5'-P-CCNC: 32 U/L (ref 0–50)
ANION GAP SERPL CALCULATED.3IONS-SCNC: 3 MMOL/L (ref 3–14)
APPEARANCE UR: CLEAR
AST SERPL W P-5'-P-CCNC: 25 U/L (ref 0–45)
BACTERIA #/AREA URNS HPF: ABNORMAL /HPF
BASOPHILS # BLD AUTO: 0 10E9/L (ref 0–0.2)
BASOPHILS NFR BLD AUTO: 0.5 %
BILIRUB SERPL-MCNC: 0.2 MG/DL (ref 0.2–1.3)
BILIRUB UR QL STRIP: NEGATIVE
BUN SERPL-MCNC: 9 MG/DL (ref 7–30)
CALCIUM SERPL-MCNC: 9.1 MG/DL (ref 8.5–10.1)
CHLORIDE SERPL-SCNC: 104 MMOL/L (ref 94–109)
CO2 SERPL-SCNC: 32 MMOL/L (ref 20–32)
COLOR UR AUTO: YELLOW
CREAT SERPL-MCNC: 0.69 MG/DL (ref 0.52–1.04)
DIFFERENTIAL METHOD BLD: ABNORMAL
EOSINOPHIL # BLD AUTO: 0.1 10E9/L (ref 0–0.7)
EOSINOPHIL NFR BLD AUTO: 1.8 %
ERYTHROCYTE [DISTWIDTH] IN BLOOD BY AUTOMATED COUNT: 12.7 % (ref 10–15)
FLUAV+FLUBV AG SPEC QL: NEGATIVE
FLUAV+FLUBV AG SPEC QL: NEGATIVE
GFR SERPL CREATININE-BSD FRML MDRD: >90 ML/MIN/1.7M2
GLUCOSE SERPL-MCNC: 116 MG/DL (ref 70–99)
GLUCOSE UR STRIP-MCNC: NEGATIVE MG/DL
HCT VFR BLD AUTO: 34.1 % (ref 35–47)
HGB BLD-MCNC: 11.7 G/DL (ref 11.7–15.7)
HGB UR QL STRIP: NEGATIVE
IMM GRANULOCYTES # BLD: 0 10E9/L (ref 0–0.4)
IMM GRANULOCYTES NFR BLD: 0.2 %
KETONES UR STRIP-MCNC: NEGATIVE MG/DL
LACTATE BLD-SCNC: 0.7 MMOL/L (ref 0.7–2)
LEUKOCYTE ESTERASE UR QL STRIP: NEGATIVE
LIPASE SERPL-CCNC: 72 U/L (ref 73–393)
LYMPHOCYTES # BLD AUTO: 1.5 10E9/L (ref 0.8–5.3)
LYMPHOCYTES NFR BLD AUTO: 34.2 %
MCH RBC QN AUTO: 30.6 PG (ref 26.5–33)
MCHC RBC AUTO-ENTMCNC: 34.3 G/DL (ref 31.5–36.5)
MCV RBC AUTO: 89 FL (ref 78–100)
MONOCYTES # BLD AUTO: 0.4 10E9/L (ref 0–1.3)
MONOCYTES NFR BLD AUTO: 8.2 %
MUCOUS THREADS #/AREA URNS LPF: PRESENT /LPF
NEUTROPHILS # BLD AUTO: 2.4 10E9/L (ref 1.6–8.3)
NEUTROPHILS NFR BLD AUTO: 55.1 %
NITRATE UR QL: NEGATIVE
NRBC # BLD AUTO: 0 10*3/UL
NRBC BLD AUTO-RTO: 0 /100
PH UR STRIP: 8 PH (ref 5–7)
PLATELET # BLD AUTO: 239 10E9/L (ref 150–450)
POTASSIUM SERPL-SCNC: 3.5 MMOL/L (ref 3.4–5.3)
PROT SERPL-MCNC: 7 G/DL (ref 6.8–8.8)
RBC # BLD AUTO: 3.82 10E12/L (ref 3.8–5.2)
RBC #/AREA URNS AUTO: 2 /HPF (ref 0–2)
SODIUM SERPL-SCNC: 139 MMOL/L (ref 133–144)
SOURCE: ABNORMAL
SP GR UR STRIP: 1.02 (ref 1–1.03)
SPECIMEN SOURCE: NORMAL
SQUAMOUS #/AREA URNS AUTO: <1 /HPF (ref 0–1)
UROBILINOGEN UR STRIP-MCNC: 0 MG/DL (ref 0–2)
WBC # BLD AUTO: 4.4 10E9/L (ref 4–11)
WBC #/AREA URNS AUTO: 4 /HPF (ref 0–2)

## 2018-02-24 PROCEDURE — 85025 COMPLETE CBC W/AUTO DIFF WBC: CPT | Performed by: EMERGENCY MEDICINE

## 2018-02-24 PROCEDURE — 96375 TX/PRO/DX INJ NEW DRUG ADDON: CPT

## 2018-02-24 PROCEDURE — 99285 EMERGENCY DEPT VISIT HI MDM: CPT | Mod: 25

## 2018-02-24 PROCEDURE — 96361 HYDRATE IV INFUSION ADD-ON: CPT

## 2018-02-24 PROCEDURE — 80053 COMPREHEN METABOLIC PANEL: CPT | Performed by: EMERGENCY MEDICINE

## 2018-02-24 PROCEDURE — 83605 ASSAY OF LACTIC ACID: CPT | Performed by: EMERGENCY MEDICINE

## 2018-02-24 PROCEDURE — 25000128 H RX IP 250 OP 636: Performed by: EMERGENCY MEDICINE

## 2018-02-24 PROCEDURE — 87804 INFLUENZA ASSAY W/OPTIC: CPT | Performed by: EMERGENCY MEDICINE

## 2018-02-24 PROCEDURE — 96374 THER/PROPH/DIAG INJ IV PUSH: CPT

## 2018-02-24 PROCEDURE — 25000128 H RX IP 250 OP 636

## 2018-02-24 PROCEDURE — 81001 URINALYSIS AUTO W/SCOPE: CPT | Performed by: EMERGENCY MEDICINE

## 2018-02-24 PROCEDURE — 83690 ASSAY OF LIPASE: CPT | Performed by: EMERGENCY MEDICINE

## 2018-02-24 RX ORDER — HEPARIN SODIUM 1000 [USP'U]/ML
1000 INJECTION, SOLUTION INTRAVENOUS; SUBCUTANEOUS ONCE
Status: DISCONTINUED | OUTPATIENT
Start: 2018-02-24 | End: 2018-02-24

## 2018-02-24 RX ORDER — DEXAMETHASONE SODIUM PHOSPHATE 10 MG/ML
6 INJECTION, SOLUTION INTRAMUSCULAR; INTRAVENOUS ONCE
Status: COMPLETED | OUTPATIENT
Start: 2018-02-24 | End: 2018-02-24

## 2018-02-24 RX ORDER — ONDANSETRON 2 MG/ML
INJECTION INTRAMUSCULAR; INTRAVENOUS
Status: COMPLETED
Start: 2018-02-24 | End: 2018-02-24

## 2018-02-24 RX ORDER — HEPARIN SODIUM 1000 [USP'U]/ML
5 INJECTION, SOLUTION INTRAVENOUS; SUBCUTANEOUS ONCE
Status: COMPLETED | OUTPATIENT
Start: 2018-02-24 | End: 2018-02-24

## 2018-02-24 RX ORDER — ONDANSETRON 2 MG/ML
8 INJECTION INTRAMUSCULAR; INTRAVENOUS ONCE
Status: COMPLETED | OUTPATIENT
Start: 2018-02-24 | End: 2018-02-24

## 2018-02-24 RX ORDER — LORAZEPAM 2 MG/ML
1 INJECTION INTRAMUSCULAR ONCE
Status: COMPLETED | OUTPATIENT
Start: 2018-02-24 | End: 2018-02-24

## 2018-02-24 RX ORDER — SODIUM CHLORIDE 9 MG/ML
1000 INJECTION, SOLUTION INTRAVENOUS CONTINUOUS
Status: DISCONTINUED | OUTPATIENT
Start: 2018-02-24 | End: 2018-02-24 | Stop reason: HOSPADM

## 2018-02-24 RX ORDER — HEPARIN SODIUM,PORCINE 10 UNIT/ML
5 VIAL (ML) INTRAVENOUS ONCE
Status: DISCONTINUED | OUTPATIENT
Start: 2018-02-24 | End: 2018-02-24 | Stop reason: HOSPADM

## 2018-02-24 RX ORDER — DIPHENHYDRAMINE HYDROCHLORIDE 50 MG/ML
25 INJECTION INTRAMUSCULAR; INTRAVENOUS ONCE
Status: COMPLETED | OUTPATIENT
Start: 2018-02-24 | End: 2018-02-24

## 2018-02-24 RX ADMIN — LORAZEPAM 1 MG: 2 INJECTION INTRAMUSCULAR; INTRAVENOUS at 16:24

## 2018-02-24 RX ADMIN — DIPHENHYDRAMINE HYDROCHLORIDE 25 MG: 50 INJECTION, SOLUTION INTRAMUSCULAR; INTRAVENOUS at 16:25

## 2018-02-24 RX ADMIN — SODIUM CHLORIDE 1000 ML: 9 INJECTION, SOLUTION INTRAVENOUS at 16:24

## 2018-02-24 RX ADMIN — ONDANSETRON 8 MG: 2 INJECTION INTRAMUSCULAR; INTRAVENOUS at 17:53

## 2018-02-24 RX ADMIN — HEPARIN SODIUM 5000 UNITS: 1000 INJECTION, SOLUTION INTRAVENOUS; SUBCUTANEOUS at 17:57

## 2018-02-24 RX ADMIN — DEXAMETHASONE SODIUM PHOSPHATE 6 MG: 10 INJECTION, SOLUTION INTRAMUSCULAR; INTRAVENOUS at 16:25

## 2018-02-24 ASSESSMENT — ENCOUNTER SYMPTOMS
POLYDIPSIA: 0
ABDOMINAL PAIN: 1
NECK STIFFNESS: 0
BLOOD IN STOOL: 0
FATIGUE: 0
TROUBLE SWALLOWING: 0
SHORTNESS OF BREATH: 0
ANAL BLEEDING: 0
CHILLS: 0
ACTIVITY CHANGE: 0
NUMBNESS: 0
COUGH: 0
CONSTIPATION: 0
UNEXPECTED WEIGHT CHANGE: 0
FEVER: 0
WHEEZING: 0
VOMITING: 1
APPETITE CHANGE: 0
SINUS PAIN: 0
SORE THROAT: 0
WEAKNESS: 0
DYSURIA: 0
POLYPHAGIA: 0
ABDOMINAL DISTENTION: 0
NECK PAIN: 0
DIZZINESS: 0
RECTAL PAIN: 0
NAUSEA: 1
BACK PAIN: 0
DIARRHEA: 1
RHINORRHEA: 0

## 2018-02-24 NOTE — ED AVS SNAPSHOT
[facility] Emergency Department    [address]    [facility] [address]    Phone:  [phone]    Fax:  [phone]                                       Alexandra Melgoza   MRN: [id number]    Department:  [facility] Emergency Department   Date of Visit:  2/24/2018           Patient Information     Date Of Birth          1995        Your diagnoses for this visit were:     Intractable cyclical vomiting with nausea     Abdominal pain, epigastric        You were seen by Raul Michele MD.      Follow-up Information     Follow up with Quyen Baez MD. Schedule an appointment as soon as possible for a visit in 2 days.    Specialty:  Internal Medicine    Why:  As needed, If symptoms worsen    Contact information:    [address] [id number] [phone]          Discharge Instructions         *Abdominal Pain, Unknown Cause (Female)    The exact cause of your abdominal (stomach) pain is not certain. This does not mean that this is something to worry about, or the right tests were not done. Everyone likes to know the exact cause of the problem, but sometimes with abdominal pain, there is no clear-cut cause, and this could be a good thing. The good news is that your symptoms can be treated, and you will feel better.   Your condition does not seem serious now; however, sometimes the signs of a serious problem may take more time to appear. For this reason, it is important for you to watch for any new symptoms, problems, or worsening of your condition.  Over the next few days, the abdominal pain may come and go, or be continuous. Other common symptoms can include nausea and vomiting. Sometimes it can be difficult to tell if you feel nauseous, you may just feel bad and not associate that feeling with nausea. Constipation, diarrhea, and a fever may go along with the pain.  The pain may continue even if treated correctly over the following days.  Depending on how things go, sometimes the cause can become clear and may require further or different treatment. Additional evaluations, medications, or tests may be needed.  Home care  Your health care provider may prescribe medications for pain, symptoms, or an infection.  Follow the health care provider's instructions for taking these medications.  General care    Rest until your next exam. No strenuous activities.    Try to find positions that ease discomfort. A small pillow placed on the abdomen may help relieve pain.    Something warm on your abdomen (such as a heating pad) may help, but be careful not to burn yourself.  Diet    Do not force yourself to eat, especially if having cramps, vomiting, or diarrhea.    Water is important so you do not get dehydrated. Soup may also be good. Sports drinks may also help, especially if they are not too acidic. Make sure you don't drink sugary drinks as this can make things worse. Take liquids in small amounts. Do not guzzle them.    Caffeine sometimes makes the pain and cramping worse.    Avoid dairy products if you have vomiting or diarrhea.    Don't eat large amounts at a time. Wait a few minutes between bites.    Eat a diet low in fiber (called a low-residue diet). Foods allowed include refined breads, white rice, fruit and vegetable juices without pulp, tender meats. These foods will pass more easily through the intestine.    Avoid fried or fatty foods, dairy, alcohol and spicy foods until your symptoms go away.  Follow-up care  Follow up with your health care provider as instructed, or if your pain does not begin to improve in the next 24 hours.  When to seek medical care  Seek prompt medical care if any of the following occur:    Pain gets worse or moves to the right lower abdomen    New or worsening vomiting or diarrhea    Swelling of the abdomen    Unable to pass stool for more than three days    New fever over 101  F (38.3 C), or rising fever    Blood in vomit  or bowel movements (dark red or black color)    Jaundice (yellow color of eyes and skin)    Weakness, dizziness    Chest, arm, back, neck or jaw pain    Unexpected vaginal bleeding or missed period  Call 911  Call emergency services if any of the following occur:    Trouble breathing    Confusion    Fainting or loss of consciousness    Rapid heart rate    Seizure    [id number] Dionne Nolas[employer], [address] [facility], [place] [address]. All rights reserved. This information is not intended as a substitute for professional medical care. Always follow your healthcare professional's instructions.          Controlling Nausea and Vomiting     Taken before meals, medicines can help ease nausea.    Nausea and vomiting are common side effects of chemotherapy and radiation therapy. Side effects happen when treatment changes some normal cells as well as cancer cells. In this case, the cells lining your stomach and the part of your brain that controls vomiting are affected.  Nausea is feeling that you need to throw up. Vomiting is when you actually do throw up. This is when your body forces food that is in your stomach out through your mouth.  Nausea and vomiting are common. They can be caused by many things. These include:    Stomach flu (gastroenteritis)    Food poisoning    Stomach pain (gastritis)    Blockages in the digestive system    Constipation    Infection    Anxiety and stress  They can also be caused by a head injury, an infection in the brain or inside the ear, or migraines. Other common causes of nausea and vomiting include:    Brain tumor    Brain bruise or injury    Motion sickness    Alcohol, pain medicines such as morphine, and cancer medicines (chemotherapy)    Certain medical treatments, such as radiation therapy    Poisonous things (toxins) such as plants or liquids that are swallowed by accident    Advanced types of cancer    Movement problems (psychogenic problems)  Extra pressure in the fluid that  surrounds the brain and spinal cord (elevated intracranial pressure)  Sometimes belly (abdominal) pain and cramps are experienced along with nausea and vomiting. The symptoms can be mild and go away by themselves. Other symptoms can be serious and must be treated.  When to seek medical advice  Nausea and vomiting can happen before, during, or after cancer treatments. But it can be controlled. Don t consider it a normal part of cancer and cancer treatment. If not managed, it can become serious. It can change the fluid and chemical balances in your body, and could even keep you from getting cancer treatment. Call your healthcare provider right away if any of the following occur:    You have nausea or vomiting that lasts 24 hours or more    You can t take your antiemetics, or they are not working    You have trouble keeping fluids down    You become dizzy, lightheaded, or confused    You have very dark urine or you stop urinating  Talk to your healthcare provider about your treatment and the nausea and vomiting management plan that's best for you. Be sure you know how and when to use antiemetics and when to call your provider.   Medicines can help  Nausea or vomiting can often be prevented or controlled with medicines called antiemetics. Your provider may give you antiemetics before or after treatment if you are getting chemotherapy or other treatments that cause nausea or vomiting. You may have to try different medicines or different combinations of medicines to get relief. But in nearly all cases, nausea and vomiting can be relieved.  Eating tips    If you have medicines to control nausea, take them before meals as directed.    Avoid fatty or greasy foods while nauseated.    Eat small meals slowly throughout the day.    Ask someone to sit with you while you eat to keep you from thinking about feeling nauseated.    Eat foods at room temperature or colder to avoid strong smells.    Eat dry foods, such as toast,  crackers, or pretzels. Also eat cool, light foods, such as applesauce, and bland foods, such as oatmeal or skinned chicken.    Try to keep taking in clear fluids in small sips, or as ice chips, gelatin, or ice pops.  Other ways to feel better    Get a little fresh air. Take a short walk.    Talk to a friend, listen to music, or watch TV.    Take a few deep, slow breaths.    Eat by candlelight or in surroundings that you find relaxing.    Use a method to help you relax, such as guided imagery. Imagine yourself in a beautiful, restful scene. Or daydream about the place you d most like to be.  Date Last Reviewed: 12/1/2016 2000-2017 [employer]. [address] [place] [address]. All rights reserved. This information is not intended as a substitute for professional medical care. Always follow your healthcare professional's instructions.          Future Appointments        Provider Department Dept Phone Center    2/26/2018 12:00 PM [facility] [phone] [facility]    2/27/2018 10:00 AM ANABEL [facility] [phone] [facility]    2/28/2018 12:00 PM [facility]; [facility] Procedure [phone] [facility]    3/2/2018 1:00 PM [facility]; [facility] [phone] [facility]    3/19/2018 7:30 AM Quyen Baez MD [facility] [phone] [facility]    3/19/2018 3:00 PM Quyen Lennon, PhD [facility] Management [phone] [facility]      24 Hour Appointment Hotline       To make an appointment at any [facility], call [phone] ([phone]). If you don't have a family doctor or clinic, we will help you find one. [facility] are conveniently located to  serve the needs of you and your family.             Review of your medicines      Our records show that you are taking the medicines listed below. If these are incorrect, please call your family doctor or clinic.        Dose / Directions Last dose taken    * amylase-lipase-protease 11798 UNITS Cpep   Commonly known as:  ZENPEP   Dose:  2-3 capsule   Quantity:  180 capsule        Take 2-3 capsules (40,000-60,000 Units) by mouth Take with snacks or supplements (Snacks)   Refills:  1        * amylase-lipase-protease 89503 UNITS Cpep   Commonly known as:  ZENPEP   Dose:  5 capsule   Quantity:  180 capsule        Take 5 capsules (100,000 Units) by mouth 4 times daily (with meals and nightly)   Refills:  1        fluticasone 50 MCG/ACT spray   Commonly known as:  FLONASE   Dose:  2 spray   Quantity:  16 g        Spray 2 sprays into both nostrils daily   Refills:  3        * gabapentin 250 MG/5ML solution   Commonly known as:  NEURONTIN   Dose:  400 mg   Quantity:  470 mL        8 mLs (400 mg) by ORAL OR NJ TUBE route 2 times daily   Refills:  0        * gabapentin 250 MG/5ML solution   Commonly known as:  NEURONTIN   Dose:  600 mg   Quantity:  450 mL        Take 12 mLs (600 mg) by mouth daily   Refills:  0        hydrOXYzine 10 MG/5ML syrup   Commonly known as:  ATARAX   Dose:  25 mg   Quantity:  473 mL        Take 12.5 mLs (25 mg) by mouth every 4 hours as needed for anxiety or other (pain)   Refills:  0        leuprolide 11.25 MG kit   Commonly known as:  LUPRON DEPOT (3-MONTH)   Dose:  11.25 mg   Quantity:  1 each        Inject 11.25 mg into the muscle every 3 months   Refills:  3        levalbuterol 45 MCG/ACT Inhaler   Commonly known as:  XOPENEX HFA   Dose:  2 puff   Quantity:  1 Inhaler        Inhale 2 puffs into the lungs every 6 hours as needed for shortness of breath / dyspnea   Refills:  1        menthol 5 % Ptch   Commonly known as:  ICY HOT   Dose:  1 patch   Quantity:  15 patch        Apply 1 patch topically  every 8 hours as needed for muscle soreness   Refills:  3        multivitamins with minerals Liqd liquid   Dose:  15 mL   Quantity:  1 Bottle        15 mLs by Per Feeding Tube route daily   Refills:  0        norethindrone 5 MG tablet   Commonly known as:  AYGESTIN   Dose:  5 mg   Quantity:  90 tablet        Take 1 tablet (5 mg) by mouth daily   Refills:  1        nortriptyline 10 MG capsule   Commonly known as:  PAMELOR   Dose:  30 mg   Quantity:  120 capsule        Take 3 capsules (30 mg) by mouth At Bedtime   Refills:  0        ondansetron 4 MG ODT tab   Commonly known as:  ZOFRAN ODT   Dose:  4 mg   Quantity:  40 tablet        Take 1 tablet (4 mg) by mouth every 8 hours as needed for nausea or vomiting   Refills:  3        order for DME   Quantity:  1 Units        Equipment being ordered: TENS with wire and electrode.   Refills:  0        * oxyCODONE 20 mg/mL (HIGH CONC) solution   Commonly known as:  ROXICODONE INTENSOL   Dose:  10-15 mg   Quantity:  13 mL        Take 0.5-0.75 mLs (10-15 mg) by mouth every 4 hours as needed for moderate to severe pain   Refills:  0        * oxyCODONE 5 MG/5ML solution   Commonly known as:  ROXICODONE   Dose:  10 mg   Quantity:  840 mL        Take 10 mLs (10 mg) by mouth every 4 hours as needed for pain maximum 60 mL per day.  This is a 2 week supply.   Refills:  0        pantoprazole 40 MG EC tablet   Commonly known as:  PROTONIX   Dose:  40 mg   Quantity:  30 tablet        Take 1 tablet (40 mg) by mouth 2 times daily (before meals)   Refills:  1        polyethylene glycol Packet   Commonly known as:  MIRALAX/GLYCOLAX   Dose:  17 g   Quantity:  7 packet        Take 17 g by mouth daily   Refills:  0        RIZATRIPTAN BENZOATE PO   Dose:  5 mg        Take 5 mg by mouth once as needed   Refills:  0        scopolamine 72 hr patch   Commonly known as:  TRANSDERM   Quantity:  2 patch        Apply 1 patch to hairless area behind one ear if severe nausea and vomiting.  Remove old  patch and change every 3 days (72 hours).   Refills:  0        senna-docusate 8.6-50 MG per tablet   Commonly known as:  SENOKOT-S;PERICOLACE   Dose:  1 tablet   Quantity:  100 tablet        Take 1 tablet by mouth 2 times daily as needed for constipation   Refills:  0        sodium bicarbonate 325 MG tablet   Dose:  325 mg   Quantity:  200 tablet        1 tablet (325 mg) by Per Feeding Tube route 4 times daily   Refills:  0        * Notice:  This list has 6 medication(s) that are the same as other medications prescribed for you. Read the directions carefully, and ask your doctor or other care provider to review them with you.            Procedures and tests performed during your visit     CBC with platelets differential    Comprehensive metabolic panel    Influenza A/B antigen    Lactic acid whole blood    Lipase    UA with Microscopic      Orders Needing Specimen Collection     None      Pending Results     No orders found from 2/22/2018 to 2/25/2018.            Pending Culture Results     No orders found from 2/22/2018 to 2/25/2018.            Pending Results Instructions     If you had any lab results that were not finalized at the time of your Discharge, you can call the ED Lab Result RN at [phone]. You will be contacted by this team for any positive Lab results or changes in treatment. The nurses are available 7 days a week from 10A to 6:30P.  You can leave a message 24 hours per day and they will return your call.        Test Results From Your Hospital Stay        2/24/2018  4:08 PM      Component Results     Component Value Ref Range & Units Status    WBC 4.4 4.0 - 11.0 10e9/L Final    RBC Count 3.82 3.8 - 5.2 10e12/L Final    Hemoglobin 11.7 11.7 - 15.7 g/dL Final    Hematocrit 34.1 (L) 35.0 - 47.0 % Final    MCV 89 78 - 100 fl Final    MCH 30.6 26.5 - 33.0 pg Final    MCHC 34.3 31.5 - 36.5 g/dL Final    RDW 12.7 10.0 - 15.0 % Final    Platelet Count 239 150 - 450 10e9/L Final    Diff Method Automated  Method  Final    % Neutrophils 55.1 % Final    % Lymphocytes 34.2 % Final    % Monocytes 8.2 % Final    % Eosinophils 1.8 % Final    % Basophils 0.5 % Final    % Immature Granulocytes 0.2 % Final    Nucleated RBCs 0 0 /100 Final    Absolute Neutrophil 2.4 1.6 - 8.3 10e9/L Final    Absolute Lymphocytes 1.5 0.8 - 5.3 10e9/L Final    Absolute Monocytes 0.4 0.0 - 1.3 10e9/L Final    Absolute Eosinophils 0.1 0.0 - 0.7 10e9/L Final    Absolute Basophils 0.0 0.0 - 0.2 10e9/L Final    Abs Immature Granulocytes 0.0 0 - 0.4 10e9/L Final    Absolute Nucleated RBC 0.0  Final         2/24/2018  4:26 PM      Component Results     Component Value Ref Range & Units Status    Sodium 139 133 - 144 mmol/L Final    Potassium 3.5 3.4 - 5.3 mmol/L Final    Chloride 104 94 - 109 mmol/L Final    Carbon Dioxide 32 20 - 32 mmol/L Final    Anion Gap 3 3 - 14 mmol/L Final    Glucose 116 (H) 70 - 99 mg/dL Final    Urea Nitrogen 9 7 - 30 mg/dL Final    Creatinine 0.69 0.52 - 1.04 mg/dL Final    GFR Estimate >90 >60 mL/min/1.7m2 Final    Non  GFR Calc    GFR Estimate If Black >90 >60 mL/min/1.7m2 Final    African American GFR Calc    Calcium 9.1 8.5 - 10.1 mg/dL Final    Bilirubin Total 0.2 0.2 - 1.3 mg/dL Final    Albumin 4.2 3.4 - 5.0 g/dL Final    Protein Total 7.0 6.8 - 8.8 g/dL Final    Alkaline Phosphatase 95 40 - 150 U/L Final    ALT 32 0 - 50 U/L Final    AST 25 0 - 45 U/L Final         2/24/2018  4:26 PM      Component Results     Component Value Ref Range & Units Status    Lipase 72 (L) 73 - 393 U/L Final         2/24/2018  4:13 PM      Component Results     Component Value Ref Range & Units Status    Lactic Acid 0.7 0.7 - 2.0 mmol/L Final         2/24/2018  4:04 PM      Component Results     Component Value Ref Range & Units Status    Color Urine Yellow  Final    Appearance Urine Clear  Final    Glucose Urine Negative NEG^Negative mg/dL Final    Bilirubin Urine Negative NEG^Negative Final    Ketones Urine Negative  NEG^Negative mg/dL Final    Specific Gravity Urine 1.018 1.003 - 1.035 Final    Blood Urine Negative NEG^Negative Final    pH Urine 8.0 (H) 5.0 - 7.0 pH Final    Protein Albumin Urine Negative NEG^Negative mg/dL Final    Urobilinogen mg/dL 0.0 0.0 - 2.0 mg/dL Final    Nitrite Urine Negative NEG^Negative Final    Leukocyte Esterase Urine Negative NEG^Negative Final    Source Midstream Urine  Final    WBC Urine 4 (H) 0 - 2 /HPF Final    RBC Urine 2 0 - 2 /HPF Final    Bacteria Urine Few (A) NEG^Negative /HPF Final    Squamous Epithelial /HPF Urine <1 0 - 1 /HPF Final    Mucous Urine Present (A) NEG^Negative /LPF Final         2/24/2018  5:14 PM      Component Results     Component Value Ref Range & Units Status    Influenza A/B Agn Specimen Nasopharyngeal  Final    Influenza A Negative NEG^Negative Final    Influenza B Negative NEG^Negative Final    Test results must be correlated with clinical data. If necessary, results   should be confirmed by a molecular assay or viral culture.                  Clinical Quality Measure: Blood Pressure Screening     Your blood pressure was checked while you were in the emergency department today. The last reading we obtained was  BP: 124/80 . Please read the guidelines below about what these numbers mean and what you should do about them.  If your systolic blood pressure (the top number) is less than 120 and your diastolic blood pressure (the bottom number) is less than 80, then your blood pressure is normal. There is nothing more that you need to do about it.  If your systolic blood pressure (the top number) is 120-139 or your diastolic blood pressure (the bottom number) is 80-89, your blood pressure may be higher than it should be. You should have your blood pressure rechecked within a year by a primary care provider.  If your systolic blood pressure (the top number) is 140 or greater or your diastolic blood pressure (the bottom number) is 90 or greater, you may have high blood  pressure. High blood pressure is treatable, but if left untreated over time it can put you at risk for heart attack, stroke, or kidney failure. You should have your blood pressure rechecked by a primary care provider within the next 4 weeks.  If your provider in the emergency department today gave you specific instructions to follow-up with your doctor or provider even sooner than that, you should follow that instruction and not wait for up to 4 weeks for your follow-up visit.        Thank you for choosing [facility]       Thank you for choosing [facility] for your care. Our goal is always to provide you with excellent care. Hearing back from our patients is one way we can continue to improve our services. Please take a few minutes to complete the written survey that you may receive in the mail after you visit with us. Thank you!        [employer] Information     [employer] gives you secure access to your electronic health record. If you see a primary care provider, you can also send messages to your care team and make appointments. If you have questions, please call your primary care clinic.  If you do not have a primary care provider, please call [phone] and they will assist you.        Care EveryWhere ID     This is your Care EveryWhere ID. This could be used by other organizations to access your [facility] medical records  [id number]        Equal Access to Services     RACHAEL BENITEZ : Bret Enamorado, alcon beck, lashell santillan, maki smallwood. So [facility] [phone].    ATENCIÓN: Si habla español, tiene a quijano disposición servicios gratuitos de asistencia lingüística. Llame al [phone].    We comply with applicable federal civil rights laws and [place] laws. We do not discriminate on the basis of race, color, national origin, age, disability, sex, sexual orientation, or gender identity.            After Visit Summary       This is your record. Keep this with  you and show to your community pharmacist(s) and doctor(s) at your next visit.

## 2018-02-24 NOTE — ED PROVIDER NOTES
"  History     Chief Complaint:  Nausea, Vomiting, & Diarrhea      HPI   Alexandra Melgoza is a 22 year old female who presents with chronic abdominal pain chronic vomiting some diarrhea nonbloody last 2 days feeling dehydrated with complicated social medical abdominal hx.  No bilious vomitus number of times multiple per day.  No weight loss.  Tolerates J feedings and pedialyte just that the feeds inducing nausea and vomiting but not the actual contents of j tube coming up by mouth.  Urinating.  No dysuria.  Pain is her chronic generalized.  Severe per her.  No discharge or redness around tube.  No jaudice.  No brusing no bleeding.  No fever.      Allergies:    Allergies   Allergen Reactions     Amoxicillin-Pot Clavulanate Nausea and Vomiting     Compazine [Prochlorperazine] Other (See Comments)     Dystonia       Hyoscyamine Other (See Comments)     Dystonia     Reglan [Metoclopramide Hcl] Other (See Comments)     Dystonia     Zyprexa Other (See Comments)     Sensitive, dystonic reaction on 11-9-2011     Amitriptyline Hcl Other (See Comments)     Dystonia, hallucinations     Buspirone Other (See Comments)     No Adverse Reactions, no benefit     Cogentin [Benztropine]      Cyproheptadine Other (See Comments)     Distonic     Dicyclomine Other (See Comments)     Droperidol Other (See Comments)     Feels tense and \"like she has to jump out of her skin\".       Effexor [Venlafaxine] Other (See Comments)     Dystonia     Food      Cilantro--lips/tongue swelling     No Clinical Screening - See Comments Other (See Comments)     Cilantro     Phenergan Dm [Promethazine-Dm] Other (See Comments)     dystonia     Promethazine Other (See Comments)     Risperidone Other (See Comments)     dystonia     Vistaril Other (See Comments)     Burning sensation.       Augmentin GI Disturbance     Ketamine Other (See Comments)     jittery     Sorbitol GI Disturbance     Headache and dyspepsia        Medications:        oxyCODONE (ROXICODONE) " 5 MG/5ML solution   nortriptyline (PAMELOR) 10 MG capsule   oxyCODONE (ROXICODONE INTENSOL) 20 mg/mL (HIGH CONC) solution   hydrOXYzine (ATARAX) 10 MG/5ML syrup   ondansetron (ZOFRAN ODT) 4 MG ODT tab   menthol (ICY HOT) 5 % PTCH   amylase-lipase-protease (ZENPEP) 29835 UNITS CPEP   multivitamins with minerals (CERTAVITE/CEROVITE) LIQD liquid   gabapentin (NEURONTIN) 250 MG/5ML solution   gabapentin (NEURONTIN) 250 MG/5ML solution   sodium bicarbonate 325 MG tablet   order for DME   scopolamine (TRANSDERM) 72 hr patch   amylase-lipase-protease (ZENPEP) 33654 UNITS CPEP   senna-docusate (SENOKOT-S;PERICOLACE) 8.6-50 MG per tablet   pantoprazole (PROTONIX) 40 MG EC tablet   polyethylene glycol (MIRALAX/GLYCOLAX) Packet   norethindrone (AYGESTIN) 5 MG tablet   fluticasone (FLONASE) 50 MCG/ACT spray   levalbuterol (XOPENEX HFA) 45 MCG/ACT Inhaler   leuprolide (LUPRON DEPOT) 11.25 MG injection   RIZATRIPTAN BENZOATE PO       Problem List:      Patient Active Problem List    Diagnosis Date Noted     Intractable nausea and vomiting 01/20/2018     Priority: Medium     Pain 01/18/2018     Priority: Medium     Abdominal pain, epigastric 01/18/2018     Priority: Medium     S/P ERCP 01/18/2018     Priority: Medium     Acute abdominal pain 01/03/2018     Priority: Medium     Abdominal pain 12/06/2017     Priority: Medium     Hypoglycemia 05/21/2015     Priority: Medium     Myalgia and myositis 10/30/2014     Priority: Medium     Problem list name updated by automated process. Provider to review       Urge incontinence 10/30/2014     Priority: Medium     Major depressive disorder, recurrent episode, moderate (H) 06/24/2014     Priority: Medium     Anxiety state 06/24/2014     Priority: Medium     Problem list name updated by automated process. Provider to review       Rash 12/19/2013     Priority: Medium     Major depressive disorder, recurrent episode, mild (H) 12/09/2013     Priority: Medium     Eating disorder 11/27/2013      Priority: Medium     Problem list name updated by automated process. Provider to review       Asthma 07/16/2013     Priority: Medium     Problem list name updated by automated process. 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       Encounter for long-term opiate analgesic use 02/08/2012     Priority: Medium     Opioid dependence (H) 01/17/2012     Priority: Medium     Off all but QOD tramadol since Feb 2014       PROVISIONAL DX: Somatoform disorder 12/05/2011     Priority: Medium     Chronic abdominal pain 08/18/2011     Priority: Medium     (Problem list name updated by automated process. Provider to review and confirm.)          Past Medical History:      Past Medical History:   Diagnosis Date     Anxiety      Asthma      Cholecystitis      Chronic abdominal pain      Chronic infection      Chronic pain      Cyclic vomiting syndrome 10/27/2012     Depression      Endometriosis      Hypoglycaemia      Migraines      Mild intermittent asthma      Ovarian cysts      Pancreatic disease      PONV (postoperative nausea and vomiting)      Pseudoseizures      Somatoform disorder      Sphincter of Oddi dysfunction      Vasovagal syncope        Past Surgical History:      Past Surgical History:    Procedure Laterality Date     ABDOMEN SURGERY      ERCP, biliary stents     CHOLECYSTECTOMY  8/2/11     COLONOSCOPY  2011    negative finding     ENDOSCOPIC RETROGRADE CHOLANGIOPANCREATOGRAM  8/23/2011    Procedure:ENDOSCOPIC RETROGRADE CHOLANGIOPANCREATOGRAM; Endoscopic Retrograde Cholangiopancreatogram; Surgeon:CAMPBELL NARAYANAN; Location:[facility]     ENDOSCOPIC RETROGRADE CHOLANGIOPANCREATOGRAM  5/17/2012    Procedure:ENDOSCOPIC RETROGRADE CHOLANGIOPANCREATOGRAM; Endoscopic Retrograde Cholangiopancreatogram with pancreatic stent placement.; Surgeon:CAMPBELL NARAYANAN; Location:[facility]     ENDOSCOPIC RETROGRADE CHOLANGIOPANCREATOGRAM N/A 1/18/2018    Procedure: COMBINED ENDOSCOPIC RETROGRADE CHOLANGIOPANCREATOGRAPHY, PLACE TUBE/STENT;  Endoscopic Retrograde Cholangiopancreatography with nasojejunal feeding tube placement;  Surgeon: Campbell Narayanan MD;  Location: [facility]     ENDOSCOPIC RETROGRADE CHOLANGIOPANCREATOGRAM COMPLEX  1/3/2012    Procedure:ENDOSCOPIC RETROGRADE CHOLANGIOPANCREATOGRAM COMPLEX; Endoscopic Retrograde Cholangiopancreatogram with Manometry bile duct sphincterotomy extention pancreatic duct sphincterotomy pancreatic duct stent placement; Surgeon:CAMPBELL NARAYANAN; Location:[facility]     ENDOSCOPIC ULTRASOUND UPPER GASTROINTESTINAL TRACT (GI) N/A 6/9/2015    Procedure: ENDOSCOPIC ULTRASOUND, ESOPHAGOSCOPY / UPPER GASTROINTESTINAL TRACT (GI);  Surgeon: Mario Joe MD;  Location: [facility]     ENDOSCOPIC ULTRASOUND UPPER GASTROINTESTINAL TRACT (GI) N/A 12/12/2016    Procedure: ENDOSCOPIC ULTRASOUND, ESOPHAGOSCOPY / UPPER GASTROINTESTINAL TRACT (GI);  Surgeon: Guru Jose Klein MD;  Location: [facility]     ESOPHAGOSCOPY, GASTROSCOPY, DUODENOSCOPY (EGD), COMBINED  1/18/2012    Procedure:COMBINED ESOPHAGOSCOPY, GASTROSCOPY, DUODENOSCOPY (EGD); Surgeon:ARNIE ESPINOZA; [facility]     ESOPHAGOSCOPY, GASTROSCOPY, DUODENOSCOPY (EGD), COMBINED  1/18/2012     Procedure:COMBINED ESOPHAGOSCOPY, GASTROSCOPY, DUODENOSCOPY (EGD); EGD; Surgeon:ARNIE ESPINOZA; Location[facility]     ESOPHAGOSCOPY, GASTROSCOPY, DUODENOSCOPY (EGD), COMBINED N/A 12/9/2017    Procedure: COMBINED ESOPHAGOSCOPY, GASTROSCOPY, DUODENOSCOPY (EGD);;  Surgeon: Josias Chan MD;  Location: [facility]     INSERT PORT VASCULAR ACCESS       L knee arthroscopy  2009     LAPAROSCOPY DIAGNOSTIC (GYN)  10/26/2012    Procedure: LAPAROSCOPY DIAGNOSTIC (GYN);  LAPAROSCOPY DIAGNOSTIC, CAUTERY ENDOMETRIOISIS and biopsy of Fallopian tube lesions;  Surgeon: Carla Lopez MD;  Location:  OR     ORTHOPEDIC SURGERY  2008    knee     REMOVE AND REPLACE BREAST IMPLANT PROSTHESIS N/A 2/8/2018    Procedure: PERCUTANEOUS INSERTION TUBE JEJUNOSTOMY;  upper endoscopy with percutaneous gastrojejunostimy feeding tuble placement and gastropexy;  Surgeon: Campbell Narayanan MD;  Location: [facility]     VASCULAR SURGERY         Family History:      Family History   Problem Relation Age of Onset     Hypertension Father      Bipolar Disorder Other      Anxiety Disorder Other      Depression Paternal Grandmother      Allergies Maternal Grandmother      CEREBROVASCULAR DISEASE Maternal Grandfather      Cardiovascular Maternal Grandfather      Depression/Anxiety Maternal Grandfather      GASTROINTESTINAL DISEASE Maternal Grandfather      DIABETES Paternal Uncle      Hypoglycemia Brother      CANCER No family hx of      No family history of skin cancer       Social History:    Marital Status:  Single [1]  Social History   Substance Use Topics     Smoking status: Never Smoker     Smokeless tobacco: Never Used     Alcohol use No        Review of Systems   Constitutional: Negative for activity change, appetite change, chills, fatigue, fever and unexpected weight change.   HENT: Negative for rhinorrhea, sinus pain, sneezing, sore throat and trouble swallowing.    Respiratory: Negative for cough, shortness of breath and wheezing.    Cardiovascular:  "Negative for chest pain and leg swelling.   Gastrointestinal: Positive for abdominal pain, diarrhea, nausea and vomiting. Negative for abdominal distention, anal bleeding, blood in stool, constipation and rectal pain.   Endocrine: Negative for polydipsia, polyphagia and polyuria.   Genitourinary: Negative for dysuria.   Musculoskeletal: Negative for back pain, neck pain and neck stiffness.   Skin: Negative for pallor and rash.   Neurological: Negative for dizziness, syncope, weakness and numbness.         Physical Exam   First Vitals:  BP: 124/82  Pulse: 103  Temp: 98.2  F (36.8  C)  Height: 167.6 cm (5' 6\")  Weight: 66.7 kg (147 lb)  SpO2: 100 %      Physical Exam  Constitutional: Patient is well appearing. No distress.  No dehydrated appearing.  Pain out proportion to exam.    Head: Atraumatic.  Mouth/Throat: Oropharynx is clear and moist. No oropharyngeal exudate.  Eyes: Conjunctivae and EOM are normal. No scleral icterus.  Neck: Normal range of motion. Neck supple.   Cardiovascular: Normal rate, regular rhythm, normal heart sounds and intact distal pulses.   Pulmonary/Chest: Breath sounds normal. No respiratory distress.  Abdominal: Soft. Bowel sounds are normal. No distension. No tenderness. No rebound or guarding. All when distracted.  GJ tube intacted.  Looks great.     Musculoskeletal: Normal range of motion. No edema or tenderness.   Neurological: Alert and orientated to person, place, and time. No observable focal neuro deficit  Skin: Warm and dry. No rash noted. Not diaphoretic.     Emergency Department Course   Per EMR reports reviewed.    Impression & Plan             Medical Decision Making:  Chronic hx of ongoing sx in no distress to signify medical or surgical emrgency.  Really just wanted reassurance with mom.  Please review EMR for extensive hx.  By my review she has not had lab abnl ever that I can see.   Extensive repeat workup not a single lab abnl here.  No ketosis or change in spec gr to " represent any dehydration.  Tube functional.  Appears in chronic states.      Diagnosis:    ICD-10-CM    1. Intractable cyclical vomiting with nausea G43.A1    2. Abdominal pain, epigastric R10.13        Disposition:  discharged to home    Discharge Medications:  New Prescriptions    No medications on file         Raul Michele  2/24/2018   [facility] EMERGENCY DEPARTMENT       Raul Michele MD  02/24/18 1725

## 2018-02-24 NOTE — ED NOTES
Pt presents to ER for N/V/D for the last three days.  Pt has a J/G tube that was placed approx 2 weeks ago for chronic pancreatitis.  C/O pain at tube side, right flank.

## 2018-02-24 NOTE — ED AVS SNAPSHOT
[facility]    [address]    [facility] [address]    Phone:  [phone]    Fax:  [phone]                                       Alexandra Melgoza   MRN: [id number]    Department:  [facility]   Date of Visit:  2/24/2018           After Visit Summary Signature Page     I have received my discharge instructions, and my questions have been answered. I have discussed any challenges I see with this plan with the nurse or doctor.    ..........................................................................................................................................  Patient/Patient Representative Signature      ..........................................................................................................................................  Patient Representative Print Name and Relationship to Patient    ..................................................               ................................................  Date                                            Time    ..........................................................................................................................................  Reviewed by Signature/Title    ...................................................              ..............................................  Date                                                            Time

## 2018-02-24 NOTE — DISCHARGE INSTRUCTIONS
*Abdominal Pain, Unknown Cause (Female)    The exact cause of your abdominal (stomach) pain is not certain. This does not mean that this is something to worry about, or the right tests were not done. Everyone likes to know the exact cause of the problem, but sometimes with abdominal pain, there is no clear-cut cause, and this could be a good thing. The good news is that your symptoms can be treated, and you will feel better.   Your condition does not seem serious now; however, sometimes the signs of a serious problem may take more time to appear. For this reason, it is important for you to watch for any new symptoms, problems, or worsening of your condition.  Over the next few days, the abdominal pain may come and go, or be continuous. Other common symptoms can include nausea and vomiting. Sometimes it can be difficult to tell if you feel nauseous, you may just feel bad and not associate that feeling with nausea. Constipation, diarrhea, and a fever may go along with the pain.  The pain may continue even if treated correctly over the following days. Depending on how things go, sometimes the cause can become clear and may require further or different treatment. Additional evaluations, medications, or tests may be needed.  Home care  Your health care provider may prescribe medications for pain, symptoms, or an infection.  Follow the health care provider's instructions for taking these medications.  General care    Rest until your next exam. No strenuous activities.    Try to find positions that ease discomfort. A small pillow placed on the abdomen may help relieve pain.    Something warm on your abdomen (such as a heating pad) may help, but be careful not to burn yourself.  Diet    Do not force yourself to eat, especially if having cramps, vomiting, or diarrhea.    Water is important so you do not get dehydrated. Soup may also be good. Sports drinks may also help, especially if they are not too acidic. Make sure you  don't drink sugary drinks as this can make things worse. Take liquids in small amounts. Do not guzzle them.    Caffeine sometimes makes the pain and cramping worse.    Avoid dairy products if you have vomiting or diarrhea.    Don't eat large amounts at a time. Wait a few minutes between bites.    Eat a diet low in fiber (called a low-residue diet). Foods allowed include refined breads, white rice, fruit and vegetable juices without pulp, tender meats. These foods will pass more easily through the intestine.    Avoid fried or fatty foods, dairy, alcohol and spicy foods until your symptoms go away.  Follow-up care  Follow up with your health care provider as instructed, or if your pain does not begin to improve in the next 24 hours.  When to seek medical care  Seek prompt medical care if any of the following occur:    Pain gets worse or moves to the right lower abdomen    New or worsening vomiting or diarrhea    Swelling of the abdomen    Unable to pass stool for more than three days    New fever over 101  F (38.3 C), or rising fever    Blood in vomit or bowel movements (dark red or black color)    Jaundice (yellow color of eyes and skin)    Weakness, dizziness    Chest, arm, back, neck or jaw pain    Unexpected vaginal bleeding or missed period  Call 911  Call emergency services if any of the following occur:    Trouble breathing    Confusion    Fainting or loss of consciousness    Rapid heart rate    Seizure    [id number] [facility], [address] [place] [address], [place] [address]. All rights reserved. This information is not intended as a substitute for professional medical care. Always follow your healthcare professional's instructions.          Controlling Nausea and Vomiting     Taken before meals, medicines can help ease nausea.    Nausea and vomiting are common side effects of chemotherapy and radiation therapy. Side effects happen when treatment changes some normal cells as well as cancer cells. In this case,  the cells lining your stomach and the part of your brain that controls vomiting are affected.  Nausea is feeling that you need to throw up. Vomiting is when you actually do throw up. This is when your body forces food that is in your stomach out through your mouth.  Nausea and vomiting are common. They can be caused by many things. These include:    Stomach flu (gastroenteritis)    Food poisoning    Stomach pain (gastritis)    Blockages in the digestive system    Constipation    Infection    Anxiety and stress  They can also be caused by a head injury, an infection in the brain or inside the ear, or migraines. Other common causes of nausea and vomiting include:    Brain tumor    Brain bruise or injury    Motion sickness    Alcohol, pain medicines such as morphine, and cancer medicines (chemotherapy)    Certain medical treatments, such as radiation therapy    Poisonous things (toxins) such as plants or liquids that are swallowed by accident    Advanced types of cancer    Movement problems (psychogenic problems)  Extra pressure in the fluid that surrounds the brain and spinal cord (elevated intracranial pressure)  Sometimes belly (abdominal) pain and cramps are experienced along with nausea and vomiting. The symptoms can be mild and go away by themselves. Other symptoms can be serious and must be treated.  When to seek medical advice  Nausea and vomiting can happen before, during, or after cancer treatments. But it can be controlled. Don t consider it a normal part of cancer and cancer treatment. If not managed, it can become serious. It can change the fluid and chemical balances in your body, and could even keep you from getting cancer treatment. Call your healthcare provider right away if any of the following occur:    You have nausea or vomiting that lasts 24 hours or more    You can t take your antiemetics, or they are not working    You have trouble keeping fluids down    You become dizzy, lightheaded, or  confused    You have very dark urine or you stop urinating  Talk to your healthcare provider about your treatment and the nausea and vomiting management plan that's best for you. Be sure you know how and when to use antiemetics and when to call your provider.   Medicines can help  Nausea or vomiting can often be prevented or controlled with medicines called antiemetics. Your provider may give you antiemetics before or after treatment if you are getting chemotherapy or other treatments that cause nausea or vomiting. You may have to try different medicines or different combinations of medicines to get relief. But in nearly all cases, nausea and vomiting can be relieved.  Eating tips    If you have medicines to control nausea, take them before meals as directed.    Avoid fatty or greasy foods while nauseated.    Eat small meals slowly throughout the day.    Ask someone to sit with you while you eat to keep you from thinking about feeling nauseated.    Eat foods at room temperature or colder to avoid strong smells.    Eat dry foods, such as toast, crackers, or pretzels. Also eat cool, light foods, such as applesauce, and bland foods, such as oatmeal or skinned chicken.    Try to keep taking in clear fluids in small sips, or as ice chips, gelatin, or ice pops.  Other ways to feel better    Get a little fresh air. Take a short walk.    Talk to a friend, listen to music, or watch TV.    Take a few deep, slow breaths.    Eat by candlelight or in surroundings that you find relaxing.    Use a method to help you relax, such as guided imagery. Imagine yourself in a beautiful, restful scene. Or daydream about the place you d most like to be.  Date Last Reviewed: 12/1/2016 2000-2017 [employer]. [address] [place] [address], [place] [address]. All rights reserved. This information is not intended as a substitute for professional medical care. Always follow your healthcare professional's instructions.

## 2018-02-25 ENCOUNTER — TELEPHONE (OUTPATIENT)
Dept: GASTROENTEROLOGY | Facility: CLINIC | Age: 23
End: 2018-02-25

## 2018-02-25 ENCOUNTER — HOSPITAL ENCOUNTER (OUTPATIENT)
Facility: CLINIC | Age: 23
Setting detail: OBSERVATION
Discharge: HOME | End: 2018-02-27
Attending: EMERGENCY MEDICINE | Admitting: INTERNAL MEDICINE
Payer: COMMERCIAL

## 2018-02-25 DIAGNOSIS — R11.15 INTRACTABLE CYCLICAL VOMITING WITH NAUSEA: ICD-10-CM

## 2018-02-25 DIAGNOSIS — G89.29 CHRONIC ABDOMINAL PAIN: Primary | Chronic | ICD-10-CM

## 2018-02-25 DIAGNOSIS — R19.7 VOMITING AND DIARRHEA: ICD-10-CM

## 2018-02-25 DIAGNOSIS — R10.84 ABDOMINAL PAIN, GENERALIZED: ICD-10-CM

## 2018-02-25 DIAGNOSIS — R10.13 ABDOMINAL PAIN, EPIGASTRIC: ICD-10-CM

## 2018-02-25 DIAGNOSIS — R10.9 ACUTE ABDOMINAL PAIN: ICD-10-CM

## 2018-02-25 DIAGNOSIS — R10.10 PAIN OF UPPER ABDOMEN: ICD-10-CM

## 2018-02-25 DIAGNOSIS — R10.9 CHRONIC ABDOMINAL PAIN: Primary | Chronic | ICD-10-CM

## 2018-02-25 DIAGNOSIS — R11.10 VOMITING AND DIARRHEA: ICD-10-CM

## 2018-02-25 LAB
ALBUMIN SERPL-MCNC: 4.4 G/DL (ref 3.4–5)
ALBUMIN UR-MCNC: NEGATIVE MG/DL
ALP SERPL-CCNC: 93 U/L (ref 40–150)
ALT SERPL W P-5'-P-CCNC: 26 U/L (ref 0–50)
AMPHETAMINES UR QL SCN: NEGATIVE
ANION GAP SERPL CALCULATED.3IONS-SCNC: 10 MMOL/L (ref 3–14)
APPEARANCE UR: CLEAR
AST SERPL W P-5'-P-CCNC: 21 U/L (ref 0–45)
BARBITURATES UR QL: NEGATIVE
BASOPHILS # BLD AUTO: 0 10E9/L (ref 0–0.2)
BASOPHILS NFR BLD AUTO: 0.1 %
BENZODIAZ UR QL: NEGATIVE
BILIRUB SERPL-MCNC: 0.4 MG/DL (ref 0.2–1.3)
BILIRUB UR QL STRIP: NEGATIVE
BUN SERPL-MCNC: 6 MG/DL (ref 7–30)
CALCIUM SERPL-MCNC: 9 MG/DL (ref 8.5–10.1)
CANNABINOIDS UR QL SCN: NEGATIVE
CHLORIDE SERPL-SCNC: 107 MMOL/L (ref 94–109)
CO2 SERPL-SCNC: 25 MMOL/L (ref 20–32)
COCAINE UR QL: NEGATIVE
COLOR UR AUTO: ABNORMAL
CREAT SERPL-MCNC: 0.64 MG/DL (ref 0.52–1.04)
DIFFERENTIAL METHOD BLD: NORMAL
EOSINOPHIL # BLD AUTO: 0 10E9/L (ref 0–0.7)
EOSINOPHIL NFR BLD AUTO: 0.1 %
ERYTHROCYTE [DISTWIDTH] IN BLOOD BY AUTOMATED COUNT: 13 % (ref 10–15)
ETHANOL UR QL SCN: NEGATIVE
GFR SERPL CREATININE-BSD FRML MDRD: >90 ML/MIN/1.7M2
GLUCOSE SERPL-MCNC: 82 MG/DL (ref 70–99)
GLUCOSE UR STRIP-MCNC: NEGATIVE MG/DL
HCG SERPL QL: NEGATIVE
HCT VFR BLD AUTO: 35.1 % (ref 35–47)
HGB BLD-MCNC: 11.9 G/DL (ref 11.7–15.7)
HGB UR QL STRIP: NEGATIVE
IMM GRANULOCYTES # BLD: 0 10E9/L (ref 0–0.4)
IMM GRANULOCYTES NFR BLD: 0.1 %
KETONES UR STRIP-MCNC: NEGATIVE MG/DL
LACTATE BLD-SCNC: 0.7 MMOL/L (ref 0.7–2)
LEUKOCYTE ESTERASE UR QL STRIP: NEGATIVE
LIPASE SERPL-CCNC: 67 U/L (ref 73–393)
LYMPHOCYTES # BLD AUTO: 1.8 10E9/L (ref 0.8–5.3)
LYMPHOCYTES NFR BLD AUTO: 23.1 %
MAGNESIUM SERPL-MCNC: 2.1 MG/DL (ref 1.6–2.3)
MCH RBC QN AUTO: 30.5 PG (ref 26.5–33)
MCHC RBC AUTO-ENTMCNC: 33.9 G/DL (ref 31.5–36.5)
MCV RBC AUTO: 90 FL (ref 78–100)
MONOCYTES # BLD AUTO: 0.6 10E9/L (ref 0–1.3)
MONOCYTES NFR BLD AUTO: 7.9 %
MUCOUS THREADS #/AREA URNS LPF: PRESENT /LPF
NEUTROPHILS # BLD AUTO: 5.5 10E9/L (ref 1.6–8.3)
NEUTROPHILS NFR BLD AUTO: 68.7 %
NITRATE UR QL: NEGATIVE
NRBC # BLD AUTO: 0 10*3/UL
NRBC BLD AUTO-RTO: 0 /100
OPIATES UR QL SCN: NEGATIVE
PH UR STRIP: 7.5 PH (ref 5–7)
PHOSPHATE SERPL-MCNC: 3.2 MG/DL (ref 2.5–4.5)
PLATELET # BLD AUTO: 272 10E9/L (ref 150–450)
POTASSIUM SERPL-SCNC: 3.3 MMOL/L (ref 3.4–5.3)
PROT SERPL-MCNC: 7.4 G/DL (ref 6.8–8.8)
RBC # BLD AUTO: 3.9 10E12/L (ref 3.8–5.2)
RBC #/AREA URNS AUTO: 0 /HPF (ref 0–2)
SODIUM SERPL-SCNC: 142 MMOL/L (ref 133–144)
SOURCE: ABNORMAL
SP GR UR STRIP: 1.01 (ref 1–1.03)
SQUAMOUS #/AREA URNS AUTO: 1 /HPF (ref 0–1)
TRANS CELLS #/AREA URNS HPF: <1 /HPF (ref 0–1)
UROBILINOGEN UR STRIP-MCNC: NORMAL MG/DL (ref 0–2)
WBC # BLD AUTO: 8 10E9/L (ref 4–11)
WBC #/AREA URNS AUTO: <1 /HPF (ref 0–2)

## 2018-02-25 PROCEDURE — 81003 URINALYSIS AUTO W/O SCOPE: CPT | Performed by: EMERGENCY MEDICINE

## 2018-02-25 PROCEDURE — 96366 THER/PROPH/DIAG IV INF ADDON: CPT | Performed by: EMERGENCY MEDICINE

## 2018-02-25 PROCEDURE — 80307 DRUG TEST PRSMV CHEM ANLYZR: CPT | Performed by: EMERGENCY MEDICINE

## 2018-02-25 PROCEDURE — 96366 THER/PROPH/DIAG IV INF ADDON: CPT

## 2018-02-25 PROCEDURE — 99220 ZZC INITIAL OBSERVATION CARE,LEVL III: CPT | Mod: AI | Performed by: INTERNAL MEDICINE

## 2018-02-25 PROCEDURE — 25000128 H RX IP 250 OP 636: Performed by: PHYSICIAN ASSISTANT

## 2018-02-25 PROCEDURE — 85025 COMPLETE CBC W/AUTO DIFF WBC: CPT | Performed by: EMERGENCY MEDICINE

## 2018-02-25 PROCEDURE — 83735 ASSAY OF MAGNESIUM: CPT | Performed by: PHYSICIAN ASSISTANT

## 2018-02-25 PROCEDURE — 25000128 H RX IP 250 OP 636: Performed by: EMERGENCY MEDICINE

## 2018-02-25 PROCEDURE — 80320 DRUG SCREEN QUANTALCOHOLS: CPT | Performed by: EMERGENCY MEDICINE

## 2018-02-25 PROCEDURE — 80053 COMPREHEN METABOLIC PANEL: CPT | Performed by: EMERGENCY MEDICINE

## 2018-02-25 PROCEDURE — 84703 CHORIONIC GONADOTROPIN ASSAY: CPT | Performed by: EMERGENCY MEDICINE

## 2018-02-25 PROCEDURE — 96375 TX/PRO/DX INJ NEW DRUG ADDON: CPT

## 2018-02-25 PROCEDURE — 99285 EMERGENCY DEPT VISIT HI MDM: CPT | Mod: Z6 | Performed by: EMERGENCY MEDICINE

## 2018-02-25 PROCEDURE — 96375 TX/PRO/DX INJ NEW DRUG ADDON: CPT | Performed by: EMERGENCY MEDICINE

## 2018-02-25 PROCEDURE — 96365 THER/PROPH/DIAG IV INF INIT: CPT | Performed by: EMERGENCY MEDICINE

## 2018-02-25 PROCEDURE — 25000128 H RX IP 250 OP 636: Performed by: INTERNAL MEDICINE

## 2018-02-25 PROCEDURE — 84100 ASSAY OF PHOSPHORUS: CPT | Performed by: EMERGENCY MEDICINE

## 2018-02-25 PROCEDURE — 25000132 ZZH RX MED GY IP 250 OP 250 PS 637: Performed by: PHYSICIAN ASSISTANT

## 2018-02-25 PROCEDURE — 99285 EMERGENCY DEPT VISIT HI MDM: CPT | Mod: 25 | Performed by: EMERGENCY MEDICINE

## 2018-02-25 PROCEDURE — 83605 ASSAY OF LACTIC ACID: CPT | Performed by: EMERGENCY MEDICINE

## 2018-02-25 PROCEDURE — 96361 HYDRATE IV INFUSION ADD-ON: CPT

## 2018-02-25 PROCEDURE — 83735 ASSAY OF MAGNESIUM: CPT | Performed by: EMERGENCY MEDICINE

## 2018-02-25 PROCEDURE — 99207 ZZC APP CREDIT; MD BILLING SHARED VISIT: CPT | Performed by: PHYSICIAN ASSISTANT

## 2018-02-25 PROCEDURE — 96361 HYDRATE IV INFUSION ADD-ON: CPT | Performed by: EMERGENCY MEDICINE

## 2018-02-25 PROCEDURE — 83690 ASSAY OF LIPASE: CPT | Performed by: EMERGENCY MEDICINE

## 2018-02-25 PROCEDURE — 25000125 ZZHC RX 250: Performed by: PHYSICIAN ASSISTANT

## 2018-02-25 PROCEDURE — G0378 HOSPITAL OBSERVATION PER HR: HCPCS

## 2018-02-25 PROCEDURE — 96376 TX/PRO/DX INJ SAME DRUG ADON: CPT | Performed by: EMERGENCY MEDICINE

## 2018-02-25 RX ORDER — MAGNESIUM SULFATE HEPTAHYDRATE 40 MG/ML
4 INJECTION, SOLUTION INTRAVENOUS EVERY 4 HOURS PRN
Status: DISCONTINUED | OUTPATIENT
Start: 2018-02-25 | End: 2018-02-27 | Stop reason: HOSPADM

## 2018-02-25 RX ORDER — ONDANSETRON 4 MG/1
4 TABLET, ORALLY DISINTEGRATING ORAL EVERY 6 HOURS PRN
Status: DISCONTINUED | OUTPATIENT
Start: 2018-02-25 | End: 2018-02-27 | Stop reason: HOSPADM

## 2018-02-25 RX ORDER — POTASSIUM CHLORIDE 29.8 MG/ML
20 INJECTION INTRAVENOUS
Status: DISCONTINUED | OUTPATIENT
Start: 2018-02-25 | End: 2018-02-27 | Stop reason: HOSPADM

## 2018-02-25 RX ORDER — ACETAMINOPHEN 650 MG/1
650 SUPPOSITORY RECTAL EVERY 4 HOURS PRN
Status: DISCONTINUED | OUTPATIENT
Start: 2018-02-25 | End: 2018-02-27 | Stop reason: HOSPADM

## 2018-02-25 RX ORDER — POTASSIUM CHLORIDE 1.5 G/1.58G
20-40 POWDER, FOR SOLUTION ORAL
Status: DISCONTINUED | OUTPATIENT
Start: 2018-02-25 | End: 2018-02-27 | Stop reason: HOSPADM

## 2018-02-25 RX ORDER — LIDOCAINE 4 G/G
1 PATCH TOPICAL
Status: DISCONTINUED | OUTPATIENT
Start: 2018-02-25 | End: 2018-02-27 | Stop reason: HOSPADM

## 2018-02-25 RX ORDER — POTASSIUM CHLORIDE 7.45 MG/ML
10 INJECTION INTRAVENOUS CONTINUOUS
Status: DISCONTINUED | OUTPATIENT
Start: 2018-02-25 | End: 2018-02-26

## 2018-02-25 RX ORDER — POTASSIUM CHLORIDE 750 MG/1
20-40 TABLET, EXTENDED RELEASE ORAL
Status: DISCONTINUED | OUTPATIENT
Start: 2018-02-25 | End: 2018-02-27 | Stop reason: HOSPADM

## 2018-02-25 RX ORDER — LEVALBUTEROL TARTRATE 45 UG/1
2 AEROSOL, METERED ORAL EVERY 6 HOURS PRN
Status: DISCONTINUED | OUTPATIENT
Start: 2018-02-25 | End: 2018-02-25

## 2018-02-25 RX ORDER — LIDOCAINE 40 MG/G
CREAM TOPICAL
Status: DISCONTINUED | OUTPATIENT
Start: 2018-02-25 | End: 2018-02-27 | Stop reason: HOSPADM

## 2018-02-25 RX ORDER — ONDANSETRON 2 MG/ML
4 INJECTION INTRAMUSCULAR; INTRAVENOUS EVERY 6 HOURS PRN
Status: DISCONTINUED | OUTPATIENT
Start: 2018-02-25 | End: 2018-02-27 | Stop reason: HOSPADM

## 2018-02-25 RX ORDER — SCOLOPAMINE TRANSDERMAL SYSTEM 1 MG/1
1 PATCH, EXTENDED RELEASE TRANSDERMAL
Status: DISCONTINUED | OUTPATIENT
Start: 2018-02-25 | End: 2018-02-27 | Stop reason: HOSPADM

## 2018-02-25 RX ORDER — HYDROMORPHONE HYDROCHLORIDE 1 MG/ML
0.5 INJECTION, SOLUTION INTRAMUSCULAR; INTRAVENOUS; SUBCUTANEOUS
Status: DISCONTINUED | OUTPATIENT
Start: 2018-02-25 | End: 2018-02-25

## 2018-02-25 RX ORDER — NALOXONE HYDROCHLORIDE 0.4 MG/ML
.1-.4 INJECTION, SOLUTION INTRAMUSCULAR; INTRAVENOUS; SUBCUTANEOUS
Status: DISCONTINUED | OUTPATIENT
Start: 2018-02-25 | End: 2018-02-27 | Stop reason: HOSPADM

## 2018-02-25 RX ORDER — ONDANSETRON 2 MG/ML
4 INJECTION INTRAMUSCULAR; INTRAVENOUS EVERY 30 MIN PRN
Status: COMPLETED | OUTPATIENT
Start: 2018-02-25 | End: 2018-02-25

## 2018-02-25 RX ORDER — OXYCODONE HYDROCHLORIDE 100 MG/5ML
10 SOLUTION ORAL EVERY 4 HOURS PRN
Status: DISCONTINUED | OUTPATIENT
Start: 2018-02-25 | End: 2018-02-27 | Stop reason: HOSPADM

## 2018-02-25 RX ORDER — DIPHENHYDRAMINE HYDROCHLORIDE 50 MG/ML
50 INJECTION INTRAMUSCULAR; INTRAVENOUS ONCE
Status: COMPLETED | OUTPATIENT
Start: 2018-02-25 | End: 2018-02-25

## 2018-02-25 RX ORDER — SODIUM CHLORIDE, SODIUM LACTATE, POTASSIUM CHLORIDE, CALCIUM CHLORIDE 600; 310; 30; 20 MG/100ML; MG/100ML; MG/100ML; MG/100ML
INJECTION, SOLUTION INTRAVENOUS CONTINUOUS
Status: DISCONTINUED | OUTPATIENT
Start: 2018-02-25 | End: 2018-02-26

## 2018-02-25 RX ORDER — POTASSIUM CL/LIDO/0.9 % NACL 10MEQ/0.1L
10 INTRAVENOUS SOLUTION, PIGGYBACK (ML) INTRAVENOUS
Status: DISCONTINUED | OUTPATIENT
Start: 2018-02-25 | End: 2018-02-27 | Stop reason: HOSPADM

## 2018-02-25 RX ORDER — POTASSIUM CHLORIDE 7.45 MG/ML
10 INJECTION INTRAVENOUS
Status: DISCONTINUED | OUTPATIENT
Start: 2018-02-25 | End: 2018-02-27 | Stop reason: HOSPADM

## 2018-02-25 RX ADMIN — Medication 0.5 MG: at 15:59

## 2018-02-25 RX ADMIN — OXYCODONE HYDROCHLORIDE 10 MG: 100 SOLUTION ORAL at 20:40

## 2018-02-25 RX ADMIN — SCOPALAMINE 1 PATCH: 1 PATCH, EXTENDED RELEASE TRANSDERMAL at 20:32

## 2018-02-25 RX ADMIN — ONDANSETRON 4 MG: 2 INJECTION INTRAMUSCULAR; INTRAVENOUS at 16:00

## 2018-02-25 RX ADMIN — DIPHENHYDRAMINE HYDROCHLORIDE 50 MG: 50 INJECTION, SOLUTION INTRAMUSCULAR; INTRAVENOUS at 20:26

## 2018-02-25 RX ADMIN — POTASSIUM CHLORIDE 10 MEQ: 7.46 INJECTION, SOLUTION INTRAVENOUS at 17:40

## 2018-02-25 RX ADMIN — Medication 0.5 MG: at 17:13

## 2018-02-25 RX ADMIN — ONDANSETRON 4 MG: 2 INJECTION INTRAMUSCULAR; INTRAVENOUS at 18:49

## 2018-02-25 RX ADMIN — SODIUM CHLORIDE, POTASSIUM CHLORIDE, SODIUM LACTATE AND CALCIUM CHLORIDE: 600; 310; 30; 20 INJECTION, SOLUTION INTRAVENOUS at 20:26

## 2018-02-25 RX ADMIN — Medication 10 MEQ: at 22:22

## 2018-02-25 RX ADMIN — ONDANSETRON 4 MG: 2 INJECTION INTRAMUSCULAR; INTRAVENOUS at 17:12

## 2018-02-25 RX ADMIN — LIDOCAINE 1 PATCH: 560 PATCH PERCUTANEOUS; TOPICAL; TRANSDERMAL at 20:34

## 2018-02-25 RX ADMIN — SODIUM CHLORIDE 1000 ML: 9 INJECTION, SOLUTION INTRAVENOUS at 15:59

## 2018-02-25 RX ADMIN — SODIUM CHLORIDE 1000 ML: 9 INJECTION, SOLUTION INTRAVENOUS at 17:40

## 2018-02-25 RX ADMIN — Medication 10 MEQ: at 21:10

## 2018-02-25 ASSESSMENT — PAIN DESCRIPTION - DESCRIPTORS: DESCRIPTORS: SHARP;STABBING

## 2018-02-25 NOTE — ED PROVIDER NOTES
"  History     Chief Complaint   Patient presents with     Nausea, Vomiting, & Diarrhea     HPI  Alexandra Melgoza is a 22 year old female w/ PMH including pancreatitis, endometriosis, chronic abdominal pain, who presents with abdominal pain. Patient has associated vomiting diarrhea as well. Symptoms within the last four days. She reports that she was recently at her father and stepmother's house, and that they both had \"stomach flu\". The other family members had 2-3 days of symptoms. Patient has had approximately 10+ episodes of vomiting and diarrhea each day for the past 4 days. She has been able to take all of her medications and some tube feeds through her gastrojejunal port. Pain is primarily through the upper abdomen, is constant with occasional short-lived exacerbations that increase the pain. Emesis and diarrhea are both nonbloody, not black. Patient states that she does feeds through the gastrojejunal port, takes only clears by mouth which she has been vomiting up. She has not been using much tube feed due to it exacerbating her nausea/vomiting.     No fever, no dysuria, no urinary urgency/frequency. LMP 2 years ago. Patient was also seen and discharged from [facility] yesterday after evaluation, and 2 days before that.     This part of the medical record was transcribed by Keli Strauss, Medical Scribe, from a dictation done by Duane Garcia MD.       I have reviewed the Medications, Allergies, Past Medical and Surgical History, and Social History in the [facility] system.  PAST MEDICAL HISTORY:   Past Medical History:   Diagnosis Date     Anxiety      Asthma      Cholecystitis     s/p cholecystectomy     Chronic abdominal pain      Chronic infection     mrsa     Chronic pain      Cyclic vomiting syndrome 10/27/2012     Depression      Endometriosis      Hypoglycaemia      Migraines      Mild intermittent asthma      Ovarian cysts      Pancreatic disease      PONV (postoperative nausea and vomiting)      " Pseudoseizures      Somatoform disorder      Sphincter of Oddi dysfunction      Vasovagal syncope        PAST SURGICAL HISTORY:   Past Surgical History:   Procedure Laterality Date     ABDOMEN SURGERY      ERCP, biliary stents     CHOLECYSTECTOMY  8/2/11     COLONOSCOPY  2011    negative finding     ENDOSCOPIC RETROGRADE CHOLANGIOPANCREATOGRAM  8/23/2011    Procedure:ENDOSCOPIC RETROGRADE CHOLANGIOPANCREATOGRAM; Endoscopic Retrograde Cholangiopancreatogram; Surgeon:SHORTY NARAYANAN; Location:[facility]     ENDOSCOPIC RETROGRADE CHOLANGIOPANCREATOGRAM  5/17/2012    Procedure:ENDOSCOPIC RETROGRADE CHOLANGIOPANCREATOGRAM; Endoscopic Retrograde Cholangiopancreatogram with pancreatic stent placement.; Surgeon:SHORTY NARAYANAN; Location:[facility]     ENDOSCOPIC RETROGRADE CHOLANGIOPANCREATOGRAM N/A 1/18/2018    Procedure: COMBINED ENDOSCOPIC RETROGRADE CHOLANGIOPANCREATOGRAPHY, PLACE TUBE/STENT;  Endoscopic Retrograde Cholangiopancreatography with nasojejunal feeding tube placement;  Surgeon: Shorty Narayanan MD;  Location: [facility]     ENDOSCOPIC RETROGRADE CHOLANGIOPANCREATOGRAM COMPLEX  1/3/2012    Procedure:ENDOSCOPIC RETROGRADE CHOLANGIOPANCREATOGRAM COMPLEX; Endoscopic Retrograde Cholangiopancreatogram with Manometry bile duct sphincterotomy extention pancreatic duct sphincterotomy pancreatic duct stent placement; Surgeon:SHORTY NARAYANAN; Location:[facility]     ENDOSCOPIC ULTRASOUND UPPER GASTROINTESTINAL TRACT (GI) N/A 6/9/2015    Procedure: ENDOSCOPIC ULTRASOUND, ESOPHAGOSCOPY / UPPER GASTROINTESTINAL TRACT (GI);  Surgeon: Mario Joe MD;  Location: [facility] OR     ENDOSCOPIC ULTRASOUND UPPER GASTROINTESTINAL TRACT (GI) N/A 12/12/2016    Procedure: ENDOSCOPIC ULTRASOUND, ESOPHAGOSCOPY / UPPER GASTROINTESTINAL TRACT (GI);  Surgeon: Guru Jose Klein MD;  Location: [facility] OR     ESOPHAGOSCOPY, GASTROSCOPY, DUODENOSCOPY (EGD), COMBINED  1/18/2012    Procedure:COMBINED ESOPHAGOSCOPY,  GASTROSCOPY, DUODENOSCOPY (EGD); Surgeon:ARNIE ESPINOZA; Location:[facility]     ESOPHAGOSCOPY, GASTROSCOPY, DUODENOSCOPY (EGD), COMBINED  1/18/2012    Procedure:COMBINED ESOPHAGOSCOPY, GASTROSCOPY, DUODENOSCOPY (EGD); EGD; Surgeon:ARNIE ESPINOZA; Location[facility]     ESOPHAGOSCOPY, GASTROSCOPY, DUODENOSCOPY (EGD), COMBINED N/A 12/9/2017    Procedure: COMBINED ESOPHAGOSCOPY, GASTROSCOPY, DUODENOSCOPY (EGD);;  Surgeon: Josias Chan MD;  Location: [facility]     INSERT PORT VASCULAR ACCESS       L knee arthroscopy  2009     LAPAROSCOPY DIAGNOSTIC (GYN)  10/26/2012    Procedure: LAPAROSCOPY DIAGNOSTIC (GYN);  LAPAROSCOPY DIAGNOSTIC, CAUTERY ENDOMETRIOISIS and biopsy of Fallopian tube lesions;  Surgeon: Carla Lopez MD;  Location:  OR     ORTHOPEDIC SURGERY  2008    knee     REMOVE AND REPLACE BREAST IMPLANT PROSTHESIS N/A 2/8/2018    Procedure: PERCUTANEOUS INSERTION TUBE JEJUNOSTOMY;  upper endoscopy with percutaneous gastrojejunostimy feeding tuble placement and gastropexy;  Surgeon: Campbell Narayanan MD;  Location: [facility]     VASCULAR SURGERY         FAMILY HISTORY:   Family History   Problem Relation Age of Onset     Hypertension Father      Bipolar Disorder Other      Anxiety Disorder Other      Depression Paternal Grandmother      Allergies Maternal Grandmother      CEREBROVASCULAR DISEASE Maternal Grandfather      Cardiovascular Maternal Grandfather      Depression/Anxiety Maternal Grandfather      GASTROINTESTINAL DISEASE Maternal Grandfather      DIABETES Paternal Uncle      Hypoglycemia Brother      CANCER No family hx of      No family history of skin cancer       SOCIAL HISTORY:   Social History   Substance Use Topics     Smoking status: Never Smoker     Smokeless tobacco: Never Used     Alcohol use No     Current Facility-Administered Medications   Medication     ondansetron (ZOFRAN) injection 4 mg     HYDROmorphone (PF) (DILAUDID) injection 0.5 mg     0.9% sodium chloride BOLUS     potassium chloride 10 mEq  "in 100 mL sterile water intermittent infusion (premix)     Current Outpatient Prescriptions   Medication     oxyCODONE (ROXICODONE) 5 MG/5ML solution     nortriptyline (PAMELOR) 10 MG capsule     oxyCODONE (ROXICODONE INTENSOL) 20 mg/mL (HIGH CONC) solution     hydrOXYzine (ATARAX) 10 MG/5ML syrup     ondansetron (ZOFRAN ODT) 4 MG ODT tab     menthol (ICY HOT) 5 % PTCH     amylase-lipase-protease (ZENPEP) 91008 UNITS CPEP     multivitamins with minerals (CERTAVITE/CEROVITE) LIQD liquid     gabapentin (NEURONTIN) 250 MG/5ML solution     gabapentin (NEURONTIN) 250 MG/5ML solution     sodium bicarbonate 325 MG tablet     order for DME     scopolamine (TRANSDERM) 72 hr patch     amylase-lipase-protease (ZENPEP) 52055 UNITS CPEP     senna-docusate (SENOKOT-S;PERICOLACE) 8.6-50 MG per tablet     pantoprazole (PROTONIX) 40 MG EC tablet     polyethylene glycol (MIRALAX/GLYCOLAX) Packet     norethindrone (AYGESTIN) 5 MG tablet     fluticasone (FLONASE) 50 MCG/ACT spray     levalbuterol (XOPENEX HFA) 45 MCG/ACT Inhaler     leuprolide (LUPRON DEPOT) 11.25 MG injection     RIZATRIPTAN BENZOATE PO        Allergies   Allergen Reactions     Amoxicillin-Pot Clavulanate Nausea and Vomiting     Compazine [Prochlorperazine] Other (See Comments)     Dystonia       Hyoscyamine Other (See Comments)     Dystonia     Reglan [Metoclopramide Hcl] Other (See Comments)     Dystonia     Zyprexa Other (See Comments)     Sensitive, dystonic reaction on 11-9-2011     Amitriptyline Hcl Other (See Comments)     Dystonia, hallucinations     Buspirone Other (See Comments)     No Adverse Reactions, no benefit     Cogentin [Benztropine]      Cyproheptadine Other (See Comments)     Distonic     Dicyclomine Other (See Comments)     Droperidol Other (See Comments)     Feels tense and \"like she has to jump out of her skin\".       Effexor [Venlafaxine] Other (See Comments)     Dystonia     Food      Cilantro--lips/tongue swelling     No Clinical Screening - " See Comments Other (See Comments)     Cilantro     Phenergan Dm [Promethazine-Dm] Other (See Comments)     dystonia     Promethazine Other (See Comments)     Risperidone Other (See Comments)     dystonia     Vistaril Other (See Comments)     Burning sensation.       Augmentin GI Disturbance     Ketamine Other (See Comments)     jittery     Sorbitol GI Disturbance     Headache and dyspepsia         Review of Systems  A complete 14-system review of systems was completed with pertinent positives and negatives noted in the HPI, otherwise negative.     Physical Exam   BP: 127/80  Heart Rate: 116  Temp: 98.3  F (36.8  C)  Resp: 20  Weight: 63.9 kg (140 lb 12.8 oz)  SpO2: 99 %      Physical Exam  /80  Temp 98.3  F (36.8  C) (Oral)  Resp 20  Wt 63.9 kg (140 lb 12.8 oz)  SpO2 99%  BMI 22.73 kg/m2  General: well-appearing, no acute distress, mildly uncomfortable appearing  HENT: MMM, no oropharyngeal lesions  Eyes: PERRL, normal sclerae, no conjunctival pallor  Neck: non-tender, supple  Cardio: Regular rhythm, mildly tachycardic rate, normal heart sounds, extremities well perfused  Resp: Normal work of breathing, clear breath sounds  Chest/Back: no visual signs of trauma, no CVA tenderness  Abdomen: mild LUQ and RUQ tenderness, non-distended, no rebound, no guarding. GJ port in place with non-erythematous, non-tender surrounding skin.   Neuro: alert and fully oriented. CN II-XII grossly intact. Grossly normal strength and sensation in all extremities.   MSK: no deformities.   Integumentary/Skin: no rash, normal color  Psych: anxious affect, normal behavior    ED Course     ED Course     Procedures        Critical Care time:  none     Labs Ordered and Resulted from Time of ED Arrival Up to the Time of Departure from the ED   COMPREHENSIVE METABOLIC PANEL - Abnormal; Notable for the following:        Result Value    Potassium 3.3 (*)     Urea Nitrogen 6 (*)     All other components within normal limits   LIPASE -  "Abnormal; Notable for the following:     Lipase 67 (*)     All other components within normal limits   CBC WITH PLATELETS DIFFERENTIAL   LACTIC ACID WHOLE BLOOD   UA MACROSCOPIC WITH REFLEX TO MICRO AND CULTURE   HCG QUALITATIVE   PERIPHERAL IV CATHETER            Assessments & Plan (with Medical Decision Making)   In the ED, the patient was afebrile, initially mildly tachycardic in the 1 teens but otherwise hemogram stableafebrile and hemodynamically stable. History notable for 4 days of vomiting and diarrhea with ill contacts who had short-lived gastrointestinal similar symptoms. Exam notable for mild upper abdominal pain in the left upper and right upper quadrants. Patient reports prior cholecystectomy. No right lower quadrant tenderness to suggest appendicitis. History strongly suggestive of viral or foodborne gastroenteritis. Labs notable for mild hypokalemia, normal WBC, Hgb at baseline, normal lactate and lipase. The patient was given normal saline bolus along with hydromorphone and ondansetron for symptom control with improvement upon reevaluation.    After counseling on the diagnosis, work-up, and treatment plan, discussed disposition with the patient and family. Noted that it would be safe to discharge home. Patient's grandparents feel uncomfortable having her at home with her current symptoms and feel it is \"too traumatic\" to come back and forth. They strongly prefer admission. Patient has been to the ED now 3 times in the past 4 days, failing outpatient management. Plan admission to medicine.       Clinical Impression:   Vomiting and diarrhea  Abdominal pain, acute on chronic    Duane Garcia MD  Emergency Medicine   This part of the medical record was transcribed by Keli Strauss, Medical Scribe, from a dictation done by Duane Garcia MD.       I have reviewed the nursing notes.    I have reviewed the findings, diagnosis, plan and need for follow up with the patient.    New Prescriptions    No " medications on file       Final diagnoses:   Vomiting and diarrhea   Pain of upper abdomen       2/25/2018   [facility] [place], EMERGENCY DEPARTMENT     Duane Garcia MD  02/25/18 2115

## 2018-02-25 NOTE — IP AVS SNAPSHOT
[address] Observation [address] [address]    Phone:  [phone]    Fax:  [phone]                                       After Visit Summary   2/25/2018    Alexandra Melgoza    MRN: [id number]           After Visit Summary Signature Page     I have received my discharge instructions, and my questions have been answered. I have discussed any challenges I see with this plan with the nurse or doctor.    ..........................................................................................................................................  Patient/Patient Representative Signature      ..........................................................................................................................................  Patient Representative Print Name and Relationship to Patient    ..................................................               ................................................  Date                                            Time    ..........................................................................................................................................  Reviewed by Signature/Title    ...................................................              ..............................................  Date                                                            Time

## 2018-02-25 NOTE — IP AVS SNAPSHOT
MRN:[id number]                      After Visit Summary   2/25/2018    Alexandra Melgoza    MRN: [id number]           Thank you!     Thank you for choosing [facility] for your care. Our goal is always to provide you with excellent care. Hearing back from our patients is one way we can continue to improve our services. Please take a few minutes to complete the written survey that you may receive in the mail after you visit with us. Thank you!        Patient Information     Date Of Birth          1995        About your hospital stay     You were admitted on:  February 25, 2018 You last received care in the:  Unit 6D Observation [facility]    You were discharged on:  February 27, 2018        Reason for your hospital stay       Treatment of worsening nausea and abd pain.                  Who to Call     For medical emergencies, please call 911.  For non-urgent questions about your medical care, please call your primary care provider or clinic, [phone]          Attending Provider     Provider Specialty    Duane Garcia MD Emergency Medicine    [facility], Magali BENDER MD Emergency Medicine    Nadeen Mcrae MD Internal Medicine       Primary Care Provider Office Phone # Fax #    Quyen Baez [phone]      After Care Instructions     Activity       Your activity upon discharge: activity as tolerated            Diet       Follow this diet upon discharge: Orders Placed This Encounter      Adult Formula Drip Feeding: Continuous Peptamen 1.5; Jejunostomy; Goal Rate: 50; mL/hr; Medication - Tube Feeding Flush Frequency: At least 15-30 mL water before and after medication administration and with tube clogging; Amout to Send (Nutrition ...      Clear Liquid Diet                  Follow-up Appointments     Follow Up and recommended labs and tests       Follow up with your family physician later this week or next week for hospital follow up and further evaluation  of your nausea and abd pain.                  Your next 10 appointments already scheduled     Feb 28, 2018 12:00 PM CST   Infusion 120 with UC SPEC INFUSION, UC 45 ATC   [facility]    [address]  [address]  [facility] [address]   [phone]            Mar 02, 2018  1:00 PM CST   Infusion 120 with UC SPEC INFUSION, UC 41 ATC   [facility] ([facility]    [address] [place] [address]  [address]  [facility] [address]   [phone]            Mar 05, 2018 10:20 AM CST   (Arrive by 10:05 AM)   Return Visit with ANABEL Falcon CNP   [facility] ([facility]    [address] [place] [id number] [phone]            Mar 19, 2018  7:30 AM CDT   (Arrive by 7:15 AM)   Return Visit with Quyen Baez MD   [facility] ([facility]    [address] [place] [id number] [phone]            Mar 19, 2018  3:00 PM CDT   (Arrive by 2:45 PM)   New Patient Visit with Quyen Lennon, PhD   [facility] ([facility]    [address] [place] [id number] [phone]              Additional Services     Home infusion referral       Your provider has referred you to:    [facility]  Phone [phone]  Fax  [phone]  __________________    Local Address (if different from home address): N/A    Anticipated Length of Therapy: per MD    Resume previous enteral feeding orders:  Peptamen 1.5 at 50 ml/hr (1200 ml/day) to provide 1800 kcals (26 kcal/kg/day), 82 g PRO (1.2 g/kg/day), 924 ml free H2O, 67 g Fat (70% from MCTs), 226 g CHO and no Fiber daily.  - Flush FT with 60 ml H2O q 4 hrs for tube  patency    Home Infusion Pharmacist to adjust therapy based on labs and clinical assessments: Yes    Agency Staff to assess nursing needs for Infusion Therapy.                  Further instructions from your care team       [facility]  Phone [phone]  Fax  [phone]  __________________    Pending Results     Date and Time Order Name Status Description    2/26/2018 0535 EKG 12-lead, tracing only Preliminary             Statement of Approval     Ordered          02/27/18 1524  I have reviewed and agree with all the recommendations and orders detailed in this document.  EFFECTIVE NOW     Approved and electronically signed by:  Herberth Kerns PA-C             Admission Information     Date & Time Provider Department Dept. Phone    2/25/2018 Nadeen Mcrae MD Unit 6D Observation [facility] [phone]      Your Vitals Were     Blood Pressure Pulse Temperature Respirations Weight Pulse Oximetry    119/88 (BP Location: Right arm) 95 99  F (37.2  C) (Oral) 16 63.9 kg (140 lb 12.8 oz) 100%    BMI (Body Mass Index)                   22.73 kg/m2           [facility] Information     [facility] gives you secure access to your electronic health record. If you see a primary care provider, you can also send messages to your care team and make appointments. If you have questions, please call your primary care clinic.  If you do not have a primary care provider, please call [phone] and they will assist you.        Care EveryWhere ID     This is your Care EveryWhere ID. This could be used by other organizations to access your [facility] medical records  [id number]        Equal Access to Services     [facility] ELI : Hadii angelina ku hadasho Sokathleenali, waaxda luqadaha, qaybta kaalmada adeegyada, maki smallwood. So [facility] [phone].    ATENCIÓN: Si habla español, tiene a quijano disposición servicios gratuitos de asistencia lingüística. Llame al [phone].    We comply with  applicable federal civil rights laws and [place] laws. We do not discriminate on the basis of race, color, national origin, age, disability, sex, sexual orientation, or gender identity.               Review of your medicines      START taking        Dose / Directions    acetaminophen 32 mg/mL solution   Commonly known as:  TYLENOL        Dose:  975 mg   30.45 mLs (975 mg) by Per J Tube route every 8 hours   Refills:  0         CONTINUE these medicines which may have CHANGED, or have new prescriptions. If we are uncertain of the size of tablets/capsules you have at home, strength may be listed as something that might have changed.        Dose / Directions    gabapentin 250 MG/5ML solution   Commonly known as:  NEURONTIN   This may have changed:  when to take this   Used for:  Abdominal pain, epigastric        Dose:  400 mg   8 mLs (400 mg) by ORAL OR NJ TUBE route 2 times daily   Quantity:  470 mL   Refills:  0       nortriptyline 10 MG capsule   Commonly known as:  PAMELOR   This may have changed:  how much to take   Used for:  Right upper quadrant abdominal pain        Dose:  30 mg   Take 3 capsules (30 mg) by mouth At Bedtime   Quantity:  120 capsule   Refills:  0       oxyCODONE 5 MG/5ML solution   Commonly known as:  ROXICODONE   This may have changed:  additional instructions   Used for:  Abdominal pain, generalized, Chronic abdominal pain        Dose:  10 mg   Take 10 mLs (10 mg) by mouth every 4 hours as needed for pain maximum 60 mL per day.   Quantity:  210 mL   Refills:  0         CONTINUE these medicines which have NOT CHANGED        Dose / Directions    * amylase-lipase-protease 20796 UNITS Cpep   Commonly known as:  ZENPEP   Used for:  Idiopathic chronic pancreatitis (H)        Dose:  2-3 capsule   Take 2-3 capsules (40,000-60,000 Units) by mouth Take with snacks or supplements (Snacks)   Quantity:  180 capsule   Refills:  1       * amylase-lipase-protease 28571 UNITS Cpep   Commonly known as:  ZENPEP    Used for:  Right upper quadrant abdominal pain        Dose:  5 capsule   Take 5 capsules (100,000 Units) by mouth 4 times daily (with meals and nightly)   Quantity:  180 capsule   Refills:  1       fluticasone 50 MCG/ACT spray   Commonly known as:  FLONASE   Used for:  Nasal congestion        Dose:  2 spray   Spray 2 sprays into both nostrils daily   Quantity:  16 g   Refills:  3       hydrOXYzine 10 MG/5ML syrup   Commonly known as:  ATARAX   Used for:  Acute abdominal pain        Dose:  25 mg   Take 12.5 mLs (25 mg) by mouth every 4 hours as needed for anxiety or other (pain)   Quantity:  473 mL   Refills:  0       leuprolide 11.25 MG kit   Commonly known as:  LUPRON DEPOT (3-MONTH)   Used for:  Endometriosis        Dose:  11.25 mg   Inject 11.25 mg into the muscle every 3 months   Quantity:  1 each   Refills:  3       levalbuterol 45 MCG/ACT Inhaler   Commonly known as:  XOPENEX HFA   Used for:  Wheeze        Dose:  2 puff   Inhale 2 puffs into the lungs every 6 hours as needed for shortness of breath / dyspnea   Quantity:  1 Inhaler   Refills:  1       menthol 5 % Ptch   Commonly known as:  ICY HOT   Used for:  Abdominal pain, epigastric        Dose:  1 patch   Apply 1 patch topically every 8 hours as needed for muscle soreness   Quantity:  15 patch   Refills:  3       multivitamins with minerals Liqd liquid   Used for:  Abdominal pain, epigastric        Dose:  15 mL   15 mLs by Per Feeding Tube route daily   Quantity:  1 Bottle   Refills:  0       norethindrone 5 MG tablet   Commonly known as:  AYGESTIN   Used for:  Endometriosis        Dose:  5 mg   Take 1 tablet (5 mg) by mouth daily   Quantity:  90 tablet   Refills:  1       ondansetron 4 MG ODT tab   Commonly known as:  ZOFRAN ODT   Used for:  Intractable cyclical vomiting with nausea        Dose:  4 mg   Take 1 tablet (4 mg) by mouth every 8 hours as needed for nausea or vomiting   Quantity:  15 tablet   Refills:  0       order for DME   Used for:   Chronic abdominal pain        Equipment being ordered: TENS with wire and electrode.   Quantity:  1 Units   Refills:  0       pantoprazole 40 MG EC tablet   Commonly known as:  PROTONIX   Used for:  Right upper quadrant abdominal pain, Erosive gastritis        Dose:  40 mg   Take 1 tablet (40 mg) by mouth 2 times daily (before meals)   Quantity:  30 tablet   Refills:  1       polyethylene glycol Packet   Commonly known as:  MIRALAX/GLYCOLAX   Used for:  Right upper quadrant abdominal pain        Dose:  17 g   Take 17 g by mouth daily   Quantity:  7 packet   Refills:  0       RIZATRIPTAN BENZOATE PO        Dose:  5 mg   Take 5 mg by mouth once as needed   Refills:  0       scopolamine 72 hr patch   Commonly known as:  TRANSDERM   Used for:  Intractable vomiting with nausea, unspecified vomiting type        Apply 1 patch to hairless area behind one ear if severe nausea and vomiting.  Remove old patch and change every 3 days (72 hours).   Quantity:  2 patch   Refills:  0       senna-docusate 8.6-50 MG per tablet   Commonly known as:  SENOKOT-S;PERICOLACE   Used for:  Right upper quadrant abdominal pain        Dose:  1 tablet   Take 1 tablet by mouth 2 times daily as needed for constipation   Quantity:  100 tablet   Refills:  0       sodium bicarbonate 325 MG tablet   Used for:  Abdominal pain, epigastric, Intractable cyclical vomiting with nausea        Dose:  325 mg   1 tablet (325 mg) by Per Feeding Tube route 4 times daily   Quantity:  120 tablet   Refills:  0       * Notice:  This list has 2 medication(s) that are the same as other medications prescribed for you. Read the directions carefully, and ask your doctor or other care provider to review them with you.         Where to get your medicines      Some of these will need a paper prescription and others can be bought over the counter. Ask your nurse if you have questions.     Bring a paper prescription for each of these medications     ondansetron 4 MG ODT tab     oxyCODONE 5 MG/5ML solution    sodium bicarbonate 325 MG tablet                Protect others around you: Learn how to safely use, store and throw away your medicines at www.disposemymeds.org.        Information about OPIOIDS     PRESCRIPTION OPIOIDS: WHAT YOU NEED TO KNOW    Prescription opioids can be used to help relieve moderate to severe pain and are often prescribed following a surgery or injury, or for certain health conditions. These medications can be an important part of treatment but also come with serious risks. It is important to work with your health care provider to make sure you are getting the safest, most effective care.    WHAT ARE THE RISKS AND SIDE EFFECTS OF OPIOID USE?  Prescription opioids carry serious risks of addiction and overdose, especially with prolonged use. An opioid overdose, often marked by slowed breathing can cause sudden death. The use of prescription opioids can have a number of side effects as well, even when taken as directed:      Tolerance - meaning you might need to take more of a medication for the same pain relief    Physical dependence - meaning you have symptoms of withdrawal when a medication is stopped    Increased sensitivity to pain    Constipation    Nausea, vomiting, and dry mouth    Sleepiness and dizziness    Confusion    Depression    Low levels of testosterone that can result in lower sex drive, energy, and strength    Itching and sweating    RISKS ARE GREATER WITH:    History of drug misuse, substance use disorder, or overdose    Mental health conditions (such as depression or anxiety)    Sleep apnea    Older age (65 years or older)    Pregnancy    Avoid alcohol while taking prescription opioids.   Also, unless specifically advised by your health care provider, medications to avoid include:    Benzodiazepines (such as Xanax or Valium)    Muscle relaxants (such as Soma or Flexeril)    Hypnotics (such as Ambien or Lunesta)    Other prescription opioids    KNOW  YOUR OPTIONS:  Talk to your health care provider about ways to manage your pain that do not involve prescription opioids. Some of these options may actually work better and have fewer risks and side effects:    Pain relievers such as acetaminophen, ibuprofen, and naproxen    Some medications that are also used for depression or seizures    Physical therapy and exercise    Cognitive behavioral therapy, a psychological, goal-directed approach, in which patients learn how to modify physical, behavioral, and emotional triggers of pain and stress    IF YOU ARE PRESCRIBED OPIOIDS FOR PAIN:    Never take opioids in greater amounts or more often than prescribed    Follow up with your primary health care provider and work together to create a plan on how to manage your pain.    Talk about ways to help manage your pain that do not involve prescription opioids    Talk about all concerns and side effects    Help prevent misuse and abuse    Never sell or share prescription opioids    Never use another person's prescription opioids    Store prescription opioids in a secure place and out of reach of others (this may include visitors, children, friends, and family)    Visit www.cdc.gov/drugoverdose to learn about risks of opioid abuse and overdose    If you believe you may be struggling with addiction, tell your health care provider and ask for guidance or call [facility]'s National Helpline at 0-201-531-HELP    LEARN MORE / www.cdc.gov/drugoverdose/prescribing/guideline.html    Safely dispose of unused prescription opioids: Find your local drug take-back programs and more information about the importance of safe disposal at www.doseofreality.mn.gov             Medication List: This is a list of all your medications and when to take them. Check marks below indicate your daily home schedule. Keep this list as a reference.      Medications           Morning Afternoon Evening Bedtime As Needed    acetaminophen 32 mg/mL solution   Commonly  known as:  TYLENOL   30.45 mLs (975 mg) by Per J Tube route every 8 hours   Last time this was given:  2/27/2018  5:45 PM                                * amylase-lipase-protease 07133 UNITS Cpep   Commonly known as:  ZENPEP   Take 2-3 capsules (40,000-60,000 Units) by mouth Take with snacks or supplements (Snacks)                                * amylase-lipase-protease 37489 UNITS Cpep   Commonly known as:  ZENPEP   Take 5 capsules (100,000 Units) by mouth 4 times daily (with meals and nightly)                                fluticasone 50 MCG/ACT spray   Commonly known as:  FLONASE   Spray 2 sprays into both nostrils daily                                gabapentin 250 MG/5ML solution   Commonly known as:  NEURONTIN   8 mLs (400 mg) by ORAL OR NJ TUBE route 2 times daily   Last time this was given:  400 mg on 2/27/2018  2:19 PM                                hydrOXYzine 10 MG/5ML syrup   Commonly known as:  ATARAX   Take 12.5 mLs (25 mg) by mouth every 4 hours as needed for anxiety or other (pain)   Last time this was given:  25 mg on 2/27/2018  2:19 PM                                leuprolide 11.25 MG kit   Commonly known as:  LUPRON DEPOT (3-MONTH)   Inject 11.25 mg into the muscle every 3 months                                levalbuterol 45 MCG/ACT Inhaler   Commonly known as:  XOPENEX HFA   Inhale 2 puffs into the lungs every 6 hours as needed for shortness of breath / dyspnea                                menthol 5 % Ptch   Commonly known as:  ICY HOT   Apply 1 patch topically every 8 hours as needed for muscle soreness   Last time this was given:  1 patch on 2/27/2018  9:28 AM                                multivitamins with minerals Liqd liquid   15 mLs by Per Feeding Tube route daily   Last time this was given:  15 mLs on 2/27/2018  2:19 PM                                norethindrone 5 MG tablet   Commonly known as:  AYGESTIN   Take 1 tablet (5 mg) by mouth daily                                 nortriptyline 10 MG capsule   Commonly known as:  PAMELOR   Take 3 capsules (30 mg) by mouth At Bedtime   Last time this was given:  30 mg on 2/26/2018 11:36 PM                                ondansetron 4 MG ODT tab   Commonly known as:  ZOFRAN ODT   Take 1 tablet (4 mg) by mouth every 8 hours as needed for nausea or vomiting                                order for DME   Equipment being ordered: TENS with wire and electrode.                                oxyCODONE 5 MG/5ML solution   Commonly known as:  ROXICODONE   Take 10 mLs (10 mg) by mouth every 4 hours as needed for pain maximum 60 mL per day.                                pantoprazole 40 MG EC tablet   Commonly known as:  PROTONIX   Take 1 tablet (40 mg) by mouth 2 times daily (before meals)                                polyethylene glycol Packet   Commonly known as:  MIRALAX/GLYCOLAX   Take 17 g by mouth daily                                RIZATRIPTAN BENZOATE PO   Take 5 mg by mouth once as needed                                scopolamine 72 hr patch   Commonly known as:  TRANSDERM   Apply 1 patch to hairless area behind one ear if severe nausea and vomiting.  Remove old patch and change every 3 days (72 hours).   Last time this was given:  1 patch on 2/25/2018  8:32 PM                                senna-docusate 8.6-50 MG per tablet   Commonly known as:  SENOKOT-S;PERICOLACE   Take 1 tablet by mouth 2 times daily as needed for constipation                                sodium bicarbonate 325 MG tablet   1 tablet (325 mg) by Per Feeding Tube route 4 times daily   Last time this was given:  325 mg on 2/27/2018  2:05 PM                                * Notice:  This list has 2 medication(s) that are the same as other medications prescribed for you. Read the directions carefully, and ask your doctor or other care provider to review them with you.

## 2018-02-25 NOTE — H&P
[facility], [place]    Internal Medicine History and Physical - Gold Service       Date of Admission:  2/25/2018    Assessment & Plan   Alexandra Melgoza is a 22 year old female  admitted on 2/25/2018. She has PMH of asthma, chronic abdominal pain s/p cholecystectomy, cyclic N/V, migraines, pseudoseizures and somatoform disorder who presents to the ED with complaints of 4 day hx of nausea, non-bloody emesis, diarrhea, and RUQ abdominal pain. Patient admitted to Medical Observation for further care.     Of note, patient with recent admissions 12/6/17 for fever and abdominal pain, 1/3-1/4/17 for acute on chronic abdominal pain, 1/18-1/23/18 for post ERCP pain, n/v, and 2/8-2/10/18 for post PEGJ pain control.     # A/c abdominal pain, Nausea, vomiting, diarrhea: Presented to the ED with complaints of  nausea, non-bloody emesis, diarrhea, and RUQ abdominal pain x 4 days. Also reports inability to tolerate anything per feeding tube. Has been in ED 3x since 2/22/18. Patient reports recent GI illness in family members. EGD in 12/2017 w/ patchy gastritis in the stomach- more pronounced in the cardia, normal esophagus and duodenum. MRCP 12/2017 w/ normal secretin stimulation study, stable appearance of pancreas w/o focal mass or ductal dilatation, post op sravani changes w/o extrahepatic biliary dilatation. EUS in 12/2016 did not meet criteria for chronic pancreatitis. NM hepatobiliary scan in 04/2015 normal. Pancreatic elastase normal 12/2017. Gastric bicarb fluid w/ 13. Follows w/ Dr. Narayanan with recent PEG-J placement (2/8/18) . Pain consult on recent admissons. Lipase 67, LFT's wnl, LA 0.7, K 3.3, CBC wnl, UA negative for infection, HCG negative. SIPC 3x weekly for IVF, antiemetics, IV benadryl (2/21/18). Received IVF, Zofran in the ED with mild improvement in sx. Unclear source of sx, though consider opiate withdrawal (received rx on 2/14/18 for 14 days or 10mg oxycodone q4h and hx of running  out or rx early) vs Viral gastritis vs bacterial vs functional. Follows with Dr. Narayanan and Dr. Leonardo Wang ([facility]  - Would avoid IV narcotic use.   - No medications or TF per PEG-J for now  - 30 cc water q4h to maintain GJ tube patency  - SubLingual oxycodone 10 mg q4h PRN  - GI consult placed- please discuss with in am  - SSCE, C.diff  - Enteric precautions  - IVF  - Zofran  - Scopolamine patch q72h  - Lidoderm patch alternating with ICY Hot patch  - Cold packs PRN  - Utox  - Patient request for IV benadryl- ordered 50 mg benadryl infusion   - Imaging if worsening sx  - CBC, CMP in am     # Hypokalemia: K 3.3 on admission. Replaced.   - Check Mg  - Monitor and replace per protocol    # Anxiety and Depression: Mild increase in sx d/t a/c illness.  PTA atarax, nortriptyline.  - Resume when tolerating medications per GJ        # Pain Assessment:   Current Pain Score 2/24/2018 2/24/2018 2/22/2018   Patient currently in pain? - - -   Pain score (0-10) 3 7 0   Pain location - - -   Pain descriptors - - -   - Alexandra is experiencing pain due to A/c abdominal pain. Pain management was discussed with Alexandra and her family and the plan was created in a collaborative fashion.  Alexandra's response to the current recommendations: engaged  - Please see the plan for pain management as documented above      Diet:    Fluids: LR @ 125 cc/hr  DVT Prophylaxis: Pneumatic Compression Devices  Code Status: Prior    Disposition Plan   Expected discharge: 2 - 3 days; recommended to prior living arrangement once GI consult, Stool studies, tolerating per GJ tube.     Entered: Vickie Angulo 02/25/2018, 5:51 PM   Information in the above section will display in the discharge planner report.    The patient's care was discussed with the Attending Physician, Dr. Love.    Vickie Angulo PA-C  Internal Medicine Hospitalist Service  [facility]  Pager: [phone]    Please see sticky note for cross  cover information  ______________________________________________________________________    Chief Complaint   N/V/D abdominal pain     History is obtained from the patient and EMR    History of Present Illness   Alexandra Melgoza is a 22 year old female with PMH as outlined above who presented to the ED for evaluation of ~ 4 day hx of N/V/D and RUQ abdominal pain.     Per chart review, patient has been evaluated in ED 3x since 2/22/18 for similar sx.       Patient reports that she was in her usual state of health until ~ 4 days ago when she developed acute onset N/V/D and RUQ abdominal pain. She ahs not tolerated TF, or medications per PEG-J tube recently placed 2/8/18. She reports recent sick contacts with GI illness. Emesis and diarrhea is non-bloody and occurring ~ 10 x daily. RUQ abdominal pain is similar to chronic pain and radiates to the back. IVF and antiemetics are alleviating factors. Anything per GJ tube aggravates. She denies fever, chills, cough, CP, dysuria, or new medications. She denies illicit drug use, No Etoh use. And not sexually active.     Discussed plan for sx management overnight and reassessment in am. Patient and grandmother in agreement with plan.       Patient does not endorse: headaches, changes in vision, chest pain, palpitations, upper respiratory symptoms of rhinorrhea or congestion, shortness of breath, cough, sputum production, wheezing, constipation, , dysuria, edema, rashes, weakness, focal neurologic deficits, recent travel, illness, fever, chills.      Review of Systems   The 10 point Review of Systems is negative other than noted in the HPI or here.     Past Medical History    I have reviewed this patient's medical history and updated it with pertinent information if needed.   Past Medical History:   Diagnosis Date     Anxiety      Asthma      Cholecystitis     s/p cholecystectomy     Chronic abdominal pain      Chronic infection     mrsa     Chronic pain      Cyclic vomiting  syndrome 10/27/2012     Depression      Endometriosis      Hypoglycaemia      Migraines      Mild intermittent asthma      Ovarian cysts      Pancreatic disease      PONV (postoperative nausea and vomiting)      Pseudoseizures      Somatoform disorder      Sphincter of Oddi dysfunction      Vasovagal syncope         Past Surgical History   I have reviewed this patient's surgical history and updated it with pertinent information if needed.  Past Surgical History:   Procedure Laterality Date     ABDOMEN SURGERY      ERCP, biliary stents     CHOLECYSTECTOMY  8/2/11     COLONOSCOPY  2011    negative finding     ENDOSCOPIC RETROGRADE CHOLANGIOPANCREATOGRAM  8/23/2011    Procedure:ENDOSCOPIC RETROGRADE CHOLANGIOPANCREATOGRAM; Endoscopic Retrograde Cholangiopancreatogram; Surgeon:SHORTY NARAYANAN; Location:[facility]     ENDOSCOPIC RETROGRADE CHOLANGIOPANCREATOGRAM  5/17/2012    Procedure:ENDOSCOPIC RETROGRADE CHOLANGIOPANCREATOGRAM; Endoscopic Retrograde Cholangiopancreatogram with pancreatic stent placement.; Surgeon:SHORTY NARAYANAN; Location:[facility]     ENDOSCOPIC RETROGRADE CHOLANGIOPANCREATOGRAM N/A 1/18/2018    Procedure: COMBINED ENDOSCOPIC RETROGRADE CHOLANGIOPANCREATOGRAPHY, PLACE TUBE/STENT;  Endoscopic Retrograde Cholangiopancreatography with nasojejunal feeding tube placement;  Surgeon: Shorty Narayanan MD;  [facility]: [facility]     ENDOSCOPIC RETROGRADE CHOLANGIOPANCREATOGRAM COMPLEX  1/3/2012    Procedure:ENDOSCOPIC RETROGRADE CHOLANGIOPANCREATOGRAM COMPLEX; Endoscopic Retrograde Cholangiopancreatogram with Manometry bile duct sphincterotomy extention pancreatic duct sphincterotomy pancreatic duct stent placement; Surgeon:SHORTY NARAYANAN; Location:[facility]     ENDOSCOPIC ULTRASOUND UPPER GASTROINTESTINAL TRACT (GI) N/A 6/9/2015    Procedure: ENDOSCOPIC ULTRASOUND, ESOPHAGOSCOPY / UPPER GASTROINTESTINAL TRACT (GI);  Surgeon: Mario Joe MD;  [facility]     ENDOSCOPIC ULTRASOUND UPPER  GASTROINTESTINAL TRACT (GI) N/A 12/12/2016    Procedure: ENDOSCOPIC ULTRASOUND, ESOPHAGOSCOPY / UPPER GASTROINTESTINAL TRACT (GI);  Surgeon: Guru Jose Klein MD;  [facility]     ESOPHAGOSCOPY, GASTROSCOPY, DUODENOSCOPY (EGD), COMBINED  1/18/2012    Procedure:COMBINED ESOPHAGOSCOPY, GASTROSCOPY, DUODENOSCOPY (EGD); Surgeon:ARNIE ESPINOZA; [facility]     ESOPHAGOSCOPY, GASTROSCOPY, DUODENOSCOPY (EGD), COMBINED  1/18/2012    Procedure:COMBINED ESOPHAGOSCOPY, GASTROSCOPY, DUODENOSCOPY (EGD); EGD; Surgeon:ARNIE ESPINOZA; [facility]     ESOPHAGOSCOPY, GASTROSCOPY, DUODENOSCOPY (EGD), COMBINED N/A 12/9/2017    Procedure: COMBINED ESOPHAGOSCOPY, GASTROSCOPY, DUODENOSCOPY (EGD);;  Surgeon: Josias Chan MD;  [facility]     INSERT PORT VASCULAR ACCESS       L knee arthroscopy  2009     LAPAROSCOPY DIAGNOSTIC (GYN)  10/26/2012    Procedure: LAPAROSCOPY DIAGNOSTIC (GYN);  LAPAROSCOPY DIAGNOSTIC, CAUTERY ENDOMETRIOISIS and biopsy of Fallopian tube lesions;  Surgeon: Carla Lopez MD;  Location:  OR     ORTHOPEDIC SURGERY  2008    knee     REMOVE AND REPLACE BREAST IMPLANT PROSTHESIS N/A 2/8/2018    Procedure: PERCUTANEOUS INSERTION TUBE JEJUNOSTOMY;  upper endoscopy with percutaneous gastrojejunostimy feeding tuble placement and gastropexy;  Surgeon: Campbell Narayanan MD;  Location:  OR     VASCULAR SURGERY          Social History   Social History   Substance Use Topics     Smoking status: Never Smoker     Smokeless tobacco: Never Used     Alcohol use No       Family History   I have reviewed this patient's family history and updated it with pertinent information if needed.   Family History   Problem Relation Age of Onset     Hypertension Father      Bipolar Disorder Other      Anxiety Disorder Other      Depression Paternal Grandmother      Allergies Maternal Grandmother      CEREBROVASCULAR DISEASE Maternal Grandfather      Cardiovascular Maternal Grandfather       Depression/Anxiety Maternal Grandfather      GASTROINTESTINAL DISEASE Maternal Grandfather      DIABETES Paternal Uncle      Hypoglycemia Brother      CANCER No family hx of      No family history of skin cancer       Prior to Admission Medications   Prior to Admission Medications   Prescriptions Last Dose Informant Patient Reported? Taking?   RIZATRIPTAN BENZOATE PO  Self Yes No   Sig: Take 5 mg by mouth once as needed    amylase-lipase-protease (ZENPEP) 88030 UNITS CPEP   No No   Sig: Take 2-3 capsules (40,000-60,000 Units) by mouth Take with snacks or supplements (Snacks)   amylase-lipase-protease (ZENPEP) 61156 UNITS CPEP   Yes No   Sig: Take 5 capsules (100,000 Units) by mouth 4 times daily (with meals and nightly)   fluticasone (FLONASE) 50 MCG/ACT spray   No No   Sig: Spray 2 sprays into both nostrils daily   gabapentin (NEURONTIN) 250 MG/5ML solution   No No   Si mLs (400 mg) by ORAL OR NJ TUBE route 2 times daily   gabapentin (NEURONTIN) 250 MG/5ML solution   No No   Sig: Take 12 mLs (600 mg) by mouth daily   hydrOXYzine (ATARAX) 10 MG/5ML syrup   No No   Sig: Take 12.5 mLs (25 mg) by mouth every 4 hours as needed for anxiety or other (pain)   leuprolide (LUPRON DEPOT) 11.25 MG injection  Self No No   Sig: Inject 11.25 mg into the muscle every 3 months   levalbuterol (XOPENEX HFA) 45 MCG/ACT Inhaler   No No   Sig: Inhale 2 puffs into the lungs every 6 hours as needed for shortness of breath / dyspnea   menthol (ICY HOT) 5 % PTCH   No No   Sig: Apply 1 patch topically every 8 hours as needed for muscle soreness   multivitamins with minerals (CERTAVITE/CEROVITE) LIQD liquid   No No   Sig: 15 mLs by Per Feeding Tube route daily   norethindrone (AYGESTIN) 5 MG tablet   No No   Sig: Take 1 tablet (5 mg) by mouth daily   nortriptyline (PAMELOR) 10 MG capsule   No No   Sig: Take 3 capsules (30 mg) by mouth At Bedtime   ondansetron (ZOFRAN ODT) 4 MG ODT tab   No No   Sig: Take 1 tablet (4 mg) by mouth every 8  "hours as needed for nausea or vomiting   order for DME   No No   Sig: Equipment being ordered: TENS with wire and electrode.   oxyCODONE (ROXICODONE INTENSOL) 20 mg/mL (HIGH CONC) solution   No No   Sig: Take 0.5-0.75 mLs (10-15 mg) by mouth every 4 hours as needed for moderate to severe pain   oxyCODONE (ROXICODONE) 5 MG/5ML solution   No No   Sig: Take 10 mLs (10 mg) by mouth every 4 hours as needed for pain maximum 60 mL per day.  This is a 2 week supply.   pantoprazole (PROTONIX) 40 MG EC tablet   No No   Sig: Take 1 tablet (40 mg) by mouth 2 times daily (before meals)   polyethylene glycol (MIRALAX/GLYCOLAX) Packet   No No   Sig: Take 17 g by mouth daily   scopolamine (TRANSDERM) 72 hr patch   No No   Sig: Apply 1 patch to hairless area behind one ear if severe nausea and vomiting.  Remove old patch and change every 3 days (72 hours).   senna-docusate (SENOKOT-S;PERICOLACE) 8.6-50 MG per tablet   No No   Sig: Take 1 tablet by mouth 2 times daily as needed for constipation   sodium bicarbonate 325 MG tablet   No No   Si tablet (325 mg) by Per Feeding Tube route 4 times daily      Facility-Administered Medications: None     Allergies   Allergies   Allergen Reactions     Amoxicillin-Pot Clavulanate Nausea and Vomiting     Compazine [Prochlorperazine] Other (See Comments)     Dystonia       Hyoscyamine Other (See Comments)     Dystonia     Reglan [Metoclopramide Hcl] Other (See Comments)     Dystonia     Zyprexa Other (See Comments)     Sensitive, dystonic reaction on 2011     Amitriptyline Hcl Other (See Comments)     Dystonia, hallucinations     Buspirone Other (See Comments)     No Adverse Reactions, no benefit     Cogentin [Benztropine]      Cyproheptadine Other (See Comments)     Distonic     Dicyclomine Other (See Comments)     Droperidol Other (See Comments)     Feels tense and \"like she has to jump out of her skin\".       Effexor [Venlafaxine] Other (See Comments)     Dystonia     Food      " Cilantro--lips/tongue swelling     No Clinical Screening - See Comments Other (See Comments)     Cilantro     Phenergan Dm [Promethazine-Dm] Other (See Comments)     dystonia     Promethazine Other (See Comments)     Risperidone Other (See Comments)     dystonia     Vistaril Other (See Comments)     Burning sensation.       Augmentin GI Disturbance     Ketamine Other (See Comments)     jittery     Sorbitol GI Disturbance     Headache and dyspepsia       Physical Exam   Vital Signs: Temp: 98.3  F (36.8  C) Temp src: Oral BP: 128/79   Heart Rate: 116 Resp: 20 SpO2: 100 % O2 Device: None (Room air)    Weight: 140 lbs 12.8 oz    Physical Exam   Constitutional: Pleasant female sitting up in ED bed.   Well nourished, well developed, resting comfortably . Grandparents at bedside  HEENT:   Head: Normocephalic and atraumatic.   Eyes: Conjunctivae are normal. Pupils are equal, round, and reactive to light.  Pharynx has no erythema or exudate, mucous membranes are dry  Neck:   No adenopathy, no bony tenderness  Cardiovascular: Tachycardia, though  without murmurs or gallops  Pulmonary/Chest: Clear to auscultation bilaterally, with no wheezes or retractions. No respiratory distress.  GI: Soft with good bowel sounds.  Mild TTP in RUQ non-distended, with no guarding, no rebound, no peritoneal signs. Gtube in RMQ. No drainage, erythema, or rash noted.   Back:  No bony or CVA tenderness   Musculoskeletal:  No edema or clubbing   Skin: Skin is warm and dry. No rash noted to exposed skin areas.   Neurological: Alert and oriented to person, place, and time. Nonfocal exam  Psychiatric:  Normal mood and affect.      Data   Data reviewed today: I reviewed all medications, new labs and imaging results over the last 24 hours. I personally reviewed recent imaging, daily labs, and progress notes     Data     Recent Labs  Lab 02/25/18  1600 02/24/18  1553 02/22/18  2127   WBC 8.0 4.4 5.0   HGB 11.9 11.7 11.1*   MCV 90 89 90    239 220     139 140   POTASSIUM 3.3* 3.5 3.3*   CHLORIDE 107 104 105   CO2 25 32 29   BUN 6* 9 7   CR 0.64 0.69 0.54   ANIONGAP 10 3 6   RENEA 9.0 9.1 8.6   GLC 82 116* 84   ALBUMIN 4.4 4.2 3.9   PROTTOTAL 7.4 7.0 6.7*   BILITOTAL 0.4 0.2 0.2   ALKPHOS 93 95 88   ALT 26 32 29   AST 21 25 23   LIPASE 67* 72* 70*

## 2018-02-25 NOTE — TELEPHONE ENCOUNTER
On Call Gastroenterology Fellow Note  02/25/18    Received call from patient via  that she is having intractable nausea/vomiting, inability to tolerate PO intake. Note two clinic visits and two ER visits in the past two weeks with similar concerns. Patient was seen in ED yesterday where her PEGJ was evaluated and was functioning. Laboratory evaluation at that time largely wnl. VS notable for tachycardia.     I noted my role as the on-call GI fellow and gave patient two options - wait out her symptoms and call her RN coordinator for Dr. Narayanan tomorrow (Monday) which seems reasonable given data from yesterdays ER visit or present to the ED if she does not feel comfortable doing such and feels she needs medical attention.    Patient feels unable to control her symptoms at home and reports she feels she needs IVF to help her feel better. Has Zofran at home she plans to try first and will discuss next steps with her mom.     Renée Baum MD  Gastroenterology Fellow

## 2018-02-25 NOTE — ED NOTES
Presents for abdominal pain, nausea/vomiting and diarrhea that started 4 days ago. Had family members ill with stomach flu recently. Unable to tolerate tube feedings or fluids. Had fevers at home up to 101, responds well to tylenol. Hx pancreatitis.

## 2018-02-25 NOTE — LETTER
Transition Communication Hand-off for Care Transitions to Next Level of Care Provider    Name: Alexandra Melgoza  MRN #: [id number]  Primary Care Provider: Quyen Baez     Primary Clinic: [address] [place] [id number]     Reason for Hospitalization:  Vomiting and diarrhea [R11.10, R19.7]  Pain of upper abdomen [R10.10]  Admit Date/Time: 2/25/2018  3:19 PM  Discharge Date: 2/27/18  Payor Source: Payor: [employer] / Plan: [employer] [place] / Product Type: HMO /     Readmission Assessment Measure (PURVI) Risk Score/category: Elevated    Reason for Communication Hand-off Referral: Multiple providers/specialties    Discharge Plan: home with home infusion enteral feeds       Concern for non-adherence with plan of care:   Y/N yes  Already enrolled in Tele-monitoring program and name of program:  no  Follow-up specialty is recommended: Yes    Follow-up plan:  Future Appointments  Date Time Provider Department Center   2/28/2018 12:00 PM [facility] SPE[facility] INFUSION [facility]   3/2/2018 1:00 PM [facility] SPEC INFUSION INPR [facility]   3/5/2018 10:20 AM Juanita Baum APRN [facility]   3/19/2018 7:30 AM Quyen Baez MD [facility]   3/19/2018 3:00 PM Quyen Lennon, PhD [facility]     Referrals     Future Labs/Procedures    Home infusion referral     Comments:    Your provider has referred you to:    [facility]  Phone [phone]  Fax  [phone]  __________________    Local Address (if different from home address): N/A    Anticipated Length of Therapy: per MD    Resume previous enteral feeding orders:  Peptamen 1.5 at 50 ml/hr (1200 ml/day) to provide 1800 kcals (26 kcal/kg/day), 82 g PRO (1.2 g/kg/day), 924 ml free H2O, 67 g Fat (70% from MCTs), 226 g CHO and no Fiber daily.  - Flush FT with 60 ml H2O q 4 hrs for tube patency    Home Infusion Pharmacist to adjust therapy based on labs and clinical assessments: Yes    Agency Staff to assess nursing needs for Infusion Therapy.            Key  Recommendations:  See AVS    Ysabel Vázquez RN, BSN  Care Coordinator [facility]  Pager: [phone]  Phone: [phone]    AVS/Discharge Summary is the source of truth; this is a helpful guide for improved communication of patient story

## 2018-02-26 ENCOUNTER — CARE COORDINATION (OUTPATIENT)
Dept: GASTROENTEROLOGY | Facility: CLINIC | Age: 23
End: 2018-02-26

## 2018-02-26 LAB
ALBUMIN SERPL-MCNC: 3.7 G/DL (ref 3.4–5)
ALP SERPL-CCNC: 79 U/L (ref 40–150)
ALT SERPL W P-5'-P-CCNC: 24 U/L (ref 0–50)
ANION GAP SERPL CALCULATED.3IONS-SCNC: 8 MMOL/L (ref 3–14)
ANION GAP SERPL CALCULATED.3IONS-SCNC: 8 MMOL/L (ref 3–14)
AST SERPL W P-5'-P-CCNC: 16 U/L (ref 0–45)
BILIRUB SERPL-MCNC: 0.7 MG/DL (ref 0.2–1.3)
BUN SERPL-MCNC: 6 MG/DL (ref 7–30)
BUN SERPL-MCNC: 7 MG/DL (ref 7–30)
CALCIUM SERPL-MCNC: 8.3 MG/DL (ref 8.5–10.1)
CALCIUM SERPL-MCNC: 8.4 MG/DL (ref 8.5–10.1)
CHLORIDE SERPL-SCNC: 109 MMOL/L (ref 94–109)
CHLORIDE SERPL-SCNC: 110 MMOL/L (ref 94–109)
CO2 SERPL-SCNC: 23 MMOL/L (ref 20–32)
CO2 SERPL-SCNC: 25 MMOL/L (ref 20–32)
CREAT SERPL-MCNC: 0.64 MG/DL (ref 0.52–1.04)
CREAT SERPL-MCNC: 0.65 MG/DL (ref 0.52–1.04)
ERYTHROCYTE [DISTWIDTH] IN BLOOD BY AUTOMATED COUNT: 13.1 % (ref 10–15)
GFR SERPL CREATININE-BSD FRML MDRD: >90 ML/MIN/1.7M2
GFR SERPL CREATININE-BSD FRML MDRD: >90 ML/MIN/1.7M2
GLUCOSE SERPL-MCNC: 72 MG/DL (ref 70–99)
GLUCOSE SERPL-MCNC: 83 MG/DL (ref 70–99)
HCT VFR BLD AUTO: 32.1 % (ref 35–47)
HGB BLD-MCNC: 10.7 G/DL (ref 11.7–15.7)
MAGNESIUM SERPL-MCNC: 1.9 MG/DL (ref 1.6–2.3)
MCH RBC QN AUTO: 30.5 PG (ref 26.5–33)
MCHC RBC AUTO-ENTMCNC: 33.3 G/DL (ref 31.5–36.5)
MCV RBC AUTO: 92 FL (ref 78–100)
PLATELET # BLD AUTO: 212 10E9/L (ref 150–450)
POTASSIUM SERPL-SCNC: 4.2 MMOL/L (ref 3.4–5.3)
POTASSIUM SERPL-SCNC: 4.2 MMOL/L (ref 3.4–5.3)
PROT SERPL-MCNC: 6.2 G/DL (ref 6.8–8.8)
RBC # BLD AUTO: 3.51 10E12/L (ref 3.8–5.2)
SODIUM SERPL-SCNC: 141 MMOL/L (ref 133–144)
SODIUM SERPL-SCNC: 142 MMOL/L (ref 133–144)
WBC # BLD AUTO: 4.5 10E9/L (ref 4–11)

## 2018-02-26 PROCEDURE — 40000275 ZZH STATISTIC RCP TIME EA 10 MIN

## 2018-02-26 PROCEDURE — 25800025 ZZH RX 258: Performed by: PHYSICIAN ASSISTANT

## 2018-02-26 PROCEDURE — 99226 ZZC SUBSEQUENT OBSERVATION CARE,LEVEL III: CPT | Performed by: INTERNAL MEDICINE

## 2018-02-26 PROCEDURE — 36415 COLL VENOUS BLD VENIPUNCTURE: CPT | Performed by: STUDENT IN AN ORGANIZED HEALTH CARE EDUCATION/TRAINING PROGRAM

## 2018-02-26 PROCEDURE — 25000132 ZZH RX MED GY IP 250 OP 250 PS 637: Performed by: PHYSICIAN ASSISTANT

## 2018-02-26 PROCEDURE — 93005 ELECTROCARDIOGRAM TRACING: CPT

## 2018-02-26 PROCEDURE — 80053 COMPREHEN METABOLIC PANEL: CPT | Performed by: PHYSICIAN ASSISTANT

## 2018-02-26 PROCEDURE — 96361 HYDRATE IV INFUSION ADD-ON: CPT

## 2018-02-26 PROCEDURE — 83735 ASSAY OF MAGNESIUM: CPT | Performed by: STUDENT IN AN ORGANIZED HEALTH CARE EDUCATION/TRAINING PROGRAM

## 2018-02-26 PROCEDURE — 96366 THER/PROPH/DIAG IV INF ADDON: CPT

## 2018-02-26 PROCEDURE — 96375 TX/PRO/DX INJ NEW DRUG ADDON: CPT

## 2018-02-26 PROCEDURE — 80048 BASIC METABOLIC PNL TOTAL CA: CPT | Performed by: STUDENT IN AN ORGANIZED HEALTH CARE EDUCATION/TRAINING PROGRAM

## 2018-02-26 PROCEDURE — 85027 COMPLETE CBC AUTOMATED: CPT | Performed by: PHYSICIAN ASSISTANT

## 2018-02-26 PROCEDURE — 25000128 H RX IP 250 OP 636: Performed by: PHYSICIAN ASSISTANT

## 2018-02-26 PROCEDURE — 25000128 H RX IP 250 OP 636: Performed by: INTERNAL MEDICINE

## 2018-02-26 PROCEDURE — 99207 ZZC CDG-CODE CATEGORY CHANGED: CPT | Performed by: INTERNAL MEDICINE

## 2018-02-26 PROCEDURE — G0378 HOSPITAL OBSERVATION PER HR: HCPCS

## 2018-02-26 PROCEDURE — 25000128 H RX IP 250 OP 636: Performed by: STUDENT IN AN ORGANIZED HEALTH CARE EDUCATION/TRAINING PROGRAM

## 2018-02-26 PROCEDURE — 36592 COLLECT BLOOD FROM PICC: CPT | Performed by: PHYSICIAN ASSISTANT

## 2018-02-26 PROCEDURE — 96376 TX/PRO/DX INJ SAME DRUG ADON: CPT

## 2018-02-26 RX ORDER — DEXTROSE MONOHYDRATE, SODIUM CHLORIDE, AND POTASSIUM CHLORIDE 50; 1.49; 9 G/1000ML; G/1000ML; G/1000ML
INJECTION, SOLUTION INTRAVENOUS
Status: DISPENSED
Start: 2018-02-26 | End: 2018-02-26

## 2018-02-26 RX ORDER — GABAPENTIN 250 MG/5ML
400 SOLUTION ORAL 3 TIMES DAILY
Status: DISCONTINUED | OUTPATIENT
Start: 2018-02-26 | End: 2018-02-27 | Stop reason: HOSPADM

## 2018-02-26 RX ORDER — NORTRIPTYLINE HCL 10 MG
30 CAPSULE ORAL AT BEDTIME
Status: DISCONTINUED | OUTPATIENT
Start: 2018-02-26 | End: 2018-02-27

## 2018-02-26 RX ORDER — DEXTROSE MONOHYDRATE, SODIUM CHLORIDE, AND POTASSIUM CHLORIDE 50; 1.49; 9 G/1000ML; G/1000ML; G/1000ML
INJECTION, SOLUTION INTRAVENOUS CONTINUOUS
Status: DISCONTINUED | OUTPATIENT
Start: 2018-02-26 | End: 2018-02-27 | Stop reason: HOSPADM

## 2018-02-26 RX ORDER — DEXTROSE, SODIUM CHLORIDE, SODIUM LACTATE, POTASSIUM CHLORIDE, AND CALCIUM CHLORIDE 5; .6; .31; .03; .02 G/100ML; G/100ML; G/100ML; G/100ML; G/100ML
INJECTION, SOLUTION INTRAVENOUS
Status: DISPENSED
Start: 2018-02-26 | End: 2018-02-26

## 2018-02-26 RX ORDER — GABAPENTIN 250 MG/5ML
400 SOLUTION ORAL 3 TIMES DAILY
Status: DISCONTINUED | OUTPATIENT
Start: 2018-02-26 | End: 2018-02-26

## 2018-02-26 RX ORDER — HYDROXYZINE HCL 10 MG/5 ML
25 SOLUTION, ORAL ORAL EVERY 4 HOURS PRN
Status: DISCONTINUED | OUTPATIENT
Start: 2018-02-26 | End: 2018-02-27 | Stop reason: HOSPADM

## 2018-02-26 RX ORDER — DIPHENHYDRAMINE HYDROCHLORIDE 50 MG/ML
25 INJECTION INTRAMUSCULAR; INTRAVENOUS ONCE
Status: COMPLETED | OUTPATIENT
Start: 2018-02-26 | End: 2018-02-26

## 2018-02-26 RX ADMIN — OXYCODONE HYDROCHLORIDE 10 MG: 100 SOLUTION ORAL at 00:42

## 2018-02-26 RX ADMIN — HYDROXYZINE HYDROCHLORIDE 25 MG: 10 SYRUP ORAL at 23:35

## 2018-02-26 RX ADMIN — NORTRIPTYLINE HYDROCHLORIDE 30 MG: 10 CAPSULE ORAL at 23:36

## 2018-02-26 RX ADMIN — GABAPENTIN 400 MG: 250 SOLUTION ORAL at 18:04

## 2018-02-26 RX ADMIN — ONDANSETRON 4 MG: 2 INJECTION INTRAMUSCULAR; INTRAVENOUS at 00:43

## 2018-02-26 RX ADMIN — DIPHENHYDRAMINE HYDROCHLORIDE 25 MG: 50 INJECTION, SOLUTION INTRAMUSCULAR; INTRAVENOUS at 13:10

## 2018-02-26 RX ADMIN — OXYCODONE HYDROCHLORIDE 10 MG: 100 SOLUTION ORAL at 14:53

## 2018-02-26 RX ADMIN — LIDOCAINE 1 PATCH: 560 PATCH PERCUTANEOUS; TOPICAL; TRANSDERMAL at 20:43

## 2018-02-26 RX ADMIN — OXYCODONE HYDROCHLORIDE 10 MG: 100 SOLUTION ORAL at 10:12

## 2018-02-26 RX ADMIN — ACETAMINOPHEN 650 MG: 325 SOLUTION ORAL at 18:04

## 2018-02-26 RX ADMIN — OXYCODONE HYDROCHLORIDE 10 MG: 100 SOLUTION ORAL at 19:04

## 2018-02-26 RX ADMIN — GABAPENTIN 400 MG: 250 SOLUTION ORAL at 23:35

## 2018-02-26 RX ADMIN — POTASSIUM CHLORIDE, DEXTROSE MONOHYDRATE AND SODIUM CHLORIDE: 150; 5; 900 INJECTION, SOLUTION INTRAVENOUS at 10:47

## 2018-02-26 RX ADMIN — Medication 10 MEQ: at 00:09

## 2018-02-26 RX ADMIN — ACETAMINOPHEN 650 MG: 325 SOLUTION ORAL at 23:36

## 2018-02-26 RX ADMIN — DIPHENHYDRAMINE HYDROCHLORIDE 10 MG/HR: 50 INJECTION, SOLUTION INTRAMUSCULAR; INTRAVENOUS at 03:01

## 2018-02-26 RX ADMIN — OXYCODONE HYDROCHLORIDE 10 MG: 100 SOLUTION ORAL at 05:08

## 2018-02-26 RX ADMIN — ONDANSETRON 4 MG: 2 INJECTION INTRAMUSCULAR; INTRAVENOUS at 12:20

## 2018-02-26 RX ADMIN — OXYCODONE HYDROCHLORIDE 10 MG: 100 SOLUTION ORAL at 23:36

## 2018-02-26 ASSESSMENT — PAIN DESCRIPTION - DESCRIPTORS
DESCRIPTORS: SHARP
DESCRIPTORS: SHARP

## 2018-02-26 NOTE — PHARMACY-ADMISSION MEDICATION HISTORY
"Admission medication history interview status for the 2/25/2018 admission is complete. See Epic admission navigator for allergy information, pharmacy, prior to admission medications and immunization status.     Medication history interview sources:    -Patient was an excellent historian and familiar with all medications and last doses.     Changes made to PTA medication list (reason)  Added:   -N/A  Deleted:  -Gabapentin 250 mg/5 ml solution - Take 12 mls (600 mg) by mouth daily. [Per patient, current dose is 8 mls (400 mg) tid. See below under \"Changed\".]  -Oxycodone 20 mg/ml solution - Take 0.5-0.75 mls (10-15 mg) by mouth every 4 hours as needed for moderate to severe pain. [Per patient, currently using the 5 mg/5ml solution.]  Changed:  -Gabapentin: frequency increased from bid to tid per patient.   -Nortriptyline: dose changed from 30 mg to 20-30 mg at bedtime per patient.     Additional medication history information (including reliability of information, actions taken by pharmacist):  -Per patient, due for leuprolide injection in a couple weeks.  -Patient received an influenza vaccination on 10-3-17.       Prior to Admission medications    Medication Sig Last Dose Taking? Auth Provider   oxyCODONE (ROXICODONE) 5 MG/5ML solution Take 10 mLs (10 mg) by mouth every 4 hours as needed for pain maximum 60 mL per day.  This is a 2 week supply. 2/25/2018 at AM Yes Leonardo Wang MD   nortriptyline (PAMELOR) 10 MG capsule Take 3 capsules (30 mg) by mouth At Bedtime  Patient taking differently: Take 20-30 mg by mouth At Bedtime  2/24/2018 at PM Yes Leonardo Wang MD   hydrOXYzine (ATARAX) 10 MG/5ML syrup Take 12.5 mLs (25 mg) by mouth every 4 hours as needed for anxiety or other (pain) PRN Yes Kael Boyd MD   ondansetron (ZOFRAN ODT) 4 MG ODT tab Take 1 tablet (4 mg) by mouth every 8 hours as needed for nausea or vomiting 2/25/2018 at Unknown time Yes Rand Navarro APRN CNP   menthol (ICY HOT) 5 " % PTCH Apply 1 patch topically every 8 hours as needed for muscle soreness PRN Yes Sapphire Guerra NP   amylase-lipase-protease (ZENPEP) 74509 UNITS CPEP Take 5 capsules (100,000 Units) by mouth 4 times daily (with meals and nightly) Past Week at Unknown time Yes Sapphire Guerra NP   multivitamins with minerals (CERTAVITE/CEROVITE) LIQD liquid 15 mLs by Per Feeding Tube route daily Past Week at Unknown time Yes Sapphire Guerra NP   gabapentin (NEURONTIN) 250 MG/5ML solution 8 mLs (400 mg) by ORAL OR NJ TUBE route 2 times daily  Patient taking differently: 400 mg by ORAL OR NJ TUBE route 3 times daily  2/25/2018 at AM Yes Sapphire Guerra NP   sodium bicarbonate 325 MG tablet 1 tablet (325 mg) by Per Feeding Tube route 4 times daily Past Week at Unknown time Yes Sapphire Guerra NP   scopolamine (TRANSDERM) 72 hr patch Apply 1 patch to hairless area behind one ear if severe nausea and vomiting.  Remove old patch and change every 3 days (72 hours). Past Month at Unknown time Yes Corbin Vaughn APRN CNP   amylase-lipase-protease (ZENPEP) 98010 UNITS CPEP Take 2-3 capsules (40,000-60,000 Units) by mouth Take with snacks or supplements (Snacks) Past Week at Unknown time Yes Meme Hameed MD   senna-docusate (SENOKOT-S;PERICOLACE) 8.6-50 MG per tablet Take 1 tablet by mouth 2 times daily as needed for constipation Past Month at Unknown time Yes Meme Hameed MD   pantoprazole (PROTONIX) 40 MG EC tablet Take 1 tablet (40 mg) by mouth 2 times daily (before meals) Past Week at Unknown time Yes Meme Hameed MD   polyethylene glycol (MIRALAX/GLYCOLAX) Packet Take 17 g by mouth daily Past Week at Unknown time Yes Meme Hameed MD   norethindrone (AYGESTIN) 5 MG tablet Take 1 tablet (5 mg) by mouth daily 2/24/2018 at PM Yes Ysabel Goddard NP   fluticasone (FLONASE) 50 MCG/ACT spray Spray 2 sprays into both nostrils daily PRN Yes Ysabel Goddard, CATIA   levalbuterol  (XOPENEX HFA) 45 MCG/ACT Inhaler Inhale 2 puffs into the lungs every 6 hours as needed for shortness of breath / dyspnea PRN Yes Ysabel Goddard NP   RIZATRIPTAN BENZOATE PO Take 5 mg by mouth once as needed  PRN Yes Adithya Haji MD   order for DME Equipment being ordered: TENS with wire and electrode. Unknown at Unknown time  Sapphire Guerra NP   leuprolide (LUPRON DEPOT) 11.25 MG injection Inject 11.25 mg into the muscle every 3 months More than a month at Unknown time  Mary Anne Aranda MD         Medication history completed by: Brooks Dhaliwal, Pharmacy Intern

## 2018-02-26 NOTE — PROGRESS NOTES
Observation goals to be met prior to discharge:   1.) Tolerating medications: Yes. PO pain medication, IV nausea medications.  2.) Fluids per GJ tube: No. GJ tube clamped.  3.) Labs and VSS: Awaiting stool sample, K replaced. VSS.

## 2018-02-26 NOTE — PLAN OF CARE
Problem: Nausea/Vomiting (Adult)  Goal: Identify Related Risk Factors and Signs and Symptoms  Related risk factors and signs and symptoms are identified upon initiation of Human Response Clinical Practice Guideline (CPG).   Outcome: No Change  /83  Temp 98.7  F (37.1  C) (Oral)  Resp 16  Wt 63.9 kg (140 lb 12.8 oz)  SpO2 99%  BMI 22.73 kg/m2    VSS on RA. Pain managed with oxycodone and lidocaine patch. Nausea is managed with scopolamine patch and IV benadryl. She did not require any IV zofran. Stool sample still needed to r/o c-diff. Potassium is currently being replaced. Pt will need one more bag and then recheck will be 2 hours after it is done infusing. GJ tube clamped. Pt calls appropriately and is able to make needs known. Will continue to monitor and follow POC.     Observation goals:  Tolerating medications-- Yes. Pt continues to c/o nausea. 1x dose of IV benadryl given with relief. Pt did not want any more IV zofran at this time.   Fluids per GJ tube--- No. GJ tube clamped.   VSS and Labs--- VSS. Stool sample still needed.

## 2018-02-26 NOTE — PROGRESS NOTES
[facility], [place]    Internal Medicine Progress Note - Gold Service      Assessment & Plan   Alexandra Melgoza is a 22 year old female  admitted on 2/25/2018. She has PMH of asthma, chronic abdominal pain s/p cholecystectomy, cyclic N/V, migraines, pseudoseizures and somatoform disorder who presents to the ED with complaints of 4 day hx of nausea, non-bloody emesis, diarrhea, and RUQ abdominal pain. Patient admitted to Medical Observation for further care.      Of note, patient with recent admissions 12/6/17 for fever and abdominal pain, 1/3-1/4/17 for acute on chronic abdominal pain, 1/18-1/23/18 for post ERCP pain, n/v, and 2/8-2/10/18 for post PEGJ pain control.      A/c abdominal pain, Nausea, vomiting, diarrhea: Presented to the ED with complaints of  nausea, non-bloody emesis, diarrhea, and RUQ abdominal pain x 4 days. Also reports inability to tolerate anything per feeding tube. Has been in ED 3x since 2/22/18. Patient reports recent GI illness in family members. EGD in 12/2017 w/ patchy gastritis in the stomach- more pronounced in the cardia, normal esophagus and duodenum. MRCP 12/2017 w/ normal secretin stimulation study, stable appearance of pancreas w/o focal mass or ductal dilatation, post op sravani changes w/o extrahepatic biliary dilatation. EUS in 12/2016 did not meet criteria for chronic pancreatitis. NM hepatobiliary scan in 04/2015 normal. Pancreatic elastase normal 12/2017. Gastric bicarb fluid w/ 13. Follows w/ Dr. Narayanan with recent PEG-J placement (2/8/18) . Pain consult on recent admissons. Lipase 67, LFT's wnl, LA 0.7, CBC wnl, UA negative for infection, HCG negative. SIPC 3x weekly for IVF, antiemetics, IV benadryl (2/21/18). Received IVF, Zofran in the ED with mild improvement in sx. Unclear source of sx, though consider opiate withdrawal (received rx on 2/14/18 for 14 days or 10mg oxycodone q4h and hx of running out or rx early) vs Viral gastritis vs  functional. Follows with Dr. Narayanan and Dr[facility]  - GI consulted and appreciate rec's  - Continue IVFs until tolerating PO diet  - Restart TFs today  - Continue prn Zofran for nausea and scheduled scopolamine patch.   - No further IV Dilaudid indicated  - Continue PTA gabapentin and oxycodone with no additional given to pt at discharge.   - 30 cc water q4h to maintain GJ tube patency  - F/u results of SSCE, C.diff  - Lidoderm patch alternating with ICY Hot patch  - Cold packs PRN  - Repeat BMP and Mg tomorrow am.     Acute recurrent abnormal facial movements:  Has had two episodes of what appear to be dystonic reactions involving her lips that have resolved after receiving one time IV Benadryl injections. Pt feels developed 2/2 Zofran use, however, d/w pharmacy and not a noted adverse reaction to Zosyn.   - Continue to monitor and avoid further IV Benadryl if sxs recur out of concern for inappropriate use assuming no ongoing other s/s of developing anaphylaxis.      Hypokalemia: Resolved s/p replacement on admission. Mg WNL.  - Monitor and replace per protocol     Anxiety and Depression: Mild increase in sx d/t a/c illness.    - Continue PTA atarax, nortriptyline.      # Pain Assessment:   Current Pain Score 2/26/2018 2/26/2018 2/25/2018   Patient currently in pain? denies yes yes   Pain score (0-10) - - -   Pain location - Abdomen Abdomen   Pain descriptors - - Sharp;Stabbing   - Alexandra is experiencing pain due to A/C abd pain and retching induced pain at recently placed GJ tube. Pain management was discussed and the plan was created in a collaborative fashion.  Alexandra's response to the current recommendations: engaged  - Please see the plan for pain management as documented above      Diet: Clear Liquid Diet  Fluids: D5 +NS +KCL at 125 ml/hr  DVT Prophylaxis: Pneumatic Compression Devices  Code Status: Full Code    Disposition Plan   Expected discharge: 1-2 days, recommended to prior  "living arrangement once patient tolerating TFs and PO intake (for comfort), no further diarrhea, and nausea returns to baseline..     Entered: Herberth Kerns 02/26/2018, 1:02 PM   Information in the above section will display in the discharge planner report.      The patient's care was discussed with the Attending Physician, Dr. Fernandez and Bedside Nurse.      Evangelista Kerns PA-C  Internal Medicine Hospitalist   [facility]  [phone]     Interval History   Had episode of facial twitching concerning for dystonia and received a dose of IV Benadryl and sxs resolved. Had recurrence later in day and resolved after receiving another one time dose of IV Benadryl. Nausea continues with no PO intake. Dry heaves this am but none since. States baseline abd pain slightly worse due to wretching. Feels sxs due to \"stomach flu\" as her family members recently had this. No BM as of yet today. Denies other physical concerns at this time including fever, chills, chest pain, SOB    Data reviewed today: I reviewed all medications, new labs and imaging results over the last 24 hours. I personally reviewed     Physical Exam   Vital Signs: Temp: 98.8  F (37.1  C) Temp src: Oral BP: 127/83   Heart Rate: 73 Resp: 16 SpO2: 98 % O2 Device: None (Room air)    Weight: 140 lbs 12.8 oz  GEN: In NAD  HEENT: NCAT; PERRL; sclerae non-icteric  LUNGS: CTAB  CV: RRR  ABD: +BSs; soft, ND, mildly tender over epigastrium and RUQ; GJ tube intact  EXT: No BLE edema  SKIN: No acute rashes noted on exposed areas.  NEURO: AAOx3; CNs grossly intact; No acute focal deficits noted.         Data   CMP  Recent Labs  Lab 02/26/18  0810 02/26/18  0215 02/25/18  1600 02/24/18  1553 02/22/18  2127    141 142 139 140   POTASSIUM 4.2 4.2 3.3* 3.5 3.3*   CHLORIDE 109 110* 107 104 105   CO2 25 23 25 32 29   ANIONGAP 8 8 10 3 6   GLC 72 83 82 116* 84   BUN 7 6* 6* 9 7   CR 0.64 0.65 0.64 0.69 0.54   GFRESTIMATED >90 >90 >90 >90 >90 "   GFRESTBLACK >90 >90 >90 >90 >90   RENEA 8.3* 8.4* 9.0 9.1 8.6   MAG  --  1.9 2.1  --   --    PHOS  --   --  3.2  --   --    PROTTOTAL 6.2*  --  7.4 7.0 6.7*   ALBUMIN 3.7  --  4.4 4.2 3.9   BILITOTAL 0.7  --  0.4 0.2 0.2   ALKPHOS 79  --  93 95 88   AST 16  --  21 25 23   ALT 24  --  26 32 29     CBC  Recent Labs  Lab 02/26/18  0810 02/25/18  1600 02/24/18  1553 02/22/18  2127   WBC 4.5 8.0 4.4 5.0   RBC 3.51* 3.90 3.82 3.64*   HGB 10.7* 11.9 11.7 11.1*   HCT 32.1* 35.1 34.1* 32.8*   MCV 92 90 89 90   MCH 30.5 30.5 30.6 30.5   MCHC 33.3 33.9 34.3 33.8   RDW 13.1 13.0 12.7 12.6    272 239 220       Lab Results   Component Value Date    LIPASE 67 02/25/2018    LIPASE 72 02/24/2018

## 2018-02-26 NOTE — PLAN OF CARE
Tolerating Medications, fluids per GJ tube.   Labs Blood sugar low. 61, 68,71,79,98 Fluids started with Dextrose. Will continue to monitor.     VSS Stable

## 2018-02-26 NOTE — PLAN OF CARE
Pt had an episode of twitching cheek and chin after receiving IV Zofran. MDs aware. IV Benadryl IV push was given with an ice pack for pain. Pt was tearful. This happened in the night as well. (early am) MD working on plans for combating this and nausea.

## 2018-02-26 NOTE — PROVIDER NOTIFICATION
Patient c/o R facial twitching, states this has happened in the past following multiple doses of Zofran. Gold cross cover notified.

## 2018-02-26 NOTE — PLAN OF CARE
Problem: Patient Care Overview  Goal: Discharge Needs Assessment  1.) Tolerating medications: NO-remains nauseated.   2.) Fluids per GJ tube: No. J clamped and G tube to gravity  3.) Labs and VSS: Awaiting stool sample. VSS

## 2018-02-26 NOTE — PROGRESS NOTES
Observation goals to be met prior to discharge:   1.) Tolerating medications: Facial twitching/drooping after dose of IV Zofran, currently has IV Benadryl infusing to correct dystonic reaction to high amount of Zofran given.   2.) Fluids per GJ tube: No. GJ tube clamped.  3.) Labs and VSS: Awaiting stool sample, K improved after replacement. VSS.

## 2018-02-26 NOTE — PLAN OF CARE
Pt mother called this afternoon. RN unaware. Some confusion with Zofran was reported. RN called and left a message on mom's phone.

## 2018-02-26 NOTE — PROGRESS NOTES
CLINICAL NUTRITION SERVICES - ASSESSMENT NOTE     Nutrition Prescription      Malnutrition Status:    Severe malnutrition in the context of acute on chronic illness    Future/Additional Recommendations:  Tube Feeding Recommendations:   -Resume Peptamen 1.5 @ 5 ml/hr with advancement by 5 ml/hr q 8 hours (or as tolerated per patient) towards goal of 50 ml/hr.  Goal/home TF regimen provides 100% of assessed needs.     -Free water flushes of 60 ml q 4 hours for tube patency.     -Certavite (MVI with minerals) 15 ml daily    -Once begin TFs, begin the following pancreatic enzyme regimen and recommend order each of the following:   (please copy and paste administration instructions into each order)  A) Sodium Bicarb tablet (325 mg), 1 tablet q 4 hrs via Jtube. Administration Instructions: Crush 1 tablet and mix into 15 ml of warm water and use this solution to mix with Creon pancreatic enzymes. DO NOT administer directly into Jtube (to be mixed into TF formula with Zenpep enzyme - see Zenpep enzyme order)   B) Zenpep  60095, 1- 2 capsules q 4 hrs via Jtube. Administration Instructions:   --If TF rate is running @ 10-30 ml/hr, administer 1 capsule q 4 hrs;   --If TF rate is running @ 40-50 ml/hr, increase to 2 capsules q 4 hrs.    **Open capsule and empty contents into 15 ml sodium bicarb solution (see sodium bicarb order), let dissolve for about 20-30 minutes and then add this solution to the amount of TF formula hung in TF bag every 4 hrs (i.e., once TF @ goal infusion 50 ml/hr will mix 2 capsules into 200 ml of TF formula every 4 hrs).   *Note: this enzyme regimen with TF @ goal infusion will provide approximately 3582 units of lipase/gram of total Fat daily and approp dosing initially for pancreatic insufficiency with more elemental TF formula.            REASON FOR ASSESSMENT  Alexandra Melgoza is a/an 22 year old female assessed by the dietitian for Admission Nutrition Risk Screen for unintentional loss of 10# or more  "in the past two months, reduced oral intake over the last month and tube feeding or parenteral nutrition    NUTRITION HISTORY  PMH of abdominal pain, refractory nausea and vomiting    PEG-J placed 2/8/18.  Acute worsening of N/V and abdominal pain after placement.  Patient reports tolerating TF well, had gotten up to 40-45 ml/hr goal rate.  Had been advancing by 5 ml/hr whenever she felt it was appropriate.  She was also tolerating clear liquids well.  Has been mixing enzymes into TF and feels she tolerates TF much better this way (bloating, diarrhea).  She mentions she is interested in trying a gluten free, dairy free TF formula ([employer] product), she is aware she would have to discuss with GI and RD at [facility] as her enzyme needs would change.     Home TF regimen via J-tube -> Peptamen 1.5 @ 50 ml/hr providing 1800 kcals (28 kcal/kg/day), 82 g PRO (1.3 g/kg/day), 924 ml free H2O, 67 g Fat (70% from MCTs), 226 g CHO and no Fiber daily.  She also receives Zenpep 20,000 - 2 caps q 4 hours with TF (providing 3582 units of lipase/g fat/day).      CURRENT NUTRITION ORDERS  Diet: Clear Liquid    LABS  Vitamin D - 15 (low) - 1/23/17  Pancreatic elastase 351 (normal) - 12/11/17    MEDICATIONS  Medications reviewed    ANTHROPOMETRICS  Height: 5'6\"  Most Recent Weight: 63.9 kg (140 lb 12.8 oz)    IBW: 59 kg  BMI: Normal BMI  Weight History: She reports weight loss of 5% over the last 4 days from her UBW of 148#.    Wt Readings from Last 15 Encounters:   02/25/18 63.9 kg (140 lb 12.8 oz)   02/24/18 66.7 kg (147 lb)   02/14/18 66.1 kg (145 lb 12.8 oz)   02/12/18 67.6 kg (149 lb)   02/08/18 67.7 kg (149 lb 4 oz)   02/05/18 67.1 kg (148 lb)   01/31/18 67.6 kg (149 lb)   01/24/18 67.6 kg (149 lb)   01/18/18 67.8 kg (149 lb 7.6 oz)   01/15/18 65.8 kg (145 lb)   01/13/18 64.9 kg (143 lb)   01/10/18 66.9 kg (147 lb 6.4 oz)   01/10/18 66.9 kg (147 lb 6.4 oz)   01/03/18 65.8 kg (145 lb)   01/03/18 64.9 kg (143 lb)   Dosing Weight: 64 " kg (actual wt)    ASSESSED NUTRITION NEEDS  Estimated Energy Needs:7741-2228 kcals/day (25 - 30 kcals/kg)  Justification: Maintenance  Estimated Protein Needs: 77-96 grams protein/day (1.2 - 1.5 grams of pro/kg)  Justification: Repletion  Estimated Fluid Needs: 1 mL/kcal   Justification: Maintenance    PHYSICAL FINDINGS  Pale skin    MALNUTRITION  % Intake: </= 50% for >/= 5 days (severe)  % Weight Loss: > 2% in 1 week (severe)  Subcutaneous Fat Loss: None observed  Muscle Loss: None observed  Fluid Accumulation/Edema: None noted  Malnutrition Diagnosis: Severe malnutrition in the context of acute on chronic illness    NUTRITION DIAGNOSIS  Inadequate enteral nutrition infusion related to increased N/V/D as evidenced by patient unable to take TF over the last 4 days per pt report, weight loss of 5% in 4 days time.      INTERVENTIONS  Implementation  Nutrition Education: Provided education on RD role, TF resumption when appropriate per MD.      Goals  Total avg nutritional intake to meet a minimum of 25 kcal/kg and 1.2 g PRO/kg daily (per dosing wt 64 kg).     Monitoring/Evaluation  Progress toward goals will be monitored and evaluated per protocol.    Lubna Fletcher MS, RD, LD  Pager [phone]

## 2018-02-26 NOTE — PROGRESS NOTES
"SPIRITUAL HEALTH SERVICES  SPIRITUAL ASSESSMENT Progress Note  [facility] ([place]) 6D   ON-CALL VISIT    REFERRAL SOURCE: [facility]      Visited with pt, who spoke about her medical illness.  Stated she has good support system in her parents, fiance, aunts and uncles, and grandparents.  Stated she feels \"fortunate.\"  Offered emotional support and prayer upon request.    PLAN: No follow up at this time, Spiritual Health Services remains available.    Genia Luevano  Chaplain Resident  Pager [phone]  "

## 2018-02-26 NOTE — PROGRESS NOTES
Gabriel Spain's mom called and said Grabiel was admitted this weekend. She would just like to let Dr. Narayanan know she is there.     Advised will send Dr. Narayanan a message.    Ella SARMIETNO RN Coordinator  Dr. Narayanan, Dr. Joe & Dr. Klein   Advanced Endoscopy  [phone]

## 2018-02-26 NOTE — CONSULTS
GASTROENTEROLOGY CONSULTATION      Date of Admission:  2/25/2018          ASSESSMENT AND RECOMMENDATIONS:   ASSESSMENT:  22 year old female with a history of chronic pain of unknown etiology since the age of 8, that worsened post-cholecystectomy (chronic opioid dependence, previously on methadone though was recently restarted back on oxycodone for pain post PEG-J placement) with mildly abnormal liver enzymes (did not respond to sphincter of Oddi ablation a few years ago, lipase has NEVER been elevated > 3x ULN), patchy erosive gastritis seen on EGD Dec 2017 (on PPI), refractory nausea and vomiting (previously labelled as cyclical vomiting syndrome), hx of migraines and ?somatoform disorder who has been admitted under the medicine service for acute worsening of her chronic abdominal pain for the last 4 days requiring multiple ED visits, associated with inability to tolerate PO intake, nausea and vomiting. Gastroenterology has been consulted to assist in the management of her abdominal pain, intractable nausea and vomiting.    RECOMMENDATIONS:  - Follow infectious stool work-up.  - Continue IV fluids as per the primary team.  - Diet as tolerated. Continue pancreatic enzyme supplementation with meals.  - Can continue once daily PPI for her hx of patchy gastritis seen on EGD.  - Will avoid escalation of her opioid therapy. We will not expect pain around the GJ site to be significant this long after PEG-J placement and will recommend chronic pain consultation (either as an inpatient or arranged as an outpatient) to discuss stopping her oxycodone and reinitiation of her methadone. We would also benefit from an ED care plan from chronic pain management for management of her pain on her ED visits.  - PEG-J site looks clean. Tube feeds can be slowly initiated while she is inhouse and can be continued on discharge.  - Will recommend pain psychology consultation as an outpatient.    Gastroenterology team will continue to  follow with you. Thank you for involving us in this patient's care. Please do not hesitate to contact the GI service with any questions or concerns.   Patient care plan has been discussed with Dr. Spain, GI staff physician.    Camilo Mccollum  Gastroenterology Fellow  P [phone]  -------------------------------------------------------------------------------------------------------------------          Chief Complaint:   We were asked by Vickie Gonzalez of the medicine team to evaluate this patient with nausea,vomiting and worsening abdominal pain.  History is obtained from the patient and the medical record.          History of Present Illness:   Alexandra otero is a 22 year old female with a history of chronic abdominal pain of unknown etiology since the age of 8, that worsened post-cholecystectomy (chronic opioid dependence, previously on methadone though was recently restarted back on oxycodone for pain post PEG-J placement) with mildly abnormal liver enzymes (did not respond to sphincter of Oddi ablation a few years ago), patchy erosive gastritis seen on EGD Dec 2017 (on PPI), refractory nausea and vomiting (previously labelled as cyclical vomiting syndrome), hx of migraines and ?somatoform disorder and recent PEG-J placement 2/8/18 with Dr. Narayanan who has been admitted under the medicine service for acute worsening of her chronic abdominal pain for the last 4 days requiring multiple ED visits, associated with inability to tolerate PO intake, nausea and vomiting. Gastroenterology has been consulted to assist in the management of her abdominal pain.    Patient reports that she has had abdominal pain since the age of 8. Due to mildly abnormal LFTs that persisted post cholecystectomy, there was suspected sphincter of Oddi dysfunction and the patient underwent an ERCP on 8/23/11 with biliary sphincterotomy with no improvement in her symptoms. Then there were concerns of chronic pancreatitis but a full evaluation for  "chronic pancreatitis has so far been negative. She had an EUS 6/9/2015 that revealed only 2/9 conventional 2 minor Cobb criteria. A repeat EUS on 12/12/16 revealed an unchanged pancreas. Exocrine function was assessed via aspiration of the duodenal juice for bicarbonate but was 13 mmol/L and normal. An MRCP with secretin in 2015 did show a decreased parenchymal T1 signal (?suggestive of chronic pancreatitis) however it was felt to be unequivocal since pancreatic duct was unremarkable and there was a normal exocrine response to secretin. Repeat MRCP 11/30/16 and 12/7/17 actually revealed a normal T1 signal, normal pancreatic duct and a normal exocrine response to secretin. The only objective evidence for chronic pancreatitis has been her chronically low lipase levels (previously were as low as 8 but recently have been within normal limits) and the fact that her symptoms improved with pancreatic enzyme supplementation (currently on ZENPEP 5 capsules 3 times daily with meals). Patient did have a repeat ERCP on 1/25/18 for mildly elevated AST/ALT and recurrent intractable pain and mildly dilated CBD on imaging which showed a patent prior biliary endoscopic sphincterotomy and a wide open pancreatic orifice. She then underwent a PEG-J placement on 2/8/18 with plans to use G for venting and J for feeding.  Since PEG-J placement, she reports having an overall improvement in her symptoms for 2 weeks. She had moved in with her step father and her mother who were both down with a \"stomach flu\" and since Wednesday, she had acute worsening of her chronic symptoms including worsening abdominal pain, intractable nausea and persistent vomiting. She has been venting her G tube but continues to retch which is a big source of concern for her. She was seen multiple times in the ED for these symptoms and finally it was decided to admit her since she had failed outpatient management of her symptoms. She endorses that her usual " regimen for her nausea that includes Ondansteron and Diphenyhydramine has stopped working. She described the pain as a stabbing, sharp pain in her RUQ that was radiating to her back. Pain is 8/10 on the pain scale (worse from a baseline of 4). She was unable to tolerate PO intake including medications due to her nausea/vomitting and abdominal pain.  In the ED, she was afebrile and hemodynamically stable. Initial labs revealed a normal CBC, normal BUN, Cr and LFTs and mild hyopkalemia. She was admitted for further management of her symptoms.   Of note, she has been following with pain clinic for chronic comprehensive pain management and last visit was 1/2/18 when her oxycodone was discontinued and methadone was added for her pain regimen.  Her Gabapentin and Nortryptyline was continued and a pain psychology consultation was recommended. Since then, she has been hospitalized multiple times with worsening abdominal pain and at some point was taken off methadone and has been managed with liquid oxycodone.            Past Medical History:   Reviewed and edited as appropriate  Past Medical History:   Diagnosis Date     Anxiety      Asthma      Cholecystitis     s/p cholecystectomy     Chronic abdominal pain      Chronic infection     mrsa     Chronic pain      Cyclic vomiting syndrome 10/27/2012     Depression      Endometriosis      Hypoglycaemia      Migraines      Mild intermittent asthma      Ovarian cysts      Pancreatic disease      PONV (postoperative nausea and vomiting)      Pseudoseizures      Somatoform disorder      Sphincter of Oddi dysfunction      Vasovagal syncope             Past Surgical History:   Reviewed and edited as appropriate   Past Surgical History:   Procedure Laterality Date     ABDOMEN SURGERY      ERCP, biliary stents     CHOLECYSTECTOMY  8/2/11     COLONOSCOPY  2011    negative finding     ENDOSCOPIC RETROGRADE CHOLANGIOPANCREATOGRAM  8/23/2011    Procedure:ENDOSCOPIC RETROGRADE  CHOLANGIOPANCREATOGRAM; Endoscopic Retrograde Cholangiopancreatogram; Surgeon:CAMPBELL NARAYANAN; Location:[facility]     ENDOSCOPIC RETROGRADE CHOLANGIOPANCREATOGRAM  5/17/2012    Procedure:ENDOSCOPIC RETROGRADE CHOLANGIOPANCREATOGRAM; Endoscopic Retrograde Cholangiopancreatogram with pancreatic stent placement.; Surgeon:CAMPBELL NARAYANAN; Location:[facility]     ENDOSCOPIC RETROGRADE CHOLANGIOPANCREATOGRAM N/A 1/18/2018    Procedure: COMBINED ENDOSCOPIC RETROGRADE CHOLANGIOPANCREATOGRAPHY, PLACE TUBE/STENT;  Endoscopic Retrograde Cholangiopancreatography with nasojejunal feeding tube placement;  Surgeon: Campbell Narayanan MD;  Location: [facility]     ENDOSCOPIC RETROGRADE CHOLANGIOPANCREATOGRAM COMPLEX  1/3/2012    Procedure:ENDOSCOPIC RETROGRADE CHOLANGIOPANCREATOGRAM COMPLEX; Endoscopic Retrograde Cholangiopancreatogram with Manometry bile duct sphincterotomy extention pancreatic duct sphincterotomy pancreatic duct stent placement; Surgeon:CAMPBELL NARAYANAN; Location:[facility]     ENDOSCOPIC ULTRASOUND UPPER GASTROINTESTINAL TRACT (GI) N/A 6/9/2015    Procedure: ENDOSCOPIC ULTRASOUND, ESOPHAGOSCOPY / UPPER GASTROINTESTINAL TRACT (GI);  Surgeon: Mario Joe MD;  Location: [facility]     ENDOSCOPIC ULTRASOUND UPPER GASTROINTESTINAL TRACT (GI) N/A 12/12/2016    Procedure: ENDOSCOPIC ULTRASOUND, ESOPHAGOSCOPY / UPPER GASTROINTESTINAL TRACT (GI);  Surgeon: Guru Jose Klein MD;  Location: [facility]     ESOPHAGOSCOPY, GASTROSCOPY, DUODENOSCOPY (EGD), COMBINED  1/18/2012    Procedure:COMBINED ESOPHAGOSCOPY, GASTROSCOPY, DUODENOSCOPY (EGD); Surgeon:ARNIE ESPINOZA; Location:[facility]     ESOPHAGOSCOPY, GASTROSCOPY, DUODENOSCOPY (EGD), COMBINED  1/18/2012    Procedure:COMBINED ESOPHAGOSCOPY, GASTROSCOPY, DUODENOSCOPY (EGD); EGD; Surgeon:ARNIE ESPINOZA; Location[facility]     ESOPHAGOSCOPY, GASTROSCOPY, DUODENOSCOPY (EGD), COMBINED N/A 12/9/2017    Procedure: COMBINED ESOPHAGOSCOPY, GASTROSCOPY, DUODENOSCOPY  (EGD);;  Surgeon: Josias Chan MD;  Location: [facility]     INSERT PORT VASCULAR ACCESS       L knee arthroscopy  2009     LAPAROSCOPY DIAGNOSTIC (GYN)  10/26/2012    Procedure: LAPAROSCOPY DIAGNOSTIC (GYN);  LAPAROSCOPY DIAGNOSTIC, CAUTERY ENDOMETRIOISIS and biopsy of Fallopian tube lesions;  Surgeon: Carla Lopez MD;  Location:  OR     ORTHOPEDIC SURGERY  2008    knee     REMOVE AND REPLACE BREAST IMPLANT PROSTHESIS N/A 2/8/2018    Procedure: PERCUTANEOUS INSERTION TUBE JEJUNOSTOMY;  upper endoscopy with percutaneous gastrojejunostimy feeding tuble placement and gastropexy;  Surgeon: Campbell Narayanan MD;  Location: [facility]     VASCULAR SURGERY              Previous Endoscopy:   No results found. However, due to the size of the patient record, not all encounters were searched. Please check Results Review for a complete set of results.         Social History:   Reviewed and edited as appropriate  Social History     Social History     Marital status: Single     Spouse name: N/A     Number of children: N/A     Years of education: N/A     Occupational History     Not on file.     Social History Main Topics     Smoking status: Never Smoker     Smokeless tobacco: Never Used     Alcohol use No     Drug use: No     Sexual activity: No     Other Topics Concern     Parent/Sibling W/ Cabg, Mi Or Angioplasty Before 65f 55m? 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, and works part-time     Focusing on DBT therapy - individual and group therapy    Currently living with her mother at her grandmother's home        Considering going to nursing school            Family History:   Reviewed and edited as appropriate  No known history of gastrointestinal/liver disease or  gastrointestinal malignancies         Allergies:   Reviewed and edited as appropriate     Allergies   Allergen Reactions     Amoxicillin-Pot Clavulanate Nausea and Vomiting     Compazine [Prochlorperazine] Other (See Comments)      "Dystonia       Hyoscyamine Other (See Comments)     Dystonia     Reglan [Metoclopramide Hcl] Other (See Comments)     Dystonia     Zyprexa Other (See Comments)     Sensitive, dystonic reaction on 11-9-2011     Amitriptyline Hcl Other (See Comments)     Dystonia, hallucinations     Buspirone Other (See Comments)     No Adverse Reactions, no benefit     Cogentin [Benztropine]      Cyproheptadine Other (See Comments)     Distonic     Dicyclomine Other (See Comments)     Droperidol Other (See Comments)     Feels tense and \"like she has to jump out of her skin\".       Effexor [Venlafaxine] Other (See Comments)     Dystonia     Food      Cilantro--lips/tongue swelling     No Clinical Screening - See Comments Other (See Comments)     Cilantro     Phenergan Dm [Promethazine-Dm] Other (See Comments)     dystonia     Promethazine Other (See Comments)     Risperidone Other (See Comments)     dystonia     Vistaril Other (See Comments)     Burning sensation.       Augmentin GI Disturbance     Ketamine Other (See Comments)     jittery     Sorbitol GI Disturbance     Headache and dyspepsia            Medications:     Current Facility-Administered Medications   Medication     dextrose 5% and 0.9% NaCl with potassium chloride 20 mEq infusion     dextrose 5% lactated ringers 5 % infusion     dextrose 5% and 0.9% NaCl with potassium chloride 20 mEq 20-5-0.9 MEQ/L-%-% infusion     potassium chloride 10 mEq in 100 mL sterile water intermittent infusion (premix)     acetaminophen (TYLENOL) Suppository 650 mg     naloxone (NARCAN) injection 0.1-0.4 mg     melatonin tablet 1 mg     ondansetron (ZOFRAN-ODT) ODT tab 4 mg    Or     ondansetron (ZOFRAN) injection 4 mg     lidocaine 1 % 1 mL     lidocaine (LMX4) kit     sodium chloride (PF) 0.9% PF flush 3 mL     sodium chloride (PF) 0.9% PF flush 3 mL     oxyCODONE HCl (ROXICODONE INTENSOL) 10 MG/0.5ML (HIGH CONC) solution 10 mg     scopolamine (TRANSDERM) 72 hr patch 1 patch     Lidocaine " (LIDOCARE) 4 % Patch 1 patch    And     lidocaine patch REMOVAL    And     lidocaine patch in PLACE     potassium chloride SA (K-DUR/KLOR-CON M) CR tablet 20-40 mEq     potassium chloride (KLOR-CON) Packet 20-40 mEq     potassium chloride 10 mEq in 100 mL sterile water intermittent infusion (premix)     potassium chloride 10 mEq in 100 mL intermittent infusion with 10 mg lidocaine     potassium chloride 20 mEq in 50 mL intermittent infusion     magnesium sulfate 4 g in 100 mL sterile water (premade)     potassium phosphate 15 mmol in D5W 250 mL intermittent infusion     potassium phosphate 20 mmol in D5W 500 mL intermittent infusion     potassium phosphate 20 mmol in D5W 250 mL intermittent infusion     potassium phosphate 25 mmol in D5W 500 mL intermittent infusion     [START ON 2/28/2018] scopolamine (TRANSDERM-SCOP) patch REMOVAL     scopolamine (TRANSDERM-SCOP) Patch in Place     menthol (ICY HOT) 5 % patch 1 patch     menthol (ICY HOT) patch REMOVAL     menthol (ICY HOT) Patch in Place             Review of Systems:   A complete review of systems was performed and is negative except as noted in the HPI           Physical Exam:   /70 (BP Location: Right arm)  Temp 97.9  F (36.6  C) (Oral)  Resp 16  Wt 63.9 kg (140 lb 12.8 oz)  SpO2 97%  BMI 22.73 kg/m2  Wt:   Wt Readings from Last 2 Encounters:   02/25/18 63.9 kg (140 lb 12.8 oz)   02/24/18 66.7 kg (147 lb)      Constitutional: cooperative, pleasant, not dyspneic/diaphoretic, no acute distress  Eyes: Sclera anicteric/injected  Ears/nose/mouth/throat: Normal oropharynx without ulcers or exudate, mucus membranes moist, hearing intact  Neck: supple, thyroid normal size  CV: No edema  Respiratory: Unlabored breathing  Lymph: No axillary, submandibular, supraclavicular or inguinal lymphadenopathy  Abd:  Nondistended, +bs, no hepatosplenomegaly, nontender, no peritoneal signs. PEG-J site in RUQ and clean.  Skin: warm, perfused, no jaundice  Neuro: AAO x 3,  No asterixis  Psych: Normal affect  MSK: No gross deformities         Data:   Labs and imaging below were independently reviewed and interpreted    BMP  Recent Labs  Lab 02/26/18  0810 02/26/18  0215 02/25/18  1600 02/24/18  1553    141 142 139   POTASSIUM 4.2 4.2 3.3* 3.5   CHLORIDE 109 110* 107 104   RENEA 8.3* 8.4* 9.0 9.1   CO2 25 23 25 32   BUN 7 6* 6* 9   CR 0.64 0.65 0.64 0.69   GLC 72 83 82 116*     CBC  Recent Labs  Lab 02/26/18  0810 02/25/18  1600 02/24/18  1553 02/22/18 2127   WBC 4.5 8.0 4.4 5.0   RBC 3.51* 3.90 3.82 3.64*   HGB 10.7* 11.9 11.7 11.1*   HCT 32.1* 35.1 34.1* 32.8*   MCV 92 90 89 90   MCH 30.5 30.5 30.6 30.5   MCHC 33.3 33.9 34.3 33.8   RDW 13.1 13.0 12.7 12.6    272 239 220     INRNo lab results found in last 7 days.  LFTs  Recent Labs  Lab 02/26/18  0810 02/25/18 1600 02/24/18  1553 02/22/18  2127   ALKPHOS 79 93 95 88   AST 16 21 25 23   ALT 24 26 32 29   BILITOTAL 0.7 0.4 0.2 0.2   PROTTOTAL 6.2* 7.4 7.0 6.7*   ALBUMIN 3.7 4.4 4.2 3.9      PANC  Recent Labs  Lab 02/25/18  1600 02/24/18  1553 02/22/18  2127   LIPASE 67* 72* 70*       IMAGING:

## 2018-02-26 NOTE — PROGRESS NOTES
Care Coordinator Progress Note     Admission Date/Time:  2/25/2018  Attending MD:  Nadeen Mcrae,*     Data  Chart reviewed, discussed with interdisciplinary team.   Patient was admitted for:    Vomiting and diarrhea  Pain of upper abdomen.    Concerns with insurance coverage for discharge needs: None.  Current Living Situation: Patient living with her mother.  Support System: Supportive and Involved  Services Involved: Home Infusion  Transportation: Family or Friend will provide  Barriers to Discharge: pending medical clearance    Coordination of Care and Referrals: Provided patient/family with options for Home Infusion.        Assessment  Spoke with patient at bedside.  Introduced RNCC role and discharge planning.  Patient is staying with her mother right now.  Her fiance is going to be moving here from [place] and then they will find a home.  Patient uses [facility] for tube feedings through PEGJ.  Patient is up independently and steady on her feet.  Patient states her mom should be able to provide transportation upon discharge.    [facility]  Phone [phone]  Fax  [phone]  __________________     Plan  Anticipated Discharge Date:  1-2 days  Anticipated Discharge Plan:  Home with home infusion    Ysabel Vázquez RN, BSN  Care Coordinator [facility]  Pager: [phone]  Phone: [phone]

## 2018-02-26 NOTE — PLAN OF CARE
Tolerating Medications, fluids per GJ tube.  Not at this time  Labs Stable at this time.   VSS stable at this time. BP slightly soft. 105/70

## 2018-02-27 ENCOUNTER — APPOINTMENT (OUTPATIENT)
Dept: GENERAL RADIOLOGY | Facility: CLINIC | Age: 23
End: 2018-02-27
Attending: PHYSICIAN ASSISTANT
Payer: COMMERCIAL

## 2018-02-27 VITALS
TEMPERATURE: 99 F | BODY MASS INDEX: 22.73 KG/M2 | HEART RATE: 95 BPM | OXYGEN SATURATION: 100 % | WEIGHT: 140.8 LBS | DIASTOLIC BLOOD PRESSURE: 88 MMHG | SYSTOLIC BLOOD PRESSURE: 119 MMHG | RESPIRATION RATE: 16 BRPM

## 2018-02-27 LAB
ANION GAP SERPL CALCULATED.3IONS-SCNC: 7 MMOL/L (ref 3–14)
BUN SERPL-MCNC: 6 MG/DL (ref 7–30)
CALCIUM SERPL-MCNC: 8.8 MG/DL (ref 8.5–10.1)
CHLORIDE SERPL-SCNC: 108 MMOL/L (ref 94–109)
CO2 SERPL-SCNC: 26 MMOL/L (ref 20–32)
CREAT SERPL-MCNC: 0.56 MG/DL (ref 0.52–1.04)
GFR SERPL CREATININE-BSD FRML MDRD: >90 ML/MIN/1.7M2
GLUCOSE BLDC GLUCOMTR-MCNC: 107 MG/DL (ref 70–99)
GLUCOSE BLDC GLUCOMTR-MCNC: 94 MG/DL (ref 70–99)
GLUCOSE BLDC GLUCOMTR-MCNC: 96 MG/DL (ref 70–99)
GLUCOSE SERPL-MCNC: 142 MG/DL (ref 70–99)
MAGNESIUM SERPL-MCNC: 1.9 MG/DL (ref 1.6–2.3)
PHOSPHATE SERPL-MCNC: 4.6 MG/DL (ref 2.5–4.5)
POTASSIUM SERPL-SCNC: 4 MMOL/L (ref 3.4–5.3)
SODIUM SERPL-SCNC: 141 MMOL/L (ref 133–144)

## 2018-02-27 PROCEDURE — 99217 ZZC OBSERVATION CARE DISCHARGE: CPT | Performed by: PHYSICIAN ASSISTANT

## 2018-02-27 PROCEDURE — 74018 RADEX ABDOMEN 1 VIEW: CPT

## 2018-02-27 PROCEDURE — 36592 COLLECT BLOOD FROM PICC: CPT | Performed by: PHYSICIAN ASSISTANT

## 2018-02-27 PROCEDURE — 00000146 ZZHCL STATISTIC GLUCOSE BY METER IP

## 2018-02-27 PROCEDURE — 25000132 ZZH RX MED GY IP 250 OP 250 PS 637: Performed by: PHYSICIAN ASSISTANT

## 2018-02-27 PROCEDURE — 27210436 ZZH NUTRITION PRODUCT SEMIELEM INTERMED CAN

## 2018-02-27 PROCEDURE — 84100 ASSAY OF PHOSPHORUS: CPT | Performed by: PHYSICIAN ASSISTANT

## 2018-02-27 PROCEDURE — G0378 HOSPITAL OBSERVATION PER HR: HCPCS

## 2018-02-27 PROCEDURE — 80048 BASIC METABOLIC PNL TOTAL CA: CPT | Performed by: PHYSICIAN ASSISTANT

## 2018-02-27 PROCEDURE — 83735 ASSAY OF MAGNESIUM: CPT | Performed by: PHYSICIAN ASSISTANT

## 2018-02-27 PROCEDURE — 25800025 ZZH RX 258: Performed by: PHYSICIAN ASSISTANT

## 2018-02-27 PROCEDURE — 99207 ZZC CDG-CHARGE REQUIRED MANUAL ENTRY: CPT | Performed by: PHYSICIAN ASSISTANT

## 2018-02-27 RX ORDER — OXYCODONE HCL 5 MG/5 ML
10 SOLUTION, ORAL ORAL EVERY 4 HOURS PRN
Qty: 210 ML | Refills: 0 | Status: SHIPPED | OUTPATIENT
Start: 2018-02-27 | End: 2018-03-02

## 2018-02-27 RX ORDER — ONDANSETRON 4 MG/1
4 TABLET, ORALLY DISINTEGRATING ORAL EVERY 8 HOURS PRN
Qty: 15 TABLET | Refills: 0 | Status: SHIPPED | OUTPATIENT
Start: 2018-02-27 | End: 2018-04-05

## 2018-02-27 RX ORDER — NORTRIPTYLINE HCL 10 MG
30 CAPSULE ORAL AT BEDTIME
Status: DISCONTINUED | OUTPATIENT
Start: 2018-02-27 | End: 2018-02-27

## 2018-02-27 RX ORDER — NORTRIPTYLINE HYDROCHLORIDE 10 MG/5ML
30 SOLUTION ORAL AT BEDTIME
Status: DISCONTINUED | OUTPATIENT
Start: 2018-02-27 | End: 2018-02-27

## 2018-02-27 RX ORDER — HEPARIN SODIUM (PORCINE) LOCK FLUSH IV SOLN 100 UNIT/ML 100 UNIT/ML
5 SOLUTION INTRAVENOUS ONCE
Status: DISCONTINUED | OUTPATIENT
Start: 2018-02-27 | End: 2018-02-27 | Stop reason: HOSPADM

## 2018-02-27 RX ORDER — SODIUM BICARBONATE 325 MG/1
325 TABLET ORAL 4 TIMES DAILY
Qty: 120 TABLET | Refills: 0 | Status: SHIPPED | OUTPATIENT
Start: 2018-02-27

## 2018-02-27 RX ORDER — SODIUM BICARBONATE 325 MG/1
325 TABLET ORAL EVERY 4 HOURS
Status: DISCONTINUED | OUTPATIENT
Start: 2018-02-27 | End: 2018-02-27 | Stop reason: HOSPADM

## 2018-02-27 RX ORDER — OXYCODONE HCL 5 MG/5 ML
10 SOLUTION, ORAL ORAL EVERY 4 HOURS PRN
Qty: 210 ML | Refills: 0 | Status: SHIPPED | OUTPATIENT
Start: 2018-02-27 | End: 2018-02-27

## 2018-02-27 RX ORDER — NORTRIPTYLINE HYDROCHLORIDE 10 MG/5ML
30 SOLUTION ORAL AT BEDTIME
Status: DISCONTINUED | OUTPATIENT
Start: 2018-02-27 | End: 2018-02-27 | Stop reason: HOSPADM

## 2018-02-27 RX ADMIN — ACETAMINOPHEN: 325 SOLUTION ORAL at 17:45

## 2018-02-27 RX ADMIN — ACETAMINOPHEN 650 MG: 325 SOLUTION ORAL at 09:27

## 2018-02-27 RX ADMIN — OXYCODONE HYDROCHLORIDE 10 MG: 100 SOLUTION ORAL at 15:19

## 2018-02-27 RX ADMIN — POTASSIUM CHLORIDE, DEXTROSE MONOHYDRATE AND SODIUM CHLORIDE: 150; 5; 900 INJECTION, SOLUTION INTRAVENOUS at 12:14

## 2018-02-27 RX ADMIN — OXYCODONE HYDROCHLORIDE 10 MG: 100 SOLUTION ORAL at 06:31

## 2018-02-27 RX ADMIN — HYDROXYZINE HYDROCHLORIDE 25 MG: 10 SYRUP ORAL at 18:24

## 2018-02-27 RX ADMIN — GABAPENTIN 400 MG: 250 SOLUTION ORAL at 14:19

## 2018-02-27 RX ADMIN — OXYCODONE HYDROCHLORIDE 10 MG: 100 SOLUTION ORAL at 11:08

## 2018-02-27 RX ADMIN — SODIUM BICARBONATE 325 MG: 325 TABLET ORAL at 14:05

## 2018-02-27 RX ADMIN — MULTIVITAMIN 15 ML: LIQUID ORAL at 14:19

## 2018-02-27 RX ADMIN — HYDROXYZINE HYDROCHLORIDE 25 MG: 10 SYRUP ORAL at 09:28

## 2018-02-27 RX ADMIN — HYDROXYZINE HYDROCHLORIDE 25 MG: 10 SYRUP ORAL at 14:19

## 2018-02-27 RX ADMIN — GABAPENTIN 400 MG: 250 SOLUTION ORAL at 09:27

## 2018-02-27 RX ADMIN — POTASSIUM CHLORIDE, DEXTROSE MONOHYDRATE AND SODIUM CHLORIDE: 150; 5; 900 INJECTION, SOLUTION INTRAVENOUS at 03:56

## 2018-02-27 RX ADMIN — MENTHOL 1 PATCH: 205.5 PATCH TOPICAL at 09:28

## 2018-02-27 ASSESSMENT — PAIN DESCRIPTION - DESCRIPTORS: DESCRIPTORS: SHARP

## 2018-02-27 NOTE — DISCHARGE SUMMARY
[facility], [place]    Internal Medicine Discharge Summary- Gold Service    Date of Admission:  2/25/2018  Date of Discharge:  2/27/2018  Discharging Provider: Evangelista Kerns PA-C; Dr. Wilfrido Fernandez MD  Discharge Team: [facility]    Discharge Diagnoses   Acute on chronic abd pain, nausea, vomiting and hx of diarrhea  Acute recurrent abnormal facial movements, resolved  Hypokalemia, resolved  Anxiety  Depression    Follow-ups Needed After Discharge   Follow up with your family physician later this week or next week for hospital follow up and further evaluation of your nausea and abd pain.    Follow up with your pain clinic as rescheduled on 3/5.    Hospital Course   Alexandra Melgoza is a 22 year old female  admitted on 2/25/2018. She has PMH of asthma, chronic abdominal pain s/p cholecystectomy, cyclic N/V, migraines, pseudoseizures and somatoform disorder who presents to the ED with complaints of 4 day hx of nausea, non-bloody emesis, diarrhea, and RUQ abdominal pain. Patient admitted to Medical Observation for further care.       Of note, patient with recent admissions 12/6/17 for fever and abdominal pain, 1/3-1/4/17 for acute on chronic abdominal pain, 1/18-1/23/18 for post ERCP pain, n/v, and 2/8-2/10/18 for post PEGJ pain control.       A/c abdominal pain, Nausea, vomiting, diarrhea: Presented to the ED with complaints of  nausea, non-bloody emesis, diarrhea, and RUQ abdominal pain x 4 days. Also reports inability to tolerate anything per feeding tube. Has been in ED 3x since 2/22/18. Patient reports recent GI illness in family members. EGD in 12/2017 w/ patchy gastritis in the stomach- more pronounced in the cardia, normal esophagus and duodenum. MRCP 12/2017 w/ normal secretin stimulation study, stable appearance of pancreas w/o focal mass or ductal dilatation, post op sravani changes w/o extrahepatic biliary dilatation. EUS in 12/2016 did not meet criteria for chronic pancreatitis. NM  hepatobiliary scan in 04/2015 normal. Pancreatic elastase normal 12/2017. Gastric bicarb fluid w/ 13. Follows w/ Dr. Narayanan with recent PEG-J placement (2/8/18) . Pain consult on recent admissons. Lipase 67, LFT's wnl, LA 0.7, CBC wnl, UA negative for infection, HCG negative. SIPC 3x weekly for IVF, antiemetics, IV benadryl (2/21/18). Received IVF, Zofran in the ED with mild improvement in sx. Unclear source of sx, though consider opiate withdrawal (received rx on 2/14/18 for 14 days or 10mg oxycodone q4h and hx of running out or rx early) vs Viral gastritis vs functional. Follows with Dr. Narayanan and Dr. Leonardo Wang ([facility]). GI consulted and pt was treated conservatively with IVFs and bowel rest (off TFs first day). Stool studies ordered regarding her reported diarrhea but pt did not have a BM on this admission. Today pt feels nausea and abd pain much improved and now tolerating start of TFs earlier today.   - GI consulted and appreciated rec's  - Continue TFs after discharge and titrate as able to goal  - Diet as tolerated  - Continue pancreatic enzyme and Na bicarb supplementation   - Continue prn Zofran for nausea and scheduled scopolamine patch.   - Continue PTA gabapentin and oxycodone solu (was given 3-4 day script of oxycodone solution 5 mg/5ml, 10 mg q4hrs prn, #210 ml, no refills) at discharge until she follows up again at her pain clinic next Monday 3/5, as this had to be rescheduled as she missed previously scheduled appointment today.   - 30 cc water q4h to maintain GJ tube patency  - Continue Lidoderm patch alternating with ICY Hot patch  - Cold packs PRN  - Continue daily PPI    Acute recurrent abnormal facial movements, resolved:  Had two episodes of what appeared to be dystonic reactions involving her lips yesterday that resolved after receiving one time IV Benadryl injections. Pt feels developed 2/2 Zofran use, however, d/w pharmacy and not a noted adverse reaction to Zosyn.   - Would  avoid further IV Benadryl if sxs recur out of concern for inappropriate use assuming no ongoing other s/s of developing anaphylaxis.       Hypokalemia: Resolved s/p replacement on admission. Mg WNL.      Anxiety and Depression: Mild increase in sx d/t a/c illness.    - Continue PTA atarax, nortriptyline.    # Discharge Pain Plan:  - During her hospitalization, Alexandra experienced abd pain due to chronic pancreatitis.  The pain plan for discharge was discussed with Alexandra and the plan was created in a collaborative fashion.    - Follow-up: PCP later this week no later than next week for further evaluation and management of chronic abd pain.     Consultations This Hospital Stay   MEDICATION HISTORY IP PHARMACY CONSULT  GI PANCREATICOBILIARY ADULT IP CONSULT  NUTRITION SERVICES ADULT IP CONSULT  PHARMACY IP CONSULT     Code Status   Full Code    Time Spent on this Encounter   I, Herberth Kerns, personally saw the patient today and spent greater than 30 minutes discharging this patient.       Evangelista Kerns PA-C  Internal Medicine Hospitalist   [facility]  [phone]   ______________________________________________________________________    Physical Exam   Vital Signs: Temp: 99  F (37.2  C) Temp src: Oral BP: 119/88 Pulse: 95 Heart Rate: 62 Resp: 16 SpO2: 100 % O2 Device: None (Room air)    Weight: 140 lbs 12.8 oz  GEN: In NAD  HEENT: NCAT; PERRL; sclerae non-icteric  LUNGS: CTAB  CV: RRR  ABD: +BSs; soft, ND, mildly ttp over epigastrium; GJ tube intact with no abnormalities noted.   EXT: No BLE edema  SKIN: No acute rashes noted on exposed areas.  NEURO: AAOx3; CNs grossly intact; No acute focal deficits noted.      Significant Results and Procedures   CMP  Recent Labs  Lab 02/27/18  0757 02/26/18  0810 02/26/18  0215 02/25/18  1600 02/24/18  1553 02/22/18  2127    142 141 142 139 140   POTASSIUM 4.0 4.2 4.2 3.3* 3.5 3.3*   CHLORIDE 108 109 110* 107 104 105   CO2 26 25 23 25 32 29    ANIONGAP 7 8 8 10 3 6   * 72 83 82 116* 84   BUN 6* 7 6* 6* 9 7   CR 0.56 0.64 0.65 0.64 0.69 0.54   GFRESTIMATED >90 >90 >90 >90 >90 >90   GFRESTBLACK >90 >90 >90 >90 >90 >90   RENEA 8.8 8.3* 8.4* 9.0 9.1 8.6   MAG 1.9  --  1.9 2.1  --   --    PHOS 4.6*  --   --  3.2  --   --    PROTTOTAL  --  6.2*  --  7.4 7.0 6.7*   ALBUMIN  --  3.7  --  4.4 4.2 3.9   BILITOTAL  --  0.7  --  0.4 0.2 0.2   ALKPHOS  --  79  --  93 95 88   AST  --  16  --  21 25 23   ALT  --  24  --  26 32 29     CBC  Recent Labs  Lab 02/26/18  0810 02/25/18  1600 02/24/18  1553 02/22/18  2127   WBC 4.5 8.0 4.4 5.0   RBC 3.51* 3.90 3.82 3.64*   HGB 10.7* 11.9 11.7 11.1*   HCT 32.1* 35.1 34.1* 32.8*   MCV 92 90 89 90   MCH 30.5 30.5 30.6 30.5   MCHC 33.3 33.9 34.3 33.8   RDW 13.1 13.0 12.7 12.6    272 239 220       Results for orders placed or performed during the hospital encounter of 02/25/18   XR Abdomen Port 1 View    Narrative    Exam: XR ABDOMEN PORT 1 VW 2/27/2018 1:06 PM    History: Abdominal pain, GJ tube placement    Comparison: Abdominal plain film 2/12/2018    Findings: Single portable view the abdomen was evaluated. Percutaneous  gastrojejunostomy tube tip projects over the proximal jejunum,  comparable to fluoroscopic procedural images. Cholecystectomy clips.  There are a few loops of gas-filled small bowel which are nondilated.  Colon is decompressed and the overall bowel gas pattern is  nonobstructive. No portal venous gas and no pneumatosis. Lung bases  are clear. Partially visualized central venous catheter with tip  projecting over the right atrium..      Impression    Impression:  1. Percutaneous gastrojejunostomy tube tip projects over the proximal  jejunum, comparable to fluoroscopic procedural images.  2. Nonobstructive bowel gas pattern.    JANNETTE TAVARES MD     *Note: Due to a large number of results and/or encounters for the requested time period, some results have not been displayed. A complete set of results  can be found in Results Review.          Primary Care Physician   Quyen Baez    Discharge Disposition   Discharged to home  Condition at discharge: Stable    Discharge Orders     Home infusion referral     Reason for your hospital stay   Treatment of worsening nausea and abd pain.     Follow Up and recommended labs and tests   Follow up with your family physician later this week or next week for hospital follow up and further evaluation of your nausea and abd pain.     Activity   Your activity upon discharge: activity as tolerated     Full Code     Diet   Follow this diet upon discharge: Orders Placed This Encounter     Adult Formula Drip Feeding: Continuous Peptamen 1.5; Jejunostomy; Goal Rate: 50; mL/hr; Medication - Tube Feeding Flush Frequency: At least 15-30 mL water before and after medication administration and with tube clogging; Amout to Send (Nutrition ...     Clear Liquid Diet       Discharge Medications   Current Discharge Medication List      START taking these medications    Details   acetaminophen (TYLENOL) 32 mg/mL solution 30.45 mLs (975 mg) by Per J Tube route every 8 hours         CONTINUE these medications which have CHANGED    Details   ondansetron (ZOFRAN ODT) 4 MG ODT tab Take 1 tablet (4 mg) by mouth every 8 hours as needed for nausea or vomiting  Qty: 15 tablet, Refills: 0    Associated Diagnoses: Intractable cyclical vomiting with nausea      sodium bicarbonate 325 MG tablet 1 tablet (325 mg) by Per Feeding Tube route 4 times daily  Qty: 120 tablet, Refills: 0    Associated Diagnoses: Abdominal pain, epigastric; Intractable cyclical vomiting with nausea      oxyCODONE (ROXICODONE) 5 MG/5ML solution Take 10 mLs (10 mg) by mouth every 4 hours as needed for pain maximum 60 mL per day.  Qty: 210 mL, Refills: 0    Associated Diagnoses: Abdominal pain, generalized; Chronic abdominal pain         CONTINUE these medications which have NOT CHANGED    Details   nortriptyline (PAMELOR) 10 MG  capsule Take 3 capsules (30 mg) by mouth At Bedtime  Qty: 120 capsule, Refills: 0    Associated Diagnoses: Right upper quadrant abdominal pain      hydrOXYzine (ATARAX) 10 MG/5ML syrup Take 12.5 mLs (25 mg) by mouth every 4 hours as needed for anxiety or other (pain)  Qty: 473 mL, Refills: 0    Associated Diagnoses: Acute abdominal pain      menthol (ICY HOT) 5 % PTCH Apply 1 patch topically every 8 hours as needed for muscle soreness  Qty: 15 patch, Refills: 3    Associated Diagnoses: Abdominal pain, epigastric      !! amylase-lipase-protease (ZENPEP) 84650 UNITS CPEP Take 5 capsules (100,000 Units) by mouth 4 times daily (with meals and nightly)  Qty: 180 capsule, Refills: 1    Associated Diagnoses: Right upper quadrant abdominal pain      multivitamins with minerals (CERTAVITE/CEROVITE) LIQD liquid 15 mLs by Per Feeding Tube route daily  Qty: 1 Bottle, Refills: 0    Associated Diagnoses: Abdominal pain, epigastric      gabapentin (NEURONTIN) 250 MG/5ML solution 8 mLs (400 mg) by ORAL OR NJ TUBE route 2 times daily  Qty: 470 mL, Refills: 0    Associated Diagnoses: Abdominal pain, epigastric      scopolamine (TRANSDERM) 72 hr patch Apply 1 patch to hairless area behind one ear if severe nausea and vomiting.  Remove old patch and change every 3 days (72 hours).  Qty: 2 patch, Refills: 0    Associated Diagnoses: Intractable vomiting with nausea, unspecified vomiting type      !! amylase-lipase-protease (ZENPEP) 05305 UNITS CPEP Take 2-3 capsules (40,000-60,000 Units) by mouth Take with snacks or supplements (Snacks)  Qty: 180 capsule, Refills: 1    Associated Diagnoses: Idiopathic chronic pancreatitis (H)      senna-docusate (SENOKOT-S;PERICOLACE) 8.6-50 MG per tablet Take 1 tablet by mouth 2 times daily as needed for constipation  Qty: 100 tablet, Refills: 0    Associated Diagnoses: Right upper quadrant abdominal pain      pantoprazole (PROTONIX) 40 MG EC tablet Take 1 tablet (40 mg) by mouth 2 times daily (before  meals)  Qty: 30 tablet, Refills: 1    Associated Diagnoses: Right upper quadrant abdominal pain; Erosive gastritis      polyethylene glycol (MIRALAX/GLYCOLAX) Packet Take 17 g by mouth daily  Qty: 7 packet, Refills: 0    Associated Diagnoses: Right upper quadrant abdominal pain      norethindrone (AYGESTIN) 5 MG tablet Take 1 tablet (5 mg) by mouth daily  Qty: 90 tablet, Refills: 1    Associated Diagnoses: Endometriosis      fluticasone (FLONASE) 50 MCG/ACT spray Spray 2 sprays into both nostrils daily  Qty: 16 g, Refills: 3    Associated Diagnoses: Nasal congestion      levalbuterol (XOPENEX HFA) 45 MCG/ACT Inhaler Inhale 2 puffs into the lungs every 6 hours as needed for shortness of breath / dyspnea  Qty: 1 Inhaler, Refills: 1    Associated Diagnoses: Wheeze      RIZATRIPTAN BENZOATE PO Take 5 mg by mouth once as needed       order for DME Equipment being ordered: TENS with wire and electrode.  Qty: 1 Units, Refills: 0    Associated Diagnoses: Chronic abdominal pain      leuprolide (LUPRON DEPOT) 11.25 MG injection Inject 11.25 mg into the muscle every 3 months  Qty: 1 each, Refills: 3    Associated Diagnoses: Endometriosis       !! - Potential duplicate medications found. Please discuss with provider.      STOP taking these medications       oxyCODONE (ROXICODONE INTENSOL) 20 mg/mL (HIGH CONC) solution Comments:   Reason for Stopping:             Allergies   Allergies   Allergen Reactions     Amoxicillin-Pot Clavulanate Nausea and Vomiting     Compazine [Prochlorperazine] Other (See Comments)     Dystonia       Hyoscyamine Other (See Comments)     Dystonia     Reglan [Metoclopramide Hcl] Other (See Comments)     Dystonia     Zyprexa Other (See Comments)     Sensitive, dystonic reaction on 11-9-2011     Amitriptyline Hcl Other (See Comments)     Dystonia, hallucinations     Buspirone Other (See Comments)     No Adverse Reactions, no benefit     Cogentin [Benztropine]      Cyproheptadine Other (See Comments)      "Distonic     Dicyclomine Other (See Comments)     Droperidol Other (See Comments)     Feels tense and \"like she has to jump out of her skin\".       Effexor [Venlafaxine] Other (See Comments)     Dystonia     Food      Cilantro--lips/tongue swelling     No Clinical Screening - See Comments Other (See Comments)     Cilantro     Phenergan Dm [Promethazine-Dm] Other (See Comments)     dystonia     Promethazine Other (See Comments)     Risperidone Other (See Comments)     dystonia     Vistaril Other (See Comments)     Burning sensation.       Augmentin GI Disturbance     Ketamine Other (See Comments)     jittery     Sorbitol GI Disturbance     Headache and dyspepsia     "

## 2018-02-27 NOTE — PROGRESS NOTES
"CLINICAL NUTRITION SERVICES    Team consulted for \"TF assess and order\".      Please see full assessment note from 2/26.      Interventions:  -Resume Peptamen 1.5 @ 10 ml/hr with advancement by 5 ml/hr q 8 hours (or as tolerated per patient) towards goal of 50 ml/hr.  Patient requested very slow advancement. Goal/home TF regimen provides 100% of assessed needs.      -Free water flushes of 60 ml q 4 hours for tube patency.      -Certavite (MVI with minerals) 15 ml daily    -Ordered phosphorus level for monitoring over the next 2 days     -Once begin TFs, begin the following pancreatic enzyme regimen and recommend order each of the following:   (please copy and paste administration instructions into each order)  A) Sodium Bicarb tablet (325 mg), 1 tablet q 4 hrs via Jtube. Administration Instructions: Crush 1 tablet and mix into 15 ml of warm water and use this solution to mix with Zenpep pancreatic enzymes. DO NOT administer directly into Jtube (to be mixed into TF formula with Zenpep enzyme - see Zenpep enzyme order)   B) Zenpep  99988, 1- 2 capsules q 4 hrs via Jtube. Administration Instructions:   --If TF rate is running @ 10-30 ml/hr, administer 1 capsule q 4 hrs;   --If TF rate is running @ 40-50 ml/hr, increase to 2 capsules q 4 hrs.    **Open capsule and empty contents into 15 ml sodium bicarb solution (see sodium bicarb order), let dissolve for about 20-30 minutes and then add this solution to the amount of TF formula hung in TF bag every 4 hrs (i.e., once TF @ goal infusion 50 ml/hr will mix 2 capsules into 200 ml of TF formula every 4 hrs).   *Note: this enzyme regimen with TF @ goal infusion will provide approximately 3582 units of lipase/gram of total Fat daily and approp dosing initially for pancreatic insufficiency with more elemental TF formula.     Recommendations:  Monitor electrolytes with TF start, suspect low refeeding risk.  If low, please replace and order re-check.  Please hold TF advancement " if phosphorus level is 1.9 or less.     Lubna Fletcher MS, RD, LD  Pager [phone]

## 2018-02-27 NOTE — PROGRESS NOTES
Observation Discharge Goals.   1. Tolerating Medications - Yes.  2. Fluids per GJ tube - Started TF today. Tolerating at present. Small amount infusing. Titrate to goal.   3. Labs and VSS - WNL

## 2018-02-27 NOTE — PLAN OF CARE
Problem: Patient Care Overview  Goal: Discharge Needs Assessment  1.) Tolerating medications: NO-medications given through J-tube, no oral meds due to recent vomiting episodes.  2.) Fluids per GJ tube: No-both clamped after bedtime medications administered.  3.) Labs and VSS: Awaiting stool sample. VSS    Pt stated a reduction in pain after oxycodone and tylenol given.

## 2018-02-27 NOTE — PLAN OF CARE
Problem: Patient Care Overview  Goal: Discharge Needs Assessment  1.) Tolerating medications: NO-medications given through J-tube, no oral meds due to recent vomiting episodes.  2.) Fluids per GJ tube: No. J clamped and G tube to gravity  3.) Labs and VSS: Awaiting stool sample. VSS    Pt is alert and oriented. Pain is controlled with oxycodone and lidocaine patch. Nausea relieved by G-tube open to gravity and walking to empty gastric fluids.

## 2018-02-27 NOTE — PLAN OF CARE
Problem: Patient Care Overview  Goal: Discharge Needs Assessment  1.) Tolerating medications: Through J-tube and sublingual oxycodone  2.) Fluids per GJ tube: No-J-tube is clamped, G-tube open to vent.  3.) Labs and VSS: Awaiting stool sample. VSS

## 2018-02-27 NOTE — PLAN OF CARE
Problem: Patient Care Overview  Goal: Plan of Care/Patient Progress Review  Observation Discharge Goals.   1. Tolerating Medications - Yes. Does have some nausea intermittently.   2. Fluids per GJ tube - Flushes with medication. Will be starting TF today.   3. Labs and VSS - WNL

## 2018-02-28 ENCOUNTER — TELEPHONE (OUTPATIENT)
Dept: FAMILY MEDICINE | Facility: CLINIC | Age: 23
End: 2018-02-28

## 2018-02-28 ENCOUNTER — MYC MEDICAL ADVICE (OUTPATIENT)
Dept: GASTROENTEROLOGY | Facility: CLINIC | Age: 23
End: 2018-02-28

## 2018-02-28 ENCOUNTER — INFUSION THERAPY VISIT (OUTPATIENT)
Dept: INFUSION THERAPY | Facility: CLINIC | Age: 23
End: 2018-02-28
Attending: INTERNAL MEDICINE
Payer: COMMERCIAL

## 2018-02-28 VITALS
DIASTOLIC BLOOD PRESSURE: 66 MMHG | HEART RATE: 94 BPM | OXYGEN SATURATION: 98 % | RESPIRATION RATE: 16 BRPM | SYSTOLIC BLOOD PRESSURE: 116 MMHG | TEMPERATURE: 98.5 F

## 2018-02-28 DIAGNOSIS — Z98.890 S/P ERCP: ICD-10-CM

## 2018-02-28 DIAGNOSIS — R21 RASH: ICD-10-CM

## 2018-02-28 DIAGNOSIS — G43.A0 CYCLICAL VOMITING WITH NAUSEA, INTRACTABILITY OF VOMITING NOT SPECIFIED: Primary | ICD-10-CM

## 2018-02-28 DIAGNOSIS — R10.9 ABDOMINAL PAIN: ICD-10-CM

## 2018-02-28 DIAGNOSIS — E16.2 HYPOGLYCEMIA: ICD-10-CM

## 2018-02-28 DIAGNOSIS — R52 PAIN: ICD-10-CM

## 2018-02-28 DIAGNOSIS — F41.1 ANXIETY STATE: ICD-10-CM

## 2018-02-28 DIAGNOSIS — F33.1 MAJOR DEPRESSIVE DISORDER, RECURRENT EPISODE, MODERATE (H): ICD-10-CM

## 2018-02-28 DIAGNOSIS — N39.41 URGE INCONTINENCE: ICD-10-CM

## 2018-02-28 DIAGNOSIS — R11.15 INTRACTABLE CYCLICAL VOMITING WITH NAUSEA: ICD-10-CM

## 2018-02-28 DIAGNOSIS — R10.13 ABDOMINAL PAIN, EPIGASTRIC: ICD-10-CM

## 2018-02-28 DIAGNOSIS — K86.1 CHRONIC PANCREATITIS, UNSPECIFIED PANCREATITIS TYPE (H): ICD-10-CM

## 2018-02-28 DIAGNOSIS — R10.9 ACUTE ABDOMINAL PAIN: ICD-10-CM

## 2018-02-28 PROCEDURE — 96375 TX/PRO/DX INJ NEW DRUG ADDON: CPT

## 2018-02-28 PROCEDURE — 25000128 H RX IP 250 OP 636: Mod: ZF | Performed by: NURSE PRACTITIONER

## 2018-02-28 PROCEDURE — 96361 HYDRATE IV INFUSION ADD-ON: CPT

## 2018-02-28 PROCEDURE — 96376 TX/PRO/DX INJ SAME DRUG ADON: CPT

## 2018-02-28 PROCEDURE — 25000128 H RX IP 250 OP 636: Mod: ZF | Performed by: INTERNAL MEDICINE

## 2018-02-28 PROCEDURE — 96374 THER/PROPH/DIAG INJ IV PUSH: CPT

## 2018-02-28 RX ORDER — ONDANSETRON 2 MG/ML
4 INJECTION INTRAMUSCULAR; INTRAVENOUS ONCE
Status: COMPLETED | OUTPATIENT
Start: 2018-02-28 | End: 2018-02-28

## 2018-02-28 RX ORDER — DIPHENHYDRAMINE HYDROCHLORIDE 50 MG/ML
25 INJECTION INTRAMUSCULAR; INTRAVENOUS ONCE
Status: CANCELLED
Start: 2018-02-28 | End: 2018-02-28

## 2018-02-28 RX ORDER — ONDANSETRON 2 MG/ML
4 INJECTION INTRAMUSCULAR; INTRAVENOUS DAILY PRN
Status: CANCELLED
Start: 2018-02-28

## 2018-02-28 RX ORDER — ONDANSETRON 2 MG/ML
4 INJECTION INTRAMUSCULAR; INTRAVENOUS ONCE
Status: CANCELLED
Start: 2018-02-28 | End: 2018-02-28

## 2018-02-28 RX ORDER — ONDANSETRON 2 MG/ML
4 INJECTION INTRAMUSCULAR; INTRAVENOUS DAILY PRN
Status: DISCONTINUED | OUTPATIENT
Start: 2018-02-28 | End: 2018-02-28 | Stop reason: HOSPADM

## 2018-02-28 RX ORDER — DIPHENHYDRAMINE HYDROCHLORIDE 50 MG/ML
25 INJECTION INTRAMUSCULAR; INTRAVENOUS ONCE
Status: COMPLETED | OUTPATIENT
Start: 2018-02-28 | End: 2018-02-28

## 2018-02-28 RX ADMIN — DIPHENHYDRAMINE HYDROCHLORIDE 25 MG: 50 INJECTION, SOLUTION INTRAMUSCULAR; INTRAVENOUS at 12:15

## 2018-02-28 RX ADMIN — SODIUM CHLORIDE, POTASSIUM CHLORIDE, SODIUM LACTATE AND CALCIUM CHLORIDE 1000 ML: 600; 310; 30; 20 INJECTION, SOLUTION INTRAVENOUS at 12:08

## 2018-02-28 RX ADMIN — ONDANSETRON 4 MG: 2 INJECTION INTRAMUSCULAR; INTRAVENOUS at 13:16

## 2018-02-28 RX ADMIN — ONDANSETRON 4 MG: 2 INJECTION INTRAMUSCULAR; INTRAVENOUS at 12:11

## 2018-02-28 NOTE — PROGRESS NOTES
Nursing Note  Alexandra Melgoza presents today to [facility] for:   Chief Complaint   Patient presents with     Infusion     IVF, Zofran, and Benadryl     During today's [facility] appointment, orders from Dr. Narayanan were completed.  Frequency: as needed    Progress note:  Patient identification verified by name and date of birth.  Assessment completed.  Vitals recorded in Doc Flowsheets.  Patient was provided with education regarding infusion and possible side effects.  Patient verbalized understanding.      needed: No  Premedications: PRN Zofran and PRN Benadryl given IVP x 2 each.  Infusion Rates: 2 liters LR infusion given over approximately 1 hour each; 2 hours total.  Approximate Infusion length: 2 hours.  Labs: were not ordered for this appointment.  Vascular access: port accessed today.  Treatment Conditions: non-applicable.  Patient tolerated infusion: well.      Discharge Plan:   Follow up plan of care with: ongoing infusions at [facility].  Discharge instructions were reviewed with patient.  Patient/representative verbalized understanding of discharge instructions and all questions answered.  Patient discharged from [facility] in stable condition.    Phyllis Brooks RN    Administrations This Visit     diphenhydrAMINE (BENADRYL) injection 25 mg     Admin Date Action Dose Route Administered By             02/28/2018 Given 25 mg Intravenous Phyllis Brooks RN                    lactated ringers BOLUS 1,000-2,000 mL     Admin Date Action Dose Route Administered By             02/28/2018 New Bag 1000 mL Intravenous Phyllis Brooks RN                    ondansetron (ZOFRAN) injection 4 mg     Admin Date Action Dose Route Administered By             02/28/2018 Given 4 mg Intravenous Phyllis Brooks RN              Admin Date Action Dose Route Administered By             02/28/2018  Given 4 mg Intravenous Phyllis Brooks RN                          /72  Pulse 101  Temp 98.5  F (36.9  C) (Oral)  Resp 16  SpO2 98%

## 2018-02-28 NOTE — MR AVS SNAPSHOT
After Visit Summary   2/28/2018    Alexandra Melgoza    MRN: [id number]           Patient Information     Date Of Birth          1995        Visit Information        Provider Department      2/28/2018 12:00 PM UC 45 ATC; UC SPEC INFUSION [facility] Procedure        Today's Diagnoses     Cyclical vomiting with nausea, intractability of vomiting not specified    -  1    Chronic pancreatitis, unspecified pancreatitis type (H)        Intractable cyclical vomiting with nausea        Pain        Abdominal pain, epigastric        S/P ERCP        Acute abdominal pain        Abdominal pain        Hypoglycemia        Urge incontinence        Major depressive disorder, recurrent episode, moderate (H)        Anxiety state        Rash          Care Instructions    Dear Alexandra Melgoza    Thank you for choosing [facility] ([facility]) for your infusion.  The following information is a summary of our appointment as well as important reminders.        We look forward in seeing you on your next appointment here at [facility].  Please don t hesitate to call us at [phone] to reschedule any of your appointments or to speak with one of the [facility] registered nurses.  It was a pleasure taking care of you today.    Sincerely,    [facility]  [address] [place] [address]  [address]  Phone:  [phone]            Follow-ups after your visit        Your next 10 appointments already scheduled     Mar 02, 2018  7:00 AM CST   (Arrive by 6:45 AM)   Return Visit with ANABEL Falcon CNP   [facility] ([facility])    [address] [place] [address] [phone]            Mar 02, 2018  1:00 PM CST   Infusion 120 with UC SPEC INFUSION, UC 41 ATC   [facility] ([facility])    [address]  [address]  [facility] [address]   [phone]            Mar 05, 2018  3:00 PM CST   Infusion 120 with UC SPEC INFUSION, UC 41 ATC   [facility] Specialty and Procedure ([facility])    [address]  [address]  [facility] [address]   [phone]            Mar 07, 2018  2:00 PM CST   Infusion 120 with UC SPEC INFUSION, UC 45 ATC   [facility] Specialty and Procedure ([facility])    [address]  [address]  [facility] [address]   [phone]            Mar 09, 2018  1:00 PM CST   Infusion 120 with UC SPEC INFUSION, UC 42 ATC   [facility] Specialty [facility] Procedure ([facility])    [address]  [address]  [facility] [address]   [phone]            Mar 12, 2018  3:00 PM CDT   Infusion 120 with UC SPEC INFUSION   [facility] Specialty and Procedure ([facility])    [address]  [address]  [facility] [address]   [phone]              Who to contact     If you have questions or need follow up information about today's clinic visit or your schedule please contact [facility] SPECIALTY AND PROCEDURE directly at [phone].  Normal or non-critical lab and imaging results will be communicated to you by MyChart, letter or phone within 4 business days after the clinic has received the results. If you do not hear from us within 7 days, please contact the clinic through MyChart or phone. If you have a critical or abnormal lab result, we will notify you by phone as soon as possible.  Submit refill requests through [employer] or call your pharmacy and they will forward the refill request to us. Please allow 3 business days for your refill to be completed.           Additional Information About Your Visit        MyChart Information     [employer] gives you secure access to your electronic health record. If you see a primary care provider, you can also send messages to your care team and make appointments. If you have questions, please call your primary care clinic.  If you do not have a primary care provider, please call [phone] and they will assist you.        Care EveryWhere ID     This is your Care EveryWhere ID. This could be used by other organizations to access your [facility] medical records  [id number]        Your Vitals Were     Pulse Temperature Respirations Pulse Oximetry          94 98.5  F (36.9  C) (Oral) 16 98%         Blood Pressure from Last 3 Encounters:   02/28/18 116/66   02/27/18 119/88   02/24/18 123/83    Weight from Last 3 Encounters:   02/25/18 63.9 kg (140 lb 12.8 oz)   02/24/18 66.7 kg (147 lb)   02/14/18 66.1 kg (145 lb 12.8 oz)              Today, you had the following     No orders found for display         Today's Medication Changes          These changes are accurate as of 2/28/18  2:52 PM.  If you have any questions, ask your nurse or doctor.               These medicines have changed or have updated prescriptions.        Dose/Directions    gabapentin 250 MG/5ML solution   Commonly known as:  NEURONTIN   This may have changed:  when to take this   Used for:  Abdominal pain, epigastric        Dose:  400 mg   8 mLs (400 mg) by ORAL OR NJ TUBE route 2 times daily   Quantity:  470 mL   Refills:  0       nortriptyline 10 MG capsule   Commonly known as:  PAMELOR   This may have changed:  how much to take   Used for:  Right upper quadrant abdominal pain        Dose:  30 mg   Take 3 capsules (30 mg) by mouth At Bedtime   Quantity:  120 capsule   Refills:  0                Primary Care Provider Office Phone # Fax #    Quyen Baez [phone]       [address] [address]        Equal Access to  Services     [facility]: Hadii aad ku hadcathicaleb Livmalini, waaxda luqadaha, qaybta kaalmada jacque, maki gaspar . So [facility] [phone].    ATENCIÓN: Si hilariola finesse, tiene a quijano disposición servicios gratuitos de asistencia lingüística. Llame al [phone].    We comply with applicable federal civil rights laws and [place] laws. We do not discriminate on the basis of race, color, national origin, age, disability, sex, sexual orientation, or gender identity.            Thank you!     Thank you for choosing [facility] SPECIALTY [facility] PROCEDURE  for your care. Our goal is always to provide you with excellent care. Hearing back from our patients is one way we can continue to improve our services. Please take a few minutes to complete the written survey that you may receive in the mail after your visit with us. Thank you!             Your Updated Medication List - Protect others around you: Learn how to safely use, store and throw away your medicines at www.disposemymeds.org.          This list is accurate as of 2/28/18  2:52 PM.  Always use your most recent med list.                   Brand Name Dispense Instructions for use Diagnosis    acetaminophen 32 mg/mL solution    TYLENOL     30.45 mLs (975 mg) by Per J Tube route every 8 hours        * amylase-lipase-protease 74510 UNITS Cpep    ZENPEP    180 capsule    Take 2-3 capsules (40,000-60,000 Units) by mouth Take with snacks or supplements (Snacks)    Idiopathic chronic pancreatitis (H)       * amylase-lipase-protease 75593 UNITS Cpep    ZENPEP    180 capsule    Take 5 capsules (100,000 Units) by mouth 4 times daily (with meals and nightly)    Right upper quadrant abdominal pain       fluticasone 50 MCG/ACT spray    FLONASE    16 g    Spray 2 sprays into both nostrils daily    Nasal congestion       gabapentin 250 MG/5ML solution    NEURONTIN    470 mL    8 mLs (400 mg) by ORAL OR NJ TUBE route 2 times daily     Abdominal pain, epigastric       hydrOXYzine 10 MG/5ML syrup    ATARAX    473 mL    Take 12.5 mLs (25 mg) by mouth every 4 hours as needed for anxiety or other (pain)    Acute abdominal pain       leuprolide 11.25 MG kit    LUPRON DEPOT (3-MONTH)    1 each    Inject 11.25 mg into the muscle every 3 months    Endometriosis       levalbuterol 45 MCG/ACT Inhaler    XOPENEX HFA    1 Inhaler    Inhale 2 puffs into the lungs every 6 hours as needed for shortness of breath / dyspnea    Wheeze       menthol 5 % Ptch    ICY HOT    15 patch    Apply 1 patch topically every 8 hours as needed for muscle soreness    Abdominal pain, epigastric       multivitamins with minerals Liqd liquid     1 Bottle    15 mLs by Per Feeding Tube route daily    Abdominal pain, epigastric       norethindrone 5 MG tablet    AYGESTIN    90 tablet    Take 1 tablet (5 mg) by mouth daily    Endometriosis       nortriptyline 10 MG capsule    PAMELOR    120 capsule    Take 3 capsules (30 mg) by mouth At Bedtime    Right upper quadrant abdominal pain       ondansetron 4 MG ODT tab    ZOFRAN ODT    15 tablet    Take 1 tablet (4 mg) by mouth every 8 hours as needed for nausea or vomiting    Intractable cyclical vomiting with nausea       order for DME     1 Units    Equipment being ordered: TENS with wire and electrode.    Chronic abdominal pain       oxyCODONE 5 MG/5ML solution    ROXICODONE    210 mL    Take 10 mLs (10 mg) by mouth every 4 hours as needed for pain maximum 60 mL per day.    Abdominal pain, generalized, Chronic abdominal pain       pantoprazole 40 MG EC tablet    PROTONIX    30 tablet    Take 1 tablet (40 mg) by mouth 2 times daily (before meals)    Right upper quadrant abdominal pain, Erosive gastritis       polyethylene glycol Packet    MIRALAX/GLYCOLAX    7 packet    Take 17 g by mouth daily    Right upper quadrant abdominal pain       RIZATRIPTAN BENZOATE PO      Take 5 mg by mouth once as needed        scopolamine 72 hr patch     TRANSDERM    2 patch    Apply 1 patch to hairless area behind one ear if severe nausea and vomiting.  Remove old patch and change every 3 days (72 hours).    Intractable vomiting with nausea, unspecified vomiting type       senna-docusate 8.6-50 MG per tablet    SENOKOT-S;PERICOLACE    100 tablet    Take 1 tablet by mouth 2 times daily as needed for constipation    Right upper quadrant abdominal pain       sodium bicarbonate 325 MG tablet     120 tablet    1 tablet (325 mg) by Per Feeding Tube route 4 times daily    Abdominal pain, epigastric, Intractable cyclical vomiting with nausea       * Notice:  This list has 2 medication(s) that are the same as other medications prescribed for you. Read the directions carefully, and ask your doctor or other care provider to review them with you.

## 2018-02-28 NOTE — TELEPHONE ENCOUNTER
ED / Discharge Outreach Protocol    Patient Contact    Attempt # 1    Was call answered?  No.  Unable to leave message. Mail box is full.

## 2018-02-28 NOTE — PATIENT INSTRUCTIONS
Dear Alexandra Melgoza    Thank you for choosing [facility] ([facility]) for your infusion.  The following information is a summary of our appointment as well as important reminders.        We look forward in seeing you on your next appointment here at [facility].  Please don t hesitate to call us at [phone] to reschedule any of your appointments or to speak with one of the [facility] registered nurses.  It was a pleasure taking care of you today.    Sincerely,    [facility]  [facility]fusion [facility]  [address] [place] [address]  [address]  Phone:  [phone]

## 2018-02-28 NOTE — PROGRESS NOTES
Discharge instructions given and pt voiced understanding. Scripts sent with patient for outside pharmacy. Continues with abdominal discomfort and intermittent nausea. TF started today and tolerating - will need to titrate to goal. Receiving Oxycodone for discomfort. Up walking in room and hallway. Urinating adequate amounts. Adequate for discharge. Discharged to home with family.

## 2018-03-01 ENCOUNTER — TELEPHONE (OUTPATIENT)
Dept: ANESTHESIOLOGY | Facility: CLINIC | Age: 23
End: 2018-03-01

## 2018-03-01 NOTE — TELEPHONE ENCOUNTER
ED / Discharge Outreach Protocol    Patient Contact    Attempt # 2    Was call answered?  No.  Unable to leave message.mailbox is full.

## 2018-03-01 NOTE — TELEPHONE ENCOUNTER
LPN was directed to call pt to assist them to schedule with Dr. Wang on 3/5/18, as pt is currently scheduled on 3/2/18 with The Nurse Practitioner.     Pt insisted that they were needing to be seen before the weekend as they were having increased pain. LPN encouraged pt to reschedule with Dr. Wang if they were wanting to discuss changes to their care plan or current medication regimen.   Pt denied that they were wanting to change, they stated that both the pt and their mother were wanting pt to the be seen and evaluated before the weekend to avoid and ED visit.      Pt stated that if Dr. Wang needed to see pt on Monday, as well, pt was going to be in the area for an infusion and could schedule an appointment with him, (if the Nurse Practioner deemed it appropriate to do so.)     Abigail Prieto LPN

## 2018-03-02 ENCOUNTER — INFUSION THERAPY VISIT (OUTPATIENT)
Dept: INFUSION THERAPY | Facility: CLINIC | Age: 23
End: 2018-03-02
Attending: INTERNAL MEDICINE
Payer: COMMERCIAL

## 2018-03-02 ENCOUNTER — TELEPHONE (OUTPATIENT)
Dept: INTERNAL MEDICINE | Facility: CLINIC | Age: 23
End: 2018-03-02

## 2018-03-02 ENCOUNTER — OFFICE VISIT (OUTPATIENT)
Dept: ANESTHESIOLOGY | Facility: CLINIC | Age: 23
End: 2018-03-02
Payer: COMMERCIAL

## 2018-03-02 VITALS
HEART RATE: 89 BPM | DIASTOLIC BLOOD PRESSURE: 97 MMHG | RESPIRATION RATE: 16 BRPM | SYSTOLIC BLOOD PRESSURE: 144 MMHG | WEIGHT: 142 LBS | BODY MASS INDEX: 22.82 KG/M2 | HEIGHT: 66 IN

## 2018-03-02 VITALS
TEMPERATURE: 98 F | HEART RATE: 87 BPM | OXYGEN SATURATION: 99 % | DIASTOLIC BLOOD PRESSURE: 77 MMHG | SYSTOLIC BLOOD PRESSURE: 127 MMHG | RESPIRATION RATE: 16 BRPM

## 2018-03-02 DIAGNOSIS — G43.A0 CYCLICAL VOMITING WITH NAUSEA, INTRACTABILITY OF VOMITING NOT SPECIFIED: Primary | ICD-10-CM

## 2018-03-02 DIAGNOSIS — F41.1 ANXIETY STATE: ICD-10-CM

## 2018-03-02 DIAGNOSIS — R10.9 CHRONIC ABDOMINAL PAIN: Chronic | ICD-10-CM

## 2018-03-02 DIAGNOSIS — R10.13 ABDOMINAL PAIN, EPIGASTRIC: ICD-10-CM

## 2018-03-02 DIAGNOSIS — N39.41 URGE INCONTINENCE: ICD-10-CM

## 2018-03-02 DIAGNOSIS — R10.9 ACUTE ABDOMINAL PAIN: ICD-10-CM

## 2018-03-02 DIAGNOSIS — F33.1 MAJOR DEPRESSIVE DISORDER, RECURRENT EPISODE, MODERATE (H): ICD-10-CM

## 2018-03-02 DIAGNOSIS — R21 RASH: ICD-10-CM

## 2018-03-02 DIAGNOSIS — E16.2 HYPOGLYCEMIA: ICD-10-CM

## 2018-03-02 DIAGNOSIS — R11.15 INTRACTABLE CYCLICAL VOMITING WITH NAUSEA: ICD-10-CM

## 2018-03-02 DIAGNOSIS — K86.1 CHRONIC PANCREATITIS, UNSPECIFIED PANCREATITIS TYPE (H): ICD-10-CM

## 2018-03-02 DIAGNOSIS — G89.29 CHRONIC ABDOMINAL PAIN: Chronic | ICD-10-CM

## 2018-03-02 DIAGNOSIS — R10.9 ABDOMINAL PAIN: ICD-10-CM

## 2018-03-02 DIAGNOSIS — R52 PAIN: ICD-10-CM

## 2018-03-02 DIAGNOSIS — Z98.890 S/P ERCP: ICD-10-CM

## 2018-03-02 DIAGNOSIS — R10.84 ABDOMINAL PAIN, GENERALIZED: ICD-10-CM

## 2018-03-02 PROCEDURE — 96376 TX/PRO/DX INJ SAME DRUG ADON: CPT

## 2018-03-02 PROCEDURE — 25000128 H RX IP 250 OP 636: Mod: ZF | Performed by: NURSE PRACTITIONER

## 2018-03-02 PROCEDURE — 25000128 H RX IP 250 OP 636: Mod: ZF | Performed by: INTERNAL MEDICINE

## 2018-03-02 PROCEDURE — 96361 HYDRATE IV INFUSION ADD-ON: CPT

## 2018-03-02 PROCEDURE — 96374 THER/PROPH/DIAG INJ IV PUSH: CPT

## 2018-03-02 PROCEDURE — 96375 TX/PRO/DX INJ NEW DRUG ADDON: CPT

## 2018-03-02 RX ORDER — DIPHENHYDRAMINE HYDROCHLORIDE 50 MG/ML
25 INJECTION INTRAMUSCULAR; INTRAVENOUS ONCE
Status: CANCELLED
Start: 2018-03-02 | End: 2018-03-02

## 2018-03-02 RX ORDER — ONDANSETRON 2 MG/ML
4 INJECTION INTRAMUSCULAR; INTRAVENOUS ONCE
Status: COMPLETED | OUTPATIENT
Start: 2018-03-02 | End: 2018-03-02

## 2018-03-02 RX ORDER — ONDANSETRON 2 MG/ML
4 INJECTION INTRAMUSCULAR; INTRAVENOUS DAILY PRN
Status: CANCELLED
Start: 2018-03-02

## 2018-03-02 RX ORDER — OXYCODONE HCL 5 MG/5 ML
SOLUTION, ORAL ORAL
Qty: 700 ML | Refills: 0 | Status: SHIPPED | OUTPATIENT
Start: 2018-03-02 | End: 2018-03-12

## 2018-03-02 RX ORDER — ONDANSETRON 2 MG/ML
4 INJECTION INTRAMUSCULAR; INTRAVENOUS DAILY PRN
Status: DISCONTINUED | OUTPATIENT
Start: 2018-03-02 | End: 2018-03-02 | Stop reason: HOSPADM

## 2018-03-02 RX ORDER — DIPHENHYDRAMINE HYDROCHLORIDE 50 MG/ML
25 INJECTION INTRAMUSCULAR; INTRAVENOUS ONCE
Status: COMPLETED | OUTPATIENT
Start: 2018-03-02 | End: 2018-03-02

## 2018-03-02 RX ORDER — ONDANSETRON 2 MG/ML
4 INJECTION INTRAMUSCULAR; INTRAVENOUS ONCE
Status: CANCELLED
Start: 2018-03-02 | End: 2018-03-02

## 2018-03-02 RX ADMIN — DIPHENHYDRAMINE HYDROCHLORIDE 25 MG: 50 INJECTION, SOLUTION INTRAMUSCULAR; INTRAVENOUS at 14:48

## 2018-03-02 RX ADMIN — SODIUM CHLORIDE, POTASSIUM CHLORIDE, SODIUM LACTATE AND CALCIUM CHLORIDE 2000 ML: 600; 310; 30; 20 INJECTION, SOLUTION INTRAVENOUS at 13:36

## 2018-03-02 RX ADMIN — ONDANSETRON 4 MG: 2 INJECTION INTRAMUSCULAR; INTRAVENOUS at 14:43

## 2018-03-02 RX ADMIN — DIPHENHYDRAMINE HYDROCHLORIDE 25 MG: 50 INJECTION, SOLUTION INTRAMUSCULAR; INTRAVENOUS at 13:43

## 2018-03-02 RX ADMIN — ONDANSETRON 4 MG: 2 INJECTION INTRAMUSCULAR; INTRAVENOUS at 13:39

## 2018-03-02 ASSESSMENT — ANXIETY QUESTIONNAIRES
2. NOT BEING ABLE TO STOP OR CONTROL WORRYING: NOT AT ALL
GAD7 TOTAL SCORE: 2
5. BEING SO RESTLESS THAT IT IS HARD TO SIT STILL: NOT AT ALL
GAD7 TOTAL SCORE: 2
6. BECOMING EASILY ANNOYED OR IRRITABLE: NOT AT ALL
7. FEELING AFRAID AS IF SOMETHING AWFUL MIGHT HAPPEN: NOT AT ALL
3. WORRYING TOO MUCH ABOUT DIFFERENT THINGS: NOT AT ALL
7. FEELING AFRAID AS IF SOMETHING AWFUL MIGHT HAPPEN: NOT AT ALL
1. FEELING NERVOUS, ANXIOUS, OR ON EDGE: SEVERAL DAYS
4. TROUBLE RELAXING: SEVERAL DAYS
GAD7 TOTAL SCORE: 2

## 2018-03-02 ASSESSMENT — PAIN SCALES - GENERAL: PAINLEVEL: MODERATE PAIN (5)

## 2018-03-02 NOTE — PATIENT INSTRUCTIONS
Dear Alexandra Melgoza    Thank you for choosing [facility] ([facility]) for your infusion.  The following information is a summary of our appointment as well as important reminders.      We look forward in seeing you on your next appointment here at [facility].  Please don t hesitate to call us at [phone] to reschedule any of your appointments or to speak with one of the [facility] registered nurses.  It was a pleasure taking care of you today.    Sincerely,    [facility]  Specialty [facility]fusion [facility]  [address] [place] [address]  [address]  Phone:  [phone]

## 2018-03-02 NOTE — PATIENT INSTRUCTIONS
1. oxyCODONE (ROXICODONE) 5 MG/5ML solution- HAS BEEN REDUCED PER YOUR REQUEST. - Take 10 mL (10 mg) by mouth every 4-6 hours as needed, maximum 50 mL per day.    2.  Acupuncture Referral.  -Please call your insurance provider to find out about acupuncture coverage, being that not all policies cover acupuncture services.    [facility]- Dominique Nudd [phone]    ([facility])- [facility] Thelma baker [phone]    [facility] [phone]  [facility]   [address] [place], [place]    Follow up: With Dr. Wang on 3/12/18      To speak with a nurse, schedule/reschedule/cancel a clinic appointment, or request a medication refill call: [phone]     You can also reach us by [employer]: [email or web address]    For refills, please call on Monday, 1 week before your medication runs out. The doctors are not always in clinic, so this gives us time to get your prescriptions ready.  Please let us know the name of the medication you are requesting a refill of.

## 2018-03-02 NOTE — MR AVS SNAPSHOT
After Visit Summary   3/2/2018    Alexandra Melgoza    MRN: [id number]           Patient Information     Date Of Birth          1995        Visit Information        Provider Department      3/2/2018 1:00 PM UC 41 ATC; UC SPEC INFUSION [facility]        Today's Diagnoses     Cyclical vomiting with nausea, intractability of vomiting not specified    -  1    Chronic pancreatitis, unspecified pancreatitis type (H)        Intractable cyclical vomiting with nausea        Pain        Abdominal pain, epigastric        S/P ERCP        Acute abdominal pain        Abdominal pain        Hypoglycemia        Urge incontinence        Major depressive disorder, recurrent episode, moderate (H)        Anxiety state        Rash          Care Instructions    Dear Alexandra Melgoza    Thank you for choosing [facility] ([facility]) for your infusion.  The following information is a summary of our appointment as well as important reminders.      We look forward in seeing you on your next appointment here at [facility].  Please don t hesitate to call us at [phone] to reschedule any of your appointments or to speak with one of the [facility] registered nurses.  It was a pleasure taking care of you today.    Sincerely,    [facility]  Specialty [facility]fusion [facility]  [address] [place] [address]  [address]  Phone:  [phone]            Follow-ups after your visit        Your next 10 appointments already scheduled     Mar 05, 2018  3:00 PM CST   Infusion 120 with UC SPEC INFUSION, UC 41 ATC   [facility] Special[facility] ([facility] [place])    [address]  [address] [place] [address] [phone]            Mar 07, 2018  2:00 PM CST   Infusion 120 with UC SPEC INFUSION, UC 45 ATC   [facility]  Specialty and Procedure ([facility])    [address]  [address]  [facility] [address]   [phone]            Mar 09, 2018  1:00 PM CST   Infusion 120 with UC SPEC INFUSION, UC 42 ATC   [facility] Specialty and Procedure ([facility])    [address]  [address]  [facility] [address]   [phone]            Mar 12, 2018 10:40 AM CDT   (Arrive by 10:25 AM)   Return Visit with Leonardo Wang MD   [facility] ([facility])    [address] [place] [address]  4th Floor  [facility] [address]   [phone]            Mar 12, 2018  3:00 PM CDT   Infusion 120 with UC SPEC INFUSION, UC 45 ATC   [facility] Procedure ([facility])    [address]  [address]  [facility] [address]   [phone]            Mar 14, 2018  4:00 PM CDT   Infusion 120 with UC SPEC INFUSION   [facility] Special[facility] Procedure ([facility])    [address]  [address]  [facility] [address]   [phone]              Who to contact     If you have questions or need follow up information about today's clinic visit or your schedule please contact [facility] SPECIALTY AND PROCEDURE directly at [phone].  Normal or non-critical lab and imaging results will be communicated to you by MyChart, letter or phone within 4 business days after the clinic has received the results. If you do not hear from us within 7 days, please contact the clinic through MyChart or phone. If you have a critical or abnormal lab result, we will notify you by phone as soon as possible.  Submit refill requests through [email or web address] or call your pharmacy and they will forward the refill request to us. Please allow 3 business days for your refill to be completed.           "Additional Information About Your Visit        MyChart Information     [employer] lets you send messages to your doctor, view your test results, renew your prescriptions, schedule appointments and more. To sign up, go to [email or web address] . Click on \"Log in\" on the left side of the screen, which will take you to the Welcome page. Then click on \"Sign up Now\" on the right side of the page.     You will be asked to enter the access code listed below, as well as some personal information. Please follow the directions to create your username and password.     Your access code is: [email or web address]  Expires: 2018  6:30 AM     Your access code will  in 90 days. If you need help or a new code, please call your [place] clinic or [phone].        Care EveryWhere ID     This is your Care EveryWhere ID. This could be used by other organizations to access your [place] medical records  [id number]        Your Vitals Were     Pulse Temperature Respirations Pulse Oximetry          87 98  F (36.7  C) (Oral) 16 99%         Blood Pressure from Last 3 Encounters:   18 (P) 119/82   18 (!) 144/97   18 116/66    Weight from Last 3 Encounters:   18 64.4 kg (142 lb)   18 63.9 kg (140 lb 12.8 oz)   18 66.7 kg (147 lb)              Today, you had the following     No orders found for display         Today's Medication Changes          These changes are accurate as of 3/2/18  3:47 PM.  If you have any questions, ask your nurse or doctor.               These medicines have changed or have updated prescriptions.        Dose/Directions    gabapentin 250 MG/5ML solution   Commonly known as:  NEURONTIN   This may have changed:  when to take this   Used for:  Abdominal pain, epigastric        Dose:  400 mg   8 mLs (400 mg) by ORAL OR NJ TUBE route 2 times daily   Quantity:  470 mL   Refills:  0       nortriptyline 10 MG capsule   Commonly known as:  PAMELOR   This may have changed:  how much to " take   Used for:  Right upper quadrant abdominal pain        Dose:  30 mg   Take 3 capsules (30 mg) by mouth At Bedtime   Quantity:  120 capsule   Refills:  0       oxyCODONE 5 MG/5ML solution   Commonly known as:  ROXICODONE   This may have changed:    - how much to take  - how to take this  - when to take this  - reasons to take this  - additional instructions   Used for:  Abdominal pain, generalized, Chronic abdominal pain   Changed by:  Juanita Baum APRN CNP        Take 10 mL (10 mg) by mouth every 4-6 hours as needed, maximum 50 mL per day.   Quantity:  700 mL   Refills:  0            Where to get your medicines      Some of these will need a paper prescription and others can be bought over the counter.  Ask your nurse if you have questions.     Bring a paper prescription for each of these medications     oxyCODONE 5 MG/5ML solution                Primary Care Provider Office Phone # Fax #    [place] ParkCheyanne Baez [phone] [address] [address]        Equal Access to Services     [facility] : Hadii angelina ku hadasho Soomaali, waaxda luqadaha, qaybta kaalmada adeegyada, waxay fatmata gaspar . So [facility] [phone].    ATENCIÓN: Si habla español, tiene a quijano disposición servicios gratuitos de asistencia lingüística. [facility] [phone].    We comply with applicable federal civil rights laws and [place] laws. We do not discriminate on the basis of race, color, national origin, age, disability, sex, sexual orientation, or gender identity.            Thank you!     Thank you for choosing [facility]ED[facility]  for your care. Our goal is always to provide you with excellent care. Hearing back from our patients is one way we can continue to improve our services. Please take a few minutes to complete the written survey that you may receive in the mail after your visit with us. Thank you!             Your  Updated Medication List - Protect others around you: Learn how to safely use, store and throw away your medicines at www.disposemymeds.org.          This list is accurate as of 3/2/18  3:47 PM.  Always use your most recent med list.                   Brand Name Dispense Instructions for use Diagnosis    acetaminophen 32 mg/mL solution    TYLENOL     30.45 mLs (975 mg) by Per J Tube route every 8 hours        * amylase-lipase-protease 68117 UNITS Cpep    ZENPEP    180 capsule    Take 2-3 capsules (40,000-60,000 Units) by mouth Take with snacks or supplements (Snacks)    Idiopathic chronic pancreatitis (H)       * amylase-lipase-protease 20365 UNITS Cpep    ZENPEP    180 capsule    Take 5 capsules (100,000 Units) by mouth 4 times daily (with meals and nightly)    Right upper quadrant abdominal pain       fluticasone 50 MCG/ACT spray    FLONASE    16 g    Spray 2 sprays into both nostrils daily    Nasal congestion       gabapentin 250 MG/5ML solution    NEURONTIN    470 mL    8 mLs (400 mg) by ORAL OR NJ TUBE route 2 times daily    Abdominal pain, epigastric       hydrOXYzine 10 MG/5ML syrup    ATARAX    473 mL    Take 12.5 mLs (25 mg) by mouth every 4 hours as needed for anxiety or other (pain)    Acute abdominal pain       leuprolide 11.25 MG kit    LUPRON DEPOT (3-MONTH)    1 each    Inject 11.25 mg into the muscle every 3 months    Endometriosis       levalbuterol 45 MCG/ACT Inhaler    XOPENEX HFA    1 Inhaler    Inhale 2 puffs into the lungs every 6 hours as needed for shortness of breath / dyspnea    Wheeze       menthol 5 % Ptch    ICY HOT    15 patch    Apply 1 patch topically every 8 hours as needed for muscle soreness    Abdominal pain, epigastric       multivitamins with minerals Liqd liquid     1 Bottle    15 mLs by Per Feeding Tube route daily    Abdominal pain, epigastric       norethindrone 5 MG tablet    AYGESTIN    90 tablet    Take 1 tablet (5 mg) by mouth daily    Endometriosis       nortriptyline 10  MG capsule    PAMELOR    120 capsule    Take 3 capsules (30 mg) by mouth At Bedtime    Right upper quadrant abdominal pain       ondansetron 4 MG ODT tab    ZOFRAN ODT    15 tablet    Take 1 tablet (4 mg) by mouth every 8 hours as needed for nausea or vomiting    Intractable cyclical vomiting with nausea       order for DME     1 Units    Equipment being ordered: TENS with wire and electrode.    Chronic abdominal pain       oxyCODONE 5 MG/5ML solution    ROXICODONE    700 mL    Take 10 mL (10 mg) by mouth every 4-6 hours as needed, maximum 50 mL per day.    Abdominal pain, generalized, Chronic abdominal pain       pantoprazole 40 MG EC tablet    PROTONIX    30 tablet    Take 1 tablet (40 mg) by mouth 2 times daily (before meals)    Right upper quadrant abdominal pain, Erosive gastritis       polyethylene glycol Packet    MIRALAX/GLYCOLAX    7 packet    Take 17 g by mouth daily    Right upper quadrant abdominal pain       RIZATRIPTAN BENZOATE PO      Take 5 mg by mouth once as needed        scopolamine 72 hr patch    TRANSDERM    2 patch    Apply 1 patch to hairless area behind one ear if severe nausea and vomiting.  Remove old patch and change every 3 days (72 hours).    Intractable vomiting with nausea, unspecified vomiting type       senna-docusate 8.6-50 MG per tablet    SENOKOT-S;PERICOLACE    100 tablet    Take 1 tablet by mouth 2 times daily as needed for constipation    Right upper quadrant abdominal pain       sodium bicarbonate 325 MG tablet     120 tablet    1 tablet (325 mg) by Per Feeding Tube route 4 times daily    Abdominal pain, epigastric, Intractable cyclical vomiting with nausea       * Notice:  This list has 2 medication(s) that are the same as other medications prescribed for you. Read the directions carefully, and ask your doctor or other care provider to review them with you.

## 2018-03-02 NOTE — LETTER
"3/2/2018       RE: Alexandra Melgoza  [address]  [facility] [address]     Dear Colleague,    Thank you for referring your patient, Alexandra Melgoza, to the [facility] FOR COMPREHENSIVE [facility] MANAGEMENT at [facility]. Please see a copy of my visit note below.    Date of visit: 3/2/2018    Chief complaint:   Chief Complaint   Patient presents with     Pain Management     Follow up        Interval history:  Alexandra Melgoza is a 22 year old female, patient of Leonardo Wang MD, last seen on 2/13/18. A 2 week supply of her oxycodone suspension was refilled due to concern with abhorrent behavior (patient reported spilling her entire bottle of previous Rx). She since has had multiple ED visits and one hospitalization, admitted 2/25/18, due to 4 day hx of nausea, non-bloody emesis, diarrhea, and RUQ abdominal pain. ERCP with NJ tube 1/18 with Dr. Narayanan; GJ tube placed approximately three weeks ago without complication. Of note, her urine drug screen on admission was negative for all opiate medication. In review of chart, there was some noted concern that patient may have been experiencing withdrawal symptoms, accounting for her ED presentation.       Currently, she takes 10 mL (10 mg) PO every 4 hours as needed for pain - max of 60 mL/day. She was refilled for a 3-day prescription following hospital discharge on 2/27/18. No further changes were made following hospitalization to care plan. Today, Alexandra reports she is feeling more stable pain-wise and has goal of tapering and discontinuing her oxycodone. 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 She notes she is currently only utilizing 40-50 mg/day on average; she verbalizes awareness that opioid medications should ideally only be used on an as needed basis for severe pain. She expresses interest in integrative medicine and would like to pursue acupuncture; she states that she would " like to eventually taper from gabapentin as well. Continues to tolerate nortriptyline well without side effect.       Medications:  Current Outpatient Prescriptions   Medication Sig Dispense Refill     acetaminophen (TYLENOL) 32 mg/mL solution 30.45 mLs (975 mg) by Per J Tube route every 8 hours       ondansetron (ZOFRAN ODT) 4 MG ODT tab Take 1 tablet (4 mg) by mouth every 8 hours as needed for nausea or vomiting 15 tablet 0     sodium bicarbonate 325 MG tablet 1 tablet (325 mg) by Per Feeding Tube route 4 times daily 120 tablet 0     oxyCODONE (ROXICODONE) 5 MG/5ML solution Take 10 mLs (10 mg) by mouth every 4 hours as needed for pain maximum 60 mL per day. 210 mL 0     nortriptyline (PAMELOR) 10 MG capsule Take 3 capsules (30 mg) by mouth At Bedtime (Patient taking differently: Take 20-30 mg by mouth At Bedtime ) 120 capsule 0     hydrOXYzine (ATARAX) 10 MG/5ML syrup Take 12.5 mLs (25 mg) by mouth every 4 hours as needed for anxiety or other (pain) 473 mL 0     menthol (ICY HOT) 5 % PTCH Apply 1 patch topically every 8 hours as needed for muscle soreness 15 patch 3     amylase-lipase-protease (ZENPEP) 49018 UNITS CPEP Take 5 capsules (100,000 Units) by mouth 4 times daily (with meals and nightly) 180 capsule 1     multivitamins with minerals (CERTAVITE/CEROVITE) LIQD liquid 15 mLs by Per Feeding Tube route daily 1 Bottle 0     gabapentin (NEURONTIN) 250 MG/5ML solution 8 mLs (400 mg) by ORAL OR NJ TUBE route 2 times daily (Patient taking differently: 400 mg by ORAL OR NJ TUBE route 3 times daily ) 470 mL 0     order for DME Equipment being ordered: TENS with wire and electrode. 1 Units 0     scopolamine (TRANSDERM) 72 hr patch Apply 1 patch to hairless area behind one ear if severe nausea and vomiting.  Remove old patch and change every 3 days (72 hours). 2 patch 0     amylase-lipase-protease (ZENPEP) 89178 UNITS CPEP Take 2-3 capsules (40,000-60,000 Units) by mouth Take with snacks or supplements (Snacks) 180  "capsule 1     senna-docusate (SENOKOT-S;PERICOLACE) 8.6-50 MG per tablet Take 1 tablet by mouth 2 times daily as needed for constipation 100 tablet 0     pantoprazole (PROTONIX) 40 MG EC tablet Take 1 tablet (40 mg) by mouth 2 times daily (before meals) 30 tablet 1     polyethylene glycol (MIRALAX/GLYCOLAX) Packet Take 17 g by mouth daily 7 packet 0     norethindrone (AYGESTIN) 5 MG tablet Take 1 tablet (5 mg) by mouth daily 90 tablet 1     fluticasone (FLONASE) 50 MCG/ACT spray Spray 2 sprays into both nostrils daily 16 g 3     levalbuterol (XOPENEX HFA) 45 MCG/ACT Inhaler Inhale 2 puffs into the lungs every 6 hours as needed for shortness of breath / dyspnea 1 Inhaler 1     leuprolide (LUPRON DEPOT) 11.25 MG injection Inject 11.25 mg into the muscle every 3 months 1 each 3     RIZATRIPTAN BENZOATE PO Take 5 mg by mouth once as needed          Medical History: any changes in medical history since they were last seen? No    Review of Systems:  The 14 system ROS was reviewed from the intake questionnaire; results listed at end of note.     Physical Exam:  Blood pressure (!) 144/97, pulse 89, resp. rate 16, height 1.676 m (5' 6\"), weight 64.4 kg (142 lb), not currently breastfeeding.  General: NAD  Gait: Normal  Psych: Mood and affect appropriate; pleasant   Respiratory: Non labored breathing  Head: Normocephalic, atraumatic   Eyes: Sclera and conjunctiva clear    Assessment:   Alexandra Melgoza is a 22 year old female who is seen at the pain clinic for chronic abdominal pain and opioid dependence.    Plan:  1. Interventions: Referral placed for acupuncture. Patient is interested in integrative medicine: pain psychology, TENS unit, yoga, jovani chi to be considered and discussed.   2. Medication Management:   -Refilled oxycodone solution 5 mg/5mL with a 15% dose reduction: 10 mL (10 mg) every 4-6 hours prn, max of 50 mL/day.   *Will plan to decrease, per patient request, again at next 2-week follow up; recommend next dose " reduction to 40mg/day max at next refill.    *Recommend UDS at next office visit. Patient has previously demonstrated questionable abhorrent behaviors.    -May consider titration of nortriptyline as tolerated.   3. Follow up: RTC with Dr. Wang on 3/12/18    Total time spent was 20 minutes, and more than 50% of face to face time was spent in counseling and/or coordination of care regarding plan of care.    Answers for HPI/ROS submitted by the patient on 3/2/2018   TANISHA 7 TOTAL SCORE: 2  PHQ-2 Score: 1      Again, thank you for allowing me to participate in the care of your patient.      Sincerely,    ANABEL Falcon CNP

## 2018-03-02 NOTE — TELEPHONE ENCOUNTER
----- Message from Quyen Baez MD sent at 3/1/2018  8:58 AM CST -----  Regarding: RE: Hospital follow up needed  Please use an available TOPHER slot next week if possible, otherwise any open slot.  Amanda Clinton    ----- Message -----     From: Kathleen Herndon LPN     Sent: 2/28/2018   9:46 AM       To: Quyen Baez MD  Subject: FW: Hospital follow up needed                    Do you want to add her?  ThanksKathleen  ----- Message -----     From: Cornelia Ray     Sent: 2/28/2018   7:51 AM       To: [facility]-  Subject: Hospital follow up needed                        Pt was discharged on 2/27/18. Per discharge instructions, pt is to see her PCP (Sasha) this week or next week for for follow up and further eval of nausea and abdominal pain.    Please call pt's mother (Leeann) at home at [phone] or on her cell at [phone]    Cornelia Clinton

## 2018-03-02 NOTE — TELEPHONE ENCOUNTER
"Hospital/TCU/ED for chronic condition Discharge Protocol    \"Hi, my name is Tanmay Peraza, a registered nurse, and I am calling from [facility].  I am calling to follow up and see how things are going for you after your recent emergency visit/hospital/TCU stay.\"    Tell me how you are doing now that you are home?\" hanging in there\"      Discharge Instructions    \"Let's review your discharge instructions.  What is/are the follow-up recommendations?  Pt. Response: I have and appointment today and follow up with my PCP next week    \"Has an appointment with your primary care provider been scheduled?\"   Yes. (confirm)    \"When you see the provider, I would recommend that you bring your medications with you.\"    Medications    \"Tell me what changed about your medicines when you discharged?\"    Changes to chronic meds?    0-1    \"What questions do you have about your medications?\"    None     New diagnoses of heart failure, COPD, diabetes, or MI?    No              Medication reconciliation completed? Yes  Was MTM referral placed (*Make sure to put transitions as reason for referral)?   No    Call Summary    \"What questions or concerns do you have about your recent visit and your follow-up care?\"     none    \"If you have questions or things don't continue to improve, we encourage you contact us through the main clinic number (give number).  Even if the clinic is not open, triage nurses are available 24/7 to help you.     We would like you to know that our clinic has extended hours (provide information).  We also have urgent care (provide details on closest location and hours/contact info)\"      \"Thank you for your time and take care!\"               "

## 2018-03-02 NOTE — NURSING NOTE
LPN reviewed AVS with Pt includin. oxyCODONE (ROXICODONE) 5 MG/5ML solution- HAS BEEN REDUCED PER YOUR REQUEST. - Take 10 mL (10 mg) by mouth every 4-6 hours as needed, maximum 50 mL per day.    2.  Acupuncture Referral.  -Please call your insurance provider to find out about acupuncture coverage, being that not all policies cover acupuncture services.    [facility]- Dominique Ladyd [phone]    ([facility])- [facility] Thelma baker- [phone]    [facility] [phone]  [facility]   [address] [place], [place]    Follow up: With Dr. Wang on 3/12/18    Pt verbalized an understanding of information, and was asked to contact clinic with questions.    Abigail Prieto LPN

## 2018-03-02 NOTE — MR AVS SNAPSHOT
After Visit Summary   3/2/2018    Alexandra Melgoza    MRN: [id number]           Patient Information     Date Of Birth          1995        Visit Information        Provider Department      3/2/2018 11:50 AM Juanita Baum APRN [facility]        Today's Diagnoses     Abdominal pain, generalized        Chronic abdominal pain          Care Instructions    1. oxyCODONE (ROXICODONE) 5 MG/5ML solution- HAS BEEN REDUCED PER YOUR REQUEST. - Take 10 mL (10 mg) by mouth every 4-6 hours as needed, maximum 50 mL per day.    2.  Acupuncture Referral.  -Please call your insurance provider to find out about acupuncture coverage, being that not all policies cover acupuncture services.    [facility]- Dominique Nudd [phone]    [facility] [phone]    [facility] [phone]  [address]    Follow up: With Dr. Wang on 3/12/18      To speak with a nurse, schedule/reschedule/cancel a clinic appointment, or request a medication refill call: [phone]     You can also reach us by [employer]: [email or web address]    For refills, please call on Monday, 1 week before your medication runs out. The doctors are not always in clinic, so this gives us time to get your prescriptions ready.  Please let us know the name of the medication you are requesting a refill of.                                     Follow-ups after your visit        Additional Services     ACUPUNCTURE REFERRAL       Acupuncture Referral.  -Please call your insurance provider to find out about acupuncture coverage, being that not all policies cover acupuncture services.    [facility]- Dominique Nudd [phone]    ([facility] [phone]    [facility] [phone]  [address]  [place]                  Your next 10 appointments already scheduled     Mar 05, 2018  3:00 PM CST   Infusion 120 with UC SPEC INFUSION, UC 41 ATC   [facility] ([facility])    [address]  [address]  [facility] [address] [phone]            Mar 07, 2018  2:00 PM CST   Infusion 120 with UC SPEC INFUSION, UC 45 ATC   [facility] ([facility])    [address]  [address]  [facility] [id number] [phone]            Mar 09, 2018  1:00 PM CST   Infusion 120 with UC SPEC INFUSION, UC 42 ATC   [facility] ([facility])    [address]  [address]  [facility] [id number] [phone]            Mar 12, 2018  3:00 PM CDT   Infusion 120 with UC SPEC INFUSION, UC 45 ATC   [facility] ([facility])    [address]  [address]  [facility] [id number] [phone]            Mar 14, 2018  4:00 PM CDT   Infusion 120 with UC SPEC INFUSION, UC 47 ATC   [facility] ([facility])    [address]  [address]  [facility] [id number] [phone]              Who to contact     Please call your clinic at [phone] to:    Ask questions about your health    Make or cancel appointments    Discuss your medicines    Learn about your test results    Speak to your doctor            Additional Information About Your Visit        [employer] Information     [employer] is an electronic gateway that provides easy, online access to your medical records. With Readz, you can request a clinic appointment, read your test results, renew a prescription or communicate with your care team.     To sign up for [employer] visit the website at  [email or web address]   You will be asked to enter the access code listed below, as well as some personal information. Please follow the directions to create your username and password.     Your access code is: [email or web address]  Expires: 2018  6:30 AM     Your access code will  in 90 days. If you need help or a new code, please contact your [facility] or call [phone] for assistance.        Care EveryWhere ID     This is your Care EveryWhere ID. This could be used by other organizations to access your [place] medical records  [id number]        Your Vitals Were     Pulse Respirations Height BMI (Body Mass Index)          89 16 1.676 m (5' 6\") 22.92 kg/m2         Blood Pressure from Last 3 Encounters:   18 (!) 144/97   18 116/66   18 119/88    Weight from Last 3 Encounters:   18 64.4 kg (142 lb)   18 63.9 kg (140 lb 12.8 oz)   18 66.7 kg (147 lb)              We Performed the Following     ACUPUNCTURE REFERRAL          Today's Medication Changes          These changes are accurate as of 3/2/18  1:05 PM.  If you have any questions, ask your nurse or doctor.               These medicines have changed or have updated prescriptions.        Dose/Directions    gabapentin 250 MG/5ML solution   Commonly known as:  NEURONTIN   This may have changed:  when to take this   Used for:  Abdominal pain, epigastric        Dose:  400 mg   8 mLs (400 mg) by ORAL OR NJ TUBE route 2 times daily   Quantity:  470 mL   Refills:  0       nortriptyline 10 MG capsule   Commonly known as:  PAMELOR   This may have changed:  how much to take   Used for:  Right upper quadrant abdominal pain        Dose:  30 mg   Take 3 capsules (30 mg) by mouth At Bedtime   Quantity:  120 capsule   Refills:  0       oxyCODONE 5 MG/5ML solution   Commonly known as:  ROXICODONE   This may have changed:    - how much to take  - how to take this  - when to take this  - reasons to " take this  - additional instructions   Used for:  Abdominal pain, generalized, Chronic abdominal pain   Changed by:  Juanita Baum APRN CNP        Take 10 mL (10 mg) by mouth every 4-6 hours as needed, maximum 50 mL per day.   Quantity:  700 mL   Refills:  0            Where to get your medicines      Some of these will need a paper prescription and others can be bought over the counter.  Ask your nurse if you have questions.     Bring a paper prescription for each of these medications     oxyCODONE 5 MG/5ML solution                Primary Care Provider Office Phone # Fax #    Quyen Baez [phone] [address] [address]        Equal Access to Services     TRACE BENITEZ : Bret Enamorado, waar beck, lashell rosariomakush santillan, maki smallwood. So [facility] [phone].    ATENCIÓN: Si habla español, tiene a quijano disposición servicios gratuitos de asistencia lingüística. Llame al [phone].    We comply with applicable federal civil rights laws and [place] laws. We do not discriminate on the basis of race, color, national origin, age, disability, sex, sexual orientation, or gender identity.            Thank you!     Thank you for choosing [facility]  for your care. Our goal is always to provide you with excellent care. Hearing back from our patients is one way we can continue to improve our services. Please take a few minutes to complete the written survey that you may receive in the mail after your visit with us. Thank you!             Your Updated Medication List - Protect others around you: Learn how to safely use, store and throw away your medicines at www.disposemymeds.org.          This list is accurate as of 3/2/18  1:05 PM.  Always use your most recent med list.                   Brand Name Dispense Instructions for use Diagnosis    acetaminophen 32 mg/mL solution     TYLENOL     30.45 mLs (975 mg) by Per J Tube route every 8 hours        * amylase-lipase-protease 67144 UNITS Cpep    ZENPEP    180 capsule    Take 2-3 capsules (40,000-60,000 Units) by mouth Take with snacks or supplements (Snacks)    Idiopathic chronic pancreatitis (H)       * amylase-lipase-protease 30888 UNITS Cpep    ZENPEP    180 capsule    Take 5 capsules (100,000 Units) by mouth 4 times daily (with meals and nightly)    Right upper quadrant abdominal pain       fluticasone 50 MCG/ACT spray    FLONASE    16 g    Spray 2 sprays into both nostrils daily    Nasal congestion       gabapentin 250 MG/5ML solution    NEURONTIN    470 mL    8 mLs (400 mg) by ORAL OR NJ TUBE route 2 times daily    Abdominal pain, epigastric       hydrOXYzine 10 MG/5ML syrup    ATARAX    473 mL    Take 12.5 mLs (25 mg) by mouth every 4 hours as needed for anxiety or other (pain)    Acute abdominal pain       leuprolide 11.25 MG kit    LUPRON DEPOT (3-MONTH)    1 each    Inject 11.25 mg into the muscle every 3 months    Endometriosis       levalbuterol 45 MCG/ACT Inhaler    XOPENEX HFA    1 Inhaler    Inhale 2 puffs into the lungs every 6 hours as needed for shortness of breath / dyspnea    Wheeze       menthol 5 % Ptch    ICY HOT    15 patch    Apply 1 patch topically every 8 hours as needed for muscle soreness    Abdominal pain, epigastric       multivitamins with minerals Liqd liquid     1 Bottle    15 mLs by Per Feeding Tube route daily    Abdominal pain, epigastric       norethindrone 5 MG tablet    AYGESTIN    90 tablet    Take 1 tablet (5 mg) by mouth daily    Endometriosis       nortriptyline 10 MG capsule    PAMELOR    120 capsule    Take 3 capsules (30 mg) by mouth At Bedtime    Right upper quadrant abdominal pain       ondansetron 4 MG ODT tab    ZOFRAN ODT    15 tablet    Take 1 tablet (4 mg) by mouth every 8 hours as needed for nausea or vomiting    Intractable cyclical vomiting with nausea       order for DME     1 Units     Equipment being ordered: TENS with wire and electrode.    Chronic abdominal pain       oxyCODONE 5 MG/5ML solution    ROXICODONE    700 mL    Take 10 mL (10 mg) by mouth every 4-6 hours as needed, maximum 50 mL per day.    Abdominal pain, generalized, Chronic abdominal pain       pantoprazole 40 MG EC tablet    PROTONIX    30 tablet    Take 1 tablet (40 mg) by mouth 2 times daily (before meals)    Right upper quadrant abdominal pain, Erosive gastritis       polyethylene glycol Packet    MIRALAX/GLYCOLAX    7 packet    Take 17 g by mouth daily    Right upper quadrant abdominal pain       RIZATRIPTAN BENZOATE PO      Take 5 mg by mouth once as needed        scopolamine 72 hr patch    TRANSDERM    2 patch    Apply 1 patch to hairless area behind one ear if severe nausea and vomiting.  Remove old patch and change every 3 days (72 hours).    Intractable vomiting with nausea, unspecified vomiting type       senna-docusate 8.6-50 MG per tablet    SENOKOT-S;PERICOLACE    100 tablet    Take 1 tablet by mouth 2 times daily as needed for constipation    Right upper quadrant abdominal pain       sodium bicarbonate 325 MG tablet     120 tablet    1 tablet (325 mg) by Per Feeding Tube route 4 times daily    Abdominal pain, epigastric, Intractable cyclical vomiting with nausea       * Notice:  This list has 2 medication(s) that are the same as other medications prescribed for you. Read the directions carefully, and ask your doctor or other care provider to review them with you.

## 2018-03-02 NOTE — PROGRESS NOTES
Nursing Note  Alexandra Melgoza presents [facility] for:   Chief Complaint   Patient presents with     Infusion     IV Fluids, Zofran, Benadryl     During today's [facility] appointment, orders from Dr. Narayanan were completed.  Frequency: 3x/wk PRN    Progress note:  Patient identification verified by name and date of birth.  Assessment completed.  Vitals recorded in Doc Flowsheets.  Patient was provided with education regarding infusion and possible side effects.  Patient verbalized understanding.      needed: No  Premedications: Zofran and benadryl given IVP over 3 minutes each.  Infusion Rates: each liter LR infusion given over approximately 1 hour.   Approximate Infusion length:2 hours.   Labs: were not ordered for this appointment.  Vascular access: port accessed today. +blood return. Heparin locked post infusion.  Treatment Conditions: patient denies fever, chills, signs of infection, recent illness, on antibiotics, productive cough or elevated temperature.  Patient tolerated infusion: well.    Discharge Plan:   Follow up plan of care with: ongoing infusions at [facility].  Discharge instructions were reviewed with patient.  Patient/representative verbalized understanding of discharge instructions and all questions answered.  Patient discharged from [facility] in stable condition.    Nicole Ballard RN     Administrations This Visit     diphenhydrAMINE (BENADRYL) injection 25 mg     Admin Date Action Dose Route Administered By             03/02/2018 Given 25 mg Intravenous Nicole Ballard RN              Admin Date Action Dose Route Administered By             03/02/2018 Given 25 mg Intravenous Nicole Ballard RN                    lactated ringers BOLUS 1,000-2,000 mL     Admin Date Action Dose Route Administered By             03/02/2018 New Bag 2000 mL Intravenous Elio  SAMARA Rivera                    ondansetron (ZOFRAN) injection 4 mg     Admin Date Action Dose Route Administered By             03/02/2018 Given 4 mg Intravenous Nicole Ballard RN              Admin Date Action Dose Route Administered By             03/02/2018 Given 4 mg Intravenous Nicole Ballard RN                        /77  Pulse 87  Temp 98  F (36.7  C) (Oral)  Resp 16  SpO2 99%

## 2018-03-02 NOTE — PROGRESS NOTES
"Date of visit: 3/2/2018    Chief complaint:   Chief Complaint   Patient presents with     Pain Management     Follow up        Interval history:  Alexandra Melgoza is a 22 year old female, patient of Leonrado Wang MD, last seen on 2/13/18. A 2 week supply of her oxycodone suspension was refilled due to concern with abhorrent behavior (patient reported spilling her entire bottle of previous Rx). She since has had multiple ED visits and one hospitalization, admitted 2/25/18, due to 4 day hx of nausea, non-bloody emesis, diarrhea, and RUQ abdominal pain. ERCP with NJ tube 1/18 with Dr. Narayanan; GJ tube placed approximately three weeks ago without complication. Of note, her urine drug screen on admission was negative for all opiate medication. In review of chart, there was some noted concern that patient may have been experiencing withdrawal symptoms, accounting for her ED presentation.       Currently, she takes 10 mL (10 mg) PO every 4 hours as needed for pain - max of 60 mL/day. She was refilled for a 3-day prescription following hospital discharge on 2/27/18. No further changes were made following hospitalization to care plan. Today, Alexandra reports she is feeling more stable pain-wise and has goal of tapering and discontinuing her oxycodone. 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 She notes she is currently only utilizing 40-50 mg/day on average; she verbalizes awareness that opioid medications should ideally only be used on an as needed basis for severe pain. She expresses interest in integrative medicine and would like to pursue acupuncture; she states that she would like to eventually taper from gabapentin as well. Continues to tolerate nortriptyline well without side effect.       Medications:  Current Outpatient Prescriptions   Medication Sig Dispense Refill     acetaminophen (TYLENOL) 32 mg/mL solution 30.45 mLs (975 mg) by Per J Tube route every 8 hours       " ondansetron (ZOFRAN ODT) 4 MG ODT tab Take 1 tablet (4 mg) by mouth every 8 hours as needed for nausea or vomiting 15 tablet 0     sodium bicarbonate 325 MG tablet 1 tablet (325 mg) by Per Feeding Tube route 4 times daily 120 tablet 0     oxyCODONE (ROXICODONE) 5 MG/5ML solution Take 10 mLs (10 mg) by mouth every 4 hours as needed for pain maximum 60 mL per day. 210 mL 0     nortriptyline (PAMELOR) 10 MG capsule Take 3 capsules (30 mg) by mouth At Bedtime (Patient taking differently: Take 20-30 mg by mouth At Bedtime ) 120 capsule 0     hydrOXYzine (ATARAX) 10 MG/5ML syrup Take 12.5 mLs (25 mg) by mouth every 4 hours as needed for anxiety or other (pain) 473 mL 0     menthol (ICY HOT) 5 % PTCH Apply 1 patch topically every 8 hours as needed for muscle soreness 15 patch 3     amylase-lipase-protease (ZENPEP) 63304 UNITS CPEP Take 5 capsules (100,000 Units) by mouth 4 times daily (with meals and nightly) 180 capsule 1     multivitamins with minerals (CERTAVITE/CEROVITE) LIQD liquid 15 mLs by Per Feeding Tube route daily 1 Bottle 0     gabapentin (NEURONTIN) 250 MG/5ML solution 8 mLs (400 mg) by ORAL OR NJ TUBE route 2 times daily (Patient taking differently: 400 mg by ORAL OR NJ TUBE route 3 times daily ) 470 mL 0     order for DME Equipment being ordered: TENS with wire and electrode. 1 Units 0     scopolamine (TRANSDERM) 72 hr patch Apply 1 patch to hairless area behind one ear if severe nausea and vomiting.  Remove old patch and change every 3 days (72 hours). 2 patch 0     amylase-lipase-protease (ZENPEP) 65825 UNITS CPEP Take 2-3 capsules (40,000-60,000 Units) by mouth Take with snacks or supplements (Snacks) 180 capsule 1     senna-docusate (SENOKOT-S;PERICOLACE) 8.6-50 MG per tablet Take 1 tablet by mouth 2 times daily as needed for constipation 100 tablet 0     pantoprazole (PROTONIX) 40 MG EC tablet Take 1 tablet (40 mg) by mouth 2 times daily (before meals) 30 tablet 1     polyethylene glycol  "(MIRALAX/GLYCOLAX) Packet Take 17 g by mouth daily 7 packet 0     norethindrone (AYGESTIN) 5 MG tablet Take 1 tablet (5 mg) by mouth daily 90 tablet 1     fluticasone (FLONASE) 50 MCG/ACT spray Spray 2 sprays into both nostrils daily 16 g 3     levalbuterol (XOPENEX HFA) 45 MCG/ACT Inhaler Inhale 2 puffs into the lungs every 6 hours as needed for shortness of breath / dyspnea 1 Inhaler 1     leuprolide (LUPRON DEPOT) 11.25 MG injection Inject 11.25 mg into the muscle every 3 months 1 each 3     RIZATRIPTAN BENZOATE PO Take 5 mg by mouth once as needed          Medical History: any changes in medical history since they were last seen? No    Review of Systems:  The 14 system ROS was reviewed from the intake questionnaire; results listed at end of note.     Physical Exam:  Blood pressure (!) 144/97, pulse 89, resp. rate 16, height 1.676 m (5' 6\"), weight 64.4 kg (142 lb), not currently breastfeeding.  General: NAD  Gait: Normal  Psych: Mood and affect appropriate; pleasant   Respiratory: Non labored breathing  Head: Normocephalic, atraumatic   Eyes: Sclera and conjunctiva clear    Assessment:   Alexandra Melgoza is a 22 year old female who is seen at the pain clinic for chronic abdominal pain and opioid dependence.    Plan:  1. Interventions: Referral placed for acupuncture. Patient is interested in integrative medicine: pain psychology, TENS unit, yoga, jovani chi to be considered and discussed.   2. Medication Management:   -Refilled oxycodone solution 5 mg/5mL with a 15% dose reduction: 10 mL (10 mg) every 4-6 hours prn, max of 50 mL/day.   *Will plan to decrease, per patient request, again at next 2-week follow up; recommend next dose reduction to 40mg/day max at next refill.    *Recommend UDS at next office visit. Patient has previously demonstrated questionable abhorrent behaviors.    -May consider titration of nortriptyline as tolerated.   3. Follow up: RTC with Dr. Wang on 3/12/18    Total time spent was 20 " minutes, and more than 50% of face to face time was spent in counseling and/or coordination of care regarding plan of care.    ANABEL Falcon, MARYNP-BC  Pain Management  Department of Interventional Pain Management and Anesthesiology   [facility]        Answers for HPI/ROS submitted by the patient on 3/2/2018   TANISHA 7 TOTAL SCORE: 2  PHQ-2 Score: 1

## 2018-03-03 ASSESSMENT — ANXIETY QUESTIONNAIRES: GAD7 TOTAL SCORE: 2

## 2018-03-05 ENCOUNTER — INFUSION THERAPY VISIT (OUTPATIENT)
Dept: INFUSION THERAPY | Facility: CLINIC | Age: 23
End: 2018-03-05
Attending: INTERNAL MEDICINE
Payer: COMMERCIAL

## 2018-03-05 VITALS
OXYGEN SATURATION: 99 % | DIASTOLIC BLOOD PRESSURE: 74 MMHG | WEIGHT: 146.1 LBS | TEMPERATURE: 97.9 F | HEART RATE: 91 BPM | BODY MASS INDEX: 23.58 KG/M2 | SYSTOLIC BLOOD PRESSURE: 105 MMHG

## 2018-03-05 DIAGNOSIS — R11.2 INTRACTABLE NAUSEA AND VOMITING: ICD-10-CM

## 2018-03-05 DIAGNOSIS — R10.9 ACUTE ABDOMINAL PAIN: ICD-10-CM

## 2018-03-05 DIAGNOSIS — Z98.890 S/P ERCP: ICD-10-CM

## 2018-03-05 DIAGNOSIS — R52 PAIN: ICD-10-CM

## 2018-03-05 DIAGNOSIS — E16.2 HYPOGLYCEMIA: ICD-10-CM

## 2018-03-05 DIAGNOSIS — R21 RASH: ICD-10-CM

## 2018-03-05 DIAGNOSIS — F41.1 ANXIETY STATE: ICD-10-CM

## 2018-03-05 DIAGNOSIS — K86.1 CHRONIC PANCREATITIS, UNSPECIFIED PANCREATITIS TYPE (H): ICD-10-CM

## 2018-03-05 DIAGNOSIS — R10.13 ABDOMINAL PAIN, EPIGASTRIC: ICD-10-CM

## 2018-03-05 DIAGNOSIS — N39.41 URGE INCONTINENCE: ICD-10-CM

## 2018-03-05 DIAGNOSIS — F33.1 MAJOR DEPRESSIVE DISORDER, RECURRENT EPISODE, MODERATE (H): ICD-10-CM

## 2018-03-05 DIAGNOSIS — R10.9 ABDOMINAL PAIN: ICD-10-CM

## 2018-03-05 DIAGNOSIS — G43.A0 CYCLICAL VOMITING WITH NAUSEA, INTRACTABILITY OF VOMITING NOT SPECIFIED: Primary | ICD-10-CM

## 2018-03-05 PROCEDURE — 96376 TX/PRO/DX INJ SAME DRUG ADON: CPT

## 2018-03-05 PROCEDURE — 96375 TX/PRO/DX INJ NEW DRUG ADDON: CPT

## 2018-03-05 PROCEDURE — 96361 HYDRATE IV INFUSION ADD-ON: CPT

## 2018-03-05 PROCEDURE — 96374 THER/PROPH/DIAG INJ IV PUSH: CPT

## 2018-03-05 PROCEDURE — 25000128 H RX IP 250 OP 636: Mod: ZF | Performed by: INTERNAL MEDICINE

## 2018-03-05 PROCEDURE — 25000128 H RX IP 250 OP 636: Mod: ZF | Performed by: NURSE PRACTITIONER

## 2018-03-05 RX ORDER — DIPHENHYDRAMINE HYDROCHLORIDE 50 MG/ML
25 INJECTION INTRAMUSCULAR; INTRAVENOUS ONCE
Status: CANCELLED
Start: 2018-03-05 | End: 2018-03-05

## 2018-03-05 RX ORDER — DIPHENHYDRAMINE HYDROCHLORIDE 50 MG/ML
25 INJECTION INTRAMUSCULAR; INTRAVENOUS ONCE
Status: COMPLETED | OUTPATIENT
Start: 2018-03-05 | End: 2018-03-05

## 2018-03-05 RX ORDER — DIPHENHYDRAMINE HYDROCHLORIDE 50 MG/ML
25 INJECTION INTRAMUSCULAR; INTRAVENOUS DAILY PRN
Status: CANCELLED
Start: 2018-03-06

## 2018-03-05 RX ORDER — ONDANSETRON 2 MG/ML
4 INJECTION INTRAMUSCULAR; INTRAVENOUS ONCE
Status: COMPLETED | OUTPATIENT
Start: 2018-03-05 | End: 2018-03-05

## 2018-03-05 RX ORDER — ONDANSETRON 2 MG/ML
4 INJECTION INTRAMUSCULAR; INTRAVENOUS ONCE
Status: CANCELLED
Start: 2018-03-05 | End: 2018-03-05

## 2018-03-05 RX ORDER — ONDANSETRON 2 MG/ML
4 INJECTION INTRAMUSCULAR; INTRAVENOUS DAILY PRN
Status: CANCELLED
Start: 2018-03-05

## 2018-03-05 RX ORDER — DIPHENHYDRAMINE HYDROCHLORIDE 50 MG/ML
25 INJECTION INTRAMUSCULAR; INTRAVENOUS DAILY PRN
Status: DISCONTINUED | OUTPATIENT
Start: 2018-03-05 | End: 2018-03-05 | Stop reason: HOSPADM

## 2018-03-05 RX ORDER — ONDANSETRON 2 MG/ML
4 INJECTION INTRAMUSCULAR; INTRAVENOUS DAILY PRN
Status: DISCONTINUED | OUTPATIENT
Start: 2018-03-05 | End: 2018-03-05 | Stop reason: HOSPADM

## 2018-03-05 RX ADMIN — ONDANSETRON 4 MG: 2 INJECTION INTRAMUSCULAR; INTRAVENOUS at 11:08

## 2018-03-05 RX ADMIN — DIPHENHYDRAMINE HYDROCHLORIDE 25 MG: 50 INJECTION, SOLUTION INTRAMUSCULAR; INTRAVENOUS at 12:20

## 2018-03-05 RX ADMIN — ONDANSETRON 4 MG: 2 INJECTION INTRAMUSCULAR; INTRAVENOUS at 12:18

## 2018-03-05 RX ADMIN — DIPHENHYDRAMINE HYDROCHLORIDE 25 MG: 50 INJECTION, SOLUTION INTRAMUSCULAR; INTRAVENOUS at 11:10

## 2018-03-05 RX ADMIN — SODIUM CHLORIDE, POTASSIUM CHLORIDE, SODIUM LACTATE AND CALCIUM CHLORIDE 1000 ML: 600; 310; 30; 20 INJECTION, SOLUTION INTRAVENOUS at 11:07

## 2018-03-05 ASSESSMENT — PAIN SCALES - GENERAL: PAINLEVEL: SEVERE PAIN (6)

## 2018-03-05 NOTE — PROGRESS NOTES
The patient is a 22-year-old female with a history of chronic pancreatitis who presents for follow-up.  The previous visit was her third visit to our clinic.  During that visit a plan was formulated with the goals of attempting to diminish chronic pancreatitis flares and thus emergency room visits and should those flares occur provided patient with pain medication that would be available to treat a flare and preclude visit to the emergency room.  She states that her normal pain is almost nonexistent.  The pain she complains of at this time is pain from her surgical procedure (GJ tube placement).  She presents today for reevaluation and follow-up     Initial summary:  The patient is a 21-year-old woman with a history of chronic pancreatitis.  She had her gallbladder removed at the age of 15 and has had severe pancreatitis since that time.  She has been seen by Dr. Narayanan here at the [facility] and has had an ERCP and stent placed.  She notes that she has pancreatitis flares approximately every 3 months.  And presents today for suggestions about treatments for her pain during these pancreatitis flares.  She states that her flares occur every 3 months.  During her flares she states that she gets fluids and pain medications namely oxycodone 10-15 mg every 4-6 hours when necessary.  Her pain is alleviated by utilizing a low-fat diet, fruit, veggie's, poultry and fish, and relaxation techniques.  She states that her pain is exacerbated when she straightens from this diet.  She has used several different modalities to treat her pain.  These include Tylenol oxycodone gabapentin Pamelor and acupuncture. She states that the gabapentin causes sedation.  She presents today for initial evaluation and treatment modalities for her pain.  She asked specifically about celiac plexus blockade.        Current Outpatient Prescriptions   Medication     gabapentin (NEURONTIN) 400 MG capsule     oxyCODONE IR (ROXICODONE) 10  "MG tablet     oxyCODONE (ROXICODONE) 5 MG/5ML solution     nortriptyline (PAMELOR) 10 MG capsule     ondansetron (ZOFRAN ODT) 4 MG ODT tab     amylase-lipase-protease (ZENPEP) 83755 UNITS CPEP     amylase-lipase-protease (ZENPEP) 45796 UNITS CPEP     senna-docusate (SENOKOT-S;PERICOLACE) 8.6-50 MG per tablet     pantoprazole (PROTONIX) 40 MG EC tablet     polyethylene glycol (MIRALAX/GLYCOLAX) Packet     gabapentin (NEURONTIN) 300 MG capsule     norethindrone (AYGESTIN) 5 MG tablet     fluticasone (FLONASE) 50 MCG/ACT spray     levalbuterol (XOPENEX HFA) 45 MCG/ACT Inhaler     leuprolide (LUPRON DEPOT) 11.25 MG injection     RIZATRIPTAN BENZOATE PO       No current facility-administered medications for this visit.                  Allergies   Allergen Reactions     Amoxicillin-Pot Clavulanate Nausea and Vomiting     Compazine [Prochlorperazine] Other (See Comments)         Dystonia     Hyoscyamine Other (See Comments)         Dystonia     Reglan [Metoclopramide Hcl] Other (See Comments)         Dystonia     Zyprexa Other (See Comments)         Sensitive, dystonic reaction on 11-9-2011     Amitriptyline Hcl Other (See Comments)         Dystonia, hallucinations     Buspirone Other (See Comments)         No Adverse Reactions, no benefit     Cogentin [Benztropine]        Cyproheptadine Other (See Comments)         Distonic     Dicyclomine Other (See Comments)     Droperidol Other (See Comments)         Feels tense and \"like she has to jump out of her skin\".       Effexor [Venlafaxine] Other (See Comments)         Dystonia     Food            Cilantro--lips/tongue swelling     No Clinical Screening - See Comments Other (See Comments)         Cilantro     Phenergan Dm [Promethazine-Dm] Other (See Comments)         dystonia     Promethazine Other (See Comments)     Risperidone Other (See Comments)         dystonia     Vistaril Other (See Comments)         Burning sensation.     Augmentin GI Disturbance     Ketamine Other " (See Comments)         jittery     Sorbitol GI Disturbance         Headache and dyspepsia         Past Medical History            Past Medical History:   Diagnosis Date     Anxiety        Asthma        Cholecystitis         s/p cholecystectomy     Chronic abdominal pain        Chronic infection         mrsa     Chronic pain        Cyclic vomiting syndrome 10/27/2012     Depression        Endometriosis        Hypoglycaemia        Migraines        Mild intermittent asthma        Ovarian cysts        Pancreatic disease        PONV (postoperative nausea and vomiting)        Pseudoseizures        Somatoform disorder        Sphincter of Oddi dysfunction        Vasovagal syncope                Past Surgical History              Past Surgical History:   Procedure Laterality Date     ABDOMEN SURGERY            ERCP, biliary stents     CHOLECYSTECTOMY    8/2/11     COLONOSCOPY    2011      negative finding     ENDOSCOPIC RETROGRADE CHOLANGIOPANCREATOGRAM    8/23/2011      Procedure:ENDOSCOPIC RETROGRADE CHOLANGIOPANCREATOGRAM; Endoscopic Retrograde Cholangiopancreatogram; Surgeon:SHORTY MCCOY; Location:[facility]     ENDOSCOPIC RETROGRADE CHOLANGIOPANCREATOGRAM    5/17/2012      Procedure:ENDOSCOPIC RETROGRADE CHOLANGIOPANCREATOGRAM; Endoscopic Retrograde Cholangiopancreatogram with pancreatic stent placement.; Surgeon:SHORTY MCCOY; Location:[facility]     ENDOSCOPIC RETROGRADE CHOLANGIOPANCREATOGRAM COMPLEX    1/3/2012      Procedure:ENDOSCOPIC RETROGRADE CHOLANGIOPANCREATOGRAM COMPLEX; Endoscopic Retrograde Cholangiopancreatogram with Manometry bile duct sphincterotomy extention pancreatic duct sphincterotomy pancreatic duct stent placement; Surgeon:SHORTY MCCOY; [facility]     ENDOSCOPIC ULTRASOUND UPPER GASTROINTESTINAL TRACT (GI) N/A 6/9/2015      Procedure: ENDOSCOPIC ULTRASOUND, ESOPHAGOSCOPY / UPPER GASTROINTESTINAL TRACT (GI);  Surgeon: Mario Joe MD;  [facility]     ENDOSCOPIC  ULTRASOUND UPPER GASTROINTESTINAL TRACT (GI) N/A 12/12/2016      Procedure: ENDOSCOPIC ULTRASOUND, ESOPHAGOSCOPY / UPPER GASTROINTESTINAL TRACT (GI);  Surgeon: Guru Jose Klein MD;  [facility]     ESOPHAGOSCOPY, GASTROSCOPY, DUODENOSCOPY (EGD), COMBINED    1/18/2012      Procedure:COMBINED ESOPHAGOSCOPY, GASTROSCOPY, DUODENOSCOPY (EGD); Surgeon:ARNIE ESPINOZA; [facility]     ESOPHAGOSCOPY, GASTROSCOPY, DUODENOSCOPY (EGD), COMBINED    1/18/2012      Procedure:COMBINED ESOPHAGOSCOPY, GASTROSCOPY, DUODENOSCOPY (EGD); EGD; Surgeon:ARNIE ESPINOZA; [facility]     ESOPHAGOSCOPY, GASTROSCOPY, DUODENOSCOPY (EGD), COMBINED N/A 12/9/2017      Procedure: COMBINED ESOPHAGOSCOPY, GASTROSCOPY, DUODENOSCOPY (EGD);;  Surgeon: Josias Chan MD;  Location: [facility]     INSERT PORT VASCULAR ACCESS           L knee arthroscopy    2009     LAPAROSCOPY DIAGNOSTIC (GYN)    10/26/2012      Procedure: LAPAROSCOPY DIAGNOSTIC (GYN);  LAPAROSCOPY DIAGNOSTIC, CAUTERY ENDOMETRIOISIS and biopsy of Fallopian tube lesions;  Surgeon: Carla Lopez MD;  Location: [facility] OR     ORTHOPEDIC SURGERY    2008      knee     VASCULAR SURGERY                   Social History    Social History                 Social History     Marital status: Single         Spouse name: N/A     Number of children: N/A     Years of education: N/A             Occupational History     Not on file.               Social History Main Topics     Smoking status: Never Smoker     Smokeless tobacco: Never Used     Alcohol use No     Drug use: No     Sexual activity: No               Other Topics Concern     Parent/Sibling W/ Cabg, Mi Or Angioplasty Before 65f 55m? 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  Physical Exam   Constitutional: She is oriented to person, place, and time. She appears well-developed and well-nourished.   HENT:   Head: Normocephalic and atraumatic.   Right Ear: External ear normal.   Left Ear: External ear normal.   Nose: Nose normal.   Mouth/Throat: Oropharynx is clear and moist.   Eyes: Conjunctivae and EOM are normal. Pupils are equal, round, and reactive to light.   Neck: Normal range of motion. Neck supple.   Cardiovascular: Normal rate, regular rhythm, normal heart sounds and intact distal pulses.    Pulmonary/Chest: Effort normal and breath sounds normal.   Abdominal: She exhibits no distension and no mass. There is tenderness. There is guarding. There is no rebound.   Musculoskeletal: Normal range of motion.   Neurological: She is alert and oriented to person, place, and time. She has normal reflexes.   Skin: Skin is warm and dry.   Psychiatric: She has a normal mood and affect.   Nursing note and vitals reviewed.  A/P:Patient is a  23 y/o lady with chronic abdominal pain who presents for initial evaluation.  It is clear that the most likely eiology of her pain is pancreatitic flares.  The previous plan of providing medication for flares only was not very successful as she was in the ED last night.  She has utilized all of the opioid that was prescribed. In an effort to decrease the frequency of her flares and decrease the duration of flares, I recommend  1. Pain psychology- patient is scheduling appt today  2. Continue Oxycodone 10 mg q4h prn with plan to wean when the surgical pain subsides.  3. Add pamelor 30 mg qHS  4. RTC in 4 weeks          Answers for HPI/ROS submitted by the patient on 2/13/2018   TANISHA 7 TOTAL SCORE: 2

## 2018-03-05 NOTE — MR AVS SNAPSHOT
After Visit Summary   3/5/2018    Alexandra Melgoaz    MRN: [id number]           Patient Information     Date Of Birth          1995        Visit Information        Provider Department      3/5/2018 11:00 AM UC 43 ATC; UC SPEC INFUSION [facility]        Today's Diagnoses     Cyclical vomiting with nausea, intractability of vomiting not specified    -  1    Chronic pancreatitis, unspecified pancreatitis type (H)        Intractable nausea and vomiting        Pain        Abdominal pain, epigastric        S/P ERCP        Acute abdominal pain        Abdominal pain        Hypoglycemia        Urge incontinence        Major depressive disorder, recurrent episode, moderate (H)        Anxiety state        Rash           Follow-ups after your visit        Your next 10 appointments already scheduled     Mar 07, 2018  2:00 PM CST   Infusion 120 with UC SPEC INFUSION, UC 45 ATC   [facility] ([facility])    [address] [place] [address]  [address]  [facility] [address]   [phone]            Mar 09, 2018  1:00 PM CST   Infusion 120 with UC SPEC INFUSION, UC 42 ATC   [facility] ([facility])    [address]  [address]  [facility] [address]   [phone]            Mar 12, 2018 10:40 AM CDT   (Arrive by 10:25 AM)   Return Visit with Leonardo Wang MD   [facility] ([facility])    [address] [place] [address]  4th Floor  [facility] [address]   [phone]            Mar 12, 2018  3:00 PM CDT   Infusion 120 with UC SPEC INFUSION, UC 45 ATC   [facility] ([facility])    [address]  [address]  [facility] [address]   [phone]            Mar 14, 2018  4:00 PM CDT  "  Infusion 120 with UC SPEC INFUSION, UC 47 ATC   [facility] ([facility])    [address]  [address]  [facility] [address] [phone]            Mar 16, 2018  3:00 PM CDT   Infusion 120 with UC SPEC INFUSION   [facility] ([facility])    [address]  [address]  [facility] [address] [phone]              Who to contact     If you have questions or need follow up information about today's clinic visit or your schedule please contact [facility] directly at [phone].  Normal or non-critical lab and imaging results will be communicated to you by [employer], letter or phone within 4 business days after the clinic has received the results. If you do not hear from us within 7 days, please contact the clinic through [employer] or phone. If you have a critical or abnormal lab result, we will notify you by phone as soon as possible.  Submit refill requests through [employer] or call your pharmacy and they will forward the refill request to us. Please allow 3 business days for your refill to be completed.          Additional Information About Your Visit        [employer] Information     [employer] lets you send messages to your doctor, view your test results, renew your prescriptions, schedule appointments and more. To sign up, go to [email or web address] . Click on \"Log in\" on the left side of the screen, which will take you to the Welcome page. Then click on \"Sign up Now\" on the right side of the page.     You will be asked to enter the access code listed below, as well as some personal information. Please follow the directions to create your username and password.     Your access code is: [email or web address]  Expires: 2018  6:30 AM     Your access code will  in 90 days. If you need help or a new code, " please call your [facility] clinic or [phone].        Care EveryWhere ID     This is your Care EveryWhere ID. This could be used by other organizations to access your [facility] medical records  [id number]        Your Vitals Were     Pulse Temperature Pulse Oximetry BMI (Body Mass Index)          91 97.9  F (36.6  C) (Oral) 99% 23.58 kg/m2         Blood Pressure from Last 3 Encounters:   03/05/18 105/74   03/02/18 (P) 119/82   03/02/18 (!) 144/97    Weight from Last 3 Encounters:   03/05/18 66.3 kg (146 lb 1.6 oz)   03/02/18 64.4 kg (142 lb)   02/25/18 63.9 kg (140 lb 12.8 oz)              Today, you had the following     No orders found for display         Today's Medication Changes          These changes are accurate as of 3/5/18  3:09 PM.  If you have any questions, ask your nurse or doctor.               These medicines have changed or have updated prescriptions.        Dose/Directions    gabapentin 250 MG/5ML solution   Commonly known as:  NEURONTIN   This may have changed:  when to take this   Used for:  Abdominal pain, epigastric        Dose:  400 mg   8 mLs (400 mg) by ORAL OR NJ TUBE route 2 times daily   Quantity:  470 mL   Refills:  0       nortriptyline 10 MG capsule   Commonly known as:  PAMELOR   This may have changed:  how much to take   Used for:  Right upper quadrant abdominal pain        Dose:  30 mg   Take 3 capsules (30 mg) by mouth At Bedtime   Quantity:  120 capsule   Refills:  0                Primary Care Provider Office Phone # Fax #    Quyen Baez [phone]       [address] [address]        Equal Access to Services     [facility] : Hadyoana Enamorado, alcon beck, qamaki hdez. So [facility] [phone].    ATENCIÓN: Si habla español, tiene a quijano disposición servicios gratuitos de asistencia lingüística. Llame al [phone].    We comply with applicable [facility] civil  rights laws and [place] laws. We do not discriminate on the basis of race, color, national origin, age, disability, sex, sexual orientation, or gender identity.            Thank you!     Thank you for choosing [facility]  for your care. Our goal is always to provide you with excellent care. Hearing back from our patients is one way we can continue to improve our services. Please take a few minutes to complete the written survey that you may receive in the mail after your visit with us. Thank you!             Your Updated Medication List - Protect others around you: Learn how to safely use, store and throw away your medicines at www.disposemymeds.org.          This list is accurate as of 3/5/18  3:09 PM.  Always use your most recent med list.                   Brand Name Dispense Instructions for use Diagnosis    acetaminophen 32 mg/mL solution    TYLENOL     30.45 mLs (975 mg) by Per J Tube route every 8 hours        * amylase-lipase-protease 52171 UNITS Cpep    ZENPEP    180 capsule    Take 2-3 capsules (40,000-60,000 Units) by mouth Take with snacks or supplements (Snacks)    Idiopathic chronic pancreatitis (H)       * amylase-lipase-protease 01133 UNITS Cpep    ZENPEP    180 capsule    Take 5 capsules (100,000 Units) by mouth 4 times daily (with meals and nightly)    Right upper quadrant abdominal pain       fluticasone 50 MCG/ACT spray    FLONASE    16 g    Spray 2 sprays into both nostrils daily    Nasal congestion       gabapentin 250 MG/5ML solution    NEURONTIN    470 mL    8 mLs (400 mg) by ORAL OR NJ TUBE route 2 times daily    Abdominal pain, epigastric       hydrOXYzine 10 MG/5ML syrup    ATARAX    473 mL    Take 12.5 mLs (25 mg) by mouth every 4 hours as needed for anxiety or other (pain)    Acute abdominal pain       leuprolide 11.25 MG kit    LUPRON DEPOT (3-MONTH)    1 each    Inject 11.25 mg into the muscle every 3 months    Endometriosis        levalbuterol 45 MCG/ACT Inhaler    XOPENEX HFA    1 Inhaler    Inhale 2 puffs into the lungs every 6 hours as needed for shortness of breath / dyspnea    Wheeze       menthol 5 % Ptch    ICY HOT    15 patch    Apply 1 patch topically every 8 hours as needed for muscle soreness    Abdominal pain, epigastric       multivitamins with minerals Liqd liquid     1 Bottle    15 mLs by Per Feeding Tube route daily    Abdominal pain, epigastric       norethindrone 5 MG tablet    AYGESTIN    90 tablet    Take 1 tablet (5 mg) by mouth daily    Endometriosis       nortriptyline 10 MG capsule    PAMELOR    120 capsule    Take 3 capsules (30 mg) by mouth At Bedtime    Right upper quadrant abdominal pain       ondansetron 4 MG ODT tab    ZOFRAN ODT    15 tablet    Take 1 tablet (4 mg) by mouth every 8 hours as needed for nausea or vomiting    Intractable cyclical vomiting with nausea       order for DME     1 Units    Equipment being ordered: TENS with wire and electrode.    Chronic abdominal pain       oxyCODONE 5 MG/5ML solution    ROXICODONE    700 mL    Take 10 mL (10 mg) by mouth every 4-6 hours as needed, maximum 50 mL per day.    Abdominal pain, generalized, Chronic abdominal pain       pantoprazole 40 MG EC tablet    PROTONIX    30 tablet    Take 1 tablet (40 mg) by mouth 2 times daily (before meals)    Right upper quadrant abdominal pain, Erosive gastritis       polyethylene glycol Packet    MIRALAX/GLYCOLAX    7 packet    Take 17 g by mouth daily    Right upper quadrant abdominal pain       RIZATRIPTAN BENZOATE PO      Take 5 mg by mouth once as needed        scopolamine 72 hr patch    TRANSDERM    2 patch    Apply 1 patch to hairless area behind one ear if severe nausea and vomiting.  Remove old patch and change every 3 days (72 hours).    Intractable vomiting with nausea, unspecified vomiting type       senna-docusate 8.6-50 MG per tablet    SENOKOT-S;PERICOLACE    100 tablet    Take 1 tablet by mouth 2 times daily as  needed for constipation    Right upper quadrant abdominal pain       sodium bicarbonate 325 MG tablet     120 tablet    1 tablet (325 mg) by Per Feeding Tube route 4 times daily    Abdominal pain, epigastric, Intractable cyclical vomiting with nausea       * Notice:  This list has 2 medication(s) that are the same as other medications prescribed for you. Read the directions carefully, and ask your doctor or other care provider to review them with you.

## 2018-03-05 NOTE — PROGRESS NOTES
"The patient is a 22-year-old female with a history of chronic pancreatitis who presents for follow-up.  The previous visit was her second visit to our clinic.  During that visit a plan was formulated with the goals of attempting to diminish chronic pancreatitis flares and thus emergency room visits and should those flares occur provided patient with pain medication that would be available to treat a flare and preclude visit to the emergency room.  Methadone was prescribed.  Unfortunately, the methadone caused nausea.  She states that \"only the oxycodone helps me\".  She uses the medication three times per day.  At her last visit she also inquired about the possibility of a celiac plexus block. I have spoken with her GI physician.  He prefers no celiac plexus block at this time.   She complains of mild nausea and vomiting.  She presents today for reevaluation and follow-up     Initial summary:  The patient is a 21-year-old woman with a history of chronic pancreatitis.  She had her gallbladder removed at the age of 15 and has had severe pancreatitis since that time.  She has been seen by Dr. Narayanan here at the [facility] and has had an ERCP and stent placed.  She notes that she has pancreatitis flares approximately every 3 months.  And presents today for suggestions about treatments for her pain during these pancreatitis flares.  She states that her flares occur every 3 months.  During her flares she states that she gets fluids and pain medications namely oxycodone 10-15 mg every 4-6 hours when necessary.  Her pain is alleviated by utilizing a low-fat diet, fruit, veggie's, poultry and fish, and relaxation techniques.  She states that her pain is exacerbated when she straightens from this diet.  She has used several different modalities to treat her pain.  These include Tylenol oxycodone gabapentin Pamelor and acupuncture. She states that the gabapentin causes sedation.  She presents today for initial " "evaluation and treatment modalities for her pain.  She asked specifically about celiac plexus blockade.        Current Outpatient Prescriptions   Medication     gabapentin (NEURONTIN) 400 MG capsule     oxyCODONE IR (ROXICODONE) 10 MG tablet     oxyCODONE (ROXICODONE) 5 MG/5ML solution     nortriptyline (PAMELOR) 10 MG capsule     ondansetron (ZOFRAN ODT) 4 MG ODT tab     amylase-lipase-protease (ZENPEP) 98292 UNITS CPEP     amylase-lipase-protease (ZENPEP) 89029 UNITS CPEP     senna-docusate (SENOKOT-S;PERICOLACE) 8.6-50 MG per tablet     pantoprazole (PROTONIX) 40 MG EC tablet     polyethylene glycol (MIRALAX/GLYCOLAX) Packet     gabapentin (NEURONTIN) 300 MG capsule     norethindrone (AYGESTIN) 5 MG tablet     fluticasone (FLONASE) 50 MCG/ACT spray     levalbuterol (XOPENEX HFA) 45 MCG/ACT Inhaler     leuprolide (LUPRON DEPOT) 11.25 MG injection     RIZATRIPTAN BENZOATE PO       No current facility-administered medications for this visit.                  Allergies   Allergen Reactions     Amoxicillin-Pot Clavulanate Nausea and Vomiting     Compazine [Prochlorperazine] Other (See Comments)         Dystonia     Hyoscyamine Other (See Comments)         Dystonia     Reglan [Metoclopramide Hcl] Other (See Comments)         Dystonia     Zyprexa Other (See Comments)         Sensitive, dystonic reaction on 11-9-2011     Amitriptyline Hcl Other (See Comments)         Dystonia, hallucinations     Buspirone Other (See Comments)         No Adverse Reactions, no benefit     Cogentin [Benztropine]        Cyproheptadine Other (See Comments)         Distonic     Dicyclomine Other (See Comments)     Droperidol Other (See Comments)         Feels tense and \"like she has to jump out of her skin\".       Effexor [Venlafaxine] Other (See Comments)         Dystonia     Food            Cilantro--lips/tongue swelling     No Clinical Screening - See Comments Other (See Comments)         Cilantro     Phenergan Dm [Promethazine-Dm] Other " (See Comments)         dystonia     Promethazine Other (See Comments)     Risperidone Other (See Comments)         dystonia     Vistaril Other (See Comments)         Burning sensation.     Augmentin GI Disturbance     Ketamine Other (See Comments)         jittery     Sorbitol GI Disturbance         Headache and dyspepsia         Past Medical History            Past Medical History:   Diagnosis Date     Anxiety        Asthma        Cholecystitis         s/p cholecystectomy     Chronic abdominal pain        Chronic infection         mrsa     Chronic pain        Cyclic vomiting syndrome 10/27/2012     Depression        Endometriosis        Hypoglycaemia        Migraines        Mild intermittent asthma        Ovarian cysts        Pancreatic disease        PONV (postoperative nausea and vomiting)        Pseudoseizures        Somatoform disorder        Sphincter of Oddi dysfunction        Vasovagal syncope                Past Surgical History              Past Surgical History:   Procedure Laterality Date     ABDOMEN SURGERY            ERCP, biliary stents     CHOLECYSTECTOMY    8/2/11     COLONOSCOPY    2011      negative finding     ENDOSCOPIC RETROGRADE CHOLANGIOPANCREATOGRAM    8/23/2011      Procedure:ENDOSCOPIC RETROGRADE CHOLANGIOPANCREATOGRAM; Endoscopic Retrograde Cholangiopancreatogram; Surgeon:SHORTY MCCOY; [facility]     ENDOSCOPIC RETROGRADE CHOLANGIOPANCREATOGRAM    5/17/2012      Procedure:ENDOSCOPIC RETROGRADE CHOLANGIOPANCREATOGRAM; Endoscopic Retrograde Cholangiopancreatogram with pancreatic stent placement.; Surgeon:SHORTY MCCOY; [facility]     ENDOSCOPIC RETROGRADE CHOLANGIOPANCREATOGRAM COMPLEX    1/3/2012      Procedure:ENDOSCOPIC RETROGRADE CHOLANGIOPANCREATOGRAM COMPLEX; Endoscopic Retrograde Cholangiopancreatogram with Manometry bile duct sphincterotomy extention pancreatic duct sphincterotomy pancreatic duct stent placement; Surgeon:SHORTY MCCOY; [facility]      ENDOSCOPIC ULTRASOUND UPPER GASTROINTESTINAL TRACT (GI) N/A 6/9/2015      Procedure: ENDOSCOPIC ULTRASOUND, ESOPHAGOSCOPY / UPPER GASTROINTESTINAL TRACT (GI);  Surgeon: Mario Joe MD;  [facility]     ENDOSCOPIC ULTRASOUND UPPER GASTROINTESTINAL TRACT (GI) N/A 12/12/2016      Procedure: ENDOSCOPIC ULTRASOUND, ESOPHAGOSCOPY / UPPER GASTROINTESTINAL TRACT (GI);  Surgeon: Guru Jose Klein MD;  [facility]     ESOPHAGOSCOPY, GASTROSCOPY, DUODENOSCOPY (EGD), COMBINED    1/18/2012      Procedure:COMBINED ESOPHAGOSCOPY, GASTROSCOPY, DUODENOSCOPY (EGD); Surgeon:ARNIE ESPINOZA; [facility]     ESOPHAGOSCOPY, GASTROSCOPY, DUODENOSCOPY (EGD), COMBINED    1/18/2012      Procedure:COMBINED ESOPHAGOSCOPY, GASTROSCOPY, DUODENOSCOPY (EGD); EGD; Surgeon:ARNIE ESPINOZA; [facility]     ESOPHAGOSCOPY, GASTROSCOPY, DUODENOSCOPY (EGD), COMBINED N/A 12/9/2017      Procedure: COMBINED ESOPHAGOSCOPY, GASTROSCOPY, DUODENOSCOPY (EGD);;  Surgeon: Josias Chan MD;  [facility]     INSERT PORT VASCULAR ACCESS           L knee arthroscopy    2009     LAPAROSCOPY DIAGNOSTIC (GYN)    10/26/2012      Procedure: LAPAROSCOPY DIAGNOSTIC (GYN);  LAPAROSCOPY DIAGNOSTIC, CAUTERY ENDOMETRIOISIS and biopsy of Fallopian tube lesions;  Surgeon: Carla Lopez MD;  Location: [facility]     ORTHOPEDIC SURGERY    2008      knee     VASCULAR SURGERY                   Social History    Social History                 Social History     Marital status: Single         Spouse name: N/A     Number of children: N/A     Years of education: N/A             Occupational History     Not on file.               Social History Main Topics     Smoking status: Never Smoker     Smokeless tobacco: Never Used     Alcohol use No     Drug use: No     Sexual activity: No               Other Topics Concern     Parent/Sibling W/ Cabg, Mi Or Angioplasty Before 65f 55m? No             Social History Narrative      In Co-op school to get  [employer] part time, and works part-time       Focusing on DBT therapy - individual and group therapy      Currently living with her mother at her grandmother's home              Considering going to nursing school           ROS: 10 point ROS neg other than the symptoms noted above in the HPI.  Physical Exam   Constitutional: She is oriented to person, place, and time. She appears well-developed and well-nourished.   HENT:   Head: Normocephalic and atraumatic.   Right Ear: External ear normal.   Left Ear: External ear normal.   Nose: Nose normal.   Mouth/Throat: Oropharynx is clear and moist.   Eyes: Conjunctivae and EOM are normal. Pupils are equal, round, and reactive to light.   Neck: Normal range of motion. Neck supple.   Cardiovascular: Normal rate, regular rhythm, normal heart sounds and intact distal pulses.    Pulmonary/Chest: Effort normal and breath sounds normal.   Abdominal: She exhibits no distension and no mass. There is tenderness. There is guarding. There is no rebound.   Musculoskeletal: Normal range of motion.   Neurological: She is alert and oriented to person, place, and time. She has normal reflexes.   Skin: Skin is warm and dry.   Psychiatric: She has a normal mood and affect.   Nursing note and vitals reviewed.  A/P:Patient is a  23 y/o lady with chronic abdominal pain who presents for initial evaluation.  It is clear that the most likely eiology of her pain is pancreatitic flares.  The previous plan of providing medication for flares only was not very successful as she was in the ED last night.  She has utilized all of the opioid that was prescribed. In an effort to decrease the frequency of her flares and decrease the duration of flares, I recommend  1. Pain psychology- patient is scheduling appt today  2. D/C methadone  3.restart oxycodone  4 RTC in 4 weeks

## 2018-03-05 NOTE — PROGRESS NOTES
Nursing Note  Alexandra Melgoza presents [facility] for:   Chief Complaint   Patient presents with     Infusion     IVF, Anti-emetics     During today's [facility] appointment, orders from Dr. Narayanan were completed.  Frequency: 3x/wk PRN    Progress note:  Patient identification verified by name and date of birth.  Assessment completed.  Vitals recorded in Doc Flowsheets.  Patient was provided with education regarding infusion and possible side effects.  Patient verbalized understanding.      needed: No  Premedications: Zofran and benadryl given IVP over 2 minutes each x 2  Infusion Rates:  LR infusion given over approximately 1 hour.   Approximate Infusion length:1.5 hours  Labs: were not ordered for this appointment.  Vascular access: port accessed today +blood return. Heparin locked post infusion.  Treatment Conditions: patient denies fever, chills, signs of infection, recent illness, on antibiotics, productive cough or elevated temperature.  Patient tolerated infusion: well.    Discharge Plan:   Follow up plan of care with: ongoing infusions at [facility].  Discharge instructions were reviewed with patient.  Patient/representative verbalized understanding of discharge instructions and all questions answered.  Patient discharged from [facility] in stable condition.    Lauren Menjivar RN     Administrations This Visit     diphenhydrAMINE (BENADRYL) injection 25 mg     Admin Date Action Dose Route Administered By             03/05/2018 Given 25 mg Intravenous Lauren Menjivar RN              Admin Date Action Dose Route Administered By             03/05/2018 Given 25 mg Intravenous Poeschel, Ysabel MANCINI RN                    lactated ringers BOLUS 1,000-2,000 mL     Admin Date Action Dose Rate Route Administered By          03/05/2018 New Bag 1000 mL 999 mL/hr Intravenous  Lauren Menjivar, RN                   ondansetron (ZOFRAN) injection 4 mg     Admin Date Action Dose Route Administered By             03/05/2018 Given 4 mg Intravenous Lauren Menjivar RN              Admin Date Action Dose Route Administered By             03/05/2018 Given 4 mg Intravenous Ysabel Piedra RN                           /69 (BP Location: Left arm, Patient Position: Chair, Cuff Size: Adult Regular)  Pulse 99  Temp 97.9  F (36.6  C) (Oral)  Wt 66.3 kg (146 lb 1.6 oz)  SpO2 99%  BMI 23.58 kg/m2

## 2018-03-07 ENCOUNTER — INFUSION THERAPY VISIT (OUTPATIENT)
Dept: INFUSION THERAPY | Facility: CLINIC | Age: 23
End: 2018-03-07
Attending: INTERNAL MEDICINE
Payer: COMMERCIAL

## 2018-03-07 VITALS — DIASTOLIC BLOOD PRESSURE: 68 MMHG | SYSTOLIC BLOOD PRESSURE: 101 MMHG | TEMPERATURE: 98.3 F | OXYGEN SATURATION: 99 %

## 2018-03-07 DIAGNOSIS — R21 RASH: ICD-10-CM

## 2018-03-07 DIAGNOSIS — R10.9 ABDOMINAL PAIN: ICD-10-CM

## 2018-03-07 DIAGNOSIS — G43.A0 CYCLICAL VOMITING WITH NAUSEA, INTRACTABILITY OF VOMITING NOT SPECIFIED: Primary | ICD-10-CM

## 2018-03-07 DIAGNOSIS — R11.15 INTRACTABLE CYCLICAL VOMITING WITH NAUSEA: ICD-10-CM

## 2018-03-07 DIAGNOSIS — N39.41 URGE INCONTINENCE: ICD-10-CM

## 2018-03-07 DIAGNOSIS — R10.13 ABDOMINAL PAIN, EPIGASTRIC: ICD-10-CM

## 2018-03-07 DIAGNOSIS — R10.9 ACUTE ABDOMINAL PAIN: ICD-10-CM

## 2018-03-07 DIAGNOSIS — E16.2 HYPOGLYCEMIA: ICD-10-CM

## 2018-03-07 DIAGNOSIS — R52 PAIN: ICD-10-CM

## 2018-03-07 DIAGNOSIS — K86.1 CHRONIC PANCREATITIS, UNSPECIFIED PANCREATITIS TYPE (H): ICD-10-CM

## 2018-03-07 DIAGNOSIS — F41.1 ANXIETY STATE: ICD-10-CM

## 2018-03-07 DIAGNOSIS — Z98.890 S/P ERCP: ICD-10-CM

## 2018-03-07 DIAGNOSIS — F33.1 MAJOR DEPRESSIVE DISORDER, RECURRENT EPISODE, MODERATE (H): ICD-10-CM

## 2018-03-07 PROCEDURE — 96361 HYDRATE IV INFUSION ADD-ON: CPT

## 2018-03-07 PROCEDURE — 25000128 H RX IP 250 OP 636: Mod: ZF | Performed by: NURSE PRACTITIONER

## 2018-03-07 PROCEDURE — 96376 TX/PRO/DX INJ SAME DRUG ADON: CPT

## 2018-03-07 PROCEDURE — 96374 THER/PROPH/DIAG INJ IV PUSH: CPT

## 2018-03-07 PROCEDURE — 25000128 H RX IP 250 OP 636: Mod: ZF | Performed by: INTERNAL MEDICINE

## 2018-03-07 PROCEDURE — 96375 TX/PRO/DX INJ NEW DRUG ADDON: CPT

## 2018-03-07 RX ORDER — DIPHENHYDRAMINE HYDROCHLORIDE 50 MG/ML
25 INJECTION INTRAMUSCULAR; INTRAVENOUS ONCE
Status: COMPLETED | OUTPATIENT
Start: 2018-03-07 | End: 2018-03-07

## 2018-03-07 RX ORDER — ONDANSETRON 2 MG/ML
4 INJECTION INTRAMUSCULAR; INTRAVENOUS ONCE
Status: COMPLETED | OUTPATIENT
Start: 2018-03-07 | End: 2018-03-07

## 2018-03-07 RX ORDER — ONDANSETRON 2 MG/ML
4 INJECTION INTRAMUSCULAR; INTRAVENOUS ONCE
Status: CANCELLED
Start: 2018-03-07 | End: 2018-03-07

## 2018-03-07 RX ORDER — ONDANSETRON 2 MG/ML
4 INJECTION INTRAMUSCULAR; INTRAVENOUS DAILY PRN
Status: CANCELLED
Start: 2018-03-07

## 2018-03-07 RX ORDER — DIPHENHYDRAMINE HYDROCHLORIDE 50 MG/ML
25 INJECTION INTRAMUSCULAR; INTRAVENOUS ONCE
Status: CANCELLED
Start: 2018-03-07 | End: 2018-03-07

## 2018-03-07 RX ORDER — DIPHENHYDRAMINE HYDROCHLORIDE 50 MG/ML
25 INJECTION INTRAMUSCULAR; INTRAVENOUS DAILY PRN
Status: CANCELLED
Start: 2018-03-07

## 2018-03-07 RX ADMIN — DIPHENHYDRAMINE HYDROCHLORIDE 25 MG: 50 INJECTION, SOLUTION INTRAMUSCULAR; INTRAVENOUS at 16:11

## 2018-03-07 RX ADMIN — ONDANSETRON 4 MG: 2 INJECTION INTRAMUSCULAR; INTRAVENOUS at 16:10

## 2018-03-07 RX ADMIN — ONDANSETRON 4 MG: 2 INJECTION INTRAMUSCULAR; INTRAVENOUS at 15:02

## 2018-03-07 RX ADMIN — SODIUM CHLORIDE, POTASSIUM CHLORIDE, SODIUM LACTATE AND CALCIUM CHLORIDE 2000 ML: 600; 310; 30; 20 INJECTION, SOLUTION INTRAVENOUS at 15:02

## 2018-03-07 RX ADMIN — DIPHENHYDRAMINE HYDROCHLORIDE 25 MG: 50 INJECTION INTRAMUSCULAR; INTRAVENOUS at 15:02

## 2018-03-07 NOTE — MR AVS SNAPSHOT
After Visit Summary   3/7/2018    Alexandra Melgoza    MRN: [id number]           Patient Information     Date Of Birth          1995        Visit Information        Provider Department      3/7/2018 2:00 PM UC 45 ATC; UC SPEC INFUSION [facility]        Today's Diagnoses     Cyclical vomiting with nausea, intractability of vomiting not specified    -  1    Chronic pancreatitis, unspecified pancreatitis type (H)        Intractable cyclical vomiting with nausea        Pain        Abdominal pain, epigastric        S/P ERCP        Acute abdominal pain        Abdominal pain        Hypoglycemia        Urge incontinence        Major depressive disorder, recurrent episode, moderate (H)        Anxiety state        Rash           Follow-ups after your visit        Your next 10 appointments already scheduled     Mar 09, 2018  1:00 PM CST   Infusion 120 with UC SPEC INFUSION, UC 42 ATC   [facility] ([facility])    [address]  [address]  [place] [address]   [phone]            Mar 12, 2018 10:40 AM CDT   (Arrive by 10:25 AM)   Return Visit with Leonardo Wang MD   [facility] ([facility])    [address] [place] [address]  4th Floor  [facility] [address]   [phone]            Mar 12, 2018  3:00 PM CDT   Infusion 120 with UC SPEC INFUSION, UC 45 ATC   [facility] ([facility])    [address]  [address]  [place] [address]   [phone]            Mar 14, 2018  4:00 PM CDT   Infusion 120 with UC SPEC INFUSION, UC 47 ATC   [facility] ([facility])    [address]  [address]  [place] [address]   [phone]            Mar 16, 2018  3:00  "PM CDT   Infusion 120 with UC SPEC INFUSION, UC 49 ATC   [facility] ([facility])    [address]  [address]  [place] [id number] [phone]            Mar 19, 2018  7:30 AM CDT   (Arrive by 7:15 AM)   Return Visit with Quyen Baez MD   [facility] ([facility])    [address]  4th Floor  [place] [facility] [address] [phone]              Who to contact     If you have questions or need follow up information about today's clinic visit or your schedule please contact [facility] directly at [phone].  Normal or non-critical lab and imaging results will be communicated to you by PHRQLhart, letter or phone within 4 business days after the clinic has received the results. If you do not hear from us within 7 days, please contact the clinic through PHRQLhart or phone. If you have a critical or abnormal lab result, we will notify you by phone as soon as possible.  Submit refill requests through [email or web address] or call your pharmacy and they will forward the refill request to us. Please allow 3 business days for your refill to be completed.          Additional Information About Your Visit        PHRQLharKitLocate Information     [email or web address] lets you send messages to your doctor, view your test results, renew your prescriptions, schedule appointments and more. To sign up, go to [email or web address] . Click on \"Log in\" on the left side of the screen, which will take you to the Welcome page. Then click on \"Sign up Now\" on the right side of the page.     You will be asked to enter the access code listed below, as well as some personal information. Please follow the directions to create your username and password.     Your access code is: [email or web address]  Expires: 2018  6:30 AM     Your access code will  in 90 days. If you need help or a new " code, please call your [facility] clinic or [phone].        Care EveryWhere ID     This is your Care EveryWhere ID. This could be used by other organizations to access your [facility] medical records  [id number]        Your Vitals Were     Temperature Pulse Oximetry                98.3  F (36.8  C) (Oral) 99%           Blood Pressure from Last 3 Encounters:   03/07/18 101/68   03/05/18 105/74   03/02/18 (P) 119/82    Weight from Last 3 Encounters:   03/05/18 66.3 kg (146 lb 1.6 oz)   03/02/18 64.4 kg (142 lb)   02/25/18 63.9 kg (140 lb 12.8 oz)              Today, you had the following     No orders found for display         Today's Medication Changes          These changes are accurate as of 3/7/18  5:44 PM.  If you have any questions, ask your nurse or doctor.               These medicines have changed or have updated prescriptions.        Dose/Directions    gabapentin 250 MG/5ML solution   Commonly known as:  NEURONTIN   This may have changed:  when to take this   Used for:  Abdominal pain, epigastric        Dose:  400 mg   8 mLs (400 mg) by ORAL OR NJ TUBE route 2 times daily   Quantity:  470 mL   Refills:  0       nortriptyline 10 MG capsule   Commonly known as:  PAMELOR   This may have changed:  how much to take   Used for:  Right upper quadrant abdominal pain        Dose:  30 mg   Take 3 capsules (30 mg) by mouth At Bedtime   Quantity:  120 capsule   Refills:  0                Primary Care Provider Office Phone # Fax #    Quyen Baez [phone]       [address] [address]        Equal Access to Services     [facility]: Hadii angelina kelley hadasho Soomaali, waaxda luqadaha, qaybta kaalmada jacque, maki smallwood. So [facility] [phone].    ATENCIÓN: Si habla español, tiene a quijano disposición servicios gratuitos de asistencia lingüística. Llame al [phone].    We comply with applicable federal civil rights laws and [place] laws.  We do not discriminate on the basis of race, color, national origin, age, disability, sex, sexual orientation, or gender identity.            Thank you!     Thank you for choosing [facility]  for your care. Our goal is always to provide you with excellent care. Hearing back from our patients is one way we can continue to improve our services. Please take a few minutes to complete the written survey that you may receive in the mail after your visit with us. Thank you!             Your Updated Medication List - Protect others around you: Learn how to safely use, store and throw away your medicines at www.disposemymeds.org.          This list is accurate as of 3/7/18  5:44 PM.  Always use your most recent med list.                   Brand Name Dispense Instructions for use Diagnosis    acetaminophen 32 mg/mL solution    TYLENOL     30.45 mLs (975 mg) by Per J Tube route every 8 hours        * amylase-lipase-protease 11274 UNITS Cpep    ZENPEP    180 capsule    Take 2-3 capsules (40,000-60,000 Units) by mouth Take with snacks or supplements (Snacks)    Idiopathic chronic pancreatitis (H)       * amylase-lipase-protease 35526 UNITS Cpep    ZENPEP    180 capsule    Take 5 capsules (100,000 Units) by mouth 4 times daily (with meals and nightly)    Right upper quadrant abdominal pain       fluticasone 50 MCG/ACT spray    FLONASE    16 g    Spray 2 sprays into both nostrils daily    Nasal congestion       gabapentin 250 MG/5ML solution    NEURONTIN    470 mL    8 mLs (400 mg) by ORAL OR NJ TUBE route 2 times daily    Abdominal pain, epigastric       hydrOXYzine 10 MG/5ML syrup    ATARAX    473 mL    Take 12.5 mLs (25 mg) by mouth every 4 hours as needed for anxiety or other (pain)    Acute abdominal pain       leuprolide 11.25 MG kit    LUPRON DEPOT (3-MONTH)    1 each    Inject 11.25 mg into the muscle every 3 months    Endometriosis       levalbuterol 45 MCG/ACT Inhaler     XOPENEX HFA    1 Inhaler    Inhale 2 puffs into the lungs every 6 hours as needed for shortness of breath / dyspnea    Wheeze       menthol 5 % Ptch    ICY HOT    15 patch    Apply 1 patch topically every 8 hours as needed for muscle soreness    Abdominal pain, epigastric       multivitamins with minerals Liqd liquid     1 Bottle    15 mLs by Per Feeding Tube route daily    Abdominal pain, epigastric       norethindrone 5 MG tablet    AYGESTIN    90 tablet    Take 1 tablet (5 mg) by mouth daily    Endometriosis       nortriptyline 10 MG capsule    PAMELOR    120 capsule    Take 3 capsules (30 mg) by mouth At Bedtime    Right upper quadrant abdominal pain       ondansetron 4 MG ODT tab    ZOFRAN ODT    15 tablet    Take 1 tablet (4 mg) by mouth every 8 hours as needed for nausea or vomiting    Intractable cyclical vomiting with nausea       order for [facility]     1 Units    Equipment being ordered: TENS with wire and electrode.    Chronic abdominal pain       oxyCODONE 5 MG/5ML solution    ROXICODONE    700 mL    Take 10 mL (10 mg) by mouth every 4-6 hours as needed, maximum 50 mL per day.    Abdominal pain, generalized, Chronic abdominal pain       pantoprazole 40 MG EC tablet    PROTONIX    30 tablet    Take 1 tablet (40 mg) by mouth 2 times daily (before meals)    Right upper quadrant abdominal pain, Erosive gastritis       polyethylene glycol Packet    MIRALAX/GLYCOLAX    7 packet    Take 17 g by mouth daily    Right upper quadrant abdominal pain       RIZATRIPTAN BENZOATE PO      Take 5 mg by mouth once as needed        scopolamine 72 hr patch    TRANSDERM    2 patch    Apply 1 patch to hairless area behind one ear if severe nausea and vomiting.  Remove old patch and change every 3 days (72 hours).    Intractable vomiting with nausea, unspecified vomiting type       senna-docusate 8.6-50 MG per tablet    SENOKOT-S;PERICOLACE    100 tablet    Take 1 tablet by mouth 2 times daily as needed for constipation    Right  upper quadrant abdominal pain       sodium bicarbonate 325 MG tablet     120 tablet    1 tablet (325 mg) by Per Feeding Tube route 4 times daily    Abdominal pain, epigastric, Intractable cyclical vomiting with nausea       * Notice:  This list has 2 medication(s) that are the same as other medications prescribed for you. Read the directions carefully, and ask your doctor or other care provider to review them with you.

## 2018-03-07 NOTE — PROGRESS NOTES
Infusion Nursing Note  Alexandra Melgoza [facility]:   Chief Complaint   Patient presents with     Infusion     fluids and zofran     During [facility]'s [facility] appointment, orders from Dr. Narayanan were completed.  Frequency: x3 weekly PRN    Progress note:  Patient identification verified by name and date of birth.  Assessment completed.  Vitals recorded in Doc Flowsheets.  Patient was provided with education regarding infusion and possible side effects.  Patient verbalized understanding.     Premedications: were not ordered.  Infusion Rates: LR given over approximately 2 hours.  Zofran given over approximately 2 mins x2  Diphenhydramine given over approximately 2 mins x2  Labs: were not ordered for this appointment.  Vascular access: port accessed today.  Treatment Conditions: non-applicable.  Patient tolerated infusion: well    Upon d/c pt asked writer to look at drain sponge of G-tube site. Sponge noted to have small amount of purulent, white/green drainage. Pt reports that this morning the quanitity of drainage was much larger. Pt advised to contact GI team for recommendations; writer sent message to nurse coordinator Ella Cabrera re: above.     Discharge Plan:   Follow up plan of care with: ongoing infusions at [facility]. and primary medical doctor.  Discharge instructions were reviewed with patient.  Patient/representative verbalized understanding of discharge instructions and all questions answered.  Patient discharged [facility] in stable condition.    Saba Turner RN    Administrations This Visit     diphenhydrAMINE (BENADRYL) injection 25 mg     Admin Date Action Dose Route Administered By             03/07/2018 Given 25 mg Intravenous Saba Turner RN              Admin Date Action Dose Route Administered By             03/07/2018 Given 25 mg Intravenous Johnny  SAMARA Walter                    lactated ringers BOLUS 1,000-2,000 mL     Admin Date Action Dose Route Administered By             03/07/2018 New Bag 2000 mL Intravenous Silva Samuel RN                    ondansetron (ZOFRAN) injection 4 mg     Admin Date Action Dose Route Administered By             03/07/2018 Given 4 mg Intravenous Saba Turner RN              Admin Date Action Dose Route Administered By             03/07/2018 Given 4 mg Intravenous Saba Turner RN                          /68  Temp 98.3  F (36.8  C) (Oral)  SpO2 99%

## 2018-03-09 ENCOUNTER — CARE COORDINATION (OUTPATIENT)
Dept: GASTROENTEROLOGY | Facility: CLINIC | Age: 23
End: 2018-03-09

## 2018-03-09 ENCOUNTER — INFUSION THERAPY VISIT (OUTPATIENT)
Dept: INFUSION THERAPY | Facility: CLINIC | Age: 23
End: 2018-03-09
Attending: INTERNAL MEDICINE
Payer: COMMERCIAL

## 2018-03-09 VITALS
RESPIRATION RATE: 16 BRPM | HEART RATE: 120 BPM | SYSTOLIC BLOOD PRESSURE: 126 MMHG | DIASTOLIC BLOOD PRESSURE: 81 MMHG | TEMPERATURE: 98.1 F

## 2018-03-09 DIAGNOSIS — Z98.890 S/P ERCP: ICD-10-CM

## 2018-03-09 DIAGNOSIS — R21 RASH: ICD-10-CM

## 2018-03-09 DIAGNOSIS — R11.15 INTRACTABLE CYCLICAL VOMITING WITH NAUSEA: ICD-10-CM

## 2018-03-09 DIAGNOSIS — N39.41 URGE INCONTINENCE: ICD-10-CM

## 2018-03-09 DIAGNOSIS — F33.1 MAJOR DEPRESSIVE DISORDER, RECURRENT EPISODE, MODERATE (H): ICD-10-CM

## 2018-03-09 DIAGNOSIS — R10.13 ABDOMINAL PAIN, EPIGASTRIC: ICD-10-CM

## 2018-03-09 DIAGNOSIS — K86.1 CHRONIC PANCREATITIS, UNSPECIFIED PANCREATITIS TYPE (H): ICD-10-CM

## 2018-03-09 DIAGNOSIS — E16.2 HYPOGLYCEMIA: ICD-10-CM

## 2018-03-09 DIAGNOSIS — R10.9 ABDOMINAL PAIN: ICD-10-CM

## 2018-03-09 DIAGNOSIS — F41.1 ANXIETY STATE: ICD-10-CM

## 2018-03-09 DIAGNOSIS — G43.A0 CYCLICAL VOMITING WITH NAUSEA, INTRACTABILITY OF VOMITING NOT SPECIFIED: Primary | ICD-10-CM

## 2018-03-09 DIAGNOSIS — R10.9 ACUTE ABDOMINAL PAIN: ICD-10-CM

## 2018-03-09 DIAGNOSIS — R52 PAIN: ICD-10-CM

## 2018-03-09 PROCEDURE — 96374 THER/PROPH/DIAG INJ IV PUSH: CPT

## 2018-03-09 PROCEDURE — 96361 HYDRATE IV INFUSION ADD-ON: CPT

## 2018-03-09 PROCEDURE — 96376 TX/PRO/DX INJ SAME DRUG ADON: CPT

## 2018-03-09 PROCEDURE — 96375 TX/PRO/DX INJ NEW DRUG ADDON: CPT

## 2018-03-09 PROCEDURE — 25000128 H RX IP 250 OP 636: Mod: ZF | Performed by: NURSE PRACTITIONER

## 2018-03-09 PROCEDURE — 25000128 H RX IP 250 OP 636: Mod: ZF | Performed by: INTERNAL MEDICINE

## 2018-03-09 RX ORDER — DIPHENHYDRAMINE HYDROCHLORIDE 50 MG/ML
25 INJECTION INTRAMUSCULAR; INTRAVENOUS DAILY PRN
Status: CANCELLED
Start: 2018-03-09

## 2018-03-09 RX ORDER — DIPHENHYDRAMINE HYDROCHLORIDE 50 MG/ML
25 INJECTION INTRAMUSCULAR; INTRAVENOUS ONCE
Status: COMPLETED | OUTPATIENT
Start: 2018-03-09 | End: 2018-03-09

## 2018-03-09 RX ORDER — DIPHENHYDRAMINE HYDROCHLORIDE 50 MG/ML
25 INJECTION INTRAMUSCULAR; INTRAVENOUS ONCE
Status: CANCELLED
Start: 2018-03-09 | End: 2018-03-09

## 2018-03-09 RX ORDER — ONDANSETRON 2 MG/ML
4 INJECTION INTRAMUSCULAR; INTRAVENOUS DAILY PRN
Status: DISCONTINUED | OUTPATIENT
Start: 2018-03-09 | End: 2018-03-09 | Stop reason: HOSPADM

## 2018-03-09 RX ORDER — DIPHENHYDRAMINE HYDROCHLORIDE 50 MG/ML
25 INJECTION INTRAMUSCULAR; INTRAVENOUS DAILY PRN
Status: DISCONTINUED | OUTPATIENT
Start: 2018-03-09 | End: 2018-03-09 | Stop reason: HOSPADM

## 2018-03-09 RX ORDER — ONDANSETRON 2 MG/ML
4 INJECTION INTRAMUSCULAR; INTRAVENOUS DAILY PRN
Status: CANCELLED
Start: 2018-03-09

## 2018-03-09 RX ORDER — ONDANSETRON 2 MG/ML
4 INJECTION INTRAMUSCULAR; INTRAVENOUS ONCE
Status: CANCELLED
Start: 2018-03-09 | End: 2018-03-09

## 2018-03-09 RX ORDER — ONDANSETRON 2 MG/ML
4 INJECTION INTRAMUSCULAR; INTRAVENOUS ONCE
Status: COMPLETED | OUTPATIENT
Start: 2018-03-09 | End: 2018-03-09

## 2018-03-09 RX ADMIN — ONDANSETRON 4 MG: 2 INJECTION INTRAMUSCULAR; INTRAVENOUS at 14:53

## 2018-03-09 RX ADMIN — ONDANSETRON 4 MG: 2 INJECTION INTRAMUSCULAR; INTRAVENOUS at 13:18

## 2018-03-09 RX ADMIN — DIPHENHYDRAMINE HYDROCHLORIDE 25 MG: 50 INJECTION, SOLUTION INTRAMUSCULAR; INTRAVENOUS at 13:22

## 2018-03-09 RX ADMIN — DIPHENHYDRAMINE HYDROCHLORIDE 25 MG: 50 INJECTION, SOLUTION INTRAMUSCULAR; INTRAVENOUS at 14:50

## 2018-03-09 RX ADMIN — SODIUM CHLORIDE, POTASSIUM CHLORIDE, SODIUM LACTATE AND CALCIUM CHLORIDE 2000 ML: 600; 310; 30; 20 INJECTION, SOLUTION INTRAVENOUS at 13:10

## 2018-03-09 NOTE — PROGRESS NOTES
Nursing Note  Alexandra Melgoza [facility] for:   Chief Complaint   Patient presents with     Infusion     IV fluids and IV medications     During [facility]'s [facility] appointment, orders from Dr. Narayanan were completed.  Frequency: 3 times weekly PRN.    Progress note:  Patient identification verified by name and date of birth.  Assessment completed.  Vitals recorded in Doc Flowsheets.  Patient was provided with education regarding infusion and possible side effects.  Patient verbalized understanding.      needed: No  Premedications: were not ordered.  Infusion Rates: infusion given over approximately 2 hours.  Labs: were not ordered for this appointment.  Vascular access: port accessed today.  Treatment Conditions: non-applicable.  Patient tolerated infusion: well.    Pt reported feeling more weak and dizzy than usual. She states she has had an elevated temp of up to 101 the past couple days and green/white thick mucous at GJ tube site. She has been in touch with Dr. Narayanan's clinic and was told to go to the ED but was scheduled in Psychiatric for fluids and brought to our attention rather than utilize the emergency department.     Spoke with Dr. Guru Bedoya (covering for Dr. Narayanan) who was notified of all pt symptoms. No new orders received. No culture or labs due to pt not having a fever at this time or any drainage around GJ tube. Per MD, pt should go to the ED for evaluation if she develops a fever over 100 or drainage is foul smelling. Pt and her mother verbalized understanding.       Discharge Plan:   Follow up plan of care with: ongoing infusions [facility].  Discharge instructions were reviewed with patient.  Patient/representative verbalized understanding of discharge instructions and all questions answered.  Patient discharged [facility] in stable  condition.    Stacey Posada RN    Administrations This Visit     diphenhydrAMINE (BENADRYL) injection 25 mg     Admin Date Action Dose Route Administered By             03/09/2018 Given 25 mg Intravenous Stacey Posada RN              Admin Date Action Dose Route Administered By             03/09/2018 Given 25 mg Intravenous Silva Samuel RN                    lactated ringers BOLUS 1,000-2,000 mL     Admin Date Action Dose Route Administered By             03/09/2018 New Bag 2000 mL Intravenous Stacey Posada RN                    ondansetron (ZOFRAN) injection 4 mg     Admin Date Action Dose Route Administered By             03/09/2018 Given 4 mg Intravenous Stacey Posada RN              Admin Date Action Dose Route Administered By             03/09/2018 Given 4 mg Intravenous Silva Samuel RN                          /81  Pulse 120  Temp 98.1  F (36.7  C) (Oral)  Resp 16

## 2018-03-09 NOTE — PATIENT INSTRUCTIONS
Dear Alexandra Melgoza    Thank you for choosing [facility] ([facility]) for your infusion.  The following information is a summary of our appointment as well as important reminders.          We look forward in seeing you on your next appointment here at [facility].  Please don t hesitate to call us at [phone] to reschedule any of your appointments or to speak with one of the [facility] registered nurses.  It was a pleasure taking care of you today.    Sincerely,    [facility]  [facility]  [address] [place] [address]  [address]  Phone:  [phone]

## 2018-03-09 NOTE — MR AVS SNAPSHOT
After Visit Summary   3/9/2018    Alexandra Melgoza    MRN: [id number]           Patient Information     Date Of Birth          1995        Visit Information        Provider Department      3/9/2018 1:00 PM UC 42 ATC; UC SPEC INFUSION [facility]        Today's Diagnoses     Cyclical vomiting with nausea, intractability of vomiting not specified    -  1    Chronic pancreatitis, unspecified pancreatitis type (H)        Intractable cyclical vomiting with nausea        Pain        Abdominal pain, epigastric        S/P ERCP        Acute abdominal pain        Abdominal pain        Hypoglycemia        Urge incontinence        Major depressive disorder, recurrent episode, moderate (H)        Anxiety state        Rash          Care Instructions    Dear Alexandra Melgoza    Thank you for choosing [facility] ([facility]) for your infusion.  The following information is a summary of our appointment as well as important reminders.          We look forward in seeing you on your next appointment here at [facility].  Please don t hesitate to call us at [phone] to reschedule any of your appointments or to speak with one of the [facility] registered nurses.  It was a pleasure taking care of you today.    Sincerely,    [facility]  [facility]  [address] [place] [address]  [address]  Phone:  [phone]            Follow-ups after your visit        Your next 10 appointments already scheduled     Mar 12, 2018 10:40 AM CDT   (Arrive by 10:25 AM)   Return Visit with Leonardo Wang MD   [facility] ([facility])    [address] [place] [address] [phone]            Mar 12, 2018  3:00 PM CDT   Infusion 120 with UC SPEC INFUSION, UC 45 ATC   [facility] ([facility])    [address]  [address]  [facility] [address]   [phone]            Mar 12, 2018  3:40 PM CDT   (Arrive by 3:25 PM)   Return Visit with Quyen Baez MD   [facility] ([facility])    [address]  4th Floor  [facility] [address]   [phone]            Mar 14, 2018  4:00 PM CDT   Infusion 120 with UC SPEC INFUSION, UC 47 ATC   [facility] Specialty and Procedure ([facility])    [address]  [address]  [facility] [address]   [phone]            Mar 16, 2018  3:00 PM CDT   Infusion 120 with UC SPEC INFUSION, UC 49 ATC   [facility] Special[facility] Procedure ([facility])    [address]  [address]  [facility] [address]   [phone]            Mar 19, 2018  7:30 AM CDT   (Arrive by 7:15 AM)   Return Visit with Quyen Baez MD   [facility] ([facility])    [address]  4th [address]  [facility] [address]   [phone]            Mar 19, 2018  8:00 AM CDT   Infusion 120 with UC SPEC INFUSION   [facility] Special[facility] Procedure ([facility])    [address]  [address]  [facility] [address]   [phone]              Who to contact     If you have questions or need follow up information about today's clinic visit or your schedule please contact [facility] SPECIAL[facility] AND PROCEDURE directly at [phone].  Normal or non-critical lab and imaging results will be communicated to you by MyChart, letter or phone within 4 business days after the clinic has received the results. If you do not hear from us within 7 days, please contact the clinic through MyChart or phone. If you have a critical or abnormal lab  "result, we will notify you by phone as soon as possible.  Submit refill requests through [employer]-V[employer] or call your pharmacy and they will forward the refill request to us. Please allow 3 business days for your refill to be completed.          Additional Information About Your Visit        [employer] (Formerly [employer]) Information     T-[employer]IPS lets you send messages to your doctor, view your test results, renew your prescriptions, schedule appointments and more. To sign up, go to [email or web address] . Click on \"Log in\" on the left side of the screen, which will take you to the Welcome page. Then click on \"Sign up Now\" on the right side of the page.     You will be asked to enter the access code listed below, as well as some personal information. Please follow the directions to create your username and password.     Your access code is: [email or web address]  Expires: 2018  6:30 AM     Your access code will  in 90 days. If you need help or a new code, please call your [facility] clinic or [phone].        Care EveryWhere ID     This is your Care EveryWhere ID. This could be used by other organizations to access your [facility] medical records  [id number]        Your Vitals Were     Pulse Temperature Respirations             120 98.1  F (36.7  C) (Oral) 16          Blood Pressure from Last 3 Encounters:   18 126/81   18 101/68   18 105/74    Weight from Last 3 Encounters:   18 66.3 kg (146 lb 1.6 oz)   18 64.4 kg (142 lb)   18 63.9 kg (140 lb 12.8 oz)              Today, you had the following     No orders found for display         Today's Medication Changes          These changes are accurate as of 3/9/18  4:25 PM.  If you have any questions, ask your nurse or doctor.               These medicines have changed or have updated prescriptions.        Dose/Directions    gabapentin 250 MG/5ML solution   Commonly known as:  NEURONTIN   This may have changed:  when to take this   Used for:  Abdominal pain, " epigastric        Dose:  400 mg   8 mLs (400 mg) by ORAL OR NJ TUBE route 2 times daily   Quantity:  470 mL   Refills:  0       nortriptyline 10 MG capsule   Commonly known as:  PAMELOR   This may have changed:  how much to take   Used for:  Right upper quadrant abdominal pain        Dose:  30 mg   Take 3 capsules (30 mg) by mouth At Bedtime   Quantity:  120 capsule   Refills:  0                Primary Care Provider Office Phone # Fax #    Quyen Baez [phone] [address] [address]        Equal Access to Services     [facility]: Hadii aad ku hadasho Soomaali, waaxda luqadaha, qaybta kaalmada jacque, maki gaspar . So [facility] [phone].    ATENCIÓN: Si habla español, tiene a quijano disposición servicios gratuitos de asistencia lingüística. [facility] [phone].    We comply with applicable federal civil rights laws and [place] laws. We do not discriminate on the basis of race, color, national origin, age, disability, sex, sexual orientation, or gender identity.            Thank you!     Thank you for choosing [facility]ED[facility]  for your care. Our goal is always to provide you with excellent care. Hearing back from our patients is one way we can continue to improve our services. Please take a few minutes to complete the written survey that you may receive in the mail after your visit with us. Thank you!             Your Updated Medication List - Protect others around you: Learn how to safely use, store and throw away your medicines at www.disposemymeds.org.          This list is accurate as of 3/9/18  4:25 PM.  Always use your most recent med list.                   Brand Name Dispense Instructions for use Diagnosis    acetaminophen 32 mg/mL solution    TYLENOL     30.45 mLs (975 mg) by Per J Tube route every 8 hours        * amylase-lipase-protease 54745 UNITS Cpep    ZENPEP    180  capsule    Take 2-3 capsules (40,000-60,000 Units) by mouth Take with snacks or supplements (Snacks)    Idiopathic chronic pancreatitis (H)       * amylase-lipase-protease 55883 UNITS Cpep    ZENPEP    180 capsule    Take 5 capsules (100,000 Units) by mouth 4 times daily (with meals and nightly)    Right upper quadrant abdominal pain       fluticasone 50 MCG/ACT spray    FLONASE    16 g    Spray 2 sprays into both nostrils daily    Nasal congestion       gabapentin 250 MG/5ML solution    NEURONTIN    470 mL    8 mLs (400 mg) by ORAL OR NJ TUBE route 2 times daily    Abdominal pain, epigastric       hydrOXYzine 10 MG/5ML syrup    ATARAX    473 mL    Take 12.5 mLs (25 mg) by mouth every 4 hours as needed for anxiety or other (pain)    Acute abdominal pain       leuprolide 11.25 MG kit    LUPRON DEPOT (3-MONTH)    1 each    Inject 11.25 mg into the muscle every 3 months    Endometriosis       levalbuterol 45 MCG/ACT Inhaler    XOPENEX HFA    1 Inhaler    Inhale 2 puffs into the lungs every 6 hours as needed for shortness of breath / dyspnea    Wheeze       menthol 5 % Ptch    ICY HOT    15 patch    Apply 1 patch topically every 8 hours as needed for muscle soreness    Abdominal pain, epigastric       multivitamins with minerals Liqd liquid     1 Bottle    15 mLs by Per Feeding Tube route daily    Abdominal pain, epigastric       norethindrone 5 MG tablet    AYGESTIN    90 tablet    Take 1 tablet (5 mg) by mouth daily    Endometriosis       nortriptyline 10 MG capsule    PAMELOR    120 capsule    Take 3 capsules (30 mg) by mouth At Bedtime    Right upper quadrant abdominal pain       ondansetron 4 MG ODT tab    ZOFRAN ODT    15 tablet    Take 1 tablet (4 mg) by mouth every 8 hours as needed for nausea or vomiting    Intractable cyclical vomiting with nausea       order for DME     1 Units    Equipment being ordered: TENS with wire and electrode.    Chronic abdominal pain       oxyCODONE 5 MG/5ML solution    ROXICODONE     700 mL    Take 10 mL (10 mg) by mouth every 4-6 hours as needed, maximum 50 mL per day.    Abdominal pain, generalized, Chronic abdominal pain       pantoprazole 40 MG EC tablet    PROTONIX    30 tablet    Take 1 tablet (40 mg) by mouth 2 times daily (before meals)    Right upper quadrant abdominal pain, Erosive gastritis       polyethylene glycol Packet    MIRALAX/GLYCOLAX    7 packet    Take 17 g by mouth daily    Right upper quadrant abdominal pain       RIZATRIPTAN BENZOATE PO      Take 5 mg by mouth once as needed        scopolamine 72 hr patch    TRANSDERM    2 patch    Apply 1 patch to hairless area behind one ear if severe nausea and vomiting.  Remove old patch and change every 3 days (72 hours).    Intractable vomiting with nausea, unspecified vomiting type       senna-docusate 8.6-50 MG per tablet    SENOKOT-S;PERICOLACE    100 tablet    Take 1 tablet by mouth 2 times daily as needed for constipation    Right upper quadrant abdominal pain       sodium bicarbonate 325 MG tablet     120 tablet    1 tablet (325 mg) by Per Feeding Tube route 4 times daily    Abdominal pain, epigastric, Intractable cyclical vomiting with nausea       * Notice:  This list has 2 medication(s) that are the same as other medications prescribed for you. Read the directions carefully, and ask your doctor or other care provider to review them with you.

## 2018-03-09 NOTE — PROGRESS NOTES
Message received that she was having some green liquid coming from her tube site.     Message sent to Dr. Narayanan for review but in the mean time. Voicemail left on mom's phone since Marta does not have voicemail.   - advised her to go to PCP to get checked out of infection in the mean time.   - also per DAVIAN Brown, they will need to work with Alvin MARCELO to recommend the tube feeding.     Ella SARMIENTO, RN Coordinator  Dr. Narayanan, Dr. Joe & Dr. Klein   [facility]  [phone]

## 2018-03-12 ENCOUNTER — OFFICE VISIT (OUTPATIENT)
Dept: INTERNAL MEDICINE | Facility: CLINIC | Age: 23
End: 2018-03-12
Payer: COMMERCIAL

## 2018-03-12 ENCOUNTER — INFUSION THERAPY VISIT (OUTPATIENT)
Dept: INFUSION THERAPY | Facility: CLINIC | Age: 23
End: 2018-03-12
Attending: INTERNAL MEDICINE
Payer: COMMERCIAL

## 2018-03-12 ENCOUNTER — OFFICE VISIT (OUTPATIENT)
Dept: ANESTHESIOLOGY | Facility: CLINIC | Age: 23
End: 2018-03-12
Payer: COMMERCIAL

## 2018-03-12 VITALS
TEMPERATURE: 97.9 F | HEART RATE: 97 BPM | HEIGHT: 67 IN | DIASTOLIC BLOOD PRESSURE: 83 MMHG | RESPIRATION RATE: 18 BRPM | OXYGEN SATURATION: 100 % | SYSTOLIC BLOOD PRESSURE: 131 MMHG | BODY MASS INDEX: 23.07 KG/M2 | WEIGHT: 147 LBS

## 2018-03-12 VITALS — BODY MASS INDEX: 23.07 KG/M2 | WEIGHT: 147 LBS | HEIGHT: 67 IN | RESPIRATION RATE: 16 BRPM

## 2018-03-12 VITALS
RESPIRATION RATE: 16 BRPM | HEART RATE: 105 BPM | TEMPERATURE: 99.2 F | SYSTOLIC BLOOD PRESSURE: 131 MMHG | DIASTOLIC BLOOD PRESSURE: 92 MMHG

## 2018-03-12 DIAGNOSIS — R10.9 CHRONIC ABDOMINAL PAIN: Chronic | ICD-10-CM

## 2018-03-12 DIAGNOSIS — R10.13 ABDOMINAL PAIN, EPIGASTRIC: ICD-10-CM

## 2018-03-12 DIAGNOSIS — G89.29 CHRONIC ABDOMINAL PAIN: Chronic | ICD-10-CM

## 2018-03-12 DIAGNOSIS — F33.1 MAJOR DEPRESSIVE DISORDER, RECURRENT EPISODE, MODERATE (H): ICD-10-CM

## 2018-03-12 DIAGNOSIS — Z98.890 S/P ERCP: ICD-10-CM

## 2018-03-12 DIAGNOSIS — R52 PAIN: ICD-10-CM

## 2018-03-12 DIAGNOSIS — R10.9 CHRONIC ABDOMINAL PAIN: Primary | ICD-10-CM

## 2018-03-12 DIAGNOSIS — K83.4 SPHINCTER OF ODDI DYSFUNCTION: ICD-10-CM

## 2018-03-12 DIAGNOSIS — R11.15 INTRACTABLE CYCLICAL VOMITING WITH NAUSEA: ICD-10-CM

## 2018-03-12 DIAGNOSIS — E16.2 HYPOGLYCEMIA: ICD-10-CM

## 2018-03-12 DIAGNOSIS — R10.84 ABDOMINAL PAIN, GENERALIZED: ICD-10-CM

## 2018-03-12 DIAGNOSIS — R10.9 ACUTE ABDOMINAL PAIN: ICD-10-CM

## 2018-03-12 DIAGNOSIS — F41.1 ANXIETY STATE: ICD-10-CM

## 2018-03-12 DIAGNOSIS — G89.29 CHRONIC ABDOMINAL PAIN: Primary | ICD-10-CM

## 2018-03-12 DIAGNOSIS — N39.41 URGE INCONTINENCE: ICD-10-CM

## 2018-03-12 DIAGNOSIS — R10.9 ABDOMINAL PAIN: ICD-10-CM

## 2018-03-12 DIAGNOSIS — K86.1 CHRONIC PANCREATITIS, UNSPECIFIED PANCREATITIS TYPE (H): ICD-10-CM

## 2018-03-12 DIAGNOSIS — G43.A0 CYCLICAL VOMITING WITH NAUSEA, INTRACTABILITY OF VOMITING NOT SPECIFIED: Primary | ICD-10-CM

## 2018-03-12 DIAGNOSIS — R21 RASH: ICD-10-CM

## 2018-03-12 PROCEDURE — 25000128 H RX IP 250 OP 636: Mod: ZF | Performed by: NURSE PRACTITIONER

## 2018-03-12 PROCEDURE — 96376 TX/PRO/DX INJ SAME DRUG ADON: CPT

## 2018-03-12 PROCEDURE — 25000128 H RX IP 250 OP 636: Mod: ZF | Performed by: INTERNAL MEDICINE

## 2018-03-12 PROCEDURE — 96361 HYDRATE IV INFUSION ADD-ON: CPT

## 2018-03-12 PROCEDURE — 96374 THER/PROPH/DIAG INJ IV PUSH: CPT

## 2018-03-12 PROCEDURE — 96375 TX/PRO/DX INJ NEW DRUG ADDON: CPT

## 2018-03-12 RX ORDER — DIPHENHYDRAMINE HYDROCHLORIDE 50 MG/ML
25 INJECTION INTRAMUSCULAR; INTRAVENOUS ONCE
Status: COMPLETED | OUTPATIENT
Start: 2018-03-12 | End: 2018-03-12

## 2018-03-12 RX ORDER — DIPHENHYDRAMINE HYDROCHLORIDE 50 MG/ML
25 INJECTION INTRAMUSCULAR; INTRAVENOUS ONCE
Status: CANCELLED
Start: 2018-03-12 | End: 2018-03-12

## 2018-03-12 RX ORDER — DIPHENHYDRAMINE HYDROCHLORIDE 50 MG/ML
25 INJECTION INTRAMUSCULAR; INTRAVENOUS DAILY PRN
Status: DISCONTINUED | OUTPATIENT
Start: 2018-03-12 | End: 2018-03-12 | Stop reason: HOSPADM

## 2018-03-12 RX ORDER — ONDANSETRON 2 MG/ML
4 INJECTION INTRAMUSCULAR; INTRAVENOUS DAILY PRN
Status: CANCELLED
Start: 2018-03-12

## 2018-03-12 RX ORDER — DIPHENHYDRAMINE HYDROCHLORIDE 50 MG/ML
25 INJECTION INTRAMUSCULAR; INTRAVENOUS DAILY PRN
Status: CANCELLED
Start: 2018-03-12

## 2018-03-12 RX ORDER — ONDANSETRON 2 MG/ML
4 INJECTION INTRAMUSCULAR; INTRAVENOUS DAILY PRN
Status: DISCONTINUED | OUTPATIENT
Start: 2018-03-12 | End: 2018-03-12 | Stop reason: HOSPADM

## 2018-03-12 RX ORDER — ONDANSETRON 2 MG/ML
4 INJECTION INTRAMUSCULAR; INTRAVENOUS ONCE
Status: CANCELLED
Start: 2018-03-12 | End: 2018-03-12

## 2018-03-12 RX ORDER — ONDANSETRON 2 MG/ML
4 INJECTION INTRAMUSCULAR; INTRAVENOUS ONCE
Status: COMPLETED | OUTPATIENT
Start: 2018-03-12 | End: 2018-03-12

## 2018-03-12 RX ORDER — OXYCODONE HCL 5 MG/5 ML
SOLUTION, ORAL ORAL
Qty: 600 ML | Refills: 0 | Status: SHIPPED | OUTPATIENT
Start: 2018-03-12 | End: 2018-03-26

## 2018-03-12 RX ADMIN — ONDANSETRON 4 MG: 2 INJECTION INTRAMUSCULAR; INTRAVENOUS at 15:32

## 2018-03-12 RX ADMIN — SODIUM CHLORIDE, POTASSIUM CHLORIDE, SODIUM LACTATE AND CALCIUM CHLORIDE 2000 ML: 600; 310; 30; 20 INJECTION, SOLUTION INTRAVENOUS at 13:43

## 2018-03-12 RX ADMIN — ONDANSETRON 4 MG: 2 INJECTION INTRAMUSCULAR; INTRAVENOUS at 13:45

## 2018-03-12 RX ADMIN — DIPHENHYDRAMINE HYDROCHLORIDE 25 MG: 50 INJECTION, SOLUTION INTRAMUSCULAR; INTRAVENOUS at 13:49

## 2018-03-12 RX ADMIN — DIPHENHYDRAMINE HYDROCHLORIDE 25 MG: 50 INJECTION, SOLUTION INTRAMUSCULAR; INTRAVENOUS at 15:30

## 2018-03-12 ASSESSMENT — PAIN SCALES - GENERAL
PAINLEVEL: SEVERE PAIN (6)
PAINLEVEL: SEVERE PAIN (6)

## 2018-03-12 NOTE — LETTER
3/12/2018       RE: Alexandra Melgoza  [address]  [facility] [address]     Dear Colleague,    Thank you for referring your patient, Alexandra Melgoza, to the [facility] FOR COMPREHENSIVE PAIN MANA[facility]. Please see a copy of my visit note below.    The patient is a 22-year-old female with a history of chronic pancreatitis who presents for follow-up.    During her previous visit a plan was formulated with the goals of attempting to diminish chronic pancreatitis flares and thus emergency room visits and should those flares occur provided patient with pain medication that would be available to treat a flare and preclude visit to the emergency room. In the interim she has been hospitalized and her surgical pain has been problematic.  (GJ tube placement).  She currently takes oxycodone elixir 10 mg 5 times per day. She presents today for reevaluation and follow-up      Initial summary:  The patient is a 21-year-old woman with a history of chronic pancreatitis.  She had her gallbladder removed at the age of 15 and has had severe pancreatitis since that time.  She has been seen by Dr. Narayanan here at the [facility] and has had an ERCP and stent placed.  She notes that she has pancreatitis flares approximately every 3 months.  And presents today for suggestions about treatments for her pain during these pancreatitis flares.  She states that her flares occur every 3 months.  During her flares she states that she gets fluids and pain medications namely oxycodone 10-15 mg every 4-6 hours when necessary.  Her pain is alleviated by utilizing a low-fat diet, fruit, veggie's, poultry and fish, and relaxation techniques.  She states that her pain is exacerbated when she straightens from this diet.  She has used several different modalities to treat her pain.  These include Tylenol oxycodone gabapentin Pamelor and acupuncture. She states that the gabapentin  "causes sedation.  She presents today for initial evaluation and treatment modalities for her pain.  She asked specifically about celiac plexus blockade.          Current Outpatient Prescriptions   Medication     gabapentin (NEURONTIN) 400 MG capsule     oxyCODONE IR (ROXICODONE) 10 MG tablet     oxyCODONE (ROXICODONE) 5 MG/5ML solution     nortriptyline (PAMELOR) 10 MG capsule     ondansetron (ZOFRAN ODT) 4 MG ODT tab     amylase-lipase-protease (ZENPEP) 08007 UNITS CPEP     amylase-lipase-protease (ZENPEP) 80575 UNITS CPEP     senna-docusate (SENOKOT-S;PERICOLACE) 8.6-50 MG per tablet     pantoprazole (PROTONIX) 40 MG EC tablet     polyethylene glycol (MIRALAX/GLYCOLAX) Packet     gabapentin (NEURONTIN) 300 MG capsule     norethindrone (AYGESTIN) 5 MG tablet     fluticasone (FLONASE) 50 MCG/ACT spray     levalbuterol (XOPENEX HFA) 45 MCG/ACT Inhaler     leuprolide (LUPRON DEPOT) 11.25 MG injection     RIZATRIPTAN BENZOATE PO       No current facility-administered medications for this visit.                      Allergies   Allergen Reactions     Amoxicillin-Pot Clavulanate Nausea and Vomiting     Compazine [Prochlorperazine] Other (See Comments)         Dystonia     Hyoscyamine Other (See Comments)         Dystonia     Reglan [Metoclopramide Hcl] Other (See Comments)         Dystonia     Zyprexa Other (See Comments)         Sensitive, dystonic reaction on 11-9-2011     Amitriptyline Hcl Other (See Comments)         Dystonia, hallucinations     Buspirone Other (See Comments)         No Adverse Reactions, no benefit     Cogentin [Benztropine]        Cyproheptadine Other (See Comments)         Distonic     Dicyclomine Other (See Comments)     Droperidol Other (See Comments)         Feels tense and \"like she has to jump out of her skin\".       Effexor [Venlafaxine] Other (See Comments)         Dystonia     Food            Cilantro--lips/tongue swelling     No Clinical Screening - See Comments Other (See Comments)      "    Cilantro     Phenergan Dm [Promethazine-Dm] Other (See Comments)         dystonia     Promethazine Other (See Comments)     Risperidone Other (See Comments)         dystonia     Vistaril Other (See Comments)         Burning sensation.     Augmentin GI Disturbance     Ketamine Other (See Comments)         jittery     Sorbitol GI Disturbance         Headache and dyspepsia          Past Medical History               Past Medical History:   Diagnosis Date     Anxiety        Asthma        Cholecystitis         s/p cholecystectomy     Chronic abdominal pain        Chronic infection         mrsa     Chronic pain        Cyclic vomiting syndrome 10/27/2012     Depression        Endometriosis        Hypoglycaemia        Migraines        Mild intermittent asthma        Ovarian cysts        Pancreatic disease        PONV (postoperative nausea and vomiting)        Pseudoseizures        Somatoform disorder        Sphincter of Oddi dysfunction        Vasovagal syncope                  Past Surgical History                  Past Surgical History:   Procedure Laterality Date     ABDOMEN SURGERY            ERCP, biliary stents     CHOLECYSTECTOMY    8/2/11     COLONOSCOPY    2011      negative finding     ENDOSCOPIC RETROGRADE CHOLANGIOPANCREATOGRAM    8/23/2011      Procedure:ENDOSCOPIC RETROGRADE CHOLANGIOPANCREATOGRAM; Endoscopic Retrograde Cholangiopancreatogram; Surgeon:SHORTY MCCOY; Location:[facility]     ENDOSCOPIC RETROGRADE CHOLANGIOPANCREATOGRAM    5/17/2012      Procedure:ENDOSCOPIC RETROGRADE CHOLANGIOPANCREATOGRAM; Endoscopic Retrograde Cholangiopancreatogram with pancreatic stent placement.; Surgeon:SHORTY MCCOY; Location:[facility]     ENDOSCOPIC RETROGRADE CHOLANGIOPANCREATOGRAM COMPLEX    1/3/2012      Procedure:ENDOSCOPIC RETROGRADE CHOLANGIOPANCREATOGRAM COMPLEX; Endoscopic Retrograde Cholangiopancreatogram with Manometry bile duct sphincterotomy extention pancreatic duct sphincterotomy pancreatic duct  stent placement; Surgeon:SHORTY MCCOY; [facility]     ENDOSCOPIC ULTRASOUND UPPER GASTROINTESTINAL TRACT (GI) N/A 6/9/2015      Procedure: ENDOSCOPIC ULTRASOUND, ESOPHAGOSCOPY / UPPER GASTROINTESTINAL TRACT (GI);  Surgeon: Mario Joe MD;  [facility] OR     ENDOSCOPIC ULTRASOUND UPPER GASTROINTESTINAL TRACT (GI) N/A 12/12/2016      Procedure: ENDOSCOPIC ULTRASOUND, ESOPHAGOSCOPY / UPPER GASTROINTESTINAL TRACT (GI);  Surgeon: Guru Jose Klein MD;  [facility]     ESOPHAGOSCOPY, GASTROSCOPY, DUODENOSCOPY (EGD), COMBINED    1/18/2012      Procedure:COMBINED ESOPHAGOSCOPY, GASTROSCOPY, DUODENOSCOPY (EGD); Surgeon:ARNIE ESPINOZA; [facility]     ESOPHAGOSCOPY, GASTROSCOPY, DUODENOSCOPY (EGD), COMBINED    1/18/2012      Procedure:COMBINED ESOPHAGOSCOPY, GASTROSCOPY, DUODENOSCOPY (EGD); EGD; Surgeon:ARNIE ESPINOZA; [facility]     ESOPHAGOSCOPY, GASTROSCOPY, DUODENOSCOPY (EGD), COMBINED N/A 12/9/2017      Procedure: COMBINED ESOPHAGOSCOPY, GASTROSCOPY, DUODENOSCOPY (EGD);;  Surgeon: Josias Chan MD;  Location: [facility] GI     INSERT PORT VASCULAR ACCESS           L knee arthroscopy    2009     LAPAROSCOPY DIAGNOSTIC (GYN)    10/26/2012      Procedure: LAPAROSCOPY DIAGNOSTIC (GYN);  LAPAROSCOPY DIAGNOSTIC, CAUTERY ENDOMETRIOISIS and biopsy of Fallopian tube lesions;  Surgeon: Carla Lopez MD;  Location: [facility] OR     ORTHOPEDIC SURGERY    2008      knee     VASCULAR SURGERY                     Social History    Social History                     Social History     Marital status: Single         Spouse name: N/A     Number of children: N/A     Years of education: N/A               Occupational History     Not on file.                  Social History Main Topics     Smoking status: Never Smoker     Smokeless tobacco: Never Used     Alcohol use No     Drug use: No     Sexual activity: No                  Other Topics Concern     Parent/Sibling W/ Cabg, Mi Or Angioplasty Before  65f 55m? 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  Physical Exam   Constitutional: She is oriented to person, place, and time. She appears well-developed and well-nourished.   HENT:   Head: Normocephalic and atraumatic.   Right Ear: External ear normal.   Left Ear: External ear normal.   Nose: Nose normal.   Mouth/Throat: Oropharynx is clear and moist.   Eyes: Conjunctivae and EOM are normal. Pupils are equal, round, and reactive to light.   Neck: Normal range of motion. Neck supple.   Cardiovascular: Normal rate, regular rhythm, normal heart sounds and intact distal pulses.    Pulmonary/Chest: Effort normal and breath sounds normal.   Abdominal: She exhibits no distension and no mass. There is tenderness. There is guarding. There is no rebound.   Musculoskeletal: Normal range of motion.   Neurological: She is alert and oriented to person, place, and time. She has normal reflexes.   Skin: Skin is warm and dry.   Psychiatric: She has a normal mood and affect.   Nursing note and vitals reviewed.  A/P:Patient is a  23 y/o lady with chronic abdominal pain who presents for follow up.  It is clear that the most likely eiology of her pain is pancreatitic flares and surgical pain.   In an effort to decrease the frequency and the duration of flares, as well as addressing the post-surgical pain, I recommend  1. Re-Encourage Pain psychology  2. Continue Oxycodone 10 mg, however decrease the frequency to q6h prn with plan to wean when the surgical pain subsides.  3. Continue Add pamelor 30 mg qHS  4. RTC in 2 weeks        Again, thank you for allowing me to participate in the care of your patient.       Sincerely,    Leonardo Wang MD

## 2018-03-12 NOTE — MR AVS SNAPSHOT
After Visit Summary   3/12/2018    Alexandra Melgoza    MRN: [id number]           Patient Information     Date Of Birth          1995        Visit Information        Provider Department      3/12/2018 3:00 PM UC 45 ATC; UC SPEC INFUSION [facility] Specialty and Procedure        Today's Diagnoses     Cyclical vomiting with nausea, intractability of vomiting not specified    -  1    Chronic pancreatitis, unspecified pancreatitis type (H)        Intractable cyclical vomiting with nausea        Pain        Abdominal pain, epigastric        S/P ERCP        Acute abdominal pain        Abdominal pain        Hypoglycemia        Urge incontinence        Major depressive disorder, recurrent episode, moderate (H)        Anxiety state        Rash          Care Instructions    Dear Alexandra Melgoza    Thank you for choosing [facility] ([facility]) for your infusion.  The following information is a summary of our appointment as well as important reminders.        We look forward in seeing you on your next appointment here at [facility].  Please don t hesitate to call us at [phone] to reschedule any of your appointments or to speak with one of the [facility] registered nurses.  It was a pleasure taking care of you today.    Sincerely,    [facility]  Specialty Infusion [facility] Procedure Center  [address] [place] [address]  [id number]  Phone:  [phone]            Follow-ups after your visit        Your next 10 appointments already scheduled     Mar 14, 2018  4:00 PM CDT   Infusion 120 with UC SPEC INFUSION, UC 47 ATC   [facility] Specialty and Procedure ([facility] [place])    [address]  [address] [place] [address] [phone]            Mar 16, 2018  3:00 PM CDT   Infusion 120 with UC SPEC INFUSION, UC 49 ATC   [facility]  Specialty and Procedure ([facility])    [address]  [address]  [facility] [address]   [phone]            Mar 19, 2018  7:30 AM CDT   (Arrive by 7:15 AM)   Return Visit with Quyen Baez MD   [facility] ([facility])    [address]  4th Floor  [facility] [address]   [phone]            Mar 19, 2018  8:00 AM CDT   Infusion 120 with UC SPEC INFUSION, UC 44 ATC   [facility] Specialty and Procedure ([facility])    [address]  [address]  [facility] [address]   [phone]            Mar 19, 2018  3:00 PM CDT   (Arrive by 2:45 PM)   New Patient Visit with Quyen Lennon, PhD   [facility] ([facility])    [address] [place] [address]  4th Floor  [facility] [address]   [phone]            Mar 21, 2018  2:00 PM CDT   Infusion 120 with UC SPEC INFUSION, UC 45 ATC   [facility] Specialty and Procedure ([facility])    [address]  [address]  [facility] [address] [phone]              Who to contact     If you have questions or need follow up information about today's clinic visit or your schedule please contact [facility] SPECIALTY AND PROCEDURE directly at [phone].  Normal or non-critical lab and imaging results will be communicated to you by MyChart, letter or phone within 4 business days after the clinic has received the results. If you do not hear from us within 7 days, please contact the clinic through MyChart or phone. If you have a critical or abnormal lab result, we will notify you by phone as soon as possible.  Submit refill requests through [employer] or call your pharmacy and they will forward the refill request to us. Please allow 3 business days for your refill to be completed.  "         Additional Information About Your Visit        MyChart Information     [employer] lets you send messages to your doctor, view your test results, renew your prescriptions, schedule appointments and more. To sign up, go to [email or web address] . Click on \"Log in\" on the left side of the screen, which will take you to the Welcome page. Then click on \"Sign up Now\" on the right side of the page.     You will be asked to enter the access code listed below, as well as some personal information. Please follow the directions to create your username and password.     Your access code is: [email or web address]  Expires: 2018  7:30 AM     Your access code will  in 90 days. If you need help or a new code, please call your [place] clinic or [phone].        Care EveryWhere ID     This is your Care EveryWhere ID. This could be used by other organizations to access your [place] medical records  [id number]        Your Vitals Were     Pulse Temperature Respirations             105 99.2  F (37.3  C) (Oral) 16          Blood Pressure from Last 3 Encounters:   18 131/83   18 (!) 131/92   18 126/81    Weight from Last 3 Encounters:   18 66.7 kg (147 lb)   18 66.7 kg (147 lb)   18 66.3 kg (146 lb 1.6 oz)              Today, you had the following     No orders found for display         Today's Medication Changes          These changes are accurate as of 3/12/18  4:10 PM.  If you have any questions, ask your nurse or doctor.               These medicines have changed or have updated prescriptions.        Dose/Directions    gabapentin 250 MG/5ML solution   Commonly known as:  NEURONTIN   This may have changed:  when to take this   Used for:  Abdominal pain, epigastric        Dose:  400 mg   8 mLs (400 mg) by ORAL OR NJ TUBE route 2 times daily   Quantity:  470 mL   Refills:  0       nortriptyline 10 MG capsule   Commonly known as:  PAMELOR   This may have changed:  how much to take "   Used for:  Right upper quadrant abdominal pain        Dose:  30 mg   Take 3 capsules (30 mg) by mouth At Bedtime   Quantity:  120 capsule   Refills:  0       oxyCODONE 5 MG/5ML solution   Commonly known as:  ROXICODONE   This may have changed:  additional instructions   Used for:  Abdominal pain, generalized, Chronic abdominal pain   Changed by:  Leonardo Wang MD        Take 10 mL (10 mg) by mouth every 6 hours as needed, maximum 40 mL per day.   Quantity:  600 mL   Refills:  0            Where to get your medicines      Some of these will need a paper prescription and others can be bought over the counter.  Ask your nurse if you have questions.     Bring a paper prescription for each of these medications     oxyCODONE 5 MG/5ML solution                Primary Care Provider Office Phone # Fax #    Quyen Baez [phone] [address] [address]        Equal Access to Services     [facility]: Hadii angelina rodneyo Somalini, waaxda luqkimberly, qaybta kaalmada adechrisyakush, maki gaspar . So [facility] [phone].    ATENCIÓN: Si habla español, tiene a quijano disposición servicios gratuitos de asistencia lingüística. Llame al [phone].    We comply with applicable federal civil rights laws and [place] laws. We do not discriminate on the basis of race, color, national origin, age, disability, sex, sexual orientation, or gender identity.            Thank you!     Thank you for choosing [facility] PROCEDURE  for your care. Our goal is always to provide you with excellent care. Hearing back from our patients is one way we can continue to improve our services. Please take a few minutes to complete the written survey that you may receive in the mail after your visit with us. Thank you!             Your Updated Medication List - Protect others around you: Learn how to safely use, store and throw away your  medicines at www.disposemymeds.org.          This list is accurate as of 3/12/18  4:10 PM.  Always use your most recent med list.                   Brand Name Dispense Instructions for use Diagnosis    acetaminophen 32 mg/mL solution    TYLENOL     30.45 mLs (975 mg) by Per J Tube route every 8 hours        * amylase-lipase-protease 72398 UNITS Cpep    ZENPEP    180 capsule    Take 2-3 capsules (40,000-60,000 Units) by mouth Take with snacks or supplements (Snacks)    Idiopathic chronic pancreatitis (H)       * amylase-lipase-protease 24079 UNITS Cpep    ZENPEP    180 capsule    Take 5 capsules (100,000 Units) by mouth 4 times daily (with meals and nightly)    Right upper quadrant abdominal pain       fluticasone 50 MCG/ACT spray    FLONASE    16 g    Spray 2 sprays into both nostrils daily    Nasal congestion       gabapentin 250 MG/5ML solution    NEURONTIN    470 mL    8 mLs (400 mg) by ORAL OR NJ TUBE route 2 times daily    Abdominal pain, epigastric       hydrOXYzine 10 MG/5ML syrup    ATARAX    473 mL    Take 12.5 mLs (25 mg) by mouth every 4 hours as needed for anxiety or other (pain)    Acute abdominal pain       leuprolide 11.25 MG kit    LUPRON DEPOT (3-MONTH)    1 each    Inject 11.25 mg into the muscle every 3 months    Endometriosis       levalbuterol 45 MCG/ACT Inhaler    XOPENEX HFA    1 Inhaler    Inhale 2 puffs into the lungs every 6 hours as needed for shortness of breath / dyspnea    Wheeze       menthol 5 % Ptch    ICY HOT    15 patch    Apply 1 patch topically every 8 hours as needed for muscle soreness    Abdominal pain, epigastric       multivitamins with minerals Liqd liquid     1 Bottle    15 mLs by Per Feeding Tube route daily    Abdominal pain, epigastric       norethindrone 5 MG tablet    AYGESTIN    90 tablet    Take 1 tablet (5 mg) by mouth daily    Endometriosis       nortriptyline 10 MG capsule    PAMELOR    120 capsule    Take 3 capsules (30 mg) by mouth At Bedtime    Right upper  quadrant abdominal pain       ondansetron 4 MG ODT tab    ZOFRAN ODT    15 tablet    Take 1 tablet (4 mg) by mouth every 8 hours as needed for nausea or vomiting    Intractable cyclical vomiting with nausea       order for DME     1 Units    Equipment being ordered: TENS with wire and electrode.    Chronic abdominal pain       oxyCODONE 5 MG/5ML solution    ROXICODONE    600 mL    Take 10 mL (10 mg) by mouth every 6 hours as needed, maximum 40 mL per day.    Abdominal pain, generalized, Chronic abdominal pain       pantoprazole 40 MG EC tablet    PROTONIX    30 tablet    Take 1 tablet (40 mg) by mouth 2 times daily (before meals)    Right upper quadrant abdominal pain, Erosive gastritis       polyethylene glycol Packet    MIRALAX/GLYCOLAX    7 packet    Take 17 g by mouth daily    Right upper quadrant abdominal pain       RIZATRIPTAN BENZOATE PO      Take 5 mg by mouth once as needed        scopolamine 72 hr patch    TRANSDERM    2 patch    Apply 1 patch to hairless area behind one ear if severe nausea and vomiting.  Remove old patch and change every 3 days (72 hours).    Intractable vomiting with nausea, unspecified vomiting type       senna-docusate 8.6-50 MG per tablet    SENOKOT-S;PERICOLACE    100 tablet    Take 1 tablet by mouth 2 times daily as needed for constipation    Right upper quadrant abdominal pain       sodium bicarbonate 325 MG tablet     120 tablet    1 tablet (325 mg) by Per Feeding Tube route 4 times daily    Abdominal pain, epigastric, Intractable cyclical vomiting with nausea       * Notice:  This list has 2 medication(s) that are the same as other medications prescribed for you. Read the directions carefully, and ask your doctor or other care provider to review them with you.

## 2018-03-12 NOTE — PATIENT INSTRUCTIONS
[facility] Medication Refill Request Information:  * Please contact your pharmacy regarding ANY request for medication refills.  ** [facility] Prescription Fax = [phone]  * Please allow 3 business days for routine medication refills.  * Please allow 5 business days for controlled substance medication refills.     [facility] Test Result notification information:  *You will be notified with in 7-10 days of your appointment day regarding the results of your test.  If you are on MyChart you will be notified as soon as the provider has reviewed the results and signed off on them.    [facility] [phone]

## 2018-03-12 NOTE — NURSING NOTE
Chief Complaint   Patient presents with     Abdominal Pain     Patient is here for follow up including upper right abdominal pain, nausea and vomiting, and G-tube     Kelvin Arreaga CMA (AAMA) at 4:00 PM on 3/12/2018

## 2018-03-12 NOTE — MR AVS SNAPSHOT
After Visit Summary   3/12/2018    Alexandra Melgoza    MRN: [id number]           Patient Information     Date Of Birth          1995        Visit Information        Provider Department      3/12/2018 10:40 AM Leonardo Wang MD [facility]        Today's Diagnoses     Abdominal pain, generalized        Chronic abdominal pain          Care Instructions    1. Continue taking your medications as prescribed.     Follow up: 2 weeks      To speak with a nurse, schedule/reschedule/cancel a clinic appointment, or request a medication refill call: [phone]     You can also reach us by Globant: [email or web address]    For refills, please call on Monday, 1 week before your medication runs out. The doctors are not always in clinic, so this gives us time to get your prescriptions ready.  Please let us know the name of the medication you are requesting a refill of.                                       Follow-ups after your visit        Your next 10 appointments already scheduled     Mar 12, 2018  3:00 PM CDT   Infusion 120 with UC SPEC INFUSION, UC 45 ATC   [facility] ([facility])    [address]  [address]  [facility] [id number] [phone]            Mar 12, 2018  3:40 PM CDT   (Arrive by 3:25 PM)   Return Visit with Quyen Baez MD   [facility] ([facility])    [address]  4th Floor  [facility] [id number] [phone]            Mar 14, 2018  4:00 PM CDT   Infusion 120 with UC SPEC INFUSION, UC 47 ATC   [facility] ([facility])    [address]  [address]  [facility] [id number] [phone]            Mar 16, 2018  3:00 PM CDT   Infusion 120 with UC SPEC INFUSION, UC 49 ATC   [facility]  "Specialty and Procedure ([facility])    [address]  [address]  [facility] [id number] [phone]            Mar 19, 2018  7:30 AM CDT   (Arrive by 7:15 AM)   Return Visit with Quyen Baez MD   [facility] ([facility])    [address]  4th Floor  [facility] [id number] [phone]            Mar 19, 2018  8:00 AM CDT   Infusion 120 with UC SPEC INFUSION, UC 44 ATC   [facility] ([facility])    [address]  [address]  [facility] [address] [phone]              Who to contact     Please call your clinic at [phone] to:    Ask questions about your health    Make or cancel appointments    Discuss your medicines    Learn about your test results    Speak to your doctor            Additional Information About Your Visit        MyChart Information     TicketBox is an electronic gateway that provides easy, online access to your medical records. With TicketBox, you can request a clinic appointment, read your test results, renew a prescription or communicate with your care team.     To sign up for TicketBox visit the website at [email or web address]   You will be asked to enter the access code listed below, as well as some personal information. Please follow the directions to create your username and password.     Your access code is: [email or web address]  Expires: 2018  7:30 AM     Your access code will  in 90 days. If you need help or a new code, please contact your [facility] or call [phone] for assistance.        Care EveryWhere ID     This is your Care EveryWhere ID. This could be used by other organizations to access your [place] medical records  [id number]        Your Vitals Were     Respirations Height BMI (Body Mass Index)             16 1.689 m (5' 6.5\") 23.37 kg/m2          " Blood Pressure from Last 3 Encounters:   03/09/18 126/81   03/07/18 101/68   03/05/18 105/74    Weight from Last 3 Encounters:   03/12/18 66.7 kg (147 lb)   03/05/18 66.3 kg (146 lb 1.6 oz)   03/02/18 64.4 kg (142 lb)              Today, you had the following     No orders found for display         Today's Medication Changes          These changes are accurate as of 3/12/18 11:38 AM.  If you have any questions, ask your nurse or doctor.               These medicines have changed or have updated prescriptions.        Dose/Directions    gabapentin 250 MG/5ML solution   Commonly known as:  NEURONTIN   This may have changed:  when to take this   Used for:  Abdominal pain, epigastric        Dose:  400 mg   8 mLs (400 mg) by ORAL OR NJ TUBE route 2 times daily   Quantity:  470 mL   Refills:  0       nortriptyline 10 MG capsule   Commonly known as:  PAMELOR   This may have changed:  how much to take   Used for:  Right upper quadrant abdominal pain        Dose:  30 mg   Take 3 capsules (30 mg) by mouth At Bedtime   Quantity:  120 capsule   Refills:  0       oxyCODONE 5 MG/5ML solution   Commonly known as:  ROXICODONE   This may have changed:  additional instructions   Used for:  Abdominal pain, generalized, Chronic abdominal pain   Changed by:  Leoanrdo Wang MD        Take 10 mL (10 mg) by mouth every 6 hours as needed, maximum 40 mL per day.   Quantity:  600 mL   Refills:  0            Where to get your medicines      Some of these will need a paper prescription and others can be bought over the counter.  Ask your nurse if you have questions.     Bring a paper prescription for each of these medications     oxyCODONE 5 MG/5ML solution                Primary Care Provider Office Phone # Fax #    Quyen Baez [phone]       [address] [address]        Equal Access to Services     RACHAEL BENITEZ AH: alcon Orta, lashell santillan,  maki duongchris palma'aan ah. So [facility] [phone].    ATENCIÓN: Si hilariola finesse, tiene a quijano disposición servicios gratuitos de asistencia lingüística. Panfilo champion [phone].    We comply with applicable federal civil rights laws and [place] laws. We do not discriminate on the basis of race, color, national origin, age, disability, sex, sexual orientation, or gender identity.            Thank you!     Thank you for choosing [facility]  for your care. Our goal is always to provide you with excellent care. Hearing back from our patients is one way we can continue to improve our services. Please take a few minutes to complete the written survey that you may receive in the mail after your visit with us. Thank you!             Your Updated Medication List - Protect others around you: Learn how to safely use, store and throw away your medicines at www.disposemymeds.org.          This list is accurate as of 3/12/18 11:38 AM.  Always use your most recent med list.                   Brand Name Dispense Instructions for use Diagnosis    acetaminophen 32 mg/mL solution    TYLENOL     30.45 mLs (975 mg) by Per J Tube route every 8 hours        * amylase-lipase-protease 85764 UNITS Cpep    ZENPEP    180 capsule    Take 2-3 capsules (40,000-60,000 Units) by mouth Take with snacks or supplements (Snacks)    Idiopathic chronic pancreatitis (H)       * amylase-lipase-protease 22094 UNITS Cpep    ZENPEP    180 capsule    Take 5 capsules (100,000 Units) by mouth 4 times daily (with meals and nightly)    Right upper quadrant abdominal pain       fluticasone 50 MCG/ACT spray    FLONASE    16 g    Spray 2 sprays into both nostrils daily    Nasal congestion       gabapentin 250 MG/5ML solution    NEURONTIN    470 mL    8 mLs (400 mg) by ORAL OR NJ TUBE route 2 times daily    Abdominal pain, epigastric       hydrOXYzine 10 MG/5ML syrup    ATARAX    473 mL    Take 12.5 mLs (25 mg) by  mouth every 4 hours as needed for anxiety or other (pain)    Acute abdominal pain       leuprolide 11.25 MG kit    LUPRON DEPOT (3-MONTH)    1 each    Inject 11.25 mg into the muscle every 3 months    Endometriosis       levalbuterol 45 MCG/ACT Inhaler    XOPENEX HFA    1 Inhaler    Inhale 2 puffs into the lungs every 6 hours as needed for shortness of breath / dyspnea    Wheeze       menthol 5 % Ptch    ICY HOT    15 patch    Apply 1 patch topically every 8 hours as needed for muscle soreness    Abdominal pain, epigastric       multivitamins with minerals Liqd liquid     1 Bottle    15 mLs by Per Feeding Tube route daily    Abdominal pain, epigastric       norethindrone 5 MG tablet    AYGESTIN    90 tablet    Take 1 tablet (5 mg) by mouth daily    Endometriosis       nortriptyline 10 MG capsule    PAMELOR    120 capsule    Take 3 capsules (30 mg) by mouth At Bedtime    Right upper quadrant abdominal pain       ondansetron 4 MG ODT tab    ZOFRAN ODT    15 tablet    Take 1 tablet (4 mg) by mouth every 8 hours as needed for nausea or vomiting    Intractable cyclical vomiting with nausea       order for [facility] [place] [facility]     1 Units    Equipment being ordered: TENS with wire and electrode.    Chronic abdominal pain       oxyCODONE 5 MG/5ML solution    ROXICODONE    600 mL    Take 10 mL (10 mg) by mouth every 6 hours as needed, maximum 40 mL per day.    Abdominal pain, generalized, Chronic abdominal pain       pantoprazole 40 MG EC tablet    PROTONIX    30 tablet    Take 1 tablet (40 mg) by mouth 2 times daily (before meals)    Right upper quadrant abdominal pain, Erosive gastritis       polyethylene glycol Packet    MIRALAX/GLYCOLAX    7 packet    Take 17 g by mouth daily    Right upper quadrant abdominal pain       RIZATRIPTAN BENZOATE PO      Take 5 mg by mouth once as needed        scopolamine 72 hr patch    TRANSDERM    2 patch    Apply 1 patch to hairless area behind one ear if severe nausea and vomiting.  Remove old patch and  change every 3 days (72 hours).    Intractable vomiting with nausea, unspecified vomiting type       senna-docusate 8.6-50 MG per tablet    SENOKOT-S;PERICOLACE    100 tablet    Take 1 tablet by mouth 2 times daily as needed for constipation    Right upper quadrant abdominal pain       sodium bicarbonate 325 MG tablet     120 tablet    1 tablet (325 mg) by Per Feeding Tube route 4 times daily    Abdominal pain, epigastric, Intractable cyclical vomiting with nausea       * Notice:  This list has 2 medication(s) that are the same as other medications prescribed for you. Read the directions carefully, and ask your doctor or other care provider to review them with you.

## 2018-03-12 NOTE — NURSING NOTE
AVS given and reviewed with pt.  Pt verbalized understanding and declined any questions.     Amanda Rodriguez, RN, BSN

## 2018-03-12 NOTE — PATIENT INSTRUCTIONS
Dear Alexandra Melgoza    Thank you for choosing [facility] ([facility]) for your infusion.  The following information is a summary of our appointment as well as important reminders.        We look forward in seeing you on your next appointment here at [facility].  Please don t hesitate to call us at [phone] to reschedule any of your appointments or to speak with one of the [facility] registered nurses.  It was a pleasure taking care of you today.    Sincerely,    [facility]  [address] [place] [address]  [address]  Phone:  [phone]

## 2018-03-12 NOTE — PATIENT INSTRUCTIONS
1. Continue taking your medications as prescribed.     Follow up: 2 weeks      To speak with a nurse, schedule/reschedule/cancel a clinic appointment, or request a medication refill call: [phone]     You can also reach us by Tribridge: [email or web address]    For refills, please call on Monday, 1 week before your medication runs out. The doctors are not always in clinic, so this gives us time to get your prescriptions ready.  Please let us know the name of the medication you are requesting a refill of.

## 2018-03-12 NOTE — PROGRESS NOTES
Nursing Note  Alexandra Melgoza [facility] for:   Chief Complaint   Patient presents with     Infusion     IV fluids and medications     During today's [facility] appointment, orders from Dr. Campbell Narayanan were completed.  Frequency: 3 times weekly as needed.    Progress note:  Patient identification verified by name and date of birth.  Assessment completed.  Vitals recorded in Doc Flowsheets.  Patient was provided with education regarding infusion and possible side effects.  Patient verbalized understanding.      needed: No  Premedications: were not ordered.  Infusion Rates: Each liter of LR administered over 1 hour for a total infusion time of 2 hours.  Labs: were not ordered for this appointment.  Vascular access: port accessed today.  Treatment Conditions: non-applicable.  Patient tolerated infusion: well.        Discharge Plan:   Follow up plan of care with: ongoing infusions at [facility].  Discharge instructions were reviewed with patient.  Patient/representative verbalized understanding of discharge instructions and all questions answered.  Patient discharged from [facility] in stable condition.    Stacey Posada RN    Administrations This Visit     diphenhydrAMINE (BENADRYL) injection 25 mg     Admin Date Action Dose Route Administered By             03/12/2018 Given 25 mg Intravenous Stacey Posada RN              Admin Date Action Dose Route Administered By             03/12/2018 Given 25 mg Intravenous Stacey Posada RN                    lactated ringers BOLUS 1,000-2,000 mL     Admin Date Action Dose Route Administered By             03/12/2018 New Bag 2000 mL Intravenous Stacey Posada RN                    ondansetron (ZOFRAN) injection 4 mg     Admin Date Action Dose Route Administered By             03/12/2018 Given 4 mg Intravenous Swetha  Stacey SHAW RN              Admin Date Action Dose Route Administered By             03/12/2018 Given 4 mg Intravenous Stacey Posada RN                          /78  Pulse 105  Temp 99.2  F (37.3  C) (Oral)  Resp 16

## 2018-03-12 NOTE — MR AVS SNAPSHOT
After Visit Summary   3/12/2018    Alexandra Melgoza    MRN: [id number]           Patient Information     Date Of Birth          1995        Visit Information        Provider Department      3/12/2018 3:40 PM Quyen Baez MD [facility]        Today's Diagnoses     Chronic abdominal pain    -  1    Sphincter of Oddi dysfunction        Intractable cyclical vomiting with nausea          Care Instructions    Primary Care Center Medication Refill Request Information:  * Please contact your pharmacy regarding ANY request for medication refills.  ** [facility] Prescription Fax = [phone]  * Please allow 3 business days for routine medication refills.  * Please allow 5 business days for controlled substance medication refills.     Primary Care Center Test Result notification information:  *You will be notified with in 7-10 days of your appointment day regarding the results of your test.  If you are on MyChart you will be notified as soon as the provider has reviewed the results and signed off on them.    [facility] [phone]             Follow-ups after your visit        Follow-up notes from your care team     Return in about 3 months (around 6/12/2018) for Routine Visit.      Your next 10 appointments already scheduled     Mar 14, 2018  4:00 PM CDT   Infusion 120 with UC SPEC INFUSION, UC 47 ATC   [facility] ([facility])    [address] [place] [address]  [address] [place] [address] [phone]            Mar 16, 2018  3:00 PM CDT   Infusion 120 with UC SPEC INFUSION, UC 49 ATC   [facility] ([facility])    [address] [place] [address]  [address] [place] [id number] [phone]            Mar 19, 2018  7:30 AM CDT   (Arrive by 7:15 AM)   Return Visit with Quyen Baez MD   [facility] ([facility]  [facility])    [address]  4th Floor  [facility] [address]   [phone]            Mar 19, 2018  8:00 AM CDT   Infusion 120 with UC SPEC INFUSION, UC 44 ATC   [facility] ([facility])    [address]  [address]  [facility] [address] [phone]            Mar 19, 2018  3:00 PM CDT   (Arrive by 2:45 PM)   New Patient Visit with Quyen Lennon, PhD   [facility] ([facility])    [address] [place] [address]  4th Floor  [facility] [address]   [phone]            Mar 21, 2018  2:00 PM CDT   Infusion 120 with UC SPEC INFUSION, UC 45 ATC   [facility] ([facility])    [address] [place] [address]  [address]  [facility] [id number] [phone]              Who to contact     Please call your clinic at [phone] to:    Ask questions about your health    Make or cancel appointments    Discuss your medicines    Learn about your test results    Speak to your doctor            Additional Information About Your Visit        [employer] Information     [employer] gives you secure access to your electronic health record. If you see a primary care provider, you can also send messages to your care team and make appointments. If you have questions, please call your primary care clinic.  If you do not have a primary care provider, please call [phone] and they will assist you.      [employer] is an electronic gateway that provides easy, online access to your medical records. With [employer], you can request a clinic appointment, read your test results, renew a prescription or communicate with your care team.     To access your existing account, please contact your [facility] or call [phone] for assistance.        Care EveryWhere ID     This is your  "Care EveryWhere ID. This could be used by other organizations to access your [facility] medical records  [id number]        Your Vitals Were     Pulse Temperature Respirations Height Pulse Oximetry Breastfeeding?    97 97.9  F (36.6  C) (Oral) 18 1.689 m (5' 6.5\") 100% No    BMI (Body Mass Index)                   23.37 kg/m2            Blood Pressure from Last 3 Encounters:   03/12/18 131/83   03/12/18 (!) 131/92   03/09/18 126/81    Weight from Last 3 Encounters:   03/12/18 66.7 kg (147 lb)   03/12/18 66.7 kg (147 lb)   03/05/18 66.3 kg (146 lb 1.6 oz)              Today, you had the following     No orders found for display         Today's Medication Changes          These changes are accurate as of 3/12/18 11:59 PM.  If you have any questions, ask your nurse or doctor.               These medicines have changed or have updated prescriptions.        Dose/Directions    nortriptyline 10 MG capsule   Commonly known as:  PAMELOR   This may have changed:  how much to take   Used for:  Right upper quadrant abdominal pain        Dose:  30 mg   Take 3 capsules (30 mg) by mouth At Bedtime   Quantity:  120 capsule   Refills:  0       oxyCODONE 5 MG/5ML solution   Commonly known as:  ROXICODONE   This may have changed:  additional instructions   Used for:  Abdominal pain, generalized, Chronic abdominal pain   Changed by:  Leonardo Wang MD        Take 10 mL (10 mg) by mouth every 6 hours as needed, maximum 40 mL per day.   Quantity:  600 mL   Refills:  0            Where to get your medicines      Some of these will need a paper prescription and others can be bought over the counter.  Ask your nurse if you have questions.     Bring a paper prescription for each of these medications     oxyCODONE 5 MG/5ML solution                Primary Care Provider Office Phone # Fax #    Quyen aBez [phone]       [address] [address]        Equal Access to Services     RACHAEL BENITEZ " AH: Bret joseph Katiali, waaxda luqadaha, qaybta kaaysha santillan, waxmacarena fatmata julianaamber stevensonarjunliss smallwood. So [facility] [phone].    ATENCIÓN: Si hilariola finesse, tiene a quijano disposición servicios gratuitos de asistencia lingüística. Llame al [phone].    We comply with applicable federal civil rights laws and [place] laws. We do not discriminate on the basis of race, color, national origin, age, disability, sex, sexual orientation, or gender identity.            Thank you!     Thank you for choosing [facility]  for your care. Our goal is always to provide you with excellent care. Hearing back from our patients is one way we can continue to improve our services. Please take a few minutes to complete the written survey that you may receive in the mail after your visit with us. Thank you!             Your Updated Medication List - Protect others around you: Learn how to safely use, store and throw away your medicines at www.disposemymeds.org.          This list is accurate as of 3/12/18 11:59 PM.  Always use your most recent med list.                   Brand Name Dispense Instructions for use Diagnosis    acetaminophen 32 mg/mL solution    TYLENOL     30.45 mLs (975 mg) by Per J Tube route every 8 hours        * amylase-lipase-protease 42785 UNITS Cpep    ZENPEP    180 capsule    Take 2-3 capsules (40,000-60,000 Units) by mouth Take with snacks or supplements (Snacks)    Idiopathic chronic pancreatitis (H)       * amylase-lipase-protease 28889 UNITS Cpep    ZENPEP    180 capsule    Take 5 capsules (100,000 Units) by mouth 4 times daily (with meals and nightly)    Right upper quadrant abdominal pain       fluticasone 50 MCG/ACT spray    FLONASE    16 g    Spray 2 sprays into both nostrils daily    Nasal congestion       leuprolide 11.25 MG kit    LUPRON DEPOT (3-MONTH)    1 each    Inject 11.25 mg into the muscle every 3 months    Endometriosis       levalbuterol 45 MCG/ACT Inhaler     XOPENEX HFA    1 Inhaler    Inhale 2 puffs into the lungs every 6 hours as needed for shortness of breath / dyspnea    Wheeze       menthol 5 % Ptch    ICY HOT    15 patch    Apply 1 patch topically every 8 hours as needed for muscle soreness    Abdominal pain, epigastric       multivitamins with minerals Liqd liquid     1 Bottle    15 mLs by Per Feeding Tube route daily    Abdominal pain, epigastric       norethindrone 5 MG tablet    AYGESTIN    90 tablet    Take 1 tablet (5 mg) by mouth daily    Endometriosis       nortriptyline 10 MG capsule    PAMELOR    120 capsule    Take 3 capsules (30 mg) by mouth At Bedtime    Right upper quadrant abdominal pain       ondansetron 4 MG ODT tab    ZOFRAN ODT    15 tablet    Take 1 tablet (4 mg) by mouth every 8 hours as needed for nausea or vomiting    Intractable cyclical vomiting with nausea       order for [facility]     1 Units    Equipment being ordered: TENS with wire and electrode.    Chronic abdominal pain       oxyCODONE 5 MG/5ML solution    ROXICODONE    600 mL    Take 10 mL (10 mg) by mouth every 6 hours as needed, maximum 40 mL per day.    Abdominal pain, generalized, Chronic abdominal pain       pantoprazole 40 MG EC tablet    PROTONIX    30 tablet    Take 1 tablet (40 mg) by mouth 2 times daily (before meals)    Right upper quadrant abdominal pain, Erosive gastritis       polyethylene glycol Packet    MIRALAX/GLYCOLAX    7 packet    Take 17 g by mouth daily    Right upper quadrant abdominal pain       RIZATRIPTAN BENZOATE PO      Take 5 mg by mouth once as needed        scopolamine 72 hr patch    TRANSDERM    2 patch    Apply 1 patch to hairless area behind one ear if severe nausea and vomiting.  Remove old patch and change every 3 days (72 hours).    Intractable vomiting with nausea, unspecified vomiting type       senna-docusate 8.6-50 MG per tablet    SENOKOT-S;PERICOLACE    100 tablet    Take 1 tablet by mouth 2 times daily as needed for constipation    Right upper  quadrant abdominal pain       sodium bicarbonate 325 MG tablet     120 tablet    1 tablet (325 mg) by Per Feeding Tube route 4 times daily    Abdominal pain, epigastric, Intractable cyclical vomiting with nausea       * Notice:  This list has 2 medication(s) that are the same as other medications prescribed for you. Read the directions carefully, and ask your doctor or other care provider to review them with you.

## 2018-03-13 ENCOUNTER — CARE COORDINATION (OUTPATIENT)
Dept: GASTROENTEROLOGY | Facility: CLINIC | Age: 23
End: 2018-03-13

## 2018-03-13 DIAGNOSIS — R10.13 ABDOMINAL PAIN, EPIGASTRIC: ICD-10-CM

## 2018-03-13 DIAGNOSIS — R10.9 ACUTE ABDOMINAL PAIN: ICD-10-CM

## 2018-03-13 RX ORDER — GABAPENTIN 250 MG/5ML
400 SOLUTION ORAL 3 TIMES DAILY
Qty: 720 ML | Refills: 2 | Status: SHIPPED | OUTPATIENT
Start: 2018-03-13 | End: 2018-03-19

## 2018-03-13 RX ORDER — HYDROXYZINE HCL 10 MG/5 ML
25 SOLUTION, ORAL ORAL EVERY 6 HOURS PRN
Qty: 1500 ML | Refills: 2 | Status: SHIPPED | OUTPATIENT
Start: 2018-03-13

## 2018-03-13 NOTE — PROGRESS NOTES
Called and talked to Grabiel about the tube feedings. Advised she should work with the dietitian that works with her, if they recommend that it will fit her dietary needs then they can send a order to us and we can have Dr. Narayanan review and fax it back if approved.   Verbalized understanding.     Ella SARMIENTO RN Coordinator  Dr. Narayanan, Dr. Joe & Dr. Klein   [facility]  [phone]

## 2018-03-13 NOTE — PROGRESS NOTES
The patient is a 22-year-old female with a history of chronic pancreatitis who presents for follow-up.    During her previous visit a plan was formulated with the goals of attempting to diminish chronic pancreatitis flares and thus emergency room visits and should those flares occur provided patient with pain medication that would be available to treat a flare and preclude visit to the emergency room. In the interim she has been hospitalized and her surgical pain has been problematic.  (GJ tube placement). She currently takes oxycodone elixir 10 mg 5 times per day. She presents today for reevaluation and follow-up      Initial summary:  The patient is a 21-year-old woman with a history of chronic pancreatitis.  She had her gallbladder removed at the age of 15 and has had severe pancreatitis since that time.  She has been seen by Dr. Narayanan here at the [facility] and has had an ERCP and stent placed.  She notes that she has pancreatitis flares approximately every 3 months.  And presents today for suggestions about treatments for her pain during these pancreatitis flares.  She states that her flares occur every 3 months.  During her flares she states that she gets fluids and pain medications namely oxycodone 10-15 mg every 4-6 hours when necessary.  Her pain is alleviated by utilizing a low-fat diet, fruit, veggie's, poultry and fish, and relaxation techniques.  She states that her pain is exacerbated when she straightens from this diet.  She has used several different modalities to treat her pain.  These include Tylenol oxycodone gabapentin Pamelor and acupuncture. She states that the gabapentin causes sedation.  She presents today for initial evaluation and treatment modalities for her pain.  She asked specifically about celiac plexus blockade.          Current Outpatient Prescriptions   Medication     gabapentin (NEURONTIN) 400 MG capsule     oxyCODONE IR (ROXICODONE) 10 MG tablet     oxyCODONE  "(ROXICODONE) 5 MG/5ML solution     nortriptyline (PAMELOR) 10 MG capsule     ondansetron (ZOFRAN ODT) 4 MG ODT tab     amylase-lipase-protease (ZENPEP) 53272 UNITS CPEP     amylase-lipase-protease (ZENPEP) 24739 UNITS CPEP     senna-docusate (SENOKOT-S;PERICOLACE) 8.6-50 MG per tablet     pantoprazole (PROTONIX) 40 MG EC tablet     polyethylene glycol (MIRALAX/GLYCOLAX) Packet     gabapentin (NEURONTIN) 300 MG capsule     norethindrone (AYGESTIN) 5 MG tablet     fluticasone (FLONASE) 50 MCG/ACT spray     levalbuterol (XOPENEX HFA) 45 MCG/ACT Inhaler     leuprolide (LUPRON DEPOT) 11.25 MG injection     RIZATRIPTAN BENZOATE PO       No current facility-administered medications for this visit.                      Allergies   Allergen Reactions     Amoxicillin-Pot Clavulanate Nausea and Vomiting     Compazine [Prochlorperazine] Other (See Comments)         Dystonia     Hyoscyamine Other (See Comments)         Dystonia     Reglan [Metoclopramide Hcl] Other (See Comments)         Dystonia     Zyprexa Other (See Comments)         Sensitive, dystonic reaction on 11-9-2011     Amitriptyline Hcl Other (See Comments)         Dystonia, hallucinations     Buspirone Other (See Comments)         No Adverse Reactions, no benefit     Cogentin [Benztropine]        Cyproheptadine Other (See Comments)         Distonic     Dicyclomine Other (See Comments)     Droperidol Other (See Comments)         Feels tense and \"like she has to jump out of her skin\".       Effexor [Venlafaxine] Other (See Comments)         Dystonia     Food            Cilantro--lips/tongue swelling     No Clinical Screening - See Comments Other (See Comments)         Cilantro     Phenergan Dm [Promethazine-Dm] Other (See Comments)         dystonia     Promethazine Other (See Comments)     Risperidone Other (See Comments)         dystonia     Vistaril Other (See Comments)         Burning sensation.     Augmentin GI Disturbance     Ketamine Other (See Comments)      "    jittery     Sorbitol GI Disturbance         Headache and dyspepsia          Past Medical History               Past Medical History:   Diagnosis Date     Anxiety        Asthma        Cholecystitis         s/p cholecystectomy     Chronic abdominal pain        Chronic infection         mrsa     Chronic pain        Cyclic vomiting syndrome 10/27/2012     Depression        Endometriosis        Hypoglycaemia        Migraines        Mild intermittent asthma        Ovarian cysts        Pancreatic disease        PONV (postoperative nausea and vomiting)        Pseudoseizures        Somatoform disorder        Sphincter of Oddi dysfunction        Vasovagal syncope                  Past Surgical History                  Past Surgical History:   Procedure Laterality Date     ABDOMEN SURGERY            ERCP, biliary stents     CHOLECYSTECTOMY    8/2/11     COLONOSCOPY    2011      negative finding     ENDOSCOPIC RETROGRADE CHOLANGIOPANCREATOGRAM    8/23/2011      Procedure:ENDOSCOPIC RETROGRADE CHOLANGIOPANCREATOGRAM; Endoscopic Retrograde Cholangiopancreatogram; Surgeon:SHORTY MCCOY; Location:[facility]     ENDOSCOPIC RETROGRADE CHOLANGIOPANCREATOGRAM    5/17/2012      Procedure:ENDOSCOPIC RETROGRADE CHOLANGIOPANCREATOGRAM; Endoscopic Retrograde Cholangiopancreatogram with pancreatic stent placement.; Surgeon:SHORTY MCCOY; Location:[facility]     ENDOSCOPIC RETROGRADE CHOLANGIOPANCREATOGRAM COMPLEX    1/3/2012      Procedure:ENDOSCOPIC RETROGRADE CHOLANGIOPANCREATOGRAM COMPLEX; Endoscopic Retrograde Cholangiopancreatogram with Manometry bile duct sphincterotomy extention pancreatic duct sphincterotomy pancreatic duct stent placement; Surgeon:SHORTY MCCOY; Location:[facility]     ENDOSCOPIC ULTRASOUND UPPER GASTROINTESTINAL TRACT (GI) N/A 6/9/2015      Procedure: ENDOSCOPIC ULTRASOUND, ESOPHAGOSCOPY / UPPER GASTROINTESTINAL TRACT (GI);  Surgeon: Mario Joe MD;  Location: [facility]     ENDOSCOPIC ULTRASOUND  UPPER GASTROINTESTINAL TRACT (GI) N/A 12/12/2016      Procedure: ENDOSCOPIC ULTRASOUND, ESOPHAGOSCOPY / UPPER GASTROINTESTINAL TRACT (GI);  Surgeon: Guru Jose Klein MD;  [facility]: [facility]     ESOPHAGOSCOPY, GASTROSCOPY, DUODENOSCOPY (EGD), COMBINED    1/18/2012      Procedure:COMBINED ESOPHAGOSCOPY, GASTROSCOPY, DUODENOSCOPY (EGD); Surgeon:ARNIE ESPINOZA; [facility]     ESOPHAGOSCOPY, GASTROSCOPY, DUODENOSCOPY (EGD), COMBINED    1/18/2012      Procedure:COMBINED ESOPHAGOSCOPY, GASTROSCOPY, DUODENOSCOPY (EGD); EGD; Surgeon:ARNIE ESPINOZA; [facility]     ESOPHAGOSCOPY, GASTROSCOPY, DUODENOSCOPY (EGD), COMBINED N/A 12/9/2017      Procedure: COMBINED ESOPHAGOSCOPY, GASTROSCOPY, DUODENOSCOPY (EGD);;  Surgeon: Josias Chan MD;  Location: [facility]     INSERT PORT VASCULAR ACCESS           L knee arthroscopy    2009     LAPAROSCOPY DIAGNOSTIC (GYN)    10/26/2012      Procedure: LAPAROSCOPY DIAGNOSTIC (GYN);  LAPAROSCOPY DIAGNOSTIC, CAUTERY ENDOMETRIOISIS and biopsy of Fallopian tube lesions;  Surgeon: Carla Lopez MD;  Location: [facility]     ORTHOPEDIC SURGERY    2008      knee     VASCULAR SURGERY                     Social History    Social History                     Social History     Marital status: Single         Spouse name: N/A     Number of children: N/A     Years of education: N/A               Occupational History     Not on file.                  Social History Main Topics     Smoking status: Never Smoker     Smokeless tobacco: Never Used     Alcohol use No     Drug use: No     Sexual activity: No                  Other Topics Concern     Parent/Sibling W/ Cabg, Mi Or Angioplasty Before 65f 55m? 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  Physical Exam   Constitutional: She is oriented to person, place, and time. She appears well-developed and well-nourished.   HENT:   Head: Normocephalic and atraumatic.   Right Ear: External ear normal.   Left Ear: External ear normal.   Nose: Nose normal.   Mouth/Throat: Oropharynx is clear and moist.   Eyes: Conjunctivae and EOM are normal. Pupils are equal, round, and reactive to light.   Neck: Normal range of motion. Neck supple.   Cardiovascular: Normal rate, regular rhythm, normal heart sounds and intact distal pulses.    Pulmonary/Chest: Effort normal and breath sounds normal.   Abdominal: She exhibits no distension and no mass. There is tenderness. There is guarding. There is no rebound.   Musculoskeletal: Normal range of motion.   Neurological: She is alert and oriented to person, place, and time. She has normal reflexes.   Skin: Skin is warm and dry.   Psychiatric: She has a normal mood and affect.   Nursing note and vitals reviewed.  A/P:Patient is a  23 y/o lady with chronic abdominal pain who presents for follow up.  It is clear that the most likely eiology of her pain is pancreatitic flares and surgical pain.   In an effort to decrease the frequency and the duration of flares, as well as addressing the post-surgical pain, I recommend  1. Re-Encourage Pain psychology  2. Continue Oxycodone 10 mg, however decrease the frequency to q6h prn with plan to wean when the surgical pain subsides.  3. Continue Add pamelor 30 mg qHS  4. RTC in 2 weeks       Answers for HPI/ROS submitted by the patient on 3/12/2018   PHQ-2 Score: 1

## 2018-03-14 ENCOUNTER — INFUSION THERAPY VISIT (OUTPATIENT)
Dept: INFUSION THERAPY | Facility: CLINIC | Age: 23
End: 2018-03-14
Attending: INTERNAL MEDICINE
Payer: COMMERCIAL

## 2018-03-14 VITALS
HEART RATE: 102 BPM | SYSTOLIC BLOOD PRESSURE: 112 MMHG | RESPIRATION RATE: 16 BRPM | TEMPERATURE: 98.4 F | DIASTOLIC BLOOD PRESSURE: 69 MMHG

## 2018-03-14 DIAGNOSIS — F41.1 ANXIETY STATE: ICD-10-CM

## 2018-03-14 DIAGNOSIS — E16.2 HYPOGLYCEMIA: ICD-10-CM

## 2018-03-14 DIAGNOSIS — R10.13 ABDOMINAL PAIN, EPIGASTRIC: ICD-10-CM

## 2018-03-14 DIAGNOSIS — K86.1 CHRONIC PANCREATITIS, UNSPECIFIED PANCREATITIS TYPE (H): ICD-10-CM

## 2018-03-14 DIAGNOSIS — G43.A0 CYCLICAL VOMITING WITH NAUSEA, INTRACTABILITY OF VOMITING NOT SPECIFIED: Primary | ICD-10-CM

## 2018-03-14 DIAGNOSIS — R10.9 ACUTE ABDOMINAL PAIN: ICD-10-CM

## 2018-03-14 DIAGNOSIS — R11.15 INTRACTABLE CYCLICAL VOMITING WITH NAUSEA: ICD-10-CM

## 2018-03-14 DIAGNOSIS — F33.1 MAJOR DEPRESSIVE DISORDER, RECURRENT EPISODE, MODERATE (H): ICD-10-CM

## 2018-03-14 DIAGNOSIS — R52 PAIN: ICD-10-CM

## 2018-03-14 DIAGNOSIS — R21 RASH: ICD-10-CM

## 2018-03-14 DIAGNOSIS — Z98.890 S/P ERCP: ICD-10-CM

## 2018-03-14 DIAGNOSIS — N39.41 URGE INCONTINENCE: ICD-10-CM

## 2018-03-14 DIAGNOSIS — R10.9 ABDOMINAL PAIN: ICD-10-CM

## 2018-03-14 LAB — INTERPRETATION ECG - MUSE: NORMAL

## 2018-03-14 PROCEDURE — 96375 TX/PRO/DX INJ NEW DRUG ADDON: CPT

## 2018-03-14 PROCEDURE — 96361 HYDRATE IV INFUSION ADD-ON: CPT

## 2018-03-14 PROCEDURE — 96376 TX/PRO/DX INJ SAME DRUG ADON: CPT

## 2018-03-14 PROCEDURE — 96374 THER/PROPH/DIAG INJ IV PUSH: CPT

## 2018-03-14 PROCEDURE — 25000128 H RX IP 250 OP 636: Mod: ZF | Performed by: INTERNAL MEDICINE

## 2018-03-14 PROCEDURE — 25000128 H RX IP 250 OP 636: Mod: ZF | Performed by: NURSE PRACTITIONER

## 2018-03-14 RX ORDER — DIPHENHYDRAMINE HYDROCHLORIDE 50 MG/ML
25 INJECTION INTRAMUSCULAR; INTRAVENOUS DAILY PRN
Status: DISCONTINUED | OUTPATIENT
Start: 2018-03-14 | End: 2018-03-14 | Stop reason: HOSPADM

## 2018-03-14 RX ORDER — DIPHENHYDRAMINE HYDROCHLORIDE 50 MG/ML
25 INJECTION INTRAMUSCULAR; INTRAVENOUS DAILY PRN
Status: CANCELLED
Start: 2018-03-14

## 2018-03-14 RX ORDER — ONDANSETRON 2 MG/ML
4 INJECTION INTRAMUSCULAR; INTRAVENOUS ONCE
Status: CANCELLED
Start: 2018-03-14 | End: 2018-03-14

## 2018-03-14 RX ORDER — ONDANSETRON 2 MG/ML
4 INJECTION INTRAMUSCULAR; INTRAVENOUS ONCE
Status: COMPLETED | OUTPATIENT
Start: 2018-03-14 | End: 2018-03-14

## 2018-03-14 RX ORDER — DIPHENHYDRAMINE HYDROCHLORIDE 50 MG/ML
25 INJECTION INTRAMUSCULAR; INTRAVENOUS ONCE
Status: CANCELLED
Start: 2018-03-14 | End: 2018-03-14

## 2018-03-14 RX ORDER — ONDANSETRON 2 MG/ML
4 INJECTION INTRAMUSCULAR; INTRAVENOUS DAILY PRN
Status: CANCELLED
Start: 2018-03-14

## 2018-03-14 RX ORDER — ONDANSETRON 2 MG/ML
4 INJECTION INTRAMUSCULAR; INTRAVENOUS DAILY PRN
Status: DISCONTINUED | OUTPATIENT
Start: 2018-03-14 | End: 2018-03-14 | Stop reason: HOSPADM

## 2018-03-14 RX ORDER — DIPHENHYDRAMINE HYDROCHLORIDE 50 MG/ML
25 INJECTION INTRAMUSCULAR; INTRAVENOUS ONCE
Status: COMPLETED | OUTPATIENT
Start: 2018-03-14 | End: 2018-03-14

## 2018-03-14 RX ADMIN — SODIUM CHLORIDE, POTASSIUM CHLORIDE, SODIUM LACTATE AND CALCIUM CHLORIDE 2000 ML: 600; 310; 30; 20 INJECTION, SOLUTION INTRAVENOUS at 16:17

## 2018-03-14 RX ADMIN — ONDANSETRON 4 MG: 2 INJECTION INTRAMUSCULAR; INTRAVENOUS at 16:19

## 2018-03-14 RX ADMIN — DIPHENHYDRAMINE HYDROCHLORIDE 25 MG: 50 INJECTION, SOLUTION INTRAMUSCULAR; INTRAVENOUS at 16:22

## 2018-03-14 RX ADMIN — DIPHENHYDRAMINE HYDROCHLORIDE 25 MG: 50 INJECTION, SOLUTION INTRAMUSCULAR; INTRAVENOUS at 17:46

## 2018-03-14 RX ADMIN — ONDANSETRON 4 MG: 2 INJECTION INTRAMUSCULAR; INTRAVENOUS at 17:49

## 2018-03-14 NOTE — PROGRESS NOTES
Nursing Note  Alexandra Melgoza [facility]:   Chief Complaint   Patient presents with     Infusion     IV fluids and medications     During [facility]'s [facility] appointment, orders from Dr. Campbell Narayanan were completed.  Frequency: 3 times weekly as needed.    Progress note:  Patient identification verified by name and date of birth.  Assessment completed.  Vitals recorded in Doc Flowsheets.  Patient was provided with education regarding infusion and possible side effects.  Patient verbalized understanding.      needed: No  Premedications: were not ordered.  Infusion Rates: Each liter infused over 1 hour for a total infusion time of 2 hours.  Labs: were not ordered for this appointment.  Vascular access: port accessed today.  Treatment Conditions: non-applicable.  Patient tolerated infusion: well.        Discharge Plan:   Follow up plan of care with: ongoing infusions at [facility].  Discharge instructions were reviewed with patient.  Patient/representative verbalized understanding of discharge instructions and all questions answered.  [facility] discharged from [facility] in stable condition.    Stacey Posada RN    Administrations This Visit     diphenhydrAMINE (BENADRYL) injection 25 mg     Admin Date Action Dose Route Administered By             03/14/2018 Given 25 mg Intravenous Stacey Posada RN                    lactated ringers BOLUS 1,000-2,000 mL     Admin Date Action Dose Route Administered By             03/14/2018 New Bag 2000 mL Intravenous Stacey Posada RN                    ondansetron (ZOFRAN) injection 4 mg     Admin Date Action Dose Route Administered By             03/14/2018 Given 4 mg Intravenous Stacey Posada RN                          /66  Pulse 96  Temp 98.4  F (36.9  C) (Tympanic)  Resp 16

## 2018-03-14 NOTE — MR AVS SNAPSHOT
After Visit Summary   3/14/2018    Alexandra Melgoza    MRN: [id number]           Patient Information     Date Of Birth          1995        Visit Information        Provider Department      3/14/2018 4:00 PM UC 47 ATC; UC SPEC INFUSION [facility]        Today's Diagnoses     Cyclical vomiting with nausea, intractability of vomiting not specified    -  1    Chronic pancreatitis, unspecified pancreatitis type (H)        Intractable cyclical vomiting with nausea        Pain        Abdominal pain, epigastric        S/P ERCP        Acute abdominal pain        Abdominal pain        Hypoglycemia        Urge incontinence        Major depressive disorder, recurrent episode, moderate (H)        Anxiety state        Rash          Care Instructions    Dear Alexandra Melgoza    Thank you for choosing [facility] ([facility]) for your infusion.  The following information is a summary of our appointment as well as important reminders.          We look forward in seeing you on your next appointment here at [facility].  Please don t hesitate to call us at [phone] to reschedule any of your appointments or to speak with one of the [facility] registered nurses.  It was a pleasure taking care of you today.    Sincerely,    [facility]  [facility]fusion [facility]  [address] [place] [address]  [address]  Phone:  [phone]            Follow-ups after your visit        Your next 10 appointments already scheduled     Mar 16, 2018  3:00 PM CDT   Infusion 120 with UC SPEC INFUSION, UC 49 ATC   [facility] ([facility])    [address] [place] [address]  [address] [place] [address] [phone]            Mar 19, 2018  7:30 AM CDT   (Arrive by 7:15 AM)   Return Visit with Quyen Baez MD   [facility] ([facility])    [address]  4th Floor  [facility] [address]   [phone]            Mar 19, 2018  8:00 AM CDT   Infusion 120 with UC SPEC INFUSION, UC 44 ATC   [facility] Specialty and Procedure ([facility])    [address]  [address]  [facility] [address]   [phone]            Mar 19, 2018  3:00 PM CDT   (Arrive by 2:45 PM)   New Patient Visit with Quyen Lennon, PhD   [facility] ([facility])    [address]  4th Floor  [facility] [address]   [phone]            Mar 21, 2018  2:00 PM CDT   Infusion 120 with UC SPEC INFUSION, UC 45 ATC   [facility] Specialty and Procedure ([facility])    [address]  [address]  [facility] [address]   [phone]            Mar 23, 2018 12:00 PM CDT   Infusion 120 with UC SPEC INFUSION, UC 50 ATC   [facility] Specialty and Procedure ([facility])    [address]  [address]  [facility] [address] [phone]              Who to contact     If you have questions or need follow up information about today's clinic visit or your schedule please contact [facility] SPECIALTY AND PROCEDURE directly at [phone].  Normal or non-critical lab and imaging results will be communicated to you by MyChart, letter or phone within 4 business days after the clinic has received the results. If you do not hear from us within 7 days, please contact the clinic through MyChart or phone. If you have a critical or abnormal lab result, we will notify you by phone as soon as possible.  Submit refill requests through [employer] or call your pharmacy and they will forward the refill request to us. Please allow 3 business days for your refill to be completed.           Additional Information About Your Visit        MyChart Information     [employer] gives you secure access to your electronic health record. If you see a primary care provider, you can also send messages to your care team and make appointments. If you have questions, please call your primary care clinic.  If you do not have a primary care provider, please call [phone] and they will assist you.        Care EveryWhere ID     This is your Care EveryWhere ID. This could be used by other organizations to access your [place] medical records  [id number]        Your Vitals Were     Pulse Temperature Respirations             102 98.4  F (36.9  C) (Tympanic) 16          Blood Pressure from Last 3 Encounters:   03/14/18 112/69   03/12/18 131/83   03/12/18 (!) 131/92    Weight from Last 3 Encounters:   03/12/18 66.7 kg (147 lb)   03/12/18 66.7 kg (147 lb)   03/05/18 66.3 kg (146 lb 1.6 oz)              Today, you had the following     No orders found for display         Today's Medication Changes          These changes are accurate as of 3/14/18  6:23 PM.  If you have any questions, ask your nurse or doctor.               These medicines have changed or have updated prescriptions.        Dose/Directions    nortriptyline 10 MG capsule   Commonly known as:  PAMELOR   This may have changed:  how much to take   Used for:  Right upper quadrant abdominal pain        Dose:  30 mg   Take 3 capsules (30 mg) by mouth At Bedtime   Quantity:  120 capsule   Refills:  0                Primary Care Provider Office Phone # Fax #    Quyen Baez [phone]       [address] [address]        Equal Access to Services     [facility]: Hadii angelina kelley hadasho Somalini, waaxda luqadaha, qaybta kaalmada maki santillan. So [facility] [phone].    ATENCIÓN: Si habla español, tiene a quijano disposición servicios gratuitos de asistencia lingüística. Llame al  [phone].    We comply with applicable federal civil rights laws and [place] laws. We do not discriminate on the basis of race, color, national origin, age, disability, sex, sexual orientation, or gender identity.            Thank you!     Thank you for choosing [facility] SPECIALTY AND PROCEDURE  for your care. Our goal is always to provide you with excellent care. Hearing back from our patients is one way we can continue to improve our services. Please take a few minutes to complete the written survey that you may receive in the mail after your visit with us. Thank you!             Your Updated Medication List - Protect others around you: Learn how to safely use, store and throw away your medicines at www.disposemymeds.org.          This list is accurate as of 3/14/18  6:23 PM.  Always use your most recent med list.                   Brand Name Dispense Instructions for use Diagnosis    acetaminophen 32 mg/mL solution    TYLENOL     30.45 mLs (975 mg) by Per J Tube route every 8 hours        * amylase-lipase-protease 51212 UNITS Cpep    ZENPEP    180 capsule    Take 2-3 capsules (40,000-60,000 Units) by mouth Take with snacks or supplements (Snacks)    Idiopathic chronic pancreatitis (H)       * amylase-lipase-protease 30690 UNITS Cpep    ZENPEP    180 capsule    Take 5 capsules (100,000 Units) by mouth 4 times daily (with meals and nightly)    Right upper quadrant abdominal pain       fluticasone 50 MCG/ACT spray    FLONASE    16 g    Spray 2 sprays into both nostrils daily    Nasal congestion       gabapentin 250 MG/5ML solution    NEURONTIN    720 mL    8 mLs (400 mg) by Per G Tube route 3 times daily    Abdominal pain, epigastric       hydrOXYzine 10 MG/5ML syrup    ATARAX    1500 mL    Take 12.5 mLs (25 mg) by mouth every 6 hours as needed for anxiety or other (pain)    Acute abdominal pain       leuprolide 11.25 MG kit    LUPRON DEPOT (3-MONTH)    1 each    Inject 11.25 mg into  the muscle every 3 months    Endometriosis       levalbuterol 45 MCG/ACT Inhaler    XOPENEX HFA    1 Inhaler    Inhale 2 puffs into the lungs every 6 hours as needed for shortness of breath / dyspnea    Wheeze       menthol 5 % Ptch    ICY HOT    15 patch    Apply 1 patch topically every 8 hours as needed for muscle soreness    Abdominal pain, epigastric       multivitamins with minerals Liqd liquid     1 Bottle    15 mLs by Per Feeding Tube route daily    Abdominal pain, epigastric       norethindrone 5 MG tablet    AYGESTIN    90 tablet    Take 1 tablet (5 mg) by mouth daily    Endometriosis       nortriptyline 10 MG capsule    PAMELOR    120 capsule    Take 3 capsules (30 mg) by mouth At Bedtime    Right upper quadrant abdominal pain       ondansetron 4 MG ODT tab    ZOFRAN ODT    15 tablet    Take 1 tablet (4 mg) by mouth every 8 hours as needed for nausea or vomiting    Intractable cyclical vomiting with nausea       order for DME     1 Units    Equipment being ordered: TENS with wire and electrode.    Chronic abdominal pain       oxyCODONE 5 MG/5ML solution    ROXICODONE    600 mL    Take 10 mL (10 mg) by mouth every 6 hours as needed, maximum 40 mL per day.    Abdominal pain, generalized, Chronic abdominal pain       pantoprazole 40 MG EC tablet    PROTONIX    30 tablet    Take 1 tablet (40 mg) by mouth 2 times daily (before meals)    Right upper quadrant abdominal pain, Erosive gastritis       polyethylene glycol Packet    MIRALAX/GLYCOLAX    7 packet    Take 17 g by mouth daily    Right upper quadrant abdominal pain       RIZATRIPTAN BENZOATE PO      Take 5 mg by mouth once as needed        scopolamine 72 hr patch    TRANSDERM    2 patch    Apply 1 patch to hairless area behind one ear if severe nausea and vomiting.  Remove old patch and change every 3 days (72 hours).    Intractable vomiting with nausea, unspecified vomiting type       senna-docusate 8.6-50 MG per tablet    SENOKOT-S;PERICOLACE    100  tablet    Take 1 tablet by mouth 2 times daily as needed for constipation    Right upper quadrant abdominal pain       sodium bicarbonate 325 MG tablet     120 tablet    1 tablet (325 mg) by Per Feeding Tube route 4 times daily    Abdominal pain, epigastric, Intractable cyclical vomiting with nausea       * Notice:  This list has 2 medication(s) that are the same as other medications prescribed for you. Read the directions carefully, and ask your doctor or other care provider to review them with you.

## 2018-03-14 NOTE — PATIENT INSTRUCTIONS
Dear Alexandra Melgoza    Thank you for choosing [facility] ([facility]) for your infusion.  The following information is a summary of our appointment as well as important reminders.          We look forward in seeing you on your next appointment here at [facility].  Please don t hesitate to call us at [phone] to reschedule any of your appointments or to speak with one of the [facility] registered nurses.  It was a pleasure taking care of you today.    Sincerely,    [facility]  Specialty Infusion [facility] Procedure Center  [address] [place] [address]  [address]  Phone:  [phone]

## 2018-03-15 ENCOUNTER — CARE COORDINATION (OUTPATIENT)
Dept: GASTROENTEROLOGY | Facility: CLINIC | Age: 23
End: 2018-03-15

## 2018-03-15 NOTE — PROGRESS NOTES
"Alexandra called triage. GTube site is tender, \"oozing\" odorous discharge. Last night during venting BRB mixed with \"dark chunks\" drained out of tube. Unable to measure amount but states it was \"a lot\". Has not check temp, states she has had chills, currently taking scheduled tylenol.     Spoke to Ella, recommended patient be evaluated in ED or by PCP. Patient verbalized understanding and will call PCP.       "

## 2018-03-16 ENCOUNTER — INFUSION THERAPY VISIT (OUTPATIENT)
Dept: INFUSION THERAPY | Facility: CLINIC | Age: 23
End: 2018-03-16
Attending: INTERNAL MEDICINE
Payer: COMMERCIAL

## 2018-03-16 ENCOUNTER — TELEPHONE (OUTPATIENT)
Dept: INTERNAL MEDICINE | Facility: CLINIC | Age: 23
End: 2018-03-16

## 2018-03-16 VITALS
OXYGEN SATURATION: 100 % | SYSTOLIC BLOOD PRESSURE: 134 MMHG | TEMPERATURE: 98.3 F | DIASTOLIC BLOOD PRESSURE: 89 MMHG | HEART RATE: 84 BPM | RESPIRATION RATE: 16 BRPM

## 2018-03-16 DIAGNOSIS — R21 RASH: ICD-10-CM

## 2018-03-16 DIAGNOSIS — R52 PAIN: ICD-10-CM

## 2018-03-16 DIAGNOSIS — N39.41 URGE INCONTINENCE: ICD-10-CM

## 2018-03-16 DIAGNOSIS — G43.A0 CYCLICAL VOMITING WITH NAUSEA, INTRACTABILITY OF VOMITING NOT SPECIFIED: Primary | ICD-10-CM

## 2018-03-16 DIAGNOSIS — E16.2 HYPOGLYCEMIA: ICD-10-CM

## 2018-03-16 DIAGNOSIS — Z98.890 S/P ERCP: ICD-10-CM

## 2018-03-16 DIAGNOSIS — F41.1 ANXIETY STATE: ICD-10-CM

## 2018-03-16 DIAGNOSIS — K86.1 CHRONIC PANCREATITIS, UNSPECIFIED PANCREATITIS TYPE (H): ICD-10-CM

## 2018-03-16 DIAGNOSIS — R10.9 ACUTE ABDOMINAL PAIN: ICD-10-CM

## 2018-03-16 DIAGNOSIS — R10.13 ABDOMINAL PAIN, EPIGASTRIC: ICD-10-CM

## 2018-03-16 DIAGNOSIS — F33.1 MAJOR DEPRESSIVE DISORDER, RECURRENT EPISODE, MODERATE (H): ICD-10-CM

## 2018-03-16 PROCEDURE — 96375 TX/PRO/DX INJ NEW DRUG ADDON: CPT

## 2018-03-16 PROCEDURE — 25000128 H RX IP 250 OP 636: Mod: ZF | Performed by: NURSE PRACTITIONER

## 2018-03-16 PROCEDURE — 96361 HYDRATE IV INFUSION ADD-ON: CPT

## 2018-03-16 PROCEDURE — 25000128 H RX IP 250 OP 636: Mod: ZF | Performed by: INTERNAL MEDICINE

## 2018-03-16 PROCEDURE — 96374 THER/PROPH/DIAG INJ IV PUSH: CPT

## 2018-03-16 RX ORDER — ONDANSETRON 2 MG/ML
4 INJECTION INTRAMUSCULAR; INTRAVENOUS DAILY PRN
Status: DISCONTINUED | OUTPATIENT
Start: 2018-03-16 | End: 2018-03-16 | Stop reason: HOSPADM

## 2018-03-16 RX ORDER — DIPHENHYDRAMINE HYDROCHLORIDE 50 MG/ML
25 INJECTION INTRAMUSCULAR; INTRAVENOUS ONCE
Status: CANCELLED
Start: 2018-03-16 | End: 2018-03-16

## 2018-03-16 RX ORDER — ONDANSETRON 2 MG/ML
4 INJECTION INTRAMUSCULAR; INTRAVENOUS ONCE
Status: COMPLETED | OUTPATIENT
Start: 2018-03-16 | End: 2018-03-16

## 2018-03-16 RX ORDER — DIPHENHYDRAMINE HYDROCHLORIDE 50 MG/ML
25 INJECTION INTRAMUSCULAR; INTRAVENOUS DAILY PRN
Status: DISCONTINUED | OUTPATIENT
Start: 2018-03-16 | End: 2018-03-16 | Stop reason: HOSPADM

## 2018-03-16 RX ORDER — DIPHENHYDRAMINE HYDROCHLORIDE 50 MG/ML
25 INJECTION INTRAMUSCULAR; INTRAVENOUS ONCE
Status: COMPLETED | OUTPATIENT
Start: 2018-03-16 | End: 2018-03-16

## 2018-03-16 RX ORDER — DIPHENHYDRAMINE HYDROCHLORIDE 50 MG/ML
25 INJECTION INTRAMUSCULAR; INTRAVENOUS DAILY PRN
Status: CANCELLED
Start: 2018-03-16

## 2018-03-16 RX ORDER — ONDANSETRON 2 MG/ML
4 INJECTION INTRAMUSCULAR; INTRAVENOUS ONCE
Status: CANCELLED
Start: 2018-03-16 | End: 2018-03-16

## 2018-03-16 RX ORDER — ONDANSETRON 2 MG/ML
4 INJECTION INTRAMUSCULAR; INTRAVENOUS DAILY PRN
Status: CANCELLED
Start: 2018-03-16

## 2018-03-16 RX ADMIN — DIPHENHYDRAMINE HYDROCHLORIDE 25 MG: 50 INJECTION, SOLUTION INTRAMUSCULAR; INTRAVENOUS at 15:33

## 2018-03-16 RX ADMIN — ONDANSETRON 4 MG: 2 INJECTION INTRAMUSCULAR; INTRAVENOUS at 16:32

## 2018-03-16 RX ADMIN — SODIUM CHLORIDE, POTASSIUM CHLORIDE, SODIUM LACTATE AND CALCIUM CHLORIDE 2000 ML: 600; 310; 30; 20 INJECTION, SOLUTION INTRAVENOUS at 15:24

## 2018-03-16 RX ADMIN — ONDANSETRON 4 MG: 2 INJECTION INTRAMUSCULAR; INTRAVENOUS at 15:28

## 2018-03-16 RX ADMIN — DIPHENHYDRAMINE HYDROCHLORIDE 25 MG: 50 INJECTION, SOLUTION INTRAMUSCULAR; INTRAVENOUS at 16:29

## 2018-03-16 NOTE — TELEPHONE ENCOUNTER
Pt's mom calling, states that she noticed little bit of blood and abdominal fluids coming out from feeding tube, tube site swelling/oozing on Wednesday night (3/14). Pt called GI next morning (3/15) and was told by GI to go to ED or call PCC. Pt was hesitant to go to ED due to the previous experience with ED visits.   There are no openings in our clinic today and I am not comfortable for her to wait till next week since it is Friday today and weekend is coming. I advsed to her to go to ED for further evaluation today and mom agreed.

## 2018-03-16 NOTE — PROGRESS NOTES
"Nursing Note  Alexandra Melgoza [facility] for:   Chief Complaint   Patient presents with     Infusion     IVF fluids & meds     During today's [facility] appointment, orders from Dr LIVE Narayanan were completed.  Frequency: 3 times weekly PRN    Progress note:  Patient identification verified by name and date of birth.  Assessment completed.  Vitals recorded in Doc Flowsheets.  Patient was provided with education regarding infusion and possible side effects.  Patient verbalized understanding.      needed: No  Premedications: were not ordered.  Infusion Rates: IV Lactated Ringer's administered over approximately two hours; each IV Benadryl dose administered over approximately 2 minutes; each IV Zofran dose administered over approximately 2 minutes.  Labs: were not ordered for this appointment.  Vascular access: port accessed today.  Treatment Conditions: non-applicable.  Patient tolerated infusion: well.    Patient reported that on Wednesday night she aspirated approximately 30 ml of bright red blood followed by approximately 4 nickel size blood clots from her gastric tube while venting it. She states she reported this to Dr Narayanan's nurse who advised her to go to the ED. Patient states she was reluctant to go to the ED as she has vomited up blood in the past and Dr Narayanan was aware of that. She reports sharp pain to G-J tube site rated at a 6/10 when sitting and 8/10 while walking. She reported that it is tender to touch as well. She states there has been no further bleeding and that she vented 200 ml of bile from the gastric tube last night. Reported all of this to Dr Narayanan who stated it is NOT necessary for patient to go to the ED and that he feels it is related to \"local erosion due to NSAID use\" or \"ulceration around the PEG.\" He gave orders for patient to continue taking Protonix as ordered, to take NO NSAIDs and to call " Pancreas/Billiary STAFF on call w/ any issues. He denied need for imaging or labs (hgb). Relayed this to patient who verbalized understanding.    Discharge Plan:   Follow up plan of care with: ongoing infusions at [facility].  Discharge instructions were reviewed with patient.  Patient/representative verbalized understanding of discharge instructions and all questions answered.  Patient discharged from [facility] in stable condition.    Dale Zacarias RN    Administrations This Visit     diphenhydrAMINE (BENADRYL) injection 25 mg     Admin Date Action Dose Route Administered By             03/16/2018 Given 25 mg Intravenous Dale Zacarias RN              Admin Date Action Dose Route Administered By             03/16/2018 Given 25 mg Intravenous Dale Zacarias RN                    lactated ringers BOLUS 1,000-2,000 mL     Admin Date Action Dose Route Administered By             03/16/2018 New Bag 2000 mL Intravenous Dale Zacarias RN                    ondansetron (ZOFRAN) injection 4 mg     Admin Date Action Dose Route Administered By             03/16/2018 Given 4 mg Intravenous Dale Zacarias RN              Admin Date Action Dose Route Administered By             03/16/2018 Given 4 mg Intravenous Dale Zacarias RN                          /89 (BP Location: Left arm)  Pulse 84  Temp 98.3  F (36.8  C) (Oral)  Resp 16  SpO2 100%

## 2018-03-16 NOTE — PATIENT INSTRUCTIONS
1. Continue Protonix as ordered.    2. Do NOT take Aspirin, Ibuprofen or any NSAIDs.    3. Call Pancreas/Biliary STAFF (not fellow) on call should you have any issues/concerns. [phone]

## 2018-03-16 NOTE — MR AVS SNAPSHOT
After Visit Summary   3/16/2018    Alexandra Melgoza    MRN: [id number]           Patient Information     Date Of Birth          1995        Visit Information        Provider Department      3/16/2018 3:00 PM UC 49 ATC; UC SPEC INFUSION [facility]        Today's Diagnoses     Cyclical vomiting with nausea, intractability of vomiting not specified    -  1    Chronic pancreatitis, unspecified pancreatitis type (H)        Pain        Abdominal pain, epigastric        S/P ERCP        Acute abdominal pain        Hypoglycemia        Urge incontinence        Major depressive disorder, recurrent episode, moderate (H)        Anxiety state        Rash          Care Instructions    1. Continue Protonix as ordered.    2. Do NOT take Aspirin, Ibuprofen or any NSAIDs.    3. Call Pancreas/Biliary STAFF (not fellow) on call should you have any issues/concerns. [phone]          Follow-ups after your visit        Your next 10 appointments already scheduled     Mar 19, 2018  7:30 AM CDT   (Arrive by 7:15 AM)   Return Visit with Quyen Baez MD   [facility] ([facility])    [address]  [address] [facility] [id number] [phone]            Mar 19, 2018  8:00 AM CDT   Infusion 120 with UC SPEC INFUSION, UC 44 ATC   [facility] ([facility])    [address] [id number] [phone]            Mar 19, 2018  3:00 PM CDT   (Arrive by 2:45 PM)   New Patient Visit with Quyen Lennon, PhD   [facility] ([facility])    [address] [place] [address]  4th Floor  [facility] [id number] [phone]            Mar 21, 2018  2:00 PM CDT   Infusion 120 with UC SPEC INFUSION, UC 45 ATC   [facility] ([facility])    [address]  [address]  [facility] [address]   [phone]            Mar 23, 2018 12:00 PM CDT   Infusion 120 with UC SPEC INFUSION, UC 50 ATC   [facility] ([facility])    [address]  [address]  [facility] [address]   [phone]            Mar 26, 2018  9:40 AM CDT   (Arrive by 9:25 AM)   Return Visit with Leonardo Wang MD   [facility] ([facility])    [address] [place] [address]  4th Floor  [facility] [address]   [phone]            Mar 26, 2018  2:00 PM CDT   Infusion 120 with UC SPEC INFUSION   [facility] ([facility])    [address]  [address]  [facility] [address]   [phone]              Who to contact     If you have questions or need follow up information about today's clinic visit or your schedule please contact [facility] directly at [phone].  Normal or non-critical lab and imaging results will be communicated to you by [employer], letter or phone within 4 business days after the clinic has received the results. If you do not hear from us within 7 days, please contact the clinic through [employer] or phone. If you have a critical or abnormal lab result, we will notify you by phone as soon as possible.  Submit refill requests through [employer] or call your pharmacy and they will forward the refill request to us. Please allow 3 business days for your refill to be completed.          Additional Information About Your Visit        [employer] Information     [employer] gives you secure access to your electronic health record. If you see a primary care provider, you can also send messages to your care team and make appointments. If you have questions, please call  your primary care clinic.  If you do not have a primary care provider, please call [phone] and they will assist you.        Care EveryWhere ID     This is your Care EveryWhere ID. This could be used by other organizations to access your [facility] medical records  [id number]        Your Vitals Were     Pulse Temperature Respirations Pulse Oximetry          99 98.2  F (36.8  C) (Oral) 16 100%         Blood Pressure from Last 3 Encounters:   03/16/18 123/90   03/14/18 112/69   03/12/18 131/83    Weight from Last 3 Encounters:   03/12/18 66.7 kg (147 lb)   03/12/18 66.7 kg (147 lb)   03/05/18 66.3 kg (146 lb 1.6 oz)              Today, you had the following     No orders found for display         Today's Medication Changes          These changes are accurate as of 3/16/18  3:56 PM.  If you have any questions, ask your nurse or doctor.               These medicines have changed or have updated prescriptions.        Dose/Directions    nortriptyline 10 MG capsule   Commonly known as:  PAMELOR   This may have changed:  how much to take   Used for:  Right upper quadrant abdominal pain        Dose:  30 mg   Take 3 capsules (30 mg) by mouth At Bedtime   Quantity:  120 capsule   Refills:  0                Primary Care Provider Office Phone # Fax #    Quyen Baez [phone]       [address] [address]        Equal Access to Services     [facility]: Hadii angelina Enamorado, waaxda luqadaha, qaybta kaalmada magalyda, maki smallwood. So [facility] [phone].    ATENCIÓN: Si habla español, tiene a quijano disposición servicios gratuitos de asistencia lingüística. Llame al [phone].    We comply with applicable federal civil rights laws and [place] laws. We do not discriminate on the basis of race, color, national origin, age, disability, sex, sexual orientation, or gender identity.            Thank you!     Thank you for choosing [facility]  for your care. Our goal is always to provide you with excellent care. Hearing back from our patients is one way we can continue to improve our services. Please take a few minutes to complete the written survey that you may receive in the mail after your visit with us. Thank you!             Your Updated Medication List - Protect others around you: Learn how to safely use, store and throw away your medicines at www.disposemymeds.org.          This list is accurate as of 3/16/18  3:56 PM.  Always use your most recent med list.                   Brand Name Dispense Instructions for use Diagnosis    acetaminophen 32 mg/mL solution    TYLENOL     30.45 mLs (975 mg) by Per J Tube route every 8 hours        * amylase-lipase-protease 90222 UNITS Cpep    ZENPEP    180 capsule    Take 2-3 capsules (40,000-60,000 Units) by mouth Take with snacks or supplements (Snacks)    Idiopathic chronic pancreatitis (H)       * amylase-lipase-protease 72668 UNITS Cpep    ZENPEP    180 capsule    Take 5 capsules (100,000 Units) by mouth 4 times daily (with meals and nightly)    Right upper quadrant abdominal pain       fluticasone 50 MCG/ACT spray    FLONASE    16 g    Spray 2 sprays into both nostrils daily    Nasal congestion       gabapentin 250 MG/5ML solution    NEURONTIN    720 mL    8 mLs (400 mg) by Per G Tube route 3 times daily    Abdominal pain, epigastric       hydrOXYzine 10 MG/5ML syrup    ATARAX    1500 mL    Take 12.5 mLs (25 mg) by mouth every 6 hours as needed for anxiety or other (pain)    Acute abdominal pain       leuprolide 11.25 MG kit    LUPRON DEPOT (3-MONTH)    1 each    Inject 11.25 mg into the muscle every 3 months    Endometriosis       levalbuterol 45 MCG/ACT Inhaler    XOPENEX HFA    1 Inhaler    Inhale 2 puffs into the lungs every 6 hours as needed for shortness of breath / dyspnea    Wheeze       menthol 5 % Ptch    ICY HOT    15 patch    Apply 1 patch  topically every 8 hours as needed for muscle soreness    Abdominal pain, epigastric       multivitamins with minerals Liqd liquid     1 Bottle    15 mLs by Per Feeding Tube route daily    Abdominal pain, epigastric       norethindrone 5 MG tablet    AYGESTIN    90 tablet    Take 1 tablet (5 mg) by mouth daily    Endometriosis       nortriptyline 10 MG capsule    PAMELOR    120 capsule    Take 3 capsules (30 mg) by mouth At Bedtime    Right upper quadrant abdominal pain       ondansetron 4 MG ODT tab    ZOFRAN ODT    15 tablet    Take 1 tablet (4 mg) by mouth every 8 hours as needed for nausea or vomiting    Intractable cyclical vomiting with nausea       order for [facility]     1 Units    Equipment being ordered: TENS with wire and electrode.    Chronic abdominal pain       oxyCODONE 5 MG/5ML solution    ROXICODONE    600 mL    Take 10 mL (10 mg) by mouth every 6 hours as needed, maximum 40 mL per day.    Abdominal pain, generalized, Chronic abdominal pain       pantoprazole 40 MG EC tablet    PROTONIX    30 tablet    Take 1 tablet (40 mg) by mouth 2 times daily (before meals)    Right upper quadrant abdominal pain, Erosive gastritis       polyethylene glycol Packet    MIRALAX/GLYCOLAX    7 packet    Take 17 g by mouth daily    Right upper quadrant abdominal pain       RIZATRIPTAN BENZOATE PO      Take 5 mg by mouth once as needed        scopolamine 72 hr patch    TRANSDERM    2 patch    Apply 1 patch to hairless area behind one ear if severe nausea and vomiting.  Remove old patch and change every 3 days (72 hours).    Intractable vomiting with nausea, unspecified vomiting type       senna-docusate 8.6-50 MG per tablet    SENOKOT-S;PERICOLACE    100 tablet    Take 1 tablet by mouth 2 times daily as needed for constipation    Right upper quadrant abdominal pain       sodium bicarbonate 325 MG tablet     120 tablet    1 tablet (325 mg) by Per Feeding Tube route 4 times daily    Abdominal pain, epigastric, Intractable  cyclical vomiting with nausea       * Notice:  This list has 2 medication(s) that are the same as other medications prescribed for you. Read the directions carefully, and ask your doctor or other care provider to review them with you.

## 2018-03-19 ENCOUNTER — OFFICE VISIT (OUTPATIENT)
Dept: ANESTHESIOLOGY | Facility: CLINIC | Age: 23
End: 2018-03-19
Payer: COMMERCIAL

## 2018-03-19 ENCOUNTER — OFFICE VISIT (OUTPATIENT)
Dept: INTERNAL MEDICINE | Facility: CLINIC | Age: 23
End: 2018-03-19
Payer: COMMERCIAL

## 2018-03-19 ENCOUNTER — RADIANT APPOINTMENT (OUTPATIENT)
Dept: GENERAL RADIOLOGY | Facility: CLINIC | Age: 23
End: 2018-03-19
Payer: COMMERCIAL

## 2018-03-19 ENCOUNTER — INFUSION THERAPY VISIT (OUTPATIENT)
Dept: INFUSION THERAPY | Facility: CLINIC | Age: 23
End: 2018-03-19
Attending: INTERNAL MEDICINE
Payer: COMMERCIAL

## 2018-03-19 VITALS
SYSTOLIC BLOOD PRESSURE: 118 MMHG | WEIGHT: 148.1 LBS | BODY MASS INDEX: 23.55 KG/M2 | HEART RATE: 101 BPM | RESPIRATION RATE: 16 BRPM | DIASTOLIC BLOOD PRESSURE: 74 MMHG

## 2018-03-19 DIAGNOSIS — F43.89 ADJUSTMENT REACTION TO CHRONIC STRESS: Primary | ICD-10-CM

## 2018-03-19 DIAGNOSIS — E16.2 HYPOGLYCEMIA: ICD-10-CM

## 2018-03-19 DIAGNOSIS — R52 PAIN: ICD-10-CM

## 2018-03-19 DIAGNOSIS — K94.20 COMPLICATION OF GASTROSTOMY TUBE (H): ICD-10-CM

## 2018-03-19 DIAGNOSIS — R10.9 ABDOMINAL PAIN: ICD-10-CM

## 2018-03-19 DIAGNOSIS — R11.15 INTRACTABLE CYCLICAL VOMITING WITH NAUSEA: ICD-10-CM

## 2018-03-19 DIAGNOSIS — E16.2 HYPOGLYCEMIA: Primary | ICD-10-CM

## 2018-03-19 DIAGNOSIS — R21 RASH: ICD-10-CM

## 2018-03-19 DIAGNOSIS — G43.A0 CYCLICAL VOMITING WITH NAUSEA, INTRACTABILITY OF VOMITING NOT SPECIFIED: Primary | ICD-10-CM

## 2018-03-19 DIAGNOSIS — K86.1 CHRONIC PANCREATITIS, UNSPECIFIED PANCREATITIS TYPE (H): ICD-10-CM

## 2018-03-19 DIAGNOSIS — F33.1 MAJOR DEPRESSIVE DISORDER, RECURRENT EPISODE, MODERATE (H): ICD-10-CM

## 2018-03-19 DIAGNOSIS — R10.13 ABDOMINAL PAIN, EPIGASTRIC: ICD-10-CM

## 2018-03-19 DIAGNOSIS — Z98.890 S/P ERCP: ICD-10-CM

## 2018-03-19 DIAGNOSIS — N39.41 URGE INCONTINENCE: ICD-10-CM

## 2018-03-19 DIAGNOSIS — G90.A POTS (POSTURAL ORTHOSTATIC TACHYCARDIA SYNDROME): ICD-10-CM

## 2018-03-19 DIAGNOSIS — F41.1 ANXIETY STATE: ICD-10-CM

## 2018-03-19 DIAGNOSIS — R00.0 SINUS TACHYCARDIA: ICD-10-CM

## 2018-03-19 DIAGNOSIS — R10.9 ACUTE ABDOMINAL PAIN: ICD-10-CM

## 2018-03-19 LAB
ALBUMIN SERPL-MCNC: 3.8 G/DL (ref 3.4–5)
ALP SERPL-CCNC: 92 U/L (ref 40–150)
ALT SERPL W P-5'-P-CCNC: 19 U/L (ref 0–50)
AMYLASE SERPL-CCNC: 24 U/L (ref 30–110)
ANION GAP SERPL CALCULATED.3IONS-SCNC: 8 MMOL/L (ref 3–14)
AST SERPL W P-5'-P-CCNC: 24 U/L (ref 0–45)
BASOPHILS # BLD AUTO: 0 10E9/L (ref 0–0.2)
BASOPHILS NFR BLD AUTO: 0.5 %
BILIRUB SERPL-MCNC: 0.3 MG/DL (ref 0.2–1.3)
BUN SERPL-MCNC: 8 MG/DL (ref 7–30)
CALCIUM SERPL-MCNC: 8.6 MG/DL (ref 8.5–10.1)
CHLORIDE SERPL-SCNC: 103 MMOL/L (ref 94–109)
CO2 SERPL-SCNC: 28 MMOL/L (ref 20–32)
CREAT SERPL-MCNC: 0.57 MG/DL (ref 0.52–1.04)
DIFFERENTIAL METHOD BLD: ABNORMAL
EOSINOPHIL # BLD AUTO: 0.2 10E9/L (ref 0–0.7)
EOSINOPHIL NFR BLD AUTO: 4.1 %
ERYTHROCYTE [DISTWIDTH] IN BLOOD BY AUTOMATED COUNT: 13 % (ref 10–15)
GFR SERPL CREATININE-BSD FRML MDRD: >90 ML/MIN/1.7M2
GLUCOSE SERPL-MCNC: 110 MG/DL (ref 70–99)
HCT VFR BLD AUTO: 32.2 % (ref 35–47)
HGB BLD-MCNC: 11 G/DL (ref 11.7–15.7)
IMM GRANULOCYTES # BLD: 0 10E9/L (ref 0–0.4)
IMM GRANULOCYTES NFR BLD: 0 %
LIPASE SERPL-CCNC: 54 U/L (ref 73–393)
LYMPHOCYTES # BLD AUTO: 1.6 10E9/L (ref 0.8–5.3)
LYMPHOCYTES NFR BLD AUTO: 37.2 %
MCH RBC QN AUTO: 31.1 PG (ref 26.5–33)
MCHC RBC AUTO-ENTMCNC: 34.2 G/DL (ref 31.5–36.5)
MCV RBC AUTO: 91 FL (ref 78–100)
MONOCYTES # BLD AUTO: 0.5 10E9/L (ref 0–1.3)
MONOCYTES NFR BLD AUTO: 10.3 %
NEUTROPHILS # BLD AUTO: 2.1 10E9/L (ref 1.6–8.3)
NEUTROPHILS NFR BLD AUTO: 47.9 %
NRBC # BLD AUTO: 0 10*3/UL
NRBC BLD AUTO-RTO: 0 /100
PLATELET # BLD AUTO: 206 10E9/L (ref 150–450)
POTASSIUM SERPL-SCNC: 3.5 MMOL/L (ref 3.4–5.3)
PROT SERPL-MCNC: 6.4 G/DL (ref 6.8–8.8)
RBC # BLD AUTO: 3.54 10E12/L (ref 3.8–5.2)
SODIUM SERPL-SCNC: 139 MMOL/L (ref 133–144)
WBC # BLD AUTO: 4.4 10E9/L (ref 4–11)

## 2018-03-19 PROCEDURE — 82150 ASSAY OF AMYLASE: CPT | Performed by: INTERNAL MEDICINE

## 2018-03-19 PROCEDURE — 96375 TX/PRO/DX INJ NEW DRUG ADDON: CPT

## 2018-03-19 PROCEDURE — 80053 COMPREHEN METABOLIC PANEL: CPT | Performed by: INTERNAL MEDICINE

## 2018-03-19 PROCEDURE — 96374 THER/PROPH/DIAG INJ IV PUSH: CPT

## 2018-03-19 PROCEDURE — 96361 HYDRATE IV INFUSION ADD-ON: CPT

## 2018-03-19 PROCEDURE — 85025 COMPLETE CBC W/AUTO DIFF WBC: CPT | Performed by: INTERNAL MEDICINE

## 2018-03-19 PROCEDURE — 96376 TX/PRO/DX INJ SAME DRUG ADON: CPT

## 2018-03-19 PROCEDURE — 25000128 H RX IP 250 OP 636: Mod: ZF | Performed by: INTERNAL MEDICINE

## 2018-03-19 PROCEDURE — 25000128 H RX IP 250 OP 636: Mod: ZF | Performed by: NURSE PRACTITIONER

## 2018-03-19 PROCEDURE — 83690 ASSAY OF LIPASE: CPT | Performed by: INTERNAL MEDICINE

## 2018-03-19 RX ORDER — ONDANSETRON 2 MG/ML
4 INJECTION INTRAMUSCULAR; INTRAVENOUS ONCE
Status: COMPLETED | OUTPATIENT
Start: 2018-03-19 | End: 2018-03-19

## 2018-03-19 RX ORDER — DIPHENHYDRAMINE HYDROCHLORIDE 50 MG/ML
25 INJECTION INTRAMUSCULAR; INTRAVENOUS ONCE
Status: CANCELLED
Start: 2018-03-19 | End: 2018-03-19

## 2018-03-19 RX ORDER — DIPHENHYDRAMINE HYDROCHLORIDE 50 MG/ML
25 INJECTION INTRAMUSCULAR; INTRAVENOUS ONCE
Status: COMPLETED | OUTPATIENT
Start: 2018-03-19 | End: 2018-03-19

## 2018-03-19 RX ORDER — DIPHENHYDRAMINE HYDROCHLORIDE 50 MG/ML
25 INJECTION INTRAMUSCULAR; INTRAVENOUS DAILY PRN
Status: DISCONTINUED | OUTPATIENT
Start: 2018-03-19 | End: 2018-03-19 | Stop reason: HOSPADM

## 2018-03-19 RX ORDER — ONDANSETRON 2 MG/ML
4 INJECTION INTRAMUSCULAR; INTRAVENOUS ONCE
Status: CANCELLED
Start: 2018-03-19 | End: 2018-03-19

## 2018-03-19 RX ORDER — ONDANSETRON 2 MG/ML
4 INJECTION INTRAMUSCULAR; INTRAVENOUS DAILY PRN
Status: CANCELLED
Start: 2018-03-19

## 2018-03-19 RX ORDER — DIPHENHYDRAMINE HYDROCHLORIDE 50 MG/ML
25 INJECTION INTRAMUSCULAR; INTRAVENOUS DAILY PRN
Status: CANCELLED
Start: 2018-03-19

## 2018-03-19 RX ORDER — GABAPENTIN 300 MG/1
CAPSULE ORAL
Refills: 0 | COMMUNITY
Start: 2017-12-14 | End: 2018-03-29

## 2018-03-19 RX ORDER — ONDANSETRON 2 MG/ML
4 INJECTION INTRAMUSCULAR; INTRAVENOUS DAILY PRN
Status: DISCONTINUED | OUTPATIENT
Start: 2018-03-19 | End: 2018-03-19 | Stop reason: HOSPADM

## 2018-03-19 RX ADMIN — ONDANSETRON 4 MG: 2 INJECTION INTRAMUSCULAR; INTRAVENOUS at 10:44

## 2018-03-19 RX ADMIN — ONDANSETRON 4 MG: 2 INJECTION INTRAMUSCULAR; INTRAVENOUS at 08:52

## 2018-03-19 RX ADMIN — DIPHENHYDRAMINE HYDROCHLORIDE: 50 INJECTION, SOLUTION INTRAMUSCULAR; INTRAVENOUS at 10:48

## 2018-03-19 RX ADMIN — SODIUM CHLORIDE, POTASSIUM CHLORIDE, SODIUM LACTATE AND CALCIUM CHLORIDE 2000 ML: 600; 310; 30; 20 INJECTION, SOLUTION INTRAVENOUS at 08:47

## 2018-03-19 RX ADMIN — DIPHENHYDRAMINE HYDROCHLORIDE 25 MG: 50 INJECTION, SOLUTION INTRAMUSCULAR; INTRAVENOUS at 08:57

## 2018-03-19 ASSESSMENT — PAIN SCALES - GENERAL: PAINLEVEL: SEVERE PAIN (6)

## 2018-03-19 ASSESSMENT — ACTIVITIES OF DAILY LIVING (ADL): I_KEEP_THINKING_ABOUT_HOW_BADLY_I_WANT_THE_PAIN_TO_STOP: 1 = TO A SLIGHT DEGREE

## 2018-03-19 ASSESSMENT — PATIENT HEALTH QUESTIONNAIRE - PHQ9
SUM OF ALL RESPONSES TO PHQ QUESTIONS 1-9: 6
SUM OF ALL RESPONSES TO PHQ QUESTIONS 1-9: 6
10. IF YOU CHECKED OFF ANY PROBLEMS, HOW DIFFICULT HAVE THESE PROBLEMS MADE IT FOR YOU TO DO YOUR WORK, TAKE CARE OF THINGS AT HOME, OR GET ALONG WITH OTHER PEOPLE: SOMEWHAT DIFFICULT

## 2018-03-19 NOTE — PROGRESS NOTES
Nursing Note  Alex[facility] Melgo[facility]:   Chief Complaint   Patient presents with     Infusion     IV fluids, Zofran and Benadryl.     During [facility]'s [facility] appointment, orders from Dr. Campbell Narayanan were completed.  Frequency: 3 times weekly as needed.    Progress note:  Patient identification verified by name and date of birth.  Assessment completed.  Vitals recorded in Doc Flowsheets.  Patient was provided with education regarding infusion and possible side effects.  Patient verbalized understanding.      needed: No  Premedications: were not ordered.  Infusion Rates: Each liter infused over 1 hour for a total infusion time of 2 hours.  Labs: were drawn per orders.   Vascular access: port accessed today.  Treatment Conditions: non-applicable.  Patient tolerated infusion: well.    Port locked with heparin and de accessed and pt discharged by Navya Schmitt RN        Discharge Plan:   Follow up plan of care with: ongoing infusions [facility].  Discharge instructions were reviewed with patient.  Patient/representative verbalized understanding of discharge instructions and all questions answered.  [facility] in stable condition.    Stacey Posada RN    Administrations This Visit     diphenhydrAMINE (BENADRYL) injection 25 mg     Admin Date Action Dose Route Administered By             03/19/2018 Given 25 mg Intravenous Stacey Posada RN              Admin Date Action Dose Route Administered By             03/19/2018 Given   Intravenous Stacey Posada RN                    lactated ringers BOLUS 1,000-2,000 mL     Admin Date Action Dose Route Administered By             03/19/2018 New Bag 2000 mL Intravenous Stacey Posada RN                    ondansetron (ZOFRAN) injection 4 mg     Admin Date Action Dose Route  Administered By             03/19/2018 Given 4 mg Intravenous Stacey Posada RN              Admin Date Action Dose Route Administered By             03/19/2018 Given 4 mg Intravenous Stacey Posada RN                          There were no vitals taken for this visit.

## 2018-03-19 NOTE — MR AVS SNAPSHOT
After Visit Summary   3/19/2018    Alexandra Melgoza    MRN: [id number]           Patient Information     Date Of Birth          1995        Visit Information        Provider Department      3/19/2018 8:00 AM UC 44 ATC; UC SPEC INFUSION [facility] [place] Specialty and Procedure        Today's Diagnoses     Cyclical vomiting with nausea, intractability of vomiting not specified    -  1    Chronic pancreatitis, unspecified pancreatitis type (H)        Intractable cyclical vomiting with nausea        Pain        Abdominal pain, epigastric        S/P ERCP        Acute abdominal pain        Abdominal pain        Hypoglycemia        Urge incontinence        Major depressive disorder, recurrent episode, moderate (H)        Anxiety state        Rash        Complication of gastrostomy tube (H)          Care Instructions    Dear Alexandra Melgoza    Thank you for choosing [facility] ([facility]) for your infusion.  The following information is a summary of our appointment as well as important reminders.        We look forward in seeing you on your next appointment here at [facility].  Please don t hesitate to call us at [phone] to reschedule any of your appointments or to speak with one of the [facility] registered nurses.  It was a pleasure taking care of you today.    Sincerely,    [facility]  Specialty Infusion & Procedure Center  [address] [place] [address]  [address]  Phone:  [phone]            Follow-ups after your visit        Your next 10 appointments already scheduled     Mar 19, 2018  3:00 PM CDT   (Arrive by 2:45 PM)   New Patient Visit with Quyen Lennon, PhD   [facility] ([facility])    [address] [place] [address]  4th [facility] [address] [phone]            Mar 21, 2018  2:00 PM CDT   Infusion 120 with UC  SPEC INFUSION, UC 45 ATC   [facility] Specialty and Procedure ([facility])    [address]  [address]  [facility] [address] [phone]            Mar 23, 2018 12:00 PM CDT   Infusion 120 with UC SPEC INFUSION, UC 50 ATC   [facility] Specialty and Procedure ([facility])    [address]  [address]  [facility] [id number] [phone]            Mar 26, 2018  9:40 AM CDT   (Arrive by 9:25 AM)   Return Visit with Leonardo Wang MD   [facility] ([facility])    [address]  4th Floor  [facility] [id number] [phone]            Mar 26, 2018  2:00 PM CDT   Infusion 120 with UC SPEC INFUSION, UC 50 ATC   [facility] Specialty and Procedure ([facility])    [address]  [address]  [facility] [id number] [phone]            Mar 28, 2018  1:00 PM CDT   Infusion 120 with UC SPEC INFUSION, UC 42 ATC   [facility] Specialty and Procedure ([facility])    [address]  [address]  [facility] [id number] [phone]              Who to contact     If you have questions or need follow up information about today's clinic visit or your schedule please contact [facility] SPECIALTY AND PROCEDURE directly at [phone].  Normal or non-critical lab and imaging results will be communicated to you by MyChart, letter or phone within 4 business days after the clinic has received the results. If you do not hear from us within 7 days, please contact the clinic through [employer] or phone. If you have a critical or abnormal lab result, we will notify you by phone as soon as possible.  Submit refill requests through [employer] or call your pharmacy and they will forward the refill request to us.  Please allow 3 business days for your refill to be completed.          Additional Information About Your Visit        [employer] Information     [employer] gives you secure access to your electronic health record. If you see a primary care provider, you can also send messages to your care team and make appointments. If you have questions, please call your primary care clinic.  If you do not have a primary care provider, please call [phone] and they will assist you.        Care EveryWhere ID     This is your Care EveryWhere ID. This could be used by other organizations to access your [place] medical records  [id number]         Blood Pressure from Last 3 Encounters:   03/19/18 118/74   03/16/18 134/89   03/14/18 112/69    Weight from Last 3 Encounters:   03/19/18 67.2 kg (148 lb 1.6 oz)   03/12/18 66.7 kg (147 lb)   03/12/18 66.7 kg (147 lb)              We Performed the Following     Amylase     CBC with platelets differential     Comprehensive metabolic panel     Lipase          Today's Medication Changes          These changes are accurate as of 3/19/18 12:10 PM.  If you have any questions, ask your nurse or doctor.               Start taking these medicines.        Dose/Directions    blood glucose monitoring test strip   Commonly known as:  no brand specified   Used for:  Hypoglycemia   Started by:  Quyen Baez MD        One Touch Ultra 2 Strips, Use to test blood sugars 2 times daily or as directed   Quantity:  100 strip   Refills:  3         These medicines have changed or have updated prescriptions.        Dose/Directions    gabapentin 300 MG capsule   Commonly known as:  NEURONTIN   This may have changed:  Another medication with the same name was removed. Continue taking this medication, and follow the directions you see here.   Changed by:  Quyen Baez MD        TK 2 CS QAM  1 C IN THE AFTERNOON AND 2 CS QHS   Refills:  0       nortriptyline 10 MG capsule   Commonly known as:  PAMELOR    This may have changed:  how much to take   Used for:  Right upper quadrant abdominal pain        Dose:  30 mg   Take 3 capsules (30 mg) by mouth At Bedtime   Quantity:  120 capsule   Refills:  0            Where to get your medicines      These medications were sent to [email or web address] - [place], [place] - [address] AT [facility] [address]  [address], [place] [address]    Hours:  called pharm.  they do accept faxes Phone:  [phone]     blood glucose monitoring test strip                Primary Care Provider Office Phone # Fax #    Quyen Baez [phone]       [address] [address]        Equal Access to Services     RACHAEL BENITEZ : Bret rodneyo Somalini, waaxda luqadaha, qaybta kaalmada adeegyada, maki smallwood. So [facility] [phone].    ATENCIÓN: Si habla español, tiene a quijano disposición servicios gratuitos de asistencia lingüística. [facility] [phone].    We comply with applicable federal civil rights laws and [place] laws. We do not discriminate on the basis of race, color, national origin, age, disability, sex, sexual orientation, or gender identity.            Thank you!     Thank you for choosing [facility] SPECIALTY AND PROCEDURE  for your care. Our goal is always to provide you with excellent care. Hearing back from our patients is one way we can continue to improve our services. Please take a few minutes to complete the written survey that you may receive in the mail after your visit with us. Thank you!             Your Updated Medication List - Protect others around you: Learn how to safely use, store and throw away your medicines at www.disposemymeds.org.          This list is accurate as of 3/19/18 12:10 PM.  Always use your most recent med list.                   Brand Name Dispense Instructions for use Diagnosis     acetaminophen 32 mg/mL solution    TYLENOL     30.45 mLs (975 mg) by Per J Tube route every 8 hours        * amylase-lipase-protease 55389 UNITS Cpep    ZENPEP    180 capsule    Take 2-3 capsules (40,000-60,000 Units) by mouth Take with snacks or supplements (Snacks)    Idiopathic chronic pancreatitis (H)       * amylase-lipase-protease 81703 UNITS Cpep    ZENPEP    180 capsule    Take 5 capsules (100,000 Units) by mouth 4 times daily (with meals and nightly)    Right upper quadrant abdominal pain       blood glucose monitoring test strip    no brand specified    100 strip    One Touch Ultra 2 Strips, Use to test blood sugars 2 times daily or as directed    Hypoglycemia       fluticasone 50 MCG/ACT spray    FLONASE    16 g    Spray 2 sprays into both nostrils daily    Nasal congestion       gabapentin 300 MG capsule    NEURONTIN     TK 2 CS QAM  1 C IN THE AFTERNOON AND 2 CS QHS        hydrOXYzine 10 MG/5ML syrup    ATARAX    1500 mL    Take 12.5 mLs (25 mg) by mouth every 6 hours as needed for anxiety or other (pain)    Acute abdominal pain       leuprolide 11.25 MG kit    LUPRON DEPOT (3-MONTH)    1 each    Inject 11.25 mg into the muscle every 3 months    Endometriosis       levalbuterol 45 MCG/ACT Inhaler    XOPENEX HFA    1 Inhaler    Inhale 2 puffs into the lungs every 6 hours as needed for shortness of breath / dyspnea    Wheeze       menthol 5 % Ptch    ICY HOT    15 patch    Apply 1 patch topically every 8 hours as needed for muscle soreness    Abdominal pain, epigastric       multivitamins with minerals Liqd liquid     1 Bottle    15 mLs by Per Feeding Tube route daily    Abdominal pain, epigastric       norethindrone 5 MG tablet    AYGESTIN    90 tablet    Take 1 tablet (5 mg) by mouth daily    Endometriosis       nortriptyline 10 MG capsule    PAMELOR    120 capsule    Take 3 capsules (30 mg) by mouth At Bedtime    Right upper quadrant abdominal pain       ondansetron 4 MG ODT tab    ZOFRAN ODT    15  tablet    Take 1 tablet (4 mg) by mouth every 8 hours as needed for nausea or vomiting    Intractable cyclical vomiting with nausea       order for DME     1 Units    Equipment being ordered: TENS with wire and electrode.    Chronic abdominal pain       oxyCODONE 5 MG/5ML solution    ROXICODONE    600 mL    Take 10 mL (10 mg) by mouth every 6 hours as needed, maximum 40 mL per day.    Abdominal pain, generalized, Chronic abdominal pain       pantoprazole 40 MG EC tablet    PROTONIX    30 tablet    Take 1 tablet (40 mg) by mouth 2 times daily (before meals)    Right upper quadrant abdominal pain, Erosive gastritis       polyethylene glycol Packet    MIRALAX/GLYCOLAX    7 packet    Take 17 g by mouth daily    Right upper quadrant abdominal pain       RIZATRIPTAN BENZOATE PO      Take 5 mg by mouth once as needed        scopolamine 72 hr patch    TRANSDERM    2 patch    Apply 1 patch to hairless area behind one ear if severe nausea and vomiting.  Remove old patch and change every 3 days (72 hours).    Intractable vomiting with nausea, unspecified vomiting type       senna-docusate 8.6-50 MG per tablet    SENOKOT-S;PERICOLACE    100 tablet    Take 1 tablet by mouth 2 times daily as needed for constipation    Right upper quadrant abdominal pain       sodium bicarbonate 325 MG tablet     120 tablet    1 tablet (325 mg) by Per Feeding Tube route 4 times daily    Abdominal pain, epigastric, Intractable cyclical vomiting with nausea       * Notice:  This list has 2 medication(s) that are the same as other medications prescribed for you. Read the directions carefully, and ask your doctor or other care provider to review them with you.

## 2018-03-19 NOTE — PATIENT INSTRUCTIONS
Dear Alexandra Melgoza    Thank you for choosing [facility] Specialty Infusion [facility] Procedure Center ([facility]) for your infusion.  The following information is a summary of our appointment as well as important reminders.        We look forward in seeing you on your next appointment here at [facility].  Please don t hesitate to call us at [phone] to reschedule any of your appointments or to speak with one of the [facility] registered nurses.  It was a pleasure taking care of you today.    Sincerely,    [facility]  Specialty Infusion & Procedure Center  [address] [place] [address]  [address]  Phone:  [phone]

## 2018-03-19 NOTE — MR AVS SNAPSHOT
After Visit Summary   3/19/2018    Alexandra Melgoza    MRN: [id number]           Patient Information     Date Of Birth          1995        Visit Information        Provider Department      3/19/2018 3:00 PM Quyen Lennon, PhD [facility]        Care Instructions          REDUCING SYMPATHETIC NERVOUS  SYSTEM  Activity                                  is the path way to reduced pain!                                                     Tools  To Help      1.  Guided imagery- to reduce sympathetic nervous system ( flight or fight)  activity      I recommend you listen once a day,  In quiet place,  Eyes closed.   (NOT  Driving) .    At the beginning,  You can start with 5  Minutes and gradually work up to the whole segment.  May take several weeks,  B/c your sympathetic nervous system is in the habit of being on high.   So go slow and be respectful of the challenge of change for your body.      Recommended source =   Dreamise.Advasense   order  EASE Pain        2.  Auditory EMDR  Also called  Bilateral Music or Biolateral Music-   For reducing sympathetic nervous system activity or anxiety quickly.      MUST USE WITH HEADPHONES OR EAR BUDS   In order to be effective.    Set  At barely audible level.    Use whenever you feel tense or have trouble sleeping or if you anticipate those problems.     Useful for 5 min or 5 hours.      Can use while reading or surfing Internet of doing chores.      Recommended source  =   Album       Bilateral Music by Ruslan Carter                                              Available on Amazon,  I tunes or  Spotify    Also called         Written on Clouds    3.  Weighted blanket -- some  Research has demonstrated that a weighted blanket can reduce night time anxiety  And improve sleep quality.   A number of my  pain patients also report benefit from using weighted blanket ( typically 15 to 20 lbs for adult size)    There  "are multiple sources on the Internet to purchase and/ or you can google instructions for how to \" do it yourself\"    You can My Chart me with questions.        Quyen Lennon PhD., LP                                      Follow-ups after your visit        Your next 10 appointments already scheduled     Mar 21, 2018  2:00 PM CDT   Infusion 120 with UC SPEC INFUSION, UC 45 ATC   [facility] ([facility])    [address]  [address]  [facility] [id number] [phone]            Mar 23, 2018 12:00 PM CDT   Infusion 120 with UC SPEC INFUSION, UC 50 ATC   [facility] ([facility])    [address]  [address]  [facility] [id number] [phone]            Mar 26, 2018  9:40 AM CDT   (Arrive by 9:25 AM)   Return Visit with Leonardo Wang MD   [facility] ([facility])    [address]  4th [address]  [facility] [id number] [phone]            Mar 26, 2018  2:00 PM CDT   Infusion 120 with UC SPEC INFUSION, UC 50 ATC   [facility] ([facility])    [address]  [address]  [facility] [id number] [phone]            Mar 28, 2018  1:00 PM CDT   Infusion 120 with UC SPEC INFUSION, UC 42 ATC   [facility] ([facility])    [address]  [address]  [facility] [id number] [phone]            Mar 30, 2018 10:00 AM CDT   Infusion 120 with UC SPEC INFUSION   [facility] ([facility])    [address]  [address]  [facility] [id number] [phone]              Who to contact     Please call your clinic at [phone] to:    Ask questions about " your health    Make or cancel appointments    Discuss your medicines    Learn about your test results    Speak to your doctor            Additional Information About Your Visit        [employer] System[employer] Information     [employer] gives you secure access to your electronic health record. If you see a primary care provider, you can also send messages to your care team and make appointments. If you have questions, please call your primary care clinic.  If you do not have a primary care provider, please call [phone] and they will assist you.      [employer] is an electronic gateway that provides easy, online access to your medical records. With [employer], you can request a clinic appointment, read your test results, renew a prescription or communicate with your care team.     To access your existing account, please contact your [facility] or call [phone] for assistance.        Care EveryWhere ID     This is your Care EveryWhere ID. This could be used by other organizations to access your [facility] medical records  [id number]         Blood Pressure from Last 3 Encounters:   03/19/18 118/74   03/16/18 134/89   03/14/18 112/69    Weight from Last 3 Encounters:   03/19/18 67.2 kg (148 lb 1.6 oz)   03/12/18 66.7 kg (147 lb)   03/12/18 66.7 kg (147 lb)              Today, you had the following     No orders found for display         Today's Medication Changes          These changes are accurate as of 3/19/18  3:50 PM.  If you have any questions, ask your nurse or doctor.               Start taking these medicines.        Dose/Directions    blood glucose monitoring test strip   Commonly known as:  no brand specified   Used for:  Hypoglycemia   Started by:  Quyen Baez MD        One Touch Ultra 2 Strips, Use to test blood sugars 2 times daily or as directed   Quantity:  100 strip   Refills:  3         These medicines have changed or have updated prescriptions.        Dose/Directions     gabapentin 300 MG capsule   Commonly known as:  NEURONTIN   This may have changed:  Another medication with the same name was removed. Continue taking this medication, and follow the directions you see here.   Changed by:  Quyen Baez MD        TK 2 CS QAM  1 C IN THE AFTERNOON AND 2 CS QHS   Refills:  0       nortriptyline 10 MG capsule   Commonly known as:  PAMELOR   This may have changed:  how much to take   Used for:  Right upper quadrant abdominal pain        Dose:  30 mg   Take 3 capsules (30 mg) by mouth At Bedtime   Quantity:  120 capsule   Refills:  0            Where to get your medicines      These medications were sent to [email or web address] - [place], [place] - [address] AT [facility] [address], [facility] [address]    Hours:  called pharm.  they do accept faxes Phone:  [phone]     blood glucose monitoring test strip                Primary Care Provider Office Phone # Fax #    Quyen Baez [phone]       [address] [address]        Equal Access to Services     [facility] : Hadii aad ku hadasho Soomaali, waaxda luqadaha, qaybta kaalmada adeegyada, maki avilez haymonique gaspar . So [facility] [phone].    ATENCIÓN: Si habla español, tiene a quijano disposición servicios gratuitos de asistencia lingüística. [facility] [phone].    We comply with applicable federal civil rights laws and [place] laws. We do not discriminate on the basis of race, color, national origin, age, disability, sex, sexual orientation, or gender identity.            Thank you!     Thank you for choosing [facility]  for your care. Our goal is always to provide you with excellent care. Hearing back from our patients is one way we can continue to improve our services. Please take a few minutes to complete the written survey that you may receive in the mail  after your visit with us. Thank you!             Your Updated Medication List - Protect others around you: Learn how to safely use, store and throw away your medicines at www.disposemymeds.org.          This list is accurate as of 3/19/18  3:50 PM.  Always use your most recent med list.                   Brand Name Dispense Instructions for use Diagnosis    acetaminophen 32 mg/mL solution    TYLENOL     30.45 mLs (975 mg) by Per J Tube route every 8 hours        * amylase-lipase-protease 10564 UNITS Cpep    ZENPEP    180 capsule    Take 2-3 capsules (40,000-60,000 Units) by mouth Take with snacks or supplements (Snacks)    Idiopathic chronic pancreatitis (H)       * amylase-lipase-protease 59156 UNITS Cpep    ZENPEP    180 capsule    Take 5 capsules (100,000 Units) by mouth 4 times daily (with meals and nightly)    Right upper quadrant abdominal pain       blood glucose monitoring test strip    no brand specified    100 strip    One Touch Ultra 2 Strips, Use to test blood sugars 2 times daily or as directed    Hypoglycemia       fluticasone 50 MCG/ACT spray    FLONASE    16 g    Spray 2 sprays into both nostrils daily    Nasal congestion       gabapentin 300 MG capsule    NEURONTIN     TK 2 CS QAM  1 C IN THE AFTERNOON AND 2 CS QHS        hydrOXYzine 10 MG/5ML syrup    ATARAX    1500 mL    Take 12.5 mLs (25 mg) by mouth every 6 hours as needed for anxiety or other (pain)    Acute abdominal pain       leuprolide 11.25 MG kit    LUPRON DEPOT (3-MONTH)    1 each    Inject 11.25 mg into the muscle every 3 months    Endometriosis       levalbuterol 45 MCG/ACT Inhaler    XOPENEX HFA    1 Inhaler    Inhale 2 puffs into the lungs every 6 hours as needed for shortness of breath / dyspnea    Wheeze       menthol 5 % Ptch    ICY HOT    15 patch    Apply 1 patch topically every 8 hours as needed for muscle soreness    Abdominal pain, epigastric       multivitamins with minerals Liqd liquid     1 Bottle    15 mLs by Per Feeding  Tube route daily    Abdominal pain, epigastric       norethindrone 5 MG tablet    AYGESTIN    90 tablet    Take 1 tablet (5 mg) by mouth daily    Endometriosis       nortriptyline 10 MG capsule    PAMELOR    120 capsule    Take 3 capsules (30 mg) by mouth At Bedtime    Right upper quadrant abdominal pain       ondansetron 4 MG ODT tab    ZOFRAN ODT    15 tablet    Take 1 tablet (4 mg) by mouth every 8 hours as needed for nausea or vomiting    Intractable cyclical vomiting with nausea       order for [facility]     1 Units    Equipment being ordered: TENS with wire and electrode.    Chronic abdominal pain       oxyCODONE 5 MG/5ML solution    ROXICODONE    600 mL    Take 10 mL (10 mg) by mouth every 6 hours as needed, maximum 40 mL per day.    Abdominal pain, generalized, Chronic abdominal pain       pantoprazole 40 MG EC tablet    PROTONIX    30 tablet    Take 1 tablet (40 mg) by mouth 2 times daily (before meals)    Right upper quadrant abdominal pain, Erosive gastritis       polyethylene glycol Packet    MIRALAX/GLYCOLAX    7 packet    Take 17 g by mouth daily    Right upper quadrant abdominal pain       RIZATRIPTAN BENZOATE PO      Take 5 mg by mouth once as needed        scopolamine 72 hr patch    TRANSDERM    2 patch    Apply 1 patch to hairless area behind one ear if severe nausea and vomiting.  Remove old patch and change every 3 days (72 hours).    Intractable vomiting with nausea, unspecified vomiting type       senna-docusate 8.6-50 MG per tablet    SENOKOT-S;PERICOLACE    100 tablet    Take 1 tablet by mouth 2 times daily as needed for constipation    Right upper quadrant abdominal pain       sodium bicarbonate 325 MG tablet     120 tablet    1 tablet (325 mg) by Per Feeding Tube route 4 times daily    Abdominal pain, epigastric, Intractable cyclical vomiting with nausea       * Notice:  This list has 2 medication(s) that are the same as other medications prescribed for you. Read the directions carefully, and ask  your doctor or other care provider to review them with you.

## 2018-03-19 NOTE — PROGRESS NOTES
"[facility] [place]   Quyen Baez MD  03/19/2018      Chief Complaint:   Abdominal Pain (pt states she had GJ tube placed, having a lot of pain)       History of Present Illness:   Alexandra Melgoza is a 22 year old female with a history of chronic abdominal pain post-cholecystectomy, Sphincter of Oddi dysfunction, cyclic N/V, migraines, endometriosis, pseudoseizures and somatoform disorder on chronic narcotics, who presents with her mother for evaluation of her feeding tube site. On 3/14 her G-tube became tender and began to discharge an odorous substance, she also began feeling an hourly intermittent sharp pain that takes her breath away along with increased general pain. She spoke with Dr. Narayanan who recommended to continue her Protonix two times per day and discontinue NSAID which she used once weekly prior to 3/14.     The pain is dissimilar to her past pain and is not specifically associated with any food or movement, although it does sometimes occur when she stands up. Over the last few days she has had an increased pain associated with her GJ tube, but is having blood and \"chunks\" described as scabs coming out of her gastric port every time she vents.  She does not recall any trauma associated with her G-tube. Swelling associated with the G tube has increased. She denies any lifestyle/routine changes recently. She is not seeing drainage at the site anymore. She reports chills, but has not taken a temperature. She has been taking scheduled tylenol. She denies nausea and bowel changes. She has not made any dietary changes and is  able to continue with feedings.     While in [place] she had some blood work done that showed low serum IgG.     She also would like a referral to cardiology because her heart rate has been increasing and she feels a pounding in her chest. She took fludrocortisone but did not tolerate it well.    Review of Systems:   A full 10-pt Review of Systems was performed, " verified and is negative except as documented in the HPI.  All health questionnaires were reviewed, verified and relevant information documented above.    Active Medications:     Current Outpatient Prescriptions:      hydrOXYzine (ATARAX) 10 MG/5ML syrup, Take 12.5 mLs (25 mg) by mouth every 6 hours as needed for anxiety or other (pain), Disp: 1500 mL, Rfl: 2     gabapentin (NEURONTIN) 250 MG/5ML solution, 8 mLs (400 mg) by Per G Tube route 3 times daily, Disp: 720 mL, Rfl: 2     oxyCODONE (ROXICODONE) 5 MG/5ML solution, Take 10 mL (10 mg) by mouth every 6 hours as needed, maximum 40 mL per day., Disp: 600 mL, Rfl: 0     acetaminophen (TYLENOL) 32 mg/mL solution, 30.45 mLs (975 mg) by Per J Tube route every 8 hours, Disp: , Rfl:      ondansetron (ZOFRAN ODT) 4 MG ODT tab, Take 1 tablet (4 mg) by mouth every 8 hours as needed for nausea or vomiting, Disp: 15 tablet, Rfl: 0     sodium bicarbonate 325 MG tablet, 1 tablet (325 mg) by Per Feeding Tube route 4 times daily, Disp: 120 tablet, Rfl: 0     nortriptyline (PAMELOR) 10 MG capsule, Take 3 capsules (30 mg) by mouth At Bedtime (Patient taking differently: Take 20-30 mg by mouth At Bedtime ), Disp: 120 capsule, Rfl: 0     menthol (ICY HOT) 5 % PTCH, Apply 1 patch topically every 8 hours as needed for muscle soreness, Disp: 15 patch, Rfl: 3     amylase-lipase-protease (ZENPEP) 64937 UNITS CPEP, Take 5 capsules (100,000 Units) by mouth 4 times daily (with meals and nightly), Disp: 180 capsule, Rfl: 1     multivitamins with minerals (CERTAVITE/CEROVITE) LIQD liquid, 15 mLs by Per Feeding Tube route daily, Disp: 1 Bottle, Rfl: 0     order for DME, Equipment being ordered: TENS with wire and electrode., Disp: 1 Units, Rfl: 0     scopolamine (TRANSDERM) 72 hr patch, Apply 1 patch to hairless area behind one ear if severe nausea and vomiting.  Remove old patch and change every 3 days (72 hours)., Disp: 2 patch, Rfl: 0     amylase-lipase-protease (ZENPEP) 69471 UNITS CPEP,  Take 2-3 capsules (40,000-60,000 Units) by mouth Take with snacks or supplements (Snacks), Disp: 180 capsule, Rfl: 1     senna-docusate (SENOKOT-S;PERICOLACE) 8.6-50 MG per tablet, Take 1 tablet by mouth 2 times daily as needed for constipation, Disp: 100 tablet, Rfl: 0     pantoprazole (PROTONIX) 40 MG EC tablet, Take 1 tablet (40 mg) by mouth 2 times daily (before meals), Disp: 30 tablet, Rfl: 1     polyethylene glycol (MIRALAX/GLYCOLAX) Packet, Take 17 g by mouth daily, Disp: 7 packet, Rfl: 0     norethindrone (AYGESTIN) 5 MG tablet, Take 1 tablet (5 mg) by mouth daily, Disp: 90 tablet, Rfl: 1     fluticasone (FLONASE) 50 MCG/ACT spray, Spray 2 sprays into both nostrils daily, Disp: 16 g, Rfl: 3     levalbuterol (XOPENEX HFA) 45 MCG/ACT Inhaler, Inhale 2 puffs into the lungs every 6 hours as needed for shortness of breath / dyspnea, Disp: 1 Inhaler, Rfl: 1     leuprolide (LUPRON DEPOT) 11.25 MG injection, Inject 11.25 mg into the muscle every 3 months, Disp: 1 each, Rfl: 3     RIZATRIPTAN BENZOATE PO, Take 5 mg by mouth once as needed , Disp: , Rfl:       Allergies:   Amoxicillin-pot clavulanate; Compazine [prochlorperazine]; Hyoscyamine; Reglan [metoclopramide hcl]; Zyprexa; Amitriptyline hcl; Buspirone; Cogentin [benztropine]; Cyproheptadine; Dicyclomine; Droperidol; Effexor [venlafaxine]; Food; No clinical screening - see comments; Phenergan dm [promethazine-dm]; Promethazine; Risperidone; Vistaril; Augmentin; Ketamine; and Sorbitol      Past Medical History:  Abdominal pain, epigastric  Acute abdominal pain  Anxiety    Asthma  Cholecystitis   Chronic abdominal pain   Chronic infection mrsa  Chronic pain   Cyclic vomiting syndrome  Depression   Dysmenorrhea  Eating disorder  Encounter for long-term opiate analgesic use  Endometriosis   Hypoglycemia  Intractable nausea and vomiting  Major depressive disorder, recurrent episode, moderate   Migraines   Mild intermittent asthma   Myalgia and myositis  Opioid  dependence   Ovarian cysts   Pancreatic disease   Pancreatitis  PONV (postoperative nausea and vomiting)   Postherpetic neuralgia  POTS (postural orthostatic tachycardia syndrome)  PROVISIONAL DX: Somatoform disorder  Pseudoseizures   Rash  Somatoform disorder   Sphincter of Oddi dysfunction  Thrombocytopenia   Urge incontinence  Vasovagal syncope      Past Surgical History:  Abdomen surgery, bilary stents  Cholecystectomy  Colonoscopy  Endoscopic retrograde cholangiopancreatogram x4  Endoscopic ultrasound, upper GI x2  esophagogastroduodenoscopy x3  Insert port vascular access  L knee arthroscopy  Laparoscopy diagnostic, Gyn  Knee surgery  Remove andreplace breast implant prothesis  Vascular surgery    Family History:   Father: Hypertension  Paternal grandmother: Depression  Maternal grandfather: cerebrovascular disease, cardiovascular, depression/anxiety, gastrointestinal disease, diabetes  Maternal grandmother: allergies  Paterna uncle: diabetes  Brother: hypoglycemia  Other: bipolar disorder, anxiety     Social History:   Single  Tobacco use: never     Physical Exam:   /74  Pulse 101  Resp 16  Wt 67.2 kg (148 lb 1.6 oz)  BMI 23.55 kg/m2   Constitutional: Alert, oriented, pleasant, no acute distress  Head: Normocephalic, atraumatic  Eyes: Extra-ocular movements intact, no scleral icterus  Cardiovascular: tachycardic but regular rhythm no murmurs, rubs or gallops, peripheral pulses full/symmetric  GI: G-tube placed in epigastric region with no surrounding erythema or drainage from the site, very tender to palpation inferior to tube, and in surrounding epigastric and right upper quadrant regions, bowel sounds present, no rebound/guarding  Musculoskeletal: No edema, normal muscle tone, normal gait  Neurologic: Alert and oriented, cranial nerves 2-12 intact.  Psychiatric: teary affect, normal mentation        Assessment and Plan:  Hypoglycemia  Patient checks blood glucose when feeling low, and requires  more strips.   - blood glucose monitoring (NO BRAND SPECIFIED) test strip  Dispense: 100 strip; Refill: 3    Sinus tachycardia  POTS (postural orthostatic tachycardia syndrome)  -Cardiology referral provided per her request    Increase pain and reports of blood ydrainage from GJ tube   Concernign for possible tube complication such as ulcer, malpositioning, or possible pain flare up related to underlying chronic abdominal pain.      -General labs to rule out acute processes. The patient will go to infusion center for fluids as scheduled. I will coordinate with her GI team to discuss further diagnostics. After speaking to staff, recommended draining gastric port to evaluate for natalya bleeding--> normal gastric contents.  Also recommended AXR for tube --> this appeared normal.  Spoke with patient and advised conservative cares, if natalya bleeding persists or worsens, she should consider ER or call GI clinic.    Follow-up: prn        Scribe Disclosure:   I, Nigel Franco, am serving as a scribe to document services personally performed by Quyen Baez MD at this visit, based upon the provider's statements to me. All documentation has been reviewed by the aforementioned provider prior to being entered into the official medical record.     Portions of this medical record were completed by a scribe. UPON MY REVIEW AND AUTHENTICATION BY ELECTRONIC SIGNATURE, this confirms (a) I performed the applicable clinical services, and (b) the record is accurate.   Quyen Baez MD  Internal Medicine

## 2018-03-19 NOTE — PROGRESS NOTES
[facility]  PAIN AND INTERVENTIONAL CLINIC  Medical Psychology Diagnostic Assessment        Patient Name: Alexandra Melgoza    YOB: 1995   Medical Record Number: [id number]  Date: 3/19/2018     Alexandra Melgoza   is a 22 year old, single,  and currently hoping to be a student.          CONSULTING PAIN PHYSICIAN: Dr. Wang           PRESENTING CONCERNS OF REFERRING PROVIDER: Evaluation and management of psychological factors in pain condition        PATIENT'S  PRESENTING PROBLEMS  In tractable abdominal pain and nausea        ASSESSMENT:           SUMMARY AND IMPRESSIONS:  Cooperative young woman who has a long and extensive medical and psychiatric history.  There is  history of gastritis, chronic abdominal pain post-cholecystectomy with mildly abnormal liver enzymes, not responding to sphincter of Oddi ablation a few years ago,cyclic N/V, migraines, pseudoseizures and somatoform disorder on chronic narcotics. Chronic abdominal pain since age 8 but worsened since cholecystectomy.  She has moved back to the [facility] due to her worsening medical condition, so that she can obtain care at [facility].  She recently had a GJ  Tube placed and feels benefit from this, still  Utilizing Zofran infusions several times a week.   There were frequent ED visits in Jan and February, but none in March.     Due to her psychiatric history, she has received extensive psycho therapy both individual,  DBT  Programs and intensive chronic pain programs.  She feels she has benefited from these and practices meditation regularly to help manage her pain.  She acknowledges ongoing anxiety re her medical condition and is interested in learning further tools to help manage anxiety and pain.    And there is still the possibility that a component of her GI distress could still have somatization roots, that her unconscious psychological issues aggravate her GI symptoms.             MENTAL HEALTH:Questionable      SLEEP:   Fair    SUBSTANCES NOT PRESCRIBED   no      PSYCHOSOCIAL: Good     PRESCRIPTION COMPLIANCE  CONCERNS :   No             CONTRIBUTING FACTORS TO PAIN: Somatic focus, Negative emotional or cognitive reaction to pain, Minimizes psychological contributing factors, Anxiety and/or depression and Possible personality disorder or traits          MOTIVATION LEVEL FOR   COMPREHENSIVE  PAIN TREATMENT: Good            FUNCTIONAL  STATUS  Client's symptoms are causing reduced functional status in the following areas: Academics / Education -  Financial management   Occupational / Vocational -   Social / Relational -                     DIAGNOSES:                                             F43.8    Other stress related disorder -   Adjustment to issues longer than 6 months                PLAN   Was  Formulated and  discussed with patient.   Patient agreed to treatment plan.           Recommend Psychological Therapy Yes               FREQUENCY: weekly         BEHAVIORAL TREATMENT PLAN     Presenting Problems with  initial Behavioral Treatment Objectives:        Improve pain management skills: Goals include:  auditory EMDR         continue meditation tools     begin  Walking program gradually increasing time                    DETAILED EVALUATION:      OTHER   RELEVANT DIAGNOSES:  S/p GB 20011    Pain:  Abdominal pain                      DURATION:   14  years        PAIN PATTERN: Constant and variable         PAIN PROGRESSION: has variable course             PAIN RANGE:  4-10       FREQUENCY OF PAIN FLARES  2/week         Pain level now:  5-6         FACTORS THAT AFFECT PAIN:              Increase pain:  eating              Decrease  pain:  Breathing and meditate -        CURRENT Pain History:   Please see Dr. Wang's notes for more details of her pain history, findings and recommendations    Compliance with pain provider recommendations:   unknown      OTHER   PAST   PAIN  TREATMENTS   Pain Intervention Clinic:   Yes    Pain  Psychologist: Yes      Acupuncture Yes   Not recently             PT'S UNDERSTANDING ABOUT THE CAUSE OF PAIN:   consistent  With medical information         ATTITUDES TOWARDS USING OPIOIDS: good         Medical Cannabis   Handled by   no    CONTRIBUTING COMPONENTS TO PAIN:   Traumatized from pain or other medical   experiences:severe  Happened  With GJ tube insertion  Had nightmares a while        Anticipatory anxiety that movement or activity will cause pain or re injury:                   no              CATASTROPHIZING  ABOUT PAIN moderate.         LEVEL OF SELF-MANAGEMENT: good      BEHAVIORAL  METHODS OF COPING / MANAGEMENT :   Distraction,  mindfulness             ABILITY TO RELAX: fair  To poor      ABILITY TO PACE ACTIVITY: fair      OBEYS LIMITATIONS: satisfactory               HEADACHES: No      BRUXISM: No                                  PROBLEMS AND IMPAIRMENTS DUE TO PAIN:       Overall Quality of Life: Fair       ADL   Good       Household tasks   Fair      Family Relationships Good       Social: Fair    Good with family         Individual activities Fair           PSYCHOLOGY SYMPTOMS:     CURRENT DEPRESSION   symptoms:     Sleep Interest Energy Appetite        Patient attributes to  :   pain            Pattern: constant;           Intensity: mild to moderate                  Suicide Risk: denies current or recent suicidal ideation                          Current  ANXIETY: worry            Frequency: constant and intermittent;        Intensity: mild    ANGER / IRRITABILITY: No        Resentment or blame regarding cause of condition or treatment: No        SLEEP PROBLEMS          Currently:  moderate   Primarily due to  Pain         Sleep Medications: Pamelor                   CURRENT MEDICATIONS:    Current Outpatient Prescriptions   Medication Sig Dispense Refill     gabapentin (NEURONTIN) 300 MG capsule TK 2 CS QAM  1 C IN THE AFTERNOON AND 2 CS QHS  0     blood glucose monitoring (NO BRAND  SPECIFIED) test strip One Touch Ultra 2 Strips, Use to test blood sugars 2 times daily or as directed 100 strip 3     hydrOXYzine (ATARAX) 10 MG/5ML syrup Take 12.5 mLs (25 mg) by mouth every 6 hours as needed for anxiety or other (pain) 1500 mL 2     [DISCONTINUED] gabapentin (NEURONTIN) 250 MG/5ML solution 8 mLs (400 mg) by Per G Tube route 3 times daily 720 mL 2     oxyCODONE (ROXICODONE) 5 MG/5ML solution Take 10 mL (10 mg) by mouth every 6 hours as needed, maximum 40 mL per day. 600 mL 0     acetaminophen (TYLENOL) 32 mg/mL solution 30.45 mLs (975 mg) by Per J Tube route every 8 hours       ondansetron (ZOFRAN ODT) 4 MG ODT tab Take 1 tablet (4 mg) by mouth every 8 hours as needed for nausea or vomiting 15 tablet 0     sodium bicarbonate 325 MG tablet 1 tablet (325 mg) by Per Feeding Tube route 4 times daily 120 tablet 0     nortriptyline (PAMELOR) 10 MG capsule Take 3 capsules (30 mg) by mouth At Bedtime (Patient taking differently: Take 20-30 mg by mouth At Bedtime ) 120 capsule 0     menthol (ICY HOT) 5 % PTCH Apply 1 patch topically every 8 hours as needed for muscle soreness 15 patch 3     amylase-lipase-protease (ZENPEP) 23477 UNITS CPEP Take 5 capsules (100,000 Units) by mouth 4 times daily (with meals and nightly) 180 capsule 1     multivitamins with minerals (CERTAVITE/CEROVITE) LIQD liquid 15 mLs by Per Feeding Tube route daily 1 Bottle 0     order for DME Equipment being ordered: TENS with wire and electrode. 1 Units 0     scopolamine (TRANSDERM) 72 hr patch Apply 1 patch to hairless area behind one ear if severe nausea and vomiting.  Remove old patch and change every 3 days (72 hours). 2 patch 0     amylase-lipase-protease (ZENPEP) 31754 UNITS CPEP Take 2-3 capsules (40,000-60,000 Units) by mouth Take with snacks or supplements (Snacks) 180 capsule 1     senna-docusate (SENOKOT-S;PERICOLACE) 8.6-50 MG per tablet Take 1 tablet by mouth 2 times daily as needed for constipation 100 tablet 0      pantoprazole (PROTONIX) 40 MG EC tablet Take 1 tablet (40 mg) by mouth 2 times daily (before meals) 30 tablet 1     polyethylene glycol (MIRALAX/GLYCOLAX) Packet Take 17 g by mouth daily 7 packet 0     norethindrone (AYGESTIN) 5 MG tablet Take 1 tablet (5 mg) by mouth daily 90 tablet 1     fluticasone (FLONASE) 50 MCG/ACT spray Spray 2 sprays into both nostrils daily 16 g 3     levalbuterol (XOPENEX HFA) 45 MCG/ACT Inhaler Inhale 2 puffs into the lungs every 6 hours as needed for shortness of breath / dyspnea 1 Inhaler 1     leuprolide (LUPRON DEPOT) 11.25 MG injection Inject 11.25 mg into the muscle every 3 months 1 each 3     RIZATRIPTAN BENZOATE PO Take 5 mg by mouth once as needed      .            Past medical problems:   Past Medical History:   Diagnosis Date     Anxiety      Asthma      Cholecystitis     s/p cholecystectomy     Chronic abdominal pain      Chronic infection     mrsa     Chronic pain      Cyclic vomiting syndrome 10/27/2012     Depression      Endometriosis      Hypoglycaemia      Migraines      Mild intermittent asthma      Ovarian cysts      Pancreatic disease      PONV (postoperative nausea and vomiting)      Pseudoseizures      Somatoform disorder      Sphincter of Oddi dysfunction      Vasovagal syncope            History of Head Trauma: No                    MENTAL HEALTH HISTORY:       Previous History of:           Depression or Anxiety:  Yes, reported            ADD:   Denied              OCD:  Yes, reported    Has diminished            Bipolar Disorder: Denied            Schizophrenia:  Denied            Eating Disorder   Denied            PTSD   Denied            History of  Losses / Abuse/Trauma   no indications or report of: significant losses, trauma, abuse or neglect    Except trauma with winter 2018  Hospitalization and poor pain management        Past Mental Health Treatment:  has received the following mental health services in the past: counseling, day treatment and inpatient  mental health services      Current psychotropic medications:none      Currently seeing psychiatrist or therapist: is not currently receiving any mental health services         Previous  CD problems/treatment  has not received chemical dependency treatment in the past         Patient reports ALCOHOL use  no      CAGE QUESTIONNAIRE:   None of the patient's responses to the CAGE screening were positive / Negative CAGE score    Patient   Reports  Marijuana none   Patient denies using street drugs.  Patient  denies the non-medical use of prescription or over the counter drugs.                                OCCUPATIONAL AND EDUCATIONAL HISTORY:        Occupations:    patient access representative  In [place]          Job Satisfaction:  Medium                  Education Level:  plans to atend nursing school        History:  No       Disability status: Not applicable       Legal case pending: no    SOCIAL HISTORY:      Social History   Substance Use Topics     Smoking status: Never Smoker     Smokeless tobacco: Never Used     Alcohol use No          Current living situation:  With mom and step dad as part of moving back from [place]          Length of time with spouse/partner:  2.5  Years   With fiance       Relationship Satisfaction: good                 Children: 0       Social Support  From friends: somewhat supportive    FAMILY HISTORY:       Family Status: The patient was raised in  [place] by her biological parents.       Parents:  Father:   when she was 13   Good relationship                   Mother:  Close        Siblings: older brother doing well        Family History of Behavioral Health Problems:  Yes, reported   Brother anxiety           Family History of Substance Abuse: Denied       General Upbringing:  good       Family Relationships: good         Factors that will assist patient in pain therapy: family         Factors that may interfere with positive change:  transportation concerns                    Health Behaviors:         There is no height or weight on file to calculate BMI.                        Diet: uncertain   Feeding tube                  Exercise: minimal exercise        Caffeine   none   Tobacco -  No,                                          PATIENT'S GOALS:    Reduce pain                                              Reduce  Prescriptions       OBJECTIVE:    Mental Status  And BEHAVIORAL OBSERVATIONS:             Mental Status Assessment:  Appearance:   Appropriate   Eye Contact:   Good   Psychomotor Behavior: Normal   Attitude:   Cooperative   Orientation:   All  Speech   Rate / Production: Normal    Volume:  Normal   Mood:    Normal  Affect:    Appropriate   Thought Content:  Clear   Thought Form:  Coherent  Logical   Insight:    Good     Pain Behaviors   absent    A 12-item WHODAS 2.0 assessment was completed by the patient today and recorded in EPIC.  Total Score of 27    PHQ-9 score:    PHQ-9 SCORE 3/19/2018   Total Score -   Total Score MyChart 6 (Mild depression)   Total Score -   Total Score 6   Some encounter information is confidential and restricted. Go to Review Flowsheets activity to see all data.       Pain Catastrophizing Scale  Pain Catastrophizing Scale 3/19/2018   I worry all the time about whether the pain will end 1 = To a slight degree   I feel I can't go on 0 = Not at all   It's terrible and I think it's never going to get any better 1 = To a slight degree   It's awful and I feel that it overwhelms me 2 = To a moderate degree   I feel I can't stand it anymore 0 = Not at all   I become afraid that the pain will get worse 1 = To a slight degree   I keep thinking of other painful events 0 = Not at all   I anxiously want the pain to go away 1 = To a slight degree   I can't seem to keep it out of my mind 1 = To a slight degree   I keep thinking about how much it hurts 1 = To a slight degree   I keep thinking about how badly I want the pain to stop 1 = To a slight degree   There's  nothing I can do to reduce the intensity of the pain 0 = Not at all   I wonder whether something serious may happen 1 = To a slight degree   Total Pain Scale Scoring Question 10         This score falls at 25th percentile.   Generally, scores above 75 percentile are predictive of sub optimal rehabilitation outcome.   Research shows that CBT can change these scores and this tends to result in better outcomes.            Time spent with Patient:   60   Minutes  in direct patient contact for a psychological  pain evaluation.        Quyen Lennon Ph.D., L.P. ...............  3/19/2018 3:04 PM    Medical Psychologist        .

## 2018-03-19 NOTE — PATIENT INSTRUCTIONS
[facility]: [phone]     [facility] Medication Refill Request Information:  * Please contact your pharmacy regarding ANY request for medication refills.  ** [facility] Prescription Fax = [phone]  * Please allow 3 business days for routine medication refills.  * Please allow 5 business days for controlled substance medication refills.     [facility] Test Result notification information:  *You will be notified with in 7-10 days of your appointment day regarding the results of your test.  If you are on MyChart you will be notified as soon as the provider has reviewed the results and signed off on them.

## 2018-03-19 NOTE — PATIENT INSTRUCTIONS
"      REDUCING SYMPATHETIC NERVOUS  SYSTEM  Activity                                  is the path way to reduced pain!                                                     Tools  To Help      1.  Guided imagery- to reduce sympathetic nervous system ( flight or fight)  activity      I recommend you listen once a day,  In quiet place,  Eyes closed.   (NOT  Driving) .    At the beginning,  You can start with 5  Minutes and gradually work up to the whole segment.  May take several weeks,  B/c your sympathetic nervous system is in the habit of being on high.   So go slow and be respectful of the challenge of change for your body.      Recommended source =   What's Hot.[employer]   order  EASE Pain        2.  Auditory EMDR  Also called  Bilateral Music or Biolateral Music-   For reducing sympathetic nervous system activity or anxiety quickly.      MUST USE WITH HEADPHONES OR EAR BUDS   In order to be effective.    Set  At barely audible level.    Use whenever you feel tense or have trouble sleeping or if you anticipate those problems.     Useful for 5 min or 5 hours.      Can use while reading or surfing Internet of doing chores.      Recommended source  =   Album       Bilateral Music by Ruslan Carter                                              Available on Amazon,  I tunes or  Spotify    Also called         Written on Clouds    3.  Weighted blanket -- some  Research has demonstrated that a weighted blanket can reduce night time anxiety  And improve sleep quality.   A number of my  pain patients also report benefit from using weighted blanket ( typically 15 to 20 lbs for adult size)    There are multiple sources on the Internet to purchase and/ or you can google instructions for how to \" do it yourself\"    You can My Chart me with questions.        Quyen Lennon PhD., LP                              "

## 2018-03-19 NOTE — MR AVS SNAPSHOT
After Visit Summary   3/19/2018    Alexandra Melgoza    MRN: [id number]           Patient Information     Date Of Birth          1995        Visit Information        Provider Department      3/19/2018 7:30 AM Quyen Baez MD [facility]        Today's Diagnoses     Hypoglycemia    -  1    Abdominal pain, epigastric        Complication of gastrostomy tube (H)        POTS (postural orthostatic tachycardia syndrome)        Sinus tachycardia          Care Instructions    Primary Care Center: [phone]     Primary Care Center Medication Refill Request Information:  * Please contact your pharmacy regarding ANY request for medication refills.  ** [facility] Prescription Fax = [phone]  * Please allow 3 business days for routine medication refills.  * Please allow 5 business days for controlled substance medication refills.     Primary Care Center Test Result notification information:  *You will be notified with in 7-10 days of your appointment day regarding the results of your test.  If you are on MyChart you will be notified as soon as the provider has reviewed the results and signed off on them.            Follow-ups after your visit        Additional Services     CARDIOLOGY EVAL ADULT REFERRAL       Preferred location:  [facility] [phone]   [email or web address]    Please be aware that coverage of these services is subject to the terms and limitations of your health insurance plan.  Call member services at your health plan with any benefit or coverage questions.      Please bring the following to your appointment:  Any x-rays, CTs or MRIs which have been performed. Contact the facility where they were done to arrange for  prior to your scheduled appointment.    List of current medications  This referral request   Any documents/labs given to you for this referral                  Your  next 10 appointments already scheduled     Mar 19, 2018  3:00 PM CDT   (Arrive by 2:45 PM)   New Patient Visit with Quyen Lennon, PhD   [facility] ([facility])    [address]  4th [address]  [place] [address]   [phone]            Mar 21, 2018  2:00 PM CDT   Infusion 120 with UC SPEC INFUSION, UC 45 ATC   [facility] ([facility])    [address]  [address]  [place] [address]   [phone]            Mar 23, 2018 12:00 PM CDT   Infusion 120 with UC SPEC INFUSION, UC 50 ATC   [facility] ([facility])    [address]  [address]  [place] [address]   [phone]            Mar 26, 2018  9:40 AM CDT   (Arrive by 9:25 AM)   Return Visit with Leonardo Wang MD   [facility] ([facility])    [address]  [address]  [place] [address]   [phone]            Mar 26, 2018  2:00 PM CDT   Infusion 120 with UC SPEC INFUSION, UC 50 ATC   [facility] ([facility])    [address]  [address]  [place] [address]   [phone]            Mar 28, 2018  1:00 PM CDT   Infusion 120 with UC SPEC INFUSION, UC 42 ATC   [facility] ([facility])    [address]  [address]  [place] [address]   [phone]              Future tests that were ordered for you today     Open Future Orders        Priority Expected Expires Ordered    CBC with platelets differential Routine 3/19/2018 4/2/2018 3/19/2018    Lipase Routine 3/19/2018 4/2/2018 3/19/2018    Amylase Routine 3/19/2018 3/19/2019 3/19/2018    Comprehensive metabolic panel  Routine 3/19/2018 4/2/2018 3/19/2018            Who to contact     Please call your clinic at [phone] to:    Ask questions about your health    Make or cancel appointments    Discuss your medicines    Learn about your test results    Speak to your doctor            Additional Information About Your Visit        MyChart Information     [employer] gives you secure access to your electronic health record. If you see a primary care provider, you can also send messages to your care team and make appointments. If you have questions, please call your primary care clinic.  If you do not have a primary care provider, please call [phone] and they will assist you.      [employer] is an electronic gateway that provides easy, online access to your medical records. With [employer], you can request a clinic appointment, read your test results, renew a prescription or communicate with your care team.     To access your existing account, please contact your [facility] or call [phone] for assistance.        Care EveryWhere ID     This is your Care EveryWhere ID. This could be used by other organizations to access your [place] medical records  [id number]        Your Vitals Were     Pulse Respirations BMI (Body Mass Index)             101 16 23.55 kg/m2          Blood Pressure from Last 3 Encounters:   03/19/18 118/74   03/16/18 134/89   03/14/18 112/69    Weight from Last 3 Encounters:   03/19/18 67.2 kg (148 lb 1.6 oz)   03/12/18 66.7 kg (147 lb)   03/12/18 66.7 kg (147 lb)              We Performed the Following     CARDIOLOGY EVAL ADULT REFERRAL          Today's Medication Changes          These changes are accurate as of 3/19/18  8:10 AM.  If you have any questions, ask your nurse or doctor.               Start taking these medicines.        Dose/Directions    blood glucose monitoring test strip   Commonly known as:  no brand specified   Used for:  Hypoglycemia   Started by:  Quyen Baez  MD [place]        One Touch Ultra 2 Strips, Use to test blood sugars 2 times daily or as directed   Quantity:  100 strip   Refills:  3         These medicines have changed or have updated prescriptions.        Dose/Directions    gabapentin 300 MG capsule   Commonly known as:  NEURONTIN   This may have changed:  Another medication with the same name was removed. Continue taking this medication, and follow the directions you see here.   Changed by:  Quyen Baez MD        TK 2 CS QAM  1 C IN THE AFTERNOON AND 2 CS QHS   Refills:  0       nortriptyline 10 MG capsule   Commonly known as:  PAMELOR   This may have changed:  how much to take   Used for:  Right upper quadrant abdominal pain        Dose:  30 mg   Take 3 capsules (30 mg) by mouth At Bedtime   Quantity:  120 capsule   Refills:  0            Where to get your medicines      These medications were sent to [facility] - [place], [place] - [address] [place] [facility] [address]  [address], [place] [facility] [address]    Hours:  called pharm.  they do accept faxes Phone:  [phone]     blood glucose monitoring test strip                Primary Care Provider Office Phone # Fax #    Quyen Baez [phone]       [address] [address]        Equal Access to Services     RACHAEL BENITEZ AH: Hadii angelina ku hadasho Soomaali, waaxda luqadaha, qaybta kaalmada adeegyada, waxay idiin hayaan yann smallwood. So [facility] [phone].    ATENCIÓN: Si habla español, tiene a quijano disposición servicios gratuitos de asistencia lingüística. ame al [phone].    We comply with applicable federal civil rights laws and [place] laws. We do not discriminate on the basis of race, color, national origin, age, disability, sex, sexual orientation, or gender identity.            Thank you!     Thank you for choosing [facility]  for your care. Our goal is always  to provide you with excellent care. Hearing back from our patients is one way we can continue to improve our services. Please take a few minutes to complete the written survey that you may receive in the mail after your visit with us. Thank you!             Your Updated Medication List - Protect others around you: Learn how to safely use, store and throw away your medicines at www.disposemymeds.org.          This list is accurate as of 3/19/18  8:10 AM.  Always use your most recent med list.                   Brand Name Dispense Instructions for use Diagnosis    acetaminophen 32 mg/mL solution    TYLENOL     30.45 mLs (975 mg) by Per J Tube route every 8 hours        * amylase-lipase-protease 82726 UNITS Cpep    ZENPEP    180 capsule    Take 2-3 capsules (40,000-60,000 Units) by mouth Take with snacks or supplements (Snacks)    Idiopathic chronic pancreatitis (H)       * amylase-lipase-protease 19386 UNITS Cpep    ZENPEP    180 capsule    Take 5 capsules (100,000 Units) by mouth 4 times daily (with meals and nightly)    Right upper quadrant abdominal pain       blood glucose monitoring test strip    no brand specified    100 strip    One Touch Ultra 2 Strips, Use to test blood sugars 2 times daily or as directed    Hypoglycemia       fluticasone 50 MCG/ACT spray    FLONASE    16 g    Spray 2 sprays into both nostrils daily    Nasal congestion       gabapentin 300 MG capsule    NEURONTIN     TK 2 CS QAM  1 C IN THE AFTERNOON AND 2 CS QHS        hydrOXYzine 10 MG/5ML syrup    ATARAX    1500 mL    Take 12.5 mLs (25 mg) by mouth every 6 hours as needed for anxiety or other (pain)    Acute abdominal pain       leuprolide 11.25 MG kit    LUPRON DEPOT (3-MONTH)    1 each    Inject 11.25 mg into the muscle every 3 months    Endometriosis       levalbuterol 45 MCG/ACT Inhaler    XOPENEX HFA    1 Inhaler    Inhale 2 puffs into the lungs every 6 hours as needed for shortness of breath / dyspnea    Wheeze       menthol 5 % Ptch     ICY HOT    15 patch    Apply 1 patch topically every 8 hours as needed for muscle soreness    Abdominal pain, epigastric       multivitamins with minerals Liqd liquid     1 Bottle    15 mLs by Per Feeding Tube route daily    Abdominal pain, epigastric       norethindrone 5 MG tablet    AYGESTIN    90 tablet    Take 1 tablet (5 mg) by mouth daily    Endometriosis       nortriptyline 10 MG capsule    PAMELOR    120 capsule    Take 3 capsules (30 mg) by mouth At Bedtime    Right upper quadrant abdominal pain       ondansetron 4 MG ODT tab    ZOFRAN ODT    15 tablet    Take 1 tablet (4 mg) by mouth every 8 hours as needed for nausea or vomiting    Intractable cyclical vomiting with nausea       [place] [facility]     1 Units    Equipment being ordered: TENS with wire and electrode.    Chronic abdominal pain       oxyCODONE 5 MG/5ML solution    ROXICODONE    600 mL    Take 10 mL (10 mg) by mouth every 6 hours as needed, maximum 40 mL per day.    Abdominal pain, generalized, Chronic abdominal pain       pantoprazole 40 MG EC tablet    PROTONIX    30 tablet    Take 1 tablet (40 mg) by mouth 2 times daily (before meals)    Right upper quadrant abdominal pain, Erosive gastritis       polyethylene glycol Packet    MIRALAX/GLYCOLAX    7 packet    Take 17 g by mouth daily    Right upper quadrant abdominal pain       RIZATRIPTAN BENZOATE PO      Take 5 mg by mouth once as needed        scopolamine 72 hr patch    TRANSDERM    2 patch    Apply 1 patch to hairless area behind one ear if severe nausea and vomiting.  Remove old patch and change every 3 days (72 hours).    Intractable vomiting with nausea, unspecified vomiting type       senna-docusate 8.6-50 MG per tablet    SENOKOT-S;PERICOLACE    100 tablet    Take 1 tablet by mouth 2 times daily as needed for constipation    Right upper quadrant abdominal pain       sodium bicarbonate 325 MG tablet     120 tablet    1 tablet (325 mg) by Per Feeding Tube route 4 times daily     Abdominal pain, epigastric, Intractable cyclical vomiting with nausea       * Notice:  This list has 2 medication(s) that are the same as other medications prescribed for you. Read the directions carefully, and ask your doctor or other care provider to review them with you.

## 2018-03-19 NOTE — NURSING NOTE
Chief Complaint   Patient presents with     Abdominal Pain     pt states she had GJ tube placed, having a lot of pain     Carmina Poon CMA at 7:27 AM on 3/19/2018.

## 2018-03-20 ASSESSMENT — PATIENT HEALTH QUESTIONNAIRE - PHQ9: SUM OF ALL RESPONSES TO PHQ QUESTIONS 1-9: 6

## 2018-03-21 ENCOUNTER — INFUSION THERAPY VISIT (OUTPATIENT)
Dept: INFUSION THERAPY | Facility: CLINIC | Age: 23
End: 2018-03-21
Attending: INTERNAL MEDICINE
Payer: COMMERCIAL

## 2018-03-21 VITALS
SYSTOLIC BLOOD PRESSURE: 127 MMHG | TEMPERATURE: 98.4 F | HEART RATE: 114 BPM | DIASTOLIC BLOOD PRESSURE: 82 MMHG | RESPIRATION RATE: 18 BRPM | OXYGEN SATURATION: 98 %

## 2018-03-21 DIAGNOSIS — R52 PAIN: ICD-10-CM

## 2018-03-21 DIAGNOSIS — R10.9 ACUTE ABDOMINAL PAIN: ICD-10-CM

## 2018-03-21 DIAGNOSIS — R10.13 ABDOMINAL PAIN, EPIGASTRIC: ICD-10-CM

## 2018-03-21 DIAGNOSIS — F33.1 MAJOR DEPRESSIVE DISORDER, RECURRENT EPISODE, MODERATE (H): ICD-10-CM

## 2018-03-21 DIAGNOSIS — R10.9 ABDOMINAL PAIN: ICD-10-CM

## 2018-03-21 DIAGNOSIS — K86.1 CHRONIC PANCREATITIS, UNSPECIFIED PANCREATITIS TYPE (H): ICD-10-CM

## 2018-03-21 DIAGNOSIS — N39.41 URGE INCONTINENCE: ICD-10-CM

## 2018-03-21 DIAGNOSIS — R21 RASH: ICD-10-CM

## 2018-03-21 DIAGNOSIS — R11.15 INTRACTABLE CYCLICAL VOMITING WITH NAUSEA: ICD-10-CM

## 2018-03-21 DIAGNOSIS — E16.2 HYPOGLYCEMIA: ICD-10-CM

## 2018-03-21 DIAGNOSIS — F41.1 ANXIETY STATE: ICD-10-CM

## 2018-03-21 DIAGNOSIS — Z98.890 S/P ERCP: ICD-10-CM

## 2018-03-21 DIAGNOSIS — G43.A0 CYCLICAL VOMITING WITH NAUSEA, INTRACTABILITY OF VOMITING NOT SPECIFIED: Primary | ICD-10-CM

## 2018-03-21 PROCEDURE — 25000128 H RX IP 250 OP 636: Mod: ZF | Performed by: NURSE PRACTITIONER

## 2018-03-21 PROCEDURE — 96375 TX/PRO/DX INJ NEW DRUG ADDON: CPT

## 2018-03-21 PROCEDURE — 96374 THER/PROPH/DIAG INJ IV PUSH: CPT

## 2018-03-21 PROCEDURE — 96361 HYDRATE IV INFUSION ADD-ON: CPT

## 2018-03-21 PROCEDURE — 96376 TX/PRO/DX INJ SAME DRUG ADON: CPT

## 2018-03-21 PROCEDURE — 25000128 H RX IP 250 OP 636: Mod: ZF | Performed by: INTERNAL MEDICINE

## 2018-03-21 RX ORDER — DIPHENHYDRAMINE HYDROCHLORIDE 50 MG/ML
25 INJECTION INTRAMUSCULAR; INTRAVENOUS DAILY PRN
Status: DISCONTINUED | OUTPATIENT
Start: 2018-03-21 | End: 2018-03-21 | Stop reason: HOSPADM

## 2018-03-21 RX ORDER — DIPHENHYDRAMINE HYDROCHLORIDE 50 MG/ML
25 INJECTION INTRAMUSCULAR; INTRAVENOUS ONCE
Status: CANCELLED
Start: 2018-03-21 | End: 2018-03-21

## 2018-03-21 RX ORDER — ONDANSETRON 2 MG/ML
4 INJECTION INTRAMUSCULAR; INTRAVENOUS ONCE
Status: CANCELLED
Start: 2018-03-21 | End: 2018-03-21

## 2018-03-21 RX ORDER — DIPHENHYDRAMINE HYDROCHLORIDE 50 MG/ML
25 INJECTION INTRAMUSCULAR; INTRAVENOUS ONCE
Status: COMPLETED | OUTPATIENT
Start: 2018-03-21 | End: 2018-03-21

## 2018-03-21 RX ORDER — ONDANSETRON 2 MG/ML
4 INJECTION INTRAMUSCULAR; INTRAVENOUS ONCE
Status: COMPLETED | OUTPATIENT
Start: 2018-03-21 | End: 2018-03-21

## 2018-03-21 RX ORDER — ONDANSETRON 2 MG/ML
4 INJECTION INTRAMUSCULAR; INTRAVENOUS DAILY PRN
Status: DISCONTINUED | OUTPATIENT
Start: 2018-03-21 | End: 2018-03-21 | Stop reason: HOSPADM

## 2018-03-21 RX ORDER — ONDANSETRON 2 MG/ML
4 INJECTION INTRAMUSCULAR; INTRAVENOUS DAILY PRN
Status: CANCELLED
Start: 2018-03-21

## 2018-03-21 RX ORDER — DIPHENHYDRAMINE HYDROCHLORIDE 50 MG/ML
25 INJECTION INTRAMUSCULAR; INTRAVENOUS DAILY PRN
Status: CANCELLED
Start: 2018-03-21

## 2018-03-21 RX ADMIN — ONDANSETRON 4 MG: 2 INJECTION INTRAMUSCULAR; INTRAVENOUS at 14:25

## 2018-03-21 RX ADMIN — DIPHENHYDRAMINE HYDROCHLORIDE 25 MG: 50 INJECTION INTRAMUSCULAR; INTRAVENOUS at 14:31

## 2018-03-21 RX ADMIN — SODIUM CHLORIDE, POTASSIUM CHLORIDE, SODIUM LACTATE AND CALCIUM CHLORIDE 2000 ML: 600; 310; 30; 20 INJECTION, SOLUTION INTRAVENOUS at 14:20

## 2018-03-21 RX ADMIN — ONDANSETRON 4 MG: 2 INJECTION INTRAMUSCULAR; INTRAVENOUS at 15:24

## 2018-03-21 RX ADMIN — DIPHENHYDRAMINE HYDROCHLORIDE 25 MG: 50 INJECTION INTRAMUSCULAR; INTRAVENOUS at 15:23

## 2018-03-21 ASSESSMENT — PAIN SCALES - GENERAL: PAINLEVEL: SEVERE PAIN (6)

## 2018-03-21 NOTE — PROGRESS NOTES
Nursing Note  Alexandra Melgoza presents [facility] for:   Chief Complaint   Patient presents with     Infusion     IVF, zofran, benadryl     During today's [facility] appointment, orders from Dr. Campbell Nraayanan were completed.  Frequency: 3 times weekly as needed.    Progress note:  Patient identification verified by name and date of birth.  Assessment completed.  Vitals recorded in Doc Flowsheets.  Patient was provided with education regarding infusion and possible side effects.  Patient verbalized understanding.      needed: No  Premedications: were not ordered.  Infusion Rates: Each liter infused over 1 hour for a total infusion time of 2 hours. Zofran admin IVP over 3 minutes, Benadryl admin IVP over 3 minutes.  Labs: were drawn per orders.   Vascular access: port accessed today. + blood return. Flushed without resistance. Heparin locked and de-accessed at discharge  Treatment Conditions: non-applicable.  Patient tolerated infusion: well.      Drug Waste Record x2     Drug Name: Benadryl  Dose: 25mg  Route administered: IV  NDC #: 9380-8859-49  Amount of waste(mL):0.5mL  Reason for waste: Single use vial      Discharge Plan:   Follow up plan of care with: ongoing infusions at [facility].  Discharge instructions were reviewed with patient.  Patient/representative verbalized understanding of discharge instructions and all questions answered.  Patient discharged from [facility] in stable condition.    Ericka Severson, RN    Administrations This Visit     diphenhydrAMINE (BENADRYL) injection 25 mg     Admin Date Action Dose Route Administered By             03/21/2018 Given 25 mg Intravenous Severson, Ericka, RN                    lactated ringers BOLUS 1,000-2,000 mL     Admin Date Action Dose Route Administered By             03/21/2018 New Bag 1000 mL Intravenous Severson, Ericka,  RN                    ondansetron (ZOFRAN) injection 4 mg     Admin Date Action Dose Route Administered By             03/21/2018 Given 4 mg Intravenous Severson, Ericka, SAMARA                          /74  Pulse 114  Temp 98.4  F (36.9  C) (Oral)  Resp 18  SpO2 98%

## 2018-03-21 NOTE — MR AVS SNAPSHOT
After Visit Summary   3/21/2018    Alexandra Melgoza    MRN: [id number]           Patient Information     Date Of Birth          1995        Visit Information        Provider Department      3/21/2018 2:00 PM UC 45 ATC; UC SPEC INFUSION [facility]        Today's Diagnoses     Cyclical vomiting with nausea, intractability of vomiting not specified    -  1    Chronic pancreatitis, unspecified pancreatitis type (H)        Intractable cyclical vomiting with nausea        Pain        Abdominal pain, epigastric        S/P ERCP        Acute abdominal pain        Abdominal pain        Hypoglycemia        Urge incontinence        Major depressive disorder, recurrent episode, moderate (H)        Anxiety state        Rash           Follow-ups after your visit        Your next 10 appointments already scheduled     Mar 23, 2018 12:00 PM CDT   Infusion 120 with UC SPEC INFUSION, UC 50 ATC   [facility] Special[facility] ([facility])    [address]  [address]  [facility] [address]   [phone]            Mar 26, 2018  9:40 AM CDT   (Arrive by 9:25 AM)   Return Visit with Leonardo Wang MD   [facility] for [facility] ([facility])    [address] [place] [address]  4th Floor  [facility] [address]   [phone]            Mar 26, 2018  2:00 PM CDT   Infusion 120 with UC SPEC INFUSION, UC 50 ATC   [facility] Procedure ([facility])    [address]  [address]  [facility] [address]   [phone]            Mar 28, 2018  1:00 PM CDT   Infusion 120 with UC SPEC INFUSION, UC 42 ATC   [facility] ([facility])    [address]  [address]  [facility] [address]   [phone]            Mar 28, 2018  4:00  PM CDT   (Arrive by 3:45 PM)   Return Visit with Quyen Lennon, PhD   [facility] for [facility] Management ([facility])    [address]  4th Floor  [facility] [place] [address] [phone]            Mar 30, 2018 10:00 AM CDT   Infusion 120 with UC SPEC INFUSION, UC 49 ATC   [facility] Special[facility] and Procedure ([facility])    [address]  [address]  [facility] [place] [address] [phone]              Who to contact     If you have questions or need follow up information about today's clinic visit or your schedule please contact [facility] SPECIALTY AND PROCEDURE directly at [phone].  Normal or non-critical lab and imaging results will be communicated to you by Seeklyhart, letter or phone within 4 business days after the clinic has received the results. If you do not hear from us within 7 days, please contact the clinic through Seeklyhart or phone. If you have a critical or abnormal lab result, we will notify you by phone as soon as possible.  Submit refill requests through Veodin or call your pharmacy and they will forward the refill request to us. Please allow 3 business days for your refill to be completed.          Additional Information About Your Visit        Seeklyhart Information     [employer] gives you secure access to your electronic health record. If you see a primary care provider, you can also send messages to your care team and make appointments. If you have questions, please call your primary care clinic.  If you do not have a primary care provider, please call [phone] and they will assist you.        Care EveryWhere ID     This is your Care EveryWhere ID. This could be used by other organizations to access your [place] medical records  [id number]        Your Vitals Were     Pulse Temperature Respirations Pulse Oximetry          114 98.4  F (36.9  C) (Oral) 18 98%          Blood Pressure from Last 3 Encounters:   03/21/18 127/82   03/19/18 118/74   03/16/18 134/89    Weight from Last 3 Encounters:   03/19/18 67.2 kg (148 lb 1.6 oz)   03/12/18 66.7 kg (147 lb)   03/12/18 66.7 kg (147 lb)              Today, you had the following     No orders found for display         Today's Medication Changes          These changes are accurate as of 3/21/18  4:53 PM.  If you have any questions, ask your nurse or doctor.               These medicines have changed or have updated prescriptions.        Dose/Directions    nortriptyline 10 MG capsule   Commonly known as:  PAMELOR   This may have changed:  how much to take   Used for:  Right upper quadrant abdominal pain        Dose:  30 mg   Take 3 capsules (30 mg) by mouth At Bedtime   Quantity:  120 capsule   Refills:  0                Primary Care Provider Office Phone # Fax #    [place]ne Baez [phone]       [address] [address]        Equal Access to Services     [facility] : Hadyoana rodneyo Somalini, waaxda luqadaha, qaybta kaalmada jacque, maki gaspar . So [facility] [phone].    ATENCIÓN: Si habla español, tiene a quijano disposición servicios gratuitos de asistencia lingüística. [facility] [phone].    We comply with applicable federal civil rights laws and [place] laws. We do not discriminate on the basis of race, color, national origin, age, disability, sex, sexual orientation, or gender identity.            Thank you!     Thank you for choosing [facility] SPECIALTY AND PROCEDURE  for your care. Our goal is always to provide you with excellent care. Hearing back from our patients is one way we can continue to improve our services. Please take a few minutes to complete the written survey that you may receive in the mail after your visit with us. Thank you!             Your Updated Medication List - Protect others around you:  Learn how to safely use, store and throw away your medicines at www.disposemymeds.org.          This list is accurate as of 3/21/18  4:53 PM.  Always use your most recent med list.                   Brand Name Dispense Instructions for use Diagnosis    acetaminophen 32 mg/mL solution    TYLENOL     30.45 mLs (975 mg) by Per J Tube route every 8 hours        * amylase-lipase-protease 15881 UNITS Cpep    ZENPEP    180 capsule    Take 2-3 capsules (40,000-60,000 Units) by mouth Take with snacks or supplements (Snacks)    Idiopathic chronic pancreatitis (H)       * amylase-lipase-protease 01055 UNITS Cpep    ZENPEP    180 capsule    Take 5 capsules (100,000 Units) by mouth 4 times daily (with meals and nightly)    Right upper quadrant abdominal pain       blood glucose monitoring test strip    no brand specified    100 strip    One Touch Ultra 2 Strips, Use to test blood sugars 2 times daily or as directed    Hypoglycemia       fluticasone 50 MCG/ACT spray    FLONASE    16 g    Spray 2 sprays into both nostrils daily    Nasal congestion       gabapentin 300 MG capsule    NEURONTIN     TK 2 CS QAM  1 C IN THE AFTERNOON AND 2 CS QHS        hydrOXYzine 10 MG/5ML syrup    ATARAX    1500 mL    Take 12.5 mLs (25 mg) by mouth every 6 hours as needed for anxiety or other (pain)    Acute abdominal pain       leuprolide 11.25 MG kit    LUPRON DEPOT (3-MONTH)    1 each    Inject 11.25 mg into the muscle every 3 months    Endometriosis       levalbuterol 45 MCG/ACT Inhaler    XOPENEX HFA    1 Inhaler    Inhale 2 puffs into the lungs every 6 hours as needed for shortness of breath / dyspnea    Wheeze       menthol 5 % Ptch    ICY HOT    15 patch    Apply 1 patch topically every 8 hours as needed for muscle soreness    Abdominal pain, epigastric       multivitamins with minerals Liqd liquid     1 Bottle    15 mLs by Per Feeding Tube route daily    Abdominal pain, epigastric       norethindrone 5 MG tablet    AYGESTIN    90 tablet     Take 1 tablet (5 mg) by mouth daily    Endometriosis       nortriptyline 10 MG capsule    PAMELOR    120 capsule    Take 3 capsules (30 mg) by mouth At Bedtime    Right upper quadrant abdominal pain       ondansetron 4 MG ODT tab    ZOFRAN ODT    15 tablet    Take 1 tablet (4 mg) by mouth every 8 hours as needed for nausea or vomiting    Intractable cyclical vomiting with nausea       order for DME     1 Units    Equipment being ordered: TENS with wire and electrode.    Chronic abdominal pain       oxyCODONE 5 MG/5ML solution    ROXICODONE    600 mL    Take 10 mL (10 mg) by mouth every 6 hours as needed, maximum 40 mL per day.    Abdominal pain, generalized, Chronic abdominal pain       pantoprazole 40 MG EC tablet    PROTONIX    30 tablet    Take 1 tablet (40 mg) by mouth 2 times daily (before meals)    Right upper quadrant abdominal pain, Erosive gastritis       polyethylene glycol Packet    MIRALAX/GLYCOLAX    7 packet    Take 17 g by mouth daily    Right upper quadrant abdominal pain       RIZATRIPTAN BENZOATE PO      Take 5 mg by mouth once as needed        scopolamine 72 hr patch    TRANSDERM    2 patch    Apply 1 patch to hairless area behind one ear if severe nausea and vomiting.  Remove old patch and change every 3 days (72 hours).    Intractable vomiting with nausea, unspecified vomiting type       senna-docusate 8.6-50 MG per tablet    SENOKOT-S;PERICOLACE    100 tablet    Take 1 tablet by mouth 2 times daily as needed for constipation    Right upper quadrant abdominal pain       sodium bicarbonate 325 MG tablet     120 tablet    1 tablet (325 mg) by Per Feeding Tube route 4 times daily    Abdominal pain, epigastric, Intractable cyclical vomiting with nausea       * Notice:  This list has 2 medication(s) that are the same as other medications prescribed for you. Read the directions carefully, and ask your doctor or other care provider to review them with you.

## 2018-03-23 ENCOUNTER — INFUSION THERAPY VISIT (OUTPATIENT)
Dept: INFUSION THERAPY | Facility: CLINIC | Age: 23
End: 2018-03-23
Attending: INTERNAL MEDICINE
Payer: COMMERCIAL

## 2018-03-23 VITALS
DIASTOLIC BLOOD PRESSURE: 97 MMHG | TEMPERATURE: 98.2 F | SYSTOLIC BLOOD PRESSURE: 137 MMHG | HEART RATE: 93 BPM | OXYGEN SATURATION: 100 % | RESPIRATION RATE: 16 BRPM

## 2018-03-23 DIAGNOSIS — E16.2 HYPOGLYCEMIA: ICD-10-CM

## 2018-03-23 DIAGNOSIS — R10.9 ACUTE ABDOMINAL PAIN: ICD-10-CM

## 2018-03-23 DIAGNOSIS — F41.1 ANXIETY STATE: ICD-10-CM

## 2018-03-23 DIAGNOSIS — R10.13 ABDOMINAL PAIN, EPIGASTRIC: ICD-10-CM

## 2018-03-23 DIAGNOSIS — R11.15 INTRACTABLE CYCLICAL VOMITING WITH NAUSEA: ICD-10-CM

## 2018-03-23 DIAGNOSIS — G43.A0 CYCLICAL VOMITING WITH NAUSEA, INTRACTABILITY OF VOMITING NOT SPECIFIED: Primary | ICD-10-CM

## 2018-03-23 DIAGNOSIS — R52 PAIN: ICD-10-CM

## 2018-03-23 DIAGNOSIS — Z98.890 S/P ERCP: ICD-10-CM

## 2018-03-23 DIAGNOSIS — R21 RASH: ICD-10-CM

## 2018-03-23 DIAGNOSIS — F33.1 MAJOR DEPRESSIVE DISORDER, RECURRENT EPISODE, MODERATE (H): ICD-10-CM

## 2018-03-23 DIAGNOSIS — K86.1 CHRONIC PANCREATITIS, UNSPECIFIED PANCREATITIS TYPE (H): ICD-10-CM

## 2018-03-23 DIAGNOSIS — N39.41 URGE INCONTINENCE: ICD-10-CM

## 2018-03-23 DIAGNOSIS — R10.9 ABDOMINAL PAIN: ICD-10-CM

## 2018-03-23 PROCEDURE — 96376 TX/PRO/DX INJ SAME DRUG ADON: CPT

## 2018-03-23 PROCEDURE — 25000128 H RX IP 250 OP 636: Mod: ZF | Performed by: NURSE PRACTITIONER

## 2018-03-23 PROCEDURE — 96374 THER/PROPH/DIAG INJ IV PUSH: CPT

## 2018-03-23 PROCEDURE — 96361 HYDRATE IV INFUSION ADD-ON: CPT

## 2018-03-23 PROCEDURE — 25000128 H RX IP 250 OP 636: Mod: ZF | Performed by: INTERNAL MEDICINE

## 2018-03-23 PROCEDURE — 96375 TX/PRO/DX INJ NEW DRUG ADDON: CPT

## 2018-03-23 RX ORDER — DIPHENHYDRAMINE HYDROCHLORIDE 50 MG/ML
25 INJECTION INTRAMUSCULAR; INTRAVENOUS ONCE
Status: COMPLETED | OUTPATIENT
Start: 2018-03-23 | End: 2018-03-23

## 2018-03-23 RX ORDER — ONDANSETRON 2 MG/ML
4 INJECTION INTRAMUSCULAR; INTRAVENOUS ONCE
Status: COMPLETED | OUTPATIENT
Start: 2018-03-23 | End: 2018-03-23

## 2018-03-23 RX ORDER — DIPHENHYDRAMINE HYDROCHLORIDE 50 MG/ML
25 INJECTION INTRAMUSCULAR; INTRAVENOUS DAILY PRN
Status: CANCELLED
Start: 2018-03-23

## 2018-03-23 RX ORDER — DIPHENHYDRAMINE HYDROCHLORIDE 50 MG/ML
25 INJECTION INTRAMUSCULAR; INTRAVENOUS DAILY PRN
Status: DISCONTINUED | OUTPATIENT
Start: 2018-03-23 | End: 2018-03-23 | Stop reason: HOSPADM

## 2018-03-23 RX ORDER — ONDANSETRON 2 MG/ML
4 INJECTION INTRAMUSCULAR; INTRAVENOUS DAILY PRN
Status: DISCONTINUED | OUTPATIENT
Start: 2018-03-23 | End: 2018-03-23 | Stop reason: HOSPADM

## 2018-03-23 RX ORDER — DIPHENHYDRAMINE HYDROCHLORIDE 50 MG/ML
25 INJECTION INTRAMUSCULAR; INTRAVENOUS ONCE
Status: CANCELLED
Start: 2018-03-23 | End: 2018-03-23

## 2018-03-23 RX ORDER — ONDANSETRON 2 MG/ML
4 INJECTION INTRAMUSCULAR; INTRAVENOUS ONCE
Status: CANCELLED
Start: 2018-03-23 | End: 2018-03-23

## 2018-03-23 RX ORDER — ONDANSETRON 2 MG/ML
4 INJECTION INTRAMUSCULAR; INTRAVENOUS DAILY PRN
Status: CANCELLED
Start: 2018-03-23

## 2018-03-23 RX ADMIN — ONDANSETRON 4 MG: 2 INJECTION INTRAMUSCULAR; INTRAVENOUS at 13:50

## 2018-03-23 RX ADMIN — ONDANSETRON 4 MG: 2 INJECTION INTRAMUSCULAR; INTRAVENOUS at 11:54

## 2018-03-23 RX ADMIN — DIPHENHYDRAMINE HYDROCHLORIDE 25 MG: 50 INJECTION INTRAMUSCULAR; INTRAVENOUS at 13:55

## 2018-03-23 RX ADMIN — DIPHENHYDRAMINE HYDROCHLORIDE 25 MG: 50 INJECTION, SOLUTION INTRAMUSCULAR; INTRAVENOUS at 11:59

## 2018-03-23 RX ADMIN — SODIUM CHLORIDE, POTASSIUM CHLORIDE, SODIUM LACTATE AND CALCIUM CHLORIDE 2000 ML: 600; 310; 30; 20 INJECTION, SOLUTION INTRAVENOUS at 11:54

## 2018-03-23 NOTE — PROGRESS NOTES
Nursing Note  Alexandra Melgoza [facility] for:   Chief Complaint   Patient presents with     Infusion     IVF, benadryl and zofran     During [facility]'s [facility] appointment, orders from Dr. Narayanan were completed.  Frequency: 3 times a week as needed    Progress note:  Patient identification verified by name and date of birth.  Assessment completed.  Vitals recorded in Doc Flowsheets.  Patient was provided with education regarding infusion and possible side effects.  Patient verbalized understanding.      needed: No  Premedications: were not ordered.  Infusion Rates: Each liter of LR infused over an hour, for a total of 2 hours. Zofran IVP administered over 4 minutes, Benadryl IVP over 2 min.  Approximate Infusion length:2 hours.   Labs: were not ordered for this appointment.  Vascular access: port accessed today. Blood return noted.  Heparin locked and de-accessed at discharge.  Treatment Conditions: non-applicable.  Patient tolerated infusion: well.    Discharge Plan:   Follow up plan of care with: ongoing infusions at [facility].  Discharge instructions were reviewed with patient.  Patient/representative verbalized understanding of discharge instructions and all questions answered.  Patient discharged from [facility] in stable condition.    ROGER BARROS, SAMARA    Administrations This Visit     diphenhydrAMINE (BENADRYL) injection 25 mg     Admin Date Action Dose Route Administered By             03/23/2018 Given 25 mg Intravenous Daphnie Baum RN              Admin Date Action Dose Route Administered By             03/23/2018 Given 25 mg Intravenous Daphnie Baum, RN                    lactated ringers BOLUS 1,000-2,000 mL     Admin Date Action Dose Route Administered By             03/23/2018 New Bag 2000 mL Intravenous Daphnie Baum, RN                     ondansetron (ZOFRAN) injection 4 mg     Admin Date Action Dose Route Administered By             03/23/2018 Given 4 mg Intravenous Daphnie Baum RN              Admin Date Action Dose Route Administered By             03/23/2018 Given 4 mg Intravenous Daphnie Baum, RN                          /86  Pulse 91  Temp 98.2  F (36.8  C) (Oral)  Resp 16  SpO2 100%

## 2018-03-23 NOTE — MR AVS SNAPSHOT
After Visit Summary   3/23/2018    Alexandra Melgoza    MRN: [id number]           Patient Information     Date Of Birth          1995        Visit Information        Provider Department      3/23/2018 12:00 PM UC 50 ATC; UC SPEC INFUSION [facility] Specialty and Procedure        Today's Diagnoses     Cyclical vomiting with nausea, intractability of vomiting not specified    -  1    Chronic pancreatitis, unspecified pancreatitis type (H)        Intractable cyclical vomiting with nausea        Pain        Abdominal pain, epigastric        S/P ERCP        Acute abdominal pain        Abdominal pain        Hypoglycemia        Urge incontinence        Major depressive disorder, recurrent episode, moderate (H)        Anxiety state        Rash          Care Instructions    Dear Alexandra Melgoza    Thank you for choosing [facility] ([facility]) for your infusion.  The following information is a summary of our appointment as well as important reminders.      We look forward in seeing you on your next appointment here at [facility].  Please don t hesitate to call us at [phone] to reschedule any of your appointments or to speak with one of the [facility] registered nurses.  It was a pleasure taking care of you today.    Sincerely,    [facility]  [facility]  [address] [place] [address]  [address]  Phone:  [phone]            Follow-ups after your visit        Your next 10 appointments already scheduled     Mar 26, 2018  9:40 AM CDT   (Arrive by 9:25 AM)   Return Visit with Leonardo Wang MD   [facility] Management ([facility])    [address] [place] [address] [phone]            Mar 26, 2018  2:00 PM CDT   Infusion 120 with UC SPEC INFUSION, UC 50 ATC   [facility] Specialty and Procedure ([facility])    [address]  [address]  [facility] [address]   [phone]            Mar 28, 2018  1:00 PM CDT   Infusion 120 with UC SPEC INFUSION, UC 42 ATC   [facility] Specialty and Procedure ([facility])    [address]  [address]  [facility] [address]   [phone]            Mar 28, 2018  4:00 PM CDT   (Arrive by 3:45 PM)   Return Visit with Quyen Lennon, PhD   [facility] for [facility] Management ([facility])    [address]  4th Floor  [facility] [address]   [phone]            Mar 30, 2018 10:00 AM CDT   Infusion 120 with UC SPEC INFUSION, UC 49 ATC   [facility] Specialty and Procedure ([facility])    [address]  [address]  [facility] [address]   [phone]            Apr 04, 2018  9:00 AM CDT   (Arrive by 8:45 AM)   Return Visit with Quyen Lennon, PhD   [facility] ([facility])    [address]  4th [address]  [facility] [address]   [phone]            Apr 11, 2018 10:00 AM CDT   (Arrive by 9:45 AM)   Return Visit with Quyen Lennon, PhD   [facility] ([facility])    [address]  4th [address]  [facility] [address]   [phone]              Who to contact     If you have questions or need follow up information about today's clinic visit or your schedule please contact [facility] SPECIALTY AND PROCEDURE directly at [phone].  Normal or non-critical lab and imaging results will be communicated to you by MyChart, letter or phone within 4 business days after the clinic has received the results. If you do not hear from us within 7 days, please contact the  clinic through [employer] or phone. If you have a critical or abnormal lab result, we will notify you by phone as soon as possible.  Submit refill requests through [employer] or call your pharmacy and they will forward the refill request to us. Please allow 3 business days for your refill to be completed.          Additional Information About Your Visit        Context Aware Solutionshart Information     [employer] gives you secure access to your electronic health record. If you see a primary care provider, you can also send messages to your care team and make appointments. If you have questions, please call your primary care clinic.  If you do not have a primary care provider, please call [phone] and they will assist you.        Care EveryWhere ID     This is your Care EveryWhere ID. This could be used by other organizations to access your [place] medical records  [id number]        Your Vitals Were     Pulse Temperature Respirations Pulse Oximetry          93 98.2  F (36.8  C) (Oral) 16 100%         Blood Pressure from Last 3 Encounters:   03/23/18 (!) 137/97   03/21/18 127/82   03/19/18 118/74    Weight from Last 3 Encounters:   03/19/18 67.2 kg (148 lb 1.6 oz)   03/12/18 66.7 kg (147 lb)   03/12/18 66.7 kg (147 lb)              Today, you had the following     No orders found for display         Today's Medication Changes          These changes are accurate as of 3/23/18  2:11 PM.  If you have any questions, ask your nurse or doctor.               These medicines have changed or have updated prescriptions.        Dose/Directions    nortriptyline 10 MG capsule   Commonly known as:  PAMELOR   This may have changed:  how much to take   Used for:  Right upper quadrant abdominal pain        Dose:  30 mg   Take 3 capsules (30 mg) by mouth At Bedtime   Quantity:  120 capsule   Refills:  0                Primary Care Provider Office Phone # Fax #    Quyen Baez [phone]       [address]  [place] [address]        Equal Access to Services     [facility] ELI : Hadii aad ku hadcathicaleb Katiali, wajesusda luqdemondha, qaybta kaalmamaki jones. So [facility] [phone].    ATENCIÓN: Si habla español, tiene a quijano disposición servicios gratuitos de asistencia lingüística. Varinderame al [phone].    We comply with applicable federal civil rights laws and [place] laws. We do not discriminate on the basis of race, color, national origin, age, disability, sex, sexual orientation, or gender identity.            Thank you!     Thank you for choosing [facility] SPECIALTY AND PROCEDURE  for your care. Our goal is always to provide you with excellent care. Hearing back from our patients is one way we can continue to improve our services. Please take a few minutes to complete the written survey that you may receive in the mail after your visit with us. Thank you!             Your Updated Medication List - Protect others around you: Learn how to safely use, store and throw away your medicines at www.disposemymeds.org.          This list is accurate as of 3/23/18  2:11 PM.  Always use your most recent med list.                   Brand Name Dispense Instructions for use Diagnosis    acetaminophen 32 mg/mL solution    TYLENOL     30.45 mLs (975 mg) by Per J Tube route every 8 hours        * amylase-lipase-protease 10991 UNITS Cpep    ZENPEP    180 capsule    Take 2-3 capsules (40,000-60,000 Units) by mouth Take with snacks or supplements (Snacks)    Idiopathic chronic pancreatitis (H)       * amylase-lipase-protease 31480 UNITS Cpep    ZENPEP    180 capsule    Take 5 capsules (100,000 Units) by mouth 4 times daily (with meals and nightly)    Right upper quadrant abdominal pain       blood glucose monitoring test strip    no brand specified    100 strip    One Touch Ultra 2 Strips, Use to test blood sugars 2 times daily or as directed    Hypoglycemia       fluticasone 50 MCG/ACT  spray    FLONASE    16 g    Spray 2 sprays into both nostrils daily    Nasal congestion       gabapentin 300 MG capsule    NEURONTIN     TK 2 CS QAM  1 C IN THE AFTERNOON AND 2 CS QHS        hydrOXYzine 10 MG/5ML syrup    ATARAX    1500 mL    Take 12.5 mLs (25 mg) by mouth every 6 hours as needed for anxiety or other (pain)    Acute abdominal pain       leuprolide 11.25 MG kit    LUPRON DEPOT (3-MONTH)    1 each    Inject 11.25 mg into the muscle every 3 months    Endometriosis       levalbuterol 45 MCG/ACT Inhaler    XOPENEX HFA    1 Inhaler    Inhale 2 puffs into the lungs every 6 hours as needed for shortness of breath / dyspnea    Wheeze       menthol 5 % Ptch    ICY HOT    15 patch    Apply 1 patch topically every 8 hours as needed for muscle soreness    Abdominal pain, epigastric       multivitamins with minerals Liqd liquid     1 Bottle    15 mLs by Per Feeding Tube route daily    Abdominal pain, epigastric       norethindrone 5 MG tablet    AYGESTIN    90 tablet    Take 1 tablet (5 mg) by mouth daily    Endometriosis       nortriptyline 10 MG capsule    PAMELOR    120 capsule    Take 3 capsules (30 mg) by mouth At Bedtime    Right upper quadrant abdominal pain       ondansetron 4 MG ODT tab    ZOFRAN ODT    15 tablet    Take 1 tablet (4 mg) by mouth every 8 hours as needed for nausea or vomiting    Intractable cyclical vomiting with nausea       order for DME     1 Units    Equipment being ordered: TENS with wire and electrode.    Chronic abdominal pain       oxyCODONE 5 MG/5ML solution    ROXICODONE    600 mL    Take 10 mL (10 mg) by mouth every 6 hours as needed, maximum 40 mL per day.    Abdominal pain, generalized, Chronic abdominal pain       pantoprazole 40 MG EC tablet    PROTONIX    30 tablet    Take 1 tablet (40 mg) by mouth 2 times daily (before meals)    Right upper quadrant abdominal pain, Erosive gastritis       polyethylene glycol Packet    MIRALAX/GLYCOLAX    7 packet    Take 17 g by mouth  daily    Right upper quadrant abdominal pain       RIZATRIPTAN BENZOATE PO      Take 5 mg by mouth once as needed        scopolamine 72 hr patch    TRANSDERM    2 patch    Apply 1 patch to hairless area behind one ear if severe nausea and vomiting.  Remove old patch and change every 3 days (72 hours).    Intractable vomiting with nausea, unspecified vomiting type       senna-docusate 8.6-50 MG per tablet    SENOKOT-S;PERICOLACE    100 tablet    Take 1 tablet by mouth 2 times daily as needed for constipation    Right upper quadrant abdominal pain       sodium bicarbonate 325 MG tablet     120 tablet    1 tablet (325 mg) by Per Feeding Tube route 4 times daily    Abdominal pain, epigastric, Intractable cyclical vomiting with nausea       * Notice:  This list has 2 medication(s) that are the same as other medications prescribed for you. Read the directions carefully, and ask your doctor or other care provider to review them with you.

## 2018-03-23 NOTE — PATIENT INSTRUCTIONS
Dear Alexandra Melgoza    Thank you for choosing [facility] ([facility]) for your infusion.  The following information is a summary of our appointment as well as important reminders.      We look forward in seeing you on your next appointment here at [facility].  Please don t hesitate to call us at [phone] to reschedule any of your appointments or to speak with one of the [facility] registered nurses.  It was a pleasure taking care of you today.    Sincerely,    [facility]  Specialty [facility] Center  [address] [place] [address]  [address]  Phone:  [phone]

## 2018-03-25 ENCOUNTER — APPOINTMENT (OUTPATIENT)
Dept: GENERAL RADIOLOGY | Facility: CLINIC | Age: 23
End: 2018-03-25
Attending: EMERGENCY MEDICINE
Payer: COMMERCIAL

## 2018-03-25 ENCOUNTER — HOSPITAL ENCOUNTER (EMERGENCY)
Facility: CLINIC | Age: 23
Discharge: HOME | End: 2018-03-25
Attending: EMERGENCY MEDICINE | Admitting: EMERGENCY MEDICINE
Payer: COMMERCIAL

## 2018-03-25 VITALS
SYSTOLIC BLOOD PRESSURE: 126 MMHG | RESPIRATION RATE: 16 BRPM | OXYGEN SATURATION: 97 % | HEART RATE: 118 BPM | TEMPERATURE: 99.1 F | DIASTOLIC BLOOD PRESSURE: 86 MMHG

## 2018-03-25 DIAGNOSIS — R10.9 CHRONIC ABDOMINAL PAIN: ICD-10-CM

## 2018-03-25 DIAGNOSIS — G89.29 CHRONIC ABDOMINAL PAIN: ICD-10-CM

## 2018-03-25 DIAGNOSIS — T85.528A DISLODGED GASTROSTOMY TUBE: ICD-10-CM

## 2018-03-25 PROCEDURE — 99284 EMERGENCY DEPT VISIT MOD MDM: CPT | Mod: 25 | Performed by: EMERGENCY MEDICINE

## 2018-03-25 PROCEDURE — 25000125 ZZHC RX 250

## 2018-03-25 PROCEDURE — 74019 RADEX ABDOMEN 2 VIEWS: CPT

## 2018-03-25 PROCEDURE — 25000128 H RX IP 250 OP 636: Performed by: EMERGENCY MEDICINE

## 2018-03-25 PROCEDURE — 25000132 ZZH RX MED GY IP 250 OP 250 PS 637: Performed by: EMERGENCY MEDICINE

## 2018-03-25 PROCEDURE — 96361 HYDRATE IV INFUSION ADD-ON: CPT | Performed by: EMERGENCY MEDICINE

## 2018-03-25 PROCEDURE — 96374 THER/PROPH/DIAG INJ IV PUSH: CPT | Performed by: EMERGENCY MEDICINE

## 2018-03-25 PROCEDURE — 99284 EMERGENCY DEPT VISIT MOD MDM: CPT | Mod: Z6 | Performed by: EMERGENCY MEDICINE

## 2018-03-25 RX ORDER — HEPARIN SODIUM (PORCINE) LOCK FLUSH IV SOLN 100 UNIT/ML 100 UNIT/ML
5 SOLUTION INTRAVENOUS
Status: DISCONTINUED | OUTPATIENT
Start: 2018-03-25 | End: 2018-03-25 | Stop reason: HOSPADM

## 2018-03-25 RX ORDER — ONDANSETRON 2 MG/ML
4 INJECTION INTRAMUSCULAR; INTRAVENOUS ONCE
Status: COMPLETED | OUTPATIENT
Start: 2018-03-25 | End: 2018-03-25

## 2018-03-25 RX ORDER — OXYCODONE HYDROCHLORIDE 5 MG/1
10 TABLET ORAL ONCE
Status: COMPLETED | OUTPATIENT
Start: 2018-03-25 | End: 2018-03-25

## 2018-03-25 RX ADMIN — ONDANSETRON 4 MG: 2 INJECTION INTRAMUSCULAR; INTRAVENOUS at 14:26

## 2018-03-25 RX ADMIN — SODIUM CHLORIDE, PRESERVATIVE FREE 5 ML: 5 INJECTION INTRAVENOUS at 15:50

## 2018-03-25 RX ADMIN — LIDOCAINE HYDROCHLORIDE 10 ML: 20 JELLY TOPICAL at 13:53

## 2018-03-25 RX ADMIN — SODIUM CHLORIDE 1000 ML: 9 INJECTION, SOLUTION INTRAVENOUS at 14:26

## 2018-03-25 RX ADMIN — OXYCODONE HYDROCHLORIDE 10 MG: 5 TABLET ORAL at 14:27

## 2018-03-25 ASSESSMENT — ENCOUNTER SYMPTOMS
PSYCHIATRIC NEGATIVE: 1
FEVER: 0
MUSCULOSKELETAL NEGATIVE: 1
NAUSEA: 1
SHORTNESS OF BREATH: 0
FLANK PAIN: 0
VOMITING: 0
EYES NEGATIVE: 1
ABDOMINAL PAIN: 1
HEADACHES: 0

## 2018-03-25 NOTE — DISCHARGE INSTRUCTIONS
Please make an appointment to follow up with GI Clinic (phone: [phone]) in 1-2 days.  You will need the GJ tube replaced.  This will be done through your GI clinic.

## 2018-03-25 NOTE — ED NOTES
12 Fr. Catheter inserted into the GJ tube site to keep the tract open. Catheter balloon filled with 10 mL NS as place herrera.

## 2018-03-25 NOTE — ED PROVIDER NOTES
[place] EMERGENCY DEPARTMENT ([facility])  3/25/18 ED 20 1:32 PM   History     Chief Complaint   Patient presents with     Abdominal Pain     feeding tube came out; became (near)-syncopal     The history is provided by the patient and medical records.     Alexandra Melgoza is a 22 year old female who resents with abdominal pain and dislodged GJ-tube.  She is accompanied by her boyfriend and mother who also present history on patient.  She has a very complex pain and psychiatric history.  She has a history of chronic abdominal pain status post cholecystectomy, sphincter of Oddi ablation a few years ago, cyclic nausea and vomiting, somatoform disorder on chronic narcotics.  Her PEG tube was converted to a GJ tube placed by Dr. Naaryanan on 2/8/18 as well as an EGD. The G tube is for venting and the J port is for tube feeds.  Patient states her tube came out started yesterday afternoon when her dog stepped on her GJ tubing and dislodged her GJ tube.  The balloon came out with this.  Patient was able to put it back in the tube and tapered down.  She had a lot of pain with this.  She contacted the GI team and they said that she could come into the ED but it was not technically in the emergency and she would not need this to be emergently replaced per se.Today she woke up and the balloon was 4 inches down her abdomen.  She stood up the balloon fell out.  Her boyfriend states she screamed and fell to the floor when the tube came out. Mother states prior to this she had some blood come out of the G portion of the tube hurts all this, but boyfriend notes that patient team is aware of this and they were doing to do an endoscopy to check it out.  She tried to hold out until she could be seen as an inpatient but the pain was more than she could bear. She is able to eat small amounts but not enough to keep up with her nutritional needs.  When asked why she is crying she states that it is because she is frustrated. She  feels nauseous at this time. Per mother Dr. Narayanan is out of town until tomorrow.     I have reviewed the Medications, Allergies, Past Medical and Surgical History, and Social History in the Epic system.    Review of Systems   Constitutional: Negative for fever.   Eyes: Negative.    Respiratory: Negative for shortness of breath.    Cardiovascular: Negative for chest pain.   Gastrointestinal: Positive for abdominal pain and nausea. Negative for vomiting.   Genitourinary: Negative for flank pain.   Musculoskeletal: Negative.    Neurological: Negative for headaches.   Psychiatric/Behavioral: Negative.    All other systems reviewed and are negative.      Physical Exam   BP: (!) 152/105  Pulse: 118  Temp: 99.1  F (37.3  C)  Resp: 18  SpO2: 99 %      Physical Exam  Physical Exam   Constitutional:   well nourished, well developed, sobbing  HENT:   Head: Normocephalic and atraumatic.   Eyes: Conjunctivae are normal. Pupils are equal, round, and reactive to light.   pharynx has no erythema or exudate, mucous membranes are moist  Neck:   no adenopathy, no bony tenderness  Cardiovascular: tachycardia without murmurs or gallops  Pulmonary/Chest: Clear to auscultation bilaterally, with no wheezes or retractions. No respiratory distress.  GI: Soft with good bowel sounds.  Non-tender, non-distended, with no guarding, no rebound, no peritoneal signs. Open GJ site with virtually no drainage  Back:  No bony or CVA tenderness   Musculoskeletal:  no edema or clubbing   Skin: Skin is warm and dry.   Neurological: alert and oriented to person, place, and time. Nonfocal exam  Psychiatric:  Crying and tearful mood and affect.   ED Course     ED Course     Procedures             Critical Care time:  none           Results for orders placed or performed during the hospital encounter of 03/25/18 (from the past 24 hour(s))   KUB XR    Narrative    EXAM: XR KUB  3/25/2018 2:42 PM     HISTORY:  pulled out GJ tube, abdominal pain    COMPARISON:   Abdominal radiograph 3/19/2018    FINDINGS:  A single AP view of the abdomen is obtained. Surgical clips  are noted in the right upper quadrant. Interval removal of  gastrojejunostomy tube, with a new catheter projecting over the right  upper quadrant region of the gastric antrum.    Multiple air distended loops of small and large bowel. Moderate stool  burden. No gross free air on supine imaging. No portal venous gas. No  pneumatosis.      Impression    IMPRESSION:   1. Interval removal of gastrojejunostomy tube, with a new catheter  projecting over the right upper quadrant region of the gastric antrum.  2. Nonspecific bowel gas pattern with multiple loops of air distended  small and large bowel. Moderate stool burden.    I have personally reviewed the examination and initial interpretation  and I agree with the findings.    YOUSUF LONDON MD     *Note: Due to a large number of results and/or encounters for the requested time period, some results have not been displayed. A complete set of results can be found in Results Review.      Labs Ordered and Resulted from Time of ED Arrival Up to the Time of Departure from the ED - No data to display    Consults  GI: Responded (03/25/18 1336)    Assessments & Plan (with Medical Decision Making)         I have reviewed the nursing notes.  Emergency Department course:  The patient was seen and examined at 1328 pm.   I consulted GI, who recommends placing a Childers in the GJ tube site to keep it open.  The patient was not really able to tolerate this despite the use of a Urojet for pain control..  I treated the patient with a bolus of normal saline IV, Zofran IV and oxycodone po. She is on chronic narcotics at home.  KUB shows a nonspecific bowel gas pattern and interval removal of the GJ tube.    The patient is a 22-year-old with chronic abdominal pain here with a dislodged gastrojejunostomy tube.  This will need to be replaced by GI.  She does have a Childers in place to keep  the tract open.  She is awaiting a GI consult at the time of this dictation.  GI recommendations were signed out to Dr. Quan at approximately 1530 pm.       I have reviewed the findings, diagnosis, plan and need for follow up with the patient.    New Prescriptions    No medications on file       Final diagnoses:   Dislodged gastrojejunostomy tube (H)   Chronic abdominal pain     I, Tomeka Pan, am serving as a trained medical scribe to document services personally performed by Katharina Flower MD based on the provider's statements to me on March 25, 2018.  This document has been checked and approved by the attending provider.    I, Katharina Flower MD, was physically present and have reviewed and verified the accuracy of this note documented by Tomeka Pan, medical scribe.     This note was created in part by the use of Dragon voice recognition dictation system. Inadvertent grammatical errors and typographical errors may still exist.  Katharina Flower MD  3/25/2018   [facility], [place], EMERGENCY DEPARTMENT     Katharina Flower MD  03/25/18 8957

## 2018-03-25 NOTE — ED AVS SNAPSHOT
[facility], [place], Emergency Department    [address] [place] [address]    Phone:  [phone]                                       Alexandra Melgoza   MRN: [id number]    Department:  [facility], Emergency Department   Date of Visit:  3/25/2018           After Visit Summary Signature Page     I have received my discharge instructions, and my questions have been answered. I have discussed any challenges I see with this plan with the nurse or doctor.    ..........................................................................................................................................  Patient/Patient Representative Signature      ..........................................................................................................................................  Patient Representative Print Name and Relationship to Patient    ..................................................               ................................................  Date                                            Time    ..........................................................................................................................................  Reviewed by Signature/Title    ...................................................              ..............................................  Date                                                            Time

## 2018-03-26 ENCOUNTER — CARE COORDINATION (OUTPATIENT)
Dept: GASTROENTEROLOGY | Facility: CLINIC | Age: 23
End: 2018-03-26

## 2018-03-26 ENCOUNTER — INFUSION THERAPY VISIT (OUTPATIENT)
Dept: INFUSION THERAPY | Facility: CLINIC | Age: 23
End: 2018-03-26
Attending: INTERNAL MEDICINE
Payer: COMMERCIAL

## 2018-03-26 ENCOUNTER — TELEPHONE (OUTPATIENT)
Dept: GASTROENTEROLOGY | Facility: CLINIC | Age: 23
End: 2018-03-26

## 2018-03-26 ENCOUNTER — OFFICE VISIT (OUTPATIENT)
Dept: ANESTHESIOLOGY | Facility: CLINIC | Age: 23
End: 2018-03-26
Payer: COMMERCIAL

## 2018-03-26 VITALS
DIASTOLIC BLOOD PRESSURE: 55 MMHG | OXYGEN SATURATION: 99 % | TEMPERATURE: 98.2 F | SYSTOLIC BLOOD PRESSURE: 111 MMHG | HEART RATE: 101 BPM | RESPIRATION RATE: 16 BRPM

## 2018-03-26 VITALS
RESPIRATION RATE: 16 BRPM | DIASTOLIC BLOOD PRESSURE: 85 MMHG | SYSTOLIC BLOOD PRESSURE: 126 MMHG | WEIGHT: 145 LBS | HEART RATE: 120 BPM | BODY MASS INDEX: 23.05 KG/M2

## 2018-03-26 DIAGNOSIS — R21 RASH: ICD-10-CM

## 2018-03-26 DIAGNOSIS — Z98.890 S/P ERCP: ICD-10-CM

## 2018-03-26 DIAGNOSIS — F41.1 ANXIETY STATE: ICD-10-CM

## 2018-03-26 DIAGNOSIS — K86.1 CHRONIC PANCREATITIS, UNSPECIFIED PANCREATITIS TYPE (H): ICD-10-CM

## 2018-03-26 DIAGNOSIS — R10.13 ABDOMINAL PAIN, EPIGASTRIC: ICD-10-CM

## 2018-03-26 DIAGNOSIS — R10.9 ACUTE ABDOMINAL PAIN: ICD-10-CM

## 2018-03-26 DIAGNOSIS — G43.A0 CYCLICAL VOMITING WITH NAUSEA, INTRACTABILITY OF VOMITING NOT SPECIFIED: Primary | ICD-10-CM

## 2018-03-26 DIAGNOSIS — Z78.9 ENCOUNTER FOR GASTROJEJUNAL (GJ) TUBE PLACEMENT: Primary | ICD-10-CM

## 2018-03-26 DIAGNOSIS — R11.15 INTRACTABLE CYCLICAL VOMITING WITH NAUSEA: ICD-10-CM

## 2018-03-26 DIAGNOSIS — R52 PAIN: ICD-10-CM

## 2018-03-26 DIAGNOSIS — G89.29 CHRONIC ABDOMINAL PAIN: Chronic | ICD-10-CM

## 2018-03-26 DIAGNOSIS — N39.41 URGE INCONTINENCE: ICD-10-CM

## 2018-03-26 DIAGNOSIS — R10.84 ABDOMINAL PAIN, GENERALIZED: ICD-10-CM

## 2018-03-26 DIAGNOSIS — E16.2 HYPOGLYCEMIA: ICD-10-CM

## 2018-03-26 DIAGNOSIS — R10.9 ABDOMINAL PAIN: ICD-10-CM

## 2018-03-26 DIAGNOSIS — R10.9 CHRONIC ABDOMINAL PAIN: Chronic | ICD-10-CM

## 2018-03-26 DIAGNOSIS — F33.1 MAJOR DEPRESSIVE DISORDER, RECURRENT EPISODE, MODERATE (H): ICD-10-CM

## 2018-03-26 PROCEDURE — 96376 TX/PRO/DX INJ SAME DRUG ADON: CPT

## 2018-03-26 PROCEDURE — 96361 HYDRATE IV INFUSION ADD-ON: CPT

## 2018-03-26 PROCEDURE — 25000128 H RX IP 250 OP 636: Mod: ZF | Performed by: INTERNAL MEDICINE

## 2018-03-26 PROCEDURE — 25000128 H RX IP 250 OP 636: Mod: ZF | Performed by: NURSE PRACTITIONER

## 2018-03-26 PROCEDURE — 96374 THER/PROPH/DIAG INJ IV PUSH: CPT

## 2018-03-26 PROCEDURE — 96375 TX/PRO/DX INJ NEW DRUG ADDON: CPT

## 2018-03-26 RX ORDER — ONDANSETRON 2 MG/ML
4 INJECTION INTRAMUSCULAR; INTRAVENOUS ONCE
Status: CANCELLED
Start: 2018-03-26 | End: 2018-03-26

## 2018-03-26 RX ORDER — ONDANSETRON 2 MG/ML
4 INJECTION INTRAMUSCULAR; INTRAVENOUS DAILY PRN
Status: CANCELLED
Start: 2018-03-26

## 2018-03-26 RX ORDER — ONDANSETRON 2 MG/ML
4 INJECTION INTRAMUSCULAR; INTRAVENOUS DAILY PRN
Status: DISCONTINUED | OUTPATIENT
Start: 2018-03-26 | End: 2018-03-26 | Stop reason: HOSPADM

## 2018-03-26 RX ORDER — DIPHENHYDRAMINE HYDROCHLORIDE 50 MG/ML
25 INJECTION INTRAMUSCULAR; INTRAVENOUS ONCE
Status: CANCELLED
Start: 2018-03-26 | End: 2018-03-26

## 2018-03-26 RX ORDER — OXYCODONE HCL 5 MG/5 ML
SOLUTION, ORAL ORAL
Qty: 600 ML | Refills: 0 | Status: SHIPPED | OUTPATIENT
Start: 2018-03-26 | End: 2018-04-03

## 2018-03-26 RX ORDER — ONDANSETRON 2 MG/ML
4 INJECTION INTRAMUSCULAR; INTRAVENOUS ONCE
Status: COMPLETED | OUTPATIENT
Start: 2018-03-26 | End: 2018-03-26

## 2018-03-26 RX ORDER — DIPHENHYDRAMINE HYDROCHLORIDE 50 MG/ML
25 INJECTION INTRAMUSCULAR; INTRAVENOUS DAILY PRN
Status: CANCELLED
Start: 2018-03-26

## 2018-03-26 RX ORDER — DIPHENHYDRAMINE HYDROCHLORIDE 50 MG/ML
25 INJECTION INTRAMUSCULAR; INTRAVENOUS ONCE
Status: COMPLETED | OUTPATIENT
Start: 2018-03-26 | End: 2018-03-26

## 2018-03-26 RX ORDER — DIPHENHYDRAMINE HYDROCHLORIDE 50 MG/ML
25 INJECTION INTRAMUSCULAR; INTRAVENOUS DAILY PRN
Status: DISCONTINUED | OUTPATIENT
Start: 2018-03-26 | End: 2018-03-26 | Stop reason: HOSPADM

## 2018-03-26 RX ADMIN — DIPHENHYDRAMINE HYDROCHLORIDE 25 MG: 50 INJECTION INTRAMUSCULAR; INTRAVENOUS at 14:08

## 2018-03-26 RX ADMIN — DIPHENHYDRAMINE HYDROCHLORIDE 25 MG: 50 INJECTION INTRAMUSCULAR; INTRAVENOUS at 15:05

## 2018-03-26 RX ADMIN — ONDANSETRON 4 MG: 2 INJECTION INTRAMUSCULAR; INTRAVENOUS at 15:01

## 2018-03-26 RX ADMIN — SODIUM CHLORIDE, POTASSIUM CHLORIDE, SODIUM LACTATE AND CALCIUM CHLORIDE 1000 ML: 600; 310; 30; 20 INJECTION, SOLUTION INTRAVENOUS at 14:01

## 2018-03-26 RX ADMIN — ONDANSETRON 4 MG: 2 INJECTION INTRAMUSCULAR; INTRAVENOUS at 14:04

## 2018-03-26 ASSESSMENT — ANXIETY QUESTIONNAIRES
7. FEELING AFRAID AS IF SOMETHING AWFUL MIGHT HAPPEN: NOT AT ALL
4. TROUBLE RELAXING: NOT AT ALL
7. FEELING AFRAID AS IF SOMETHING AWFUL MIGHT HAPPEN: NOT AT ALL
3. WORRYING TOO MUCH ABOUT DIFFERENT THINGS: NOT AT ALL
GAD7 TOTAL SCORE: 0
1. FEELING NERVOUS, ANXIOUS, OR ON EDGE: NOT AT ALL
5. BEING SO RESTLESS THAT IT IS HARD TO SIT STILL: NOT AT ALL
GAD7 TOTAL SCORE: 0
GAD7 TOTAL SCORE: 0
6. BECOMING EASILY ANNOYED OR IRRITABLE: NOT AT ALL
2. NOT BEING ABLE TO STOP OR CONTROL WORRYING: NOT AT ALL

## 2018-03-26 ASSESSMENT — PAIN SCALES - GENERAL: PAINLEVEL: EXTREME PAIN (8)

## 2018-03-26 NOTE — PROGRESS NOTES
Grabiel called back in stating her tube came out over the weekend completely after her dog stepped on it. She went to the ED and they placed a messer catheter to keep the tract open.   She was asked to call the clinic and then see when we can get her in to replace the tube.     Advised her will talk to Dr. Narayanan today to see when we can get her in for replacement and get back to her.   Verbalized understanding.     Ella SARMIENTO RN Coordinator  Dr. Narayanan, Dr. Joe & Dr. Klein   [facility]  [phone]

## 2018-03-26 NOTE — PROGRESS NOTES
Nursing Note  Alexandra Melogza presents today to [facility] for:   Chief Complaint   Patient presents with     Infusion     IV fluids, zofran and benadryl     During today's [facility] appointment, orders from Dr. Narayanan were completed.  Frequency: TID weekly PRN    Progress note:  Patient identification verified by name and date of birth.  Assessment completed.  Vitals recorded in Doc Flowsheets.  Patient was provided with education regarding infusion and possible side effects.  Patient verbalized understanding.      needed: No  Premedications: were not ordered.  Infusion Rates: 1L LR infused over 1 hour.  Zofran IVP over 4 minutes, Benadryl IVP over 2 min.  Labs: were not ordered for this appointment.  Vascular access: port accessed today.  Blood return noted.  Heparin locked and de-accessed at discharge.  Treatment Conditions: non-applicable.  Patient tolerated infusion: well.    Drug Waste Record    Drug Name: Benadryl   Dose: 25mg  Route administered: IV  NDC #: 9195-1368-28  Amount of waste(mL):0.5ml  Reason for waste: Single use vial      Discharge Plan:   Follow up plan of care with: ongoing infusions at [facility].  Discharge instructions were reviewed with patient.  Patient/representative verbalized understanding of discharge instructions and all questions answered.  Patient discharged from [facility] in stable condition.    ROGER BARROS RN    Administrations This Visit     diphenhydrAMINE (BENADRYL) injection 25 mg     Admin Date Action Dose Route Administered By             03/26/2018 Given 25 mg Intravenous Daphnie Baum RN              Admin Date Action Dose Route Administered By             03/26/2018 Given 25 mg Intravenous Daphnie Baum RN                    lactated ringers BOLUS 1,000-2,000 mL     Admin Date Action Dose Route Administered By              03/26/2018 New Bag 1000 mL Intravenous Daphnie Baum, RN                    ondansetron (ZOFRAN) injection 4 mg     Admin Date Action Dose Route Administered By             03/26/2018 Given 4 mg Intravenous Daphnie Baum RN              Admin Date Action Dose Route Administered By             03/26/2018 Given 4 mg Intravenous Dpahnie Baum RN                          /72  Pulse 103  Temp 98.2  F (36.8  C) (Oral)  Resp 16  SpO2 99%

## 2018-03-26 NOTE — LETTER
3/26/2018       RE: Alex[place] Melgo[place]  [address] [facility] [address]     Dear Colleague,    Thank you for referring your patient, Alexandra Melgoza, to the [facility] at [facility]. Please see a copy of my visit note below.    The patient is a 22-year-old female with a history of chronic pancreatitis who presents for follow-up.    In the interim since her last visit, she states that her GJ tube was inadvertertently dislodged.  This has caused some increased pain.  She was in the ED last night.  She is currently taking 10 mg oxycodone 4 times daily.  She has participated in the pain psychology, and finds it helpfu.   During her previous visit a plan was formulated with the goals of attempting to diminish chronic pancreatitis flares and thus emergency room visits and should those flares occur provided patient with pain medication that would be available to treat a flare and preclude visit to the emergency room.  She presents today for reevaluation and follow-up  Current Outpatient Prescriptions   Medication     oxyCODONE (ROXICODONE) 5 MG/5ML solution     gabapentin (NEURONTIN) 300 MG capsule     blood glucose monitoring (NO BRAND SPECIFIED) test strip     hydrOXYzine (ATARAX) 10 MG/5ML syrup     acetaminophen (TYLENOL) 32 mg/mL solution     ondansetron (ZOFRAN ODT) 4 MG ODT tab     sodium bicarbonate 325 MG tablet     nortriptyline (PAMELOR) 10 MG capsule     menthol (ICY HOT) 5 % PTCH     amylase-lipase-protease (ZENPEP) 90838 UNITS CPEP     multivitamins with minerals (CERTAVITE/CEROVITE) LIQD liquid     order for DME     scopolamine (TRANSDERM) 72 hr patch     amylase-lipase-protease (ZENPEP) 00830 UNITS CPEP     senna-docusate (SENOKOT-S;PERICOLACE) 8.6-50 MG per tablet     pantoprazole (PROTONIX) 40 MG EC tablet     polyethylene glycol (MIRALAX/GLYCOLAX) Packet     norethindrone (AYGESTIN) 5 MG tablet     fluticasone (FLONASE) 50  "MCG/ACT spray     levalbuterol (XOPENEX HFA) 45 MCG/ACT Inhaler     leuprolide (LUPRON DEPOT) 11.25 MG injection     RIZATRIPTAN BENZOATE PO     No current facility-administered medications for this visit.      Allergies   Allergen Reactions     Amoxicillin-Pot Clavulanate Nausea and Vomiting     Compazine [Prochlorperazine] Other (See Comments)     Dystonia       Hyoscyamine Other (See Comments)     Dystonia     Reglan [Metoclopramide Hcl] Other (See Comments)     Dystonia     Zyprexa Other (See Comments)     Sensitive, dystonic reaction on 11-9-2011     Amitriptyline Hcl Other (See Comments)     Dystonia, hallucinations     Buspirone Other (See Comments)     No Adverse Reactions, no benefit     Cogentin [Benztropine]      Cyproheptadine Other (See Comments)     Distonic     Dicyclomine Other (See Comments)     Droperidol Other (See Comments)     Feels tense and \"like she has to jump out of her skin\".       Effexor [Venlafaxine] Other (See Comments)     Dystonia     Food      Cilantro--lips/tongue swelling     No Clinical Screening - See Comments Other (See Comments)     Cilantro     Phenergan Dm [Promethazine-Dm] Other (See Comments)     dystonia     Promethazine Other (See Comments)     Risperidone Other (See Comments)     dystonia     Vistaril Other (See Comments)     Burning sensation.       Augmentin GI Disturbance     Ketamine Other (See Comments)     jittery     Sorbitol GI Disturbance     Headache and dyspepsia     Past Medical History:   Diagnosis Date     Anxiety      Asthma      Cholecystitis     s/p cholecystectomy     Chronic abdominal pain      Chronic infection     mrsa     Chronic pain      Cyclic vomiting syndrome 10/27/2012     Depression      Endometriosis      Hypoglycaemia      Migraines      Mild intermittent asthma      Ovarian cysts      Pancreatic disease      PONV (postoperative nausea and vomiting)      Pseudoseizures      Somatoform disorder      Sphincter of Oddi dysfunction      " Vasovagal syncope      Past Surgical History:   Procedure Laterality Date     ABDOMEN SURGERY      ERCP, biliary stents     CHOLECYSTECTOMY  8/2/11     COLONOSCOPY  2011    negative finding     ENDOSCOPIC RETROGRADE CHOLANGIOPANCREATOGRAM  8/23/2011    Procedure:ENDOSCOPIC RETROGRADE CHOLANGIOPANCREATOGRAM; Endoscopic Retrograde Cholangiopancreatogram; Surgeon:CAMPBELL NARAYANAN; Location:[facility]     ENDOSCOPIC RETROGRADE CHOLANGIOPANCREATOGRAM  5/17/2012    Procedure:ENDOSCOPIC RETROGRADE CHOLANGIOPANCREATOGRAM; Endoscopic Retrograde Cholangiopancreatogram with pancreatic stent placement.; Surgeon:CAMPBELL NARAYANAN; Location:[facility]     ENDOSCOPIC RETROGRADE CHOLANGIOPANCREATOGRAM N/A 1/18/2018    Procedure: COMBINED ENDOSCOPIC RETROGRADE CHOLANGIOPANCREATOGRAPHY, PLACE TUBE/STENT;  Endoscopic Retrograde Cholangiopancreatography with nasojejunal feeding tube placement;  Surgeon: Campbell Narayanan MD;  Location: [facility]     ENDOSCOPIC RETROGRADE CHOLANGIOPANCREATOGRAM COMPLEX  1/3/2012    Procedure:ENDOSCOPIC RETROGRADE CHOLANGIOPANCREATOGRAM COMPLEX; Endoscopic Retrograde Cholangiopancreatogram with Manometry bile duct sphincterotomy extention pancreatic duct sphincterotomy pancreatic duct stent placement; Surgeon:CAMPBELL NARAYANAN; Location:[facility]     ENDOSCOPIC ULTRASOUND UPPER GASTROINTESTINAL TRACT (GI) N/A 6/9/2015    Procedure: ENDOSCOPIC ULTRASOUND, ESOPHAGOSCOPY / UPPER GASTROINTESTINAL TRACT (GI);  Surgeon: Mario Joe MD;  Location: [facility] OR     ENDOSCOPIC ULTRASOUND UPPER GASTROINTESTINAL TRACT (GI) N/A 12/12/2016    Procedure: ENDOSCOPIC ULTRASOUND, ESOPHAGOSCOPY / UPPER GASTROINTESTINAL TRACT (GI);  Surgeon: Guru Jose Kleni MD;  Location: [facility] OR     ESOPHAGOSCOPY, GASTROSCOPY, DUODENOSCOPY (EGD), COMBINED  1/18/2012    Procedure:COMBINED ESOPHAGOSCOPY, GASTROSCOPY, DUODENOSCOPY (EGD); Surgeon:ARNIE ESPINOZA; Location:[facility]     ESOPHAGOSCOPY, GASTROSCOPY, DUODENOSCOPY  (EGD), COMBINED  1/18/2012    Procedure:COMBINED ESOPHAGOSCOPY, GASTROSCOPY, DUODENOSCOPY (EGD); EGD; Surgeon:ARNIE ESPINOZA; Location[facility]     ESOPHAGOSCOPY, GASTROSCOPY, DUODENOSCOPY (EGD), COMBINED N/A 12/9/2017    Procedure: COMBINED ESOPHAGOSCOPY, GASTROSCOPY, DUODENOSCOPY (EGD);;  Surgeon: Josias Chan MD;  Location: [facility] GI     INSERT PORT VASCULAR ACCESS       L knee arthroscopy  2009     LAPAROSCOPY DIAGNOSTIC (GYN)  10/26/2012    Procedure: LAPAROSCOPY DIAGNOSTIC (GYN);  LAPAROSCOPY DIAGNOSTIC, CAUTERY ENDOMETRIOISIS and biopsy of Fallopian tube lesions;  Surgeon: Carla Lopez MD;  Location:  OR     ORTHOPEDIC SURGERY  2008    knee     REMOVE AND REPLACE BREAST IMPLANT PROSTHESIS N/A 2/8/2018    Procedure: PERCUTANEOUS INSERTION TUBE JEJUNOSTOMY;  upper endoscopy with percutaneous gastrojejunostimy feeding tuble placement and gastropexy;  Surgeon: Campbell Narayanan MD;  Location: [facility] OR     VASCULAR SURGERY       Family History   Problem Relation Age of Onset     Hypertension Father      Bipolar Disorder Other      Anxiety Disorder Other      Depression Paternal Grandmother      Allergies Maternal Grandmother      CEREBROVASCULAR DISEASE Maternal Grandfather      Cardiovascular Maternal Grandfather      Depression/Anxiety Maternal Grandfather      GASTROINTESTINAL DISEASE Maternal Grandfather      DIABETES Paternal Uncle      Hypoglycemia Brother      CANCER No family hx of      No family history of skin cancer     Social History     Social History     Marital status: Single     Spouse name: N/A     Number of children: N/A     Years of education: N/A     Occupational History     Not on file.     Social History Main Topics     Smoking status: Never Smoker     Smokeless tobacco: Never Used     Alcohol use No     Drug use: No     Sexual activity: No     Other Topics Concern     Parent/Sibling W/ Cabg, Mi Or Angioplasty Before 65f 55m? 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      Physical Exam   Constitutional: She is oriented to person, place, and time. She appears well-developed and well-nourished.   HENT:   Head: Normocephalic and atraumatic.   Right Ear: External ear normal.   Left Ear: External ear normal.   Nose: Nose normal.   Mouth/Throat: Oropharynx is clear and moist.   Eyes: Conjunctivae and EOM are normal. Pupils are equal, round, and reactive to light.   Neck: Normal range of motion. Neck supple.   Cardiovascular: Normal rate, regular rhythm, normal heart sounds and intact distal pulses.    Pulmonary/Chest: Effort normal and breath sounds normal.   Abdominal: She exhibits no distension and no mass. There is tenderness. There is guarding. There is no rebound.   Musculoskeletal: Normal range of motion.   Neurological: She is alert and oriented to person, place, and time. She has normal reflexes.   Skin: Skin is warm and dry.   Psychiatric: She has a normal mood and affect.   Nursing note and vitals reviewed.  A/P:Patient is a  21 y/o lady with chronic abdominal pain who presents for follow up.  It is clear that the most likely eiology of her pain is pancreatitic flares and surgical pain.   In an effort to decrease the frequency and the duration of flares, as well as addressing the post-surgical pain, I recommend  1. Continue Pain psychology  2. Continue Oxycodone 10 mg  q6h prn with plan to wean when the surgical pain subsides.  3. Continue pamelor 30 mg qHS  4. RTC in 2 weeks        Again, thank you for allowing me to participate in the care of your patient.      Sincerely,    Leonardo Wang MD

## 2018-03-26 NOTE — PATIENT INSTRUCTIONS
1. Oxycodone refilled. Continue medication as prescribed.     Follow up: 2 weeks in clinic with Dr. Wang.       To speak with a nurse, schedule/reschedule/cancel a clinic appointment, or request a medication refill call: [phone]     You can also reach us by [employer]: [email or web address]    For refills, please call on Monday, 1 week before your medication runs out. The doctors are not always in clinic, so this gives us time to get your prescriptions ready.  Please let us know the name of the medication you are requesting a refill of.

## 2018-03-26 NOTE — MR AVS SNAPSHOT
After Visit Summary   3/26/2018    Alexandra Melgoza    MRN: [id number]           Patient Information     Date Of Birth          1995        Visit Information        Provider Department      3/26/2018 2:00 PM UC 50 ATC; UC SPEC INFUSION [facility]        Today's Diagnoses     Cyclical vomiting with nausea, intractability of vomiting not specified    -  1    Chronic pancreatitis, unspecified pancreatitis type (H)        Intractable cyclical vomiting with nausea        Pain        Abdominal pain, epigastric        S/P ERCP        Acute abdominal pain        Abdominal pain        Hypoglycemia        Urge incontinence        Major depressive disorder, recurrent episode, moderate (H)        Anxiety state        Rash          Care Instructions    Dear Alexandra Melgoza    Thank you for choosing [facility] ([facility]) for your infusion.  The following information is a summary of our appointment as well as important reminders.      We look forward in seeing you on your next appointment here at [facility].  Please don t hesitate to call us at [phone] to reschedule any of your appointments or to speak with one of the [facility] registered nurses.  It was a pleasure taking care of you today.    Sincerely,    [facility]  Specialty Infusion & Procedure Center  [address] [place] [address]  [address]  Phone:  [phone]            Follow-ups after your visit        Your next 10 appointments already scheduled     Mar 27, 2018   Procedure with Campbell Narayanan MD   [facility], [place], Same Day Surgery (--)    [address] [id number] [phone]            Mar 28, 2018  1:00 PM CDT   Infusion 120 with UC SPEC INFUSION, UC 42 ATC   [facility] ([facility])    [address] [place] [address]  [address]  [facility] [address]   [phone]            Mar 28, 2018  4:00 PM CDT   (Arrive by 3:45 PM)   Return Visit with Quyen Lennon, PhD   [facility] ([facility])    [address]  4th [address]  [facility] [address]   [phone]            Mar 30, 2018 10:00 AM CDT   Infusion 120 with UC SPEC INFUSION, UC 49 ATC   [facility] ([facility])    [address]  [address]  [facility] [address]   [phone]            Apr 02, 2018  2:00 PM CDT   Infusion 120 with UC SPEC INFUSION, UC 47 ATC   [facility] ([facility])    [address]  [address]  [facility] [address]   [phone]            Apr 04, 2018  9:00 AM CDT   (Arrive by 8:45 AM)   Return Visit with Quyen Lennon, PhD   [facility] ([facility])    [address]  4th Floor  [facility] [address]   [phone]            Apr 04, 2018  4:00 PM CDT   Infusion 120 with UC SPEC INFUSION   [facility] ([facility])    [address]  [address]  [facility] [address]   [phone]              Future tests that were ordered for you today     Open Future Orders        Priority Expected Expires Ordered    UPPER GI ENDOSCOPY Routine  5/10/2018 3/26/2018            Who to contact     If you have questions or need follow up information about today's clinic visit or your schedule please contact [facility] SPECIAL[facility] PROCED[facility] directly at [phone].  Normal or non-critical lab and imaging results will be communicated to you by MyChart, letter or phone within 4 business days after the clinic has received the results. If you do not  hear from us within 7 days, please contact the clinic through [employer] or phone. If you have a critical or abnormal lab result, we will notify you by phone as soon as possible.  Submit refill requests through [employer] or call your pharmacy and they will forward the refill request to us. Please allow 3 business days for your refill to be completed.          Additional Information About Your Visit        [employer] Information     [facility] gives you secure access to your electronic health record. If you see a primary care provider, you can also send messages to your care team and make appointments. If you have questions, please call your primary care clinic.  If you do not have a primary care provider, please call [phone] and they will assist you.        Care EveryWhere ID     This is your Care EveryWhere ID. This could be used by other organizations to access your [place] medical records  [id number]        Your Vitals Were     Pulse Temperature Respirations Pulse Oximetry          101 98.2  F (36.8  C) (Oral) 16 99%         Blood Pressure from Last 3 Encounters:   03/26/18 111/55   03/26/18 126/85   03/25/18 126/86    Weight from Last 3 Encounters:   03/26/18 65.8 kg (145 lb)   03/19/18 67.2 kg (148 lb 1.6 oz)   03/12/18 66.7 kg (147 lb)              Today, you had the following     No orders found for display         Today's Medication Changes          These changes are accurate as of 3/26/18  3:19 PM.  If you have any questions, ask your nurse or doctor.               These medicines have changed or have updated prescriptions.        Dose/Directions    nortriptyline 10 MG capsule   Commonly known as:  PAMELOR   This may have changed:  how much to take   Used for:  Right upper quadrant abdominal pain        Dose:  30 mg   Take 3 capsules (30 mg) by mouth At Bedtime   Quantity:  120 capsule   Refills:  0            Where to get your medicines      Some of these will need a paper prescription and others can be  bought over the counter.  Ask your nurse if you have questions.     Bring a paper prescription for each of these medications     oxyCODONE 5 MG/5ML solution                Primary Care Provider Office Phone # Fax #    Quyen Baez [phone]        [address] [address]        Equal Access to Services     RACHAEL BENITEZ : Hadii aad ku hadasho Soomaali, waaxda luqadaha, qaybta kaalmada adeegyada, waxay idiin hayaan adeeg kharjunsh laedmar . So [facility] [phone].    ATENCIÓN: Si habla español, tiene a quijano disposición servicios gratuitos de asistencia lingüística. Llame al [phone].    We comply with applicable federal civil rights laws and [place] laws. We do not discriminate on the basis of race, color, national origin, age, disability, sex, sexual orientation, or gender identity.            Thank you!     Thank you for choosing [facility] SPECIAL[facility] PROCEDURE  for your care. Our goal is always to provide you with excellent care. Hearing back from our patients is one way we can continue to improve our services. Please take a few minutes to complete the written survey that you may receive in the mail after your visit with us. Thank you!             Your Updated Medication List - Protect others around you: Learn how to safely use, store and throw away your medicines at www.disposemymeds.org.          This list is accurate as of 3/26/18  3:19 PM.  Always use your most recent med list.                   Brand Name Dispense Instructions for use Diagnosis    acetaminophen 32 mg/mL solution    TYLENOL     30.45 mLs (975 mg) by Per J Tube route every 8 hours        * amylase-lipase-protease 37698 UNITS Cpep    ZENPEP    180 capsule    Take 2-3 capsules (40,000-60,000 Units) by mouth Take with snacks or supplements (Snacks)    Idiopathic chronic pancreatitis (H)       * amylase-lipase-protease 64884 UNITS Cpep    ZENPEP    180 capsule    Take 5  capsules (100,000 Units) by mouth 4 times daily (with meals and nightly)    Right upper quadrant abdominal pain       blood glucose monitoring test strip    no brand specified    100 strip    One Touch Ultra 2 Strips, Use to test blood sugars 2 times daily or as directed    Hypoglycemia       fluticasone 50 MCG/ACT spray    FLONASE    16 g    Spray 2 sprays into both nostrils daily    Nasal congestion       gabapentin 300 MG capsule    NEURONTIN     TK 2 CS QAM  1 C IN THE AFTERNOON AND 2 CS QHS        hydrOXYzine 10 MG/5ML syrup    ATARAX    1500 mL    Take 12.5 mLs (25 mg) by mouth every 6 hours as needed for anxiety or other (pain)    Acute abdominal pain       leuprolide 11.25 MG kit    LUPRON DEPOT (3-MONTH)    1 each    Inject 11.25 mg into the muscle every 3 months    Endometriosis       levalbuterol 45 MCG/ACT Inhaler    XOPENEX HFA    1 Inhaler    Inhale 2 puffs into the lungs every 6 hours as needed for shortness of breath / dyspnea    Wheeze       menthol 5 % Ptch    ICY HOT    15 patch    Apply 1 patch topically every 8 hours as needed for muscle soreness    Abdominal pain, epigastric       multivitamins with minerals Liqd liquid     1 Bottle    15 mLs by Per Feeding Tube route daily    Abdominal pain, epigastric       norethindrone 5 MG tablet    AYGESTIN    90 tablet    Take 1 tablet (5 mg) by mouth daily    Endometriosis       nortriptyline 10 MG capsule    PAMELOR    120 capsule    Take 3 capsules (30 mg) by mouth At Bedtime    Right upper quadrant abdominal pain       ondansetron 4 MG ODT tab    ZOFRAN ODT    15 tablet    Take 1 tablet (4 mg) by mouth every 8 hours as needed for nausea or vomiting    Intractable cyclical vomiting with nausea       order for DME     1 Units    Equipment being ordered: TENS with wire and electrode.    Chronic abdominal pain       oxyCODONE 5 MG/5ML solution    ROXICODONE    600 mL    Take 10 mL (10 mg) by mouth every 6 hours as needed, maximum 40 mL per day.     Abdominal pain, generalized, Chronic abdominal pain       pantoprazole 40 MG EC tablet    PROTONIX    30 tablet    Take 1 tablet (40 mg) by mouth 2 times daily (before meals)    Right upper quadrant abdominal pain, Erosive gastritis       polyethylene glycol Packet    MIRALAX/GLYCOLAX    7 packet    Take 17 g by mouth daily    Right upper quadrant abdominal pain       RIZATRIPTAN BENZOATE PO      Take 5 mg by mouth once as needed        scopolamine 72 hr patch    TRANSDERM    2 patch    Apply 1 patch to hairless area behind one ear if severe nausea and vomiting.  Remove old patch and change every 3 days (72 hours).    Intractable vomiting with nausea, unspecified vomiting type       senna-docusate 8.6-50 MG per tablet    SENOKOT-S;PERICOLACE    100 tablet    Take 1 tablet by mouth 2 times daily as needed for constipation    Right upper quadrant abdominal pain       sodium bicarbonate 325 MG tablet     120 tablet    1 tablet (325 mg) by Per Feeding Tube route 4 times daily    Abdominal pain, epigastric, Intractable cyclical vomiting with nausea       * Notice:  This list has 2 medication(s) that are the same as other medications prescribed for you. Read the directions carefully, and ask your doctor or other care provider to review them with you.

## 2018-03-26 NOTE — NURSING NOTE
LPN reviewed AVS with Pt includin. Oxycodone refilled. Continue medication as prescribed.     Follow up: 2 weeks in clinic with Dr. Wang.       Pt verbalized an understanding of information, and was asked to contact clinic with questions.    LPN assisted pt to schedule on 18 at 0700 am.     Abigail Prieto LPN

## 2018-03-26 NOTE — PATIENT INSTRUCTIONS
Dear Alexandra Melgoza    Thank you for choosing [facility] ([facility]) for your infusion.  The following information is a summary of our appointment as well as important reminders.      We look forward in seeing you on your next appointment here at [facility].  Please don t hesitate to call us at [phone] to reschedule any of your appointments or to speak with one of the [facility] registered nurses.  It was a pleasure taking care of you today.    Sincerely,    [facility]  Specialty Infusion & Procedure Center  [address] [place] [address]  [address]  Phone:  [phone]

## 2018-03-26 NOTE — PROGRESS NOTES
The patient is a 22-year-old female with a history of chronic pancreatitis who presents for follow-up.    In the interim since her last visit, she states that her GJ tube was inadvertertently dislodged.  This has caused some increased pain.  She was in the ED last night.  She is currently taking 10 mg oxycodone 4 times daily.  She has participated in the pain psychology, and finds it helpfu.   During her previous visit a plan was formulated with the goals of attempting to diminish chronic pancreatitis flares and thus emergency room visits and should those flares occur provided patient with pain medication that would be available to treat a flare and preclude visit to the emergency room.  She presents today for reevaluation and follow-up  Current Outpatient Prescriptions   Medication     oxyCODONE (ROXICODONE) 5 MG/5ML solution     gabapentin (NEURONTIN) 300 MG capsule     blood glucose monitoring (NO BRAND SPECIFIED) test strip     hydrOXYzine (ATARAX) 10 MG/5ML syrup     acetaminophen (TYLENOL) 32 mg/mL solution     ondansetron (ZOFRAN ODT) 4 MG ODT tab     sodium bicarbonate 325 MG tablet     nortriptyline (PAMELOR) 10 MG capsule     menthol (ICY HOT) 5 % PTCH     amylase-lipase-protease (ZENPEP) 71293 UNITS CPEP     multivitamins with minerals (CERTAVITE/CEROVITE) LIQD liquid     order for DME     scopolamine (TRANSDERM) 72 hr patch     amylase-lipase-protease (ZENPEP) 49223 UNITS CPEP     senna-docusate (SENOKOT-S;PERICOLACE) 8.6-50 MG per tablet     pantoprazole (PROTONIX) 40 MG EC tablet     polyethylene glycol (MIRALAX/GLYCOLAX) Packet     norethindrone (AYGESTIN) 5 MG tablet     fluticasone (FLONASE) 50 MCG/ACT spray     levalbuterol (XOPENEX HFA) 45 MCG/ACT Inhaler     leuprolide (LUPRON DEPOT) 11.25 MG injection     RIZATRIPTAN BENZOATE PO     No current facility-administered medications for this visit.      Allergies   Allergen Reactions     Amoxicillin-Pot Clavulanate Nausea and Vomiting     Compazine  "[Prochlorperazine] Other (See Comments)     Dystonia       Hyoscyamine Other (See Comments)     Dystonia     Reglan [Metoclopramide Hcl] Other (See Comments)     Dystonia     Zyprexa Other (See Comments)     Sensitive, dystonic reaction on 11-9-2011     Amitriptyline Hcl Other (See Comments)     Dystonia, hallucinations     Buspirone Other (See Comments)     No Adverse Reactions, no benefit     Cogentin [Benztropine]      Cyproheptadine Other (See Comments)     Distonic     Dicyclomine Other (See Comments)     Droperidol Other (See Comments)     Feels tense and \"like she has to jump out of her skin\".       Effexor [Venlafaxine] Other (See Comments)     Dystonia     Food      Cilantro--lips/tongue swelling     No Clinical Screening - See Comments Other (See Comments)     Cilantro     Phenergan Dm [Promethazine-Dm] Other (See Comments)     dystonia     Promethazine Other (See Comments)     Risperidone Other (See Comments)     dystonia     Vistaril Other (See Comments)     Burning sensation.       Augmentin GI Disturbance     Ketamine Other (See Comments)     jittery     Sorbitol GI Disturbance     Headache and dyspepsia     Past Medical History:   Diagnosis Date     Anxiety      Asthma      Cholecystitis     s/p cholecystectomy     Chronic abdominal pain      Chronic infection     mrsa     Chronic pain      Cyclic vomiting syndrome 10/27/2012     Depression      Endometriosis      Hypoglycaemia      Migraines      Mild intermittent asthma      Ovarian cysts      Pancreatic disease      PONV (postoperative nausea and vomiting)      Pseudoseizures      Somatoform disorder      Sphincter of Oddi dysfunction      Vasovagal syncope      Past Surgical History:   Procedure Laterality Date     ABDOMEN SURGERY      ERCP, biliary stents     CHOLECYSTECTOMY  8/2/11     COLONOSCOPY  2011    negative finding     ENDOSCOPIC RETROGRADE CHOLANGIOPANCREATOGRAM  8/23/2011    Procedure:ENDOSCOPIC RETROGRADE CHOLANGIOPANCREATOGRAM; " Endoscopic Retrograde Cholangiopancreatogram; Surgeon:CAMPBELL NARAYANAN; Location:[facility]     ENDOSCOPIC RETROGRADE CHOLANGIOPANCREATOGRAM  5/17/2012    Procedure:ENDOSCOPIC RETROGRADE CHOLANGIOPANCREATOGRAM; Endoscopic Retrograde Cholangiopancreatogram with pancreatic stent placement.; Surgeon:CAMPBELL NARAYANAN; Location:[facility] OR     ENDOSCOPIC RETROGRADE CHOLANGIOPANCREATOGRAM N/A 1/18/2018    Procedure: COMBINED ENDOSCOPIC RETROGRADE CHOLANGIOPANCREATOGRAPHY, PLACE TUBE/STENT;  Endoscopic Retrograde Cholangiopancreatography with nasojejunal feeding tube placement;  Surgeon: Campbell Narayanan MD;  Location: [facility] OR     ENDOSCOPIC RETROGRADE CHOLANGIOPANCREATOGRAM COMPLEX  1/3/2012    Procedure:ENDOSCOPIC RETROGRADE CHOLANGIOPANCREATOGRAM COMPLEX; Endoscopic Retrograde Cholangiopancreatogram with Manometry bile duct sphincterotomy extention pancreatic duct sphincterotomy pancreatic duct stent placement; Surgeon:CAMPBELL NARAYANAN; Location:[facility]     ENDOSCOPIC ULTRASOUND UPPER GASTROINTESTINAL TRACT (GI) N/A 6/9/2015    Procedure: ENDOSCOPIC ULTRASOUND, ESOPHAGOSCOPY / UPPER GASTROINTESTINAL TRACT (GI);  Surgeon: Mario Joe MD;  Location: [facility] OR     ENDOSCOPIC ULTRASOUND UPPER GASTROINTESTINAL TRACT (GI) N/A 12/12/2016    Procedure: ENDOSCOPIC ULTRASOUND, ESOPHAGOSCOPY / UPPER GASTROINTESTINAL TRACT (GI);  Surgeon: Guru Jose Klein MD;  Location: [facility] OR     ESOPHAGOSCOPY, GASTROSCOPY, DUODENOSCOPY (EGD), COMBINED  1/18/2012    Procedure:COMBINED ESOPHAGOSCOPY, GASTROSCOPY, DUODENOSCOPY (EGD); Surgeon:ARNIE ESPINOZA; Location:[facility]     ESOPHAGOSCOPY, GASTROSCOPY, DUODENOSCOPY (EGD), COMBINED  1/18/2012    Procedure:COMBINED ESOPHAGOSCOPY, GASTROSCOPY, DUODENOSCOPY (EGD); EGD; Surgeon:ARNIE ESPINOZA; [facility]     ESOPHAGOSCOPY, GASTROSCOPY, DUODENOSCOPY (EGD), COMBINED N/A 12/9/2017    Procedure: COMBINED ESOPHAGOSCOPY, GASTROSCOPY, DUODENOSCOPY (EGD);;  Surgeon: Dustin  MD Josias;  Location:  GI     INSERT PORT VASCULAR ACCESS       L knee arthroscopy  2009     LAPAROSCOPY DIAGNOSTIC (GYN)  10/26/2012    Procedure: LAPAROSCOPY DIAGNOSTIC (GYN);  LAPAROSCOPY DIAGNOSTIC, CAUTERY ENDOMETRIOISIS and biopsy of Fallopian tube lesions;  Surgeon: Carla Lopez MD;  Location:  OR     ORTHOPEDIC SURGERY  2008    knee     REMOVE AND REPLACE BREAST IMPLANT PROSTHESIS N/A 2/8/2018    Procedure: PERCUTANEOUS INSERTION TUBE JEJUNOSTOMY;  upper endoscopy with percutaneous gastrojejunostimy feeding tuble placement and gastropexy;  Surgeon: Campbell Narayanan MD;  Location:  OR     VASCULAR SURGERY       Family History   Problem Relation Age of Onset     Hypertension Father      Bipolar Disorder Other      Anxiety Disorder Other      Depression Paternal Grandmother      Allergies Maternal Grandmother      CEREBROVASCULAR DISEASE Maternal Grandfather      Cardiovascular Maternal Grandfather      Depression/Anxiety Maternal Grandfather      GASTROINTESTINAL DISEASE Maternal Grandfather      DIABETES Paternal Uncle      Hypoglycemia Brother      CANCER No family hx of      No family history of skin cancer     Social History     Social History     Marital status: Single     Spouse name: N/A     Number of children: N/A     Years of education: N/A     Occupational History     Not on file.     Social History Main Topics     Smoking status: Never Smoker     Smokeless tobacco: Never Used     Alcohol use No     Drug use: No     Sexual activity: No     Other Topics Concern     Parent/Sibling W/ Cabg, Mi Or Angioplasty Before 65f 55m? 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      Physical Exam   Constitutional:  She is oriented to person, place, and time. She appears well-developed and well-nourished.   HENT:   Head: Normocephalic and atraumatic.   Right Ear: External ear normal.   Left Ear: External ear normal.   Nose: Nose normal.   Mouth/Throat: Oropharynx is clear and moist.   Eyes: Conjunctivae and EOM are normal. Pupils are equal, round, and reactive to light.   Neck: Normal range of motion. Neck supple.   Cardiovascular: Normal rate, regular rhythm, normal heart sounds and intact distal pulses.    Pulmonary/Chest: Effort normal and breath sounds normal.   Abdominal: She exhibits no distension and no mass. There is tenderness. There is guarding. There is no rebound.   Musculoskeletal: Normal range of motion.   Neurological: She is alert and oriented to person, place, and time. She has normal reflexes.   Skin: Skin is warm and dry.   Psychiatric: She has a normal mood and affect.   Nursing note and vitals reviewed.  A/P:Patient is a  23 y/o lady with chronic abdominal pain who presents for follow up.  It is clear that the most likely eiology of her pain is pancreatitic flares and surgical pain.   In an effort to decrease the frequency and the duration of flares, as well as addressing the post-surgical pain, I recommend  1. Continue Pain psychology  2. Continue Oxycodone 10 mg  q6h prn with plan to wean when the surgical pain subsides.  3. Continue pamelor 30 mg qHS  4. RTC in 2 weeks      Answers for HPI/ROS submitted by the patient on 3/26/2018   TANISHA 7 TOTAL SCORE: 0  PHQ-2 Score: 0

## 2018-03-26 NOTE — TELEPHONE ENCOUNTER
Alexandra is informed that she is scheduled on 03/27/2018 at 230 PM with an arrival time 1230 PM for a PEG J exchange in the OR with Dr. Narayanan.   Location of appt confirmed with pt.   She knows to arrange for a  and someone to monitor her for at least 24 hours post procedure.   No pre-op needed, Dr. Narayanan will update her last one done on the day of the procedure.      SR 03/26/2018 @ 1120 A

## 2018-03-26 NOTE — MR AVS SNAPSHOT
After Visit Summary   3/26/2018    Alexandra Melgoza    MRN: [id number]           Patient Information     Date Of Birth          1995        Visit Information        Provider Department      3/26/2018 9:40 AM Leonardo Wang MD [facility]        Today's Diagnoses     Abdominal pain, generalized        Chronic abdominal pain          Care Instructions    1. Oxycodone refilled. Continue medication as prescribed.     Follow up: 2 weeks in clinic with Dr. Wang.       To speak with a nurse, schedule/reschedule/cancel a clinic appointment, or request a medication refill call: [phone]     You can also reach us by [employer]: [email or web address]    For refills, please call on Monday, 1 week before your medication runs out. The doctors are not always in clinic, so this gives us time to get your prescriptions ready.  Please let us know the name of the medication you are requesting a refill of.                                     Follow-ups after your visit        Your next 10 appointments already scheduled     Mar 26, 2018  2:00 PM CDT   Infusion 120 with UC SPEC INFUSION, UC 50 ATC   [facility] ([facility])    [address]  [address]  [place] [id number] [phone]            Mar 28, 2018  1:00 PM CDT   Infusion 120 with UC SPEC INFUSION, UC 42 ATC   [facility] ([facility])    [address]  [address]  [place] [id number] [phone]            Mar 28, 2018  4:00 PM CDT   (Arrive by 3:45 PM)   Return Visit with Quyen Lennon, PhD   [facility] ([facility])    [address]  4th [address]  [place] [id number] [phone]            Mar 30, 2018 10:00 AM CDT   Infusion 120 with UC SPEC  INFUSION, UC 49 ATC   [facility] ([facility])    [address]  [address]  [place] [address]   [phone]            Apr 04, 2018  9:00 AM CDT   (Arrive by 8:45 AM)   Return Visit with Quyen Lennon, PhD   [facility] ([facility])    [address]  4th [address]  [place] [id number] [phone]            Apr 11, 2018 10:00 AM CDT   (Arrive by 9:45 AM)   Return Visit with Quyen Lennon, PhD   [facility] ([facility])    [address]  4th [address]  [place] [address] [phone]            Apr 18, 2018 10:00 AM CDT   (Arrive by 9:45 AM)   Return Visit with Quyen Lennon, PhD   [facility] ([facility])    [address]  [address] [place] [id number] [phone]              Who to contact     Please call your clinic at [phone] to:    Ask questions about your health    Make or cancel appointments    Discuss your medicines    Learn about your test results    Speak to your doctor            Additional Information About Your Visit        [employer] Information     [employer] gives you secure access to your electronic health record. If you see a primary care provider, you can also send messages to your care team and make appointments. If you have questions, please call your primary care clinic.  If you do not have a primary care provider, please call [phone] and they will assist you.      [employer] is an electronic gateway that provides easy, online access to your medical records. With [employer], you can request a clinic appointment, read your test results, renew a prescription or communicate with your care team.     To access your existing account, please contact your [facility] or call [phone] for assistance.        Care EveryWhere ID     This is your Care EveryWhere ID. This could be used by other organizations to access your [place] medical records  [id number]        Your Vitals Were     Pulse Respirations BMI (Body Mass Index)             120 16 23.05 kg/m2          Blood Pressure from Last 3 Encounters:   03/26/18 126/85   03/25/18 126/86   03/23/18 (!) 137/97    Weight from Last 3 Encounters:   03/26/18 65.8 kg (145 lb)   03/19/18 67.2 kg (148 lb 1.6 oz)   03/12/18 66.7 kg (147 lb)              Today, you had the following     No orders found for display         Today's Medication Changes          These changes are accurate as of 3/26/18 10:06 AM.  If you have any questions, ask your nurse or doctor.               These medicines have changed or have updated prescriptions.        Dose/Directions    nortriptyline 10 MG capsule   Commonly known as:  PAMELOR   This may have changed:  how much to take   Used for:  Right upper quadrant abdominal pain        Dose:  30 mg   Take 3 capsules (30 mg) by mouth At Bedtime   Quantity:  120 capsule   Refills:  0            Where to get your medicines      Some of these will need a paper prescription and others can be bought over the counter.  Ask your nurse if you have questions.     Bring a paper prescription for each of these medications     oxyCODONE 5 MG/5ML solution                Primary Care Provider Office Phone # Fax #    Quyne Baez [phone]       [address] [address]        Equal Access to Services     RACHAEL BENITEZ : Bret joseph Somalini, waaxda luqadaha, qaybta kaalmada jacque, maki smallwood. So [facility] [phone].    ATENCIÓN: Si habla español, tiene a quijano disposición servicios gratuitos de asistencia lingüística. Llame al [phone].    We comply with applicable federal civil rights laws and [place] laws.  We do not discriminate on the basis of race, color, national origin, age, disability, sex, sexual orientation, or gender identity.            Thank you!     Thank you for choosing [facility]  for your care. Our goal is always to provide you with excellent care. Hearing back from our patients is one way we can continue to improve our services. Please take a few minutes to complete the written survey that you may receive in the mail after your visit with us. Thank you!             Your Updated Medication List - Protect others around you: Learn how to safely use, store and throw away your medicines at www.disposemymeds.org.          This list is accurate as of 3/26/18 10:06 AM.  Always use your most recent med list.                   Brand Name Dispense Instructions for use Diagnosis    acetaminophen 32 mg/mL solution    TYLENOL     30.45 mLs (975 mg) by Per J Tube route every 8 hours        * amylase-lipase-protease 37967 UNITS Cpep    ZENPEP    180 capsule    Take 2-3 capsules (40,000-60,000 Units) by mouth Take with snacks or supplements (Snacks)    Idiopathic chronic pancreatitis (H)       * amylase-lipase-protease 81144 UNITS Cpep    ZENPEP    180 capsule    Take 5 capsules (100,000 Units) by mouth 4 times daily (with meals and nightly)    Right upper quadrant abdominal pain       blood glucose monitoring test strip    no brand specified    100 strip    One Touch Ultra 2 Strips, Use to test blood sugars 2 times daily or as directed    Hypoglycemia       fluticasone 50 MCG/ACT spray    FLONASE    16 g    Spray 2 sprays into both nostrils daily    Nasal congestion       gabapentin 300 MG capsule    NEURONTIN     TK 2 CS QAM  1 C IN THE AFTERNOON AND 2 CS QHS        hydrOXYzine 10 MG/5ML syrup    ATARAX    1500 mL    Take 12.5 mLs (25 mg) by mouth every 6 hours as needed for anxiety or other (pain)    Acute abdominal pain       leuprolide 11.25 MG kit    LUPRON DEPOT (3-MONTH)     1 each    Inject 11.25 mg into the muscle every 3 months    Endometriosis       levalbuterol 45 MCG/ACT Inhaler    XOPENEX HFA    1 Inhaler    Inhale 2 puffs into the lungs every 6 hours as needed for shortness of breath / dyspnea    Wheeze       menthol 5 % Ptch    ICY HOT    15 patch    Apply 1 patch topically every 8 hours as needed for muscle soreness    Abdominal pain, epigastric       multivitamins with minerals Liqd liquid     1 Bottle    15 mLs by Per Feeding Tube route daily    Abdominal pain, epigastric       norethindrone 5 MG tablet    AYGESTIN    90 tablet    Take 1 tablet (5 mg) by mouth daily    Endometriosis       nortriptyline 10 MG capsule    PAMELOR    120 capsule    Take 3 capsules (30 mg) by mouth At Bedtime    Right upper quadrant abdominal pain       ondansetron 4 MG ODT tab    ZOFRAN ODT    15 tablet    Take 1 tablet (4 mg) by mouth every 8 hours as needed for nausea or vomiting    Intractable cyclical vomiting with nausea       order for DME     1 Units    Equipment being ordered: TENS with wire and electrode.    Chronic abdominal pain       oxyCODONE 5 MG/5ML solution    ROXICODONE    600 mL    Take 10 mL (10 mg) by mouth every 6 hours as needed, maximum 40 mL per day.    Abdominal pain, generalized, Chronic abdominal pain       pantoprazole 40 MG EC tablet    PROTONIX    30 tablet    Take 1 tablet (40 mg) by mouth 2 times daily (before meals)    Right upper quadrant abdominal pain, Erosive gastritis       polyethylene glycol Packet    MIRALAX/GLYCOLAX    7 packet    Take 17 g by mouth daily    Right upper quadrant abdominal pain       RIZATRIPTAN BENZOATE PO      Take 5 mg by mouth once as needed        scopolamine 72 hr patch    TRANSDERM    2 patch    Apply 1 patch to hairless area behind one ear if severe nausea and vomiting.  Remove old patch and change every 3 days (72 hours).    Intractable vomiting with nausea, unspecified vomiting type       senna-docusate 8.6-50 MG per tablet     SENOKOT-S;PERICOLACE    100 tablet    Take 1 tablet by mouth 2 times daily as needed for constipation    Right upper quadrant abdominal pain       sodium bicarbonate 325 MG tablet     120 tablet    1 tablet (325 mg) by Per Feeding Tube route 4 times daily    Abdominal pain, epigastric, Intractable cyclical vomiting with nausea       * Notice:  This list has 2 medication(s) that are the same as other medications prescribed for you. Read the directions carefully, and ask your doctor or other care provider to review them with you.

## 2018-03-27 ENCOUNTER — SURGERY (OUTPATIENT)
Age: 23
End: 2018-03-27

## 2018-03-27 ENCOUNTER — ANESTHESIA EVENT (OUTPATIENT)
Dept: SURGERY | Facility: CLINIC | Age: 23
End: 2018-03-27
Payer: COMMERCIAL

## 2018-03-27 ENCOUNTER — APPOINTMENT (OUTPATIENT)
Dept: GENERAL RADIOLOGY | Facility: CLINIC | Age: 23
End: 2018-03-27
Attending: INTERNAL MEDICINE
Payer: COMMERCIAL

## 2018-03-27 ENCOUNTER — HOSPITAL ENCOUNTER (OUTPATIENT)
Facility: CLINIC | Age: 23
Discharge: HOME | End: 2018-03-27
Attending: INTERNAL MEDICINE | Admitting: INTERNAL MEDICINE
Payer: COMMERCIAL

## 2018-03-27 ENCOUNTER — ANESTHESIA (OUTPATIENT)
Dept: SURGERY | Facility: CLINIC | Age: 23
End: 2018-03-27
Payer: COMMERCIAL

## 2018-03-27 VITALS
TEMPERATURE: 98.2 F | HEIGHT: 66 IN | SYSTOLIC BLOOD PRESSURE: 132 MMHG | BODY MASS INDEX: 23.07 KG/M2 | WEIGHT: 143.52 LBS | DIASTOLIC BLOOD PRESSURE: 82 MMHG | OXYGEN SATURATION: 96 % | RESPIRATION RATE: 16 BRPM

## 2018-03-27 LAB
GLUCOSE BLDC GLUCOMTR-MCNC: 87 MG/DL (ref 70–99)
HCG UR QL: NEGATIVE

## 2018-03-27 PROCEDURE — 36000053 ZZH SURGERY LEVEL 2 EA 15 ADDTL MIN - UMMC: Performed by: INTERNAL MEDICINE

## 2018-03-27 PROCEDURE — 82962 GLUCOSE BLOOD TEST: CPT

## 2018-03-27 PROCEDURE — 81025 URINE PREGNANCY TEST: CPT | Performed by: ANESTHESIOLOGY

## 2018-03-27 PROCEDURE — 25000125 ZZHC RX 250: Performed by: NURSE ANESTHETIST, CERTIFIED REGISTERED

## 2018-03-27 PROCEDURE — 37000008 ZZH ANESTHESIA TECHNICAL FEE, 1ST 30 MIN: Performed by: INTERNAL MEDICINE

## 2018-03-27 PROCEDURE — 36000051 ZZH SURGERY LEVEL 2 1ST 30 MIN - UMMC: Performed by: INTERNAL MEDICINE

## 2018-03-27 PROCEDURE — 40000171 ZZH STATISTIC PRE-PROCEDURE ASSESSMENT III: Performed by: INTERNAL MEDICINE

## 2018-03-27 PROCEDURE — 71000015 ZZH RECOVERY PHASE 1 LEVEL 2 EA ADDTL HR: Performed by: INTERNAL MEDICINE

## 2018-03-27 PROCEDURE — 71000027 ZZH RECOVERY PHASE 2 EACH 15 MINS: Performed by: INTERNAL MEDICINE

## 2018-03-27 PROCEDURE — 25000128 H RX IP 250 OP 636: Performed by: NURSE ANESTHETIST, CERTIFIED REGISTERED

## 2018-03-27 PROCEDURE — 25000128 H RX IP 250 OP 636: Performed by: ANESTHESIOLOGY

## 2018-03-27 PROCEDURE — 25000125 ZZHC RX 250: Performed by: ANESTHESIOLOGY

## 2018-03-27 PROCEDURE — 71000014 ZZH RECOVERY PHASE 1 LEVEL 2 FIRST HR: Performed by: INTERNAL MEDICINE

## 2018-03-27 PROCEDURE — 40000277 XR SURGERY CARM FLUORO LESS THAN 5 MIN W STILLS: Mod: TC

## 2018-03-27 PROCEDURE — 37000009 ZZH ANESTHESIA TECHNICAL FEE, EACH ADDTL 15 MIN: Performed by: INTERNAL MEDICINE

## 2018-03-27 PROCEDURE — 27210794 ZZH OR GENERAL SUPPLY STERILE: Performed by: INTERNAL MEDICINE

## 2018-03-27 PROCEDURE — 25500064 ZZH RX 255 OP 636: Performed by: INTERNAL MEDICINE

## 2018-03-27 PROCEDURE — 25000125 ZZHC RX 250: Performed by: INTERNAL MEDICINE

## 2018-03-27 RX ORDER — ONDANSETRON 2 MG/ML
4 INJECTION INTRAMUSCULAR; INTRAVENOUS EVERY 30 MIN PRN
Status: DISCONTINUED | OUTPATIENT
Start: 2018-03-27 | End: 2018-03-27 | Stop reason: HOSPADM

## 2018-03-27 RX ORDER — PROPOFOL 10 MG/ML
INJECTION, EMULSION INTRAVENOUS PRN
Status: DISCONTINUED | OUTPATIENT
Start: 2018-03-27 | End: 2018-03-27

## 2018-03-27 RX ORDER — FENTANYL CITRATE 50 UG/ML
25-50 INJECTION, SOLUTION INTRAMUSCULAR; INTRAVENOUS EVERY 5 MIN PRN
Status: DISCONTINUED | OUTPATIENT
Start: 2018-03-27 | End: 2018-03-27 | Stop reason: HOSPADM

## 2018-03-27 RX ORDER — DEXAMETHASONE SODIUM PHOSPHATE 4 MG/ML
4 INJECTION, SOLUTION INTRA-ARTICULAR; INTRALESIONAL; INTRAMUSCULAR; INTRAVENOUS; SOFT TISSUE EVERY 10 MIN PRN
Status: DISCONTINUED | OUTPATIENT
Start: 2018-03-27 | End: 2018-03-27 | Stop reason: HOSPADM

## 2018-03-27 RX ORDER — HYDRALAZINE HYDROCHLORIDE 20 MG/ML
2.5-5 INJECTION INTRAMUSCULAR; INTRAVENOUS EVERY 10 MIN PRN
Status: DISCONTINUED | OUTPATIENT
Start: 2018-03-27 | End: 2018-03-27 | Stop reason: HOSPADM

## 2018-03-27 RX ORDER — HEPARIN SODIUM (PORCINE) LOCK FLUSH IV SOLN 100 UNIT/ML 100 UNIT/ML
5 SOLUTION INTRAVENOUS
Status: DISCONTINUED | OUTPATIENT
Start: 2018-03-27 | End: 2018-03-27 | Stop reason: HOSPADM

## 2018-03-27 RX ORDER — SCOLOPAMINE TRANSDERMAL SYSTEM 1 MG/1
1 PATCH, EXTENDED RELEASE TRANSDERMAL ONCE
Status: COMPLETED | OUTPATIENT
Start: 2018-03-27 | End: 2018-03-27

## 2018-03-27 RX ORDER — ONDANSETRON 2 MG/ML
INJECTION INTRAMUSCULAR; INTRAVENOUS PRN
Status: DISCONTINUED | OUTPATIENT
Start: 2018-03-27 | End: 2018-03-27

## 2018-03-27 RX ORDER — ONDANSETRON 2 MG/ML
4 INJECTION INTRAMUSCULAR; INTRAVENOUS
Status: DISCONTINUED | OUTPATIENT
Start: 2018-03-27 | End: 2018-03-27 | Stop reason: HOSPADM

## 2018-03-27 RX ORDER — FLUMAZENIL 0.1 MG/ML
0.2 INJECTION, SOLUTION INTRAVENOUS
Status: DISCONTINUED | OUTPATIENT
Start: 2018-03-27 | End: 2018-03-27 | Stop reason: HOSPADM

## 2018-03-27 RX ORDER — ONDANSETRON 2 MG/ML
8 INJECTION INTRAMUSCULAR; INTRAVENOUS
Status: DISCONTINUED | OUTPATIENT
Start: 2018-03-27 | End: 2018-03-27 | Stop reason: HOSPADM

## 2018-03-27 RX ORDER — FENTANYL CITRATE 50 UG/ML
25-50 INJECTION, SOLUTION INTRAMUSCULAR; INTRAVENOUS
Status: DISCONTINUED | OUTPATIENT
Start: 2018-03-27 | End: 2018-03-27 | Stop reason: HOSPADM

## 2018-03-27 RX ORDER — SODIUM CHLORIDE, SODIUM LACTATE, POTASSIUM CHLORIDE, CALCIUM CHLORIDE 600; 310; 30; 20 MG/100ML; MG/100ML; MG/100ML; MG/100ML
INJECTION, SOLUTION INTRAVENOUS CONTINUOUS PRN
Status: DISCONTINUED | OUTPATIENT
Start: 2018-03-27 | End: 2018-03-27

## 2018-03-27 RX ORDER — IOPAMIDOL 510 MG/ML
INJECTION, SOLUTION INTRAVASCULAR PRN
Status: DISCONTINUED | OUTPATIENT
Start: 2018-03-27 | End: 2018-03-27 | Stop reason: HOSPADM

## 2018-03-27 RX ORDER — DEXAMETHASONE SODIUM PHOSPHATE 4 MG/ML
INJECTION, SOLUTION INTRA-ARTICULAR; INTRALESIONAL; INTRAMUSCULAR; INTRAVENOUS; SOFT TISSUE PRN
Status: DISCONTINUED | OUTPATIENT
Start: 2018-03-27 | End: 2018-03-27

## 2018-03-27 RX ORDER — KETOROLAC TROMETHAMINE 30 MG/ML
INJECTION, SOLUTION INTRAMUSCULAR; INTRAVENOUS PRN
Status: DISCONTINUED | OUTPATIENT
Start: 2018-03-27 | End: 2018-03-27

## 2018-03-27 RX ORDER — ONDANSETRON 4 MG/1
4 TABLET, ORALLY DISINTEGRATING ORAL EVERY 30 MIN PRN
Status: DISCONTINUED | OUTPATIENT
Start: 2018-03-27 | End: 2018-03-27 | Stop reason: HOSPADM

## 2018-03-27 RX ORDER — DIPHENHYDRAMINE HYDROCHLORIDE 50 MG/ML
25 INJECTION INTRAMUSCULAR; INTRAVENOUS ONCE
Status: COMPLETED | OUTPATIENT
Start: 2018-03-27 | End: 2018-03-27

## 2018-03-27 RX ORDER — PROPOFOL 10 MG/ML
INJECTION, EMULSION INTRAVENOUS CONTINUOUS PRN
Status: DISCONTINUED | OUTPATIENT
Start: 2018-03-27 | End: 2018-03-27

## 2018-03-27 RX ORDER — FENTANYL CITRATE 50 UG/ML
INJECTION, SOLUTION INTRAMUSCULAR; INTRAVENOUS PRN
Status: DISCONTINUED | OUTPATIENT
Start: 2018-03-27 | End: 2018-03-27

## 2018-03-27 RX ORDER — LIDOCAINE 40 MG/G
CREAM TOPICAL
Status: DISCONTINUED | OUTPATIENT
Start: 2018-03-27 | End: 2018-03-27 | Stop reason: HOSPADM

## 2018-03-27 RX ORDER — NALOXONE HYDROCHLORIDE 0.4 MG/ML
.1-.4 INJECTION, SOLUTION INTRAMUSCULAR; INTRAVENOUS; SUBCUTANEOUS
Status: DISCONTINUED | OUTPATIENT
Start: 2018-03-27 | End: 2018-03-27 | Stop reason: HOSPADM

## 2018-03-27 RX ORDER — DIPHENHYDRAMINE HCL 25 MG
25 CAPSULE ORAL ONCE
Status: COMPLETED | OUTPATIENT
Start: 2018-03-27 | End: 2018-03-27

## 2018-03-27 RX ORDER — MEPERIDINE HYDROCHLORIDE 50 MG/ML
12.5 INJECTION INTRAMUSCULAR; INTRAVENOUS; SUBCUTANEOUS
Status: DISCONTINUED | OUTPATIENT
Start: 2018-03-27 | End: 2018-03-27 | Stop reason: HOSPADM

## 2018-03-27 RX ORDER — DIPHENHYDRAMINE HYDROCHLORIDE 50 MG/ML
10 INJECTION INTRAMUSCULAR; INTRAVENOUS
Status: DISCONTINUED | OUTPATIENT
Start: 2018-03-27 | End: 2018-03-27 | Stop reason: HOSPADM

## 2018-03-27 RX ORDER — SODIUM CHLORIDE, SODIUM LACTATE, POTASSIUM CHLORIDE, CALCIUM CHLORIDE 600; 310; 30; 20 MG/100ML; MG/100ML; MG/100ML; MG/100ML
INJECTION, SOLUTION INTRAVENOUS CONTINUOUS
Status: DISCONTINUED | OUTPATIENT
Start: 2018-03-27 | End: 2018-03-27 | Stop reason: HOSPADM

## 2018-03-27 RX ADMIN — SODIUM CHLORIDE, POTASSIUM CHLORIDE, SODIUM LACTATE AND CALCIUM CHLORIDE 1000 ML: 600; 310; 30; 20 INJECTION, SOLUTION INTRAVENOUS at 14:00

## 2018-03-27 RX ADMIN — PROPOFOL 150 MCG/KG/MIN: 10 INJECTION, EMULSION INTRAVENOUS at 14:44

## 2018-03-27 RX ADMIN — Medication 80 MG: at 14:44

## 2018-03-27 RX ADMIN — DEXMEDETOMIDINE HYDROCHLORIDE 8 MCG: 100 INJECTION, SOLUTION INTRAVENOUS at 14:34

## 2018-03-27 RX ADMIN — HEPARIN SODIUM (PORCINE) LOCK FLUSH IV SOLN 100 UNIT/ML 5 ML: 100 SOLUTION at 20:14

## 2018-03-27 RX ADMIN — ONDANSETRON 4 MG: 2 INJECTION INTRAMUSCULAR; INTRAVENOUS at 17:02

## 2018-03-27 RX ADMIN — ONDANSETRON 4 MG: 2 INJECTION INTRAMUSCULAR; INTRAVENOUS at 15:26

## 2018-03-27 RX ADMIN — HYDROMORPHONE HYDROCHLORIDE 0.5 MG: 1 INJECTION, SOLUTION INTRAMUSCULAR; INTRAVENOUS; SUBCUTANEOUS at 17:21

## 2018-03-27 RX ADMIN — SCOPALAMINE 1 PATCH: 1 PATCH, EXTENDED RELEASE TRANSDERMAL at 14:11

## 2018-03-27 RX ADMIN — HYDROMORPHONE HYDROCHLORIDE 0.5 MG: 1 INJECTION, SOLUTION INTRAMUSCULAR; INTRAVENOUS; SUBCUTANEOUS at 16:40

## 2018-03-27 RX ADMIN — HYDROMORPHONE HYDROCHLORIDE 0.5 MG: 1 INJECTION, SOLUTION INTRAMUSCULAR; INTRAVENOUS; SUBCUTANEOUS at 16:54

## 2018-03-27 RX ADMIN — DEXMEDETOMIDINE HYDROCHLORIDE 8 MCG: 100 INJECTION, SOLUTION INTRAVENOUS at 15:06

## 2018-03-27 RX ADMIN — PROPOFOL 10 MG: 10 INJECTION, EMULSION INTRAVENOUS at 19:14

## 2018-03-27 RX ADMIN — KETOROLAC TROMETHAMINE 30 MG: 30 INJECTION, SOLUTION INTRAMUSCULAR at 15:27

## 2018-03-27 RX ADMIN — SODIUM CHLORIDE, POTASSIUM CHLORIDE, SODIUM LACTATE AND CALCIUM CHLORIDE: 600; 310; 30; 20 INJECTION, SOLUTION INTRAVENOUS at 14:31

## 2018-03-27 RX ADMIN — DEXMEDETOMIDINE HYDROCHLORIDE 8 MCG: 100 INJECTION, SOLUTION INTRAVENOUS at 14:50

## 2018-03-27 RX ADMIN — HYDROMORPHONE HYDROCHLORIDE 0.5 MG: 1 INJECTION, SOLUTION INTRAMUSCULAR; INTRAVENOUS; SUBCUTANEOUS at 17:08

## 2018-03-27 RX ADMIN — MIDAZOLAM 2 MG: 1 INJECTION INTRAMUSCULAR; INTRAVENOUS at 14:31

## 2018-03-27 RX ADMIN — FENTANYL CITRATE 1 MCG: 50 INJECTION, SOLUTION INTRAMUSCULAR; INTRAVENOUS at 14:44

## 2018-03-27 RX ADMIN — DIPHENHYDRAMINE HYDROCHLORIDE 25 MG: 50 INJECTION, SOLUTION INTRAMUSCULAR; INTRAVENOUS at 13:57

## 2018-03-27 RX ADMIN — PROPOFOL 160 MG: 10 INJECTION, EMULSION INTRAVENOUS at 14:44

## 2018-03-27 RX ADMIN — IOPAMIDOL 20 ML: 510 INJECTION, SOLUTION INTRAVASCULAR at 15:51

## 2018-03-27 RX ADMIN — DEXAMETHASONE SODIUM PHOSPHATE 4 MG: 4 INJECTION, SOLUTION INTRA-ARTICULAR; INTRALESIONAL; INTRAMUSCULAR; INTRAVENOUS; SOFT TISSUE at 14:54

## 2018-03-27 RX ADMIN — FENTANYL CITRATE 50 MCG: 50 INJECTION, SOLUTION INTRAMUSCULAR; INTRAVENOUS at 16:20

## 2018-03-27 RX ADMIN — DIPHENHYDRAMINE HYDROCHLORIDE 25 MG: 50 INJECTION, SOLUTION INTRAMUSCULAR; INTRAVENOUS at 17:04

## 2018-03-27 RX ADMIN — SIMETHICONE 2 ML: 63.3; 3.7 SOLUTION/ DROPS ORAL at 15:08

## 2018-03-27 ASSESSMENT — PAIN DESCRIPTION - DESCRIPTORS: DESCRIPTORS: SORE

## 2018-03-27 ASSESSMENT — ANXIETY QUESTIONNAIRES: GAD7 TOTAL SCORE: 0

## 2018-03-27 NOTE — IP AVS SNAPSHOT
MRN:[id number]                      After Visit Summary   3/27/2018    Alexandra Melgoza    MRN: [id number]           Thank you!     Thank you for choosing [facility] for your care. Our goal is always to provide you with excellent care. Hearing back from our patients is one way we can continue to improve our services. Please take a few minutes to complete the written survey that you may receive in the mail after you visit with us. Thank you!        Patient Information     Date Of Birth          1995        About your hospital stay     You were admitted on:  March 27, 2018 You last received care in the:  Post Anesthesia Care Unit [facility]    You were discharged on:  March 27, 2018       Who to Call     For medical emergencies, please call 911.  For non-urgent questions about your medical care, please call your primary care provider or clinic, [phone]  For questions related to your surgery, please call your surgery clinic        Attending Provider     Provider Specialty    Campbell Narayanan MD Gastroenterology       Primary Care Provider Office Phone # Fax #    Quyen Baez [phone]      After Care Instructions     Discharge Instructions       No driving or operating machinery until the day after procedure.            Discharge Instructions       Recommend that a responsible adult remain with the patient at home for 24 hours post discharge.            Discharge Instructions       Start with clear liquids, sips of water 1 hour after procedure. If no abdominal pain and gag reflex has returned, advance as tolerated to pre-procedure diet.              Discharge Instructions       Restart home medications.            Discharge Instructions       Check with your Provider when to start anticoagulant medication.            Discharge Instructions       No ALCOHOL 24 hours post procedure.                  Your next 10 appointments already scheduled     Mar 28, 2018   1:00 PM CDT   Infusion 120 with UC SPEC INFUSION, UC 42 ATC   [facility] ([facility])    [address]  [address]  [place] [address]   [phone]            Mar 28, 2018  4:00 PM CDT   (Arrive by 3:45 PM)   Return Visit with Quyen Lennon, PhD   [facility] ([facility])    [address]  4th Floor  [place] [address]   [phone]            Mar 30, 2018 10:00 AM CDT   Infusion 120 with UC SPEC INFUSION, UC 49 ATC   [facility] ([facility])    [address]  [address]  [place] [address]   [phone]            Apr 02, 2018  2:00 PM CDT   Infusion 120 with UC SPEC INFUSION, UC 47 ATC   [facility] ([facility])    [address]  [address]  [place] [address]   [phone]            Apr 04, 2018  9:00 AM CDT   (Arrive by 8:45 AM)   Return Visit with Quyen Lennon, PhD   [facility] ([facility])    [address]  4th [address]  [place] [address]   [phone]            Apr 04, 2018  4:00 PM CDT   Infusion 120 with UC SPEC INFUSION, UC 45 ATC   [facility] ([facility])    [address]  [address]  [place] [address]   [phone]              Further instructions from your care team       [facility]  Same-Day Surgery   Adult Discharge Orders & Instructions     For 24 hours after surgery    1. Get plenty of rest.  A responsible adult must stay with you for at least 24 hours after you leave the hospital.   2. Do not drive or use heavy equipment.  If you have weakness or  tingling, don't drive or use heavy equipment until this feeling goes away.  3. Do not drink alcohol.  4. Avoid strenuous or risky activities.  Ask for help when climbing stairs.   5. You may feel lightheaded.  IF so, sit for a few minutes before standing.  Have someone help you get up.   6. If you have nausea (feel sick to your stomach): Drink only clear liquids such as apple juice, ginger ale, broth or 7-Up.  Rest may also help.  Be sure to drink enough fluids.  Move to a regular diet as you feel able.  7. You may have a slight fever. Call the doctor if your fever is over 100 F (37.7 C) (taken under the tongue) or lasts longer than 24 hours.  8. You may have a dry mouth, a sore throat, muscle aches or trouble sleeping.  These should go away after 24 hours.  9. Do not make important or legal decisions.   Call your doctor for any of the followin.  Signs of infection (fever, growing tenderness at the surgery site, a large amount of drainage or bleeding, severe pain, foul-smelling drainage, redness, swelling).    2. It has been over 8 to 10 hours since surgery and you are still not able to urinate (pass water).    3.  Headache for over 24 hours.      To contact a doctor, call Dr Narayanan's office at [phone] or:    X   [phone] and ask for the resident on call for Gastroenterology or GI   (answered 24 hours a day)  X Emergency Department:    [facility]: [phone]       (TTY for hearing impaired: [phone])    May take Home pain medication Oxycodone 10 mg elixer every 4 hours for the next 48 hours. Pt to resume seeing Dr. Leonardo Wang for pain medication needs here after.               Tips for taking pain medications  To get the best pain relief possible , remember these points:      Take pain medications as directed, before pain becomes severe      Pain medication can upset your stomach: taking it with food may help      Constipation is a common side effect of pain medication. Drink  plenty of  Fluids      Eat foods high in fiber. Take a stool softener  if recommended by your doctor or  Pharmacist.        Do not drink alcohol, drive or operate machinery while taking pain medications.    Ask about other ways to control pain, such as with heat, ice or relaxation.    Scopolamine Patch  (Absorbed through the skin)    The Scopolamine Patch prevents nausea and vomiting caused by motion sickness or anesthesia and surgery in adults.    Brand Name(s): Transderm Scop, Transderm-Scope  There may be other brand names for this medicine.    When This Medicine Should Not Be Used:  You should not use this medicine if you have had an allergic reaction to scopolamine, or if you have narrow angle glaucoma.    How to Use This Medicine:  Your doctor will tell you how many patches to use, where to apply them, and how often to apply them.   Do not use more patches or apply them more often than your doctor tells you to.  This medicine comes with patient instructions. Read and follow these instructions carefully. Ask your doctor or pharmacist if you have any questions.  To prevent motion sickness, apply the patch at least 4 hours before you need it.  Wash and dry your hands thoroughly before applying the patch.  Leave the patch in its sealed wrapper until you are ready to put it on. Tear the wrapper open carefully. NEVER CUT the wrapper or the patch with scissors. Do not use any patch that has been cut by accident.  Take the liner off the sticky side before applying.  Apply the patch to dry, hairless skin behind the ear.  If the patch is loose or falls off, apply a new patch at a different place behind the ear.  After you take off the patch, wash the place where the patch was and your hands thoroughly.  Only one patch should be used at any time.    If a dose is missed:  If you forget to wear or change a patch, put one on as soon as you can. If it is almost time to put on your next patch, wait until then to apply a new  patch and skip the one you missed. Do not apply extra patches to make up for a missed dose.    How to Store and Dispose of This Medicine:  Store the patches at room temperature in a closed container, away from heat, moisture and direct light.  Fold the used patch in half with the sticky sides together. Throw any used patch away so that children or pets cannot get to it. You will also need to throw away old patches after the expiration date has passed.  Keep all medicine away from children and never share your medicine with anyone.    Ask your doctor or pharmacist before using any other medicine, including over-the-counter medicines, vitamins, and herbal products.  Tell your doctor if you are using any medicines that make you sleepy. These include sleeping pills, cold and allergy medicine, narcotic pain relievers and sedatives.   Tell your doctor if you are using any medicine that make you sleepy. These include sleeping pills, ricardo and allergy medicine, narcotic pain relievers and sedatives.  Do not drink alcohol while you are using this medicine.     Warnings While Using This Medicine:  Make sure your doctor knows if you are pregnant or breastfeeding or if you have glaucoma, prostate problems, trouble urinating, blocked bowels, liver disease, kidney disease or a history of seizures or mental illness.  This medicine can cause blurring of vision and other vision problems if it comes in contact with the eyes. This medicine may also cause problems with urination. If any of these reactions occur, remove the patch and call your doctor right away.  This medicine may make you dizzy or drowsy. Avoid driving, using machines, or doing anything else that could be dangerous if you are not alert. If you plan to participate in underwater sports, this medicine may cause disorienting effects. If this is a concern for you, talk with your doctor.  This medicine may make you sweat less and cause your body to get too hot. Be careful in  "hot weather, when you are exercising or if using sauna or whirlpool.  Make sure any doctor or dentist who treats you knows that you are using this medicine. This medicine may affect the results of certain medical tests.  Skin burns have been reported at the patch site in several patients wearing an aluminized transdermal system during a magnetic resonance imaging scan (MRI). Because Transderm Scop contains aluminum, it is recommended to remove the system before undergoing an MRI.    Call your doctor right away if you notice any of these side effects:  Allergic reaction: Itching or hives, swelling in your face or hands, swelling or tingling in your mouth or throat, chest tightness, trouble breathing  Blurred vision  Confusion or memory loss  Fast, slow or uneven heartbeat  Lightheadedness, dizziness, drowsiness or fainting  Seeing, hearing or feeling things that are not there  Severe eye pain  Trouble urinating    If you notice these less serious side effects, talk with your doctor:  Dry mouth  Dry, itchy or red eyes  Restlessness  Skin rash or redness    If you notice other side effects that you think are caused by this medicine, tell your doctor immediately.    Rev. 4/2014        Pending Results     No orders found from 3/25/2018 to 3/28/2018.            Admission Information     Date & Time Provider Department Dept. Phone    3/27/2018 Campbell Narayanan MD Post Anesthesia Care Unit [facility] [phone]      Your Vitals Were     Blood Pressure Temperature Respirations Height Weight Pulse Oximetry    129/88 (Cuff Size: Adult Regular) 98.2  F (36.8  C) 16 1.676 m (5' 6\") 65.1 kg (143 lb 8.3 oz) 96%    BMI (Body Mass Index)                   23.16 kg/m2           [employer] Information     [employer] gives you secure access to your electronic health record. If you see a primary care provider, you can also send messages to your care team and make appointments. If you have questions, please call your primary care " clinic.  If you do not have a primary care provider, please call [phone] and they will assist you.        Care EveryWhere ID     This is your Care EveryWhere ID. This could be used by other organizations to access your [place] medical records  [id number]        Equal Access to Services     CINTHYATRACE ELI : Hadii aad ku hadcathicaleb Somalini, waaxda luqadaha, qaybta kaalmada jacque, maki stevensonarjunliss smallwood. So [facility] [phone].    ATENCIÓN: Si habla español, tiene a quijano disposición servicios gratuitos de asistencia lingüística. Llame al [phone].    We comply with applicable federal civil rights laws and [place] laws. We do not discriminate on the basis of race, color, national origin, age, disability, sex, sexual orientation, or gender identity.               Review of your medicines      CONTINUE these medicines which may have CHANGED, or have new prescriptions. If we are uncertain of the size of tablets/capsules you have at home, strength may be listed as something that might have changed.        Dose / Directions    nortriptyline 10 MG capsule   Commonly known as:  PAMELOR   This may have changed:  how much to take   Used for:  Right upper quadrant abdominal pain        Dose:  30 mg   Take 3 capsules (30 mg) by mouth At Bedtime   Quantity:  120 capsule   Refills:  0         CONTINUE these medicines which have NOT CHANGED        Dose / Directions    acetaminophen 32 mg/mL solution   Commonly known as:  TYLENOL        Dose:  975 mg   30.45 mLs (975 mg) by Per J Tube route every 8 hours   Refills:  0       * amylase-lipase-protease 90897 UNITS Cpep   Commonly known as:  ZENPEP   Used for:  Idiopathic chronic pancreatitis (H)        Dose:  2-3 capsule   Take 2-3 capsules (40,000-60,000 Units) by mouth Take with snacks or supplements (Snacks)   Quantity:  180 capsule   Refills:  1       * amylase-lipase-protease 58554 UNITS Cpep   Commonly known as:  ZENPEP   Used for:  Right upper quadrant  abdominal pain        Dose:  5 capsule   Take 5 capsules (100,000 Units) by mouth 4 times daily (with meals and nightly)   Quantity:  180 capsule   Refills:  1       blood glucose monitoring test strip   Commonly known as:  no brand specified   Used for:  Hypoglycemia        One Touch Ultra 2 Strips, Use to test blood sugars 2 times daily or as directed   Quantity:  100 strip   Refills:  3       fluticasone 50 MCG/ACT spray   Commonly known as:  FLONASE   Used for:  Nasal congestion        Dose:  2 spray   Spray 2 sprays into both nostrils daily   Quantity:  16 g   Refills:  3       gabapentin 300 MG capsule   Commonly known as:  NEURONTIN        TK 2 CS QAM  1 C IN THE AFTERNOON AND 2 CS QHS   Refills:  0       hydrOXYzine 10 MG/5ML syrup   Commonly known as:  ATARAX   Used for:  Acute abdominal pain        Dose:  25 mg   Take 12.5 mLs (25 mg) by mouth every 6 hours as needed for anxiety or other (pain)   Quantity:  1500 mL   Refills:  2       leuprolide 11.25 MG kit   Commonly known as:  LUPRON DEPOT (3-MONTH)   Used for:  Endometriosis        Dose:  11.25 mg   Inject 11.25 mg into the muscle every 3 months   Quantity:  1 each   Refills:  3       levalbuterol 45 MCG/ACT Inhaler   Commonly known as:  XOPENEX HFA   Used for:  Wheeze        Dose:  2 puff   Inhale 2 puffs into the lungs every 6 hours as needed for shortness of breath / dyspnea   Quantity:  1 Inhaler   Refills:  1       multivitamins with minerals Liqd liquid   Used for:  Abdominal pain, epigastric        Dose:  15 mL   15 mLs by Per Feeding Tube route daily   Quantity:  1 Bottle   Refills:  0       norethindrone 5 MG tablet   Commonly known as:  AYGESTIN   Used for:  Endometriosis        Dose:  5 mg   Take 1 tablet (5 mg) by mouth daily   Quantity:  90 tablet   Refills:  1       ondansetron 4 MG ODT tab   Commonly known as:  ZOFRAN ODT   Used for:  Intractable cyclical vomiting with nausea        Dose:  4 mg   Take 1 tablet (4 mg) by mouth every 8  hours as needed for nausea or vomiting   Quantity:  15 tablet   Refills:  0       order for DME   Used for:  Chronic abdominal pain        Equipment being ordered: TENS with wire and electrode.   Quantity:  1 Units   Refills:  0       oxyCODONE 5 MG/5ML solution   Commonly known as:  ROXICODONE   Used for:  Abdominal pain, generalized, Chronic abdominal pain        Take 10 mL (10 mg) by mouth every 6 hours as needed, maximum 40 mL per day.   Quantity:  600 mL   Refills:  0       pantoprazole 40 MG EC tablet   Commonly known as:  PROTONIX   Used for:  Right upper quadrant abdominal pain, Erosive gastritis        Dose:  40 mg   Take 1 tablet (40 mg) by mouth 2 times daily (before meals)   Quantity:  30 tablet   Refills:  1       polyethylene glycol Packet   Commonly known as:  MIRALAX/GLYCOLAX   Used for:  Right upper quadrant abdominal pain        Dose:  17 g   Take 17 g by mouth daily   Quantity:  7 packet   Refills:  0       RIZATRIPTAN BENZOATE PO        Dose:  5 mg   Take 5 mg by mouth once as needed   Refills:  0       senna-docusate 8.6-50 MG per tablet   Commonly known as:  SENOKOT-S;PERICOLACE   Used for:  Right upper quadrant abdominal pain        Dose:  1 tablet   Take 1 tablet by mouth 2 times daily as needed for constipation   Quantity:  100 tablet   Refills:  0       sodium bicarbonate 325 MG tablet   Used for:  Abdominal pain, epigastric, Intractable cyclical vomiting with nausea        Dose:  325 mg   1 tablet (325 mg) by Per Feeding Tube route 4 times daily   Quantity:  120 tablet   Refills:  0       * Notice:  This list has 2 medication(s) that are the same as other medications prescribed for you. Read the directions carefully, and ask your doctor or other care provider to review them with you.             Protect others around you: Learn how to safely use, store and throw away your medicines at www.disposemymeds.org.             Medication List: This is a list of all your medications and when to  take them. Check marks below indicate your daily home schedule. Keep this list as a reference.      Medications           Morning Afternoon Evening Bedtime As Needed    acetaminophen 32 mg/mL solution   Commonly known as:  TYLENOL   30.45 mLs (975 mg) by Per J Tube route every 8 hours                                * amylase-lipase-protease 24230 UNITS Cpep   Commonly known as:  ZENPEP   Take 2-3 capsules (40,000-60,000 Units) by mouth Take with snacks or supplements (Snacks)                                * amylase-lipase-protease 04471 UNITS Cpep   Commonly known as:  ZENPEP   Take 5 capsules (100,000 Units) by mouth 4 times daily (with meals and nightly)                                blood glucose monitoring test strip   Commonly known as:  no brand specified   One Touch Ultra 2 Strips, Use to test blood sugars 2 times daily or as directed                                fluticasone 50 MCG/ACT spray   Commonly known as:  FLONASE   Spray 2 sprays into both nostrils daily                                gabapentin 300 MG capsule   Commonly known as:  NEURONTIN   TK 2 CS QAM  1 C IN THE AFTERNOON AND 2 CS QHS                                hydrOXYzine 10 MG/5ML syrup   Commonly known as:  ATARAX   Take 12.5 mLs (25 mg) by mouth every 6 hours as needed for anxiety or other (pain)                                leuprolide 11.25 MG kit   Commonly known as:  LUPRON DEPOT (3-MONTH)   Inject 11.25 mg into the muscle every 3 months                                levalbuterol 45 MCG/ACT Inhaler   Commonly known as:  XOPENEX HFA   Inhale 2 puffs into the lungs every 6 hours as needed for shortness of breath / dyspnea                                multivitamins with minerals Liqd liquid   15 mLs by Per Feeding Tube route daily                                norethindrone 5 MG tablet   Commonly known as:  AYGESTIN   Take 1 tablet (5 mg) by mouth daily                                nortriptyline 10 MG capsule   Commonly known  as:  PAMELOR   Take 3 capsules (30 mg) by mouth At Bedtime                                ondansetron 4 MG ODT tab   Commonly known as:  ZOFRAN ODT   Take 1 tablet (4 mg) by mouth every 8 hours as needed for nausea or vomiting                                order for DME   Equipment being ordered: TENS with wire and electrode.                                oxyCODONE 5 MG/5ML solution   Commonly known as:  ROXICODONE   Take 10 mL (10 mg) by mouth every 6 hours as needed, maximum 40 mL per day.                                pantoprazole 40 MG EC tablet   Commonly known as:  PROTONIX   Take 1 tablet (40 mg) by mouth 2 times daily (before meals)                                polyethylene glycol Packet   Commonly known as:  MIRALAX/GLYCOLAX   Take 17 g by mouth daily                                RIZATRIPTAN BENZOATE PO   Take 5 mg by mouth once as needed                                senna-docusate 8.6-50 MG per tablet   Commonly known as:  SENOKOT-S;PERICOLACE   Take 1 tablet by mouth 2 times daily as needed for constipation                                sodium bicarbonate 325 MG tablet   1 tablet (325 mg) by Per Feeding Tube route 4 times daily                                * Notice:  This list has 2 medication(s) that are the same as other medications prescribed for you. Read the directions carefully, and ask your doctor or other care provider to review them with you.

## 2018-03-27 NOTE — ANESTHESIA CARE TRANSFER NOTE
Patient: Alexandra Melgoza    Procedure(s):  Upper Gastrointestinal Endoscopy convert gastrostomy to gastrojejunostomy tube  - Wound Class: II-Clean Contaminated    Diagnosis: Gastrojejunostomy Tube Malfunction   Diagnosis Additional Information: No value filed.    Anesthesia Type:   No value filed.     Note:  Airway :Room Air  Patient transferred to:PACU  Comments: VSS. Ventilating well.Handoff Report: Identifed the Patient, Identified the Reponsible Provider, Reviewed the pertinent medical history, Discussed the surgical course, Reviewed Intra-OP anesthesia mangement and issues during anesthesia, Set expectations for post-procedure period and Allowed opportunity for questions and acknowledgement of understanding      Vitals: (Last set prior to Anesthesia Care Transfer)    CRNA VITALS  3/27/2018 1514 - 3/27/2018 1556      3/27/2018             Pulse: 91    SpO2: 100 %    Resp Rate (observed): (!)  7                Electronically Signed By: ANABEL Short CRNA  March 27, 2018  3:56 PM

## 2018-03-27 NOTE — OR NURSING
Pt does see a Dr. Leonardo Wang pain doctor and patient stated that Dr. Leonardo Wang was going to talk to Dr. Narayanan regarding increasing frequency of pain medication she is currently on. The Josias Chan called regarding this and he stated patient can take her pain medication Oxycodone 10 mg  elixir every four hours instead of every 6 hours for the next 48 hours.

## 2018-03-27 NOTE — OR NURSING
Patient having Nausea and vomitting. Dr. Fierro notified. Port accessed will start fluids. Patient took Zofran at home.

## 2018-03-27 NOTE — BRIEF OP NOTE
[facility], [place]  Gastroenterology Brief Operative Note    Pre-operative diagnosis: GJ tube dislodgement   Post-operative diagnosis Same   Procedure: GJ tube exchange   Surgeon: Campbell Narayanan MD   Assistants(s): Josias Chan MD   Anesthesia: General endotracheal anesthesia   Estimated blood loss: Minimal    Total IV fluids: (See anesthesia record)   Blood transfusion: No transfusion was given during surgery   Total urine output: (See anesthesia record)   Drains: None   Specimens: None   Implants: 18 Fr x 45 cm low profile 3 cm GJ tube   Findings: Temporary messer tube removed from gastrostomy. Pediatric endoscope advanced through gastrostomy to proximal jejunum. Glidewire placed in jejunum and new GJ tube advanced over the wire. Placement confirmed fluoroscopically.   Complications: None   Condition: Stable   Comments:      Recommendations:         See dictated procedure report for full details (found in chart review under 'Procedures')    - Observe in Same Day for possible discharge  - GjJ tube may be used immediately as previously     Josias Chan MD  [phone]

## 2018-03-27 NOTE — DISCHARGE INSTRUCTIONS
[facility]  Same-Day Surgery   Adult Discharge Orders & Instructions     For 24 hours after surgery    1. Get plenty of rest.  A responsible adult must stay with you for at least 24 hours after you leave the hospital.   2. Do not drive or use heavy equipment.  If you have weakness or tingling, don't drive or use heavy equipment until this feeling goes away.  3. Do not drink alcohol.  4. Avoid strenuous or risky activities.  Ask for help when climbing stairs.   5. You may feel lightheaded.  IF so, sit for a few minutes before standing.  Have someone help you get up.   6. If you have nausea (feel sick to your stomach): Drink only clear liquids such as apple juice, ginger ale, broth or 7-Up.  Rest may also help.  Be sure to drink enough fluids.  Move to a regular diet as you feel able.  7. You may have a slight fever. Call the doctor if your fever is over 100 F (37.7 C) (taken under the tongue) or lasts longer than 24 hours.  8. You may have a dry mouth, a sore throat, muscle aches or trouble sleeping.  These should go away after 24 hours.  9. Do not make important or legal decisions.   Call your doctor for any of the followin.  Signs of infection (fever, growing tenderness at the surgery site, a large amount of drainage or bleeding, severe pain, foul-smelling drainage, redness, swelling).    2. It has been over 8 to 10 hours since surgery and you are still not able to urinate (pass water).    3.  Headache for over 24 hours.      To contact a doctor, call Dr Narayanan's office at [phone] or:    X   [phone] and ask for the resident on call for Gastroenterology or GI   (answered 24 hours a day)  X Emergency Department:    [facility]: [phone]       (TT[facility] for hearing impaired: [phone])    May take Home pain medication Oxycodone 10 mg elixer every 4 hours for the next 48 hours. Pt to resume seeing Dr. Leonardo Wang for pain medication needs here  after.               Tips for taking pain medications  To get the best pain relief possible , remember these points:      Take pain medications as directed, before pain becomes severe      Pain medication can upset your stomach: taking it with food may help      Constipation is a common side effect of pain medication. Drink plenty of  Fluids      Eat foods high in fiber. Take a stool softener  if recommended by your doctor or  Pharmacist.        Do not drink alcohol, drive or operate machinery while taking pain medications.    Ask about other ways to control pain, such as with heat, ice or relaxation.    Scopolamine Patch  (Absorbed through the skin)    The Scopolamine Patch prevents nausea and vomiting caused by motion sickness or anesthesia and surgery in adults.    Brand Name(s): Transderm Scop, Transderm-Scope  There may be other brand names for this medicine.    When This Medicine Should Not Be Used:  You should not use this medicine if you have had an allergic reaction to scopolamine, or if you have narrow angle glaucoma.    How to Use This Medicine:  Your doctor will tell you how many patches to use, where to apply them, and how often to apply them.   Do not use more patches or apply them more often than your doctor tells you to.  This medicine comes with patient instructions. Read and follow these instructions carefully. Ask your doctor or pharmacist if you have any questions.  To prevent motion sickness, apply the patch at least 4 hours before you need it.  Wash and dry your hands thoroughly before applying the patch.  Leave the patch in its sealed wrapper until you are ready to put it on. Tear the wrapper open carefully. NEVER CUT the wrapper or the patch with scissors. Do not use any patch that has been cut by accident.  Take the liner off the sticky side before applying.  Apply the patch to dry, hairless skin behind the ear.  If the patch is loose or falls off, apply a new patch at a different place  behind the ear.  After you take off the patch, wash the place where the patch was and your hands thoroughly.  Only one patch should be used at any time.    If a dose is missed:  If you forget to wear or change a patch, put one on as soon as you can. If it is almost time to put on your next patch, wait until then to apply a new patch and skip the one you missed. Do not apply extra patches to make up for a missed dose.    How to Store and Dispose of This Medicine:  Store the patches at room temperature in a closed container, away from heat, moisture and direct light.  Fold the used patch in half with the sticky sides together. Throw any used patch away so that children or pets cannot get to it. You will also need to throw away old patches after the expiration date has passed.  Keep all medicine away from children and never share your medicine with anyone.    Ask your doctor or pharmacist before using any other medicine, including over-the-counter medicines, vitamins, and herbal products.  Tell your doctor if you are using any medicines that make you sleepy. These include sleeping pills, cold and allergy medicine, narcotic pain relievers and sedatives.   Tell your doctor if you are using any medicine that make you sleepy. These include sleeping pills, ricardo and allergy medicine, narcotic pain relievers and sedatives.  Do not drink alcohol while you are using this medicine.     Warnings While Using This Medicine:  Make sure your doctor knows if you are pregnant or breastfeeding or if you have glaucoma, prostate problems, trouble urinating, blocked bowels, liver disease, kidney disease or a history of seizures or mental illness.  This medicine can cause blurring of vision and other vision problems if it comes in contact with the eyes. This medicine may also cause problems with urination. If any of these reactions occur, remove the patch and call your doctor right away.  This medicine may make you dizzy or drowsy. Avoid  driving, using machines, or doing anything else that could be dangerous if you are not alert. If you plan to participate in underwater sports, this medicine may cause disorienting effects. If this is a concern for you, talk with your doctor.  This medicine may make you sweat less and cause your body to get too hot. Be careful in hot weather, when you are exercising or if using sauna or whirlpool.  Make sure any doctor or dentist who treats you knows that you are using this medicine. This medicine may affect the results of certain medical tests.  Skin burns have been reported at the patch site in several patients wearing an aluminized transdermal system during a magnetic resonance imaging scan (MRI). Because Transderm Scop contains aluminum, it is recommended to remove the system before undergoing an MRI.    Call your doctor right away if you notice any of these side effects:  Allergic reaction: Itching or hives, swelling in your face or hands, swelling or tingling in your mouth or throat, chest tightness, trouble breathing  Blurred vision  Confusion or memory loss  Fast, slow or uneven heartbeat  Lightheadedness, dizziness, drowsiness or fainting  Seeing, hearing or feeling things that are not there  Severe eye pain  Trouble urinating    If you notice these less serious side effects, talk with your doctor:  Dry mouth  Dry, itchy or red eyes  Restlessness  Skin rash or redness    If you notice other side effects that you think are caused by this medicine, tell your doctor immediately.    Rev. 4/2014

## 2018-03-27 NOTE — OR NURSING
Dr. oLpez (anesthesia) notified patient continues to have nausea, vomited X1 in phase 2, and then started to have pain after vomiting.  Pt received max dose of hydromorphone in PACU, declines fentanyl.  Pt has multiple allergies, and has received all antiemetics ordered.  Pt also has sea bands on and was given peppermint oil.  Dr. Lopez advises to watch patient for 30-45 minutes and then re-assess.

## 2018-03-27 NOTE — ANESTHESIA POSTPROCEDURE EVALUATION
Patient: Alexandra Melgoza    Procedure(s):  Upper Gastrointestinal Endoscopy convert gastrostomy to gastrojejunostomy tube  - Wound Class: II-Clean Contaminated    Diagnosis:Gastrojejunostomy Tube Malfunction   Diagnosis Additional Information: No value filed.    Anesthesia Type:  No value filed.    Note:  Anesthesia Post Evaluation    Patient location during evaluation: PACU  Patient participation: Able to fully participate in evaluation  Level of consciousness: awake and alert  Pain management: satisfactory to patient  Airway patency: patent  Cardiovascular status: acceptable and stable  Respiratory status: acceptable and spontaneous ventilation  Hydration status: euvolemic  PONV: controlled     Anesthetic complications: None          Last vitals:  Vitals:    03/27/18 1546 03/27/18 1600 03/27/18 1615   BP: 123/79 125/80 125/83   Resp: 14 14 16   Temp: 36.8  C (98.2  F)     SpO2: 100% 100% 98%         Electronically Signed By: Manny Perez MD  March 27, 2018  4:59 PM

## 2018-03-27 NOTE — ANESTHESIA PREPROCEDURE EVALUATION
Anesthesia Evaluation     . Pt has had prior anesthetic. Type: General    History of anesthetic complications   - PONV        ROS/MED HX    ENT/Pulmonary:     (+)asthma (no issues in several years) , . .    Neurologic:       Cardiovascular: Comment: P.O.T.S. Postural orthostatic tachycardia syndrome; no issues with it anymore, per patient    (+) ----. : . . . :. . Previous cardiac testing Echodate:2013results:Interpretation Summary  Global and regional left ventricular function is normal with an EF of 55-60%.   Global right ventricular function is normal. Pulmonary artery systolic   pressure is normal. The inferior vena cava is normal. No pericardial   effusion is present.  PatientHeight: 67 in  PatientWeight: 143 lbs  SystolicPressure: 96 mmHg  DiastolicPressure: 55 mmHg  BSA 1.8 m^2      Procedure  Echocardiogram with two-dimensional, color and spectral Doppler performed.    Left Ventricle  Global and regional left ventricular function is normal with an EF of 55-60%.    Right Ventricle  The right ventricle is normal size.  Global right ventricular function is normal.    Atria  Both atria appear normal.  A prominent eustachian valve is noted.  A catheter is noted in the right atrium.    Mitral Valve  The mitral valve is normal.    Aortic Valve  Aortic valve is normal in structure and function.    Tricuspid Valve  The tricuspid valve is normal.  Trace tricuspid insufficiency is present.  The TR peak pressure gradient is 16 mmHg .  Pulmonary artery systolic pressure is normal.    Pulmonic Valve  TheInterpretation Summary  Global and regional left ventricular function is normal with an EF of 55-60%.   Global right ventricular function is normal. Pulmonary artery systolic   pressure is normal. The inferior vena cava is normal. No pericardial   effusion is present.  PatientHeight: 67 in  PatientWeight: 143 lbs  SystolicPressure: 96 mmHg  DiastolicPressure: 55 mmHg  BSA 1.8 m^2      Procedure  Echocardiogram with  two-dimensional, color and spectral Doppler performed.    Left Ventricle  Global and regional left ventricular function is normal with an EF of 55-60%.    Right Ventricle  The right ventricle is normal size.  Global right ventricular function is normal.    Atria  Both atria appear normal.  A prominent eustachian valve is noted.  A catheter is noted in the right atrium.    Mitral Valve  The mitral valve is normal.    Aortic Valve  Aortic valve is normal in structure and function.    Tricuspid Valve  The tricuspid valve is normal.  Trace tricuspid insufficiency is present.  The TR peak pressure gradient is 16 mmHg .  Pulmonary artery systolic pressure is normal.    Pulmonic Valve  The pulmonic valve is normal.  Trace pulmonic insufficiency is present.    Vessels  The inferior vena cava is normal.    Pericardium  No pericardial effusion is present.   pulmonic valve is normal.  Trace pulmonic insufficiency is present.    Vessels  The inferior vena cava is normal.    Pericardium  No pericardial effusion is present.  date: results:ECG reviewed date:1/2016 results:NSR date: results:         (-) irregular heartbeat/palpitations and valvular problems/murmurs   METS/Exercise Tolerance:  >4 METS   Hematologic:  - neg hematologic  ROS       Musculoskeletal:  - neg musculoskeletal ROS       GI/Hepatic:     (+) Other GI/Hepatic Chronic abdominal pain, cyclic N/V      Renal/Genitourinary:  - ROS Renal section negative       Endo: Comment: Taking OCP    (+) Other Endocrine Disorder endometriosis.      Psychiatric:     (+) psychiatric history other (comment) (somatoform disorder, pseudoseizures)      Infectious Disease:  - neg infectious disease ROS       Malignancy:      - no malignancy   Other:    (+) H/O Chronic Pain,                   Physical Exam  Normal systems: dental    Airway   Mallampati: I  TM distance: >3 FB  Neck ROM: full    Dental     Cardiovascular   Rhythm and rate: regular and normal  (-) no weak pulses and no  murmur    Pulmonary    breath sounds clear to auscultation(-) no rhonchi                    Anesthesia Plan      History & Physical Review      ASA Status:  2 .    NPO Status:  > 8 hours    Plan for General, ETT and RSI with Intravenous induction. Maintenance will be TIVA.      GETA with RSI (N/V, bilious) and TIVA. Port access, possible + PIV. Standard ASA monitors. IV opioids. PACU postop.    Risks and benefits of anesthetic discussed with patient including sore throat, voice hoarseness, injury to vocal cords, throat, mouth, teeth, tongue, and lips from intubation; nausea/vomiting; cardiac arrest, respiratory complications, MI, stroke, blood clots, death.    Presented opportunity to answer patient and family questions. Questions addressed.    On OCP and is sexually active and has endometriosis. Will avoid sugammadex.      Postoperative Care      Consents  Anesthetic plan, risks, benefits and alternatives discussed with:  Patient..                          .

## 2018-03-27 NOTE — IP AVS SNAPSHOT
Post Anesthesia Care Unit [address] [address]    Phone:  [phone]                                       After Visit Summary   3/27/2018    Alexandra Melgoza    MRN: [id number]           After Visit Summary Signature Page     I have received my discharge instructions, and my questions have been answered. I have discussed any challenges I see with this plan with the nurse or doctor.    ..........................................................................................................................................  Patient/Patient Representative Signature      ..........................................................................................................................................  Patient Representative Print Name and Relationship to Patient    ..................................................               ................................................  Date                                            Time    ..........................................................................................................................................  Reviewed by Signature/Title    ...................................................              ..............................................  Date                                                            Time

## 2018-03-28 ENCOUNTER — DOCUMENTATION ONLY (OUTPATIENT)
Dept: PHARMACY | Facility: CLINIC | Age: 23
End: 2018-03-28

## 2018-03-28 ENCOUNTER — RADIANT APPOINTMENT (OUTPATIENT)
Dept: GENERAL RADIOLOGY | Facility: CLINIC | Age: 23
End: 2018-03-28
Payer: COMMERCIAL

## 2018-03-28 ENCOUNTER — INFUSION THERAPY VISIT (OUTPATIENT)
Dept: INFUSION THERAPY | Facility: CLINIC | Age: 23
End: 2018-03-28
Attending: INTERNAL MEDICINE
Payer: COMMERCIAL

## 2018-03-28 ENCOUNTER — CARE COORDINATION (OUTPATIENT)
Dept: GASTROENTEROLOGY | Facility: CLINIC | Age: 23
End: 2018-03-28

## 2018-03-28 VITALS
SYSTOLIC BLOOD PRESSURE: 128 MMHG | DIASTOLIC BLOOD PRESSURE: 50 MMHG | RESPIRATION RATE: 18 BRPM | HEART RATE: 96 BPM | TEMPERATURE: 98.2 F | OXYGEN SATURATION: 98 %

## 2018-03-28 DIAGNOSIS — G43.A0 CYCLICAL VOMITING WITH NAUSEA, INTRACTABILITY OF VOMITING NOT SPECIFIED: Primary | ICD-10-CM

## 2018-03-28 DIAGNOSIS — F33.1 MAJOR DEPRESSIVE DISORDER, RECURRENT EPISODE, MODERATE (H): ICD-10-CM

## 2018-03-28 DIAGNOSIS — F41.1 ANXIETY STATE: ICD-10-CM

## 2018-03-28 DIAGNOSIS — R52 PAIN: ICD-10-CM

## 2018-03-28 DIAGNOSIS — R10.13 ABDOMINAL PAIN, EPIGASTRIC: ICD-10-CM

## 2018-03-28 DIAGNOSIS — R10.9 ABDOMINAL PAIN: ICD-10-CM

## 2018-03-28 DIAGNOSIS — K86.1 CHRONIC PANCREATITIS, UNSPECIFIED PANCREATITIS TYPE (H): ICD-10-CM

## 2018-03-28 DIAGNOSIS — R11.15 INTRACTABLE CYCLICAL VOMITING WITH NAUSEA: ICD-10-CM

## 2018-03-28 DIAGNOSIS — R10.9 ACUTE ABDOMINAL PAIN: ICD-10-CM

## 2018-03-28 DIAGNOSIS — Z98.890 S/P ERCP: ICD-10-CM

## 2018-03-28 DIAGNOSIS — N39.41 URGE INCONTINENCE: ICD-10-CM

## 2018-03-28 DIAGNOSIS — E16.2 HYPOGLYCEMIA: ICD-10-CM

## 2018-03-28 DIAGNOSIS — R21 RASH: ICD-10-CM

## 2018-03-28 LAB — UPPER GI ENDOSCOPY: NORMAL

## 2018-03-28 PROCEDURE — 25000128 H RX IP 250 OP 636: Mod: ZF | Performed by: INTERNAL MEDICINE

## 2018-03-28 PROCEDURE — 25000128 H RX IP 250 OP 636: Mod: ZF | Performed by: NURSE PRACTITIONER

## 2018-03-28 PROCEDURE — 96374 THER/PROPH/DIAG INJ IV PUSH: CPT

## 2018-03-28 PROCEDURE — 96375 TX/PRO/DX INJ NEW DRUG ADDON: CPT

## 2018-03-28 PROCEDURE — 96376 TX/PRO/DX INJ SAME DRUG ADON: CPT

## 2018-03-28 PROCEDURE — 96361 HYDRATE IV INFUSION ADD-ON: CPT

## 2018-03-28 RX ORDER — ONDANSETRON 2 MG/ML
4 INJECTION INTRAMUSCULAR; INTRAVENOUS DAILY PRN
Status: DISCONTINUED | OUTPATIENT
Start: 2018-03-28 | End: 2018-03-28 | Stop reason: HOSPADM

## 2018-03-28 RX ORDER — ONDANSETRON 2 MG/ML
4 INJECTION INTRAMUSCULAR; INTRAVENOUS ONCE
Status: COMPLETED | OUTPATIENT
Start: 2018-03-28 | End: 2018-03-28

## 2018-03-28 RX ORDER — DIPHENHYDRAMINE HYDROCHLORIDE 50 MG/ML
25 INJECTION INTRAMUSCULAR; INTRAVENOUS DAILY PRN
Status: DISCONTINUED | OUTPATIENT
Start: 2018-03-28 | End: 2018-03-28 | Stop reason: HOSPADM

## 2018-03-28 RX ORDER — DIPHENHYDRAMINE HYDROCHLORIDE 50 MG/ML
25 INJECTION INTRAMUSCULAR; INTRAVENOUS ONCE
Status: CANCELLED
Start: 2018-03-28 | End: 2018-03-28

## 2018-03-28 RX ORDER — ONDANSETRON 2 MG/ML
4 INJECTION INTRAMUSCULAR; INTRAVENOUS DAILY PRN
Status: CANCELLED
Start: 2018-03-28

## 2018-03-28 RX ORDER — ONDANSETRON 2 MG/ML
4 INJECTION INTRAMUSCULAR; INTRAVENOUS ONCE
Status: CANCELLED
Start: 2018-03-28 | End: 2018-03-28

## 2018-03-28 RX ORDER — DIPHENHYDRAMINE HYDROCHLORIDE 50 MG/ML
25 INJECTION INTRAMUSCULAR; INTRAVENOUS ONCE
Status: COMPLETED | OUTPATIENT
Start: 2018-03-28 | End: 2018-03-28

## 2018-03-28 RX ORDER — DIPHENHYDRAMINE HYDROCHLORIDE 50 MG/ML
25 INJECTION INTRAMUSCULAR; INTRAVENOUS DAILY PRN
Status: CANCELLED
Start: 2018-03-28

## 2018-03-28 RX ADMIN — ONDANSETRON 4 MG: 2 INJECTION INTRAMUSCULAR; INTRAVENOUS at 10:31

## 2018-03-28 RX ADMIN — SODIUM CHLORIDE, POTASSIUM CHLORIDE, SODIUM LACTATE AND CALCIUM CHLORIDE 2000 ML: 600; 310; 30; 20 INJECTION, SOLUTION INTRAVENOUS at 10:16

## 2018-03-28 RX ADMIN — DIPHENHYDRAMINE HYDROCHLORIDE 25 MG: 50 INJECTION, SOLUTION INTRAMUSCULAR; INTRAVENOUS at 11:28

## 2018-03-28 RX ADMIN — ONDANSETRON 4 MG: 2 INJECTION INTRAMUSCULAR; INTRAVENOUS at 11:21

## 2018-03-28 RX ADMIN — DIPHENHYDRAMINE HYDROCHLORIDE 25 MG: 50 INJECTION, SOLUTION INTRAMUSCULAR; INTRAVENOUS at 10:19

## 2018-03-28 ASSESSMENT — PAIN DESCRIPTION - DESCRIPTORS: DESCRIPTORS: SORE

## 2018-03-28 NOTE — OR NURSING
RN spoke with Dr. Kay in regards to patient's nausea. Updated MD on interventions performed. Newly placed tube venting and draining GI contents as expected. Patient and family agreeable to discharge at this time. Dr. Kay ok with patient leaving at this time. Patient to contact on call MD if nausea increased and is uncontrolled by prescribed medication.

## 2018-03-28 NOTE — PROGRESS NOTES
Nursing Note  Alexandra Melgoza presents today to [facility] for:   Chief Complaint   Patient presents with     Infusion     IVFluids, antiemetics, meds     During today's [facility] appointment, orders from Dr. Campbell Narayanan were completed.  Frequency: 3x/wk, PRN    Progress note:  Patient identification verified by name and date of birth.  Assessment completed.  Vitals recorded in Doc Flowsheets.  Patient was provided with education regarding infusion and possible side effects.  Patient verbalized understanding.      needed: No  Premedications: administered per order. Benadryl given IVP over 2 minutes X2 doses. Zofran given IVP over 4 minutes X2 doses.  Infusion Rates: Each liter LR infusion given over approximately 1 hour.  Approximate Infusion length:2 hours.   Labs: were not ordered for this appointment.  Vascular access: port accessed today. +blood return. Flushed without resistance. Heparin locked post infusion and de-accessed.  Treatment Conditions: Patient afebrile today. Marked nausea, vomiting prior to arrival today. Patient c/o vomiting immediately after administration of medications into J tube. States the medication comes out of G tube. [facility] paged Dr. Narayanan. Patient sent down to x-ray. Return page sent to Dr. Narayanan re: POC. Unable to determine plan of care during infusion appt. Patient preferred to go home until plan of care determined.   No episodes of emesis during infusion. Dressing applied by patient to G-tube site, small amount of serous drainage and dried blood at insertion site. No redness.  After discharge of patient, Dr. Narayanan called. He will look at imaging and follow up with patient.     Patient tolerated infusion: well.    Discharge Plan:   Follow up plan of care with: ongoing infusions at [facility].  Discharge instructions were reviewed with patient.  Patient/representative  verbalized understanding of discharge instructions and all questions answered.  Patient discharged from [facility] in stable condition.    Nicole Ballard RN    Administrations This Visit     diphenhydrAMINE (BENADRYL) injection 25 mg     Admin Date Action Dose Route Administered By             03/28/2018 Given 25 mg Intravenous Nicole Ballard RN              Admin Date Action Dose Route Administered By             03/28/2018 Given 25 mg Intravenous Mary Anne Nascimento RN                    lactated ringers BOLUS 1,000-2,000 mL     Admin Date Action Dose Route Administered By             03/28/2018 New Bag 2000 mL Intravenous Nicole Ballard RN                    ondansetron (ZOFRAN) injection 4 mg     Admin Date Action Dose Route Administered By             03/28/2018 Given 4 mg Intravenous Nicole Ballard RN              Admin Date Action Dose Route Administered By             03/28/2018 Given 4 mg Intravenous Mary Anne Nascimento RN                        /50 (BP Location: Left arm)  Pulse 96  Temp 98.2  F (36.8  C) (Oral)  Resp 18  SpO2 98%

## 2018-03-28 NOTE — MR AVS SNAPSHOT
After Visit Summary   3/28/2018    Alexandra Melgoza    MRN: [id number]           Patient Information     Date Of Birth          1995        Visit Information        Provider Department      3/28/2018 1:00 PM UC 42 ATC; UC SPEC INFUSION [facility] Specialty and Procedure        Today's Diagnoses     Cyclical vomiting with nausea, intractability of vomiting not specified    -  1    Chronic pancreatitis, unspecified pancreatitis type (H)        Intractable cyclical vomiting with nausea        Pain        Abdominal pain, epigastric        S/P ERCP        Acute abdominal pain        Abdominal pain        Hypoglycemia        Urge incontinence        Major depressive disorder, recurrent episode, moderate (H)        Anxiety state        Rash          Care Instructions    Dear Alexandra Melgoza    Thank you for choosing [facility] ([facility]) for your infusion.  The following information is a summary of our appointment as well as important reminders.      We look forward in seeing you on your next appointment here at [facility].  Please don t hesitate to call us at [phone] to reschedule any of your appointments or to speak with one of the [facility] registered nurses.  It was a pleasure taking care of you today.    Sincerely,    [facility]  Specialty Infusion & Procedure Center  [address] [place] [address]  Phone:  [phone]            Follow-ups after your visit        Your next 10 appointments already scheduled     Mar 28, 2018  2:20 PM CDT   XR ABDOMEN 2 VIEWS with UCXR1   [facility] ([facility])    [address] [address] [phone]           Please bring a list of your current medicines to your exam. (Include vitamins, minerals and over-thecounter medicines.) Leave your valuables at home.  Tell your  doctor if there is a chance you may be pregnant.  You do not need to do anything special for this exam.            Mar 29, 2018 10:25 AM CDT   (Arrive by 10:10 AM)   Return Visit with Quyen Baez MD   [facility] ([facility])    [address] [place] [address]  4th Floor  [facility] [address] [phone]            Mar 30, 2018 10:00 AM CDT   Infusion 120 with UC SPEC INFUSION, UC 49 ATC   [facility] Specialty and Procedure ([facility])    [address]  [address]  [facility] [address] [phone]            Apr 02, 2018  2:00 PM CDT   Infusion 120 with UC SPEC INFUSION, UC 47 ATC   [facility] Specialty and Procedure ([facility])    [address] [place] [address]  [address]  [facility] [address] [phone]            Apr 04, 2018  9:00 AM CDT   (Arrive by 8:45 AM)   Return Visit with Quyen Lennon, PhD   [facility] ([facility])    [address] [place] [address]  4th Floor  [facility] [address] [phone]            Apr 04, 2018  4:00 PM CDT   Infusion 120 with UC SPEC INFUSION, UC 45 ATC   [facility] Specialty and Procedure ([facility])    [address]  [address]  [facility] [address] [phone]            Apr 06, 2018 12:00 PM CDT   Infusion 120 with UC SPEC INFUSION   [facility] Specialty and Procedure ([facility])    [address] [place] [address]  [address]  [facility] [id number] [phone]              Who to contact     If you have questions or need follow up information about today's clinic visit or your schedule please contact [facility] SPECIALTY AND PROCEDURE directly at [phone].  Normal or non-critical lab and imaging results will be  communicated to you by cacaoTVhart, letter or phone within 4 business days after the clinic has received the results. If you do not hear from us within 7 days, please contact the clinic through [employer] or phone. If you have a critical or abnormal lab result, we will notify you by phone as soon as possible.  Submit refill requests through [employer] or call your pharmacy and they will forward the refill request to us. Please allow 3 business days for your refill to be completed.          Additional Information About Your Visit        [employer] Information     [employer] gives you secure access to your electronic health record. If you see a primary care provider, you can also send messages to your care team and make appointments. If you have questions, please call your primary care clinic.  If you do not have a primary care provider, please call [phone] and they will assist you.        Care EveryWhere ID     This is your Care EveryWhere ID. This could be used by other organizations to access your [facility] medical records  [id number]        Your Vitals Were     Pulse Temperature Respirations Pulse Oximetry          96 98.2  F (36.8  C) (Oral) 18 98%         Blood Pressure from Last 3 Encounters:   03/28/18 (P) 116/45   03/27/18 132/82   03/26/18 111/55    Weight from Last 3 Encounters:   03/27/18 65.1 kg (143 lb 8.3 oz)   03/26/18 65.8 kg (145 lb)   03/19/18 67.2 kg (148 lb 1.6 oz)              We Performed the Following     XR Abdomen 2 Views          Today's Medication Changes          These changes are accurate as of 3/28/18  1:49 PM.  If you have any questions, ask your nurse or doctor.               These medicines have changed or have updated prescriptions.        Dose/Directions    nortriptyline 10 MG capsule   Commonly known as:  PAMELOR   This may have changed:  how much to take   Used for:  Right upper quadrant abdominal pain        Dose:  30 mg   Take 3 capsules (30 mg) by mouth At Bedtime   Quantity:  120  capsule   Refills:  0                Primary Care Provider Office Phone # Fax #    Quyen Baez [phone] [address] [address]        Equal Access to Services     RACHAEL BENITEZ : Hadii aad ku hadkavon Enamorado, waaxda luqadaha, qaybta kaalmada adeonel, maki andrewsamber duongchris broliss smallwood. So [facility] [phone].    ATENCIÓN: Si habla español, tiene a quijano disposición servicios gratuitos de asistencia lingüística. Llame al [phone].    We comply with applicable federal civil rights laws and [place] laws. We do not discriminate on the basis of race, color, national origin, age, disability, sex, sexual orientation, or gender identity.            Thank you!     Thank you for choosing [facility] SPECIALTY AND PROCEDURE  for your care. Our goal is always to provide you with excellent care. Hearing back from our patients is one way we can continue to improve our services. Please take a few minutes to complete the written survey that you may receive in the mail after your visit with us. Thank you!             Your Updated Medication List - Protect others around you: Learn how to safely use, store and throw away your medicines at www.disposemymeds.org.          This list is accurate as of 3/28/18  1:49 PM.  Always use your most recent med list.                   Brand Name Dispense Instructions for use Diagnosis    acetaminophen 32 mg/mL solution    TYLENOL     30.45 mLs (975 mg) by Per J Tube route every 8 hours        * amylase-lipase-protease 80344 UNITS Cpep    ZENPEP    180 capsule    Take 2-3 capsules (40,000-60,000 Units) by mouth Take with snacks or supplements (Snacks)    Idiopathic chronic pancreatitis (H)       * amylase-lipase-protease 72954 UNITS Cpep    ZENPEP    180 capsule    Take 5 capsules (100,000 Units) by mouth 4 times daily (with meals and nightly)    Right upper quadrant abdominal pain       blood glucose  monitoring test strip    no brand specified    100 strip    One Touch Ultra 2 Strips, Use to test blood sugars 2 times daily or as directed    Hypoglycemia       fluticasone 50 MCG/ACT spray    FLONASE    16 g    Spray 2 sprays into both nostrils daily    Nasal congestion       gabapentin 300 MG capsule    NEURONTIN     TK 2 CS QAM  1 C IN THE AFTERNOON AND 2 CS QHS        hydrOXYzine 10 MG/5ML syrup    ATARAX    1500 mL    Take 12.5 mLs (25 mg) by mouth every 6 hours as needed for anxiety or other (pain)    Acute abdominal pain       leuprolide 11.25 MG kit    LUPRON DEPOT (3-MONTH)    1 each    Inject 11.25 mg into the muscle every 3 months    Endometriosis       levalbuterol 45 MCG/ACT Inhaler    XOPENEX HFA    1 Inhaler    Inhale 2 puffs into the lungs every 6 hours as needed for shortness of breath / dyspnea    Wheeze       multivitamins with minerals Liqd liquid     1 Bottle    15 mLs by Per Feeding Tube route daily    Abdominal pain, epigastric       norethindrone 5 MG tablet    AYGESTIN    90 tablet    Take 1 tablet (5 mg) by mouth daily    Endometriosis       nortriptyline 10 MG capsule    PAMELOR    120 capsule    Take 3 capsules (30 mg) by mouth At Bedtime    Right upper quadrant abdominal pain       ondansetron 4 MG ODT tab    ZOFRAN ODT    15 tablet    Take 1 tablet (4 mg) by mouth every 8 hours as needed for nausea or vomiting    Intractable cyclical vomiting with nausea       order for DME     1 Units    Equipment being ordered: TENS with wire and electrode.    Chronic abdominal pain       oxyCODONE 5 MG/5ML solution    ROXICODONE    600 mL    Take 10 mL (10 mg) by mouth every 6 hours as needed, maximum 40 mL per day.    Abdominal pain, generalized, Chronic abdominal pain       pantoprazole 40 MG EC tablet    PROTONIX    30 tablet    Take 1 tablet (40 mg) by mouth 2 times daily (before meals)    Right upper quadrant abdominal pain, Erosive gastritis       polyethylene glycol Packet    MIRALAX/GLYCOLAX     7 packet    Take 17 g by mouth daily    Right upper quadrant abdominal pain       RIZATRIPTAN BENZOATE PO      Take 5 mg by mouth once as needed        senna-docusate 8.6-50 MG per tablet    SENOKOT-S;PERICOLACE    100 tablet    Take 1 tablet by mouth 2 times daily as needed for constipation    Right upper quadrant abdominal pain       sodium bicarbonate 325 MG tablet     120 tablet    1 tablet (325 mg) by Per Feeding Tube route 4 times daily    Abdominal pain, epigastric, Intractable cyclical vomiting with nausea       * Notice:  This list has 2 medication(s) that are the same as other medications prescribed for you. Read the directions carefully, and ask your doctor or other care provider to review them with you.

## 2018-03-28 NOTE — OR NURSING
Dr. Lopez (anesthesia) notified that pt is still nauseated, Dr. Lopez to bring propofol to phase 2 to help with pt's nausea.

## 2018-03-28 NOTE — PATIENT INSTRUCTIONS
Dear Alexandra Melgoza    Thank you for choosing [facility] ([facility]) for your infusion.  The following information is a summary of our appointment as well as important reminders.      We look forward in seeing you on your next appointment here at [facility].  Please don t hesitate to call us at [phone] to reschedule any of your appointments or to speak with one of the [facility] registered nurses.  It was a pleasure taking care of you today.    Sincerely,    [facility]  Specialty Infusion & Procedure Center  [address] [place] [address]  [address]  Phone:  [phone]

## 2018-03-28 NOTE — OR NURSING
Dr. Lopez (anesthesia) at bedside to administer propofol to patient as anti-emetic.  Pt denies feeling lightheaded or sleepy.  VSS.  Will continue to monitor and update anesthesia.

## 2018-03-28 NOTE — OR NURSING
Following page sent to GI:    Alexandra Melgoza: EGD/GJ tube exchange today.  Nauseated, multiple allergies, anesthesia administered 10mg propofol at 1914, helped for a little bit, however, nausea back.  Please call SAMARA Sorto at [phone]

## 2018-03-28 NOTE — PROGRESS NOTES
Grabiel called and states she is unable to keep anything down: vomited x 5 times- is really prompted by any manipulation with her feeding tube- med administration, flushing etc.   She also reports a fever 101. She is taking zofran with little help. She has am infusion scheduled for today. She is still able to urinate but it is darker than normal.  Denies dizziness but endorses fatigue.     Advised with the temp and unable to keep anything down she may need to go to the ED to get evaluated. Gave her the option to see if her infusion improves her symptoms but again advised a visit to the ED may be warranted especially with the fever.   Verbalized understanding.     Ella SARMIENTO RN Coordinator  Dr. Narayanan, Dr. Joe & Dr. Klein   Advanced Endoscopy  [phone]

## 2018-03-29 ENCOUNTER — INFUSION THERAPY VISIT (OUTPATIENT)
Dept: INFUSION THERAPY | Facility: CLINIC | Age: 23
End: 2018-03-29
Attending: INTERNAL MEDICINE
Payer: COMMERCIAL

## 2018-03-29 ENCOUNTER — OFFICE VISIT (OUTPATIENT)
Dept: INTERNAL MEDICINE | Facility: CLINIC | Age: 23
End: 2018-03-29
Payer: COMMERCIAL

## 2018-03-29 VITALS
SYSTOLIC BLOOD PRESSURE: 123 MMHG | BODY MASS INDEX: 23.47 KG/M2 | TEMPERATURE: 97.9 F | DIASTOLIC BLOOD PRESSURE: 76 MMHG | HEART RATE: 107 BPM | WEIGHT: 145.4 LBS

## 2018-03-29 VITALS
RESPIRATION RATE: 16 BRPM | DIASTOLIC BLOOD PRESSURE: 77 MMHG | HEART RATE: 94 BPM | TEMPERATURE: 98.5 F | OXYGEN SATURATION: 100 % | SYSTOLIC BLOOD PRESSURE: 128 MMHG

## 2018-03-29 DIAGNOSIS — K86.1 CHRONIC PANCREATITIS, UNSPECIFIED PANCREATITIS TYPE (H): ICD-10-CM

## 2018-03-29 DIAGNOSIS — G89.29 CHRONIC ABDOMINAL PAIN: Chronic | ICD-10-CM

## 2018-03-29 DIAGNOSIS — Z98.890 S/P ERCP: ICD-10-CM

## 2018-03-29 DIAGNOSIS — T85.848S PAIN AROUND PERCUTANEOUS ENDOSCOPIC GASTROSTOMY (PEG) TUBE SITE, SEQUELA: ICD-10-CM

## 2018-03-29 DIAGNOSIS — E16.2 HYPOGLYCEMIA: ICD-10-CM

## 2018-03-29 DIAGNOSIS — R21 RASH AND NONSPECIFIC SKIN ERUPTION: ICD-10-CM

## 2018-03-29 DIAGNOSIS — R11.2 INTRACTABLE NAUSEA AND VOMITING: ICD-10-CM

## 2018-03-29 DIAGNOSIS — R21 RASH: ICD-10-CM

## 2018-03-29 DIAGNOSIS — R10.9 ACUTE ABDOMINAL PAIN: ICD-10-CM

## 2018-03-29 DIAGNOSIS — F33.1 MAJOR DEPRESSIVE DISORDER, RECURRENT EPISODE, MODERATE (H): ICD-10-CM

## 2018-03-29 DIAGNOSIS — R10.9 ABDOMINAL PAIN: ICD-10-CM

## 2018-03-29 DIAGNOSIS — R10.13 ABDOMINAL PAIN, EPIGASTRIC: ICD-10-CM

## 2018-03-29 DIAGNOSIS — G43.A0 CYCLICAL VOMITING WITH NAUSEA, INTRACTABILITY OF VOMITING NOT SPECIFIED: Primary | ICD-10-CM

## 2018-03-29 DIAGNOSIS — R52 PAIN: ICD-10-CM

## 2018-03-29 DIAGNOSIS — G89.29 CHRONIC ABDOMINAL PAIN: Primary | Chronic | ICD-10-CM

## 2018-03-29 DIAGNOSIS — K83.4 SPHINCTER OF ODDI DYSFUNCTION: ICD-10-CM

## 2018-03-29 DIAGNOSIS — G43.A0 CYCLICAL VOMITING WITH NAUSEA, INTRACTABILITY OF VOMITING NOT SPECIFIED: ICD-10-CM

## 2018-03-29 DIAGNOSIS — F41.1 ANXIETY STATE: ICD-10-CM

## 2018-03-29 DIAGNOSIS — R10.9 CHRONIC ABDOMINAL PAIN: Chronic | ICD-10-CM

## 2018-03-29 DIAGNOSIS — R10.9 CHRONIC ABDOMINAL PAIN: Primary | Chronic | ICD-10-CM

## 2018-03-29 DIAGNOSIS — N39.41 URGE INCONTINENCE: ICD-10-CM

## 2018-03-29 LAB
ANION GAP SERPL CALCULATED.3IONS-SCNC: 8 MMOL/L (ref 3–14)
BUN SERPL-MCNC: 8 MG/DL (ref 7–30)
CALCIUM SERPL-MCNC: 8.3 MG/DL (ref 8.5–10.1)
CHLORIDE SERPL-SCNC: 102 MMOL/L (ref 94–109)
CO2 SERPL-SCNC: 28 MMOL/L (ref 20–32)
CREAT SERPL-MCNC: 0.7 MG/DL (ref 0.52–1.04)
GFR SERPL CREATININE-BSD FRML MDRD: >90 ML/MIN/1.7M2
GLUCOSE SERPL-MCNC: 112 MG/DL (ref 70–99)
LIPASE SERPL-CCNC: 79 U/L (ref 73–393)
POTASSIUM SERPL-SCNC: 3.4 MMOL/L (ref 3.4–5.3)
SODIUM SERPL-SCNC: 139 MMOL/L (ref 133–144)

## 2018-03-29 PROCEDURE — 96376 TX/PRO/DX INJ SAME DRUG ADON: CPT

## 2018-03-29 PROCEDURE — 96375 TX/PRO/DX INJ NEW DRUG ADDON: CPT

## 2018-03-29 PROCEDURE — 25000128 H RX IP 250 OP 636: Mod: ZF | Performed by: INTERNAL MEDICINE

## 2018-03-29 PROCEDURE — 25000128 H RX IP 250 OP 636: Mod: ZF | Performed by: NURSE PRACTITIONER

## 2018-03-29 PROCEDURE — 96361 HYDRATE IV INFUSION ADD-ON: CPT

## 2018-03-29 PROCEDURE — 80048 BASIC METABOLIC PNL TOTAL CA: CPT | Performed by: INTERNAL MEDICINE

## 2018-03-29 PROCEDURE — 83690 ASSAY OF LIPASE: CPT | Performed by: INTERNAL MEDICINE

## 2018-03-29 PROCEDURE — 96374 THER/PROPH/DIAG INJ IV PUSH: CPT

## 2018-03-29 RX ORDER — ONDANSETRON 2 MG/ML
4 INJECTION INTRAMUSCULAR; INTRAVENOUS DAILY PRN
Status: CANCELLED
Start: 2018-03-29

## 2018-03-29 RX ORDER — ONDANSETRON 2 MG/ML
4 INJECTION INTRAMUSCULAR; INTRAVENOUS DAILY PRN
Status: DISCONTINUED | OUTPATIENT
Start: 2018-03-29 | End: 2018-03-29 | Stop reason: HOSPADM

## 2018-03-29 RX ORDER — LORAZEPAM 0.5 MG/1
0.5 TABLET ORAL 2 TIMES DAILY PRN
Qty: 10 TABLET | Refills: 0 | Status: SHIPPED | OUTPATIENT
Start: 2018-03-29 | End: 2018-03-29

## 2018-03-29 RX ORDER — DIPHENHYDRAMINE HYDROCHLORIDE 50 MG/ML
25 INJECTION INTRAMUSCULAR; INTRAVENOUS ONCE
Status: COMPLETED | OUTPATIENT
Start: 2018-03-29 | End: 2018-03-29

## 2018-03-29 RX ORDER — DIPHENHYDRAMINE HYDROCHLORIDE 50 MG/ML
25 INJECTION INTRAMUSCULAR; INTRAVENOUS ONCE
Status: CANCELLED
Start: 2018-03-29 | End: 2018-03-29

## 2018-03-29 RX ORDER — DIPHENHYDRAMINE HYDROCHLORIDE 50 MG/ML
25 INJECTION INTRAMUSCULAR; INTRAVENOUS DAILY PRN
Status: DISCONTINUED | OUTPATIENT
Start: 2018-03-29 | End: 2018-03-29 | Stop reason: HOSPADM

## 2018-03-29 RX ORDER — LORAZEPAM 0.5 MG/1
0.5 TABLET ORAL 2 TIMES DAILY PRN
Qty: 10 TABLET | Refills: 0 | Status: ON HOLD | OUTPATIENT
Start: 2018-03-29 | End: 2018-12-11

## 2018-03-29 RX ORDER — GABAPENTIN 250 MG/5ML
SOLUTION ORAL
Refills: 2 | COMMUNITY
Start: 2018-03-13 | End: 2018-06-21

## 2018-03-29 RX ORDER — ONDANSETRON 2 MG/ML
4 INJECTION INTRAMUSCULAR; INTRAVENOUS ONCE
Status: COMPLETED | OUTPATIENT
Start: 2018-03-29 | End: 2018-03-29

## 2018-03-29 RX ORDER — CLOTRIMAZOLE AND BETAMETHASONE DIPROPIONATE 10; .64 MG/G; MG/G
CREAM TOPICAL 2 TIMES DAILY
Qty: 15 G | Refills: 0 | Status: ON HOLD | OUTPATIENT
Start: 2018-03-29 | End: 2019-06-18

## 2018-03-29 RX ORDER — ONDANSETRON 2 MG/ML
4 INJECTION INTRAMUSCULAR; INTRAVENOUS ONCE
Status: CANCELLED
Start: 2018-03-29 | End: 2018-03-29

## 2018-03-29 RX ORDER — DIPHENHYDRAMINE HYDROCHLORIDE 50 MG/ML
25 INJECTION INTRAMUSCULAR; INTRAVENOUS DAILY PRN
Status: CANCELLED
Start: 2018-03-29

## 2018-03-29 RX ADMIN — ONDANSETRON 4 MG: 2 INJECTION INTRAMUSCULAR; INTRAVENOUS at 13:25

## 2018-03-29 RX ADMIN — DIPHENHYDRAMINE HYDROCHLORIDE 25 MG: 50 INJECTION, SOLUTION INTRAMUSCULAR; INTRAVENOUS at 12:17

## 2018-03-29 RX ADMIN — SODIUM CHLORIDE, POTASSIUM CHLORIDE, SODIUM LACTATE AND CALCIUM CHLORIDE 2000 ML: 600; 310; 30; 20 INJECTION, SOLUTION INTRAVENOUS at 12:17

## 2018-03-29 RX ADMIN — DIPHENHYDRAMINE HYDROCHLORIDE 25 MG: 50 INJECTION, SOLUTION INTRAMUSCULAR; INTRAVENOUS at 13:32

## 2018-03-29 RX ADMIN — ONDANSETRON 4 MG: 2 INJECTION INTRAMUSCULAR; INTRAVENOUS at 12:12

## 2018-03-29 ASSESSMENT — PAIN SCALES - GENERAL: PAINLEVEL: SEVERE PAIN (7)

## 2018-03-29 NOTE — PROGRESS NOTES
Prior Authorization Approval    Ondansetron 4 mg ODT  Date Initiated: 03/28/2018  Date Completed: 03/28/2018  Prior Auth Type: Quantity Limit     Status: Approved    Effective Date: 02/26/2018 - 09/24/2018  Copay: 0.00  [place] Filled: Yes    Insurance: [employer]ide  Ph: [phone]  ID: [id number]  Case Number: [id number]  Submitted Via: Bentley Rios  Pharmacy Liaison  Ph: [phone] Page: [phone]

## 2018-03-29 NOTE — PATIENT INSTRUCTIONS
[facility]: [phone]     [facility] Medication Refill Request Information:  * Please contact your pharmacy regarding ANY request for medication refills.  ** [facility] Prescription Fax = [phone]  * Please allow 3 business days for routine medication refills.  * Please allow 5 business days for controlled substance medication refills.     [facility] Test Result notification information:  *You will be notified with in 7-10 days of your appointment day regarding the results of your test.  If you are on MyChart you will be notified as soon as the provider has reviewed the results and signed off on them.

## 2018-03-29 NOTE — PROGRESS NOTES
"Infusion nursing note:    Alexandra Melgoza presents today to [facility] for a IVF, and anti nausea medications infusion.  During today's [facility] appointment orders from Dr Narayanan were completed.  Frequency: today is an extra \"add on \" dose  For ongoing and elevated nausea/vomiting from GJ tube replacement 2days ago.     Progress note:  ID verified by name and .  Assessment completed.  Vitals were stable throughout time in [facility].  verbal education given to patient/representative regarding infusion and possible side effects.  Patient verbalized understanding.    Note: Pt gj tube insertion site very swollen and tender as it was accidentally pulled out 5days ago, and then reinserted 3days ago. Pt not able to take in her normal po and tfeeds . Pt saw her pcp prior to this appointment who did give her a scrpt for ativan for her nausea.    Infusion given over approximately  Each liter of LR over 60min. Zofran and benadryl IVP over 3minutes each.     Administrations This Visit     diphenhydrAMINE (BENADRYL) injection 25 mg     Admin Date Action Dose Route Administered By             2018 Given 25 mg Intravenous Patito Liu RN                    lactated ringers BOLUS 1,000-2,000 mL     Admin Date Action Dose Route Administered By             2018 New Bag 1000 mL Intravenous Pattio Liu RN                    ondansetron (ZOFRAN) injection 4 mg     Admin Date Action Dose Route Administered By             2018 Given 4 mg Intravenous Patito Liu RN                          /77  Pulse 94  Temp 98.5  F (36.9  C)  Resp 16  SpO2 100%    Discharge plan:    Discharge instructions were reviewed with patient: Yes  Patient/representative verbalized understanding of discharge instructions and all questions answered: Yes.    Discharged from [facility] at 1420 with self to home.    Patito Liu      "

## 2018-03-29 NOTE — MR AVS SNAPSHOT
After Visit Summary   3/29/2018    Alexandra Melgoza    MRN: [id number]           Patient Information     Date Of Birth          1995        Visit Information        Provider Department      3/29/2018 12:00 PM UC 44 ATC; UC SPEC INFUSION [facility]        Today's Diagnoses     Cyclical vomiting with nausea, intractability of vomiting not specified    -  1    Chronic pancreatitis, unspecified pancreatitis type (H)        Chronic abdominal pain        Sphincter of Oddi dysfunction        Pain around percutaneous endoscopic gastrostomy (PEG) tube site, sequela        Intractable nausea and vomiting        Pain        Abdominal pain, epigastric        S/P ERCP        Acute abdominal pain        Abdominal pain        Hypoglycemia        Urge incontinence        Major depressive disorder, recurrent episode, moderate (H)        Anxiety state        Rash           Follow-ups after your visit        Your next 10 appointments already scheduled     Mar 30, 2018 10:00 AM CDT   Infusion 120 with UC SPEC INFUSION, UC 49 ATC   [facility] ([facility])    [address]  [address]  [place] [address]   [phone]            Apr 02, 2018  2:00 PM CDT   Infusion 120 with UC SPEC INFUSION, UC 47 ATC   [facility] ([facility])    [address]  [address]  [place] [facility] [address]   [phone]            Apr 04, 2018  9:00 AM CDT   (Arrive by 8:45 AM)   Return Visit with Quyen Lennon, PhD   [facility] ([facility])    [address]  [address]th [address]  [place] [facility] [address]   [phone]            Apr 04, 2018  4:00 PM CDT   Infusion 120 with UC SPEC INFUSION, UC 45 ATC   [facility]  ([facility])    [address]  [address]  [place] [facility] [address]   [phone]            Apr 06, 2018 12:00 PM CDT   Infusion 120 with UC SPEC INFUSION, UC 47 ATC   [facility] Procedure ([facility])    [address]  [address]  [place] [address]   [phone]            Apr 09, 2018  4:00 PM CDT   Infusion 120 with UC SPEC INFUSION   [facility] ([facility])    [address]  [address]  [place] [facility] [address] [phone]              Who to contact     If you have questions or need follow up information about today's clinic visit or your schedule please contact [facility] SPECIAL[facility] AND PROCEDURE directly at [phone].  Normal or non-critical lab and imaging results will be communicated to you by ZEALERhart, letter or phone within 4 business days after the clinic has received the results. If you do not hear from us within 7 days, please contact the clinic through ZEALERhart or phone. If you have a critical or abnormal lab result, we will notify you by phone as soon as possible.  Submit refill requests through el? or call your pharmacy and they will forward the refill request to us. Please allow 3 business days for your refill to be completed.          Additional Information About Your Visit        MyChart Information     el? gives you secure access to your electronic health record. If you see a primary care provider, you can also send messages to your care team and make appointments. If you have questions, please call your primary care clinic.  If you do not have a primary care provider, please call [phone] and they will assist you.        Care EveryWhere ID     This is your Care EveryWhere ID. This could be used by other organizations to access your [facility]  records  [id number]        Your Vitals Were     Pulse Temperature Respirations Pulse Oximetry          94 98.5  F (36.9  C) 16 100%         Blood Pressure from Last 3 Encounters:   03/29/18 128/77   03/29/18 123/76   03/28/18 (P) 116/45    Weight from Last 3 Encounters:   03/29/18 66 kg (145 lb 6.4 oz)   03/27/18 65.1 kg (143 lb 8.3 oz)   03/26/18 65.8 kg (145 lb)              We Performed the Following     Basic metabolic panel     Lipase          Today's Medication Changes          These changes are accurate as of 3/29/18  4:44 PM.  If you have any questions, ask your nurse or doctor.               Start taking these medicines.        Dose/Directions    clotrimazole-betamethasone cream   Commonly known as:  LOTRISONE   Used for:  Rash and nonspecific skin eruption   Started by:  Quyen Baez MD        Apply topically 2 times daily   Quantity:  15 g   Refills:  0       LORazepam 0.5 MG tablet   Commonly known as:  ATIVAN   Used for:  Cyclical vomiting with nausea, intractability of vomiting not specified   Started by:  Quyen Baez MD        Dose:  0.5 mg   Take 1 tablet (0.5 mg) by mouth 2 times daily as needed (nausea, do not take with pain medicine, do not drive after taking)   Quantity:  10 tablet   Refills:  0         These medicines have changed or have updated prescriptions.        Dose/Directions    gabapentin 250 MG/5ML solution   Commonly known as:  NEURONTIN   This may have changed:  Another medication with the same name was removed. Continue taking this medication, and follow the directions you see here.   Used for:  Chronic abdominal pain, Sphincter of Oddi dysfunction, Cyclical vomiting with nausea, intractability of vomiting not specified, Pain around percutaneous endoscopic gastrostomy (PEG) tube site, sequela   Changed by:  Quyen Baez MD        Refills:  2       nortriptyline 10 MG capsule   Commonly known as:  PAMELOR   This may have changed:  how much to take   Used  for:  Right upper quadrant abdominal pain        Dose:  30 mg   Take 3 capsules (30 mg) by mouth At Bedtime   Quantity:  120 capsule   Refills:  0            Where to get your medicines      These medications were sent to [place], [place] - [phone]  [address] [address]    Hours:  TRANSPLANT PHONE NUMBER [phone] Phone:  [phone]     clotrimazole-betamethasone cream         Some of these will need a paper prescription and others can be bought over the counter.  Ask your nurse if you have questions.     Bring a paper prescription for each of these medications     LORazepam 0.5 MG tablet                Primary Care Provider Office Phone # Fax #    Quyen Baez [phone]       [address] [address]        Equal Access to Services     RACHAEL BENITEZ : Hadii aad ku hadasho Somalini, waaxda luqadaha, qaybta kaalmada adechrisyakush, maki gaspar . So [facility] [phone].    ATENCIÓN: Si habla español, tiene a quijano disposición servicios gratuitos de asistencia lingüística. Llame al [phone].    We comply with applicable federal civil rights laws and [place] laws. We do not discriminate on the basis of race, color, national origin, age, disability, sex, sexual orientation, or gender identity.            Thank you!     Thank you for choosing [facility] SPECIALTY AND PROCEDURE  for your care. Our goal is always to provide you with excellent care. Hearing back from our patients is one way we can continue to improve our services. Please take a few minutes to complete the written survey that you may receive in the mail after your visit with us. Thank you!             Your Updated Medication List - Protect others around you: Learn how to safely use, store and throw away your medicines at www.disposemymeds.org.          This list is accurate as  of 3/29/18  4:44 PM.  Always use your most recent med list.                   Brand Name Dispense Instructions for use Diagnosis    acetaminophen 32 mg/mL solution    TYLENOL     30.45 mLs (975 mg) by Per J Tube route every 8 hours        * amylase-lipase-protease 85781 UNITS Cpep    ZENPEP    180 capsule    Take 2-3 capsules (40,000-60,000 Units) by mouth Take with snacks or supplements (Snacks)    Idiopathic chronic pancreatitis (H)       * amylase-lipase-protease 78164 UNITS Cpep    ZENPEP    180 capsule    Take 5 capsules (100,000 Units) by mouth 4 times daily (with meals and nightly)    Right upper quadrant abdominal pain       blood glucose monitoring test strip    no brand specified    100 strip    One Touch Ultra 2 Strips, Use to test blood sugars 2 times daily or as directed    Hypoglycemia       clotrimazole-betamethasone cream    LOTRISONE    15 g    Apply topically 2 times daily    Rash and nonspecific skin eruption       fluticasone 50 MCG/ACT spray    FLONASE    16 g    Spray 2 sprays into both nostrils daily    Nasal congestion       gabapentin 250 MG/5ML solution    NEURONTIN      Chronic abdominal pain, Sphincter of Oddi dysfunction, Cyclical vomiting with nausea, intractability of vomiting not specified, Pain around percutaneous endoscopic gastrostomy (PEG) tube site, sequela       hydrOXYzine 10 MG/5ML syrup    ATARAX    1500 mL    Take 12.5 mLs (25 mg) by mouth every 6 hours as needed for anxiety or other (pain)    Acute abdominal pain       leuprolide 11.25 MG kit    LUPRON DEPOT (3-MONTH)    1 each    Inject 11.25 mg into the muscle every 3 months    Endometriosis       levalbuterol 45 MCG/ACT Inhaler    XOPENEX HFA    1 Inhaler    Inhale 2 puffs into the lungs every 6 hours as needed for shortness of breath / dyspnea    Wheeze       LORazepam 0.5 MG tablet    ATIVAN    10 tablet    Take 1 tablet (0.5 mg) by mouth 2 times daily as needed (nausea, do not take with pain medicine, do not drive  after taking)    Cyclical vomiting with nausea, intractability of vomiting not specified       multivitamins with minerals Liqd liquid     1 Bottle    15 mLs by Per Feeding Tube route daily    Abdominal pain, epigastric       norethindrone 5 MG tablet    AYGESTIN    90 tablet    Take 1 tablet (5 mg) by mouth daily    Endometriosis       nortriptyline 10 MG capsule    PAMELOR    120 capsule    Take 3 capsules (30 mg) by mouth At Bedtime    Right upper quadrant abdominal pain       ondansetron 4 MG ODT tab    ZOFRAN ODT    15 tablet    Take 1 tablet (4 mg) by mouth every 8 hours as needed for nausea or vomiting    Intractable cyclical vomiting with nausea       [place] [facility]     [place] Units    Equipment being ordered: TENS with wire and electrode.    Chronic abdominal pain       oxyCODONE 5 MG/5ML solution    ROXICODONE    600 mL    Take 10 mL (10 mg) by mouth every 6 hours as needed, maximum 40 mL per day.    Abdominal pain, generalized, Chronic abdominal pain       pantoprazole 40 MG EC tablet    PROTONIX    30 tablet    Take 1 tablet (40 mg) by mouth 2 times daily (before meals)    Right upper quadrant abdominal pain, Erosive gastritis       polyethylene glycol Packet    MIRALAX/GLYCOLAX    7 packet    Take 17 g by mouth daily    Right upper quadrant abdominal pain       RIZATRIPTAN BENZOATE PO      Take 5 mg by mouth once as needed        senna-docusate 8.6-50 MG per tablet    SENOKOT-S;PERICOLACE    100 tablet    Take 1 tablet by mouth 2 times daily as needed for constipation    Right upper quadrant abdominal pain       sodium bicarbonate 325 MG tablet     120 tablet    1 tablet (325 mg) by Per Feeding Tube route 4 times daily    Abdominal pain, epigastric, Intractable cyclical vomiting with nausea       * Notice:  This list has 2 medication(s) that are the same as other medications prescribed for you. Read the directions carefully, and ask your doctor or other care provider to review them with you.

## 2018-03-29 NOTE — NURSING NOTE
Chief Complaint   Patient presents with     Derm Problem     pt states she has rash on her stomach     Nausea     pt has nausea for past few days     Carmina Poon CMA at 10:52 AM on 3/29/2018.

## 2018-03-29 NOTE — PROGRESS NOTES
[facility]  Primary Care Center   Quyen Baez MD  03/29/2018      Chief Complaint:   Derm Problem and Nausea       History of Present Illness:   Alexandra Melgoza is a 22 year old female with a complex medical history including chronic abdominal pain, s/p cholecystectomy, sphincter of Oddi dysfunction, cyclic nausea and vomiting, and somatoform disorder on chronic narcotics who presents to clinic for follow up of nausea and vomiting after an emergency department visit. Alexandra had a GJ tube placed with Dr. Narayanan, in gastroenterology, on 2/8/18. On 3/24, her tube was dislodged when her dog stepped on her tubing. She presented to the emergency department on 3/25 where they provided IV rehydration, Zofran, and a dose of oxycodone PO. She had a Childers placed to keep her stoma open until she was able to follow up with GI for endoscopic replacement of her GJ tube (3/27/18).    Since, she describes constant nausea with persistent abdominal pain. She has been unable to tolerate formula feedings through her tube; normally she uses 45 mL formula, but has been pushing 10 mL which is causing nausea. She has been using scheduled Tylenol and Zofran, Benadryl which is somewhat helpful, but states that if she uses medication through her jejunal port it comes out of her gastric lumen and increases the intensity of her nausea and vomiting. She approximates 50% of her medication is coming out. She has been unable to tolerate her narcotic pain medications.     Additionally, she is wondering about an infection. She feels that the discharge from her stoma has more of a pungent smell, which correlated with the onset of a mild rash surrounding her stoma. She feels this is spreading, but has been using OTC antibiotic ointment. She feels febrile.     She has no other concerns today.        Review of Systems:   Pertinent items are noted in HPI, remainder of complete ROS is negative.      Active Medications:     Current Outpatient  Prescriptions:      gabapentin (NEURONTIN) 250 MG/5ML solution, , Disp: , Rfl: 2     oxyCODONE (ROXICODONE) 5 MG/5ML solution, Take 10 mL (10 mg) by mouth every 6 hours as needed, maximum 40 mL per day., Disp: 600 mL, Rfl: 0     blood glucose monitoring (NO BRAND SPECIFIED) test strip, One Touch Ultra 2 Strips, Use to test blood sugars 2 times daily or as directed, Disp: 100 strip, Rfl: 3     hydrOXYzine (ATARAX) 10 MG/5ML syrup, Take 12.5 mLs (25 mg) by mouth every 6 hours as needed for anxiety or other (pain), Disp: 1500 mL, Rfl: 2     acetaminophen (TYLENOL) 32 mg/mL solution, 30.45 mLs (975 mg) by Per J Tube route every 8 hours, Disp: , Rfl:      ondansetron (ZOFRAN ODT) 4 MG ODT tab, Take 1 tablet (4 mg) by mouth every 8 hours as needed for nausea or vomiting, Disp: 15 tablet, Rfl: 0     sodium bicarbonate 325 MG tablet, 1 tablet (325 mg) by Per Feeding Tube route 4 times daily, Disp: 120 tablet, Rfl: 0     nortriptyline (PAMELOR) 10 MG capsule, Take 3 capsules (30 mg) by mouth At Bedtime (Patient taking differently: Take 20-30 mg by mouth At Bedtime ), Disp: 120 capsule, Rfl: 0     amylase-lipase-protease (ZENPEP) 95286 UNITS CPEP, Take 5 capsules (100,000 Units) by mouth 4 times daily (with meals and nightly), Disp: 180 capsule, Rfl: 1     multivitamins with minerals (CERTAVITE/CEROVITE) LIQD liquid, 15 mLs by Per Feeding Tube route daily, Disp: 1 Bottle, Rfl: 0     order for DME, Equipment being ordered: TENS with wire and electrode., Disp: 1 Units, Rfl: 0     amylase-lipase-protease (ZENPEP) 21205 UNITS CPEP, Take 2-3 capsules (40,000-60,000 Units) by mouth Take with snacks or supplements (Snacks), Disp: 180 capsule, Rfl: 1     senna-docusate (SENOKOT-S;PERICOLACE) 8.6-50 MG per tablet, Take 1 tablet by mouth 2 times daily as needed for constipation, Disp: 100 tablet, Rfl: 0     pantoprazole (PROTONIX) 40 MG EC tablet, Take 1 tablet (40 mg) by mouth 2 times daily (before meals), Disp: 30 tablet, Rfl: 1      polyethylene glycol (MIRALAX/GLYCOLAX) Packet, Take 17 g by mouth daily, Disp: 7 packet, Rfl: 0     norethindrone (AYGESTIN) 5 MG tablet, Take 1 tablet (5 mg) by mouth daily, Disp: 90 tablet, Rfl: 1     fluticasone (FLONASE) 50 MCG/ACT spray, Spray 2 sprays into both nostrils daily, Disp: 16 g, Rfl: 3     levalbuterol (XOPENEX HFA) 45 MCG/ACT Inhaler, Inhale 2 puffs into the lungs every 6 hours as needed for shortness of breath / dyspnea, Disp: 1 Inhaler, Rfl: 1     leuprolide (LUPRON DEPOT) 11.25 MG injection, Inject 11.25 mg into the muscle every 3 months, Disp: 1 each, Rfl: 3     RIZATRIPTAN BENZOATE PO, Take 5 mg by mouth once as needed , Disp: , Rfl:      [DISCONTINUED] gabapentin (NEURONTIN) 300 MG capsule, TK 2 CS QAM  1 C IN THE AFTERNOON AND 2 CS QHS, Disp: , Rfl: 0  No current facility-administered medications for this visit.       Allergies:   Amoxicillin-pot clavulanate; Compazine [prochlorperazine]; Hyoscyamine; Reglan [metoclopramide hcl]; Zyprexa; Amitriptyline hcl; Buspirone; Cogentin [benztropine]; Cyproheptadine; Dicyclomine; Droperidol; Effexor [venlafaxine]; Food; No clinical screening - see comments; Phenergan dm [promethazine-dm]; Promethazine; Risperidone; Vistaril; Augmentin; Ketamine; and Sorbitol      Past Medical History:  Cholecystitis, s/p cholecystectomy   Sphincter of Oddi dysfunction  Chronic abdominal pain   Cyclic vomiting syndrome   Pancreatitic disease  Pseudoseizures    Somatoform disorder  Endometriosis   Migraines   Asthma     Past Surgical History:  ERCP, biliary stents  Cholecystectomy   Negative colonoscopy, 8/2011  Endoscopic retrograde cholangiopancreatogram, x4  Esophagogastroduodenoscopy, x3  Vascular access port   Left knee arthroscopy     Family History:   Hypertension - father   Bipolar disorder  Anxiety disorder - maternal grandfather   Depression - paternal grandmother   Cerebrovascular disease - maternal grandfather   Diabetes - paternal uncle       Social History:    No history of tobacco use.   No alcohol use.      Physical Exam:   /76  Pulse 107  Temp 97.9  F (36.6  C) (Oral)  Wt 66 kg (145 lb 6.4 oz)  BMI 23.47 kg/m2   Constitutional: Alert, ill appearing, mild distress.   Head: Normocephalic, atraumatic  Eyes: Extra-ocular movements intact, no scleral icterus  ENT: Oropharynx clear, moist mucus membranes, good dentition  Cardiovascular: Regular rate and rhythm, no murmurs, rubs or gallops, peripheral pulses full/symmetric  Respiratory: Good air movement bilaterally, lungs clear, no wheezes/rales/rhonchi  GI: Abdomen soft, bowel sounds present, nondistended, diffusely tender around tube, no organomegaly or masses, no rebound/guarding  Musculoskeletal: No edema, normal muscle tone, normal gait  Neurologic: Alert and oriented, cranial nerves 2-12 intact.  Skin: Faint papular rash approx. 2-3 cm diamter inferior to her GJ tube.   Psychiatric: normal mentation, depressed affect and mood    Assessment and Plan:    Cyclical vomiting with nausea, intractability of vomiting not specified  Chronic abdominal, Sphincter of Oddi dysfunction, and pain around percutaneous endoscopic gastrostomy (PEG) tube site, sequela  Will have her undergo infusions today for rehydration. Encouraged her to minimize use of her tube for the next few days and attempt to take oral PO liquid. If she is unable to tolerate hydration, she should return to the emergency department. Encouraged her not to take Lorazepam with her narcotic pain medications. She should f/u with GI if she has persistent problems related to the tube.  - gabapentin (NEURONTIN) 250 MG/5ML solution; Refill: 2  - Basic metabolic panel  - Lipase  - LORazepam (ATIVAN) 0.5 MG tablet  Dispense: 10 tablet; Refill: 0    Rash and nonspecific skin eruption  - clotrimazole-betamethasone (LOTRISONE) cream  Dispense: 15 g; Refill: 0        Follow-up: As needed.        Scribe Disclosure:   Raine BARRETT, am serving as a scribe to  document services personally performed by Quyen Baez MD at this visit, based upon the provider's statements to me. All documentation has been reviewed by the aforementioned provider prior to being entered into the official medical record.     Portions of this medical record were completed by a scribe. UPON MY REVIEW AND AUTHENTICATION BY ELECTRONIC SIGNATURE, this confirms (a) I performed the applicable clinical services, and (b) the record is accurate.   Quyen Baez MD  Internal Medicine    25 min spent face to face, of which >50% time spent on counseling/coordinating care exclusive of any procedure time

## 2018-03-29 NOTE — MR AVS SNAPSHOT
After Visit Summary   3/29/2018    Alexandra Melgoza    MRN: [id number]           Patient Information     Date Of Birth          1995        Visit Information        Provider Department      3/29/2018 10:25 AM Quyen Baez MD [facility]        Today's Diagnoses     Chronic abdominal pain    -  1    Sphincter of Oddi dysfunction        Cyclical vomiting with nausea, intractability of vomiting not specified        Pain around percutaneous endoscopic gastrostomy (PEG) tube site, sequela        Rash and nonspecific skin eruption          Care Instructions    Primary Care Center: [phone]     Primary Care Center Medication Refill Request Information:  * Please contact your pharmacy regarding ANY request for medication refills.  ** Psychiatric Prescription Fax = [phone]  * Please allow 3 business days for routine medication refills.  * Please allow 5 business days for controlled substance medication refills.     [facility] Test Result notification information:  *You will be notified with in 7-10 days of your appointment day regarding the results of your test.  If you are on MyChart you will be notified as soon as the provider has reviewed the results and signed off on them.            Follow-ups after your visit        Your next 10 appointments already scheduled     Apr 02, 2018  2:00 PM CDT   Infusion 120 with UC SPEC INFUSION, UC 47 ATC   [facility] ([facility])    [address] [place] [address]  [address]  [facility] [id number] [phone]            Apr 04, 2018  9:00 AM CDT   (Arrive by 8:45 AM)   Return Visit with Quyen Lennon, PhD   [facility] ([facility])    [address] [place] [address]  4th Floor  [facility] [address] [phone]            Apr 04, 2018  4:00 PM CDT   Infusion 120 with UC SPEC INFUSION, UC 45 ATC     [facility] ([facility])    [address]  [address]  [facility] [address]   [phone]            Apr 06, 2018 12:00 PM CDT   Infusion 120 with UC SPEC INFUSION, UC 47 ATC   [facility] ([facility])    [address]  [address]  [facility] [address]   [phone]            Apr 09, 2018  4:00 PM CDT   Infusion 120 with UC SPEC INFUSION, UC 49 ATC   [facility] ([facility])    [address]  [address]  [facility] [address]   [phone]            Apr 11, 2018  7:00 AM CDT   (Arrive by 6:45 AM)   Return Visit with Leonardo Wang MD   [facility] ([facility])    [address]  4th Floor  [facility] [address] [phone]              Who to contact     Please call your clinic at [phone] to:    Ask questions about your health    Make or cancel appointments    Discuss your medicines    Learn about your test results    Speak to your doctor            Additional Information About Your Visit        [employer] Information     [employer] gives you secure access to your electronic health record. If you see a primary care provider, you can also send messages to your care team and make appointments. If you have questions, please call your primary care clinic.  If you do not have a primary care provider, please call [phone] and they will assist you.      [employer] is an electronic gateway that provides easy, online access to your medical records. With [employer], you can request a clinic appointment, read your test results, renew a prescription or communicate with your care team.     To access your existing account, please contact your [facility] or call [phone] for  assistance.        Care EveryWhere ID     This is your Care EveryWhere ID. This could be used by other organizations to access your [place] medical records  [id number]        Your Vitals Were     Pulse Temperature BMI (Body Mass Index)             107 97.9  F (36.6  C) (Oral) 23.47 kg/m2          Blood Pressure from Last 3 Encounters:   03/30/18 (!) 136/96   03/29/18 128/77   03/29/18 123/76    Weight from Last 3 Encounters:   03/29/18 66 kg (145 lb 6.4 oz)   03/27/18 65.1 kg (143 lb 8.3 oz)   03/26/18 65.8 kg (145 lb)                 Today's Medication Changes          These changes are accurate as of 3/29/18 11:59 PM.  If you have any questions, ask your nurse or doctor.               Start taking these medicines.        Dose/Directions    clotrimazole-betamethasone cream   Commonly known as:  LOTRISONE   Used for:  Rash and nonspecific skin eruption   Started by:  Quyen Baez MD        Apply topically 2 times daily   Quantity:  15 g   Refills:  0       LORazepam 0.5 MG tablet   Commonly known as:  ATIVAN   Used for:  Cyclical vomiting with nausea, intractability of vomiting not specified   Started by:  Quyen Baez MD        Dose:  0.5 mg   Take 1 tablet (0.5 mg) by mouth 2 times daily as needed (nausea, do not take with pain medicine, do not drive after taking)   Quantity:  10 tablet   Refills:  0         These medicines have changed or have updated prescriptions.        Dose/Directions    gabapentin 250 MG/5ML solution   Commonly known as:  NEURONTIN   This may have changed:  Another medication with the same name was removed. Continue taking this medication, and follow the directions you see here.   Used for:  Chronic abdominal pain, Sphincter of Oddi dysfunction, Cyclical vomiting with nausea, intractability of vomiting not specified, Pain around percutaneous endoscopic gastrostomy (PEG) tube site, sequela   Changed by:  Quyen Baez MD        Refills:  2       nortriptyline 10  MG capsule   Commonly known as:  PAMELOR   This may have changed:  how much to take   Used for:  Right upper quadrant abdominal pain        Dose:  30 mg   Take 3 capsules (30 mg) by mouth At Bedtime   Quantity:  120 capsule   Refills:  0            Where to get your medicines      These medications were sent to [place], [place] - [address]  [address], [facility] [address]    Hours:  TRANSPLANT PHONE NUMBER [phone] Phone:  [phone]     clotrimazole-betamethasone cream         Some of these will need a paper prescription and others can be bought over the counter.  Ask your nurse if you have questions.     Bring a paper prescription for each of these medications     LORazepam 0.5 MG tablet                Primary Care Provider Office Phone # Fax #    Quyen Baez [phone]       [address] [facility] [address]        Equal Access to Services     RACHAEL BENITEZ AH: Hadii aad ku hadasho Soomaali, waaxda luqadaha, qaybta kaalmada adeegyada, maki smallwood. So [facility] [phone].    ATENCIÓN: Si habla español, tiene a quijano disposición servicios gratuitos de asistencia lingüística. Llame al [phone].    We comply with applicable federal civil rights laws and [place] laws. We do not discriminate on the basis of race, color, national origin, age, disability, sex, sexual orientation, or gender identity.            Thank you!     Thank you for choosing [facility]  for your care. Our goal is always to provide you with excellent care. Hearing back from our patients is one way we can continue to improve our services. Please take a few minutes to complete the written survey that you may receive in the mail after your visit with us. Thank you!             Your Updated Medication List - Protect others around you: Learn how to safely use, store and throw away your medicines  at www.disposemymeds.org.          This list is accurate as of 3/29/18 11:59 PM.  Always use your most recent med list.                   Brand Name Dispense Instructions for use Diagnosis    acetaminophen 32 mg/mL solution    TYLENOL     30.45 mLs (975 mg) by Per J Tube route every 8 hours        * amylase-lipase-protease 91498 UNITS Cpep    ZENPEP    180 capsule    Take 2-3 capsules (40,000-60,000 Units) by mouth Take with snacks or supplements (Snacks)    Idiopathic chronic pancreatitis (H)       * amylase-lipase-protease 46847 UNITS Cpep    ZENPEP    180 capsule    Take 5 capsules (100,000 Units) by mouth 4 times daily (with meals and nightly)    Right upper quadrant abdominal pain       blood glucose monitoring test strip    no brand specified    100 strip    One Touch Ultra 2 Strips, Use to test blood sugars 2 times daily or as directed    Hypoglycemia       clotrimazole-betamethasone cream    LOTRISONE    15 g    Apply topically 2 times daily    Rash and nonspecific skin eruption       fluticasone 50 MCG/ACT spray    FLONASE    16 g    Spray 2 sprays into both nostrils daily    Nasal congestion       gabapentin 250 MG/5ML solution    NEURONTIN      Chronic abdominal pain, Sphincter of Oddi dysfunction, Cyclical vomiting with nausea, intractability of vomiting not specified, Pain around percutaneous endoscopic gastrostomy (PEG) tube site, sequela       hydrOXYzine 10 MG/5ML syrup    ATARAX    1500 mL    Take 12.5 mLs (25 mg) by mouth every 6 hours as needed for anxiety or other (pain)    Acute abdominal pain       leuprolide 11.25 MG kit    LUPRON DEPOT (3-MONTH)    1 each    Inject 11.25 mg into the muscle every 3 months    Endometriosis       levalbuterol 45 MCG/ACT Inhaler    XOPENEX HFA    1 Inhaler    Inhale 2 puffs into the lungs every 6 hours as needed for shortness of breath / dyspnea    Wheeze       LORazepam 0.5 MG tablet    ATIVAN    10 tablet    Take 1 tablet (0.5 mg) by mouth 2 times daily as  needed (nausea, do not take with pain medicine, do not drive after taking)    Cyclical vomiting with nausea, intractability of vomiting not specified       multivitamins with minerals Liqd liquid     1 Bottle    15 mLs by Per Feeding Tube route daily    Abdominal pain, epigastric       norethindrone 5 MG tablet    AYGESTIN    90 tablet    Take 1 tablet (5 mg) by mouth daily    Endometriosis       nortriptyline 10 MG capsule    PAMELOR    120 capsule    Take 3 capsules (30 mg) by mouth At Bedtime    Right upper quadrant abdominal pain       ondansetron 4 MG ODT tab    ZOFRAN ODT    15 tablet    Take 1 tablet (4 mg) by mouth every 8 hours as needed for nausea or vomiting    Intractable cyclical vomiting with nausea       order for [facility] [place] [facility]     1 Units    Equipment being ordered: TENS with wire and electrode.    Chronic abdominal pain       oxyCODONE 5 MG/5ML solution    ROXICODONE    600 mL    Take 10 mL (10 mg) by mouth every 6 hours as needed, maximum 40 mL per day.    Abdominal pain, generalized, Chronic abdominal pain       pantoprazole 40 MG EC tablet    PROTONIX    30 tablet    Take 1 tablet (40 mg) by mouth 2 times daily (before meals)    Right upper quadrant abdominal pain, Erosive gastritis       polyethylene glycol Packet    MIRALAX/GLYCOLAX    7 packet    Take 17 g by mouth daily    Right upper quadrant abdominal pain       RIZATRIPTAN BENZOATE PO      Take 5 mg by mouth once as needed        senna-docusate 8.6-50 MG per tablet    SENOKOT-S;PERICOLACE    100 tablet    Take 1 tablet by mouth 2 times daily as needed for constipation    Right upper quadrant abdominal pain       sodium bicarbonate 325 MG tablet     120 tablet    1 tablet (325 mg) by Per Feeding Tube route 4 times daily    Abdominal pain, epigastric, Intractable cyclical vomiting with nausea       * Notice:  This list has 2 medication(s) that are the same as other medications prescribed for you. Read the directions carefully, and ask your doctor or  other care provider to review them with you.

## 2018-03-30 ENCOUNTER — CARE COORDINATION (OUTPATIENT)
Dept: GASTROENTEROLOGY | Facility: CLINIC | Age: 23
End: 2018-03-30

## 2018-03-30 ENCOUNTER — INFUSION THERAPY VISIT (OUTPATIENT)
Dept: INFUSION THERAPY | Facility: CLINIC | Age: 23
End: 2018-03-30
Attending: INTERNAL MEDICINE
Payer: COMMERCIAL

## 2018-03-30 ENCOUNTER — TELEPHONE (OUTPATIENT)
Dept: ANESTHESIOLOGY | Facility: CLINIC | Age: 23
End: 2018-03-30

## 2018-03-30 VITALS
TEMPERATURE: 98.4 F | DIASTOLIC BLOOD PRESSURE: 96 MMHG | OXYGEN SATURATION: 100 % | RESPIRATION RATE: 16 BRPM | SYSTOLIC BLOOD PRESSURE: 136 MMHG | HEART RATE: 91 BPM

## 2018-03-30 DIAGNOSIS — R10.9 ACUTE ABDOMINAL PAIN: ICD-10-CM

## 2018-03-30 DIAGNOSIS — G43.A0 CYCLICAL VOMITING WITH NAUSEA, INTRACTABILITY OF VOMITING NOT SPECIFIED: Primary | ICD-10-CM

## 2018-03-30 DIAGNOSIS — R10.13 ABDOMINAL PAIN, EPIGASTRIC: ICD-10-CM

## 2018-03-30 DIAGNOSIS — R10.9 ABDOMINAL PAIN: ICD-10-CM

## 2018-03-30 DIAGNOSIS — Z98.890 S/P ERCP: ICD-10-CM

## 2018-03-30 DIAGNOSIS — E16.2 HYPOGLYCEMIA: ICD-10-CM

## 2018-03-30 DIAGNOSIS — F33.1 MAJOR DEPRESSIVE DISORDER, RECURRENT EPISODE, MODERATE (H): ICD-10-CM

## 2018-03-30 DIAGNOSIS — N39.41 URGE INCONTINENCE: ICD-10-CM

## 2018-03-30 DIAGNOSIS — F41.1 ANXIETY STATE: ICD-10-CM

## 2018-03-30 DIAGNOSIS — R21 RASH: ICD-10-CM

## 2018-03-30 DIAGNOSIS — R11.15 INTRACTABLE CYCLICAL VOMITING WITH NAUSEA: ICD-10-CM

## 2018-03-30 DIAGNOSIS — K86.1 CHRONIC PANCREATITIS, UNSPECIFIED PANCREATITIS TYPE (H): ICD-10-CM

## 2018-03-30 DIAGNOSIS — R52 PAIN: ICD-10-CM

## 2018-03-30 PROCEDURE — 25000128 H RX IP 250 OP 636: Mod: ZF | Performed by: NURSE PRACTITIONER

## 2018-03-30 PROCEDURE — 96375 TX/PRO/DX INJ NEW DRUG ADDON: CPT

## 2018-03-30 PROCEDURE — 25000128 H RX IP 250 OP 636: Mod: ZF | Performed by: INTERNAL MEDICINE

## 2018-03-30 PROCEDURE — 96376 TX/PRO/DX INJ SAME DRUG ADON: CPT

## 2018-03-30 PROCEDURE — 96361 HYDRATE IV INFUSION ADD-ON: CPT

## 2018-03-30 PROCEDURE — 96374 THER/PROPH/DIAG INJ IV PUSH: CPT

## 2018-03-30 RX ORDER — ONDANSETRON 2 MG/ML
4 INJECTION INTRAMUSCULAR; INTRAVENOUS DAILY PRN
Status: CANCELLED
Start: 2018-03-30

## 2018-03-30 RX ORDER — DIPHENHYDRAMINE HYDROCHLORIDE 50 MG/ML
25 INJECTION INTRAMUSCULAR; INTRAVENOUS ONCE
Status: COMPLETED | OUTPATIENT
Start: 2018-03-30 | End: 2018-03-30

## 2018-03-30 RX ORDER — ONDANSETRON 2 MG/ML
4 INJECTION INTRAMUSCULAR; INTRAVENOUS ONCE
Status: CANCELLED
Start: 2018-03-30 | End: 2018-03-30

## 2018-03-30 RX ORDER — ONDANSETRON 2 MG/ML
4 INJECTION INTRAMUSCULAR; INTRAVENOUS ONCE
Status: COMPLETED | OUTPATIENT
Start: 2018-03-30 | End: 2018-03-30

## 2018-03-30 RX ORDER — ONDANSETRON 2 MG/ML
4 INJECTION INTRAMUSCULAR; INTRAVENOUS DAILY PRN
Status: DISCONTINUED | OUTPATIENT
Start: 2018-03-30 | End: 2018-03-30 | Stop reason: HOSPADM

## 2018-03-30 RX ORDER — DIPHENHYDRAMINE HYDROCHLORIDE 50 MG/ML
25 INJECTION INTRAMUSCULAR; INTRAVENOUS ONCE
Status: CANCELLED
Start: 2018-03-30 | End: 2018-03-30

## 2018-03-30 RX ORDER — DIPHENHYDRAMINE HYDROCHLORIDE 50 MG/ML
25 INJECTION INTRAMUSCULAR; INTRAVENOUS DAILY PRN
Status: DISCONTINUED | OUTPATIENT
Start: 2018-03-30 | End: 2018-03-30 | Stop reason: HOSPADM

## 2018-03-30 RX ORDER — DIPHENHYDRAMINE HYDROCHLORIDE 50 MG/ML
25 INJECTION INTRAMUSCULAR; INTRAVENOUS DAILY PRN
Status: CANCELLED
Start: 2018-03-30

## 2018-03-30 RX ADMIN — DIPHENHYDRAMINE HYDROCHLORIDE 25 MG: 50 INJECTION, SOLUTION INTRAMUSCULAR; INTRAVENOUS at 11:42

## 2018-03-30 RX ADMIN — ONDANSETRON 4 MG: 2 INJECTION INTRAMUSCULAR; INTRAVENOUS at 11:34

## 2018-03-30 RX ADMIN — DIPHENHYDRAMINE HYDROCHLORIDE 25 MG: 50 INJECTION, SOLUTION INTRAMUSCULAR; INTRAVENOUS at 10:55

## 2018-03-30 RX ADMIN — ONDANSETRON 4 MG: 2 INJECTION INTRAMUSCULAR; INTRAVENOUS at 10:54

## 2018-03-30 RX ADMIN — SODIUM CHLORIDE, POTASSIUM CHLORIDE, SODIUM LACTATE AND CALCIUM CHLORIDE 2000 ML: 600; 310; 30; 20 INJECTION, SOLUTION INTRAVENOUS at 10:30

## 2018-03-30 NOTE — PROGRESS NOTES
Infusion nursing note:    Alexandra Melgoza presents today to [facility] for a IVF, aofran and benadryl IV infusions.  During today's [facility] appointment orders from Dr Narayanan were completed.  Frequency: as needed.     Progress note:  ID verified by name and .  Assessment completed.  Vitals were stable throughout time in [facility].  verbal education given to patient/representative regarding infusion and possible side effects.  Patient verbalized understanding.    Note: Pt still in obvious distress from pain in GJ tube entry site, which is still very swollen and firm to the touch after having it accidentally pulled out a week ago and then reinserted 4days ago.  She did say the ativan pill she was given to take home last oralia did help the nausea quite a bit overnite.  She stated her mom gjot up during the nignt to help put  her pain medicine down her gj tube-- this in an effort to take the pain medication on a schedule (vs as needed)    Infusion given over approximately  LR each liter over 1hour; Benadryla nd zofran each over 2-3min IVP     Administrations This Visit     diphenhydrAMINE (BENADRYL) injection 25 mg     Admin Date Action Dose Route Administered By             2018 Given 25 mg Intravenous Patito Liu RN              Admin Date Action Dose Route Administered By             2018 Given 25 mg Intravenous Phyllis Brooks RN                    lactated ringers BOLUS 1,000-2,000 mL     Admin Date Action Dose Route Administered By             2018 New Bag 2000 mL Intravenous Phyllis Brooks RN                    ondansetron (ZOFRAN) injection 4 mg     Admin Date Action Dose Route Administered By             2018 Given 4 mg Intravenous Patito Liu RN              Admin Date Action Dose Route Administered By             2018 Given 4 mg Intravenous Phyllis Brooks RN                          BP (!) 136/96  Pulse 91  Temp 98.4  F (36.9  C)  SpO2 100%      Discharge  plan:    Discharge instructions were reviewed with patient: Yes  Patient/representative verbalized understanding of discharge instructions and all questions answered: Yes.    Discharged from [facility] at 1530 with self to home.    Patito Liu

## 2018-03-30 NOTE — MR AVS SNAPSHOT
After Visit Summary   3/30/2018    Alexandra Melgoza    MRN: [id number]           Patient Information     Date Of Birth          1995        Visit Information        Provider Department      3/30/2018 10:00 AM UC 49 ATC; UC SPEC INFUSION [facility]        Today's Diagnoses     Cyclical vomiting with nausea, intractability of vomiting not specified    -  1    Chronic pancreatitis, unspecified pancreatitis type (H)        Pain        Abdominal pain, epigastric        S/P ERCP        Acute abdominal pain        Hypoglycemia        Urge incontinence        Major depressive disorder, recurrent episode, moderate (H)        Anxiety state        Rash        Intractable cyclical vomiting with nausea        Abdominal pain           Follow-ups after your visit        Your next 10 appointments already scheduled     Apr 02, 2018  2:00 PM CDT   Infusion 120 with UC SPEC INFUSION, UC 47 ATC   [facility] ([facility])    [address]  [address]  [place] [address]   [phone]            Apr 04, 2018  9:00 AM CDT   (Arrive by 8:45 AM)   Return Visit with Quyen Lennon, PhD   [facility] ([facility])    [address]  4th [address]  [place] [address] [phone]            Apr 04, 2018  4:00 PM CDT   Infusion 120 with UC SPEC INFUSION, UC 45 ATC   [facility] ([facility])    [address]  [address]  [place] [address] [phone]            Apr 06, 2018 12:00 PM CDT   Infusion 120 with UC SPEC INFUSION, UC 47 ATC   [facility] ([facility])    [address]  [address]  [place] [address]   [phone]            Apr 09, 2018   4:00 PM CDT   Infusion 120 with UC SPEC INFUSION, UC 49 ATC   [facility] ([facility])    [address]  [address]  [place] [id number] [phone]            Apr 11, 2018  7:00 AM CDT   (Arrive by 6:45 AM)   Return Visit with Leonardo Wang MD   [facility] ([facility])    [address]  4th Floor  [place] [id number] [phone]              Who to contact     If you have questions or need follow up information about today's clinic visit or your schedule please contact [facility] directly at [phone].  Normal or non-critical lab and imaging results will be communicated to you by Newsrepshart, letter or phone within 4 business days after the clinic has received the results. If you do not hear from us within 7 days, please contact the clinic through Newsrepshart or phone. If you have a critical or abnormal lab result, we will notify you by phone as soon as possible.  Submit refill requests through Vioozer or call your pharmacy and they will forward the refill request to us. Please allow 3 business days for your refill to be completed.          Additional Information About Your Visit        Newsrepshart Information     Vioozer gives you secure access to your electronic health record. If you see a primary care provider, you can also send messages to your care team and make appointments. If you have questions, please call your primary care clinic.  If you do not have a primary care provider, please call [phone] and they will assist you.        Care EveryWhere ID     This is your Care EveryWhere ID. This could be used by other organizations to access your Friend medical records  [id number]        Your Vitals Were     Pulse Temperature Respirations Pulse Oximetry          91 98.4  F (36.9  C) 16 100%          Blood Pressure from Last 3 Encounters:   03/30/18 (!) 136/96   03/29/18 128/77   03/29/18 123/76    Weight from Last 3 Encounters:   03/29/18 66 kg (145 lb 6.4 oz)   03/27/18 65.1 kg (143 lb 8.3 oz)   03/26/18 65.8 kg (145 lb)              Today, you had the following     No orders found for display         Today's Medication Changes          These changes are accurate as of 3/30/18  3:33 PM.  If you have any questions, ask your nurse or doctor.               These medicines have changed or have updated prescriptions.        Dose/Directions    nortriptyline 10 MG capsule   Commonly known as:  PAMELOR   This may have changed:  how much to take   Used for:  Right upper quadrant abdominal pain        Dose:  30 mg   Take 3 capsules (30 mg) by mouth At Bedtime   Quantity:  120 capsule   Refills:  0                Primary Care Provider Office Phone # Fax #    HoustonCheyanne Baez [phone]       [address] [address]        Equal Access to Services     [facility]: Hadii angelina rodneyo Somalini, waaxda luqadaha, qaybta kaalmada adeegmiles, maki gaspar . So [facility] [phone].    ATENCIÓN: Si habla español, tiene a quijano disposición servicios gratuitos de asistencia lingüística. Llame al [phone].    We comply with applicable federal civil rights laws and [place] laws. We do not discriminate on the basis of race, color, national origin, age, disability, sex, sexual orientation, or gender identity.            Thank you!     Thank you for choosing [facility]  for your care. Our goal is always to provide you with excellent care. Hearing back from our patients is one way we can continue to improve our services. Please take a few minutes to complete the written survey that you may receive in the mail after your visit with us. Thank you!             Your Updated Medication List - Protect others around you:  Learn how to safely use, store and throw away your medicines at www.disposemymeds.org.          This list is accurate as of 3/30/18  3:33 PM.  Always use your most recent med list.                   Brand Name Dispense Instructions for use Diagnosis    acetaminophen 32 mg/mL solution    TYLENOL     30.45 mLs (975 mg) by Per J Tube route every 8 hours        * amylase-lipase-protease 79366 UNITS Cpep    ZENPEP    180 capsule    Take 2-3 capsules (40,000-60,000 Units) by mouth Take with snacks or supplements (Snacks)    Idiopathic chronic pancreatitis (H)       * amylase-lipase-protease 35537 UNITS Cpep    ZENPEP    180 capsule    Take 5 capsules (100,000 Units) by mouth 4 times daily (with meals and nightly)    Right upper quadrant abdominal pain       blood glucose monitoring test strip    no brand specified    100 strip    One Touch Ultra 2 Strips, Use to test blood sugars 2 times daily or as directed    Hypoglycemia       clotrimazole-betamethasone cream    LOTRISONE    15 g    Apply topically 2 times daily    Rash and nonspecific skin eruption       fluticasone 50 MCG/ACT spray    FLONASE    16 g    Spray 2 sprays into both nostrils daily    Nasal congestion       gabapentin 250 MG/5ML solution    NEURONTIN      Chronic abdominal pain, Sphincter of Oddi dysfunction, Cyclical vomiting with nausea, intractability of vomiting not specified, Pain around percutaneous endoscopic gastrostomy (PEG) tube site, sequela       hydrOXYzine 10 MG/5ML syrup    ATARAX    1500 mL    Take 12.5 mLs (25 mg) by mouth every 6 hours as needed for anxiety or other (pain)    Acute abdominal pain       leuprolide 11.25 MG kit    LUPRON DEPOT (3-MONTH)    1 each    Inject 11.25 mg into the muscle every 3 months    Endometriosis       levalbuterol 45 MCG/ACT Inhaler    XOPENEX HFA    1 Inhaler    Inhale 2 puffs into the lungs every 6 hours as needed for shortness of breath / dyspnea    Wheeze       LORazepam 0.5 MG tablet    ATIVAN    10  tablet    Take 1 tablet (0.5 mg) by mouth 2 times daily as needed (nausea, do not take with pain medicine, do not drive after taking)    Cyclical vomiting with nausea, intractability of vomiting not specified       multivitamins with minerals Liqd liquid     1 Bottle    15 mLs by Per Feeding Tube route daily    Abdominal pain, epigastric       norethindrone 5 MG tablet    AYGESTIN    90 tablet    Take 1 tablet (5 mg) by mouth daily    Endometriosis       nortriptyline 10 MG capsule    PAMELOR    120 capsule    Take 3 capsules (30 mg) by mouth At Bedtime    Right upper quadrant abdominal pain       ondansetron 4 MG ODT tab    ZOFRAN ODT    15 tablet    Take 1 tablet (4 mg) by mouth every 8 hours as needed for nausea or vomiting    Intractable cyclical vomiting with nausea       order for [facility]     1 Units    Equipment being ordered: TENS with wire and electrode.    Chronic abdominal pain       oxyCODONE 5 MG/5ML solution    ROXICODONE    600 mL    Take 10 mL (10 mg) by mouth every 6 hours as needed, maximum 40 mL per day.    Abdominal pain, generalized, Chronic abdominal pain       pantoprazole 40 MG EC tablet    PROTONIX    30 tablet    Take 1 tablet (40 mg) by mouth 2 times daily (before meals)    Right upper quadrant abdominal pain, Erosive gastritis       polyethylene glycol Packet    MIRALAX/GLYCOLAX    7 packet    Take 17 g by mouth daily    Right upper quadrant abdominal pain       RIZATRIPTAN BENZOATE PO      Take 5 mg by mouth once as needed        senna-docusate 8.6-50 MG per tablet    SENOKOT-S;PERICOLACE    100 tablet    Take 1 tablet by mouth 2 times daily as needed for constipation    Right upper quadrant abdominal pain       sodium bicarbonate 325 MG tablet     120 tablet    1 tablet (325 mg) by Per Feeding Tube route 4 times daily    Abdominal pain, epigastric, Intractable cyclical vomiting with nausea       * Notice:  This list has 2 medication(s) that are the same as other medications prescribed for  you. Read the directions carefully, and ask your doctor or other care provider to review them with you.

## 2018-03-30 NOTE — PROGRESS NOTES
Grabiel's mom is calling asking about what Grabiel should do about her continued pain at her tube site. States she is having a lot of pain still with nausea and is concerned she will run out of her pain medication. She is using ice at the site. Mom states she is in so much pain she just sits and inge. States Grabiel was told by Dr. Wang that additional pain medication will be deferred to GI.    Advised will send message to Dr. Narayanan and will get back to her. She is also aware this office does not prescribe pain medication and that we refer to pain clinic for pain management as we are not equipped with managing that specialty. Verbalized understanding.     Ella SARMIENTO RN Coordinator  Dr. Narayanan, Dr. Joe & Dr. Klein   [facility]  [phone]

## 2018-03-30 NOTE — PROGRESS NOTES
"Grabiel called in, she is crying and upset on the phone. Saying she has been told that Dr. Wang has told her that she will need to get pain medicine from Dr. Narayanan.   She understands that we do not dispense pain medication.     She has been trying to tylenol and alternating with motrin. She is using heat and ice, meditation and \"everything she can.\" She is frustrated and in pain, crying. We talked through options for her to use over the weekend to get her through this. She will use the tylenol and motrin, she will use the ice packs as they help a little bit.  Advised of the following concerning symptoms  ~ Advised to go to the ER if develops:    Fevers over 101 +/- chills,    Significant nausea/vomiting causing inability to take in fluids depleting hydration.    severe pain, ongoing symptoms (pain, nausea) that cannot be controlled with prescribed or OTC medication.    Signs of dehydration (pale skin, low blood pressure, lightheadedness, dark urine, \"sticky\" skin when pinched, rapid heart rate, little to no urine output).    Verbalized understanding.   Advised that Dr. Narayanan was sent a message about the pain from her tube and he thought if it continued then the tube could be changed back to a regular one.   She states the pain is more located around the tube site and feels bruised.   Advised will send a message to Dr. Narayanan to reach out to Dr. Wang to come up with a plan for her.   Verbalized understanding and amenable to plan.     Ella SARMIENTO RN Coordinator  Dr. Narayanan, Dr. Joe & Dr. Klein   [facility]  [phone]        "

## 2018-03-30 NOTE — TELEPHONE ENCOUNTER
Pt came to clinic requesting to speak with a Nurse.     Pt was complaining of increased pain after their procedure on Tuesday.  Pt was tearful, asked if it would be okay to increase dose or the frequency of medication.  Pt stated that their GI team informed her that Dr. Wang should write additional opioids while pt is having increased pain.     RN Supervisor Paged Dr. Wang and followed up with APRN who was in clinic.   Clinic schedule was full and pt was not able to be added in.     Pt was informed that the providers were not agreeable to increase their Narcotics.     APRN gave instruction for LPN to inform pt of the following:  *Alternate Heat and Ice to the area.     *Alternate Tylenol (Max  of 4,000 mg/day)  *Alternate Ibuprofen (Max of 3200 mg/ day)    *Pt was also informed that her Gabapentin dose could be increased, but pt was asked to call when they got home to update clinic of how they are taking the medication because the medication is not updated in EPIC.     Pt verbalized understanding of recommendations.   Pt was informed that when Dr. Wang returns page, he will be updated of the recommendations. If there is any other recommendations that he can give to pt, they will be contacted.     Abigail Prieto LPN

## 2018-04-02 ENCOUNTER — INFUSION THERAPY VISIT (OUTPATIENT)
Dept: INFUSION THERAPY | Facility: CLINIC | Age: 23
End: 2018-04-02
Attending: INTERNAL MEDICINE
Payer: COMMERCIAL

## 2018-04-02 VITALS
HEART RATE: 100 BPM | DIASTOLIC BLOOD PRESSURE: 87 MMHG | TEMPERATURE: 98.1 F | RESPIRATION RATE: 16 BRPM | SYSTOLIC BLOOD PRESSURE: 143 MMHG

## 2018-04-02 DIAGNOSIS — R21 RASH: ICD-10-CM

## 2018-04-02 DIAGNOSIS — G43.A0 CYCLICAL VOMITING WITH NAUSEA, INTRACTABILITY OF VOMITING NOT SPECIFIED: Primary | ICD-10-CM

## 2018-04-02 DIAGNOSIS — R10.13 ABDOMINAL PAIN, EPIGASTRIC: ICD-10-CM

## 2018-04-02 DIAGNOSIS — E16.2 HYPOGLYCEMIA: ICD-10-CM

## 2018-04-02 DIAGNOSIS — N39.41 URGE INCONTINENCE: ICD-10-CM

## 2018-04-02 DIAGNOSIS — R10.9 ACUTE ABDOMINAL PAIN: ICD-10-CM

## 2018-04-02 DIAGNOSIS — F33.1 MAJOR DEPRESSIVE DISORDER, RECURRENT EPISODE, MODERATE (H): ICD-10-CM

## 2018-04-02 DIAGNOSIS — R52 PAIN: ICD-10-CM

## 2018-04-02 DIAGNOSIS — Z98.890 S/P ERCP: ICD-10-CM

## 2018-04-02 DIAGNOSIS — R10.9 ABDOMINAL PAIN: ICD-10-CM

## 2018-04-02 DIAGNOSIS — F41.1 ANXIETY STATE: ICD-10-CM

## 2018-04-02 DIAGNOSIS — R11.15 INTRACTABLE CYCLICAL VOMITING WITH NAUSEA: ICD-10-CM

## 2018-04-02 DIAGNOSIS — K86.1 CHRONIC PANCREATITIS, UNSPECIFIED PANCREATITIS TYPE (H): ICD-10-CM

## 2018-04-02 PROCEDURE — 96361 HYDRATE IV INFUSION ADD-ON: CPT

## 2018-04-02 PROCEDURE — 25000128 H RX IP 250 OP 636: Mod: ZF | Performed by: INTERNAL MEDICINE

## 2018-04-02 PROCEDURE — 96375 TX/PRO/DX INJ NEW DRUG ADDON: CPT

## 2018-04-02 PROCEDURE — 96376 TX/PRO/DX INJ SAME DRUG ADON: CPT

## 2018-04-02 PROCEDURE — 96374 THER/PROPH/DIAG INJ IV PUSH: CPT

## 2018-04-02 RX ORDER — ONDANSETRON 2 MG/ML
4 INJECTION INTRAMUSCULAR; INTRAVENOUS DAILY PRN
Status: CANCELLED
Start: 2018-04-02

## 2018-04-02 RX ORDER — DIPHENHYDRAMINE HYDROCHLORIDE 50 MG/ML
25 INJECTION INTRAMUSCULAR; INTRAVENOUS DAILY PRN
Status: DISCONTINUED | OUTPATIENT
Start: 2018-04-02 | End: 2018-04-02 | Stop reason: HOSPADM

## 2018-04-02 RX ORDER — DIPHENHYDRAMINE HYDROCHLORIDE 50 MG/ML
25 INJECTION INTRAMUSCULAR; INTRAVENOUS DAILY PRN
Status: CANCELLED
Start: 2018-04-02

## 2018-04-02 RX ORDER — ONDANSETRON 2 MG/ML
4 INJECTION INTRAMUSCULAR; INTRAVENOUS DAILY PRN
Status: DISCONTINUED | OUTPATIENT
Start: 2018-04-02 | End: 2018-04-02 | Stop reason: HOSPADM

## 2018-04-02 RX ORDER — DIPHENHYDRAMINE HYDROCHLORIDE 50 MG/ML
25 INJECTION INTRAMUSCULAR; INTRAVENOUS ONCE
Status: CANCELLED
Start: 2018-04-02 | End: 2018-04-02

## 2018-04-02 RX ORDER — DIPHENHYDRAMINE HYDROCHLORIDE 50 MG/ML
25 INJECTION INTRAMUSCULAR; INTRAVENOUS ONCE
Status: COMPLETED | OUTPATIENT
Start: 2018-04-02 | End: 2018-04-02

## 2018-04-02 RX ORDER — ONDANSETRON 2 MG/ML
4 INJECTION INTRAMUSCULAR; INTRAVENOUS ONCE
Status: COMPLETED | OUTPATIENT
Start: 2018-04-02 | End: 2018-04-02

## 2018-04-02 RX ORDER — ONDANSETRON 2 MG/ML
4 INJECTION INTRAMUSCULAR; INTRAVENOUS ONCE
Status: CANCELLED
Start: 2018-04-02 | End: 2018-04-02

## 2018-04-02 RX ADMIN — DIPHENHYDRAMINE HYDROCHLORIDE 25 MG: 50 INJECTION, SOLUTION INTRAMUSCULAR; INTRAVENOUS at 13:47

## 2018-04-02 RX ADMIN — DIPHENHYDRAMINE HYDROCHLORIDE 25 MG: 50 INJECTION, SOLUTION INTRAMUSCULAR; INTRAVENOUS at 14:42

## 2018-04-02 RX ADMIN — SODIUM CHLORIDE, POTASSIUM CHLORIDE, SODIUM LACTATE AND CALCIUM CHLORIDE 1000 ML: 600; 310; 30; 20 INJECTION, SOLUTION INTRAVENOUS at 13:43

## 2018-04-02 RX ADMIN — ONDANSETRON 4 MG: 2 INJECTION INTRAMUSCULAR; INTRAVENOUS at 13:51

## 2018-04-02 RX ADMIN — ONDANSETRON 4 MG: 2 INJECTION INTRAMUSCULAR; INTRAVENOUS at 14:37

## 2018-04-02 NOTE — MR AVS SNAPSHOT
After Visit Summary   4/2/2018    Alexandra Melgoza    MRN: [id number]           Patient Information     Date Of Birth          1995        Visit Information        Provider Department      4/2/2018 2:00 PM UC 47 ATC; UC SPEC INFUSION [facility]        Today's Diagnoses     Cyclical vomiting with nausea, intractability of vomiting not specified    -  1    Chronic pancreatitis, unspecified pancreatitis type (H)        Pain        Abdominal pain, epigastric        S/P ERCP        Acute abdominal pain        Abdominal pain        Hypoglycemia        Urge incontinence        Major depressive disorder, recurrent episode, moderate (H)        Anxiety state        Rash        Intractable cyclical vomiting with nausea          Care Instructions    Dear Alexandra Melgoza    Thank you for choosing [facility] ([facility]) for your infusion.  The following information is a summary of our appointment as well as important reminders.      We look forward in seeing you on your next appointment here at [facility].  Please don t hesitate to call us at [phone] to reschedule any of your appointments or to speak with one of the [facility] registered nurses.  It was a pleasure taking care of you today.    Sincerely,    [facility]  Specialty Infusion & Procedure Center  [address] [place] [address]  [address]  Phone:  [phone]            Follow-ups after your visit        Your next 10 appointments already scheduled     Apr 03, 2018 10:40 AM CDT   (Arrive by 10:25 AM)   Return Visit with ANABEL Faclon [facility] ([facility])    [address] [place] [address] [phone]            Apr 04, 2018  9:00 AM CDT   (Arrive by 8:45 AM)   Return Visit with Quyen Lennon, PhD   M  [facility] ([facility])    [address]  4th Floor  [facility] [address]   [phone]            Apr 04, 2018  4:00 PM CDT   Infusion 120 with UC SPEC INFUSION, UC 45 ATC   [facility] Specialty and Procedure ([facility])    [address]  [address]  [facility] [address]   [phone]            Apr 06, 2018 12:00 PM CDT   Infusion 120 with UC SPEC INFUSION, UC 47 ATC   [facility] Specialty and Procedure ([facility])    [address]  [address]  [facility] [address]   [phone]            Apr 09, 2018  4:00 PM CDT   Infusion 120 with UC SPEC INFUSION, UC 49 ATC   [facility] Specialty and Procedure ([facility])    [address]  [address]  [facility] [address]   [phone]            Apr 11, 2018  7:00 AM CDT   (Arrive by 6:45 AM)   Return Visit with Leonardo Wang MD   [facility] ([facility])    [address]  4th Floor  [facility] [address]   [phone]            Apr 11, 2018 10:00 AM CDT   (Arrive by 9:45 AM)   Return Visit with Quyen Lennon, PhD   [facility] ([facility])    [address]  4th [address]  [facility] [address]   [phone]              Who to contact     If you have questions or need follow up information about today's clinic visit or your schedule please contact [facility] SPECIALTY AND PROCEDURE directly at [phone].  Normal or non-critical lab and imaging results will be communicated to you by MyChart, letter or phone within 4 business days after the clinic has received the results. If you do not hear from us within 7 days, please contact the  clinic through [employer] or phone. If you have a critical or abnormal lab result, we will notify you by phone as soon as possible.  Submit refill requests through [employer] or call your pharmacy and they will forward the refill request to us. Please allow 3 business days for your refill to be completed.          Additional Information About Your Visit        [employer] Information     [employer] gives you secure access to your electronic health record. If you see a primary care provider, you can also send messages to your care team and make appointments. If you have questions, please call your primary care clinic.  If you do not have a primary care provider, please call [phone] and they will assist you.        Care EveryWhere ID     This is your Care EveryWhere ID. This could be used by other organizations to access your [place] medical records  [id number]        Your Vitals Were     Pulse Temperature Respirations             100 98.1  F (36.7  C) (Oral) 16          Blood Pressure from Last 3 Encounters:   04/02/18 143/87   03/30/18 (!) 136/96   03/29/18 128/77    Weight from Last 3 Encounters:   03/29/18 66 kg (145 lb 6.4 oz)   03/27/18 65.1 kg (143 lb 8.3 oz)   03/26/18 65.8 kg (145 lb)              Today, you had the following     No orders found for display         Today's Medication Changes          These changes are accurate as of 4/2/18  3:01 PM.  If you have any questions, ask your nurse or doctor.               These medicines have changed or have updated prescriptions.        Dose/Directions    nortriptyline 10 MG capsule   Commonly known as:  PAMELOR   This may have changed:  how much to take   Used for:  Right upper quadrant abdominal pain        Dose:  30 mg   Take 3 capsules (30 mg) by mouth At Bedtime   Quantity:  120 capsule   Refills:  0                Primary Care Provider Office Phone # Fax #    Quyen Baez [phone]       [address] [address]         Equal Access to Services     [facility] : Hadii aad ku hadcathicaleb Livmalini, wajesusda luqkimberly, qakaylahta ritokurtmaki jones. So [facility] [phone].    ATENCIÓN: Si habla español, tiene a quijano disposición servicios gratuitos de asistencia lingüística. Varinderame al [phone].    We comply with applicable federal civil rights laws and [place] laws. We do not discriminate on the basis of race, color, national origin, age, disability, sex, sexual orientation, or gender identity.            Thank you!     Thank you for choosing [facility] SPECIALTY AND PROCEDURE  for your care. Our goal is always to provide you with excellent care. Hearing back from our patients is one way we can continue to improve our services. Please take a few minutes to complete the written survey that you may receive in the mail after your visit with us. Thank you!             Your Updated Medication List - Protect others around you: Learn how to safely use, store and throw away your medicines at www.disposemymeds.org.          This list is accurate as of 4/2/18  3:01 PM.  Always use your most recent med list.                   Brand Name Dispense Instructions for use Diagnosis    acetaminophen 32 mg/mL solution    TYLENOL     30.45 mLs (975 mg) by Per J Tube route every 8 hours        * amylase-lipase-protease 99269 UNITS Cpep    ZENPEP    180 capsule    Take 2-3 capsules (40,000-60,000 Units) by mouth Take with snacks or supplements (Snacks)    Idiopathic chronic pancreatitis (H)       * amylase-lipase-protease 91069 UNITS Cpep    ZENPEP    180 capsule    Take 5 capsules (100,000 Units) by mouth 4 times daily (with meals and nightly)    Right upper quadrant abdominal pain       blood glucose monitoring test strip    no brand specified    100 strip    One Touch Ultra 2 Strips, Use to test blood sugars 2 times daily or as directed    Hypoglycemia       clotrimazole-betamethasone cream     LOTRISONE    15 g    Apply topically 2 times daily    Rash and nonspecific skin eruption       fluticasone 50 MCG/ACT spray    FLONASE    16 g    Spray 2 sprays into both nostrils daily    Nasal congestion       gabapentin 250 MG/5ML solution    NEURONTIN      Chronic abdominal pain, Sphincter of Oddi dysfunction, Cyclical vomiting with nausea, intractability of vomiting not specified, Pain around percutaneous endoscopic gastrostomy (PEG) tube site, sequela       hydrOXYzine 10 MG/5ML syrup    ATARAX    1500 mL    Take 12.5 mLs (25 mg) by mouth every 6 hours as needed for anxiety or other (pain)    Acute abdominal pain       leuprolide 11.25 MG kit    LUPRON DEPOT (3-MONTH)    1 each    Inject 11.25 mg into the muscle every 3 months    Endometriosis       levalbuterol 45 MCG/ACT Inhaler    XOPENEX HFA    1 Inhaler    Inhale 2 puffs into the lungs every 6 hours as needed for shortness of breath / dyspnea    Wheeze       LORazepam 0.5 MG tablet    ATIVAN    10 tablet    Take 1 tablet (0.5 mg) by mouth 2 times daily as needed (nausea, do not take with pain medicine, do not drive after taking)    Cyclical vomiting with nausea, intractability of vomiting not specified       multivitamins with minerals Liqd liquid     1 Bottle    15 mLs by Per Feeding Tube route daily    Abdominal pain, epigastric       norethindrone 5 MG tablet    AYGESTIN    90 tablet    Take 1 tablet (5 mg) by mouth daily    Endometriosis       nortriptyline 10 MG capsule    PAMELOR    120 capsule    Take 3 capsules (30 mg) by mouth At Bedtime    Right upper quadrant abdominal pain       ondansetron 4 MG ODT tab    ZOFRAN ODT    15 tablet    Take 1 tablet (4 mg) by mouth every 8 hours as needed for nausea or vomiting    Intractable cyclical vomiting with nausea       order for DME     1 Units    Equipment being ordered: TENS with wire and electrode.    Chronic abdominal pain       oxyCODONE 5 MG/5ML solution    ROXICODONE    600 mL    Take 10 mL (10  mg) by mouth every 6 hours as needed, maximum 40 mL per day.    Abdominal pain, generalized, Chronic abdominal pain       pantoprazole 40 MG EC tablet    PROTONIX    30 tablet    Take 1 tablet (40 mg) by mouth 2 times daily (before meals)    Right upper quadrant abdominal pain, Erosive gastritis       polyethylene glycol Packet    MIRALAX/GLYCOLAX    7 packet    Take 17 g by mouth daily    Right upper quadrant abdominal pain       RIZATRIPTAN BENZOATE PO      Take 5 mg by mouth once as needed        senna-docusate 8.6-50 MG per tablet    SENOKOT-S;PERICOLACE    100 tablet    Take 1 tablet by mouth 2 times daily as needed for constipation    Right upper quadrant abdominal pain       sodium bicarbonate 325 MG tablet     120 tablet    1 tablet (325 mg) by Per Feeding Tube route 4 times daily    Abdominal pain, epigastric, Intractable cyclical vomiting with nausea       * Notice:  This list has 2 medication(s) that are the same as other medications prescribed for you. Read the directions carefully, and ask your doctor or other care provider to review them with you.

## 2018-04-02 NOTE — PATIENT INSTRUCTIONS
Dear Alexandra Melgoza    Thank you for choosing [facility] Special[facility] ([facility]) for your infusion.  The following information is a summary of our appointment as well as important reminders.      We look forward in seeing you on your next appointment here at [facility].  Please don t hesitate to call us at [phone] to reschedule any of your appointments or to speak with one of the [facility] registered nurses.  It was a pleasure taking care of you today.    Sincerely,    [facility]  Specialty Infusion & Procedure Center  [address] [place] [address]  [address]  Phone:  [phone]

## 2018-04-02 NOTE — PROGRESS NOTES
Nursing Note  Alexandra Melgoza presents today to [facility] for:   Chief Complaint   Patient presents with     Infusion     IVF, zofran and benadryl     During today's [facility] appointment, orders from Dr. Narayanan were completed.  Frequency: daily PRN    Progress note:  Patient identification verified by name and date of birth.  Assessment completed.  Vitals recorded in Doc Flowsheets.  Patient was provided with education regarding infusion and possible side effects.  Patient verbalized understanding.      needed: No  Premedications: administered per order.  Infusion Rates: 1L of LR given over 1 hour  Labs: were not ordered for this appointment.  Vascular access: port accessed today.  Treatment Conditions: non-applicable.  Patient tolerated infusion: well.    Drug Waste Record    Drug Name: Benadryl   Dose: 25mg  Route administered: IV  NDC #: 44239-775-88  Amount of waste(mL):0.5ml  Reason for waste: Single use vial      Discharge Plan:   Follow up plan of care with: ongoing infusions at [facility].  Discharge instructions were reviewed with patient.  Patient/representative verbalized understanding of discharge instructions and all questions answered.  Patient discharged from [facility] in stable condition.    ROGER BARROS, SAMARA    Administrations This Visit     diphenhydrAMINE (BENADRYL) injection 25 mg     Admin Date Action Dose Route Administered By             04/02/2018 Given 25 mg Intravenous Daphnie Baum RN              Admin Date Action Dose Route Administered By             04/02/2018 Given 25 mg Intravenous Daphnie Baum, RN                    lactated ringers BOLUS 1,000-2,000 mL     Admin Date Action Dose Route Administered By             04/02/2018 New Bag 1000 mL Intravenous Daphnie Baum, RN                    ondansetron (ZOFRAN) injection 4 mg     Admin  Date Action Dose Route Administered By             04/02/2018 Given 4 mg Intravenous Daphnie Baum RN              Admin Date Action Dose Route Administered By             04/02/2018 Given 4 mg Intravenous Daphnie Baum RN                          /79  Pulse 99  Temp 98.1  F (36.7  C) (Oral)  Resp 16

## 2018-04-03 ENCOUNTER — OFFICE VISIT (OUTPATIENT)
Dept: ANESTHESIOLOGY | Facility: CLINIC | Age: 23
End: 2018-04-03
Payer: COMMERCIAL

## 2018-04-03 VITALS
DIASTOLIC BLOOD PRESSURE: 84 MMHG | HEART RATE: 108 BPM | SYSTOLIC BLOOD PRESSURE: 137 MMHG | WEIGHT: 145 LBS | RESPIRATION RATE: 16 BRPM | BODY MASS INDEX: 23.3 KG/M2 | HEIGHT: 66 IN

## 2018-04-03 DIAGNOSIS — R10.84 ABDOMINAL PAIN, GENERALIZED: ICD-10-CM

## 2018-04-03 DIAGNOSIS — G89.29 CHRONIC ABDOMINAL PAIN: Chronic | ICD-10-CM

## 2018-04-03 DIAGNOSIS — R10.9 CHRONIC ABDOMINAL PAIN: Chronic | ICD-10-CM

## 2018-04-03 RX ORDER — OXYCODONE HCL 5 MG/5 ML
SOLUTION, ORAL ORAL
Qty: 240 ML | Refills: 0 | Status: SHIPPED | OUTPATIENT
Start: 2018-04-06 | End: 2018-04-11

## 2018-04-03 ASSESSMENT — PAIN SCALES - GENERAL: PAINLEVEL: SEVERE PAIN (7)

## 2018-04-03 NOTE — PATIENT INSTRUCTIONS
1. Increase your Nortriptyline for a total dose of 40 mg at bedtime.     2. Oxycodone refilled, take 10 mg every 6 hours. MAXIMUM OF 40 ML per DAY!    Follow up: 4/11/18 at 0700 am, arrive by 0645 am. With Dr. Wang.      To speak with a nurse, schedule/reschedule/cancel a clinic appointment, or request a medication refill call: [phone]     You can also reach us by [employer]: [email or web address]    For refills, please call on Monday, 1 week before your medication runs out. The doctors are not always in clinic, so this gives us time to get your prescriptions ready.  Please let us know the name of the medication you are requesting a refill of.

## 2018-04-03 NOTE — NURSING NOTE
LPN reviewed AVS with Pt includin. Increase your Nortriptyline for a total dose of 40 mg at bedtime.     2. Oxycodone refilled, take 10 mg every 6 hours. MAXIMUM OF 40 ML per DAY!    Follow up: 18 at 0700 am, arrive by 0645 am. With Dr. Wang.    Pt verbalized an understanding of information, and was asked to contact clinic with questions.    Abigail Prieto LPN

## 2018-04-03 NOTE — MR AVS SNAPSHOT
After Visit Summary   4/3/2018    Alexandra Melgoza    MRN: [id number]           Patient Information     Date Of Birth          1995        Visit Information        Provider Department      4/3/2018 10:40 AM Juanita Baum APRN CNP [facility]        Care Instructions    1. Increase your Nortriptyline for a total dose of 40 mg at bedtime.     2. Oxycodone refilled, take 10 mg every 6 hours. MAXIMUM OF 40 ML per DAY!    Follow up: 4/11/18 at 0700 am, arrive by 0645 am. With Dr. Wang.      To speak with a nurse, schedule/reschedule/cancel a clinic appointment, or request a medication refill call: [phone]     You can also reach us by [employer]: [email or web address]    For refills, please call on Monday, 1 week before your medication runs out. The doctors are not always in clinic, so this gives us time to get your prescriptions ready.  Please let us know the name of the medication you are requesting a refill of.                                     Follow-ups after your visit        Your next 10 appointments already scheduled     Apr 04, 2018  9:00 AM CDT   (Arrive by 8:45 AM)   Return Visit with Quyen Lennon, PhD   [facility] ([facility])    [address] [place] [address]  4th Floor  [facility] [id number] [phone]            Apr 04, 2018  4:00 PM CDT   Infusion 120 with UC SPEC INFUSION, UC 45 ATC   [facility] ([facility])    [address] [place] [address]  [address]  [facility] [id number] [phone]            Apr 06, 2018 12:00 PM CDT   Infusion 120 with UC SPEC INFUSION, UC 47 ATC   [facility] ([facility])    [address] [place] [address]  [address]  [facility] [id number] [phone]            Apr 09, 2018  4:00 PM CDT    Infusion 120 with UC SPEC INFUSION, UC 49 ATC   [facility] ([facility])    [address]  [address]  [facility] [id number] [phone]            Apr 11, 2018  7:00 AM CDT   (Arrive by 6:45 AM)   Return Visit with eLonardo Wang MD   [facility] ([facility])    [address]  4th Floor  [facility] [id number] [phone]            Apr 11, 2018 10:00 AM CDT   (Arrive by 9:45 AM)   Return Visit with Quyen Lennon PhD   [facility] ([facility])    [address]  4th Floor  [facility] [id number] [phone]            Apr 11, 2018 12:00 PM CDT   Infusion 120 with UC SPEC INFUSION   [facility] ([facility])    [address]  [address]  [facility] [id number] [phone]              Who to contact     Please call your clinic at [phone] to:    Ask questions about your health    Make or cancel appointments    Discuss your medicines    Learn about your test results    Speak to your doctor            Additional Information About Your Visit        [employer] Information     [employer] gives you secure access to your electronic health record. If you see a primary care provider, you can also send messages to your care team and make appointments. If you have questions, please call your primary care clinic.  If you do not have a primary care provider, please call [phone] and they will assist you.      [employer] is an electronic gateway that provides easy, online access to your medical records. With [employer], you can request a clinic appointment, read your test results, renew a prescription or communicate with your care team.     To access your existing account, please contact your [facility]  "[place] [facility] or call [phone] for assistance.        Care EveryWhere ID     This is your Care EveryWhere ID. This could be used by other organizations to access your [place] medical records  [id number]        Your Vitals Were     Pulse Respirations Height BMI (Body Mass Index)          108 16 1.676 m (5' 6\") 23.4 kg/m2         Blood Pressure from Last 3 Encounters:   04/03/18 137/84   04/02/18 143/87   03/30/18 (!) 136/96    Weight from Last 3 Encounters:   04/03/18 65.8 kg (145 lb)   03/29/18 66 kg (145 lb 6.4 oz)   03/27/18 65.1 kg (143 lb 8.3 oz)              Today, you had the following     No orders found for display         Today's Medication Changes          These changes are accurate as of 4/3/18 11:39 AM.  If you have any questions, ask your nurse or doctor.               These medicines have changed or have updated prescriptions.        Dose/Directions    nortriptyline 10 MG capsule   Commonly known as:  PAMELOR   This may have changed:  how much to take   Used for:  Right upper quadrant abdominal pain        Dose:  30 mg   Take 3 capsules (30 mg) by mouth At Bedtime   Quantity:  120 capsule   Refills:  0                Primary Care Provider Office Phone # Fax #    Quyen Baez [phone]       [address] [address]        Equal Access to Services     [facility]: Hadii angelina joseph Somalini, waaxda luqkimberly, qaybta kaalmada maki santillan . So [facility] [phone].    ATENCIÓN: Si habla español, tiene a quijano disposición servicios gratuitos de asistencia lingüística. Llame al [phone].    We comply with applicable federal civil rights laws and [place] laws. We do not discriminate on the basis of race, color, national origin, age, disability, sex, sexual orientation, or gender identity.            Thank you!     Thank you for choosing [facility]  for your " care. Our goal is always to provide you with excellent care. Hearing back from our patients is one way we can continue to improve our services. Please take a few minutes to complete the written survey that you may receive in the mail after your visit with us. Thank you!             Your Updated Medication List - Protect others around you: Learn how to safely use, store and throw away your medicines at www.disposemymeds.org.          This list is accurate as of 4/3/18 11:39 AM.  Always use your most recent med list.                   Brand Name Dispense Instructions for use Diagnosis    acetaminophen 32 mg/mL solution    TYLENOL     30.45 mLs (975 mg) by Per J Tube route every 8 hours        * amylase-lipase-protease 50601 UNITS Cpep    ZENPEP    180 capsule    Take 2-3 capsules (40,000-60,000 Units) by mouth Take with snacks or supplements (Snacks)    Idiopathic chronic pancreatitis (H)       * amylase-lipase-protease 07481 UNITS Cpep    ZENPEP    180 capsule    Take 5 capsules (100,000 Units) by mouth 4 times daily (with meals and nightly)    Right upper quadrant abdominal pain       blood glucose monitoring test strip    no brand specified    100 strip    One Touch Ultra 2 Strips, Use to test blood sugars 2 times daily or as directed    Hypoglycemia       clotrimazole-betamethasone cream    LOTRISONE    15 g    Apply topically 2 times daily    Rash and nonspecific skin eruption       fluticasone 50 MCG/ACT spray    FLONASE    16 g    Spray 2 sprays into both nostrils daily    Nasal congestion       gabapentin 250 MG/5ML solution    NEURONTIN      Chronic abdominal pain, Sphincter of Oddi dysfunction, Cyclical vomiting with nausea, intractability of vomiting not specified, Pain around percutaneous endoscopic gastrostomy (PEG) tube site, sequela       hydrOXYzine 10 MG/5ML syrup    ATARAX    1500 mL    Take 12.5 mLs (25 mg) by mouth every 6 hours as needed for anxiety or other (pain)    Acute abdominal pain        leuprolide 11.25 MG kit    LUPRON DEPOT (3-MONTH)    1 each    Inject 11.25 mg into the muscle every 3 months    Endometriosis       levalbuterol 45 MCG/ACT Inhaler    XOPENEX HFA    1 Inhaler    Inhale 2 puffs into the lungs every 6 hours as needed for shortness of breath / dyspnea    Wheeze       LORazepam 0.5 MG tablet    ATIVAN    10 tablet    Take 1 tablet (0.5 mg) by mouth 2 times daily as needed (nausea, do not take with pain medicine, do not drive after taking)    Cyclical vomiting with nausea, intractability of vomiting not specified       multivitamins with minerals Liqd liquid     1 Bottle    15 mLs by Per Feeding Tube route daily    Abdominal pain, epigastric       norethindrone 5 MG tablet    AYGESTIN    90 tablet    Take 1 tablet (5 mg) by mouth daily    Endometriosis       nortriptyline 10 MG capsule    PAMELOR    120 capsule    Take 3 capsules (30 mg) by mouth At Bedtime    Right upper quadrant abdominal pain       ondansetron 4 MG ODT tab    ZOFRAN ODT    15 tablet    Take 1 tablet (4 mg) by mouth every 8 hours as needed for nausea or vomiting    Intractable cyclical vomiting with nausea       order for [facility]     1 Units    Equipment being ordered: TENS with wire and electrode.    Chronic abdominal pain       oxyCODONE 5 MG/5ML solution    ROXICODONE    600 mL    Take 10 mL (10 mg) by mouth every 6 hours as needed, maximum 40 mL per day.    Abdominal pain, generalized, Chronic abdominal pain       pantoprazole 40 MG EC tablet    PROTONIX    30 tablet    Take 1 tablet (40 mg) by mouth 2 times daily (before meals)    Right upper quadrant abdominal pain, Erosive gastritis       polyethylene glycol Packet    MIRALAX/GLYCOLAX    7 packet    Take 17 g by mouth daily    Right upper quadrant abdominal pain       RIZATRIPTAN BENZOATE PO      Take 5 mg by mouth once as needed        senna-docusate 8.6-50 MG per tablet    SENOKOT-S;PERICOLACE    100 tablet    Take 1 tablet by mouth 2 times daily as needed for  constipation    Right upper quadrant abdominal pain       sodium bicarbonate 325 MG tablet     120 tablet    1 tablet (325 mg) by Per Feeding Tube route 4 times daily    Abdominal pain, epigastric, Intractable cyclical vomiting with nausea       * Notice:  This list has 2 medication(s) that are the same as other medications prescribed for you. Read the directions carefully, and ask your doctor or other care provider to review them with you.

## 2018-04-03 NOTE — PROGRESS NOTES
Date of visit: 4/3/2018    Chief complaint:   Chief Complaint   Patient presents with     Pain Management     Follow up        Interval history:  Alexandra Melgoza is a 22 year old female last seen by my colleague, Dr. Leonardo Wang, on 3/26/18 with chronic pancreatitis. She has a follow-up scheduled with Dr. Wang again on 4/11/18. She is here today to discuss acute pain secondary to GJ tube dislodgement; this was replaced on 3/27 with Dr. Narayanan. In review of chart, there is a note that patient was given a verbal OK to take oxycodone 10 mg Q4 hours x 48 hours (until 3/29). Patient reports she has been taking Q4 since yesterday 4/2. Patient had presented to clinic without a scheduled appointment 3/30 requesting additional pain medication. This was declined as patient is aware we are attempting to taper and discontinue her oxycodone; additionally, we informed her we are not able to treat her acute post-procedure pain. She was instructed to utilize alternating ice, ibuprofen, acetaminophen. In review of chart, patient called Dr. Narayanan's office 3/30 requesting additional pain medication; this was declined and patient was informed to follow with Dr. Wang.   She is currently prescribed 10 mg oxycodone 4 times daily. She also takes nortriptyline 30 mg at HS and gabapentin 800 mg TID.   Patient notes that she is concerned about odor, drainage, and bleeding at site of GJ tube. Has f/u with Dr. Narayanan 6/2018.     Recommendations/plan at the last visit included:  A/P:Patient is a  23 y/o lady with chronic abdominal pain who presents for follow up.  It is clear that the most likely eiology of her pain is pancreatitic flares and surgical pain.   In an effort to decrease the frequency and the duration of flares, as well as addressing the post-surgical pain, I recommend  1. Continue Pain psychology  2. Continue Oxycodone 10 mg  q6h prn with plan to wean when the surgical pain subsides.  3. Continue pamelor 30 mg  qHS  4. RTC in 2 weeks          Medications:  Current Outpatient Prescriptions   Medication Sig Dispense Refill     gabapentin (NEURONTIN) 250 MG/5ML solution   2     clotrimazole-betamethasone (LOTRISONE) cream Apply topically 2 times daily 15 g 0     LORazepam (ATIVAN) 0.5 MG tablet Take 1 tablet (0.5 mg) by mouth 2 times daily as needed (nausea, do not take with pain medicine, do not drive after taking) 10 tablet 0     oxyCODONE (ROXICODONE) 5 MG/5ML solution Take 10 mL (10 mg) by mouth every 6 hours as needed, maximum 40 mL per day. 600 mL 0     blood glucose monitoring (NO BRAND SPECIFIED) test strip One Touch Ultra 2 Strips, Use to test blood sugars 2 times daily or as directed 100 strip 3     hydrOXYzine (ATARAX) 10 MG/5ML syrup Take 12.5 mLs (25 mg) by mouth every 6 hours as needed for anxiety or other (pain) 1500 mL 2     acetaminophen (TYLENOL) 32 mg/mL solution 30.45 mLs (975 mg) by Per J Tube route every 8 hours       ondansetron (ZOFRAN ODT) 4 MG ODT tab Take 1 tablet (4 mg) by mouth every 8 hours as needed for nausea or vomiting 15 tablet 0     sodium bicarbonate 325 MG tablet 1 tablet (325 mg) by Per Feeding Tube route 4 times daily 120 tablet 0     nortriptyline (PAMELOR) 10 MG capsule Take 3 capsules (30 mg) by mouth At Bedtime (Patient taking differently: Take 20-30 mg by mouth At Bedtime ) 120 capsule 0     amylase-lipase-protease (ZENPEP) 59508 UNITS CPEP Take 5 capsules (100,000 Units) by mouth 4 times daily (with meals and nightly) 180 capsule 1     multivitamins with minerals (CERTAVITE/CEROVITE) LIQD liquid 15 mLs by Per Feeding Tube route daily 1 Bottle 0     order for DME Equipment being ordered: TENS with wire and electrode. 1 Units 0     amylase-lipase-protease (ZENPEP) 77360 UNITS CPEP Take 2-3 capsules (40,000-60,000 Units) by mouth Take with snacks or supplements (Snacks) 180 capsule 1     senna-docusate (SENOKOT-S;PERICOLACE) 8.6-50 MG per tablet Take 1 tablet by mouth 2 times daily  "as needed for constipation 100 tablet 0     pantoprazole (PROTONIX) 40 MG EC tablet Take 1 tablet (40 mg) by mouth 2 times daily (before meals) 30 tablet 1     polyethylene glycol (MIRALAX/GLYCOLAX) Packet Take 17 g by mouth daily 7 packet 0     norethindrone (AYGESTIN) 5 MG tablet Take 1 tablet (5 mg) by mouth daily 90 tablet 1     fluticasone (FLONASE) 50 MCG/ACT spray Spray 2 sprays into both nostrils daily 16 g 3     levalbuterol (XOPENEX HFA) 45 MCG/ACT Inhaler Inhale 2 puffs into the lungs every 6 hours as needed for shortness of breath / dyspnea 1 Inhaler 1     leuprolide (LUPRON DEPOT) 11.25 MG injection Inject 11.25 mg into the muscle every 3 months 1 each 3     RIZATRIPTAN BENZOATE PO Take 5 mg by mouth once as needed          Medical History: any changes in medical history since they were last seen? No    Review of Systems:  The 14 system ROS was reviewed from the intake questionnaire; results listed at end of note.     Physical Exam:  Blood pressure 137/84, pulse 108, resp. rate 16, height 1.676 m (5' 6\"), weight 65.8 kg (145 lb), not currently breastfeeding.  General: mild distress secondary to pain  Gait: Normal  MSK exam: full range of motion in all extremities.  Abdominal: GJ site observed today; stoma is clean without bleeding, drainage, erythema, swelling.      Assessment:   Alexandra Melgoza is a 22 year old female who is seen at the pain clinic for abdominal pain secondary to chronic pancreatitis.    Plan:  1. Medication Management:   -I will refill patient's oxycodone at 10 mg Q6 hours until she can be seen at her appointment with Dr. Wang on 4/11. She was given a 6-day supply; due for refill beginning 4/12. Patient has overtaken her medication four days longer than instructed following her procedure. I strongly recommend a taper with plan for discontinuation at this appointment; this was discussed with patient and she is in agreement with this plan of care and reiterates that she would like to " be off of oxycodone.   -Increase nortriptyline to 40 mg at HS.   -Patient plans to pursue acupuncture referral.    2. Need for Clinical Health Psychologist.  -Continue with Dr. Quyen Lennon  3. Follow up: RTC for appointment with Dr. Wang on 4/11/18. I do not see any concern for infection as site of GJ tube but advised patient to follow with gastroenterology clinic if she has continued concerns.     Total time spent was 20 minutes, and more than 50% of face to face time was spent in counseling and/or coordination of care regarding plan of care.    ANABEL Falcon, ABIOLA-BC  Pain Management  Department of Interventional Pain Management and Anesthesiology   [facility]

## 2018-04-03 NOTE — LETTER
4/3/2018       RE: Alexandra Melgoza  [address]  [facility] [address]     Dear Colleague,    Thank you for referring your patient, Alexandra Melgoza, to the [facility] at [facility]. Please see a copy of my visit note below.    Date of visit: 4/3/2018    Chief complaint:   Chief Complaint   Patient presents with     Pain Management     Follow up        Interval history:  Alexandra Melgoza is a 22 year old female last seen by my colleague, Dr. Leonardo Wang, on 3/26/18 with chronic pancreatitis. She has a follow-up scheduled with Dr. Wang again on 4/11/18. She is here today to discuss acute pain secondary to GJ tube dislodgement; this was replaced on 3/27 with Dr. Narayanan. In review of chart, there is a note that patient was given a verbal OK to take oxycodone 10 mg Q4 hours x 48 hours (until 3/29). Patient reports she has been taking Q4 since yesterday 4/2. Patient had presented to clinic without a scheduled appointment 3/30 requesting additional pain medication. This was declined as patient is aware we are attempting to taper and discontinue her oxycodone; additionally, we informed her we are not able to treat her acute post-procedure pain. She was instructed to utilize alternating ice, ibuprofen, acetaminophen. In review of chart, patient called Dr. Narayanan's office 3/30 requesting additional pain medication; this was declined and patient was informed to follow with Dr. Wang.   She is currently prescribed 10 mg oxycodone 4 times daily. She also takes nortriptyline 30 mg at HS and gabapentin 800 mg TID.   Patient notes that she is concerned about odor, drainage, and bleeding at site of GJ tube. Has f/u with Dr. Narayanan 6/2018.     Recommendations/plan at the last visit included:  A/P:Patient is a  23 y/o lady with chronic abdominal pain who presents for follow up.  It is clear that the most likely eiology of her pain is  pancreatitic flares and surgical pain.   In an effort to decrease the frequency and the duration of flares, as well as addressing the post-surgical pain, I recommend  1. Continue Pain psychology  2. Continue Oxycodone 10 mg  q6h prn with plan to wean when the surgical pain subsides.  3. Continue pamelor 30 mg qHS  4. RTC in 2 weeks          Medications:  Current Outpatient Prescriptions   Medication Sig Dispense Refill     gabapentin (NEURONTIN) 250 MG/5ML solution   2     clotrimazole-betamethasone (LOTRISONE) cream Apply topically 2 times daily 15 g 0     LORazepam (ATIVAN) 0.5 MG tablet Take 1 tablet (0.5 mg) by mouth 2 times daily as needed (nausea, do not take with pain medicine, do not drive after taking) 10 tablet 0     oxyCODONE (ROXICODONE) 5 MG/5ML solution Take 10 mL (10 mg) by mouth every 6 hours as needed, maximum 40 mL per day. 600 mL 0     blood glucose monitoring (NO BRAND SPECIFIED) test strip One Touch Ultra 2 Strips, Use to test blood sugars 2 times daily or as directed 100 strip 3     hydrOXYzine (ATARAX) 10 MG/5ML syrup Take 12.5 mLs (25 mg) by mouth every 6 hours as needed for anxiety or other (pain) 1500 mL 2     acetaminophen (TYLENOL) 32 mg/mL solution 30.45 mLs (975 mg) by Per J Tube route every 8 hours       ondansetron (ZOFRAN ODT) 4 MG ODT tab Take 1 tablet (4 mg) by mouth every 8 hours as needed for nausea or vomiting 15 tablet 0     sodium bicarbonate 325 MG tablet 1 tablet (325 mg) by Per Feeding Tube route 4 times daily 120 tablet 0     nortriptyline (PAMELOR) 10 MG capsule Take 3 capsules (30 mg) by mouth At Bedtime (Patient taking differently: Take 20-30 mg by mouth At Bedtime ) 120 capsule 0     amylase-lipase-protease (ZENPEP) 23073 UNITS CPEP Take 5 capsules (100,000 Units) by mouth 4 times daily (with meals and nightly) 180 capsule 1     multivitamins with minerals (CERTAVITE/CEROVITE) LIQD liquid 15 mLs by Per Feeding Tube route daily 1 Bottle 0     order for DME Equipment  "being ordered: TENS with wire and electrode. 1 Units 0     amylase-lipase-protease (ZENPEP) 45662 UNITS CPEP Take 2-3 capsules (40,000-60,000 Units) by mouth Take with snacks or supplements (Snacks) 180 capsule 1     senna-docusate (SENOKOT-S;PERICOLACE) 8.6-50 MG per tablet Take 1 tablet by mouth 2 times daily as needed for constipation 100 tablet 0     pantoprazole (PROTONIX) 40 MG EC tablet Take 1 tablet (40 mg) by mouth 2 times daily (before meals) 30 tablet 1     polyethylene glycol (MIRALAX/GLYCOLAX) Packet Take 17 g by mouth daily 7 packet 0     norethindrone (AYGESTIN) 5 MG tablet Take 1 tablet (5 mg) by mouth daily 90 tablet 1     fluticasone (FLONASE) 50 MCG/ACT spray Spray 2 sprays into both nostrils daily 16 g 3     levalbuterol (XOPENEX HFA) 45 MCG/ACT Inhaler Inhale 2 puffs into the lungs every 6 hours as needed for shortness of breath / dyspnea 1 Inhaler 1     leuprolide (LUPRON DEPOT) 11.25 MG injection Inject 11.25 mg into the muscle every 3 months 1 each 3     RIZATRIPTAN BENZOATE PO Take 5 mg by mouth once as needed          Medical History: any changes in medical history since they were last seen? No    Review of Systems:  The 14 system ROS was reviewed from the intake questionnaire; results listed at end of note.     Physical Exam:  Blood pressure 137/84, pulse 108, resp. rate 16, height 1.676 m (5' 6\"), weight 65.8 kg (145 lb), not currently breastfeeding.  General: mild distress secondary to pain  Gait: Normal  MSK exam: full range of motion in all extremities.  Abdominal: GJ site observed today; stoma is clean without bleeding, drainage, erythema, swelling.      Assessment:   Alexandra Melgoza is a 22 year old female who is seen at the pain clinic for abdominal pain secondary to chronic pancreatitis.    Plan:  1. Medication Management:   -I will refill patient's oxycodone at 10 mg Q6 hours until she can be seen at her appointment with Dr. Wang on 4/11. She was given a 6-day supply; due for " refill beginning 4/12. Patient has overtaken her medication four days longer than instructed following her procedure. I strongly recommend a taper with plan for discontinuation at this appointment; this was discussed with patient and she is in agreement with this plan of care and reiterates that she would like to be off of oxycodone.   -Increase nortriptyline to 40 mg at HS.   -Patient plans to pursue acupuncture referral.    2. Need for Clinical Health Psychologist.  -Continue with Dr. Quyen Lennon  3. Follow up: RTC for appointment with Dr. Wang on 4/11/18. I do not see any concern for infection as site of GJ tube but advised patient to follow with gastroenterology clinic if she has continued concerns.     Total time spent was 20 minutes, and more than 50% of face to face time was spent in counseling and/or coordination of care regarding plan of care.      Again, thank you for allowing me to participate in the care of your patient.      Sincerely,    ANABEL Falcon CNP

## 2018-04-04 ENCOUNTER — MYC MEDICAL ADVICE (OUTPATIENT)
Dept: INTERNAL MEDICINE | Facility: CLINIC | Age: 23
End: 2018-04-04

## 2018-04-04 ENCOUNTER — INFUSION THERAPY VISIT (OUTPATIENT)
Dept: INFUSION THERAPY | Facility: CLINIC | Age: 23
End: 2018-04-04
Attending: INTERNAL MEDICINE
Payer: COMMERCIAL

## 2018-04-04 ENCOUNTER — OFFICE VISIT (OUTPATIENT)
Dept: ANESTHESIOLOGY | Facility: CLINIC | Age: 23
End: 2018-04-04
Payer: COMMERCIAL

## 2018-04-04 ENCOUNTER — CARE COORDINATION (OUTPATIENT)
Dept: GASTROENTEROLOGY | Facility: CLINIC | Age: 23
End: 2018-04-04

## 2018-04-04 VITALS
HEART RATE: 104 BPM | TEMPERATURE: 97.5 F | RESPIRATION RATE: 16 BRPM | DIASTOLIC BLOOD PRESSURE: 84 MMHG | SYSTOLIC BLOOD PRESSURE: 133 MMHG

## 2018-04-04 DIAGNOSIS — F33.1 MAJOR DEPRESSIVE DISORDER, RECURRENT EPISODE, MODERATE (H): ICD-10-CM

## 2018-04-04 DIAGNOSIS — N39.41 URGE INCONTINENCE: ICD-10-CM

## 2018-04-04 DIAGNOSIS — R21 RASH: ICD-10-CM

## 2018-04-04 DIAGNOSIS — R10.9 ABDOMINAL PAIN: ICD-10-CM

## 2018-04-04 DIAGNOSIS — R10.13 ABDOMINAL PAIN, EPIGASTRIC: ICD-10-CM

## 2018-04-04 DIAGNOSIS — R52 PAIN: ICD-10-CM

## 2018-04-04 DIAGNOSIS — F43.89 ADJUSTMENT REACTION TO CHRONIC STRESS: Primary | ICD-10-CM

## 2018-04-04 DIAGNOSIS — F41.1 ANXIETY STATE: ICD-10-CM

## 2018-04-04 DIAGNOSIS — R11.15 INTRACTABLE CYCLICAL VOMITING WITH NAUSEA: ICD-10-CM

## 2018-04-04 DIAGNOSIS — Z98.890 S/P ERCP: ICD-10-CM

## 2018-04-04 DIAGNOSIS — K86.1 CHRONIC PANCREATITIS, UNSPECIFIED PANCREATITIS TYPE (H): ICD-10-CM

## 2018-04-04 DIAGNOSIS — G43.A0 CYCLICAL VOMITING WITH NAUSEA, INTRACTABILITY OF VOMITING NOT SPECIFIED: Primary | ICD-10-CM

## 2018-04-04 DIAGNOSIS — R10.9 ACUTE ABDOMINAL PAIN: ICD-10-CM

## 2018-04-04 DIAGNOSIS — E16.2 HYPOGLYCEMIA: ICD-10-CM

## 2018-04-04 PROCEDURE — 96375 TX/PRO/DX INJ NEW DRUG ADDON: CPT

## 2018-04-04 PROCEDURE — 25000128 H RX IP 250 OP 636: Mod: ZF | Performed by: INTERNAL MEDICINE

## 2018-04-04 PROCEDURE — 96361 HYDRATE IV INFUSION ADD-ON: CPT

## 2018-04-04 PROCEDURE — 96374 THER/PROPH/DIAG INJ IV PUSH: CPT

## 2018-04-04 PROCEDURE — 25000125 ZZHC RX 250: Mod: ZF

## 2018-04-04 PROCEDURE — 96376 TX/PRO/DX INJ SAME DRUG ADON: CPT

## 2018-04-04 RX ORDER — DIPHENHYDRAMINE HYDROCHLORIDE 50 MG/ML
25 INJECTION INTRAMUSCULAR; INTRAVENOUS ONCE
Status: CANCELLED
Start: 2018-04-04 | End: 2018-04-04

## 2018-04-04 RX ORDER — HEPARIN SODIUM (PORCINE) LOCK FLUSH IV SOLN 100 UNIT/ML 100 UNIT/ML
500 SOLUTION INTRAVENOUS EVERY 8 HOURS
Status: DISCONTINUED | OUTPATIENT
Start: 2018-04-04 | End: 2018-04-09 | Stop reason: HOSPADM

## 2018-04-04 RX ORDER — DIPHENHYDRAMINE HYDROCHLORIDE 50 MG/ML
25 INJECTION INTRAMUSCULAR; INTRAVENOUS ONCE
Status: COMPLETED | OUTPATIENT
Start: 2018-04-04 | End: 2018-04-04

## 2018-04-04 RX ORDER — DIPHENHYDRAMINE HYDROCHLORIDE 50 MG/ML
25 INJECTION INTRAMUSCULAR; INTRAVENOUS DAILY PRN
Status: CANCELLED
Start: 2018-04-04

## 2018-04-04 RX ORDER — HEPARIN SODIUM (PORCINE) LOCK FLUSH IV SOLN 100 UNIT/ML 100 UNIT/ML
500 SOLUTION INTRAVENOUS EVERY 8 HOURS
Status: CANCELLED
Start: 2018-04-05

## 2018-04-04 RX ORDER — DIPHENHYDRAMINE HYDROCHLORIDE 50 MG/ML
25 INJECTION INTRAMUSCULAR; INTRAVENOUS DAILY PRN
Status: DISCONTINUED | OUTPATIENT
Start: 2018-04-04 | End: 2018-04-09 | Stop reason: HOSPADM

## 2018-04-04 RX ORDER — ONDANSETRON 2 MG/ML
4 INJECTION INTRAMUSCULAR; INTRAVENOUS DAILY PRN
Status: DISCONTINUED | OUTPATIENT
Start: 2018-04-04 | End: 2018-04-09 | Stop reason: HOSPADM

## 2018-04-04 RX ORDER — ONDANSETRON 2 MG/ML
4 INJECTION INTRAMUSCULAR; INTRAVENOUS DAILY PRN
Status: CANCELLED
Start: 2018-04-04

## 2018-04-04 RX ORDER — ONDANSETRON 2 MG/ML
4 INJECTION INTRAMUSCULAR; INTRAVENOUS ONCE
Status: COMPLETED | OUTPATIENT
Start: 2018-04-04 | End: 2018-04-04

## 2018-04-04 RX ORDER — ONDANSETRON 2 MG/ML
4 INJECTION INTRAMUSCULAR; INTRAVENOUS ONCE
Status: CANCELLED
Start: 2018-04-04 | End: 2018-04-04

## 2018-04-04 RX ADMIN — DIPHENHYDRAMINE HYDROCHLORIDE 25 MG: 50 INJECTION, SOLUTION INTRAMUSCULAR; INTRAVENOUS at 12:14

## 2018-04-04 RX ADMIN — ONDANSETRON 4 MG: 2 INJECTION INTRAMUSCULAR; INTRAVENOUS at 10:22

## 2018-04-04 RX ADMIN — DIPHENHYDRAMINE HYDROCHLORIDE 25 MG: 50 INJECTION, SOLUTION INTRAMUSCULAR; INTRAVENOUS at 10:19

## 2018-04-04 RX ADMIN — SODIUM CHLORIDE, POTASSIUM CHLORIDE, SODIUM LACTATE AND CALCIUM CHLORIDE 2000 ML: 600; 310; 30; 20 INJECTION, SOLUTION INTRAVENOUS at 10:17

## 2018-04-04 RX ADMIN — ONDANSETRON 4 MG: 2 INJECTION INTRAMUSCULAR; INTRAVENOUS at 12:38

## 2018-04-04 ASSESSMENT — PAIN SCALES - GENERAL: PAINLEVEL: MODERATE PAIN (5)

## 2018-04-04 ASSESSMENT — ACTIVITIES OF DAILY LIVING (ADL): I_KEEP_THINKING_ABOUT_HOW_BADLY_I_WANT_THE_PAIN_TO_STOP: 1 = TO A SLIGHT DEGREE

## 2018-04-04 NOTE — MR AVS SNAPSHOT
After Visit Summary   4/4/2018    Alexandra Melgoza    MRN: [id number]           Patient Information     Date Of Birth          1995        Visit Information        Provider Department      4/4/2018 9:00 AM Quyen Lennon, PhD [facility]        Today's Diagnoses     Adjustment reaction to chronic stress    -  1      Care Instructions          REDUCING SYMPATHETIC NERVOUS  SYSTEM  Activity                                  is the path way to reduced pain!                                                     Tools  To Help      1.  Guided imagery- to reduce sympathetic nervous system ( flight or fight)  activity      I recommend you listen once a day,  In quiet place,  Eyes closed.   (NOT  Driving) .    At the beginning,  You can start with 5  Minutes and gradually work up to the whole segment.  May take several weeks,  B/c your sympathetic nervous system is in the habit of being on high.   So go slow and be respectful of the challenge of change for your body.      Recommended source =   [facility]   order  EASE Pain        2.  Auditory EMDR  Also called  Bilateral Music or Biolateral Music-   For reducing sympathetic nervous system activity or anxiety quickly.      MUST USE WITH HEADPHONES OR EAR BUDS   In order to be effective.    Set  At barely audible level.    Use whenever you feel tense or have trouble sleeping or if you anticipate those problems.     Useful for 5 min or 5 hours.      Can use while reading or surfing Internet of doing chores.      Recommended source  =   Album       Bilateral Music by Ruslan Carter                                              Available on Amazon,  I tunes or  Spotify    Also called         Written on Clouds    3.  Weighted blanket -- some  Research has demonstrated that a weighted blanket can reduce night time anxiety  And improve sleep quality.   A number of my  pain patients also report benefit from  "using weighted blanket ( typically 15 to 20 lbs for adult size)    There are multiple sources on the Internet to purchase and/ or you can google instructions for how to \" do it yourself\"    You can My Chart me with questions.        Quyen Lennon PhD., LP                          Follow-ups after your visit        Your next 10 appointments already scheduled     Apr 04, 2018 10:00 AM CDT   Infusion 120 with UC SPEC INFUSION, UC 43 ATC   [facility] ([facility])    [address]  [address]  [facility] [address]   [phone]            Apr 06, 2018 12:00 PM CDT   Infusion 120 with UC SPEC INFUSION, UC 47 ATC   [facility] ([facility])    [address]  [address]  [facility] [address]   [phone]            Apr 09, 2018  4:00 PM CDT   Infusion 120 with UC SPEC INFUSION, UC 49 ATC   [facility] ([facility])    [address]  [address]  [facility] [address]   [phone]            Apr 11, 2018  7:00 AM CDT   (Arrive by 6:45 AM)   Return Visit with Leonardo Wang MD   [facility] ([facility])    [address] [place] [address]  4th Floor  [facility] [address]   [phone]            Apr 11, 2018 10:00 AM CDT   (Arrive by 9:45 AM)   Return Visit with Quyen Lennon, PhD   [facility] ([facility])    [address] [place] [address]  4th Floor  [facility] [address]   [phone]            Apr 11, 2018 12:00 PM CDT   Infusion 120 with UC SPEC INFUSION, UC 41 ATC   [facility] ([facility])    [address]  [address]  [facility] [address]   [phone]            "   Who to contact     Please call your clinic at [phone] to:    Ask questions about your health    Make or cancel appointments    Discuss your medicines    Learn about your test results    Speak to your doctor            Additional Information About Your Visit        Critical [employer] Information     [employer] gives you secure access to your electronic health record. If you see a primary care provider, you can also send messages to your care team and make appointments. If you have questions, please call your primary care clinic.  If you do not have a primary care provider, please call [phone] and they will assist you.      [employer] is an electronic gateway that provides easy, online access to your medical records. With [employer], you can request a clinic appointment, read your test results, renew a prescription or communicate with your care team.     To access your existing account, please contact your [facility] or call [phone] for assistance.        Care EveryWhere ID     This is your Care EveryWhere ID. This could be used by other organizations to access your [facility] medical records  [id number]         Blood Pressure from Last 3 Encounters:   04/03/18 137/84   04/02/18 143/87   03/30/18 (!) 136/96    Weight from Last 3 Encounters:   04/03/18 65.8 kg (145 lb)   03/29/18 66 kg (145 lb 6.4 oz)   03/27/18 65.1 kg (143 lb 8.3 oz)              Today, you had the following     No orders found for display         Today's Medication Changes          These changes are accurate as of 4/4/18  9:33 AM.  If you have any questions, ask your nurse or doctor.               These medicines have changed or have updated prescriptions.        Dose/Directions    nortriptyline 10 MG capsule   Commonly known as:  PAMELOR   This may have changed:  how much to take   Used for:  Right upper quadrant abdominal pain        Dose:  30 mg   Take 3 capsules (30 mg) by mouth At Bedtime   Quantity:  120 capsule    Refills:  0                Primary Care Provider Office Phone # Fax #    Quyen Baez [phone] [address] [address]        Equal Access to Services     RACHAEL BENITEZ : Hadyoana angelina kelley opal Somalini, waaxda luqadaha, qaybta kaalmada adeegyada, maki tyler adchris catherine hubert [place]. So [facility] [phone].    ATENCIÓN: Si habla español, tiene a quijano disposición servicios gratuitos de asistencia lingüística. Llame al [phone].    We comply with applicable federal civil rights laws and [place] laws. We do not discriminate on the basis of race, color, national origin, age, disability, sex, sexual orientation, or gender identity.            Thank you!     Thank you for choosing [facility]  for your care. Our goal is always to provide you with excellent care. Hearing back from our patients is one way we can continue to improve our services. Please take a few minutes to complete the written survey that you may receive in the mail after your visit with us. Thank you!             Your Updated Medication List - Protect others around you: Learn how to safely use, store and throw away your medicines at www.disposemymeds.org.          This list is accurate as of 4/4/18  9:33 AM.  Always use your most recent med list.                   Brand Name Dispense Instructions for use Diagnosis    acetaminophen 32 mg/mL solution    TYLENOL     30.45 mLs (975 mg) by Per J Tube route every 8 hours        * amylase-lipase-protease 15046 UNITS Cpep    ZENPEP    180 capsule    Take 2-3 capsules (40,000-60,000 Units) by mouth Take with snacks or supplements (Snacks)    Idiopathic chronic pancreatitis (H)       * amylase-lipase-protease 77365 UNITS Cpep    ZENPEP    180 capsule    Take 5 capsules (100,000 Units) by mouth 4 times daily (with meals and nightly)    Right upper quadrant abdominal pain       blood glucose monitoring test strip     no brand specified    100 strip    One Touch Ultra 2 Strips, Use to test blood sugars 2 times daily or as directed    Hypoglycemia       clotrimazole-betamethasone cream    LOTRISONE    15 g    Apply topically 2 times daily    Rash and nonspecific skin eruption       fluticasone 50 MCG/ACT spray    FLONASE    16 g    Spray 2 sprays into both nostrils daily    Nasal congestion       gabapentin 250 MG/5ML solution    NEURONTIN      Chronic abdominal pain, Sphincter of Oddi dysfunction, Cyclical vomiting with nausea, intractability of vomiting not specified, Pain around percutaneous endoscopic gastrostomy (PEG) tube site, sequela       hydrOXYzine 10 MG/5ML syrup    ATARAX    1500 mL    Take 12.5 mLs (25 mg) by mouth every 6 hours as needed for anxiety or other (pain)    Acute abdominal pain       leuprolide 11.25 MG kit    LUPRON DEPOT (3-MONTH)    1 each    Inject 11.25 mg into the muscle every 3 months    Endometriosis       levalbuterol 45 MCG/ACT Inhaler    XOPENEX HFA    1 Inhaler    Inhale 2 puffs into the lungs every 6 hours as needed for shortness of breath / dyspnea    Wheeze       LORazepam 0.5 MG tablet    ATIVAN    10 tablet    Take 1 tablet (0.5 mg) by mouth 2 times daily as needed (nausea, do not take with pain medicine, do not drive after taking)    Cyclical vomiting with nausea, intractability of vomiting not specified       multivitamins with minerals Liqd liquid     1 Bottle    15 mLs by Per Feeding Tube route daily    Abdominal pain, epigastric       norethindrone 5 MG tablet    AYGESTIN    90 tablet    Take 1 tablet (5 mg) by mouth daily    Endometriosis       nortriptyline 10 MG capsule    PAMELOR    120 capsule    Take 3 capsules (30 mg) by mouth At Bedtime    Right upper quadrant abdominal pain       ondansetron 4 MG ODT tab    ZOFRAN ODT    15 tablet    Take 1 tablet (4 mg) by mouth every 8 hours as needed for nausea or vomiting    Intractable cyclical vomiting with nausea       order for DME      1 Units    Equipment being ordered: TENS with wire and electrode.    Chronic abdominal pain       oxyCODONE 5 MG/5ML solution   Start taking on:  4/6/2018    ROXICODONE    240 mL    Take 10 mL (10 mg) by mouth every 6 hours as needed, maximum 40 mL per day.    Abdominal pain, generalized, Chronic abdominal pain       pantoprazole 40 MG EC tablet    PROTONIX    30 tablet    Take 1 tablet (40 mg) by mouth 2 times daily (before meals)    Right upper quadrant abdominal pain, Erosive gastritis       polyethylene glycol Packet    MIRALAX/GLYCOLAX    7 packet    Take 17 g by mouth daily    Right upper quadrant abdominal pain       RIZATRIPTAN BENZOATE PO      Take 5 mg by mouth once as needed        senna-docusate 8.6-50 MG per tablet    SENOKOT-S;PERICOLACE    100 tablet    Take 1 tablet by mouth 2 times daily as needed for constipation    Right upper quadrant abdominal pain       sodium bicarbonate 325 MG tablet     120 tablet    1 tablet (325 mg) by Per Feeding Tube route 4 times daily    Abdominal pain, epigastric, Intractable cyclical vomiting with nausea       * Notice:  This list has 2 medication(s) that are the same as other medications prescribed for you. Read the directions carefully, and ask your doctor or other care provider to review them with you.

## 2018-04-04 NOTE — LETTER
"4/4/2018       RE: Alex[place] Melgoza  [address] [facility] [place] [address]     Dear Colleague,    Thank you for referring your patient, Alexandra Melgoza, to the [facility] at [facility]. Please see a copy of my visit note below.    [facility]   Comprehensive Pain Program   Psychology Progress Note    Patient Name: Alexandra Melgoza    YOB: 1995   Medical Record Number: [id number]  Date: 4/4/2018              SUBJECTIVE           Interval history:      Discussed pros and cons of feeding tube.   The GJ  Tube helps the pain  Until the dog stepped on the tube.   Feels like she is adjusting   Except for getting more oozing and tissue is raw.    Using bilateral and likes it - facilitates using her mindfulness           OBJECTIVE:              Length of Visit: {NUMBERS 15-90:752332} minutes        Mental status: {Mental status:676871}         Session objective:   Continued behavioral pain management skill improvment         Behavioral inventions and response:                 Auditory EMDR/Bilateral sound during session   ***           CBT  ***            Breathing Imagery   ***               Resourced Brainspotting/Eye position therapy [employer]  is ***                                   Resource spot  ***   .          Beginning  SUDS =  ***              Ending SUDS  =  ***                                                              ASSESSMENT              Diagnoses:                    F45.1     Chronic pain disorder with somatic and psychological factors                  F45.41     Somatic symptom disorder                                                   With predominant pain                                                   Persistent                                                  Severity : {MILD/MODERATE:633379::\"moderate\"}                               F43.8      Other stress related disorder                                         " "                      Psychosocial and Contextual Factors: {IMPROVED:876539}         Progress toward goals: {GOOD/FAIR/POOR (DB):372375}. ***             Pain status since onset of pain services: {IMPROVED:070732}           Emotional status since onset of pain services: {IMPROVED:302853}       Medication / chemical use concerns: {NONE/MINOR/SIGNIFICANT/MAJOR:049596::\"None\"}        PLAN:               Next Appointment: Alexandra Melgoza will schedule a follow-up appointment in *** weeks.         Assignment: {MMBEHAVIORALPAIMMANAGEGOALS:517825}         Objectives / interventions for next session:          {MMPAINPROBLEMSCAREPLAN:186991}    Treatment plan signed   and scanned into Epic           Quyen Lennon, Ph.D., L.P. ...............4/4/2018 9:13 AM                Medical Psychologist                         Again, thank you for allowing me to participate in the care of your patient.      Sincerely,    Quyen Lennon, PhD      "

## 2018-04-04 NOTE — PROGRESS NOTES
"Alexandra called back, Ella was unavailable at the time. I gave her the information below. She was receiving call \"from the [employer]\" at same time.  Hung up to take that call.  "

## 2018-04-04 NOTE — MR AVS SNAPSHOT
After Visit Summary   4/4/2018    Alexandra Melgoza    MRN: [id number]           Patient Information     Date Of Birth          1995        Visit Information        Provider Department      4/4/2018 10:00 AM UC 43 ATC; UC SPEC INFUSION [facility] Specialty and Procedure        Today's Diagnoses     Cyclical vomiting with nausea, intractability of vomiting not specified    -  1    Chronic pancreatitis, unspecified pancreatitis type (H)        Pain        Abdominal pain, epigastric        S/P ERCP        Acute abdominal pain        Abdominal pain        Hypoglycemia        Urge incontinence        Major depressive disorder, recurrent episode, moderate (H)        Anxiety state        Rash        Intractable cyclical vomiting with nausea           Follow-ups after your visit        Your next 10 appointments already scheduled     Apr 09, 2018  4:00 PM CDT   Infusion 120 with UC SPEC INFUSION, UC 49 ATC   [facility] Specialty and Procedure ([facility])    [address] [place] [address]  [address]  [facility] [address] [phone]            Apr 11, 2018  7:00 AM CDT   (Arrive by 6:45 AM)   Return Visit with Leonardo Wang MD   [facility] ([facility])    [address] [place] [address]  4th Floor  [facility] [address]   [phone]            Apr 11, 2018 10:00 AM CDT   (Arrive by 9:45 AM)   Return Visit with Quyen Lennon, PhD   [facility] ([facility])    [address] [place] [address]  4th Floor  [facility] [address]   [phone]            Apr 11, 2018 12:00 PM CDT   Infusion 120 with UC SPEC INFUSION, UC 41 ATC   [facility] Specialty and Procedure ([facility])    [address]  [address]  [facility] [address] [phone]            Apr 13,  2018  2:00 PM CDT   Infusion 120 with UC SPEC INFUSION, UC 45 ATC   [facility] Specialty and Procedure ([facility])    [address]  [address]  [facility] [address] [phone]            Apr 18, 2018 10:00 AM CDT   (Arrive by 9:45 AM)   Return Visit with Quyen Lennon, PhD   [facility] ([facility])    [address]  4th Floor  [facility] [address] [phone]            Jun 06, 2018 12:00 PM CDT   (Arrive by 11:45 AM)   Return Visit with Campbell Narayanan MD   [facility] Pancreas and Biliary ([facility])    [address]  4th Floor  [facility] [address] [phone]              Who to contact     If you have questions or need follow up information about today's clinic visit or your schedule please contact [facility] SPECIALTY AND PROCEDURE directly at [phone].  Normal or non-critical lab and imaging results will be communicated to you by Informativehart, letter or phone within 4 business days after the clinic has received the results. If you do not hear from us within 7 days, please contact the clinic through Zimoryt or phone. If you have a critical or abnormal lab result, we will notify you by phone as soon as possible.  Submit refill requests through Brandtology or call your pharmacy and they will forward the refill request to us. Please allow 3 business days for your refill to be completed.          Additional Information About Your Visit        InformativeharExtreme Reach Information     Brandtology gives you secure access to your electronic health record. If you see a primary care provider, you can also send messages to your care team and make appointments. If you have questions, please call your primary care clinic.  If you do not have a primary care provider, please call [phone] and they will assist you.        Care  EveryWhere ID     This is your Care EveryWhere ID. This could be used by other organizations to access your [facility] medical records  [id number]        Your Vitals Were     Pulse Temperature Respirations             104 97.5  F (36.4  C) 16          Blood Pressure from Last 3 Encounters:   04/06/18 118/79   04/04/18 126/85   04/04/18 133/84    Weight from Last 3 Encounters:   04/04/18 68.4 kg (150 lb 11.2 oz)   04/03/18 65.8 kg (145 lb)   03/29/18 66 kg (145 lb 6.4 oz)              Today, you had the following     No orders found for display         Today's Medication Changes          These changes are accurate as of 4/4/18 11:59 PM.  If you have any questions, ask your nurse or doctor.               These medicines have changed or have updated prescriptions.        Dose/Directions    nortriptyline 10 MG capsule   Commonly known as:  PAMELOR   This may have changed:  how much to take   Used for:  Right upper quadrant abdominal pain        Dose:  30 mg   Take 3 capsules (30 mg) by mouth At Bedtime   Quantity:  120 capsule   Refills:  0            Where to get your medicines      These medications were sent to [address] [place] [address] [place], [place] [id number]    Hours:  TRANSPLANT PHONE NUMBER [phone] Phone:  [phone]     ondansetron 4 MG ODT tab                Primary Care Provider Office Phone # Fax #    Quyen Baez [phone]       [address] [place] [address]        Equal Access to Services     RACHAEL BENITEZ AH: Hadii angelina rodneyo Somalini, waaxda luqadaha, qaybta kaalmada adeasia sykesmacarena idicedric [place]. So [facility] [phone].    ATENCIÓN: Si habla español, tiene a quijano disposición servicios gratuitos de asistencia lingüística. Llame al [phone].    We comply with applicable federal civil rights laws and [place] laws. We do not discriminate on the basis  of race, color, national origin, age, disability, sex, sexual orientation, or gender identity.            Thank you!     Thank you for choosing [facility] SPECIALTY AND PROCEDURE  for your care. Our goal is always to provide you with excellent care. Hearing back from our patients is one way we can continue to improve our services. Please take a few minutes to complete the written survey that you may receive in the mail after your visit with us. Thank you!             Your Updated Medication List - Protect others around you: Learn how to safely use, store and throw away your medicines at www.disposemymeds.org.          This list is accurate as of 4/4/18 11:59 PM.  Always use your most recent med list.                   Brand Name Dispense Instructions for use Diagnosis    acetaminophen 32 mg/mL solution    TYLENOL     30.45 mLs (975 mg) by Per J Tube route every 8 hours        * amylase-lipase-protease 15108 UNITS Cpep    ZENPEP    180 capsule    Take 2-3 capsules (40,000-60,000 Units) by mouth Take with snacks or supplements (Snacks)    Idiopathic chronic pancreatitis (H)       * amylase-lipase-protease 42557 UNITS Cpep    ZENPEP    180 capsule    Take 5 capsules (100,000 Units) by mouth 4 times daily (with meals and nightly)    Right upper quadrant abdominal pain       blood glucose monitoring test strip    no brand specified    100 strip    One Touch Ultra 2 Strips, Use to test blood sugars 2 times daily or as directed    Hypoglycemia       clotrimazole-betamethasone cream    LOTRISONE    15 g    Apply topically 2 times daily    Rash and nonspecific skin eruption       fluticasone 50 MCG/ACT spray    FLONASE    16 g    Spray 2 sprays into both nostrils daily    Nasal congestion       gabapentin 250 MG/5ML solution    NEURONTIN      Chronic abdominal pain, Sphincter of Oddi dysfunction, Cyclical vomiting with nausea, intractability of vomiting not specified, Pain around percutaneous  endoscopic gastrostomy (PEG) tube site, sequela       hydrOXYzine 10 MG/5ML syrup    ATARAX    1500 mL    Take 12.5 mLs (25 mg) by mouth every 6 hours as needed for anxiety or other (pain)    Acute abdominal pain       leuprolide 11.25 MG kit    LUPRON DEPOT (3-MONTH)    1 each    Inject 11.25 mg into the muscle every 3 months    Endometriosis       levalbuterol 45 MCG/ACT Inhaler    XOPENEX HFA    1 Inhaler    Inhale 2 puffs into the lungs every 6 hours as needed for shortness of breath / dyspnea    Wheeze       LORazepam 0.5 MG tablet    ATIVAN    10 tablet    Take 1 tablet (0.5 mg) by mouth 2 times daily as needed (nausea, do not take with pain medicine, do not drive after taking)    Cyclical vomiting with nausea, intractability of vomiting not specified       multivitamins with minerals Liqd liquid     1 Bottle    15 mLs by Per Feeding Tube route daily    Abdominal pain, epigastric       norethindrone 5 MG tablet    AYGESTIN    90 tablet    Take 1 tablet (5 mg) by mouth daily    Endometriosis       nortriptyline 10 MG capsule    PAMELOR    120 capsule    Take 3 capsules (30 mg) by mouth At Bedtime    Right upper quadrant abdominal pain       ondansetron 4 MG ODT tab    ZOFRAN ODT    30 tablet    Take 1 tablet (4 mg) by mouth every 8 hours as needed for nausea or vomiting    Intractable cyclical vomiting with nausea       order for DME     1 Units    Equipment being ordered: TENS with wire and electrode.    Chronic abdominal pain       oxyCODONE 5 MG/5ML solution    ROXICODONE    240 mL    Take 10 mL (10 mg) by mouth every 6 hours as needed, maximum 40 mL per day.    Abdominal pain, generalized, Chronic abdominal pain       pantoprazole 40 MG EC tablet    PROTONIX    30 tablet    Take 1 tablet (40 mg) by mouth 2 times daily (before meals)    Right upper quadrant abdominal pain, Erosive gastritis       polyethylene glycol Packet    MIRALAX/GLYCOLAX    7 packet    Take 17 g by mouth daily    Right upper quadrant  abdominal pain       RIZATRIPTAN BENZOATE PO      Take 5 mg by mouth once as needed        senna-docusate 8.6-50 MG per tablet    SENOKOT-S;PERICOLACE    100 tablet    Take 1 tablet by mouth 2 times daily as needed for constipation    Right upper quadrant abdominal pain       sodium bicarbonate 325 MG tablet     120 tablet    1 tablet (325 mg) by Per Feeding Tube route 4 times daily    Abdominal pain, epigastric, Intractable cyclical vomiting with nausea       * Notice:  This list has 2 medication(s) that are the same as other medications prescribed for you. Read the directions carefully, and ask your doctor or other care provider to review them with you.

## 2018-04-04 NOTE — PROGRESS NOTES
Message received from call center with symptoms: then another call from call center stating the mom sent a message to us for clinic visit today.   - no where in the original message did it state this was the purpose of the call.     Called Leeann and she would like to have Grabiel be seen in clinic for her GJ tube. States she it is swollen and there is a foul odor along with drainage coming from it.   Advised will see if possible to see her in clinic today with Dr. Narayanan.     Ok per Dr. Narayanan to see her, she will need to be fit in when we can.   Message left for Grabiel on her voicemail to call this RN when she is done with her current appointment here at [facility].     Ella SARMIENTO, RN Coordinator  Dr. Narayanan, Dr. Joe & Dr. Klein   [facility]  [phone]

## 2018-04-05 RX ORDER — ONDANSETRON 4 MG/1
4 TABLET, ORALLY DISINTEGRATING ORAL EVERY 8 HOURS PRN
Qty: 30 TABLET | Refills: 2 | Status: SHIPPED | OUTPATIENT
Start: 2018-04-05

## 2018-04-06 ENCOUNTER — INFUSION THERAPY VISIT (OUTPATIENT)
Dept: INFUSION THERAPY | Facility: CLINIC | Age: 23
End: 2018-04-06
Attending: INTERNAL MEDICINE
Payer: COMMERCIAL

## 2018-04-06 VITALS
SYSTOLIC BLOOD PRESSURE: 118 MMHG | OXYGEN SATURATION: 100 % | DIASTOLIC BLOOD PRESSURE: 79 MMHG | RESPIRATION RATE: 18 BRPM | TEMPERATURE: 98.6 F

## 2018-04-06 DIAGNOSIS — N39.41 URGE INCONTINENCE: ICD-10-CM

## 2018-04-06 DIAGNOSIS — R10.9 ABDOMINAL PAIN: ICD-10-CM

## 2018-04-06 DIAGNOSIS — R52 PAIN: ICD-10-CM

## 2018-04-06 DIAGNOSIS — R10.9 ACUTE ABDOMINAL PAIN: ICD-10-CM

## 2018-04-06 DIAGNOSIS — G43.A0 CYCLICAL VOMITING WITH NAUSEA, INTRACTABILITY OF VOMITING NOT SPECIFIED: Primary | ICD-10-CM

## 2018-04-06 DIAGNOSIS — K86.1 CHRONIC PANCREATITIS, UNSPECIFIED PANCREATITIS TYPE (H): ICD-10-CM

## 2018-04-06 DIAGNOSIS — Z98.890 S/P ERCP: ICD-10-CM

## 2018-04-06 DIAGNOSIS — F33.1 MAJOR DEPRESSIVE DISORDER, RECURRENT EPISODE, MODERATE (H): ICD-10-CM

## 2018-04-06 DIAGNOSIS — E16.2 HYPOGLYCEMIA: ICD-10-CM

## 2018-04-06 DIAGNOSIS — R11.15 INTRACTABLE CYCLICAL VOMITING WITH NAUSEA: ICD-10-CM

## 2018-04-06 DIAGNOSIS — F41.1 ANXIETY STATE: ICD-10-CM

## 2018-04-06 DIAGNOSIS — R21 RASH: ICD-10-CM

## 2018-04-06 DIAGNOSIS — R10.13 ABDOMINAL PAIN, EPIGASTRIC: ICD-10-CM

## 2018-04-06 PROCEDURE — 96375 TX/PRO/DX INJ NEW DRUG ADDON: CPT

## 2018-04-06 PROCEDURE — 96361 HYDRATE IV INFUSION ADD-ON: CPT

## 2018-04-06 PROCEDURE — 25000128 H RX IP 250 OP 636: Mod: ZF | Performed by: INTERNAL MEDICINE

## 2018-04-06 PROCEDURE — 96374 THER/PROPH/DIAG INJ IV PUSH: CPT

## 2018-04-06 PROCEDURE — 96376 TX/PRO/DX INJ SAME DRUG ADON: CPT

## 2018-04-06 RX ORDER — DIPHENHYDRAMINE HYDROCHLORIDE 50 MG/ML
25 INJECTION INTRAMUSCULAR; INTRAVENOUS DAILY PRN
Status: CANCELLED
Start: 2018-04-06

## 2018-04-06 RX ORDER — HEPARIN SODIUM (PORCINE) LOCK FLUSH IV SOLN 100 UNIT/ML 100 UNIT/ML
500 SOLUTION INTRAVENOUS EVERY 8 HOURS
Status: CANCELLED
Start: 2018-04-06

## 2018-04-06 RX ORDER — ONDANSETRON 2 MG/ML
4 INJECTION INTRAMUSCULAR; INTRAVENOUS DAILY PRN
Status: CANCELLED
Start: 2018-04-06

## 2018-04-06 RX ORDER — ONDANSETRON 2 MG/ML
4 INJECTION INTRAMUSCULAR; INTRAVENOUS DAILY PRN
Status: DISCONTINUED | OUTPATIENT
Start: 2018-04-06 | End: 2018-04-06 | Stop reason: HOSPADM

## 2018-04-06 RX ORDER — ONDANSETRON 2 MG/ML
4 INJECTION INTRAMUSCULAR; INTRAVENOUS ONCE
Status: CANCELLED
Start: 2018-04-06 | End: 2018-04-06

## 2018-04-06 RX ORDER — ONDANSETRON 2 MG/ML
4 INJECTION INTRAMUSCULAR; INTRAVENOUS ONCE
Status: COMPLETED | OUTPATIENT
Start: 2018-04-06 | End: 2018-04-06

## 2018-04-06 RX ORDER — DIPHENHYDRAMINE HYDROCHLORIDE 50 MG/ML
25 INJECTION INTRAMUSCULAR; INTRAVENOUS ONCE
Status: COMPLETED | OUTPATIENT
Start: 2018-04-06 | End: 2018-04-06

## 2018-04-06 RX ORDER — DIPHENHYDRAMINE HYDROCHLORIDE 50 MG/ML
25 INJECTION INTRAMUSCULAR; INTRAVENOUS ONCE
Status: CANCELLED
Start: 2018-04-06 | End: 2018-04-06

## 2018-04-06 RX ORDER — DIPHENHYDRAMINE HYDROCHLORIDE 50 MG/ML
25 INJECTION INTRAMUSCULAR; INTRAVENOUS DAILY PRN
Status: DISCONTINUED | OUTPATIENT
Start: 2018-04-06 | End: 2018-04-06 | Stop reason: HOSPADM

## 2018-04-06 RX ADMIN — ONDANSETRON 4 MG: 2 INJECTION INTRAMUSCULAR; INTRAVENOUS at 13:02

## 2018-04-06 RX ADMIN — SODIUM CHLORIDE, POTASSIUM CHLORIDE, SODIUM LACTATE AND CALCIUM CHLORIDE 2000 ML: 600; 310; 30; 20 INJECTION, SOLUTION INTRAVENOUS at 13:00

## 2018-04-06 RX ADMIN — ONDANSETRON 4 MG: 2 INJECTION INTRAMUSCULAR; INTRAVENOUS at 14:21

## 2018-04-06 RX ADMIN — DIPHENHYDRAMINE HYDROCHLORIDE 25 MG: 50 INJECTION, SOLUTION INTRAMUSCULAR; INTRAVENOUS at 14:21

## 2018-04-06 RX ADMIN — DIPHENHYDRAMINE HYDROCHLORIDE 25 MG: 50 INJECTION, SOLUTION INTRAMUSCULAR; INTRAVENOUS at 13:07

## 2018-04-06 NOTE — PROGRESS NOTES
Nursing Note  Alex[facility] Melgo[facility] for:   Chief Complaint   Patient presents with     Infusion     IVF's     During [facility]'s [facility] appointment, orders from Dr. Narayanan were completed.  Frequency: daily PRN    Progress note:  Patient identification verified by name and date of birth.  Assessment completed.  Vitals recorded in Doc Flowsheets.  Patient was provided with education regarding infusion and possible side effects.  Patient verbalized understanding.      needed: No  Premedications: administered per order.  Infusion Rates: 2L of LR given over 2 hour. Benadryl and zofran given IV push.   Labs: were not ordered for this appointment.  Vascular access: port accessed today.  Treatment Conditions: non-applicable.  Patient tolerated infusion: well.    Drug Waste Record    Drug Name: Benadryl x2   Dose: 25mg  Route administered: IV  NDC #: 88022-985-76  Amount of waste(mL):0.5ml  Reason for waste: Single use vial      Discharge Plan:   Follow up plan of care with: ongoing infusions [facility].  Discharge instructions were reviewed with patient.  Patient/representative verbalized understanding of discharge instructions and all questions answered.  Patient discharged from [facility] in stable condition.    Nadeen Conte RN    Administrations This Visit     diphenhydrAMINE (BENADRYL) injection 25 mg     Admin Date Action Dose Route Administered By             04/06/2018 Given 25 mg Intravenous Nadeen Conte RN              Admin Date Action Dose Route Administered By             04/06/2018 Given 25 mg Intravenous Ting Davidson RN                    lactated ringers BOLUS 1,000-2,000 mL     Admin Date Action Dose Route Administered By             04/06/2018 New Bag 2000 mL Intravenous Nadeen Conte RN                    ondansetron (ZOFRAN) injection 4  mg     Admin Date Action Dose Route Administered By             04/06/2018 Given 4 mg Intravenous Nadeen Conte, SAMARA              Admin Date Action Dose Route Administered By             04/06/2018 Given 4 mg Intravenous Ting Davidson RN                          /62 (BP Location: Left arm)  Temp 98.6  F (37  C) (Oral)  Resp 18  SpO2 100%

## 2018-04-06 NOTE — MR AVS SNAPSHOT
After Visit Summary   4/6/2018    Alexandra Melgoza    MRN: [id number]           Patient Information     Date Of Birth          1995        Visit Information        Provider Department      4/6/2018 12:00 PM UC 47 ATC; UC SPEC INFUSION [facility] Specialty and Procedure        Today's Diagnoses     Cyclical vomiting with nausea, intractability of vomiting not specified    -  1    Chronic pancreatitis, unspecified pancreatitis type (H)        Pain        Abdominal pain, epigastric        S/P ERCP        Acute abdominal pain        Abdominal pain        Hypoglycemia        Urge incontinence        Major depressive disorder, recurrent episode, moderate (H)        Anxiety state        Rash        Intractable cyclical vomiting with nausea           Follow-ups after your visit        Your next 10 appointments already scheduled     Apr 09, 2018  4:00 PM CDT   Infusion 120 with UC SPEC INFUSION, UC 49 ATC   [facility] Specialty and Procedure ([facility])    [address] [place] [address]  [address]  [facility] [address] [phone]            Apr 11, 2018  7:00 AM CDT   (Arrive by 6:45 AM)   Return Visit with Leonardo Wnag MD   [facility] ([facility])    [address] [place] [address]  4th Floor  [facility] [address]   [phone]            Apr 11, 2018 10:00 AM CDT   (Arrive by 9:45 AM)   Return Visit with Quyen Lennon, PhD   [facility] ([facility])    [address] [place] [address]  4th Floor  [facility] [address]   [phone]            Apr 11, 2018 12:00 PM CDT   Infusion 120 with UC SPEC INFUSION, UC 41 ATC   [facility] Specialty and Procedure ([facility])    [address]  [address]  [facility] [address] [phone]            Apr 13,  2018  2:00 PM CDT   Infusion 120 with UC SPEC INFUSION, UC 45 ATC   [facility] Specialty and Procedure ([facility])    [address]  [address]  [facility] [address] [phone]            Apr 18, 2018 10:00 AM CDT   (Arrive by 9:45 AM)   Return Visit with Quyen Lennon, PhD   [facility] ([facility])    [address]  4th Floor  [facility] [address] [phone]            Jun 06, 2018 12:00 PM CDT   (Arrive by 11:45 AM)   Return Visit with Campbell Narayanan MD   [facility] Pancreas and Biliary ([facility])    [address]  4th Floor  [facility] [address] [phone]              Who to contact     If you have questions or need follow up information about today's clinic visit or your schedule please contact [facility] SPECIALTY AND PROCEDURE directly at [phone].  Normal or non-critical lab and imaging results will be communicated to you by Vivactahart, letter or phone within 4 business days after the clinic has received the results. If you do not hear from us within 7 days, please contact the clinic through Dinos Rulet or phone. If you have a critical or abnormal lab result, we will notify you by phone as soon as possible.  Submit refill requests through Wowsai or call your pharmacy and they will forward the refill request to us. Please allow 3 business days for your refill to be completed.          Additional Information About Your Visit        VivactaharYooDeal Information     Wowsai gives you secure access to your electronic health record. If you see a primary care provider, you can also send messages to your care team and make appointments. If you have questions, please call your primary care clinic.  If you do not have a primary care provider, please call [phone] and they will assist you.        Care  EveryWhere ID     This is your Care EveryWhere ID. This could be used by other organizations to access your [facility] medical records  [id number]        Your Vitals Were     Temperature Respirations Pulse Oximetry             98.6  F (37  C) (Oral) 18 100%          Blood Pressure from Last 3 Encounters:   04/06/18 118/79   04/04/18 126/85   04/04/18 133/84    Weight from Last 3 Encounters:   04/04/18 68.4 kg (150 lb 11.2 oz)   04/03/18 65.8 kg (145 lb)   03/29/18 66 kg (145 lb 6.4 oz)              Today, you had the following     No orders found for display         Today's Medication Changes          These changes are accurate as of 4/6/18  5:03 PM.  If you have any questions, ask your nurse or doctor.               These medicines have changed or have updated prescriptions.        Dose/Directions    nortriptyline 10 MG capsule   Commonly known as:  PAMELOR   This may have changed:  how much to take   Used for:  Right upper quadrant abdominal pain        Dose:  30 mg   Take 3 capsules (30 mg) by mouth At Bedtime   Quantity:  120 capsule   Refills:  0                Primary Care Provider Office Phone # Fax #    Quyen Baez [phone]       [address] [address]        Equal Access to Services     RACHAEL BENITEZ AH: Bret rodneyo Somalini, waaxda luqadaha, qaybta kaalmada adeegyada, maki [place]. So [facility] [phone].    ATENCIÓN: Si habla español, tiene a quijano disposición servicios gratuitos de asistencia lingüística. Llame al [phone].    We comply with applicable federal civil rights laws and [place] laws. We do not discriminate on the basis of race, color, national origin, age, disability, sex, sexual orientation, or gender identity.            Thank you!     Thank you for choosing [facility] SPECIALTY AND PROCEDURE  for your care. Our goal is always to provide you with excellent care. Hearing back from  our patients is one way we can continue to improve our services. Please take a few minutes to complete the written survey that you may receive in the mail after your visit with us. Thank you!             Your Updated Medication List - Protect others around you: Learn how to safely use, store and throw away your medicines at www.disposemymeds.org.          This list is accurate as of 4/6/18  5:03 PM.  Always use your most recent med list.                   Brand Name Dispense Instructions for use Diagnosis    acetaminophen 32 mg/mL solution    TYLENOL     30.45 mLs (975 mg) by Per J Tube route every 8 hours        * amylase-lipase-protease 20454 UNITS Cpep    ZENPEP    180 capsule    Take 2-3 capsules (40,000-60,000 Units) by mouth Take with snacks or supplements (Snacks)    Idiopathic chronic pancreatitis (H)       * amylase-lipase-protease 58609 UNITS Cpep    ZENPEP    180 capsule    Take 5 capsules (100,000 Units) by mouth 4 times daily (with meals and nightly)    Right upper quadrant abdominal pain       blood glucose monitoring test strip    no brand specified    100 strip    One Touch Ultra 2 Strips, Use to test blood sugars 2 times daily or as directed    Hypoglycemia       clotrimazole-betamethasone cream    LOTRISONE    15 g    Apply topically 2 times daily    Rash and nonspecific skin eruption       fluticasone 50 MCG/ACT spray    FLONASE    16 g    Spray 2 sprays into both nostrils daily    Nasal congestion       gabapentin 250 MG/5ML solution    NEURONTIN      Chronic abdominal pain, Sphincter of Oddi dysfunction, Cyclical vomiting with nausea, intractability of vomiting not specified, Pain around percutaneous endoscopic gastrostomy (PEG) tube site, sequela       hydrOXYzine 10 MG/5ML syrup    ATARAX    1500 mL    Take 12.5 mLs (25 mg) by mouth every 6 hours as needed for anxiety or other (pain)    Acute abdominal pain       leuprolide 11.25 MG kit    LUPRON DEPOT (3-MONTH)    1 each    Inject 11.25 mg  into the muscle every 3 months    Endometriosis       levalbuterol 45 MCG/ACT Inhaler    XOPENEX HFA    1 Inhaler    Inhale 2 puffs into the lungs every 6 hours as needed for shortness of breath / dyspnea    Wheeze       LORazepam 0.5 MG tablet    ATIVAN    10 tablet    Take 1 tablet (0.5 mg) by mouth 2 times daily as needed (nausea, do not take with pain medicine, do not drive after taking)    Cyclical vomiting with nausea, intractability of vomiting not specified       multivitamins with minerals Liqd liquid     1 Bottle    15 mLs by Per Feeding Tube route daily    Abdominal pain, epigastric       norethindrone 5 MG tablet    AYGESTIN    90 tablet    Take 1 tablet (5 mg) by mouth daily    Endometriosis       nortriptyline 10 MG capsule    PAMELOR    120 capsule    Take 3 capsules (30 mg) by mouth At Bedtime    Right upper quadrant abdominal pain       ondansetron 4 MG ODT tab    ZOFRAN ODT    30 tablet    Take 1 tablet (4 mg) by mouth every 8 hours as needed for nausea or vomiting    Intractable cyclical vomiting with nausea       order for [facility]     1 Units    Equipment being ordered: TENS with wire and electrode.    Chronic abdominal pain       oxyCODONE 5 MG/5ML solution    ROXICODONE    240 mL    Take 10 mL (10 mg) by mouth every 6 hours as needed, maximum 40 mL per day.    Abdominal pain, generalized, Chronic abdominal pain       pantoprazole 40 MG EC tablet    PROTONIX    30 tablet    Take 1 tablet (40 mg) by mouth 2 times daily (before meals)    Right upper quadrant abdominal pain, Erosive gastritis       polyethylene glycol Packet    MIRALAX/GLYCOLAX    7 packet    Take 17 g by mouth daily    Right upper quadrant abdominal pain       RIZATRIPTAN BENZOATE PO      Take 5 mg by mouth once as needed        senna-docusate 8.6-50 MG per tablet    SENOKOT-S;PERICOLACE    100 tablet    Take 1 tablet by mouth 2 times daily as needed for constipation    Right upper quadrant abdominal pain       sodium bicarbonate 325  MG tablet     120 tablet    1 tablet (325 mg) by Per Feeding Tube route 4 times daily    Abdominal pain, epigastric, Intractable cyclical vomiting with nausea       * Notice:  This list has 2 medication(s) that are the same as other medications prescribed for you. Read the directions carefully, and ask your doctor or other care provider to review them with you.

## 2018-04-09 ENCOUNTER — INFUSION THERAPY VISIT (OUTPATIENT)
Dept: INFUSION THERAPY | Facility: CLINIC | Age: 23
End: 2018-04-09
Attending: INTERNAL MEDICINE
Payer: COMMERCIAL

## 2018-04-09 ENCOUNTER — TELEPHONE (OUTPATIENT)
Dept: ANESTHESIOLOGY | Facility: CLINIC | Age: 23
End: 2018-04-09

## 2018-04-09 VITALS — TEMPERATURE: 98.3 F | SYSTOLIC BLOOD PRESSURE: 130 MMHG | DIASTOLIC BLOOD PRESSURE: 99 MMHG | RESPIRATION RATE: 16 BRPM

## 2018-04-09 DIAGNOSIS — F33.1 MAJOR DEPRESSIVE DISORDER, RECURRENT EPISODE, MODERATE (H): ICD-10-CM

## 2018-04-09 DIAGNOSIS — K86.1 CHRONIC PANCREATITIS, UNSPECIFIED PANCREATITIS TYPE (H): ICD-10-CM

## 2018-04-09 DIAGNOSIS — F41.1 ANXIETY STATE: ICD-10-CM

## 2018-04-09 DIAGNOSIS — R52 PAIN: ICD-10-CM

## 2018-04-09 DIAGNOSIS — R10.9 ABDOMINAL PAIN: ICD-10-CM

## 2018-04-09 DIAGNOSIS — R11.15 INTRACTABLE CYCLICAL VOMITING WITH NAUSEA: ICD-10-CM

## 2018-04-09 DIAGNOSIS — Z98.890 S/P ERCP: ICD-10-CM

## 2018-04-09 DIAGNOSIS — E16.2 HYPOGLYCEMIA: ICD-10-CM

## 2018-04-09 DIAGNOSIS — R21 RASH: ICD-10-CM

## 2018-04-09 DIAGNOSIS — G43.A0 CYCLICAL VOMITING WITH NAUSEA, INTRACTABILITY OF VOMITING NOT SPECIFIED: Primary | ICD-10-CM

## 2018-04-09 DIAGNOSIS — R10.9 ACUTE ABDOMINAL PAIN: ICD-10-CM

## 2018-04-09 DIAGNOSIS — R10.13 ABDOMINAL PAIN, EPIGASTRIC: ICD-10-CM

## 2018-04-09 DIAGNOSIS — N39.41 URGE INCONTINENCE: ICD-10-CM

## 2018-04-09 PROCEDURE — 25000128 H RX IP 250 OP 636: Mod: ZF | Performed by: INTERNAL MEDICINE

## 2018-04-09 PROCEDURE — 96361 HYDRATE IV INFUSION ADD-ON: CPT

## 2018-04-09 PROCEDURE — 96376 TX/PRO/DX INJ SAME DRUG ADON: CPT

## 2018-04-09 PROCEDURE — 96375 TX/PRO/DX INJ NEW DRUG ADDON: CPT

## 2018-04-09 PROCEDURE — 96374 THER/PROPH/DIAG INJ IV PUSH: CPT

## 2018-04-09 RX ORDER — DIPHENHYDRAMINE HYDROCHLORIDE 50 MG/ML
25 INJECTION INTRAMUSCULAR; INTRAVENOUS DAILY PRN
Status: DISCONTINUED | OUTPATIENT
Start: 2018-04-09 | End: 2018-04-09 | Stop reason: HOSPADM

## 2018-04-09 RX ORDER — ONDANSETRON 2 MG/ML
4 INJECTION INTRAMUSCULAR; INTRAVENOUS DAILY PRN
Status: CANCELLED
Start: 2018-04-09

## 2018-04-09 RX ORDER — ONDANSETRON 2 MG/ML
4 INJECTION INTRAMUSCULAR; INTRAVENOUS DAILY PRN
Status: DISCONTINUED | OUTPATIENT
Start: 2018-04-09 | End: 2018-04-09 | Stop reason: HOSPADM

## 2018-04-09 RX ORDER — ONDANSETRON 2 MG/ML
4 INJECTION INTRAMUSCULAR; INTRAVENOUS ONCE
Status: CANCELLED
Start: 2018-04-09 | End: 2018-04-09

## 2018-04-09 RX ORDER — HEPARIN SODIUM (PORCINE) LOCK FLUSH IV SOLN 100 UNIT/ML 100 UNIT/ML
500 SOLUTION INTRAVENOUS EVERY 8 HOURS
Status: CANCELLED
Start: 2018-04-09

## 2018-04-09 RX ORDER — ONDANSETRON 2 MG/ML
4 INJECTION INTRAMUSCULAR; INTRAVENOUS ONCE
Status: COMPLETED | OUTPATIENT
Start: 2018-04-09 | End: 2018-04-09

## 2018-04-09 RX ORDER — DIPHENHYDRAMINE HYDROCHLORIDE 50 MG/ML
25 INJECTION INTRAMUSCULAR; INTRAVENOUS ONCE
Status: CANCELLED
Start: 2018-04-09 | End: 2018-04-09

## 2018-04-09 RX ORDER — DIPHENHYDRAMINE HYDROCHLORIDE 50 MG/ML
25 INJECTION INTRAMUSCULAR; INTRAVENOUS ONCE
Status: COMPLETED | OUTPATIENT
Start: 2018-04-09 | End: 2018-04-09

## 2018-04-09 RX ORDER — DIPHENHYDRAMINE HYDROCHLORIDE 50 MG/ML
25 INJECTION INTRAMUSCULAR; INTRAVENOUS DAILY PRN
Status: CANCELLED
Start: 2018-04-09

## 2018-04-09 RX ADMIN — SODIUM CHLORIDE, POTASSIUM CHLORIDE, SODIUM LACTATE AND CALCIUM CHLORIDE 2000 ML: 600; 310; 30; 20 INJECTION, SOLUTION INTRAVENOUS at 16:21

## 2018-04-09 RX ADMIN — ONDANSETRON 4 MG: 2 INJECTION INTRAMUSCULAR; INTRAVENOUS at 17:33

## 2018-04-09 RX ADMIN — DIPHENHYDRAMINE HYDROCHLORIDE 25 MG: 50 INJECTION, SOLUTION INTRAMUSCULAR; INTRAVENOUS at 16:43

## 2018-04-09 RX ADMIN — ONDANSETRON 4 MG: 2 INJECTION INTRAMUSCULAR; INTRAVENOUS at 16:26

## 2018-04-09 RX ADMIN — DIPHENHYDRAMINE HYDROCHLORIDE 25 MG: 50 INJECTION, SOLUTION INTRAMUSCULAR; INTRAVENOUS at 17:33

## 2018-04-09 NOTE — TELEPHONE ENCOUNTER
Pt called clinic and scheduled an appointment to see the Nurse Practitioner today 4/9/18.  LPN called pt and informed them that since they were scheduled with Dr. Wang on 4/11/18, it was unnecessary for them to be seen by the Nurse practitioner today.  Pt was agreeable to cancel appointment.    Note was written in pt's permanent comments that ALL future Pain clinic appointments need to be approved by the Pain Clinic Nurses, as the pt is making a pattern of scheduling last minute appointments.     Abigail Prieto LPN

## 2018-04-09 NOTE — PROGRESS NOTES
Nursing Note  Alexandra Melgoza [facility] for:   Chief Complaint   Patient presents with     Infusion     IVF, meds     During today's [facility] appointment, orders from Dr. Narayanan were completed.  Frequency: as needed 3 times weekly    Progress note:  Patient identification verified by name and date of birth.  Assessment completed.  Vitals recorded in Doc Flowsheets.  Patient was provided with education regarding infusion and possible side effects.  Patient verbalized understanding.      needed: No  Premedications: were not ordered.  Infusion Rates: given over 2 hours  Approximate Infusion length:2 hours.   Labs: were not ordered for this appointment.  Vascular access: port accessed today.  Treatment Conditions: patient denies fever, chills, signs of infection, recent illness, on antibiotics, productive cough or elevated temperature.  Patient tolerated infusion: well.    Drug Waste Record? Yes      Drug Name: Benadryl  Dose: 25 mg  Route administered: IV  NDC #: 17269-136-49  Amount of waste(mL): 0.5 ml  Reason for waste: Single use vial     Drug Name: Benadryl  Dose: 25 mg  Route administered: IV  NDC #: 19042-364-94  Amount of waste(mL): 0.5 ml  Reason for waste: Single use vial     Discharge Plan:   Follow up plan of care with: ongoing infusions at [facility].  Discharge instructions were reviewed with patient.  Patient/representative verbalized understanding of discharge instructions and all questions answered.  Patient discharged from [facility] in stable condition.    Navya Schmitt RN       Administrations This Visit     diphenhydrAMINE (BENADRYL) injection 25 mg     Admin Date Action Dose Route Administered By             04/09/2018 Given 25 mg Intravenous Navya Schmitt RN              Admin Date Action Dose Route Administered By             04/09/2018 Given 25  mg Intravenous Navya Schmitt, SAMARA                    lactated ringers BOLUS 1,000-2,000 mL     Admin Date Action Dose Route Administered By             04/09/2018 New Bag 2000 mL Intravenous Navya Schmitt RN                    ondansetron (ZOFRAN) injection 4 mg     Admin Date Action Dose Route Administered By             04/09/2018 Given 4 mg Intravenous Navya Schmitt RN              Admin Date Action Dose Route Administered By             04/09/2018 Given 4 mg Intravenous Navya Schmitt RN                        /66 (BP Location: Left arm)  Temp 97.7  F (36.5  C) (Oral)  Resp 16

## 2018-04-09 NOTE — PROGRESS NOTES
Infusion Nursing Note:  Alexandra Melgoza presents today to [facility] for a fluids infusion. 18  During today's [facility] appointment orders from Dr. Narayanan were completed.  Frequency: PRN 3 x per week    Progress note:  ID verified by name and .  Assessment completed. Vitals were stable throughout time in [facility]. Patient education regarding infusion and possible side effects provided.  Patient verbalized understanding. yes    Premedications: were not ordered.    Infusion Rates: Infusion given over approximately 2 hours.     Labs were not ordered for this appointment.    Vascular access: Port accessed today.      Patient tolerated infusion well.    Discharge Plan:   Follow up plan of care with: Ongoing infusions at [facility]  Discharge instructions were reviewed with patient.  Patient/representative verbalized understanding of discharge instructions and all questions answered.    [facility] condition.    Silva Samuel RN    Administrations This Visit     diphenhydrAMINE (BENADRYL) injection 25 mg     Admin Date Action Dose Route Administered By             2018 Given 25 mg Intravenous Silva Samuel RN              Admin Date Action Dose Route Administered By             2018 Given 25 mg Intravenous Silva Samuel RN                    lactated ringers BOLUS 1,000-2,000 mL     Admin Date Action Dose Route Administered By             2018 New Bag 2000 mL Intravenous Silva Samuel RN                    ondansetron (ZOFRAN) injection 4 mg     Admin Date Action Dose Route Administered By             2018 Given 4 mg Intravenous Silva Samuel RN              Admin Date Action Dose Route Administered By             2018 Given 4 mg Intravenous Silva Samuel RN                          /84  Pulse 104  Temp 97.5  F (36.4  C)  Resp 16

## 2018-04-09 NOTE — MR AVS SNAPSHOT
After Visit Summary   4/9/2018    Alexandra Melgoza    MRN: [id number]           Patient Information     Date Of Birth          1995        Visit Information        Provider Department      4/9/2018 4:00 PM UC 49 ATC; UC SPEC INFUSION [facility]        Today's Diagnoses     Cyclical vomiting with nausea, intractability of vomiting not specified    -  1    Chronic pancreatitis, unspecified pancreatitis type (H)        Pain        Abdominal pain, epigastric        S/P ERCP        Acute abdominal pain        Abdominal pain        Hypoglycemia        Urge incontinence        Major depressive disorder, recurrent episode, moderate (H)        Anxiety state        Rash        Intractable cyclical vomiting with nausea           Follow-ups after your visit        Your next 10 appointments already scheduled     Apr 11, 2018  7:00 AM CDT   (Arrive by 6:45 AM)   Return Visit with Leonardo Wang MD   [facility] ([facility])    [address] [place] [address]  [address]th [facility] [address] [phone]            Apr 11, 2018 10:00 AM CDT   (Arrive by 9:45 AM)   Return Visit with Quyen Lennon,    [facility] ([facility])    [address] [place] [address]  [address]th [facility] [address]   [phone]            Apr 11, 2018 12:00 PM CDT   Infusion 120 with UC SPEC INFUSION, UC 41 ATC   [facility] ([facility])    [address]  [address]  [facility] [address] [phone]            Apr 13, 2018  2:00 PM CDT   Infusion 120 with UC SPEC INFUSION, UC 45 ATC   [facility] ([facility])    [address]  [address]  [facility] [address] [phone]            Apr 18,  2018 10:00 AM CDT   (Arrive by 9:45 AM)   Return Visit with Quyen Lennon, PhD   [facility] ([facility])    [address]  4th [facility] [address] [phone]            Jun 06, 2018 12:00 PM CDT   (Arrive by 11:45 AM)   Return Visit with Campbell Narayanan MD   [facility] ([facility])    [address] [place] [id number] [phone]            Jun 11, 2018  2:40 PM CDT   (Arrive by 2:25 PM)   Return Visit with Quyen Baez MD   [facility] ([facility])    [address] [place] [address] [phone]            Jul 26, 2018 12:30 PM CDT   (Arrive by 12:15 PM)   POSTURAL ORTHOSTATIC SYCOPE with Davidson Mitchell MD   [facility] ([facility])    [address] [place] [address]  [address]  [facility] [address] [phone]              Who to contact     If you have questions or need follow up information about today's clinic visit or your schedule please contact [facility] PROCED[facility] directly at [phone].  Normal or non-critical lab and imaging results will be communicated to you by [employer], letter or phone within 4 business days after the clinic has received the results. If you do not hear from us within 7 days, please contact the clinic through [employer] or phone. If you have a critical or abnormal lab result, we will notify you by phone as soon as possible.  Submit refill requests through [employer] or call your pharmacy and they will forward the refill request to us. Please allow 3 business days for your refill to be completed.          Additional Information About Your Visit        [employer] Information     [employer] gives you secure access to your electronic health record. If you see a primary care  provider, you can also send messages to your care team and make appointments. If you have questions, please call your primary care clinic.  If you do not have a primary care provider, please call [phone] and they will assist you.        Care EveryWhere ID     This is your Care EveryWhere ID. This could be used by other organizations to access your [place] medical records  [id number]        Your Vitals Were     Temperature Respirations                98.3  F (36.8  C) (Oral) 16           Blood Pressure from Last 3 Encounters:   04/09/18 (!) 130/99   04/06/18 118/79   04/04/18 126/85    Weight from Last 3 Encounters:   04/04/18 68.4 kg (150 lb 11.2 oz)   04/03/18 65.8 kg (145 lb)   03/29/18 66 kg (145 lb 6.4 oz)              Today, you had the following     No orders found for display         Today's Medication Changes          These changes are accurate as of 4/9/18  6:34 PM.  If you have any questions, ask your nurse or doctor.               These medicines have changed or have updated prescriptions.        Dose/Directions    nortriptyline 10 MG capsule   Commonly known as:  PAMELOR   This may have changed:  how much to take   Used for:  Right upper quadrant abdominal pain        Dose:  30 mg   Take 3 capsules (30 mg) by mouth At Bedtime   Quantity:  120 capsule   Refills:  0                Primary Care Provider Office Phone # Fax #    Quyen Baez [phone]       [address] [address]        Equal Access to Services     RACHAEL BENITEZ AH: Hadii angelina kelley hadasho Sokathleenali, waaxda luqadaha, qaybta kaalmada adeegyada, maki smallwood. So [facility] [phone].    ATENCIÓN: Si habla español, tiene a quijano disposición servicios gratuitos de asistencia lingüística. Llame al [phone].    We comply with applicable federal civil rights laws and [place] laws. We do not discriminate on the basis of race, color, national origin, age, disability, sex,  sexual orientation, or gender identity.            Thank you!     Thank you for choosing [facility]  for your care. Our goal is always to provide you with excellent care. Hearing back from our patients is one way we can continue to improve our services. Please take a few minutes to complete the written survey that you may receive in the mail after your visit with us. Thank you!             Your Updated Medication List - Protect others around you: Learn how to safely use, store and throw away your medicines at www.disposemymeds.org.          This list is accurate as of 4/9/18  6:34 PM.  Always use your most recent med list.                   Brand Name Dispense Instructions for use Diagnosis    acetaminophen 32 mg/mL solution    TYLENOL     30.45 mLs (975 mg) by Per J Tube route every 8 hours        * amylase-lipase-protease 61057 UNITS Cpep    ZENPEP    180 capsule    Take 2-3 capsules (40,000-60,000 Units) by mouth Take with snacks or supplements (Snacks)    Idiopathic chronic pancreatitis (H)       * amylase-lipase-protease 57487 UNITS Cpep    ZENPEP    180 capsule    Take 5 capsules (100,000 Units) by mouth 4 times daily (with meals and nightly)    Right upper quadrant abdominal pain       blood glucose monitoring test strip    no brand specified    100 strip    One Touch Ultra 2 Strips, Use to test blood sugars 2 times daily or as directed    Hypoglycemia       clotrimazole-betamethasone cream    LOTRISONE    15 g    Apply topically 2 times daily    Rash and nonspecific skin eruption       fluticasone 50 MCG/ACT spray    FLONASE    16 g    Spray 2 sprays into both nostrils daily    Nasal congestion       gabapentin 250 MG/5ML solution    NEURONTIN      Chronic abdominal pain, Sphincter of Oddi dysfunction, Cyclical vomiting with nausea, intractability of vomiting not specified, Pain around percutaneous endoscopic gastrostomy (PEG) tube site, sequela        hydrOXYzine 10 MG/5ML syrup    ATARAX    1500 mL    Take 12.5 mLs (25 mg) by mouth every 6 hours as needed for anxiety or other (pain)    Acute abdominal pain       leuprolide 11.25 MG kit    LUPRON DEPOT (3-MONTH)    1 each    Inject 11.25 mg into the muscle every 3 months    Endometriosis       levalbuterol 45 MCG/ACT Inhaler    XOPENEX HFA    1 Inhaler    Inhale 2 puffs into the lungs every 6 hours as needed for shortness of breath / dyspnea    Wheeze       LORazepam 0.5 MG tablet    ATIVAN    10 tablet    Take 1 tablet (0.5 mg) by mouth 2 times daily as needed (nausea, do not take with pain medicine, do not drive after taking)    Cyclical vomiting with nausea, intractability of vomiting not specified       multivitamins with minerals Liqd liquid     1 Bottle    15 mLs by Per Feeding Tube route daily    Abdominal pain, epigastric       norethindrone 5 MG tablet    AYGESTIN    90 tablet    Take 1 tablet (5 mg) by mouth daily    Endometriosis       nortriptyline 10 MG capsule    PAMELOR    120 capsule    Take 3 capsules (30 mg) by mouth At Bedtime    Right upper quadrant abdominal pain       ondansetron 4 MG ODT tab    ZOFRAN ODT    30 tablet    Take 1 tablet (4 mg) by mouth every 8 hours as needed for nausea or vomiting    Intractable cyclical vomiting with nausea       order for DME     1 Units    Equipment being ordered: TENS with wire and electrode.    Chronic abdominal pain       oxyCODONE 5 MG/5ML solution    ROXICODONE    240 mL    Take 10 mL (10 mg) by mouth every 6 hours as needed, maximum 40 mL per day.    Abdominal pain, generalized, Chronic abdominal pain       pantoprazole 40 MG EC tablet    PROTONIX    30 tablet    Take 1 tablet (40 mg) by mouth 2 times daily (before meals)    Right upper quadrant abdominal pain, Erosive gastritis       polyethylene glycol Packet    MIRALAX/GLYCOLAX    7 packet    Take 17 g by mouth daily    Right upper quadrant abdominal pain       RIZATRIPTAN BENZOATE PO      Take 5  mg by mouth once as needed        senna-docusate 8.6-50 MG per tablet    SENOKOT-S;PERICOLACE    100 tablet    Take 1 tablet by mouth 2 times daily as needed for constipation    Right upper quadrant abdominal pain       sodium bicarbonate 325 MG tablet     120 tablet    1 tablet (325 mg) by Per Feeding Tube route 4 times daily    Abdominal pain, epigastric, Intractable cyclical vomiting with nausea       * Notice:  This list has 2 medication(s) that are the same as other medications prescribed for you. Read the directions carefully, and ask your doctor or other care provider to review them with you.

## 2018-04-11 ENCOUNTER — OFFICE VISIT (OUTPATIENT)
Dept: ANESTHESIOLOGY | Facility: CLINIC | Age: 23
End: 2018-04-11
Payer: COMMERCIAL

## 2018-04-11 ENCOUNTER — INFUSION THERAPY VISIT (OUTPATIENT)
Dept: INFUSION THERAPY | Facility: CLINIC | Age: 23
End: 2018-04-11
Attending: INTERNAL MEDICINE
Payer: COMMERCIAL

## 2018-04-11 VITALS
WEIGHT: 150 LBS | BODY MASS INDEX: 24.11 KG/M2 | HEIGHT: 66 IN | HEART RATE: 115 BPM | DIASTOLIC BLOOD PRESSURE: 74 MMHG | RESPIRATION RATE: 16 BRPM | SYSTOLIC BLOOD PRESSURE: 114 MMHG

## 2018-04-11 VITALS
DIASTOLIC BLOOD PRESSURE: 70 MMHG | OXYGEN SATURATION: 100 % | TEMPERATURE: 98.2 F | SYSTOLIC BLOOD PRESSURE: 122 MMHG | HEART RATE: 108 BPM

## 2018-04-11 DIAGNOSIS — R52 PAIN: ICD-10-CM

## 2018-04-11 DIAGNOSIS — R10.9 CHRONIC ABDOMINAL PAIN: Chronic | ICD-10-CM

## 2018-04-11 DIAGNOSIS — R10.9 ABDOMINAL PAIN: ICD-10-CM

## 2018-04-11 DIAGNOSIS — E16.2 HYPOGLYCEMIA: ICD-10-CM

## 2018-04-11 DIAGNOSIS — F43.89 ADJUSTMENT REACTION TO CHRONIC STRESS: Primary | ICD-10-CM

## 2018-04-11 DIAGNOSIS — K86.1 CHRONIC PANCREATITIS, UNSPECIFIED PANCREATITIS TYPE (H): ICD-10-CM

## 2018-04-11 DIAGNOSIS — R21 RASH: ICD-10-CM

## 2018-04-11 DIAGNOSIS — F33.1 MAJOR DEPRESSIVE DISORDER, RECURRENT EPISODE, MODERATE (H): ICD-10-CM

## 2018-04-11 DIAGNOSIS — G43.A0 CYCLICAL VOMITING WITH NAUSEA, INTRACTABILITY OF VOMITING NOT SPECIFIED: Primary | ICD-10-CM

## 2018-04-11 DIAGNOSIS — R10.84 ABDOMINAL PAIN, GENERALIZED: ICD-10-CM

## 2018-04-11 DIAGNOSIS — R11.15 INTRACTABLE CYCLICAL VOMITING WITH NAUSEA: ICD-10-CM

## 2018-04-11 DIAGNOSIS — Z98.890 S/P ERCP: ICD-10-CM

## 2018-04-11 DIAGNOSIS — F41.1 ANXIETY STATE: ICD-10-CM

## 2018-04-11 DIAGNOSIS — N39.41 URGE INCONTINENCE: ICD-10-CM

## 2018-04-11 DIAGNOSIS — G89.29 CHRONIC ABDOMINAL PAIN: Chronic | ICD-10-CM

## 2018-04-11 DIAGNOSIS — R10.13 ABDOMINAL PAIN, EPIGASTRIC: ICD-10-CM

## 2018-04-11 DIAGNOSIS — R10.9 ACUTE ABDOMINAL PAIN: ICD-10-CM

## 2018-04-11 PROCEDURE — 96376 TX/PRO/DX INJ SAME DRUG ADON: CPT

## 2018-04-11 PROCEDURE — 96375 TX/PRO/DX INJ NEW DRUG ADDON: CPT

## 2018-04-11 PROCEDURE — 96374 THER/PROPH/DIAG INJ IV PUSH: CPT

## 2018-04-11 PROCEDURE — 96361 HYDRATE IV INFUSION ADD-ON: CPT

## 2018-04-11 PROCEDURE — 25000128 H RX IP 250 OP 636: Mod: ZF | Performed by: INTERNAL MEDICINE

## 2018-04-11 RX ORDER — ONDANSETRON 2 MG/ML
4 INJECTION INTRAMUSCULAR; INTRAVENOUS DAILY PRN
Status: CANCELLED
Start: 2018-04-11

## 2018-04-11 RX ORDER — ONDANSETRON 2 MG/ML
4 INJECTION INTRAMUSCULAR; INTRAVENOUS DAILY PRN
Status: DISCONTINUED | OUTPATIENT
Start: 2018-04-11 | End: 2018-04-11 | Stop reason: HOSPADM

## 2018-04-11 RX ORDER — DIPHENHYDRAMINE HYDROCHLORIDE 50 MG/ML
25 INJECTION INTRAMUSCULAR; INTRAVENOUS ONCE
Status: CANCELLED
Start: 2018-04-11 | End: 2018-04-11

## 2018-04-11 RX ORDER — DIPHENHYDRAMINE HYDROCHLORIDE 50 MG/ML
25 INJECTION INTRAMUSCULAR; INTRAVENOUS DAILY PRN
Status: CANCELLED
Start: 2018-04-11

## 2018-04-11 RX ORDER — ONDANSETRON 2 MG/ML
4 INJECTION INTRAMUSCULAR; INTRAVENOUS ONCE
Status: COMPLETED | OUTPATIENT
Start: 2018-04-11 | End: 2018-04-11

## 2018-04-11 RX ORDER — HEPARIN SODIUM (PORCINE) LOCK FLUSH IV SOLN 100 UNIT/ML 100 UNIT/ML
500 SOLUTION INTRAVENOUS EVERY 8 HOURS
Status: CANCELLED
Start: 2018-04-11

## 2018-04-11 RX ORDER — ONDANSETRON 2 MG/ML
4 INJECTION INTRAMUSCULAR; INTRAVENOUS ONCE
Status: CANCELLED
Start: 2018-04-11 | End: 2018-04-11

## 2018-04-11 RX ORDER — DIPHENHYDRAMINE HYDROCHLORIDE 50 MG/ML
25 INJECTION INTRAMUSCULAR; INTRAVENOUS ONCE
Status: COMPLETED | OUTPATIENT
Start: 2018-04-11 | End: 2018-04-11

## 2018-04-11 RX ORDER — OXYCODONE HCL 5 MG/5 ML
SOLUTION, ORAL ORAL
Qty: 560 ML | Refills: 0 | Status: SHIPPED | OUTPATIENT
Start: 2018-04-11 | End: 2018-04-19

## 2018-04-11 RX ORDER — HEPARIN SODIUM (PORCINE) LOCK FLUSH IV SOLN 100 UNIT/ML 100 UNIT/ML
500 SOLUTION INTRAVENOUS EVERY 8 HOURS
Status: DISCONTINUED | OUTPATIENT
Start: 2018-04-11 | End: 2018-04-11 | Stop reason: HOSPADM

## 2018-04-11 RX ORDER — DIPHENHYDRAMINE HYDROCHLORIDE 50 MG/ML
25 INJECTION INTRAMUSCULAR; INTRAVENOUS DAILY PRN
Status: DISCONTINUED | OUTPATIENT
Start: 2018-04-11 | End: 2018-04-11 | Stop reason: HOSPADM

## 2018-04-11 RX ADMIN — DIPHENHYDRAMINE HYDROCHLORIDE 25 MG: 50 INJECTION, SOLUTION INTRAMUSCULAR; INTRAVENOUS at 11:59

## 2018-04-11 RX ADMIN — ONDANSETRON 4 MG: 2 INJECTION INTRAMUSCULAR; INTRAVENOUS at 11:59

## 2018-04-11 RX ADMIN — ONDANSETRON 4 MG: 2 INJECTION INTRAMUSCULAR; INTRAVENOUS at 13:30

## 2018-04-11 RX ADMIN — SODIUM CHLORIDE, POTASSIUM CHLORIDE, SODIUM LACTATE AND CALCIUM CHLORIDE 1500 ML: 600; 310; 30; 20 INJECTION, SOLUTION INTRAVENOUS at 11:59

## 2018-04-11 RX ADMIN — SODIUM CHLORIDE, PRESERVATIVE FREE 500 UNITS: 5 INJECTION INTRAVENOUS at 13:37

## 2018-04-11 RX ADMIN — DIPHENHYDRAMINE HYDROCHLORIDE 25 MG: 50 INJECTION, SOLUTION INTRAMUSCULAR; INTRAVENOUS at 13:29

## 2018-04-11 ASSESSMENT — PAIN SCALES - GENERAL: PAINLEVEL: SEVERE PAIN (6)

## 2018-04-11 NOTE — PROGRESS NOTES
"[facility]   Comprehensive Pain Program   Psychology Progress Note    Patient Name: Alexandra Melgoza    YOB: 1995   Medical Record Number: [id number]  Date: 4/11/2018              SUBJECTIVE           Interval history:  Pain and vomiting better to some degree  Using bilateral sound and imagery breathing with some benefit    OBJECTIVE:              Length of Visit: 45       Mental status:  subdued         Session objective:   Continued behavioral pain management skill improvement         Behavioral inventions and response:                                     Breathing Imagery   Along with                Resourced Brainspotting/Eye position therapy [employer]  is  pain                                   Resource spot  Special spot then 3 pt grid   .          Beginning  SUDS =  5              Ending SUDS  =  3   Along with very relaxed body but no disassociation                                                               ASSESSMENT              Diagnoses:                                               F43.8      Other stress related disorder                                                               Psychosocial and Contextual Factors: stayed the same         Progress toward goals: satisfactory.              Pain status since onset of pain services: had fluctuating course           Emotional status since onset of pain services: stayed the same       Medication / chemical use concerns: Minor -         PLAN:               Next Appointment: Alexandra Melgoza will schedule a follow-up appointment in 1 weeks.         Assignment: Establish a daily routine of stretching and exercise, Healthy breathing skills  for daily use, Utilize  Guided imagery  \"EASE  PAIN\"  at least once a day and auditory EMDR         Objectives / interventions for next session:          Improve pain management skills: Goals include:  Learn constructive cognitive response to pain triggers and Cognitive therapy: create constructive thought " processes and beliefs regarding pain               Quyen Lennon, Ph.D., LREX. ...............4/11/2018 10:15 AM                Medical Psychologist

## 2018-04-11 NOTE — LETTER
4/11/2018       RE: Alexandra Melgoza  [address]  [facility] [address]     Dear Colleague,    Thank you for referring your patient, Alexandra Melgoza, to the [facility] at [facility]. Please see a copy of my visit note below.    The patient is a 22-year-old female with a history of chronic pancreatitis who presents for follow-up.    In the interim since her last visit, she continues to have pain at the GJ site This has caused some increased pain.    She also reports spillig some of her medication. She is currently taking 10 mg oxycodone 4 times daily.  She has participated in the pain psychology, and finds it helpful.   During her previous visit a plan was formulated with the goals of attempting to diminish chronic pancreatitis flares and thus emergency room visits and should those flares occur provided patient with pain medication that would be available to treat a flare and preclude visit to the emergency room. It appears that her pain is more constant. She presents today for reevaluation and follow-up      Current Outpatient Prescriptions   Medication     oxyCODONE (ROXICODONE) 5 MG/5ML solution     gabapentin (NEURONTIN) 300 MG capsule     blood glucose monitoring (NO BRAND SPECIFIED) test strip     hydrOXYzine (ATARAX) 10 MG/5ML syrup     acetaminophen (TYLENOL) 32 mg/mL solution     ondansetron (ZOFRAN ODT) 4 MG ODT tab     sodium bicarbonate 325 MG tablet     nortriptyline (PAMELOR) 10 MG capsule     menthol (ICY HOT) 5 % PTCH     amylase-lipase-protease (ZENPEP) 06349 UNITS CPEP     multivitamins with minerals (CERTAVITE/CEROVITE) LIQD liquid     order for DME     scopolamine (TRANSDERM) 72 hr patch     amylase-lipase-protease (ZENPEP) 04353 UNITS CPEP     senna-docusate (SENOKOT-S;PERICOLACE) 8.6-50 MG per tablet     pantoprazole (PROTONIX) 40 MG EC tablet     polyethylene glycol (MIRALAX/GLYCOLAX) Packet     norethindrone  "(AYGESTIN) 5 MG tablet     fluticasone (FLONASE) 50 MCG/ACT spray     levalbuterol (XOPENEX HFA) 45 MCG/ACT Inhaler     leuprolide (LUPRON DEPOT) 11.25 MG injection     RIZATRIPTAN BENZOATE PO      No current facility-administered medications for this visit.             Allergies   Allergen Reactions     Amoxicillin-Pot Clavulanate Nausea and Vomiting     Compazine [Prochlorperazine] Other (See Comments)       Dystonia     Hyoscyamine Other (See Comments)       Dystonia     Reglan [Metoclopramide Hcl] Other (See Comments)       Dystonia     Zyprexa Other (See Comments)       Sensitive, dystonic reaction on 11-9-2011     Amitriptyline Hcl Other (See Comments)       Dystonia, hallucinations     Buspirone Other (See Comments)       No Adverse Reactions, no benefit     Cogentin [Benztropine]       Cyproheptadine Other (See Comments)       Distonic     Dicyclomine Other (See Comments)     Droperidol Other (See Comments)       Feels tense and \"like she has to jump out of her skin\".       Effexor [Venlafaxine] Other (See Comments)       Dystonia     Food         Cilantro--lips/tongue swelling     No Clinical Screening - See Comments Other (See Comments)       Cilantro     Phenergan Dm [Promethazine-Dm] Other (See Comments)       dystonia     Promethazine Other (See Comments)     Risperidone Other (See Comments)       dystonia     Vistaril Other (See Comments)       Burning sensation.     Augmentin GI Disturbance     Ketamine Other (See Comments)       jittery     Sorbitol GI Disturbance       Headache and dyspepsia       Past Medical History         Past Medical History:   Diagnosis Date     Anxiety       Asthma       Cholecystitis       s/p cholecystectomy     Chronic abdominal pain       Chronic infection       mrsa     Chronic pain       Cyclic vomiting syndrome 10/27/2012     Depression       Endometriosis       Hypoglycaemia       Migraines       Mild intermittent asthma       Ovarian cysts       Pancreatic disease   "     PONV (postoperative nausea and vomiting)       Pseudoseizures       Somatoform disorder       Sphincter of Oddi dysfunction       Vasovagal syncope            Past Surgical History          Past Surgical History:   Procedure Laterality Date     ABDOMEN SURGERY         ERCP, biliary stents     CHOLECYSTECTOMY   8/2/11     COLONOSCOPY   2011     negative finding     ENDOSCOPIC RETROGRADE CHOLANGIOPANCREATOGRAM   8/23/2011     Procedure:ENDOSCOPIC RETROGRADE CHOLANGIOPANCREATOGRAM; Endoscopic Retrograde Cholangiopancreatogram; Surgeon:SHORTY NARAYANAN; [facility]     ENDOSCOPIC RETROGRADE CHOLANGIOPANCREATOGRAM   5/17/2012     Procedure:ENDOSCOPIC RETROGRADE CHOLANGIOPANCREATOGRAM; Endoscopic Retrograde Cholangiopancreatogram with pancreatic stent placement.; Surgeon:SHORTY NARAYANAN; [facility]     ENDOSCOPIC RETROGRADE CHOLANGIOPANCREATOGRAM N/A 1/18/2018     Procedure: COMBINED ENDOSCOPIC RETROGRADE CHOLANGIOPANCREATOGRAPHY, PLACE TUBE/STENT;  Endoscopic Retrograde Cholangiopancreatography with nasojejunal feeding tube placement;  Surgeon: Shorty Narayanan MD;  [facility]     ENDOSCOPIC RETROGRADE CHOLANGIOPANCREATOGRAM COMPLEX   1/3/2012     Procedure:ENDOSCOPIC RETROGRADE CHOLANGIOPANCREATOGRAM COMPLEX; Endoscopic Retrograde Cholangiopancreatogram with Manometry bile duct sphincterotomy extention pancreatic duct sphincterotomy pancreatic duct stent placement; Surgeon:SHORTY NARAYANAN; [facility]     ENDOSCOPIC ULTRASOUND UPPER GASTROINTESTINAL TRACT (GI) N/A 6/9/2015     Procedure: ENDOSCOPIC ULTRASOUND, ESOPHAGOSCOPY / UPPER GASTROINTESTINAL TRACT (GI);  Surgeon: Mario Joe MD;  [facility]     ENDOSCOPIC ULTRASOUND UPPER GASTROINTESTINAL TRACT (GI) N/A 12/12/2016     Procedure: ENDOSCOPIC ULTRASOUND, ESOPHAGOSCOPY / UPPER GASTROINTESTINAL TRACT (GI);  Surgeon: Guru Jose Klein MD;  [facility]     ESOPHAGOSCOPY, GASTROSCOPY,  DUODENOSCOPY (EGD), COMBINED   1/18/2012     Procedure:COMBINED ESOPHAGOSCOPY, GASTROSCOPY, DUODENOSCOPY (EGD); Surgeon:ARNIE ESPINOZA; [facility]     ESOPHAGOSCOPY, GASTROSCOPY, DUODENOSCOPY (EGD), COMBINED   1/18/2012     Procedure:COMBINED ESOPHAGOSCOPY, GASTROSCOPY, DUODENOSCOPY (EGD); EGD; Surgeon:ARNIE ESPINOZA; [facility]     ESOPHAGOSCOPY, GASTROSCOPY, DUODENOSCOPY (EGD), COMBINED N/A 12/9/2017     Procedure: COMBINED ESOPHAGOSCOPY, GASTROSCOPY, DUODENOSCOPY (EGD);;  Surgeon: Josias Chan MD;  [facility]     INSERT PORT VASCULAR ACCESS         L knee arthroscopy   2009     LAPAROSCOPY DIAGNOSTIC (GYN)   10/26/2012     Procedure: LAPAROSCOPY DIAGNOSTIC (GYN);  LAPAROSCOPY DIAGNOSTIC, CAUTERY ENDOMETRIOISIS and biopsy of Fallopian tube lesions;  Surgeon: Carla Lopez MD;  Location:  OR     ORTHOPEDIC SURGERY   2008     knee     REMOVE AND REPLACE BREAST IMPLANT PROSTHESIS N/A 2/8/2018     Procedure: PERCUTANEOUS INSERTION TUBE JEJUNOSTOMY;  upper endoscopy with percutaneous gastrojejunostimy feeding tuble placement and gastropexy;  Surgeon: Campbell Narayanan MD;  [facility]     VASCULAR SURGERY              Family History           Family History   Problem Relation Age of Onset     Hypertension Father       Bipolar Disorder Other       Anxiety Disorder Other       Depression Paternal Grandmother       Allergies Maternal Grandmother       CEREBROVASCULAR DISEASE Maternal Grandfather       Cardiovascular Maternal Grandfather       Depression/Anxiety Maternal Grandfather       GASTROINTESTINAL DISEASE Maternal Grandfather       DIABETES Paternal Uncle       Hypoglycemia Brother       CANCER No family hx of         No family history of skin cancer          Social History    Social History            Social History     Marital status: Single       Spouse name: N/A     Number of children: N/A     Years of education: N/A          Occupational History     Not on file.           Social  History Main Topics     Smoking status: Never Smoker     Smokeless tobacco: Never Used     Alcohol use No     Drug use: No     Sexual activity: No           Other Topics Concern     Parent/Sibling W/ Cabg, Mi Or Angioplasty Before 65f 55m? 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       Physical Exam   Constitutional: She is oriented to person, place, and time. She appears well-developed and well-nourished.   HENT:   Head: Normocephalic and atraumatic.   Right Ear: External ear normal.   Left Ear: External ear normal.   Nose: Nose normal.   Mouth/Throat: Oropharynx is clear and moist.   Eyes: Conjunctivae and EOM are normal. Pupils are equal, round, and reactive to light.   Neck: Normal range of motion. Neck supple.   Cardiovascular: Normal rate, regular rhythm, normal heart sounds and intact distal pulses.    Pulmonary/Chest: Effort normal and breath sounds normal.   Abdominal: She exhibits no distension and no mass. There is tenderness. There is guarding. There is no rebound.   Musculoskeletal: Normal range of motion.   Neurological: She is alert and oriented to person, place, and time. She has normal reflexes.   Skin: Skin is warm and dry.   Psychiatric: She has a normal mood and affect.   Nursing note and vitals reviewed.  A/P:Patient is a  23 y/o lady with chronic abdominal pain who presents for follow up.  It is clear that the most likely eiology of her pain is pancreatitic flares and surgical pain.   In an effort to decrease the frequency and the duration of flares, as well as addressing the post-surgical pain, I recommend  1. Continue Pain psychology  2. Continue Oxycodone 10 mg  q6h prn with plan to wean  when the surgical pain subsides.  3. Continue pamelor 40 mg qHS  4. RTC in 2 weeks      Again, thank you for allowing me to participate in the care of your patient.      Sincerely,    Leonardo Wang MD

## 2018-04-11 NOTE — MR AVS SNAPSHOT
After Visit Summary   4/11/2018    Alexandra Melgoza    MRN: [id number]           Patient Information     Date Of Birth          1995        Visit Information        Provider Department      4/11/2018 10:00 AM Quyen Lennon, PhD [facility]         Follow-ups after your visit        Your next 10 appointments already scheduled     Apr 11, 2018 12:00 PM CDT   Infusion 120 with UC SPEC INFUSION, UC 41 ATC   [facility] ([facility])    [address] [place] [address]  [address]  [facility] [place] [address]   [phone]            Apr 13, 2018  2:00 PM CDT   Infusion 120 with UC SPEC INFUSION, UC 45 ATC   [facility] ([facility])    [address] [place] [address] [address]  [facility] [place] [address]   [phone]            Apr 18, 2018 10:00 AM CDT   (Arrive by 9:45 AM)   Return Visit with Quyen Lennon,    [facility] ([facility])    [address] [place] [address]  [address] [facility] [address]   [phone]            Apr 23, 2018  2:30 PM CDT   (Arrive by 2:15 PM)   Return Visit with Leonardo Wang MD   [facility] ([facility])    [address] [place] [address]  [address] [facility] [address]   [phone]            Apr 25, 2018  9:00 AM CDT   (Arrive by 8:45 AM)   Return Visit with Quyen Lennon, PhD   [facility] ([facility])    [address] [place] [address]  [address] [facility] [address]   [phone]            Jun 06, 2018 12:00 PM CDT   (Arrive by 11:45 AM)   Return Visit with Campbell Narayanan MD   [facility] ([facility])    [address] [place] [address]  [address]   [phone]            Jun 11, 2018  2:40 PM CDT   (Arrive by 2:25 PM)   Return Visit with Quyen Baez MD   [facility] ([facility])    [address]  4th Floor  [facility] [address] [phone]            Jul 26, 2018 12:30 PM CDT   (Arrive by 12:15 PM)   POSTURAL ORTHOSTATIC SYCOPE with Davidson Mitchell MD   [facility] ([facility])    [address] [facility] [facility] [facility] [address]  [address]  [facility] [address] [phone]              Who to contact     Please call your clinic at [phone] to:    Ask questions about your health    Make or cancel appointments    Discuss your medicines    Learn about your test results    Speak to your doctor            Additional Information About Your Visit        [employer] Information     [employer] gives you secure access to your electronic health record. If you see a primary care provider, you can also send messages to your care team and make appointments. If you have questions, please call your primary care clinic.  If you do not have a primary care provider, please call [phone] and they will assist you.      [employer] is an electronic gateway that provides easy, online access to your medical records. With [employer], you can request a clinic appointment, read your test results, renew a prescription or communicate with your care team.     To access your existing account, please contact your [facility] or call [phone] for assistance.        Care EveryWhere ID     This is your Care EveryWhere ID. This could be used by other organizations to access your [facility] medical records  [id number]         Blood Pressure from Last 3 Encounters:   04/11/18 114/74   04/09/18 (!) 130/99   04/06/18 118/79    Weight from Last 3 Encounters:   04/11/18 68 kg (150 lb)   04/04/18 68.4 kg (150 lb 11.2 oz)   04/03/18 65.8 kg (145 lb)              Today, you had the  following     No orders found for display         Today's Medication Changes          These changes are accurate as of 4/11/18 10:52 AM.  If you have any questions, ask your nurse or doctor.               These medicines have changed or have updated prescriptions.        Dose/Directions    nortriptyline 10 MG capsule   Commonly known as:  PAMELOR   This may have changed:  how much to take   Used for:  Right upper quadrant abdominal pain        Dose:  30 mg   Take 3 capsules (30 mg) by mouth At Bedtime   Quantity:  120 capsule   Refills:  0            Where to get your medicines      Some of these will need a paper prescription and others can be bought over the counter.  Ask your nurse if you have questions.     Bring a paper prescription for each of these medications     oxyCODONE 5 MG/5ML solution                Primary Care Provider Office Phone # Fax #    Quyen Baez [phone] [address] [address]        Equal Access to Services     [facility]: Hadii angelina rodneyo Somalini, waaxda luqadaha, qaybta kaalmada adechrisyada, maki gaspar . So [facility] [phone].    ATENCIÓN: Si habla español, tiene a quijano disposición servicios gratuitos de asistencia lingüística. Llame al [phone].    We comply with applicable federal civil rights laws and [place] laws. We do not discriminate on the basis of race, color, national origin, age, disability, sex, sexual orientation, or gender identity.            Thank you!     Thank you for choosing [facility]NAGEMENT  for your care. Our goal is always to provide you with excellent care. Hearing back from our patients is one way we can continue to improve our services. Please take a few minutes to complete the written survey that you may receive in the mail after your visit with us. Thank you!             Your Updated Medication List - Protect others around you: Learn  how to safely use, store and throw away your medicines at www.disposemymeds.org.          This list is accurate as of 4/11/18 10:52 AM.  Always use your most recent med list.                   Brand Name Dispense Instructions for use Diagnosis    acetaminophen 32 mg/mL solution    TYLENOL     30.45 mLs (975 mg) by Per J Tube route every 8 hours        * amylase-lipase-protease 73208 units Cpep    ZENPEP    180 capsule    Take 2-3 capsules (40,000-60,000 Units) by mouth Take with snacks or supplements (Snacks)    Idiopathic chronic pancreatitis (H)       * amylase-lipase-protease 79961 units Cpep    ZENPEP    180 capsule    Take 5 capsules (100,000 Units) by mouth 4 times daily (with meals and nightly)    Right upper quadrant abdominal pain       blood glucose monitoring test strip    no brand specified    100 strip    One Touch Ultra 2 Strips, Use to test blood sugars 2 times daily or as directed    Hypoglycemia       clotrimazole-betamethasone cream    LOTRISONE    15 g    Apply topically 2 times daily    Rash and nonspecific skin eruption       fluticasone 50 MCG/ACT spray    FLONASE    16 g    Spray 2 sprays into both nostrils daily    Nasal congestion       gabapentin 250 MG/5ML solution    NEURONTIN      Chronic abdominal pain, Sphincter of Oddi dysfunction, Cyclical vomiting with nausea, intractability of vomiting not specified, Pain around percutaneous endoscopic gastrostomy (PEG) tube site, sequela       hydrOXYzine 10 MG/5ML syrup    ATARAX    1500 mL    Take 12.5 mLs (25 mg) by mouth every 6 hours as needed for anxiety or other (pain)    Acute abdominal pain       leuprolide 11.25 MG kit    LUPRON DEPOT (3-MONTH)    1 each    Inject 11.25 mg into the muscle every 3 months    Endometriosis       levalbuterol 45 MCG/ACT Inhaler    XOPENEX HFA    1 Inhaler    Inhale 2 puffs into the lungs every 6 hours as needed for shortness of breath / dyspnea    Wheeze       LORazepam 0.5 MG tablet    ATIVAN    10 tablet     Take 1 tablet (0.5 mg) by mouth 2 times daily as needed (nausea, do not take with pain medicine, do not drive after taking)    Cyclical vomiting with nausea, intractability of vomiting not specified       multivitamins with minerals Liqd liquid     1 Bottle    15 mLs by Per Feeding Tube route daily    Abdominal pain, epigastric       norethindrone 5 MG tablet    AYGESTIN    90 tablet    Take 1 tablet (5 mg) by mouth daily    Endometriosis       nortriptyline 10 MG capsule    PAMELOR    120 capsule    Take 3 capsules (30 mg) by mouth At Bedtime    Right upper quadrant abdominal pain       ondansetron 4 MG ODT tab    ZOFRAN ODT    30 tablet    Take 1 tablet (4 mg) by mouth every 8 hours as needed for nausea or vomiting    Intractable cyclical vomiting with nausea       order for [facility]     1 Units    Equipment being ordered: TENS with wire and electrode.    Chronic abdominal pain       oxyCODONE 5 MG/5ML solution    ROXICODONE    560 mL    Take 10 mL (10 mg) by mouth every 6 hours as needed, maximum 40 mL per day.    Abdominal pain, generalized, Chronic abdominal pain       pantoprazole 40 MG EC tablet    PROTONIX    30 tablet    Take 1 tablet (40 mg) by mouth 2 times daily (before meals)    Right upper quadrant abdominal pain, Erosive gastritis       polyethylene glycol Packet    MIRALAX/GLYCOLAX    7 packet    Take 17 g by mouth daily    Right upper quadrant abdominal pain       RIZATRIPTAN BENZOATE PO      Take 5 mg by mouth once as needed        senna-docusate 8.6-50 MG per tablet    SENOKOT-S;PERICOLACE    100 tablet    Take 1 tablet by mouth 2 times daily as needed for constipation    Right upper quadrant abdominal pain       sodium bicarbonate 325 MG tablet     120 tablet    1 tablet (325 mg) by Per Feeding Tube route 4 times daily    Abdominal pain, epigastric, Intractable cyclical vomiting with nausea       * Notice:  This list has 2 medication(s) that are the same as other medications prescribed for you.  Read the directions carefully, and ask your doctor or other care provider to review them with you.

## 2018-04-11 NOTE — PROGRESS NOTES
Infusion Nursing Note  Alexandra Melgoza [facility] for:   Chief Complaint   Patient presents with     Infusion     IVF, zofran, benadryl     During today's [facility] appointment, orders from Dr. Narayanan were completed.  Frequency: weekly    Progress note:  Patient identification verified by name and date of birth.  Assessment completed.  Vitals recorded in Doc Flowsheets.  Patient was provided with education regarding infusion and possible side effects.  Patient verbalized understanding.     Premedications: were not ordered.  Infusion Rates: infusion given over approximately 1.5 hours; pt requested to stop 2nd liter half way through infusion as she had somewhere to be; so she received a total of 1.5 L LR per her request. Zofran and benadryl administered IVP ~2 mins x 2 doses.   Labs: were not ordered for this appointment.  Vascular access: port accessed today.  Treatment Conditions: non-applicable.  Patient tolerated infusion: well    Discharge Plan:   Follow up plan of care with: ongoing infusions at [facility]. and primary medical doctor.  Discharge instructions were reviewed with patient.  Patient/representative verbalized understanding of discharge instructions and all questions answered.  Patient discharged from [facility] in stable condition.    Saba Turner RN       Administrations This Visit     diphenhydrAMINE (BENADRYL) injection 25 mg     Admin Date Action Dose Route Administered By             04/11/2018 Given 25 mg Intravenous Saba Turner RN              Admin Date Action Dose Route Administered By             04/11/2018 Given 25 mg Intravenous Saba Turner, RN                    heparin 100 UNIT/ML injection 500 Units     Admin Date Action Dose Route Administered By             04/11/2018 Given 500 Units Intracatheter Saba Turner, SAMARA                    lactated  ringers BOLUS 1,000-2,000 mL     Admin Date Action Dose Route Administered By             04/11/2018 New Bag 1500 mL Intravenous Saba Turner RN                    ondansetron (ZOFRAN) injection 4 mg     Admin Date Action Dose Route Administered By             04/11/2018 Given 4 mg Intravenous Saba Turner RN              Admin Date Action Dose Route Administered By             04/11/2018 Given 4 mg Intravenous Saba Turner RN                            /70  Pulse 108  Temp 98.2  F (36.8  C) (Oral)  SpO2 100%

## 2018-04-11 NOTE — MR AVS SNAPSHOT
After Visit Summary   4/11/2018    Alexandra Melgoza    MRN: [id number]           Patient Information     Date Of Birth          1995        Visit Information        Provider Department      4/11/2018 12:00 PM UC 41 ATC; UC SPEC INFUSION [facility]        Today's Diagnoses     Cyclical vomiting with nausea, intractability of vomiting not specified    -  1    Chronic pancreatitis, unspecified pancreatitis type (H)        Pain        Abdominal pain, epigastric        S/P ERCP        Acute abdominal pain        Abdominal pain        Hypoglycemia        Urge incontinence        Major depressive disorder, recurrent episode, moderate (H)        Anxiety state        Rash        Intractable cyclical vomiting with nausea           Follow-ups after your visit        Your next 10 appointments already scheduled     Apr 13, 2018  2:00 PM CDT   Infusion 120 with UC SPEC INFUSION, UC 45 ATC   [facility] Procedure ([facility])    [address] [place] [address]   [phone]            Apr 18, 2018 10:00 AM CDT   (Arrive by 9:45 AM)   Return Visit with Quyen Lennon, PhD   [facility] ([facility])    [address] [place] [address]  [address] [facility] [address]   [phone]            Apr 23, 2018  2:30 PM CDT   (Arrive by 2:15 PM)   Return Visit with Leonardo Wang MD   [facility] ([facility])    [address] [place] [address]  [address] [facility] [address]   [phone]            Apr 25, 2018  9:00 AM CDT   (Arrive by 8:45 AM)   Return Visit with Quyen Lennon, PhD   [facility] ([facility])    [address] [place] [address]   [phone]             Jun 06, 2018 12:00 PM CDT   (Arrive by 11:45 AM)   Return Visit with Campbell Narayanan MD   [facility] Biliary ([facility])    [address]  4th Floor  [facility] [id number] [phone]            Jun 11, 2018  2:40 PM CDT   (Arrive by 2:25 PM)   Return Visit with Quyen Baez MD   [facility] ([facility])    [address]  4th [facility] [id number] [phone]            Jul 26, 2018 12:30 PM CDT   (Arrive by 12:15 PM)   POSTURAL ORTHOSTATIC SYCOPE with Davidson Mitchell MD   [facility] Heart Care ([facility])    [address] [place] [address]  [address]  [facility] [id number] [phone]              Who to contact     If you have questions or need follow up information about today's clinic visit or your schedule please contact [facility] [place] [facility] directly at [phone].  Normal or non-critical lab and imaging results will be communicated to you by [employer], letter or phone within 4 business days after the clinic has received the results. If you do not hear from us within 7 days, please contact the clinic through [employer]t or phone. If you have a critical or abnormal lab result, we will notify you by phone as soon as possible.  Submit refill requests through startuply or call your pharmacy and they will forward the refill request to us. Please allow 3 business days for your refill to be completed.          Additional Information About Your Visit        [employer] Information     startuply gives you secure access to your electronic health record. If you see a primary care provider, you can also send messages to your care team and make appointments. If you have questions, please call your primary care clinic.  If you do not have a primary care provider, please call [phone] and they will assist you.        Care EveryWhere ID      This is your Care EveryWhere ID. This could be used by other organizations to access your [place] medical records  [id number]        Your Vitals Were     Pulse Temperature Pulse Oximetry             108 98.2  F (36.8  C) (Oral) 100%          Blood Pressure from Last 3 Encounters:   04/11/18 122/70   04/11/18 114/74   04/09/18 (!) 130/99    Weight from Last 3 Encounters:   04/11/18 68 kg (150 lb)   04/04/18 68.4 kg (150 lb 11.2 oz)   04/03/18 65.8 kg (145 lb)              Today, you had the following     No orders found for display         Today's Medication Changes          These changes are accurate as of 4/11/18  3:55 PM.  If you have any questions, ask your nurse or doctor.               These medicines have changed or have updated prescriptions.        Dose/Directions    nortriptyline 10 MG capsule   Commonly known as:  PAMELOR   This may have changed:  how much to take   Used for:  Right upper quadrant abdominal pain        Dose:  30 mg   Take 3 capsules (30 mg) by mouth At Bedtime   Quantity:  120 capsule   Refills:  0            Where to get your medicines      Some of these will need a paper prescription and others can be bought over the counter.  Ask your nurse if you have questions.     Bring a paper prescription for each of these medications     oxyCODONE 5 MG/5ML solution                Primary Care Provider Office Phone # Fax #    Quyen Baez [phone]       [address] [address]        Equal Access to Services     RACHAEL BENITEZ : Bret joseph Somalini, waaxda luhalley, qaybta kaalmaasia jonesmacarena fatmata tyler [facility] [place]. So [facility] [phone].    ATENCIÓN: Si habla español, tiene a quijano disposición servicios gratuitos de asistencia lingüística. Llame al [phone].    We comply with applicable federal civil rights laws and [place] laws. We do not discriminate on the basis of race, color, national origin, age, disability,  sex, sexual orientation, or gender identity.            Thank you!     Thank you for choosing [facility] SPECIALTY AND PROCEDURE  for your care. Our goal is always to provide you with excellent care. Hearing back from our patients is one way we can continue to improve our services. Please take a few minutes to complete the written survey that you may receive in the mail after your visit with us. Thank you!             Your Updated Medication List - Protect others around you: Learn how to safely use, store and throw away your medicines at www.disposemymeds.org.          This list is accurate as of 4/11/18  3:55 PM.  Always use your most recent med list.                   Brand Name Dispense Instructions for use Diagnosis    acetaminophen 32 mg/mL solution    TYLENOL     30.45 mLs (975 mg) by Per J Tube route every 8 hours        * amylase-lipase-protease 67771 units Cpep    ZENPEP    180 capsule    Take 2-3 capsules (40,000-60,000 Units) by mouth Take with snacks or supplements (Snacks)    Idiopathic chronic pancreatitis (H)       * amylase-lipase-protease 73142 units Cpep    ZENPEP    180 capsule    Take 5 capsules (100,000 Units) by mouth 4 times daily (with meals and nightly)    Right upper quadrant abdominal pain       blood glucose monitoring test strip    no brand specified    100 strip    One Touch Ultra 2 Strips, Use to test blood sugars 2 times daily or as directed    Hypoglycemia       clotrimazole-betamethasone cream    LOTRISONE    15 g    Apply topically 2 times daily    Rash and nonspecific skin eruption       fluticasone 50 MCG/ACT spray    FLONASE    16 g    Spray 2 sprays into both nostrils daily    Nasal congestion       gabapentin 250 MG/5ML solution    NEURONTIN      Chronic abdominal pain, Sphincter of Oddi dysfunction, Cyclical vomiting with nausea, intractability of vomiting not specified, Pain around percutaneous endoscopic gastrostomy (PEG) tube site, sequela        hydrOXYzine 10 MG/5ML syrup    ATARAX    1500 mL    Take 12.5 mLs (25 mg) by mouth every 6 hours as needed for anxiety or other (pain)    Acute abdominal pain       leuprolide 11.25 MG kit    LUPRON DEPOT (3-MONTH)    1 each    Inject 11.25 mg into the muscle every 3 months    Endometriosis       levalbuterol 45 MCG/ACT Inhaler    XOPENEX HFA    1 Inhaler    Inhale 2 puffs into the lungs every 6 hours as needed for shortness of breath / dyspnea    Wheeze       LORazepam 0.5 MG tablet    ATIVAN    10 tablet    Take 1 tablet (0.5 mg) by mouth 2 times daily as needed (nausea, do not take with pain medicine, do not drive after taking)    Cyclical vomiting with nausea, intractability of vomiting not specified       multivitamins with minerals Liqd liquid     1 Bottle    15 mLs by Per Feeding Tube route daily    Abdominal pain, epigastric       norethindrone 5 MG tablet    AYGESTIN    90 tablet    Take 1 tablet (5 mg) by mouth daily    Endometriosis       nortriptyline 10 MG capsule    PAMELOR    120 capsule    Take 3 capsules (30 mg) by mouth At Bedtime    Right upper quadrant abdominal pain       ondansetron 4 MG ODT tab    ZOFRAN ODT    30 tablet    Take 1 tablet (4 mg) by mouth every 8 hours as needed for nausea or vomiting    Intractable cyclical vomiting with nausea       order for DME     1 Units    Equipment being ordered: TENS with wire and electrode.    Chronic abdominal pain       oxyCODONE 5 MG/5ML solution    ROXICODONE    560 mL    Take 10 mL (10 mg) by mouth every 6 hours as needed, maximum 40 mL per day.    Abdominal pain, generalized, Chronic abdominal pain       pantoprazole 40 MG EC tablet    PROTONIX    30 tablet    Take 1 tablet (40 mg) by mouth 2 times daily (before meals)    Right upper quadrant abdominal pain, Erosive gastritis       polyethylene glycol Packet    MIRALAX/GLYCOLAX    7 packet    Take 17 g by mouth daily    Right upper quadrant abdominal pain       RIZATRIPTAN BENZOATE PO      Take 5  mg by mouth once as needed        senna-docusate 8.6-50 MG per tablet    SENOKOT-S;PERICOLACE    100 tablet    Take 1 tablet by mouth 2 times daily as needed for constipation    Right upper quadrant abdominal pain       sodium bicarbonate 325 MG tablet     120 tablet    1 tablet (325 mg) by Per Feeding Tube route 4 times daily    Abdominal pain, epigastric, Intractable cyclical vomiting with nausea       * Notice:  This list has 2 medication(s) that are the same as other medications prescribed for you. Read the directions carefully, and ask your doctor or other care provider to review them with you.

## 2018-04-11 NOTE — PATIENT INSTRUCTIONS
1. Continue taking medications as prescribed.      2. We will discuss the possibility of a TAP block at your next visit.     Follow up: 2 weeks for med check and refill       To speak with a nurse, schedule/reschedule/cancel a clinic appointment, or request a medication refill call: [phone]     You can also reach us by mNectar: [email or web address]    For refills, please call on Monday, 1 week before your medication runs out. The doctors are not always in clinic, so this gives us time to get your prescriptions ready.  Please let us know the name of the medication you are requesting a refill of.

## 2018-04-11 NOTE — MR AVS SNAPSHOT
After Visit Summary   4/11/2018    Alexandra Melgoza    MRN: [id number]           Patient Information     Date Of Birth          1995        Visit Information        Provider Department      4/11/2018 7:00 AM Leonardo Wang MD [facility]        Today's Diagnoses     Abdominal pain, generalized        Chronic abdominal pain          Care Instructions    1. Continue taking medications as prescribed.      2. We will discuss the possibility of a TAP block at your next visit.     Follow up: 2 weeks for med check and refill       To speak with a nurse, schedule/reschedule/cancel a clinic appointment, or request a medication refill call: [phone]     You can also reach us by UTILICASE: [email or web address]    For refills, please call on Monday, 1 week before your medication runs out. The doctors are not always in clinic, so this gives us time to get your prescriptions ready.  Please let us know the name of the medication you are requesting a refill of.                                     Follow-ups after your visit        Your next 10 appointments already scheduled     Apr 11, 2018 10:00 AM CDT   (Arrive by 9:45 AM)   Return Visit with Quyen Lennon, PhD   [facility] ([facility])    [address]  4th Floor  [facility] [address] [phone]            Apr 11, 2018 12:00 PM CDT   Infusion 120 with UC SPEC INFUSION, UC 41 ATC   [facility] Specialty and Procedure ([facility])    [address]  [address]  [facility] [id number] [phone]            Apr 13, 2018  2:00 PM CDT   Infusion 120 with UC SPEC INFUSION, UC 45 ATC   [facility] Procedure ([facility])    [address]  [address]  [facility] [id number] [phone]             Apr 18, 2018 10:00 AM CDT   (Arrive by 9:45 AM)   Return Visit with Quyen Lennon, PhD   [facility] ([facility])    [address] [place] [address] [phone]            Apr 23, 2018  2:30 PM CDT   (Arrive by 2:15 PM)   Return Visit with Leonardo Wang MD   [facility] ([facility])    [address] [place] [address] [phone]            Jun 06, 2018 12:00 PM CDT   (Arrive by 11:45 AM)   Return Visit with Campbell Narayanan MD   [facility] Biliary ([facility])    [address] [place] [address] [phone]            Jun 11, 2018  2:40 PM CDT   (Arrive by 2:25 PM)   Return Visit with Quyen Baez MD   [facility] ([facility])    [address] [place] [address] [phone]            Jul 26, 2018 12:30 PM CDT   (Arrive by 12:15 PM)   POSTURAL ORTHOSTATIC SYCOPE with Davidson Mitchell MD   [facility] ([facility])    [address] [place] [address] [phone]              Who to contact     Please call your clinic at [phone] to:    Ask questions about your health    Make or cancel appointments    Discuss your medicines    Learn about your test results    Speak to your doctor            Additional Information About Your Visit        MyChart Information     [employer]art gives you secure access to your electronic health record. If you see a primary care provider, you can also send messages to your care team and make appointments. If you have questions, please call your primary care clinic.  If you do not have a primary care provider, please call [phone] and they will assist you.      Beau is an  "electronic gateway that provides easy, online access to your medical records. With Fixber, you can request a clinic appointment, read your test results, renew a prescription or communicate with your care team.     To access your existing account, please contact your [facility] or call [phone] for assistance.        Care EveryWhere ID     This is your Care EveryWhere ID. This could be used by other organizations to access your [place] medical records  [id number]        Your Vitals Were     Pulse Respirations Height BMI (Body Mass Index)          115 16 1.676 m (5' 6\") 24.21 kg/m2         Blood Pressure from Last 3 Encounters:   04/11/18 114/74   04/09/18 (!) 130/99   04/06/18 118/79    Weight from Last 3 Encounters:   04/11/18 68 kg (150 lb)   04/04/18 68.4 kg (150 lb 11.2 oz)   04/03/18 65.8 kg (145 lb)              Today, you had the following     No orders found for display         Today's Medication Changes          These changes are accurate as of 4/11/18  7:41 AM.  If you have any questions, ask your nurse or doctor.               These medicines have changed or have updated prescriptions.        Dose/Directions    nortriptyline 10 MG capsule   Commonly known as:  PAMELOR   This may have changed:  how much to take   Used for:  Right upper quadrant abdominal pain        Dose:  30 mg   Take 3 capsules (30 mg) by mouth At Bedtime   Quantity:  120 capsule   Refills:  0            Where to get your medicines      Some of these will need a paper prescription and others can be bought over the counter.  Ask your nurse if you have questions.     Bring a paper prescription for each of these medications     oxyCODONE 5 MG/5ML solution                Primary Care Provider Office Phone # Fax #    Quyen Baez [phone]       [address] [address]        Equal Access to Services     RACHAEL MINER: alcon Orta " kiran davidnicci verasaysha admaki sykes. So [facility] [phone].    ATENCIÓN: Si ishan kilpatrick, tiene a quijano disposición servicios gratuitos de asistencia lingüística. Panfilo al [phone].    We comply with applicable federal civil rights laws and [place] laws. We do not discriminate on the basis of race, color, national origin, age, disability, sex, sexual orientation, or gender identity.            Thank you!     Thank you for choosing [facility]  for your care. Our goal is always to provide you with excellent care. Hearing back from our patients is one way we can continue to improve our services. Please take a few minutes to complete the written survey that you may receive in the mail after your visit with us. Thank you!             Your Updated Medication List - Protect others around you: Learn how to safely use, store and throw away your medicines at www.disposemymeds.org.          This list is accurate as of 4/11/18  7:41 AM.  Always use your most recent med list.                   Brand Name Dispense Instructions for use Diagnosis    acetaminophen 32 mg/mL solution    TYLENOL     30.45 mLs (975 mg) by Per J Tube route every 8 hours        * amylase-lipase-protease 28364 units Cpep    ZENPEP    180 capsule    Take 2-3 capsules (40,000-60,000 Units) by mouth Take with snacks or supplements (Snacks)    Idiopathic chronic pancreatitis (H)       * amylase-lipase-protease 73724 units Cpep    ZENPEP    180 capsule    Take 5 capsules (100,000 Units) by mouth 4 times daily (with meals and nightly)    Right upper quadrant abdominal pain       blood glucose monitoring test strip    no brand specified    100 strip    One Touch Ultra 2 Strips, Use to test blood sugars 2 times daily or as directed    Hypoglycemia       clotrimazole-betamethasone cream    LOTRISONE    15 g    Apply topically 2 times daily    Rash and nonspecific skin eruption        fluticasone 50 MCG/ACT spray    FLONASE    16 g    Spray 2 sprays into both nostrils daily    Nasal congestion       gabapentin 250 MG/5ML solution    NEURONTIN      Chronic abdominal pain, Sphincter of Oddi dysfunction, Cyclical vomiting with nausea, intractability of vomiting not specified, Pain around percutaneous endoscopic gastrostomy (PEG) tube site, sequela       hydrOXYzine 10 MG/5ML syrup    ATARAX    1500 mL    Take 12.5 mLs (25 mg) by mouth every 6 hours as needed for anxiety or other (pain)    Acute abdominal pain       leuprolide 11.25 MG kit    LUPRON DEPOT (3-MONTH)    1 each    Inject 11.25 mg into the muscle every 3 months    Endometriosis       levalbuterol 45 MCG/ACT Inhaler    XOPENEX HFA    1 Inhaler    Inhale 2 puffs into the lungs every 6 hours as needed for shortness of breath / dyspnea    Wheeze       LORazepam 0.5 MG tablet    ATIVAN    10 tablet    Take 1 tablet (0.5 mg) by mouth 2 times daily as needed (nausea, do not take with pain medicine, do not drive after taking)    Cyclical vomiting with nausea, intractability of vomiting not specified       multivitamins with minerals Liqd liquid     1 Bottle    15 mLs by Per Feeding Tube route daily    Abdominal pain, epigastric       norethindrone 5 MG tablet    AYGESTIN    90 tablet    Take 1 tablet (5 mg) by mouth daily    Endometriosis       nortriptyline 10 MG capsule    PAMELOR    120 capsule    Take 3 capsules (30 mg) by mouth At Bedtime    Right upper quadrant abdominal pain       ondansetron 4 MG ODT tab    ZOFRAN ODT    30 tablet    Take 1 tablet (4 mg) by mouth every 8 hours as needed for nausea or vomiting    Intractable cyclical vomiting with nausea       order for DME     1 Units    Equipment being ordered: TENS with wire and electrode.    Chronic abdominal pain       oxyCODONE 5 MG/5ML solution    ROXICODONE    560 mL    Take 10 mL (10 mg) by mouth every 6 hours as needed, maximum 40 mL per day.    Abdominal pain, generalized,  Chronic abdominal pain       pantoprazole 40 MG EC tablet    PROTONIX    30 tablet    Take 1 tablet (40 mg) by mouth 2 times daily (before meals)    Right upper quadrant abdominal pain, Erosive gastritis       polyethylene glycol Packet    MIRALAX/GLYCOLAX    7 packet    Take 17 g by mouth daily    Right upper quadrant abdominal pain       RIZATRIPTAN BENZOATE PO      Take 5 mg by mouth once as needed        senna-docusate 8.6-50 MG per tablet    SENOKOT-S;PERICOLACE    100 tablet    Take 1 tablet by mouth 2 times daily as needed for constipation    Right upper quadrant abdominal pain       sodium bicarbonate 325 MG tablet     120 tablet    1 tablet (325 mg) by Per Feeding Tube route 4 times daily    Abdominal pain, epigastric, Intractable cyclical vomiting with nausea       * Notice:  This list has 2 medication(s) that are the same as other medications prescribed for you. Read the directions carefully, and ask your doctor or other care provider to review them with you.

## 2018-04-13 ENCOUNTER — TELEPHONE (OUTPATIENT)
Dept: INTERNAL MEDICINE | Facility: CLINIC | Age: 23
End: 2018-04-13

## 2018-04-13 ENCOUNTER — INFUSION THERAPY VISIT (OUTPATIENT)
Dept: INFUSION THERAPY | Facility: CLINIC | Age: 23
End: 2018-04-13
Attending: INTERNAL MEDICINE
Payer: COMMERCIAL

## 2018-04-13 ENCOUNTER — OFFICE VISIT (OUTPATIENT)
Dept: FAMILY MEDICINE | Facility: CLINIC | Age: 23
End: 2018-04-13
Payer: COMMERCIAL

## 2018-04-13 VITALS
WEIGHT: 150 LBS | DIASTOLIC BLOOD PRESSURE: 79 MMHG | BODY MASS INDEX: 24.21 KG/M2 | OXYGEN SATURATION: 100 % | HEART RATE: 106 BPM | RESPIRATION RATE: 20 BRPM | SYSTOLIC BLOOD PRESSURE: 132 MMHG

## 2018-04-13 VITALS — DIASTOLIC BLOOD PRESSURE: 89 MMHG | RESPIRATION RATE: 16 BRPM | SYSTOLIC BLOOD PRESSURE: 129 MMHG | TEMPERATURE: 99.3 F

## 2018-04-13 DIAGNOSIS — R30.0 DYSURIA: Primary | ICD-10-CM

## 2018-04-13 DIAGNOSIS — F41.1 ANXIETY STATE: ICD-10-CM

## 2018-04-13 DIAGNOSIS — R10.9 ABDOMINAL PAIN: ICD-10-CM

## 2018-04-13 DIAGNOSIS — R11.15 INTRACTABLE CYCLICAL VOMITING WITH NAUSEA: ICD-10-CM

## 2018-04-13 DIAGNOSIS — N39.41 URGE INCONTINENCE: ICD-10-CM

## 2018-04-13 DIAGNOSIS — Z98.890 S/P ERCP: ICD-10-CM

## 2018-04-13 DIAGNOSIS — R52 PAIN: ICD-10-CM

## 2018-04-13 DIAGNOSIS — G43.A0 CYCLICAL VOMITING WITH NAUSEA, INTRACTABILITY OF VOMITING NOT SPECIFIED: Primary | ICD-10-CM

## 2018-04-13 DIAGNOSIS — R10.9 ACUTE ABDOMINAL PAIN: ICD-10-CM

## 2018-04-13 DIAGNOSIS — E16.2 HYPOGLYCEMIA: ICD-10-CM

## 2018-04-13 DIAGNOSIS — R21 RASH: ICD-10-CM

## 2018-04-13 DIAGNOSIS — F33.1 MAJOR DEPRESSIVE DISORDER, RECURRENT EPISODE, MODERATE (H): ICD-10-CM

## 2018-04-13 DIAGNOSIS — R10.13 ABDOMINAL PAIN, EPIGASTRIC: ICD-10-CM

## 2018-04-13 DIAGNOSIS — K86.1 CHRONIC PANCREATITIS, UNSPECIFIED PANCREATITIS TYPE (H): ICD-10-CM

## 2018-04-13 LAB
ALBUMIN UR-MCNC: NEGATIVE MG/DL
APPEARANCE UR: CLEAR
BILIRUB UR QL STRIP: NEGATIVE
COLOR UR AUTO: ABNORMAL
GLUCOSE UR STRIP-MCNC: NEGATIVE MG/DL
HGB UR QL STRIP: NEGATIVE
KETONES UR STRIP-MCNC: NEGATIVE MG/DL
LEUKOCYTE ESTERASE UR QL STRIP: NEGATIVE
MUCOUS THREADS #/AREA URNS LPF: PRESENT /LPF
NITRATE UR QL: NEGATIVE
PH UR STRIP: 7 PH (ref 5–7)
RBC #/AREA URNS AUTO: <1 /HPF (ref 0–2)
SOURCE: ABNORMAL
SP GR UR STRIP: 1.01 (ref 1–1.03)
UROBILINOGEN UR STRIP-MCNC: 0 MG/DL (ref 0–2)
WBC #/AREA URNS AUTO: 1 /HPF (ref 0–5)

## 2018-04-13 PROCEDURE — 96374 THER/PROPH/DIAG INJ IV PUSH: CPT

## 2018-04-13 PROCEDURE — 96376 TX/PRO/DX INJ SAME DRUG ADON: CPT

## 2018-04-13 PROCEDURE — 25000128 H RX IP 250 OP 636: Mod: ZF | Performed by: INTERNAL MEDICINE

## 2018-04-13 PROCEDURE — 96361 HYDRATE IV INFUSION ADD-ON: CPT

## 2018-04-13 PROCEDURE — 96375 TX/PRO/DX INJ NEW DRUG ADDON: CPT

## 2018-04-13 RX ORDER — ONDANSETRON 2 MG/ML
4 INJECTION INTRAMUSCULAR; INTRAVENOUS ONCE
Status: CANCELLED
Start: 2018-04-13 | End: 2018-04-13

## 2018-04-13 RX ORDER — CEPHALEXIN 250 MG/5ML
POWDER, FOR SUSPENSION ORAL
Qty: 210 ML | Refills: 0 | Status: ON HOLD | OUTPATIENT
Start: 2018-04-13 | End: 2018-12-11

## 2018-04-13 RX ORDER — HEPARIN SODIUM (PORCINE) LOCK FLUSH IV SOLN 100 UNIT/ML 100 UNIT/ML
500 SOLUTION INTRAVENOUS EVERY 8 HOURS
Status: CANCELLED
Start: 2018-04-13

## 2018-04-13 RX ORDER — HEPARIN SODIUM (PORCINE) LOCK FLUSH IV SOLN 100 UNIT/ML 100 UNIT/ML
500 SOLUTION INTRAVENOUS EVERY 8 HOURS
Status: DISCONTINUED | OUTPATIENT
Start: 2018-04-13 | End: 2018-04-13 | Stop reason: HOSPADM

## 2018-04-13 RX ORDER — DIPHENHYDRAMINE HYDROCHLORIDE 50 MG/ML
25 INJECTION INTRAMUSCULAR; INTRAVENOUS DAILY PRN
Status: DISCONTINUED | OUTPATIENT
Start: 2018-04-13 | End: 2018-04-13 | Stop reason: HOSPADM

## 2018-04-13 RX ORDER — DIPHENHYDRAMINE HYDROCHLORIDE 50 MG/ML
25 INJECTION INTRAMUSCULAR; INTRAVENOUS ONCE
Status: CANCELLED
Start: 2018-04-13 | End: 2018-04-13

## 2018-04-13 RX ORDER — DIPHENHYDRAMINE HYDROCHLORIDE 50 MG/ML
25 INJECTION INTRAMUSCULAR; INTRAVENOUS DAILY PRN
Status: CANCELLED
Start: 2018-04-13

## 2018-04-13 RX ORDER — DIPHENHYDRAMINE HYDROCHLORIDE 50 MG/ML
25 INJECTION INTRAMUSCULAR; INTRAVENOUS ONCE
Status: COMPLETED | OUTPATIENT
Start: 2018-04-13 | End: 2018-04-13

## 2018-04-13 RX ORDER — ONDANSETRON 2 MG/ML
4 INJECTION INTRAMUSCULAR; INTRAVENOUS DAILY PRN
Status: DISCONTINUED | OUTPATIENT
Start: 2018-04-13 | End: 2018-04-13 | Stop reason: HOSPADM

## 2018-04-13 RX ORDER — ONDANSETRON 2 MG/ML
4 INJECTION INTRAMUSCULAR; INTRAVENOUS DAILY PRN
Status: CANCELLED
Start: 2018-04-13

## 2018-04-13 RX ORDER — ONDANSETRON 2 MG/ML
4 INJECTION INTRAMUSCULAR; INTRAVENOUS ONCE
Status: COMPLETED | OUTPATIENT
Start: 2018-04-13 | End: 2018-04-13

## 2018-04-13 RX ADMIN — SODIUM CHLORIDE, PRESERVATIVE FREE 500 UNITS: 5 INJECTION INTRAVENOUS at 16:44

## 2018-04-13 RX ADMIN — ONDANSETRON 4 MG: 2 INJECTION INTRAMUSCULAR; INTRAVENOUS at 14:43

## 2018-04-13 RX ADMIN — ONDANSETRON 4 MG: 2 INJECTION INTRAMUSCULAR; INTRAVENOUS at 16:41

## 2018-04-13 RX ADMIN — DIPHENHYDRAMINE HYDROCHLORIDE 25 MG: 50 INJECTION, SOLUTION INTRAMUSCULAR; INTRAVENOUS at 16:40

## 2018-04-13 RX ADMIN — DIPHENHYDRAMINE HYDROCHLORIDE 25 MG: 50 INJECTION, SOLUTION INTRAMUSCULAR; INTRAVENOUS at 14:44

## 2018-04-13 RX ADMIN — SODIUM CHLORIDE, POTASSIUM CHLORIDE, SODIUM LACTATE AND CALCIUM CHLORIDE 2000 ML: 600; 310; 30; 20 INJECTION, SOLUTION INTRAVENOUS at 14:40

## 2018-04-13 ASSESSMENT — PAIN SCALES - GENERAL: PAINLEVEL: SEVERE PAIN (6)

## 2018-04-13 NOTE — MR AVS SNAPSHOT
After Visit Summary   4/13/2018    Alexandra Melgoza    MRN: [id number]           Patient Information     Date Of Birth          1995        Visit Information        Provider Department      4/13/2018 3:35 PM Britton Andrews MD [facility]        Today's Diagnoses     Dysuria    -  1      Care Instructions    [facility]: [phone]     [facility] Medication Refill Request Information:  * Please contact your pharmacy regarding ANY request for medication refills.  ** [facility] Prescription Fax = [phone]  * Please allow 3 business days for routine medication refills.  * Please allow 5 business days for controlled substance medication refills.     Primary Care Center Test Result notification information:  *You will be notified with in 7-10 days of your appointment day regarding the results of your test.  If you are on MyChart you will be notified as soon as the provider has reviewed the results and signed off on them.          Follow-ups after your visit        Your next 10 appointments already scheduled     Apr 25, 2018  8:00 AM CDT   Infusion 120 with UC SPEC INFUSION, UC 49 ATC   [facility] ([facility])    [address] [place] [address]  [address]  [facility] [address] [phone]            Apr 25, 2018  9:00 AM CDT   (Arrive by 8:45 AM)   Return Visit with Quyen Lennon, PhD   [facility] ([facility])    [address] [place] [address]  4th Floor  [facility] [address] [phone]            Apr 28, 2018 12:00 PM CDT   Infusion 120 with UC SPEC INFUSION, UC 42 ATC   [facility] ([facility])    [address] [place] [address]  [address]  [facility] [address]   [phone]            Apr 30, 2018  9:00 AM CDT   (Arrive by 8:45 AM)   Return Visit with Mirza WEBSTER  MD Raquel   [facility] ([facility])    [address]  4th Floor  [facility] [id number] [phone]            Apr 30, 2018  2:00 PM CDT   Infusion 120 with UC SPEC INFUSION, UC 43 ATC   [facility] ([facility])    [address]  [address]  [facility] [id number] [phone]            May 02, 2018  3:00 PM CDT   Infusion 120 with UC SPEC INFUSION, UC 49 ATC   [facility] ([facility])    [address]  [address]  [facility] [id number] [phone]              Who to contact     Please call your clinic at [phone] to:    Ask questions about your health    Make or cancel appointments    Discuss your medicines    Learn about your test results    Speak to your doctor            Additional Information About Your Visit        [facility] Information     [email or web address] gives you secure access to your electronic health record. If you see a primary care provider, you can also send messages to your care team and make appointments. If you have questions, please call your primary care clinic.  If you do not have a primary care provider, please call [phone] and they will assist you.      [email or web address] is an electronic gateway that provides easy, online access to your medical records. With [email or web address], you can request a clinic appointment, read your test results, renew a prescription or communicate with your care team.     To access your existing account, please contact your [facility] or call [phone] for assistance.        Care EveryWhere ID     This is your Care EveryWhere ID. This could be used by other organizations to access your [place] medical records  [id number]        Your Vitals Were     Pulse Respirations Pulse Oximetry BMI (Body Mass Index)          106 20 100% 24.21  kg/m2         Blood Pressure from Last 3 Encounters:   04/23/18 126/81   04/23/18 129/82   04/21/18 113/57    Weight from Last 3 Encounters:   04/13/18 150 lb (68 kg)   04/11/18 150 lb (68 kg)   04/04/18 150 lb 11.2 oz (68.4 kg)              We Performed the Following     UA with Microscopic reflex to Culture          Today's Medication Changes          These changes are accurate as of 4/13/18 11:59 PM.  If you have any questions, ask your nurse or doctor.               Start taking these medicines.        Dose/Directions    cephalexin 250 MG/5ML suspension   Commonly known as:  KEFLEX   Used for:  Dysuria   Started by:  Britton Andrews MD        Take 10 ml per GJ tube tid   Quantity:  210 mL   Refills:  0         These medicines have changed or have updated prescriptions.        Dose/Directions    nortriptyline 10 MG capsule   Commonly known as:  PAMELOR   This may have changed:  how much to take   Used for:  Right upper quadrant abdominal pain        Dose:  30 mg   Take 3 capsules (30 mg) by mouth At Bedtime   Quantity:  120 capsule   Refills:  0            Where to get your medicines      These medications were sent to [facility] [id number] [place] [address] [address] AT [facility] [address], [facility] [address]    Hours:  called pharm.  they do accept faxes Phone:  [phone]     cephalexin 250 MG/5ML suspension                Primary Care Provider Office Phone # Fax #    Quyen Baez [phone]       [address] [address]        Equal Access to Services     [facility] ELI AH: Hadii angelina kelley hadashcaleb Somalini, waaxda luqadaha, qaybta kaalmada jacque, maki [place]. So [facility] [phone].    ATENCIÓN: Si habla español, tiene a quijano disposición servicios gratuitos de asistencia lingüística. Llame al [phone].    We comply with applicable federal civil rights laws and  [place] laws. We do not discriminate on the basis of race, color, national origin, age, disability, sex, sexual orientation, or gender identity.            Thank you!     Thank you for choosing [facility]  for your care. Our goal is always to provide you with excellent care. Hearing back from our patients is one way we can continue to improve our services. Please take a few minutes to complete the written survey that you may receive in the mail after your visit with us. Thank you!             Your Updated Medication List - Protect others around you: Learn how to safely use, store and throw away your medicines at www.disposemymeds.org.          This list is accurate as of 4/13/18 11:59 PM.  Always use your most recent med list.                   Brand Name Dispense Instructions for use Diagnosis    acetaminophen 32 mg/mL solution    TYLENOL     30.45 mLs (975 mg) by Per J Tube route every 8 hours        * amylase-lipase-protease 12518 units Cpep    ZENPEP    180 capsule    Take 2-3 capsules (40,000-60,000 Units) by mouth Take with snacks or supplements (Snacks)    Idiopathic chronic pancreatitis (H)       * amylase-lipase-protease 97373 units Cpep    ZENPEP    180 capsule    Take 5 capsules (100,000 Units) by mouth 4 times daily (with meals and nightly)    Right upper quadrant abdominal pain       blood glucose monitoring test strip    no brand specified    100 strip    One Touch Ultra 2 Strips, Use to test blood sugars 2 times daily or as directed    Hypoglycemia       cephalexin 250 MG/5ML suspension    KEFLEX    210 mL    Take 10 ml per GJ tube tid    Dysuria       clotrimazole-betamethasone cream    LOTRISONE    15 g    Apply topically 2 times daily    Rash and nonspecific skin eruption       fluticasone 50 MCG/ACT spray    FLONASE    16 g    Spray 2 sprays into both nostrils daily    Nasal congestion       gabapentin 250 MG/5ML solution    NEURONTIN      Chronic abdominal pain, Sphincter of  Oddi dysfunction, Cyclical vomiting with nausea, intractability of vomiting not specified, Pain around percutaneous endoscopic gastrostomy (PEG) tube site, sequela       hydrOXYzine 10 MG/5ML syrup    ATARAX    1500 mL    Take 12.5 mLs (25 mg) by mouth every 6 hours as needed for anxiety or other (pain)    Acute abdominal pain       leuprolide 11.25 MG kit    LUPRON DEPOT (3-MONTH)    1 each    Inject 11.25 mg into the muscle every 3 months    Endometriosis       levalbuterol 45 MCG/ACT Inhaler    XOPENEX HFA    1 Inhaler    Inhale 2 puffs into the lungs every 6 hours as needed for shortness of breath / dyspnea    Wheeze       LORazepam 0.5 MG tablet    ATIVAN    10 tablet    Take 1 tablet (0.5 mg) by mouth 2 times daily as needed (nausea, do not take with pain medicine, do not drive after taking)    Cyclical vomiting with nausea, intractability of vomiting not specified       multivitamins with minerals Liqd liquid     1 Bottle    15 mLs by Per Feeding Tube route daily    Abdominal pain, epigastric       norethindrone 5 MG tablet    AYGESTIN    90 tablet    Take 1 tablet (5 mg) by mouth daily    Endometriosis       nortriptyline 10 MG capsule    PAMELOR    120 capsule    Take 3 capsules (30 mg) by mouth At Bedtime    Right upper quadrant abdominal pain       ondansetron 4 MG ODT tab    ZOFRAN ODT    30 tablet    Take 1 tablet (4 mg) by mouth every 8 hours as needed for nausea or vomiting    Intractable cyclical vomiting with nausea       order for DME     1 Units    Equipment being ordered: TENS with wire and electrode.    Chronic abdominal pain       pantoprazole 40 MG EC tablet    PROTONIX    30 tablet    Take 1 tablet (40 mg) by mouth 2 times daily (before meals)    Right upper quadrant abdominal pain, Erosive gastritis       polyethylene glycol Packet    MIRALAX/GLYCOLAX    7 packet    Take 17 g by mouth daily    Right upper quadrant abdominal pain       RIZATRIPTAN BENZOATE PO      Take 5 mg by mouth once as  needed        senna-docusate 8.6-50 MG per tablet    SENOKOT-S;PERICOLACE    100 tablet    Take 1 tablet by mouth 2 times daily as needed for constipation    Right upper quadrant abdominal pain       sodium bicarbonate 325 MG tablet     120 tablet    1 tablet (325 mg) by Per Feeding Tube route 4 times daily    Abdominal pain, epigastric, Intractable cyclical vomiting with nausea       * Notice:  This list has 2 medication(s) that are the same as other medications prescribed for you. Read the directions carefully, and ask your doctor or other care provider to review them with you.

## 2018-04-13 NOTE — PROGRESS NOTES
[facility]  Primary Care Center   Britton Andrews MD  04/13/2018      Chief Complaint:   UTI     History of Present Illness:   Alexandra Melgoza is a 22 year old female with a complex medical history including pancreatitis, sphincter of Oddi dysfunction, s/p cholecystectomy, and chronic abdominal pain who presents for evaluation of a UTI.     She has been experiencing UTI-like symptoms for the past few days. She reports a low grade fever up to 101 degrees for which she has been taking tylenol and motrin. She also reports dysuria with increased urgency. She endorses nausea and vomiting. She denies hematuria or suspicion for STD.  She has had UTIs in the past which were treated successfully with cephalexin.  She has had kidney stones and kidney infections in the past, which presented differently so she is not concerned for either.    She also reports swelling and drainage from her GJ tube button since Monday. She got a button about 3 weeks ago.         Immunization History   Administered Date(s) Administered     HPV 02/14/2011     Influenza (IIV3) PF 12/02/2015, 09/01/2016, 10/03/2017          Depression - PHQ-2 Score:   PHQ-2 ( 1999 Pf[employer]) 3/26/2018 3/12/2018   Q1: Little interest or pleasure in doing things 0 1   Q2: Feeling down, depressed or hopeless 0 0   PHQ-2 Score 0 1   Q1: Little interest or pleasure in doing things Not at all Several days   Q2: Feeling down, depressed or hopeless Not at all Not at all   PHQ-2 Score 0 1          Review of Systems:   Pertinent items are noted in HPI, remainder of complete ROS is negative.      Active Medications:      oxyCODONE (ROXICODONE) 5 MG/5ML solution, Take 10 mL (10 mg) by mouth every 6 hours as needed, maximum 40 mL per day., Disp: 560 mL, Rfl: 0     ondansetron (ZOFRAN ODT) 4 MG ODT tab, Take 1 tablet (4 mg) by mouth every 8 hours as needed for nausea or vomiting, Disp: 30 tablet, Rfl: 2     gabapentin (NEURONTIN) 250 MG/5ML solution, , Disp: , Rfl: 2      clotrimazole-betamethasone (LOTRISONE) cream, Apply topically 2 times daily, Disp: 15 g, Rfl: 0     LORazepam (ATIVAN) 0.5 MG tablet, Take 1 tablet (0.5 mg) by mouth 2 times daily as needed (nausea, do not take with pain medicine, do not drive after taking), Disp: 10 tablet, Rfl: 0     blood glucose monitoring (NO BRAND SPECIFIED) test strip, One Touch Ultra 2 Strips, Use to test blood sugars 2 times daily or as directed, Disp: 100 strip, Rfl: 3     hydrOXYzine (ATARAX) 10 MG/5ML syrup, Take 12.5 mLs (25 mg) by mouth every 6 hours as needed for anxiety or other (pain), Disp: 1500 mL, Rfl: 2     acetaminophen (TYLENOL) 32 mg/mL solution, 30.45 mLs (975 mg) by Per J Tube route every 8 hours, Disp: , Rfl:      sodium bicarbonate 325 MG tablet, 1 tablet (325 mg) by Per Feeding Tube route 4 times daily, Disp: 120 tablet, Rfl: 0     nortriptyline (PAMELOR) 10 MG capsule, Take 3 capsules (30 mg) by mouth At Bedtime (Patient taking differently: Take 20-30 mg by mouth At Bedtime ), Disp: 120 capsule, Rfl: 0     amylase-lipase-protease (ZENPEP) 09615 UNITS CPEP, Take 5 capsules (100,000 Units) by mouth 4 times daily (with meals and nightly), Disp: 180 capsule, Rfl: 1     multivitamins with minerals (CERTAVITE/CEROVITE) LIQD liquid, 15 mLs by Per Feeding Tube route daily, Disp: 1 Bottle, Rfl: 0     order for DME, Equipment being ordered: TENS with wire and electrode., Disp: 1 Units, Rfl: 0     amylase-lipase-protease (ZENPEP) 77173 UNITS CPEP, Take 2-3 capsules (40,000-60,000 Units) by mouth Take with snacks or supplements (Snacks), Disp: 180 capsule, Rfl: 1     senna-docusate (SENOKOT-S;PERICOLACE) 8.6-50 MG per tablet, Take 1 tablet by mouth 2 times daily as needed for constipation, Disp: 100 tablet, Rfl: 0     pantoprazole (PROTONIX) 40 MG EC tablet, Take 1 tablet (40 mg) by mouth 2 times daily (before meals), Disp: 30 tablet, Rfl: 1     polyethylene glycol (MIRALAX/GLYCOLAX) Packet, Take 17 g by mouth daily, Disp: 7  packet, Rfl: 0     norethindrone (AYGESTIN) 5 MG tablet, Take 1 tablet (5 mg) by mouth daily, Disp: 90 tablet, Rfl: 1     fluticasone (FLONASE) 50 MCG/ACT spray, Spray 2 sprays into both nostrils daily, Disp: 16 g, Rfl: 3     levalbuterol (XOPENEX HFA) 45 MCG/ACT Inhaler, Inhale 2 puffs into the lungs every 6 hours as needed for shortness of breath / dyspnea, Disp: 1 Inhaler, Rfl: 1     leuprolide (LUPRON DEPOT) 11.25 MG injection, Inject 11.25 mg into the muscle every 3 months, Disp: 1 each, Rfl: 3     RIZATRIPTAN BENZOATE PO, Take 5 mg by mouth once as needed , Disp: , Rfl:      Allergies:   Amoxicillin-pot clavulanate;   Compazine [prochlorperazine];   Hyoscyamine;   Reglan [metoclopramide hcl];   Zyprexa;   Amitriptyline hcl;   Buspirone;   Cogentin [benztropine];   Cyproheptadine;   Dicyclomine;   Droperidol;   Effexor [venlafaxine];   Food;    Phenergan dm [promethazine-dm];   Promethazine;   Risperidone;   Vistaril;   Augmentin;   Ketamine;   Sorbitol      Past Medical History:  Anxiety  Asthma  Cholecystitis s/p cholecystectomy  Chronic abdominal pain  MRSA  Cyclic vomiting syndrome  Depression  Endometriosis  hypoglycaemia  Migraines  Ovarian cysts  Pancreatitis  Postoperative nausea and vomiting  Pseudoseizures  Somatoform disorder  Sphincter of Oddi dysfunction  Vasovagal syncope  Opioid dependence  Thrombocytopenia  Eating disorder  Myalgia and mositis  Urge incontinence  S/p ERCP     Past Surgical History:  Abdomen surgery, ERCP biliary stents  Cholecystectomy  Insert port vascular access  Biopsy of fallopian tube lesions  Knee surgery   Remove and replace breast implant prosthesis    Family History:   Father - hypertension  Brother - hypoglycemia  Paternal grandmother - depression  Maternal grandmother - allergies  Maternal grandfather - cerebrovascular disease, depression/anxiety, GI disease  Other - diabetes, bipolar disorder, anxiety disorder      Social History:   Patient reports no history of  tobacco use or alcohol consumption.        Physical Exam:   /79 (BP Location: Right arm, Patient Position: Sitting, Cuff Size: Adult Large)  Pulse 106  Resp 20  Wt 68 kg (150 lb)  SpO2 100%  BMI 24.21 kg/m2      Constitutional: Alert. In no distress.  Head: Normocephalic.   Gastrointestinal: No flank tenderness. GJ tube site slight granulation, no erythema. Small sanguineous drainage. Abdomen soft. Non-tender. BS normal. No masses or organomegaly.  Neurologic: Gait normal.   Psychiatric: Mentation appears normal. Normal affect.        not concerned for infection.   Assessment and Plan:  1. Dysuria  Lack of flank pain suggests lower UTI over kidney infection. UA ordered and cephalexin prescribed pending results.   - UA with Micro reflex to Culture  - cephalexin (KEFLEX) 250 MG/5ML suspension  Dispense: 210 mL; Refill: 0  - UA with Microscopic reflex to Culture        Follow-up: Data Unavailable         Scribe Disclosure:   I, Bel Andujar, am serving as a scribe to document services personally performed by Britton Andrews MD at this visit, based upon the provider's statements to me. All documentation has been reviewed by the aforementioned provider prior to being entered into the official medical record.     Portions of this medical record were completed by a scribe. UPON MY REVIEW AND AUTHENTICATION BY ELECTRONIC SIGNATURE, this confirms (a) I performed the applicable clinical services, and (b) the record is accurate.   Britton Andrews MD

## 2018-04-13 NOTE — TELEPHONE ENCOUNTER
Patient has dark urine and painful urination.  Typical symptoms of UTI.  Scheduled in acute visit with provider.Patient verbalizes understanding.  Kathleen Herndon LPN

## 2018-04-13 NOTE — PATIENT INSTRUCTIONS
[facility]: [phone]     [facility] Medication Refill Request Information:  * Please contact your pharmacy regarding ANY request for medication refills.  ** [facility] Prescription Fax = [phone]  * Please allow 3 business days for routine medication refills.  * Please allow 5 business days for controlled substance medication refills.     [facility] Test Result notification information:  *You will be notified with in 7-10 days of your appointment day regarding the results of your test.  If you are on MyChart you will be notified as soon as the provider has reviewed the results and signed off on them.

## 2018-04-13 NOTE — PROGRESS NOTES
Infusion Nursing Note  Alexandra Melgoza [facility]:   Chief Complaint   Patient presents with     Infusion     IVF'S and IV push medications     During today's [facility] appointment, orders from Dr. Narayanan were completed.  Frequency: weekly    Note: patient reports having frequent low blood sugars in the 40's for the past week. Staff message sent to Dr. Narayanan to update and perhaps adding D5LR to therapy plan when she is having low blood sugars like this.     Progress note:  Patient identification verified by name and date of birth.  Assessment completed.  Vitals recorded in Doc Flowsheets.  Patient was provided with education regarding infusion and possible side effects.  Patient verbalized understanding.     Premedications: were not ordered.  Infusion Rates: infusion given over approximately 2 hours. Zofran and benadryl administered IVP ~2 mins x 2 doses.   Labs: were not ordered for this appointment.  Vascular access: port accessed today.  Treatment Conditions: non-applicable.  Patient tolerated infusion: well    Discharge Plan:   Follow up plan of care with: ongoing infusions at [facility]. and primary medical doctor.  Discharge instructions were reviewed with patient.  Patient/representative verbalized understanding of discharge instructions and all questions answered.  Patient discharged from [facility] in stable condition.    Nadeen Conte RN       Administrations This Visit     diphenhydrAMINE (BENADRYL) injection 25 mg     Admin Date Action Dose Route Administered By             04/13/2018 Given 25 mg Intravenous Nadeen Conte RN              Admin Date Action Dose Route Administered By             04/13/2018 Given 25 mg Intravenous Saba Turner RN                    heparin 100 UNIT/ML injection 500 Units     Admin Date Action Dose Route Administered By              04/13/2018 Given 500 Units Intracatheter Saba Turner RN                    lactated ringers BOLUS 1,000-2,000 mL     Admin Date Action Dose Route Administered By             04/13/2018 New Bag 2000 mL Intravenous Nadeen Conte RN                    ondansetron (ZOFRAN) injection 4 mg     Admin Date Action Dose Route Administered By             04/13/2018 Given 4 mg Intravenous Nadeen Conte RN              Admin Date Action Dose Route Administered By             04/13/2018 Given 4 mg Intravenous Saba Turner RN                            /74  Temp 99.3  F (37.4  C) (Oral)  Resp 16

## 2018-04-13 NOTE — NURSING NOTE
Chief Complaint   Patient presents with     UTI     Has had dark urine, burning with urination   Sana Brooks LPN 3:54 PM on 4/13/2018    Will do Health Maintenance at future appointment.Sana Brooks LPN 3:54 PM on 4/13/2018

## 2018-04-13 NOTE — TELEPHONE ENCOUNTER
Patient here getting an infusion and is having UTI symptoms can you place a UA order?  Please call her back

## 2018-04-16 ENCOUNTER — DOCUMENTATION ONLY (OUTPATIENT)
Dept: ANESTHESIOLOGY | Facility: CLINIC | Age: 23
End: 2018-04-16

## 2018-04-16 ENCOUNTER — INFUSION THERAPY VISIT (OUTPATIENT)
Dept: INFUSION THERAPY | Facility: CLINIC | Age: 23
End: 2018-04-16
Attending: INTERNAL MEDICINE
Payer: COMMERCIAL

## 2018-04-16 VITALS
SYSTOLIC BLOOD PRESSURE: 125 MMHG | OXYGEN SATURATION: 100 % | RESPIRATION RATE: 18 BRPM | TEMPERATURE: 97.4 F | DIASTOLIC BLOOD PRESSURE: 86 MMHG

## 2018-04-16 DIAGNOSIS — R52 PAIN: ICD-10-CM

## 2018-04-16 DIAGNOSIS — F41.1 ANXIETY STATE: ICD-10-CM

## 2018-04-16 DIAGNOSIS — R10.9 ACUTE ABDOMINAL PAIN: ICD-10-CM

## 2018-04-16 DIAGNOSIS — R11.15 INTRACTABLE CYCLICAL VOMITING WITH NAUSEA: ICD-10-CM

## 2018-04-16 DIAGNOSIS — G43.A0 CYCLICAL VOMITING WITH NAUSEA, INTRACTABILITY OF VOMITING NOT SPECIFIED: Primary | ICD-10-CM

## 2018-04-16 DIAGNOSIS — N39.41 URGE INCONTINENCE: ICD-10-CM

## 2018-04-16 DIAGNOSIS — R10.9 ABDOMINAL PAIN: ICD-10-CM

## 2018-04-16 DIAGNOSIS — F33.1 MAJOR DEPRESSIVE DISORDER, RECURRENT EPISODE, MODERATE (H): ICD-10-CM

## 2018-04-16 DIAGNOSIS — Z98.890 S/P ERCP: ICD-10-CM

## 2018-04-16 DIAGNOSIS — R21 RASH: ICD-10-CM

## 2018-04-16 DIAGNOSIS — E16.2 HYPOGLYCEMIA: ICD-10-CM

## 2018-04-16 DIAGNOSIS — R10.13 ABDOMINAL PAIN, EPIGASTRIC: ICD-10-CM

## 2018-04-16 DIAGNOSIS — K86.1 CHRONIC PANCREATITIS, UNSPECIFIED PANCREATITIS TYPE (H): ICD-10-CM

## 2018-04-16 PROCEDURE — 96376 TX/PRO/DX INJ SAME DRUG ADON: CPT

## 2018-04-16 PROCEDURE — 96361 HYDRATE IV INFUSION ADD-ON: CPT

## 2018-04-16 PROCEDURE — 25000128 H RX IP 250 OP 636: Mod: ZF | Performed by: INTERNAL MEDICINE

## 2018-04-16 PROCEDURE — 96375 TX/PRO/DX INJ NEW DRUG ADDON: CPT

## 2018-04-16 PROCEDURE — 96374 THER/PROPH/DIAG INJ IV PUSH: CPT

## 2018-04-16 RX ORDER — HEPARIN SODIUM (PORCINE) LOCK FLUSH IV SOLN 100 UNIT/ML 100 UNIT/ML
500 SOLUTION INTRAVENOUS EVERY 8 HOURS
Status: DISCONTINUED | OUTPATIENT
Start: 2018-04-16 | End: 2018-04-16 | Stop reason: HOSPADM

## 2018-04-16 RX ORDER — DIPHENHYDRAMINE HYDROCHLORIDE 50 MG/ML
25 INJECTION INTRAMUSCULAR; INTRAVENOUS DAILY PRN
Status: CANCELLED
Start: 2018-04-16

## 2018-04-16 RX ORDER — DIPHENHYDRAMINE HYDROCHLORIDE 50 MG/ML
25 INJECTION INTRAMUSCULAR; INTRAVENOUS DAILY PRN
Status: DISCONTINUED | OUTPATIENT
Start: 2018-04-16 | End: 2018-04-16 | Stop reason: HOSPADM

## 2018-04-16 RX ORDER — ONDANSETRON 2 MG/ML
4 INJECTION INTRAMUSCULAR; INTRAVENOUS ONCE
Status: COMPLETED | OUTPATIENT
Start: 2018-04-16 | End: 2018-04-16

## 2018-04-16 RX ORDER — DIPHENHYDRAMINE HYDROCHLORIDE 50 MG/ML
25 INJECTION INTRAMUSCULAR; INTRAVENOUS ONCE
Status: COMPLETED | OUTPATIENT
Start: 2018-04-16 | End: 2018-04-16

## 2018-04-16 RX ORDER — DIPHENHYDRAMINE HYDROCHLORIDE 50 MG/ML
25 INJECTION INTRAMUSCULAR; INTRAVENOUS ONCE
Status: CANCELLED
Start: 2018-04-16 | End: 2018-04-16

## 2018-04-16 RX ORDER — ONDANSETRON 2 MG/ML
4 INJECTION INTRAMUSCULAR; INTRAVENOUS DAILY PRN
Status: DISCONTINUED | OUTPATIENT
Start: 2018-04-16 | End: 2018-04-16 | Stop reason: HOSPADM

## 2018-04-16 RX ORDER — HEPARIN SODIUM (PORCINE) LOCK FLUSH IV SOLN 100 UNIT/ML 100 UNIT/ML
500 SOLUTION INTRAVENOUS EVERY 8 HOURS
Status: CANCELLED
Start: 2018-04-16

## 2018-04-16 RX ORDER — ONDANSETRON 2 MG/ML
4 INJECTION INTRAMUSCULAR; INTRAVENOUS DAILY PRN
Status: CANCELLED
Start: 2018-04-16

## 2018-04-16 RX ORDER — ONDANSETRON 2 MG/ML
4 INJECTION INTRAMUSCULAR; INTRAVENOUS ONCE
Status: CANCELLED
Start: 2018-04-16 | End: 2018-04-16

## 2018-04-16 RX ADMIN — ONDANSETRON 4 MG: 2 INJECTION INTRAMUSCULAR; INTRAVENOUS at 15:59

## 2018-04-16 RX ADMIN — ONDANSETRON 4 MG: 2 INJECTION INTRAMUSCULAR; INTRAVENOUS at 17:04

## 2018-04-16 RX ADMIN — DIPHENHYDRAMINE HYDROCHLORIDE 25 MG: 50 INJECTION, SOLUTION INTRAMUSCULAR; INTRAVENOUS at 16:02

## 2018-04-16 RX ADMIN — SODIUM CHLORIDE, POTASSIUM CHLORIDE, SODIUM LACTATE AND CALCIUM CHLORIDE 2000 ML: 600; 310; 30; 20 INJECTION, SOLUTION INTRAVENOUS at 15:56

## 2018-04-16 RX ADMIN — DIPHENHYDRAMINE HYDROCHLORIDE 25 MG: 50 INJECTION, SOLUTION INTRAMUSCULAR; INTRAVENOUS at 17:06

## 2018-04-16 ASSESSMENT — PAIN DESCRIPTION - DESCRIPTORS: DESCRIPTORS: ACHING

## 2018-04-16 NOTE — MR AVS SNAPSHOT
After Visit Summary   4/16/2018    Alexandra Melgoza    MRN: [id number]           Patient Information     Date Of Birth          1995        Visit Information        Provider Department      4/16/2018 4:00 PM UC 45 ATC; UC SPEC INFUSION [facility] Special[facility] Procedure        Today's Diagnoses     Cyclical vomiting with nausea, intractability of vomiting not specified    -  1    Chronic pancreatitis, unspecified pancreatitis type (H)        Pain        Abdominal pain, epigastric        S/P ERCP        Acute abdominal pain        Abdominal pain        Hypoglycemia        Urge incontinence        Major depressive disorder, recurrent episode, moderate (H)        Anxiety state        Rash        Intractable cyclical vomiting with nausea          Care Instructions    Dear Alexandra Melgoza    Thank you for choosing [facility] ([facility]) for your infusion.  The following information is a summary of our appointment as well as important reminders.         We look forward in seeing you on your next appointment here at [facility].  Please don t hesitate to call us at [phone] to reschedule any of your appointments or to speak with one of the [facility] registered nurses.  It was a pleasure taking care of you today.    Sincerely,    [facility]  Specialty Infusion & Procedure Center  [address] [place] [address]  [address]  Phone:  [phone]            Follow-ups after your visit        Your next 10 appointments already scheduled     Apr 23, 2018  2:30 PM CDT   (Arrive by 2:15 PM)   Return Visit with Leonardo Wang MD   [facility] ([facility])    [address] [place] [address] [phone]            Apr 25, 2018  9:00 AM CDT   (Arrive by 8:45 AM)   Return Visit with Quyen Lennon, PhD   [facility] ([facility])    [address]  4th Floor  [facility] [address] [phone]            Jun 06, 2018 12:00 PM CDT   (Arrive by 11:45 AM)   Return Visit with Campbell Narayanan MD   [facility] Pancreas [facility] Biliary ([facility])    [address]  [address]th [facility] [address] [phone]            Jun 11, 2018  2:40 PM CDT   (Arrive by 2:25 PM)   Return Visit with Quyen Baez MD   [facility] ([facility])    [address]  [address]th [facility] [address] [phone]            Jul 26, 2018 12:30 PM CDT   (Arrive by 12:15 PM)   POSTURAL ORTHOSTATIC SYCOPE with Davidson Mitchell MD   [facility] Heart Care ([facility])    [address]  [address]  [facility] [id number] [phone]              Who to contact     If you have questions or need follow up information about today's clinic visit or your schedule please contact [facility] [place] SPECIALTY AND PROCEDURE directly at [phone].  Normal or non-critical lab and imaging results will be communicated to you by [employer] Ahart, letter or phone within 4 business days after the clinic has received the results. If you do not hear from us within 7 days, please contact the clinic through [employer] Ahart or phone. If you have a critical or abnormal lab result, we will notify you by phone as soon as possible.  Submit refill requests through eBoox or call your pharmacy and they will forward the refill request to us. Please allow 3 business days for your refill to be completed.          Additional Information About Your Visit        Green Ahart Information     eBoox gives you secure access to your electronic health record. If you see a primary care provider, you can also send messages to your care team and make appointments. If you have questions,  please call your primary care clinic.  If you do not have a primary care provider, please call [phone] and they will assist you.        Care EveryWhere ID     This is your Care EveryWhere ID. This could be used by other organizations to access your [place] medical records  [id number]        Your Vitals Were     Temperature Respirations Pulse Oximetry             97.4  F (36.3  C) (Oral) 18 100%          Blood Pressure from Last 3 Encounters:   04/16/18 125/86   04/13/18 132/79   04/13/18 129/89    Weight from Last 3 Encounters:   04/13/18 68 kg (150 lb)   04/11/18 68 kg (150 lb)   04/04/18 68.4 kg (150 lb 11.2 oz)              Today, you had the following     No orders found for display         Today's Medication Changes          These changes are accurate as of 4/16/18  6:09 PM.  If you have any questions, ask your nurse or doctor.               These medicines have changed or have updated prescriptions.        Dose/Directions    nortriptyline 10 MG capsule   Commonly known as:  PAMELOR   This may have changed:  how much to take   Used for:  Right upper quadrant abdominal pain        Dose:  30 mg   Take 3 capsules (30 mg) by mouth At Bedtime   Quantity:  120 capsule   Refills:  0                Primary Care Provider Office Phone # Fax #    Quyen Baez [phone]       [address] [address]        Equal Access to Services     [facility]: Hadii angelina joseph Somalini, waaxda luqadaha, qaybta kaalmada jacque, maki gaspar . So [facility] [phone].    ATENCIÓN: Si habla español, tiene a quijano disposición servicios gratuitos de asistencia lingüística. Llame al [phone].    We comply with applicable federal civil rights laws and [place] laws. We do not discriminate on the basis of race, color, national origin, age, disability, sex, sexual orientation, or gender identity.            Thank you!     Thank you for choosing [facility]  for your care. Our goal is always to provide you with excellent care. Hearing back from our patients is one way we can continue to improve our services. Please take a few minutes to complete the written survey that you may receive in the mail after your visit with us. Thank you!             Your Updated Medication List - Protect others around you: Learn how to safely use, store and throw away your medicines at www.disposemymeds.org.          This list is accurate as of 4/16/18  6:09 PM.  Always use your most recent med list.                   Brand Name Dispense Instructions for use Diagnosis    acetaminophen 32 mg/mL solution    TYLENOL     30.45 mLs (975 mg) by Per J Tube route every 8 hours        * amylase-lipase-protease 97795 units Cpep    ZENPEP    180 capsule    Take 2-3 capsules (40,000-60,000 Units) by mouth Take with snacks or supplements (Snacks)    Idiopathic chronic pancreatitis (H)       * amylase-lipase-protease 79733 units Cpep    ZENPEP    180 capsule    Take 5 capsules (100,000 Units) by mouth 4 times daily (with meals and nightly)    Right upper quadrant abdominal pain       blood glucose monitoring test strip    no brand specified    100 strip    One Touch Ultra 2 Strips, Use to test blood sugars 2 times daily or as directed    Hypoglycemia       cephalexin 250 MG/5ML suspension    KEFLEX    210 mL    Take 10 ml per GJ tube tid    Dysuria       clotrimazole-betamethasone cream    LOTRISONE    15 g    Apply topically 2 times daily    Rash and nonspecific skin eruption       fluticasone 50 MCG/ACT spray    FLONASE    16 g    Spray 2 sprays into both nostrils daily    Nasal congestion       gabapentin 250 MG/5ML solution    NEURONTIN      Chronic abdominal pain, Sphincter of Oddi dysfunction, Cyclical vomiting with nausea, intractability of vomiting not specified, Pain around percutaneous endoscopic gastrostomy (PEG) tube site, sequela        hydrOXYzine 10 MG/5ML syrup    ATARAX    1500 mL    Take 12.5 mLs (25 mg) by mouth every 6 hours as needed for anxiety or other (pain)    Acute abdominal pain       leuprolide 11.25 MG kit    LUPRON DEPOT (3-MONTH)    1 each    Inject 11.25 mg into the muscle every 3 months    Endometriosis       levalbuterol 45 MCG/ACT Inhaler    XOPENEX HFA    1 Inhaler    Inhale 2 puffs into the lungs every 6 hours as needed for shortness of breath / dyspnea    Wheeze       LORazepam 0.5 MG tablet    ATIVAN    10 tablet    Take 1 tablet (0.5 mg) by mouth 2 times daily as needed (nausea, do not take with pain medicine, do not drive after taking)    Cyclical vomiting with nausea, intractability of vomiting not specified       multivitamins with minerals Liqd liquid     1 Bottle    15 mLs by Per Feeding Tube route daily    Abdominal pain, epigastric       norethindrone 5 MG tablet    AYGESTIN    90 tablet    Take 1 tablet (5 mg) by mouth daily    Endometriosis       nortriptyline 10 MG capsule    PAMELOR    120 capsule    Take 3 capsules (30 mg) by mouth At Bedtime    Right upper quadrant abdominal pain       ondansetron 4 MG ODT tab    ZOFRAN ODT    30 tablet    Take 1 tablet (4 mg) by mouth every 8 hours as needed for nausea or vomiting    Intractable cyclical vomiting with nausea       order for DME     1 Units    Equipment being ordered: TENS with wire and electrode.    Chronic abdominal pain       oxyCODONE 5 MG/5ML solution    ROXICODONE    560 mL    Take 10 mL (10 mg) by mouth every 6 hours as needed, maximum 40 mL per day.    Abdominal pain, generalized, Chronic abdominal pain       pantoprazole 40 MG EC tablet    PROTONIX    30 tablet    Take 1 tablet (40 mg) by mouth 2 times daily (before meals)    Right upper quadrant abdominal pain, Erosive gastritis       polyethylene glycol Packet    MIRALAX/GLYCOLAX    7 packet    Take 17 g by mouth daily    Right upper quadrant abdominal pain       RIZATRIPTAN BENZOATE PO      Take 5  mg by mouth once as needed        senna-docusate 8.6-50 MG per tablet    SENOKOT-S;PERICOLACE    100 tablet    Take 1 tablet by mouth 2 times daily as needed for constipation    Right upper quadrant abdominal pain       sodium bicarbonate 325 MG tablet     120 tablet    1 tablet (325 mg) by Per Feeding Tube route 4 times daily    Abdominal pain, epigastric, Intractable cyclical vomiting with nausea       * Notice:  This list has 2 medication(s) that are the same as other medications prescribed for you. Read the directions carefully, and ask your doctor or other care provider to review them with you.

## 2018-04-16 NOTE — PROGRESS NOTES
Patient came to the clinic and asked to speak to one of Dr. Wang's nurses.   Pt was tearful and stated that her purse tipped over, and her medication spilt.   Pt stated that over half of the medication is gone.     Pt had her medication bottle with her. It was in a plastic bag and there was a small collection of the medication in the bottom of the bag.    LPN informed pt that they would update Dr. Wang of the situation and that the clinic would get in touch with her with his recommendations.     Pt verbalized understanding.     Abigail Prieto LPN

## 2018-04-16 NOTE — PATIENT INSTRUCTIONS
Dear Alexandra Melgoza    Thank you for choosing [facility] Special[facility] ([facility]) for your infusion.  The following information is a summary of our appointment as well as important reminders.         We look forward in seeing you on your next appointment here at [facility].  Please don t hesitate to call us at [phone] to reschedule any of your appointments or to speak with one of the [facility] registered nurses.  It was a pleasure taking care of you today.    Sincerely,    [facility]  Specialty Infusion & Procedure Center  [address] [place] [address]  [address]  Phone:  [phone]

## 2018-04-17 ENCOUNTER — TELEPHONE (OUTPATIENT)
Dept: ANESTHESIOLOGY | Facility: CLINIC | Age: 23
End: 2018-04-17

## 2018-04-17 NOTE — TELEPHONE ENCOUNTER
[facility] Call Center    Phone Message    May a detailed message be left on voicemail: yes    Reason for Call: Other: Pt's mother Leeann calling back with further question regarding medication.  She states that rx will run out tomorrow and would like a call back as soon as possible.     Action Taken: Message routed to:  [facility] ([facility] Adult Pain Clinic

## 2018-04-17 NOTE — TELEPHONE ENCOUNTER
CHELA updated Dr. Wang of pt coming to clinic stating that they had spilt their medication.     Dr. Wang recommended that because the patient was wanting to decrease this medication in an effort to come off if it, they decrease their current dose of 10 ml every 6 hours- Maximum 40 ml/ml a day...    To a daily maximum dose of 20 mg/ml or 30 mg/ml.     Dr. Wang stated that she should make the medication she has last until her next appointment with him on 4/23/18.    Pt verbalized understanding, and denied further questions.     Abigail Prieto LPN

## 2018-04-18 NOTE — TELEPHONE ENCOUNTER
[facility] Call Center    Phone Message    May a detailed message be left on voicemail: yes    Reason for Call: Other: pt mom Leeann calling back and she has not heard anything back in the 24 hour time frame     Action Taken: Message routed to:  [facility] ([facility]): Pain Clinic

## 2018-04-19 ENCOUNTER — INFUSION THERAPY VISIT (OUTPATIENT)
Dept: INFUSION THERAPY | Facility: CLINIC | Age: 23
End: 2018-04-19
Attending: INTERNAL MEDICINE
Payer: COMMERCIAL

## 2018-04-19 VITALS
TEMPERATURE: 97.4 F | DIASTOLIC BLOOD PRESSURE: 57 MMHG | RESPIRATION RATE: 16 BRPM | OXYGEN SATURATION: 100 % | HEART RATE: 113 BPM | SYSTOLIC BLOOD PRESSURE: 103 MMHG

## 2018-04-19 DIAGNOSIS — G89.29 CHRONIC ABDOMINAL PAIN: Chronic | ICD-10-CM

## 2018-04-19 DIAGNOSIS — G43.A0 CYCLICAL VOMITING WITH NAUSEA, INTRACTABILITY OF VOMITING NOT SPECIFIED: Primary | ICD-10-CM

## 2018-04-19 DIAGNOSIS — E16.2 HYPOGLYCEMIA: ICD-10-CM

## 2018-04-19 DIAGNOSIS — R10.9 ACUTE ABDOMINAL PAIN: ICD-10-CM

## 2018-04-19 DIAGNOSIS — K86.1 CHRONIC PANCREATITIS, UNSPECIFIED PANCREATITIS TYPE (H): ICD-10-CM

## 2018-04-19 DIAGNOSIS — Z98.890 S/P ERCP: ICD-10-CM

## 2018-04-19 DIAGNOSIS — R10.9 ABDOMINAL PAIN: ICD-10-CM

## 2018-04-19 DIAGNOSIS — N39.41 URGE INCONTINENCE: ICD-10-CM

## 2018-04-19 DIAGNOSIS — R10.84 ABDOMINAL PAIN, GENERALIZED: ICD-10-CM

## 2018-04-19 DIAGNOSIS — R52 PAIN: ICD-10-CM

## 2018-04-19 DIAGNOSIS — R11.15 INTRACTABLE CYCLICAL VOMITING WITH NAUSEA: ICD-10-CM

## 2018-04-19 DIAGNOSIS — F33.1 MAJOR DEPRESSIVE DISORDER, RECURRENT EPISODE, MODERATE (H): ICD-10-CM

## 2018-04-19 DIAGNOSIS — R10.13 ABDOMINAL PAIN, EPIGASTRIC: ICD-10-CM

## 2018-04-19 DIAGNOSIS — F41.1 ANXIETY STATE: ICD-10-CM

## 2018-04-19 DIAGNOSIS — R21 RASH: ICD-10-CM

## 2018-04-19 DIAGNOSIS — R10.9 CHRONIC ABDOMINAL PAIN: Chronic | ICD-10-CM

## 2018-04-19 PROCEDURE — 25000128 H RX IP 250 OP 636: Mod: ZF | Performed by: INTERNAL MEDICINE

## 2018-04-19 PROCEDURE — 96376 TX/PRO/DX INJ SAME DRUG ADON: CPT

## 2018-04-19 PROCEDURE — 96375 TX/PRO/DX INJ NEW DRUG ADDON: CPT

## 2018-04-19 PROCEDURE — 96361 HYDRATE IV INFUSION ADD-ON: CPT

## 2018-04-19 PROCEDURE — 96374 THER/PROPH/DIAG INJ IV PUSH: CPT

## 2018-04-19 RX ORDER — ONDANSETRON 2 MG/ML
4 INJECTION INTRAMUSCULAR; INTRAVENOUS DAILY PRN
Status: DISCONTINUED | OUTPATIENT
Start: 2018-04-19 | End: 2018-04-19 | Stop reason: HOSPADM

## 2018-04-19 RX ORDER — DIPHENHYDRAMINE HYDROCHLORIDE 50 MG/ML
25 INJECTION INTRAMUSCULAR; INTRAVENOUS ONCE
Status: CANCELLED
Start: 2018-04-19 | End: 2018-04-19

## 2018-04-19 RX ORDER — DIPHENHYDRAMINE HYDROCHLORIDE 50 MG/ML
25 INJECTION INTRAMUSCULAR; INTRAVENOUS DAILY PRN
Status: CANCELLED
Start: 2018-04-19

## 2018-04-19 RX ORDER — ONDANSETRON 2 MG/ML
4 INJECTION INTRAMUSCULAR; INTRAVENOUS DAILY PRN
Status: CANCELLED
Start: 2018-04-19

## 2018-04-19 RX ORDER — DIPHENHYDRAMINE HYDROCHLORIDE 50 MG/ML
25 INJECTION INTRAMUSCULAR; INTRAVENOUS ONCE
Status: COMPLETED | OUTPATIENT
Start: 2018-04-19 | End: 2018-04-19

## 2018-04-19 RX ORDER — ONDANSETRON 2 MG/ML
4 INJECTION INTRAMUSCULAR; INTRAVENOUS ONCE
Status: COMPLETED | OUTPATIENT
Start: 2018-04-19 | End: 2018-04-19

## 2018-04-19 RX ORDER — HEPARIN SODIUM (PORCINE) LOCK FLUSH IV SOLN 100 UNIT/ML 100 UNIT/ML
500 SOLUTION INTRAVENOUS EVERY 8 HOURS
Status: DISCONTINUED | OUTPATIENT
Start: 2018-04-19 | End: 2018-04-19 | Stop reason: HOSPADM

## 2018-04-19 RX ORDER — DEXTROSE, SODIUM CHLORIDE, SODIUM LACTATE, POTASSIUM CHLORIDE, AND CALCIUM CHLORIDE 5; .6; .31; .03; .02 G/100ML; G/100ML; G/100ML; G/100ML; G/100ML
2000 INJECTION, SOLUTION INTRAVENOUS ONCE
Status: CANCELLED
Start: 2018-04-20 | End: 2018-04-20

## 2018-04-19 RX ORDER — DIPHENHYDRAMINE HYDROCHLORIDE 50 MG/ML
25 INJECTION INTRAMUSCULAR; INTRAVENOUS DAILY PRN
Status: DISCONTINUED | OUTPATIENT
Start: 2018-04-19 | End: 2018-04-19 | Stop reason: HOSPADM

## 2018-04-19 RX ORDER — ONDANSETRON 2 MG/ML
4 INJECTION INTRAMUSCULAR; INTRAVENOUS ONCE
Status: CANCELLED
Start: 2018-04-19 | End: 2018-04-19

## 2018-04-19 RX ORDER — OXYCODONE HCL 5 MG/5 ML
SOLUTION, ORAL ORAL
Qty: 200 ML | Refills: 0 | Status: SHIPPED | OUTPATIENT
Start: 2018-04-19 | End: 2018-09-12

## 2018-04-19 RX ORDER — HEPARIN SODIUM (PORCINE) LOCK FLUSH IV SOLN 100 UNIT/ML 100 UNIT/ML
500 SOLUTION INTRAVENOUS EVERY 8 HOURS
Status: CANCELLED
Start: 2018-04-19

## 2018-04-19 RX ORDER — DEXTROSE, SODIUM CHLORIDE, SODIUM LACTATE, POTASSIUM CHLORIDE, AND CALCIUM CHLORIDE 5; .6; .31; .03; .02 G/100ML; G/100ML; G/100ML; G/100ML; G/100ML
2000 INJECTION, SOLUTION INTRAVENOUS ONCE
Status: COMPLETED | OUTPATIENT
Start: 2018-04-19 | End: 2018-04-19

## 2018-04-19 RX ADMIN — SODIUM CHLORIDE, SODIUM LACTATE, POTASSIUM CHLORIDE, CALCIUM CHLORIDE AND DEXTROSE MONOHYDRATE 2000 ML: 5; 600; 310; 30; 20 INJECTION, SOLUTION INTRAVENOUS at 07:56

## 2018-04-19 RX ADMIN — ONDANSETRON 4 MG: 2 INJECTION INTRAMUSCULAR; INTRAVENOUS at 09:32

## 2018-04-19 RX ADMIN — DIPHENHYDRAMINE HYDROCHLORIDE 25 MG: 50 INJECTION, SOLUTION INTRAMUSCULAR; INTRAVENOUS at 09:27

## 2018-04-19 RX ADMIN — SODIUM CHLORIDE, PRESERVATIVE FREE 500 UNITS: 5 INJECTION INTRAVENOUS at 10:00

## 2018-04-19 RX ADMIN — DIPHENHYDRAMINE HYDROCHLORIDE 25 MG: 50 INJECTION, SOLUTION INTRAMUSCULAR; INTRAVENOUS at 08:00

## 2018-04-19 RX ADMIN — ONDANSETRON 4 MG: 2 INJECTION INTRAMUSCULAR; INTRAVENOUS at 08:03

## 2018-04-19 ASSESSMENT — PAIN DESCRIPTION - DESCRIPTORS: DESCRIPTORS: ACHING

## 2018-04-19 NOTE — TELEPHONE ENCOUNTER
[facility] Call Center    Phone Message    May a detailed message be left on voicemail: yes    Reason for Call: Other: oxyCODONE (ROXICODONE) 5 MG/5ML solution - PT NEED Medication refill bc she is out. Pt's mother said pt is at the [facility] for another appt. Please call mothe or pt. Incident note in Epic.      Action Taken: Message routed to:  [facility] ([facility]): Pain clinic

## 2018-04-19 NOTE — TELEPHONE ENCOUNTER
----- Message from Ella Cabrera RN sent at 4/18/2018  5:56 PM CDT -----  Done.     Thank you,   Ella     ----- Message -----     From: Campbell Narayanan MD     Sent: 4/14/2018   8:41 PM       To: Nadeen Conte RN, Ella Cabrera RN    Yes that is fine  Can we change the orders Ella?      ----- Message -----     From: Nadeen Conte, SAMARA     Sent: 4/13/2018   2:46 PM       To: Campbell Narayanan MD    Alexandra reports having blood sugars as low as 45 and having a hard time keeping her sugars up. Is it possible to have D5LR fluid replacement when she is having such lows put in her therapy plan?     Dan[facility]

## 2018-04-19 NOTE — PATIENT INSTRUCTIONS
Dear Alexandra Melgoza    Thank you for choosing [facility] Special[facility]fusion [facility] Procedure [facility] ([facility]) for your infusion.  The following information is a summary of our appointment as well as important reminders.      We look forward in seeing you on your next appointment here at [facility].  Please don t hesitate to call us at [phone] to reschedule any of your appointments or to speak with one of the [facility] registered nurses.  It was a pleasure taking care of you today.    Sincerely,    [facility]  Specialty Infusion & Procedure Center  [address] [place] [address]  [address]  Phone:  [phone]

## 2018-04-19 NOTE — MR AVS SNAPSHOT
After Visit Summary   4/19/2018    Alexandra Melgoza    MRN: [id number]           Patient Information     Date Of Birth          1995        Visit Information        Provider Department      4/19/2018 7:00 AM UC 44 ATC; UC SPEC INFUSION [facility] Special[facility] Procedure        Today's Diagnoses     Cyclical vomiting with nausea, intractability of vomiting not specified    -  1    Chronic pancreatitis, unspecified pancreatitis type (H)        Pain        Abdominal pain, epigastric        S/P ERCP        Acute abdominal pain        Abdominal pain        Hypoglycemia        Urge incontinence        Major depressive disorder, recurrent episode, moderate (H)        Anxiety state        Rash        Intractable cyclical vomiting with nausea          Care Instructions    Dear Alexandra Melgoza    Thank you for choosing [facility] ([facility]) for your infusion.  The following information is a summary of our appointment as well as important reminders.      We look forward in seeing you on your next appointment here at [facility].  Please don t hesitate to call us at [phone] to reschedule any of your appointments or to speak with one of the [facility] registered nurses.  It was a pleasure taking care of you today.    Sincerely,    [facility]  Specialty Infusion [facility] Center  [address] [place] [address]  [id number]  Phone:  [phone]            Follow-ups after your visit        Your next 10 appointments already scheduled     Apr 21, 2018  8:00 AM CDT   Infusion 120 with UC SPEC INFUSION, UC 44 ATC   [facility] [place] Specialty [facility] Procedure ([facility])    [address] [place] [address]  [address] [place] [address] [phone]            Apr 23, 2018  2:30 PM CDT   (Arrive by 2:15 PM)   Return Visit with Leonardo Wang MD   [facility] for  Comprehensive [facility] Management ([facility])    [address]  4th Floor  [facility] [address]   [phone]            Apr 23, 2018  4:00 PM CDT   Infusion 120 with UC SPEC INFUSION, UC 43 ATC   [facility] Specialty and Procedure ([facility])    [address]  [address]  [facility] [address]   [phone]            Apr 25, 2018  8:00 AM CDT   Infusion 120 with UC SPEC INFUSION, UC 49 ATC   [facility] Specialty and Procedure ([facility])    [address]  [address]  [facility] [address]   [phone]            Apr 25, 2018  9:00 AM CDT   (Arrive by 8:45 AM)   Return Visit with Quyen Lennon, PhD   [facility] for Comprehensive Pain Management ([facility])    [address]  4th Floor  [facility] [address]   [phone]            Apr 28, 2018 12:00 PM CDT   Infusion 120 with UC SPEC INFUSION, UC 42 ATC   [facility] Specialty and Procedure ([facility])    [address]  [address]  [facility] [address]   [phone]              Who to contact     If you have questions or need follow up information about today's clinic visit or your schedule please contact [facility] SPECIALTY AND PROCEDURE directly at [phone].  Normal or non-critical lab and imaging results will be communicated to you by MyChart, letter or phone within 4 business days after the clinic has received the results. If you do not hear from us within 7 days, please contact the clinic through MyChart or phone. If you have a critical or abnormal lab result, we will notify you by phone as soon as possible.  Submit refill requests through [email or web address] or call your pharmacy and they will forward the refill request to us. Please allow 3 business days for your refill to be  completed.          Additional Information About Your Visit        MyChart Information     [employer] gives you secure access to your electronic health record. If you see a primary care provider, you can also send messages to your care team and make appointments. If you have questions, please call your primary care clinic.  If you do not have a primary care provider, please call [phone] and they will assist you.        Care EveryWhere ID     This is your Care EveryWhere ID. This could be used by other organizations to access your [place] medical records  [id number]        Your Vitals Were     Pulse Temperature Respirations Pulse Oximetry          113 97.4  F (36.3  C) (Oral) 16 100%         Blood Pressure from Last 3 Encounters:   04/19/18 103/57   04/16/18 125/86   04/13/18 132/79    Weight from Last 3 Encounters:   04/13/18 68 kg (150 lb)   04/11/18 68 kg (150 lb)   04/04/18 68.4 kg (150 lb 11.2 oz)              Today, you had the following     No orders found for display         Today's Medication Changes          These changes are accurate as of 4/19/18 10:11 AM.  If you have any questions, ask your nurse or doctor.               These medicines have changed or have updated prescriptions.        Dose/Directions    nortriptyline 10 MG capsule   Commonly known as:  PAMELOR   This may have changed:  how much to take   Used for:  Right upper quadrant abdominal pain        Dose:  30 mg   Take 3 capsules (30 mg) by mouth At Bedtime   Quantity:  120 capsule   Refills:  0                Primary Care Provider Office Phone # Fax #    Quyen Baez [phone]       [address] [address]        Equal Access to Services     [facility] AH: Hadii angelina joseph Somalini, waaxda luqadaha, qaybta kaalmamaki jones. So [facility] [phone].    ATENCIÓN: Si habla español, tiene a quijano disposición servicios gratuitos de asistencia  lingüísticaJustin Whittaker al [phone].    We comply with applicable federal civil rights laws and [place] laws. We do not discriminate on the basis of race, color, national origin, age, disability, sex, sexual orientation, or gender identity.            Thank you!     Thank you for choosing [facility] SPECIALTY AND PROCEDURE  for your care. Our goal is always to provide you with excellent care. Hearing back from our patients is one way we can continue to improve our services. Please take a few minutes to complete the written survey that you may receive in the mail after your visit with us. Thank you!             Your Updated Medication List - Protect others around you: Learn how to safely use, store and throw away your medicines at www.disposemymeds.org.          This list is accurate as of 4/19/18 10:11 AM.  Always use your most recent med list.                   Brand Name Dispense Instructions for use Diagnosis    acetaminophen 32 mg/mL solution    TYLENOL     30.45 mLs (975 mg) by Per J Tube route every 8 hours        * amylase-lipase-protease 64838 units Cpep    ZENPEP    180 capsule    Take 2-3 capsules (40,000-60,000 Units) by mouth Take with snacks or supplements (Snacks)    Idiopathic chronic pancreatitis (H)       * amylase-lipase-protease 94491 units Cpep    ZENPEP    180 capsule    Take 5 capsules (100,000 Units) by mouth 4 times daily (with meals and nightly)    Right upper quadrant abdominal pain       blood glucose monitoring test strip    no brand specified    100 strip    One Touch Ultra 2 Strips, Use to test blood sugars 2 times daily or as directed    Hypoglycemia       cephalexin 250 MG/5ML suspension    KEFLEX    210 mL    Take 10 ml per GJ tube tid    Dysuria       clotrimazole-betamethasone cream    LOTRISONE    15 g    Apply topically 2 times daily    Rash and nonspecific skin eruption       fluticasone 50 MCG/ACT spray    FLONASE    16 g    Spray 2 sprays into both  nostrils daily    Nasal congestion       gabapentin 250 MG/5ML solution    NEURONTIN      Chronic abdominal pain, Sphincter of Oddi dysfunction, Cyclical vomiting with nausea, intractability of vomiting not specified, Pain around percutaneous endoscopic gastrostomy (PEG) tube site, sequela       hydrOXYzine 10 MG/5ML syrup    ATARAX    1500 mL    Take 12.5 mLs (25 mg) by mouth every 6 hours as needed for anxiety or other (pain)    Acute abdominal pain       leuprolide 11.25 MG kit    LUPRON DEPOT (3-MONTH)    1 each    Inject 11.25 mg into the muscle every 3 months    Endometriosis       levalbuterol 45 MCG/ACT Inhaler    XOPENEX HFA    1 Inhaler    Inhale 2 puffs into the lungs every 6 hours as needed for shortness of breath / dyspnea    Wheeze       LORazepam 0.5 MG tablet    ATIVAN    10 tablet    Take 1 tablet (0.5 mg) by mouth 2 times daily as needed (nausea, do not take with pain medicine, do not drive after taking)    Cyclical vomiting with nausea, intractability of vomiting not specified       multivitamins with minerals Liqd liquid     1 Bottle    15 mLs by Per Feeding Tube route daily    Abdominal pain, epigastric       norethindrone 5 MG tablet    AYGESTIN    90 tablet    Take 1 tablet (5 mg) by mouth daily    Endometriosis       nortriptyline 10 MG capsule    PAMELOR    120 capsule    Take 3 capsules (30 mg) by mouth At Bedtime    Right upper quadrant abdominal pain       ondansetron 4 MG ODT tab    ZOFRAN ODT    30 tablet    Take 1 tablet (4 mg) by mouth every 8 hours as needed for nausea or vomiting    Intractable cyclical vomiting with nausea       order for DME     1 Units    Equipment being ordered: TENS with wire and electrode.    Chronic abdominal pain       oxyCODONE 5 MG/5ML solution    ROXICODONE    560 mL    Take 10 mL (10 mg) by mouth every 6 hours as needed, maximum 40 mL per day.    Abdominal pain, generalized, Chronic abdominal pain       pantoprazole 40 MG EC tablet    PROTONIX    30  tablet    Take 1 tablet (40 mg) by mouth 2 times daily (before meals)    Right upper quadrant abdominal pain, Erosive gastritis       polyethylene glycol Packet    MIRALAX/GLYCOLAX    7 packet    Take 17 g by mouth daily    Right upper quadrant abdominal pain       RIZATRIPTAN BENZOATE PO      Take 5 mg by mouth once as needed        senna-docusate 8.6-50 MG per tablet    SENOKOT-S;PERICOLACE    100 tablet    Take 1 tablet by mouth 2 times daily as needed for constipation    Right upper quadrant abdominal pain       sodium bicarbonate 325 MG tablet     120 tablet    1 tablet (325 mg) by Per Feeding Tube route 4 times daily    Abdominal pain, epigastric, Intractable cyclical vomiting with nausea       * Notice:  This list has 2 medication(s) that are the same as other medications prescribed for you. Read the directions carefully, and ask your doctor or other care provider to review them with you.

## 2018-04-19 NOTE — TELEPHONE ENCOUNTER
[facility] Call Center    Phone Message    May a detailed message be left on voicemail: yes    Reason for Call: Call from PT's mom Leeann re: Rx oxyCODONE (ROXICODONE) 5 MG/5ML solution as she still has not heard back from anyone with any further instruction or answers.  Leeann stated that PT followed instruction of Dr. Wang and cut the dose in half and still ran out  ran out of her medication last night--taking the last dose at 6:00pm.  As noted in previous encounter, a call was made yesterday stating that PT would run out and still no one has reached out to her.  PT was up most of the night in pain and was vomiting.  PT is currently on the 2nd floor at the [facility] and will be there until about 9:30 this morning.  Leeann would appreciate a call back to discuss further or for someone to FU with the PT while she is at the clinic as she needs more medication to get her through until her appointment on Monday.    If patient has red-flag symptoms, warm transfer to triage line    Current symptom or concern: In pain, vomiting over night and out of medication    Symptoms have been present for: 1 day    Has patient previously been seen for this? Yes    By: Dr Wang    Date: Future appt on 4/23/18    Are there any new or worsening symptoms? No      Action Taken: Message routed to:  [facility] ([facility]): .

## 2018-04-19 NOTE — PROGRESS NOTES
Nursing Note  Alexandra Melgoza [facility] for:   Chief Complaint   Patient presents with     Infusion     IVF, zofran, benadryl     During today's [facility] appointment, orders from Dr. Hightower were completed.  Frequency: weekly, PRN    Progress note:  Patient identification verified by name and date of birth.  Assessment completed.  Vitals recorded in Doc Flowsheets.  Patient was provided with education regarding infusion and possible side effects.  Patient verbalized understanding.      needed: No  Premedications: administered per order.  Infusion Rates: infusion given over approximately 2 hours.  Labs: were not ordered for this appointment.  Vascular access: port accessed today.  Treatment Conditions: non-applicable.  Patient tolerated infusion: well.    Pt reports blood sugars have been running low and was vomiting overnight and before appointment.  Given D5LR per orders.    Drug Waste Record    Drug Name: Benadryl   Dose: 25mg  Route administered: IV  NDC #: 86290-962-99  Amount of waste(mL):0.5ml  Reason for waste: Single use vial       Discharge Plan:   Follow up plan of care with: ongoing infusions at [facility].  Discharge instructions were reviewed with patient.  Patient/representative verbalized understanding of discharge instructions and all questions answered.  Patient discharged from [facility] in stable condition.    ROGER BARROS RN    Administrations This Visit     dextrose 5% in lactated ringers infusion     Admin Date Action Dose Rate Route Administered By          04/19/2018 New Bag 2000 mL 1,000 mL/hr Intravenous Daphnie Baum RN                   diphenhydrAMINE (BENADRYL) injection 25 mg     Admin Date Action Dose Route Administered By             04/19/2018 Given 25 mg Intravenous Daphnie Baum RN              Admin Date Action Dose  Route Administered By             04/19/2018 Given 25 mg Intravenous Daphnie Baum RN                    ondansetron (ZOFRAN) injection 4 mg     Admin Date Action Dose Route Administered By             04/19/2018 Given 4 mg Intravenous Daphnie Baum RN              Admin Date Action Dose Route Administered By             04/19/2018 Given 4 mg Intravenous Daphnie Baum RN                          /72  Pulse 112  Temp 97.4  F (36.3  C) (Oral)  Resp 16  SpO2 100%

## 2018-04-20 NOTE — TELEPHONE ENCOUNTER
Oxycodone refilled for 5 additional days to get patient to her appt with Dr. Wang. This is an early refill because patient reports she spilled her medication.

## 2018-04-21 ENCOUNTER — INFUSION THERAPY VISIT (OUTPATIENT)
Dept: INFUSION THERAPY | Facility: CLINIC | Age: 23
End: 2018-04-21
Attending: INTERNAL MEDICINE
Payer: COMMERCIAL

## 2018-04-21 VITALS
OXYGEN SATURATION: 100 % | SYSTOLIC BLOOD PRESSURE: 113 MMHG | DIASTOLIC BLOOD PRESSURE: 57 MMHG | RESPIRATION RATE: 14 BRPM | HEART RATE: 118 BPM | TEMPERATURE: 98 F

## 2018-04-21 DIAGNOSIS — N39.41 URGE INCONTINENCE: ICD-10-CM

## 2018-04-21 DIAGNOSIS — R52 PAIN: ICD-10-CM

## 2018-04-21 DIAGNOSIS — K86.1 CHRONIC PANCREATITIS, UNSPECIFIED PANCREATITIS TYPE (H): ICD-10-CM

## 2018-04-21 DIAGNOSIS — R10.9 ACUTE ABDOMINAL PAIN: ICD-10-CM

## 2018-04-21 DIAGNOSIS — G43.A0 CYCLICAL VOMITING WITH NAUSEA, INTRACTABILITY OF VOMITING NOT SPECIFIED: Primary | ICD-10-CM

## 2018-04-21 DIAGNOSIS — F41.1 ANXIETY STATE: ICD-10-CM

## 2018-04-21 DIAGNOSIS — F33.1 MAJOR DEPRESSIVE DISORDER, RECURRENT EPISODE, MODERATE (H): ICD-10-CM

## 2018-04-21 DIAGNOSIS — E16.2 HYPOGLYCEMIA: ICD-10-CM

## 2018-04-21 DIAGNOSIS — R10.13 ABDOMINAL PAIN, EPIGASTRIC: ICD-10-CM

## 2018-04-21 DIAGNOSIS — R21 RASH: ICD-10-CM

## 2018-04-21 DIAGNOSIS — Z98.890 S/P ERCP: ICD-10-CM

## 2018-04-21 DIAGNOSIS — R11.15 INTRACTABLE CYCLICAL VOMITING WITH NAUSEA: ICD-10-CM

## 2018-04-21 PROCEDURE — 96361 HYDRATE IV INFUSION ADD-ON: CPT

## 2018-04-21 PROCEDURE — 96376 TX/PRO/DX INJ SAME DRUG ADON: CPT

## 2018-04-21 PROCEDURE — 96374 THER/PROPH/DIAG INJ IV PUSH: CPT

## 2018-04-21 PROCEDURE — 25000128 H RX IP 250 OP 636: Mod: ZF | Performed by: INTERNAL MEDICINE

## 2018-04-21 PROCEDURE — 96375 TX/PRO/DX INJ NEW DRUG ADDON: CPT

## 2018-04-21 RX ORDER — ONDANSETRON 2 MG/ML
4 INJECTION INTRAMUSCULAR; INTRAVENOUS DAILY PRN
Status: CANCELLED
Start: 2018-04-21

## 2018-04-21 RX ORDER — ONDANSETRON 2 MG/ML
4 INJECTION INTRAMUSCULAR; INTRAVENOUS DAILY PRN
Status: DISCONTINUED | OUTPATIENT
Start: 2018-04-21 | End: 2018-04-21 | Stop reason: HOSPADM

## 2018-04-21 RX ORDER — HEPARIN SODIUM (PORCINE) LOCK FLUSH IV SOLN 100 UNIT/ML 100 UNIT/ML
500 SOLUTION INTRAVENOUS EVERY 8 HOURS
Status: DISCONTINUED | OUTPATIENT
Start: 2018-04-21 | End: 2018-04-21 | Stop reason: HOSPADM

## 2018-04-21 RX ORDER — DIPHENHYDRAMINE HYDROCHLORIDE 50 MG/ML
25 INJECTION INTRAMUSCULAR; INTRAVENOUS DAILY PRN
Status: DISCONTINUED | OUTPATIENT
Start: 2018-04-21 | End: 2018-04-21 | Stop reason: HOSPADM

## 2018-04-21 RX ORDER — DEXTROSE, SODIUM CHLORIDE, SODIUM LACTATE, POTASSIUM CHLORIDE, AND CALCIUM CHLORIDE 5; .6; .31; .03; .02 G/100ML; G/100ML; G/100ML; G/100ML; G/100ML
2000 INJECTION, SOLUTION INTRAVENOUS ONCE
Status: COMPLETED | OUTPATIENT
Start: 2018-04-21 | End: 2018-04-21

## 2018-04-21 RX ORDER — ONDANSETRON 2 MG/ML
4 INJECTION INTRAMUSCULAR; INTRAVENOUS ONCE
Status: CANCELLED
Start: 2018-04-21 | End: 2018-04-21

## 2018-04-21 RX ORDER — DIPHENHYDRAMINE HYDROCHLORIDE 50 MG/ML
25 INJECTION INTRAMUSCULAR; INTRAVENOUS DAILY PRN
Status: CANCELLED
Start: 2018-04-21

## 2018-04-21 RX ORDER — HEPARIN SODIUM (PORCINE) LOCK FLUSH IV SOLN 100 UNIT/ML 100 UNIT/ML
500 SOLUTION INTRAVENOUS EVERY 8 HOURS
Status: CANCELLED
Start: 2018-04-21

## 2018-04-21 RX ORDER — DIPHENHYDRAMINE HYDROCHLORIDE 50 MG/ML
25 INJECTION INTRAMUSCULAR; INTRAVENOUS ONCE
Status: CANCELLED
Start: 2018-04-21 | End: 2018-04-21

## 2018-04-21 RX ORDER — ONDANSETRON 2 MG/ML
4 INJECTION INTRAMUSCULAR; INTRAVENOUS ONCE
Status: COMPLETED | OUTPATIENT
Start: 2018-04-21 | End: 2018-04-21

## 2018-04-21 RX ORDER — DEXTROSE, SODIUM CHLORIDE, SODIUM LACTATE, POTASSIUM CHLORIDE, AND CALCIUM CHLORIDE 5; .6; .31; .03; .02 G/100ML; G/100ML; G/100ML; G/100ML; G/100ML
2000 INJECTION, SOLUTION INTRAVENOUS ONCE
Status: CANCELLED
Start: 2018-04-21 | End: 2018-04-21

## 2018-04-21 RX ORDER — DIPHENHYDRAMINE HYDROCHLORIDE 50 MG/ML
25 INJECTION INTRAMUSCULAR; INTRAVENOUS ONCE
Status: DISCONTINUED | OUTPATIENT
Start: 2018-04-21 | End: 2018-04-21

## 2018-04-21 RX ADMIN — SODIUM CHLORIDE, POTASSIUM CHLORIDE, SODIUM LACTATE AND CALCIUM CHLORIDE 1000 ML: 600; 310; 30; 20 INJECTION, SOLUTION INTRAVENOUS at 08:39

## 2018-04-21 RX ADMIN — DIPHENHYDRAMINE HYDROCHLORIDE 25 MG: 50 INJECTION, SOLUTION INTRAMUSCULAR; INTRAVENOUS at 10:33

## 2018-04-21 RX ADMIN — ONDANSETRON 4 MG: 2 INJECTION INTRAMUSCULAR; INTRAVENOUS at 08:47

## 2018-04-21 RX ADMIN — DIPHENHYDRAMINE HYDROCHLORIDE 25 MG: 50 INJECTION INTRAMUSCULAR; INTRAVENOUS at 08:37

## 2018-04-21 RX ADMIN — SODIUM CHLORIDE, SODIUM LACTATE, POTASSIUM CHLORIDE, CALCIUM CHLORIDE AND DEXTROSE MONOHYDRATE 1000 ML: 5; 600; 310; 30; 20 INJECTION, SOLUTION INTRAVENOUS at 09:49

## 2018-04-21 RX ADMIN — ONDANSETRON 4 MG: 2 INJECTION INTRAMUSCULAR; INTRAVENOUS at 10:33

## 2018-04-21 NOTE — PROGRESS NOTES
Infusion nursing note:    Alexandra Melgoza presents today to [facility] for a IVF, IV anti nausea emetics infusion.  During today's [facility] appointment orders from Dr Peralta were completed.  Frequency: As needed, usually 3times per week     Progress note:  ID verified by name and .  Assessment completed.  Vitals were stable throughout time in [facility].  verbal education given to patient/representative regarding infusion and possible side effects.  Patient verbalized understanding.    Note: Pt has elevated pain and nausea due to an infected gt entry site and the antibiotics she is taking for that.     Infusion given over approximately  Each liter of IVF over one aarti; IV benadryl and IV zofran each given over 2-5min.     Administrations This Visit     diphenhydrAMINE (BENADRYL) 25 mg in sodium chloride 0.9 % intermittent infusion     Admin Date Action Dose Rate Route Administered By          2018 New Bag 25 mg 300 mL/hr Intravenous Patito Liu RN                   lactated ringers BOLUS 1,000-2,000 mL     Admin Date Action Dose Route Administered By             2018 New Bag 1000 mL Intravenous Patito Liu RN                    ondansetron (ZOFRAN) injection 4 mg     Admin Date Action Dose Route Administered By             2018 Given 4 mg Intravenous Patito Liu RN                          /81  Pulse 113  Temp 98  F (36.7  C)  Resp 14  SpO2 100%    Discharge plan:    Discharge instructions were reviewed with patient: Yes  Patient/representative verbalized understanding of discharge instructions and all questions answered: Yes.    Discharged from [facility] at 1115 with self to home.    Patito Liu

## 2018-04-21 NOTE — MR AVS SNAPSHOT
After Visit Summary   4/21/2018    Alexandra Melgoza    MRN: [id number]           Patient Information     Date Of Birth          1995        Visit Information        Provider Department      4/21/2018 8:00 AM UC 44 ATC; UC SPEC INFUSION [facility]        Today's Diagnoses     Cyclical vomiting with nausea, intractability of vomiting not specified    -  1    Chronic pancreatitis, unspecified pancreatitis type (H)        Pain        Abdominal pain, epigastric        S/P ERCP        Acute abdominal pain        Hypoglycemia        Urge incontinence        Major depressive disorder, recurrent episode, moderate (H)        Anxiety state        Rash        Intractable cyclical vomiting with nausea           Follow-ups after your visit        Your next 10 appointments already scheduled     Apr 23, 2018  2:30 PM CDT   (Arrive by 2:15 PM)   Return Visit with Leonardo Wang MD   [facility] ([facility])    [address] [place] [address]  [address] [facility] [address]   [phone]            Apr 23, 2018  4:00 PM CDT   Infusion 120 with UC SPEC INFUSION, UC 43 ATC   [facility] ([facility])    [address]  [address]  [facility] [address]   [phone]            Apr 25, 2018  8:00 AM CDT   Infusion 120 with UC SPEC INFUSION, UC 49 ATC   [facility] ([facility])    [address]  [address]  [facility] [address]   [phone]            Apr 25, 2018  9:00 AM CDT   (Arrive by 8:45 AM)   Return Visit with Quyen Lennon PhD   [facility] ([facility])    [address] [place] [address]  [address] [facility] [address]   [phone]            Apr 28, 2018 12:00 PM CDT    Infusion 120 with UC SPEC INFUSION, UC 42 ATC   [facility] ([facility])    [address]  [address]  [facility] [address] [phone]            Apr 30, 2018  2:00 PM CDT   Infusion 120 with UC SPEC INFUSION, UC 43 ATC   [facility] ([facility])    [address]  [address]  [facility] [address] [phone]              Who to contact     If you have questions or need follow up information about today's clinic visit or your schedule please contact [facility] directly at [phone].  Normal or non-critical lab and imaging results will be communicated to you by [employer], letter or phone within 4 business days after the clinic has received the results. If you do not hear from us within 7 days, please contact the clinic through [employer] or phone. If you have a critical or abnormal lab result, we will notify you by phone as soon as possible.  Submit refill requests through [employer] or call your pharmacy and they will forward the refill request to us. Please allow 3 business days for your refill to be completed.          Additional Information About Your Visit        [employer] Information     [employer] gives you secure access to your electronic health record. If you see a primary care provider, you can also send messages to your care team and make appointments. If you have questions, please call your primary care clinic.  If you do not have a primary care provider, please call [phone] and they will assist you.        Care EveryWhere ID     This is your Care EveryWhere ID. This could be used by other organizations to access your [place] medical records  [id number]        Your Vitals Were     Pulse Temperature Respirations Pulse Oximetry          118 98  F (36.7  C) 14 100%         Blood Pressure from  Last 3 Encounters:   04/21/18 113/57   04/19/18 103/57   04/16/18 125/86    Weight from Last 3 Encounters:   04/13/18 68 kg (150 lb)   04/11/18 68 kg (150 lb)   04/04/18 68.4 kg (150 lb 11.2 oz)              Today, you had the following     No orders found for display         Today's Medication Changes          These changes are accurate as of 4/21/18  3:56 PM.  If you have any questions, ask your nurse or doctor.               These medicines have changed or have updated prescriptions.        Dose/Directions    nortriptyline 10 MG capsule   Commonly known as:  PAMELOR   This may have changed:  how much to take   Used for:  Right upper quadrant abdominal pain        Dose:  30 mg   Take 3 capsules (30 mg) by mouth At Bedtime   Quantity:  120 capsule   Refills:  0                Primary Care Provider Office Phone # Fax #    Quyen Baez [phone]       [address] [address]        Equal Access to Services     TRACE BENITEZ AH: Hadii angelina ku hadasho Soomaali, waaxda luqadaha, qaybta kaalmada adeegyada, waxay fatmata gaspar . So [facility] [phone].    ATENCIÓN: Si habla español, tiene a uqijano disposición servicios gratuitos de asistencia lingüística. [facility] [phone].    We comply with applicable federal civil rights laws and [place] laws. We do not discriminate on the basis of race, color, national origin, age, disability, sex, sexual orientation, or gender identity.            Thank you!     Thank you for choosing [facility]  for your care. Our goal is always to provide you with excellent care. Hearing back from our patients is one way we can continue to improve our services. Please take a few minutes to complete the written survey that you may receive in the mail after your visit with us. Thank you!             Your Updated Medication List - Protect others around you: Learn how to safely use, store  and throw away your medicines at www.disposemymeds.org.          This list is accurate as of 4/21/18  3:56 PM.  Always use your most recent med list.                   Brand Name Dispense Instructions for use Diagnosis    acetaminophen 32 mg/mL solution    TYLENOL     30.45 mLs (975 mg) by Per J Tube route every 8 hours        * amylase-lipase-protease 16716 units Cpep    ZENPEP    180 capsule    Take 2-3 capsules (40,000-60,000 Units) by mouth Take with snacks or supplements (Snacks)    Idiopathic chronic pancreatitis (H)       * amylase-lipase-protease 77464 units Cpep    ZENPEP    180 capsule    Take 5 capsules (100,000 Units) by mouth 4 times daily (with meals and nightly)    Right upper quadrant abdominal pain       blood glucose monitoring test strip    no brand specified    100 strip    One Touch Ultra 2 Strips, Use to test blood sugars 2 times daily or as directed    Hypoglycemia       cephalexin 250 MG/5ML suspension    KEFLEX    210 mL    Take 10 ml per GJ tube tid    Dysuria       clotrimazole-betamethasone cream    LOTRISONE    15 g    Apply topically 2 times daily    Rash and nonspecific skin eruption       fluticasone 50 MCG/ACT spray    FLONASE    16 g    Spray 2 sprays into both nostrils daily    Nasal congestion       gabapentin 250 MG/5ML solution    NEURONTIN      Chronic abdominal pain, Sphincter of Oddi dysfunction, Cyclical vomiting with nausea, intractability of vomiting not specified, Pain around percutaneous endoscopic gastrostomy (PEG) tube site, sequela       hydrOXYzine 10 MG/5ML syrup    ATARAX    1500 mL    Take 12.5 mLs (25 mg) by mouth every 6 hours as needed for anxiety or other (pain)    Acute abdominal pain       leuprolide 11.25 MG kit    LUPRON DEPOT (3-MONTH)    1 each    Inject 11.25 mg into the muscle every 3 months    Endometriosis       levalbuterol 45 MCG/ACT Inhaler    XOPENEX HFA    1 Inhaler    Inhale 2 puffs into the lungs every 6 hours as needed for shortness of breath  / dyspnea    Wheeze       LORazepam 0.5 MG tablet    ATIVAN    10 tablet    Take 1 tablet (0.5 mg) by mouth 2 times daily as needed (nausea, do not take with pain medicine, do not drive after taking)    Cyclical vomiting with nausea, intractability of vomiting not specified       multivitamins with minerals Liqd liquid     1 Bottle    15 mLs by Per Feeding Tube route daily    Abdominal pain, epigastric       norethindrone 5 MG tablet    AYGESTIN    90 tablet    Take 1 tablet (5 mg) by mouth daily    Endometriosis       nortriptyline 10 MG capsule    PAMELOR    120 capsule    Take 3 capsules (30 mg) by mouth At Bedtime    Right upper quadrant abdominal pain       ondansetron 4 MG ODT tab    ZOFRAN ODT    30 tablet    Take 1 tablet (4 mg) by mouth every 8 hours as needed for nausea or vomiting    Intractable cyclical vomiting with nausea       order for DME     1 Units    Equipment being ordered: TENS with wire and electrode.    Chronic abdominal pain       oxyCODONE 5 MG/5ML solution    ROXICODONE    200 mL    Take 10 mL (10 mg) by mouth every 6 hours as needed, maximum 40 mL per day.    Abdominal pain, generalized, Chronic abdominal pain       pantoprazole 40 MG EC tablet    PROTONIX    30 tablet    Take 1 tablet (40 mg) by mouth 2 times daily (before meals)    Right upper quadrant abdominal pain, Erosive gastritis       polyethylene glycol Packet    MIRALAX/GLYCOLAX    7 packet    Take 17 g by mouth daily    Right upper quadrant abdominal pain       RIZATRIPTAN BENZOATE PO      Take 5 mg by mouth once as needed        senna-docusate 8.6-50 MG per tablet    SENOKOT-S;PERICOLACE    100 tablet    Take 1 tablet by mouth 2 times daily as needed for constipation    Right upper quadrant abdominal pain       sodium bicarbonate 325 MG tablet     120 tablet    1 tablet (325 mg) by Per Feeding Tube route 4 times daily    Abdominal pain, epigastric, Intractable cyclical vomiting with nausea       * Notice:  This list has 2  medication(s) that are the same as other medications prescribed for you. Read the directions carefully, and ask your doctor or other care provider to review them with you.

## 2018-04-23 ENCOUNTER — TELEPHONE (OUTPATIENT)
Dept: ANESTHESIOLOGY | Facility: CLINIC | Age: 23
End: 2018-04-23

## 2018-04-23 ENCOUNTER — INFUSION THERAPY VISIT (OUTPATIENT)
Dept: INFUSION THERAPY | Facility: CLINIC | Age: 23
End: 2018-04-23
Attending: INTERNAL MEDICINE
Payer: COMMERCIAL

## 2018-04-23 ENCOUNTER — OFFICE VISIT (OUTPATIENT)
Dept: ANESTHESIOLOGY | Facility: CLINIC | Age: 23
End: 2018-04-23
Payer: COMMERCIAL

## 2018-04-23 VITALS — SYSTOLIC BLOOD PRESSURE: 129 MMHG | DIASTOLIC BLOOD PRESSURE: 82 MMHG | HEART RATE: 135 BPM | OXYGEN SATURATION: 99 %

## 2018-04-23 VITALS — TEMPERATURE: 99.2 F | RESPIRATION RATE: 18 BRPM | SYSTOLIC BLOOD PRESSURE: 126 MMHG | DIASTOLIC BLOOD PRESSURE: 81 MMHG

## 2018-04-23 DIAGNOSIS — R10.84 ABDOMINAL PAIN, GENERALIZED: Primary | ICD-10-CM

## 2018-04-23 DIAGNOSIS — R10.13 ABDOMINAL PAIN, EPIGASTRIC: ICD-10-CM

## 2018-04-23 DIAGNOSIS — N39.41 URGE INCONTINENCE: ICD-10-CM

## 2018-04-23 DIAGNOSIS — R10.9 ACUTE ABDOMINAL PAIN: ICD-10-CM

## 2018-04-23 DIAGNOSIS — R10.9 ABDOMINAL PAIN: ICD-10-CM

## 2018-04-23 DIAGNOSIS — F41.1 ANXIETY STATE: ICD-10-CM

## 2018-04-23 DIAGNOSIS — G43.A0 CYCLICAL VOMITING WITH NAUSEA, INTRACTABILITY OF VOMITING NOT SPECIFIED: Primary | ICD-10-CM

## 2018-04-23 DIAGNOSIS — R52 PAIN: ICD-10-CM

## 2018-04-23 DIAGNOSIS — E16.2 HYPOGLYCEMIA: ICD-10-CM

## 2018-04-23 DIAGNOSIS — R21 RASH: ICD-10-CM

## 2018-04-23 DIAGNOSIS — R11.15 INTRACTABLE CYCLICAL VOMITING WITH NAUSEA: ICD-10-CM

## 2018-04-23 DIAGNOSIS — Z98.890 S/P ERCP: ICD-10-CM

## 2018-04-23 DIAGNOSIS — K86.1 CHRONIC PANCREATITIS, UNSPECIFIED PANCREATITIS TYPE (H): ICD-10-CM

## 2018-04-23 DIAGNOSIS — F33.1 MAJOR DEPRESSIVE DISORDER, RECURRENT EPISODE, MODERATE (H): ICD-10-CM

## 2018-04-23 LAB
GRAM STN SPEC: ABNORMAL
GRAM STN SPEC: ABNORMAL
Lab: ABNORMAL
SPECIMEN SOURCE: ABNORMAL

## 2018-04-23 PROCEDURE — 87205 SMEAR GRAM STAIN: CPT | Performed by: INTERNAL MEDICINE

## 2018-04-23 PROCEDURE — 96376 TX/PRO/DX INJ SAME DRUG ADON: CPT

## 2018-04-23 PROCEDURE — 87070 CULTURE OTHR SPECIMN AEROBIC: CPT | Performed by: INTERNAL MEDICINE

## 2018-04-23 PROCEDURE — 96375 TX/PRO/DX INJ NEW DRUG ADDON: CPT

## 2018-04-23 PROCEDURE — 25000128 H RX IP 250 OP 636: Mod: ZF | Performed by: INTERNAL MEDICINE

## 2018-04-23 PROCEDURE — 96374 THER/PROPH/DIAG INJ IV PUSH: CPT

## 2018-04-23 PROCEDURE — 87077 CULTURE AEROBIC IDENTIFY: CPT | Performed by: INTERNAL MEDICINE

## 2018-04-23 PROCEDURE — 87186 SC STD MICRODIL/AGAR DIL: CPT | Performed by: INTERNAL MEDICINE

## 2018-04-23 PROCEDURE — 96361 HYDRATE IV INFUSION ADD-ON: CPT

## 2018-04-23 RX ORDER — DEXTROSE, SODIUM CHLORIDE, SODIUM LACTATE, POTASSIUM CHLORIDE, AND CALCIUM CHLORIDE 5; .6; .31; .03; .02 G/100ML; G/100ML; G/100ML; G/100ML; G/100ML
2000 INJECTION, SOLUTION INTRAVENOUS ONCE
Status: CANCELLED
Start: 2018-04-23 | End: 2018-04-23

## 2018-04-23 RX ORDER — BUPRENORPHINE 5 UG/H
1 PATCH TRANSDERMAL
Qty: 4 PATCH | Refills: 0 | Status: ON HOLD | OUTPATIENT
Start: 2018-04-23 | End: 2018-12-11

## 2018-04-23 RX ORDER — ONDANSETRON 2 MG/ML
4 INJECTION INTRAMUSCULAR; INTRAVENOUS DAILY PRN
Status: DISCONTINUED | OUTPATIENT
Start: 2018-04-23 | End: 2018-04-23 | Stop reason: HOSPADM

## 2018-04-23 RX ORDER — DIPHENHYDRAMINE HYDROCHLORIDE 50 MG/ML
25 INJECTION INTRAMUSCULAR; INTRAVENOUS DAILY PRN
Status: CANCELLED
Start: 2018-04-23

## 2018-04-23 RX ORDER — HEPARIN SODIUM (PORCINE) LOCK FLUSH IV SOLN 100 UNIT/ML 100 UNIT/ML
500 SOLUTION INTRAVENOUS EVERY 8 HOURS
Status: CANCELLED
Start: 2018-04-23

## 2018-04-23 RX ORDER — DIPHENHYDRAMINE HYDROCHLORIDE 50 MG/ML
25 INJECTION INTRAMUSCULAR; INTRAVENOUS DAILY PRN
Status: DISCONTINUED | OUTPATIENT
Start: 2018-04-23 | End: 2018-04-23 | Stop reason: HOSPADM

## 2018-04-23 RX ORDER — ONDANSETRON 2 MG/ML
4 INJECTION INTRAMUSCULAR; INTRAVENOUS ONCE
Status: COMPLETED | OUTPATIENT
Start: 2018-04-23 | End: 2018-04-23

## 2018-04-23 RX ORDER — ONDANSETRON 2 MG/ML
4 INJECTION INTRAMUSCULAR; INTRAVENOUS ONCE
Status: CANCELLED
Start: 2018-04-23 | End: 2018-04-23

## 2018-04-23 RX ORDER — DIPHENHYDRAMINE HYDROCHLORIDE 50 MG/ML
25 INJECTION INTRAMUSCULAR; INTRAVENOUS ONCE
Status: CANCELLED
Start: 2018-04-23 | End: 2018-04-23

## 2018-04-23 RX ORDER — DIPHENHYDRAMINE HYDROCHLORIDE 50 MG/ML
25 INJECTION INTRAMUSCULAR; INTRAVENOUS ONCE
Status: COMPLETED | OUTPATIENT
Start: 2018-04-23 | End: 2018-04-23

## 2018-04-23 RX ORDER — ONDANSETRON 2 MG/ML
4 INJECTION INTRAMUSCULAR; INTRAVENOUS DAILY PRN
Status: CANCELLED
Start: 2018-04-23

## 2018-04-23 RX ADMIN — DIPHENHYDRAMINE HYDROCHLORIDE 25 MG: 50 INJECTION, SOLUTION INTRAMUSCULAR; INTRAVENOUS at 17:53

## 2018-04-23 RX ADMIN — SODIUM CHLORIDE, POTASSIUM CHLORIDE, SODIUM LACTATE AND CALCIUM CHLORIDE 2000 ML: 600; 310; 30; 20 INJECTION, SOLUTION INTRAVENOUS at 16:40

## 2018-04-23 RX ADMIN — DIPHENHYDRAMINE HYDROCHLORIDE 25 MG: 50 INJECTION, SOLUTION INTRAMUSCULAR; INTRAVENOUS at 16:49

## 2018-04-23 RX ADMIN — ONDANSETRON 4 MG: 2 INJECTION INTRAMUSCULAR; INTRAVENOUS at 17:54

## 2018-04-23 RX ADMIN — ONDANSETRON 4 MG: 2 INJECTION INTRAMUSCULAR; INTRAVENOUS at 16:49

## 2018-04-23 ASSESSMENT — ANXIETY QUESTIONNAIRES
GAD7 TOTAL SCORE: 3
7. FEELING AFRAID AS IF SOMETHING AWFUL MIGHT HAPPEN: NOT AT ALL
3. WORRYING TOO MUCH ABOUT DIFFERENT THINGS: NOT AT ALL
GAD7 TOTAL SCORE: 3
5. BEING SO RESTLESS THAT IT IS HARD TO SIT STILL: NOT AT ALL
6. BECOMING EASILY ANNOYED OR IRRITABLE: SEVERAL DAYS
4. TROUBLE RELAXING: SEVERAL DAYS
1. FEELING NERVOUS, ANXIOUS, OR ON EDGE: SEVERAL DAYS
GAD7 TOTAL SCORE: 3
2. NOT BEING ABLE TO STOP OR CONTROL WORRYING: NOT AT ALL
7. FEELING AFRAID AS IF SOMETHING AWFUL MIGHT HAPPEN: NOT AT ALL

## 2018-04-23 ASSESSMENT — PAIN SCALES - GENERAL: PAINLEVEL: MODERATE PAIN (5)

## 2018-04-23 NOTE — MR AVS SNAPSHOT
After Visit Summary   4/23/2018    Alexandra Melgoza    MRN: [id number]           Patient Information     Date Of Birth          1995        Visit Information        Provider Department      4/23/2018 2:30 PM Leonardo Wang MD [facility]        Today's Diagnoses     Abdominal pain, generalized    -  1      Care Instructions    1. Use 1 butrans patch every 7 days        Follow up:    4 weeks     To speak with a nurse, schedule/reschedule/cancel a clinic appointment, or request a medication refill call: [phone]     You can also reach us by [employer]: [email or web address]    For refills, please call on Monday, 1 week before your medication runs out. The doctors are not always in clinic, so this gives us time to get your prescriptions ready.  Please let us know the name of the medication you are requesting a refill of.                                 Buprenorphine transdermal patch  Brand Name: Butrans  What is this medicine?  BUPRENORPHINE (byoo pre NOR feen) is a pain reliever. It is used to treat moderate to severe pain.  How should I use this medicine?  Apply the patch to your skin. Do not cut or damage the patch. A cut or damaged patch can be very dangerous because you may get too much medicine. Select a clean, dry area of skin on your upper outer arm, upper chest, upper back, or the side of the chest. Do not apply the patch to broken, burned, cut, or irritated skin. Use only water to clean the area. Do not use soap or alcohol to clean the skin because this can increase the effects of the medicine. If the area is hairy, clip the hair with scissors, but do not shave.  Take the patch out of its wrapper. Bend the patch along the faint line and slowly peel the outer portion of the liner, which covers the sticky surface of the patch. Press the patch onto the skin and slowly peel off the protective liner. Do not use a patch if the packaging  or backing is damaged. Do not touch the sticky part with your fingers. Press the patch to the skin using the palm of your hand. Press the patch to the skin for 15 seconds. Wash your hands at once.  Keep patches far away from children. Do not let children see you apply the patch and do not apply it where children can see it. Do not call the patch a sticker, tattoo, or bandage, as this could encourage the child to mimic your actions. Used patches still contain medicine. Children or pets can have serious side effects or die from putting used patches in their mouth or on their bodies.  Take off the old patch before putting on a new patch. Apply each new patch to a different area of skin. If a patch comes off or causes irritation, remove it and apply a new patch to a different site. If the edges of the patch start to loosen, first apply first aid tape to the edges of the patch. If problems with the patch not sticking continue, cover the patch with a see-through adhesive dressing (like Bioclusive or Tegaderm). Never cover the patch with any other bandage or tape. To get rid of used patches, fold the patch in half with the sticky sides together. Then, flush it down the toilet. Alternately, you may dispose of the patch in the Patch-Disposal Unit provided. Never throw the patch away in the trash without sealing it in the Patch-Disposal unit. Replace the patch every 7 days. Follow the directions on the prescription label. Do not use more medicine than you are told to use.  A special MedGuide will be given to you by the pharmacist with each prescription and refill. Be sure to read this information carefully each time.  Talk to your pediatrician regarding the use of this medicine in children. Special care may be needed.  If a patch accidentally touches the skin, use only water to clean the area. Do not use soap or alcohol to clean the skin because this can increase the effects of the medicine. If someone accidentally uses a  buprenorphine patch and is not awake and alert, immediately call 911 for help. If the person is awake and alert, call a doctor, health care professional, or the Poison Control Center.  What side effects may I notice from receiving this medicine?  Side effects that you should report to your doctor or health care professional as soon as possible:    allergic reactions like skin rash, itching or hives, swelling of the face, lips, or tongue    breathing problems    confusion    signs and symptoms of a dangerous change in heartbeat or heart rhythm like chest pain; dizziness; fast or irregular heartbeat; palpitations; feeling faint or lightheaded, falls; breathing problems    signs and symptoms of liver injury like dark yellow or brown urine; general ill feeling or flu-like symptoms; light-colored stools; loss of appetite; nausea; right upper belly pain; unusually weak or tired; yellow of the eyes or skin    signs and symptoms of low blood pressure like dizziness; feeling faint or lightheaded, falls; unusually weak or tired    trouble passing urine or change in the amount of urine  Side effects that usually do not require medical attention (report to your doctor or health care professional if they continue or are bothersome):    constipation    dry mouth    itching, redness, or rash at the patch site    nausea, vomiting    tiredness  What may interact with this medicine?  Do not take this medication with any of the following medicines:    cisapride    certain medicines for fungal infections like ketoconazole and itraconazole    dofetilide    dronedarone    pimozide    ritonavir    thioridazine    ziprasidone  This medicine may interact with the following medications:    alcohol    antihistamines for allergy, cough and cold    antiviral medicines for HIV or AIDS    atropine    certain antibiotics like clarithromycin, erythromycin, linezold, rifampin    certain medicines for anxiety or sleep    certain medicines for bladder  problems like oxybutynin, tolterodine    certain medicines for depression like amitriptyline, fluoxetine, sertraline    certain medicines for migraine headache like almotriptan, eletriptan, frovatriptan, naratriptan, rizatriptan, sumatriptan, zolmitriptan    certain medicines for nausea or vomiting like dolasetron, ondansetron, palonosetron    certain medicines for Parkinson's disease like benztropine, trihexyphenidyl    certain medicines for seizures like phenobarbital, primidone    certain medicines for stomach problems like cimetidine, dicyclomine, hyoscyamine    certain medicines for travel sickness like scopolamine    diuretics    general anesthetics like halothane, isoflurane, methoxyflurane, propofol    ipratropium    local anesthetics like lidocaine, pramoxine, tetracaine    MAOIs like Carbex, Eldepryl, Marplan, Nardil, and Parnate    medicines that relax muscles for surgery    methylene blue    other medicines that prolong the QT interval (cause an abnormal heart rhythm)    other narcotic medicines for pain or cough    phenothiazines like chlorpromazine, mesoridazine, prochlorperazine, thioridazine  What if I miss a dose?  If you forget to replace your patch, take off the old patch and put on a new patch as soon as you can. Do not apply an extra patch to your skin. Do not wear more than one patch at the same time unless told to do so by your doctor or health care professional.  Where should I keep my medicine?  Keep out of the reach of children. This medicine can be abused. Keep your medicine in a safe place to protect it from theft. Do not share this medicine with anyone. Selling or giving away this medicine is dangerous and against the law.  Store at room temperature between 59 and 86 degrees F (15 and 30 degrees C). Do not store the patches out of their wrappers.  This medicine may cause accidental overdose and death if it is taken by other adults, children, or pets. Flush any unused medicine down the  toilet as instructed above to reduce the chance of harm. Alternately, you may dispose of the patch in the Patch-Disposal Unit provided. Do not use the medicine after the expiration date.  What should I tell my health care provider before I take this medicine?  They need to know if you have any of these conditions:    blockage in your bowel    brain tumor    drink more than 3 alcohol-containing drinks per day    drug abuse or addiction    fever    head injury    kidney disease    liver disease    lung or breathing disease, like asthma    thyroid disease    trouble passing urine or change in the amount of urine    an unusual or allergic reaction to buprenorphine, other medicines, foods, dyes, or preservatives    pregnant or trying to get pregnant    breast-feeding  What should I watch for while using this medicine?  Other pain medicine may be needed when you first start using the patch because the patch can take some time to start working. Tell your doctor or health care professional if your pain does not go away, if it gets worse, or if you have new or a different type of pain. You may develop tolerance to the medicine. Tolerance means that you will need a higher dose of the medicine for pain relief. Tolerance is normal and is expected if you take the medicine for a long time.  Do not suddenly stop taking your medicine because you may develop a severe reaction. Your body becomes used to the medicine. This does NOT mean you are addicted. Addiction is a behavior related to getting and using a drug for a non-medical reason. If you have pain, you have a medical reason to take pain medicine. Your doctor will tell you how much medicine to take. If your doctor wants you to stop the medicine, the dose will be slowly lowered over time to avoid any side effects.  If you are also taking a narcotic medicine for pain or cough or another medicine that also causes drowsiness, you may have more side effects. Give your health care  provider a list of all medicines you use. Your doctor will tell you how much medicine to take. Do not take more medicine than directed. Call emergency for help if you have problems breathing or unusual sleepiness.  You may get drowsy or dizzy. Do not drive, use machinery, or do anything that needs mental alertness until you know how this medicine affects you. Do not stand or sit up quickly, especially if you are an older patient. This reduces the risk of dizzy or fainting spells. Alcohol may interfere with the effect of this medicine. Avoid alcoholic drinks.  The medicine will cause constipation. Try to have a bowel movement at least every 2 to 3 days. If you do not have a bowel movement for 3 days, call your doctor or health care professional.  Your mouth may get dry. Chewing sugarless gum or sucking hard candy, and drinking plenty of water may help. Contact your doctor if the problem does not go away or is severe.  This medicine patch is sensitive to certain body heat changes. If your skin gets too hot, more medicine will come out of the patch and can cause a deadly overdose. Call your healthcare provider if you get a fever. Do not take hot baths. Do not sunbathe. Do not use hot tubs, saunas, hair dryers, heating pads, electric blankets, heated waterbeds, or tanning lamps. Do not do exercise that increases your body temperature.  NOTE:This sheet is a summary. It may not cover all possible information. If you have questions about this medicine, talk to your doctor, pharmacist, or health care provider. Copyright  2018 [employer]vier                Follow-ups after your visit        Your next 10 appointments already scheduled     Apr 23, 2018  4:00 PM CDT   Infusion 120 with UC SPEC INFUSION, UC 43 ATC   [facility] ([facility])    [address]  [address]  [place] [facility] [address] [phone]            Apr 25, 2018  8:00 AM CDT   Infusion  120 with UC SPEC INFUSION, UC 49 ATC   [facility] ([facility])    [address]  [address]  [facility] [id number] [phone]            Apr 25, 2018  9:00 AM CDT   (Arrive by 8:45 AM)   Return Visit with Quyen Lennon, PhD   [facility] ([facility])    [address]  [address]  [facility] [id number] [phone]            Apr 28, 2018 12:00 PM CDT   Infusion 120 with UC SPEC INFUSION, UC 42 ATC   [facility] ([facility])    [address]  [address]  [facility] [id number] [phone]            Apr 30, 2018  9:00 AM CDT   (Arrive by 8:45 AM)   Return Visit with Mirza García MD   [facility] ([facility])    [address]  [address]  [facility] [id number] [phone]            Apr 30, 2018  2:00 PM CDT   Infusion 120 with UC SPEC INFUSION, UC 43 ATC   [facility] ([facility])    [address]  [address]  [facility] [id number] [phone]              Who to contact     Please call your clinic at [phone] to:    Ask questions about your health    Make or cancel appointments    Discuss your medicines    Learn about your test results    Speak to your doctor            Additional Information About Your Visit        The [employer] Information     [employer] gives you secure access to your electronic health record. If you see a primary care provider, you can also send messages to your care team and make appointments. If you have questions, please call your primary care clinic.  If you do not have a primary care provider, please call [phone] and they will assist you.      [employer] is an electronic gateway that provides easy, online  access to your medical records. With [employer], you can request a clinic appointment, read your test results, renew a prescription or communicate with your care team.     To access your existing account, please contact your [facility] or call [phone] for assistance.        Care EveryWhere ID     This is your Care EveryWhere ID. This could be used by other organizations to access your [facility] medical records  [id number]        Your Vitals Were     Pulse Pulse Oximetry                135 99%           Blood Pressure from Last 3 Encounters:   04/23/18 129/82   04/21/18 113/57   04/19/18 103/57    Weight from Last 3 Encounters:   04/13/18 68 kg (150 lb)   04/11/18 68 kg (150 lb)   04/04/18 68.4 kg (150 lb 11.2 oz)              Today, you had the following     No orders found for display         Today's Medication Changes          These changes are accurate as of 4/23/18  3:23 PM.  If you have any questions, ask your nurse or doctor.               Start taking these medicines.        Dose/Directions    buprenorphine 5 MCG/HR WK patch   Commonly known as:  BUTRANS   Used for:  Abdominal pain, generalized   Started by:  Leonardo Wang MD        Dose:  1 patch   Place 1 patch onto the skin every 7 days   Quantity:  4 patch   Refills:  0         These medicines have changed or have updated prescriptions.        Dose/Directions    nortriptyline 10 MG capsule   Commonly known as:  PAMELOR   This may have changed:  how much to take   Used for:  Right upper quadrant abdominal pain        Dose:  30 mg   Take 3 capsules (30 mg) by mouth At Bedtime   Quantity:  120 capsule   Refills:  0            Where to get your medicines      Some of these will need a paper prescription and others can be bought over the counter.  Ask your nurse if you have questions.     Bring a paper prescription for each of these medications     buprenorphine 5 MCG/HR WK patch                Primary Care Provider  Office Phone # Fax #    Quyen Baez [phone] [address] [address]        Equal Access to Services     RACHAEL BENITEZ : Hadyoana aad ku hadkavon Enamorado, waaxda luqadaha, qaybta kaalmada jacque, maki catherine hubert [place]. So [facility] [phone].    ATENCIÓN: Si habla español, tiene a quijano disposición servicios gratuitos de asistencia lingüística. Llame al [phone].    We comply with applicable federal civil rights laws and [place] laws. We do not discriminate on the basis of race, color, national origin, age, disability, sex, sexual orientation, or gender identity.            Thank you!     Thank you for choosing [facility]  for your care. Our goal is always to provide you with excellent care. Hearing back from our patients is one way we can continue to improve our services. Please take a few minutes to complete the written survey that you may receive in the mail after your visit with us. Thank you!             Your Updated Medication List - Protect others around you: Learn how to safely use, store and throw away your medicines at www.disposemymeds.org.          This list is accurate as of 4/23/18  3:23 PM.  Always use your most recent med list.                   Brand Name Dispense Instructions for use Diagnosis    acetaminophen 32 mg/mL solution    TYLENOL     30.45 mLs (975 mg) by Per J Tube route every 8 hours        * amylase-lipase-protease 36156 units Cpep    ZENPEP    180 capsule    Take 2-3 capsules (40,000-60,000 Units) by mouth Take with snacks or supplements (Snacks)    Idiopathic chronic pancreatitis (H)       * amylase-lipase-protease 76296 units Cpep    ZENPEP    180 capsule    Take 5 capsules (100,000 Units) by mouth 4 times daily (with meals and nightly)    Right upper quadrant abdominal pain       blood glucose monitoring test strip    no brand specified    100 strip    One Touch Ultra  2 Strips, Use to test blood sugars 2 times daily or as directed    Hypoglycemia       buprenorphine 5 MCG/HR WK patch    BUTRANS    4 patch    Place 1 patch onto the skin every 7 days    Abdominal pain, generalized       cephalexin 250 MG/5ML suspension    KEFLEX    210 mL    Take 10 ml per GJ tube tid    Dysuria       clotrimazole-betamethasone cream    LOTRISONE    15 g    Apply topically 2 times daily    Rash and nonspecific skin eruption       fluticasone 50 MCG/ACT spray    FLONASE    16 g    Spray 2 sprays into both nostrils daily    Nasal congestion       gabapentin 250 MG/5ML solution    NEURONTIN      Chronic abdominal pain, Sphincter of Oddi dysfunction, Cyclical vomiting with nausea, intractability of vomiting not specified, Pain around percutaneous endoscopic gastrostomy (PEG) tube site, sequela       hydrOXYzine 10 MG/5ML syrup    ATARAX    1500 mL    Take 12.5 mLs (25 mg) by mouth every 6 hours as needed for anxiety or other (pain)    Acute abdominal pain       leuprolide 11.25 MG kit    LUPRON DEPOT (3-MONTH)    1 each    Inject 11.25 mg into the muscle every 3 months    Endometriosis       levalbuterol 45 MCG/ACT Inhaler    XOPENEX HFA    1 Inhaler    Inhale 2 puffs into the lungs every 6 hours as needed for shortness of breath / dyspnea    Wheeze       LORazepam 0.5 MG tablet    ATIVAN    10 tablet    Take 1 tablet (0.5 mg) by mouth 2 times daily as needed (nausea, do not take with pain medicine, do not drive after taking)    Cyclical vomiting with nausea, intractability of vomiting not specified       multivitamins with minerals Liqd liquid     1 Bottle    15 mLs by Per Feeding Tube route daily    Abdominal pain, epigastric       norethindrone 5 MG tablet    AYGESTIN    90 tablet    Take 1 tablet (5 mg) by mouth daily    Endometriosis       nortriptyline 10 MG capsule    PAMELOR    120 capsule    Take 3 capsules (30 mg) by mouth At Bedtime    Right upper quadrant abdominal pain       ondansetron 4  MG ODT tab    ZOFRAN ODT    30 tablet    Take 1 tablet (4 mg) by mouth every 8 hours as needed for nausea or vomiting    Intractable cyclical vomiting with nausea       order for DME     1 Units    Equipment being ordered: TENS with wire and electrode.    Chronic abdominal pain       oxyCODONE 5 MG/5ML solution    ROXICODONE    200 mL    Take 10 mL (10 mg) by mouth every 6 hours as needed, maximum 40 mL per day.    Abdominal pain, generalized, Chronic abdominal pain       pantoprazole 40 MG EC tablet    PROTONIX    30 tablet    Take 1 tablet (40 mg) by mouth 2 times daily (before meals)    Right upper quadrant abdominal pain, Erosive gastritis       polyethylene glycol Packet    MIRALAX/GLYCOLAX    7 packet    Take 17 g by mouth daily    Right upper quadrant abdominal pain       RIZATRIPTAN BENZOATE PO      Take 5 mg by mouth once as needed        senna-docusate 8.6-50 MG per tablet    SENOKOT-S;PERICOLACE    100 tablet    Take 1 tablet by mouth 2 times daily as needed for constipation    Right upper quadrant abdominal pain       sodium bicarbonate 325 MG tablet     120 tablet    1 tablet (325 mg) by Per Feeding Tube route 4 times daily    Abdominal pain, epigastric, Intractable cyclical vomiting with nausea       * Notice:  This list has 2 medication(s) that are the same as other medications prescribed for you. Read the directions carefully, and ask your doctor or other care provider to review them with you.

## 2018-04-23 NOTE — MR AVS SNAPSHOT
After Visit Summary   4/23/2018    Alexandra Melgoza    MRN: [id number]           Patient Information     Date Of Birth          1995        Visit Information        Provider Department      4/23/2018 4:00 PM UC 43 ATC; UC SPEC INFUSION [facility]        Today's Diagnoses     Cyclical vomiting with nausea, intractability of vomiting not specified    -  1    Chronic pancreatitis, unspecified pancreatitis type (H)        Pain        Abdominal pain, epigastric        S/P ERCP        Acute abdominal pain        Abdominal pain        Hypoglycemia        Urge incontinence        Major depressive disorder, recurrent episode, moderate (H)        Anxiety state        Rash        Intractable cyclical vomiting with nausea           Follow-ups after your visit        Your next 10 appointments already scheduled     Apr 25, 2018  8:00 AM CDT   Infusion 120 with UC SPEC INFUSION, UC 49 ATC   [facility] ([facility])    [address] [place] [address]  [address]  [facility] [address] [phone]            Apr 25, 2018  9:00 AM CDT   (Arrive by 8:45 AM)   Return Visit with Quyen Lennon, PhD   [facility] ([facility])    [address] [place] [address]  4th Floor  [facility] [address] [phone]            Apr 28, 2018 12:00 PM CDT   Infusion 120 with UC SPEC INFUSION, UC 42 ATC   [facility] ([facility])    [address]  [address]  [facility] [address] [phone]            Apr 30, 2018  9:00 AM CDT   (Arrive by 8:45 AM)   Return Visit with Mirza García MD   [facility] ([facility])    [address] [place] [address]  4th Floor  [facility] [id number] [phone]            Apr 30, 2018  2:00 PM CDT    Infusion 120 with UC SPEC INFUSION, UC 43 ATC   [facility] ([facility])    [address]  [address]  [facility] [address] [phone]            May 02, 2018  3:00 PM CDT   Infusion 120 with UC SPEC INFUSION, UC 49 ATC   [facility] ([facility])    [address]  [address]  [facility] [address] [phone]              Who to contact     If you have questions or need follow up information about today's clinic visit or your schedule please contact [facility] PROCEDURE directly at [phone].  Normal or non-critical lab and imaging results will be communicated to you by [employer], letter or phone within 4 business days after the clinic has received the results. If you do not hear from us within 7 days, please contact the clinic through [email or web address] or phone. If you have a critical or abnormal lab result, we will notify you by phone as soon as possible.  Submit refill requests through [facility] [facility] or call your pharmacy and they will forward the refill request to us. Please allow 3 business days for your refill to be completed.          Additional Information About Your Visit        CellSpinharSimple-Fill Information     [employer] gives you secure access to your electronic health record. If you see a primary care provider, you can also send messages to your care team and make appointments. If you have questions, please call your primary care clinic.  If you do not have a primary care provider, please call [phone] and they will assist you.        Care EveryWhere ID     This is your Care EveryWhere ID. This could be used by other organizations to access your [facility] medical records  [id number]        Your Vitals Were     Temperature Respirations                99.2  F (37.3  C) (Oral) 18           Blood Pressure from Last 3  Encounters:   04/23/18 126/81   04/23/18 129/82   04/21/18 113/57    Weight from Last 3 Encounters:   04/13/18 68 kg (150 lb)   04/11/18 68 kg (150 lb)   04/04/18 68.4 kg (150 lb 11.2 oz)              We Performed the Following     Gram stain     Wound Culture Aerobic Bacterial          Today's Medication Changes          These changes are accurate as of 4/23/18  6:54 PM.  If you have any questions, ask your nurse or doctor.               Start taking these medicines.        Dose/Directions    buprenorphine 5 MCG/HR WK patch   Commonly known as:  BUTRANS   Used for:  Abdominal pain, generalized   Started by:  Leonardo Wang MD        Dose:  1 patch   Place 1 patch onto the skin every 7 days   Quantity:  4 patch   Refills:  0         These medicines have changed or have updated prescriptions.        Dose/Directions    nortriptyline 10 MG capsule   Commonly known as:  PAMELOR   This may have changed:  how much to take   Used for:  Right upper quadrant abdominal pain        Dose:  30 mg   Take 3 capsules (30 mg) by mouth At Bedtime   Quantity:  120 capsule   Refills:  0            Where to get your medicines      Some of these will need a paper prescription and others can be bought over the counter.  Ask your nurse if you have questions.     Bring a paper prescription for each of these medications     buprenorphine 5 MCG/HR WK patch                Primary Care Provider Office Phone # Fax #    Quyen Baez [phone] [address] [address]        Equal Access to Services     [facility]: Hadii angelina joseph Somalini, waaxda luqadaha, qaybta kaalmada jacque, waxay fatmata [place]. So [facility] [phone].    ATENCIÓN: Si habla español, tiene a quijano disposición servicios gratuitos de asistencia lingüística. Llame al [phone].    We comply with applicable federal civil rights laws and [place] laws. We do not discriminate on the basis of  race, color, national origin, age, disability, sex, sexual orientation, or gender identity.            Thank you!     Thank you for choosing [facility] PROCED[facility]  for your care. Our goal is always to provide you with excellent care. Hearing back from our patients is one way we can continue to improve our services. Please take a few minutes to complete the written survey that you may receive in the mail after your visit with us. Thank you!             Your Updated Medication List - Protect others around you: Learn how to safely use, store and throw away your medicines at www.disposemymeds.org.          This list is accurate as of 4/23/18  6:54 PM.  Always use your most recent med list.                   Brand Name Dispense Instructions for use Diagnosis    acetaminophen 32 mg/mL solution    TYLENOL     30.45 mLs (975 mg) by Per J Tube route every 8 hours        * amylase-lipase-protease 91158 units Cpep    ZENPEP    180 capsule    Take 2-3 capsules (40,000-60,000 Units) by mouth Take with snacks or supplements (Snacks)    Idiopathic chronic pancreatitis (H)       * amylase-lipase-protease 86072 units Cpep    ZENPEP    180 capsule    Take 5 capsules (100,000 Units) by mouth 4 times daily (with meals and nightly)    Right upper quadrant abdominal pain       blood glucose monitoring test strip    no brand specified    100 strip    One Touch Ultra 2 Strips, Use to test blood sugars 2 times daily or as directed    Hypoglycemia       buprenorphine 5 MCG/HR WK patch    BUTRANS    4 patch    Place 1 patch onto the skin every 7 days    Abdominal pain, generalized       cephalexin 250 MG/5ML suspension    KEFLEX    210 mL    Take 10 ml per GJ tube tid    Dysuria       clotrimazole-betamethasone cream    LOTRISONE    15 g    Apply topically 2 times daily    Rash and nonspecific skin eruption       fluticasone 50 MCG/ACT spray    FLONASE    16 g    Spray 2 sprays into both nostrils daily     Nasal congestion       gabapentin 250 MG/5ML solution    NEURONTIN      Chronic abdominal pain, Sphincter of Oddi dysfunction, Cyclical vomiting with nausea, intractability of vomiting not specified, Pain around percutaneous endoscopic gastrostomy (PEG) tube site, sequela       hydrOXYzine 10 MG/5ML syrup    ATARAX    1500 mL    Take 12.5 mLs (25 mg) by mouth every 6 hours as needed for anxiety or other (pain)    Acute abdominal pain       leuprolide 11.25 MG kit    LUPRON DEPOT (3-MONTH)    1 each    Inject 11.25 mg into the muscle every 3 months    Endometriosis       levalbuterol 45 MCG/ACT Inhaler    XOPENEX HFA    1 Inhaler    Inhale 2 puffs into the lungs every 6 hours as needed for shortness of breath / dyspnea    Wheeze       LORazepam 0.5 MG tablet    ATIVAN    10 tablet    Take 1 tablet (0.5 mg) by mouth 2 times daily as needed (nausea, do not take with pain medicine, do not drive after taking)    Cyclical vomiting with nausea, intractability of vomiting not specified       multivitamins with minerals Liqd liquid     1 Bottle    15 mLs by Per Feeding Tube route daily    Abdominal pain, epigastric       norethindrone 5 MG tablet    AYGESTIN    90 tablet    Take 1 tablet (5 mg) by mouth daily    Endometriosis       nortriptyline 10 MG capsule    PAMELOR    120 capsule    Take 3 capsules (30 mg) by mouth At Bedtime    Right upper quadrant abdominal pain       ondansetron 4 MG ODT tab    ZOFRAN ODT    30 tablet    Take 1 tablet (4 mg) by mouth every 8 hours as needed for nausea or vomiting    Intractable cyclical vomiting with nausea       order for DME     1 Units    Equipment being ordered: TENS with wire and electrode.    Chronic abdominal pain       oxyCODONE 5 MG/5ML solution    ROXICODONE    200 mL    Take 10 mL (10 mg) by mouth every 6 hours as needed, maximum 40 mL per day.    Abdominal pain, generalized, Chronic abdominal pain       pantoprazole 40 MG EC tablet    PROTONIX    30 tablet    Take 1  tablet (40 mg) by mouth 2 times daily (before meals)    Right upper quadrant abdominal pain, Erosive gastritis       polyethylene glycol Packet    MIRALAX/GLYCOLAX    7 packet    Take 17 g by mouth daily    Right upper quadrant abdominal pain       RIZATRIPTAN BENZOATE PO      Take 5 mg by mouth once as needed        senna-docusate 8.6-50 MG per tablet    SENOKOT-S;PERICOLACE    100 tablet    Take 1 tablet by mouth 2 times daily as needed for constipation    Right upper quadrant abdominal pain       sodium bicarbonate 325 MG tablet     120 tablet    1 tablet (325 mg) by Per Feeding Tube route 4 times daily    Abdominal pain, epigastric, Intractable cyclical vomiting with nausea       * Notice:  This list has 2 medication(s) that are the same as other medications prescribed for you. Read the directions carefully, and ask your doctor or other care provider to review them with you.

## 2018-04-23 NOTE — PROGRESS NOTES
Nursing Note  Alexandra Melgoza [facility]:   Chief Complaint   Patient presents with     Infusion     2 liters LR     During [facility]'s [facility] appointment, orders from Dr. Narayanan were completed.  Frequency: three times weekly as needed    Progress note:  Patient identification verified by name and date of birth.  Assessment completed.  Vitals recorded in Doc Flowsheets.  Patient was provided with education regarding infusion and possible side effects.  Patient verbalized understanding.      needed: No  Premedications: were not ordered.  Infusion Rates: each liter of fluids over 1 hour, for a total of 2 hours  Approximate Infusion length:2 hours.   Labs: were not ordered for this appointment.  Vascular access: port accessed today.  Treatment Conditions: Patient's G tube is putting out more drainage and is foul smelling, yellow/green tinged per patient. Dr. Narayanan paged. Order from Dr. Narayanan to send culture of G tube drainage and patient instructed to call his nurse, Montse, tomorrow to be seen in clinic per Dr. Narayanan. Patient understands plan and will call clinic tomorrow.  Patient tolerated infusion: well.    Drug Waste Record? Yes      Drug Name: benadryl  Dose: 25 mg  Route administered: IV  NDC #: 40385-831-43  Amount of waste(mL):0.5 ml  Reason for waste: Single use vial     Drug Name: benadryl  Dose: 25 mg  Route administered: IV  NDC #: 51751-609-42  Amount of waste(mL):0.5 ml  Reason for waste: Single use vial     Discharge Plan:   Follow up plan of care with: ongoing infusions at [facility].  Discharge instructions were reviewed with patient.  Patient/representative verbalized understanding of discharge instructions and all questions answered.  [facility] discharged [facility] in stable condition.    Navya Schmitt RN       Administrations This Visit      diphenhydrAMINE (BENADRYL) injection 25 mg     Admin Date Action Dose Route Administered By             04/23/2018 Given 25 mg Intravenous Navya Schmitt RN              Admin Date Action Dose Route Administered By             04/23/2018 Given 25 mg Intravenous Navya Schmitt RN                    lactated ringers BOLUS 1,000-2,000 mL     Admin Date Action Dose Rate Route Administered By          04/23/2018 New Bag 2000 mL 999 mL/hr Intravenous Navya Schmitt RN                   ondansetron (ZOFRAN) injection 4 mg     Admin Date Action Dose Route Administered By             04/23/2018 Given 4 mg Intravenous Navya Schmitt RN              Admin Date Action Dose Route Administered By             04/23/2018 Given 4 mg Intravenous Navya Schmitt RN                        /79 (BP Location: Left arm)  Temp 99.2  F (37.3  C) (Oral)  Resp 18

## 2018-04-23 NOTE — TELEPHONE ENCOUNTER
[facility] Call Center    Phone Message    May a detailed message be left on voicemail: yes    Reason for Call: Medication Question or concern regarding medication   Prescription Clarification  Name of Medication: Bultrans  Prescribing Provider: Leonardo Wang   Pharmacy: [facility]   What on the order needs clarification? Not covered by insurance.  Is there an alternative?          Action Taken: Message routed to:  [facility] ([facility] Pain and Other: a

## 2018-04-23 NOTE — LETTER
4/23/2018       RE: Alexandra Melgoza  [address]  [facility] [address]     Dear Colleague,    Thank you for referring your patient, Alexandra Melgoza, to the [facility] at [facility]. Please see a copy of my visit note below.    Patient is a 23 y/o lady with a h/o abdominal pain who presents for f/u.  The patient has had several bouts of extreme pain in the last few months, punctuated with several different surgical procedures to assist in the management of her pain and facilitate nutrition.  The mainstay of her analgesic regimen has been with opioid analgesics, namely, oxycodone elixir 10 mg q6h prn.  For the second time in as many visits, the patient has returned for an early refill, stating that she spilled her medication.  This is the cause of some concern.  Upon our initial evaluation, it was decided, between the patient and I that only a short course of opiates would be needed for an acute exacerbation that was occurring at the time.  Her pain was described as intermittent.  We've not been off opiates since that time.  Her pain seems constant, not intermittent.  It seems that a short acting elixir may not be the best choice for this particular patient.  She returns today for re-evaluation and medication refill.  Current Outpatient Prescriptions   Medication     buprenorphine (BUTRANS) 5 MCG/HR WK patch     acetaminophen (TYLENOL) 32 mg/mL solution     amylase-lipase-protease (ZENPEP) 19729 UNITS CPEP     amylase-lipase-protease (ZENPEP) 98898 UNITS CPEP     blood glucose monitoring (NO BRAND SPECIFIED) test strip     cephalexin (KEFLEX) 250 MG/5ML suspension     clotrimazole-betamethasone (LOTRISONE) cream     fluticasone (FLONASE) 50 MCG/ACT spray     gabapentin (NEURONTIN) 250 MG/5ML solution     hydrOXYzine (ATARAX) 10 MG/5ML syrup     leuprolide (LUPRON DEPOT) 11.25 MG injection     levalbuterol (XOPENEX HFA) 45 MCG/ACT Inhaler  "    LORazepam (ATIVAN) 0.5 MG tablet     multivitamins with minerals (CERTAVITE/CEROVITE) LIQD liquid     norethindrone (AYGESTIN) 5 MG tablet     nortriptyline (PAMELOR) 10 MG capsule     ondansetron (ZOFRAN ODT) 4 MG ODT tab     order for DME     oxyCODONE (ROXICODONE) 5 MG/5ML solution     oxyCODONE IR (ROXICODONE) 10 MG tablet     pantoprazole (PROTONIX) 40 MG EC tablet     polyethylene glycol (MIRALAX/GLYCOLAX) Packet     RIZATRIPTAN BENZOATE PO     senna-docusate (SENOKOT-S;PERICOLACE) 8.6-50 MG per tablet     sodium bicarbonate 325 MG tablet     No current facility-administered medications for this visit.      Allergies   Allergen Reactions     Amoxicillin-Pot Clavulanate Nausea and Vomiting     Compazine [Prochlorperazine] Other (See Comments)     Dystonia       Hyoscyamine Other (See Comments)     Dystonia     Reglan [Metoclopramide Hcl] Other (See Comments)     Dystonia     Zyprexa Other (See Comments)     Sensitive, dystonic reaction on 11-9-2011     Amitriptyline Hcl Other (See Comments)     Dystonia, hallucinations     Buspirone Other (See Comments)     No Adverse Reactions, no benefit     Cogentin [Benztropine]      Cyproheptadine Other (See Comments)     Distonic     Dicyclomine Other (See Comments)     Droperidol Other (See Comments)     Feels tense and \"like she has to jump out of her skin\".       Effexor [Venlafaxine] Other (See Comments)     Dystonia     Food      Cilantro--lips/tongue swelling     No Clinical Screening - See Comments Other (See Comments)     Cilantro     Phenergan Dm [Promethazine-Dm] Other (See Comments)     dystonia     Promethazine Other (See Comments)     Risperidone Other (See Comments)     dystonia     Vistaril Other (See Comments)     Burning sensation.       Augmentin GI Disturbance     Ketamine Other (See Comments)     jittery     Sorbitol GI Disturbance     Headache and dyspepsia     Past Medical History:   Diagnosis Date     Anxiety      Asthma      Cholecystitis     " s/p cholecystectomy     Chronic abdominal pain      Chronic infection     mrsa     Chronic pain      Cyclic vomiting syndrome 10/27/2012     Depression      Endometriosis      Hypoglycaemia      Migraines      Mild intermittent asthma      Ovarian cysts      Pancreatic disease      PONV (postoperative nausea and vomiting)      Pseudoseizures      Somatoform disorder      Sphincter of Oddi dysfunction      Vasovagal syncope      Past Surgical History:   Procedure Laterality Date     ABDOMEN SURGERY      ERCP, biliary stents     CHOLECYSTECTOMY  8/2/11     COLONOSCOPY  2011    negative finding     ENDOSCOPIC RETROGRADE CHOLANGIOPANCREATOGRAM  8/23/2011    Procedure:ENDOSCOPIC RETROGRADE CHOLANGIOPANCREATOGRAM; Endoscopic Retrograde Cholangiopancreatogram; Surgeon:SHORTY NARAYANAN; Location:[facility]     ENDOSCOPIC RETROGRADE CHOLANGIOPANCREATOGRAM  5/17/2012    Procedure:ENDOSCOPIC RETROGRADE CHOLANGIOPANCREATOGRAM; Endoscopic Retrograde Cholangiopancreatogram with pancreatic stent placement.; Surgeon:SHORTY NARAYANAN; Location:[facility]     ENDOSCOPIC RETROGRADE CHOLANGIOPANCREATOGRAM N/A 1/18/2018    Procedure: COMBINED ENDOSCOPIC RETROGRADE CHOLANGIOPANCREATOGRAPHY, PLACE TUBE/STENT;  Endoscopic Retrograde Cholangiopancreatography with nasojejunal feeding tube placement;  Surgeon: Shroty Narayanan MD;  [facility]: [facility]     ENDOSCOPIC RETROGRADE CHOLANGIOPANCREATOGRAM COMPLEX  1/3/2012    Procedure:ENDOSCOPIC RETROGRADE CHOLANGIOPANCREATOGRAM COMPLEX; Endoscopic Retrograde Cholangiopancreatogram with Manometry bile duct sphincterotomy extention pancreatic duct sphincterotomy pancreatic duct stent placement; Surgeon:SHORTY NARAYANAN; [facility]     ENDOSCOPIC ULTRASOUND UPPER GASTROINTESTINAL TRACT (GI) N/A 6/9/2015    Procedure: ENDOSCOPIC ULTRASOUND, ESOPHAGOSCOPY / UPPER GASTROINTESTINAL TRACT (GI);  Surgeon: Mario Joe MD;  [facility]     ENDOSCOPIC ULTRASOUND UPPER  GASTROINTESTINAL TRACT (GI) N/A 12/12/2016    Procedure: ENDOSCOPIC ULTRASOUND, ESOPHAGOSCOPY / UPPER GASTROINTESTINAL TRACT (GI);  Surgeon: Guru Jose Klein MD;  [facility]     ESOPHAGOSCOPY, GASTROSCOPY, DUODENOSCOPY (EGD), COMBINED  1/18/2012    Procedure:COMBINED ESOPHAGOSCOPY, GASTROSCOPY, DUODENOSCOPY (EGD); Surgeon:ARNIE ESPINOZA; [facility]     ESOPHAGOSCOPY, GASTROSCOPY, DUODENOSCOPY (EGD), COMBINED  1/18/2012    Procedure:COMBINED ESOPHAGOSCOPY, GASTROSCOPY, DUODENOSCOPY (EGD); EGD; Surgeon:ARNIE ESPINOZA; [facility]     ESOPHAGOSCOPY, GASTROSCOPY, DUODENOSCOPY (EGD), COMBINED N/A 12/9/2017    Procedure: COMBINED ESOPHAGOSCOPY, GASTROSCOPY, DUODENOSCOPY (EGD);;  Surgeon: Josias Chan MD;  [facility]     ESOPHAGOSCOPY, GASTROSCOPY, DUODENOSCOPY (EGD), COMBINED N/A 3/27/2018    Procedure: COMBINED ESOPHAGOSCOPY, GASTROSCOPY, DUODENOSCOPY (EGD);  Upper Gastrointestinal Endoscopy convert gastrostomy to gastrojejunostomy tube ;  Surgeon: Campbell Narayanan MD;  [facility]     INSERT PORT VASCULAR ACCESS       L knee arthroscopy  2009     LAPAROSCOPY DIAGNOSTIC (GYN)  10/26/2012    Procedure: LAPAROSCOPY DIAGNOSTIC (GYN);  LAPAROSCOPY DIAGNOSTIC, CAUTERY ENDOMETRIOISIS and biopsy of Fallopian tube lesions;  Surgeon: Caral Lopez MD;  Location:  OR     ORTHOPEDIC SURGERY  2008    knee     REMOVE AND REPLACE BREAST IMPLANT PROSTHESIS N/A 2/8/2018    Procedure: PERCUTANEOUS INSERTION TUBE JEJUNOSTOMY;  upper endoscopy with percutaneous gastrojejunostimy feeding tuble placement and gastropexy;  Surgeon: Campbell Narayanan MD;  Location: [facility]     VASCULAR SURGERY       Family History   Problem Relation Age of Onset     Hypertension Father      Bipolar Disorder Other      Anxiety Disorder Other      Depression Paternal Grandmother      Allergies Maternal Grandmother      CEREBROVASCULAR DISEASE Maternal Grandfather      Cardiovascular Maternal Grandfather       "Depression/Anxiety Maternal Grandfather      GASTROINTESTINAL DISEASE Maternal Grandfather      DIABETES Paternal Uncle      Hypoglycemia Brother      CANCER No family hx of      No family history of skin cancer     Social History     Social History     Marital status: Single     Spouse name: N/A     Number of children: N/A     Years of education: N/A     Occupational History     Not on file.     Social History Main Topics     Smoking status: Never Smoker     Smokeless tobacco: Never Used     Alcohol use No     Drug use: No     Sexual activity: No     Other Topics Concern     Parent/Sibling W/ Cabg, Mi Or Angioplasty Before 65f 55m? 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  Physical Exam   Abdominal: She exhibits no distension and no mass. There is tenderness. There is guarding. There is no rebound.   Nursing note and vitals reviewed.  A/P:  Patient is a 21 y/o lady with chronic abdominal pain who presents for f/u.  Pain is currently controlled with opiates.  Patient has had at least two instances of requesting early refills, the last two attributed to \"spilling\" the liquid elixir.  At this time, I recommend continuing opiates to control her pain, however, as the elixir seems difficult for her to use, I recommend employing a Butrans patch as transdermal medications may be easier for her to use, as the patches are changed weekly.  The patient protests, however, upon assurance that this may be a better overall plan, she verbalizes an understanding of our plan.  She will return to clinic in 4 weeks.      Again, thank you for allowing me to participate in the care of your patient.      Sincerely,    Leonardo Wang MD      "

## 2018-04-23 NOTE — PATIENT INSTRUCTIONS
1. Use 1 butrans patch every 7 days        Follow up:    4 weeks     To speak with a nurse, schedule/reschedule/cancel a clinic appointment, or request a medication refill call: [phone]     You can also reach us by [employer]: [email or web address]    For refills, please call on Monday, 1 week before your medication runs out. The doctors are not always in clinic, so this gives us time to get your prescriptions ready.  Please let us know the name of the medication you are requesting a refill of.                                 Buprenorphine transdermal patch  Brand Name: Butrans  What is this medicine?  BUPRENORPHINE (byoo pre NOR feen) is a pain reliever. It is used to treat moderate to severe pain.  How should I use this medicine?  Apply the patch to your skin. Do not cut or damage the patch. A cut or damaged patch can be very dangerous because you may get too much medicine. Select a clean, dry area of skin on your upper outer arm, upper chest, upper back, or the side of the chest. Do not apply the patch to broken, burned, cut, or irritated skin. Use only water to clean the area. Do not use soap or alcohol to clean the skin because this can increase the effects of the medicine. If the area is hairy, clip the hair with scissors, but do not shave.  Take the patch out of its wrapper. Bend the patch along the faint line and slowly peel the outer portion of the liner, which covers the sticky surface of the patch. Press the patch onto the skin and slowly peel off the protective liner. Do not use a patch if the packaging or backing is damaged. Do not touch the sticky part with your fingers. Press the patch to the skin using the palm of your hand. Press the patch to the skin for 15 seconds. Wash your hands at once.  Keep patches far away from children. Do not let children see you apply the patch and do not apply it where children can see it. Do not call the patch a sticker, tattoo, or bandage, as this  could encourage the child to mimic your actions. Used patches still contain medicine. Children or pets can have serious side effects or die from putting used patches in their mouth or on their bodies.  Take off the old patch before putting on a new patch. Apply each new patch to a different area of skin. If a patch comes off or causes irritation, remove it and apply a new patch to a different site. If the edges of the patch start to loosen, first apply first aid tape to the edges of the patch. If problems with the patch not sticking continue, cover the patch with a see-through adhesive dressing (like Bioclusive or Tegaderm). Never cover the patch with any other bandage or tape. To get rid of used patches, fold the patch in half with the sticky sides together. Then, flush it down the toilet. Alternately, you may dispose of the patch in the Patch-Disposal Unit provided. Never throw the patch away in the trash without sealing it in the Patch-Disposal unit. Replace the patch every 7 days. Follow the directions on the prescription label. Do not use more medicine than you are told to use.  A special MedGuide will be given to you by the pharmacist with each prescription and refill. Be sure to read this information carefully each time.  Talk to your pediatrician regarding the use of this medicine in children. Special care may be needed.  If a patch accidentally touches the skin, use only water to clean the area. Do not use soap or alcohol to clean the skin because this can increase the effects of the medicine. If someone accidentally uses a buprenorphine patch and is not awake and alert, immediately call 911 for help. If the person is awake and alert, call a doctor, health care professional, or the Poison Control Center.  What side effects may I notice from receiving this medicine?  Side effects that you should report to your doctor or health care professional as soon as possible:    allergic reactions like skin rash,  itching or hives, swelling of the face, lips, or tongue    breathing problems    confusion    signs and symptoms of a dangerous change in heartbeat or heart rhythm like chest pain; dizziness; fast or irregular heartbeat; palpitations; feeling faint or lightheaded, falls; breathing problems    signs and symptoms of liver injury like dark yellow or brown urine; general ill feeling or flu-like symptoms; light-colored stools; loss of appetite; nausea; right upper belly pain; unusually weak or tired; yellow of the eyes or skin    signs and symptoms of low blood pressure like dizziness; feeling faint or lightheaded, falls; unusually weak or tired    trouble passing urine or change in the amount of urine  Side effects that usually do not require medical attention (report to your doctor or health care professional if they continue or are bothersome):    constipation    dry mouth    itching, redness, or rash at the patch site    nausea, vomiting    tiredness  What may interact with this medicine?  Do not take this medication with any of the following medicines:    cisapride    certain medicines for fungal infections like ketoconazole and itraconazole    dofetilide    dronedarone    pimozide    ritonavir    thioridazine    ziprasidone  This medicine may interact with the following medications:    alcohol    antihistamines for allergy, cough and cold    antiviral medicines for HIV or AIDS    atropine    certain antibiotics like clarithromycin, erythromycin, linezold, rifampin    certain medicines for anxiety or sleep    certain medicines for bladder problems like oxybutynin, tolterodine    certain medicines for depression like amitriptyline, fluoxetine, sertraline    certain medicines for migraine headache like almotriptan, eletriptan, frovatriptan, naratriptan, rizatriptan, sumatriptan, zolmitriptan    certain medicines for nausea or vomiting like dolasetron, ondansetron, palonosetron    certain medicines for Parkinson's  disease like benztropine, trihexyphenidyl    certain medicines for seizures like phenobarbital, primidone    certain medicines for stomach problems like cimetidine, dicyclomine, hyoscyamine    certain medicines for travel sickness like scopolamine    diuretics    general anesthetics like halothane, isoflurane, methoxyflurane, propofol    ipratropium    local anesthetics like lidocaine, pramoxine, tetracaine    MAOIs like Carbex, Eldepryl, Marplan, Nardil, and Parnate    medicines that relax muscles for surgery    methylene blue    other medicines that prolong the QT interval (cause an abnormal heart rhythm)    other narcotic medicines for pain or cough    phenothiazines like chlorpromazine, mesoridazine, prochlorperazine, thioridazine  What if I miss a dose?  If you forget to replace your patch, take off the old patch and put on a new patch as soon as you can. Do not apply an extra patch to your skin. Do not wear more than one patch at the same time unless told to do so by your doctor or health care professional.  Where should I keep my medicine?  Keep out of the reach of children. This medicine can be abused. Keep your medicine in a safe place to protect it from theft. Do not share this medicine with anyone. Selling or giving away this medicine is dangerous and against the law.  Store at room temperature between 59 and 86 degrees F (15 and 30 degrees C). Do not store the patches out of their wrappers.  This medicine may cause accidental overdose and death if it is taken by other adults, children, or pets. Flush any unused medicine down the toilet as instructed above to reduce the chance of harm. Alternately, you may dispose of the patch in the Patch-Disposal Unit provided. Do not use the medicine after the expiration date.  What should I tell my health care provider before I take this medicine?  They need to know if you have any of these conditions:    blockage in your bowel    brain tumor    drink more than 3  alcohol-containing drinks per day    drug abuse or addiction    fever    head injury    kidney disease    liver disease    lung or breathing disease, like asthma    thyroid disease    trouble passing urine or change in the amount of urine    an unusual or allergic reaction to buprenorphine, other medicines, foods, dyes, or preservatives    pregnant or trying to get pregnant    breast-feeding  What should I watch for while using this medicine?  Other pain medicine may be needed when you first start using the patch because the patch can take some time to start working. Tell your doctor or health care professional if your pain does not go away, if it gets worse, or if you have new or a different type of pain. You may develop tolerance to the medicine. Tolerance means that you will need a higher dose of the medicine for pain relief. Tolerance is normal and is expected if you take the medicine for a long time.  Do not suddenly stop taking your medicine because you may develop a severe reaction. Your body becomes used to the medicine. This does NOT mean you are addicted. Addiction is a behavior related to getting and using a drug for a non-medical reason. If you have pain, you have a medical reason to take pain medicine. Your doctor will tell you how much medicine to take. If your doctor wants you to stop the medicine, the dose will be slowly lowered over time to avoid any side effects.  If you are also taking a narcotic medicine for pain or cough or another medicine that also causes drowsiness, you may have more side effects. Give your health care provider a list of all medicines you use. Your doctor will tell you how much medicine to take. Do not take more medicine than directed. Call emergency for help if you have problems breathing or unusual sleepiness.  You may get drowsy or dizzy. Do not drive, use machinery, or do anything that needs mental alertness until you know how this medicine affects you. Do not stand or  sit up quickly, especially if you are an older patient. This reduces the risk of dizzy or fainting spells. Alcohol may interfere with the effect of this medicine. Avoid alcoholic drinks.  The medicine will cause constipation. Try to have a bowel movement at least every 2 to 3 days. If you do not have a bowel movement for 3 days, call your doctor or health care professional.  Your mouth may get dry. Chewing sugarless gum or sucking hard candy, and drinking plenty of water may help. Contact your doctor if the problem does not go away or is severe.  This medicine patch is sensitive to certain body heat changes. If your skin gets too hot, more medicine will come out of the patch and can cause a deadly overdose. Call your healthcare provider if you get a fever. Do not take hot baths. Do not sunbathe. Do not use hot tubs, saunas, hair dryers, heating pads, electric blankets, heated waterbeds, or tanning lamps. Do not do exercise that increases your body temperature.  NOTE:This sheet is a summary. It may not cover all possible information. If you have questions about this medicine, talk to your doctor, pharmacist, or health care provider. Copyright  2018 [employer]

## 2018-04-24 ENCOUNTER — TELEPHONE (OUTPATIENT)
Dept: GASTROENTEROLOGY | Facility: CLINIC | Age: 23
End: 2018-04-24

## 2018-04-24 ENCOUNTER — OFFICE VISIT (OUTPATIENT)
Dept: WOUND CARE | Facility: CLINIC | Age: 23
End: 2018-04-24
Payer: COMMERCIAL

## 2018-04-24 ENCOUNTER — OFFICE VISIT (OUTPATIENT)
Dept: ANESTHESIOLOGY | Facility: CLINIC | Age: 23
End: 2018-04-24
Payer: COMMERCIAL

## 2018-04-24 VITALS — SYSTOLIC BLOOD PRESSURE: 126 MMHG | HEART RATE: 118 BPM | OXYGEN SATURATION: 99 % | DIASTOLIC BLOOD PRESSURE: 81 MMHG

## 2018-04-24 DIAGNOSIS — K94.23 LEAKING PEG TUBE (H): Primary | ICD-10-CM

## 2018-04-24 DIAGNOSIS — R10.9 ABDOMINAL PAIN, UNSPECIFIED ABDOMINAL LOCATION: ICD-10-CM

## 2018-04-24 DIAGNOSIS — R10.84 ABDOMINAL PAIN, GENERALIZED: Primary | ICD-10-CM

## 2018-04-24 RX ORDER — OXYCODONE HYDROCHLORIDE 10 MG/1
10 TABLET ORAL EVERY 8 HOURS PRN
Qty: 21 TABLET | Refills: 0 | Status: SHIPPED | OUTPATIENT
Start: 2018-04-24 | End: 2018-04-30

## 2018-04-24 ASSESSMENT — PAIN SCALES - GENERAL: PAINLEVEL: MODERATE PAIN (5)

## 2018-04-24 ASSESSMENT — ANXIETY QUESTIONNAIRES: GAD7 TOTAL SCORE: 3

## 2018-04-24 NOTE — PROGRESS NOTES
Alexandra Melgoza comes into clinic today at the request of Dr Narayanan and ordering provider for evaluation of Alex-Key button.  Pt presents to clinic for pain related to Percutaneous gastric tube (PEG)- pre-existing  Tube was placed on 03/27/18  (convert gastrostomy to gastrojejunostomy tube )and is intact.  Flange is positioned with the appropriate tightness and pt has a piece of gauze underneath the flange  Her tube is not fastened with tape to her body. She is tucking it into her pants    She is c/o pain at the tube site. Her skin does present with some hypergranulation but no erythemetous tissue around the tube site. She states her drainage is foul smelling but I can't detect an odor from the dried up drainage on the gauze. His was changed a short while ogo but the drainage is dried up Pt can expect some gray discolored drainage and reiterated to keep small amount of gauze underneath the flange at all times. Keep tube attached to the body at all times to prevent it from swinging around. Pt is on her way to a wedding in [place] for her Step-sister and just wanted to be sure nothing is wrong.    Follow up as needed. Dr. Chong was available for supervision of care if needed or if questions should arise and regarding plan of care.  Jeana García RN CWON

## 2018-04-24 NOTE — MR AVS SNAPSHOT
After Visit Summary   4/24/2018    Alexandra Melgoza    MRN: [id number]           Patient Information     Date Of Birth          1995        Visit Information        Provider Department      4/24/2018 9:30 AM Jeana García, SAMARA [facility] Wound Ostomy        Today's Diagnoses     Leaking PEG tube (H)    -  1    Abdominal pain, unspecified abdominal location           Follow-ups after your visit        Your next 10 appointments already scheduled     Apr 25, 2018  8:00 AM CDT   Infusion 120 with UC SPEC INFUSION, UC 49 ATC   [facility] ([facility])    [address]  [address]  [facility] [address]   [phone]            Apr 25, 2018  9:00 AM CDT   (Arrive by 8:45 AM)   Return Visit with Quyen Lennon,    [facility] ([facility])    [address] [place] [address]  4th Floor  [facility] [address]   [phone]            Apr 28, 2018 12:00 PM CDT   Infusion 120 with UC SPEC INFUSION, UC 42 ATC   [facility] ([facility])    [address]  [address]  [facility] [facility] [address]   [phone]            Apr 30, 2018  7:20 AM CDT   (Arrive by 7:05 AM)   Return Visit with Leonardo Wang MD   [facility] ([facility])    [address] [place] [address]  4th Floor  [facility] [address]   [phone]            Apr 30, 2018  9:00 AM CDT   (Arrive by 8:45 AM)   Return Visit with Mirza García MD   [facility] ([facility])    [address] [place] [address]  4th Floor  [facility] [address]   [phone]            Apr 30, 2018  2:00 PM CDT   Infusion 120 with UC SPEC INFUSION, UC 43 ATC   [facility] ([facility])     [address]  [address]  [facility] [place] [facility] [id number] [phone]            May 02, 2018  3:00 PM CDT   Infusion 120 with UC SPEC INFUSION   [facility] ([facility])    [address]  [address]  [facility] [place] [facility] [id number] [phone]              Who to contact     Please call your clinic at [phone] to:    Ask questions about your health    Make or cancel appointments    Discuss your medicines    Learn about your test results    Speak to your doctor            Additional Information About Your Visit        [employer] Information     [employer] gives you secure access to your electronic health record. If you see a primary care provider, you can also send messages to your care team and make appointments. If you have questions, please call your primary care clinic.  If you do not have a primary care provider, please call [phone] and they will assist you.      [employer] is an electronic gateway that provides easy, online access to your medical records. With [employer], you can request a clinic appointment, read your test results, renew a prescription or communicate with your care team.     To access your existing account, please contact your [facility] or call [phone] for assistance.        Care EveryWhere ID     This is your Care EveryWhere ID. This could be used by other organizations to access your [facility] medical records  [id number]         Blood Pressure from Last 3 Encounters:   04/24/18 126/81   04/23/18 126/81   04/23/18 129/82    Weight from Last 3 Encounters:   04/13/18 68 kg (150 lb)   04/11/18 68 kg (150 lb)   04/04/18 68.4 kg (150 lb 11.2 oz)              Today, you had the following     No orders found for display         Today's Medication Changes          These changes are accurate as of 4/24/18 10:06 AM.  If you have any questions, ask your nurse or doctor.                These medicines have changed or have updated prescriptions.        Dose/Directions    nortriptyline 10 MG capsule   Commonly known as:  PAMELOR   This may have changed:  how much to take   Used for:  Right upper quadrant abdominal pain        Dose:  30 mg   Take 3 capsules (30 mg) by mouth At Bedtime   Quantity:  120 capsule   Refills:  0       * oxyCODONE 5 MG/5ML solution   Commonly known as:  ROXICODONE   This may have changed:  Another medication with the same name was added. Make sure you understand how and when to take each.   Used for:  Abdominal pain, generalized, Chronic abdominal pain   Changed by:  Leonardo Wang MD        Take 10 mL (10 mg) by mouth every 6 hours as needed, maximum 40 mL per day.   Quantity:  200 mL   Refills:  0       * oxyCODONE IR 10 MG tablet   Commonly known as:  ROXICODONE   This may have changed:  You were already taking a medication with the same name, and this prescription was added. Make sure you understand how and when to take each.   Used for:  Abdominal pain, generalized   Changed by:  Leonardo Wang MD        Dose:  10 mg   Take 1 tablet (10 mg) by mouth every 8 hours as needed for moderate to severe pain   Quantity:  21 tablet   Refills:  0       * Notice:  This list has 2 medication(s) that are the same as other medications prescribed for you. Read the directions carefully, and ask your doctor or other care provider to review them with you.         Where to get your medicines      Some of these will need a paper prescription and others can be bought over the counter.  Ask your nurse if you have questions.     Bring a paper prescription for each of these medications     oxyCODONE IR 10 MG tablet                Primary Care Provider Office Phone # Fax #    Quyen Baez [phone]       [address] [address]        Equal Access to Services     RACHAEL BENITEZ AH: alcon Orta qaybta  maki maherchris gaspar ah. Liv Redwood [phone].    ATENCIÓN: Si ishan kilpatrick, tiene a quijano disposición servicios gratuitos de asistencia lingüística. Panfilo al [phone].    We comply with applicable federal civil rights laws and [place] laws. We do not discriminate on the basis of race, color, national origin, age, disability, sex, sexual orientation, or gender identity.            Thank you!     Thank you for choosing [facility]  for your care. Our goal is always to provide you with excellent care. Hearing back from our patients is one way we can continue to improve our services. Please take a few minutes to complete the written survey that you may receive in the mail after your visit with us. Thank you!             Your Updated Medication List - Protect others around you: Learn how to safely use, store and throw away your medicines at www.disposemymeds.org.          This list is accurate as of 4/24/18 10:06 AM.  Always use your most recent med list.                   Brand Name Dispense Instructions for use Diagnosis    acetaminophen 32 mg/mL solution    TYLENOL     30.45 mLs (975 mg) by Per J Tube route every 8 hours        * amylase-lipase-protease 33952 units Cpep    ZENPEP    180 capsule    Take 2-3 capsules (40,000-60,000 Units) by mouth Take with snacks or supplements (Snacks)    Idiopathic chronic pancreatitis (H)       * amylase-lipase-protease 07241 units Cpep    ZENPEP    180 capsule    Take 5 capsules (100,000 Units) by mouth 4 times daily (with meals and nightly)    Right upper quadrant abdominal pain       blood glucose monitoring test strip    no brand specified    100 strip    One Touch Ultra 2 Strips, Use to test blood sugars 2 times daily or as directed    Hypoglycemia       buprenorphine 5 MCG/HR WK patch    BUTRANS    4 patch    Place 1 patch onto the skin every 7 days    Abdominal pain, generalized       cephalexin 250 MG/5ML suspension     KEFLEX    210 mL    Take 10 ml per GJ tube tid    Dysuria       clotrimazole-betamethasone cream    LOTRISONE    15 g    Apply topically 2 times daily    Rash and nonspecific skin eruption       fluticasone 50 MCG/ACT spray    FLONASE    16 g    Spray 2 sprays into both nostrils daily    Nasal congestion       gabapentin 250 MG/5ML solution    NEURONTIN      Chronic abdominal pain, Sphincter of Oddi dysfunction, Cyclical vomiting with nausea, intractability of vomiting not specified, Pain around percutaneous endoscopic gastrostomy (PEG) tube site, sequela       hydrOXYzine 10 MG/5ML syrup    ATARAX    1500 mL    Take 12.5 mLs (25 mg) by mouth every 6 hours as needed for anxiety or other (pain)    Acute abdominal pain       leuprolide 11.25 MG kit    LUPRON DEPOT (3-MONTH)    1 each    Inject 11.25 mg into the muscle every 3 months    Endometriosis       levalbuterol 45 MCG/ACT Inhaler    XOPENEX HFA    1 Inhaler    Inhale 2 puffs into the lungs every 6 hours as needed for shortness of breath / dyspnea    Wheeze       LORazepam 0.5 MG tablet    ATIVAN    10 tablet    Take 1 tablet (0.5 mg) by mouth 2 times daily as needed (nausea, do not take with pain medicine, do not drive after taking)    Cyclical vomiting with nausea, intractability of vomiting not specified       multivitamins with minerals Liqd liquid     1 Bottle    15 mLs by Per Feeding Tube route daily    Abdominal pain, epigastric       norethindrone 5 MG tablet    AYGESTIN    90 tablet    Take 1 tablet (5 mg) by mouth daily    Endometriosis       nortriptyline 10 MG capsule    PAMELOR    120 capsule    Take 3 capsules (30 mg) by mouth At Bedtime    Right upper quadrant abdominal pain       ondansetron 4 MG ODT tab    ZOFRAN ODT    30 tablet    Take 1 tablet (4 mg) by mouth every 8 hours as needed for nausea or vomiting    Intractable cyclical vomiting with nausea       order for DME     1 Units    Equipment being ordered: TENS with wire and electrode.     Chronic abdominal pain       * oxyCODONE 5 MG/5ML solution    ROXICODONE    200 mL    Take 10 mL (10 mg) by mouth every 6 hours as needed, maximum 40 mL per day.    Abdominal pain, generalized, Chronic abdominal pain       * oxyCODONE IR 10 MG tablet    ROXICODONE    21 tablet    Take 1 tablet (10 mg) by mouth every 8 hours as needed for moderate to severe pain    Abdominal pain, generalized       pantoprazole 40 MG EC tablet    PROTONIX    30 tablet    Take 1 tablet (40 mg) by mouth 2 times daily (before meals)    Right upper quadrant abdominal pain, Erosive gastritis       polyethylene glycol Packet    MIRALAX/GLYCOLAX    7 packet    Take 17 g by mouth daily    Right upper quadrant abdominal pain       RIZATRIPTAN BENZOATE PO      Take 5 mg by mouth once as needed        senna-docusate 8.6-50 MG per tablet    SENOKOT-S;PERICOLACE    100 tablet    Take 1 tablet by mouth 2 times daily as needed for constipation    Right upper quadrant abdominal pain       sodium bicarbonate 325 MG tablet     120 tablet    1 tablet (325 mg) by Per Feeding Tube route 4 times daily    Abdominal pain, epigastric, Intractable cyclical vomiting with nausea       * Notice:  This list has 4 medication(s) that are the same as other medications prescribed for you. Read the directions carefully, and ask your doctor or other care provider to review them with you.

## 2018-04-24 NOTE — MR AVS SNAPSHOT
After Visit Summary   4/24/2018    Alexandra Melgoza    MRN: [id number]           Patient Information     Date Of Birth          1995        Visit Information        Provider Department      4/24/2018 8:20 AM Leonardo Wang MD [facility]        Today's Diagnoses     Abdominal pain, generalized    -  1      Care Instructions    1. Oxycodone 10 mg Every 8 hours.     Follow up: on 4/30/18      To speak with a nurse, schedule/reschedule/cancel a clinic appointment, or request a medication refill call: [phone]     You can also reach us by [employer] [email or web address]    For refills, please call on Monday, 1 week before your medication runs out. The doctors are not always in clinic, so this gives us time to get your prescriptions ready.  Please let us know the name of the medication you are requesting a refill of.                                     Follow-ups after your visit        Your next 10 appointments already scheduled     Apr 25, 2018  8:00 AM CDT   Infusion 120 with UC SPEC INFUSION, UC 49 ATC   [facility] ([facility])    [address]  [address]  [place] [address] [phone]            Apr 25, 2018  9:00 AM CDT   (Arrive by 8:45 AM)   Return Visit with Quyen Lennon, PhD   [facility] ([facility])    [address]  4th Floor  [place] [id number] [phone]            Apr 28, 2018 12:00 PM CDT   Infusion 120 with UC SPEC INFUSION, UC 42 ATC   [facility] ([facility])    [address]  [address]  [place] [address] [phone]            Apr 30, 2018  9:00 AM CDT   (Arrive by 8:45 AM)   Return Visit with Mirza García MD   [facility] ([facility]  [facility])    [address]  4th Floor  [place] [address] [phone]            Apr 30, 2018  2:00 PM CDT   Infusion 120 with UC SPEC INFUSION, UC 43 ATC   [facility] ([facility])    [address]  [address]  [place] [id number] [phone]            May 02, 2018  3:00 PM CDT   Infusion 120 with UC SPEC INFUSION, UC 49 ATC   [facility] ([facility])    [address]  [address]  [place] [id number] [phone]              Who to contact     Please call your clinic at [phone] to:    Ask questions about your health    Make or cancel appointments    Discuss your medicines    Learn about your test results    Speak to your doctor            Additional Information About Your Visit        THE EMPTY [employer]ar[employer]arm[employer] Information     [employer] gives you secure access to your electronic health record. If you see a primary care provider, you can also send messages to your care team and make appointments. If you have questions, please call your primary care clinic.  If you do not have a primary care provider, please call [phone] and they will assist you.      [employer] is an electronic gateway that provides easy, online access to your medical records. With [employer], you can request a clinic appointment, read your test results, renew a prescription or communicate with your care team.     To access your existing account, please contact your [facility] or call [phone] for assistance.        Care EveryWhere ID     This is your Care EveryWhere ID. This could be used by other organizations to access your [facility] medical records  [id number]         Blood Pressure from Last 3 Encounters:   04/23/18 126/81   04/23/18 129/82   04/21/18 113/57    Weight from Last 3 Encounters:   04/13/18 68 kg (150 lb)    04/11/18 68 kg (150 lb)   04/04/18 68.4 kg (150 lb 11.2 oz)              Today, you had the following     No orders found for display         Today's Medication Changes          These changes are accurate as of 4/24/18  9:18 AM.  If you have any questions, ask your nurse or doctor.               These medicines have changed or have updated prescriptions.        Dose/Directions    nortriptyline 10 MG capsule   Commonly known as:  PAMELOR   This may have changed:  how much to take   Used for:  Right upper quadrant abdominal pain        Dose:  30 mg   Take 3 capsules (30 mg) by mouth At Bedtime   Quantity:  120 capsule   Refills:  0       * oxyCODONE 5 MG/5ML solution   Commonly known as:  ROXICODONE   This may have changed:  Another medication with the same name was added. Make sure you understand how and when to take each.   Used for:  Abdominal pain, generalized, Chronic abdominal pain   Changed by:  Leonardo Wang MD        Take 10 mL (10 mg) by mouth every 6 hours as needed, maximum 40 mL per day.   Quantity:  200 mL   Refills:  0       * oxyCODONE IR 10 MG tablet   Commonly known as:  ROXICODONE   This may have changed:  You were already taking a medication with the same name, and this prescription was added. Make sure you understand how and when to take each.   Used for:  Abdominal pain, generalized   Changed by:  Leonardo Wang MD        Dose:  10 mg   Take 1 tablet (10 mg) by mouth every 8 hours as needed for moderate to severe pain   Quantity:  21 tablet   Refills:  0       * Notice:  This list has 2 medication(s) that are the same as other medications prescribed for you. Read the directions carefully, and ask your doctor or other care provider to review them with you.         Where to get your medicines      Some of these will need a paper prescription and others can be bought over the counter.  Ask your nurse if you have questions.     Bring a paper prescription for each of these medications      oxyCODONE IR 10 MG tablet               Information about OPIOIDS     PRESCRIPTION OPIOIDS: WHAT YOU NEED TO KNOW   You have a prescription for an opioid (narcotic) pain medicine. Opioids can cause addiction. If you have a history of chemical dependency of any type, you are at a higher risk of becoming addicted to opioids. Only take this medicine after all other options have been tried. Take it for as short a time and as few doses as possible.     Do not:    Drive. If you drive while taking these medicines, you could be arrested for driving under the influence (DUI).    Operate heavy machinery    Do any other dangerous activities while taking these medicines.     Drink any alcohol while taking these medicines.      Take with any other medicines that contain acetaminophen. Read all labels carefully. Look for the word  acetaminophen  or  Tylenol.  Ask your pharmacist if you have questions or are unsure.    Store your pills in a secure place, locked if possible. We will not replace any lost or stolen medicine. If you don t finish your medicine, please throw away (dispose) as directed by your pharmacist. The [place] Pollution Control Agency has more information about safe disposal: https://www.pca.[facility].mn.us/living-green/managing-unwanted-medications    All opioids tend to cause constipation. Drink plenty of water and eat foods that have a lot of fiber, such as fruits, vegetables, prune juice, apple juice and high-fiber cereal. Take a laxative (Miralax, milk of magnesia, Colace, Senna) if you don t move your bowels at least every other day.          Primary Care Provider Office Phone # Fax #    Quyen Baez [phone]       [address] [address]        Equal Access to Services     [facility]: Bret Enamorado, waar beck, qaybta maki maher. [facility] [phone].    ATENCIÓN: Lelia kilpatrick,  tiene a quijano disposición servicios gratuitos de asistencia lingüística. Panfilo champion [phone].    We comply with applicable federal civil rights laws and [place] laws. We do not discriminate on the basis of race, color, national origin, age, disability, sex, sexual orientation, or gender identity.            Thank you!     Thank you for choosing [facility]  for your care. Our goal is always to provide you with excellent care. Hearing back from our patients is one way we can continue to improve our services. Please take a few minutes to complete the written survey that you may receive in the mail after your visit with us. Thank you!             Your Updated Medication List - Protect others around you: Learn how to safely use, store and throw away your medicines at www.disposemymeds.org.          This list is accurate as of 4/24/18  9:18 AM.  Always use your most recent med list.                   Brand Name Dispense Instructions for use Diagnosis    acetaminophen 32 mg/mL solution    TYLENOL     30.45 mLs (975 mg) by Per J Tube route every 8 hours        * amylase-lipase-protease 63179 units Cpep    ZENPEP    180 capsule    Take 2-3 capsules (40,000-60,000 Units) by mouth Take with snacks or supplements (Snacks)    Idiopathic chronic pancreatitis (H)       * amylase-lipase-protease 30834 units Cpep    ZENPEP    180 capsule    Take 5 capsules (100,000 Units) by mouth 4 times daily (with meals and nightly)    Right upper quadrant abdominal pain       blood glucose monitoring test strip    no brand specified    100 strip    One Touch Ultra 2 Strips, Use to test blood sugars 2 times daily or as directed    Hypoglycemia       buprenorphine 5 MCG/HR WK patch    BUTRANS    4 patch    Place 1 patch onto the skin every 7 days    Abdominal pain, generalized       cephalexin 250 MG/5ML suspension    KEFLEX    210 mL    Take 10 ml per GJ tube tid    Dysuria       clotrimazole-betamethasone  cream    LOTRISONE    15 g    Apply topically 2 times daily    Rash and nonspecific skin eruption       fluticasone 50 MCG/ACT spray    FLONASE    16 g    Spray 2 sprays into both nostrils daily    Nasal congestion       gabapentin 250 MG/5ML solution    NEURONTIN      Chronic abdominal pain, Sphincter of Oddi dysfunction, Cyclical vomiting with nausea, intractability of vomiting not specified, Pain around percutaneous endoscopic gastrostomy (PEG) tube site, sequela       hydrOXYzine 10 MG/5ML syrup    ATARAX    1500 mL    Take 12.5 mLs (25 mg) by mouth every 6 hours as needed for anxiety or other (pain)    Acute abdominal pain       leuprolide 11.25 MG kit    LUPRON DEPOT (3-MONTH)    1 each    Inject 11.25 mg into the muscle every 3 months    Endometriosis       levalbuterol 45 MCG/ACT Inhaler    XOPENEX HFA    1 Inhaler    Inhale 2 puffs into the lungs every 6 hours as needed for shortness of breath / dyspnea    Wheeze       LORazepam 0.5 MG tablet    ATIVAN    10 tablet    Take 1 tablet (0.5 mg) by mouth 2 times daily as needed (nausea, do not take with pain medicine, do not drive after taking)    Cyclical vomiting with nausea, intractability of vomiting not specified       multivitamins with minerals Liqd liquid     1 Bottle    15 mLs by Per Feeding Tube route daily    Abdominal pain, epigastric       norethindrone 5 MG tablet    AYGESTIN    90 tablet    Take 1 tablet (5 mg) by mouth daily    Endometriosis       nortriptyline 10 MG capsule    PAMELOR    120 capsule    Take 3 capsules (30 mg) by mouth At Bedtime    Right upper quadrant abdominal pain       ondansetron 4 MG ODT tab    ZOFRAN ODT    30 tablet    Take 1 tablet (4 mg) by mouth every 8 hours as needed for nausea or vomiting    Intractable cyclical vomiting with nausea       order for DME     1 Units    Equipment being ordered: TENS with wire and electrode.    Chronic abdominal pain       * oxyCODONE 5 MG/5ML solution    ROXICODONE    200 mL    Take  10 mL (10 mg) by mouth every 6 hours as needed, maximum 40 mL per day.    Abdominal pain, generalized, Chronic abdominal pain       * oxyCODONE IR 10 MG tablet    ROXICODONE    21 tablet    Take 1 tablet (10 mg) by mouth every 8 hours as needed for moderate to severe pain    Abdominal pain, generalized       pantoprazole 40 MG EC tablet    PROTONIX    30 tablet    Take 1 tablet (40 mg) by mouth 2 times daily (before meals)    Right upper quadrant abdominal pain, Erosive gastritis       polyethylene glycol Packet    MIRALAX/GLYCOLAX    7 packet    Take 17 g by mouth daily    Right upper quadrant abdominal pain       RIZATRIPTAN BENZOATE PO      Take 5 mg by mouth once as needed        senna-docusate 8.6-50 MG per tablet    SENOKOT-S;PERICOLACE    100 tablet    Take 1 tablet by mouth 2 times daily as needed for constipation    Right upper quadrant abdominal pain       sodium bicarbonate 325 MG tablet     120 tablet    1 tablet (325 mg) by Per Feeding Tube route 4 times daily    Abdominal pain, epigastric, Intractable cyclical vomiting with nausea       * Notice:  This list has 4 medication(s) that are the same as other medications prescribed for you. Read the directions carefully, and ask your doctor or other care provider to review them with you.

## 2018-04-24 NOTE — TELEPHONE ENCOUNTER
Prior Authorization Retail Medication Request    Medication/Dose:   ICD code (if different than what is on RX):    Previously Tried and Failed: Fentanyl Transdermal patch, oxycodone, dilaudid, methadone  Rationale:      Insurance Name:    Insurance ID:        Pharmacy Information (if different than what is on RX)  Name:    Phone:

## 2018-04-24 NOTE — TELEPHONE ENCOUNTER
Grabiel showed up to clinic today asking to see a provider.   Per Dr. Narayanan she was told to come today to see if someone could look at her tube site.     Asked JOY Hills RN, she agreed to see her and address her concerns. Please see her note regarding appointment.     Ella SARMIENTO RN Coordinator  Dr. Narayanan, Dr. Joe & Dr. Klein   [facility]  [phone]

## 2018-04-24 NOTE — NURSING NOTE
LPN reviewed AVS with Pt includin. Oxycodone 10 mg Every 8 hours.     Follow up: on 18    Pt verbalized an understanding of information, and was asked to contact clinic with questions.

## 2018-04-24 NOTE — TELEPHONE ENCOUNTER
Health Call Center    Phone Message    May a detailed message be left on voicemail: yes    Reason for Call: Symptoms or Concerns     If patient has red-flag symptoms, warm transfer to triage line    Current symptom or concern: Patient stated she has an infected GJ tube. Patient stated the nurse in the infusion center spoke to Dr. Narayanan over the phone yesterday regarding this. Patient stated Dr. Narayanan told the nurse that patient needs to be seen in clinic today by another provider since Dr. Narayanan is in the OR all day.    Symptoms have been present for:  2 day(s)    Has patient previously been seen for this? No    By : Dr. Narayanan    Date: 4/24/18    Are there any new or worsening symptoms? Yes: Infected GJ tube per patient      Action Taken: Message routed to:  [facility]

## 2018-04-24 NOTE — LETTER
4/24/2018       RE: Alexandra Melgoza  [address]  [facility] [address]     Dear Colleague,    Thank you for referring your patient, Alexandra Melgoza, to the [facility]IVE [facility]NAGEMENT at [facility]. Please see a copy of my visit note below.    Patient is a 23 y/o lady with a chief complaint of chronic abdominal pain receiving chronic opiates.  She was last seen yesterday.  It was decided that transdermal buprenorphine would be utilized to help manage her pain.  The patient did not really want to use Butrans, and after her visit, it was discovered that initially her insurance would not provide it.  (After appeal it was approved).  She notes today that she had tried Suboxone in the past , thought it was ineffective, and didn't like the way it made her feel.  She refuses the Butrans.  She states that she is willing to use the Oxycodone tablets.  I have admonished her that her medications should be used judiciously .  No early refills will be allowed.  Such requests would be a violation of the opioid contract, and at that point we may have nothing else to offer her.  She presents for pain management options.  Current Outpatient Prescriptions   Medication     acetaminophen (TYLENOL) 32 mg/mL solution     amylase-lipase-protease (ZENPEP) 88366 UNITS CPEP     amylase-lipase-protease (ZENPEP) 20009 UNITS CPEP     blood glucose monitoring (NO BRAND SPECIFIED) test strip     cephalexin (KEFLEX) 250 MG/5ML suspension     clotrimazole-betamethasone (LOTRISONE) cream     fluticasone (FLONASE) 50 MCG/ACT spray     gabapentin (NEURONTIN) 250 MG/5ML solution     hydrOXYzine (ATARAX) 10 MG/5ML syrup     leuprolide (LUPRON DEPOT) 11.25 MG injection     levalbuterol (XOPENEX HFA) 45 MCG/ACT Inhaler     LORazepam (ATIVAN) 0.5 MG tablet     multivitamins with minerals (CERTAVITE/CEROVITE) LIQD liquid     norethindrone (AYGESTIN) 5 MG tablet     nortriptyline (PAMELOR) 10  "MG capsule     ondansetron (ZOFRAN ODT) 4 MG ODT tab     order for DME     oxyCODONE (ROXICODONE) 5 MG/5ML solution     oxyCODONE IR (ROXICODONE) 10 MG tablet     pantoprazole (PROTONIX) 40 MG EC tablet     polyethylene glycol (MIRALAX/GLYCOLAX) Packet     RIZATRIPTAN BENZOATE PO     senna-docusate (SENOKOT-S;PERICOLACE) 8.6-50 MG per tablet     sodium bicarbonate 325 MG tablet     buprenorphine (BUTRANS) 5 MCG/HR WK patch     No current facility-administered medications for this visit.      Allergies   Allergen Reactions     Amoxicillin-Pot Clavulanate Nausea and Vomiting     Compazine [Prochlorperazine] Other (See Comments)     Dystonia       Hyoscyamine Other (See Comments)     Dystonia     Reglan [Metoclopramide Hcl] Other (See Comments)     Dystonia     Zyprexa Other (See Comments)     Sensitive, dystonic reaction on 11-9-2011     Amitriptyline Hcl Other (See Comments)     Dystonia, hallucinations     Buspirone Other (See Comments)     No Adverse Reactions, no benefit     Cogentin [Benztropine]      Cyproheptadine Other (See Comments)     Distonic     Dicyclomine Other (See Comments)     Droperidol Other (See Comments)     Feels tense and \"like she has to jump out of her skin\".       Effexor [Venlafaxine] Other (See Comments)     Dystonia     Food      Cilantro--lips/tongue swelling     No Clinical Screening - See Comments Other (See Comments)     Cilantro     Phenergan Dm [Promethazine-Dm] Other (See Comments)     dystonia     Promethazine Other (See Comments)     Risperidone Other (See Comments)     dystonia     Vistaril Other (See Comments)     Burning sensation.       Augmentin GI Disturbance     Ketamine Other (See Comments)     jittery     Sorbitol GI Disturbance     Headache and dyspepsia     Past Medical History:   Diagnosis Date     Anxiety      Asthma      Cholecystitis     s/p cholecystectomy     Chronic abdominal pain      Chronic infection     mrsa     Chronic pain      Cyclic vomiting syndrome " 10/27/2012     Depression      Endometriosis      Hypoglycaemia      Migraines      Mild intermittent asthma      Ovarian cysts      Pancreatic disease      PONV (postoperative nausea and vomiting)      Pseudoseizures      Somatoform disorder      Sphincter of Oddi dysfunction      Vasovagal syncope      Past Surgical History:   Procedure Laterality Date     ABDOMEN SURGERY      ERCP, biliary stents     CHOLECYSTECTOMY  8/2/11     COLONOSCOPY  2011    negative finding     ENDOSCOPIC RETROGRADE CHOLANGIOPANCREATOGRAM  8/23/2011    Procedure:ENDOSCOPIC RETROGRADE CHOLANGIOPANCREATOGRAM; Endoscopic Retrograde Cholangiopancreatogram; Surgeon:SHORTY NARAYANAN; [facility]     ENDOSCOPIC RETROGRADE CHOLANGIOPANCREATOGRAM  5/17/2012    Procedure:ENDOSCOPIC RETROGRADE CHOLANGIOPANCREATOGRAM; Endoscopic Retrograde Cholangiopancreatogram with pancreatic stent placement.; Surgeon:SHORTY NARAYANAN; [facility]     ENDOSCOPIC RETROGRADE CHOLANGIOPANCREATOGRAM N/A 1/18/2018    Procedure: COMBINED ENDOSCOPIC RETROGRADE CHOLANGIOPANCREATOGRAPHY, PLACE TUBE/STENT;  Endoscopic Retrograde Cholangiopancreatography with nasojejunal feeding tube placement;  Surgeon: Shorty Narayanan MD;  [facility]     ENDOSCOPIC RETROGRADE CHOLANGIOPANCREATOGRAM COMPLEX  1/3/2012    Procedure:ENDOSCOPIC RETROGRADE CHOLANGIOPANCREATOGRAM COMPLEX; Endoscopic Retrograde Cholangiopancreatogram with Manometry bile duct sphincterotomy extention pancreatic duct sphincterotomy pancreatic duct stent placement; Surgeon:SHORTY NARAYANAN; [facility]     ENDOSCOPIC ULTRASOUND UPPER GASTROINTESTINAL TRACT (GI) N/A 6/9/2015    Procedure: ENDOSCOPIC ULTRASOUND, ESOPHAGOSCOPY / UPPER GASTROINTESTINAL TRACT (GI);  Surgeon: Mario Jeo MD;  [facility]     ENDOSCOPIC ULTRASOUND UPPER GASTROINTESTINAL TRACT (GI) N/A 12/12/2016    Procedure: ENDOSCOPIC ULTRASOUND, ESOPHAGOSCOPY / UPPER GASTROINTESTINAL TRACT (GI);  Surgeon:  Guru Jose Klein MD;  [facility]     ESOPHAGOSCOPY, GASTROSCOPY, DUODENOSCOPY (EGD), COMBINED  1/18/2012    Procedure:COMBINED ESOPHAGOSCOPY, GASTROSCOPY, DUODENOSCOPY (EGD); Surgeon:ARNIE ESPINOZA; [facility]     ESOPHAGOSCOPY, GASTROSCOPY, DUODENOSCOPY (EGD), COMBINED  1/18/2012    Procedure:COMBINED ESOPHAGOSCOPY, GASTROSCOPY, DUODENOSCOPY (EGD); EGD; Surgeon:ARNIE ESPINOZA; [facility]     ESOPHAGOSCOPY, GASTROSCOPY, DUODENOSCOPY (EGD), COMBINED N/A 12/9/2017    Procedure: COMBINED ESOPHAGOSCOPY, GASTROSCOPY, DUODENOSCOPY (EGD);;  Surgeon: Josias Chan MD;  [facility]     ESOPHAGOSCOPY, GASTROSCOPY, DUODENOSCOPY (EGD), COMBINED N/A 3/27/2018    Procedure: COMBINED ESOPHAGOSCOPY, GASTROSCOPY, DUODENOSCOPY (EGD);  Upper Gastrointestinal Endoscopy convert gastrostomy to gastrojejunostomy tube ;  Surgeon: [place] Narayanan MD;  [facility]     INSERT PORT VASCULAR ACCESS       L knee arthroscopy  2009     LAPAROSCOPY DIAGNOSTIC (GYN)  10/26/2012    Procedure: LAPAROSCOPY DIAGNOSTIC (GYN);  LAPAROSCOPY DIAGNOSTIC, CAUTERY ENDOMETRIOISIS and biopsy of Fallopian tube lesions;  Surgeon: Carla Lopez MD;  Location:  OR     ORTHOPEDIC SURGERY  2008    knee     REMOVE AND REPLACE BREAST IMPLANT PROSTHESIS N/A 2/8/2018    Procedure: PERCUTANEOUS INSERTION TUBE JEJUNOSTOMY;  upper endoscopy with percutaneous gastrojejunostimy feeding tuble placement and gastropexy;  Surgeon: [place] Narayanan MD;  [facility]     VASCULAR SURGERY       Family History   Problem Relation Age of Onset     Hypertension Father      Bipolar Disorder Other      Anxiety Disorder Other      Depression Paternal Grandmother      Allergies Maternal Grandmother      CEREBROVASCULAR DISEASE Maternal Grandfather      Cardiovascular Maternal Grandfather      Depression/Anxiety Maternal Grandfather      GASTROINTESTINAL DISEASE Maternal Grandfather      DIABETES Paternal Uncle      Hypoglycemia  Brother      CANCER No family hx of      No family history of skin cancer     Social History     Social History     Marital status: Single     Spouse name: N/A     Number of children: N/A     Years of education: N/A     Occupational History     Not on file.     Social History Main Topics     Smoking status: Never Smoker     Smokeless tobacco: Never Used     Alcohol use No     Drug use: No     Sexual activity: No     Other Topics Concern     Parent/Sibling W/ Cabg, Mi Or Angioplasty Before 65f 55m? 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  Physical examination is deferred.  A/P:  Patient is a 23 y/o lady with chronic abdominal pain.  Barriers to her treatment are documented above.  I arabella prescribe oxycodone 10 mg q8h prn (tablet form).  I will prescribe medication for one week.  She will f/u at that time for re-evaluation.  She verbaizes an understanding of our plan    Again, thank you for allowing me to participate in the care of your patient.      Sincerely,    Leonardo Wang MD

## 2018-04-24 NOTE — PATIENT INSTRUCTIONS
1. Oxycodone 10 mg Every 8 hours.     Follow up: on 4/30/18      To speak with a nurse, schedule/reschedule/cancel a clinic appointment, or request a medication refill call: [phone]     You can also reach us by [employer]: [email or web address]    For refills, please call on Monday, 1 week before your medication runs out. The doctors are not always in clinic, so this gives us time to get your prescriptions ready.  Please let us know the name of the medication you are requesting a refill of.

## 2018-04-25 ENCOUNTER — INFUSION THERAPY VISIT (OUTPATIENT)
Dept: INFUSION THERAPY | Facility: CLINIC | Age: 23
End: 2018-04-25
Attending: INTERNAL MEDICINE
Payer: COMMERCIAL

## 2018-04-25 VITALS
RESPIRATION RATE: 16 BRPM | TEMPERATURE: 98.3 F | SYSTOLIC BLOOD PRESSURE: 112 MMHG | HEART RATE: 106 BPM | DIASTOLIC BLOOD PRESSURE: 75 MMHG | OXYGEN SATURATION: 100 % | HEIGHT: 66 IN

## 2018-04-25 DIAGNOSIS — F41.1 ANXIETY STATE: ICD-10-CM

## 2018-04-25 DIAGNOSIS — K86.1 CHRONIC PANCREATITIS, UNSPECIFIED PANCREATITIS TYPE (H): ICD-10-CM

## 2018-04-25 DIAGNOSIS — R52 PAIN: ICD-10-CM

## 2018-04-25 DIAGNOSIS — R10.9 ACUTE ABDOMINAL PAIN: ICD-10-CM

## 2018-04-25 DIAGNOSIS — R10.13 ABDOMINAL PAIN, EPIGASTRIC: ICD-10-CM

## 2018-04-25 DIAGNOSIS — R10.9 ABDOMINAL PAIN, UNSPECIFIED ABDOMINAL LOCATION: ICD-10-CM

## 2018-04-25 DIAGNOSIS — G43.A0 CYCLICAL VOMITING WITH NAUSEA, INTRACTABILITY OF VOMITING NOT SPECIFIED: Primary | ICD-10-CM

## 2018-04-25 DIAGNOSIS — R11.15 INTRACTABLE CYCLICAL VOMITING WITH NAUSEA: ICD-10-CM

## 2018-04-25 DIAGNOSIS — F33.1 MAJOR DEPRESSIVE DISORDER, RECURRENT EPISODE, MODERATE (H): ICD-10-CM

## 2018-04-25 DIAGNOSIS — Z98.890 S/P ERCP: ICD-10-CM

## 2018-04-25 DIAGNOSIS — E16.2 HYPOGLYCEMIA: ICD-10-CM

## 2018-04-25 DIAGNOSIS — R21 RASH: ICD-10-CM

## 2018-04-25 DIAGNOSIS — N39.41 URGE INCONTINENCE: ICD-10-CM

## 2018-04-25 LAB
BACTERIA SPEC CULT: ABNORMAL
BACTERIA SPEC CULT: ABNORMAL
Lab: ABNORMAL
SPECIMEN SOURCE: ABNORMAL

## 2018-04-25 PROCEDURE — 96376 TX/PRO/DX INJ SAME DRUG ADON: CPT

## 2018-04-25 PROCEDURE — 96361 HYDRATE IV INFUSION ADD-ON: CPT

## 2018-04-25 PROCEDURE — 96374 THER/PROPH/DIAG INJ IV PUSH: CPT

## 2018-04-25 PROCEDURE — 25000128 H RX IP 250 OP 636: Mod: ZF | Performed by: INTERNAL MEDICINE

## 2018-04-25 PROCEDURE — 96375 TX/PRO/DX INJ NEW DRUG ADDON: CPT

## 2018-04-25 RX ORDER — ONDANSETRON 2 MG/ML
4 INJECTION INTRAMUSCULAR; INTRAVENOUS DAILY PRN
Status: CANCELLED
Start: 2018-04-25

## 2018-04-25 RX ORDER — HEPARIN SODIUM (PORCINE) LOCK FLUSH IV SOLN 100 UNIT/ML 100 UNIT/ML
500 SOLUTION INTRAVENOUS EVERY 8 HOURS
Status: DISCONTINUED | OUTPATIENT
Start: 2018-04-25 | End: 2018-04-25 | Stop reason: HOSPADM

## 2018-04-25 RX ORDER — ONDANSETRON 2 MG/ML
4 INJECTION INTRAMUSCULAR; INTRAVENOUS ONCE
Status: COMPLETED | OUTPATIENT
Start: 2018-04-25 | End: 2018-04-25

## 2018-04-25 RX ORDER — ONDANSETRON 2 MG/ML
4 INJECTION INTRAMUSCULAR; INTRAVENOUS ONCE
Status: CANCELLED
Start: 2018-04-25 | End: 2018-04-25

## 2018-04-25 RX ORDER — DEXTROSE, SODIUM CHLORIDE, SODIUM LACTATE, POTASSIUM CHLORIDE, AND CALCIUM CHLORIDE 5; .6; .31; .03; .02 G/100ML; G/100ML; G/100ML; G/100ML; G/100ML
2000 INJECTION, SOLUTION INTRAVENOUS ONCE
Status: CANCELLED
Start: 2018-04-25 | End: 2018-04-25

## 2018-04-25 RX ORDER — ONDANSETRON 2 MG/ML
4 INJECTION INTRAMUSCULAR; INTRAVENOUS DAILY PRN
Status: DISCONTINUED | OUTPATIENT
Start: 2018-04-25 | End: 2018-04-25 | Stop reason: HOSPADM

## 2018-04-25 RX ORDER — DIPHENHYDRAMINE HYDROCHLORIDE 50 MG/ML
25 INJECTION INTRAMUSCULAR; INTRAVENOUS DAILY PRN
Status: DISCONTINUED | OUTPATIENT
Start: 2018-04-25 | End: 2018-04-25 | Stop reason: HOSPADM

## 2018-04-25 RX ORDER — HEPARIN SODIUM (PORCINE) LOCK FLUSH IV SOLN 100 UNIT/ML 100 UNIT/ML
500 SOLUTION INTRAVENOUS EVERY 8 HOURS
Status: CANCELLED
Start: 2018-04-25

## 2018-04-25 RX ORDER — DIPHENHYDRAMINE HYDROCHLORIDE 50 MG/ML
25 INJECTION INTRAMUSCULAR; INTRAVENOUS DAILY PRN
Status: CANCELLED
Start: 2018-04-25

## 2018-04-25 RX ORDER — DIPHENHYDRAMINE HYDROCHLORIDE 50 MG/ML
25 INJECTION INTRAMUSCULAR; INTRAVENOUS ONCE
Status: COMPLETED | OUTPATIENT
Start: 2018-04-25 | End: 2018-04-25

## 2018-04-25 RX ORDER — DIPHENHYDRAMINE HYDROCHLORIDE 50 MG/ML
25 INJECTION INTRAMUSCULAR; INTRAVENOUS ONCE
Status: CANCELLED
Start: 2018-04-25 | End: 2018-04-25

## 2018-04-25 RX ADMIN — DIPHENHYDRAMINE HYDROCHLORIDE 25 MG: 50 INJECTION, SOLUTION INTRAMUSCULAR; INTRAVENOUS at 08:51

## 2018-04-25 RX ADMIN — SODIUM CHLORIDE, POTASSIUM CHLORIDE, SODIUM LACTATE AND CALCIUM CHLORIDE 1000 ML: 600; 310; 30; 20 INJECTION, SOLUTION INTRAVENOUS at 08:46

## 2018-04-25 RX ADMIN — ONDANSETRON 4 MG: 2 INJECTION INTRAMUSCULAR; INTRAVENOUS at 08:51

## 2018-04-25 RX ADMIN — ONDANSETRON 4 MG: 2 INJECTION INTRAMUSCULAR; INTRAVENOUS at 09:58

## 2018-04-25 RX ADMIN — Medication 500 UNITS: at 10:58

## 2018-04-25 RX ADMIN — DIPHENHYDRAMINE HYDROCHLORIDE 25 MG: 50 INJECTION, SOLUTION INTRAMUSCULAR; INTRAVENOUS at 09:55

## 2018-04-25 ASSESSMENT — PAIN SCALES - GENERAL: PAINLEVEL: MODERATE PAIN (4)

## 2018-04-25 NOTE — PATIENT INSTRUCTIONS
Dear Alexandra Melgoza    Thank you for choosing [facility] ([facility]) for your infusion.  The following information is a summary of our appointment as well as important reminders.        We look forward in seeing you on your next appointment here at [facility].  Please don t hesitate to call us at [phone] to reschedule any of your appointments or to speak with one of the [facility] registered nurses.  It was a pleasure taking care of you today.    Sincerely,    [facility]  [address] [place] [address]  [address]  Phone:  [phone]

## 2018-04-25 NOTE — MR AVS SNAPSHOT
After Visit Summary   4/25/2018    Alexandra Melgoza    MRN: [id number]           Patient Information     Date Of Birth          1995        Visit Information        Provider Department      4/25/2018 8:00 AM UC 49 ATC; UC SPEC INFUSION [facility]        Today's Diagnoses     Cyclical vomiting with nausea, intractability of vomiting not specified    -  1    Chronic pancreatitis, unspecified pancreatitis type (H)        Pain        Abdominal pain, epigastric        S/P ERCP        Acute abdominal pain        Abdominal pain, unspecified abdominal location        Hypoglycemia        Urge incontinence        Major depressive disorder, recurrent episode, moderate (H)        Anxiety state        Rash        Intractable cyclical vomiting with nausea          Care Instructions    Dear Alexandra Melgoza    Thank you for choosing [facility] ([facility]) for your infusion.  The following information is a summary of our appointment as well as important reminders.        We look forward in seeing you on your next appointment here at [facility].  Please don t hesitate to call us at [phone] to reschedule any of your appointments or to speak with one of the [facility] registered nurses.  It was a pleasure taking care of you today.    Sincerely,    [facility]  Specialty Infusion [facility]  [address] [place] [address]  [address]  Phone:  [phone]            Follow-ups after your visit        Your next 10 appointments already scheduled     Apr 28, 2018 12:00 PM CDT   Infusion 120 with UC SPEC INFUSION, UC 42 ATC   [facility] ([facility])    [address] [place] [address]  [address] [place] [address] [phone]            Apr 30, 2018  7:20 AM CDT   (Arrive by 7:05 AM)   Return Visit with Leonardo  MD Kathleen   [facility] ([facility])    [address]  4th [address]  [facility] [address] [phone]            Apr 30, 2018  9:00 AM CDT   (Arrive by 8:45 AM)   Return Visit with Mirza García MD   [facility] ([facility])    [address]  4th [address]  [facility] [id number] [phone]            Apr 30, 2018  2:00 PM CDT   Infusion 120 with UC SPEC INFUSION, UC 43 ATC   [facility] Specialty and Procedure ([facility])    [address]  [address]  [facility] [id number] [phone]            May 02, 2018  3:00 PM CDT   Infusion 120 with UC SPEC INFUSION, UC 49 ATC   [facility] Specialty [facility] Procedure ([facility])    [address]  [address]  [facility] [id number] [phone]            May 05, 2018 11:00 AM CDT   Infusion 120 with UC SPEC INFUSION, UC 42 ATC   [facility] Specialty and Procedure ([facility])    [address]  [address]  [facility] [id number] [phone]              Who to contact     If you have questions or need follow up information about today's clinic visit or your schedule please contact [facility] SPECIALTY AND PROCEDURE directly at [phone].  Normal or non-critical lab and imaging results will be communicated to you by MyChart, letter or phone within 4 business days after the clinic has received the results. If you do not hear from us within 7 days, please contact the clinic through MyChart or phone. If you have a critical or abnormal lab result, we will notify you by phone as soon as possible.  Submit refill requests through [employer] or call your pharmacy and they will forward the refill request to us. Please allow 3 business days for your refill to  "be completed.          Additional Information About Your Visit        MyChart Information     [employer] gives you secure access to your electronic health record. If you see a primary care provider, you can also send messages to your care team and make appointments. If you have questions, please call your primary care clinic.  If you do not have a primary care provider, please call [phone] and they will assist you.        Care EveryWhere ID     This is your Care EveryWhere ID. This could be used by other organizations to access your [place] medical records  [id number]        Your Vitals Were     Pulse Temperature Respirations Height Pulse Oximetry       106 98.3  F (36.8  C) (Oral) 16 1.676 m (5' 6\") 100%        Blood Pressure from Last 3 Encounters:   04/25/18 112/75   04/24/18 126/81   04/23/18 126/81    Weight from Last 3 Encounters:   04/13/18 68 kg (150 lb)   04/11/18 68 kg (150 lb)   04/04/18 68.4 kg (150 lb 11.2 oz)              Today, you had the following     No orders found for display         Today's Medication Changes          These changes are accurate as of 4/25/18 10:59 AM.  If you have any questions, ask your nurse or doctor.               These medicines have changed or have updated prescriptions.        Dose/Directions    nortriptyline 10 MG capsule   Commonly known as:  PAMELOR   This may have changed:  how much to take   Used for:  Right upper quadrant abdominal pain        Dose:  30 mg   Take 3 capsules (30 mg) by mouth At Bedtime   Quantity:  120 capsule   Refills:  0                Primary Care Provider Office Phone # Fax #    Quyen Baez [phone]       [address] [address]        Equal Access to Services     [facility]: Bret Enamorado, alcon beck, maki quintero. So [facility] [phone].    ATENCIÓN: Si habla español, tiene a quijano disposición servicios gratuitos " de asistencia lingüística. Panfilo champion [phone].    We comply with applicable federal civil rights laws and [place] laws. We do not discriminate on the basis of race, color, national origin, age, disability, sex, sexual orientation, or gender identity.            Thank you!     Thank you for choosing [facility] SPECIALTY AND PROCEDURE  for your care. Our goal is always to provide you with excellent care. Hearing back from our patients is one way we can continue to improve our services. Please take a few minutes to complete the written survey that you may receive in the mail after your visit with us. Thank you!             Your Updated Medication List - Protect others around you: Learn how to safely use, store and throw away your medicines at www.disposemymeds.org.          This list is accurate as of 4/25/18 10:59 AM.  Always use your most recent med list.                   Brand Name Dispense Instructions for use Diagnosis    acetaminophen 32 mg/mL solution    TYLENOL     30.45 mLs (975 mg) by Per J Tube route every 8 hours        * amylase-lipase-protease 80743 units Cpep    ZENPEP    180 capsule    Take 2-3 capsules (40,000-60,000 Units) by mouth Take with snacks or supplements (Snacks)    Idiopathic chronic pancreatitis (H)       * amylase-lipase-protease 66239 units Cpep    ZENPEP    180 capsule    Take 5 capsules (100,000 Units) by mouth 4 times daily (with meals and nightly)    Right upper quadrant abdominal pain       blood glucose monitoring test strip    no brand specified    100 strip    One Touch Ultra 2 Strips, Use to test blood sugars 2 times daily or as directed    Hypoglycemia       buprenorphine 5 MCG/HR WK patch    BUTRANS    4 patch    Place 1 patch onto the skin every 7 days    Abdominal pain, generalized       cephalexin 250 MG/5ML suspension    KEFLEX    210 mL    Take 10 ml per GJ tube tid    Dysuria       clotrimazole-betamethasone cream    LOTRISONE    15 g     Apply topically 2 times daily    Rash and nonspecific skin eruption       fluticasone 50 MCG/ACT spray    FLONASE    16 g    Spray 2 sprays into both nostrils daily    Nasal congestion       gabapentin 250 MG/5ML solution    NEURONTIN      Chronic abdominal pain, Sphincter of Oddi dysfunction, Cyclical vomiting with nausea, intractability of vomiting not specified, Pain around percutaneous endoscopic gastrostomy (PEG) tube site, sequela       hydrOXYzine 10 MG/5ML syrup    ATARAX    1500 mL    Take 12.5 mLs (25 mg) by mouth every 6 hours as needed for anxiety or other (pain)    Acute abdominal pain       leuprolide 11.25 MG kit    LUPRON DEPOT (3-MONTH)    1 each    Inject 11.25 mg into the muscle every 3 months    Endometriosis       levalbuterol 45 MCG/ACT Inhaler    XOPENEX HFA    1 Inhaler    Inhale 2 puffs into the lungs every 6 hours as needed for shortness of breath / dyspnea    Wheeze       LORazepam 0.5 MG tablet    ATIVAN    10 tablet    Take 1 tablet (0.5 mg) by mouth 2 times daily as needed (nausea, do not take with pain medicine, do not drive after taking)    Cyclical vomiting with nausea, intractability of vomiting not specified       multivitamins with minerals Liqd liquid     1 Bottle    15 mLs by Per Feeding Tube route daily    Abdominal pain, epigastric       norethindrone 5 MG tablet    AYGESTIN    90 tablet    Take 1 tablet (5 mg) by mouth daily    Endometriosis       nortriptyline 10 MG capsule    PAMELOR    120 capsule    Take 3 capsules (30 mg) by mouth At Bedtime    Right upper quadrant abdominal pain       ondansetron 4 MG ODT tab    ZOFRAN ODT    30 tablet    Take 1 tablet (4 mg) by mouth every 8 hours as needed for nausea or vomiting    Intractable cyclical vomiting with nausea       order for DME     1 Units    Equipment being ordered: TENS with wire and electrode.    Chronic abdominal pain       * oxyCODONE 5 MG/5ML solution    ROXICODONE    200 mL    Take 10 mL (10 mg) by mouth every 6  hours as needed, maximum 40 mL per day.    Abdominal pain, generalized, Chronic abdominal pain       * oxyCODONE IR 10 MG tablet    ROXICODONE    21 tablet    Take 1 tablet (10 mg) by mouth every 8 hours as needed for moderate to severe pain    Abdominal pain, generalized       pantoprazole 40 MG EC tablet    PROTONIX    30 tablet    Take 1 tablet (40 mg) by mouth 2 times daily (before meals)    Right upper quadrant abdominal pain, Erosive gastritis       polyethylene glycol Packet    MIRALAX/GLYCOLAX    7 packet    Take 17 g by mouth daily    Right upper quadrant abdominal pain       RIZATRIPTAN BENZOATE PO      Take 5 mg by mouth once as needed        senna-docusate 8.6-50 MG per tablet    SENOKOT-S;PERICOLACE    100 tablet    Take 1 tablet by mouth 2 times daily as needed for constipation    Right upper quadrant abdominal pain       sodium bicarbonate 325 MG tablet     120 tablet    1 tablet (325 mg) by Per Feeding Tube route 4 times daily    Abdominal pain, epigastric, Intractable cyclical vomiting with nausea       * Notice:  This list has 4 medication(s) that are the same as other medications prescribed for you. Read the directions carefully, and ask your doctor or other care provider to review them with you.

## 2018-04-25 NOTE — PROGRESS NOTES
Nursing Note  Alexandra Melgoza presents today to [facility] for:   Chief Complaint   Patient presents with     Infusion     IVF and anti-nausea meds     During today's [facility] appointment, orders from Dr. Narayanan were completed.  Frequency:  3x/week as needed     Progress note:  Patient identification verified by name and date of birth.  Assessment completed.  Vitals recorded in Doc Flowsheets.  Patient was provided with education regarding infusion and possible side effects.  Patient verbalized understanding.     Pt opted for LR infusion instead D5LR, depending upon blood glucose levels. Pt has been monitoring blood glucose 3-4 times daily via fingerstick. Results have been as low as 50's in the early morning hours. This morning result in the 60's, prior to nutritional intake. Pt has been managing low results with glucose gel. Reinforcement of s/sx of hypoglycemia covered with pt. Verbalized understanding.      needed: No  Premedications: were not ordered. PRN Zofran and PRN Benadryl given IVP x 2.  Infusion Rates: 2 liters LR infusion given over approximately 2 hours.   Approximate Infusion length: 2 hours.  Labs: were not ordered for this appointment.  Vascular access: port accessed today. + blood return noted. Heparin locked and de-accessed prior to discharge.   Treatment Conditions: non-applicable.  Patient tolerated infusion: well.    Drug Waste Record    Drug Name: Benadryl  Dose: 25 mg  Route administered: IV  NDC #: 38657-899-70  Amount of waste(mL):0.5 ml  Reason for waste: Single use vial    Drug Waste Record    Drug Name: Benadryl  Dose: 25 mg  Route administered: IV  NDC #: 49951-905-98  Amount of waste(mL):0.5 ml  Reason for waste: Single use vial      Discharge Plan:   Follow up plan of care with: ongoing infusions at [facility].  Discharge instructions were reviewed with  "patient.  Patient/representative verbalized understanding of discharge instructions and all questions answered.  Patient discharged from [facility] in stable condition.    Phyllis Brooks RN    Administrations This Visit     diphenhydrAMINE (BENADRYL) injection 25 mg     Admin Date Action Dose Route Administered By             04/25/2018 Given 25 mg Intravenous Phyllis Brooks RN              Admin Date Action Dose Route Administered By             04/25/2018 Given 25 mg Intravenous Phyllis Brooks RN                    heparin 100 UNIT/ML injection 500 Units     Admin Date Action Dose Route Administered By             04/25/2018 Given 500 Units Intracatheter Phyllis Brooks RN                    lactated ringers BOLUS 1,000-2,000 mL     Admin Date Action Dose Route Administered By             04/25/2018 New Bag 1000 mL Intravenous Phyllis Brooks RN                    ondansetron (ZOFRAN) injection 4 mg     Admin Date Action Dose Route Administered By             04/25/2018 Given 4 mg Intravenous Phyllis Brooks RN              Admin Date Action Dose Route Administered By             04/25/2018 Given 4 mg Intravenous Phyllis Brooks RN                          /75  Pulse 106  Temp 98.3  F (36.8  C) (Oral)  Resp 16  Ht 1.676 m (5' 6\")  SpO2 100%  "

## 2018-04-26 NOTE — PROGRESS NOTES
The patient is a 22-year-old female with a history of chronic pancreatitis who presents for follow-up.    In the interim since her last visit, she continues to have pain at the GJ site This has caused some increased pain.   She also reports spillig some of her medication. She is currently taking 10 mg oxycodone 4 times daily.  She has participated in the pain psychology, and finds it helpful.   During her previous visit a plan was formulated with the goals of attempting to diminish chronic pancreatitis flares and thus emergency room visits and should those flares occur provided patient with pain medication that would be available to treat a flare and preclude visit to the emergency room. It appears that her pain is more constant. She presents today for reevaluation and follow-up      Current Outpatient Prescriptions   Medication     oxyCODONE (ROXICODONE) 5 MG/5ML solution     gabapentin (NEURONTIN) 300 MG capsule     blood glucose monitoring (NO BRAND SPECIFIED) test strip     hydrOXYzine (ATARAX) 10 MG/5ML syrup     acetaminophen (TYLENOL) 32 mg/mL solution     ondansetron (ZOFRAN ODT) 4 MG ODT tab     sodium bicarbonate 325 MG tablet     nortriptyline (PAMELOR) 10 MG capsule     menthol (ICY HOT) 5 % PTCH     amylase-lipase-protease (ZENPEP) 58915 UNITS CPEP     multivitamins with minerals (CERTAVITE/CEROVITE) LIQD liquid     order for DME     scopolamine (TRANSDERM) 72 hr patch     amylase-lipase-protease (ZENPEP) 51896 UNITS CPEP     senna-docusate (SENOKOT-S;PERICOLACE) 8.6-50 MG per tablet     pantoprazole (PROTONIX) 40 MG EC tablet     polyethylene glycol (MIRALAX/GLYCOLAX) Packet     norethindrone (AYGESTIN) 5 MG tablet     fluticasone (FLONASE) 50 MCG/ACT spray     levalbuterol (XOPENEX HFA) 45 MCG/ACT Inhaler     leuprolide (LUPRON DEPOT) 11.25 MG injection     RIZATRIPTAN BENZOATE PO      No current facility-administered medications for this visit.             Allergies   Allergen Reactions      "Amoxicillin-Pot Clavulanate Nausea and Vomiting     Compazine [Prochlorperazine] Other (See Comments)       Dystonia     Hyoscyamine Other (See Comments)       Dystonia     Reglan [Metoclopramide Hcl] Other (See Comments)       Dystonia     Zyprexa Other (See Comments)       Sensitive, dystonic reaction on 11-9-2011     Amitriptyline Hcl Other (See Comments)       Dystonia, hallucinations     Buspirone Other (See Comments)       No Adverse Reactions, no benefit     Cogentin [Benztropine]       Cyproheptadine Other (See Comments)       Distonic     Dicyclomine Other (See Comments)     Droperidol Other (See Comments)       Feels tense and \"like she has to jump out of her skin\".       Effexor [Venlafaxine] Other (See Comments)       Dystonia     Food         Cilantro--lips/tongue swelling     No Clinical Screening - See Comments Other (See Comments)       Cilantro     Phenergan Dm [Promethazine-Dm] Other (See Comments)       dystonia     Promethazine Other (See Comments)     Risperidone Other (See Comments)       dystonia     Vistaril Other (See Comments)       Burning sensation.     Augmentin GI Disturbance     Ketamine Other (See Comments)       jittery     Sorbitol GI Disturbance       Headache and dyspepsia       Past Medical History         Past Medical History:   Diagnosis Date     Anxiety       Asthma       Cholecystitis       s/p cholecystectomy     Chronic abdominal pain       Chronic infection       mrsa     Chronic pain       Cyclic vomiting syndrome 10/27/2012     Depression       Endometriosis       Hypoglycaemia       Migraines       Mild intermittent asthma       Ovarian cysts       Pancreatic disease       PONV (postoperative nausea and vomiting)       Pseudoseizures       Somatoform disorder       Sphincter of Oddi dysfunction       Vasovagal syncope            Past Surgical History          Past Surgical History:   Procedure Laterality Date     ABDOMEN SURGERY         ERCP, biliary stents     " CHOLECYSTECTOMY   8/2/11     COLONOSCOPY   2011     negative finding     ENDOSCOPIC RETROGRADE CHOLANGIOPANCREATOGRAM   8/23/2011     Procedure:ENDOSCOPIC RETROGRADE CHOLANGIOPANCREATOGRAM; Endoscopic Retrograde Cholangiopancreatogram; Surgeon:CAMPBELL NARAYANAN; [facility]     ENDOSCOPIC RETROGRADE CHOLANGIOPANCREATOGRAM   5/17/2012     Procedure:ENDOSCOPIC RETROGRADE CHOLANGIOPANCREATOGRAM; Endoscopic Retrograde Cholangiopancreatogram with pancreatic stent placement.; Surgeon:CAMPBELL NARAYANAN; [facility]     ENDOSCOPIC RETROGRADE CHOLANGIOPANCREATOGRAM N/A 1/18/2018     Procedure: COMBINED ENDOSCOPIC RETROGRADE CHOLANGIOPANCREATOGRAPHY, PLACE TUBE/STENT;  Endoscopic Retrograde Cholangiopancreatography with nasojejunal feeding tube placement;  Surgeon: Campbell Narayanan MD;  [facility]: [facility]     ENDOSCOPIC RETROGRADE CHOLANGIOPANCREATOGRAM COMPLEX   1/3/2012     Procedure:ENDOSCOPIC RETROGRADE CHOLANGIOPANCREATOGRAM COMPLEX; Endoscopic Retrograde Cholangiopancreatogram with Manometry bile duct sphincterotomy extention pancreatic duct sphincterotomy pancreatic duct stent placement; Surgeon:CAMPBELL NARAYANAN; [facility]     ENDOSCOPIC ULTRASOUND UPPER GASTROINTESTINAL TRACT (GI) N/A 6/9/2015     Procedure: ENDOSCOPIC ULTRASOUND, ESOPHAGOSCOPY / UPPER GASTROINTESTINAL TRACT (GI);  Surgeon: Mario Joe MD;  [facility]     ENDOSCOPIC ULTRASOUND UPPER GASTROINTESTINAL TRACT (GI) N/A 12/12/2016     Procedure: ENDOSCOPIC ULTRASOUND, ESOPHAGOSCOPY / UPPER GASTROINTESTINAL TRACT (GI);  Surgeon: Guru Jose Klein MD;  [facility]: [facility]     ESOPHAGOSCOPY, GASTROSCOPY, DUODENOSCOPY (EGD), COMBINED   1/18/2012     Procedure:COMBINED ESOPHAGOSCOPY, GASTROSCOPY, DUODENOSCOPY (EGD); Surgeon:ARNIE ESPINOZA; [facility]     ESOPHAGOSCOPY, GASTROSCOPY, DUODENOSCOPY (EGD), COMBINED   1/18/2012     Procedure:COMBINED ESOPHAGOSCOPY, GASTROSCOPY, DUODENOSCOPY (EGD); EGD;  Surgeon:ARNIE ESPINOZA; [facility]     ESOPHAGOSCOPY, GASTROSCOPY, DUODENOSCOPY (EGD), COMBINED N/A 12/9/2017     Procedure: COMBINED ESOPHAGOSCOPY, GASTROSCOPY, DUODENOSCOPY (EGD);;  Surgeon: Josias Chan MD;  [facility]: [facility]     INSERT PORT VASCULAR ACCESS         L knee arthroscopy   2009     LAPAROSCOPY DIAGNOSTIC (GYN)   10/26/2012     Procedure: LAPAROSCOPY DIAGNOSTIC (GYN);  LAPAROSCOPY DIAGNOSTIC, CAUTERY ENDOMETRIOISIS and biopsy of Fallopian tube lesions;  Surgeon: Carla Lopez MD;  Location:  OR     ORTHOPEDIC SURGERY   2008     knee     REMOVE AND REPLACE BREAST IMPLANT PROSTHESIS N/A 2/8/2018     Procedure: PERCUTANEOUS INSERTION TUBE JEJUNOSTOMY;  upper endoscopy with percutaneous gastrojejunostimy feeding tuble placement and gastropexy;  Surgeon: Campbell Narayanan MD;  Location: [facility]     VASCULAR SURGERY              Family History           Family History   Problem Relation Age of Onset     Hypertension Father       Bipolar Disorder Other       Anxiety Disorder Other       Depression Paternal Grandmother       Allergies Maternal Grandmother       CEREBROVASCULAR DISEASE Maternal Grandfather       Cardiovascular Maternal Grandfather       Depression/Anxiety Maternal Grandfather       GASTROINTESTINAL DISEASE Maternal Grandfather       DIABETES Paternal Uncle       Hypoglycemia Brother       CANCER No family hx of         No family history of skin cancer          Social History    Social History            Social History     Marital status: Single       Spouse name: N/A     Number of children: N/A     Years of education: N/A          Occupational History     Not on file.           Social History Main Topics     Smoking status: Never Smoker     Smokeless tobacco: Never Used     Alcohol use No     Drug use: No     Sexual activity: No           Other Topics Concern     Parent/Sibling W/ Cabg, Mi Or Angioplasty Before 65f 55m? 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       Physical Exam   Constitutional: She is oriented to person, place, and time. She appears well-developed and well-nourished.   HENT:   Head: Normocephalic and atraumatic.   Right Ear: External ear normal.   Left Ear: External ear normal.   Nose: Nose normal.   Mouth/Throat: Oropharynx is clear and moist.   Eyes: Conjunctivae and EOM are normal. Pupils are equal, round, and reactive to light.   Neck: Normal range of motion. Neck supple.   Cardiovascular: Normal rate, regular rhythm, normal heart sounds and intact distal pulses.    Pulmonary/Chest: Effort normal and breath sounds normal.   Abdominal: She exhibits no distension and no mass. There is tenderness. There is guarding. There is no rebound.   Musculoskeletal: Normal range of motion.   Neurological: She is alert and oriented to person, place, and time. She has normal reflexes.   Skin: Skin is warm and dry.   Psychiatric: She has a normal mood and affect.   Nursing note and vitals reviewed.  A/P:Patient is a  21 y/o lady with chronic abdominal pain who presents for follow up.  It is clear that the most likely eiology of her pain is pancreatitic flares and surgical pain.   In an effort to decrease the frequency and the duration of flares, as well as addressing the post-surgical pain, I recommend  1. Continue Pain psychology  2. Continue Oxycodone 10 mg  q6h prn with plan to wean when the surgical pain subsides.  3. Continue pamelor 40 mg qHS  4. RTC in 2 weeks

## 2018-04-26 NOTE — PROGRESS NOTES
Patient is a 23 y/o lady with a chief complaint of chronic abdominal pain receiving chronic opiates.  She was last seen yesterday.  It was decided that transdermal buprenorphine would be utilized to help manage her pain.  The patient did not really want to use Butrans, and after her visit, it was discovered that initially her insurance would not provide it.  (After appeal it was approved).  She notes today that she had tried Suboxone in the past , thought it was ineffective, and didn't like the way it made her feel.  She refuses the Butrans.  She states that she is willing to use the Oxycodone tablets.  I have admonished her that her medications should be used judiciously .  No early refills will be allowed.  Such requests would be a violation of the opioid contract, and at that point we may have nothing else to offer her.  She presents for pain management options.  Current Outpatient Prescriptions   Medication     acetaminophen (TYLENOL) 32 mg/mL solution     amylase-lipase-protease (ZENPEP) 31924 UNITS CPEP     amylase-lipase-protease (ZENPEP) 52428 UNITS CPEP     blood glucose monitoring (NO BRAND SPECIFIED) test strip     cephalexin (KEFLEX) 250 MG/5ML suspension     clotrimazole-betamethasone (LOTRISONE) cream     fluticasone (FLONASE) 50 MCG/ACT spray     gabapentin (NEURONTIN) 250 MG/5ML solution     hydrOXYzine (ATARAX) 10 MG/5ML syrup     leuprolide (LUPRON DEPOT) 11.25 MG injection     levalbuterol (XOPENEX HFA) 45 MCG/ACT Inhaler     LORazepam (ATIVAN) 0.5 MG tablet     multivitamins with minerals (CERTAVITE/CEROVITE) LIQD liquid     norethindrone (AYGESTIN) 5 MG tablet     nortriptyline (PAMELOR) 10 MG capsule     ondansetron (ZOFRAN ODT) 4 MG ODT tab     order for DME     oxyCODONE (ROXICODONE) 5 MG/5ML solution     oxyCODONE IR (ROXICODONE) 10 MG tablet     pantoprazole (PROTONIX) 40 MG EC tablet     polyethylene glycol (MIRALAX/GLYCOLAX) Packet     RIZATRIPTAN BENZOATE PO     senna-docusate  "(SENOKOT-S;PERICOLACE) 8.6-50 MG per tablet     sodium bicarbonate 325 MG tablet     buprenorphine (BUTRANS) 5 MCG/HR WK patch     No current facility-administered medications for this visit.      Allergies   Allergen Reactions     Amoxicillin-Pot Clavulanate Nausea and Vomiting     Compazine [Prochlorperazine] Other (See Comments)     Dystonia       Hyoscyamine Other (See Comments)     Dystonia     Reglan [Metoclopramide Hcl] Other (See Comments)     Dystonia     Zyprexa Other (See Comments)     Sensitive, dystonic reaction on 11-9-2011     Amitriptyline Hcl Other (See Comments)     Dystonia, hallucinations     Buspirone Other (See Comments)     No Adverse Reactions, no benefit     Cogentin [Benztropine]      Cyproheptadine Other (See Comments)     Distonic     Dicyclomine Other (See Comments)     Droperidol Other (See Comments)     Feels tense and \"like she has to jump out of her skin\".       Effexor [Venlafaxine] Other (See Comments)     Dystonia     Food      Cilantro--lips/tongue swelling     No Clinical Screening - See Comments Other (See Comments)     Cilantro     Phenergan Dm [Promethazine-Dm] Other (See Comments)     dystonia     Promethazine Other (See Comments)     Risperidone Other (See Comments)     dystonia     Vistaril Other (See Comments)     Burning sensation.       Augmentin GI Disturbance     Ketamine Other (See Comments)     jittery     Sorbitol GI Disturbance     Headache and dyspepsia     Past Medical History:   Diagnosis Date     Anxiety      Asthma      Cholecystitis     s/p cholecystectomy     Chronic abdominal pain      Chronic infection     mrsa     Chronic pain      Cyclic vomiting syndrome 10/27/2012     Depression      Endometriosis      Hypoglycaemia      Migraines      Mild intermittent asthma      Ovarian cysts      Pancreatic disease      PONV (postoperative nausea and vomiting)      Pseudoseizures      Somatoform disorder      Sphincter of Oddi dysfunction      Vasovagal syncope  "     Past Surgical History:   Procedure Laterality Date     ABDOMEN SURGERY      ERCP, biliary stents     CHOLECYSTECTOMY  8/2/11     COLONOSCOPY  2011    negative finding     ENDOSCOPIC RETROGRADE CHOLANGIOPANCREATOGRAM  8/23/2011    Procedure:ENDOSCOPIC RETROGRADE CHOLANGIOPANCREATOGRAM; Endoscopic Retrograde Cholangiopancreatogram; Surgeon:CAMPBELL NARAYANAN; [facility]     ENDOSCOPIC RETROGRADE CHOLANGIOPANCREATOGRAM  5/17/2012    Procedure:ENDOSCOPIC RETROGRADE CHOLANGIOPANCREATOGRAM; Endoscopic Retrograde Cholangiopancreatogram with pancreatic stent placement.; Surgeon:CAMPBELL NARAYANAN; [facility]     ENDOSCOPIC RETROGRADE CHOLANGIOPANCREATOGRAM N/A 1/18/2018    Procedure: COMBINED ENDOSCOPIC RETROGRADE CHOLANGIOPANCREATOGRAPHY, PLACE TUBE/STENT;  Endoscopic Retrograde Cholangiopancreatography with nasojejunal feeding tube placement;  Surgeon: Campbell Narayanan MD;  [facility]     ENDOSCOPIC RETROGRADE CHOLANGIOPANCREATOGRAM COMPLEX  1/3/2012    Procedure:ENDOSCOPIC RETROGRADE CHOLANGIOPANCREATOGRAM COMPLEX; Endoscopic Retrograde Cholangiopancreatogram with Manometry bile duct sphincterotomy extention pancreatic duct sphincterotomy pancreatic duct stent placement; Surgeon:CAMPBELL NARAYANAN; [facility]     ENDOSCOPIC ULTRASOUND UPPER GASTROINTESTINAL TRACT (GI) N/A 6/9/2015    Procedure: ENDOSCOPIC ULTRASOUND, ESOPHAGOSCOPY / UPPER GASTROINTESTINAL TRACT (GI);  Surgeon: Mario Joe MD;  [facility]     ENDOSCOPIC ULTRASOUND UPPER GASTROINTESTINAL TRACT (GI) N/A 12/12/2016    Procedure: ENDOSCOPIC ULTRASOUND, ESOPHAGOSCOPY / UPPER GASTROINTESTINAL TRACT (GI);  Surgeon: Guru Jose Klein MD;  [facility]     ESOPHAGOSCOPY, GASTROSCOPY, DUODENOSCOPY (EGD), COMBINED  1/18/2012    Procedure:COMBINED ESOPHAGOSCOPY, GASTROSCOPY, DUODENOSCOPY (EGD); Surgeon:ARNIE ESPINOZA; [facility]     ESOPHAGOSCOPY, GASTROSCOPY, DUODENOSCOPY (EGD), COMBINED   1/18/2012    Procedure:COMBINED ESOPHAGOSCOPY, GASTROSCOPY, DUODENOSCOPY (EGD); EGD; Surgeon:ARNIE ESPINOZA; [facility]     ESOPHAGOSCOPY, GASTROSCOPY, DUODENOSCOPY (EGD), COMBINED N/A 12/9/2017    Procedure: COMBINED ESOPHAGOSCOPY, GASTROSCOPY, DUODENOSCOPY (EGD);;  Surgeon: Josias hCan MD;  [facility]     ESOPHAGOSCOPY, GASTROSCOPY, DUODENOSCOPY (EGD), COMBINED N/A 3/27/2018    Procedure: COMBINED ESOPHAGOSCOPY, GASTROSCOPY, DUODENOSCOPY (EGD);  Upper Gastrointestinal Endoscopy convert gastrostomy to gastrojejunostomy tube ;  Surgeon: Campbell Narayanan MD;  [facility]     INSERT PORT VASCULAR ACCESS       L knee arthroscopy  2009     LAPAROSCOPY DIAGNOSTIC (GYN)  10/26/2012    Procedure: LAPAROSCOPY DIAGNOSTIC (GYN);  LAPAROSCOPY DIAGNOSTIC, CAUTERY ENDOMETRIOISIS and biopsy of Fallopian tube lesions;  Surgeon: Carla Lopez MD;  Location:  OR     ORTHOPEDIC SURGERY  2008    knee     REMOVE AND REPLACE BREAST IMPLANT PROSTHESIS N/A 2/8/2018    Procedure: PERCUTANEOUS INSERTION TUBE JEJUNOSTOMY;  upper endoscopy with percutaneous gastrojejunostimy feeding tuble placement and gastropexy;  Surgeon: Campbell Narayanan MD;  Location: [facility]     VASCULAR SURGERY       Family History   Problem Relation Age of Onset     Hypertension Father      Bipolar Disorder Other      Anxiety Disorder Other      Depression Paternal Grandmother      Allergies Maternal Grandmother      CEREBROVASCULAR DISEASE Maternal Grandfather      Cardiovascular Maternal Grandfather      Depression/Anxiety Maternal Grandfather      GASTROINTESTINAL DISEASE Maternal Grandfather      DIABETES Paternal Uncle      Hypoglycemia Brother      CANCER No family hx of      No family history of skin cancer     Social History     Social History     Marital status: Single     Spouse name: N/A     Number of children: N/A     Years of education: N/A     Occupational History     Not on file.     Social History Main Topics     Smoking  status: Never Smoker     Smokeless tobacco: Never Used     Alcohol use No     Drug use: No     Sexual activity: No     Other Topics Concern     Parent/Sibling W/ Cabg, Mi Or Angioplasty Before 65f 55m? 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  Physical examination is deferred.  A/P:  Patient is a 23 y/o lady with chronic abdominal pain.  Barriers to her treatment are documented above.  I arabella prescribe oxycodone 10 mg q8h prn (tablet form).  I will prescribe medication for one week.  She will f/u at that time for re-evaluation.  She verbaizes an understanding of our plan

## 2018-04-26 NOTE — PROGRESS NOTES
Patient is a 23 y/o lady with a h/o abdominal pain who presents for f/u.  The patient has had several bouts of extreme pain in the last few months, punctuated with several different surgical procedures to assist in the management of her pain and facilitate nutrition.  The mainstay of her analgesic regimen has been with opioid analgesics, namely, oxycodone elixir 10 mg q6h prn.  For the second time in as many visits, the patient has returned for an early refill, stating that she spilled her medication.  This is the cause of some concern.  Upon our initial evaluation, it was decided, between the patient and I that only a short course of opiates would be needed for an acute exacerbation that was occurring at the time.  Her pain was described as intermittent.  We've not been off opiates since that time.  Her pain seems constant, not intermittent.  It seems that a short acting elixir may not be the best choice for this particular patient.  She returns today for re-evaluation and medication refill.  Current Outpatient Prescriptions   Medication     buprenorphine (BUTRANS) 5 MCG/HR WK patch     acetaminophen (TYLENOL) 32 mg/mL solution     amylase-lipase-protease (ZENPEP) 22584 UNITS CPEP     amylase-lipase-protease (ZENPEP) 58948 UNITS CPEP     blood glucose monitoring (NO BRAND SPECIFIED) test strip     cephalexin (KEFLEX) 250 MG/5ML suspension     clotrimazole-betamethasone (LOTRISONE) cream     fluticasone (FLONASE) 50 MCG/ACT spray     gabapentin (NEURONTIN) 250 MG/5ML solution     hydrOXYzine (ATARAX) 10 MG/5ML syrup     leuprolide (LUPRON DEPOT) 11.25 MG injection     levalbuterol (XOPENEX HFA) 45 MCG/ACT Inhaler     LORazepam (ATIVAN) 0.5 MG tablet     multivitamins with minerals (CERTAVITE/CEROVITE) LIQD liquid     norethindrone (AYGESTIN) 5 MG tablet     nortriptyline (PAMELOR) 10 MG capsule     ondansetron (ZOFRAN ODT) 4 MG ODT tab     order for DME     oxyCODONE (ROXICODONE) 5 MG/5ML solution     oxyCODONE IR  "(ROXICODONE) 10 MG tablet     pantoprazole (PROTONIX) 40 MG EC tablet     polyethylene glycol (MIRALAX/GLYCOLAX) Packet     RIZATRIPTAN BENZOATE PO     senna-docusate (SENOKOT-S;PERICOLACE) 8.6-50 MG per tablet     sodium bicarbonate 325 MG tablet     No current facility-administered medications for this visit.      Allergies   Allergen Reactions     Amoxicillin-Pot Clavulanate Nausea and Vomiting     Compazine [Prochlorperazine] Other (See Comments)     Dystonia       Hyoscyamine Other (See Comments)     Dystonia     Reglan [Metoclopramide Hcl] Other (See Comments)     Dystonia     Zyprexa Other (See Comments)     Sensitive, dystonic reaction on 11-9-2011     Amitriptyline Hcl Other (See Comments)     Dystonia, hallucinations     Buspirone Other (See Comments)     No Adverse Reactions, no benefit     Cogentin [Benztropine]      Cyproheptadine Other (See Comments)     Distonic     Dicyclomine Other (See Comments)     Droperidol Other (See Comments)     Feels tense and \"like she has to jump out of her skin\".       Effexor [Venlafaxine] Other (See Comments)     Dystonia     Food      Cilantro--lips/tongue swelling     No Clinical Screening - See Comments Other (See Comments)     Cilantro     Phenergan Dm [Promethazine-Dm] Other (See Comments)     dystonia     Promethazine Other (See Comments)     Risperidone Other (See Comments)     dystonia     Vistaril Other (See Comments)     Burning sensation.       Augmentin GI Disturbance     Ketamine Other (See Comments)     jittery     Sorbitol GI Disturbance     Headache and dyspepsia     Past Medical History:   Diagnosis Date     Anxiety      Asthma      Cholecystitis     s/p cholecystectomy     Chronic abdominal pain      Chronic infection     mrsa     Chronic pain      Cyclic vomiting syndrome 10/27/2012     Depression      Endometriosis      Hypoglycaemia      Migraines      Mild intermittent asthma      Ovarian cysts      Pancreatic disease      PONV (postoperative " nausea and vomiting)      Pseudoseizures      Somatoform disorder      Sphincter of Oddi dysfunction      Vasovagal syncope      Past Surgical History:   Procedure Laterality Date     ABDOMEN SURGERY      ERCP, biliary stents     CHOLECYSTECTOMY  8/2/11     COLONOSCOPY  2011    negative finding     ENDOSCOPIC RETROGRADE CHOLANGIOPANCREATOGRAM  8/23/2011    Procedure:ENDOSCOPIC RETROGRADE CHOLANGIOPANCREATOGRAM; Endoscopic Retrograde Cholangiopancreatogram; Surgeon:SHORTY NARAYANAN; [facility]     ENDOSCOPIC RETROGRADE CHOLANGIOPANCREATOGRAM  5/17/2012    Procedure:ENDOSCOPIC RETROGRADE CHOLANGIOPANCREATOGRAM; Endoscopic Retrograde Cholangiopancreatogram with pancreatic stent placement.; Surgeon:SHORTY NARAYANAN; [facility]     ENDOSCOPIC RETROGRADE CHOLANGIOPANCREATOGRAM N/A 1/18/2018    Procedure: COMBINED ENDOSCOPIC RETROGRADE CHOLANGIOPANCREATOGRAPHY, PLACE TUBE/STENT;  Endoscopic Retrograde Cholangiopancreatography with nasojejunal feeding tube placement;  Surgeon: Shorty Narayanan MD;  [facility]     ENDOSCOPIC RETROGRADE CHOLANGIOPANCREATOGRAM COMPLEX  1/3/2012    Procedure:ENDOSCOPIC RETROGRADE CHOLANGIOPANCREATOGRAM COMPLEX; Endoscopic Retrograde Cholangiopancreatogram with Manometry bile duct sphincterotomy extention pancreatic duct sphincterotomy pancreatic duct stent placement; Surgeon:SHORTY NARAYANAN; [facility]     ENDOSCOPIC ULTRASOUND UPPER GASTROINTESTINAL TRACT (GI) N/A 6/9/2015    Procedure: ENDOSCOPIC ULTRASOUND, ESOPHAGOSCOPY / UPPER GASTROINTESTINAL TRACT (GI);  Surgeon: Mario Joe MD;  [facility]     ENDOSCOPIC ULTRASOUND UPPER GASTROINTESTINAL TRACT (GI) N/A 12/12/2016    Procedure: ENDOSCOPIC ULTRASOUND, ESOPHAGOSCOPY / UPPER GASTROINTESTINAL TRACT (GI);  Surgeon: Guru Jose Klein MD;  [facility]     ESOPHAGOSCOPY, GASTROSCOPY, DUODENOSCOPY (EGD), COMBINED  1/18/2012    Procedure:COMBINED ESOPHAGOSCOPY,  GASTROSCOPY, DUODENOSCOPY (EGD); Surgeon:ARNIE ESPINOZA; [facility]     ESOPHAGOSCOPY, GASTROSCOPY, DUODENOSCOPY (EGD), COMBINED  1/18/2012    Procedure:COMBINED ESOPHAGOSCOPY, GASTROSCOPY, DUODENOSCOPY (EGD); EGD; Surgeon:ARNIE ESPINOZA; [facility]     ESOPHAGOSCOPY, GASTROSCOPY, DUODENOSCOPY (EGD), COMBINED N/A 12/9/2017    Procedure: COMBINED ESOPHAGOSCOPY, GASTROSCOPY, DUODENOSCOPY (EGD);;  Surgeon: Josias Chan MD;  [facility]     ESOPHAGOSCOPY, GASTROSCOPY, DUODENOSCOPY (EGD), COMBINED N/A 3/27/2018    Procedure: COMBINED ESOPHAGOSCOPY, GASTROSCOPY, DUODENOSCOPY (EGD);  Upper Gastrointestinal Endoscopy convert gastrostomy to gastrojejunostomy tube ;  Surgeon: Campbell Narayanan MD;  [facility]     INSERT PORT VASCULAR ACCESS       L knee arthroscopy  2009     LAPAROSCOPY DIAGNOSTIC (GYN)  10/26/2012    Procedure: LAPAROSCOPY DIAGNOSTIC (GYN);  LAPAROSCOPY DIAGNOSTIC, CAUTERY ENDOMETRIOISIS and biopsy of Fallopian tube lesions;  Surgeon: Carla Lopez MD;  Location:  OR     ORTHOPEDIC SURGERY  2008    knee     REMOVE AND REPLACE BREAST IMPLANT PROSTHESIS N/A 2/8/2018    Procedure: PERCUTANEOUS INSERTION TUBE JEJUNOSTOMY;  upper endoscopy with percutaneous gastrojejunostimy feeding tuble placement and gastropexy;  Surgeon: Campbell Narayanan MD;  [facility]     VASCULAR SURGERY       Family History   Problem Relation Age of Onset     Hypertension Father      Bipolar Disorder Other      Anxiety Disorder Other      Depression Paternal Grandmother      Allergies Maternal Grandmother      CEREBROVASCULAR DISEASE Maternal Grandfather      Cardiovascular Maternal Grandfather      Depression/Anxiety Maternal Grandfather      GASTROINTESTINAL DISEASE Maternal Grandfather      DIABETES Paternal Uncle      Hypoglycemia Brother      CANCER No family hx of      No family history of skin cancer     Social History     Social History     Marital status: Single     Spouse name: N/A     Number  "of children: N/A     Years of education: N/A     Occupational History     Not on file.     Social History Main Topics     Smoking status: Never Smoker     Smokeless tobacco: Never Used     Alcohol use No     Drug use: No     Sexual activity: No     Other Topics Concern     Parent/Sibling W/ Cabg, Mi Or Angioplasty Before 65f 55m? 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  Physical Exam   Abdominal: She exhibits no distension and no mass. There is tenderness. There is guarding. There is no rebound.   Nursing note and vitals reviewed.  A/P:  Patient is a 21 y/o lady with chronic abdominal pain who presents for f/u.  Pain is currently controlled with opiates.  Patient has had at least two instances of requesting early refills, the last two attributed to \"spilling\" the liquid elixir.  At this time, I recommend continuing opiates to control her pain, however, as the elixir seems difficult for her to use, I recommend employing a Butrans patch as transdermal medications may be easier for her to use, as the patches are changed weekly.  The patient protests, however, upon assurance that this may be a better overall plan, she verbalizes an understanding of our plan.  She will return to clinic in 4 weeks.    Answers for HPI/ROS submitted by the patient on 4/23/2018   TANISHA 7 TOTAL SCORE: 3  PHQ-2 Score: 1    "

## 2018-04-27 ENCOUNTER — CARE COORDINATION (OUTPATIENT)
Dept: GASTROENTEROLOGY | Facility: CLINIC | Age: 23
End: 2018-04-27

## 2018-04-27 NOTE — PROGRESS NOTES
Message from Dr. Narayanan in regards to bacterial growth in tube site.   Sensitive to cipro or levaquin     How bnout levaquin 500 qd x 7 d     1 refill                Called and left Grabiel a message asking her to call this RN back to discuss antibiotic treatment and to get preferred pharmacy of where she would like this sent to her.     Ella SARMIENTO, RN Coordinator  Dr. Narayanan, Dr. Joe & Dr. Klein   [facility]  [phone]

## 2018-04-28 ENCOUNTER — INFUSION THERAPY VISIT (OUTPATIENT)
Dept: INFUSION THERAPY | Facility: CLINIC | Age: 23
End: 2018-04-28
Attending: INTERNAL MEDICINE
Payer: COMMERCIAL

## 2018-04-28 VITALS
SYSTOLIC BLOOD PRESSURE: 129 MMHG | OXYGEN SATURATION: 100 % | DIASTOLIC BLOOD PRESSURE: 83 MMHG | RESPIRATION RATE: 16 BRPM | TEMPERATURE: 98.3 F

## 2018-04-28 DIAGNOSIS — R10.9 ACUTE ABDOMINAL PAIN: ICD-10-CM

## 2018-04-28 DIAGNOSIS — R10.13 ABDOMINAL PAIN, EPIGASTRIC: ICD-10-CM

## 2018-04-28 DIAGNOSIS — E16.2 HYPOGLYCEMIA: ICD-10-CM

## 2018-04-28 DIAGNOSIS — R21 RASH: ICD-10-CM

## 2018-04-28 DIAGNOSIS — K86.1 CHRONIC PANCREATITIS, UNSPECIFIED PANCREATITIS TYPE (H): ICD-10-CM

## 2018-04-28 DIAGNOSIS — F41.1 ANXIETY STATE: ICD-10-CM

## 2018-04-28 DIAGNOSIS — N39.41 URGE INCONTINENCE: ICD-10-CM

## 2018-04-28 DIAGNOSIS — Z98.890 S/P ERCP: ICD-10-CM

## 2018-04-28 DIAGNOSIS — F33.1 MAJOR DEPRESSIVE DISORDER, RECURRENT EPISODE, MODERATE (H): ICD-10-CM

## 2018-04-28 DIAGNOSIS — G43.A0 CYCLICAL VOMITING WITH NAUSEA, INTRACTABILITY OF VOMITING NOT SPECIFIED: Primary | ICD-10-CM

## 2018-04-28 DIAGNOSIS — R11.15 INTRACTABLE CYCLICAL VOMITING WITH NAUSEA: ICD-10-CM

## 2018-04-28 DIAGNOSIS — R52 PAIN: ICD-10-CM

## 2018-04-28 PROCEDURE — 96361 HYDRATE IV INFUSION ADD-ON: CPT

## 2018-04-28 PROCEDURE — 25000128 H RX IP 250 OP 636: Mod: ZF | Performed by: INTERNAL MEDICINE

## 2018-04-28 PROCEDURE — 96376 TX/PRO/DX INJ SAME DRUG ADON: CPT

## 2018-04-28 PROCEDURE — 96375 TX/PRO/DX INJ NEW DRUG ADDON: CPT

## 2018-04-28 PROCEDURE — 96374 THER/PROPH/DIAG INJ IV PUSH: CPT

## 2018-04-28 RX ORDER — HEPARIN SODIUM (PORCINE) LOCK FLUSH IV SOLN 100 UNIT/ML 100 UNIT/ML
500 SOLUTION INTRAVENOUS EVERY 8 HOURS
Status: CANCELLED
Start: 2018-04-28

## 2018-04-28 RX ORDER — DIPHENHYDRAMINE HYDROCHLORIDE 50 MG/ML
25 INJECTION INTRAMUSCULAR; INTRAVENOUS ONCE
Status: CANCELLED
Start: 2018-04-28 | End: 2018-04-28

## 2018-04-28 RX ORDER — HEPARIN SODIUM (PORCINE) LOCK FLUSH IV SOLN 100 UNIT/ML 100 UNIT/ML
500 SOLUTION INTRAVENOUS EVERY 8 HOURS
Status: DISCONTINUED | OUTPATIENT
Start: 2018-04-28 | End: 2018-04-28 | Stop reason: HOSPADM

## 2018-04-28 RX ORDER — ONDANSETRON 2 MG/ML
4 INJECTION INTRAMUSCULAR; INTRAVENOUS DAILY PRN
Status: DISCONTINUED | OUTPATIENT
Start: 2018-04-28 | End: 2018-04-28 | Stop reason: HOSPADM

## 2018-04-28 RX ORDER — ONDANSETRON 2 MG/ML
4 INJECTION INTRAMUSCULAR; INTRAVENOUS ONCE
Status: CANCELLED
Start: 2018-04-28 | End: 2018-04-28

## 2018-04-28 RX ORDER — ONDANSETRON 2 MG/ML
4 INJECTION INTRAMUSCULAR; INTRAVENOUS DAILY PRN
Status: CANCELLED
Start: 2018-04-28

## 2018-04-28 RX ORDER — DEXTROSE, SODIUM CHLORIDE, SODIUM LACTATE, POTASSIUM CHLORIDE, AND CALCIUM CHLORIDE 5; .6; .31; .03; .02 G/100ML; G/100ML; G/100ML; G/100ML; G/100ML
2000 INJECTION, SOLUTION INTRAVENOUS ONCE
Status: CANCELLED
Start: 2018-04-28 | End: 2018-04-28

## 2018-04-28 RX ORDER — DIPHENHYDRAMINE HYDROCHLORIDE 50 MG/ML
25 INJECTION INTRAMUSCULAR; INTRAVENOUS DAILY PRN
Status: CANCELLED
Start: 2018-04-28

## 2018-04-28 RX ORDER — ONDANSETRON 2 MG/ML
4 INJECTION INTRAMUSCULAR; INTRAVENOUS ONCE
Status: COMPLETED | OUTPATIENT
Start: 2018-04-28 | End: 2018-04-28

## 2018-04-28 RX ADMIN — DIPHENHYDRAMINE HYDROCHLORIDE: 50 INJECTION INTRAMUSCULAR; INTRAVENOUS at 11:49

## 2018-04-28 RX ADMIN — SODIUM CHLORIDE, POTASSIUM CHLORIDE, SODIUM LACTATE AND CALCIUM CHLORIDE 2000 ML: 600; 310; 30; 20 INJECTION, SOLUTION INTRAVENOUS at 11:48

## 2018-04-28 RX ADMIN — ONDANSETRON 4 MG: 2 INJECTION INTRAMUSCULAR; INTRAVENOUS at 12:54

## 2018-04-28 RX ADMIN — DIPHENHYDRAMINE HYDROCHLORIDE: 50 INJECTION INTRAMUSCULAR; INTRAVENOUS at 12:59

## 2018-04-28 RX ADMIN — ONDANSETRON 4 MG: 2 INJECTION INTRAMUSCULAR; INTRAVENOUS at 11:51

## 2018-04-28 NOTE — PATIENT INSTRUCTIONS
1.  Hold tube feedings starting at 4:00 AM on Monday 04/30/2018.    2. Call [facility] Monday morning after 8:00 AM to schedule appointment for Monday for Dr Narayanan to look at G/J tube and possibly change it.     Endoscopy phone #: [phone] or [phone]

## 2018-04-28 NOTE — MR AVS SNAPSHOT
After Visit Summary   4/28/2018    Alexandra Melgoza    MRN: [id number]           Patient Information     Date Of Birth          1995        Visit Information        Provider Department      4/28/2018 12:00 PM UC 42 ATC; UC SPEC INFUSION [facility] Specialty and Procedure        Today's Diagnoses     Cyclical vomiting with nausea, intractability of vomiting not specified    -  1    Chronic pancreatitis, unspecified pancreatitis type (H)        Pain        Abdominal pain, epigastric        S/P ERCP        Acute abdominal pain        Hypoglycemia        Urge incontinence        Major depressive disorder, recurrent episode, moderate (H)        Anxiety state        Rash        Intractable cyclical vomiting with nausea          Care Instructions    1.  Hold tube feedings starting at 4:00 AM on Monday 04/30/2018.    2. Call [facility] Monday morning after 8:00 AM to schedule appointment for Monday for Dr Narayanan to look at G/J tube and possibly change it.     Endoscopy phone #: [phone] or [phone]          Follow-ups after your visit        Your next 10 appointments already scheduled     Apr 30, 2018  7:20 AM CDT   (Arrive by 7:05 AM)   Return Visit with Leonardo Wang MD   [facility] ([facility])    [address]  [address] [facility] [id number] [phone]            Apr 30, 2018  9:00 AM CDT   (Arrive by 8:45 AM)   Return Visit with Mirza García MD   [facility] ([facility])    [address]  4th Floor  [facility] [id number] [phone]            Apr 30, 2018  2:00 PM CDT   Infusion 120 with UC SPEC INFUSION, UC 43 ATC   [facility] Specialty and Procedure ([facility])    [address]  [address]  [facility] [id number] [phone]            May 02, 2018  3:00 PM CDT    Infusion 120 with UC SPEC INFUSION, UC 49 ATC   [facility] Specialty and Procedure ([facility])    [address]  [address]  [facility] [id number] [phone]            May 05, 2018 11:00 AM CDT   Infusion 120 with UC SPEC INFUSION, UC 42 ATC   [facility] Specialty and Procedure ([facility])    [address]  [address]  [facility] [id number] [phone]            May 07, 2018  2:00 PM CDT   Infusion 120 with UC SPEC INFUSION, UC 50 ATC   [facility] Specialty and Procedure ([facility])    [address]  [address]  [facility] [id number] [phone]              Who to contact     If you have questions or need follow up information about today's clinic visit or your schedule please contact [facility] SPECIALTY AND PROCEDURE directly at [phone].  Normal or non-critical lab and imaging results will be communicated to you by CAP[employer]UREhart, letter or phone within 4 business days after the clinic has received the results. If you do not hear from us within 7 days, please contact the clinic through [employer] or phone. If you have a critical or abnormal lab result, we will notify you by phone as soon as possible.  Submit refill requests through [employer] or call your pharmacy and they will forward the refill request to us. Please allow 3 business days for your refill to be completed.          Additional Information About Your Visit        CAPPTUREhart Information     [employer] gives you secure access to your electronic health record. If you see a primary care provider, you can also send messages to your care team and make appointments. If you have questions, please call your primary care clinic.  If you do not have a primary care provider, please call [phone] and they will assist you.        Care EveryWhere ID     This  is your Care EveryWhere ID. This could be used by other organizations to access your [place] medical records  [id number]        Your Vitals Were     Temperature Respirations Pulse Oximetry             98.3  F (36.8  C) (Oral) 16 99%          Blood Pressure from Last 3 Encounters:   04/28/18 121/81   04/25/18 112/75   04/24/18 126/81    Weight from Last 3 Encounters:   04/13/18 68 kg (150 lb)   04/11/18 68 kg (150 lb)   04/04/18 68.4 kg (150 lb 11.2 oz)              Today, you had the following     No orders found for display         Today's Medication Changes          These changes are accurate as of 4/28/18 12:41 PM.  If you have any questions, ask your nurse or doctor.               These medicines have changed or have updated prescriptions.        Dose/Directions    nortriptyline 10 MG capsule   Commonly known as:  PAMELOR   This may have changed:  how much to take   Used for:  Right upper quadrant abdominal pain        Dose:  30 mg   Take 3 capsules (30 mg) by mouth At Bedtime   Quantity:  120 capsule   Refills:  0                Primary Care Provider Office Phone # Fax #    Quyen Baez [phone]       [address] [address]        Equal Access to Services     RACHAEL BENITEZ AH: Bret Enamorado, waaxda luqadaha, qaybta kaalmada adechrisyada, maki smallwood. So [facility] [phone].    ATENCIÓN: Si habla español, tiene a quijano disposición servicios gratuitos de asistencia lingüística. Llame al [phone].    We comply with applicable federal civil rights laws and [place] laws. We do not discriminate on the basis of race, color, national origin, age, disability, sex, sexual orientation, or gender identity.            Thank you!     Thank you for choosing [facility] SPECIALTY AND PROCEDURE  for your care. Our goal is always to provide you with excellent care. Hearing back from our patients is one way we can  continue to improve our services. Please take a few minutes to complete the written survey that you may receive in the mail after your visit with us. Thank you!             Your Updated Medication List - Protect others around you: Learn how to safely use, store and throw away your medicines at www.disposemymeds.org.          This list is accurate as of 4/28/18 12:41 PM.  Always use your most recent med list.                   Brand Name Dispense Instructions for use Diagnosis    acetaminophen 32 mg/mL solution    TYLENOL     30.45 mLs (975 mg) by Per J Tube route every 8 hours        * amylase-lipase-protease 59293 units Cpep    ZENPEP    180 capsule    Take 2-3 capsules (40,000-60,000 Units) by mouth Take with snacks or supplements (Snacks)    Idiopathic chronic pancreatitis (H)       * amylase-lipase-protease 37285 units Cpep    ZENPEP    180 capsule    Take 5 capsules (100,000 Units) by mouth 4 times daily (with meals and nightly)    Right upper quadrant abdominal pain       blood glucose monitoring test strip    no brand specified    100 strip    One Touch Ultra 2 Strips, Use to test blood sugars 2 times daily or as directed    Hypoglycemia       buprenorphine 5 MCG/HR WK patch    BUTRANS    4 patch    Place 1 patch onto the skin every 7 days    Abdominal pain, generalized       cephalexin 250 MG/5ML suspension    KEFLEX    210 mL    Take 10 ml per GJ tube tid    Dysuria       clotrimazole-betamethasone cream    LOTRISONE    15 g    Apply topically 2 times daily    Rash and nonspecific skin eruption       fluticasone 50 MCG/ACT spray    FLONASE    16 g    Spray 2 sprays into both nostrils daily    Nasal congestion       gabapentin 250 MG/5ML solution    NEURONTIN      Chronic abdominal pain, Sphincter of Oddi dysfunction, Cyclical vomiting with nausea, intractability of vomiting not specified, Pain around percutaneous endoscopic gastrostomy (PEG) tube site, sequela       hydrOXYzine 10 MG/5ML syrup    ATARAX     1500 mL    Take 12.5 mLs (25 mg) by mouth every 6 hours as needed for anxiety or other (pain)    Acute abdominal pain       leuprolide 11.25 MG kit    LUPRON DEPOT (3-MONTH)    1 each    Inject 11.25 mg into the muscle every 3 months    Endometriosis       levalbuterol 45 MCG/ACT Inhaler    XOPENEX HFA    1 Inhaler    Inhale 2 puffs into the lungs every 6 hours as needed for shortness of breath / dyspnea    Wheeze       LORazepam 0.5 MG tablet    ATIVAN    10 tablet    Take 1 tablet (0.5 mg) by mouth 2 times daily as needed (nausea, do not take with pain medicine, do not drive after taking)    Cyclical vomiting with nausea, intractability of vomiting not specified       multivitamins with minerals Liqd liquid     1 Bottle    15 mLs by Per Feeding Tube route daily    Abdominal pain, epigastric       norethindrone 5 MG tablet    AYGESTIN    90 tablet    Take 1 tablet (5 mg) by mouth daily    Endometriosis       nortriptyline 10 MG capsule    PAMELOR    120 capsule    Take 3 capsules (30 mg) by mouth At Bedtime    Right upper quadrant abdominal pain       ondansetron 4 MG ODT tab    ZOFRAN ODT    30 tablet    Take 1 tablet (4 mg) by mouth every 8 hours as needed for nausea or vomiting    Intractable cyclical vomiting with nausea       order for DME     1 Units    Equipment being ordered: TENS with wire and electrode.    Chronic abdominal pain       * oxyCODONE 5 MG/5ML solution    ROXICODONE    200 mL    Take 10 mL (10 mg) by mouth every 6 hours as needed, maximum 40 mL per day.    Abdominal pain, generalized, Chronic abdominal pain       * oxyCODONE IR 10 MG tablet    ROXICODONE    21 tablet    Take 1 tablet (10 mg) by mouth every 8 hours as needed for moderate to severe pain    Abdominal pain, generalized       pantoprazole 40 MG EC tablet    PROTONIX    30 tablet    Take 1 tablet (40 mg) by mouth 2 times daily (before meals)    Right upper quadrant abdominal pain, Erosive gastritis       polyethylene glycol Packet     MIRALAX/GLYCOLAX    7 packet    Take 17 g by mouth daily    Right upper quadrant abdominal pain       RIZATRIPTAN BENZOATE PO      Take 5 mg by mouth once as needed        senna-docusate 8.6-50 MG per tablet    SENOKOT-S;PERICOLACE    100 tablet    Take 1 tablet by mouth 2 times daily as needed for constipation    Right upper quadrant abdominal pain       sodium bicarbonate 325 MG tablet     120 tablet    1 tablet (325 mg) by Per Feeding Tube route 4 times daily    Abdominal pain, epigastric, Intractable cyclical vomiting with nausea       * Notice:  This list has 4 medication(s) that are the same as other medications prescribed for you. Read the directions carefully, and ask your doctor or other care provider to review them with you.

## 2018-04-28 NOTE — PROGRESS NOTES
Nursing Note  Alexandra Melgoza presents [facility] for:   Chief Complaint   Patient presents with     Infusion     IVF/Meds     During today's [facility] appointment, orders from Dr LIVE Narayanan were completed.  Frequency: 3 times weekly PRN    Progress note:  Patient identification verified by name and date of birth.  Assessment completed.  Vitals recorded in Doc Flowsheets.  Patient was provided with education regarding infusion and possible side effects.  Patient verbalized understanding.      needed: No  Premedications: were not ordered.  Infusion Rates: IV Fluids administered over approximately 2 hours 40 minutes; IV Benadryl administered over 6-7 minutes for each dose; IV Zofran administered over approximately 2 minutes for each dose.  Labs: were not ordered for this appointment.  Vascular access: port accessed today.  Treatment Conditions: non-applicable.  Patient tolerated infusion: well.    Patient reported that she received a call from Ella Cabrera (Dr Narayanan's nurse) yesterday regarding starting antibiotics for G/J tube cultures that were positive. Patient states she called her back and was unable to reach her. This RN spoke w/ Dr Narayanan today about this and he stated that he did not want to start her on antibiotics yet since she recently completed a course of antibiotics (Keflex). Dr Narayanan stated that he would like to look at her G/J tube on Monday to possibly change it. He gave the following instructions and stated he would definitely be able to see her Monday 04/30/2018:                               1.  Hold tube feedings starting at 4:00 AM on Monday 04/30/2018.                              2. Call Ella Monday morning after 8:00 AM to schedule appointment for Monday for Dr Narayanan to look at G/J tube and possibly change it.   Patient verbalized understanding.    Discharge Plan:   Follow up plan of care with: ongoing  infusions at [facility] and Dr Narayanan on Monday.  Discharge instructions were reviewed with patient.  Patient/representative verbalized understanding of discharge instructions and all questions answered.  Patient discharged from [facility] in stable condition.    Dale Zaacrias RN    Administrations This Visit     diphenhydrAMINE (BENADRYL) 25 mg in sodium chloride 0.9 % 50 mL     Admin Date Action Dose Rate Route Administered By          04/28/2018 New Bag    Intravenous Dale Zacarias RN             Admin Date Action Dose Rate Route Administered By          04/28/2018 New Bag   336 mL/hr Intravenous Dale Zacarias RN                   lactated ringers BOLUS 1,000-2,000 mL     Admin Date Action Dose Route Administered By             04/28/2018 New Bag 2000 mL Intravenous Dale Zacarias RN                    ondansetron (ZOFRAN) injection 4 mg     Admin Date Action Dose Route Administered By             04/28/2018 Given 4 mg Intravenous Dale Zacarias RN              Admin Date Action Dose Route Administered By             04/28/2018 Given 4 mg Intravenous Dale Zacarias RN                          /81 (BP Location: Right arm)  Temp 98.3  F (36.8  C) (Oral)  Resp 16  SpO2 99%

## 2018-04-30 ENCOUNTER — OFFICE VISIT (OUTPATIENT)
Dept: ANESTHESIOLOGY | Facility: CLINIC | Age: 23
End: 2018-04-30
Payer: COMMERCIAL

## 2018-04-30 ENCOUNTER — HOSPITAL ENCOUNTER (OUTPATIENT)
Facility: CLINIC | Age: 23
Discharge: HOME | End: 2018-04-30
Attending: INTERNAL MEDICINE | Admitting: INTERNAL MEDICINE
Payer: COMMERCIAL

## 2018-04-30 ENCOUNTER — CARE COORDINATION (OUTPATIENT)
Dept: GASTROENTEROLOGY | Facility: CLINIC | Age: 23
End: 2018-04-30

## 2018-04-30 ENCOUNTER — INFUSION THERAPY VISIT (OUTPATIENT)
Dept: INFUSION THERAPY | Facility: CLINIC | Age: 23
End: 2018-04-30
Attending: INTERNAL MEDICINE
Payer: COMMERCIAL

## 2018-04-30 ENCOUNTER — APPOINTMENT (OUTPATIENT)
Dept: GENERAL RADIOLOGY | Facility: CLINIC | Age: 23
End: 2018-04-30
Attending: INTERNAL MEDICINE
Payer: COMMERCIAL

## 2018-04-30 ENCOUNTER — SURGERY (OUTPATIENT)
Age: 23
End: 2018-04-30

## 2018-04-30 VITALS
HEART RATE: 96 BPM | SYSTOLIC BLOOD PRESSURE: 122 MMHG | DIASTOLIC BLOOD PRESSURE: 91 MMHG | HEIGHT: 66 IN | BODY MASS INDEX: 23.3 KG/M2 | WEIGHT: 145 LBS | RESPIRATION RATE: 12 BRPM | OXYGEN SATURATION: 96 %

## 2018-04-30 VITALS
HEIGHT: 66 IN | HEART RATE: 115 BPM | WEIGHT: 145 LBS | BODY MASS INDEX: 23.3 KG/M2 | DIASTOLIC BLOOD PRESSURE: 87 MMHG | SYSTOLIC BLOOD PRESSURE: 124 MMHG

## 2018-04-30 VITALS — SYSTOLIC BLOOD PRESSURE: 110 MMHG | DIASTOLIC BLOOD PRESSURE: 67 MMHG

## 2018-04-30 DIAGNOSIS — Z98.890 S/P ERCP: ICD-10-CM

## 2018-04-30 DIAGNOSIS — R21 RASH: ICD-10-CM

## 2018-04-30 DIAGNOSIS — K86.1 CHRONIC PANCREATITIS, UNSPECIFIED PANCREATITIS TYPE (H): ICD-10-CM

## 2018-04-30 DIAGNOSIS — F33.1 MAJOR DEPRESSIVE DISORDER, RECURRENT EPISODE, MODERATE (H): ICD-10-CM

## 2018-04-30 DIAGNOSIS — N39.41 URGE INCONTINENCE: ICD-10-CM

## 2018-04-30 DIAGNOSIS — G43.A0 CYCLICAL VOMITING WITH NAUSEA, INTRACTABILITY OF VOMITING NOT SPECIFIED: Primary | ICD-10-CM

## 2018-04-30 DIAGNOSIS — R11.15 INTRACTABLE CYCLICAL VOMITING WITH NAUSEA: ICD-10-CM

## 2018-04-30 DIAGNOSIS — R10.13 ABDOMINAL PAIN, EPIGASTRIC: ICD-10-CM

## 2018-04-30 DIAGNOSIS — R10.9 ACUTE ABDOMINAL PAIN: ICD-10-CM

## 2018-04-30 DIAGNOSIS — R10.9 ABDOMINAL PAIN, UNSPECIFIED ABDOMINAL LOCATION: ICD-10-CM

## 2018-04-30 DIAGNOSIS — R52 PAIN: ICD-10-CM

## 2018-04-30 DIAGNOSIS — E16.2 HYPOGLYCEMIA: ICD-10-CM

## 2018-04-30 DIAGNOSIS — F41.1 ANXIETY STATE: ICD-10-CM

## 2018-04-30 DIAGNOSIS — G89.29 CHRONIC ABDOMINAL PAIN: Primary | Chronic | ICD-10-CM

## 2018-04-30 DIAGNOSIS — R10.9 CHRONIC ABDOMINAL PAIN: Primary | Chronic | ICD-10-CM

## 2018-04-30 DIAGNOSIS — R10.84 ABDOMINAL PAIN, GENERALIZED: ICD-10-CM

## 2018-04-30 LAB — PROVATION GI EXAM: NORMAL

## 2018-04-30 PROCEDURE — 25000128 H RX IP 250 OP 636: Performed by: INTERNAL MEDICINE

## 2018-04-30 PROCEDURE — 76000 FLUOROSCOPY <1 HR PHYS/QHP: CPT | Mod: TC

## 2018-04-30 PROCEDURE — 49452 REPLACE G-J TUBE PERC: CPT | Performed by: INTERNAL MEDICINE

## 2018-04-30 PROCEDURE — 49451 REPLACE DUOD/JEJ TUBE PERC: CPT | Performed by: INTERNAL MEDICINE

## 2018-04-30 PROCEDURE — 25000132 ZZH RX MED GY IP 250 OP 250 PS 637: Performed by: INTERNAL MEDICINE

## 2018-04-30 PROCEDURE — 96375 TX/PRO/DX INJ NEW DRUG ADDON: CPT

## 2018-04-30 PROCEDURE — 96376 TX/PRO/DX INJ SAME DRUG ADON: CPT

## 2018-04-30 PROCEDURE — 27210814 ZZ H TUBE GASTRO CR12

## 2018-04-30 PROCEDURE — 96361 HYDRATE IV INFUSION ADD-ON: CPT

## 2018-04-30 PROCEDURE — 96374 THER/PROPH/DIAG INJ IV PUSH: CPT

## 2018-04-30 PROCEDURE — 99152 MOD SED SAME PHYS/QHP 5/>YRS: CPT | Performed by: INTERNAL MEDICINE

## 2018-04-30 PROCEDURE — 25000128 H RX IP 250 OP 636: Mod: ZF | Performed by: INTERNAL MEDICINE

## 2018-04-30 RX ORDER — ONDANSETRON 2 MG/ML
4 INJECTION INTRAMUSCULAR; INTRAVENOUS DAILY PRN
Status: CANCELLED
Start: 2018-04-30

## 2018-04-30 RX ORDER — DIPHENHYDRAMINE HYDROCHLORIDE 50 MG/ML
INJECTION INTRAMUSCULAR; INTRAVENOUS PRN
Status: DISCONTINUED | OUTPATIENT
Start: 2018-04-30 | End: 2018-04-30 | Stop reason: HOSPADM

## 2018-04-30 RX ORDER — DEXTROSE, SODIUM CHLORIDE, SODIUM LACTATE, POTASSIUM CHLORIDE, AND CALCIUM CHLORIDE 5; .6; .31; .03; .02 G/100ML; G/100ML; G/100ML; G/100ML; G/100ML
2000 INJECTION, SOLUTION INTRAVENOUS ONCE
Status: CANCELLED
Start: 2018-04-30 | End: 2018-04-30

## 2018-04-30 RX ORDER — ONDANSETRON 2 MG/ML
4 INJECTION INTRAMUSCULAR; INTRAVENOUS ONCE
Status: CANCELLED
Start: 2018-04-30 | End: 2018-04-30

## 2018-04-30 RX ORDER — FENTANYL CITRATE 50 UG/ML
INJECTION, SOLUTION INTRAMUSCULAR; INTRAVENOUS PRN
Status: DISCONTINUED | OUTPATIENT
Start: 2018-04-30 | End: 2018-04-30 | Stop reason: HOSPADM

## 2018-04-30 RX ORDER — DIPHENHYDRAMINE HYDROCHLORIDE 50 MG/ML
25 INJECTION INTRAMUSCULAR; INTRAVENOUS DAILY PRN
Status: DISCONTINUED | OUTPATIENT
Start: 2018-04-30 | End: 2018-04-30 | Stop reason: HOSPADM

## 2018-04-30 RX ORDER — DEXTROSE, SODIUM CHLORIDE, SODIUM LACTATE, POTASSIUM CHLORIDE, AND CALCIUM CHLORIDE 5; .6; .31; .03; .02 G/100ML; G/100ML; G/100ML; G/100ML; G/100ML
2000 INJECTION, SOLUTION INTRAVENOUS ONCE
Status: COMPLETED | OUTPATIENT
Start: 2018-04-30 | End: 2018-04-30

## 2018-04-30 RX ORDER — OXYCODONE HYDROCHLORIDE 10 MG/1
10 TABLET ORAL EVERY 6 HOURS PRN
Qty: 27 TABLET | Refills: 0 | Status: SHIPPED | OUTPATIENT
Start: 2018-04-30 | End: 2018-09-12

## 2018-04-30 RX ORDER — HEPARIN SODIUM (PORCINE) LOCK FLUSH IV SOLN 100 UNIT/ML 100 UNIT/ML
500 SOLUTION INTRAVENOUS EVERY 8 HOURS
Status: DISCONTINUED | OUTPATIENT
Start: 2018-04-30 | End: 2018-04-30 | Stop reason: HOSPADM

## 2018-04-30 RX ORDER — DIPHENHYDRAMINE HYDROCHLORIDE 50 MG/ML
25 INJECTION INTRAMUSCULAR; INTRAVENOUS DAILY PRN
Status: CANCELLED
Start: 2018-04-30

## 2018-04-30 RX ORDER — DIPHENHYDRAMINE HYDROCHLORIDE 50 MG/ML
25 INJECTION INTRAMUSCULAR; INTRAVENOUS ONCE
Status: CANCELLED
Start: 2018-04-30 | End: 2018-04-30

## 2018-04-30 RX ORDER — LORAZEPAM 1 MG/1
1 TABLET ORAL EVERY 4 HOURS PRN
Status: DISCONTINUED | OUTPATIENT
Start: 2018-04-30 | End: 2018-04-30 | Stop reason: HOSPADM

## 2018-04-30 RX ORDER — ONDANSETRON 2 MG/ML
4 INJECTION INTRAMUSCULAR; INTRAVENOUS ONCE
Status: COMPLETED | OUTPATIENT
Start: 2018-04-30 | End: 2018-04-30

## 2018-04-30 RX ORDER — ONDANSETRON 2 MG/ML
4 INJECTION INTRAMUSCULAR; INTRAVENOUS DAILY PRN
Status: DISCONTINUED | OUTPATIENT
Start: 2018-04-30 | End: 2018-04-30 | Stop reason: HOSPADM

## 2018-04-30 RX ORDER — HEPARIN SODIUM (PORCINE) LOCK FLUSH IV SOLN 100 UNIT/ML 100 UNIT/ML
500 SOLUTION INTRAVENOUS EVERY 8 HOURS
Status: CANCELLED
Start: 2018-04-30

## 2018-04-30 RX ADMIN — DIPHENHYDRAMINE HYDROCHLORIDE 25 MG: 50 INJECTION, SOLUTION INTRAMUSCULAR; INTRAVENOUS at 15:19

## 2018-04-30 RX ADMIN — SODIUM CHLORIDE, POTASSIUM CHLORIDE, SODIUM LACTATE AND CALCIUM CHLORIDE 1000 ML: 600; 310; 30; 20 INJECTION, SOLUTION INTRAVENOUS at 15:13

## 2018-04-30 RX ADMIN — ONDANSETRON 4 MG: 2 INJECTION INTRAMUSCULAR; INTRAVENOUS at 15:16

## 2018-04-30 RX ADMIN — ONDANSETRON 4 MG: 2 INJECTION INTRAMUSCULAR; INTRAVENOUS at 14:13

## 2018-04-30 RX ADMIN — MIDAZOLAM 1 MG: 1 INJECTION INTRAMUSCULAR; INTRAVENOUS at 10:28

## 2018-04-30 RX ADMIN — MIDAZOLAM 1 MG: 1 INJECTION INTRAMUSCULAR; INTRAVENOUS at 10:34

## 2018-04-30 RX ADMIN — MIDAZOLAM 2 MG: 1 INJECTION INTRAMUSCULAR; INTRAVENOUS at 10:31

## 2018-04-30 RX ADMIN — FENTANYL CITRATE 100 MCG: 50 INJECTION, SOLUTION INTRAMUSCULAR; INTRAVENOUS at 10:28

## 2018-04-30 RX ADMIN — DIPHENHYDRAMINE HYDROCHLORIDE 50 MG: 50 INJECTION, SOLUTION INTRAMUSCULAR; INTRAVENOUS at 10:28

## 2018-04-30 RX ADMIN — LORAZEPAM 1 MG: 1 TABLET ORAL at 11:34

## 2018-04-30 RX ADMIN — Medication 500 UNITS: at 16:21

## 2018-04-30 RX ADMIN — SODIUM CHLORIDE, SODIUM LACTATE, POTASSIUM CHLORIDE, CALCIUM CHLORIDE AND DEXTROSE MONOHYDRATE 1000 ML: 5; 600; 310; 30; 20 INJECTION, SOLUTION INTRAVENOUS at 14:13

## 2018-04-30 RX ADMIN — DIPHENHYDRAMINE HYDROCHLORIDE: 50 INJECTION INTRAMUSCULAR; INTRAVENOUS at 14:19

## 2018-04-30 ASSESSMENT — PAIN SCALES - GENERAL: PAINLEVEL: SEVERE PAIN (6)

## 2018-04-30 NOTE — OR NURSING
Pt complaining of abd pain just below site of G/J tube./she states pain is a 6/10 and is crying.   She says it is spasming.  Dr Narayanan notified and ordered Ativan 1 mg po to be given.  Quyen Garg RN

## 2018-04-30 NOTE — LETTER
4/30/2018       RE: Alexandra Melgoza  [address]  [place] [facility] [address]     Dear Colleague,    Thank you for referring your patient, Alexandra Melgoza, to the [facility] at [facility]. Please see a copy of my visit note below.    Patient is a 21 y/o lady with a h/o chronic pancreatitis who presents for f/u.  At her last visit, we decided to treat her pain with oxycodone 3 tabs per day.  We discontinued the elixir.  She did well with this regimen.  She is scheduled for a tube exchange today as she has an infection around the stoma.  She denies any sedation, nausea, vomiting or sedation with the medication.  She presents today for f/u and re-evaluation.  Current Outpatient Prescriptions   Medication     acetaminophen (TYLENOL) 32 mg/mL solution     amylase-lipase-protease (ZENPEP) 18701 UNITS CPEP     amylase-lipase-protease (ZENPEP) 17884 UNITS CPEP     blood glucose monitoring (NO BRAND SPECIFIED) test strip     cephalexin (KEFLEX) 250 MG/5ML suspension     clotrimazole-betamethasone (LOTRISONE) cream     fluticasone (FLONASE) 50 MCG/ACT spray     gabapentin (NEURONTIN) 250 MG/5ML solution     hydrOXYzine (ATARAX) 10 MG/5ML syrup     leuprolide (LUPRON DEPOT) 11.25 MG injection     levalbuterol (XOPENEX HFA) 45 MCG/ACT Inhaler     LORazepam (ATIVAN) 0.5 MG tablet     multivitamins with minerals (CERTAVITE/CEROVITE) LIQD liquid     norethindrone (AYGESTIN) 5 MG tablet     nortriptyline (PAMELOR) 10 MG capsule     ondansetron (ZOFRAN ODT) 4 MG ODT tab     order for DME     oxyCODONE (ROXICODONE) 5 MG/5ML solution     oxyCODONE IR (ROXICODONE) 10 MG tablet     pantoprazole (PROTONIX) 40 MG EC tablet     polyethylene glycol (MIRALAX/GLYCOLAX) Packet     RIZATRIPTAN BENZOATE PO     senna-docusate (SENOKOT-S;PERICOLACE) 8.6-50 MG per tablet     sodium bicarbonate 325 MG tablet     buprenorphine (BUTRANS) 5 MCG/HR WK patch     No current  "facility-administered medications for this visit.      Allergies   Allergen Reactions     Amoxicillin-Pot Clavulanate Nausea and Vomiting     Compazine [Prochlorperazine] Other (See Comments)     Dystonia       Hyoscyamine Other (See Comments)     Dystonia     Reglan [Metoclopramide Hcl] Other (See Comments)     Dystonia     Zyprexa Other (See Comments)     Sensitive, dystonic reaction on 11-9-2011     Amitriptyline Hcl Other (See Comments)     Dystonia, hallucinations     Buspirone Other (See Comments)     No Adverse Reactions, no benefit     Cogentin [Benztropine]      Cyproheptadine Other (See Comments)     Distonic     Dicyclomine Other (See Comments)     Droperidol Other (See Comments)     Feels tense and \"like she has to jump out of her skin\".       Effexor [Venlafaxine] Other (See Comments)     Dystonia     Food      Cilantro--lips/tongue swelling     No Clinical Screening - See Comments Other (See Comments)     Cilantro     Phenergan Dm [Promethazine-Dm] Other (See Comments)     dystonia     Promethazine Other (See Comments)     Risperidone Other (See Comments)     dystonia     Vistaril Other (See Comments)     Burning sensation.       Augmentin GI Disturbance     Ketamine Other (See Comments)     jittery     Sorbitol GI Disturbance     Headache and dyspepsia     Past Medical History:   Diagnosis Date     Anxiety      Asthma      Cholecystitis     s/p cholecystectomy     Chronic abdominal pain      Chronic infection     mrsa     Chronic pain      Cyclic vomiting syndrome 10/27/2012     Depression      Endometriosis      Hypoglycaemia      Migraines      Mild intermittent asthma      Ovarian cysts      Pancreatic disease      PONV (postoperative nausea and vomiting)      Pseudoseizures      Somatoform disorder      Sphincter of Oddi dysfunction      Vasovagal syncope      Past Surgical History:   Procedure Laterality Date     ABDOMEN SURGERY      ERCP, biliary stents     CHOLECYSTECTOMY  8/2/11     " COLONOSCOPY  2011    negative finding     ENDOSCOPIC RETROGRADE CHOLANGIOPANCREATOGRAM  8/23/2011    Procedure:ENDOSCOPIC RETROGRADE CHOLANGIOPANCREATOGRAM; Endoscopic Retrograde Cholangiopancreatogram; Surgeon:CAMPBELL NARAYANAN; Location:[facility]     ENDOSCOPIC RETROGRADE CHOLANGIOPANCREATOGRAM  5/17/2012    Procedure:ENDOSCOPIC RETROGRADE CHOLANGIOPANCREATOGRAM; Endoscopic Retrograde Cholangiopancreatogram with pancreatic stent placement.; Surgeon:CAMPBELL NARAYANAN; Location:[facility]     ENDOSCOPIC RETROGRADE CHOLANGIOPANCREATOGRAM N/A 1/18/2018    Procedure: COMBINED ENDOSCOPIC RETROGRADE CHOLANGIOPANCREATOGRAPHY, PLACE TUBE/STENT;  Endoscopic Retrograde Cholangiopancreatography with nasojejunal feeding tube placement;  Surgeon: Campbell Narayanan MD;  Location: [facility]     ENDOSCOPIC RETROGRADE CHOLANGIOPANCREATOGRAM COMPLEX  1/3/2012    Procedure:ENDOSCOPIC RETROGRADE CHOLANGIOPANCREATOGRAM COMPLEX; Endoscopic Retrograde Cholangiopancreatogram with Manometry bile duct sphincterotomy extention pancreatic duct sphincterotomy pancreatic duct stent placement; Surgeon:CAMPBELL NARAYANAN; Location:[facility]     ENDOSCOPIC ULTRASOUND UPPER GASTROINTESTINAL TRACT (GI) N/A 6/9/2015    Procedure: ENDOSCOPIC ULTRASOUND, ESOPHAGOSCOPY / UPPER GASTROINTESTINAL TRACT (GI);  Surgeon: Mario Joe MD;  [facility]: [facility]     ENDOSCOPIC ULTRASOUND UPPER GASTROINTESTINAL TRACT (GI) N/A 12/12/2016    Procedure: ENDOSCOPIC ULTRASOUND, ESOPHAGOSCOPY / UPPER GASTROINTESTINAL TRACT (GI);  Surgeon: Guru Jose Klein MD;  [facility]: [facility]     ESOPHAGOSCOPY, GASTROSCOPY, DUODENOSCOPY (EGD), COMBINED  1/18/2012    Procedure:COMBINED ESOPHAGOSCOPY, GASTROSCOPY, DUODENOSCOPY (EGD); Surgeon:ARNIE ESPINOZA; [facility]     ESOPHAGOSCOPY, GASTROSCOPY, DUODENOSCOPY (EGD), COMBINED  1/18/2012    Procedure:COMBINED ESOPHAGOSCOPY, GASTROSCOPY, DUODENOSCOPY (EGD); EGD; Surgeon:ARNIE ESPINOZA; Location[facility]      ESOPHAGOSCOPY, GASTROSCOPY, DUODENOSCOPY (EGD), COMBINED N/A 12/9/2017    Procedure: COMBINED ESOPHAGOSCOPY, GASTROSCOPY, DUODENOSCOPY (EGD);;  Surgeon: Josias Chan MD;  [facility]     ESOPHAGOSCOPY, GASTROSCOPY, DUODENOSCOPY (EGD), COMBINED N/A 3/27/2018    Procedure: COMBINED ESOPHAGOSCOPY, GASTROSCOPY, DUODENOSCOPY (EGD);  Upper Gastrointestinal Endoscopy convert gastrostomy to gastrojejunostomy tube ;  Surgeon: Campbell Narayanan MD;  Location:  OR     INSERT PORT VASCULAR ACCESS       L knee arthroscopy  2009     LAPAROSCOPY DIAGNOSTIC (GYN)  10/26/2012    Procedure: LAPAROSCOPY DIAGNOSTIC (GYN);  LAPAROSCOPY DIAGNOSTIC, CAUTERY ENDOMETRIOISIS and biopsy of Fallopian tube lesions;  Surgeon: Carla Lopez MD;  Location:  OR     ORTHOPEDIC SURGERY  2008    knee     REMOVE AND REPLACE BREAST IMPLANT PROSTHESIS N/A 2/8/2018    Procedure: PERCUTANEOUS INSERTION TUBE JEJUNOSTOMY;  upper endoscopy with percutaneous gastrojejunostimy feeding tuble placement and gastropexy;  Surgeon: Campbell Narayanan MD;  Location:  OR     VASCULAR SURGERY       Family History   Problem Relation Age of Onset     Hypertension Father      Bipolar Disorder Other      Anxiety Disorder Other      Depression Paternal Grandmother      Allergies Maternal Grandmother      CEREBROVASCULAR DISEASE Maternal Grandfather      Cardiovascular Maternal Grandfather      Depression/Anxiety Maternal Grandfather      GASTROINTESTINAL DISEASE Maternal Grandfather      DIABETES Paternal Uncle      Hypoglycemia Brother      CANCER No family hx of      No family history of skin cancer     Social History     Social History     Marital status: Single     Spouse name: N/A     Number of children: N/A     Years of education: N/A     Occupational History     Not on file.     Social History Main Topics     Smoking status: Never Smoker     Smokeless tobacco: Never Used     Alcohol use No     Drug use: No     Sexual activity: No     Other Topics  Concern     Parent/Sibling W/ Cabg, Mi Or Angioplasty Before 65f 55m? 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  PE:  Physical examination is unchanged  A/P: Patient is a 21 y/o lady with chronic abdominal pain.  Her pain now is mostly secondary to the infection at the site.  She is, in fact scheduled for a tube exchange today.  Will continue oxycodone 10 mg tid and a few tabs for post-procedure pain.  Patient will f/u in 1 week.        Again, thank you for allowing me to participate in the care of your patient.      Sincerely,    Leonardo Wang MD

## 2018-04-30 NOTE — DISCHARGE INSTRUCTIONS
Discharge Instructions after  Changing G/J tube    Activity and Diet  You were given medicine for pain. You may be dizzy or sleepy.  For 24 hours:    Do not drive or use heavy equipment.    Do not make important decisions.      __    Discomfort    You may take Tylenol (acetaminophen) for pain unless your doctor has told you not to.    Do not take aspirin or ibuprofen (Advil, Motrin) or other NSAIDS  (anti-inflammatory drugs) for _3__ days.    Follow-up  ___    Other instructions_____Change dressing if saturated. ___________________________________________________    When to call us:  Problems are rare. Call right away if you have:     Unusual pain in belly or chest that is not relieved by belching or passing air    Black stools (tar-like looking bowel movement)    Temperature above 100.6  F. (37.5  C).    If you vomit blood or have severe pain, go to an emergency room.    If you have questions, call:  Monday to Friday, 7 a.m. to 4:30 p.m.: Endoscopy: [phone] (We may have to call you back)    After hours: Hospital: [phone] (Ask for the GI fellow on call)

## 2018-04-30 NOTE — PATIENT INSTRUCTIONS
Dear Alexandra Melgoza    Thank you for choosing [facility] ([facility]) for your infusion.  The following information is a summary of our appointment as well as important reminders.        We look forward in seeing you on your next appointment here at [facility].  Please don t hesitate to call us at [phone] to reschedule any of your appointments or to speak with one of the [facility] registered nurses.  It was a pleasure taking care of you today.    Sincerely,    [facility]  [address] [place] [address]  [id number]  Phone:  [phone]

## 2018-04-30 NOTE — IP AVS SNAPSHOT
MRN:[id number]                      After Visit Summary   4/30/2018    Alexandra Melgoza    MRN: [id number]           Thank you!     Thank you for choosing [facility] for your care. Our goal is always to provide you with excellent care. Hearing back from our patients is one way we can continue to improve our services. Please take a few minutes to complete the written survey that you may receive in the mail after you visit with us. Thank you!        Patient Information     Date Of Birth          1995        About your hospital stay     You were admitted on:  April 30, 2018 You last received care in the:  [facility], Endoscopy    You were discharged on:  April 30, 2018       Who to Call     For medical emergencies, please call 911.  For non-urgent questions about your medical care, please call your primary care provider or clinic, [phone]  For questions related to your surgery, please call your surgery clinic        Attending Provider     Provider Specialty    Campbell Narayanan MD Gastroenterology       Primary Care Provider Office Phone # Fax #    Quyen Baez [phone]      Your next 10 appointments already scheduled     Apr 30, 2018  2:00 PM CDT   Infusion 120 with UC SPEC INFUSION, UC 43 ATC   [facility] ([facility])    [address] [place] [address]  [address] [place] [address] [id number] [phone]            May 02, 2018  3:00 PM CDT   Infusion 120 with UC SPEC INFUSION, UC 49 ATC   [facility] ([facility])    [address] [place] [address]  [address] [place] [id number] [phone]            May 05, 2018 11:00 AM CDT   Infusion 120 with UC SPEC INFUSION, UC 42 ATC   [facility] ([facility])    [address] [place] [address]  [address]  [facility]  [address]   [phone]            May 07, 2018  7:40 AM CDT   (Arrive by 7:25 AM)   Return Visit with Leonardo Wang MD   [facility] ([facility])    [address] [place] [address]  4th Floor  [facility] [address]   [phone]            May 07, 2018  2:00 PM CDT   Infusion 120 with UC SPEC INFUSION, UC 50 ATC   [facility] ([facility])    [address]  [address]  [facility] [address]   [phone]            May 09, 2018  1:00 PM CDT   Infusion 120 with UC SPEC INFUSION   [facility] ([facility])    [address] [place] [address]  [address]  [facility] [address]   [phone]              Further instructions from your care team       Discharge Instructions after  Changing G/J tube    Activity and Diet  You were given medicine for pain. You may be dizzy or sleepy.  For 24 hours:    Do not drive or use heavy equipment.    Do not make important decisions.      __    Discomfort    You may take Tylenol (acetaminophen) for pain unless your doctor has told you not to.    Do not take aspirin or ibuprofen (Advil, Motrin) or other NSAIDS  (anti-inflammatory drugs) for _3__ days.    Follow-up  ___    Other instructions_____Change dressing if saturated. ___________________________________________________    When to call us:  Problems are rare. Call right away if you have:     Unusual pain in belly or chest that is not relieved by belching or passing air    Black stools (tar-like looking bowel movement)    Temperature above 100.6  F. (37.5  C).    If you vomit blood or have severe pain, go to an emergency room.    If you have questions, call:  Monday to Friday, 7 a.m. to 4:30 p.m.: Endoscopy: [phone] (We may have to call you back)    After hours: Hospital: [phone] (Ask for the GI fellow on  "call)    Pending Results     No orders found from 4/28/2018 to 5/1/2018.            Admission Information     Date & Time Provider Department Dept. Phone    4/30/2018 Campbell Narayanan MD [facility], [place], Endoscopy [phone]      Your Vitals Were     Blood Pressure Pulse Respirations Height Weight Pulse Oximetry    127/84 96 11 1.676 m (5' 6\") 65.8 kg (145 lb) 96%    BMI (Body Mass Index)                   23.4 kg/m2           [employer] Information     [employer] gives you secure access to your electronic health record. If you see a primary care provider, you can also send messages to your care team and make appointments. If you have questions, please call your primary care clinic.  If you do not have a primary care provider, please call [phone] and they will assist you.        Care EveryWhere ID     This is your Care EveryWhere ID. This could be used by other organizations to access your [place] medical records  [id number]        Equal Access to Services     RACHAEL BENITEZ AH: Hadii angelina rodneyo Somalini, waaxda luqadaha, qaybta kaalmada adeegyakush, maki gaspar . So [facility] [phone].    ATENCIÓN: Si habla español, tiene a quijano disposición servicios gratuitos de asistencia lingüística. Llame al [phone].    We comply with applicable federal civil rights laws and [place] laws. We do not discriminate on the basis of race, color, national origin, age, disability, sex, sexual orientation, or gender identity.               Review of your medicines      UNREVIEWED medicines. Ask your doctor about these medicines        Dose / Directions    acetaminophen 32 mg/mL solution   Commonly known as:  TYLENOL        Dose:  975 mg   30.45 mLs (975 mg) by Per J Tube route every 8 hours   Refills:  0       * amylase-lipase-protease 28417 units Cpep   Commonly known as:  ZENPEP   Used for:  Idiopathic chronic pancreatitis (H)        Dose:  2-3 capsule   Take 2-3 capsules (40,000-60,000 Units) " by mouth Take with snacks or supplements (Snacks)   Quantity:  180 capsule   Refills:  1       * amylase-lipase-protease 67005 units Cpep   Commonly known as:  ZENPEP   Used for:  Right upper quadrant abdominal pain        Dose:  5 capsule   Take 5 capsules (100,000 Units) by mouth 4 times daily (with meals and nightly)   Quantity:  180 capsule   Refills:  1       buprenorphine 5 MCG/HR WK patch   Commonly known as:  BUTRANS   Used for:  Abdominal pain, generalized        Dose:  1 patch   Place 1 patch onto the skin every 7 days   Quantity:  4 patch   Refills:  0       cephalexin 250 MG/5ML suspension   Commonly known as:  KEFLEX   Used for:  Dysuria        Take 10 ml per GJ tube tid   Quantity:  210 mL   Refills:  0       clotrimazole-betamethasone cream   Commonly known as:  LOTRISONE   Used for:  Rash and nonspecific skin eruption        Apply topically 2 times daily   Quantity:  15 g   Refills:  0       fluticasone 50 MCG/ACT spray   Commonly known as:  FLONASE   Used for:  Nasal congestion        Dose:  2 spray   Spray 2 sprays into both nostrils daily   Quantity:  16 g   Refills:  3       gabapentin 250 MG/5ML solution   Commonly known as:  NEURONTIN   Used for:  Chronic abdominal pain, Sphincter of Oddi dysfunction, Cyclical vomiting with nausea, intractability of vomiting not specified, Pain around percutaneous endoscopic gastrostomy (PEG) tube site, sequela        Refills:  2       hydrOXYzine 10 MG/5ML syrup   Commonly known as:  ATARAX   Used for:  Acute abdominal pain        Dose:  25 mg   Take 12.5 mLs (25 mg) by mouth every 6 hours as needed for anxiety or other (pain)   Quantity:  1500 mL   Refills:  2       leuprolide 11.25 MG kit   Commonly known as:  LUPRON DEPOT (3-MONTH)   Used for:  Endometriosis        Dose:  11.25 mg   Inject 11.25 mg into the muscle every 3 months   Quantity:  1 each   Refills:  3       levalbuterol 45 MCG/ACT Inhaler   Commonly known as:  XOPENEX HFA   Used for:  Wheeze         Dose:  2 puff   Inhale 2 puffs into the lungs every 6 hours as needed for shortness of breath / dyspnea   Quantity:  1 Inhaler   Refills:  1       LORazepam 0.5 MG tablet   Commonly known as:  ATIVAN   Used for:  Cyclical vomiting with nausea, intractability of vomiting not specified        Dose:  0.5 mg   Take 1 tablet (0.5 mg) by mouth 2 times daily as needed (nausea, do not take with pain medicine, do not drive after taking)   Quantity:  10 tablet   Refills:  0       multivitamins with minerals Liqd liquid   Used for:  Abdominal pain, epigastric        Dose:  15 mL   15 mLs by Per Feeding Tube route daily   Quantity:  1 Bottle   Refills:  0       norethindrone 5 MG tablet   Commonly known as:  AYGESTIN   Used for:  Endometriosis        Dose:  5 mg   Take 1 tablet (5 mg) by mouth daily   Quantity:  90 tablet   Refills:  1       nortriptyline 10 MG capsule   Commonly known as:  PAMELOR   Used for:  Right upper quadrant abdominal pain        Dose:  30 mg   Take 3 capsules (30 mg) by mouth At Bedtime   Quantity:  120 capsule   Refills:  0       ondansetron 4 MG ODT tab   Commonly known as:  ZOFRAN ODT   Used for:  Intractable cyclical vomiting with nausea        Dose:  4 mg   Take 1 tablet (4 mg) by mouth every 8 hours as needed for nausea or vomiting   Quantity:  30 tablet   Refills:  2       * oxyCODONE 5 MG/5ML solution   Commonly known as:  ROXICODONE   Used for:  Abdominal pain, generalized, Chronic abdominal pain        Take 10 mL (10 mg) by mouth every 6 hours as needed, maximum 40 mL per day.   Quantity:  200 mL   Refills:  0       * oxyCODONE IR 10 MG tablet   Commonly known as:  ROXICODONE   Used for:  Abdominal pain, generalized        Dose:  10 mg   Take 1 tablet (10 mg) by mouth every 6 hours as needed for moderate to severe pain   Quantity:  27 tablet   Refills:  0       pantoprazole 40 MG EC tablet   Commonly known as:  PROTONIX   Used for:  Right upper quadrant abdominal pain, Erosive gastritis         Dose:  40 mg   Take 1 tablet (40 mg) by mouth 2 times daily (before meals)   Quantity:  30 tablet   Refills:  1       polyethylene glycol Packet   Commonly known as:  MIRALAX/GLYCOLAX   Used for:  Right upper quadrant abdominal pain        Dose:  17 g   Take 17 g by mouth daily   Quantity:  7 packet   Refills:  0       RIZATRIPTAN BENZOATE PO        Dose:  5 mg   Take 5 mg by mouth once as needed   Refills:  0       senna-docusate 8.6-50 MG per tablet   Commonly known as:  SENOKOT-S;PERICOLACE   Used for:  Right upper quadrant abdominal pain        Dose:  1 tablet   Take 1 tablet by mouth 2 times daily as needed for constipation   Quantity:  100 tablet   Refills:  0       sodium bicarbonate 325 MG tablet   Used for:  Abdominal pain, epigastric, Intractable cyclical vomiting with nausea        Dose:  325 mg   1 tablet (325 mg) by Per Feeding Tube route 4 times daily   Quantity:  120 tablet   Refills:  0       * Notice:  This list has 4 medication(s) that are the same as other medications prescribed for you. Read the directions carefully, and ask your doctor or other care provider to review them with you.      CONTINUE these medicines which have NOT CHANGED        Dose / Directions    blood glucose monitoring test strip   Commonly known as:  no brand specified   Used for:  Hypoglycemia        One Touch Ultra 2 Strips, Use to test blood sugars 2 times daily or as directed   Quantity:  100 strip   Refills:  3       order for DME   Used for:  Chronic abdominal pain        Equipment being ordered: TENS with wire and electrode.   Quantity:  1 Units   Refills:  0                Protect others around you: Learn how to safely use, store and throw away your medicines at www.disposemymeds.org.             Medication List: This is a list of all your medications and when to take them. Check marks below indicate your daily home schedule. Keep this list as a reference.      Medications           Morning Afternoon Evening Bedtime  As Needed    acetaminophen 32 mg/mL solution   Commonly known as:  TYLENOL   30.45 mLs (975 mg) by Per J Tube route every 8 hours                                * amylase-lipase-protease 73132 units Cpep   Commonly known as:  ZENPEP   Take 2-3 capsules (40,000-60,000 Units) by mouth Take with snacks or supplements (Snacks)                                * amylase-lipase-protease 14340 units Cpep   Commonly known as:  ZENPEP   Take 5 capsules (100,000 Units) by mouth 4 times daily (with meals and nightly)                                blood glucose monitoring test strip   Commonly known as:  no brand specified   One Touch Ultra 2 Strips, Use to test blood sugars 2 times daily or as directed                                buprenorphine 5 MCG/HR WK patch   Commonly known as:  BUTRANS   Place 1 patch onto the skin every 7 days                                cephalexin 250 MG/5ML suspension   Commonly known as:  KEFLEX   Take 10 ml per GJ tube tid                                clotrimazole-betamethasone cream   Commonly known as:  LOTRISONE   Apply topically 2 times daily                                fluticasone 50 MCG/ACT spray   Commonly known as:  FLONASE   Spray 2 sprays into both nostrils daily                                gabapentin 250 MG/5ML solution   Commonly known as:  NEURONTIN                                hydrOXYzine 10 MG/5ML syrup   Commonly known as:  ATARAX   Take 12.5 mLs (25 mg) by mouth every 6 hours as needed for anxiety or other (pain)                                leuprolide 11.25 MG kit   Commonly known as:  LUPRON DEPOT (3-MONTH)   Inject 11.25 mg into the muscle every 3 months                                levalbuterol 45 MCG/ACT Inhaler   Commonly known as:  XOPENEX HFA   Inhale 2 puffs into the lungs every 6 hours as needed for shortness of breath / dyspnea                                LORazepam 0.5 MG tablet   Commonly known as:  ATIVAN   Take 1 tablet (0.5 mg) by mouth 2 times  daily as needed (nausea, do not take with pain medicine, do not drive after taking)                                multivitamins with minerals Liqd liquid   15 mLs by Per Feeding Tube route daily                                norethindrone 5 MG tablet   Commonly known as:  AYGESTIN   Take 1 tablet (5 mg) by mouth daily                                nortriptyline 10 MG capsule   Commonly known as:  PAMELOR   Take 3 capsules (30 mg) by mouth At Bedtime                                ondansetron 4 MG ODT tab   Commonly known as:  ZOFRAN ODT   Take 1 tablet (4 mg) by mouth every 8 hours as needed for nausea or vomiting                                order for DME   Equipment being ordered: TENS with wire and electrode.                                * oxyCODONE 5 MG/5ML solution   Commonly known as:  ROXICODONE   Take 10 mL (10 mg) by mouth every 6 hours as needed, maximum 40 mL per day.                                * oxyCODONE IR 10 MG tablet   Commonly known as:  ROXICODONE   Take 1 tablet (10 mg) by mouth every 6 hours as needed for moderate to severe pain                                pantoprazole 40 MG EC tablet   Commonly known as:  PROTONIX   Take 1 tablet (40 mg) by mouth 2 times daily (before meals)                                polyethylene glycol Packet   Commonly known as:  MIRALAX/GLYCOLAX   Take 17 g by mouth daily                                RIZATRIPTAN BENZOATE PO   Take 5 mg by mouth once as needed                                senna-docusate 8.6-50 MG per tablet   Commonly known as:  SENOKOT-S;PERICOLACE   Take 1 tablet by mouth 2 times daily as needed for constipation                                sodium bicarbonate 325 MG tablet   1 tablet (325 mg) by Per Feeding Tube route 4 times daily                                * Notice:  This list has 4 medication(s) that are the same as other medications prescribed for you. Read the directions carefully, and ask your doctor or other care provider  to review them with you.

## 2018-04-30 NOTE — PROGRESS NOTES
Nursing Note  Alexandra Melgoza [facility]:   Chief Complaint   Patient presents with     Other     Port access, flush     Infusion     IVF, anti-nausea meds     During today's [facility] appointment, orders from Dr. Narayanan were completed.  Frequency:  3x/week as needed     Progress note:  Patient identification verified by name and date of birth.  Assessment completed.  Vitals recorded in Doc Flowsheets.  Patient was provided with education regarding infusion and possible side effects.  Patient verbalized understanding.     Pt opted for 1L D5LR to start, then transitioned to 1L LR for second liter of fluids. Pt decision determined d/t having to hold TF since 10 pm last night d/t procedure earlier today. IVF type dependent upon blood glucose levels and how the pt feels. To resume TF upon return home after clinic visit. Pt has been monitoring blood glucose 3-4 times daily via fingerstick. Results have been as low as 50's in the early morning hours.       needed: No  Premedications: were not ordered. 1st PRN Benadryl provided by pharmacy in bag and given over 10 minutes, 2nd PRN Benadryl given IVP, and PRN Zofran given IVP  x 2.  Infusion Rates: 2 liters LR infusion given over approximately 2 hours.   Approximate Infusion length: 2 hours.  Labs: were not ordered for this appointment.  Vascular access: port accessed earlier today. + blood return noted. Heparin locked and de-accessed prior to discharge.   Treatment Conditions: non-applicable.  Patient tolerated infusion: well.      Drug Waste Record    Drug Name: Benadryl  Dose: 25 mg  Route administered: IV  NDC #: 49606-717-05  Amount of waste(mL):0.5 ml  Reason for waste: Single use vial        Discharge Plan:   Follow up plan of care with: ongoing infusions at [facility].  Discharge instructions were reviewed with patient.  Patient/representative  verbalized understanding of discharge instructions and all questions answered.  Patient discharged from [facility] in stable condition.    Phyllis Brooks RN    Administrations This Visit     dextrose 5% in lactated ringers infusion     Admin Date Action Dose Rate Route Administered By          04/30/2018 New Bag 1000 mL 1,000 mL/hr Intravenous Phyllis Brooks RN                   diphenhydrAMINE (BENADRYL) 25 mg in sodium chloride 0.9 % 50 mL     Admin Date Action Dose Rate Route Administered By          04/30/2018 New Bag   300 mL/hr Intravenous Phyllis Brooks RN                   ondansetron (ZOFRAN) injection 4 mg     Admin Date Action Dose Route Administered By             04/30/2018 Given 4 mg Intravenous Phyllis Brooks RN                          /76

## 2018-04-30 NOTE — PROGRESS NOTES
Called and talked to Mom- advised tried to call Grabiel and her phone voicemail is full.   Will need send a prescription for her but need to know the pharmacy.   - She states it's hard for Grabiel to take in in PO pills and wanted to know of there were other options. Grabiel is getting her tube changed today. Advised her to text her ad se eif she can ask the MD that rounds on her so they can take care of it. If not then they can call this RN back and will check with Dr. Narayanan.     Ella SARMIENTO, RN Coordinator  Dr. Narayanan, Dr. Joe & Dr. Klein   [facility]  [phone]

## 2018-04-30 NOTE — PROGRESS NOTES
Patient is a 21 y/o lady with a h/o chronic pancreatitis who presents for f/u.  At her last visit, we decided to treat her pain with oxycodone 3 tabs per day.  We discontinued the elixir.  She did well with this regimen.  She is scheduled for a tube exchange today as she has an infection around the stoma.  She denies any sedation, nausea, vomiting or sedation with the medication.  She presents today for f/u and re-evaluation.  Current Outpatient Prescriptions   Medication     acetaminophen (TYLENOL) 32 mg/mL solution     amylase-lipase-protease (ZENPEP) 08436 UNITS CPEP     amylase-lipase-protease (ZENPEP) 67937 UNITS CPEP     blood glucose monitoring (NO BRAND SPECIFIED) test strip     cephalexin (KEFLEX) 250 MG/5ML suspension     clotrimazole-betamethasone (LOTRISONE) cream     fluticasone (FLONASE) 50 MCG/ACT spray     gabapentin (NEURONTIN) 250 MG/5ML solution     hydrOXYzine (ATARAX) 10 MG/5ML syrup     leuprolide (LUPRON DEPOT) 11.25 MG injection     levalbuterol (XOPENEX HFA) 45 MCG/ACT Inhaler     LORazepam (ATIVAN) 0.5 MG tablet     multivitamins with minerals (CERTAVITE/CEROVITE) LIQD liquid     norethindrone (AYGESTIN) 5 MG tablet     nortriptyline (PAMELOR) 10 MG capsule     ondansetron (ZOFRAN ODT) 4 MG ODT tab     order for DME     oxyCODONE (ROXICODONE) 5 MG/5ML solution     oxyCODONE IR (ROXICODONE) 10 MG tablet     pantoprazole (PROTONIX) 40 MG EC tablet     polyethylene glycol (MIRALAX/GLYCOLAX) Packet     RIZATRIPTAN BENZOATE PO     senna-docusate (SENOKOT-S;PERICOLACE) 8.6-50 MG per tablet     sodium bicarbonate 325 MG tablet     buprenorphine (BUTRANS) 5 MCG/HR WK patch     No current facility-administered medications for this visit.      Allergies   Allergen Reactions     Amoxicillin-Pot Clavulanate Nausea and Vomiting     Compazine [Prochlorperazine] Other (See Comments)     Dystonia       Hyoscyamine Other (See Comments)     Dystonia     Reglan [Metoclopramide Hcl] Other (See Comments)      "Dystonia     Zyprexa Other (See Comments)     Sensitive, dystonic reaction on 11-9-2011     Amitriptyline Hcl Other (See Comments)     Dystonia, hallucinations     Buspirone Other (See Comments)     No Adverse Reactions, no benefit     Cogentin [Benztropine]      Cyproheptadine Other (See Comments)     Distonic     Dicyclomine Other (See Comments)     Droperidol Other (See Comments)     Feels tense and \"like she has to jump out of her skin\".       Effexor [Venlafaxine] Other (See Comments)     Dystonia     Food      Cilantro--lips/tongue swelling     No Clinical Screening - See Comments Other (See Comments)     Cilantro     Phenergan Dm [Promethazine-Dm] Other (See Comments)     dystonia     Promethazine Other (See Comments)     Risperidone Other (See Comments)     dystonia     Vistaril Other (See Comments)     Burning sensation.       Augmentin GI Disturbance     Ketamine Other (See Comments)     jittery     Sorbitol GI Disturbance     Headache and dyspepsia     Past Medical History:   Diagnosis Date     Anxiety      Asthma      Cholecystitis     s/p cholecystectomy     Chronic abdominal pain      Chronic infection     mrsa     Chronic pain      Cyclic vomiting syndrome 10/27/2012     Depression      Endometriosis      Hypoglycaemia      Migraines      Mild intermittent asthma      Ovarian cysts      Pancreatic disease      PONV (postoperative nausea and vomiting)      Pseudoseizures      Somatoform disorder      Sphincter of Oddi dysfunction      Vasovagal syncope      Past Surgical History:   Procedure Laterality Date     ABDOMEN SURGERY      ERCP, biliary stents     CHOLECYSTECTOMY  8/2/11     COLONOSCOPY  2011    negative finding     ENDOSCOPIC RETROGRADE CHOLANGIOPANCREATOGRAM  8/23/2011    Procedure:ENDOSCOPIC RETROGRADE CHOLANGIOPANCREATOGRAM; Endoscopic Retrograde Cholangiopancreatogram; Surgeon:SHORTY MCCOY; [facility]     ENDOSCOPIC RETROGRADE CHOLANGIOPANCREATOGRAM  5/17/2012    " Procedure:ENDOSCOPIC RETROGRADE CHOLANGIOPANCREATOGRAM; Endoscopic Retrograde Cholangiopancreatogram with pancreatic stent placement.; Surgeon:CAMPBELL NARAYANAN; [facility]     ENDOSCOPIC RETROGRADE CHOLANGIOPANCREATOGRAM N/A 1/18/2018    Procedure: COMBINED ENDOSCOPIC RETROGRADE CHOLANGIOPANCREATOGRAPHY, PLACE TUBE/STENT;  Endoscopic Retrograde Cholangiopancreatography with nasojejunal feeding tube placement;  Surgeon: Campbell Narayanan MD;  [facility]     ENDOSCOPIC RETROGRADE CHOLANGIOPANCREATOGRAM COMPLEX  1/3/2012    Procedure:ENDOSCOPIC RETROGRADE CHOLANGIOPANCREATOGRAM COMPLEX; Endoscopic Retrograde Cholangiopancreatogram with Manometry bile duct sphincterotomy extention pancreatic duct sphincterotomy pancreatic duct stent placement; Surgeon:CAMPBELL NARAYANAN; [facility]     ENDOSCOPIC ULTRASOUND UPPER GASTROINTESTINAL TRACT (GI) N/A 6/9/2015    Procedure: ENDOSCOPIC ULTRASOUND, ESOPHAGOSCOPY / UPPER GASTROINTESTINAL TRACT (GI);  Surgeon: Mario Joe MD;  [facility]     ENDOSCOPIC ULTRASOUND UPPER GASTROINTESTINAL TRACT (GI) N/A 12/12/2016    Procedure: ENDOSCOPIC ULTRASOUND, ESOPHAGOSCOPY / UPPER GASTROINTESTINAL TRACT (GI);  Surgeon: Guru Jose Klein MD;  [facility]     ESOPHAGOSCOPY, GASTROSCOPY, DUODENOSCOPY (EGD), COMBINED  1/18/2012    Procedure:COMBINED ESOPHAGOSCOPY, GASTROSCOPY, DUODENOSCOPY (EGD); Surgeon:ARNIE ESPINOZA; [facility]     ESOPHAGOSCOPY, GASTROSCOPY, DUODENOSCOPY (EGD), COMBINED  1/18/2012    Procedure:COMBINED ESOPHAGOSCOPY, GASTROSCOPY, DUODENOSCOPY (EGD); EGD; Surgeon:ARNIE ESPINOZA; [facility]     ESOPHAGOSCOPY, GASTROSCOPY, DUODENOSCOPY (EGD), COMBINED N/A 12/9/2017    Procedure: COMBINED ESOPHAGOSCOPY, GASTROSCOPY, DUODENOSCOPY (EGD);;  Surgeon: Josias Chan MD;  [facility]     ESOPHAGOSCOPY, GASTROSCOPY, DUODENOSCOPY (EGD), COMBINED N/A 3/27/2018    Procedure: COMBINED ESOPHAGOSCOPY, GASTROSCOPY,  DUODENOSCOPY (EGD);  Upper Gastrointestinal Endoscopy convert gastrostomy to gastrojejunostomy tube ;  Surgeon: Campbell Narayanan MD;  [facility]     INSERT PORT VASCULAR ACCESS       L knee arthroscopy  2009     LAPAROSCOPY DIAGNOSTIC (GYN)  10/26/2012    Procedure: LAPAROSCOPY DIAGNOSTIC (GYN);  LAPAROSCOPY DIAGNOSTIC, CAUTERY ENDOMETRIOISIS and biopsy of Fallopian tube lesions;  Surgeon: Carla Lopez MD;  Location:  OR     ORTHOPEDIC SURGERY  2008    knee     REMOVE AND REPLACE BREAST IMPLANT PROSTHESIS N/A 2/8/2018    Procedure: PERCUTANEOUS INSERTION TUBE JEJUNOSTOMY;  upper endoscopy with percutaneous gastrojejunostimy feeding tuble placement and gastropexy;  Surgeon: Campbell Narayanan MD;  [facility]     VASCULAR SURGERY       Family History   Problem Relation Age of Onset     Hypertension Father      Bipolar Disorder Other      Anxiety Disorder Other      Depression Paternal Grandmother      Allergies Maternal Grandmother      CEREBROVASCULAR DISEASE Maternal Grandfather      Cardiovascular Maternal Grandfather      Depression/Anxiety Maternal Grandfather      GASTROINTESTINAL DISEASE Maternal Grandfather      DIABETES Paternal Uncle      Hypoglycemia Brother      CANCER No family hx of      No family history of skin cancer     Social History     Social History     Marital status: Single     Spouse name: N/A     Number of children: N/A     Years of education: N/A     Occupational History     Not on file.     Social History Main Topics     Smoking status: Never Smoker     Smokeless tobacco: Never Used     Alcohol use No     Drug use: No     Sexual activity: No     Other Topics Concern     Parent/Sibling W/ Cabg, Mi Or Angioplasty Before 65f 55m? 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  PE:  Physical examination is unchanged  A/P: Patient is a 23 y/o lady with chronic abdominal pain.  Her pain now is mostly secondary to the infection at the site.  She is, in fact scheduled for a tube exchange today.  Will continue oxycodone 10 mg tid and a few tabs for post-procedure pain.  Patient will f/u in 1 week.    Answers for HPI/ROS submitted by the patient on 4/30/2018   PHQ-2 Score: 2

## 2018-04-30 NOTE — IP AVS SNAPSHOT
[facility], [place], Endoscopy    [address] [address]    Phone:  [phone]                                       After Visit Summary   4/30/2018    Alexandra Melgoza    MRN: [id number]           After Visit Summary Signature Page     I have received my discharge instructions, and my questions have been answered. I have discussed any challenges I see with this plan with the nurse or doctor.    ..........................................................................................................................................  Patient/Patient Representative Signature      ..........................................................................................................................................  Patient Representative Print Name and Relationship to Patient    ..................................................               ................................................  Date                                            Time    ..........................................................................................................................................  Reviewed by Signature/Title    ...................................................              ..............................................  Date                                                            Time

## 2018-04-30 NOTE — MR AVS SNAPSHOT
After Visit Summary   4/30/2018    Alexandra Melgoza    MRN: [id number]           Patient Information     Date Of Birth          1995        Visit Information        Provider Department      4/30/2018 2:00 PM UC 43 ATC; UC SPEC INFUSION M [facility]        Today's Diagnoses     Cyclical vomiting with nausea, intractability of vomiting not specified    -  1    Chronic pancreatitis, unspecified pancreatitis type (H)        Pain        Abdominal pain, epigastric        S/P ERCP        Acute abdominal pain        Abdominal pain, unspecified abdominal location        Hypoglycemia        Urge incontinence        Major depressive disorder, recurrent episode, moderate (H)        Anxiety state        Rash        Intractable cyclical vomiting with nausea          Care Instructions    Dear Alexandra Melgoza    Thank you for choosing [facility] ([facility]) for your infusion.  The following information is a summary of our appointment as well as important reminders.        We look forward in seeing you on your next appointment here at [facility].  Please don t hesitate to call us at [phone] to reschedule any of your appointments or to speak with one of the [facility] registered nurses.  It was a pleasure taking care of you today.    Sincerely,    [facility]  Specialty Infusion [facility] Center  [address] [place] [address]  [address]  Phone:  [phone]            Follow-ups after your visit        Your next 10 appointments already scheduled     May 02, 2018  3:00 PM CDT   Infusion 120 with UC SPEC INFUSION, UC 49 ATC   M [facility] Specialty [facility] Procedure ([facility])    [address]  [address] [place] [address] [phone]            May 05, 2018 11:00 AM CDT   Infusion 120 with UC SPEC INFUSION, UC 42 ATC   M  [facility] Specialty and Procedure ([facility])    [address]  [address]  [facility] [address]   [phone]            May 07, 2018  7:40 AM CDT   (Arrive by 7:25 AM)   Return Visit with Leonardo Wang MD   [facility] ([facility])    [address]  4th Floor  [facility] [address]   [phone]            May 07, 2018  2:00 PM CDT   Infusion 120 with UC SPEC INFUSION, UC 50 ATC   [facility] Specialty and Procedure ([facility])    [address]  [address]  [facility] [address]   [phone]            May 09, 2018  1:00 PM CDT   Infusion 120 with UC SPEC INFUSION, UC 45 ATC   [facility] Specialty and Procedure ([facility])    [address]  [address]  [facility] [address]   [phone]            May 11, 2018  2:00 PM CDT   Infusion 120 with UC SPEC INFUSION   [facility] Specialty and Procedure ([facility])    [address]  [address]  [facility] [address]   [phone]              Who to contact     If you have questions or need follow up information about today's clinic visit or your schedule please contact [facility] SPECIALTY AND PROCEDURE directly at [phone].  Normal or non-critical lab and imaging results will be communicated to you by MyChart, letter or phone within 4 business days after the clinic has received the results. If you do not hear from us within 7 days, please contact the clinic through MyChart or phone. If you have a critical or abnormal lab result, we will notify you by phone as soon as possible.  Submit refill requests through [employer] or call your pharmacy and they will forward the refill request to us. Please allow 3 business days for your  refill to be completed.          Additional Information About Your Visit        MyChart Information     [employer] gives you secure access to your electronic health record. If you see a primary care provider, you can also send messages to your care team and make appointments. If you have questions, please call your primary care clinic.  If you do not have a primary care provider, please call [phone] and they will assist you.        Care EveryWhere ID     This is your Care EveryWhere ID. This could be used by other organizations to access your [facility] medical records  [id number]         Blood Pressure from Last 3 Encounters:   04/30/18 110/67   04/30/18 (!) 122/91   04/30/18 124/87    Weight from Last 3 Encounters:   04/30/18 65.8 kg (145 lb)   04/30/18 65.8 kg (145 lb)   04/13/18 68 kg (150 lb)              Today, you had the following     No orders found for display         Today's Medication Changes          These changes are accurate as of 4/30/18  4:55 PM.  If you have any questions, ask your nurse or doctor.               These medicines have changed or have updated prescriptions.        Dose/Directions    nortriptyline 10 MG capsule   Commonly known as:  PAMELOR   This may have changed:  how much to take   Used for:  Right upper quadrant abdominal pain        Dose:  30 mg   Take 3 capsules (30 mg) by mouth At Bedtime   Quantity:  120 capsule   Refills:  0                Primary Care Provider Office Phone # Fax #    [place] BranchCheyanne Baez [phone] [address] [address]        Equal Access to Services     RACHAEL BENITEZ : Hadii angelina ku hadasho Soomaali, waaxda luqadaha, qaybta kaalmada adeegyada, maki smallwood. So [facility] [phone].    ATENCIÓN: Si habla español, tiene a quijano disposición servicios gratuitos de asistencia lingüística. Llame al [phone].    We comply with applicable federal civil rights laws and [place] laws. We do  not discriminate on the basis of race, color, national origin, age, disability, sex, sexual orientation, or gender identity.            Thank you!     Thank you for choosing [facility] SPECIALTY AND PROCEDURE  for your care. Our goal is always to provide you with excellent care. Hearing back from our patients is one way we can continue to improve our services. Please take a few minutes to complete the written survey that you may receive in the mail after your visit with us. Thank you!             Your Updated Medication List - Protect others around you: Learn how to safely use, store and throw away your medicines at www.disposemymeds.org.          This list is accurate as of 4/30/18  4:55 PM.  Always use your most recent med list.                   Brand Name Dispense Instructions for use Diagnosis    acetaminophen 32 mg/mL solution    TYLENOL     30.45 mLs (975 mg) by Per J Tube route every 8 hours        * amylase-lipase-protease 54825 units Cpep    ZENPEP    180 capsule    Take 2-3 capsules (40,000-60,000 Units) by mouth Take with snacks or supplements (Snacks)    Idiopathic chronic pancreatitis (H)       * amylase-lipase-protease 88200 units Cpep    ZENPEP    180 capsule    Take 5 capsules (100,000 Units) by mouth 4 times daily (with meals and nightly)    Right upper quadrant abdominal pain       blood glucose monitoring test strip    no brand specified    100 strip    One Touch Ultra 2 Strips, Use to test blood sugars 2 times daily or as directed    Hypoglycemia       buprenorphine 5 MCG/HR WK patch    BUTRANS    4 patch    Place 1 patch onto the skin every 7 days    Abdominal pain, generalized       cephalexin 250 MG/5ML suspension    KEFLEX    210 mL    Take 10 ml per GJ tube tid    Dysuria       clotrimazole-betamethasone cream    LOTRISONE    15 g    Apply topically 2 times daily    Rash and nonspecific skin eruption       fluticasone 50 MCG/ACT spray    FLONASE    16 g    Spray 2  sprays into both nostrils daily    Nasal congestion       gabapentin 250 MG/5ML solution    NEURONTIN      Chronic abdominal pain, Sphincter of Oddi dysfunction, Cyclical vomiting with nausea, intractability of vomiting not specified, Pain around percutaneous endoscopic gastrostomy (PEG) tube site, sequela       hydrOXYzine 10 MG/5ML syrup    ATARAX    1500 mL    Take 12.5 mLs (25 mg) by mouth every 6 hours as needed for anxiety or other (pain)    Acute abdominal pain       leuprolide 11.25 MG kit    LUPRON DEPOT (3-MONTH)    1 each    Inject 11.25 mg into the muscle every 3 months    Endometriosis       levalbuterol 45 MCG/ACT Inhaler    XOPENEX HFA    1 Inhaler    Inhale 2 puffs into the lungs every 6 hours as needed for shortness of breath / dyspnea    Wheeze       LORazepam 0.5 MG tablet    ATIVAN    10 tablet    Take 1 tablet (0.5 mg) by mouth 2 times daily as needed (nausea, do not take with pain medicine, do not drive after taking)    Cyclical vomiting with nausea, intractability of vomiting not specified       multivitamins with minerals Liqd liquid     1 Bottle    15 mLs by Per Feeding Tube route daily    Abdominal pain, epigastric       norethindrone 5 MG tablet    AYGESTIN    90 tablet    Take 1 tablet (5 mg) by mouth daily    Endometriosis       nortriptyline 10 MG capsule    PAMELOR    120 capsule    Take 3 capsules (30 mg) by mouth At Bedtime    Right upper quadrant abdominal pain       ondansetron 4 MG ODT tab    ZOFRAN ODT    30 tablet    Take 1 tablet (4 mg) by mouth every 8 hours as needed for nausea or vomiting    Intractable cyclical vomiting with nausea       order for DME     1 Units    Equipment being ordered: TENS with wire and electrode.    Chronic abdominal pain       * oxyCODONE 5 MG/5ML solution    ROXICODONE    200 mL    Take 10 mL (10 mg) by mouth every 6 hours as needed, maximum 40 mL per day.    Abdominal pain, generalized, Chronic abdominal pain       * oxyCODONE IR 10 MG tablet     ROXICODONE    27 tablet    Take 1 tablet (10 mg) by mouth every 6 hours as needed for moderate to severe pain    Abdominal pain, generalized       pantoprazole 40 MG EC tablet    PROTONIX    30 tablet    Take 1 tablet (40 mg) by mouth 2 times daily (before meals)    Right upper quadrant abdominal pain, Erosive gastritis       polyethylene glycol Packet    MIRALAX/GLYCOLAX    7 packet    Take 17 g by mouth daily    Right upper quadrant abdominal pain       RIZATRIPTAN BENZOATE PO      Take 5 mg by mouth once as needed        senna-docusate 8.6-50 MG per tablet    SENOKOT-S;PERICOLACE    100 tablet    Take 1 tablet by mouth 2 times daily as needed for constipation    Right upper quadrant abdominal pain       sodium bicarbonate 325 MG tablet     120 tablet    1 tablet (325 mg) by Per Feeding Tube route 4 times daily    Abdominal pain, epigastric, Intractable cyclical vomiting with nausea       * Notice:  This list has 4 medication(s) that are the same as other medications prescribed for you. Read the directions carefully, and ask your doctor or other care provider to review them with you.

## 2018-04-30 NOTE — PATIENT INSTRUCTIONS
1. Continue taking 10mg of oxycodone every 8 hours as needed, max dose of 3 tablets per day. You may take an extra tablet per day after your procedure.         Follow up:    05/07 7:40am      To speak with a nurse, schedule/reschedule/cancel a clinic appointment, or request a medication refill call: [phone]     You can also reach us by [employer]: [email or web address]    For refills, please call on Monday, 1 week before your medication runs out. The doctors are not always in clinic, so this gives us time to get your prescriptions ready.  Please let us know the name of the medication you are requesting a refill of.

## 2018-04-30 NOTE — MR AVS SNAPSHOT
After Visit Summary   4/30/2018    Alexandra Melgoza    MRN: [id number]           Patient Information     Date Of Birth          1995        Visit Information        Provider Department      4/30/2018 7:20 AM Leonardo Wang MD [facility]        Today's Diagnoses     Abdominal pain, generalized          Care Instructions    1. Continue taking 10mg of oxycodone every 8 hours as needed, max dose of 3 tablets per day. You may take an extra tablet per day after your procedure.         Follow up:    05/07 7:40am      To speak with a nurse, schedule/reschedule/cancel a clinic appointment, or request a medication refill call: [phone]     You can also reach us by [employer]: [email or web address]    For refills, please call on Monday, 1 week before your medication runs out. The doctors are not always in clinic, so this gives us time to get your prescriptions ready.  Please let us know the name of the medication you are requesting a refill of.                                     Follow-ups after your visit        Your next 10 appointments already scheduled     Apr 30, 2018  2:00 PM CDT   Infusion 120 with UC SPEC INFUSION, UC 43 ATC   [facility] ([facility])    [address] [place] [address]  [address]  [facility] [address]   [phone]            May 02, 2018  3:00 PM CDT   Infusion 120 with UC SPEC INFUSION, UC 49 ATC   [facility] ([facility])    [address]  [address]  [facility] [address]   [phone]            May 05, 2018 11:00 AM CDT   Infusion 120 with UC SPEC INFUSION, UC 42 ATC   [facility] ([facility])    [address]  [address]  [facility] [address]   [phone]            May 07, 2018  7:40  AM CDT   (Arrive by 7:25 AM)   Return Visit with Leonardo Wang MD   [facility] ([facility])    [address]  [address]  [facility] [place] [facility] [address] [phone]            May 07, 2018  2:00 PM CDT   Infusion 120 with UC SPEC INFUSION, UC 50 ATC   [facility] ([facility])    [address]  [address]  [facility] [id number] [phone]            May 09, 2018  1:00 PM CDT   Infusion 120 with UC SPEC INFUSION   [facility] ([facility])    [address]  [address]  [facility] [place] [facility] [address] [phone]              Who to contact     Please call your clinic at [phone] to:    Ask questions about your health    Make or cancel appointments    Discuss your medicines    Learn about your test results    Speak to your doctor            Additional Information About Your Visit        [employer] Information     [employer] gives you secure access to your electronic health record. If you see a primary care provider, you can also send messages to your care team and make appointments. If you have questions, please call your primary care clinic.  If you do not have a primary care provider, please call [phone] and they will assist you.      [employer] is an electronic gateway that provides easy, online access to your medical records. With [employer], you can request a clinic appointment, read your test results, renew a prescription or communicate with your care team.     To access your existing account, please contact your [facility] or call [phone] for assistance.        Care EveryWhere ID     This is your Care EveryWhere ID. This could be used by other organizations to access your [place] medical records  [id number]        Your Vitals Were     Pulse Height  "BMI (Body Mass Index)             115 1.676 m (5' 6\") 23.4 kg/m2          Blood Pressure from Last 3 Encounters:   04/30/18 124/87   04/28/18 129/83   04/25/18 112/75    Weight from Last 3 Encounters:   04/30/18 65.8 kg (145 lb)   04/13/18 68 kg (150 lb)   04/11/18 68 kg (150 lb)              Today, you had the following     No orders found for display         Today's Medication Changes          These changes are accurate as of 4/30/18  7:42 AM.  If you have any questions, ask your nurse or doctor.               These medicines have changed or have updated prescriptions.        Dose/Directions    nortriptyline 10 MG capsule   Commonly known as:  PAMELOR   This may have changed:  how much to take   Used for:  Right upper quadrant abdominal pain        Dose:  30 mg   Take 3 capsules (30 mg) by mouth At Bedtime   Quantity:  120 capsule   Refills:  0       * oxyCODONE 5 MG/5ML solution   Commonly known as:  ROXICODONE   This may have changed:  Another medication with the same name was changed. Make sure you understand how and when to take each.   Used for:  Abdominal pain, generalized, Chronic abdominal pain   Changed by:  Leonardo Wang MD        Take 10 mL (10 mg) by mouth every 6 hours as needed, maximum 40 mL per day.   Quantity:  200 mL   Refills:  0       * oxyCODONE IR 10 MG tablet   Commonly known as:  ROXICODONE   This may have changed:  when to take this   Used for:  Abdominal pain, generalized   Changed by:  Leonardo Wang MD        Dose:  10 mg   Take 1 tablet (10 mg) by mouth every 6 hours as needed for moderate to severe pain   Quantity:  27 tablet   Refills:  0       * Notice:  This list has 2 medication(s) that are the same as other medications prescribed for you. Read the directions carefully, and ask your doctor or other care provider to review them with you.         Where to get your medicines      Some of these will need a paper prescription and others can be bought over the counter.  Ask " your nurse if you have questions.     Bring a paper prescription for each of these medications     oxyCODONE IR 10 MG tablet               Information about OPIOIDS     PRESCRIPTION OPIOIDS: WHAT YOU NEED TO KNOW   You have a prescription for an opioid (narcotic) pain medicine. Opioids can cause addiction. If you have a history of chemical dependency of any type, you are at a higher risk of becoming addicted to opioids. Only take this medicine after all other options have been tried. Take it for as short a time and as few doses as possible.     Do not:    Drive. If you drive while taking these medicines, you could be arrested for driving under the influence (DUI).    Operate heavy machinery    Do any other dangerous activities while taking these medicines.     Drink any alcohol while taking these medicines.      Take with any other medicines that contain acetaminophen. Read all labels carefully. Look for the word  acetaminophen  or  Tylenol.  Ask your pharmacist if you have questions or are unsure.    Store your pills in a secure place, locked if possible. We will not replace any lost or stolen medicine. If you don t finish your medicine, please throw away (dispose) as directed by your pharmacist. The [place] [employer] has more information about safe disposal: https://www.pca.[facility].[place].us/living-green/managing-unwanted-medications    All opioids tend to cause constipation. Drink plenty of water and eat foods that have a lot of fiber, such as fruits, vegetables, prune juice, apple juice and high-fiber cereal. Take a laxative (Miralax, milk of magnesia, Colace, Senna) if you don t move your bowels at least every other day.          Primary Care Provider Office Phone # Fax #    Quyen Baez [phone] [address] [address]        Equal Access to Services     RACHAEL BENITEZ : alcon Orta qaybta kaalmada adeegyada,  maki duongchris palma'aan ah. So [facility] [phone].    ATENCIÓN: Si ishan kilpatrick, tiene a quijano disposición servicios gratuitos de asistencia lingüística. Panfilo champion [phone].    We comply with applicable federal civil rights laws and [place] laws. We do not discriminate on the basis of race, color, national origin, age, disability, sex, sexual orientation, or gender identity.            Thank you!     Thank you for choosing [facility]  for your care. Our goal is always to provide you with excellent care. Hearing back from our patients is one way we can continue to improve our services. Please take a few minutes to complete the written survey that you may receive in the mail after your visit with us. Thank you!             Your Updated Medication List - Protect others around you: Learn how to safely use, store and throw away your medicines at www.disposemymeds.org.          This list is accurate as of 4/30/18  7:42 AM.  Always use your most recent med list.                   Brand Name Dispense Instructions for use Diagnosis    acetaminophen 32 mg/mL solution    TYLENOL     30.45 mLs (975 mg) by Per J Tube route every 8 hours        * amylase-lipase-protease 24489 units Cpep    ZENPEP    180 capsule    Take 2-3 capsules (40,000-60,000 Units) by mouth Take with snacks or supplements (Snacks)    Idiopathic chronic pancreatitis (H)       * amylase-lipase-protease 26817 units Cpep    ZENPEP    180 capsule    Take 5 capsules (100,000 Units) by mouth 4 times daily (with meals and nightly)    Right upper quadrant abdominal pain       blood glucose monitoring test strip    no brand specified    100 strip    One Touch Ultra 2 Strips, Use to test blood sugars 2 times daily or as directed    Hypoglycemia       buprenorphine 5 MCG/HR WK patch    BUTRANS    4 patch    Place 1 patch onto the skin every 7 days    Abdominal pain, generalized       cephalexin 250 MG/5ML  suspension    KEFLEX    210 mL    Take 10 ml per GJ tube tid    Dysuria       clotrimazole-betamethasone cream    LOTRISONE    15 g    Apply topically 2 times daily    Rash and nonspecific skin eruption       fluticasone 50 MCG/ACT spray    FLONASE    16 g    Spray 2 sprays into both nostrils daily    Nasal congestion       gabapentin 250 MG/5ML solution    NEURONTIN      Chronic abdominal pain, Sphincter of Oddi dysfunction, Cyclical vomiting with nausea, intractability of vomiting not specified, Pain around percutaneous endoscopic gastrostomy (PEG) tube site, sequela       hydrOXYzine 10 MG/5ML syrup    ATARAX    1500 mL    Take 12.5 mLs (25 mg) by mouth every 6 hours as needed for anxiety or other (pain)    Acute abdominal pain       leuprolide 11.25 MG kit    LUPRON DEPOT (3-MONTH)    1 each    Inject 11.25 mg into the muscle every 3 months    Endometriosis       levalbuterol 45 MCG/ACT Inhaler    XOPENEX HFA    1 Inhaler    Inhale 2 puffs into the lungs every 6 hours as needed for shortness of breath / dyspnea    Wheeze       LORazepam 0.5 MG tablet    ATIVAN    10 tablet    Take 1 tablet (0.5 mg) by mouth 2 times daily as needed (nausea, do not take with pain medicine, do not drive after taking)    Cyclical vomiting with nausea, intractability of vomiting not specified       multivitamins with minerals Liqd liquid     1 Bottle    15 mLs by Per Feeding Tube route daily    Abdominal pain, epigastric       norethindrone 5 MG tablet    AYGESTIN    90 tablet    Take 1 tablet (5 mg) by mouth daily    Endometriosis       nortriptyline 10 MG capsule    PAMELOR    120 capsule    Take 3 capsules (30 mg) by mouth At Bedtime    Right upper quadrant abdominal pain       ondansetron 4 MG ODT tab    ZOFRAN ODT    30 tablet    Take 1 tablet (4 mg) by mouth every 8 hours as needed for nausea or vomiting    Intractable cyclical vomiting with nausea       order for DME     1 Units    Equipment being ordered: TENS with wire and  electrode.    Chronic abdominal pain       * oxyCODONE 5 MG/5ML solution    ROXICODONE    200 mL    Take 10 mL (10 mg) by mouth every 6 hours as needed, maximum 40 mL per day.    Abdominal pain, generalized, Chronic abdominal pain       * oxyCODONE IR 10 MG tablet    ROXICODONE    27 tablet    Take 1 tablet (10 mg) by mouth every 6 hours as needed for moderate to severe pain    Abdominal pain, generalized       pantoprazole 40 MG EC tablet    PROTONIX    30 tablet    Take 1 tablet (40 mg) by mouth 2 times daily (before meals)    Right upper quadrant abdominal pain, Erosive gastritis       polyethylene glycol Packet    MIRALAX/GLYCOLAX    7 packet    Take 17 g by mouth daily    Right upper quadrant abdominal pain       RIZATRIPTAN BENZOATE PO      Take 5 mg by mouth once as needed        senna-docusate 8.6-50 MG per tablet    SENOKOT-S;PERICOLACE    100 tablet    Take 1 tablet by mouth 2 times daily as needed for constipation    Right upper quadrant abdominal pain       sodium bicarbonate 325 MG tablet     120 tablet    1 tablet (325 mg) by Per Feeding Tube route 4 times daily    Abdominal pain, epigastric, Intractable cyclical vomiting with nausea       * Notice:  This list has 4 medication(s) that are the same as other medications prescribed for you. Read the directions carefully, and ask your doctor or other care provider to review them with you.

## 2018-04-30 NOTE — OR NURSING
Procedure: Replace GJ tube   Sedation: Conscious  O2: RA  Tolerated Procedure: Fair  Patient returned to recovery room in stable condition with SAMARA Zheng RN

## 2018-05-01 ENCOUNTER — CARE COORDINATION (OUTPATIENT)
Dept: GASTROENTEROLOGY | Facility: CLINIC | Age: 23
End: 2018-05-01

## 2018-05-03 NOTE — PROGRESS NOTES
Leeann called in and this RN explained that Grabiel does not need any antibiotics per Dr. Narayanan. After Dr. Narayanan changed the tube, he determined that she does not need antibiotics because her tube site looks great and there is no physical evidence of infection. He does not think antibiotics are needed at this time.   Verbalized understanding.     Ella SARMIENTO RN Coordinator  Dr. Narayanan, Dr. Joe & Dr. Klein   [facility]  [phone]

## 2018-05-17 ENCOUNTER — INFUSION THERAPY VISIT (OUTPATIENT)
Dept: INFUSION THERAPY | Facility: CLINIC | Age: 23
End: 2018-05-17
Attending: INTERNAL MEDICINE
Payer: COMMERCIAL

## 2018-05-17 VITALS
HEART RATE: 98 BPM | TEMPERATURE: 98.5 F | DIASTOLIC BLOOD PRESSURE: 70 MMHG | RESPIRATION RATE: 16 BRPM | SYSTOLIC BLOOD PRESSURE: 121 MMHG

## 2018-05-17 DIAGNOSIS — Z98.890 S/P ERCP: ICD-10-CM

## 2018-05-17 DIAGNOSIS — R21 RASH: ICD-10-CM

## 2018-05-17 DIAGNOSIS — F41.1 ANXIETY STATE: ICD-10-CM

## 2018-05-17 DIAGNOSIS — R11.15 INTRACTABLE CYCLICAL VOMITING WITH NAUSEA: ICD-10-CM

## 2018-05-17 DIAGNOSIS — K86.1 CHRONIC PANCREATITIS, UNSPECIFIED PANCREATITIS TYPE (H): ICD-10-CM

## 2018-05-17 DIAGNOSIS — R10.9 ACUTE ABDOMINAL PAIN: ICD-10-CM

## 2018-05-17 DIAGNOSIS — R10.9 ABDOMINAL PAIN: ICD-10-CM

## 2018-05-17 DIAGNOSIS — E16.2 HYPOGLYCEMIA: ICD-10-CM

## 2018-05-17 DIAGNOSIS — R10.13 ABDOMINAL PAIN, EPIGASTRIC: ICD-10-CM

## 2018-05-17 DIAGNOSIS — N39.41 URGE INCONTINENCE: ICD-10-CM

## 2018-05-17 DIAGNOSIS — F33.1 MAJOR DEPRESSIVE DISORDER, RECURRENT EPISODE, MODERATE (H): ICD-10-CM

## 2018-05-17 DIAGNOSIS — R52 PAIN: ICD-10-CM

## 2018-05-17 DIAGNOSIS — G43.A0 CYCLICAL VOMITING WITH NAUSEA, INTRACTABILITY OF VOMITING NOT SPECIFIED: Primary | ICD-10-CM

## 2018-05-17 PROCEDURE — 96365 THER/PROPH/DIAG IV INF INIT: CPT

## 2018-05-17 PROCEDURE — 96375 TX/PRO/DX INJ NEW DRUG ADDON: CPT

## 2018-05-17 PROCEDURE — 25000128 H RX IP 250 OP 636: Mod: ZF | Performed by: INTERNAL MEDICINE

## 2018-05-17 PROCEDURE — 96361 HYDRATE IV INFUSION ADD-ON: CPT

## 2018-05-17 PROCEDURE — 96366 THER/PROPH/DIAG IV INF ADDON: CPT

## 2018-05-17 PROCEDURE — 96376 TX/PRO/DX INJ SAME DRUG ADON: CPT

## 2018-05-17 RX ORDER — DEXTROSE, SODIUM CHLORIDE, SODIUM LACTATE, POTASSIUM CHLORIDE, AND CALCIUM CHLORIDE 5; .6; .31; .03; .02 G/100ML; G/100ML; G/100ML; G/100ML; G/100ML
2000 INJECTION, SOLUTION INTRAVENOUS ONCE
Status: CANCELLED
Start: 2018-05-17 | End: 2018-05-17

## 2018-05-17 RX ORDER — ONDANSETRON 2 MG/ML
4 INJECTION INTRAMUSCULAR; INTRAVENOUS ONCE
Status: COMPLETED | OUTPATIENT
Start: 2018-05-17 | End: 2018-05-17

## 2018-05-17 RX ORDER — HEPARIN SODIUM (PORCINE) LOCK FLUSH IV SOLN 100 UNIT/ML 100 UNIT/ML
500 SOLUTION INTRAVENOUS EVERY 8 HOURS
Status: CANCELLED
Start: 2018-05-17

## 2018-05-17 RX ORDER — ONDANSETRON 2 MG/ML
4 INJECTION INTRAMUSCULAR; INTRAVENOUS DAILY PRN
Status: CANCELLED
Start: 2018-05-17

## 2018-05-17 RX ORDER — DIPHENHYDRAMINE HYDROCHLORIDE 50 MG/ML
25 INJECTION INTRAMUSCULAR; INTRAVENOUS ONCE
Status: CANCELLED
Start: 2018-05-17 | End: 2018-05-17

## 2018-05-17 RX ORDER — HEPARIN SODIUM (PORCINE) LOCK FLUSH IV SOLN 100 UNIT/ML 100 UNIT/ML
500 SOLUTION INTRAVENOUS EVERY 8 HOURS
Status: DISCONTINUED | OUTPATIENT
Start: 2018-05-17 | End: 2018-05-17 | Stop reason: HOSPADM

## 2018-05-17 RX ORDER — ONDANSETRON 2 MG/ML
4 INJECTION INTRAMUSCULAR; INTRAVENOUS ONCE
Status: CANCELLED
Start: 2018-05-17 | End: 2018-05-17

## 2018-05-17 RX ORDER — DIPHENHYDRAMINE HYDROCHLORIDE 50 MG/ML
25 INJECTION INTRAMUSCULAR; INTRAVENOUS DAILY PRN
Status: CANCELLED
Start: 2018-05-17

## 2018-05-17 RX ADMIN — SODIUM CHLORIDE, POTASSIUM CHLORIDE, SODIUM LACTATE AND CALCIUM CHLORIDE 2000 ML: 600; 310; 30; 20 INJECTION, SOLUTION INTRAVENOUS at 09:12

## 2018-05-17 RX ADMIN — ONDANSETRON 4 MG: 2 INJECTION INTRAMUSCULAR; INTRAVENOUS at 09:16

## 2018-05-17 RX ADMIN — ONDANSETRON 4 MG: 2 INJECTION INTRAMUSCULAR; INTRAVENOUS at 11:27

## 2018-05-17 RX ADMIN — DIPHENHYDRAMINE HYDROCHLORIDE: 50 INJECTION INTRAMUSCULAR; INTRAVENOUS at 11:25

## 2018-05-17 RX ADMIN — DIPHENHYDRAMINE HYDROCHLORIDE: 50 INJECTION INTRAMUSCULAR; INTRAVENOUS at 09:12

## 2018-05-17 ASSESSMENT — PAIN SCALES - GENERAL: PAINLEVEL: MODERATE PAIN (5)

## 2018-05-17 NOTE — PROGRESS NOTES
Infusion Nursing Note:  Alexandra Melgoza presents today to [facility] for fluids and anti emetics  infusion.    Frequency: prn    Progress note:  ID verified by name and .  Assessment completed. Vitals were stable throughout time in [facility]. Patient education regarding infusion and possible side effects provided.  Patient verbalized understanding. yes    Premedications: were not ordered.    Infusion Rates: Infusion given over approximately 2 hours.     Labs were not ordered for this appointment.    Vascular access: Port accessed today.      Patient tolerated infusion well.    Discharge Plan:   Follow up plan of care with: Ongoing infusions [facility]  Discharge instructions were reviewed with patient.  Patient/representative verbalized understanding of discharge instructions and all questions answered.    Patient discharged from [facility] in stable condition.    Silva Samuel RN    Administrations This Visit     diphenhydrAMINE (BENADRYL) 25 mg in sodium chloride 0.9 % 50 mL     Admin Date Action Dose Route Administered By             2018 New Bag   Intravenous Silva Samuel RN              Admin Date Action Dose Route Administered By             2018 New Bag   Intravenous Silva Samuel RN                    lactated ringers BOLUS 1,000-2,000 mL     Admin Date Action Dose Route Administered By             2018 New Bag 2000 mL Intravenous Silva Samuel RN                    ondansetron (ZOFRAN) injection 4 mg     Admin Date Action Dose Route Administered By             2018 Given 4 mg Intravenous Silva Samuel RN              Admin Date Action Dose Route Administered By             2018 Given 4 mg Intravenous Silva Samuel RN                          /70  Pulse 98  Temp 98.5  F (36.9  C) (Oral)  Resp 16

## 2018-05-17 NOTE — MR AVS SNAPSHOT
After Visit Summary   5/17/2018    Alexandra Melgoza    MRN: [id number]           Patient Information     Date Of Birth          1995        Visit Information        Provider Department      5/17/2018 9:00 AM UC 41 ATC; UC SPEC INFUSION [facility] Specialty and Procedure        Today's Diagnoses     Cyclical vomiting with nausea, intractability of vomiting not specified    -  1    Chronic pancreatitis, unspecified pancreatitis type (H)        Pain        Abdominal pain, epigastric        S/P ERCP        Acute abdominal pain        Abdominal pain        Hypoglycemia        Urge incontinence        Major depressive disorder, recurrent episode, moderate (H)        Anxiety state        Rash        Intractable cyclical vomiting with nausea           Follow-ups after your visit        Your next 10 appointments already scheduled     Jun 04, 2018 10:00 AM CDT   (Arrive by 9:45 AM)   POSTURAL ORTHOSTATIC SYCOPE with Mirna España MD   [facility] ([facility])    [address] [place] [id number] [phone]            Jun 06, 2018 12:00 PM CDT   (Arrive by 11:45 AM)   Return Visit with Campbell Narayanan MD   [facility] ([facility])    [address] [place] [id number] [phone]            Jun 11, 2018  2:40 PM CDT   (Arrive by 2:25 PM)   Return Visit with Quyen Baez MD   [facility] ([facility])    [address] [place] [id number] [phone]              Who to contact     If you have questions or need follow up information about today's clinic visit or your schedule please contact [facility] SPECIALTY AND PROCEDURE directly at [phone].  Normal or non-critical lab and imaging results will be communicated to you by MyChart, letter or phone  within 4 business days after the clinic has received the results. If you do not hear from us within 7 days, please contact the clinic through [employer] or phone. If you have a critical or abnormal lab result, we will notify you by phone as soon as possible.  Submit refill requests through [employer] or call your pharmacy and they will forward the refill request to us. Please allow 3 business days for your refill to be completed.          Additional Information About Your Visit        [employer] Information     [employer] gives you secure access to your electronic health record. If you see a primary care provider, you can also send messages to your care team and make appointments. If you have questions, please call your primary care clinic.  If you do not have a primary care provider, please call [phone] and they will assist you.        Care EveryWhere ID     This is your Care EveryWhere ID. This could be used by other organizations to access your [place] medical records  [id number]        Your Vitals Were     Pulse Temperature Respirations             98 98.5  F (36.9  C) (Oral) 16          Blood Pressure from Last 3 Encounters:   05/17/18 121/70   04/30/18 110/67   04/30/18 (!) 122/91    Weight from Last 3 Encounters:   04/30/18 65.8 kg (145 lb)   04/30/18 65.8 kg (145 lb)   04/13/18 68 kg (150 lb)              Today, you had the following     No orders found for display         Today's Medication Changes          These changes are accurate as of 5/17/18 12:15 PM.  If you have any questions, ask your nurse or doctor.               These medicines have changed or have updated prescriptions.        Dose/Directions    nortriptyline 10 MG capsule   Commonly known as:  PAMELOR   This may have changed:  how much to take   Used for:  Right upper quadrant abdominal pain        Dose:  30 mg   Take 3 capsules (30 mg) by mouth At Bedtime   Quantity:  120 capsule   Refills:  0                Primary Care Provider Office Phone #  Fax #    Quyen Baez [phone] [address] [address]        Equal Access to Services     RACHAEL BENITEZ : Hadii aad ku hadcathicaleb Enamorado, wajesusda kathryndemondha, qakaylahta kaaysha santillan, maki tyler adchris broliss [place]. So [facility] [phone].    ATENCIÓN: Si habla español, tiene a quijano disposición servicios gratuitos de asistencia lingüística. Llame al [phone].    We comply with applicable federal civil rights laws and [place] laws. We do not discriminate on the basis of race, color, national origin, age, disability, sex, sexual orientation, or gender identity.            Thank you!     Thank you for choosing [facility] SPECIALTY AND PROCEDURE  for your care. Our goal is always to provide you with excellent care. Hearing back from our patients is one way we can continue to improve our services. Please take a few minutes to complete the written survey that you may receive in the mail after your visit with us. Thank you!             Your Updated Medication List - Protect others around you: Learn how to safely use, store and throw away your medicines at www.disposemymeds.org.          This list is accurate as of 5/17/18 12:15 PM.  Always use your most recent med list.                   Brand Name Dispense Instructions for use Diagnosis    acetaminophen 32 mg/mL solution    TYLENOL     30.45 mLs (975 mg) by Per J Tube route every 8 hours        * amylase-lipase-protease 60787-40042 units Cpep    ZENPEP    180 capsule    Take 2-3 capsules (40,000-60,000 Units) by mouth Take with snacks or supplements (Snacks)    Idiopathic chronic pancreatitis (H)       * amylase-lipase-protease 02673-75325 units Cpep    ZENPEP    180 capsule    Take 5 capsules (100,000 Units) by mouth 4 times daily (with meals and nightly)    Right upper quadrant abdominal pain       blood glucose monitoring test strip    no brand specified    100 strip    One Touch  Ultra 2 Strips, Use to test blood sugars 2 times daily or as directed    Hypoglycemia       buprenorphine 5 MCG/HR WK patch    BUTRANS    4 patch    Place 1 patch onto the skin every 7 days    Abdominal pain, generalized       cephalexin 250 MG/5ML suspension    KEFLEX    210 mL    Take 10 ml per GJ tube tid    Dysuria       clotrimazole-betamethasone cream    LOTRISONE    15 g    Apply topically 2 times daily    Rash and nonspecific skin eruption       fluticasone 50 MCG/ACT spray    FLONASE    16 g    Spray 2 sprays into both nostrils daily    Nasal congestion       gabapentin 250 MG/5ML solution    NEURONTIN      Chronic abdominal pain, Sphincter of Oddi dysfunction, Cyclical vomiting with nausea, intractability of vomiting not specified, Pain around percutaneous endoscopic gastrostomy (PEG) tube site, sequela       hydrOXYzine 10 MG/5ML syrup    ATARAX    1500 mL    Take 12.5 mLs (25 mg) by mouth every 6 hours as needed for anxiety or other (pain)    Acute abdominal pain       leuprolide 11.25 MG kit    LUPRON DEPOT (3-MONTH)    1 each    Inject 11.25 mg into the muscle every 3 months    Endometriosis       levalbuterol 45 MCG/ACT Inhaler    XOPENEX HFA    1 Inhaler    Inhale 2 puffs into the lungs every 6 hours as needed for shortness of breath / dyspnea    Wheeze       LORazepam 0.5 MG tablet    ATIVAN    10 tablet    Take 1 tablet (0.5 mg) by mouth 2 times daily as needed (nausea, do not take with pain medicine, do not drive after taking)    Cyclical vomiting with nausea, intractability of vomiting not specified       multivitamins with minerals Liqd liquid     1 Bottle    15 mLs by Per Feeding Tube route daily    Abdominal pain, epigastric       norethindrone 5 MG tablet    AYGESTIN    90 tablet    Take 1 tablet (5 mg) by mouth daily    Endometriosis       nortriptyline 10 MG capsule    PAMELOR    120 capsule    Take 3 capsules (30 mg) by mouth At Bedtime    Right upper quadrant abdominal pain        ondansetron 4 MG ODT tab    ZOFRAN ODT    30 tablet    Take 1 tablet (4 mg) by mouth every 8 hours as needed for nausea or vomiting    Intractable cyclical vomiting with nausea       order for DME     1 Units    Equipment being ordered: TENS with wire and electrode.    Chronic abdominal pain       * oxyCODONE 5 MG/5ML solution    ROXICODONE    200 mL    Take 10 mL (10 mg) by mouth every 6 hours as needed, maximum 40 mL per day.    Abdominal pain, generalized, Chronic abdominal pain       * oxyCODONE IR 10 MG tablet    ROXICODONE    27 tablet    Take 1 tablet (10 mg) by mouth every 6 hours as needed for moderate to severe pain    Abdominal pain, generalized       pantoprazole 40 MG EC tablet    PROTONIX    30 tablet    Take 1 tablet (40 mg) by mouth 2 times daily (before meals)    Right upper quadrant abdominal pain, Erosive gastritis       polyethylene glycol Packet    MIRALAX/GLYCOLAX    7 packet    Take 17 g by mouth daily    Right upper quadrant abdominal pain       RIZATRIPTAN BENZOATE PO      Take 5 mg by mouth once as needed        senna-docusate 8.6-50 MG per tablet    SENOKOT-S;PERICOLACE    100 tablet    Take 1 tablet by mouth 2 times daily as needed for constipation    Right upper quadrant abdominal pain       sodium bicarbonate 325 MG tablet     120 tablet    1 tablet (325 mg) by Per Feeding Tube route 4 times daily    Abdominal pain, epigastric, Intractable cyclical vomiting with nausea       * Notice:  This list has 4 medication(s) that are the same as other medications prescribed for you. Read the directions carefully, and ask your doctor or other care provider to review them with you.

## 2018-05-17 NOTE — PROGRESS NOTES
[facility]  Primary Care Center   Quyen Baez MD  03/12/2018      Chief Complaint:   Abdominal Pain (Patient is here for follow up including upper right abdominal pain, nausea and vomiting, and G-tube)       History of Present Illness:   Alexandra Melgoza is a 22 year old female with a history of history of chronic abdominal pain post-cholecystectomy, Sphincter of Oddi dysfunction, cyclic N/V, migraines, endometriosis, pseudoseizures and somatoform disorder on chronic narcotics. She has had abdominal pain since she was 8 years old and this worsened after her cholecystectomy. She is here today for follow up of chronic abdominal pain. She reports that she recently moved back to [place] from [place] and her significant other Mirza intends to move as well.  She was apparently hospitalized with flare of pancreatitis in [place] in November and was apparently transferred to [place].  She has had several hospitalizations and diagnostics/procedures in the interim. She had a MRCP, ERCP, EGD, HIDA scan and MR secretin test. She has seen Dr. Narayanan with concerns for chronic pancreatitis clinically, though she has not met diagnostic criteria.     She did get a feeding tube which did decreased some of her abdominal pain. She does 24 hour feeds with a pump and can eat soft solids if she feels like it. She has been able to tolerate applesauce, broth, and soft fruits, but has not yet been able to tolerate a full meal. She does still have a port and has been getting infusions MWF with Zofran and Tylenol as needed. While she is up here, she is staying with her mother while she and her significant other are looking for housing. She notes that some days are better than others. Since she has started the tube feedings, she has had diarrhea and is now trying to change her formula to a dairy free and gluten free formula to see if this improves her symptoms. Also, she has been mixing in zantac with the formula. She does flush with Pedialyte occasionally  "and thinks that this has helped somewhat with her nausea. She has been working with Dr. Wang to wean off her pain medication. She also has an appointment to see the pain psychologist Dr. Quyen Lennon on 3/19/2018. She is also planning on trying acupuncture to see if this can help as well. She does report intermittent fevers over the last week and has also noticed \"oozing\" from the G/J tube. She notes that the discharge is a whitish-green color. Last night was the last time she had an elevated temperature. She denies any urinary issues. She has vomited a couple of times the last couple of days, but she is able to vent through her gastric port and this alleviates a lot of her symptoms. She does endorse a cough, but denies any other sick symptoms.     Regarding her endometriosis, she is still on Lupron shots. She does not get menstrual periods.     She is additionally requesting refills of her Gabapentin 400 mg three times daily and Hydroxyzine 25 mg every four hours as needed, both in liquid form. This was initially prescribed by the hospital.      Review of Systems:   Pertinent items are noted in HPI, remainder of complete ROS is negative.      Active Medications:   Current Outpatient Prescriptions:      oxyCODONE (ROXICODONE) 5 MG/5ML solution, Take 10 mL (10 mg) by mouth every 6 hours as needed, maximum 40 mL per day., Disp: 600 mL, Rfl: 0     acetaminophen (TYLENOL) 32 mg/mL solution, 30.45 mLs (975 mg) by Per J Tube route every 8 hours, Disp: , Rfl:      ondansetron (ZOFRAN ODT) 4 MG ODT tab, Take 1 tablet (4 mg) by mouth every 8 hours as needed for nausea or vomiting, Disp: 15 tablet, Rfl: 0     sodium bicarbonate 325 MG tablet, 1 tablet (325 mg) by Per Feeding Tube route 4 times daily, Disp: 120 tablet, Rfl: 0     nortriptyline (PAMELOR) 10 MG capsule, Take 3 capsules (30 mg) by mouth At Bedtime (Patient taking differently: Take 20-30 mg by mouth At Bedtime ), Disp: 120 capsule, Rfl: 0     hydrOXYzine " (ATARAX) 10 MG/5ML syrup, Take 12.5 mLs (25 mg) by mouth every 4 hours as needed for anxiety or other (pain), Disp: 473 mL, Rfl: 0     menthol (ICY HOT) 5 % PTCH, Apply 1 patch topically every 8 hours as needed for muscle soreness, Disp: 15 patch, Rfl: 3     amylase-lipase-protease (ZENPEP) 61168 UNITS CPEP, Take 5 capsules (100,000 Units) by mouth 4 times daily (with meals and nightly), Disp: 180 capsule, Rfl: 1     multivitamins with minerals (CERTAVITE/CEROVITE) LIQD liquid, 15 mLs by Per Feeding Tube route daily, Disp: 1 Bottle, Rfl: 0     gabapentin (NEURONTIN) 250 MG/5ML solution, 8 mLs (400 mg) by ORAL OR NJ TUBE route 2 times daily (Patient taking differently: 400 mg by ORAL OR NJ TUBE route 3 times daily ), Disp: 470 mL, Rfl: 0     order for DME, Equipment being ordered: TENS with wire and electrode., Disp: 1 Units, Rfl: 0     scopolamine (TRANSDERM) 72 hr patch, Apply 1 patch to hairless area behind one ear if severe nausea and vomiting.  Remove old patch and change every 3 days (72 hours)., Disp: 2 patch, Rfl: 0     amylase-lipase-protease (ZENPEP) 30390 UNITS CPEP, Take 2-3 capsules (40,000-60,000 Units) by mouth Take with snacks or supplements (Snacks), Disp: 180 capsule, Rfl: 1     senna-docusate (SENOKOT-S;PERICOLACE) 8.6-50 MG per tablet, Take 1 tablet by mouth 2 times daily as needed for constipation, Disp: 100 tablet, Rfl: 0     pantoprazole (PROTONIX) 40 MG EC tablet, Take 1 tablet (40 mg) by mouth 2 times daily (before meals), Disp: 30 tablet, Rfl: 1     polyethylene glycol (MIRALAX/GLYCOLAX) Packet, Take 17 g by mouth daily, Disp: 7 packet, Rfl: 0     norethindrone (AYGESTIN) 5 MG tablet, Take 1 tablet (5 mg) by mouth daily, Disp: 90 tablet, Rfl: 1     fluticasone (FLONASE) 50 MCG/ACT spray, Spray 2 sprays into both nostrils daily, Disp: 16 g, Rfl: 3     levalbuterol (XOPENEX HFA) 45 MCG/ACT Inhaler, Inhale 2 puffs into the lungs every 6 hours as needed for shortness of breath / dyspnea, Disp: 1  Detail Level: Generalized Inhaler, Rfl: 1     leuprolide (LUPRON DEPOT) 11.25 MG injection, Inject 11.25 mg into the muscle every 3 months, Disp: 1 each, Rfl: 3     RIZATRIPTAN BENZOATE PO, Take 5 mg by mouth once as needed , Disp: , Rfl:   No current facility-administered medications for this visit.     Facility-Administered Medications Ordered in Other Visits:      ondansetron (ZOFRAN) injection 4 mg, 4 mg, Intravenous, Daily PRN, Campbell Narayanan MD, 4 mg at 03/12/18 1532     diphenhydrAMINE (BENADRYL) injection 25 mg, 25 mg, Intravenous, Daily PRN, Campbell Narayanan MD, 25 mg at 03/12/18 1530      Allergies:   Amoxicillin-pot clavulanate; Compazine [prochlorperazine]; Hyoscyamine; Reglan [metoclopramide hcl]; Zyprexa; Amitriptyline hcl; Buspirone; Cogentin [benztropine]; Cyproheptadine; Dicyclomine; Droperidol; Effexor [venlafaxine]; Food; No clinical screening - see comments; Phenergan dm [promethazine-dm]; Promethazine; Risperidone; Vistaril; Augmentin; Ketamine; and Sorbitol      Past Medical History:  Abdominal pain, epigastric  Anxiety  Asthma  Cholecystitis; s/p cholecystectomy  Chronic abdominal pain  Chronic infection; mrsa  Cyclic vomiting syndrome  Depression  Dysmenorrhea  Eating disorder  Encounter for long-term opiate analgesic use  Endometriosis  Hypoglycemia  Intractable nausea and vomiting  Major depressive disorder, recurrent episode, moderate (H)  Migraines  Mild intermittent asthma  Myalgia and myositis  Opioid dependence (H)  Ovarian cysts  Pancreatic disease  Pancreatitis  PONV (postoperative nausea and vomiting)  Postherpetic neuralgia  POTS (postural orthostatic tachycardia syndrome)  Pseudoseizures  Somatoform disorder  Sphincter of Oddi dysfunction  Thrombocytopenia (H)  Urge incontinence  Vasovagal syncope     Past Surgical History:    ABDOMEN SURGERY; ERCP, biliary stents    CHOLECYSTECTOMY    ENDOSCOPIC RETROGRADE CHOLANGIOPANCREATOGRAM; Endoscopic Retrograde Cholangiopancreatogram    ENDOSCOPIC  "RETROGRADE CHOLANGIOPANCREATOGRAM; Endoscopic Retrograde Cholangiopancreatogram with pancreatic stent placement.    COMBINED ENDOSCOPIC RETROGRADE CHOLANGIOPANCREATOGRAPHY, PLACE TUBE/STENT;  Endoscopic Retrograde Cholangiopancreatography with nasojejunal feeding tube placement    ENDOSCOPIC RETROGRADE CHOLANGIOPANCREATOGRAM COMPLEX; Endoscopic Retrograde Cholangiopancreatogram with Manometry bile duct sphincterotomy extention pancreatic duct sphincterotomy pancreatic duct stent placement    INSERT PORT VASCULAR ACCESS      L knee arthroscopy    LAPAROSCOPY DIAGNOSTIC (GYN);  LAPAROSCOPY DIAGNOSTIC, CAUTERY ENDOMETRIOISIS and biopsy of Fallopian tube lesions    ORTHOPEDIC SURGERY; knee    PERCUTANEOUS INSERTION TUBE JEJUNOSTOMY;  upper endoscopy with percutaneous gastrojejunostimy feeding tuble placement and gastropexy    VASCULAR SURGERY      Family History:   Father - hypertension  Brother - hypoglycemia  Paternal grandmother - depression  Maternal grandmother - allergies  Maternal grandfather - cerebrovascular disease, cardiovascular, depression/anxiety, gastrointestinal disease      Social History:   Marital Status: significant other  Presents to the clinic alone  Tobacco Use: never  Alcohol Use: no  PCP: Quyen Baez      Physical Exam:   /83 (BP Location: Right arm, Patient Position: Chair, Cuff Size: Adult Regular)  Pulse 97  Temp 97.9  F (36.6  C) (Oral)  Resp 18  Ht 1.689 m (5' 6.5\")  Wt 66.7 kg (147 lb)  SpO2 100%  Breastfeeding? No  BMI 23.37 kg/m2      Constitutional: Alert, oriented, pleasant, no acute distress  Head: Normocephalic, atraumatic  Eyes: Extra-ocular movements intact, no scleral icterus  ENT: Oropharynx clear, moist mucus membranes, good dentition  Cardiovascular: Regular rate and rhythm, no murmurs, rubs or gallops, peripheral pulses full/symmetric  Respiratory: Good air movement bilaterally, lungs clear, no wheezes/rales/rhonchi  GI: Abdomen soft, bowel sounds " Detail Level: Detailed present, nondistended, tender to palpation in the epigastric and right upper quadrant, G/J tube present with scant greenish-yellowish drainage, no erythema of the peg site, no organomegaly or masses, no rebound/guarding  Musculoskeletal: No edema, normal muscle tone, normal gait  Neurologic: Alert and oriented, cranial nerves 2-12 intact.  Skin: No rashes/lesions  Psychiatric: normal mentation, affect and mood     Assessment and Plan:  Alexandra was seen today for abdominal pain.    Diagnoses and all orders for this visit:    Chronic abdominal pain    Sphincter of Oddi dysfunction    Intractable cyclical vomiting with nausea  This is my first visit with Alexandra since she returned to [place]. We reviewed an overview of her most recent medical history. I have reviewed her notes and hospitalizations. I will refill some of her medications today. I reassured her that I do not believe her G/J tube site is infected, but she should continue to monitor it for increased drainage or redness. She will continue to follow with GI and the Pain Clinic. I will see her back in 3 months or sooner as needed.       Follow-up: Return in about 3 months (around 6/12/2018) for Routine Visit.         Scribe Disclosure:   I, Rebecca Saez, am serving as a scribe to document services personally performed by Quyen Baez MD at this visit, based upon the provider's statements to me. All documentation has been reviewed by the aforementioned provider prior to being entered into the official medical record.     Portions of this medical record were completed by a scribe. UPON MY REVIEW AND AUTHENTICATION BY ELECTRONIC SIGNATURE, this confirms (a) I performed the applicable clinical services, and (b) the record is accurate.    Quyen Baez MD  Internal Medicine    25 min spent face to face, of which >50% time spent on counseling/coordinating care exclusive of any procedure time

## 2018-05-23 ENCOUNTER — TELEPHONE (OUTPATIENT)
Dept: FAMILY MEDICINE | Facility: CLINIC | Age: 23
End: 2018-05-23

## 2018-05-23 NOTE — LETTER
May 23, 2018    Alexandra Melgoza  [address] [facility] [address]    Dear Phyllis Morris cares about your health and your health plan.  I have reviewed your medical conditions, medication list and lab results, and am making recommendations based on this review to better manage your health.    You are in particular need of attention regarding:  -Asthma  -Depression/Anxiety  -Cervical Cancer Screening  -Wellness (Physical) Visit   -GC Chlamydia Screening    I am recommending that you:     -schedule a WELLNESS (Physical) APPOINTMENT with me.   I will check fasting labs the same day - nothing to eat except water and meds for 8-10 hours prior. (If you go elsewhere for Wellness visits then please disregard this reminder.)    -schedule a PAP SMEAR EXAM which is due.  Please disregard this reminder if you have had this exam elsewhere within the last year.  It would be helpful for us to have a copy of your recent pap smear report in our file so that we can best coordinate your care.    If you are under/uninsured, we recommend you contact the [facility]. They offer pap smears at no charge or on a sliding fee charge. You can schedule with them at [phone]. Please have them send us the results.    -Complete and return the attached ASTHMA CONTROL TEST.  If your total score is 19 or less or you have been to the ER or urgent care for your asthma, then please schedule an asthma followup appointment.    -Be tested for Chlamydia      Annual testing is recommended for sexually active women between the ages of 15 and 25.     Chlamydia is the most common bacterial sexually transmitted disease in the [place], according to the Centers for Disease Control (CDC), yet many women considered at risk for the disease do not get the recommended annual screening test.     Chlamydia has no symptoms and left untreated, it can cause infertility and other serious health problems. Chlamydia is easily cured with antibiotics.   We have enclosed a brochure that gives you additional information about Chlamydia.    Testing is now usually done by leaving a urine sample with a lab-only appointment or you can make an office visit if you have other concerns. (If you are not recently or currently sexually active, then please contact us so we can update your medical record.)        Please complete the enclosed PHQ9 and mail back to clinic in the envelope provided.         Please call us at the [facility] location:  [phone] or use [employer] to address the above recommendations.     Thank you for trusting [facility].  We appreciate the opportunity to serve you and look forward to supporting your healthcare in the future.    If you have (or plan to have) any of these tests done at a facility other than a [facility] or a [facility], please have the results sent to the [facility] location noted above.      Best Regards,    Michelle Martinez, DO

## 2018-05-23 NOTE — LETTER
My Depression Action Plan  Name: Alexandra eMlgoza   Date of Birth 1995  Date: 5/23/2018    My doctor: Quyen Baez   My clinic: [address] [address]  [phone]          GREEN    ZONE   Good Control    What it looks like:     Things are going generally well. You have normal up s and down s. You may even feel depressed from time to time, but bad moods usually last less than a day.   What you need to do:  1. Continue to care for yourself (see self care plan)  2. Check your depression survival kit and update it as needed  3. Follow your physician s recommendations including any medication.  4. Do not stop taking medication unless you consult with your physician first.           YELLOW         ZONE Getting Worse    What it looks like:     Depression is starting to interfere with your life.     It may be hard to get out of bed; you may be starting to isolate yourself from others.    Symptoms of depression are starting to last most all day and this has happened for several days.     You may have suicidal thoughts but they are not constant.   What you need to do:     1. Call your care team, your response to treatment will improve if you keep your care team informed of your progress. Yellow periods are signs an adjustment may need to be made.     2. Continue your self-care, even if you have to fake it!    3. Talk to someone in your support network    4. Open up your depression survival kit           RED    ZONE Medical Alert - Get Help    What it looks like:     Depression is seriously interfering with your life.     You may experience these or other symptoms: You can t get out of bed most days, can t work or engage in other necessary activities, you have trouble taking care of basic hygiene, or basic responsibilities, thoughts of suicide or death that will not go away, self-injurious behavior.     What you need  to do:  1. Call your care team and request a same-day appointment. If they are not available (weekends or after hours) call your local crisis line, emergency room or 911.            Depression Self Care Plan / Survival Kit    Self-Care for Depression  Here s the deal. Your body and mind are really not as separate as most people think.  What you do and think affects how you feel and how you feel influences what you do and think. This means if you do things that people who feel good do, it will help you feel better.  Sometimes this is all it takes.  There is also a place for medication and therapy depending on how severe your depression is, so be sure to consult with your medical provider and/ or Behavioral Health Consultant if your symptoms are worsening or not improving.     In order to better manage my stress, I will:    Exercise  Get some form of exercise, every day. This will help reduce pain and release endorphins, the  feel good  chemicals in your brain. This is almost as good as taking antidepressants!  This is not the same as joining a gym and then never going! (they count on that by the way ) It can be as simple as just going for a walk or doing some gardening, anything that will get you moving.      Hygiene   Maintain good hygiene (Get out of bed in the morning, Make your bed, Brush your teeth, Take a shower, and Get dressed like you were going to work, even if you are unemployed).  If your clothes don't fit try to get ones that do.    Diet  I will strive to eat foods that are good for me, drink plenty of water, and avoid excessive sugar, caffeine, alcohol, and other mood-altering substances.  Some foods that are helpful in depression are: complex carbohydrates, B vitamins, flaxseed, fish or fish oil, fresh fruits and vegetables.    Psychotherapy  I agree to participate in Individual Therapy (if recommended).    Medication  If prescribed medications, I agree to take them.  Missing doses can result in  serious side effects.  I understand that drinking alcohol, or other illicit drug use, may cause potential side effects.  I will not stop my medication abruptly without first discussing it with my provider.    Staying Connected With Others  I will stay in touch with my friends, family members, and my primary care provider/team.    Use your imagination  Be creative.  We all have a creative side; it doesn t matter if it s oil painting, sand castles, or mud pies! This will also kick up the endorphins.    Witness Beauty  (AKA stop and smell the roses) Take a look outside, even in mid-winter. Notice colors, textures. Watch the squirrels and birds.     Service to others  Be of service to others.  There is always someone else in need.  By helping others we can  get out of ourselves  and remember the really important things.  This also provides opportunities for practicing all the other parts of the program.    Humor  Laugh and be silly!  Adjust your TV habits for less news and crime-drama and more comedy.    Control your stress  Try breathing deep, massage therapy, biofeedback, and meditation. Find time to relax each day.     My support system    Clinic Contact:  Phone number:    Contact 1:  Phone number:    Contact 2:  Phone number:    Shinto/:  Phone number:    Therapist:  Phone number:    Local crisis center:    Phone number:    Other community support:  Phone number:

## 2018-05-23 NOTE — TELEPHONE ENCOUNTER
Panel Management Review      Patient has the following on her problem list:     Depression / Dysthymia review    Measure:  Needs PHQ-9 score of 4 or less during index window.  Administer PHQ-9 and if score is 5 or more, send encounter to provider for next steps.    5 - 7 month window range:     PHQ-9 SCORE 4/14/2016 1/23/2017 3/19/2018   Total Score - - -   Total Score MyChart - - 6 (Mild depression)   Total Score - - -   Total Score 0 1 6   Some encounter information is confidential and restricted. Go to Review Flowsheets activity to see all data.       If PHQ-9 recheck is 5 or more, route to provider for next steps.    Patient is due for:  PHQ9 and DAP/ACT/AAP/Pap      Composite cancer screening  Chart review shows that this patient is due/due soon for the following Pap Smear  Summary:    Patient is due/failing the following:   AAP, ACT, DAP, PAP and PHQ9    Action needed:   Patient needs office visit for Physical/Pap., Patient needs to do ACT. and Patient needs to do PHQ9.    Type of outreach:    Sent [employer] message. and Copy of ACT mailed to patient, will reach out in 5 days.    Questions for provider review:    Please complete AAP and return to me                                                                                                                                    Marjorie Bates LPN     Chart routed to Provider .

## 2018-05-26 NOTE — PROGRESS NOTES
Called Grabiel and she is aware we will get her scheduled for tomorrow with Dr. Narayanan.  She had a H& P done 2/25/2018. Dr. Narayanan can update it at the time of procedure.     Order placed for upper GI endoscopy and sent to OR scheduling.     Ella SARMIENTO RN Coordinator  Dr. Narayanan, Dr. Joe & Dr. Klein   Advanced Endoscopy  [phone]       No Heavy lifting/straining

## 2018-06-06 ENCOUNTER — TELEPHONE (OUTPATIENT)
Dept: GASTROENTEROLOGY | Facility: CLINIC | Age: 23
End: 2018-06-06

## 2018-06-06 NOTE — TELEPHONE ENCOUNTER
[facility] Call Center    Phone Message    May a detailed message be left on voicemail: yes    Reason for Call: Other: Pt stated that she feels ready to change her JJ tube to apurva-key button. can you call pt to discuss this?     Action Taken: Message routed to:  [facility] ([facility] chely

## 2018-06-07 ENCOUNTER — CARE COORDINATION (OUTPATIENT)
Dept: GASTROENTEROLOGY | Facility: CLINIC | Age: 23
End: 2018-06-07

## 2018-06-07 DIAGNOSIS — Z78.9 ENCOUNTER FOR GASTROJEJUNAL TUBE PLACEMENT: Primary | ICD-10-CM

## 2018-06-07 NOTE — PROGRESS NOTES
Grabiel would like tube changed to a button. Ok per Dr. Narayanan to change this. Can be done in endo as the last procedure was.     Order placed and sent to scheduling.     Ella SARMIENTO RN Coordinator  Dr. Narayanan, Dr. Joe & Dr. Klein   Advanced Endoscopy  [phone]

## 2018-06-08 NOTE — TELEPHONE ENCOUNTER
[email or web address] message sent to Grabiel - called her but she does not have voicemail set up and the call disconnects.    Advised we will get her scheduled for tube change with Dr. Narayanan. She will get a call on Monday 6/11/2018.     Ella SARMIENTO RN Coordinator  Dr. Narayanan, Dr. Joe & Dr. Klein   Advanced Endoscopy  [phone]

## 2018-06-13 ENCOUNTER — NURSE TRIAGE (OUTPATIENT)
Dept: NURSING | Facility: CLINIC | Age: 23
End: 2018-06-13

## 2018-06-13 NOTE — TELEPHONE ENCOUNTER
"Caller transferred to triage from the clinic staff.   Patient states that she has a JG tube that was placed in February this year for pancreatitis.   Since last night Patient has been vomiting and is seeing more of the tubing \"part of it is pulled out.\" \"I don't know if the balloon is deflated or what.\"  Has vomited 10-12 times. Also has had 6 diarrhea stools. No blood noted.   Rates abdominal pain, upper left and upper right, 6-7/10.  Feels like she is having a pancreatitis flare.   Feels weak.  Last urinated at 7am this morning.   Denies fever.  Is currently in [place]. Will be coming to [place] and having her tube replaced on the 20th.     Protocol and care advice reviewed.   Caller states understanding of the recommended disposition. Advised to be seen in ER. Caller states that she will go to the [facility] in [place], [place].   Advised to call back if further questions or concerns.       Reason for Disposition    Vomiting > 4 times in the past 4 hours    Additional Information    Negative: Shock suspected (e.g., cold/pale/clammy skin, too weak to stand, low BP, rapid pulse)    Negative: [1] Shortness of breath AND [2] new onset    Negative: Bluish lips, tongue, or face now    Negative: Sounds like a life-threatening emergency to the triager    Negative: SEVERE abdominal pain    Negative: [1] Vomiting AND [2] contains red blood or black (\"coffee ground\") material  (Exception: few red streaks in vomit that only happened once)    Negative: Tube contains red blood or black (\"coffee ground\") material (Exception: pink tinge of tube fluid occurs once and clears immediately with irrigation.)    Protocols used: FEEDING TUBE SYMPTOMS AND QUESTIONS-ADULT-      "

## 2018-06-14 ENCOUNTER — TELEPHONE (OUTPATIENT)
Dept: GASTROENTEROLOGY | Facility: CLINIC | Age: 23
End: 2018-06-14

## 2018-06-18 ENCOUNTER — TELEPHONE (OUTPATIENT)
Dept: GASTROENTEROLOGY | Facility: CLINIC | Age: 23
End: 2018-06-18

## 2018-06-18 NOTE — TELEPHONE ENCOUNTER
Patient scheduled for EGD     Indication for procedure. Gastrojejunal tube placement     Referring Provider. Campbell Narayanan MD    ? No     Arrival time verified? Patient to arrive at 7:30 am     Facility location verified? [address], [address]     Instructions given regarding prep and procedure. Transportation policy reviewed and verbalized understanding.     Prep Type NPO     Are you taking any anticoagulants or blood thinners? Denies     Instructions given? Yes     Electronic implanted devices? Denies     Pre procedure teaching completed? Yes    Transportation from procedure? Yes, mother.     H&P / Pre op physical completed? N/A    Paresh Nergo RN

## 2018-06-20 ENCOUNTER — MYC MEDICAL ADVICE (OUTPATIENT)
Dept: ANESTHESIOLOGY | Facility: CLINIC | Age: 23
End: 2018-06-20

## 2018-06-20 ENCOUNTER — SURGERY (OUTPATIENT)
Age: 23
End: 2018-06-20

## 2018-06-20 ENCOUNTER — HOSPITAL ENCOUNTER (OUTPATIENT)
Facility: CLINIC | Age: 23
Discharge: HOME | End: 2018-06-20
Attending: INTERNAL MEDICINE | Admitting: INTERNAL MEDICINE
Payer: COMMERCIAL

## 2018-06-20 ENCOUNTER — APPOINTMENT (OUTPATIENT)
Dept: GENERAL RADIOLOGY | Facility: CLINIC | Age: 23
End: 2018-06-20
Attending: INTERNAL MEDICINE
Payer: COMMERCIAL

## 2018-06-20 ENCOUNTER — TELEPHONE (OUTPATIENT)
Dept: GASTROENTEROLOGY | Facility: CLINIC | Age: 23
End: 2018-06-20

## 2018-06-20 VITALS
DIASTOLIC BLOOD PRESSURE: 84 MMHG | OXYGEN SATURATION: 100 % | RESPIRATION RATE: 6 BRPM | SYSTOLIC BLOOD PRESSURE: 115 MMHG

## 2018-06-20 DIAGNOSIS — R10.13 ABDOMINAL PAIN, EPIGASTRIC: Primary | ICD-10-CM

## 2018-06-20 DIAGNOSIS — K94.23 LEAKING PERCUTANEOUS ENDOSCOPIC GASTROSTOMY (PEG) TUBE (H): Primary | ICD-10-CM

## 2018-06-20 LAB
GLUCOSE BLDC GLUCOMTR-MCNC: 95 MG/DL (ref 70–99)
PROVATION GI EXAM: NORMAL

## 2018-06-20 PROCEDURE — 25000132 ZZH RX MED GY IP 250 OP 250 PS 637: Performed by: INTERNAL MEDICINE

## 2018-06-20 PROCEDURE — 82962 GLUCOSE BLOOD TEST: CPT

## 2018-06-20 PROCEDURE — 76000 FLUOROSCOPY <1 HR PHYS/QHP: CPT | Mod: TC

## 2018-06-20 PROCEDURE — 25000125 ZZHC RX 250: Performed by: INTERNAL MEDICINE

## 2018-06-20 PROCEDURE — G0500 MOD SEDAT ENDO SERVICE >5YRS: HCPCS | Performed by: INTERNAL MEDICINE

## 2018-06-20 PROCEDURE — 25000128 H RX IP 250 OP 636: Performed by: INTERNAL MEDICINE

## 2018-06-20 PROCEDURE — 99152 MOD SED SAME PHYS/QHP 5/>YRS: CPT | Performed by: INTERNAL MEDICINE

## 2018-06-20 PROCEDURE — 49451 REPLACE DUOD/JEJ TUBE PERC: CPT | Performed by: INTERNAL MEDICINE

## 2018-06-20 RX ORDER — DIPHENHYDRAMINE HYDROCHLORIDE 50 MG/ML
INJECTION INTRAMUSCULAR; INTRAVENOUS PRN
Status: DISCONTINUED | OUTPATIENT
Start: 2018-06-20 | End: 2018-06-20 | Stop reason: HOSPADM

## 2018-06-20 RX ORDER — OXYCODONE AND ACETAMINOPHEN 5; 325 MG/1; MG/1
1 TABLET ORAL EVERY 6 HOURS PRN
Qty: 20 TABLET | Refills: 0 | Status: SHIPPED | OUTPATIENT
Start: 2018-06-20 | End: 2018-09-12

## 2018-06-20 RX ORDER — LIDOCAINE 40 MG/G
CREAM TOPICAL
Status: DISCONTINUED | OUTPATIENT
Start: 2018-06-20 | End: 2018-06-20 | Stop reason: HOSPADM

## 2018-06-20 RX ORDER — SODIUM CHLORIDE, SODIUM LACTATE, POTASSIUM CHLORIDE, CALCIUM CHLORIDE 600; 310; 30; 20 MG/100ML; MG/100ML; MG/100ML; MG/100ML
INJECTION, SOLUTION INTRAVENOUS CONTINUOUS
Status: DISCONTINUED | OUTPATIENT
Start: 2018-06-20 | End: 2018-06-20 | Stop reason: HOSPADM

## 2018-06-20 RX ORDER — FENTANYL CITRATE 50 UG/ML
INJECTION, SOLUTION INTRAMUSCULAR; INTRAVENOUS PRN
Status: DISCONTINUED | OUTPATIENT
Start: 2018-06-20 | End: 2018-06-20 | Stop reason: HOSPADM

## 2018-06-20 RX ORDER — HEPARIN SODIUM (PORCINE) LOCK FLUSH IV SOLN 100 UNIT/ML 100 UNIT/ML
5 SOLUTION INTRAVENOUS
Status: DISCONTINUED | OUTPATIENT
Start: 2018-06-20 | End: 2018-06-20 | Stop reason: HOSPADM

## 2018-06-20 RX ORDER — LIDOCAINE 40 MG/G
CREAM TOPICAL
Status: CANCELLED | OUTPATIENT
Start: 2018-06-20

## 2018-06-20 RX ORDER — SIMETHICONE
LIQUID (ML) MISCELLANEOUS PRN
Status: DISCONTINUED | OUTPATIENT
Start: 2018-06-20 | End: 2018-06-20 | Stop reason: HOSPADM

## 2018-06-20 RX ORDER — HEPARIN SODIUM,PORCINE 10 UNIT/ML
5-10 VIAL (ML) INTRAVENOUS EVERY 24 HOURS
Status: DISCONTINUED | OUTPATIENT
Start: 2018-06-20 | End: 2018-06-20 | Stop reason: HOSPADM

## 2018-06-20 RX ADMIN — MIDAZOLAM 1 MG: 1 INJECTION INTRAMUSCULAR; INTRAVENOUS at 10:15

## 2018-06-20 RX ADMIN — MIDAZOLAM 1 MG: 1 INJECTION INTRAMUSCULAR; INTRAVENOUS at 10:21

## 2018-06-20 RX ADMIN — MIDAZOLAM 2 MG: 1 INJECTION INTRAMUSCULAR; INTRAVENOUS at 10:20

## 2018-06-20 RX ADMIN — MIDAZOLAM 2 MG: 1 INJECTION INTRAMUSCULAR; INTRAVENOUS at 10:24

## 2018-06-20 RX ADMIN — FENTANYL CITRATE 50 MCG: 50 INJECTION, SOLUTION INTRAMUSCULAR; INTRAVENOUS at 10:24

## 2018-06-20 RX ADMIN — Medication 2 ML: at 07:43

## 2018-06-20 RX ADMIN — HEPARIN SODIUM (PORCINE) LOCK FLUSH IV SOLN 100 UNIT/ML 5 ML: 100 SOLUTION at 11:06

## 2018-06-20 RX ADMIN — DIPHENHYDRAMINE HYDROCHLORIDE 50 MG: 50 INJECTION, SOLUTION INTRAMUSCULAR; INTRAVENOUS at 10:15

## 2018-06-20 RX ADMIN — FENTANYL CITRATE 100 MCG: 50 INJECTION, SOLUTION INTRAMUSCULAR; INTRAVENOUS at 10:16

## 2018-06-20 RX ADMIN — LIDOCAINE HYDROCHLORIDE 1 TUBE: 20 JELLY TOPICAL at 10:22

## 2018-06-20 RX ADMIN — SODIUM CHLORIDE, POTASSIUM CHLORIDE, SODIUM LACTATE AND CALCIUM CHLORIDE 150 ML/HR: 600; 310; 30; 20 INJECTION, SOLUTION INTRAVENOUS at 09:40

## 2018-06-20 NOTE — IP AVS SNAPSHOT
[facility], [place], Endoscopy    [address] [facility] [address] [address]    Phone:  [phone]                                       After Visit Summary   6/20/2018    Alexandra Melgoza    MRN: [id number]           After Visit Summary Signature Page     I have received my discharge instructions, and my questions have been answered. I have discussed any challenges I see with this plan with the nurse or doctor.    ..........................................................................................................................................  Patient/Patient Representative Signature      ..........................................................................................................................................  Patient Representative Print Name and Relationship to Patient    ..................................................               ................................................  Date                                            Time    ..........................................................................................................................................  Reviewed by Signature/Title    ...................................................              ..............................................  Date                                                            Time

## 2018-06-20 NOTE — IP AVS SNAPSHOT
MRN:[id number]                      After Visit Summary   6/20/2018    Alexandra Melgoza    MRN: [id number]           Thank you!     Thank you for choosing [facility] for your care. Our goal is always to provide you with excellent care. Hearing back from our patients is one way we can continue to improve our services. Please take a few minutes to complete the written survey that you may receive in the mail after you visit with us. Thank you!        Patient Information     Date Of Birth          1995        About your hospital stay     You were admitted on:  June 20, 2018 You last received care in the:  [facility], Endoscopy    You were discharged on:  June 20, 2018       Who to Call     For medical emergencies, please call 911.  For non-urgent questions about your medical care, please call your primary care provider or clinic, [phone]  For questions related to your surgery, please call your surgery clinic        Attending Provider     Provider Specialty    Campbell Narayanan MD Gastroenterology       Primary Care Provider Office Phone # Fax #    Quyen Edward MD Sasha [phone]      Your next 10 appointments already scheduled     Jul 30, 2018  2:00 PM CDT   (Arrive by 1:45 PM)   Return Visit with Campbell Narayanan MD   [facility] ([facility])    [address] [place] [id number] [phone]              Further instructions from your care team       Discharge Instructions after J-Tube Replacement     Activity and Diet  You were given medicine for pain. You may be dizzy or sleepy.  For 24 hours:    Do not drive or use heavy equipment.    Do not make important decisions.    Do not drink any alcohol.  You may return to your regular diet.    Discomfort  You may take Tylenol (acetaminophen) for pain unless your doctor has told you not to.    Do not take aspirin or ibuprofen (Advil, Motrin) or  other NSAIDS  (anti-inflammatory drugs) for 3  days.    Follow-up  Per Dr. Narayanan     When to call us:  Problems are rare. Call right away if you have:    Unusual pain in belly or chest that is not relieved by belching or passing air    Black stools (tar-like looking bowel movement)    Temperature above 100.6  F. (37.5  C).    If you vomit blood or have severe pain, go to an emergency room.    If you have questions, call:  Monday to Friday, 7 a.m. to 4:30 p.m.: Endoscopy: [phone] (We may have to call you back)    After hours: Hospital: [phone] (Ask for the GI fellow on call)    Pending Results     No orders found from 6/18/2018 to 6/21/2018.            Admission Information     Date & Time Provider Department Dept. Phone    6/20/2018 Campbell Narayanan MD [facility], [place], Endoscopy [phone]      Your Vitals Were     Blood Pressure Respirations Last Period Pulse Oximetry          113/78 6 06/06/2018 (Approximate) 100%        MyChart Information     [facility] gives you secure access to your electronic health record. If you see a primary care provider, you can also send messages to your care team and make appointments. If you have questions, please call your primary care clinic.  If you do not have a primary care provider, please call [phone] and they will assist you.        Care EveryWhere ID     This is your Care EveryWhere ID. This could be used by other organizations to access your [place] medical records  [id number]        Equal Access to Services     RACHAEL BENITEZ : Hadii angelina kelley hadasho Soomaali, waaxda luqadaha, qaybta kaalmada adeegyada, maki gaspar . So [facility] [phone].    ATENCIÓN: Si habla español, tiene a quijano disposición servicios gratuitos de asistencia lingüística. Llame al [phone].    We comply with applicable federal civil rights laws and [place] laws. We do not discriminate on the basis of race, color, national origin, age, disability, sex,  sexual orientation, or gender identity.               Review of your medicines      UNREVIEWED medicines. Ask your doctor about these medicines        Dose / Directions    acetaminophen 32 mg/mL solution   Commonly known as:  TYLENOL        Dose:  975 mg   30.45 mLs (975 mg) by Per J Tube route every 8 hours   Refills:  0       * amylase-lipase-protease 91739-82139 units Cpep   Commonly known as:  ZENPEP   Used for:  Idiopathic chronic pancreatitis (H)        Dose:  2-3 capsule   Take 2-3 capsules (40,000-60,000 Units) by mouth Take with snacks or supplements (Snacks)   Quantity:  180 capsule   Refills:  1       * amylase-lipase-protease 31201-94517 units Cpep   Commonly known as:  ZENPEP   Used for:  Right upper quadrant abdominal pain        Dose:  5 capsule   Take 5 capsules (100,000 Units) by mouth 4 times daily (with meals and nightly)   Quantity:  180 capsule   Refills:  1       buprenorphine 5 MCG/HR WK patch   Commonly known as:  BUTRANS   Used for:  Abdominal pain, generalized        Dose:  1 patch   Place 1 patch onto the skin every 7 days   Quantity:  4 patch   Refills:  0       cephalexin 250 MG/5ML suspension   Commonly known as:  KEFLEX   Used for:  Dysuria        Take 10 ml per GJ tube tid   Quantity:  210 mL   Refills:  0       clotrimazole-betamethasone cream   Commonly known as:  LOTRISONE   Used for:  Rash and nonspecific skin eruption        Apply topically 2 times daily   Quantity:  15 g   Refills:  0       fluticasone 50 MCG/ACT spray   Commonly known as:  FLONASE   Used for:  Nasal congestion        Dose:  2 spray   Spray 2 sprays into both nostrils daily   Quantity:  16 g   Refills:  3       gabapentin 250 MG/5ML solution   Commonly known as:  NEURONTIN   Used for:  Chronic abdominal pain, Sphincter of Oddi dysfunction, Cyclical vomiting with nausea, intractability of vomiting not specified, Pain around percutaneous endoscopic gastrostomy (PEG) tube site, sequela        Refills:  2        hydrOXYzine 10 MG/5ML syrup   Commonly known as:  ATARAX   Used for:  Acute abdominal pain        Dose:  25 mg   Take 12.5 mLs (25 mg) by mouth every 6 hours as needed for anxiety or other (pain)   Quantity:  1500 mL   Refills:  2       leuprolide 11.25 MG kit   Commonly known as:  LUPRON DEPOT (3-MONTH)   Used for:  Endometriosis        Dose:  11.25 mg   Inject 11.25 mg into the muscle every 3 months   Quantity:  1 each   Refills:  3       levalbuterol 45 MCG/ACT Inhaler   Commonly known as:  XOPENEX HFA   Used for:  Wheeze        Dose:  2 puff   Inhale 2 puffs into the lungs every 6 hours as needed for shortness of breath / dyspnea   Quantity:  1 Inhaler   Refills:  1       LORazepam 0.5 MG tablet   Commonly known as:  ATIVAN   Used for:  Cyclical vomiting with nausea, intractability of vomiting not specified        Dose:  0.5 mg   Take 1 tablet (0.5 mg) by mouth 2 times daily as needed (nausea, do not take with pain medicine, do not drive after taking)   Quantity:  10 tablet   Refills:  0       multivitamins with minerals Liqd liquid   Used for:  Abdominal pain, epigastric        Dose:  15 mL   15 mLs by Per Feeding Tube route daily   Quantity:  1 Bottle   Refills:  0       norethindrone 5 MG tablet   Commonly known as:  AYGESTIN   Used for:  Endometriosis        Dose:  5 mg   Take 1 tablet (5 mg) by mouth daily   Quantity:  90 tablet   Refills:  1       nortriptyline 10 MG capsule   Commonly known as:  PAMELOR   Used for:  Right upper quadrant abdominal pain        Dose:  30 mg   Take 3 capsules (30 mg) by mouth At Bedtime   Quantity:  120 capsule   Refills:  0       ondansetron 4 MG ODT tab   Commonly known as:  ZOFRAN ODT   Used for:  Intractable cyclical vomiting with nausea        Dose:  4 mg   Take 1 tablet (4 mg) by mouth every 8 hours as needed for nausea or vomiting   Quantity:  30 tablet   Refills:  2       * oxyCODONE 5 MG/5ML solution   Commonly known as:  ROXICODONE   Used for:  Abdominal pain,  generalized, Chronic abdominal pain        Take 10 mL (10 mg) by mouth every 6 hours as needed, maximum 40 mL per day.   Quantity:  200 mL   Refills:  0       * oxyCODONE IR 10 MG tablet   Commonly known as:  ROXICODONE   Used for:  Abdominal pain, generalized        Dose:  10 mg   Take 1 tablet (10 mg) by mouth every 6 hours as needed for moderate to severe pain   Quantity:  27 tablet   Refills:  0       pantoprazole 40 MG EC tablet   Commonly known as:  PROTONIX   Used for:  Right upper quadrant abdominal pain, Erosive gastritis        Dose:  40 mg   Take 1 tablet (40 mg) by mouth 2 times daily (before meals)   Quantity:  30 tablet   Refills:  1       polyethylene glycol Packet   Commonly known as:  MIRALAX/GLYCOLAX   Used for:  Right upper quadrant abdominal pain        Dose:  17 g   Take 17 g by mouth daily   Quantity:  7 packet   Refills:  0       RIZATRIPTAN BENZOATE PO        Dose:  5 mg   Take 5 mg by mouth once as needed   Refills:  0       senna-docusate 8.6-50 MG per tablet   Commonly known as:  SENOKOT-S;PERICOLACE   Used for:  Right upper quadrant abdominal pain        Dose:  1 tablet   Take 1 tablet by mouth 2 times daily as needed for constipation   Quantity:  100 tablet   Refills:  0       sodium bicarbonate 325 MG tablet   Used for:  Abdominal pain, epigastric, Intractable cyclical vomiting with nausea        Dose:  325 mg   1 tablet (325 mg) by Per Feeding Tube route 4 times daily   Quantity:  120 tablet   Refills:  0       * Notice:  This list has 4 medication(s) that are the same as other medications prescribed for you. Read the directions carefully, and ask your doctor or other care provider to review them with you.      CONTINUE these medicines which have NOT CHANGED        Dose / Directions    blood glucose monitoring test strip   Commonly known as:  no brand specified   Used for:  Hypoglycemia        One Touch Ultra 2 Strips, Use to test blood sugars 2 times daily or as directed   Quantity:   100 strip   Refills:  3       order for DME   Used for:  Chronic abdominal pain        Equipment being ordered: TENS with wire and electrode.   Quantity:  1 Units   Refills:  0                Protect others around you: Learn how to safely use, store and throw away your medicines at www.disposemymeds.org.             Medication List: This is a list of all your medications and when to take them. Check marks below indicate your daily home schedule. Keep this list as a reference.      Medications           Morning Afternoon Evening Bedtime As Needed    acetaminophen 32 mg/mL solution   Commonly known as:  TYLENOL   30.45 mLs (975 mg) by Per J Tube route every 8 hours                                * amylase-lipase-protease 82850-36905 units Cpep   Commonly known as:  ZENPEP   Take 2-3 capsules (40,000-60,000 Units) by mouth Take with snacks or supplements (Snacks)                                * amylase-lipase-protease 00713-46319 units Cpep   Commonly known as:  ZENPEP   Take 5 capsules (100,000 Units) by mouth 4 times daily (with meals and nightly)                                blood glucose monitoring test strip   Commonly known as:  no brand specified   One Touch Ultra 2 Strips, Use to test blood sugars 2 times daily or as directed                                buprenorphine 5 MCG/HR WK patch   Commonly known as:  BUTRANS   Place 1 patch onto the skin every 7 days                                cephalexin 250 MG/5ML suspension   Commonly known as:  KEFLEX   Take 10 ml per GJ tube tid                                clotrimazole-betamethasone cream   Commonly known as:  LOTRISONE   Apply topically 2 times daily                                fluticasone 50 MCG/ACT spray   Commonly known as:  FLONASE   Spray 2 sprays into both nostrils daily                                gabapentin 250 MG/5ML solution   Commonly known as:  NEURONTIN                                hydrOXYzine 10 MG/5ML syrup   Commonly known as:   ATARAX   Take 12.5 mLs (25 mg) by mouth every 6 hours as needed for anxiety or other (pain)                                leuprolide 11.25 MG kit   Commonly known as:  LUPRON DEPOT (3-MONTH)   Inject 11.25 mg into the muscle every 3 months                                levalbuterol 45 MCG/ACT Inhaler   Commonly known as:  XOPENEX HFA   Inhale 2 puffs into the lungs every 6 hours as needed for shortness of breath / dyspnea                                LORazepam 0.5 MG tablet   Commonly known as:  ATIVAN   Take 1 tablet (0.5 mg) by mouth 2 times daily as needed (nausea, do not take with pain medicine, do not drive after taking)                                multivitamins with minerals Liqd liquid   15 mLs by Per Feeding Tube route daily                                norethindrone 5 MG tablet   Commonly known as:  AYGESTIN   Take 1 tablet (5 mg) by mouth daily                                nortriptyline 10 MG capsule   Commonly known as:  PAMELOR   Take 3 capsules (30 mg) by mouth At Bedtime                                ondansetron 4 MG ODT tab   Commonly known as:  ZOFRAN ODT   Take 1 tablet (4 mg) by mouth every 8 hours as needed for nausea or vomiting                                order for DME   Equipment being ordered: TENS with wire and electrode.                                * oxyCODONE 5 MG/5ML solution   Commonly known as:  ROXICODONE   Take 10 mL (10 mg) by mouth every 6 hours as needed, maximum 40 mL per day.                                * oxyCODONE IR 10 MG tablet   Commonly known as:  ROXICODONE   Take 1 tablet (10 mg) by mouth every 6 hours as needed for moderate to severe pain                                pantoprazole 40 MG EC tablet   Commonly known as:  PROTONIX   Take 1 tablet (40 mg) by mouth 2 times daily (before meals)                                polyethylene glycol Packet   Commonly known as:  MIRALAX/GLYCOLAX   Take 17 g by mouth daily                                RIZATRIPTAN  BENZOATE PO   Take 5 mg by mouth once as needed                                senna-docusate 8.6-50 MG per tablet   Commonly known as:  SENOKOT-S;PERICOLACE   Take 1 tablet by mouth 2 times daily as needed for constipation                                sodium bicarbonate 325 MG tablet   1 tablet (325 mg) by Per Feeding Tube route 4 times daily                                * Notice:  This list has 4 medication(s) that are the same as other medications prescribed for you. Read the directions carefully, and ask your doctor or other care provider to review them with you.

## 2018-06-20 NOTE — OR NURSING
G/J feeding tube placed (Alex-Key low-profile GJ-tube 18FR, 45cm, 3.5cm stoma length, 10ml balloon) with conscious sedation and on 2L nc oxygen.  Total fluoro time = 2min 51sec.  Pt tolerated well.

## 2018-06-20 NOTE — DISCHARGE INSTRUCTIONS
Discharge Instructions after J-Tube Replacement     Activity and Diet  You were given medicine for pain. You may be dizzy or sleepy.  For 24 hours:    Do not drive or use heavy equipment.    Do not make important decisions.    Do not drink any alcohol.  You may return to your regular diet.    Discomfort  You may take Tylenol (acetaminophen) for pain unless your doctor has told you not to.    Do not take aspirin or ibuprofen (Advil, Motrin) or other NSAIDS  (anti-inflammatory drugs) for 3  days.    Follow-up  Per Dr. Narayanan     When to call us:  Problems are rare. Call right away if you have:    Unusual pain in belly or chest that is not relieved by belching or passing air    Black stools (tar-like looking bowel movement)    Temperature above 100.6  F. (37.5  C).    If you vomit blood or have severe pain, go to an emergency room.    If you have questions, call:  Monday to Friday, 7 a.m. to 4:30 p.m.: Endoscopy: [phone] (We may have to call you back)    After hours: Hospital: [phone] (Ask for the GI fellow on call)

## 2018-06-21 RX ORDER — GABAPENTIN 400 MG/1
400 CAPSULE ORAL 3 TIMES DAILY
Qty: 90 CAPSULE | Refills: 2 | Status: SHIPPED | OUTPATIENT
Start: 2018-06-21

## 2018-06-22 ENCOUNTER — CARE COORDINATION (OUTPATIENT)
Dept: GASTROENTEROLOGY | Facility: CLINIC | Age: 23
End: 2018-06-22

## 2018-06-22 NOTE — PROGRESS NOTES
Post tube change (6/20/2018) with Dr. Narayanan: Follow-up    Post procedure recommendations: Please follow the post-PEG recommendations including: change dressing once per day (max of 1 piece of split guaze under bumper) and start using PEG today.     Orders placed: None at this time    Patient states she is doing well, she had a little pain yesterday but it feels much better with the new tube in.     Clinic contact and scheduling numbers verified for future questions/concerns.     Ella SARMIENTO RN Coordinator  Dr. Narayanan, Dr. Joe & Dr. Klein  [facility] Endoscopy  [phone]

## 2018-08-01 ENCOUNTER — TELEPHONE (OUTPATIENT)
Dept: GASTROENTEROLOGY | Facility: CLINIC | Age: 23
End: 2018-08-01

## 2018-08-01 NOTE — TELEPHONE ENCOUNTER
[facility] Call Center    Phone Message    May a detailed message be left on voicemail: yes    Reason for Call: Other: Patient called requesting her infusion orders be faxed to [facility] in [place], [place] at [phone]. [place] is living [place] and would like to get her infusions closer to home. Please call patient with any questons    Action Taken: Message routed to:  [facility] ([facility]): ERIN CABALLERO

## 2018-08-07 NOTE — TELEPHONE ENCOUNTER
Message left for Grabiel asking her to call back to discuss as we generally do not just send orders outside of our system. We usually need a provider that we send recommendations to with our infusion protocols.    Asked her to call back, clinic number left for her to call.     Ella SARMIENTO, RN Coordinator  Dr. Narayanan, Dr. Joe & Dr. Klein   [facility]  [phone]

## 2018-08-17 ENCOUNTER — TELEPHONE (OUTPATIENT)
Dept: GASTROENTEROLOGY | Facility: CLINIC | Age: 23
End: 2018-08-17

## 2018-08-17 NOTE — TELEPHONE ENCOUNTER
Talko message received and sent to Dr. Narayanan for review.     Ella SARMIENTO RN Coordinator  Dr. Narayanan, Dr. Joe & Dr. Klein   [facility]  [phone]

## 2018-08-17 NOTE — TELEPHONE ENCOUNTER
Health Call Center    Phone Message    May a detailed message be left on voicemail: yes    Reason for Call: Symptoms or Concerns     If patient has red-flag symptoms, warm transfer to triage line    Current symptom or concern: Flare up- abdominal pain and vomitting    Symptoms have been present for:  2  week(s)    Has patient previously been seen for this? Yes    By : Dr. Narayanan pt    Date: 6/2018 was most recent visit with Dr. Narayanan    Are there any new or worsening symptoms? Yes: Flare up worsening requesting call back with advice on next step.      Action Taken: Message routed to:  [facility] ([facility]): Endy/Trudy

## 2018-09-02 ENCOUNTER — INFUSION THERAPY VISIT (OUTPATIENT)
Dept: INFUSION THERAPY | Facility: CLINIC | Age: 23
End: 2018-09-02
Attending: INTERNAL MEDICINE
Payer: COMMERCIAL

## 2018-09-02 VITALS
OXYGEN SATURATION: 98 % | TEMPERATURE: 98.9 F | RESPIRATION RATE: 16 BRPM | SYSTOLIC BLOOD PRESSURE: 119 MMHG | HEART RATE: 92 BPM | DIASTOLIC BLOOD PRESSURE: 72 MMHG

## 2018-09-02 DIAGNOSIS — N39.41 URGE INCONTINENCE: ICD-10-CM

## 2018-09-02 DIAGNOSIS — E16.2 HYPOGLYCEMIA: ICD-10-CM

## 2018-09-02 DIAGNOSIS — F33.1 MAJOR DEPRESSIVE DISORDER, RECURRENT EPISODE, MODERATE (H): ICD-10-CM

## 2018-09-02 DIAGNOSIS — G43.A0 CYCLICAL VOMITING WITH NAUSEA, INTRACTABILITY OF VOMITING NOT SPECIFIED: Primary | ICD-10-CM

## 2018-09-02 DIAGNOSIS — R52 PAIN: ICD-10-CM

## 2018-09-02 DIAGNOSIS — Z98.890 S/P ERCP: ICD-10-CM

## 2018-09-02 DIAGNOSIS — R10.9 ABDOMINAL PAIN: ICD-10-CM

## 2018-09-02 DIAGNOSIS — R10.13 ABDOMINAL PAIN, EPIGASTRIC: ICD-10-CM

## 2018-09-02 DIAGNOSIS — R21 RASH: ICD-10-CM

## 2018-09-02 DIAGNOSIS — R10.9 ACUTE ABDOMINAL PAIN: ICD-10-CM

## 2018-09-02 DIAGNOSIS — R11.15 INTRACTABLE CYCLICAL VOMITING WITH NAUSEA: ICD-10-CM

## 2018-09-02 DIAGNOSIS — K86.1 CHRONIC PANCREATITIS, UNSPECIFIED PANCREATITIS TYPE (H): ICD-10-CM

## 2018-09-02 DIAGNOSIS — F41.1 ANXIETY STATE: ICD-10-CM

## 2018-09-02 PROCEDURE — 96376 TX/PRO/DX INJ SAME DRUG ADON: CPT

## 2018-09-02 PROCEDURE — 96361 HYDRATE IV INFUSION ADD-ON: CPT

## 2018-09-02 PROCEDURE — 96375 TX/PRO/DX INJ NEW DRUG ADDON: CPT

## 2018-09-02 PROCEDURE — 25000128 H RX IP 250 OP 636: Mod: ZF | Performed by: INTERNAL MEDICINE

## 2018-09-02 PROCEDURE — 96374 THER/PROPH/DIAG INJ IV PUSH: CPT

## 2018-09-02 RX ORDER — DIPHENHYDRAMINE HYDROCHLORIDE 50 MG/ML
25 INJECTION INTRAMUSCULAR; INTRAVENOUS DAILY PRN
Status: CANCELLED
Start: 2018-09-02

## 2018-09-02 RX ORDER — DIPHENHYDRAMINE HYDROCHLORIDE 50 MG/ML
25 INJECTION INTRAMUSCULAR; INTRAVENOUS ONCE
Status: DISCONTINUED | OUTPATIENT
Start: 2018-09-02 | End: 2018-09-02

## 2018-09-02 RX ORDER — DIPHENHYDRAMINE HYDROCHLORIDE 50 MG/ML
25 INJECTION INTRAMUSCULAR; INTRAVENOUS DAILY PRN
Status: DISCONTINUED | OUTPATIENT
Start: 2018-09-02 | End: 2018-09-02

## 2018-09-02 RX ORDER — DIPHENHYDRAMINE HYDROCHLORIDE 50 MG/ML
25 INJECTION INTRAMUSCULAR; INTRAVENOUS ONCE
Status: CANCELLED
Start: 2018-09-02 | End: 2018-09-02

## 2018-09-02 RX ORDER — ONDANSETRON 2 MG/ML
4 INJECTION INTRAMUSCULAR; INTRAVENOUS ONCE
Status: COMPLETED | OUTPATIENT
Start: 2018-09-02 | End: 2018-09-02

## 2018-09-02 RX ORDER — ONDANSETRON 2 MG/ML
4 INJECTION INTRAMUSCULAR; INTRAVENOUS DAILY PRN
Status: DISCONTINUED | OUTPATIENT
Start: 2018-09-02 | End: 2018-09-02 | Stop reason: HOSPADM

## 2018-09-02 RX ORDER — ONDANSETRON 2 MG/ML
4 INJECTION INTRAMUSCULAR; INTRAVENOUS DAILY PRN
Status: CANCELLED
Start: 2018-09-02

## 2018-09-02 RX ORDER — ONDANSETRON 2 MG/ML
4 INJECTION INTRAMUSCULAR; INTRAVENOUS ONCE
Status: CANCELLED
Start: 2018-09-02 | End: 2018-09-02

## 2018-09-02 RX ORDER — DEXTROSE, SODIUM CHLORIDE, SODIUM LACTATE, POTASSIUM CHLORIDE, AND CALCIUM CHLORIDE 5; .6; .31; .03; .02 G/100ML; G/100ML; G/100ML; G/100ML; G/100ML
2000 INJECTION, SOLUTION INTRAVENOUS ONCE
Status: CANCELLED
Start: 2018-09-02 | End: 2018-09-02

## 2018-09-02 RX ORDER — HEPARIN SODIUM (PORCINE) LOCK FLUSH IV SOLN 100 UNIT/ML 100 UNIT/ML
500 SOLUTION INTRAVENOUS EVERY 8 HOURS
Status: CANCELLED
Start: 2018-09-02

## 2018-09-02 RX ADMIN — DIPHENHYDRAMINE HYDROCHLORIDE 25 MG: 50 INJECTION INTRAMUSCULAR; INTRAVENOUS at 12:02

## 2018-09-02 RX ADMIN — SODIUM CHLORIDE, POTASSIUM CHLORIDE, SODIUM LACTATE AND CALCIUM CHLORIDE 2000 ML: 600; 310; 30; 20 INJECTION, SOLUTION INTRAVENOUS at 12:11

## 2018-09-02 RX ADMIN — ONDANSETRON 4 MG: 2 INJECTION, SOLUTION INTRAMUSCULAR; INTRAVENOUS at 13:14

## 2018-09-02 RX ADMIN — DIPHENHYDRAMINE HYDROCHLORIDE 25 MG: 50 INJECTION INTRAMUSCULAR; INTRAVENOUS at 14:15

## 2018-09-02 RX ADMIN — ONDANSETRON 4 MG: 2 INJECTION, SOLUTION INTRAMUSCULAR; INTRAVENOUS at 12:00

## 2018-09-02 NOTE — PATIENT INSTRUCTIONS
Dear Alexandra Melgoza    Thank you for choosing [facility] ([facility]) for your infusion.  The following information is a summary of our appointment as well as important reminders.          We look forward in seeing you on your next appointment here at [facility].  Please don t hesitate to call us at [phone] to reschedule any of your appointments or to speak with one of the [facility] registered nurses.  It was a pleasure taking care of you today.    Sincerely,    [facility]  [address] [place] [address]  [id number]  Phone:  [phone]

## 2018-09-02 NOTE — MR AVS SNAPSHOT
After Visit Summary   9/2/2018    Alexandra Melgoza    MRN: [id number]           Patient Information     Date Of Birth          1995        Visit Information        Provider Department      9/2/2018 12:00 PM UC 44 ATC; UC SPEC INFUSION [facility] Procedure        Today's Diagnoses     Cyclical vomiting with nausea, intractability of vomiting not specified    -  1    Chronic pancreatitis, unspecified pancreatitis type (H)        Pain        Abdominal pain, epigastric        S/P ERCP        Acute abdominal pain        Abdominal pain        Hypoglycemia        Urge incontinence        Major depressive disorder, recurrent episode, moderate (H)        Anxiety state        Rash        Intractable cyclical vomiting with nausea          Care Instructions    Dear Alexandra Melgoza    Thank you for choosing [facility] ([facility]) for your infusion.  The following information is a summary of our appointment as well as important reminders.          We look forward in seeing you on your next appointment here at [facility].  Please don t hesitate to call us at [phone] to reschedule any of your appointments or to speak with one of the [facility] registered nurses.  It was a pleasure taking care of you today.    Sincerely,    [facility]  Specialty Infusion [facility] Procedure [facility]  [address] [place] [address]  [address]  Phone:  [phone]            Follow-ups after your visit        Who to contact     If you have questions or need follow up information about today's clinic visit or your schedule please contact [facility] SPECIALTY AND PROCEDURE directly at [phone].  Normal or non-critical lab and imaging results will be communicated to you by MyChart, letter or phone within 4 business days after the clinic has received the results. If you do not hear from us within 7  days, please contact the clinic through [employer] or phone. If you have a critical or abnormal lab result, we will notify you by phone as soon as possible.  Submit refill requests through [employer] or call your pharmacy and they will forward the refill request to us. Please allow 3 business days for your refill to be completed.          Additional Information About Your Visit        [employer] Information     [employer] gives you secure access to your electronic health record. If you see a primary care provider, you can also send messages to your care team and make appointments. If you have questions, please call your primary care clinic.  If you do not have a primary care provider, please call [phone] and they will assist you.        Care EveryWhere ID     This is your Care EveryWhere ID. This could be used by other organizations to access your [place] medical records  [id number]        Your Vitals Were     Pulse Temperature Respirations Pulse Oximetry          92 98.9  F (37.2  C) (Oral) 16 98%         Blood Pressure from Last 3 Encounters:   09/02/18 119/72   06/20/18 115/84   05/17/18 121/70    Weight from Last 3 Encounters:   04/30/18 65.8 kg (145 lb)   04/30/18 65.8 kg (145 lb)   04/13/18 68 kg (150 lb)              Today, you had the following     No orders found for display         Today's Medication Changes          These changes are accurate as of 9/2/18  2:24 PM.  If you have any questions, ask your nurse or doctor.               These medicines have changed or have updated prescriptions.        Dose/Directions    nortriptyline 10 MG capsule   Commonly known as:  PAMELOR   This may have changed:  how much to take   Used for:  Right upper quadrant abdominal pain        Dose:  30 mg   Take 3 capsules (30 mg) by mouth At Bedtime   Quantity:  120 capsule   Refills:  0                Primary Care Provider Office Phone # Fax #    Quyen Baez [phone]       [address]  [facility] [address]        Equal Access to Services     RACHAEL BENITEZ : Hadii aad ku hadcathicaleb Enamorado, wajesusda kathryndemondha, qakaylharadha rosariomamaki jones. So [facility] [phone].    ATENCIÓN: Si habla español, tiene a quijano disposición servicios gratuitos de asistencia lingüística. Panfilo al [phone].    We comply with applicable federal civil rights laws and [place] laws. We do not discriminate on the basis of race, color, national origin, age, disability, sex, sexual orientation, or gender identity.            Thank you!     Thank you for choosing [facility] SPECIALTY AND PROCEDURE  for your care. Our goal is always to provide you with excellent care. Hearing back from our patients is one way we can continue to improve our services. Please take a few minutes to complete the written survey that you may receive in the mail after your visit with us. Thank you!             Your Updated Medication List - Protect others around you: Learn how to safely use, store and throw away your medicines at www.disposemymeds.org.          This list is accurate as of 9/2/18  2:24 PM.  Always use your most recent med list.                   Brand Name Dispense Instructions for use Diagnosis    acetaminophen 32 mg/mL solution    TYLENOL     30.45 mLs (975 mg) by Per J Tube route every 8 hours        * amylase-lipase-protease 31438-87560 units Cpep    ZENPEP    180 capsule    Take 2-3 capsules (40,000-60,000 Units) by mouth Take with snacks or supplements (Snacks)    Idiopathic chronic pancreatitis (H)       * amylase-lipase-protease 53644-07278 units Cpep    ZENPEP    180 capsule    Take 5 capsules (100,000 Units) by mouth 4 times daily (with meals and nightly)    Right upper quadrant abdominal pain       blood glucose monitoring test strip    no brand specified    100 strip    One Touch Ultra 2 Strips, Use to test blood sugars 2 times daily or as directed    Hypoglycemia        buprenorphine 5 MCG/HR WK patch    BUTRANS    4 patch    Place 1 patch onto the skin every 7 days    Abdominal pain, generalized       cephalexin 250 MG/5ML suspension    KEFLEX    210 mL    Take 10 ml per GJ tube tid    Dysuria       clotrimazole-betamethasone cream    LOTRISONE    15 g    Apply topically 2 times daily    Rash and nonspecific skin eruption       fluticasone 50 MCG/ACT spray    FLONASE    16 g    Spray 2 sprays into both nostrils daily    Nasal congestion       gabapentin 400 MG capsule    NEURONTIN    90 capsule    Take 1 capsule (400 mg) by mouth 3 times daily    Abdominal pain, epigastric       hydrOXYzine 10 MG/5ML syrup    ATARAX    1500 mL    Take 12.5 mLs (25 mg) by mouth every 6 hours as needed for anxiety or other (pain)    Acute abdominal pain       leuprolide 11.25 MG kit    LUPRON DEPOT (3-MONTH)    1 each    Inject 11.25 mg into the muscle every 3 months    Endometriosis       levalbuterol 45 MCG/ACT Inhaler    XOPENEX HFA    1 Inhaler    Inhale 2 puffs into the lungs every 6 hours as needed for shortness of breath / dyspnea    Wheeze       LORazepam 0.5 MG tablet    ATIVAN    10 tablet    Take 1 tablet (0.5 mg) by mouth 2 times daily as needed (nausea, do not take with pain medicine, do not drive after taking)    Cyclical vomiting with nausea, intractability of vomiting not specified       multivitamins with minerals Liqd liquid     1 Bottle    15 mLs by Per Feeding Tube route daily    Abdominal pain, epigastric       norethindrone 5 MG tablet    AYGESTIN    90 tablet    Take 1 tablet (5 mg) by mouth daily    Endometriosis       nortriptyline 10 MG capsule    PAMELOR    120 capsule    Take 3 capsules (30 mg) by mouth At Bedtime    Right upper quadrant abdominal pain       ondansetron 4 MG ODT tab    ZOFRAN ODT    30 tablet    Take 1 tablet (4 mg) by mouth every 8 hours as needed for nausea or vomiting    Intractable cyclical vomiting with nausea       order for DME     1 Units     Equipment being ordered: TENS with wire and electrode.    Chronic abdominal pain       * oxyCODONE 5 MG/5ML solution    ROXICODONE    200 mL    Take 10 mL (10 mg) by mouth every 6 hours as needed, maximum 40 mL per day.    Abdominal pain, generalized, Chronic abdominal pain       * oxyCODONE IR 10 MG tablet    ROXICODONE    27 tablet    Take 1 tablet (10 mg) by mouth every 6 hours as needed for moderate to severe pain    Abdominal pain, generalized       oxyCODONE-acetaminophen 5-325 MG per tablet    PERCOCET    20 tablet    Take 1 tablet by mouth every 6 hours as needed for pain    Leaking percutaneous endoscopic gastrostomy (PEG) tube (H)       pantoprazole 40 MG EC tablet    PROTONIX    30 tablet    Take 1 tablet (40 mg) by mouth 2 times daily (before meals)    Right upper quadrant abdominal pain, Erosive gastritis       polyethylene glycol Packet    MIRALAX/GLYCOLAX    7 packet    Take 17 g by mouth daily    Right upper quadrant abdominal pain       RIZATRIPTAN BENZOATE PO      Take 5 mg by mouth once as needed        senna-docusate 8.6-50 MG per tablet    SENOKOT-S;PERICOLACE    100 tablet    Take 1 tablet by mouth 2 times daily as needed for constipation    Right upper quadrant abdominal pain       sodium bicarbonate 325 MG tablet     120 tablet    1 tablet (325 mg) by Per Feeding Tube route 4 times daily    Abdominal pain, epigastric, Intractable cyclical vomiting with nausea       * Notice:  This list has 4 medication(s) that are the same as other medications prescribed for you. Read the directions carefully, and ask your doctor or other care provider to review them with you.

## 2018-09-02 NOTE — PROGRESS NOTES
Nursing Note  Alexandra Melgoza [facility] for:   Chief Complaint   Patient presents with     Infusion     LR, ZOFRAN AND BENADRYLE     During [facility]'s [facility] appointment, orders from Dr. Narayanan were completed.  Frequency: as needed    Progress note:  Patient identification verified by name and date of birth.  Assessment completed.  Vitals recorded in Doc Flowsheets.  Patient was provided with education regarding infusion and possible side effects.  Patient verbalized understanding.      needed: No  Premedications: administered per order.  Infusion Rates: 999 ml/hr.  Approximate Infusion length:2 hours.   Labs: were not ordered for this appointment.  Vascular access: port accessed today.  Treatment Conditions: non-applicable.  Patient tolerated infusion: well.    Drug Waste Record? No     Discharge Plan:   Follow up plan of care with: primary medical doctor.  Discharge instructions were reviewed with patient.  Patient/representative verbalized understanding of discharge instructions and all questions answered.  [facility] in stable condition.    Ysabel Piedra RN    Administrations This Visit     diphenhydrAMINE (BENADRYL) 25 mg in sodium chloride 0.9% 50 mL     Admin Date Action Dose Rate Route Administered By          09/02/2018 New Bag 25 mg 300 mL/hr Intravenous Ysabel Piedra RN             Admin Date Action Dose Rate Route Administered By          09/02/2018 New Bag 25 mg 300 mL/hr Intravenous Ysabel Piedra RN                   lactated ringers BOLUS 1,000-2,000 mL     Admin Date Action Dose Route Administered By             09/02/2018 New Bag 2000 mL Intravenous Nicole Ballard, SAMARA                    ondansetron (ZOFRAN) injection 4 mg     Admin Date Action Dose Route Administered By             09/02/2018 Given 4 mg Intravenous Ysabel Piedra  LIVE RN              Admin Date Action Dose Route Administered By             09/02/2018 Given 4 mg Intravenous Nicole Ballard, SAMARA                          /80  Pulse 100  Temp 98.9  F (37.2  C) (Oral)  Resp 16  SpO2 98%

## 2018-09-09 ENCOUNTER — TRANSFERRED RECORDS (OUTPATIENT)
Dept: HEALTH INFORMATION MANAGEMENT | Facility: CLINIC | Age: 23
End: 2018-09-09

## 2018-09-10 ENCOUNTER — TRANSFERRED RECORDS (OUTPATIENT)
Dept: HEALTH INFORMATION MANAGEMENT | Facility: CLINIC | Age: 23
End: 2018-09-10

## 2018-09-11 ENCOUNTER — CARE COORDINATION (OUTPATIENT)
Dept: GASTROENTEROLOGY | Facility: CLINIC | Age: 23
End: 2018-09-11

## 2018-09-11 NOTE — PROGRESS NOTES
Grabiel called in to say she had her tube cultured and she is on liquid omicef. She wanted to ask Dr. Narayanan his opinion about her treatment with antibiotics.   She is wondering if her tube should be swapped out again. She is coming to town today and could be in town if Dr. Narayanan wanted to change out her tube.     Advised will route this message to Dr. Narayanan for his input.     Ella SARMIENTO RN Coordinator  Dr. Narayanan, Dr. Joe & Dr. Klein   Advanced [facility]  [phone]

## 2018-09-12 ENCOUNTER — INFUSION THERAPY VISIT (OUTPATIENT)
Dept: INFUSION THERAPY | Facility: CLINIC | Age: 23
End: 2018-09-12
Attending: INTERNAL MEDICINE
Payer: COMMERCIAL

## 2018-09-12 VITALS
DIASTOLIC BLOOD PRESSURE: 73 MMHG | SYSTOLIC BLOOD PRESSURE: 120 MMHG | RESPIRATION RATE: 16 BRPM | TEMPERATURE: 97.6 F | HEART RATE: 97 BPM

## 2018-09-12 DIAGNOSIS — N39.41 URGE INCONTINENCE: ICD-10-CM

## 2018-09-12 DIAGNOSIS — R11.15 INTRACTABLE CYCLICAL VOMITING WITH NAUSEA: ICD-10-CM

## 2018-09-12 DIAGNOSIS — K86.1 CHRONIC PANCREATITIS, UNSPECIFIED PANCREATITIS TYPE (H): ICD-10-CM

## 2018-09-12 DIAGNOSIS — F33.1 MAJOR DEPRESSIVE DISORDER, RECURRENT EPISODE, MODERATE (H): ICD-10-CM

## 2018-09-12 DIAGNOSIS — E16.2 HYPOGLYCEMIA: ICD-10-CM

## 2018-09-12 DIAGNOSIS — R10.9 ABDOMINAL PAIN, UNSPECIFIED ABDOMINAL LOCATION: ICD-10-CM

## 2018-09-12 DIAGNOSIS — R10.13 ABDOMINAL PAIN, EPIGASTRIC: ICD-10-CM

## 2018-09-12 DIAGNOSIS — R52 PAIN: ICD-10-CM

## 2018-09-12 DIAGNOSIS — R21 RASH: ICD-10-CM

## 2018-09-12 DIAGNOSIS — F41.1 ANXIETY STATE: ICD-10-CM

## 2018-09-12 DIAGNOSIS — R10.9 ACUTE ABDOMINAL PAIN: ICD-10-CM

## 2018-09-12 DIAGNOSIS — Z98.890 S/P ERCP: ICD-10-CM

## 2018-09-12 DIAGNOSIS — G43.A0 CYCLICAL VOMITING WITH NAUSEA, INTRACTABILITY OF VOMITING NOT SPECIFIED: Primary | ICD-10-CM

## 2018-09-12 PROCEDURE — 25000128 H RX IP 250 OP 636: Mod: ZF | Performed by: INTERNAL MEDICINE

## 2018-09-12 PROCEDURE — 96376 TX/PRO/DX INJ SAME DRUG ADON: CPT

## 2018-09-12 PROCEDURE — 96375 TX/PRO/DX INJ NEW DRUG ADDON: CPT

## 2018-09-12 PROCEDURE — 96361 HYDRATE IV INFUSION ADD-ON: CPT

## 2018-09-12 PROCEDURE — 96365 THER/PROPH/DIAG IV INF INIT: CPT

## 2018-09-12 RX ORDER — ONDANSETRON 2 MG/ML
4 INJECTION INTRAMUSCULAR; INTRAVENOUS ONCE
Status: COMPLETED | OUTPATIENT
Start: 2018-09-12 | End: 2018-09-12

## 2018-09-12 RX ORDER — DIPHENHYDRAMINE HYDROCHLORIDE 50 MG/ML
25 INJECTION INTRAMUSCULAR; INTRAVENOUS DAILY PRN
Status: CANCELLED
Start: 2018-09-12

## 2018-09-12 RX ORDER — DIPHENHYDRAMINE HYDROCHLORIDE 50 MG/ML
25 INJECTION INTRAMUSCULAR; INTRAVENOUS ONCE
Status: CANCELLED
Start: 2018-09-12 | End: 2018-09-12

## 2018-09-12 RX ORDER — DEXTROSE, SODIUM CHLORIDE, SODIUM LACTATE, POTASSIUM CHLORIDE, AND CALCIUM CHLORIDE 5; .6; .31; .03; .02 G/100ML; G/100ML; G/100ML; G/100ML; G/100ML
2000 INJECTION, SOLUTION INTRAVENOUS ONCE
Status: CANCELLED
Start: 2018-09-12 | End: 2018-09-12

## 2018-09-12 RX ORDER — ONDANSETRON 2 MG/ML
4 INJECTION INTRAMUSCULAR; INTRAVENOUS ONCE
Status: CANCELLED
Start: 2018-09-12 | End: 2018-09-12

## 2018-09-12 RX ORDER — ONDANSETRON 2 MG/ML
4 INJECTION INTRAMUSCULAR; INTRAVENOUS DAILY PRN
Status: DISCONTINUED | OUTPATIENT
Start: 2018-09-12 | End: 2018-09-12 | Stop reason: HOSPADM

## 2018-09-12 RX ORDER — HEPARIN SODIUM (PORCINE) LOCK FLUSH IV SOLN 100 UNIT/ML 100 UNIT/ML
500 SOLUTION INTRAVENOUS EVERY 8 HOURS
Status: CANCELLED
Start: 2018-09-12

## 2018-09-12 RX ORDER — CEFDINIR 250 MG/5ML
17.9 POWDER, FOR SUSPENSION ORAL DAILY
Status: ON HOLD | COMMUNITY
End: 2018-12-11

## 2018-09-12 RX ORDER — HEPARIN SODIUM (PORCINE) LOCK FLUSH IV SOLN 100 UNIT/ML 100 UNIT/ML
500 SOLUTION INTRAVENOUS EVERY 8 HOURS
Status: DISCONTINUED | OUTPATIENT
Start: 2018-09-12 | End: 2018-09-12 | Stop reason: HOSPADM

## 2018-09-12 RX ORDER — ONDANSETRON 2 MG/ML
4 INJECTION INTRAMUSCULAR; INTRAVENOUS DAILY PRN
Status: CANCELLED
Start: 2018-09-12

## 2018-09-12 RX ADMIN — DIPHENHYDRAMINE HYDROCHLORIDE: 50 INJECTION INTRAMUSCULAR; INTRAVENOUS at 17:13

## 2018-09-12 RX ADMIN — ONDANSETRON 4 MG: 2 INJECTION, SOLUTION INTRAMUSCULAR; INTRAVENOUS at 17:15

## 2018-09-12 RX ADMIN — DIPHENHYDRAMINE HYDROCHLORIDE: 50 INJECTION, SOLUTION INTRAMUSCULAR; INTRAVENOUS at 16:11

## 2018-09-12 RX ADMIN — ONDANSETRON 4 MG: 2 INJECTION, SOLUTION INTRAMUSCULAR; INTRAVENOUS at 16:13

## 2018-09-12 RX ADMIN — SODIUM CHLORIDE, POTASSIUM CHLORIDE, SODIUM LACTATE AND CALCIUM CHLORIDE 2000 ML: 600; 310; 30; 20 INJECTION, SOLUTION INTRAVENOUS at 16:07

## 2018-09-12 NOTE — PATIENT INSTRUCTIONS
Dear Alexandra Melgoza    Thank you for choosing [facility] ([facility]) for your infusion.  The following information is a summary of our appointment as well as important reminders.          We look forward in seeing you on your next appointment here at [facility].  Please don t hesitate to call us at [phone] to reschedule any of your appointments or to speak with one of the [facility] registered nurses.  It was a pleasure taking care of you today.    Sincerely,    [facility]  Specialty Infusion [facility] Procedure Center  [address] [place] [address]  [address]  Phone:  [phone]

## 2018-09-12 NOTE — PROGRESS NOTES
Nursing Note  Alexandra Melgoza [facility] for:   Chief Complaint   Patient presents with     Infusion     IV fluids, Zofran and Benadryl     During today's [facility] appointment, orders from Dr. Campbell Narayanan were completed.  Frequency: 3 times weekly as needed.    Progress note:  Patient identification verified by name and date of birth.  Assessment completed.  Vitals recorded in Doc Flowsheets.  Patient was provided with education regarding infusion and possible side effects.  Patient verbalized understanding.      needed: No  Premedications: were not ordered.  Infusion Rates: 999 ml/hr.  Approximate Infusion length:2 hours.   Labs: were not ordered for this appointment.  Vascular access: port accessed today.  Treatment Conditions: non-applicable.  Patient tolerated infusion: well.    Report given to Navya Villar RN for remainder of pt care.         Discharge Plan:   Follow up plan of care with: ongoing infusions at [facility] In[facility] Procedure [facility].  Discharge instructions were reviewed with patient.  Patient/representative verbalized understanding of discharge instructions and all questions answered.  Patient discharged from [facility] Procedure Center in stable condition.    Stacey Willson RN    Administrations This Visit     diphenhydrAMINE (BENADRYL) 25 mg in sodium chloride 0.9% 50 mL     Admin Date Action Dose Rate Route Administered By          09/12/2018 New Bag   250 mL/hr Intravenous Stacey Willson RN             Admin Date Action Dose Rate Route Administered By          09/12/2018 New Bag   200 mL/hr Intravenous Stacey Willson RN                   lactated ringers BOLUS 1,000-2,000 mL     Admin Date Action Dose Route Administered By             09/12/2018 New Bag 2000 mL Intravenous Stacey Willson RN                    ondansetron (ZOFRAN) injection 4 mg     Admin Date Action Dose Route  Administered By             09/12/2018 Given 4 mg Intravenous Stacey Willson RN              Admin Date Action Dose Route Administered By             09/12/2018 Given 4 mg Intravenous Stacey Willson RN                          /73  Pulse 97  Temp 97.6  F (36.4  C) (Oral)  Resp 16

## 2018-09-12 NOTE — MR AVS SNAPSHOT
After Visit Summary   9/12/2018    Alexandra Melgoza    MRN: [id number]           Patient Information     Date Of Birth          1995        Visit Information        Provider Department      9/12/2018 4:00 PM UC 41 ATC; UC SPEC INFUSION [facility] [place] [facility]        Today's Diagnoses     Cyclical vomiting with nausea, intractability of vomiting not specified    -  1    Chronic pancreatitis, unspecified pancreatitis type (H)        Pain        Abdominal pain, epigastric        S/P ERCP        Acute abdominal pain        Abdominal pain, unspecified abdominal location        Hypoglycemia        Urge incontinence        Major depressive disorder, recurrent episode, moderate (H)        Anxiety state        Rash        Intractable cyclical vomiting with nausea          Care Instructions    Dear Alexandra Melgoza    Thank you for choosing [facility] ([facility]) for your infusion.  The following information is a summary of our appointment as well as important reminders.          We look forward in seeing you on your next appointment here at [facility].  Please don t hesitate to call us at [phone] to reschedule any of your appointments or to speak with one of the [facility] registered nurses.  It was a pleasure taking care of you today.    Sincerely,    [facility]  Specialty Infusion & Procedure Center  [address] [place] [address]  [address]  Phone:  [phone]            Follow-ups after your visit        Your next 10 appointments already scheduled     Sep 15, 2018  9:00 AM CDT   Infusion 180 with UC SPEC INFUSION, UC 45 ATC   [facility] [place] [facility] ([facility] [place])    [address] [place] [address]  [address] [place] [address] [phone]              Who to contact     If you have questions or need follow up information about  today's clinic visit or your schedule please contact [facility] directly at [phone].  Normal or non-critical lab and imaging results will be communicated to you by Zebithart, letter or phone within 4 business days after the clinic has received the results. If you do not hear from us within 7 days, please contact the clinic through [email or web address] or phone. If you have a critical or abnormal lab result, we will notify you by phone as soon as possible.  Submit refill requests through NextPotential or call your pharmacy and they will forward the refill request to us. Please allow 3 business days for your refill to be completed.          Additional Information About Your Visit        NextPotential Information     NextPotential gives you secure access to your electronic health record. If you see a primary care provider, you can also send messages to your care team and make appointments. If you have questions, please call your primary care clinic.  If you do not have a primary care provider, please call [phone] and they will assist you.        Care EveryWhere ID     This is your Care EveryWhere ID. This could be used by other organizations to access your [facility] medical records  [id number]        Your Vitals Were     Pulse Temperature Respirations             97 97.6  F (36.4  C) (Oral) 16          Blood Pressure from Last 3 Encounters:   09/12/18 120/73   09/02/18 119/72   06/20/18 115/84    Weight from Last 3 Encounters:   04/30/18 65.8 kg (145 lb)   04/30/18 65.8 kg (145 lb)   04/13/18 68 kg (150 lb)              Today, you had the following     No orders found for display         Today's Medication Changes          These changes are accurate as of 9/12/18  6:32 PM.  If you have any questions, ask your nurse or doctor.               These medicines have changed or have updated prescriptions.        Dose/Directions    nortriptyline 10 MG capsule   Commonly known as:  PAMELOR   This may  have changed:  how much to take   Used for:  Right upper quadrant abdominal pain        Dose:  30 mg   Take 3 capsules (30 mg) by mouth At Bedtime   Quantity:  120 capsule   Refills:  0               Information about OPIOIDS     PRESCRIPTION OPIOIDS: WHAT YOU NEED TO KNOW   We gave you an opioid (narcotic) pain medicine. It is important to manage your pain, but opioids are not always the best choice. You should first try all the other options your care team gave you. Take this medicine for as short a time (and as few doses) as possible.    Some activities can increase your pain, such as bandage changes or therapy sessions. It may help to take your pain medicine 30 to 60 minutes before these activities. Reduce your stress by getting enough sleep, working on hobbies you enjoy and practicing relaxation or meditation. Talk to your care team about ways to manage your pain beyond prescription opioids.    These medicines have risks:    DO NOT drive when on new or higher doses of pain medicine. These medicines can affect your alertness and reaction times, and you could be arrested for driving under the influence (DUI). If you need to use opioids long-term, talk to your care team about driving.    DO NOT operate heavy machinery    DO NOT do any other dangerous activities while taking these medicines.    DO NOT drink any alcohol while taking these medicines.     If the opioid prescribed includes acetaminophen, DO NOT take with any other medicines that contain acetaminophen. Read all labels carefully. Look for the word  acetaminophen  or  Tylenol.  Ask your pharmacist if you have questions or are unsure.    You can get addicted to pain medicines, especially if you have a history of addiction (chemical, alcohol or substance dependence). Talk to your care team about ways to reduce this risk.    All opioids tend to cause constipation. Drink plenty of water and eat foods that have a lot of fiber, such as fruits, vegetables, prune  juice, apple juice and high-fiber cereal. Take a laxative (Miralax, milk of magnesia, Colace, Senna) if you don t move your bowels at least every other day. Other side effects include upset stomach, sleepiness, dizziness, throwing up, tolerance (needing more of the medicine to have the same effect), physical dependence and slowed breathing.    Store your pills in a secure place, locked if possible. We will not replace any lost or stolen medicine. If you don t finish your medicine, please throw away (dispose) as directed by your pharmacist. The [place] [employer]ution [employer] has more information about safe disposal: https://www.pca.[facility].mn.us/living-green/managing-unwanted-medications         Primary Care Provider Office Phone # Fax #    Quyen Baez [phone] [address] [address]        Equal Access to Services     RACHAEL BENITEZ : Hadii aad ku hadasho Somalini, waaxda luqadaha, qaybta kaalmada adechrisyada, maki gaspar . So [facility] [phone].    ATENCIÓN: Si habla español, tiene a quijano disposición servicios gratuitos de asistencia lingüística. Panfilo al [phone].    We comply with applicable federal civil rights laws and [place] laws. We do not discriminate on the basis of race, color, national origin, age, disability, sex, sexual orientation, or gender identity.            Thank you!     Thank you for choosing [facility]  for your care. Our goal is always to provide you with excellent care. Hearing back from our patients is one way we can continue to improve our services. Please take a few minutes to complete the written survey that you may receive in the mail after your visit with us. Thank you!             Your Updated Medication List - Protect others around you: Learn how to safely use, store and throw away your medicines at www.disposemymeds.org.          This list is  accurate as of 9/12/18  6:32 PM.  Always use your most recent med list.                   Brand Name Dispense Instructions for use Diagnosis    acetaminophen 32 mg/mL solution    TYLENOL     30.45 mLs (975 mg) by Per J Tube route every 8 hours        * amylase-lipase-protease 30094-88895 units Cpep    ZENPEP    180 capsule    Take 2-3 capsules (40,000-60,000 Units) by mouth Take with snacks or supplements (Snacks)    Idiopathic chronic pancreatitis (H)       * amylase-lipase-protease 95662-[address] units Cpep    ZENPEP    180 capsule    Take 5 capsules (100,000 Units) by mouth 4 times daily (with meals and nightly)    Right upper quadrant abdominal pain       blood glucose monitoring test strip    no brand specified    100 strip    One Touch Ultra 2 Strips, Use to test blood sugars 2 times daily or as directed    Hypoglycemia       buprenorphine 5 MCG/HR WK patch    BUTRANS    4 patch    Place 1 patch onto the skin every 7 days    Abdominal pain, generalized       cefdinir 250 MG/5ML suspension    OMNICEF     Take 17.9 mLs by mouth daily        cephalexin 250 MG/5ML suspension    KEFLEX    210 mL    Take 10 ml per GJ tube tid    Dysuria       clotrimazole-betamethasone cream    LOTRISONE    15 g    Apply topically 2 times daily    Rash and nonspecific skin eruption       fluticasone 50 MCG/ACT spray    FLONASE    16 g    Spray 2 sprays into both nostrils daily    Nasal congestion       gabapentin 400 MG capsule    NEURONTIN    90 capsule    Take 1 capsule (400 mg) by mouth 3 times daily    Abdominal pain, epigastric       hydrOXYzine 10 MG/5ML syrup    ATARAX    1500 mL    Take 12.5 mLs (25 mg) by mouth every 6 hours as needed for anxiety or other (pain)    Acute abdominal pain       leuprolide 11.25 MG kit    LUPRON DEPOT (3-MONTH)    1 each    Inject 11.25 mg into the muscle every 3 months    Endometriosis       levalbuterol 45 MCG/ACT Inhaler    XOPENEX HFA    1 Inhaler    Inhale 2 puffs into the lungs every 6 hours  as needed for shortness of breath / dyspnea    Wheeze       LORazepam 0.5 MG tablet    ATIVAN    10 tablet    Take 1 tablet (0.5 mg) by mouth 2 times daily as needed (nausea, do not take with pain medicine, do not drive after taking)    Cyclical vomiting with nausea, intractability of vomiting not specified       multivitamins with minerals Liqd liquid     1 Bottle    15 mLs by Per Feeding Tube route daily    Abdominal pain, epigastric       NORCO PO      Take 5 mg by mouth 5 mg (10 ml) every 4-6 hours as needed for pain.        norethindrone 5 MG tablet    AYGESTIN    90 tablet    Take 1 tablet (5 mg) by mouth daily    Endometriosis       nortriptyline 10 MG capsule    PAMELOR    120 capsule    Take 3 capsules (30 mg) by mouth At Bedtime    Right upper quadrant abdominal pain       ondansetron 4 MG ODT tab    ZOFRAN ODT    30 tablet    Take 1 tablet (4 mg) by mouth every 8 hours as needed for nausea or vomiting    Intractable cyclical vomiting with nausea       order for [facility]     1 Units    Equipment being ordered: TENS with wire and electrode.    Chronic abdominal pain       pantoprazole 40 MG EC tablet    PROTONIX    30 tablet    Take 1 tablet (40 mg) by mouth 2 times daily (before meals)    Right upper quadrant abdominal pain, Erosive gastritis       polyethylene glycol Packet    MIRALAX/GLYCOLAX    7 packet    Take 17 g by mouth daily    Right upper quadrant abdominal pain       RIZATRIPTAN BENZOATE PO      Take 5 mg by mouth once as needed        senna-docusate 8.6-50 MG per tablet    SENOKOT-S;PERICOLACE    100 tablet    Take 1 tablet by mouth 2 times daily as needed for constipation    Right upper quadrant abdominal pain       sodium bicarbonate 325 MG tablet     120 tablet    1 tablet (325 mg) by Per Feeding Tube route 4 times daily    Abdominal pain, epigastric, Intractable cyclical vomiting with nausea       * Notice:  This list has 2 medication(s) that are the same as other medications prescribed for you.  Read the directions carefully, and ask your doctor or other care provider to review them with you.

## 2018-09-13 ENCOUNTER — CARE COORDINATION (OUTPATIENT)
Dept: GASTROENTEROLOGY | Facility: CLINIC | Age: 23
End: 2018-09-13

## 2018-09-13 DIAGNOSIS — Z78.9 ENCOUNTER FOR GASTROJEJUNAL (GJ) TUBE PLACEMENT: Primary | ICD-10-CM

## 2018-09-13 NOTE — PROGRESS NOTES
Ok to schedule for tube change with Dr. Narayanan or Dr. Joe in unit J.     Called and spoke to Grabiel she is amenable to getting this done in unit J and is open to either provider listed above.   Order placed for tube change.     Called and left message for Endo about scheduling.   Scheduled Friday 9/13/2018 in unit J at 2:30pm.   Arrival time:  1:30, first floor- Endoscopy. Room 1-301   Thanks!     Ella SARMIENTO RN Coordinator  Dr. Narayanan, Dr. Joe & Dr. Klein   [facility]  [phone]

## 2018-09-13 NOTE — PROGRESS NOTES
Grabiel's mom called in and states Grabiel's tube came out where she could see the balloon, she pushed it back in but she believes the balloon has popped. She is able to flush but it feels funny and it was painful. She is in town until Saturday.   Message sent to Dr. Narayanan and will call them with response.     Ella SARMIENTO RN Coordinator  Dr. Narayanan, Dr. Joe & Dr. Klein   [facility]  [phone]

## 2018-09-14 ENCOUNTER — HOSPITAL ENCOUNTER (OUTPATIENT)
Facility: CLINIC | Age: 23
Discharge: HOME | End: 2018-09-14
Attending: INTERNAL MEDICINE | Admitting: INTERNAL MEDICINE
Payer: COMMERCIAL

## 2018-09-14 ENCOUNTER — SURGERY (OUTPATIENT)
Age: 23
End: 2018-09-14

## 2018-09-14 ENCOUNTER — APPOINTMENT (OUTPATIENT)
Dept: GENERAL RADIOLOGY | Facility: CLINIC | Age: 23
End: 2018-09-14
Attending: INTERNAL MEDICINE
Payer: COMMERCIAL

## 2018-09-14 VITALS
DIASTOLIC BLOOD PRESSURE: 86 MMHG | OXYGEN SATURATION: 98 % | SYSTOLIC BLOOD PRESSURE: 118 MMHG | RESPIRATION RATE: 9 BRPM

## 2018-09-14 DIAGNOSIS — G89.18 POSTOPERATIVE PAIN: Primary | ICD-10-CM

## 2018-09-14 LAB — PROVATION GI EXAM: NORMAL

## 2018-09-14 PROCEDURE — 49451 REPLACE DUOD/JEJ TUBE PERC: CPT | Performed by: INTERNAL MEDICINE

## 2018-09-14 PROCEDURE — 25000128 H RX IP 250 OP 636: Performed by: INTERNAL MEDICINE

## 2018-09-14 PROCEDURE — 76000 FLUOROSCOPY <1 HR PHYS/QHP: CPT | Mod: TC

## 2018-09-14 PROCEDURE — G0500 MOD SEDAT ENDO SERVICE >5YRS: HCPCS | Performed by: INTERNAL MEDICINE

## 2018-09-14 RX ORDER — DIPHENHYDRAMINE HYDROCHLORIDE 50 MG/ML
INJECTION INTRAMUSCULAR; INTRAVENOUS PRN
Status: DISCONTINUED | OUTPATIENT
Start: 2018-09-14 | End: 2018-09-14 | Stop reason: HOSPADM

## 2018-09-14 RX ORDER — FLUMAZENIL 0.1 MG/ML
0.2 INJECTION, SOLUTION INTRAVENOUS
Status: CANCELLED | OUTPATIENT
Start: 2018-09-14 | End: 2018-09-15

## 2018-09-14 RX ORDER — ONDANSETRON 2 MG/ML
INJECTION INTRAMUSCULAR; INTRAVENOUS PRN
Status: DISCONTINUED | OUTPATIENT
Start: 2018-09-14 | End: 2018-09-14 | Stop reason: HOSPADM

## 2018-09-14 RX ORDER — FENTANYL CITRATE 50 UG/ML
INJECTION, SOLUTION INTRAMUSCULAR; INTRAVENOUS PRN
Status: DISCONTINUED | OUTPATIENT
Start: 2018-09-14 | End: 2018-09-14 | Stop reason: HOSPADM

## 2018-09-14 RX ORDER — HEPARIN SODIUM,PORCINE 10 UNIT/ML
VIAL (ML) INTRAVENOUS PRN
Status: DISCONTINUED | OUTPATIENT
Start: 2018-09-14 | End: 2018-09-14 | Stop reason: HOSPADM

## 2018-09-14 RX ORDER — OXYCODONE AND ACETAMINOPHEN 5; 325 MG/1; MG/1
1 TABLET ORAL EVERY 6 HOURS PRN
Qty: 8 TABLET | Refills: 0 | Status: ON HOLD | OUTPATIENT
Start: 2018-09-14 | End: 2019-06-18

## 2018-09-14 RX ORDER — ONDANSETRON 2 MG/ML
4 INJECTION INTRAMUSCULAR; INTRAVENOUS EVERY 6 HOURS PRN
Status: CANCELLED | OUTPATIENT
Start: 2018-09-14

## 2018-09-14 RX ORDER — NALOXONE HYDROCHLORIDE 0.4 MG/ML
.1-.4 INJECTION, SOLUTION INTRAMUSCULAR; INTRAVENOUS; SUBCUTANEOUS
Status: CANCELLED | OUTPATIENT
Start: 2018-09-14 | End: 2018-09-15

## 2018-09-14 RX ORDER — ONDANSETRON 4 MG/1
4 TABLET, ORALLY DISINTEGRATING ORAL EVERY 6 HOURS PRN
Status: CANCELLED | OUTPATIENT
Start: 2018-09-14

## 2018-09-14 RX ADMIN — SODIUM CHLORIDE, PRESERVATIVE FREE 5 ML: 5 INJECTION INTRAVENOUS at 15:21

## 2018-09-14 RX ADMIN — MIDAZOLAM 1 MG: 1 INJECTION INTRAMUSCULAR; INTRAVENOUS at 14:30

## 2018-09-14 RX ADMIN — DIPHENHYDRAMINE HYDROCHLORIDE 50 MG: 50 INJECTION, SOLUTION INTRAMUSCULAR; INTRAVENOUS at 14:24

## 2018-09-14 RX ADMIN — FENTANYL CITRATE 50 MCG: 50 INJECTION, SOLUTION INTRAMUSCULAR; INTRAVENOUS at 14:25

## 2018-09-14 RX ADMIN — FENTANYL CITRATE 25 MCG: 50 INJECTION, SOLUTION INTRAMUSCULAR; INTRAVENOUS at 14:34

## 2018-09-14 RX ADMIN — MIDAZOLAM 2 MG: 1 INJECTION INTRAMUSCULAR; INTRAVENOUS at 14:25

## 2018-09-14 RX ADMIN — MIDAZOLAM 1 MG: 1 INJECTION INTRAMUSCULAR; INTRAVENOUS at 14:34

## 2018-09-14 RX ADMIN — ONDANSETRON 4 MG: 2 INJECTION INTRAMUSCULAR; INTRAVENOUS at 14:24

## 2018-09-14 RX ADMIN — FENTANYL CITRATE 25 MCG: 50 INJECTION, SOLUTION INTRAMUSCULAR; INTRAVENOUS at 14:30

## 2018-09-14 NOTE — IP AVS SNAPSHOT
[facility], [place], Endoscopy    [address] [facility] [address]    Phone:  [phone]                                       After Visit Summary   9/14/2018    Alexandra Melgoza    MRN: [id number]           After Visit Summary Signature Page     I have received my discharge instructions, and my questions have been answered. I have discussed any challenges I see with this plan with the nurse or doctor.    ..........................................................................................................................................  Patient/Patient Representative Signature      ..........................................................................................................................................  Patient Representative Print Name and Relationship to Patient    ..................................................               ................................................  Date                                   Time    ..........................................................................................................................................  Reviewed by Signature/Title    ...................................................              ..............................................  Date                                               Time          22EPIC Rev 08/18

## 2018-09-14 NOTE — OR NURSING
Gastric-jejunal feeding tube replacement completed with low-profile 18Fr, 3.0cm tube.  Pt anxious and c/o discomfort intermittently throughout procedure, tachycardic and hypertensive at times.  Otherwise vitally stable.  Sedation given per MD instruction.  Fluoroscopy time was 0.5 minutes.

## 2018-09-14 NOTE — IP AVS SNAPSHOT
MRN:[id number]                      After Visit Summary   9/14/2018    Alexandra Melgoza    MRN: [id number]           Thank you!     Thank you for choosing [facility] for your care. Our goal is always to provide you with excellent care. Hearing back from our patients is one way we can continue to improve our services. Please take a few minutes to complete the written survey that you may receive in the mail after you visit with us. Thank you!        Patient Information     Date Of Birth          1995        About your hospital stay     You were admitted on:  September 14, 2018 You last received care in the:  [facility], Endoscopy    You were discharged on:  September 14, 2018       Who to Call     For medical emergencies, please call 911.  For non-urgent questions about your medical care, please call your primary care provider or clinic, [phone]  For questions related to your surgery, please call your surgery clinic        Attending Provider     Provider Specialty    Mario Joe MD Gastroenterology       Primary Care Provider Office Phone # Fax #    Quyen Baez [phone]      Your next 10 appointments already scheduled     Sep 15, 2018  9:00 AM CDT   Infusion 180 with UC SPEC INFUSION, UC 45 ATC   [facility] ([facility])    [address]  [address]  [place] [address] [phone]              Further instructions from your care team       Discharge Instructions after  Upper Endoscopy (EGD)    Activity and Diet  You were given medicine for pain. You may be dizzy or sleepy.  For 24 hours:    Do not drive or use heavy equipment.    Do not make important decisions.    Do not drink any alcohol.  _x__ You may return to your regular diet.    Follow-up  Per your physician.    When to call us:  Problems are rare. Call right away if you have:    Unusual throat pain or  trouble swallowing    Unusual pain in belly or chest that is not relieved by belching or passing air    Black stools (tar-like looking bowel movement)    Temperature above 100.6  F. (37.5  C).    If you vomit blood or have severe pain, go to an emergency room.    If you have questions, call:  Monday to Friday, 7 a.m. to 4:30 p.m.: Endoscopy: [phone] (We may have to call you back)    After hours: Hospital: [phone] (Ask for the GI fellow on call)    Pending Results     Date and Time Order Name Status Description    9/14/2018 1334 XR Fluoro Time 0/1 Hour In process             Admission Information     Date & Time Provider Department Dept. Phone    9/14/2018 Mario Joe MD [facility], [place], Endoscopy [phone]      Your Vitals Were     Blood Pressure Respirations Pulse Oximetry             131/93 7 97%         Like[employer]shart Information     [employer] gives you secure access to your electronic health record. If you see a primary care provider, you can also send messages to your care team and make appointments. If you have questions, please call your primary care clinic.  If you do not have a primary care provider, please call [phone] and they will assist you.        Care EveryWhere ID     This is your Care EveryWhere ID. This could be used by other organizations to access your [place] medical records  [id number]        Equal Access to Services     RACHAEL BENITEZ : Hadii angelina kelley hadasho Soomaali, waaxda luqadaha, qaybta kaalmada adechrisyada, maki smallwood. So [facility] [phone].    ATENCIÓN: Si habla español, tiene a quijano disposición servicios gratuitos de asistencia lingüística. Llame al [phone].    We comply with applicable federal civil rights laws and [place] laws. We do not discriminate on the basis of race, color, national origin, age, disability, sex, sexual orientation, or gender identity.               Review of your medicines      UNREVIEWED medicines. Ask your  doctor about these medicines        Dose / Directions    LORazepam 0.5 MG tablet   Commonly known as:  ATIVAN   Used for:  Cyclical vomiting with nausea, intractability of vomiting not specified        Dose:  0.5 mg   Take 1 tablet (0.5 mg) by mouth 2 times daily as needed (nausea, do not take with pain medicine, do not drive after taking)   Quantity:  10 tablet   Refills:  0       NORCO PO        Dose:  5 mg   Take 5 mg by mouth 5 mg (10 ml) every 4-6 hours as needed for pain.   Refills:  0         START taking        Dose / Directions    oxyCODONE-acetaminophen 5-325 MG per tablet   Commonly known as:  PERCOCET   Used for:  Postoperative pain        Dose:  1 tablet   Take 1 tablet by mouth every 6 hours as needed for pain   Quantity:  8 tablet   Refills:  0         CONTINUE these medicines which may have CHANGED, or have new prescriptions. If we are uncertain of the size of tablets/capsules you have at home, strength may be listed as something that might have changed.        Dose / Directions    nortriptyline 10 MG capsule   Commonly known as:  PAMELOR   This may have changed:  how much to take   Used for:  Right upper quadrant abdominal pain        Dose:  30 mg   Take 3 capsules (30 mg) by mouth At Bedtime   Quantity:  120 capsule   Refills:  0         CONTINUE these medicines which have NOT CHANGED        Dose / Directions    acetaminophen 32 mg/mL solution   Commonly known as:  TYLENOL        Dose:  975 mg   30.45 mLs (975 mg) by Per J Tube route every 8 hours   Refills:  0       * amylase-lipase-protease 49528-46808 units Cpep   Commonly known as:  ZENPEP   Used for:  Idiopathic chronic pancreatitis (H)        Dose:  2-3 capsule   Take 2-3 capsules (40,000-60,000 Units) by mouth Take with snacks or supplements (Snacks)   Quantity:  180 capsule   Refills:  1       * amylase-lipase-protease 30614-99004 units Cpep   Commonly known as:  ZENPEP   Used for:  Right upper quadrant abdominal pain        Dose:  5  capsule   Take 5 capsules (100,000 Units) by mouth 4 times daily (with meals and nightly)   Quantity:  180 capsule   Refills:  1       blood glucose monitoring test strip   Commonly known as:  no brand specified   Used for:  Hypoglycemia        One Touch Ultra 2 Strips, Use to test blood sugars 2 times daily or as directed   Quantity:  100 strip   Refills:  3       buprenorphine 5 MCG/HR WK patch   Commonly known as:  BUTRANS   Used for:  Abdominal pain, generalized        Dose:  1 patch   Place 1 patch onto the skin every 7 days   Quantity:  4 patch   Refills:  0       cefdinir 250 MG/5ML suspension   Commonly known as:  OMNICEF        Dose:  17.9 mL   Take 17.9 mLs by mouth daily   Refills:  0       cephalexin 250 MG/5ML suspension   Commonly known as:  KEFLEX   Used for:  Dysuria        Take 10 ml per GJ tube tid   Quantity:  210 mL   Refills:  0       clotrimazole-betamethasone cream   Commonly known as:  LOTRISONE   Used for:  Rash and nonspecific skin eruption        Apply topically 2 times daily   Quantity:  15 g   Refills:  0       fluticasone 50 MCG/ACT spray   Commonly known as:  FLONASE   Used for:  Nasal congestion        Dose:  2 spray   Spray 2 sprays into both nostrils daily   Quantity:  16 g   Refills:  3       gabapentin 400 MG capsule   Commonly known as:  NEURONTIN   Used for:  Abdominal pain, epigastric        Dose:  400 mg   Take 1 capsule (400 mg) by mouth 3 times daily   Quantity:  90 capsule   Refills:  2       hydrOXYzine 10 MG/5ML syrup   Commonly known as:  ATARAX   Used for:  Acute abdominal pain        Dose:  25 mg   Take 12.5 mLs (25 mg) by mouth every 6 hours as needed for anxiety or other (pain)   Quantity:  1500 mL   Refills:  2       leuprolide 11.25 MG kit   Commonly known as:  LUPRON DEPOT (3-MONTH)   Used for:  Endometriosis        Dose:  11.25 mg   Inject 11.25 mg into the muscle every 3 months   Quantity:  1 each   Refills:  3       levalbuterol 45 MCG/ACT Inhaler   Commonly  known as:  XOPENEX HFA   Used for:  Wheeze        Dose:  2 puff   Inhale 2 puffs into the lungs every 6 hours as needed for shortness of breath / dyspnea   Quantity:  1 Inhaler   Refills:  1       multivitamins with minerals Liqd liquid   Used for:  Abdominal pain, epigastric        Dose:  15 mL   15 mLs by Per Feeding Tube route daily   Quantity:  1 Bottle   Refills:  0       norethindrone 5 MG tablet   Commonly known as:  AYGESTIN   Used for:  Endometriosis        Dose:  5 mg   Take 1 tablet (5 mg) by mouth daily   Quantity:  90 tablet   Refills:  1       ondansetron 4 MG ODT tab   Commonly known as:  ZOFRAN ODT   Used for:  Intractable cyclical vomiting with nausea        Dose:  4 mg   Take 1 tablet (4 mg) by mouth every 8 hours as needed for nausea or vomiting   Quantity:  30 tablet   Refills:  2       order for DME   Used for:  Chronic abdominal pain        Equipment being ordered: TENS with wire and electrode.   Quantity:  1 Units   Refills:  0       pantoprazole 40 MG EC tablet   Commonly known as:  PROTONIX   Used for:  Right upper quadrant abdominal pain, Erosive gastritis        Dose:  40 mg   Take 1 tablet (40 mg) by mouth 2 times daily (before meals)   Quantity:  30 tablet   Refills:  1       polyethylene glycol Packet   Commonly known as:  MIRALAX/GLYCOLAX   Used for:  Right upper quadrant abdominal pain        Dose:  17 g   Take 17 g by mouth daily   Quantity:  7 packet   Refills:  0       RIZATRIPTAN BENZOATE PO        Dose:  5 mg   Take 5 mg by mouth once as needed   Refills:  0       senna-docusate 8.6-50 MG per tablet   Commonly known as:  SENOKOT-S;PERICOLACE   Used for:  Right upper quadrant abdominal pain        Dose:  1 tablet   Take 1 tablet by mouth 2 times daily as needed for constipation   Quantity:  100 tablet   Refills:  0       sodium bicarbonate 325 MG tablet   Used for:  Abdominal pain, epigastric, Intractable cyclical vomiting with nausea        Dose:  325 mg   1 tablet (325 mg) by  Per Feeding Tube route 4 times daily   Quantity:  120 tablet   Refills:  0       * Notice:  This list has 2 medication(s) that are the same as other medications prescribed for you. Read the directions carefully, and ask your doctor or other care provider to review them with you.         Where to get your medicines      Some of these will need a paper prescription and others can be bought over the counter. Ask your nurse if you have questions.     Bring a paper prescription for each of these medications     oxyCODONE-acetaminophen 5-325 MG per tablet                Protect others around you: Learn how to safely use, store and throw away your medicines at www.disposemymeds.org.        Information about OPIOIDS     PRESCRIPTION OPIOIDS: WHAT YOU NEED TO KNOW   We gave you an opioid (narcotic) pain medicine. It is important to manage your pain, but opioids are not always the best choice. You should first try all the other options your care team gave you. Take this medicine for as short a time (and as few doses) as possible.    Some activities can increase your pain, such as bandage changes or therapy sessions. It may help to take your pain medicine 30 to 60 minutes before these activities. Reduce your stress by getting enough sleep, working on hobbies you enjoy and practicing relaxation or meditation. Talk to your care team about ways to manage your pain beyond prescription opioids.    These medicines have risks:    DO NOT drive when on new or higher doses of pain medicine. These medicines can affect your alertness and reaction times, and you could be arrested for driving under the influence (DUI). If you need to use opioids long-term, talk to your care team about driving.    DO NOT operate heavy machinery    DO NOT do any other dangerous activities while taking these medicines.    DO NOT drink any alcohol while taking these medicines.     If the opioid prescribed includes acetaminophen, DO NOT take with any other  medicines that contain acetaminophen. Read all labels carefully. Look for the word  acetaminophen  or  Tylenol.  Ask your pharmacist if you have questions or are unsure.    You can get addicted to pain medicines, especially if you have a history of addiction (chemical, alcohol or substance dependence). Talk to your care team about ways to reduce this risk.    All opioids tend to cause constipation. Drink plenty of water and eat foods that have a lot of fiber, such as fruits, vegetables, prune juice, apple juice and high-fiber cereal. Take a laxative (Miralax, milk of magnesia, Colace, Senna) if you don t move your bowels at least every other day. Other side effects include upset stomach, sleepiness, dizziness, throwing up, tolerance (needing more of the medicine to have the same effect), physical dependence and slowed breathing.    Store your pills in a secure place, locked if possible. We will not replace any lost or stolen medicine. If you don t finish your medicine, please throw away (dispose) as directed by your pharmacist. The [place] [employer] has more information about safe disposal: https://www.[employer].mn.us/living-green/managing-unwanted-medications             Medication List: This is a list of all your medications and when to take them. Check marks below indicate your daily home schedule. Keep this list as a reference.      Medications           Morning Afternoon Evening Bedtime As Needed    acetaminophen 32 mg/mL solution   Commonly known as:  TYLENOL   30.45 mLs (975 mg) by Per J Tube route every 8 hours                                * amylase-lipase-protease 74813-10532 units Cpep   Commonly known as:  ZENPEP   Take 2-3 capsules (40,000-60,000 Units) by mouth Take with snacks or supplements (Snacks)                                * amylase-lipase-protease 63881-11519 units Cpep   Commonly known as:  ZENPEP   Take 5 capsules (100,000 Units) by mouth 4 times daily (with meals and  nightly)                                blood glucose monitoring test strip   Commonly known as:  no brand specified   One Touch Ultra 2 Strips, Use to test blood sugars 2 times daily or as directed                                buprenorphine 5 MCG/HR WK patch   Commonly known as:  BUTRANS   Place 1 patch onto the skin every 7 days                                cefdinir 250 MG/5ML suspension   Commonly known as:  OMNICEF   Take 17.9 mLs by mouth daily                                cephalexin 250 MG/5ML suspension   Commonly known as:  KEFLEX   Take 10 ml per GJ tube tid                                clotrimazole-betamethasone cream   Commonly known as:  LOTRISONE   Apply topically 2 times daily                                fluticasone 50 MCG/ACT spray   Commonly known as:  FLONASE   Spray 2 sprays into both nostrils daily                                gabapentin 400 MG capsule   Commonly known as:  NEURONTIN   Take 1 capsule (400 mg) by mouth 3 times daily                                hydrOXYzine 10 MG/5ML syrup   Commonly known as:  ATARAX   Take 12.5 mLs (25 mg) by mouth every 6 hours as needed for anxiety or other (pain)                                leuprolide 11.25 MG kit   Commonly known as:  LUPRON DEPOT (3-MONTH)   Inject 11.25 mg into the muscle every 3 months                                levalbuterol 45 MCG/ACT Inhaler   Commonly known as:  XOPENEX HFA   Inhale 2 puffs into the lungs every 6 hours as needed for shortness of breath / dyspnea                                LORazepam 0.5 MG tablet   Commonly known as:  ATIVAN   Take 1 tablet (0.5 mg) by mouth 2 times daily as needed (nausea, do not take with pain medicine, do not drive after taking)                                multivitamins with minerals Liqd liquid   15 mLs by Per Feeding Tube route daily                                NORCO PO   Take 5 mg by mouth 5 mg (10 ml) every 4-6 hours as needed for pain.                                 norethindrone 5 MG tablet   Commonly known as:  AYGESTIN   Take 1 tablet (5 mg) by mouth daily                                nortriptyline 10 MG capsule   Commonly known as:  PAMELOR   Take 3 capsules (30 mg) by mouth At Bedtime                                ondansetron 4 MG ODT tab   Commonly known as:  ZOFRAN ODT   Take 1 tablet (4 mg) by mouth every 8 hours as needed for nausea or vomiting                                order for DME   Equipment being ordered: TENS with wire and electrode.                                oxyCODONE-acetaminophen 5-325 MG per tablet   Commonly known as:  PERCOCET   Take 1 tablet by mouth every 6 hours as needed for pain                                pantoprazole 40 MG EC tablet   Commonly known as:  PROTONIX   Take 1 tablet (40 mg) by mouth 2 times daily (before meals)                                polyethylene glycol Packet   Commonly known as:  MIRALAX/GLYCOLAX   Take 17 g by mouth daily                                RIZATRIPTAN BENZOATE PO   Take 5 mg by mouth once as needed                                senna-docusate 8.6-50 MG per tablet   Commonly known as:  SENOKOT-S;PERICOLACE   Take 1 tablet by mouth 2 times daily as needed for constipation                                sodium bicarbonate 325 MG tablet   1 tablet (325 mg) by Per Feeding Tube route 4 times daily                                * Notice:  This list has 2 medication(s) that are the same as other medications prescribed for you. Read the directions carefully, and ask your doctor or other care provider to review them with you.

## 2018-09-14 NOTE — DISCHARGE INSTRUCTIONS
Discharge Instructions after  Upper Endoscopy (EGD)    Activity and Diet  You were given medicine for pain. You may be dizzy or sleepy.  For 24 hours:    Do not drive or use heavy equipment.    Do not make important decisions.    Do not drink any alcohol.  _x__ You may return to your regular diet.    Follow-up  Per your physician.    When to call us:  Problems are rare. Call right away if you have:    Unusual throat pain or trouble swallowing    Unusual pain in belly or chest that is not relieved by belching or passing air    Black stools (tar-like looking bowel movement)    Temperature above 100.6  F. (37.5  C).    If you vomit blood or have severe pain, go to an emergency room.    If you have questions, call:  Monday to Friday, 7 a.m. to 4:30 p.m.: Endoscopy: [phone] (We may have to call you back)    After hours: Hospital: [phone] (Ask for the GI fellow on call)

## 2018-09-15 ENCOUNTER — INFUSION THERAPY VISIT (OUTPATIENT)
Dept: INFUSION THERAPY | Facility: CLINIC | Age: 23
End: 2018-09-15
Attending: INTERNAL MEDICINE
Payer: COMMERCIAL

## 2018-09-15 VITALS
HEART RATE: 94 BPM | TEMPERATURE: 97.9 F | RESPIRATION RATE: 16 BRPM | OXYGEN SATURATION: 100 % | DIASTOLIC BLOOD PRESSURE: 79 MMHG | SYSTOLIC BLOOD PRESSURE: 137 MMHG

## 2018-09-15 DIAGNOSIS — R11.15 INTRACTABLE CYCLICAL VOMITING WITH NAUSEA: ICD-10-CM

## 2018-09-15 DIAGNOSIS — R10.9 ACUTE ABDOMINAL PAIN: ICD-10-CM

## 2018-09-15 DIAGNOSIS — N39.41 URGE INCONTINENCE: ICD-10-CM

## 2018-09-15 DIAGNOSIS — R52 PAIN: ICD-10-CM

## 2018-09-15 DIAGNOSIS — R21 RASH: ICD-10-CM

## 2018-09-15 DIAGNOSIS — R10.9 ABDOMINAL PAIN, UNSPECIFIED ABDOMINAL LOCATION: ICD-10-CM

## 2018-09-15 DIAGNOSIS — E16.2 HYPOGLYCEMIA: ICD-10-CM

## 2018-09-15 DIAGNOSIS — Z98.890 S/P ERCP: ICD-10-CM

## 2018-09-15 DIAGNOSIS — F41.1 ANXIETY STATE: ICD-10-CM

## 2018-09-15 DIAGNOSIS — F33.1 MAJOR DEPRESSIVE DISORDER, RECURRENT EPISODE, MODERATE (H): ICD-10-CM

## 2018-09-15 DIAGNOSIS — G43.A0 CYCLICAL VOMITING WITH NAUSEA, INTRACTABILITY OF VOMITING NOT SPECIFIED: Primary | ICD-10-CM

## 2018-09-15 DIAGNOSIS — K86.1 CHRONIC PANCREATITIS, UNSPECIFIED PANCREATITIS TYPE (H): ICD-10-CM

## 2018-09-15 DIAGNOSIS — R10.13 ABDOMINAL PAIN, EPIGASTRIC: ICD-10-CM

## 2018-09-15 PROCEDURE — 96361 HYDRATE IV INFUSION ADD-ON: CPT

## 2018-09-15 PROCEDURE — 96365 THER/PROPH/DIAG IV INF INIT: CPT

## 2018-09-15 PROCEDURE — 96376 TX/PRO/DX INJ SAME DRUG ADON: CPT

## 2018-09-15 PROCEDURE — 25000128 H RX IP 250 OP 636: Mod: ZF | Performed by: INTERNAL MEDICINE

## 2018-09-15 PROCEDURE — 96375 TX/PRO/DX INJ NEW DRUG ADDON: CPT

## 2018-09-15 PROCEDURE — 96366 THER/PROPH/DIAG IV INF ADDON: CPT

## 2018-09-15 RX ORDER — DIPHENHYDRAMINE HYDROCHLORIDE 50 MG/ML
25 INJECTION INTRAMUSCULAR; INTRAVENOUS ONCE
Status: CANCELLED
Start: 2018-09-15 | End: 2018-09-15

## 2018-09-15 RX ORDER — ONDANSETRON 2 MG/ML
4 INJECTION INTRAMUSCULAR; INTRAVENOUS DAILY PRN
Status: CANCELLED
Start: 2018-09-15

## 2018-09-15 RX ORDER — ONDANSETRON 2 MG/ML
4 INJECTION INTRAMUSCULAR; INTRAVENOUS DAILY PRN
Status: DISCONTINUED | OUTPATIENT
Start: 2018-09-15 | End: 2018-09-15 | Stop reason: HOSPADM

## 2018-09-15 RX ORDER — DEXTROSE, SODIUM CHLORIDE, SODIUM LACTATE, POTASSIUM CHLORIDE, AND CALCIUM CHLORIDE 5; .6; .31; .03; .02 G/100ML; G/100ML; G/100ML; G/100ML; G/100ML
2000 INJECTION, SOLUTION INTRAVENOUS ONCE
Status: CANCELLED
Start: 2018-09-15 | End: 2018-09-15

## 2018-09-15 RX ORDER — ONDANSETRON 2 MG/ML
4 INJECTION INTRAMUSCULAR; INTRAVENOUS ONCE
Status: COMPLETED | OUTPATIENT
Start: 2018-09-15 | End: 2018-09-15

## 2018-09-15 RX ORDER — HEPARIN SODIUM (PORCINE) LOCK FLUSH IV SOLN 100 UNIT/ML 100 UNIT/ML
500 SOLUTION INTRAVENOUS EVERY 8 HOURS
Status: CANCELLED
Start: 2018-09-15

## 2018-09-15 RX ORDER — ONDANSETRON 2 MG/ML
4 INJECTION INTRAMUSCULAR; INTRAVENOUS ONCE
Status: CANCELLED
Start: 2018-09-15 | End: 2018-09-15

## 2018-09-15 RX ORDER — HEPARIN SODIUM (PORCINE) LOCK FLUSH IV SOLN 100 UNIT/ML 100 UNIT/ML
500 SOLUTION INTRAVENOUS EVERY 8 HOURS
Status: DISCONTINUED | OUTPATIENT
Start: 2018-09-15 | End: 2018-09-15 | Stop reason: HOSPADM

## 2018-09-15 RX ORDER — DIPHENHYDRAMINE HYDROCHLORIDE 50 MG/ML
25 INJECTION INTRAMUSCULAR; INTRAVENOUS DAILY PRN
Status: CANCELLED
Start: 2018-09-15

## 2018-09-15 RX ADMIN — ONDANSETRON 4 MG: 2 INJECTION, SOLUTION INTRAMUSCULAR; INTRAVENOUS at 10:42

## 2018-09-15 RX ADMIN — ONDANSETRON 4 MG: 2 INJECTION, SOLUTION INTRAMUSCULAR; INTRAVENOUS at 09:37

## 2018-09-15 RX ADMIN — DIPHENHYDRAMINE HYDROCHLORIDE: 50 INJECTION INTRAMUSCULAR; INTRAVENOUS at 10:42

## 2018-09-15 RX ADMIN — DIPHENHYDRAMINE HYDROCHLORIDE: 50 INJECTION, SOLUTION INTRAMUSCULAR; INTRAVENOUS at 09:37

## 2018-09-15 RX ADMIN — SODIUM CHLORIDE, POTASSIUM CHLORIDE, SODIUM LACTATE AND CALCIUM CHLORIDE 2000 ML: 600; 310; 30; 20 INJECTION, SOLUTION INTRAVENOUS at 09:37

## 2018-09-15 ASSESSMENT — PAIN SCALES - GENERAL: PAINLEVEL: MODERATE PAIN (5)

## 2018-09-15 NOTE — MR AVS SNAPSHOT
After Visit Summary   9/15/2018    Alexandra Melgoza    MRN: [id number]           Patient Information     Date Of Birth          1995        Visit Information        Provider Department      9/15/2018 9:00 AM UC 45 ATC; UC SPEC INFUSION [facility]        Today's Diagnoses     Cyclical vomiting with nausea, intractability of vomiting not specified    -  1    Chronic pancreatitis, unspecified pancreatitis type (H)        Pain        Abdominal pain, epigastric        S/P ERCP        Acute abdominal pain        Abdominal pain, unspecified abdominal location        Hypoglycemia        Urge incontinence        Major depressive disorder, recurrent episode, moderate (H)        Anxiety state        Rash        Intractable cyclical vomiting with nausea           Follow-ups after your visit        Who to contact     If you have questions or need follow up information about today's clinic visit or your schedule please contact [facility]ED[facility] directly at [phone].  Normal or non-critical lab and imaging results will be communicated to you by PAAYhart, letter or phone within 4 business days after the clinic has received the results. If you do not hear from us within 7 days, please contact the clinic through PAAYhart or phone. If you have a critical or abnormal lab result, we will notify you by phone as soon as possible.  Submit refill requests through Informaat or call your pharmacy and they will forward the refill request to us. Please allow 3 business days for your refill to be completed.          Additional Information About Your Visit        PAAYhart Information     Informaat gives you secure access to your electronic health record. If you see a primary care provider, you can also send messages to your care team and make appointments. If you have questions, please call your primary care clinic.  If you do not have a  primary care provider, please call [phone] and they will assist you.        Care EveryWhere ID     This is your Care EveryWhere ID. This could be used by other organizations to access your [facility] medical records  [id number]        Your Vitals Were     Pulse Temperature Respirations Pulse Oximetry          94 97.9  F (36.6  C) (Oral) 16 100%         Blood Pressure from Last 3 Encounters:   09/15/18 137/79   09/14/18 118/86   09/12/18 120/73    Weight from Last 3 Encounters:   04/30/18 65.8 kg (145 lb)   04/30/18 65.8 kg (145 lb)   04/13/18 68 kg (150 lb)              Today, you had the following     No orders found for display         Today's Medication Changes          These changes are accurate as of 9/15/18 12:50 PM.  If you have any questions, ask your nurse or doctor.               These medicines have changed or have updated prescriptions.        Dose/Directions    nortriptyline 10 MG capsule   Commonly known as:  PAMELOR   This may have changed:  how much to take   Used for:  Right upper quadrant abdominal pain        Dose:  30 mg   Take 3 capsules (30 mg) by mouth At Bedtime   Quantity:  120 capsule   Refills:  0                Primary Care Provider Office Phone # Fax #    Quyen Baez [phone]       [address] [address]        Equal Access to Services     RACHAEL BENITEZ : Hadii angelina kelley hadasho Somalini, waaxda luqadaha, qaybta kaalmada jacque, maki smallwood. So [facility] [phone].    ATENCIÓN: Si habla español, tiene a quijano disposición servicios gratuitos de asistencia lingüística. Llame al [phone].    We comply with applicable federal civil rights laws and [place] laws. We do not discriminate on the basis of race, color, national origin, age, disability, sex, sexual orientation, or gender identity.            Thank you!     Thank you for choosing [facility] SPECIAL[facility] AND PROCEDURE  for your  care. Our goal is always to provide you with excellent care. Hearing back from our patients is one way we can continue to improve our services. Please take a few minutes to complete the written survey that you may receive in the mail after your visit with us. Thank you!             Your Updated Medication List - Protect others around you: Learn how to safely use, store and throw away your medicines at www.disposemymeds.org.          This list is accurate as of 9/15/18 12:50 PM.  Always use your most recent med list.                   Brand Name Dispense Instructions for use Diagnosis    acetaminophen 32 mg/mL solution    TYLENOL     30.45 mLs (975 mg) by Per J Tube route every 8 hours        * amylase-lipase-protease 79476-95267 units Cpep    ZENPEP    180 capsule    Take 2-3 capsules (40,000-60,000 Units) by mouth Take with snacks or supplements (Snacks)    Idiopathic chronic pancreatitis (H)       * amylase-lipase-protease 27334-[address] units Cpep    ZENPEP    180 capsule    Take 5 capsules (100,000 Units) by mouth 4 times daily (with meals and nightly)    Right upper quadrant abdominal pain       blood glucose monitoring test strip    no brand specified    100 strip    One Touch Ultra 2 Strips, Use to test blood sugars 2 times daily or as directed    Hypoglycemia       buprenorphine 5 MCG/HR WK patch    BUTRANS    4 patch    Place 1 patch onto the skin every 7 days    Abdominal pain, generalized       cefdinir 250 MG/5ML suspension    OMNICEF     Take 17.9 mLs by mouth daily        cephalexin 250 MG/5ML suspension    KEFLEX    210 mL    Take 10 ml per GJ tube tid    Dysuria       clotrimazole-betamethasone cream    LOTRISONE    15 g    Apply topically 2 times daily    Rash and nonspecific skin eruption       fluticasone 50 MCG/ACT spray    FLONASE    16 g    Spray 2 sprays into both nostrils daily    Nasal congestion       gabapentin 400 MG capsule    NEURONTIN    90 capsule    Take 1 capsule (400 mg) by mouth 3  times daily    Abdominal pain, epigastric       hydrOXYzine 10 MG/5ML syrup    ATARAX    1500 mL    Take 12.5 mLs (25 mg) by mouth every 6 hours as needed for anxiety or other (pain)    Acute abdominal pain       leuprolide 11.25 MG kit    LUPRON DEPOT (3-MONTH)    1 each    Inject 11.25 mg into the muscle every 3 months    Endometriosis       levalbuterol 45 MCG/ACT Inhaler    XOPENEX HFA    1 Inhaler    Inhale 2 puffs into the lungs every 6 hours as needed for shortness of breath / dyspnea    Wheeze       LORazepam 0.5 MG tablet    ATIVAN    10 tablet    Take 1 tablet (0.5 mg) by mouth 2 times daily as needed (nausea, do not take with pain medicine, do not drive after taking)    Cyclical vomiting with nausea, intractability of vomiting not specified       multivitamins with minerals Liqd liquid     1 Bottle    15 mLs by Per Feeding Tube route daily    Abdominal pain, epigastric       NORCO PO      Take 5 mg by mouth 5 mg (10 ml) every 4-6 hours as needed for pain.        norethindrone 5 MG tablet    AYGESTIN    90 tablet    Take 1 tablet (5 mg) by mouth daily    Endometriosis       nortriptyline 10 MG capsule    PAMELOR    120 capsule    Take 3 capsules (30 mg) by mouth At Bedtime    Right upper quadrant abdominal pain       ondansetron 4 MG ODT tab    ZOFRAN ODT    30 tablet    Take 1 tablet (4 mg) by mouth every 8 hours as needed for nausea or vomiting    Intractable cyclical vomiting with nausea       order for DME     1 Units    Equipment being ordered: TENS with wire and electrode.    Chronic abdominal pain       oxyCODONE-acetaminophen 5-325 MG per tablet    PERCOCET    8 tablet    Take 1 tablet by mouth every 6 hours as needed for pain    Postoperative pain       pantoprazole 40 MG EC tablet    PROTONIX    30 tablet    Take 1 tablet (40 mg) by mouth 2 times daily (before meals)    Right upper quadrant abdominal pain, Erosive gastritis       polyethylene glycol Packet    MIRALAX/GLYCOLAX    7 packet    Take  17 g by mouth daily    Right upper quadrant abdominal pain       RIZATRIPTAN BENZOATE PO      Take 5 mg by mouth once as needed        senna-docusate 8.6-50 MG per tablet    SENOKOT-S;PERICOLACE    100 tablet    Take 1 tablet by mouth 2 times daily as needed for constipation    Right upper quadrant abdominal pain       sodium bicarbonate 325 MG tablet     120 tablet    1 tablet (325 mg) by Per Feeding Tube route 4 times daily    Abdominal pain, epigastric, Intractable cyclical vomiting with nausea       * Notice:  This list has 2 medication(s) that are the same as other medications prescribed for you. Read the directions carefully, and ask your doctor or other care provider to review them with you.

## 2018-09-15 NOTE — PROGRESS NOTES
Nursing Note  Alex[facility] Melgo[facility]:   Chief Complaint   Patient presents with     Infusion     IVF, zofran, benadryl     During [facility]'s [facility] appointment, orders from Dr. Narayanan were completed.  Frequency: as needed    Progress note:  Patient identification verified by name and date of birth.  Assessment completed.  Vitals recorded in Doc Flowsheets.  Patient was provided with education regarding infusion and possible side effects.  Patient verbalized understanding.     Patient reports ongoing nausea and vomiting and abdominal pain near G-tube site.     needed: No  Premedications: were not ordered.  Infusion Rates: Fluids given over approximately 1 hour each bag.  Zofran given over approximately 3 minutes IV push.  Benadryl given over approximately 15 minutes.  Approximate Infusion length:2 hours.   Labs: were not ordered for this appointment.  Vascular access: port accessed today, blood return noted, flushes without resistance. Slight bruising noted to skin directly over port.  Treatment Conditions: non-applicable.  Patient tolerated infusion: well.      Dale OLMEDO RN discharged patient at end of infusion.    Drug Waste Record? No     Discharge Plan:   Follow up plan of care with: ongoing infusions at [facility].  Discharge instructions were reviewed with patient.  Patient/representative verbalized understanding of discharge instructions and all questions answered.  Patient discharged from [facility] in stable condition.    Ericka Severson, RN    Administrations This Visit     diphenhydrAMINE (BENADRYL) 25 mg in sodium chloride 0.9% 50 mL     Admin Date Action Dose Rate Route Administered By          09/15/2018 New Bag    Intravenous Severson, Ericka, RN             Admin Date Action Dose Rate Route Administered By          09/15/2018 New Bag   224 mL/hr  Intravenous Severson, Ericka, RN                   lactated ringers BOLUS 1,000-2,000 mL     Admin Date Action Dose Route Administered By             09/15/2018 New Bag 2000 mL Intravenous Severson, Ericka, RN                    ondansetron (ZOFRAN) injection 4 mg     Admin Date Action Dose Route Administered By             09/15/2018 Given 4 mg Intravenous Severson, Ericka, RN              Admin Date Action Dose Route Administered By             09/15/2018 Given 4 mg Intravenous Severson, Ericka, RN                          /77  Pulse 94  Temp 97.9  F (36.6  C) (Oral)  Resp 18  SpO2 100%

## 2018-09-19 ENCOUNTER — CARE COORDINATION (OUTPATIENT)
Dept: GASTROENTEROLOGY | Facility: CLINIC | Age: 23
End: 2018-09-19

## 2018-09-19 NOTE — PROGRESS NOTES
Called Grabiel - her phone number does not have voicemail.     Called her Mom- Leeann. States she is back in [place] but her tube is working. She has walking pneumonia and has a really bad cough.   She has our number if she needs anything in the future.     Ella SARMIENTO, RN Coordinator  Dr. Narayanan, Dr. Joe & Dr. Klein   [facility]  [phone]

## 2018-10-30 ENCOUNTER — PATIENT OUTREACH (OUTPATIENT)
Dept: GASTROENTEROLOGY | Facility: CLINIC | Age: 23
End: 2018-10-30

## 2018-10-30 NOTE — PROGRESS NOTES
Grabiel's mom called and states she was in the hospital in [facility].     She wants to know if an appointment should be made with Dr. Narayanan. She will call to get this scheduled as they are aware he books out 6-8 weeks in advance.     Advised [facility] message was received and it was sent to Dr. Narayanan for review. Once we receive a response we will get back to them.     She was also wondering about records being sent to them, gave them number for records department.     Ella SARMIENTO, RN Coordinator  Dr. Narayanan, Dr. Joe & Dr. Klein   [facility]  [phone]

## 2018-11-24 NOTE — MR AVS SNAPSHOT
After Visit Summary   4/13/2018    Alexandra Melgoza    MRN: [id number]           Patient Information     Date Of Birth          1995        Visit Information        Provider Department      4/13/2018 2:00 PM UC 45 ATC; UC SPEC INFUSION [facility]        Today's Diagnoses     Cyclical vomiting with nausea, intractability of vomiting not specified    -  1    Chronic pancreatitis, unspecified pancreatitis type (H)        Pain        Abdominal pain, epigastric        S/P ERCP        Acute abdominal pain        Abdominal pain        Hypoglycemia        Urge incontinence        Major depressive disorder, recurrent episode, moderate (H)        Anxiety state        Rash        Intractable cyclical vomiting with nausea           Follow-ups after your visit        Your next 10 appointments already scheduled     Apr 18, 2018 10:00 AM CDT   (Arrive by 9:45 AM)   Return Visit with Quyen Lennon PhD   [facility] ([facility])    [address] [place] [address]   [phone]            Apr 23, 2018  2:30 PM CDT   (Arrive by 2:15 PM)   Return Visit with Leonardo Wang MD   [facility] ([facility])    [address] [place] [address]   [phone]            Apr 25, 2018  9:00 AM CDT   (Arrive by 8:45 AM)   Return Visit with Quyen Lennon PhD   [facility] ([facility])    [address] [place] [address]   [phone]            Jun 06, 2018 12:00 PM CDT   (Arrive by 11:45 AM)   Return Visit with Campbell Narayanan MD   [facility] ([facility])    [address] [place] [address]   [phone]            Jun  11, 2018  2:40 PM CDT   (Arrive by 2:25 PM)   Return Visit with Quyen Baez MD   [facility] ([facility])    [address]  [address]th [address]  [facility] [address] [phone]            Jul 26, 2018 12:30 PM CDT   (Arrive by 12:15 PM)   POSTURAL ORTHOSTATIC SYCOPE with Davidson Mitchell MD   [facility] ([facility])    [address]  [address]  [facility] [address] [phone]              Who to contact     If you have questions or need follow up information about today's clinic visit or your schedule please contact [facility] SPECIALTY AND PROCEDURE directly at [phone].  Normal or non-critical lab and imaging results will be communicated to you by IJJ CORPhart, letter or phone within 4 business days after the clinic has received the results. If you do not hear from us within 7 days, please contact the clinic through IJJ CORPhart or phone. If you have a critical or abnormal lab result, we will notify you by phone as soon as possible.  Submit refill requests through SportsManias or call your pharmacy and they will forward the refill request to us. Please allow 3 business days for your refill to be completed.          Additional Information About Your Visit        ZPowert Information     SportsManias gives you secure access to your electronic health record. If you see a primary care provider, you can also send messages to your care team and make appointments. If you have questions, please call your primary care clinic.  If you do not have a primary care provider, please call [phone] and they will assist you.        Care EveryWhere ID     This is your Care EveryWhere ID. This could be used by other organizations to access your [place] medical records  [id number]        Your Vitals Were     Temperature Respirations                99.3  F (37.4  C) (Oral) 16           Blood Pressure from Last 3  Encounters:   04/13/18 132/79   04/13/18 129/89   04/11/18 122/70    Weight from Last 3 Encounters:   04/13/18 68 kg (150 lb)   04/11/18 68 kg (150 lb)   04/04/18 68.4 kg (150 lb 11.2 oz)              Today, you had the following     No orders found for display         Today's Medication Changes          These changes are accurate as of 4/13/18  4:51 PM.  If you have any questions, ask your nurse or doctor.               Start taking these medicines.        Dose/Directions    cephalexin 250 MG/5ML suspension   Commonly known as:  KEFLEX   Used for:  Dysuria   Started by:  Britton Andrews MD        Take 10 ml per GJ tube tid   Quantity:  210 mL   Refills:  0         These medicines have changed or have updated prescriptions.        Dose/Directions    nortriptyline 10 MG capsule   Commonly known as:  PAMELOR   This may have changed:  how much to take   Used for:  Right upper quadrant abdominal pain        Dose:  30 mg   Take 3 capsules (30 mg) by mouth At Bedtime   Quantity:  120 capsule   Refills:  0            Where to get your medicines      These medications were sent to [facility] [address] [place] [address] - [address] AT [facility] [address], [facility] [address]    Hours:  called pharm.  they do accept faxes Phone:  [phone]     cephalexin 250 MG/5ML suspension                Primary Care Provider Office Phone # Fax #    Quyen Baez [phone]       [address] [address]        Equal Access to Services     [facility]: Hadii aad ku hadasho Soomaali, waaxda luqadaha, qaybta kaalmada adeegyada, maki smallwood. So [facility] [phone].    ATENCIÓN: Si habla español, tiene a quijano disposición servicios gratuitos de asistencia lingüística. Llame al [phone].    We comply with applicable federal civil rights laws and [place] laws. We do not discriminate on the basis  of race, color, national origin, age, disability, sex, sexual orientation, or gender identity.            Thank you!     Thank you for choosing [facility] SPECIALTY AND PROCEDURE  for your care. Our goal is always to provide you with excellent care. Hearing back from our patients is one way we can continue to improve our services. Please take a few minutes to complete the written survey that you may receive in the mail after your visit with us. Thank you!             Your Updated Medication List - Protect others around you: Learn how to safely use, store and throw away your medicines at www.disposemymeds.org.          This list is accurate as of 4/13/18  4:51 PM.  Always use your most recent med list.                   Brand Name Dispense Instructions for use Diagnosis    acetaminophen 32 mg/mL solution    TYLENOL     30.45 mLs (975 mg) by Per J Tube route every 8 hours        * amylase-lipase-protease 13225 units Cpep    ZENPEP    180 capsule    Take 2-3 capsules (40,000-60,000 Units) by mouth Take with snacks or supplements (Snacks)    Idiopathic chronic pancreatitis (H)       * amylase-lipase-protease 37427 units Cpep    ZENPEP    180 capsule    Take 5 capsules (100,000 Units) by mouth 4 times daily (with meals and nightly)    Right upper quadrant abdominal pain       blood glucose monitoring test strip    no brand specified    100 strip    One Touch Ultra 2 Strips, Use to test blood sugars 2 times daily or as directed    Hypoglycemia       cephalexin 250 MG/5ML suspension    KEFLEX    210 mL    Take 10 ml per GJ tube tid    Dysuria       clotrimazole-betamethasone cream    LOTRISONE    15 g    Apply topically 2 times daily    Rash and nonspecific skin eruption       fluticasone 50 MCG/ACT spray    FLONASE    16 g    Spray 2 sprays into both nostrils daily    Nasal congestion       gabapentin 250 MG/5ML solution    NEURONTIN      Chronic abdominal pain, Sphincter of Oddi dysfunction,  Cyclical vomiting with nausea, intractability of vomiting not specified, Pain around percutaneous endoscopic gastrostomy (PEG) tube site, sequela       hydrOXYzine 10 MG/5ML syrup    ATARAX    1500 mL    Take 12.5 mLs (25 mg) by mouth every 6 hours as needed for anxiety or other (pain)    Acute abdominal pain       leuprolide 11.25 MG kit    LUPRON DEPOT (3-MONTH)    1 each    Inject 11.25 mg into the muscle every 3 months    Endometriosis       levalbuterol 45 MCG/ACT Inhaler    XOPENEX HFA    1 Inhaler    Inhale 2 puffs into the lungs every 6 hours as needed for shortness of breath / dyspnea    Wheeze       LORazepam 0.5 MG tablet    ATIVAN    10 tablet    Take 1 tablet (0.5 mg) by mouth 2 times daily as needed (nausea, do not take with pain medicine, do not drive after taking)    Cyclical vomiting with nausea, intractability of vomiting not specified       multivitamins with minerals Liqd liquid     1 Bottle    15 mLs by Per Feeding Tube route daily    Abdominal pain, epigastric       norethindrone 5 MG tablet    AYGESTIN    90 tablet    Take 1 tablet (5 mg) by mouth daily    Endometriosis       nortriptyline 10 MG capsule    PAMELOR    120 capsule    Take 3 capsules (30 mg) by mouth At Bedtime    Right upper quadrant abdominal pain       ondansetron 4 MG ODT tab    ZOFRAN ODT    30 tablet    Take 1 tablet (4 mg) by mouth every 8 hours as needed for nausea or vomiting    Intractable cyclical vomiting with nausea       order for DME     1 Units    Equipment being ordered: TENS with wire and electrode.    Chronic abdominal pain       oxyCODONE 5 MG/5ML solution    ROXICODONE    560 mL    Take 10 mL (10 mg) by mouth every 6 hours as needed, maximum 40 mL per day.    Abdominal pain, generalized, Chronic abdominal pain       pantoprazole 40 MG EC tablet    PROTONIX    30 tablet    Take 1 tablet (40 mg) by mouth 2 times daily (before meals)    Right upper quadrant abdominal pain, Erosive gastritis       polyethylene  glycol Packet    MIRALAX/GLYCOLAX    7 packet    Take 17 g by mouth daily    Right upper quadrant abdominal pain       RIZATRIPTAN BENZOATE PO      Take 5 mg by mouth once as needed        senna-docusate 8.6-50 MG per tablet    SENOKOT-S;PERICOLACE    100 tablet    Take 1 tablet by mouth 2 times daily as needed for constipation    Right upper quadrant abdominal pain       sodium bicarbonate 325 MG tablet     120 tablet    1 tablet (325 mg) by Per Feeding Tube route 4 times daily    Abdominal pain, epigastric, Intractable cyclical vomiting with nausea       * Notice:  This list has 2 medication(s) that are the same as other medications prescribed for you. Read the directions carefully, and ask your doctor or other care provider to review them with you.       (4) rarely moist

## 2018-12-03 ENCOUNTER — TELEPHONE (OUTPATIENT)
Dept: GASTROENTEROLOGY | Facility: CLINIC | Age: 23
End: 2018-12-03

## 2018-12-03 NOTE — TELEPHONE ENCOUNTER
Called Leeann, she thought Alexandra's appt with Dr. Narayanan was scheduled on Thur 12/06/2018. Advised that she is scheduled on 12/5/18 at 2P.   Dr. Narayanan has agreed to see pt on 12/7/18 at 12 P.  Message sent to Tania ARGUELLO, director of our clinic to approve opening clinic template.     SR 12/3/18 @ 1228 P

## 2018-12-07 ENCOUNTER — PATIENT OUTREACH (OUTPATIENT)
Dept: GASTROENTEROLOGY | Facility: CLINIC | Age: 23
End: 2018-12-07

## 2018-12-07 ENCOUNTER — INFUSION THERAPY VISIT (OUTPATIENT)
Dept: INFUSION THERAPY | Facility: CLINIC | Age: 23
End: 2018-12-07
Attending: INTERNAL MEDICINE
Payer: COMMERCIAL

## 2018-12-07 ENCOUNTER — OFFICE VISIT (OUTPATIENT)
Dept: GASTROENTEROLOGY | Facility: CLINIC | Age: 23
End: 2018-12-07
Payer: COMMERCIAL

## 2018-12-07 VITALS
TEMPERATURE: 98 F | OXYGEN SATURATION: 98 % | BODY MASS INDEX: 22.54 KG/M2 | SYSTOLIC BLOOD PRESSURE: 119 MMHG | DIASTOLIC BLOOD PRESSURE: 72 MMHG | HEIGHT: 67 IN | HEART RATE: 125 BPM | WEIGHT: 143.6 LBS

## 2018-12-07 VITALS — DIASTOLIC BLOOD PRESSURE: 70 MMHG | SYSTOLIC BLOOD PRESSURE: 115 MMHG | HEART RATE: 90 BPM

## 2018-12-07 DIAGNOSIS — G89.18 ACUTE POST-OPERATIVE PAIN: ICD-10-CM

## 2018-12-07 DIAGNOSIS — R10.9 ACUTE ABDOMINAL PAIN: ICD-10-CM

## 2018-12-07 DIAGNOSIS — Z98.890 S/P ERCP: ICD-10-CM

## 2018-12-07 DIAGNOSIS — R10.13 ABDOMINAL PAIN, EPIGASTRIC: ICD-10-CM

## 2018-12-07 DIAGNOSIS — E16.2 HYPOGLYCEMIA: ICD-10-CM

## 2018-12-07 DIAGNOSIS — R11.15 INTRACTABLE CYCLICAL VOMITING WITH NAUSEA: ICD-10-CM

## 2018-12-07 DIAGNOSIS — K86.1 CHRONIC PANCREATITIS, UNSPECIFIED PANCREATITIS TYPE (H): ICD-10-CM

## 2018-12-07 DIAGNOSIS — R10.9 ABDOMINAL PAIN: ICD-10-CM

## 2018-12-07 DIAGNOSIS — R10.13 ABDOMINAL PAIN, EPIGASTRIC: Primary | ICD-10-CM

## 2018-12-07 DIAGNOSIS — Z78.9 ENCOUNTER FOR GASTROJEJUNAL (GJ) TUBE PLACEMENT: Primary | ICD-10-CM

## 2018-12-07 DIAGNOSIS — F33.1 MAJOR DEPRESSIVE DISORDER, RECURRENT EPISODE, MODERATE (H): ICD-10-CM

## 2018-12-07 DIAGNOSIS — F41.1 ANXIETY STATE: ICD-10-CM

## 2018-12-07 DIAGNOSIS — R52 PAIN: ICD-10-CM

## 2018-12-07 DIAGNOSIS — G43.A0 CYCLICAL VOMITING WITH NAUSEA, INTRACTABILITY OF VOMITING NOT SPECIFIED: Primary | ICD-10-CM

## 2018-12-07 DIAGNOSIS — R21 RASH: ICD-10-CM

## 2018-12-07 DIAGNOSIS — N39.41 URGE INCONTINENCE: ICD-10-CM

## 2018-12-07 PROCEDURE — 25000128 H RX IP 250 OP 636: Mod: ZF | Performed by: INTERNAL MEDICINE

## 2018-12-07 PROCEDURE — 96375 TX/PRO/DX INJ NEW DRUG ADDON: CPT

## 2018-12-07 PROCEDURE — 96374 THER/PROPH/DIAG INJ IV PUSH: CPT

## 2018-12-07 PROCEDURE — 96376 TX/PRO/DX INJ SAME DRUG ADON: CPT

## 2018-12-07 PROCEDURE — 96361 HYDRATE IV INFUSION ADD-ON: CPT

## 2018-12-07 RX ORDER — DEXTROSE, SODIUM CHLORIDE, SODIUM LACTATE, POTASSIUM CHLORIDE, AND CALCIUM CHLORIDE 5; .6; .31; .03; .02 G/100ML; G/100ML; G/100ML; G/100ML; G/100ML
2000 INJECTION, SOLUTION INTRAVENOUS ONCE
Status: CANCELLED
Start: 2018-12-07 | End: 2018-12-07

## 2018-12-07 RX ORDER — PANTOPRAZOLE SODIUM 40 MG/1
40 TABLET, DELAYED RELEASE ORAL DAILY
Qty: 30 TABLET | Refills: 3 | Status: SHIPPED | OUTPATIENT
Start: 2018-12-07 | End: 2019-06-06

## 2018-12-07 RX ORDER — DIPHENHYDRAMINE HYDROCHLORIDE 50 MG/ML
25 INJECTION INTRAMUSCULAR; INTRAVENOUS DAILY PRN
Status: CANCELLED
Start: 2018-12-07

## 2018-12-07 RX ORDER — OXYCODONE AND ACETAMINOPHEN 5; 325 MG/1; MG/1
TABLET ORAL
Qty: 12 TABLET | Refills: 0 | Status: ON HOLD | OUTPATIENT
Start: 2018-12-07 | End: 2019-06-18

## 2018-12-07 RX ORDER — HEPARIN SODIUM (PORCINE) LOCK FLUSH IV SOLN 100 UNIT/ML 100 UNIT/ML
500 SOLUTION INTRAVENOUS EVERY 8 HOURS
Status: CANCELLED
Start: 2018-12-07

## 2018-12-07 RX ORDER — ONDANSETRON 2 MG/ML
4 INJECTION INTRAMUSCULAR; INTRAVENOUS ONCE
Status: CANCELLED
Start: 2018-12-07 | End: 2018-12-07

## 2018-12-07 RX ORDER — HEPARIN SODIUM (PORCINE) LOCK FLUSH IV SOLN 100 UNIT/ML 100 UNIT/ML
500 SOLUTION INTRAVENOUS EVERY 8 HOURS
Status: DISCONTINUED | OUTPATIENT
Start: 2018-12-07 | End: 2018-12-07 | Stop reason: HOSPADM

## 2018-12-07 RX ORDER — ONDANSETRON 2 MG/ML
4 INJECTION INTRAMUSCULAR; INTRAVENOUS DAILY PRN
Status: DISCONTINUED | OUTPATIENT
Start: 2018-12-07 | End: 2018-12-07 | Stop reason: HOSPADM

## 2018-12-07 RX ORDER — DIPHENHYDRAMINE HYDROCHLORIDE 50 MG/ML
25 INJECTION INTRAMUSCULAR; INTRAVENOUS ONCE
Status: CANCELLED
Start: 2018-12-07 | End: 2018-12-07

## 2018-12-07 RX ORDER — ONDANSETRON 2 MG/ML
4 INJECTION INTRAMUSCULAR; INTRAVENOUS ONCE
Status: COMPLETED | OUTPATIENT
Start: 2018-12-07 | End: 2018-12-07

## 2018-12-07 RX ORDER — ONDANSETRON 2 MG/ML
4 INJECTION INTRAMUSCULAR; INTRAVENOUS DAILY PRN
Status: CANCELLED
Start: 2018-12-07

## 2018-12-07 RX ADMIN — DIPHENHYDRAMINE HYDROCHLORIDE 25 MG: 50 INJECTION INTRAMUSCULAR; INTRAVENOUS at 15:19

## 2018-12-07 RX ADMIN — SODIUM CHLORIDE, PRESERVATIVE FREE 500 UNITS: 5 INJECTION INTRAVENOUS at 16:32

## 2018-12-07 RX ADMIN — DIPHENHYDRAMINE HYDROCHLORIDE 25 MG: 50 INJECTION INTRAMUSCULAR; INTRAVENOUS at 14:12

## 2018-12-07 RX ADMIN — ONDANSETRON 4 MG: 2 INJECTION, SOLUTION INTRAMUSCULAR; INTRAVENOUS at 15:16

## 2018-12-07 RX ADMIN — SODIUM CHLORIDE, POTASSIUM CHLORIDE, SODIUM LACTATE AND CALCIUM CHLORIDE 2000 ML: 600; 310; 30; 20 INJECTION, SOLUTION INTRAVENOUS at 14:20

## 2018-12-07 RX ADMIN — ONDANSETRON 4 MG: 2 INJECTION, SOLUTION INTRAMUSCULAR; INTRAVENOUS at 14:08

## 2018-12-07 ASSESSMENT — PAIN SCALES - GENERAL: PAINLEVEL: SEVERE PAIN (6)

## 2018-12-07 NOTE — LETTER
12/7/2018       RE: Alexandra Melgoza  [address] [facility] [address]     Dear Colleague,    Thank you for referring your patient, Alexandra Melgoza, to the [facility] at [facility] Please see a copy of my visit note below.    Grabiel is well-known to me.  She is a 22-year-old with intractable abdominal pain and food intolerance of unclear etiology.  She is GJ dependent almost a year now.  She is back from [place] visiting with and to seek medical follow-up.  Major issues are that she has had several episodes of cellulitis around her PEG.  This apparently was hospitalized in [place] and placed on IV then oral Rocephin and Levaquin.  She is finishing Levaquin.  Otherwise sits pretty much status quo where she uses J-tube feeding as needed and gets IV infusions regularly.  She takes analgesic pain medicines every couple of days on average.  Physical exam.  We examined the Low Profile PEG J that was changed in September by Dr. Joe.  It appears adequately loose with some slack can be lifted off the skin but there is a circular area of maceration around it no underlying induration erythema or purulence. Jonathan Beltran nurse practitioner examined this with me and we agreed that it represents acid irritation of the surrounding tissue rather than an actual bacterial infection.  Impression we spent 25 minutes more than 50% counseling.  We first of all recommend that we change the GJ next Tuesday. Jonathan have recommended barrier cream and using gauze pads to soak up and divert the refluxed gastric acid.  We are also going to start her on pantoprazole 40 mg a day.  We will also renew her tube feeding order in give her a limited amount of Percocet for post procedure pain when we do the tube change next Tuesday.  We will see her back follow-up as needed.    Again, thank you for allowing me to participate in the care of your patient.      Sincerely,    Campbell  Caleb Narayanan MD

## 2018-12-07 NOTE — PROGRESS NOTES
Per Dr. Narayanan: Dr. Narayanan is requesting to change this patient's GJ tube in Endo on Tuesday on 12/11/2018.     He can do this case at 9:45 AM Or at 11:55 AM.     Please enter orders and send over to Endo scheduling, please.     Thanks,   Aleja     Order placed and sent to endo schedulers.     Ella SARMIENTO RN Care Coordinator  Dr. Narayanan, Dr. Joe & Dr. Klein   Advanced [facility]oscopy  [phone]

## 2018-12-07 NOTE — MR AVS SNAPSHOT
After Visit Summary   12/7/2018    Alexandra Melgoza    MRN: [id number]           Patient Information     Date Of Birth          1995        Visit Information        Provider Department      12/7/2018 12:00 PM Campbell Narayanan MD [facility] Pancreas [facility] Biliary        Care Instructions    Follow up:    Please call with any questions or concerns regarding your clinic visit today.    It is a pleasure being involved in your health care.    Contacts post-consultation depending on your need:    Schedule Clinic Appointments          [phone] # 1   M-F 7:30 - 5 pm    Ella SARMIENTO RN Care Coordinator         [phone]  Grace Holm LPN            [phone]       OR Procedure Scheduling                [phone]    My Chart is available 24 hours a day and is a secure way to access your records and communicate with your care team.  I strongly recommend signing up if you haven't already done so, if you are comfortable with computers.  If you would like to inquire about this or are having problems with My Chart access, you may call [phone] or go online at [email or web address].  Please allow at least 24 hours for a response and extra time on weekends and Holidays.              Follow-ups after your visit        Your next 10 appointments already scheduled     Dec 07, 2018  2:00 PM CST   Infusion 180 with UC SPEC INFUSION, UC 41 ATC   [facility] ([facility])    [address]  [address]  [place] [address] [phone]              Who to contact     Please call your clinic at [phone] to:    Ask questions about your health    Make or cancel appointments    Discuss your medicines    Learn about your test results    Speak to your doctor            Additional Information About Your Visit        Overtonehart Information     [employer] gives you secure access to your electronic health record. If you see  "a primary care provider, you can also send messages to your care team and make appointments. If you have questions, please call your primary care clinic.  If you do not have a primary care provider, please call [phone] and they will assist you.      [employer] is an electronic gateway that provides easy, online access to your medical records. With [employer], you can request a clinic appointment, read your test results, renew a prescription or communicate with your care team.     To access your existing account, please contact your [facility] or call [phone] for assistance.        Care EveryWhere ID     This is your Care EveryWhere ID. This could be used by other organizations to access your [place] medical records  [id number]        Your Vitals Were     Pulse Temperature Height Pulse Oximetry BMI (Body Mass Index)       125 98  F (36.7  C) (Oral) 1.702 m (5' 7\") 98% 22.49 kg/m2        Blood Pressure from Last 3 Encounters:   12/07/18 119/72   09/15/18 137/79   09/14/18 118/86    Weight from Last 3 Encounters:   12/07/18 65.1 kg (143 lb 9.6 oz)   04/30/18 65.8 kg (145 lb)   04/30/18 65.8 kg (145 lb)              Today, you had the following     No orders found for display         Today's Medication Changes          These changes are accurate as of 12/7/18  1:08 PM.  If you have any questions, ask your nurse or doctor.               These medicines have changed or have updated prescriptions.        Dose/Directions    nortriptyline 10 MG capsule   Commonly known as:  PAMELOR   This may have changed:  how much to take   Used for:  Right upper quadrant abdominal pain        Dose:  30 mg   Take 3 capsules (30 mg) by mouth At Bedtime   Quantity:  120 capsule   Refills:  0                Primary Care Provider Office Phone # Fax #    Quyen Baez [phone]       [address] [address]        Equal Access to Services     RACHAEL BENITEZ AH: " Hadii aad ku hadcathio Sokathleenali, waaxda luqadaha, qaybta kaalmada jacque, maki eliseocedric smallwood. So [facility] [phone].    ATENCIÓN: Si ishan kilpatrick, tiene a quijano disposición servicios gratuitos de asistencia lingüística. Llame al [phone].    We comply with applicable federal civil rights laws and [place] laws. We do not discriminate on the basis of race, color, national origin, age, disability, sex, sexual orientation, or gender identity.            Thank you!     Thank you for choosing [facility] PANCREAS AND BILIARY  for your care. Our goal is always to provide you with excellent care. Hearing back from our patients is one way we can continue to improve our services. Please take a few minutes to complete the written survey that you may receive in the mail after your visit with us. Thank you!             Your Updated Medication List - Protect others around you: Learn how to safely use, store and throw away your medicines at www.disposemymeds.org.          This list is accurate as of 12/7/18  1:08 PM.  Always use your most recent med list.                   Brand Name Dispense Instructions for use Diagnosis    acetaminophen 32 mg/mL liquid    TYLENOL     30.45 mLs (975 mg) by Per J Tube route every 8 hours        * amylase-lipase-protease 99013-76361 units Cpep    ZENPEP    180 capsule    Take 2-3 capsules (40,000-60,000 Units) by mouth Take with snacks or supplements (Snacks)    Idiopathic chronic pancreatitis (H)       * amylase-lipase-protease 59372-83554 units Cpep    ZENPEP    180 capsule    Take 5 capsules (100,000 Units) by mouth 4 times daily (with meals and nightly)    Right upper quadrant abdominal pain       blood glucose monitoring test strip    NO BRAND SPECIFIED    100 strip    One Touch Ultra 2 Strips, Use to test blood sugars 2 times daily or as directed    Hypoglycemia       buprenorphine 5 MCG/HR WK patch    BUTRANS    4 patch    Place 1 patch onto the skin every 7 days     Abdominal pain, generalized       cefdinir 250 MG/5ML suspension    OMNICEF     Take 17.9 mLs by mouth daily        cephALEXin 250 MG/5ML suspension    KEFLEX    210 mL    Take 10 ml per GJ tube tid    Dysuria       clotrimazole-betamethasone 1-0.05 % external cream    LOTRISONE    15 g    Apply topically 2 times daily    Rash and nonspecific skin eruption       fluticasone 50 MCG/ACT nasal spray    FLONASE    16 g    Spray 2 sprays into both nostrils daily    Nasal congestion       gabapentin 400 MG capsule    NEURONTIN    90 capsule    Take 1 capsule (400 mg) by mouth 3 times daily    Abdominal pain, epigastric       hydrOXYzine 10 MG/5ML syrup    ATARAX    1500 mL    Take 12.5 mLs (25 mg) by mouth every 6 hours as needed for anxiety or other (pain)    Acute abdominal pain       leuprolide 11.25 MG kit    LUPRON DEPOT (3-MONTH)    1 each    Inject 11.25 mg into the muscle every 3 months    Endometriosis       levalbuterol 45 MCG/ACT inhaler    XOPENEX HFA    1 Inhaler    Inhale 2 puffs into the lungs every 6 hours as needed for shortness of breath / dyspnea    Wheeze       LEVAQUIN PO      Take 750 mg by mouth daily        LORazepam 0.5 MG tablet    ATIVAN    10 tablet    Take 1 tablet (0.5 mg) by mouth 2 times daily as needed (nausea, do not take with pain medicine, do not drive after taking)    Cyclical vomiting with nausea, intractability of vomiting not specified       multivitamins w/minerals liquid     1 Bottle    15 mLs by Per Feeding Tube route daily    Abdominal pain, epigastric       NORCO PO      Take 5 mg by mouth 5 mg (10 ml) every 4-6 hours as needed for pain.        norethindrone 5 MG tablet    AYGESTIN    90 tablet    Take 1 tablet (5 mg) by mouth daily    Endometriosis       nortriptyline 10 MG capsule    PAMELOR    120 capsule    Take 3 capsules (30 mg) by mouth At Bedtime    Right upper quadrant abdominal pain       ondansetron 4 MG ODT tab    ZOFRAN ODT    30 tablet    Take 1 tablet (4 mg) by  mouth every 8 hours as needed for nausea or vomiting    Intractable cyclical vomiting with nausea       order for DME     1 Units    Equipment being ordered: TENS with wire and electrode.    Chronic abdominal pain       oxyCODONE-acetaminophen 5-325 MG tablet    PERCOCET    8 tablet    Take 1 tablet by mouth every 6 hours as needed for pain    Postoperative pain       pantoprazole 40 MG EC tablet    PROTONIX    30 tablet    Take 1 tablet (40 mg) by mouth 2 times daily (before meals)    Right upper quadrant abdominal pain, Erosive gastritis       polyethylene glycol packet    MIRALAX/GLYCOLAX    7 packet    Take 17 g by mouth daily    Right upper quadrant abdominal pain       RIZATRIPTAN BENZOATE PO      Take 5 mg by mouth once as needed        senna-docusate 8.6-50 MG tablet    SENOKOT-S/PERICOLACE    100 tablet    Take 1 tablet by mouth 2 times daily as needed for constipation    Right upper quadrant abdominal pain       sodium bicarbonate 325 MG tablet     120 tablet    1 tablet (325 mg) by Per Feeding Tube route 4 times daily    Abdominal pain, epigastric, Intractable cyclical vomiting with nausea       * Notice:  This list has 2 medication(s) that are the same as other medications prescribed for you. Read the directions carefully, and ask your doctor or other care provider to review them with you.

## 2018-12-07 NOTE — NURSING NOTE
"Chief Complaint   Patient presents with     Consult     RETURN -        Vitals:    12/07/18 1153   BP: 119/72   Pulse: 125   Temp: 98  F (36.7  C)   TempSrc: Oral   SpO2: 98%   Weight: 143 lb 9.6 oz   Height: 5' 7\"       Body mass index is 22.49 kg/(m^2).    Kaveh Urbina on 12/7/2018 at 12:00 PM                          "

## 2018-12-07 NOTE — PATIENT INSTRUCTIONS
Follow up:    Please call with any questions or concerns regarding your clinic visit today.    It is a pleasure being involved in your health care.    Contacts post-consultation depending on your need:    Schedule Clinic Appointments          [phone] # 1   M-F 7:30 - 5 pm    Ella SARMIENTO RN Care Coordinator         [phone]  Grace Holm LPN            [phone]       OR Procedure Scheduling                [phone]    My Chart is available 24 hours a day and is a secure way to access your records and communicate with your care team.  I strongly recommend signing up if you haven't already done so, if you are comfortable with computers.  If you would like to inquire about this or are having problems with My Chart access, you may call [phone] or go online at [email or web address].  Please allow at least 24 hours for a response and extra time on weekends and Holidays.

## 2018-12-07 NOTE — PATIENT INSTRUCTIONS
Dear Alexandra Melgoza    Thank you for choosing [facility] ([facility]) for your infusion.  The following information is a summary of our appointment as well as important reminders.            We look forward in seeing you on your next appointment here at [facility].  Please don t hesitate to call us at [phone] to reschedule any of your appointments or to speak with one of the [facility] registered nurses.  It was a pleasure taking care of you today.    Sincerely,    [facility]  [facility]  [address] [place] [address]  [id number]  Phone:  [phone]

## 2018-12-07 NOTE — PROGRESS NOTES
Nursing Note  Alexandra Melgoza [facility] for:   Chief Complaint   Patient presents with     Infusion     LR, zofran and Benadryl     During today's [facility] appointment, orders from Dr. Narayanan were completed.  Frequency: as needed    Progress note:  Patient identification verified by name and date of birth.  Assessment completed.  Vitals recorded in Doc Flowsheets.  Patient was provided with education regarding infusion and possible side effects.  Patient verbalized understanding.     Patient reports ongoing nausea and vomiting.     needed: No  Premedications: were not ordered.  Infusion Rates: Fluids given over approximately 1 hour each bag.  Zofran given over approximately 3 minutes IV push.  Benadryl given over approximately 15 minutes.  Approximate Infusion length:2 hours.   Labs: were not ordered for this appointment.  Vascular access: port accessed today, blood return noted, flushes without resistance. Slight bruising noted to skin directly over port.  Treatment Conditions: non-applicable.  Patient tolerated infusion: well.      Dale OLMEDO RN discharged patient at end of infusion.    Drug Waste Record? No     Discharge Plan:   Follow up plan of care with: ongoing infusions at [facility].  Discharge instructions were reviewed with patient.  Patient/representative verbalized understanding of discharge instructions and all questions answered.  Patient discharged from [facility] in stable condition.      Ysabel Piedra RN    Administrations This Visit     diphenhydrAMINE (BENADRYL) 25 mg in sodium chloride 0.9% 50 mL     Admin Date Action Dose Rate Route Administered By          12/07/2018 New Bag 25 mg 200 mL/hr Intravenous Ysabel Piedra RN             Admin Date Action Dose Rate Route Administered By          12/07/2018 New Bag 25 mg 200 mL/hr  Intravenous Ysabel Piedra RN                   lactated ringers BOLUS 1,000-2,000 mL     Admin Date Action Dose Route Administered By             12/07/2018 New Bag 2000 mL Intravenous Ysabel Piedra RN                    ondansetron (ZOFRAN) injection 4 mg     Admin Date Action Dose Route Administered By             12/07/2018 Given 4 mg Intravenous Ysabel Piedra RN              Admin Date Action Dose Route Administered By             12/07/2018 Given 4 mg Intravenous Ysabel Piedra RN                          There were no vitals taken for this visit.

## 2018-12-07 NOTE — MR AVS SNAPSHOT
After Visit Summary   12/7/2018    Alexandra Melgoza    MRN: [id number]           Patient Information     Date Of Birth          1995        Visit Information        Provider Department      12/7/2018 2:00 PM UC 41 ATC; UC SPEC INFUSION [facility]        Today's Diagnoses     Cyclical vomiting with nausea, intractability of vomiting not specified    -  1    Chronic pancreatitis, unspecified pancreatitis type (H)        Pain        Abdominal pain, epigastric        S/P ERCP        Acute abdominal pain        Abdominal pain        Hypoglycemia        Urge incontinence        Major depressive disorder, recurrent episode, moderate (H)        Anxiety state        Rash        Intractable cyclical vomiting with nausea          Care Instructions    Dear Alexandra Melgoza    Thank you for choosing [facility] ([facility]) for your infusion.  The following information is a summary of our appointment as well as important reminders.            We look forward in seeing you on your next appointment here at [facility].  Please don t hesitate to call us at [phone] to reschedule any of your appointments or to speak with one of the [facility] registered nurses.  It was a pleasure taking care of you today.    Sincerely,    [facility]  [address] [place] [address]  [id number]  Phone:  [phone]            Follow-ups after your visit        Who to contact     If you have questions or need follow up information about today's clinic visit or your schedule please contact [facility] directly at [phone].  Normal or non-critical lab and imaging results will be communicated to you by MyChart, letter or phone within 4 business days after the clinic has received the results. If you do not hear from us  within 7 days, please contact the clinic through [employer] or phone. If you have a critical or abnormal lab result, we will notify you by phone as soon as possible.  Submit refill requests through [employer] or call your pharmacy and they will forward the refill request to us. Please allow 3 business days for your refill to be completed.          Additional Information About Your Visit        [employer] Information     [employer] gives you secure access to your electronic health record. If you see a primary care provider, you can also send messages to your care team and make appointments. If you have questions, please call your primary care clinic.  If you do not have a primary care provider, please call [phone] and they will assist you.        Care EveryWhere ID     This is your Care EveryWhere ID. This could be used by other organizations to access your [facility] medical records  [id number]        Your Vitals Were     Pulse                   90            Blood Pressure from Last 3 Encounters:   12/07/18 115/70   12/07/18 119/72   09/15/18 137/79    Weight from Last 3 Encounters:   12/07/18 65.1 kg (143 lb 9.6 oz)   04/30/18 65.8 kg (145 lb)   04/30/18 65.8 kg (145 lb)              Today, you had the following     No orders found for display         Today's Medication Changes          These changes are accurate as of 12/7/18  4:33 PM.  If you have any questions, ask your nurse or doctor.               These medicines have changed or have updated prescriptions.        Dose/Directions    nortriptyline 10 MG capsule   Commonly known as:  PAMELOR   This may have changed:  how much to take   Used for:  Right upper quadrant abdominal pain        Dose:  30 mg   Take 3 capsules (30 mg) by mouth At Bedtime   Quantity:  120 capsule   Refills:  0       * oxyCODONE-acetaminophen 5-325 MG tablet   Commonly known as:  PERCOCET   This may have changed:  Another medication with the same name was added. Make sure you understand how and  when to take each.   Used for:  Postoperative pain   Changed by:  Campbell Narayanan MD        Dose:  1 tablet   Take 1 tablet by mouth every 6 hours as needed for pain   Quantity:  8 tablet   Refills:  0       * oxyCODONE-acetaminophen 5-325 MG tablet   Commonly known as:  PERCOCET   This may have changed:  You were already taking a medication with the same name, and this prescription was added. Make sure you understand how and when to take each.   Used for:  Acute post-operative pain   Changed by:  Campbell Narayanan MD        Take 1-2 tablets every 4 - 6 hours as needed for pain.   Quantity:  12 tablet   Refills:  0       * pantoprazole 40 MG EC tablet   Commonly known as:  PROTONIX   This may have changed:  Another medication with the same name was added. Make sure you understand how and when to take each.   Used for:  Right upper quadrant abdominal pain, Erosive gastritis   Changed by:  Campbell Narayanan MD        Dose:  40 mg   Take 1 tablet (40 mg) by mouth 2 times daily (before meals)   Quantity:  30 tablet   Refills:  1       * pantoprazole 40 MG EC tablet   Commonly known as:  PROTONIX   This may have changed:  You were already taking a medication with the same name, and this prescription was added. Make sure you understand how and when to take each.   Used for:  Abdominal pain, epigastric   Changed by:  Campbell Narayanan MD        Dose:  40 mg   Take 1 tablet (40 mg) by mouth daily Take 30-60 minutes before a meal.   Quantity:  30 tablet   Refills:  3       * Notice:  This list has 4 medication(s) that are the same as other medications prescribed for you. Read the directions carefully, and ask your doctor or other care provider to review them with you.         Where to get your medicines      These medications were sent to [place], [place] - [address] [place] [address] [address] [place] [address] [address] [address]    Hours:  TRANSPLANT PHONE NUMBER  [phone] Phone:  [phone]     pantoprazole 40 MG EC tablet         Some of these will need a paper prescription and others can be bought over the counter.  Ask your nurse if you have questions.     Bring a paper prescription for each of these medications     oxyCODONE-acetaminophen 5-325 MG tablet                Primary Care Provider Office Phone # Fax #    Quyen Baez -[phone]       [address] [address]        Equal Access to Services     RACHAEL BENITEZ : Hadii aad ku hadasho Soomaali, waaxda luqadaha, qaybta kaalmada adeegyada, waxay eliseoin haymonique gaspar . So [facility] [phone].    ATENCIÓN: Si habla español, tiene a quijano disposición servicios gratuitos de asistencia lingüística. [facility] [phone].    We comply with applicable federal civil rights laws and [place] laws. We do not discriminate on the basis of race, color, national origin, age, disability, sex, sexual orientation, or gender identity.            Thank you!     Thank you for choosing [facility]  for your care. Our goal is always to provide you with excellent care. Hearing back from our patients is one way we can continue to improve our services. Please take a few minutes to complete the written survey that you may receive in the mail after your visit with us. Thank you!             Your Updated Medication List - Protect others around you: Learn how to safely use, store and throw away your medicines at www.disposemymeds.org.          This list is accurate as of 12/7/18  4:33 PM.  Always use your most recent med list.                   Brand Name Dispense Instructions for use Diagnosis    acetaminophen 32 mg/mL liquid    TYLENOL     30.45 mLs (975 mg) by Per J Tube route every 8 hours        * amylase-lipase-protease 29912-75191 units Cpep    ZENPEP    180 capsule    Take 2-3 capsules (40,000-60,000 Units) by mouth Take with  snacks or supplements (Snacks)    Idiopathic chronic pancreatitis (H)       * amylase-lipase-protease 00229-85740 units Cpep    ZENPEP    180 capsule    Take 5 capsules (100,000 Units) by mouth 4 times daily (with meals and nightly)    Right upper quadrant abdominal pain       blood glucose monitoring test strip    NO BRAND SPECIFIED    100 strip    One Touch Ultra 2 Strips, Use to test blood sugars 2 times daily or as directed    Hypoglycemia       buprenorphine 5 MCG/HR WK patch    BUTRANS    4 patch    Place 1 patch onto the skin every 7 days    Abdominal pain, generalized       cefdinir 250 MG/5ML suspension    OMNICEF     Take 17.9 mLs by mouth daily        cephALEXin 250 MG/5ML suspension    KEFLEX    210 mL    Take 10 ml per GJ tube tid    Dysuria       clotrimazole-betamethasone 1-0.05 % external cream    LOTRISONE    15 g    Apply topically 2 times daily    Rash and nonspecific skin eruption       fluticasone 50 MCG/ACT nasal spray    FLONASE    16 g    Spray 2 sprays into both nostrils daily    Nasal congestion       gabapentin 400 MG capsule    NEURONTIN    90 capsule    Take 1 capsule (400 mg) by mouth 3 times daily    Abdominal pain, epigastric       hydrOXYzine 10 MG/5ML syrup    ATARAX    1500 mL    Take 12.5 mLs (25 mg) by mouth every 6 hours as needed for anxiety or other (pain)    Acute abdominal pain       leuprolide 11.25 MG kit    LUPRON DEPOT (3-MONTH)    1 each    Inject 11.25 mg into the muscle every 3 months    Endometriosis       levalbuterol 45 MCG/ACT inhaler    XOPENEX HFA    1 Inhaler    Inhale 2 puffs into the lungs every 6 hours as needed for shortness of breath / dyspnea    Wheeze       LEVAQUIN PO      Take 750 mg by mouth daily        LORazepam 0.5 MG tablet    ATIVAN    10 tablet    Take 1 tablet (0.5 mg) by mouth 2 times daily as needed (nausea, do not take with pain medicine, do not drive after taking)    Cyclical vomiting with nausea, intractability of vomiting not specified        multivitamins w/minerals liquid     1 Bottle    15 mLs by Per Feeding Tube route daily    Abdominal pain, epigastric       NORCO PO      Take 5 mg by mouth 5 mg (10 ml) every 4-6 hours as needed for pain.        norethindrone 5 MG tablet    AYGESTIN    90 tablet    Take 1 tablet (5 mg) by mouth daily    Endometriosis       nortriptyline 10 MG capsule    PAMELOR    120 capsule    Take 3 capsules (30 mg) by mouth At Bedtime    Right upper quadrant abdominal pain       ondansetron 4 MG ODT tab    ZOFRAN ODT    30 tablet    Take 1 tablet (4 mg) by mouth every 8 hours as needed for nausea or vomiting    Intractable cyclical vomiting with nausea       order for DME     1 Units    Equipment being ordered: TENS with wire and electrode.    Chronic abdominal pain       * oxyCODONE-acetaminophen 5-325 MG tablet    PERCOCET    8 tablet    Take 1 tablet by mouth every 6 hours as needed for pain    Postoperative pain       * oxyCODONE-acetaminophen 5-325 MG tablet    PERCOCET    12 tablet    Take 1-2 tablets every 4 - 6 hours as needed for pain.    Acute post-operative pain       * pantoprazole 40 MG EC tablet    PROTONIX    30 tablet    Take 1 tablet (40 mg) by mouth 2 times daily (before meals)    Right upper quadrant abdominal pain, Erosive gastritis       * pantoprazole 40 MG EC tablet    PROTONIX    30 tablet    Take 1 tablet (40 mg) by mouth daily Take 30-60 minutes before a meal.    Abdominal pain, epigastric       polyethylene glycol packet    MIRALAX/GLYCOLAX    7 packet    Take 17 g by mouth daily    Right upper quadrant abdominal pain       RIZATRIPTAN BENZOATE PO      Take 5 mg by mouth once as needed        senna-docusate 8.6-50 MG tablet    SENOKOT-S/PERICOLACE    100 tablet    Take 1 tablet by mouth 2 times daily as needed for constipation    Right upper quadrant abdominal pain       sodium bicarbonate 325 MG tablet     120 tablet    1 tablet (325 mg) by Per Feeding Tube route 4 times daily    Abdominal pain,  epigastric, Intractable cyclical vomiting with nausea       * Notice:  This list has 6 medication(s) that are the same as other medications prescribed for you. Read the directions carefully, and ask your doctor or other care provider to review them with you.

## 2018-12-07 NOTE — PROGRESS NOTES
Grabiel is well-known to me.  She is a 22-year-old with intractable abdominal pain and food intolerance of unclear etiology.  She is GJ dependent almost a year now.  She is back from [place] visiting with and to seek medical follow-up.  Major issues are that she has had several episodes of cellulitis around her PEG.  This apparently was hospitalized in [place] and placed on IV then oral Rocephin and Levaquin.  She is finishing Levaquin.  Otherwise sits pretty much status quo where she uses J-tube feeding as needed and gets IV infusions regularly.  She takes analgesic pain medicines every couple of days on average.  Physical exam.  We examined the Low Profile PEG J that was changed in September by Dr. Joe.  It appears adequately loose with some slack can be lifted off the skin but there is a circular area of maceration around it no underlying induration erythema or purulence. Jonathan Beltran nurse practitioner examined this with me and we agreed that it represents acid irritation of the surrounding tissue rather than an actual bacterial infection.  Impression we spent 25 minutes more than 50% counseling.  We first of all recommend that we change the GJ next Tuesday. Jonathan have recommended barrier cream and using gauze pads to soak up and divert the refluxed gastric acid.  We are also going to start her on pantoprazole 40 mg a day.  We will also renew her tube feeding order in give her a limited amount of Percocet for post procedure pain when we do the tube change next Tuesday.  We will see her back follow-up as needed.

## 2018-12-11 ENCOUNTER — HOSPITAL ENCOUNTER (OUTPATIENT)
Facility: CLINIC | Age: 23
Discharge: HOME | End: 2018-12-11
Attending: INTERNAL MEDICINE | Admitting: INTERNAL MEDICINE
Payer: COMMERCIAL

## 2018-12-11 ENCOUNTER — APPOINTMENT (OUTPATIENT)
Dept: GENERAL RADIOLOGY | Facility: CLINIC | Age: 23
End: 2018-12-11
Attending: INTERNAL MEDICINE
Payer: COMMERCIAL

## 2018-12-11 VITALS
WEIGHT: 143 LBS | HEIGHT: 67 IN | DIASTOLIC BLOOD PRESSURE: 83 MMHG | RESPIRATION RATE: 13 BRPM | SYSTOLIC BLOOD PRESSURE: 110 MMHG | OXYGEN SATURATION: 96 % | BODY MASS INDEX: 22.44 KG/M2

## 2018-12-11 LAB — PROVATION GI EXAM: NORMAL

## 2018-12-11 PROCEDURE — G0500 MOD SEDAT ENDO SERVICE >5YRS: HCPCS | Performed by: INTERNAL MEDICINE

## 2018-12-11 PROCEDURE — 25000128 H RX IP 250 OP 636: Performed by: INTERNAL MEDICINE

## 2018-12-11 PROCEDURE — 49451 REPLACE DUOD/JEJ TUBE PERC: CPT | Performed by: INTERNAL MEDICINE

## 2018-12-11 PROCEDURE — 76000 FLUOROSCOPY <1 HR PHYS/QHP: CPT | Mod: TC,59

## 2018-12-11 RX ORDER — ONDANSETRON 2 MG/ML
INJECTION INTRAMUSCULAR; INTRAVENOUS PRN
Status: DISCONTINUED | OUTPATIENT
Start: 2018-12-11 | End: 2018-12-11 | Stop reason: HOSPADM

## 2018-12-11 RX ORDER — FENTANYL CITRATE 50 UG/ML
INJECTION, SOLUTION INTRAMUSCULAR; INTRAVENOUS PRN
Status: DISCONTINUED | OUTPATIENT
Start: 2018-12-11 | End: 2018-12-11 | Stop reason: HOSPADM

## 2018-12-11 RX ORDER — DIPHENHYDRAMINE HYDROCHLORIDE 50 MG/ML
INJECTION INTRAMUSCULAR; INTRAVENOUS PRN
Status: DISCONTINUED | OUTPATIENT
Start: 2018-12-11 | End: 2018-12-11 | Stop reason: HOSPADM

## 2018-12-11 ASSESSMENT — MIFFLIN-ST. JEOR: SCORE: 1431.27

## 2018-12-13 ENCOUNTER — PATIENT OUTREACH (OUTPATIENT)
Dept: GASTROENTEROLOGY | Facility: CLINIC | Age: 23
End: 2018-12-13

## 2018-12-13 DIAGNOSIS — R10.11 RUQ ABDOMINAL PAIN: Primary | ICD-10-CM

## 2018-12-13 NOTE — PROGRESS NOTES
Grabiel called back and state she needs a refill on her zenpep- set to preferred pharmacy.     She would also like to have her supplies and formula signed for by Dr. Narayanan. She will have Aysha send the orders to us and have Dr. Narayanan sign and then send back to them.     Ella SARMIENTO, RN Care Coordinator  Dr. Narayanan, Dr. Joe & Dr. Klein   [facility]  [phone]

## 2018-12-13 NOTE — PROGRESS NOTES
Post tube change (12/10/2018) with Dr. Narayanan: Follow-up    Post procedure recommendations: - Written post PEG-J tube discharge instructions were provided to the patient.     Orders placed: None at this time    Unable to reach [place]- she does not have voicemail set up.     Clinic contact and scheduling numbers verified for future questions/concerns.     Ella SARMIENTO RN Coordinator  Dr. Narayanan, Dr. Joe & Dr. Klein  [facility]  [phone]

## 2018-12-14 ENCOUNTER — TELEPHONE (OUTPATIENT)
Dept: GASTROENTEROLOGY | Facility: CLINIC | Age: 23
End: 2018-12-14

## 2018-12-14 NOTE — TELEPHONE ENCOUNTER
Central Prior Authorization Team   Phone: [phone]    Prior Authorization Retail Medication Request    Medication/Dose: amylase-lipase-protease (ZENPEP) 04765-43035 units CPEP  ICD code (if different than what is on RX):  RUQ abdominal pain [R10.11]  Previously Tried and Failed:    Rationale:      Insurance Name:  [facility]  Insurance ID:  [id number]      Pharmacy Information (if different than what is on RX)  Name:  [facility]  Phone:  [phone]

## 2018-12-18 NOTE — TELEPHONE ENCOUNTER
Central Prior Authorization Team   [phone]    PA Initiation    Medication: amylase-lipase-protease (ZENPEP) 85659-585[address] units CPEP  Insurance Company: [employer] - Phone [phone] Fax [phone]  Pharmacy Filling the Rx: [facility] [address] [place] [address] [address] AT [facility] [address]  Filling Pharmacy Phone: [phone]  Filling Pharmacy Fax:    Start Date: 12/18/2018

## 2018-12-18 NOTE — TELEPHONE ENCOUNTER
Prior Authorization Approval    *NOTE: Pharmacy is requesting a New Prescription for amylase-lipase-protease (ZENPEP) 89540-78349 units CPEP and will resubmit to insurance and notify patient on medication. Pharmacy system has deleted Prescription on patient's chart.*      Authorization Effective Date: 11/18/2018  Authorization Expiration Date: 12/18/2019  Medication: amylase-lipase-protease (ZENPEP) 16470-33[address] units CPEP-[place] APPROVED   Approved Dose/Quantity:   Reference #: CASE ID #[id number]   Insurance Company: [employer] - Phone [phone] Fax [phone]  Expected CoPay:       CoPay Card Available:      Foundation Assistance Needed:    Which Pharmacy is filling the prescription (Not needed for infusion/clinic administered): [facility] [address] [place] [address] [address] AT [facility] [address]  Pharmacy Notified: Yes  Patient Notified: Yes

## 2019-01-02 ENCOUNTER — PATIENT OUTREACH (OUTPATIENT)
Dept: GASTROENTEROLOGY | Facility: CLINIC | Age: 24
End: 2019-01-02

## 2019-01-02 NOTE — PROGRESS NOTES
Received call from Linnea at [facility] asking for the orders to be signed for her tube feeding supplies. [phone]    Advised this was not received. She will resend, fax number verified.    Ella SARMIENTO RN Care Coordinator  Dr. Narayanan, Dr. Joe & Dr. Klein   [facility]  [phone]

## 2019-01-07 ENCOUNTER — TELEPHONE (OUTPATIENT)
Dept: GASTROENTEROLOGY | Facility: CLINIC | Age: 24
End: 2019-01-07

## 2019-01-07 NOTE — TELEPHONE ENCOUNTER
[facility] Call Center    Phone Message    May a detailed message be left on voicemail: yes    Reason for Call: Other: Linnea is wondering if you got the request for tube feeding supplies. Your note as of 01/02 indicates not yet recieved. Linnea states she has refaxed for the 4th and final time and is not allowed to call on this again. Please follow up with her to let her know if we received it or not     Action Taken: Message routed to:  [facility] panc

## 2019-01-08 NOTE — TELEPHONE ENCOUNTER
Returned call and spoke with Linnea. Advised that Dr. Narayanan will be in clinic tomorrow and will have him sign it then.     CHELA Massey Dr[address] Dr. Joe, & Dr. Klein  [facility]  [phone]

## 2019-01-15 ENCOUNTER — CARE COORDINATION (OUTPATIENT)
Dept: GASTROENTEROLOGY | Facility: CLINIC | Age: 24
End: 2019-01-15

## 2019-01-15 DIAGNOSIS — Z46.59 ENCOUNTER FOR CARE RELATED TO FEEDING TUBE: Primary | ICD-10-CM

## 2019-01-15 NOTE — PROGRESS NOTES
Received message from patient via [employer]: Hi there,     I have a couple questions for you guys. 1.) I'm currently admitted to the hospital with sepsis from my power port being infected. I've been here for a week now, and will be getting a PICC placed and sent home on IV antibiotics for 2 weeks and then I will be getting a new power port placed here in a month. But because of my port being infected, as a result my feeding tube grew strep bacteria which I was treated for a few days and has since cleared up. But, my team here is wondering if I should get my tube changed again since I've had all of this infection going on? They seem to think that's a good idea, but obviously Oracio knows best. I am coming to [place] in February to finally celebrate [place] with my family in [place], so if Oracio felt like that's best I'm wondering if it's something that could be done then or what he thinks?      Per Dr. Narayanan: Sure can change it here in unit J, by me    Order placed and sent to endoscopy scheduling.     CHELA Massey Dr., Dr. Joe, & Dr. Klein  [facility]  [phone]

## 2019-02-01 ENCOUNTER — TELEPHONE (OUTPATIENT)
Dept: FAMILY MEDICINE | Facility: CLINIC | Age: 24
End: 2019-02-01

## 2019-02-01 NOTE — LETTER
February 1, 2019    Alexandra Melgoza  [address] [facility] [address]    Dear Phyllis Morris cares about your health and your health plan.  I have reviewed your medical conditions, medication list and lab results, and am making recommendations based on this review to better manage your health.    You are in particular need of attention regarding:  -Asthma  -Depression/Anxiety  -Cervical Cancer Screening  -Wellness (Physical) Visit     I am recommending that you:     -schedule a WELLNESS (Physical) APPOINTMENT with me.   I will check fasting labs the same day - nothing to eat except water and meds for 8-10 hours prior. (If you go elsewhere for Wellness visits then please disregard this reminder.)      -schedule a PAP SMEAR EXAM which is due.  Please disregard this reminder if you have had this exam elsewhere within the last year.  It would be helpful for us to have a copy of your recent pap smear report in our file so that we can best coordinate your care.    If you are under/uninsured, we recommend you contact the [facility]. They offer pap smears at no charge or on a sliding fee charge. You can schedule with them at [phone]. Please have them send us the results.    -Complete and return the attached ASTHMA CONTROL TEST.  If your total score is 19 or less or you have been to the ER or urgent care for your asthma, then please schedule an asthma followup appointment.    Please complete the enclosed PHQ9 and mail back to clinic in the envelope provided.         Please call us at the [facility] location:  [phone] or use [employer] to address the above recommendations.     Thank you for trusting [facility].  We appreciate the opportunity to serve you and look forward to supporting your healthcare in the future.    If you have (or plan to have) any of these tests done at a facility other than a [facility] or a [facility], please have the results sent to the [facility] location noted above.      Best Regards,    Michelle Martinez, DO

## 2019-02-01 NOTE — TELEPHONE ENCOUNTER
Panel Management Review      Patient has the following on her problem list:     Depression / Dysthymia review    Measure:  Needs PHQ-9 score of 4 or less during index window.  Administer PHQ-9 and if score is 5 or more, send encounter to provider for next steps.    5 - 7 month window range:     PHQ-9 SCORE 4/14/2016 1/23/2017 3/19/2018   PHQ-9 Total Score - - -   PHQ-9 Total Score MyChart - - 6 (Mild depression)   PHQ-9 Total Score - - -   PHQ-9 Total Score 0 1 6   Some encounter information is confidential and restricted. Go to Review Flowsheets activity to see all data.       If PHQ-9 recheck is 5 or more, route to provider for next steps.    Patient is due for:  PHQ9 and DAP    Asthma review     ACT Total Scores 10/28/2015   ACT TOTAL SCORE -   ASTHMA ER VISITS -   ASTHMA HOSPITALIZATIONS -   ACT TOTAL SCORE (Goal Greater than or Equal to 20) 20   In the past 12 months, how many times did you visit the emergency room for your asthma without being admitted to the hospital? 0   In the past 12 months, how many times were you hospitalized overnight because of your asthma? 0      1. Is Asthma diagnosis on the Problem List? Yes    2. Is Asthma listed on Health Maintenance? Yes    3. Patient is due for:  ACT and AAP      Composite cancer screening  Chart review shows that this patient is due/due soon for the following Pap Smear  Summary:    Patient is due/failing the following:   AAP, ACT, DAP, PAP, PHQ9 and PHYSICAL    Action needed:   Patient needs office visit for physical/pap.    Type of outreach:    Sent letter.    Questions for provider review:    None                                                                                                                                    Marjorie Bates LPN     Chart routed to.

## 2019-02-01 NOTE — TELEPHONE ENCOUNTER
It does not seem that Alexandra reads [email or web address] messages...  Can we send a letter reminding her to schedule a physical to get all of her preventive cares completed?  Thanks!  --Dr. Martinez

## 2019-05-02 NOTE — PLAN OF CARE
Problem: Patient Care Overview  Goal: Plan of Care/Patient Progress Review  Outpatient/Observation goals to be met before discharge home:  -diagnostic tests and consults completed and resulted:No   -vital signs normal or at patient baseline: Monitoring   -tolerating oral intake to maintain hydration: No, pt nauseous  -adequate pain control on oral analgesics: No  IV Dilaudid given       Subjective:   Patient ID:  Karyna Geller is a 59 y.o. female who presents for evaluation of Abnormal ECG; Chest Pain; Tingling; Neck Pain; Nausea; and Excessive Sweating      58 yo female, referred for chest pain.  PMH remote slight MI, HTN, HLD, asthma, GERD, h/o subarachnoid haemorrhagia and aneurysm in , bipolar.  C/o chest sharp pain, 2 or 3 times a week, for 15 minutes. Could be triggered by nothing. Radiating to the shoulders and back. Pt states that NTG SL helped  C/o dyspnea. Inhalor prn  Smoking 1/2 ppd for 30 yrs. Occasional drinking.  Walking and biking weekly  ekg today NSR        Past Medical History:   Diagnosis Date    Anemia     Aneurysm     Anorexia 4/26/2017    Anxiety     Asthma     Bipolar disorder     Carpal tunnel syndrome, bilateral     Cerebral aneurysm     Chronic pain syndrome     Cigarette nicotine dependence     Depression     GERD (gastroesophageal reflux disease)     Hyperlipidemia     Hypertension     Insomnia     Osteoporosis     Schizophrenia     Stroke     Subarachnoid hemorrhage        Past Surgical History:   Procedure Laterality Date    ANGIOGRAM-CEREBRAL N/A 3/31/2015    Performed by Cosme Montgomery MD at [facility] OR [facility]    ANGIOGRAM-CEREBRAL possible coiling N/A 8/27/2014    Performed by Deven Surgeon at [facility] DEVEN    BUNIONECTOMY Right     COLONOSCOPY W/ BIOPSIES AND POLYPECTOMY      HYSTERECTOMY      PARTIAL HYSTERECTOMY      Bilateral Ovaries Remain    TRANSCRANIAL DOPPLER STUDY - COMPLETE  8/28/2014         TRANSCRANIAL DOPPLER STUDY - COMPLETE  8/29/2014         TRANSCRANIAL DOPPLER STUDY - COMPLETE  8/30/2014         TRANSCRANIAL DOPPLER STUDY - COMPLETE  8/31/2014         TRANSCRANIAL DOPPLER STUDY - COMPLETE  9/1/2014         TRANSCRANIAL DOPPLER STUDY - COMPLETE  9/2/2014         TRANSCRANIAL DOPPLER STUDY - COMPLETE  9/3/2014         TRANSCRANIAL DOPPLER STUDY - COMPLETE  9/4/2014         TRANSCRANIAL DOPPLER STUDY -  COMPLETE  9/5/2014         TRANSCRANIAL DOPPLER STUDY - COMPLETE  9/6/2014         VAGINAL DELIVERY      X 3       Social History     Tobacco Use    Smoking status: Current Every Day Smoker     Packs/day: 0.20     Types: Cigarettes     Start date: 1976    Smokeless tobacco: Never Used   Substance Use Topics    Alcohol use: Yes     Comment: 1-3 times per week, 1-2 drink at a time.    Drug use: No       Family History   Problem Relation Age of Onset    Cancer Mother     Glaucoma Mother     Stroke Mother     Diabetes type II Mother     Heart disease Mother     Hypertension Mother     Brain cancer Sister     Diabetes type II Sister     Cancer Brother     Hypertension Brother     Bone cancer Sister        Review of Systems   Constitution: Negative for decreased appetite, diaphoresis, fever, malaise/fatigue and night sweats.   HENT: Negative for nosebleeds.    Eyes: Negative for blurred vision and double vision.   Cardiovascular: Positive for chest pain. Negative for claudication, dyspnea on exertion, irregular heartbeat, leg swelling, near-syncope, orthopnea, palpitations, paroxysmal nocturnal dyspnea and syncope.   Respiratory: Negative for cough, shortness of breath, sleep disturbances due to breathing, snoring, sputum production and wheezing.    Endocrine: Negative for cold intolerance and polyuria.   Hematologic/Lymphatic: Does not bruise/bleed easily.   Skin: Negative for rash.   Musculoskeletal: Negative for back pain, falls, joint pain, joint swelling and neck pain.   Gastrointestinal: Negative for abdominal pain, heartburn, nausea and vomiting.   Genitourinary: Negative for dysuria, frequency and hematuria.   Neurological: Negative for difficulty with concentration, dizziness, focal weakness, headaches, light-headedness, numbness, seizures and weakness.   Psychiatric/Behavioral: Negative for depression, memory loss and substance abuse. The patient does not have insomnia.    Allergic/Immunologic:  Negative for HIV exposure and hives.       Objective:   Physical Exam   Constitutional: She is oriented to person, place, and time. She appears well-nourished.   HENT:   Head: Normocephalic.   Eyes: Pupils are equal, round, and reactive to light.   Neck: Normal carotid pulses and no JVD present. Carotid bruit is not present. No thyromegaly present.   Cardiovascular: Normal rate, regular rhythm, normal heart sounds and normal pulses.  No extrasystoles are present. PMI is not displaced. Exam reveals no gallop and no S3.   No murmur heard.  Pulmonary/Chest: Breath sounds normal. No stridor. No respiratory distress.   Abdominal: Soft. Bowel sounds are normal. There is no tenderness. There is no rebound.   Musculoskeletal: Normal range of motion.   Neurological: She is alert and oriented to person, place, and time.   Skin: Skin is intact. No rash noted.   Psychiatric: Her behavior is normal.       Lab Results   Component Value Date    CHOL 201 (H) 04/23/2019    CHOL 173 03/08/2018    CHOL 204 (H) 05/03/2017     Lab Results   Component Value Date    HDL 71 04/23/2019    HDL 76 (H) 03/08/2018    HDL 81 (H) 05/03/2017     Lab Results   Component Value Date    LDLCALC 99.2 04/23/2019    LDLCALC 90.0 03/08/2018    LDLCALC 111.6 05/03/2017     Lab Results   Component Value Date    TRIG 154 (H) 04/23/2019    TRIG 35 03/08/2018    TRIG 57 05/03/2017     Lab Results   Component Value Date    CHOLHDL 35.3 04/23/2019    CHOLHDL 43.9 03/08/2018    CHOLHDL 39.7 05/03/2017       Chemistry        Component Value Date/Time     04/23/2019 1557    K 4.1 04/23/2019 1557     04/23/2019 1557    CO2 31 (H) 04/23/2019 1557    BUN 15 04/23/2019 1557    CREATININE 1.0 04/23/2019 1557    GLU 82 04/23/2019 1557        Component Value Date/Time    CALCIUM 9.7 04/23/2019 1557    ALKPHOS 53 (L) 04/23/2019 1557    AST 18 04/23/2019 1557    ALT 12 04/23/2019 1557    BILITOT 0.1 04/23/2019 1557    ESTGFRAFRICA >60.0 04/23/2019 1550     EGFRNONAA >60.0 04/23/2019 1557          Lab Results   Component Value Date    HGBA1C 5.5 04/23/2019     Lab Results   Component Value Date    TSH 0.728 08/27/2014     Lab Results   Component Value Date    INR 1.0 04/03/2017    INR 1.0 08/29/2014    INR 1.0 08/28/2014     Lab Results   Component Value Date    WBC 8.40 04/23/2019    HGB 12.3 04/23/2019    HCT 36.6 (L) 04/23/2019    MCV 93 04/23/2019     04/23/2019     BNP  @LABRCNTIP(BNP,BNPTRIAGEBLO)@  CrCl cannot be calculated (Patient's most recent lab result is older than the maximum 7 days allowed.).  No results found in the last 24 hours.  No results found in the last 24 hours.  No results found in the last 24 hours.    Assessment:      1. Other chest pain    2. Essential hypertension    3. Hyperlipidemia, unspecified hyperlipidemia type    4. Cigarette nicotine dependence without complication    5. Subarachnoid hemorrhage from aneurysm of right anterior communicating artery    6. Smoker      Atypical CP with multiple Cv risk factors, including HTN, smoking and response to NTG SL  Plan:   Exercise stress echo and call for the result  Refill nTG SL  Continue current meds.  Recommend heart-healthy diet, weight control and regular exercise.  Moises. Risk modification.   F/u with pcp    I have reviewed all pertinent labs and cardiac studies. Plans and recommendations have been formulated under my direct supervision. All questions answered and patient voiced understanding. Patient to continue current medications.

## 2019-05-15 ENCOUNTER — MYC MEDICAL ADVICE (OUTPATIENT)
Dept: GASTROENTEROLOGY | Facility: CLINIC | Age: 24
End: 2019-05-15

## 2019-05-15 ENCOUNTER — TELEPHONE (OUTPATIENT)
Dept: GASTROENTEROLOGY | Facility: CLINIC | Age: 24
End: 2019-05-15

## 2019-05-15 DIAGNOSIS — Z46.59 ENCOUNTER FOR CARE RELATED TO FEEDING TUBE: Primary | ICD-10-CM

## 2019-05-15 NOTE — TELEPHONE ENCOUNTER
[facility]    Phone Message    May a detailed message be left on voicemail: yes    Reason for Call: Other: Pt has infected feeing tube and states she need tube change. Please call her to discuss. thanks!     Action Taken: Message routed to:  [facility] natacha

## 2019-05-15 NOTE — TELEPHONE ENCOUNTER
[facility] Call Center    Phone Message    May a detailed message be left on voicemail: yes    Reason for Call: Other: Patient called again regarding a GJ Tube Change, she is hoping to speak with someone today at [phone].     Action Taken: Message routed to:  [facility]

## 2019-05-15 NOTE — TELEPHONE ENCOUNTER
Spoke with patient and advised her that I sent the MyChart to Dr. Narayanan and am awaiting his recommendations.     I will be in touch when he responds.     CHELA Massey Dr., Dr. Joe, & Dr. Klein  [facility]  [phone]

## 2019-05-17 NOTE — TELEPHONE ENCOUNTER
Per Dr. Narayanan: Im happy to do tube change. Guessing OR best rather than [address] as having problems.       Order placed.     I will respond on MyChart and let her know his recommendations.     CHELA Massey Dr., Dr. Joe, & Dr. Klein  [facility]  [phone]

## 2019-05-20 ENCOUNTER — TELEPHONE (OUTPATIENT)
Dept: GASTROENTEROLOGY | Facility: CLINIC | Age: 24
End: 2019-05-20

## 2019-05-20 NOTE — TELEPHONE ENCOUNTER
Spoke to patient in regards to scheduled procedure. Informed patient she is scheduled with Dr. Narayanan on 6/7/19. Informed patient she will need an updated pre-op physical within 30 days of her procedure. Patient stated she is going to have this done locally in [place].. Informed patient she will need a  and someone to monitor her for 24 hours after the procedure. Informed patient all scheduling details will be sent to her MyChart per her request.     5/20/19 112pm

## 2019-06-07 ENCOUNTER — APPOINTMENT (OUTPATIENT)
Dept: GENERAL RADIOLOGY | Facility: CLINIC | Age: 24
End: 2019-06-07
Attending: INTERNAL MEDICINE
Payer: COMMERCIAL

## 2019-06-07 ENCOUNTER — HOSPITAL ENCOUNTER (OUTPATIENT)
Facility: CLINIC | Age: 24
Discharge: HOME | End: 2019-06-07
Attending: INTERNAL MEDICINE | Admitting: INTERNAL MEDICINE
Payer: COMMERCIAL

## 2019-06-07 ENCOUNTER — ANESTHESIA EVENT (OUTPATIENT)
Dept: SURGERY | Facility: CLINIC | Age: 24
End: 2019-06-07

## 2019-06-07 ENCOUNTER — ANESTHESIA (OUTPATIENT)
Dept: SURGERY | Facility: CLINIC | Age: 24
End: 2019-06-07

## 2019-06-07 VITALS
OXYGEN SATURATION: 97 % | DIASTOLIC BLOOD PRESSURE: 91 MMHG | SYSTOLIC BLOOD PRESSURE: 125 MMHG | RESPIRATION RATE: 16 BRPM | HEART RATE: 98 BPM

## 2019-06-07 PROCEDURE — 76000 FLUOROSCOPY <1 HR PHYS/QHP: CPT | Mod: TC,59

## 2019-06-07 PROCEDURE — 49450 REPLACE G/C TUBE PERC: CPT | Performed by: INTERNAL MEDICINE

## 2019-06-07 PROCEDURE — 25000128 H RX IP 250 OP 636: Performed by: INTERNAL MEDICINE

## 2019-06-07 PROCEDURE — G0500 MOD SEDAT ENDO SERVICE >5YRS: HCPCS

## 2019-06-07 RX ORDER — FENTANYL CITRATE 50 UG/ML
INJECTION, SOLUTION INTRAMUSCULAR; INTRAVENOUS PRN
Status: DISCONTINUED | OUTPATIENT
Start: 2019-06-07 | End: 2019-06-07 | Stop reason: HOSPADM

## 2019-06-07 NOTE — DISCHARGE INSTRUCTIONS
Discharge Instructions after  GJ tube exchange    Activity and Diet  You were given medicine for pain. You may be dizzy or sleepy.  For 24 hours:    Do not drive or use heavy equipment.    Do not make important decisions.    Do not drink any alcohol.  _x__ You may return to your regular diet.    Discomfort  Your tube site may be sore for a couple days.  Watch for signs of infection: reddness, pain, swelling, drainage.    You may take Tylenol (acetaminophen) for pain unless your doctor has told you not to.        Follow-up      Other instructions________________________________________________________    When to call us:  Problems are rare. Call right away if you have:    Unusual throat pain or trouble swallowing    Unusual pain in belly or chest that is not relieved by belching or passing air    Black stools (tar-like looking bowel movement)    Temperature above 100.6  F. (37.5  C).    If you vomit blood or have severe pain, go to an emergency room.    If you have questions, call:  Monday to Friday, 7 a.m. to 4:30 p.m.: Endoscopy: [phone] (We may have to call you back)    After hours: Hospital: [phone] (Ask for the GI fellow on call)

## 2019-06-07 NOTE — OR NURSING
GJ tube exchange under conscious sedation.  Alex Cerna gastric jejunal feeding tube.  18fr, 3 cm stoma length, 45 cvm jejunal length balloon 7-10 ml.  Fluoro used to confirm placement.  Pt tolerated procedure.

## 2019-06-09 NOTE — BRIEF OP NOTE
[facility], [place]    Brief Operative Note    Pre-operative diagnosis: abdominal pain  Post-operative diagnosis Clogged tube    Procedure: Procedure(s):  REPLACEMENT, GASTROSTOMY TUBE, PERCUTANEOUS  Surgeon: Surgeon(s) and Role:     * Campbell Narayanan MD - Primary  Anesthesia: Conscious Sedation 7mg versed, 150mics fentanyl, 25mg benadryl  Estimated blood loss: None   Drains: None  Specimens: * No specimens in log *  Findings:   Tube in place. Glidewire passed through to jejunum, new tube placed under flouroscopyImplants:      18F 45cm 3cm Lo Profile

## 2019-06-13 ENCOUNTER — TELEPHONE (OUTPATIENT)
Dept: GASTROENTEROLOGY | Facility: CLINIC | Age: 24
End: 2019-06-13

## 2019-06-13 NOTE — TELEPHONE ENCOUNTER
Called patient and updated that I was working with Dr. Narayanan about scheduling her for procedure. Patient did mention she was having a hard time getting anyone local to replace low profile tube. Will come back from [place] tomorrow with parents, planning for procedure next week.    Bel Berrios, RN Care Coordinator

## 2019-06-13 NOTE — TELEPHONE ENCOUNTER
Health Call Center    Phone Message    May a detailed message be left on voicemail: yes    Reason for Call: Other: Patient called to speak to Bel regarding a MyChart message patient had sent regarding her GJ balloon/tube. Patient stated that Bel was going to send a message to Dr. Narayanan regarding her tube issues. Patient wondering if Bel heard back from Dr. Narayanan yet. Please call patient back.     Action Taken: Message routed to:  [facility] Panc-Bili

## 2019-06-17 ENCOUNTER — PATIENT OUTREACH (OUTPATIENT)
Dept: GASTROENTEROLOGY | Facility: CLINIC | Age: 24
End: 2019-06-17

## 2019-06-17 ENCOUNTER — ANESTHESIA EVENT (OUTPATIENT)
Dept: SURGERY | Facility: CLINIC | Age: 24
End: 2019-06-17
Payer: COMMERCIAL

## 2019-06-17 ENCOUNTER — TELEPHONE (OUTPATIENT)
Dept: GASTROENTEROLOGY | Facility: CLINIC | Age: 24
End: 2019-06-17

## 2019-06-17 DIAGNOSIS — Z46.59 ENCOUNTER FOR CARE RELATED TO FEEDING TUBE: Primary | ICD-10-CM

## 2019-06-17 NOTE — TELEPHONE ENCOUNTER
Spoke to patient in regards to scheduled procedure. Informed patient she is scheduled with Dr. Narayanan on 6/18/19.  Informed patient she will need a  and someone to monitor her for 24 hours after the procedure. Informed patient all scheduling details will be sent to her MyChart per her request.     6/17/19 1043am

## 2019-06-17 NOTE — ANESTHESIA PREPROCEDURE EVALUATION
Anesthesia Evaluation     . Pt has had prior anesthetic. Type: General    History of anesthetic complications   - PONV        ROS/MED HX    ENT/Pulmonary:     (+)asthma (no issues in several years) , . .    Neurologic: Comment: PHN    (+)migraines, seizures features: pseudo sz,     Cardiovascular: Comment: P.O.T.S. Postural orthostatic tachycardia syndrome; no issues with it anymore, per patient    (+) ----. Taking blood thinners Pt has received instructions: Instructions Given to patient:  Meg . . . :. . Previous cardiac testing Echodate:2013results:Interpretation Summary  Global and regional left ventricular function is normal with an EF of 55-60%.   Global right ventricular function is normal. Pulmonary artery systolic   pressure is normal. The inferior vena cava is normal. No pericardial   effusion is present.  PatientHeight: 67 in  PatientWeight: 143 lbs  SystolicPressure: 96 mmHg  DiastolicPressure: 55 mmHg  BSA 1.8 m^2      Procedure  Echocardiogram with two-dimensional, color and spectral Doppler performed.    Left Ventricle  Global and regional left ventricular function is normal with an EF of 55-60%.    Right Ventricle  The right ventricle is normal size.  Global right ventricular function is normal.    Atria  Both atria appear normal.  A prominent eustachian valve is noted.  A catheter is noted in the right atrium.    Mitral Valve  The mitral valve is normal.    Aortic Valve  Aortic valve is normal in structure and function.    Tricuspid Valve  The tricuspid valve is normal.  Trace tricuspid insufficiency is present.  The TR peak pressure gradient is 16 mmHg .  Pulmonary artery systolic pressure is normal.    Pulmonic Valve  TheInterpretation Summary  Global and regional left ventricular function is normal with an EF of 55-60%.   Global right ventricular function is normal. Pulmonary artery systolic   pressure is normal. The inferior vena cava is normal. No pericardial   effusion is  present.  PatientHeight: 67 in  PatientWeight: 143 lbs  SystolicPressure: 96 mmHg  DiastolicPressure: 55 mmHg  BSA 1.8 m^2      Procedure  Echocardiogram with two-dimensional, color and spectral Doppler performed.    Left Ventricle  Global and regional left ventricular function is normal with an EF of 55-60%.    Right Ventricle  The right ventricle is normal size.  Global right ventricular function is normal.    Atria  Both atria appear normal.  A prominent eustachian valve is noted.  A catheter is noted in the right atrium.    Mitral Valve  The mitral valve is normal.    Aortic Valve  Aortic valve is normal in structure and function.    Tricuspid Valve  The tricuspid valve is normal.  Trace tricuspid insufficiency is present.  The TR peak pressure gradient is 16 mmHg .  Pulmonary artery systolic pressure is normal.    Pulmonic Valve  The pulmonic valve is normal.  Trace pulmonic insufficiency is present.    Vessels  The inferior vena cava is normal.    Pericardium  No pericardial effusion is present.   pulmonic valve is normal.  Trace pulmonic insufficiency is present.    Vessels  The inferior vena cava is normal.    Pericardium  No pericardial effusion is present.  date: results:ECG reviewed date:2/2018 results:Sinus bradycardia with sinus arrhythmia date: results:          METS/Exercise Tolerance:  >4 METS   Hematologic:     (+) History of blood clots pt is anticoagulated, -      Musculoskeletal: Comment: FMS        GI/Hepatic: Comment: sphincter of oddi dysfxn      (+) Other GI/Hepatic Chronic abdominal pain, cyclic N/V      Renal/Genitourinary:  - ROS Renal section negative       Endo: Comment: Taking OCP    (+) Other Endocrine Disorder endometriosis.      Psychiatric:     (+) psychiatric history other (comment) (somatoform disorder, pseudoseizures)      Infectious Disease:   (+) MRSA,       Malignancy:      - no malignancy   Other:    (+) H/O Chronic Pain,H/O chronic opiod use ,                                                       .        PHYSICAL EXAM:   Mental Status/Neuro:    Airway: Facies: Feasible  Mallampati: Not Assessed  Mouth/Opening: Not Assessed  TM distance: Not Assessed  Neck ROM: Not Assessed   Respiratory:    CV:    Comments:                    Assessment:   ASA SCORE: 2    NPO Status: > 6 hours since completed Solid Foods   Documentation: H&P complete; Preop Testing complete; Consents complete   Proceeding: Proceed without further delay  Tobacco Use:  NO Active use of Tobacco/UNKNOWN Tobacco use status     Plan:   Anes. Type:  General   Pre-Induction: Midazolam IV   Induction:  IV (Standard)   Airway: Oral ETT   Access/Monitoring: PIV   Maintenance: Balanced; Propofol   Emergence: Procedure Site   Logistics: Same Day Surgery     Postop Pain/Sedation Strategy:  Standard-Options: Opioids PRN     PONV Management:  Adult Risk Factors: Female, H/o PONV or Motion Sickness, Non-Smoker, Postop Opioids  Prevention: Propofol Infusion; Ondansetron; Dexamethasone       Comments for Plan/Consent:  03/27/18; 3237; Mask Ventilation: Not attempted (RSI); Ease of Intubation: Easy; Airway Size: 7;  Cuffed;  Oral;  Blade Type: Abeba;  Blade Size: 3;  Place by: JB Moore CRNA;  Insertion Attempts: 1;  Secured at (cm)to lip: 22 cm;  Breath Sounds: Equal, clear and bilateral;  End Tidal CO2: Present;  Dentition: Intact;  Grade View of Cords: 1

## 2019-06-18 ENCOUNTER — ANESTHESIA (OUTPATIENT)
Dept: SURGERY | Facility: CLINIC | Age: 24
End: 2019-06-18
Payer: COMMERCIAL

## 2019-06-18 ENCOUNTER — HOSPITAL ENCOUNTER (OUTPATIENT)
Facility: CLINIC | Age: 24
Discharge: HOME | End: 2019-06-18
Attending: INTERNAL MEDICINE | Admitting: INTERNAL MEDICINE
Payer: COMMERCIAL

## 2019-06-18 ENCOUNTER — APPOINTMENT (OUTPATIENT)
Dept: GENERAL RADIOLOGY | Facility: CLINIC | Age: 24
End: 2019-06-18
Attending: INTERNAL MEDICINE
Payer: COMMERCIAL

## 2019-06-18 VITALS
OXYGEN SATURATION: 96 % | WEIGHT: 167.77 LBS | RESPIRATION RATE: 16 BRPM | SYSTOLIC BLOOD PRESSURE: 125 MMHG | TEMPERATURE: 98.4 F | HEART RATE: 98 BPM | HEIGHT: 66 IN | DIASTOLIC BLOOD PRESSURE: 87 MMHG | BODY MASS INDEX: 26.96 KG/M2

## 2019-06-18 LAB
GLUCOSE BLDC GLUCOMTR-MCNC: 86 MG/DL (ref 70–99)
HCG UR QL: NEGATIVE

## 2019-06-18 PROCEDURE — 37000009 ZZH ANESTHESIA TECHNICAL FEE, EACH ADDTL 15 MIN: Performed by: INTERNAL MEDICINE

## 2019-06-18 PROCEDURE — 25000128 H RX IP 250 OP 636: Performed by: ANESTHESIOLOGY

## 2019-06-18 PROCEDURE — 25000125 ZZHC RX 250: Performed by: NURSE ANESTHETIST, CERTIFIED REGISTERED

## 2019-06-18 PROCEDURE — 27210794 ZZH OR GENERAL SUPPLY STERILE: Performed by: INTERNAL MEDICINE

## 2019-06-18 PROCEDURE — 25000125 ZZHC RX 250: Performed by: INTERNAL MEDICINE

## 2019-06-18 PROCEDURE — 81025 URINE PREGNANCY TEST: CPT | Performed by: ANESTHESIOLOGY

## 2019-06-18 PROCEDURE — 36000053 ZZH SURGERY LEVEL 2 EA 15 ADDTL MIN - UMMC: Performed by: INTERNAL MEDICINE

## 2019-06-18 PROCEDURE — 82962 GLUCOSE BLOOD TEST: CPT

## 2019-06-18 PROCEDURE — 25800030 ZZH RX IP 258 OP 636: Performed by: NURSE ANESTHETIST, CERTIFIED REGISTERED

## 2019-06-18 PROCEDURE — 25500064 ZZH RX 255 OP 636: Performed by: INTERNAL MEDICINE

## 2019-06-18 PROCEDURE — 71000016 ZZH RECOVERY PHASE 1 LEVEL 3 FIRST HR: Performed by: INTERNAL MEDICINE

## 2019-06-18 PROCEDURE — 71000027 ZZH RECOVERY PHASE 2 EACH 15 MINS: Performed by: INTERNAL MEDICINE

## 2019-06-18 PROCEDURE — 37000008 ZZH ANESTHESIA TECHNICAL FEE, 1ST 30 MIN: Performed by: INTERNAL MEDICINE

## 2019-06-18 PROCEDURE — 71000017 ZZH RECOVERY PHASE 1 LEVEL 3 EA ADDTL HR: Performed by: INTERNAL MEDICINE

## 2019-06-18 PROCEDURE — 36000055 ZZH SURGERY LEVEL 2 W FLUORO 1ST 30 MIN - UMMC: Performed by: INTERNAL MEDICINE

## 2019-06-18 PROCEDURE — 25000565 ZZH ISOFLURANE, EA 15 MIN: Performed by: INTERNAL MEDICINE

## 2019-06-18 PROCEDURE — 25000128 H RX IP 250 OP 636: Performed by: NURSE ANESTHETIST, CERTIFIED REGISTERED

## 2019-06-18 PROCEDURE — 40000170 ZZH STATISTIC PRE-PROCEDURE ASSESSMENT II: Performed by: INTERNAL MEDICINE

## 2019-06-18 PROCEDURE — C1769 GUIDE WIRE: HCPCS | Performed by: INTERNAL MEDICINE

## 2019-06-18 PROCEDURE — 40000277 XR SURGERY CARM FLUORO LESS THAN 5 MIN W STILLS: Mod: TC

## 2019-06-18 RX ORDER — LIDOCAINE 40 MG/G
CREAM TOPICAL
Status: DISCONTINUED | OUTPATIENT
Start: 2019-06-18 | End: 2019-06-18 | Stop reason: HOSPADM

## 2019-06-18 RX ORDER — HYDROMORPHONE HYDROCHLORIDE 1 MG/ML
.3-.5 INJECTION, SOLUTION INTRAMUSCULAR; INTRAVENOUS; SUBCUTANEOUS EVERY 10 MIN PRN
Status: DISCONTINUED | OUTPATIENT
Start: 2019-06-18 | End: 2019-06-18 | Stop reason: HOSPADM

## 2019-06-18 RX ORDER — SODIUM CHLORIDE, SODIUM LACTATE, POTASSIUM CHLORIDE, CALCIUM CHLORIDE 600; 310; 30; 20 MG/100ML; MG/100ML; MG/100ML; MG/100ML
INJECTION, SOLUTION INTRAVENOUS CONTINUOUS
Status: DISCONTINUED | OUTPATIENT
Start: 2019-06-18 | End: 2019-06-18 | Stop reason: HOSPADM

## 2019-06-18 RX ORDER — ONDANSETRON 2 MG/ML
4 INJECTION INTRAMUSCULAR; INTRAVENOUS
Status: DISCONTINUED | OUTPATIENT
Start: 2019-06-18 | End: 2019-06-18 | Stop reason: HOSPADM

## 2019-06-18 RX ORDER — ONDANSETRON 4 MG/1
4 TABLET, ORALLY DISINTEGRATING ORAL EVERY 30 MIN PRN
Status: DISCONTINUED | OUTPATIENT
Start: 2019-06-18 | End: 2019-06-18 | Stop reason: HOSPADM

## 2019-06-18 RX ORDER — ALBUTEROL SULFATE 0.83 MG/ML
2.5 SOLUTION RESPIRATORY (INHALATION) EVERY 4 HOURS PRN
Status: DISCONTINUED | OUTPATIENT
Start: 2019-06-18 | End: 2019-06-18 | Stop reason: HOSPADM

## 2019-06-18 RX ORDER — ONDANSETRON 2 MG/ML
INJECTION INTRAMUSCULAR; INTRAVENOUS PRN
Status: DISCONTINUED | OUTPATIENT
Start: 2019-06-18 | End: 2019-06-18

## 2019-06-18 RX ORDER — PROPOFOL 10 MG/ML
INJECTION, EMULSION INTRAVENOUS PRN
Status: DISCONTINUED | OUTPATIENT
Start: 2019-06-18 | End: 2019-06-18

## 2019-06-18 RX ORDER — NALOXONE HYDROCHLORIDE 0.4 MG/ML
.1-.4 INJECTION, SOLUTION INTRAMUSCULAR; INTRAVENOUS; SUBCUTANEOUS
Status: DISCONTINUED | OUTPATIENT
Start: 2019-06-18 | End: 2019-06-18 | Stop reason: HOSPADM

## 2019-06-18 RX ORDER — DEXAMETHASONE SODIUM PHOSPHATE 4 MG/ML
4 INJECTION, SOLUTION INTRA-ARTICULAR; INTRALESIONAL; INTRAMUSCULAR; INTRAVENOUS; SOFT TISSUE ONCE
Status: COMPLETED | OUTPATIENT
Start: 2019-06-18 | End: 2019-06-18

## 2019-06-18 RX ORDER — MEPERIDINE HYDROCHLORIDE 25 MG/ML
12.5 INJECTION INTRAMUSCULAR; INTRAVENOUS; SUBCUTANEOUS
Status: DISCONTINUED | OUTPATIENT
Start: 2019-06-18 | End: 2019-06-18 | Stop reason: HOSPADM

## 2019-06-18 RX ORDER — FENTANYL CITRATE 50 UG/ML
25-50 INJECTION, SOLUTION INTRAMUSCULAR; INTRAVENOUS
Status: DISCONTINUED | OUTPATIENT
Start: 2019-06-18 | End: 2019-06-18 | Stop reason: HOSPADM

## 2019-06-18 RX ORDER — ONDANSETRON 2 MG/ML
4 INJECTION INTRAMUSCULAR; INTRAVENOUS EVERY 30 MIN PRN
Status: DISCONTINUED | OUTPATIENT
Start: 2019-06-18 | End: 2019-06-18 | Stop reason: HOSPADM

## 2019-06-18 RX ORDER — NALOXONE HYDROCHLORIDE 0.4 MG/ML
.1-.4 INJECTION, SOLUTION INTRAMUSCULAR; INTRAVENOUS; SUBCUTANEOUS
Status: CANCELLED | OUTPATIENT
Start: 2019-06-18 | End: 2019-06-19

## 2019-06-18 RX ORDER — FENTANYL CITRATE 50 UG/ML
25-50 INJECTION, SOLUTION INTRAMUSCULAR; INTRAVENOUS
Status: CANCELLED | OUTPATIENT
Start: 2019-06-18

## 2019-06-18 RX ORDER — DIMENHYDRINATE 50 MG/ML
25 INJECTION, SOLUTION INTRAMUSCULAR; INTRAVENOUS
Status: DISCONTINUED | OUTPATIENT
Start: 2019-06-18 | End: 2019-06-18 | Stop reason: HOSPADM

## 2019-06-18 RX ORDER — FENTANYL CITRATE 50 UG/ML
INJECTION, SOLUTION INTRAMUSCULAR; INTRAVENOUS PRN
Status: DISCONTINUED | OUTPATIENT
Start: 2019-06-18 | End: 2019-06-18

## 2019-06-18 RX ORDER — DIPHENHYDRAMINE HYDROCHLORIDE 50 MG/ML
INJECTION INTRAMUSCULAR; INTRAVENOUS PRN
Status: DISCONTINUED | OUTPATIENT
Start: 2019-06-18 | End: 2019-06-18

## 2019-06-18 RX ORDER — METOPROLOL TARTRATE 1 MG/ML
1-2 INJECTION, SOLUTION INTRAVENOUS EVERY 5 MIN PRN
Status: DISCONTINUED | OUTPATIENT
Start: 2019-06-18 | End: 2019-06-18 | Stop reason: HOSPADM

## 2019-06-18 RX ORDER — ESMOLOL HYDROCHLORIDE 10 MG/ML
INJECTION INTRAVENOUS PRN
Status: DISCONTINUED | OUTPATIENT
Start: 2019-06-18 | End: 2019-06-18

## 2019-06-18 RX ORDER — IOPAMIDOL 510 MG/ML
INJECTION, SOLUTION INTRAVASCULAR PRN
Status: DISCONTINUED | OUTPATIENT
Start: 2019-06-18 | End: 2019-06-18 | Stop reason: HOSPADM

## 2019-06-18 RX ORDER — SODIUM CHLORIDE, SODIUM LACTATE, POTASSIUM CHLORIDE, CALCIUM CHLORIDE 600; 310; 30; 20 MG/100ML; MG/100ML; MG/100ML; MG/100ML
INJECTION, SOLUTION INTRAVENOUS CONTINUOUS PRN
Status: DISCONTINUED | OUTPATIENT
Start: 2019-06-18 | End: 2019-06-18

## 2019-06-18 RX ORDER — HYDRALAZINE HYDROCHLORIDE 20 MG/ML
2.5-5 INJECTION INTRAMUSCULAR; INTRAVENOUS EVERY 10 MIN PRN
Status: DISCONTINUED | OUTPATIENT
Start: 2019-06-18 | End: 2019-06-18 | Stop reason: HOSPADM

## 2019-06-18 RX ORDER — FLUMAZENIL 0.1 MG/ML
0.2 INJECTION, SOLUTION INTRAVENOUS
Status: CANCELLED | OUTPATIENT
Start: 2019-06-18 | End: 2019-06-19

## 2019-06-18 RX ADMIN — HYDROMORPHONE HYDROCHLORIDE 0.5 MG: 1 INJECTION, SOLUTION INTRAMUSCULAR; INTRAVENOUS; SUBCUTANEOUS at 18:55

## 2019-06-18 RX ADMIN — FENTANYL CITRATE 25 MCG: 50 INJECTION INTRAMUSCULAR; INTRAVENOUS at 17:17

## 2019-06-18 RX ADMIN — FENTANYL CITRATE 75 MCG: 50 INJECTION, SOLUTION INTRAMUSCULAR; INTRAVENOUS at 15:58

## 2019-06-18 RX ADMIN — HYDROMORPHONE HYDROCHLORIDE 0.5 MG: 1 INJECTION, SOLUTION INTRAMUSCULAR; INTRAVENOUS; SUBCUTANEOUS at 17:45

## 2019-06-18 RX ADMIN — FENTANYL CITRATE 25 MCG: 50 INJECTION INTRAMUSCULAR; INTRAVENOUS at 17:28

## 2019-06-18 RX ADMIN — HYDROMORPHONE HYDROCHLORIDE 0.5 MG: 1 INJECTION, SOLUTION INTRAMUSCULAR; INTRAVENOUS; SUBCUTANEOUS at 17:57

## 2019-06-18 RX ADMIN — HYDROMORPHONE HYDROCHLORIDE 0.5 MG: 1 INJECTION, SOLUTION INTRAMUSCULAR; INTRAVENOUS; SUBCUTANEOUS at 18:10

## 2019-06-18 RX ADMIN — DEXAMETHASONE SODIUM PHOSPHATE 4 MG: 4 INJECTION, SOLUTION INTRA-ARTICULAR; INTRALESIONAL; INTRAMUSCULAR; INTRAVENOUS; SOFT TISSUE at 18:39

## 2019-06-18 RX ADMIN — ONDANSETRON 8 MG: 2 INJECTION INTRAMUSCULAR; INTRAVENOUS at 16:03

## 2019-06-18 RX ADMIN — FENTANYL CITRATE 25 MCG: 50 INJECTION, SOLUTION INTRAMUSCULAR; INTRAVENOUS at 16:11

## 2019-06-18 RX ADMIN — PROPOFOL 150 MG: 10 INJECTION, EMULSION INTRAVENOUS at 15:58

## 2019-06-18 RX ADMIN — DIPHENHYDRAMINE HYDROCHLORIDE 50 MG: 50 INJECTION, SOLUTION INTRAMUSCULAR; INTRAVENOUS at 16:03

## 2019-06-18 RX ADMIN — SODIUM CHLORIDE, POTASSIUM CHLORIDE, SODIUM LACTATE AND CALCIUM CHLORIDE: 600; 310; 30; 20 INJECTION, SOLUTION INTRAVENOUS at 15:50

## 2019-06-18 RX ADMIN — FENTANYL CITRATE 50 MCG: 50 INJECTION, SOLUTION INTRAMUSCULAR; INTRAVENOUS at 15:53

## 2019-06-18 RX ADMIN — FENTANYL CITRATE 50 MCG: 50 INJECTION, SOLUTION INTRAMUSCULAR; INTRAVENOUS at 16:25

## 2019-06-18 RX ADMIN — ROCURONIUM BROMIDE 40 MG: 10 INJECTION INTRAVENOUS at 15:58

## 2019-06-18 RX ADMIN — SUGAMMADEX 150 MG: 100 INJECTION, SOLUTION INTRAVENOUS at 16:44

## 2019-06-18 RX ADMIN — FENTANYL CITRATE 50 MCG: 50 INJECTION INTRAMUSCULAR; INTRAVENOUS at 17:22

## 2019-06-18 RX ADMIN — HYDROMORPHONE HYDROCHLORIDE 0.5 MG: 1 INJECTION, SOLUTION INTRAMUSCULAR; INTRAVENOUS; SUBCUTANEOUS at 17:32

## 2019-06-18 RX ADMIN — ESMOLOL HYDROCHLORIDE 50 MG: 10 INJECTION, SOLUTION INTRAVENOUS at 16:04

## 2019-06-18 RX ADMIN — MIDAZOLAM 2 MG: 1 INJECTION INTRAMUSCULAR; INTRAVENOUS at 15:50

## 2019-06-18 ASSESSMENT — ENCOUNTER SYMPTOMS: SEIZURES: 1

## 2019-06-18 ASSESSMENT — MIFFLIN-ST. JEOR: SCORE: 1522.75

## 2019-06-18 NOTE — DISCHARGE INSTRUCTIONS
[facility]  Same-Day Surgery   Adult Discharge Orders & Instructions     For 24 hours after surgery    1. Get plenty of rest.  A responsible adult must stay with you for at least 24 hours after you leave the hospital.   2. Do not drive or use heavy equipment.  If you have weakness or tingling, don't drive or use heavy equipment until this feeling goes away.  3. Do not drink alcohol.  4. Avoid strenuous or risky activities.  Ask for help when climbing stairs.   5. You may feel lightheaded.  IF so, sit for a few minutes before standing.  Have someone help you get up.   6. If you have nausea (feel sick to your stomach): Drink only clear liquids such as apple juice, ginger ale, broth or 7-Up.  Rest may also help.  Be sure to drink enough fluids.  Move to a regular diet as you feel able.  7. You may have a slight fever. Call the doctor if your fever is over 100 F (37.7 C) (taken under the tongue) or lasts longer than 24 hours.  8. You may have a dry mouth, a sore throat, muscle aches or trouble sleeping.  These should go away after 24 hours.  9. Do not make important or legal decisions.   Call your doctor for any of the followin.  Signs of infection (fever, growing tenderness at the surgery site, a large amount of drainage or bleeding, severe pain, foul-smelling drainage, redness, swelling).    2. It has been over 8 to 10 hours since surgery and you are still not able to urinate (pass water).    3.  Headache for over 24 hours.      To contact a doctor, call Dr Narayanan's office at [phone] or:        [phone] and ask for the resident on call for gastroenterology (answered 24 hours a day)      Emergency Department:    [facility]: [phone]       (TTY for hearing impaired: [phone])

## 2019-06-18 NOTE — OR NURSING
Dr. Pan notified for patients nausea that is continuing after alternative therapies offered. Benadryl and zofran given in OR around 1605. Pt has allergies to all other antiemetics. Haldol suggested by MD and patient states she hasn't had good results from that in the past. RN suggested scopolamine patch and patient states she gets dizzy and blurred vision. MD states to give 500 ml bolus of LR. RN to complete.     1835: Patients nausea worsen, now dry heaving. Dr. Pan notified and states to give 4 mg decadron. Patients agrees with medication. RN to administer.     1850: Decadron helped with nausea. Now pain increased from dry heaving. Dr. Pan notified for more pain medication. Fentanyl wasn't effective for patient. MD states to give one more dose of 0.5 mg dilaudid. RN to complete.     1910: patient doing well after last dose of dilaudid and states she feels ready for phase 2. Dr. Pan aware and will place sign out when able. Handoff given to Guanaco Reyes RN for phase 2 care.

## 2019-06-18 NOTE — ANESTHESIA CARE TRANSFER NOTE
Patient: Alexandra Melgoza    Procedure(s):  Upper Gastrointestinal Endoscopy with PEG Placement    Diagnosis: Encounter For Care Related To Feeding Tube  Diagnosis Additional Information: No value filed.    Anesthesia Type:   No value filed.     Note:  Airway :Face Mask  Patient transferred to:PACU  Comments: Anesthesia Care Transfer Note    Patient: Alexandra Melgoza    Transferred to: PACU    Patient vital signs: stable    Airway: none    Monitors placed. VSS. Port patent. 8L 02 FM. Patient awake, comfortable. Report given and care transferred to RN.     Agnes Gore CRNA   6/18/2019  Handoff Report: Identifed the Patient, Identified the Reponsible Provider, Reviewed the pertinent medical history, Discussed the surgical course, Reviewed Intra-OP anesthesia mangement and issues during anesthesia, Set expectations for post-procedure period and Allowed opportunity for questions and acknowledgement of understanding      Vitals: (Last set prior to Anesthesia Care Transfer)    CRNA VITALS  6/18/2019 1626 - 6/18/2019 1702      6/18/2019             Resp Rate (observed):  11                Electronically Signed By: ANABEL Meraz CRNA  June 18, 2019  5:02 PM

## 2019-06-18 NOTE — BRIEF OP NOTE
[facility], [place]  Gastroenterology Brief Operative Note    Pre-operative diagnosis: Malnutrition    Post-operative diagnosis Same   Procedure: Upper endoscopy. PEG J tube placement    Surgeon: Dr Narayanan    Assistants(s): Silvano Martino MD   Anesthesia: General endotracheal anesthesia   Estimated blood loss: Minimal    Total IV fluids: (See anesthesia record)   Blood transfusion: No transfusion was given during surgery   Total urine output: (See anesthesia record)   Drains: None   Specimens: None   Implants: 18 Fr 3 cm low profile PEG J tube placement      Findings: - Normal esophagus with GEJ at 37 cm.   - Previous gastrostomy tract was seen in the antrum.   - Normal examined duodenum.   - Previous gastrostomy tract dilated to 8mm over the wire.   - PEG site granulomas treated with silver nitrate stick.   - A 18 Fr 3 cm low profile PEG J tube placed under fluoroscopic guidance.   - Barrier cream applied to the site at the end of the procedure.      Complications: None   Condition: Stable   Comments:      Recommendations:         See dictated procedure report for full details (found in chart review under 'Procedures')    - Observe in Same Day for possible discharge  - Can start using the G and J ports immediately.   - Discharge home if minimal or no pain.  - Findings were discussed with the patient and her family soon after the procedure.      Silvano Martino MD  Advanced Endoscopy Fellow  Pager: [phone]

## 2019-06-19 ENCOUNTER — TELEPHONE (OUTPATIENT)
Dept: GASTROENTEROLOGY | Facility: CLINIC | Age: 24
End: 2019-06-19

## 2019-06-19 DIAGNOSIS — Z46.59 ENCOUNTER FOR CARE RELATED TO FEEDING TUBE: Primary | ICD-10-CM

## 2019-06-19 RX ORDER — OXYCODONE HYDROCHLORIDE 5 MG/1
5 TABLET ORAL EVERY 6 HOURS PRN
Qty: 12 TABLET | Refills: 0 | Status: ON HOLD | OUTPATIENT
Start: 2019-06-19 | End: 2019-10-22

## 2019-06-19 NOTE — TELEPHONE ENCOUNTER
[facility] Call [facility]    Phone Message    May a detailed message be left on voicemail: yes    Reason for Call: Medication Question or concern regarding medication     Prescription Clarification  Name of Medication: NA - Pt requesting new Rx for pain - had surgery yesterday.   Prescribing Provider: Dr. Narayanan   Pharmacy:  [facility] pharmacy - Pt would come  if it's a narcotic      Action Taken: Message routed to:  [facility] ([facility]): panc

## 2019-06-19 NOTE — TELEPHONE ENCOUNTER
5mg oxycodone x12 pills per Dr. Narayanan. Ordered, delivered to [facility] pharmacy.    Called a tearful Alexandra, she will  at [facility] as she'll be here for another 5 days.    Bel Berrios RN Care Coordinator

## 2019-06-19 NOTE — ANESTHESIA POSTPROCEDURE EVALUATION
Anesthesia POST Procedure Evaluation    Patient: Alexandra Melgoza   MRN:     [id number] Gender:   female   Age:    23 year old :      1995        Preoperative Diagnosis: Encounter For Care Related To Feeding Tube   Procedure(s):  Upper Gastrointestinal Endoscopy with PEG Placement   Postop Comments: No value filed.       Anesthesia Type:  General  No value filed.    Reportable Event: YES     PAIN: Uncomplicated   Sign Out status: Comfortable, Well controlled pain     PONV: PONV Occurence     Symptoms:  Nausea + single Emesis/Retching     Interventions: Dexamethasone; 5-HT3 antagonist   Sign Out status:  No Nausea or Vomiting     Neuro/Psych: Uneventful perioperative course   Sign Out Status: Preoperative baseline; Age appropriate mentation     Airway/Resp.: Uneventful perioperative course   Sign Out Status: Non labored breathing, age appropriate RR; Resp. Status within EXPECTED Parameters     CV: Uneventful perioperative course   Sign Out status: Appropriate BP and perfusion indices; Appropriate HR/Rhythm     Disposition:   Sign Out in:  PACU  Recovery Course: Uneventful           Last Anesthesia Record Vitals:  CRNA VITALS  2019 1626 - 2019 1726      2019             SpO2:  97 %    EKG:  Sinus rhythm          Last PACU Vitals:  Vitals Value Taken Time   /74 2019  7:10 PM   Temp 36.9  C (98.4  F) 2019  7:10 PM   Pulse 92 2019  7:00 PM   Resp 16 2019  7:10 PM   SpO2 92 % 2019  7:10 PM   Temp src     NIBP 112/72 2019  5:02 PM   Pulse     SpO2 97 % 2019  5:03 PM   Resp     Temp     Ht Rate 97 2019  5:02 PM   Temp 2           Electronically Signed By: Dada Pan DO, 2019, 7:34 PM

## 2019-06-20 LAB — UPPER GI ENDOSCOPY: NORMAL

## 2019-06-21 ENCOUNTER — PATIENT OUTREACH (OUTPATIENT)
Dept: GASTROENTEROLOGY | Facility: CLINIC | Age: 24
End: 2019-06-21

## 2019-06-21 NOTE — PROGRESS NOTES
Called Alexandra to ask how she's feeling after procedure and increased pain. Left message.     Bel Berrios, RN Care Coordinator

## 2019-10-07 ENCOUNTER — PATIENT OUTREACH (OUTPATIENT)
Dept: GASTROENTEROLOGY | Facility: CLINIC | Age: 24
End: 2019-10-07

## 2019-10-07 ENCOUNTER — PREP FOR PROCEDURE (OUTPATIENT)
Dept: GASTROENTEROLOGY | Facility: CLINIC | Age: 24
End: 2019-10-07

## 2019-10-07 DIAGNOSIS — Z46.59 ENCOUNTER FOR CARE RELATED TO FEEDING TUBE: Primary | ICD-10-CM

## 2019-10-07 NOTE — PROGRESS NOTES
Called patient regarding loose feeding tube. Was in a flare, more vomiting. Tube is still in, its taped down.Can still vent and feed.     Advise given to secure tube including tape, abdominal binder or even a bathing suit. Case Request entered for Oracio to exchange.     Will move to earlier provider if directed to by Dr. Oracio Berrios, RN Care Coordinator

## 2019-10-15 ENCOUNTER — MYC MEDICAL ADVICE (OUTPATIENT)
Dept: GASTROENTEROLOGY | Facility: CLINIC | Age: 24
End: 2019-10-15

## 2019-10-15 NOTE — TELEPHONE ENCOUNTER
Called patient. Discussed doing procedure in Endo next week with Dr. Narayanan  Will follow up.    Bel Berrios RN Care Coordinator

## 2019-10-17 ENCOUNTER — TELEPHONE (OUTPATIENT)
Dept: GASTROENTEROLOGY | Facility: CLINIC | Age: 24
End: 2019-10-17

## 2019-10-17 DIAGNOSIS — Z46.59 ENCOUNTER FOR CARE RELATED TO FEEDING TUBE: Primary | ICD-10-CM

## 2019-10-22 ENCOUNTER — HOSPITAL ENCOUNTER (OUTPATIENT)
Facility: CLINIC | Age: 24
Discharge: HOME | End: 2019-10-22
Attending: INTERNAL MEDICINE | Admitting: INTERNAL MEDICINE
Payer: COMMERCIAL

## 2019-10-22 ENCOUNTER — APPOINTMENT (OUTPATIENT)
Dept: GENERAL RADIOLOGY | Facility: CLINIC | Age: 24
End: 2019-10-22
Attending: INTERNAL MEDICINE
Payer: COMMERCIAL

## 2019-10-22 VITALS
DIASTOLIC BLOOD PRESSURE: 55 MMHG | OXYGEN SATURATION: 97 % | RESPIRATION RATE: 11 BRPM | SYSTOLIC BLOOD PRESSURE: 120 MMHG | HEART RATE: 89 BPM

## 2019-10-22 LAB — PROVATION GI EXAM: NORMAL

## 2019-10-22 PROCEDURE — G0500 MOD SEDAT ENDO SERVICE >5YRS: HCPCS | Performed by: INTERNAL MEDICINE

## 2019-10-22 PROCEDURE — 40000279 XR SURGERY CARM FLUORO GREATER THAN 5 MIN W STILLS: Mod: TC

## 2019-10-22 PROCEDURE — 25000128 H RX IP 250 OP 636: Performed by: INTERNAL MEDICINE

## 2019-10-22 PROCEDURE — 99153 MOD SED SAME PHYS/QHP EA: CPT

## 2019-10-22 PROCEDURE — 49450 REPLACE G/C TUBE PERC: CPT | Performed by: INTERNAL MEDICINE

## 2019-10-22 RX ORDER — FENTANYL CITRATE 50 UG/ML
INJECTION, SOLUTION INTRAMUSCULAR; INTRAVENOUS PRN
Status: DISCONTINUED | OUTPATIENT
Start: 2019-10-22 | End: 2019-10-22 | Stop reason: HOSPADM

## 2019-10-22 RX ORDER — DIPHENHYDRAMINE HYDROCHLORIDE 50 MG/ML
INJECTION INTRAMUSCULAR; INTRAVENOUS PRN
Status: DISCONTINUED | OUTPATIENT
Start: 2019-10-22 | End: 2019-10-22 | Stop reason: HOSPADM

## 2019-10-22 NOTE — DISCHARGE INSTRUCTIONS
Discharge Instructions after G_J change  Activity and Diet  You were given medicine for pain. You may be dizzy or sleepy.  For 24 hours:    Do not drive or use heavy equipment.    Do not make important decisions.    Do not drink any alcohol.  You may return to your normal diet and medicines.        You may take Tylenol (acetaminophen) for pain unless your doctor has told you not to.  Do not take aspirin or ibuprofen (Advil, Motrin, or other anti-inflammatory  drugs) for ___3__ days.    Follow-up  ____  When to call:    Call right away if you have:    Unusual pain in belly or chest pain not relieved with passing air.    Fever above 100.6  F (37.5  C).  . Signs of infection, oozing, redness, warmth at site   If you have severe pain, bleeding, or shortness of breath, go to an emergency room.    If you have questions, call:  Monday to Friday, 7 a.m. to 4:30 p.m.  Endoscopy: [phone] (We may have to call you back)    After hours  Hospital: [phone] (Ask for the GI fellow on call)

## 2019-10-25 ENCOUNTER — CARE COORDINATION (OUTPATIENT)
Dept: GASTROENTEROLOGY | Facility: CLINIC | Age: 24
End: 2019-10-25

## 2019-10-25 NOTE — PROGRESS NOTES
Post Gtube Replacement (10/22/19) with Dr. Narayanan: Follow-up     Post procedure recommendations:   -Discharge patient to home (ambulatory).    Orders placed: None     Left message advising of the recommendations listed above from provider. Clinic number given to call with any concerning symptoms or questions.     CHELA Becerra Dr., Dr. Joe, & Dr. Klein  Advanced Endoscopy  [phone]

## 2019-10-28 ENCOUNTER — PATIENT OUTREACH (OUTPATIENT)
Dept: GASTROENTEROLOGY | Facility: CLINIC | Age: 24
End: 2019-10-28

## 2019-11-08 ENCOUNTER — HEALTH MAINTENANCE LETTER (OUTPATIENT)
Age: 24
End: 2019-11-08

## 2019-12-16 NOTE — PROGRESS NOTES
[facility], [place]    Internal Medicine Progress Note - Gold Service    Assessment & Plan   Alexandra Melgoza is a 21 year old female admitted on 12/6/2017. She has a prior medical history of chronic abdominal pain, spina bifida, cyclic nausea and vomiting, migraines, pseudoseizures, endometriosis, prior ovarian cyst rupture presenting for abdominal pain.  She was assessed for pancreatitis however we don't think this is the etiology of her pain.  We are working on symptomatic management of her pain and advancement of her diet.     # Acute on chronic abdominal pain     - Per GI she does not meet criteria for chronic pancreatitis   - continue oral and IV pain medications with Fentanyl and Hydromorphone.  Will try to taper IV medications   - continue scheduled Tylenol   - hold Celecoxib with concerns of GI bleed and toxicity of me the medication  - continue home gabapentin and nortriptyline   - appreciate nutrition assistance, pancreatic enzyme dose increased 12/12   - symptomatic management of nausea     # Hematemesis due to possible Alexandra Sancehz tear and erosive gastritis     - hold NSAIDs  - continue BID PPI   - monitor for any further bleeding     # Endometriosis   # Asthma     - continue norethindrone   - continue bowel regimen     Diet: Advance Diet as Tolerated: Regular Diet Adult  Fluids: none   DVT Prophylaxis: Ambulate every shift  Code Status: Full Code    Disposition Plan   Expected discharge: 2 - 3 days, recommended to prior living arrangement once once IV pain meds are complete and pain is controlled, diet advanced .     Entered: Meme Hameed 12/12/2017, 1:30 PM   Information in the above section will display in the discharge planner report.      The patient's care was discussed with the Patient.    Meme Hameed  Internal Medicine Staff Hospitalist Service  [facility]  Pager: [phone]  Please see sticky note for cross cover information    Interval History      Ms. Melgoza is doing well this morning.  Her abdominal pain is controlled with medications and nausea is resolved.  She is able to tolerate an oral diet.  No fevers or chills.  No dyspnea or chest pain.  No other overnight events.     Data reviewed today: I reviewed all medications, new labs and imaging results over the last 24 hours. I personally reviewed     Physical Exam   Vital Signs: Temp: 97.8  F (36.6  C) Temp src: Oral BP: 117/73 Pulse: 76 Heart Rate: 78 Resp: 18 SpO2: 100 % O2 Device: None (Room air)    Weight: 152 lbs 0 oz     General Appearance: Patient is in no acute distress, pain controlled   Neuro: Patient is alert and oriented to person, place, time, and situation  Respiratory: Lungs are clear to auscultation, no wheezing, rales, or rhonchi auscultated  Cardiovascular: Regular rate and rhythm, no murmurs, no rubs, and no gallops   GI: soft, not tender, not distended, bowel sounds present and normal, no masses appreciated   Skin: no rashes and no discolorations, port site with no erythema and no edema   Extremities: no cyanosis, no edema, and no clubbing    English

## 2020-01-01 NOTE — NURSING NOTE
Chief Complaint   Patient presents with     Hospital F/U     pt here after a hospital visit     UTI     pt states having painful urination, frequency     Carmina Poon CMA at 5:20 PM on 12/14/2017.     Infant initially had several glucose drops to 30s that resolved with formula feeding. Last 2 consecutive glucoses were above 50. Protocol competed.

## 2020-02-07 ENCOUNTER — NURSE TRIAGE (OUTPATIENT)
Dept: NURSING | Facility: CLINIC | Age: 25
End: 2020-02-07

## 2020-02-07 NOTE — TELEPHONE ENCOUNTER
Advised patient that I will work on this and see if they have any extensions at the hospital.     Spoke with ESTELITA Washington and she is going to reach out.     CHELA Massey Dr., Dr. Joe, & Dr. Klein  Advanced Endoscopy  [phone]

## 2020-02-07 NOTE — TELEPHONE ENCOUNTER
24 year old female calls today because the extension tubing she connects to her 18 Fr NORIS-KEY  Has a hole in it   She thought she had an extra one but she does not.  She is calling to get another extension tube.  Her mother is able to come and pick it up..    Will forward to surgery clinic to call back with a plan...

## 2020-02-17 ENCOUNTER — TELEPHONE (OUTPATIENT)
Dept: GASTROENTEROLOGY | Facility: CLINIC | Age: 25
End: 2020-02-17

## 2020-02-17 NOTE — TELEPHONE ENCOUNTER
Spoke with Alexandra and she advised that her mom went to  extension but was not there, mom was under the impression that she could pick it up anytime.    Advised that I would send a message to Marlys again to see if she could get an extension.     Will be in touch with response.     CHELA Massey Dr., Dr. Joe, & Dr. Klein  Advanced Endoscopy  [phone]

## 2020-02-18 ENCOUNTER — TELEPHONE (OUTPATIENT)
Dept: GASTROENTEROLOGY | Facility: CLINIC | Age: 25
End: 2020-02-18

## 2020-02-23 ENCOUNTER — HEALTH MAINTENANCE LETTER (OUTPATIENT)
Age: 25
End: 2020-02-23

## 2020-04-13 ENCOUNTER — TELEPHONE (OUTPATIENT)
Dept: GASTROENTEROLOGY | Facility: CLINIC | Age: 25
End: 2020-04-13

## 2020-04-13 ENCOUNTER — NURSE TRIAGE (OUTPATIENT)
Dept: NURSING | Facility: CLINIC | Age: 25
End: 2020-04-13

## 2020-04-13 NOTE — TELEPHONE ENCOUNTER
"Pt having issues with her G-Tube.   Pt mentioned something does not feel right.     Pt wondering if there is a whole in the Balloon that holds the G-Tube in place.      It just does not feel right.     No issues with vomiting, nausea, diarrhea or constipation.   No cold symptoms noted.     G-Tube has not been replaced in 2 or 3 years.   Pt wondering if the G-Tube needs to be replaced.    Pl call Pt back @ [phone] (H) for further assistance.       No cold Symptoms noted.       Linnea Loyd RN  Central Triage Red Flags/Med Refills      Reason for Disposition    [1] G-tube is broken or cracked AND [2] is not usable    Additional Information    Negative: Shock suspected (e.g., cold/pale/clammy skin, too weak to stand, low BP, rapid pulse)    Negative: [1] Shortness of breath AND [2] new onset    Negative: Bluish (or gray) lips or face now    Negative: Sounds like a life-threatening emergency to the triager    Negative: SEVERE abdominal pain    Negative: [1] Vomiting AND [2] contains red blood or black (\"coffee ground\") material  (Exception: few red streaks in vomit that only happened once)    Negative: [1] Vomiting AND [2] contains bile (green color)    Negative: Tube contains red blood or black (\"coffee ground\") material  (Exception: pink tinge of tube fluid occurs once and clears immediately with irrigation.)    Negative: G-tube, J-tube, or GJ-tube came completely out of site in abdominal wall    Negative: Difficulty breathing    Negative: Dehydration suspected (e.g., no urine > 12 hours, very dry mouth, lightheaded)    Negative: Patient sounds very sick or weak to the triager    Negative: Vomiting > 4 times in the past 4 hours    Negative: Diarrhea > 4 times in the past 4 hours    Negative: [1] G-tube is clogged AND [2] irrigation has been attempted without success    Protocols used: FEEDING TUBE SYMPTOMS AND [id number]      "

## 2020-04-13 NOTE — TELEPHONE ENCOUNTER
Called patient to discuss problems with tube.     Mail box full, unable to leave message    Bel Berrios, RN Care Coordinator

## 2020-04-13 NOTE — TELEPHONE ENCOUNTER
[facility] Call [facility]    Phone Message    May a detailed message be left on voicemail: yes     Reason for Call: Other: Pt is returning Bel GIOVANA's call to her; she states she has her phone with her, but is having technical issues with her voicemail, so she would like Bel to try her back today again if possible. Thanks!     Action Taken: Message routed to:  [facility] ([facility]): Endy Yates    Travel Screening: Not Applicable

## 2020-04-14 ENCOUNTER — PATIENT OUTREACH (OUTPATIENT)
Dept: GASTROENTEROLOGY | Facility: CLINIC | Age: 25
End: 2020-04-14

## 2020-04-14 DIAGNOSIS — Z46.59 ENCOUNTER FOR CARE RELATED TO FEEDING TUBE: Primary | ICD-10-CM

## 2020-04-14 NOTE — PROGRESS NOTES
Called patient to advise tube change on 4/21 in Endo per Dr. Narayanan    Gastro orders placed. Called to advise patient. Message sent to scheduling.    Bel Berrios, SAMARA Care Coordinator

## 2020-04-21 ENCOUNTER — HOSPITAL ENCOUNTER (OUTPATIENT)
Facility: CLINIC | Age: 25
Discharge: HOME | End: 2020-04-21
Attending: INTERNAL MEDICINE | Admitting: INTERNAL MEDICINE
Payer: COMMERCIAL

## 2020-04-21 VITALS
RESPIRATION RATE: 11 BRPM | HEART RATE: 82 BPM | DIASTOLIC BLOOD PRESSURE: 75 MMHG | OXYGEN SATURATION: 96 % | SYSTOLIC BLOOD PRESSURE: 110 MMHG

## 2020-04-21 PROCEDURE — G0500 MOD SEDAT ENDO SERVICE >5YRS: HCPCS | Performed by: INTERNAL MEDICINE

## 2020-04-21 PROCEDURE — 49452 REPLACE G-J TUBE PERC: CPT | Performed by: INTERNAL MEDICINE

## 2020-04-21 PROCEDURE — 25000128 H RX IP 250 OP 636: Performed by: INTERNAL MEDICINE

## 2020-04-21 PROCEDURE — 49451 REPLACE DUOD/JEJ TUBE PERC: CPT | Performed by: INTERNAL MEDICINE

## 2020-04-21 RX ORDER — ONDANSETRON 2 MG/ML
4 INJECTION INTRAMUSCULAR; INTRAVENOUS EVERY 6 HOURS PRN
Status: CANCELLED | OUTPATIENT
Start: 2020-04-21

## 2020-04-21 RX ORDER — NALOXONE HYDROCHLORIDE 0.4 MG/ML
.1-.4 INJECTION, SOLUTION INTRAMUSCULAR; INTRAVENOUS; SUBCUTANEOUS
Status: CANCELLED | OUTPATIENT
Start: 2020-04-21 | End: 2020-04-22

## 2020-04-21 RX ORDER — FENTANYL CITRATE 50 UG/ML
INJECTION, SOLUTION INTRAMUSCULAR; INTRAVENOUS PRN
Status: DISCONTINUED | OUTPATIENT
Start: 2020-04-21 | End: 2020-04-21 | Stop reason: HOSPADM

## 2020-04-21 RX ORDER — ONDANSETRON 4 MG/1
4 TABLET, ORALLY DISINTEGRATING ORAL EVERY 6 HOURS PRN
Status: CANCELLED | OUTPATIENT
Start: 2020-04-21

## 2020-04-21 RX ORDER — DIPHENHYDRAMINE HYDROCHLORIDE 50 MG/ML
INJECTION INTRAMUSCULAR; INTRAVENOUS PRN
Status: DISCONTINUED | OUTPATIENT
Start: 2020-04-21 | End: 2020-04-21 | Stop reason: HOSPADM

## 2020-04-21 RX ORDER — FLUMAZENIL 0.1 MG/ML
0.2 INJECTION, SOLUTION INTRAVENOUS
Status: CANCELLED | OUTPATIENT
Start: 2020-04-21 | End: 2020-04-22

## 2020-04-21 NOTE — DISCHARGE INSTRUCTIONS
Discharge Instructions after J tube exchange    Activity and Diet  You were given medicine for pain. You may be dizzy or sleepy.  For 24 hours:    Do not drive or use heavy equipment.    Do not make important decisions.    Do not drink any alcohol.  _x__ You may return to your regular diet.    Discomfort  You may have a sore throat for 2 to 3 days. It may help to:    Avoid hot liquids for 24 hours.    Use sore throat lozenges.    Gargle as needed with salt water up to 4 times a day. Mix 1 cup of warm water  with 1 teaspoon of salt. Do not swallow.      You may take Tylenol (acetaminophen) for pain unless your doctor has told you not to.    Do not take aspirin or ibuprofen (Advil, Motrin) or other NSAIDS  (anti-inflammatory drugs) for _2__ days.  Pt states Hematologist said to restart Xarelto in 2 days.      Follow-up      Other instructions________________________________________________________    When to call us:  Problems are rare. Call right away if you have:    Unusual throat pain or trouble swallowing    Unusual pain in belly or chest that is not relieved by belching or passing air    Black stools (tar-like looking bowel movement)    Temperature above 100.6  F. (37.5  C).    If you vomit blood or have severe pain, go to an emergency room.    If you have questions, call:  Monday to Friday, 7 a.m. to 4:30 p.m.: Endoscopy: [phone] (We may have to call you back)    After hours: Hospital: [phone] (Ask for the GI fellow on call)  Discharge Instructions: Caring for Your Jejunostomy Tube (J-Tube)  You have been discharged with a feeding tube called a jejunostomy tube, or J-tube. The J-tube was put through your skin and into your small bowel (jejunum). This allows for feeding directly into your small bowel. Your feeding tube was put in because you are not able to take in enough food or drink through your mouth to keep your normal body weight. You were shown how to care for your J-tube in the hospital. This  sheet helps you remember the steps when you re at home.     The name of my feeding supplement/formula is:  ______________________________________  Amount per feeding:  _____________________  Times per day: __________________________  Amount of water used to flush tube:  ______________________________________   Guidelines for continuous tube feeding    Make arrangements for a special feeding pump to be delivered to your home.    Do not take medicines through your J-tube.    Keep tension off the tubing by taping it up onto your belly (abdomen).  Clean around the tube    Wash your hands thoroughly with mild soap and water before starting your feeding.    Clean the area around the tube with mild soap and water.    Pat the area dry using a clean washcloth.  Get ready    Assemble the supplies you will need. These include formula, water, feeding bag, pump, and a 30 to 60 mL syringe.    Close the clamp on the feeding bag tubing.    Slowly pour the formula into the feeding bag. Use the prescribed amount of feeding.    Hang the feeding bag on the pole about 1 to 2 feet above your head.  Begin feeding    Open the clamp and let the formula fill the entire tubing, clearing any air.    Close the clamp.    Connect the feeding bag tubing to the pump. Adjust the settings on the pump.    Using the syringe, flush the J-tube with the prescribed amount of water.    Connect the tubing of the feeding bag to the J-tube.    Open the clamp. Start the pump.  After the feeding    When the feeding is finished, stop the infusion and flush the J-tube with the prescribed amount of water.    Stop the feeding once each day to clean the bag. Wash the feeding bag with soapy water.    Rinse the bag thoroughly so that there is no soapy film in the bag. Hang the bag to dry.    Flush the J-tube with the prescribed amount of water every 4 to 6 hours through the flush port. If there is no flush port, do this: Stop the pump, disconnect the feeding bag  tubing, and flush the J-tube.  Follow-up  Follow up with your healthcare provider, or as advised.  When to call your provider  Call your healthcare provider right away if you have any of the following:    A tube that is clogged or dislodged    Belly pain that gets worse    Vomiting    Fever of 100.4 F (38 C) or higher, or as advised by your provider    Chills    Diarrhea that lasts more than 2 days    Signs of infection (redness, swelling, or warmth at the tube site)    Drainage from the tube site    Weight loss of 2 or more pounds in 24 hours    Decreased urination   Date Last Reviewed: 8/1/2016 2000-2019 [employer]. [address] [place] [address], [place] [address]. All rights reserved. This information is not intended as a substitute for professional medical care. Always follow your healthcare professional's instructions.

## 2020-04-23 LAB — PROVATION GI EXAM: NORMAL

## 2020-04-24 ENCOUNTER — PATIENT OUTREACH (OUTPATIENT)
Dept: GASTROENTEROLOGY | Facility: CLINIC | Age: 25
End: 2020-04-24

## 2020-04-24 ENCOUNTER — TELEPHONE (OUTPATIENT)
Dept: GASTROENTEROLOGY | Facility: CLINIC | Age: 25
End: 2020-04-24

## 2020-04-24 NOTE — TELEPHONE ENCOUNTER
Returned patients call, have not heard back from Dr. Narayanan.    Confirmed she did bring her pain meds from [place]. Said she's due for a refill in a week or so.     Bel Berrios, RN Care Coordinator

## 2020-04-24 NOTE — TELEPHONE ENCOUNTER
Called regarding medications. She's still in [place] after procedure with Dr. Narayanan    Planning to stay in [place] for now, will be here for at least 3 weeks. Cannot get her normally prescribed medications due to insurance and locations, cannot pay out of pocket for it either. Is on palliative care in [place].    Dilaudid is currently prescribed, 4mg, take every 6 hours. Has been ok with oxycodone. Says she's willing to try anything for pain but at this time is narcotic dependent.     Message sent to Dr. Narayanan regarding options.    Bel Berrios, RN Care Coordinator

## 2020-09-23 ENCOUNTER — PATIENT OUTREACH (OUTPATIENT)
Dept: GASTROENTEROLOGY | Facility: CLINIC | Age: 25
End: 2020-09-23

## 2020-09-23 NOTE — TELEPHONE ENCOUNTER
Called to check on patient r/t message from patient team:    While on call this AM I received a call that this patient's GJ tube fell out while she was in the process of vomiting. Her and her boyfriend called from an ED in [place] where it sounds like they were able to get a Childers into the GJ. They were going to try to replace the GJ there. I told them to call the clinic if they were unable to get the GJ placed.    Mailbox full, cannot leave message. MyChart sent.     Bel Berrios, SAMARA Care Coordinator

## 2020-09-30 ENCOUNTER — TELEPHONE (OUTPATIENT)
Dept: GASTROENTEROLOGY | Facility: CLINIC | Age: 25
End: 2020-09-30

## 2020-09-30 NOTE — TELEPHONE ENCOUNTER
Returned call to patient. Mailbox full, cannot leave message. Will send MyChart.    Bel Berrios RN Care Coordinator

## 2020-09-30 NOTE — TELEPHONE ENCOUNTER
[facility] Call Center    Phone Message    May a detailed message be left on voicemail: yes     Reason for Call: Other: PT calling in regarding getting some information from her surgery on  4/21/2020. Pt is needing some information from her anesthesia sent to [facility] in  [place]. The fax# [phone] and telephone# [phone]. Please follow up with pt when available. Thank you     Action Taken: Message routed to:  [facility] ([facility]): Endy sams    Travel Screening: Not Applicable

## 2020-12-06 ENCOUNTER — HEALTH MAINTENANCE LETTER (OUTPATIENT)
Age: 25
End: 2020-12-06

## 2020-12-16 NOTE — PROGRESS NOTES
Pt arrived on 6D at 2145 from the PACU, accompanied by her mother.  She's continuing to complain of Nausea and 10 out of 10 abdominal pain.  MD notified. One time order given for 1 mg of IV dilaudid.  Dilaudid PCA pump started shortly after.  Benadryl given thru NJ tube, however pt does not want anymore meds put down tube at this time.   On regular diet. Pt refused Food/ water. Stated she could only tolerate ice chips.    Patient was informed of the reason for this intervention.

## 2021-02-08 NOTE — PROGRESS NOTES
"Gynecology Visit Note  2/10/2021    Reason for visit: Endometriosis    SUBJECTIVE     Alexandra is a 25 year old female who is being evaluated via a billable telephone visit.    Patient opted to conduct today's return visit via telephone secondary to the COVID-19 pandemic vs. an in person visit to the clinic.    I spoke with: Alexandra Melgoza    The patient has been notified of following:   \"This telephone visit will be conducted via a call between you and your physician/provider. We have found that certain health care needs can be provided without the need for a physical exam.  This service lets us provide the care you need with a short phone conversation.  If a prescription is necessary we can send it directly to your pharmacy.  If lab work is needed we can place an order for that and you can then stop by our lab to have the test done at a later time.  If during the course of the call the physician/provider feels a telephone visit is not appropriate, you will not be charged for this service.\"     The reason for the telephone visit: Endometriosis    HPI: Patient is a 26 yo nulligravid female who I last treated back in 8/2016 for endometriosis and preventive OBGYN care.  Patient at that time was managed with Lupron/Aygestin and Mononessa.  Had failed multiple other therapies prior to Lupron, had some assistance with PFT in past for associated pelvic floor dysnfunction.  Patient also has a very complicated medical history, frequent visits with Gastroenterology and has Jejunostomy tube in place for intractable abdominal pain and food intolerance related to chronic pancreatitis.  Patient also notes that she did have some testing done in [place] and was diagnosed with a CFTR gene mutation, not the same as with CF, but interestingly has been associated with chronic pancreatitis/pancreatic disorders in patients with this mutation.  From this standpoint, she does think things are improved and hasn't had to use the J tube for " feeds recently which she is happy about.    Today, patient states her endometriosis has been brutal recently.  Has been hard to find someone to manage this part of her care in [place].  Currently she is not doing anything for treatment.  Was on Lupron until 2 years ago, and felt at that time it really was not doing much.  Has tried OCP here and there but more nausea with this and so can't stay on it.  Has had 3 surgeries for endometriosis since last seen.  Was diffuse in nature and was told would likely need for excisional procedure, but she is not sure she wants further surgical intervention.  Last surgery was a couple years ago and was for 5 cm hemorrhagic ovarian cyst done emergently.  Her bleeding is monthly and heavy in nature, clots for the first 3 days-using tampon and pad every 2 hours or so in beginning, total of 9 days.  Bleeding every 3.5 weeks.  Does have some nausea and pain pre-menses, which radiates down into her legs and her back.  Does visit ED almost monthly secondary to these symptoms.  .  Is on Xarelto due to DVT history, found after a hospitalization for sepsis.  No clotting disorder found on evaluation per patient report.      Past OB/GYN History:  Nulligravid  Menses:  See HPI  Contraception: Condoms  Sexually active with male partner, same for last 6 years  No STI history  Pap smear History: 6 months ago, normal per patient report, does annually    Past Medical History:   Diagnosis Date     Antiplatelet or antithrombotic long-term use      Anxiety      Asthma      Cholecystitis     s/p cholecystectomy     Chronic abdominal pain      Chronic infection     mrsa     Chronic pain      Cyclic vomiting syndrome 10/27/2012     Depression      Endometriosis      Hypoglycaemia      Hypokalemia      Hypomagnesemia      Migraines      Mild intermittent asthma      Other chronic pain      Ovarian cysts      Pancreatic disease      PONV (postoperative nausea and vomiting)      Pseudoseizures       Somatoform disorder      Sphincter of Oddi dysfunction      Vasovagal syncope      Past Surgical History:   Procedure Laterality Date     ABDOMEN SURGERY      ERCP, biliary stents     ABDOMEN SURGERY      Expl lap abdomen     CHOLECYSTECTOMY  8/2/11     COLONOSCOPY  2011    negative finding     ENDOSCOPIC RETROGRADE CHOLANGIOPANCREATOGRAM  8/23/2011    Procedure:ENDOSCOPIC RETROGRADE CHOLANGIOPANCREATOGRAM; Endoscopic Retrograde Cholangiopancreatogram; Surgeon:SHORTY NARAYANAN; [facility]     ENDOSCOPIC RETROGRADE CHOLANGIOPANCREATOGRAM  5/17/2012    Procedure:ENDOSCOPIC RETROGRADE CHOLANGIOPANCREATOGRAM; Endoscopic Retrograde Cholangiopancreatogram with pancreatic stent placement.; Surgeon:SHORTY NARAYANAN; [facility]     ENDOSCOPIC RETROGRADE CHOLANGIOPANCREATOGRAM N/A 1/18/2018    Procedure: COMBINED ENDOSCOPIC RETROGRADE CHOLANGIOPANCREATOGRAPHY, PLACE TUBE/STENT;  Endoscopic Retrograde Cholangiopancreatography with nasojejunal feeding tube placement;  Surgeon: Shorty Narayanan MD;  [facility]: [facility]     ENDOSCOPIC RETROGRADE CHOLANGIOPANCREATOGRAM COMPLEX  1/3/2012    Procedure:ENDOSCOPIC RETROGRADE CHOLANGIOPANCREATOGRAM COMPLEX; Endoscopic Retrograde Cholangiopancreatogram with Manometry bile duct sphincterotomy extention pancreatic duct sphincterotomy pancreatic duct stent placement; Surgeon:SHORTY NARAYANAN; [facility]     ENDOSCOPIC ULTRASOUND UPPER GASTROINTESTINAL TRACT (GI) N/A 6/9/2015    Procedure: ENDOSCOPIC ULTRASOUND, ESOPHAGOSCOPY / UPPER GASTROINTESTINAL TRACT (GI);  Surgeon: Mario Joe MD;  [facility]     ENDOSCOPIC ULTRASOUND UPPER GASTROINTESTINAL TRACT (GI) N/A 12/12/2016    Procedure: ENDOSCOPIC ULTRASOUND, ESOPHAGOSCOPY / UPPER GASTROINTESTINAL TRACT (GI);  Surgeon: Guru Jose Klein MD;  [facility]     ESOPHAGOSCOPY, GASTROSCOPY, DUODENOSCOPY (EGD), COMBINED  1/18/2012    Procedure:COMBINED ESOPHAGOSCOPY, GASTROSCOPY,  DUODENOSCOPY (EGD); Surgeon:ARNIE ESPINOZA; [facility]     ESOPHAGOSCOPY, GASTROSCOPY, DUODENOSCOPY (EGD), COMBINED  1/18/2012    Procedure:COMBINED ESOPHAGOSCOPY, GASTROSCOPY, DUODENOSCOPY (EGD); EGD; Surgeon:ARNIE ESPINOZA; [facility]     ESOPHAGOSCOPY, GASTROSCOPY, DUODENOSCOPY (EGD), COMBINED N/A 12/9/2017    Procedure: COMBINED ESOPHAGOSCOPY, GASTROSCOPY, DUODENOSCOPY (EGD);;  Surgeon: Josias Chan MD;  [facility]     ESOPHAGOSCOPY, GASTROSCOPY, DUODENOSCOPY (EGD), COMBINED N/A 3/27/2018    Procedure: COMBINED ESOPHAGOSCOPY, GASTROSCOPY, DUODENOSCOPY (EGD);  Upper Gastrointestinal Endoscopy convert gastrostomy to gastrojejunostomy tube ;  Surgeon: Campbell Narayanan MD;  [facility]     ESOPHAGOSCOPY, GASTROSCOPY, DUODENOSCOPY (EGD), COMBINED N/A 6/18/2019    Procedure: Upper Gastrointestinal Endoscopy with PEG Placement;  Surgeon: Campbell Narayanan MD;  [facility]     GYN SURGERY      s/p ovarian cyst removal, right 5-29-18     INSERT PORT VASCULAR ACCESS       L knee arthroscopy  2009     LAPAROSCOPY DIAGNOSTIC (GYN)  10/26/2012    Procedure: LAPAROSCOPY DIAGNOSTIC (GYN);  LAPAROSCOPY DIAGNOSTIC, CAUTERY ENDOMETRIOISIS and biopsy of Fallopian tube lesions;  Surgeon: Carla Lopez MD;  Location:  OR     ORTHOPEDIC SURGERY  2008    knee, left     power port removal  01/10/2019     REMOVE AND REPLACE BREAST IMPLANT PROSTHESIS N/A 2/8/2018    Procedure: PERCUTANEOUS INSERTION TUBE JEJUNOSTOMY;  upper endoscopy with percutaneous gastrojejunostimy feeding tuble placement and gastropexy;  Surgeon: Campbell Narayanan MD;  [facility]     REPLACE GASTROJEJUNOSTOMY TUBE, PERCUTANEOUS N/A 4/30/2018    Procedure: REPLACE GASTROJEJUNOSTOMY TUBE, PERCUTANEOUS;  REPLACE GASTROJEJUNOSTOMY TUBE, PERCUTANEOUS;  Surgeon: Campbell Narayanan MD;  [facility]     REPLACE GASTROJEJUNOSTOMY TUBE, PERCUTANEOUS N/A 6/20/2018    Procedure: REPLACE GASTROJEJUNOSTOMY TUBE, PERCUTANEOUS;   EGD-Tube Change;  Surgeon: Campbell Narayanan MD;  [facility]     REPLACE GASTROJEJUNOSTOMY TUBE, PERCUTANEOUS N/A 9/14/2018    Procedure: REPLACE GASTROJEJUNOSTOMY TUBE, PERCUTANEOUS;  replace GJ tube with fluoro;  Surgeon: Mario Joe MD;  [facility]     REPLACE GASTROJEJUNOSTOMY TUBE, PERCUTANEOUS N/A 12/11/2018    Procedure: REPLACE GASTROJEJUNOSTOMY TUBE, PERCUTANEOUS;  Surgeon: Campbell Narayanan MD;  [facility]     REPLACE GASTROSTOMY TUBE, PERCUTANEOUS N/A 6/7/2019    Procedure: REPLACEMENT, GASTROSTOMY TUBE, PERCUTANEOUS;  Surgeon: Campbell Narayanan MD;  [facility]     REPLACE GASTROSTOMY TUBE, PERCUTANEOUS N/A 10/22/2019    Procedure: REPLACEMENT, GASTROSTOMY TUBE, PERCUTANEOUS;  Surgeon: Campbell Narayanan MD;  [facility]     REPLACE JEJUNOSTOMY TUBE, PERCUTANEOUS  4/21/2020    Procedure: Replace Jejunostomy Tube, Percutaneous;  Surgeon: Campbell Narayanan MD;  [facility]     VASCULAR SURGERY       Current Outpatient Medications   Medication     acetaminophen (TYLENOL) 32 mg/mL solution     amylase-lipase-protease (ZENPEP) 33812 UNITS CPEP     blood glucose monitoring (NO BRAND SPECIFIED) test strip     fluticasone (FLONASE) 50 MCG/ACT spray     gabapentin (NEURONTIN) 400 MG capsule     hydrOXYzine (ATARAX) 10 MG/5ML syrup     levalbuterol (XOPENEX HFA) 45 MCG/ACT Inhaler     nortriptyline (PAMELOR) 10 MG capsule     ondansetron (ZOFRAN ODT) 4 MG ODT tab     order for DME     polyethylene glycol (MIRALAX/GLYCOLAX) Packet     sodium bicarbonate 325 MG tablet     No current facility-administered medications for this visit.      Allergies   Allergen Reactions     Amoxicillin-Pot Clavulanate Nausea and Vomiting     Compazine [Prochlorperazine] Other (See Comments)     Dystonia       Hyoscyamine Other (See Comments)     Dystonia     Reglan [Metoclopramide Hcl] Other (See Comments)     Dystonia     Zyprexa Other (See Comments)     Sensitive, dystonic  "reaction on 11-9-2011     Amitriptyline Hcl Other (See Comments)     Dystonia, hallucinations     Buspirone Other (See Comments)     No Adverse Reactions, no benefit     Cogentin [Benztropine]      Cyproheptadine Other (See Comments)     Distonic     Dicyclomine Other (See Comments)     Droperidol Other (See Comments)     Feels tense and \"like she has to jump out of her skin\".       Effexor [Venlafaxine] Other (See Comments)     Dystonia     Food      Cilantro--lips/tongue swelling     No Clinical Screening - See Comments Other (See Comments)     Cilantro     Phenergan Dm [Promethazine-Dm] Other (See Comments)     dystonia     Promethazine Other (See Comments)     Risperidone Other (See Comments)     dystonia     Vistaril Other (See Comments)     Burning sensation.       Augmentin GI Disturbance     Ketamine Other (See Comments)     jittery     Sorbitol GI Disturbance     Headache and dyspepsia     Social History     Tobacco Use     Smoking status: Never Smoker     Smokeless tobacco: Never Used   Substance Use Topics     Alcohol use: No     Alcohol/week: 0.0 standard drinks     Drug use: No     Family History   Problem Relation Age of Onset     Hypertension Father      Bipolar Disorder Other      Anxiety Disorder Other      Depression Paternal Grandmother      Allergies Maternal Grandmother      Cerebrovascular Disease Maternal Grandfather      Cardiovascular Maternal Grandfather      Depression/Anxiety Maternal Grandfather      Gastrointestinal Disease Maternal Grandfather      Diabetes Paternal Uncle      Hypoglycemia Brother      Cancer No family hx of         No family history of skin cancer     ROS: A complete 10 point ROS was completed and was negative aside from that noted in the HPI    O:  No vitals as remote visit    General: NAD, A&Ox3  Resp: Non-labored breathing on phone    A/P: 26 yo nulligravid female being called today to discuss endometriosis treatment  1) Endometriosis: Patient with extensive " disease, has not been on medical management in over 2 years.  Has had multiple surgeries now since I last saw her.  We discussed options for treatment, not estrogen candidate.  I do feel given her overall history and prior treatment options that she would benefit from Orilissa.  Would also encourage her to take Aygesting 5 mg daily.  She is happy with this plan, feels this would be a good option for her.  Has done some reading and research on her own and feels this would be a good place for her to start with resumption of medical management.  Rx placed today.  Will likely need PA for Orilissa, and will complete for her as requested.  2) Contraception: Plans to continue with condoms, aware of Aygestin not being a form of contraception.  3) Patient to check in via MyChart in 1 month after starting therapy to let me know how she is doing, can adjust norethindrone dose as needed at that time.    Phone call start: 8:56 AM  Phone call end: 9:18 AM  Phone call duration:  22 minutes    Mary Anne Aranda MD

## 2021-02-10 ENCOUNTER — VIRTUAL VISIT (OUTPATIENT)
Dept: OBGYN | Facility: CLINIC | Age: 26
End: 2021-02-10
Attending: OBSTETRICS & GYNECOLOGY
Payer: COMMERCIAL

## 2021-02-10 DIAGNOSIS — N80.9 ENDOMETRIOSIS: Primary | ICD-10-CM

## 2021-02-10 DIAGNOSIS — G89.29 CHRONIC PELVIC PAIN IN FEMALE: ICD-10-CM

## 2021-02-10 DIAGNOSIS — R10.2 CHRONIC PELVIC PAIN IN FEMALE: ICD-10-CM

## 2021-02-10 PROCEDURE — 99202 OFFICE O/P NEW SF 15 MIN: CPT | Mod: 95 | Performed by: OBSTETRICS & GYNECOLOGY

## 2021-02-10 RX ORDER — ELAGOLIX 150 MG/1
1 TABLET, FILM COATED ORAL DAILY
Qty: 90 TABLET | Refills: 3 | Status: SHIPPED | OUTPATIENT
Start: 2021-02-10

## 2021-02-10 RX ORDER — NORETHINDRONE ACETATE 5 MG
5 TABLET ORAL DAILY
Qty: 90 TABLET | Refills: 3 | Status: SHIPPED | OUTPATIENT
Start: 2021-02-10

## 2021-03-10 ENCOUNTER — MYC MEDICAL ADVICE (OUTPATIENT)
Dept: OBGYN | Facility: CLINIC | Age: 26
End: 2021-03-10

## 2021-03-22 RX ORDER — HEPARIN SODIUM (PORCINE) LOCK FLUSH IV SOLN 100 UNIT/ML 100 UNIT/ML
500 SOLUTION INTRAVENOUS EVERY 8 HOURS
Status: CANCELLED
Start: 2021-03-22

## 2021-03-22 RX ORDER — DIPHENHYDRAMINE HYDROCHLORIDE 50 MG/ML
25 INJECTION INTRAMUSCULAR; INTRAVENOUS DAILY PRN
Status: CANCELLED
Start: 2021-03-22

## 2021-03-22 RX ORDER — DIPHENHYDRAMINE HYDROCHLORIDE 50 MG/ML
25 INJECTION INTRAMUSCULAR; INTRAVENOUS ONCE
Status: CANCELLED
Start: 2021-03-22 | End: 2021-03-22

## 2021-03-22 RX ORDER — ONDANSETRON 2 MG/ML
4 INJECTION INTRAMUSCULAR; INTRAVENOUS DAILY PRN
Status: CANCELLED
Start: 2021-03-22

## 2021-03-22 RX ORDER — ONDANSETRON 2 MG/ML
4 INJECTION INTRAMUSCULAR; INTRAVENOUS ONCE
Status: CANCELLED
Start: 2021-03-22 | End: 2021-03-22

## 2021-03-22 RX ORDER — DEXTROSE, SODIUM CHLORIDE, SODIUM LACTATE, POTASSIUM CHLORIDE, AND CALCIUM CHLORIDE 5; .6; .31; .03; .02 G/100ML; G/100ML; G/100ML; G/100ML; G/100ML
2000 INJECTION, SOLUTION INTRAVENOUS ONCE
Status: CANCELLED
Start: 2021-03-22 | End: 2021-03-22

## 2021-08-03 ENCOUNTER — TELEPHONE (OUTPATIENT)
Dept: GASTROENTEROLOGY | Facility: CLINIC | Age: 26
End: 2021-08-03

## 2021-08-03 DIAGNOSIS — Z46.59 ENCOUNTER FOR CARE RELATED TO FEEDING TUBE: Primary | ICD-10-CM

## 2021-08-03 NOTE — TELEPHONE ENCOUNTER
[facility] Call Center    Phone Message    May a detailed message be left on voicemail: yes     Reason for Call: Other: Patient's mother, Leeann, called as patient was in [facility]'s ER and they found that the balloon in tube was abnormally positioned in abdomen.  Therefore, Leeann would like to make a follow up appt with Dr. Narayanan for patient and will be trying to get patient up here this Friday, 8/6/21.  Please follow up with Leeann at [phone].  Thank you!    Action Taken: Message routed to:  Other: [facility] Team     Travel Screening: Not Applicable

## 2021-08-03 NOTE — TELEPHONE ENCOUNTER
Called and talked to patients mom, explained Oracio out this week. Orders placed for tube exchange 8/11, message w/ recent CT read sent to Dr. Narayanan    ML

## 2021-08-04 ENCOUNTER — TELEPHONE (OUTPATIENT)
Dept: GASTROENTEROLOGY | Facility: CLINIC | Age: 26
End: 2021-08-04

## 2021-08-04 NOTE — TELEPHONE ENCOUNTER
[facility] Call Center    Phone Message    May a detailed message be left on voicemail: yes     Reason for Call: Other: Per pt would like to know what else she can do until next week. Per pt is still in some pain. Please call pt back asap. Thank you.      Action Taken: Message routed to:  [facility] Eric    Travel Screening: Not Applicable

## 2021-08-05 NOTE — TELEPHONE ENCOUNTER
"Returned call to patient. She's now been discharged from ER, tube site still having pain. Tube is \"sticking out\", she cannot push it back in. Per CT read discussed the possibility of tube migration. Advised immobilization for tube, ice for comfort. Pt will come from [place] today, encouraged her to come to [facility] ER for continued triage/assessment. Scheduled for tube exchange with Dr. Narayanan on Wednesday, 8/11.    Will follow.    ML  "

## 2021-08-06 DIAGNOSIS — Z11.59 ENCOUNTER FOR SCREENING FOR OTHER VIRAL DISEASES: ICD-10-CM

## 2021-08-08 ENCOUNTER — LAB (OUTPATIENT)
Dept: FAMILY MEDICINE | Facility: CLINIC | Age: 26
End: 2021-08-08
Attending: INTERNAL MEDICINE
Payer: COMMERCIAL

## 2021-08-11 ENCOUNTER — HOSPITAL ENCOUNTER (OUTPATIENT)
Facility: CLINIC | Age: 26
Discharge: HOME | End: 2021-08-11
Attending: INTERNAL MEDICINE | Admitting: INTERNAL MEDICINE
Payer: COMMERCIAL

## 2021-08-11 ENCOUNTER — TELEPHONE (OUTPATIENT)
Dept: GASTROENTEROLOGY | Facility: CLINIC | Age: 26
End: 2021-08-11

## 2021-08-11 ENCOUNTER — MYC MEDICAL ADVICE (OUTPATIENT)
Dept: GASTROENTEROLOGY | Facility: CLINIC | Age: 26
End: 2021-08-11

## 2021-08-11 VITALS
SYSTOLIC BLOOD PRESSURE: 137 MMHG | HEART RATE: 98 BPM | HEIGHT: 67 IN | WEIGHT: 192.46 LBS | BODY MASS INDEX: 30.21 KG/M2 | DIASTOLIC BLOOD PRESSURE: 93 MMHG | RESPIRATION RATE: 15 BRPM | TEMPERATURE: 99.2 F | OXYGEN SATURATION: 98 %

## 2021-08-11 LAB — PROVATION GI EXAM: NORMAL

## 2021-08-11 PROCEDURE — 250N000011 HC RX IP 250 OP 636: Performed by: INTERNAL MEDICINE

## 2021-08-11 PROCEDURE — 49451 REPLACE DUOD/JEJ TUBE PERC: CPT | Performed by: INTERNAL MEDICINE

## 2021-08-11 RX ORDER — HEPARIN SODIUM (PORCINE) LOCK FLUSH IV SOLN 100 UNIT/ML 100 UNIT/ML
5-10 SOLUTION INTRAVENOUS
Status: DISCONTINUED | OUTPATIENT
Start: 2021-08-11 | End: 2021-08-11 | Stop reason: HOSPADM

## 2021-08-11 RX ORDER — HEPARIN SODIUM,PORCINE 10 UNIT/ML
5-10 VIAL (ML) INTRAVENOUS EVERY 24 HOURS
Status: DISCONTINUED | OUTPATIENT
Start: 2021-08-11 | End: 2021-08-11 | Stop reason: HOSPADM

## 2021-08-11 RX ORDER — METOPROLOL SUCCINATE 25 MG/1
25 TABLET, EXTENDED RELEASE ORAL
COMMUNITY
Start: 2021-06-08

## 2021-08-11 RX ORDER — LIDOCAINE 40 MG/G
CREAM TOPICAL
Status: DISCONTINUED | OUTPATIENT
Start: 2021-08-11 | End: 2021-08-11 | Stop reason: HOSPADM

## 2021-08-11 RX ORDER — HEPARIN SODIUM,PORCINE 10 UNIT/ML
5-10 VIAL (ML) INTRAVENOUS
Status: DISCONTINUED | OUTPATIENT
Start: 2021-08-11 | End: 2021-08-11 | Stop reason: HOSPADM

## 2021-08-11 RX ORDER — ONDANSETRON 2 MG/ML
4 INJECTION INTRAMUSCULAR; INTRAVENOUS
Status: DISCONTINUED | OUTPATIENT
Start: 2021-08-11 | End: 2021-08-11 | Stop reason: HOSPADM

## 2021-08-11 RX ADMIN — HEPARIN SODIUM (PORCINE) LOCK FLUSH IV SOLN 100 UNIT/ML 5 ML: 100 SOLUTION at 11:45

## 2021-08-11 ASSESSMENT — MIFFLIN-ST. JEOR: SCORE: 1640.63

## 2021-08-11 NOTE — TELEPHONE ENCOUNTER
[facility] Call Center    Phone Message    May a detailed message be left on voicemail: yes     Reason for Call: Other: Alexandra is calling in today asking for a call back. She states that she her stoma has been bleeding after her procedure with Dr. Narayanan on 8/11 and she is wondering if that is normal. Writer offered to get patient to Triage Line, but she declined. Please call back as soon as possible to discuss.     Action Taken: Message routed to:  [facility] Roberth    Travel Screening: Not Applicable

## 2021-08-11 NOTE — OR NURSING
Patient did not have a replacement for feeding tube as she was have complications with device. Dr. Narayanan removed old device. Patient was sent home with 2x2 dressings, tegaderm and 2 silver nitrate sticks for home care by Dr. Narayanan. Port was deaccessed by another provider on 3C. Patient discharged to home without sedation.

## 2021-08-11 NOTE — OR NURSING
Pt and Md decided to remove the Ritchie instead of replacing it .  No medication was given. SiIlver nitrate was used

## 2021-08-12 ENCOUNTER — MYC MEDICAL ADVICE (OUTPATIENT)
Dept: GASTROENTEROLOGY | Facility: CLINIC | Age: 26
End: 2021-08-12

## 2021-08-12 NOTE — TELEPHONE ENCOUNTER
"Called patient to walk through symptoms noted via MyChart.     Per patient, still having some bleeding from stoma, bleeding is better, \"oozing a bit\", changing every 2 hours to keep clean/dry. Still sore. Has low grade temp, 100.3, took tylenol for ever. Only symptom is pain at stoma site, still has some swelling at stoma site, has known cellulitis.     Advised to continue keeping up with dressing changes as needed as site heals over next few weeks. Symptoms above not concerning, advised pt should continue to monitor.     ML      "

## 2021-08-28 ENCOUNTER — NURSE TRIAGE (OUTPATIENT)
Dept: NURSING | Facility: CLINIC | Age: 26
End: 2021-08-28

## 2021-08-28 NOTE — TELEPHONE ENCOUNTER
"\"I had a J tube for 4 years. I was finally able to have it removed on 8/11 with Dr. Narayanan. (see epic)  The incision itself looks like it's healing very well. But I noticed yesterday and today that directly below the stoma there's a golf ball size lump that's painful. (sitting rates pain 5/10, moving 7/10)  I was lifting and moving some bins yesterday. The incision there's a very small amount of clear to light brown drainage, no redness.\"   Denies fever or other sx.  Paged on call Dr. Nguyen through page op at 2:52 pm to call [facility]  Per MD \"If not getting better she should come to ER for a CT scan.\"  Notified patient  Call back if needed.  Michelle Palacio RN [facility]        Reason for Disposition    [1] Caller has URGENT question AND [2] triager unable to answer question    Additional Information    Negative: Fever > 100.4 F (38.0 C)    Negative: [1] Incision looks infected (spreading redness, pain) AND [2] fever > 99.5 F (37.5 C)    Negative: [1] Incision looks infected (spreading redness, pain) AND [2] large red area (> 2 in. or 5 cm)    Negative: [1] Incision looks infected (spreading redness, pain) AND [2] face wound    Negative: [1] Red streak runs from the incision AND [2] longer than 1 inch (2.5 cm)    Negative: [1] Pus or bad-smelling fluid draining from incision AND [2] no fever    Negative: Severe pain in the incision    Negative: [1] Incision gaping open AND [2] < 48 hours since wound re-opened    Negative: [1] Incision gaping open AND [2] length of opening > 2 inches (5 cm)    Negative: Patient sounds very sick or weak to the triager    Negative: Sounds like a serious complication to the triager    Negative: [1] Post-op pain AND [2] not controlled with pain medications    Negative: Dressing soaked with blood or body fluid (e.g., drainage)    Negative: [1] Raised bruise and [2] size > 2 inches (5 cm) and expanding    Protocols used: POST-OP INCISION SYMPTOMS-A-AH      "

## 2021-08-30 NOTE — TELEPHONE ENCOUNTER
Chart review for [facility] clinic. Does not have a PCP listed with [facility] clinic and call was regarding specialty provider.   Contract clinic section updated to reflect  Primary Care Clinic.     Renard Lei RN

## 2021-09-25 ENCOUNTER — HEALTH MAINTENANCE LETTER (OUTPATIENT)
Age: 26
End: 2021-09-25

## 2022-01-01 NOTE — ED NOTES
22-yr female patient - Presenting C/o:  Patient's feeding tube partially came out yesterday, then came out fully today.  Paitent near-syncopal, afterwards, with increased pain.  Airway patent; CMS intact.   2022

## 2022-01-03 NOTE — LETTER
"2/10/2021       RE: Alexandra Melgoza  [address]  [facility] [address]     Dear Colleague,    Thank you for referring your patient, Alexandra Melgoza, to the [facility] [place] at [facility]. Please see a copy of my visit note below.    Gynecology Visit Note  2/10/2021    Reason for visit: Endometriosis    SUBJECTIVE     Alexandra is a 25 year old female who is being evaluated via a billable telephone visit.    Patient opted to conduct today's return visit via telephone secondary to the COVID-19 pandemic vs. an in person visit to the clinic.    I spoke with: Alexandra Melgoza    The patient has been notified of following:   \"This telephone visit will be conducted via a call between you and your physician/provider. We have found that certain health care needs can be provided without the need for a physical exam.  This service lets us provide the care you need with a short phone conversation.  If a prescription is necessary we can send it directly to your pharmacy.  If lab work is needed we can place an order for that and you can then stop by our lab to have the test done at a later time.  If during the course of the call the physician/provider feels a telephone visit is not appropriate, you will not be charged for this service.\"     The reason for the telephone visit: Endometriosis    HPI: Patient is a 24 yo nulligravid female who I last treated back in 8/2016 for endometriosis and preventive OBGYN care.  Patient at that time was managed with Lupron/Aygestin and Mononessa.  Had failed multiple other therapies prior to Lupron, had some assistance with PFT in past for associated pelvic floor dysnfunction.  Patient also has a very complicated medical history, frequent visits with Gastroenterology and has Jejunostomy tube in place for intractable abdominal pain and food intolerance related to chronic pancreatitis.  Patient also notes that she did " Problem: Falls - Risk of:  Goal: Will remain free from falls  Description: Will remain free from falls  Outcome: Ongoing  Goal: Absence of physical injury  Description: Absence of physical injury  Outcome: Ongoing     Problem: Discharge Planning:  Goal: Discharged to appropriate level of care  Description: Discharged to appropriate level of care  Outcome: Ongoing     Problem: Serum Glucose Level - Abnormal:  Goal: Ability to maintain appropriate glucose levels will improve  Description: Ability to maintain appropriate glucose levels will improve  Outcome: Ongoing have some testing done in [place] and was diagnosed with a CFTR gene mutation, not the same as with CF, but interestingly has been associated with chronic pancreatitis/pancreatic disorders in patients with this mutation.  From this standpoint, she does think things are improved and hasn't had to use the J tube for feeds recently which she is happy about.    Today, patient states her endometriosis has been brutal recently.  Has been hard to find someone to manage this part of her care in [place].  Currently she is not doing anything for treatment.  Was on Lupron until 2 years ago, and felt at that time it really was not doing much.  Has tried OCP here and there but more nausea with this and so can't stay on it.  Has had 3 surgeries for endometriosis since last seen.  Was diffuse in nature and was told would likely need for excisional procedure, but she is not sure she wants further surgical intervention.  Last surgery was a couple years ago and was for 5 cm hemorrhagic ovarian cyst done emergently.  Her bleeding is monthly and heavy in nature, clots for the first 3 days-using tampon and pad every 2 hours or so in beginning, total of 9 days.  Bleeding every 3.5 weeks.  Does have some nausea and pain pre-menses, which radiates down into her legs and her back.  Does visit ED almost monthly secondary to these symptoms.  .  Is on Xarelto due to DVT history, found after a hospitalization for sepsis.  No clotting disorder found on evaluation per patient report.      Past OB/GYN History:  Nulligravid  Menses:  See HPI  Contraception: Condoms  Sexually active with male partner, same for last 6 years  No STI history  Pap smear History: 6 months ago, normal per patient report, does annually    Past Medical History:   Diagnosis Date     Antiplatelet or antithrombotic long-term use      Anxiety      Asthma      Cholecystitis     s/p cholecystectomy     Chronic abdominal pain      Chronic infection     mrsa     Chronic pain       Cyclic vomiting syndrome 10/27/2012     Depression      Endometriosis      Hypoglycaemia      Hypokalemia      Hypomagnesemia      Migraines      Mild intermittent asthma      Other chronic pain      Ovarian cysts      Pancreatic disease      PONV (postoperative nausea and vomiting)      Pseudoseizures      Somatoform disorder      Sphincter of Oddi dysfunction      Vasovagal syncope      Past Surgical History:   Procedure Laterality Date     ABDOMEN SURGERY      ERCP, biliary stents     ABDOMEN SURGERY      Expl lap abdomen     CHOLECYSTECTOMY  8/2/11     COLONOSCOPY  2011    negative finding     ENDOSCOPIC RETROGRADE CHOLANGIOPANCREATOGRAM  8/23/2011    Procedure:ENDOSCOPIC RETROGRADE CHOLANGIOPANCREATOGRAM; Endoscopic Retrograde Cholangiopancreatogram; Surgeon:SHORTY NARAYANAN; [facility]     ENDOSCOPIC RETROGRADE CHOLANGIOPANCREATOGRAM  5/17/2012    Procedure:ENDOSCOPIC RETROGRADE CHOLANGIOPANCREATOGRAM; Endoscopic Retrograde Cholangiopancreatogram with pancreatic stent placement.; Surgeon:SHORTY NARAYANAN; Location:[facility]     ENDOSCOPIC RETROGRADE CHOLANGIOPANCREATOGRAM N/A 1/18/2018    Procedure: COMBINED ENDOSCOPIC RETROGRADE CHOLANGIOPANCREATOGRAPHY, PLACE TUBE/STENT;  Endoscopic Retrograde Cholangiopancreatography with nasojejunal feeding tube placement;  Surgeon: Shorty Narayanan MD;  Location: [facility]     ENDOSCOPIC RETROGRADE CHOLANGIOPANCREATOGRAM COMPLEX  1/3/2012    Procedure:ENDOSCOPIC RETROGRADE CHOLANGIOPANCREATOGRAM COMPLEX; Endoscopic Retrograde Cholangiopancreatogram with Manometry bile duct sphincterotomy extention pancreatic duct sphincterotomy pancreatic duct stent placement; Surgeon:SHORTY NARAYANAN; [facility]     ENDOSCOPIC ULTRASOUND UPPER GASTROINTESTINAL TRACT (GI) N/A 6/9/2015    Procedure: ENDOSCOPIC ULTRASOUND, ESOPHAGOSCOPY / UPPER GASTROINTESTINAL TRACT (GI);  Surgeon: Mario Joe MD;  [facility]     ENDOSCOPIC ULTRASOUND UPPER  GASTROINTESTINAL TRACT (GI) N/A 12/12/2016    Procedure: ENDOSCOPIC ULTRASOUND, ESOPHAGOSCOPY / UPPER GASTROINTESTINAL TRACT (GI);  Surgeon: Guru Jose Klein MD;  [facility]: [facility]     ESOPHAGOSCOPY, GASTROSCOPY, DUODENOSCOPY (EGD), COMBINED  1/18/2012    Procedure:COMBINED ESOPHAGOSCOPY, GASTROSCOPY, DUODENOSCOPY (EGD); Surgeon:ARNIE ESPINOZA; [facility]     ESOPHAGOSCOPY, GASTROSCOPY, DUODENOSCOPY (EGD), COMBINED  1/18/2012    Procedure:COMBINED ESOPHAGOSCOPY, GASTROSCOPY, DUODENOSCOPY (EGD); EGD; Surgeon:ARNIE ESPINOZA; [facility]     ESOPHAGOSCOPY, GASTROSCOPY, DUODENOSCOPY (EGD), COMBINED N/A 12/9/2017    Procedure: COMBINED ESOPHAGOSCOPY, GASTROSCOPY, DUODENOSCOPY (EGD);;  Surgeon: Josias Chan MD;  [facility]     ESOPHAGOSCOPY, GASTROSCOPY, DUODENOSCOPY (EGD), COMBINED N/A 3/27/2018    Procedure: COMBINED ESOPHAGOSCOPY, GASTROSCOPY, DUODENOSCOPY (EGD);  Upper Gastrointestinal Endoscopy convert gastrostomy to gastrojejunostomy tube ;  Surgeon: Campbell Narayanan MD;  [facility]     ESOPHAGOSCOPY, GASTROSCOPY, DUODENOSCOPY (EGD), COMBINED N/A 6/18/2019    Procedure: Upper Gastrointestinal Endoscopy with PEG Placement;  Surgeon: Campbell Narayanan MD;  Location: [facility]     GYN SURGERY      s/p ovarian cyst removal, right 5-29-18     INSERT PORT VASCULAR ACCESS       L knee arthroscopy  2009     LAPAROSCOPY DIAGNOSTIC (GYN)  10/26/2012    Procedure: LAPAROSCOPY DIAGNOSTIC (GYN);  LAPAROSCOPY DIAGNOSTIC, CAUTERY ENDOMETRIOISIS and biopsy of Fallopian tube lesions;  Surgeon: Carla Lopez MD;  Location:  OR     ORTHOPEDIC SURGERY  2008    knee, left     power port removal  01/10/2019     REMOVE AND REPLACE BREAST IMPLANT PROSTHESIS N/A 2/8/2018    Procedure: PERCUTANEOUS INSERTION TUBE JEJUNOSTOMY;  upper endoscopy with percutaneous gastrojejunostimy feeding tuble placement and gastropexy;  Surgeon: Campbell Narayanan MD;  [facility]     REPLACE  GASTROJEJUNOSTOMY TUBE, PERCUTANEOUS N/A 4/30/2018    Procedure: REPLACE GASTROJEJUNOSTOMY TUBE, PERCUTANEOUS;  REPLACE GASTROJEJUNOSTOMY TUBE, PERCUTANEOUS;  Surgeon: Campbell Narayanan MD;  [facility]     REPLACE GASTROJEJUNOSTOMY TUBE, PERCUTANEOUS N/A 6/20/2018    Procedure: REPLACE GASTROJEJUNOSTOMY TUBE, PERCUTANEOUS;  EGD-Tube Change;  Surgeon: Campbell Narayanan MD;  [facility]     REPLACE GASTROJEJUNOSTOMY TUBE, PERCUTANEOUS N/A 9/14/2018    Procedure: REPLACE GASTROJEJUNOSTOMY TUBE, PERCUTANEOUS;  replace GJ tube with fluoro;  Surgeon: Mario Joe MD;  [facility]     REPLACE GASTROJEJUNOSTOMY TUBE, PERCUTANEOUS N/A 12/11/2018    Procedure: REPLACE GASTROJEJUNOSTOMY TUBE, PERCUTANEOUS;  Surgeon: Campbell Narayanan MD;  [facility]     REPLACE GASTROSTOMY TUBE, PERCUTANEOUS N/A 6/7/2019    Procedure: REPLACEMENT, GASTROSTOMY TUBE, PERCUTANEOUS;  Surgeon: Campbell Narayanan MD;  [facility]     REPLACE GASTROSTOMY TUBE, PERCUTANEOUS N/A 10/22/2019    Procedure: REPLACEMENT, GASTROSTOMY TUBE, PERCUTANEOUS;  Surgeon: Campbell Narayanan MD;  [facility]     REPLACE JEJUNOSTOMY TUBE, PERCUTANEOUS  4/21/2020    Procedure: Replace Jejunostomy Tube, Percutaneous;  Surgeon: Campbell Narayanan MD;  Location: [facility]     VASCULAR SURGERY       Current Outpatient Medications   Medication     acetaminophen (TYLENOL) 32 mg/mL solution     amylase-lipase-protease (ZENPEP) 19257 UNITS CPEP     blood glucose monitoring (NO BRAND SPECIFIED) test strip     fluticasone (FLONASE) 50 MCG/ACT spray     gabapentin (NEURONTIN) 400 MG capsule     hydrOXYzine (ATARAX) 10 MG/5ML syrup     levalbuterol (XOPENEX HFA) 45 MCG/ACT Inhaler     nortriptyline (PAMELOR) 10 MG capsule     ondansetron (ZOFRAN ODT) 4 MG ODT tab     order for DME     polyethylene glycol (MIRALAX/GLYCOLAX) Packet     sodium bicarbonate 325 MG tablet     No current facility-administered medications for this  "visit.      Allergies   Allergen Reactions     Amoxicillin-Pot Clavulanate Nausea and Vomiting     Compazine [Prochlorperazine] Other (See Comments)     Dystonia       Hyoscyamine Other (See Comments)     Dystonia     Reglan [Metoclopramide Hcl] Other (See Comments)     Dystonia     Zyprexa Other (See Comments)     Sensitive, dystonic reaction on 11-9-2011     Amitriptyline Hcl Other (See Comments)     Dystonia, hallucinations     Buspirone Other (See Comments)     No Adverse Reactions, no benefit     Cogentin [Benztropine]      Cyproheptadine Other (See Comments)     Distonic     Dicyclomine Other (See Comments)     Droperidol Other (See Comments)     Feels tense and \"like she has to jump out of her skin\".       Effexor [Venlafaxine] Other (See Comments)     Dystonia     Food      Cilantro--lips/tongue swelling     No Clinical Screening - See Comments Other (See Comments)     Cilantro     Phenergan Dm [Promethazine-Dm] Other (See Comments)     dystonia     Promethazine Other (See Comments)     Risperidone Other (See Comments)     dystonia     Vistaril Other (See Comments)     Burning sensation.       Augmentin GI Disturbance     Ketamine Other (See Comments)     jittery     Sorbitol GI Disturbance     Headache and dyspepsia     Social History     Tobacco Use     Smoking status: Never Smoker     Smokeless tobacco: Never Used   Substance Use Topics     Alcohol use: No     Alcohol/week: 0.0 standard drinks     Drug use: No     Family History   Problem Relation Age of Onset     Hypertension Father      Bipolar Disorder Other      Anxiety Disorder Other      Depression Paternal Grandmother      Allergies Maternal Grandmother      Cerebrovascular Disease Maternal Grandfather      Cardiovascular Maternal Grandfather      Depression/Anxiety Maternal Grandfather      Gastrointestinal Disease Maternal Grandfather      Diabetes Paternal Uncle      Hypoglycemia Brother      Cancer No family hx of         No family history of " skin cancer     ROS: A complete 10 point ROS was completed and was negative aside from that noted in the HPI    O:  No vitals as remote visit    General: NAD, A&Ox3  Resp: Non-labored breathing on phone    A/P: 24 yo nulligravid female being called today to discuss endometriosis treatment  1) Endometriosis: Patient with extensive disease, has not been on medical management in over 2 years.  Has had multiple surgeries now since I last saw her.  We discussed options for treatment, not estrogen candidate.  I do feel given her overall history and prior treatment options that she would benefit from Orilissa.  Would also encourage her to take Aygesting 5 mg daily.  She is happy with this plan, feels this would be a good option for her.  Has done some reading and research on her own and feels this would be a good place for her to start with resumption of medical management.  Rx placed today.  Will likely need PA for Orilissa, and will complete for her as requested.  2) Contraception: Plans to continue with condoms, aware of Aygestin not being a form of contraception.  3) Patient to check in via MyChart in 1 month after starting therapy to let me know how she is doing, can adjust norethindrone dose as needed at that time.    Phone call start: 8:56 AM  Phone call end: 9:18 AM  Phone call duration:  22 minutes    Mary Anne Aranda MD

## 2022-01-15 ENCOUNTER — HEALTH MAINTENANCE LETTER (OUTPATIENT)
Age: 27
End: 2022-01-15

## 2022-03-25 RX ORDER — DEXTROSE, SODIUM CHLORIDE, SODIUM LACTATE, POTASSIUM CHLORIDE, AND CALCIUM CHLORIDE 5; .6; .31; .03; .02 G/100ML; G/100ML; G/100ML; G/100ML; G/100ML
2000 INJECTION, SOLUTION INTRAVENOUS ONCE
Status: CANCELLED
Start: 2022-03-25 | End: 2022-03-25

## 2022-03-25 RX ORDER — ONDANSETRON 2 MG/ML
4 INJECTION INTRAMUSCULAR; INTRAVENOUS ONCE
Status: CANCELLED
Start: 2022-03-25 | End: 2022-03-25

## 2022-03-25 RX ORDER — DIPHENHYDRAMINE HYDROCHLORIDE 50 MG/ML
25 INJECTION INTRAMUSCULAR; INTRAVENOUS ONCE
Status: CANCELLED
Start: 2022-03-25 | End: 2022-03-25

## 2022-03-25 RX ORDER — ONDANSETRON 2 MG/ML
4 INJECTION INTRAMUSCULAR; INTRAVENOUS DAILY PRN
Status: CANCELLED
Start: 2022-03-25

## 2022-03-25 RX ORDER — DIPHENHYDRAMINE HYDROCHLORIDE 50 MG/ML
25 INJECTION INTRAMUSCULAR; INTRAVENOUS DAILY PRN
Status: CANCELLED
Start: 2022-03-25

## 2022-03-25 RX ORDER — HEPARIN SODIUM (PORCINE) LOCK FLUSH IV SOLN 100 UNIT/ML 100 UNIT/ML
500 SOLUTION INTRAVENOUS EVERY 8 HOURS
Status: CANCELLED
Start: 2022-03-25

## 2022-04-21 ENCOUNTER — MYC MEDICAL ADVICE (OUTPATIENT)
Dept: GASTROENTEROLOGY | Facility: CLINIC | Age: 27
End: 2022-04-21

## 2022-04-21 NOTE — TELEPHONE ENCOUNTER
Called to follow up on complaints on old gtube site    Is she leaking, or just painful? Red, swollen?   Needs to see PCP     Per patient - no redness, little swollen/hard at top of site, no leaking    Reviewed no concerning symptoms, no interventions recommended- pt will f/ up with PCP    ML

## 2022-05-25 NOTE — TELEPHONE ENCOUNTER
Phoned to say Rx ready to / having pain since PEGJ exchange   Prednisone Counseling:  I discussed with the patient the risks of prolonged use of prednisone including but not limited to weight gain, insomnia, osteoporosis, mood changes, diabetes, susceptibility to infection, glaucoma and high blood pressure.  In cases where prednisone use is prolonged, patients should be monitored with blood pressure checks, serum glucose levels and an eye exam.  Additionally, the patient may need to be placed on GI prophylaxis, PCP prophylaxis, and calcium and vitamin D supplementation and/or a bisphosphonate.  The patient verbalized understanding of the proper use and the possible adverse effects of prednisone.  All of the patient's questions and concerns were addressed.

## 2022-12-28 NOTE — PROGRESS NOTES
Contrast Dose Calculator:   MACD = 300 mL. Infusion Nursing Note  Alexandra Melgoza presents today to [facility] for:   Chief Complaint   Patient presents with     Infusion     IVF, meds     During today's [facility] appointment, orders from Dr. Narayanan were completed.  Frequency: weekly, PRN    Progress note:  Patient identification verified by name and date of birth.  Assessment completed.  Vitals recorded in Doc Flowsheets.  Patient was provided with education regarding infusion and possible side effects.  Patient verbalized understanding.     Premedications: were not ordered.  Infusion Rates: infusion given over approximately 2 hours.   Zofran and benadryl given IVP over 2 mins x 2 doses each.   Labs: were not ordered for this appointment.  Vascular access: port accessed today. +blood return. Flushed without difficulty.   Treatment Conditions: non-applicable.  Patient tolerated infusion: well    Patient reports ongoing blood sugar at 43 at least once per day, however states it is improving since start antibiotics. Reports she feels stable at visit.    Report given to Ysabel ROSE RN to transfer care.     Discharge Plan:   Follow up plan of care with: ongoing infusions at [facility]. and primary medical doctor.  Discharge instructions were reviewed with patient.  Patient/representative verbalized understanding of discharge instructions and all questions answered.  Patient discharged from [facility] in stable condition.    Nicole Ballard RN    Administrations This Visit     diphenhydrAMINE (BENADRYL) injection 25 mg     Admin Date Action Dose Route Administered By             04/16/2018 Given 25 mg Intravenous Nicole Ballard RN              Admin Date Action Dose Route Administered By             04/16/2018 Given 25 mg Intravenous Nicole Ballard, SAMARA                    lactated ringers BOLUS 1,000-2,000 mL     Admin Date Action Dose Rate  Route Administered By          04/16/2018 New Bag 2000 mL 999 mL/hr Intravenous Nicole Ballard RN                   ondansetron (ZOFRAN) injection 4 mg     Admin Date Action Dose Route Administered By             04/16/2018 Given 4 mg Intravenous Nicole Ballard RN              Admin Date Action Dose Route Administered By             04/16/2018 Given 4 mg Intravenous Nicole Ballard RN                          /67 (BP Location: Left arm)  Temp 97.4  F (36.3  C) (Oral)  Resp 18  SpO2 100%

## 2023-01-07 ENCOUNTER — HEALTH MAINTENANCE LETTER (OUTPATIENT)
Age: 28
End: 2023-01-07

## 2023-04-22 ENCOUNTER — HEALTH MAINTENANCE LETTER (OUTPATIENT)
Age: 28
End: 2023-04-22

## 2023-07-14 NOTE — ED AVS SNAPSHOT
[facility], Emergency Department    [address] [address]    Phone:  [phone]                                       Alexandra Melgoza   MRN: [id number]    Department:  [facility], Emergency Department   Date of Visit:  3/25/2018           Patient Information     Date Of Birth          1995        Your diagnoses for this visit were:     Dislodged gastrojejunostomy tube (H)     Chronic abdominal pain        You were seen by Katharina Flower MD and Diallo Quan MD.        Discharge Instructions       Please make an appointment to follow up with GI Clinic (phone: [phone]) in 1-2 days.  You will need the GJ tube replaced.  This will be done through your GI clinic.    Your next 10 appointments already scheduled     Mar 26, 2018  9:40 AM CDT   (Arrive by 9:25 AM)   Return Visit with Loenardo aWng MD   [facility] ([facility])    [address] [place] [address]  [address] [facility] [id number] [phone]            Mar 26, 2018  2:00 PM CDT   Infusion 120 with UC SPEC INFUSION, UC 50 ATC   [facility] ([facility])    [address]  [address]  [facility] [id number] [phone]            Mar 28, 2018  1:00 PM CDT   Infusion 120 with UC SPEC INFUSION, UC 42 ATC   [facility] ([facility])    [address]  [address]  [facility] [id number] [phone]            Mar 28, 2018  4:00 PM CDT   (Arrive by 3:45 PM)   Return Visit with Quyen Lennon, PhD   [facility] ([facility])    [address]  [address]th [facility] [id number] [phone]            Mar 30, 2018 10:00 AM CDT   Infusion 120 with UC SPEC INFUSION, UC 49 ATC   [facility]  None and Procedure ([facility])    [address]  [address]  [facility] [address]   [phone]            Apr 04, 2018  9:00 AM CDT   (Arrive by 8:45 AM)   Return Visit with Quyen Lennon, PhD   [facility] ([facility])    [address]  4th [address]  [facility] [address]   [phone]            Apr 11, 2018 10:00 AM CDT   (Arrive by 9:45 AM)   Return Visit with Quyen Lennon, PhD   [facility] ([facility])    [address]  4th [address]  [facility] [address]   [phone]              24 Hour Appointment Hotline       To make an appointment at any [facility], call [phone] ([phone]). If you don't have a family doctor or clinic, we will help you find one. [facility] are conveniently located to serve the needs of you and your family.             Review of your medicines      Our records show that you are taking the medicines listed below. If these are incorrect, please call your family doctor or clinic.        Dose / Directions Last dose taken    acetaminophen 32 mg/mL solution   Commonly known as:  TYLENOL   Dose:  975 mg        30.45 mLs (975 mg) by Per J Tube route every 8 hours   Refills:  0        * amylase-lipase-protease 03822 UNITS Cpep   Commonly known as:  ZENPEP   Dose:  2-3 capsule   Quantity:  180 capsule        Take 2-3 capsules (40,000-60,000 Units) by mouth Take with snacks or supplements (Snacks)   Refills:  1        * amylase-lipase-protease 60909 UNITS Cpep   Commonly known as:  ZENPEP   Dose:  5 capsule   Quantity:  180 capsule        Take 5 capsules (100,000 Units) by mouth 4 times daily (with meals and nightly)   Refills:  1        blood glucose monitoring test strip   Commonly known as:  no brand specified   Quantity:  100 strip        One Touch Ultra 2 Strips, Use to test  blood sugars 2 times daily or as directed   Refills:  3        fluticasone 50 MCG/ACT spray   Commonly known as:  FLONASE   Dose:  2 spray   Quantity:  16 g        Spray 2 sprays into both nostrils daily   Refills:  3        gabapentin 300 MG capsule   Commonly known as:  NEURONTIN        TK 2 CS QAM  1 C IN THE AFTERNOON AND 2 CS QHS   Refills:  0        hydrOXYzine 10 MG/5ML syrup   Commonly known as:  ATARAX   Dose:  25 mg   Quantity:  1500 mL        Take 12.5 mLs (25 mg) by mouth every 6 hours as needed for anxiety or other (pain)   Refills:  2        leuprolide 11.25 MG kit   Commonly known as:  LUPRON DEPOT (3-MONTH)   Dose:  11.25 mg   Quantity:  1 each        Inject 11.25 mg into the muscle every 3 months   Refills:  3        levalbuterol 45 MCG/ACT Inhaler   Commonly known as:  XOPENEX HFA   Dose:  2 puff   Quantity:  1 Inhaler        Inhale 2 puffs into the lungs every 6 hours as needed for shortness of breath / dyspnea   Refills:  1        menthol 5 % Ptch   Commonly known as:  ICY HOT   Dose:  1 patch   Quantity:  15 patch        Apply 1 patch topically every 8 hours as needed for muscle soreness   Refills:  3        multivitamins with minerals Liqd liquid   Dose:  15 mL   Quantity:  1 Bottle        15 mLs by Per Feeding Tube route daily   Refills:  0        norethindrone 5 MG tablet   Commonly known as:  AYGESTIN   Dose:  5 mg   Quantity:  90 tablet        Take 1 tablet (5 mg) by mouth daily   Refills:  1        nortriptyline 10 MG capsule   Commonly known as:  PAMELOR   Dose:  30 mg   Quantity:  120 capsule        Take 3 capsules (30 mg) by mouth At Bedtime   Refills:  0        ondansetron 4 MG ODT tab   Commonly known as:  ZOFRAN ODT   Dose:  4 mg   Quantity:  15 tablet        Take 1 tablet (4 mg) by mouth every 8 hours as needed for nausea or vomiting   Refills:  0        order for DME   Quantity:  1 Units        Equipment being ordered: TENS with wire and electrode.   Refills:  0        oxyCODONE 5  MG/5ML solution   Commonly known as:  ROXICODONE   Quantity:  600 mL        Take 10 mL (10 mg) by mouth every 6 hours as needed, maximum 40 mL per day.   Refills:  0        pantoprazole 40 MG EC tablet   Commonly known as:  PROTONIX   Dose:  40 mg   Quantity:  30 tablet        Take 1 tablet (40 mg) by mouth 2 times daily (before meals)   Refills:  1        polyethylene glycol Packet   Commonly known as:  MIRALAX/GLYCOLAX   Dose:  17 g   Quantity:  7 packet        Take 17 g by mouth daily   Refills:  0        RIZATRIPTAN BENZOATE PO   Dose:  5 mg        Take 5 mg by mouth once as needed   Refills:  0        scopolamine 72 hr patch   Commonly known as:  TRANSDERM   Quantity:  2 patch        Apply 1 patch to hairless area behind one ear if severe nausea and vomiting.  Remove old patch and change every 3 days (72 hours).   Refills:  0        senna-docusate 8.6-50 MG per tablet   Commonly known as:  SENOKOT-S;PERICOLACE   Dose:  1 tablet   Quantity:  100 tablet        Take 1 tablet by mouth 2 times daily as needed for constipation   Refills:  0        sodium bicarbonate 325 MG tablet   Dose:  325 mg   Quantity:  120 tablet        1 tablet (325 mg) by Per Feeding Tube route 4 times daily   Refills:  0        * Notice:  This list has 2 medication(s) that are the same as other medications prescribed for you. Read the directions carefully, and ask your doctor or other care provider to review them with you.            Procedures and tests performed during your visit     KUB XR      Orders Needing Specimen Collection     None      Pending Results     No orders found from 3/23/2018 to 3/26/2018.            Pending Culture Results     No orders found from 3/23/2018 to 3/26/2018.            Pending Results Instructions     If you had any lab results that were not finalized at the time of your Discharge, you can call the ED Lab Result RN at [phone]. You will be contacted by this team for any positive Lab results or changes in  treatment. The nurses are available 7 days a week from 10A to 6:30P.  You can leave a message 24 hours per day and they will return your call.        Thank you for choosing [facility]       Thank you for choosing [facility] for your care. Our goal is always to provide you with excellent care. Hearing back from our patients is one way we can continue to improve our services. Please take a few minutes to complete the written survey that you may receive in the mail after you visit with us. Thank you!        [employer] Information     [employer] gives you secure access to your electronic health record. If you see a primary care provider, you can also send messages to your care team and make appointments. If you have questions, please call your primary care clinic.  If you do not have a primary care provider, please call [phone] and they will assist you.        Care EveryWhere ID     This is your Care EveryWhere ID. This could be used by other organizations to access your [facility] medical records  [id number]        Equal Access to Services     RACHAEL BENITEZ : Bret Enamorado, alcon beck, lashell santillan, maki smallwood. So [facility] [phone].    ATENCIÓN: Si habla español, tiene a quijano disposición servicios gratuitos de asistencia lingüística. Llame al [phone].    We comply with applicable federal civil rights laws and [place] laws. We do not discriminate on the basis of race, color, national origin, age, disability, sex, sexual orientation, or gender identity.            After Visit Summary       This is your record. Keep this with you and show to your community pharmacist(s) and doctor(s) at your next visit.

## 2024-03-26 NOTE — PROGRESS NOTES
"Chief Complaint  Cyst (Pt presents here today with a knots on her neck that goes to her shoulder )    Subjective        Neha Guillory presents to [facility]  Patient presents accompanied by her daughter for evaluation of a left neck/shoulder mass.  This is an 87-year-old female.  This began 2 months ago and has rapidly worsened.  She noticed some \"hard lumps\" to the left lateral neck that have gradually spread down the left side of her lateral neck and into the shoulder.  The largest area of masses are at the peak of the shoulder, left side.  No pain but she is having some trouble turning her neck to the side.  Of note she has a history of breast cancer status postlumpectomy/lymph node removal April 2022.  Her oncologist is Dr. Dill.  Denies any other new symptoms today.      Objective   Vital Signs:  /80 (BP Location: Right arm, Patient Position: Sitting, Cuff Size: Large Adult)   Pulse 67   Temp 97.2 °F (36.2 °C)   Resp 18   Ht 149.9 cm (59\")   Wt 60.3 kg (133 lb)   SpO2 94%   BMI 26.86 kg/m²   Estimated body mass index is 26.86 kg/m² as calculated from the following:    Height as of this encounter: 149.9 cm (59\").    Weight as of this encounter: 60.3 kg (133 lb).          Physical Exam  Vitals and nursing note reviewed.   Constitutional:       General: She is not in acute distress.     Appearance: Normal appearance. She is well-developed. She is not ill-appearing, toxic-appearing or diaphoretic.   HENT:      Head: Normocephalic and atraumatic.      Right Ear: External ear normal.      Left Ear: External ear normal.   Eyes:      Pupils: Pupils are equal, round, and reactive to light.   Cardiovascular:      Rate and Rhythm: Normal rate and regular rhythm.      Pulses: Normal pulses.      Heart sounds: Normal heart sounds.   Pulmonary:      Effort: Pulmonary effort is normal. No respiratory distress.      Breath sounds: Normal breath sounds. No stridor. No wheezing, " The patient is a 21-year-old woman with a history of chronic pancreatitis.  She had her gallbladder removed at the age of 15 and has had severe pancreatitis since that time.  She has been seen by Dr. Narayanan here at the [facility] and has had an ERCP and stent placed.  She notes that she has pancreatitis flares approximately every 3 months.  And presents today for suggestions about treatments for her pain during these pancreatitis flares.  She states that her flares occur every 3 months.  During her flares she states that she gets fluids and pain medications namely oxycodone 10-15 mg every 4-6 hours when necessary.  Her pain is alleviated by utilizing a low-fat diet, fruit, veggie's, poultry and fish, and relaxation techniques.  She states that her pain is exacerbated when she straightens from this diet.  She has used several different modalities to treat her pain.  These include Tylenol oxycodone gabapentin Pamelor and acupuncture. She states that the gabapentin causes sedation.  She presents today for initial evaluation and treatment modalities for her pain.  She asked specifically about celiac plexus blockade.  Current Outpatient Prescriptions   Medication     gabapentin (NEURONTIN) 400 MG capsule     oxyCODONE IR (ROXICODONE) 10 MG tablet     oxyCODONE (ROXICODONE) 5 MG/5ML solution     nortriptyline (PAMELOR) 10 MG capsule     ondansetron (ZOFRAN ODT) 4 MG ODT tab     amylase-lipase-protease (ZENPEP) 42009 UNITS CPEP     amylase-lipase-protease (ZENPEP) 44968 UNITS CPEP     senna-docusate (SENOKOT-S;PERICOLACE) 8.6-50 MG per tablet     pantoprazole (PROTONIX) 40 MG EC tablet     polyethylene glycol (MIRALAX/GLYCOLAX) Packet     gabapentin (NEURONTIN) 300 MG capsule     norethindrone (AYGESTIN) 5 MG tablet     fluticasone (FLONASE) 50 MCG/ACT spray     levalbuterol (XOPENEX HFA) 45 MCG/ACT Inhaler     leuprolide (LUPRON DEPOT) 11.25 MG injection     RIZATRIPTAN BENZOATE PO     No current  "facility-administered medications for this visit.      Allergies   Allergen Reactions     Amoxicillin-Pot Clavulanate Nausea and Vomiting     Compazine [Prochlorperazine] Other (See Comments)     Dystonia       Hyoscyamine Other (See Comments)     Dystonia     Reglan [Metoclopramide Hcl] Other (See Comments)     Dystonia     Zyprexa Other (See Comments)     Sensitive, dystonic reaction on 11-9-2011     Amitriptyline Hcl Other (See Comments)     Dystonia, hallucinations     Buspirone Other (See Comments)     No Adverse Reactions, no benefit     Cogentin [Benztropine]      Cyproheptadine Other (See Comments)     Distonic     Dicyclomine Other (See Comments)     Droperidol Other (See Comments)     Feels tense and \"like she has to jump out of her skin\".       Effexor [Venlafaxine] Other (See Comments)     Dystonia     Food      Cilantro--lips/tongue swelling     No Clinical Screening - See Comments Other (See Comments)     Cilantro     Phenergan Dm [Promethazine-Dm] Other (See Comments)     dystonia     Promethazine Other (See Comments)     Risperidone Other (See Comments)     dystonia     Vistaril Other (See Comments)     Burning sensation.       Augmentin GI Disturbance     Ketamine Other (See Comments)     jittery     Sorbitol GI Disturbance     Headache and dyspepsia     Past Medical History:   Diagnosis Date     Anxiety      Asthma      Cholecystitis     s/p cholecystectomy     Chronic abdominal pain      Chronic infection     mrsa     Chronic pain      Cyclic vomiting syndrome 10/27/2012     Depression      Endometriosis      Hypoglycaemia      Migraines      Mild intermittent asthma      Ovarian cysts      Pancreatic disease      PONV (postoperative nausea and vomiting)      Pseudoseizures      Somatoform disorder      Sphincter of Oddi dysfunction      Vasovagal syncope      Past Surgical History:   Procedure Laterality Date     ABDOMEN SURGERY      ERCP, biliary stents     CHOLECYSTECTOMY  8/2/11     " rhonchi or rales.   Chest:      Chest wall: No tenderness.   Abdominal:      General: Bowel sounds are normal. There is no distension.      Palpations: Abdomen is soft.      Tenderness: There is no abdominal tenderness.   Musculoskeletal:         General: Normal range of motion.      Left shoulder: Deformity present.        Arms:       Cervical back: Normal range of motion and neck supple.   Skin:     General: Skin is warm and dry.      Capillary Refill: Capillary refill takes less than 2 seconds.   Neurological:      General: No focal deficit present.      Mental Status: She is alert and oriented to person, place, and time. Mental status is at baseline.   Psychiatric:         Mood and Affect: Mood normal.         Behavior: Behavior normal.         Thought Content: Thought content normal.         Judgment: Judgment normal.        Result Review :  The following data was reviewed by: ENZO Larry on 08/24/2022:  Common labs    Common Labsle 4/11/22 4/11/22 6/27/22 7/5/22 7/5/22 7/5/22 7/5/22    0923 0923  1045 1045 1045 1045   Glucose  129 (A)    93    BUN  21 (A)    20    Creatinine  0.90    1.08 (A)    Sodium  139    144    Potassium  4.7    4.5    Chloride  102    108 (A)    Calcium  10.1    9.5    Total Protein      6.0    Albumin  4.10    3.8    Total Bilirubin  0.6    0.5    Alkaline Phosphatase  81    49    AST (SGOT)  29    19    ALT (SGPT)  21    20    WBC 8.37  11.55 (A)    9.0   Hemoglobin 13.6  12.5    12.5   Hematocrit 42.7  37.6    38.8   Platelets 244  214    244   Total Cholesterol     101     Triglycerides     83     HDL Cholesterol     43     LDL Cholesterol      41     Hemoglobin A1C    5.9 (A)      (A) Abnormal value       Comments are available for some flowsheets but are not being displayed.           Current outpatient and discharge medications have been reconciled for the patient.  Reviewed by: ENZO Larry           Assessment and Plan   Diagnoses and all orders for this  COLONOSCOPY  2011    negative finding     ENDOSCOPIC RETROGRADE CHOLANGIOPANCREATOGRAM  8/23/2011    Procedure:ENDOSCOPIC RETROGRADE CHOLANGIOPANCREATOGRAM; Endoscopic Retrograde Cholangiopancreatogram; Surgeon:SHORTY MCCOY; Location:[facility]     ENDOSCOPIC RETROGRADE CHOLANGIOPANCREATOGRAM  5/17/2012    Procedure:ENDOSCOPIC RETROGRADE CHOLANGIOPANCREATOGRAM; Endoscopic Retrograde Cholangiopancreatogram with pancreatic stent placement.; Surgeon:SHORTY MCCOY; Location:[facility]     ENDOSCOPIC RETROGRADE CHOLANGIOPANCREATOGRAM COMPLEX  1/3/2012    Procedure:ENDOSCOPIC RETROGRADE CHOLANGIOPANCREATOGRAM COMPLEX; Endoscopic Retrograde Cholangiopancreatogram with Manometry bile duct sphincterotomy extention pancreatic duct sphincterotomy pancreatic duct stent placement; Surgeon:SHORTY MCCOY; Location:[facility]     ENDOSCOPIC ULTRASOUND UPPER GASTROINTESTINAL TRACT (GI) N/A 6/9/2015    Procedure: ENDOSCOPIC ULTRASOUND, ESOPHAGOSCOPY / UPPER GASTROINTESTINAL TRACT (GI);  Surgeon: Mario Joe MD;  Location: [facility]     ENDOSCOPIC ULTRASOUND UPPER GASTROINTESTINAL TRACT (GI) N/A 12/12/2016    Procedure: ENDOSCOPIC ULTRASOUND, ESOPHAGOSCOPY / UPPER GASTROINTESTINAL TRACT (GI);  Surgeon: Guru Jose Klein MD;  [facility]: [facility]     ESOPHAGOSCOPY, GASTROSCOPY, DUODENOSCOPY (EGD), COMBINED  1/18/2012    Procedure:COMBINED ESOPHAGOSCOPY, GASTROSCOPY, DUODENOSCOPY (EGD); Surgeon:ARNIE ESPINOZA; [facility]     ESOPHAGOSCOPY, GASTROSCOPY, DUODENOSCOPY (EGD), COMBINED  1/18/2012    Procedure:COMBINED ESOPHAGOSCOPY, GASTROSCOPY, DUODENOSCOPY (EGD); EGD; Surgeon:ARNIE ESPINOZA; [facility]     ESOPHAGOSCOPY, GASTROSCOPY, DUODENOSCOPY (EGD), COMBINED N/A 12/9/2017    Procedure: COMBINED ESOPHAGOSCOPY, GASTROSCOPY, DUODENOSCOPY (EGD);;  Surgeon: Josias Chan MD;  Location: [facility]     INSERT PORT VASCULAR ACCESS       L knee arthroscopy  2009     LAPAROSCOPY DIAGNOSTIC (GYN)  10/26/2012     visit:    1. Mass of lateral neck (Primary)  -     CBC & Differential  -     CT Chest With Contrast; Future  -     CT soft tissue neck w contrast; Future    2. Mass of joint of left shoulder  -     CBC & Differential  -     CT Chest With Contrast; Future  -     CT soft tissue neck w contrast; Future    3. Carcinoma of upper-inner quadrant of left breast in female, estrogen receptor positive (HCC)  -     CBC & Differential  -     CT Chest With Contrast; Future  -     CT soft tissue neck w contrast; Future      CBC with differential today ordered along with CT of the chest and neck for further evaluation.  I am concerned for metastasis/lymph node involvement given her recent history of breast cancer.    We will contact patient with results and further recommendations and I have sent a note to her oncologist, Dr. Dill as well.  Follow-up as needed and at next scheduled office visit.         Follow Up   Return if symptoms worsen or fail to improve, for Next scheduled follow up.  Patient was given instructions and counseling regarding her condition or for health maintenance advice. Please see specific information pulled into the AVS if appropriate.        Procedure: LAPAROSCOPY DIAGNOSTIC (GYN);  LAPAROSCOPY DIAGNOSTIC, CAUTERY ENDOMETRIOISIS and biopsy of Fallopian tube lesions;  Surgeon: Carla Lopez MD;  Location: [facility]     ORTHOPEDIC SURGERY  2008    knee     VASCULAR SURGERY       Social History     Social History     Marital status: Single     Spouse name: N/A     Number of children: N/A     Years of education: N/A     Occupational History     Not on file.     Social History Main Topics     Smoking status: Never Smoker     Smokeless tobacco: Never Used     Alcohol use No     Drug use: No     Sexual activity: No     Other Topics Concern     Parent/Sibling W/ Cabg, Mi Or Angioplasty Before 65f 55m? 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  Physical Exam   Constitutional: She is oriented to person, place, and time. She appears well-developed and well-nourished.   HENT:   Head: Normocephalic and atraumatic.   Right Ear: External ear normal.   Left Ear: External ear normal.   Nose: Nose normal.   Mouth/Throat: Oropharynx is clear and moist.   Eyes: Conjunctivae and EOM are normal. Pupils are equal, round, and reactive to light.   Neck: Normal range of motion. Neck supple.   Cardiovascular: Normal rate, regular rhythm, normal heart sounds and intact distal pulses.    Pulmonary/Chest: Effort normal and breath sounds normal.   Abdominal: She exhibits no distension and no mass. There is tenderness. There is guarding. There is no rebound.   Musculoskeletal: Normal range of motion.   Neurological: She is alert and oriented to person, place, and time. She has normal reflexes.   Skin: Skin is warm and dry.   Psychiatric: She has a normal mood and affect.   Nursing note and vitals  VITAL SIGNS: I have reviewed nursing notes and confirm.  CONSTITUTIONAL:  in no acute distress.  SKIN: Skin exam is warm and dry, no acute rash.  HEAD: Normocephalic; atraumatic.  EYES: PERRL, EOM intact; conjunctiva and sclera clear.  ENT: No nasal discharge; airway clear. Throat clear.  NECK: Supple; non tender.    CARD: Regular rate and rhythm.  RESP: No wheezes,  no rales or rhonchi.   ABD:  soft; non-distended; non-tender;   EXT: Normal ROM. No clubbing, cyanosis or edema.  NEURO: Alert, oriented. Grossly unremarkable. No focal deficits.  moves all extremities,  normal gait   PSYCH: Cooperative, appropriate. reviewed.  A/P:Patient is a  23 y/o lady with chronic abdominal pain who presents for initial evaluation.  It is clear that the most likely eiology of her pain is pancreatitic flares and in an effort to decrease the frequency of her flares and decrease the duration of flares, I recommend  1. Pain psychology  2. Accuuncture  3. Increase gabapentin dose to 400 mg tid  4. Oxycodone 10 mg  Tid during flares (which normally last two weeks)  5. RTC in 3 weeks      Answers for HPI/ROS submitted by the patient on 12/20/2017   General Symptoms: Yes  Skin Symptoms: No  HENT Symptoms: Yes  EYE SYMPTOMS: No  HEART SYMPTOMS: No  LUNG SYMPTOMS: Yes  INTESTINAL SYMPTOMS: Yes  URINARY SYMPTOMS: Yes  GYNECOLOGIC SYMPTOMS: No  BREAST SYMPTOMS: No  SKELETAL SYMPTOMS: Yes  BLOOD SYMPTOMS: No  NERVOUS SYSTEM SYMPTOMS: No  MENTAL HEALTH SYMPTOMS: No  Fever: Yes  Loss of appetite: Yes  Weight loss: Yes  Fatigue: Yes  Night sweats: Yes  Chills: Yes  Increased stress: Yes  Excessive hunger: No  Excessive thirst: No  Feeling hot or cold when others believe the temperature is normal: Yes  Loss of height: No  Post-operative complications: No  Surgical site pain: No  Hallucinations: No  Change in or Loss of Energy: Yes  Hyperactivity: No  Confusion: No  Ear pain: No  Ear discharge: No  Hearing loss: No  Tinnitus: No  Nosebleeds: No  Congestion: Yes  Sinus pain: Yes  Trouble swallowing: No   Voice hoarseness: No  Mouth sores: No  Sore throat: No  Tooth pain: No  Gum tenderness: No  Bleeding gums: No  Change in taste: No  Change in sense of smell: No  Dry mouth: No  Hearing aid used: No  Neck lump: No  Cough: Yes  Sputum or phlegm: Yes  Coughing up blood: No  Difficulty breating or shortness of breath: Yes  Snoring: No  Wheezing: No  Difficulty breathing on exertion: Yes  Nighttime Cough: Yes  Difficulty breathing when lying flat: No  Heart burn or indigestion: No  Nausea: Yes  Vomiting: Yes  Abdominal pain: Yes  Bloating: Yes  Constipation:  No  Diarrhea: Yes  Blood in stool: No  Black stools: No  Rectal or Anal pain: No  Fecal incontinence: No  Yellowing of skin or eyes: No  Vomit with blood: Yes  Change in stools: Yes  Trouble holding urine or incontinence: No  Pain or burning: Yes  Trouble starting or stopping: No  Increased frequency of urination: Yes  Blood in urine: No  Decreased frequency of urination: No  Frequent nighttime urination: No  Flank pain: No  Difficulty emptying bladder: No  Back pain: No  Muscle aches: Yes  Neck pain: No  Swollen joints: No  Joint pain: No  Bone pain: Yes  Muscle cramps: Yes  Muscle weakness: Yes  Joint stiffness: No  Bone fracture: No  TANISHA 7 TOTAL SCORE: 2

## 2024-04-07 RX ORDER — HEPARIN SODIUM (PORCINE) LOCK FLUSH IV SOLN 100 UNIT/ML 100 UNIT/ML
500 SOLUTION INTRAVENOUS EVERY 8 HOURS
Start: 2024-04-07

## 2024-04-07 RX ORDER — DIPHENHYDRAMINE HYDROCHLORIDE 50 MG/ML
25 INJECTION INTRAMUSCULAR; INTRAVENOUS DAILY PRN
Start: 2024-04-07

## 2024-04-07 RX ORDER — ONDANSETRON 2 MG/ML
4 INJECTION INTRAMUSCULAR; INTRAVENOUS ONCE
Start: 2024-04-07 | End: 2024-04-07

## 2024-04-07 RX ORDER — ONDANSETRON 2 MG/ML
4 INJECTION INTRAMUSCULAR; INTRAVENOUS DAILY PRN
Start: 2024-04-07

## 2024-04-07 RX ORDER — DIPHENHYDRAMINE HYDROCHLORIDE 50 MG/ML
25 INJECTION INTRAMUSCULAR; INTRAVENOUS ONCE
Start: 2024-04-07 | End: 2024-04-07

## 2024-04-07 RX ORDER — DEXTROSE, SODIUM CHLORIDE, SODIUM LACTATE, POTASSIUM CHLORIDE, AND CALCIUM CHLORIDE 5; .6; .31; .03; .02 G/100ML; G/100ML; G/100ML; G/100ML; G/100ML
2000 INJECTION, SOLUTION INTRAVENOUS ONCE
Start: 2024-04-07 | End: 2024-04-07

## 2024-04-30 ENCOUNTER — TELEPHONE (OUTPATIENT)
Dept: GASTROENTEROLOGY | Facility: CLINIC | Age: 29
End: 2024-04-30

## 2024-04-30 NOTE — TELEPHONE ENCOUNTER
[facility] Call Center    Phone Message    May a detailed message be left on voicemail: yes     Reason for Call: Other: Patient has moved and she used to get her infusion orders from Dr. Narayanan.  Her new doctor, Dr. Romo, at [facility] in [facility] wants to see the orders that Dr. Narayanan wrote so that they are written the same way.  Please fax over a copy of an infusion order to Dr. Romo at [phone], Attn: Dr. Romo.  Any questions, call patient.     Action Taken: Message routed to:  [facility] ([facility] Endy Yates Team     Travel Screening: Not Applicable

## 2024-06-29 ENCOUNTER — HEALTH MAINTENANCE LETTER (OUTPATIENT)
Age: 29
End: 2024-06-29

## 2025-04-08 RX ORDER — ONDANSETRON 2 MG/ML
4 INJECTION INTRAMUSCULAR; INTRAVENOUS DAILY PRN
Start: 2025-04-08

## 2025-04-08 RX ORDER — DIPHENHYDRAMINE HYDROCHLORIDE 50 MG/ML
25 INJECTION, SOLUTION INTRAMUSCULAR; INTRAVENOUS DAILY PRN
Start: 2025-04-08

## 2025-04-08 RX ORDER — HEPARIN SODIUM (PORCINE) LOCK FLUSH IV SOLN 100 UNIT/ML 100 UNIT/ML
500 SOLUTION INTRAVENOUS EVERY 8 HOURS
Start: 2025-04-08

## 2025-04-08 RX ORDER — ONDANSETRON 2 MG/ML
4 INJECTION INTRAMUSCULAR; INTRAVENOUS ONCE
Start: 2025-04-08 | End: 2025-04-08

## 2025-04-08 RX ORDER — DIPHENHYDRAMINE HYDROCHLORIDE 50 MG/ML
25 INJECTION, SOLUTION INTRAMUSCULAR; INTRAVENOUS ONCE
Start: 2025-04-08 | End: 2025-04-08

## 2025-04-08 RX ORDER — DEXTROSE, SODIUM CHLORIDE, SODIUM LACTATE, POTASSIUM CHLORIDE, AND CALCIUM CHLORIDE 5; .6; .31; .03; .02 G/100ML; G/100ML; G/100ML; G/100ML; G/100ML
2000 INJECTION, SOLUTION INTRAVENOUS ONCE
Start: 2025-04-08 | End: 2025-04-08

## 2025-05-21 NOTE — LETTER
11/22/2017     RE: Alexandra Melgoza  [address]  [facility] [address]     Dear Colleague,    Thank you for referring your patient, Alexandra Melgoza, to the [facility] at [facility]. Please see a copy of my visit note below.    HISTORY OF PRESENT ILLNESS:  Alexandra is very well known to me with intractable abdominal pain after presenting with post-cholecystectomy pain with mildly abnormal liver enzymes, not responding to sphincter of Oddi ablation a few years ago.  She has had increasingly intractable pain.  A year ago, we did a full evaluation for chronic pancreatitis.  She had normal pancreatic function test, 2/9 criteria at EUS and a completely normal pancreatic secretin MRCP with normal T1 signal, exocrine function, etc.  She has been living in [place] and has been in [facility] where they are discussing repeating all these tests.  They even suggested pancreatectomy islet autotransplant to be considered.  Her pain is epigastric, radiates to the back.  It is improved with enzyme replacement.  The only objective evidence is that she has had a persistently low serum lipase of as low as 8 done at other centers and now around 18 at [facility] lab.  Her stools are regular.      IMPRESSION/RECOMMENDATION:  We spent 25 minutes, more than 50% spent in counseling, discussing that clearly her pain syndrome seems to be deteriorating.  She is on narcotics, now on Norco 3 times a day and gabapentin, but she is not being managed in [facility].  Nobody in [place] will take her on.  We discussed that she does not have evidence to support chronic pancreatitis at present, even though she might.  Her MRCP is normal, EUS essentially normal and pancreatic function tests normal, has not had recurrent acute pancreatitis.  Therefore, we cannot possibly entertain pancreatectomy with islet autotransplant.  Which she needs is diagnostically a fecal  Pt last seen 2/18/25   elastase, a repeat secretin MRCP to see if there is any evidence now and then referral to our Pain Clinic.  She is willing to come from [place] and even eventually move here if that is what it takes. I would again recommended as usual against celiac plexus blocks as in the most young women with somatic pain syndrome.  We will see her in followup after that.      Twenty-five minutes, more than 50% in counseling.     Again, thank you for allowing me to participate in the care of your patient.      Sincerely,    Campbell Narayanan MD

## (undated) DEVICE — KIT ENDO FIRST STEP DISINFECTANT 200ML W/POUCH EP-4

## (undated) DEVICE — NDL COUNTER 20CT 31142493

## (undated) DEVICE — Device

## (undated) DEVICE — BIOPSY VALVE BIOSHIELD 00711135

## (undated) DEVICE — DRSG GAUZE 4X4" TRAY 6939

## (undated) DEVICE — SUCTION MANIFOLD DORNOCH ULTRA CART UL-CL500

## (undated) DEVICE — SOL WATER IRRIG 1000ML BOTTLE 2F7114

## (undated) DEVICE — WIPE PREMOIST CLEANSING WASHCLOTHS 7988

## (undated) DEVICE — DRAPE C-ARM W/STRAPS 42X72" 07-CA104

## (undated) DEVICE — ENDO FUSION OMNI-TOME 21 FS-OMNI-21 G48675

## (undated) DEVICE — GLOVE PROTEXIS POWDER FREE SMT 8.0  2D72PT80X

## (undated) DEVICE — DRSG DRAIN 2X2" 7087

## (undated) DEVICE — ENDO BITE BLOCK ADULT OMNI-BLOC

## (undated) DEVICE — ENDO TUBING CO2 SMARTCAP STERILE DISP 100145CO2EXT

## (undated) DEVICE — PREP CHLORAPREP 26ML TINTED ORANGE  260815

## (undated) DEVICE — PACK ENDOSCOPY GI CUSTOM UMMC

## (undated) DEVICE — SYR 30ML SLIP TIP W/O NDL 302833

## (undated) DEVICE — VALVE FEEDING TUBE LOPEZ STOPCOCK CLOSED SYS M9000

## (undated) DEVICE — TAPE CLOTH 3" CARDINAL 3TRCL03

## (undated) DEVICE — OSTOMY STOMADHESIVE 2OZ 79300

## (undated) DEVICE — KIT CONNECTOR FOR OLYMPUS ENDOSCOPES DEFENDO 100310

## (undated) DEVICE — PAD CHUX UNDERPAD 23X24" 7136

## (undated) DEVICE — TUBE TRANS GASTROJEJUNOSTOMY 18FRX45CM 0250-18

## (undated) DEVICE — SYR 50ML CATH TIP W/O NDL 309620

## (undated) DEVICE — INTR PEELAWAY 22FRX13CM G04096 PLVW-22.0-38

## (undated) DEVICE — BOWL 32OZ STERILE 01232

## (undated) DEVICE — GUIDEWIRE TERUMO .035X260 3CM STR TIP GS3504

## (undated) DEVICE — ENDO FORCEP ENDOJAW BIOPSY 2.8MMX160CM FB-220K

## (undated) DEVICE — GUIDEWIRE NOVAGOLD .018X260CM STR TIP M00552000

## (undated) DEVICE — LINEN TOWEL PACK X5 5464

## (undated) DEVICE — BAG URINARY DRAIN LUBRISIL IC 4000ML LF 253509A

## (undated) DEVICE — LABEL MEDICATION SYSTEM 3303-P

## (undated) DEVICE — GLIDEWIRE TERUMO .035X180 ANG STIFF GS3508

## (undated) DEVICE — DRAPE SHEET MED 44X70" 9355

## (undated) DEVICE — DEVICE SUTURE GRASPER TROCAR CLOSURE 14GA PMITCSG

## (undated) DEVICE — SU VICRYL 0 TIE 12X18" J906G

## (undated) DEVICE — TUBE GASTROSTOMY PEG SET 24FR G22636 PEG-24-PULL-S

## (undated) RX ORDER — ONDANSETRON 2 MG/ML
INJECTION INTRAMUSCULAR; INTRAVENOUS
Status: DISPENSED
Start: 2018-04-30

## (undated) RX ORDER — FENTANYL CITRATE 50 UG/ML
INJECTION, SOLUTION INTRAMUSCULAR; INTRAVENOUS
Status: DISPENSED
Start: 2017-12-09

## (undated) RX ORDER — DEXAMETHASONE SODIUM PHOSPHATE 4 MG/ML
INJECTION, SOLUTION INTRA-ARTICULAR; INTRALESIONAL; INTRAMUSCULAR; INTRAVENOUS; SOFT TISSUE
Status: DISPENSED
Start: 2019-06-18

## (undated) RX ORDER — PROPOFOL 10 MG/ML
INJECTION, EMULSION INTRAVENOUS
Status: DISPENSED
Start: 2018-01-18

## (undated) RX ORDER — PHENYLEPHRINE HCL IN 0.9% NACL 1 MG/10 ML
SYRINGE (ML) INTRAVENOUS
Status: DISPENSED
Start: 2019-06-18

## (undated) RX ORDER — SCOLOPAMINE TRANSDERMAL SYSTEM 1 MG/1
PATCH, EXTENDED RELEASE TRANSDERMAL
Status: DISPENSED
Start: 2018-02-08

## (undated) RX ORDER — DIPHENHYDRAMINE HYDROCHLORIDE 50 MG/ML
INJECTION INTRAMUSCULAR; INTRAVENOUS
Status: DISPENSED
Start: 2019-10-22

## (undated) RX ORDER — FENTANYL CITRATE 50 UG/ML
INJECTION, SOLUTION INTRAMUSCULAR; INTRAVENOUS
Status: DISPENSED
Start: 2018-02-08

## (undated) RX ORDER — FENTANYL CITRATE 50 UG/ML
INJECTION, SOLUTION INTRAMUSCULAR; INTRAVENOUS
Status: DISPENSED
Start: 2019-06-18

## (undated) RX ORDER — IOPAMIDOL 408 MG/ML
INJECTION, SOLUTION INTRATHECAL
Status: DISPENSED
Start: 2019-10-22

## (undated) RX ORDER — ONDANSETRON 2 MG/ML
INJECTION INTRAMUSCULAR; INTRAVENOUS
Status: DISPENSED
Start: 2018-01-18

## (undated) RX ORDER — DIPHENHYDRAMINE HYDROCHLORIDE 50 MG/ML
INJECTION INTRAMUSCULAR; INTRAVENOUS
Status: DISPENSED
Start: 2018-01-18

## (undated) RX ORDER — ONDANSETRON 2 MG/ML
INJECTION INTRAMUSCULAR; INTRAVENOUS
Status: DISPENSED
Start: 2018-09-14

## (undated) RX ORDER — ONDANSETRON 2 MG/ML
INJECTION INTRAMUSCULAR; INTRAVENOUS
Status: DISPENSED
Start: 2018-12-11

## (undated) RX ORDER — PROPOFOL 10 MG/ML
INJECTION, EMULSION INTRAVENOUS
Status: DISPENSED
Start: 2018-02-08

## (undated) RX ORDER — DIPHENHYDRAMINE HYDROCHLORIDE 50 MG/ML
INJECTION INTRAMUSCULAR; INTRAVENOUS
Status: DISPENSED
Start: 2020-04-21

## (undated) RX ORDER — SIMETHICONE 20 MG/.3ML
EMULSION ORAL
Status: DISPENSED
Start: 2018-06-20

## (undated) RX ORDER — ESMOLOL HYDROCHLORIDE 10 MG/ML
INJECTION INTRAVENOUS
Status: DISPENSED
Start: 2019-06-18

## (undated) RX ORDER — FENTANYL CITRATE 50 UG/ML
INJECTION, SOLUTION INTRAMUSCULAR; INTRAVENOUS
Status: DISPENSED
Start: 2020-04-21

## (undated) RX ORDER — HYDROMORPHONE HYDROCHLORIDE 1 MG/ML
INJECTION, SOLUTION INTRAMUSCULAR; INTRAVENOUS; SUBCUTANEOUS
Status: DISPENSED
Start: 2019-06-18

## (undated) RX ORDER — INDOMETHACIN 50 MG/1
SUPPOSITORY RECTAL
Status: DISPENSED
Start: 2018-01-18

## (undated) RX ORDER — FENTANYL CITRATE 50 UG/ML
INJECTION, SOLUTION INTRAMUSCULAR; INTRAVENOUS
Status: DISPENSED
Start: 2018-04-30

## (undated) RX ORDER — FENTANYL CITRATE 50 UG/ML
INJECTION, SOLUTION INTRAMUSCULAR; INTRAVENOUS
Status: DISPENSED
Start: 2018-03-27

## (undated) RX ORDER — PROPOFOL 10 MG/ML
INJECTION, EMULSION INTRAVENOUS
Status: DISPENSED
Start: 2018-03-27

## (undated) RX ORDER — IOPAMIDOL 408 MG/ML
INJECTION, SOLUTION INTRATHECAL
Status: DISPENSED
Start: 2018-04-30

## (undated) RX ORDER — SODIUM CHLORIDE, SODIUM LACTATE, POTASSIUM CHLORIDE, CALCIUM CHLORIDE 600; 310; 30; 20 MG/100ML; MG/100ML; MG/100ML; MG/100ML
INJECTION, SOLUTION INTRAVENOUS
Status: DISPENSED
Start: 2018-03-27

## (undated) RX ORDER — DIPHENHYDRAMINE HYDROCHLORIDE 50 MG/ML
INJECTION INTRAMUSCULAR; INTRAVENOUS
Status: DISPENSED
Start: 2018-02-08

## (undated) RX ORDER — PROPOFOL 10 MG/ML
INJECTION, EMULSION INTRAVENOUS
Status: DISPENSED
Start: 2019-06-18

## (undated) RX ORDER — SIMETHICONE 20 MG/.3ML
EMULSION ORAL
Status: DISPENSED
Start: 2017-12-09

## (undated) RX ORDER — DIPHENHYDRAMINE HYDROCHLORIDE 50 MG/ML
INJECTION INTRAMUSCULAR; INTRAVENOUS
Status: DISPENSED
Start: 2021-08-11

## (undated) RX ORDER — SODIUM CHLORIDE, SODIUM LACTATE, POTASSIUM CHLORIDE, CALCIUM CHLORIDE 600; 310; 30; 20 MG/100ML; MG/100ML; MG/100ML; MG/100ML
INJECTION, SOLUTION INTRAVENOUS
Status: DISPENSED
Start: 2018-02-08

## (undated) RX ORDER — ONDANSETRON 2 MG/ML
INJECTION INTRAMUSCULAR; INTRAVENOUS
Status: DISPENSED
Start: 2018-03-27

## (undated) RX ORDER — LORAZEPAM 2 MG/ML
INJECTION INTRAMUSCULAR
Status: DISPENSED
Start: 2018-01-18

## (undated) RX ORDER — DIPHENHYDRAMINE HYDROCHLORIDE 50 MG/ML
INJECTION INTRAMUSCULAR; INTRAVENOUS
Status: DISPENSED
Start: 2018-03-27

## (undated) RX ORDER — HEPARIN SODIUM (PORCINE) LOCK FLUSH IV SOLN 100 UNIT/ML 100 UNIT/ML
SOLUTION INTRAVENOUS
Status: DISPENSED
Start: 2018-06-20

## (undated) RX ORDER — ONDANSETRON 2 MG/ML
INJECTION INTRAMUSCULAR; INTRAVENOUS
Status: DISPENSED
Start: 2019-06-18

## (undated) RX ORDER — FENTANYL CITRATE 50 UG/ML
INJECTION, SOLUTION INTRAMUSCULAR; INTRAVENOUS
Status: DISPENSED
Start: 2018-01-18

## (undated) RX ORDER — DEXAMETHASONE SODIUM PHOSPHATE 4 MG/ML
INJECTION, SOLUTION INTRA-ARTICULAR; INTRALESIONAL; INTRAMUSCULAR; INTRAVENOUS; SOFT TISSUE
Status: DISPENSED
Start: 2018-01-18

## (undated) RX ORDER — ONDANSETRON 2 MG/ML
INJECTION INTRAMUSCULAR; INTRAVENOUS
Status: DISPENSED
Start: 2018-02-08

## (undated) RX ORDER — KETOROLAC TROMETHAMINE 30 MG/ML
INJECTION, SOLUTION INTRAMUSCULAR; INTRAVENOUS
Status: DISPENSED
Start: 2018-03-27

## (undated) RX ORDER — LIDOCAINE HYDROCHLORIDE 20 MG/ML
INJECTION, SOLUTION EPIDURAL; INFILTRATION; INTRACAUDAL; PERINEURAL
Status: DISPENSED
Start: 2019-06-18

## (undated) RX ORDER — SILVER SULFADIAZINE 10 MG/G
CREAM TOPICAL
Status: DISPENSED
Start: 2019-06-18

## (undated) RX ORDER — DEXAMETHASONE SODIUM PHOSPHATE 4 MG/ML
INJECTION, SOLUTION INTRA-ARTICULAR; INTRALESIONAL; INTRAMUSCULAR; INTRAVENOUS; SOFT TISSUE
Status: DISPENSED
Start: 2018-03-27

## (undated) RX ORDER — ESMOLOL HYDROCHLORIDE 10 MG/ML
INJECTION INTRAVENOUS
Status: DISPENSED
Start: 2018-01-18

## (undated) RX ORDER — IOPAMIDOL 408 MG/ML
INJECTION, SOLUTION INTRATHECAL
Status: DISPENSED
Start: 2018-09-14

## (undated) RX ORDER — DIPHENHYDRAMINE HYDROCHLORIDE 50 MG/ML
INJECTION INTRAMUSCULAR; INTRAVENOUS
Status: DISPENSED
Start: 2017-12-09

## (undated) RX ORDER — FENTANYL CITRATE 50 UG/ML
INJECTION, SOLUTION INTRAMUSCULAR; INTRAVENOUS
Status: DISPENSED
Start: 2018-09-14

## (undated) RX ORDER — OXYMETAZOLINE HYDROCHLORIDE 0.05 G/100ML
SPRAY NASAL
Status: DISPENSED
Start: 2018-01-18

## (undated) RX ORDER — FENTANYL CITRATE 50 UG/ML
INJECTION, SOLUTION INTRAMUSCULAR; INTRAVENOUS
Status: DISPENSED
Start: 2021-08-11

## (undated) RX ORDER — SODIUM CHLORIDE, SODIUM LACTATE, POTASSIUM CHLORIDE, CALCIUM CHLORIDE 600; 310; 30; 20 MG/100ML; MG/100ML; MG/100ML; MG/100ML
INJECTION, SOLUTION INTRAVENOUS
Status: DISPENSED
Start: 2018-01-18

## (undated) RX ORDER — SODIUM CHLORIDE, SODIUM LACTATE, POTASSIUM CHLORIDE, CALCIUM CHLORIDE 600; 310; 30; 20 MG/100ML; MG/100ML; MG/100ML; MG/100ML
INJECTION, SOLUTION INTRAVENOUS
Status: DISPENSED
Start: 2019-06-18

## (undated) RX ORDER — LIDOCAINE HYDROCHLORIDE 20 MG/ML
INJECTION, SOLUTION EPIDURAL; INFILTRATION; INTRACAUDAL; PERINEURAL
Status: DISPENSED
Start: 2018-03-27

## (undated) RX ORDER — HEPARIN SODIUM (PORCINE) LOCK FLUSH IV SOLN 100 UNIT/ML 100 UNIT/ML
SOLUTION INTRAVENOUS
Status: DISPENSED
Start: 2018-09-14

## (undated) RX ORDER — DIPHENHYDRAMINE HYDROCHLORIDE 50 MG/ML
INJECTION INTRAMUSCULAR; INTRAVENOUS
Status: DISPENSED
Start: 2018-09-14

## (undated) RX ORDER — SCOLOPAMINE TRANSDERMAL SYSTEM 1 MG/1
PATCH, EXTENDED RELEASE TRANSDERMAL
Status: DISPENSED
Start: 2018-03-27

## (undated) RX ORDER — KETOROLAC TROMETHAMINE 30 MG/ML
INJECTION, SOLUTION INTRAMUSCULAR; INTRAVENOUS
Status: DISPENSED
Start: 2018-02-08

## (undated) RX ORDER — DIPHENHYDRAMINE HYDROCHLORIDE 50 MG/ML
INJECTION INTRAMUSCULAR; INTRAVENOUS
Status: DISPENSED
Start: 2018-06-20

## (undated) RX ORDER — EPHEDRINE SULFATE 50 MG/ML
INJECTION, SOLUTION INTRAMUSCULAR; INTRAVENOUS; SUBCUTANEOUS
Status: DISPENSED
Start: 2019-06-18

## (undated) RX ORDER — IOPAMIDOL 408 MG/ML
INJECTION, SOLUTION INTRATHECAL
Status: DISPENSED
Start: 2021-08-11

## (undated) RX ORDER — IOPAMIDOL 408 MG/ML
INJECTION, SOLUTION INTRATHECAL
Status: DISPENSED
Start: 2018-12-11

## (undated) RX ORDER — ONDANSETRON 2 MG/ML
INJECTION INTRAMUSCULAR; INTRAVENOUS
Status: DISPENSED
Start: 2020-04-21

## (undated) RX ORDER — HEPARIN SODIUM (PORCINE) LOCK FLUSH IV SOLN 100 UNIT/ML 100 UNIT/ML
SOLUTION INTRAVENOUS
Status: DISPENSED
Start: 2018-12-11

## (undated) RX ORDER — FENTANYL CITRATE 50 UG/ML
INJECTION, SOLUTION INTRAMUSCULAR; INTRAVENOUS
Status: DISPENSED
Start: 2019-10-22

## (undated) RX ORDER — GLYCOPYRROLATE 0.2 MG/ML
INJECTION, SOLUTION INTRAMUSCULAR; INTRAVENOUS
Status: DISPENSED
Start: 2019-06-18

## (undated) RX ORDER — DIPHENHYDRAMINE HYDROCHLORIDE 50 MG/ML
INJECTION INTRAMUSCULAR; INTRAVENOUS
Status: DISPENSED
Start: 2018-04-30

## (undated) RX ORDER — LIDOCAINE HYDROCHLORIDE 20 MG/ML
INJECTION, SOLUTION EPIDURAL; INFILTRATION; INTRACAUDAL; PERINEURAL
Status: DISPENSED
Start: 2018-01-18

## (undated) RX ORDER — IOPAMIDOL 408 MG/ML
INJECTION, SOLUTION INTRATHECAL
Status: DISPENSED
Start: 2018-06-20

## (undated) RX ORDER — FENTANYL CITRATE 50 UG/ML
INJECTION, SOLUTION INTRAMUSCULAR; INTRAVENOUS
Status: DISPENSED
Start: 2018-12-11

## (undated) RX ORDER — SCOLOPAMINE TRANSDERMAL SYSTEM 1 MG/1
PATCH, EXTENDED RELEASE TRANSDERMAL
Status: DISPENSED
Start: 2018-01-18

## (undated) RX ORDER — LIDOCAINE 50 MG/G
OINTMENT TOPICAL
Status: DISPENSED
Start: 2018-04-04

## (undated) RX ORDER — IOPAMIDOL 408 MG/ML
INJECTION, SOLUTION INTRATHECAL
Status: DISPENSED
Start: 2020-04-21

## (undated) RX ORDER — DIPHENHYDRAMINE HYDROCHLORIDE 50 MG/ML
INJECTION INTRAMUSCULAR; INTRAVENOUS
Status: DISPENSED
Start: 2018-12-11

## (undated) RX ORDER — FENTANYL CITRATE 50 UG/ML
INJECTION, SOLUTION INTRAMUSCULAR; INTRAVENOUS
Status: DISPENSED
Start: 2018-06-20

## (undated) RX ORDER — DIPHENHYDRAMINE HYDROCHLORIDE 50 MG/ML
INJECTION INTRAMUSCULAR; INTRAVENOUS
Status: DISPENSED
Start: 2019-06-18

## (undated) RX ORDER — SIMETHICONE 20 MG/.3ML
EMULSION ORAL
Status: DISPENSED
Start: 2018-04-30

## (undated) RX ORDER — ROCURONIUM BROMIDE 50 MG/5 ML
SYRINGE (ML) INTRAVENOUS
Status: DISPENSED
Start: 2018-03-27